# Patient Record
Sex: FEMALE | Race: WHITE | Employment: PART TIME
[De-identification: names, ages, dates, MRNs, and addresses within clinical notes are randomized per-mention and may not be internally consistent; named-entity substitution may affect disease eponyms.]

---

## 2017-01-09 ENCOUNTER — OFFICE VISIT (OUTPATIENT)
Dept: OBGYN | Facility: CLINIC | Age: 37
End: 2017-01-09

## 2017-01-09 VITALS
RESPIRATION RATE: 16 BRPM | HEART RATE: 84 BPM | SYSTOLIC BLOOD PRESSURE: 138 MMHG | WEIGHT: 213 LBS | DIASTOLIC BLOOD PRESSURE: 71 MMHG | BODY MASS INDEX: 38.96 KG/M2

## 2017-01-09 DIAGNOSIS — R10.31 RLQ ABDOMINAL PAIN: Primary | ICD-10-CM

## 2017-01-09 PROCEDURE — 99203 OFFICE O/P NEW LOW 30 MIN: CPT | Performed by: SPECIALIST

## 2017-01-09 ASSESSMENT — ENCOUNTER SYMPTOMS
ABDOMINAL DISTENTION: 0
CONSTIPATION: 0
APNEA: 0
DIARRHEA: 0
EYE PAIN: 0
NAUSEA: 0
VOMITING: 0
COUGH: 0
ABDOMINAL PAIN: 1

## 2017-01-16 ENCOUNTER — OFFICE VISIT (OUTPATIENT)
Dept: OBGYN | Facility: CLINIC | Age: 37
End: 2017-01-16

## 2017-01-16 DIAGNOSIS — R10.31 RLQ ABDOMINAL PAIN: ICD-10-CM

## 2017-01-16 PROCEDURE — 76856 US EXAM PELVIC COMPLETE: CPT | Performed by: SPECIALIST

## 2017-01-16 PROCEDURE — 76830 TRANSVAGINAL US NON-OB: CPT | Performed by: SPECIALIST

## 2017-02-08 ENCOUNTER — HOSPITAL ENCOUNTER (EMERGENCY)
Age: 37
Discharge: HOME | End: 2017-02-08
Attending: EMERGENCY MEDICINE
Payer: COMMERCIAL

## 2017-02-08 VITALS
BODY MASS INDEX: 39.38 KG/M2 | WEIGHT: 214 LBS | OXYGEN SATURATION: 96 % | HEART RATE: 89 BPM | HEIGHT: 62 IN | DIASTOLIC BLOOD PRESSURE: 80 MMHG | SYSTOLIC BLOOD PRESSURE: 121 MMHG | TEMPERATURE: 97.7 F | RESPIRATION RATE: 16 BRPM

## 2017-02-08 DIAGNOSIS — S16.1XXA NECK STRAIN, INITIAL ENCOUNTER: Primary | ICD-10-CM

## 2017-02-08 PROCEDURE — 99282 EMERGENCY DEPT VISIT SF MDM: CPT

## 2017-02-08 RX ORDER — PREDNISONE 50 MG/1
50 TABLET ORAL DAILY
Qty: 4 TABLET | Refills: 0 | Status: SHIPPED | OUTPATIENT
Start: 2017-02-08 | End: 2017-02-27

## 2017-02-08 RX ORDER — HYDROCODONE BITARTRATE AND ACETAMINOPHEN 5; 325 MG/1; MG/1
1 TABLET ORAL EVERY 6 HOURS PRN
Qty: 10 TABLET | Refills: 0 | Status: SHIPPED | OUTPATIENT
Start: 2017-02-08 | End: 2017-02-15

## 2017-02-08 ASSESSMENT — PAIN DESCRIPTION - ORIENTATION: ORIENTATION: RIGHT;MID

## 2017-02-08 ASSESSMENT — PAIN DESCRIPTION - PAIN TYPE: TYPE: ACUTE PAIN

## 2017-02-08 ASSESSMENT — PAIN DESCRIPTION - DESCRIPTORS: DESCRIPTORS: ACHING

## 2017-02-08 ASSESSMENT — PAIN SCALES - GENERAL: PAINLEVEL_OUTOF10: 7

## 2017-02-08 ASSESSMENT — PAIN DESCRIPTION - LOCATION: LOCATION: NECK

## 2017-02-09 ENCOUNTER — OFFICE VISIT (OUTPATIENT)
Dept: ORTHOPEDIC SURGERY | Facility: CLINIC | Age: 37
End: 2017-02-09

## 2017-02-09 DIAGNOSIS — M50.20 CERVICAL DISC HERNIATION: ICD-10-CM

## 2017-02-09 DIAGNOSIS — M54.12 CERVICAL RADICULAR PAIN: Primary | ICD-10-CM

## 2017-02-09 DIAGNOSIS — M50.30 DEGENERATIVE DISC DISEASE, CERVICAL: ICD-10-CM

## 2017-02-09 PROCEDURE — 99213 OFFICE O/P EST LOW 20 MIN: CPT | Performed by: ORTHOPAEDIC SURGERY

## 2017-02-10 DIAGNOSIS — M54.12 CERVICAL RADICULAR PAIN: Primary | ICD-10-CM

## 2017-02-10 DIAGNOSIS — M50.30 DEGENERATIVE DISC DISEASE, CERVICAL: ICD-10-CM

## 2017-02-10 DIAGNOSIS — M50.20 CERVICAL DISC HERNIATION: ICD-10-CM

## 2017-02-13 ENCOUNTER — HOSPITAL ENCOUNTER (INPATIENT)
Age: 37
LOS: 1 days | Discharge: HOME | DRG: 202 | End: 2017-02-15
Attending: EMERGENCY MEDICINE | Admitting: INTERNAL MEDICINE
Payer: COMMERCIAL

## 2017-02-13 DIAGNOSIS — J45.901 ASTHMA WITH ACUTE EXACERBATION, UNSPECIFIED ASTHMA SEVERITY: Primary | ICD-10-CM

## 2017-02-13 LAB
ABSOLUTE EOS #: 0 K/UL (ref 0–0.4)
ABSOLUTE LYMPH #: 1.7 K/UL (ref 1–4.8)
ABSOLUTE MONO #: 0.2 K/UL (ref 0.1–1.3)
ANION GAP SERPL CALCULATED.3IONS-SCNC: 14 MMOL/L (ref 9–17)
BASOPHILS # BLD: 0 % (ref 0–2)
BASOPHILS ABSOLUTE: 0 K/UL (ref 0–0.2)
BUN BLDV-MCNC: 17 MG/DL (ref 6–20)
BUN/CREAT BLD: ABNORMAL (ref 9–20)
CALCIUM SERPL-MCNC: 8.9 MG/DL (ref 8.6–10.4)
CHLORIDE BLD-SCNC: 103 MMOL/L (ref 98–107)
CO2: 21 MMOL/L (ref 20–31)
CREAT SERPL-MCNC: 0.67 MG/DL (ref 0.5–0.9)
DIFFERENTIAL TYPE: ABNORMAL
EOSINOPHILS RELATIVE PERCENT: 0 % (ref 0–4)
GFR AFRICAN AMERICAN: >60 ML/MIN
GFR NON-AFRICAN AMERICAN: >60 ML/MIN
GFR SERPL CREATININE-BSD FRML MDRD: ABNORMAL ML/MIN/{1.73_M2}
GFR SERPL CREATININE-BSD FRML MDRD: ABNORMAL ML/MIN/{1.73_M2}
GLUCOSE BLD-MCNC: 152 MG/DL (ref 70–99)
HCT VFR BLD CALC: 39.5 % (ref 36–46)
HEMOGLOBIN: 13.1 G/DL (ref 12–16)
LYMPHOCYTES # BLD: 18 % (ref 24–44)
MCH RBC QN AUTO: 26.2 PG (ref 26–34)
MCHC RBC AUTO-ENTMCNC: 33.3 G/DL (ref 31–37)
MCV RBC AUTO: 78.6 FL (ref 80–100)
MONOCYTES # BLD: 2 % (ref 1–7)
PDW BLD-RTO: 15.3 % (ref 11.5–14.9)
PLATELET # BLD: 303 K/UL (ref 150–450)
PLATELET ESTIMATE: ABNORMAL
PMV BLD AUTO: 8.6 FL (ref 6–12)
POTASSIUM SERPL-SCNC: 4.2 MMOL/L (ref 3.7–5.3)
RBC # BLD: 5.02 M/UL (ref 4–5.2)
RBC # BLD: ABNORMAL 10*6/UL
SEG NEUTROPHILS: 80 % (ref 36–66)
SEGMENTED NEUTROPHILS ABSOLUTE COUNT: 7.4 K/UL (ref 1.3–9.1)
SODIUM BLD-SCNC: 138 MMOL/L (ref 135–144)
WBC # BLD: 9.3 K/UL (ref 3.5–11)
WBC # BLD: ABNORMAL 10*3/UL

## 2017-02-13 PROCEDURE — 36415 COLL VENOUS BLD VENIPUNCTURE: CPT

## 2017-02-13 PROCEDURE — 6370000000 HC RX 637 (ALT 250 FOR IP): Performed by: EMERGENCY MEDICINE

## 2017-02-13 PROCEDURE — 94664 DEMO&/EVAL PT USE INHALER: CPT

## 2017-02-13 PROCEDURE — 85025 COMPLETE CBC W/AUTO DIFF WBC: CPT

## 2017-02-13 PROCEDURE — 99285 EMERGENCY DEPT VISIT HI MDM: CPT

## 2017-02-13 PROCEDURE — 94640 AIRWAY INHALATION TREATMENT: CPT

## 2017-02-13 PROCEDURE — G0378 HOSPITAL OBSERVATION PER HR: HCPCS

## 2017-02-13 PROCEDURE — 6360000002 HC RX W HCPCS: Performed by: EMERGENCY MEDICINE

## 2017-02-13 PROCEDURE — 80048 BASIC METABOLIC PNL TOTAL CA: CPT

## 2017-02-13 PROCEDURE — 96374 THER/PROPH/DIAG INJ IV PUSH: CPT

## 2017-02-13 RX ORDER — ALBUTEROL SULFATE 2.5 MG/3ML
5 SOLUTION RESPIRATORY (INHALATION)
Status: DISCONTINUED | OUTPATIENT
Start: 2017-02-13 | End: 2017-02-13 | Stop reason: ALTCHOICE

## 2017-02-13 RX ORDER — ALBUTEROL SULFATE 90 UG/1
2 AEROSOL, METERED RESPIRATORY (INHALATION)
Status: DISCONTINUED | OUTPATIENT
Start: 2017-02-13 | End: 2017-02-13

## 2017-02-13 RX ORDER — ALBUTEROL SULFATE 2.5 MG/3ML
5 SOLUTION RESPIRATORY (INHALATION)
Status: DISCONTINUED | OUTPATIENT
Start: 2017-02-13 | End: 2017-02-13

## 2017-02-13 RX ORDER — SODIUM CHLORIDE 0.9 % (FLUSH) 0.9 %
10 SYRINGE (ML) INJECTION EVERY 12 HOURS SCHEDULED
Status: DISCONTINUED | OUTPATIENT
Start: 2017-02-13 | End: 2017-02-14 | Stop reason: SDUPTHER

## 2017-02-13 RX ORDER — BENZONATATE 100 MG/1
200 CAPSULE ORAL ONCE
Status: COMPLETED | OUTPATIENT
Start: 2017-02-13 | End: 2017-02-13

## 2017-02-13 RX ORDER — SODIUM CHLORIDE 0.9 % (FLUSH) 0.9 %
10 SYRINGE (ML) INJECTION PRN
Status: DISCONTINUED | OUTPATIENT
Start: 2017-02-13 | End: 2017-02-14 | Stop reason: SDUPTHER

## 2017-02-13 RX ORDER — IPRATROPIUM BROMIDE AND ALBUTEROL SULFATE 2.5; .5 MG/3ML; MG/3ML
1 SOLUTION RESPIRATORY (INHALATION)
Status: DISCONTINUED | OUTPATIENT
Start: 2017-02-13 | End: 2017-02-13

## 2017-02-13 RX ORDER — IPRATROPIUM BROMIDE AND ALBUTEROL SULFATE 2.5; .5 MG/3ML; MG/3ML
1 SOLUTION RESPIRATORY (INHALATION)
Status: DISCONTINUED | OUTPATIENT
Start: 2017-02-13 | End: 2017-02-13 | Stop reason: ALTCHOICE

## 2017-02-13 RX ORDER — METHYLPREDNISOLONE SODIUM SUCCINATE 125 MG/2ML
125 INJECTION, POWDER, LYOPHILIZED, FOR SOLUTION INTRAMUSCULAR; INTRAVENOUS ONCE
Status: COMPLETED | OUTPATIENT
Start: 2017-02-13 | End: 2017-02-13

## 2017-02-13 RX ORDER — ALBUTEROL SULFATE 90 UG/1
2 AEROSOL, METERED RESPIRATORY (INHALATION)
Status: DISCONTINUED | OUTPATIENT
Start: 2017-02-13 | End: 2017-02-13 | Stop reason: ALTCHOICE

## 2017-02-13 RX ORDER — ACETAMINOPHEN 325 MG/1
650 TABLET ORAL EVERY 4 HOURS PRN
Status: DISCONTINUED | OUTPATIENT
Start: 2017-02-13 | End: 2017-02-15 | Stop reason: HOSPADM

## 2017-02-13 RX ORDER — AZITHROMYCIN 250 MG/1
500 TABLET, FILM COATED ORAL ONCE
Status: COMPLETED | OUTPATIENT
Start: 2017-02-13 | End: 2017-02-13

## 2017-02-13 RX ADMIN — IPRATROPIUM BROMIDE AND ALBUTEROL SULFATE 1 AMPULE: .5; 3 SOLUTION RESPIRATORY (INHALATION) at 20:30

## 2017-02-13 RX ADMIN — AZITHROMYCIN 500 MG: 250 TABLET, FILM COATED ORAL at 22:30

## 2017-02-13 RX ADMIN — BENZONATATE 200 MG: 100 CAPSULE ORAL at 22:30

## 2017-02-13 RX ADMIN — IPRATROPIUM BROMIDE AND ALBUTEROL SULFATE 1 AMPULE: .5; 3 SOLUTION RESPIRATORY (INHALATION) at 20:43

## 2017-02-13 RX ADMIN — IPRATROPIUM BROMIDE AND ALBUTEROL SULFATE 1 AMPULE: .5; 3 SOLUTION RESPIRATORY (INHALATION) at 20:56

## 2017-02-13 RX ADMIN — METHYLPREDNISOLONE SODIUM SUCCINATE 125 MG: 125 INJECTION, POWDER, FOR SOLUTION INTRAMUSCULAR; INTRAVENOUS at 21:25

## 2017-02-13 ASSESSMENT — ENCOUNTER SYMPTOMS
VOMITING: 0
ABDOMINAL PAIN: 0
SHORTNESS OF BREATH: 1
SORE THROAT: 0
EYE PAIN: 0
COUGH: 0
WHEEZING: 1
BACK PAIN: 0
NAUSEA: 0
DIARRHEA: 0

## 2017-02-14 ENCOUNTER — APPOINTMENT (OUTPATIENT)
Dept: GENERAL RADIOLOGY | Age: 37
DRG: 202 | End: 2017-02-14
Payer: COMMERCIAL

## 2017-02-14 PROBLEM — Z72.0 TOBACCO USE: Status: ACTIVE | Noted: 2017-02-14

## 2017-02-14 LAB
ALBUMIN SERPL-MCNC: 3.9 G/DL (ref 3.5–5.2)
ALBUMIN/GLOBULIN RATIO: ABNORMAL (ref 1–2.5)
ALP BLD-CCNC: 59 U/L (ref 35–104)
ALT SERPL-CCNC: 21 U/L (ref 5–33)
ANION GAP SERPL CALCULATED.3IONS-SCNC: 14 MMOL/L (ref 9–17)
AST SERPL-CCNC: 15 U/L
BILIRUB SERPL-MCNC: 0.25 MG/DL (ref 0.3–1.2)
BUN BLDV-MCNC: 15 MG/DL (ref 6–20)
BUN/CREAT BLD: ABNORMAL (ref 9–20)
CALCIUM SERPL-MCNC: 8.9 MG/DL (ref 8.6–10.4)
CHLORIDE BLD-SCNC: 105 MMOL/L (ref 98–107)
CO2: 19 MMOL/L (ref 20–31)
CREAT SERPL-MCNC: 0.6 MG/DL (ref 0.5–0.9)
GFR AFRICAN AMERICAN: >60 ML/MIN
GFR NON-AFRICAN AMERICAN: >60 ML/MIN
GFR SERPL CREATININE-BSD FRML MDRD: ABNORMAL ML/MIN/{1.73_M2}
GFR SERPL CREATININE-BSD FRML MDRD: ABNORMAL ML/MIN/{1.73_M2}
GLUCOSE BLD-MCNC: 124 MG/DL (ref 65–105)
GLUCOSE BLD-MCNC: 139 MG/DL (ref 70–99)
GLUCOSE BLD-MCNC: 183 MG/DL (ref 65–105)
HCT VFR BLD CALC: 38.8 % (ref 36–46)
HEMOGLOBIN: 12.2 G/DL (ref 12–16)
MCH RBC QN AUTO: 24.6 PG (ref 26–34)
MCHC RBC AUTO-ENTMCNC: 31.3 G/DL (ref 31–37)
MCV RBC AUTO: 78.5 FL (ref 80–100)
PDW BLD-RTO: 15.2 % (ref 11.5–14.9)
PLATELET # BLD: 301 K/UL (ref 150–450)
PMV BLD AUTO: 8.5 FL (ref 6–12)
POTASSIUM SERPL-SCNC: 4.3 MMOL/L (ref 3.7–5.3)
RBC # BLD: 4.95 M/UL (ref 4–5.2)
SODIUM BLD-SCNC: 138 MMOL/L (ref 135–144)
TOTAL PROTEIN: 6.7 G/DL (ref 6.4–8.3)
WBC # BLD: 11.8 K/UL (ref 3.5–11)

## 2017-02-14 PROCEDURE — 80053 COMPREHEN METABOLIC PANEL: CPT

## 2017-02-14 PROCEDURE — 6370000000 HC RX 637 (ALT 250 FOR IP): Performed by: NURSE PRACTITIONER

## 2017-02-14 PROCEDURE — 94761 N-INVAS EAR/PLS OXIMETRY MLT: CPT

## 2017-02-14 PROCEDURE — 94760 N-INVAS EAR/PLS OXIMETRY 1: CPT

## 2017-02-14 PROCEDURE — 6360000002 HC RX W HCPCS: Performed by: NURSE PRACTITIONER

## 2017-02-14 PROCEDURE — 71020 XR CHEST STANDARD TWO VW: CPT

## 2017-02-14 PROCEDURE — 6370000000 HC RX 637 (ALT 250 FOR IP): Performed by: INTERNAL MEDICINE

## 2017-02-14 PROCEDURE — 99223 1ST HOSP IP/OBS HIGH 75: CPT | Performed by: INTERNAL MEDICINE

## 2017-02-14 PROCEDURE — 85027 COMPLETE CBC AUTOMATED: CPT

## 2017-02-14 PROCEDURE — 87631 RESP VIRUS 3-5 TARGETS: CPT

## 2017-02-14 PROCEDURE — 2580000003 HC RX 258: Performed by: NURSE PRACTITIONER

## 2017-02-14 PROCEDURE — 94664 DEMO&/EVAL PT USE INHALER: CPT

## 2017-02-14 PROCEDURE — 96376 TX/PRO/DX INJ SAME DRUG ADON: CPT

## 2017-02-14 PROCEDURE — 71020 XR CHEST STANDARD TWO VW: CPT | Performed by: RADIOLOGY

## 2017-02-14 PROCEDURE — 82947 ASSAY GLUCOSE BLOOD QUANT: CPT

## 2017-02-14 PROCEDURE — 94640 AIRWAY INHALATION TREATMENT: CPT

## 2017-02-14 PROCEDURE — 94667 MNPJ CHEST WALL 1ST: CPT

## 2017-02-14 PROCEDURE — 96372 THER/PROPH/DIAG INJ SC/IM: CPT

## 2017-02-14 PROCEDURE — 36415 COLL VENOUS BLD VENIPUNCTURE: CPT

## 2017-02-14 PROCEDURE — 96374 THER/PROPH/DIAG INJ IV PUSH: CPT

## 2017-02-14 PROCEDURE — 6360000002 HC RX W HCPCS: Performed by: INTERNAL MEDICINE

## 2017-02-14 PROCEDURE — 1200000000 HC SEMI PRIVATE

## 2017-02-14 RX ORDER — PANTOPRAZOLE SODIUM 40 MG/1
40 TABLET, DELAYED RELEASE ORAL
Status: DISCONTINUED | OUTPATIENT
Start: 2017-02-14 | End: 2017-02-15 | Stop reason: HOSPADM

## 2017-02-14 RX ORDER — SUCRALFATE ORAL 1 G/10ML
1 SUSPENSION ORAL 4 TIMES DAILY
Status: DISCONTINUED | OUTPATIENT
Start: 2017-02-14 | End: 2017-02-15 | Stop reason: HOSPADM

## 2017-02-14 RX ORDER — DEXTROSE MONOHYDRATE 50 MG/ML
100 INJECTION, SOLUTION INTRAVENOUS PRN
Status: DISCONTINUED | OUTPATIENT
Start: 2017-02-14 | End: 2017-02-15 | Stop reason: HOSPADM

## 2017-02-14 RX ORDER — SUMATRIPTAN 100 MG/1
100 TABLET, FILM COATED ORAL
Status: DISPENSED | OUTPATIENT
Start: 2017-02-14 | End: 2017-02-14

## 2017-02-14 RX ORDER — ONDANSETRON 2 MG/ML
4 INJECTION INTRAMUSCULAR; INTRAVENOUS EVERY 6 HOURS PRN
Status: DISCONTINUED | OUTPATIENT
Start: 2017-02-14 | End: 2017-02-15 | Stop reason: HOSPADM

## 2017-02-14 RX ORDER — POTASSIUM CHLORIDE 20 MEQ/1
40 TABLET, EXTENDED RELEASE ORAL PRN
Status: DISCONTINUED | OUTPATIENT
Start: 2017-02-14 | End: 2017-02-15 | Stop reason: HOSPADM

## 2017-02-14 RX ORDER — BENZONATATE 100 MG/1
200 CAPSULE ORAL 3 TIMES DAILY PRN
Status: DISCONTINUED | OUTPATIENT
Start: 2017-02-14 | End: 2017-02-15 | Stop reason: HOSPADM

## 2017-02-14 RX ORDER — ALBUTEROL SULFATE 2.5 MG/3ML
2.5 SOLUTION RESPIRATORY (INHALATION) EVERY 4 HOURS PRN
Status: DISCONTINUED | OUTPATIENT
Start: 2017-02-14 | End: 2017-02-15 | Stop reason: HOSPADM

## 2017-02-14 RX ORDER — HYDROCODONE BITARTRATE AND ACETAMINOPHEN 5; 325 MG/1; MG/1
1 TABLET ORAL EVERY 6 HOURS PRN
Status: DISCONTINUED | OUTPATIENT
Start: 2017-02-14 | End: 2017-02-14

## 2017-02-14 RX ORDER — HYDROCODONE BITARTRATE AND ACETAMINOPHEN 5; 325 MG/1; MG/1
1 TABLET ORAL EVERY 4 HOURS PRN
Status: DISCONTINUED | OUTPATIENT
Start: 2017-02-14 | End: 2017-02-15 | Stop reason: HOSPADM

## 2017-02-14 RX ORDER — BISACODYL 10 MG
10 SUPPOSITORY, RECTAL RECTAL DAILY PRN
Status: DISCONTINUED | OUTPATIENT
Start: 2017-02-14 | End: 2017-02-15 | Stop reason: HOSPADM

## 2017-02-14 RX ORDER — MONTELUKAST SODIUM 10 MG/1
10 TABLET ORAL NIGHTLY
Status: DISCONTINUED | OUTPATIENT
Start: 2017-02-14 | End: 2017-02-15 | Stop reason: HOSPADM

## 2017-02-14 RX ORDER — SODIUM CHLORIDE 0.9 % (FLUSH) 0.9 %
10 SYRINGE (ML) INJECTION EVERY 12 HOURS SCHEDULED
Status: DISCONTINUED | OUTPATIENT
Start: 2017-02-14 | End: 2017-02-15 | Stop reason: HOSPADM

## 2017-02-14 RX ORDER — PAROXETINE HYDROCHLORIDE 20 MG/1
20 TABLET, FILM COATED ORAL EVERY MORNING
Status: DISCONTINUED | OUTPATIENT
Start: 2017-02-14 | End: 2017-02-15 | Stop reason: HOSPADM

## 2017-02-14 RX ORDER — TOPIRAMATE 25 MG/1
50 TABLET ORAL 2 TIMES DAILY
Status: DISCONTINUED | OUTPATIENT
Start: 2017-02-14 | End: 2017-02-15 | Stop reason: HOSPADM

## 2017-02-14 RX ORDER — LAMOTRIGINE 25 MG/1
25 TABLET ORAL 2 TIMES DAILY
Status: DISCONTINUED | OUTPATIENT
Start: 2017-02-14 | End: 2017-02-15 | Stop reason: HOSPADM

## 2017-02-14 RX ORDER — NAPROXEN 500 MG/1
500 TABLET ORAL 2 TIMES DAILY
Status: DISCONTINUED | OUTPATIENT
Start: 2017-02-14 | End: 2017-02-15 | Stop reason: HOSPADM

## 2017-02-14 RX ORDER — METHYLPREDNISOLONE SODIUM SUCCINATE 125 MG/2ML
60 INJECTION, POWDER, LYOPHILIZED, FOR SOLUTION INTRAMUSCULAR; INTRAVENOUS EVERY 6 HOURS
Status: DISCONTINUED | OUTPATIENT
Start: 2017-02-14 | End: 2017-02-15 | Stop reason: HOSPADM

## 2017-02-14 RX ORDER — GABAPENTIN 300 MG/1
300 CAPSULE ORAL 3 TIMES DAILY
Status: DISCONTINUED | OUTPATIENT
Start: 2017-02-14 | End: 2017-02-15 | Stop reason: HOSPADM

## 2017-02-14 RX ORDER — POTASSIUM CHLORIDE 20MEQ/15ML
40 LIQUID (ML) ORAL PRN
Status: DISCONTINUED | OUTPATIENT
Start: 2017-02-14 | End: 2017-02-15 | Stop reason: HOSPADM

## 2017-02-14 RX ORDER — AZITHROMYCIN 250 MG/1
250 TABLET, FILM COATED ORAL DAILY
Status: DISCONTINUED | OUTPATIENT
Start: 2017-02-14 | End: 2017-02-15 | Stop reason: HOSPADM

## 2017-02-14 RX ORDER — NICOTINE POLACRILEX 4 MG
15 LOZENGE BUCCAL PRN
Status: DISCONTINUED | OUTPATIENT
Start: 2017-02-14 | End: 2017-02-15 | Stop reason: HOSPADM

## 2017-02-14 RX ORDER — POTASSIUM CHLORIDE 7.45 MG/ML
10 INJECTION INTRAVENOUS PRN
Status: DISCONTINUED | OUTPATIENT
Start: 2017-02-14 | End: 2017-02-15 | Stop reason: HOSPADM

## 2017-02-14 RX ORDER — SODIUM CHLORIDE 0.9 % (FLUSH) 0.9 %
10 SYRINGE (ML) INJECTION PRN
Status: DISCONTINUED | OUTPATIENT
Start: 2017-02-14 | End: 2017-02-15 | Stop reason: HOSPADM

## 2017-02-14 RX ORDER — GUAIFENESIN 600 MG/1
600 TABLET, EXTENDED RELEASE ORAL 2 TIMES DAILY
Status: DISCONTINUED | OUTPATIENT
Start: 2017-02-14 | End: 2017-02-15 | Stop reason: HOSPADM

## 2017-02-14 RX ORDER — CETIRIZINE HYDROCHLORIDE 10 MG/1
10 TABLET ORAL DAILY
Status: DISCONTINUED | OUTPATIENT
Start: 2017-02-14 | End: 2017-02-15 | Stop reason: HOSPADM

## 2017-02-14 RX ORDER — IPRATROPIUM BROMIDE AND ALBUTEROL SULFATE 2.5; .5 MG/3ML; MG/3ML
1 SOLUTION RESPIRATORY (INHALATION)
Status: DISCONTINUED | OUTPATIENT
Start: 2017-02-14 | End: 2017-02-15 | Stop reason: HOSPADM

## 2017-02-14 RX ORDER — DEXTROSE MONOHYDRATE 25 G/50ML
12.5 INJECTION, SOLUTION INTRAVENOUS PRN
Status: DISCONTINUED | OUTPATIENT
Start: 2017-02-14 | End: 2017-02-15 | Stop reason: HOSPADM

## 2017-02-14 RX ADMIN — SUCRALFATE 1 G: 1 SUSPENSION ORAL at 01:21

## 2017-02-14 RX ADMIN — LAMOTRIGINE 25 MG: 25 TABLET ORAL at 08:17

## 2017-02-14 RX ADMIN — NAPROXEN 500 MG: 500 TABLET ORAL at 01:20

## 2017-02-14 RX ADMIN — IPRATROPIUM BROMIDE AND ALBUTEROL SULFATE 1 AMPULE: .5; 3 SOLUTION RESPIRATORY (INHALATION) at 07:18

## 2017-02-14 RX ADMIN — METFORMIN HYDROCHLORIDE 500 MG: 500 TABLET ORAL at 08:14

## 2017-02-14 RX ADMIN — PAROXETINE HYDROCHLORIDE 20 MG: 20 TABLET, FILM COATED ORAL at 08:15

## 2017-02-14 RX ADMIN — SUCRALFATE 1 G: 1 SUSPENSION ORAL at 19:30

## 2017-02-14 RX ADMIN — HYDROCODONE BITARTRATE AND ACETAMINOPHEN 1 TABLET: 5; 325 TABLET ORAL at 19:30

## 2017-02-14 RX ADMIN — IPRATROPIUM BROMIDE AND ALBUTEROL SULFATE 1 AMPULE: .5; 3 SOLUTION RESPIRATORY (INHALATION) at 10:53

## 2017-02-14 RX ADMIN — MOMETASONE FUROATE AND FORMOTEROL FUMARATE DIHYDRATE 2 PUFF: 200; 5 AEROSOL RESPIRATORY (INHALATION) at 08:15

## 2017-02-14 RX ADMIN — GABAPENTIN 300 MG: 300 CAPSULE ORAL at 13:50

## 2017-02-14 RX ADMIN — HYDROCODONE BITARTRATE AND ACETAMINOPHEN 1 TABLET: 5; 325 TABLET ORAL at 01:03

## 2017-02-14 RX ADMIN — GABAPENTIN 300 MG: 300 CAPSULE ORAL at 19:31

## 2017-02-14 RX ADMIN — LAMOTRIGINE 25 MG: 25 TABLET ORAL at 01:21

## 2017-02-14 RX ADMIN — Medication 10 ML: at 19:32

## 2017-02-14 RX ADMIN — SUCRALFATE 1 G: 1 SUSPENSION ORAL at 08:16

## 2017-02-14 RX ADMIN — NAPROXEN 500 MG: 500 TABLET ORAL at 19:30

## 2017-02-14 RX ADMIN — AZITHROMYCIN 250 MG: 250 TABLET, FILM COATED ORAL at 19:31

## 2017-02-14 RX ADMIN — METHYLPREDNISOLONE SODIUM SUCCINATE 60 MG: 125 INJECTION, POWDER, FOR SOLUTION INTRAMUSCULAR; INTRAVENOUS at 13:50

## 2017-02-14 RX ADMIN — NAPROXEN 500 MG: 500 TABLET ORAL at 08:16

## 2017-02-14 RX ADMIN — METHYLPREDNISOLONE SODIUM SUCCINATE 60 MG: 125 INJECTION, POWDER, FOR SOLUTION INTRAMUSCULAR; INTRAVENOUS at 03:27

## 2017-02-14 RX ADMIN — IPRATROPIUM BROMIDE AND ALBUTEROL SULFATE 1 AMPULE: .5; 3 SOLUTION RESPIRATORY (INHALATION) at 20:38

## 2017-02-14 RX ADMIN — GABAPENTIN 300 MG: 300 CAPSULE ORAL at 08:14

## 2017-02-14 RX ADMIN — NICOTINE POLACRILEX 2 MG: 2 GUM, CHEWING ORAL at 12:25

## 2017-02-14 RX ADMIN — TOPIRAMATE 50 MG: 25 TABLET, FILM COATED ORAL at 19:30

## 2017-02-14 RX ADMIN — Medication 10 ML: at 08:17

## 2017-02-14 RX ADMIN — MONTELUKAST SODIUM 10 MG: 10 TABLET, FILM COATED ORAL at 19:30

## 2017-02-14 RX ADMIN — METFORMIN HYDROCHLORIDE 500 MG: 500 TABLET ORAL at 19:27

## 2017-02-14 RX ADMIN — CETIRIZINE HYDROCHLORIDE 10 MG: 10 TABLET, FILM COATED ORAL at 08:15

## 2017-02-14 RX ADMIN — PANTOPRAZOLE SODIUM 40 MG: 40 TABLET, DELAYED RELEASE ORAL at 19:31

## 2017-02-14 RX ADMIN — IPRATROPIUM BROMIDE AND ALBUTEROL SULFATE 1 AMPULE: .5; 3 SOLUTION RESPIRATORY (INHALATION) at 15:07

## 2017-02-14 RX ADMIN — MOMETASONE FUROATE AND FORMOTEROL FUMARATE DIHYDRATE 2 PUFF: 200; 5 AEROSOL RESPIRATORY (INHALATION) at 19:31

## 2017-02-14 RX ADMIN — GUAIFENESIN 600 MG: 600 TABLET, EXTENDED RELEASE ORAL at 19:30

## 2017-02-14 RX ADMIN — HYDROCODONE BITARTRATE AND ACETAMINOPHEN 1 TABLET: 5; 325 TABLET ORAL at 07:08

## 2017-02-14 RX ADMIN — LAMOTRIGINE 25 MG: 25 TABLET ORAL at 19:30

## 2017-02-14 RX ADMIN — ONDANSETRON 4 MG: 2 INJECTION INTRAMUSCULAR; INTRAVENOUS at 17:28

## 2017-02-14 RX ADMIN — NICOTINE POLACRILEX 2 MG: 2 GUM, CHEWING ORAL at 17:41

## 2017-02-14 RX ADMIN — GUAIFENESIN 600 MG: 600 TABLET, EXTENDED RELEASE ORAL at 08:15

## 2017-02-14 RX ADMIN — BENZONATATE 200 MG: 100 CAPSULE ORAL at 08:15

## 2017-02-14 RX ADMIN — TOPIRAMATE 50 MG: 25 TABLET, FILM COATED ORAL at 01:25

## 2017-02-14 RX ADMIN — HYDROCODONE BITARTRATE AND ACETAMINOPHEN 1 TABLET: 5; 325 TABLET ORAL at 13:50

## 2017-02-14 RX ADMIN — PANTOPRAZOLE SODIUM 40 MG: 40 TABLET, DELAYED RELEASE ORAL at 07:08

## 2017-02-14 RX ADMIN — MOMETASONE FUROATE AND FORMOTEROL FUMARATE DIHYDRATE 2 PUFF: 200; 5 AEROSOL RESPIRATORY (INHALATION) at 01:20

## 2017-02-14 RX ADMIN — SUCRALFATE 1 G: 1 SUSPENSION ORAL at 12:26

## 2017-02-14 RX ADMIN — ENOXAPARIN SODIUM 40 MG: 40 INJECTION SUBCUTANEOUS at 08:18

## 2017-02-14 RX ADMIN — METHYLPREDNISOLONE SODIUM SUCCINATE 60 MG: 125 INJECTION, POWDER, FOR SOLUTION INTRAMUSCULAR; INTRAVENOUS at 19:30

## 2017-02-14 RX ADMIN — TOPIRAMATE 50 MG: 25 TABLET, FILM COATED ORAL at 08:16

## 2017-02-14 ASSESSMENT — ENCOUNTER SYMPTOMS
NAUSEA: 0
COUGH: 1
VOMITING: 0
WHEEZING: 1
SHORTNESS OF BREATH: 1
BLURRED VISION: 0
DOUBLE VISION: 0
SORE THROAT: 0
ABDOMINAL PAIN: 0
CONSTIPATION: 0
SPUTUM PRODUCTION: 0
DIARRHEA: 0
ORTHOPNEA: 0
BACK PAIN: 0

## 2017-02-14 ASSESSMENT — PAIN SCALES - GENERAL
PAINLEVEL_OUTOF10: 8
PAINLEVEL_OUTOF10: 9
PAINLEVEL_OUTOF10: 6
PAINLEVEL_OUTOF10: 7
PAINLEVEL_OUTOF10: 8
PAINLEVEL_OUTOF10: 7
PAINLEVEL_OUTOF10: 7
PAINLEVEL_OUTOF10: 9
PAINLEVEL_OUTOF10: 8
PAINLEVEL_OUTOF10: 8

## 2017-02-14 ASSESSMENT — PAIN DESCRIPTION - LOCATION: LOCATION: NECK

## 2017-02-14 ASSESSMENT — PAIN DESCRIPTION - PAIN TYPE: TYPE: CHRONIC PAIN

## 2017-02-15 ENCOUNTER — TELEPHONE (OUTPATIENT)
Dept: GASTROENTEROLOGY | Facility: CLINIC | Age: 37
End: 2017-02-15

## 2017-02-15 VITALS
TEMPERATURE: 98.2 F | RESPIRATION RATE: 20 BRPM | BODY MASS INDEX: 39.84 KG/M2 | SYSTOLIC BLOOD PRESSURE: 120 MMHG | HEIGHT: 62 IN | OXYGEN SATURATION: 96 % | WEIGHT: 216.49 LBS | DIASTOLIC BLOOD PRESSURE: 63 MMHG | HEART RATE: 110 BPM

## 2017-02-15 LAB
ANION GAP SERPL CALCULATED.3IONS-SCNC: 17 MMOL/L (ref 9–17)
BUN BLDV-MCNC: 23 MG/DL (ref 6–20)
BUN/CREAT BLD: ABNORMAL (ref 9–20)
CALCIUM SERPL-MCNC: 8.7 MG/DL (ref 8.6–10.4)
CHLORIDE BLD-SCNC: 104 MMOL/L (ref 98–107)
CO2: 18 MMOL/L (ref 20–31)
CREAT SERPL-MCNC: 0.69 MG/DL (ref 0.5–0.9)
DIRECT EXAM: ABNORMAL
GFR AFRICAN AMERICAN: >60 ML/MIN
GFR NON-AFRICAN AMERICAN: >60 ML/MIN
GFR SERPL CREATININE-BSD FRML MDRD: ABNORMAL ML/MIN/{1.73_M2}
GFR SERPL CREATININE-BSD FRML MDRD: ABNORMAL ML/MIN/{1.73_M2}
GLUCOSE BLD-MCNC: 141 MG/DL (ref 65–105)
GLUCOSE BLD-MCNC: 166 MG/DL (ref 70–99)
GLUCOSE BLD-MCNC: 182 MG/DL (ref 65–105)
Lab: ABNORMAL
POTASSIUM SERPL-SCNC: 4 MMOL/L (ref 3.7–5.3)
SODIUM BLD-SCNC: 139 MMOL/L (ref 135–144)
SPECIMEN DESCRIPTION: ABNORMAL
SPECIMEN DESCRIPTION: ABNORMAL
STATUS: ABNORMAL

## 2017-02-15 PROCEDURE — 6370000000 HC RX 637 (ALT 250 FOR IP): Performed by: INTERNAL MEDICINE

## 2017-02-15 PROCEDURE — 36415 COLL VENOUS BLD VENIPUNCTURE: CPT

## 2017-02-15 PROCEDURE — 6360000002 HC RX W HCPCS: Performed by: NURSE PRACTITIONER

## 2017-02-15 PROCEDURE — 6360000002 HC RX W HCPCS: Performed by: INTERNAL MEDICINE

## 2017-02-15 PROCEDURE — 6370000000 HC RX 637 (ALT 250 FOR IP): Performed by: NURSE PRACTITIONER

## 2017-02-15 PROCEDURE — 94640 AIRWAY INHALATION TREATMENT: CPT

## 2017-02-15 PROCEDURE — 99239 HOSP IP/OBS DSCHRG MGMT >30: CPT | Performed by: INTERNAL MEDICINE

## 2017-02-15 PROCEDURE — 2580000003 HC RX 258: Performed by: NURSE PRACTITIONER

## 2017-02-15 PROCEDURE — 80048 BASIC METABOLIC PNL TOTAL CA: CPT

## 2017-02-15 PROCEDURE — 82947 ASSAY GLUCOSE BLOOD QUANT: CPT

## 2017-02-15 RX ORDER — PREDNISONE 20 MG/1
20 TABLET ORAL 2 TIMES DAILY
Qty: 10 TABLET | Refills: 0 | Status: SHIPPED | OUTPATIENT
Start: 2017-02-15 | End: 2017-06-08

## 2017-02-15 RX ORDER — ALBUTEROL SULFATE 2.5 MG/3ML
2.5 SOLUTION RESPIRATORY (INHALATION) EVERY 4 HOURS PRN
Qty: 120 EACH | Refills: 3 | Status: SHIPPED | OUTPATIENT
Start: 2017-02-15 | End: 2020-04-14

## 2017-02-15 RX ORDER — BENZONATATE 200 MG/1
200 CAPSULE ORAL 3 TIMES DAILY PRN
Qty: 21 CAPSULE | Refills: 0 | Status: SHIPPED | OUTPATIENT
Start: 2017-02-15 | End: 2017-02-22

## 2017-02-15 RX ADMIN — ALBUTEROL SULFATE 2.5 MG: 2.5 SOLUTION RESPIRATORY (INHALATION) at 02:12

## 2017-02-15 RX ADMIN — MOMETASONE FUROATE AND FORMOTEROL FUMARATE DIHYDRATE 2 PUFF: 200; 5 AEROSOL RESPIRATORY (INHALATION) at 07:41

## 2017-02-15 RX ADMIN — MOMETASONE FUROATE AND FORMOTEROL FUMARATE DIHYDRATE 2 PUFF: 200; 5 AEROSOL RESPIRATORY (INHALATION) at 07:30

## 2017-02-15 RX ADMIN — PANTOPRAZOLE SODIUM 40 MG: 40 TABLET, DELAYED RELEASE ORAL at 06:06

## 2017-02-15 RX ADMIN — Medication 10 ML: at 02:13

## 2017-02-15 RX ADMIN — INSULIN LISPRO 1 UNITS: 100 INJECTION, SOLUTION INTRAVENOUS; SUBCUTANEOUS at 11:03

## 2017-02-15 RX ADMIN — SUCRALFATE 1 G: 1 SUSPENSION ORAL at 13:45

## 2017-02-15 RX ADMIN — PAROXETINE HYDROCHLORIDE 20 MG: 20 TABLET, FILM COATED ORAL at 07:30

## 2017-02-15 RX ADMIN — HYDROCODONE BITARTRATE AND ACETAMINOPHEN 1 TABLET: 5; 325 TABLET ORAL at 10:24

## 2017-02-15 RX ADMIN — NAPROXEN 500 MG: 500 TABLET ORAL at 07:32

## 2017-02-15 RX ADMIN — HYDROCODONE BITARTRATE AND ACETAMINOPHEN 1 TABLET: 5; 325 TABLET ORAL at 06:06

## 2017-02-15 RX ADMIN — HYDROCODONE BITARTRATE AND ACETAMINOPHEN 1 TABLET: 5; 325 TABLET ORAL at 14:07

## 2017-02-15 RX ADMIN — METFORMIN HYDROCHLORIDE 500 MG: 500 TABLET ORAL at 07:30

## 2017-02-15 RX ADMIN — GABAPENTIN 300 MG: 300 CAPSULE ORAL at 07:30

## 2017-02-15 RX ADMIN — AZITHROMYCIN 250 MG: 250 TABLET, FILM COATED ORAL at 07:29

## 2017-02-15 RX ADMIN — ENOXAPARIN SODIUM 40 MG: 40 INJECTION SUBCUTANEOUS at 07:30

## 2017-02-15 RX ADMIN — GUAIFENESIN 600 MG: 600 TABLET, EXTENDED RELEASE ORAL at 07:30

## 2017-02-15 RX ADMIN — INSULIN LISPRO 1 UNITS: 100 INJECTION, SOLUTION INTRAVENOUS; SUBCUTANEOUS at 07:52

## 2017-02-15 RX ADMIN — TOPIRAMATE 50 MG: 25 TABLET, FILM COATED ORAL at 07:33

## 2017-02-15 RX ADMIN — IPRATROPIUM BROMIDE AND ALBUTEROL SULFATE 1 AMPULE: .5; 3 SOLUTION RESPIRATORY (INHALATION) at 07:11

## 2017-02-15 RX ADMIN — SUCRALFATE 1 G: 1 SUSPENSION ORAL at 07:33

## 2017-02-15 RX ADMIN — CETIRIZINE HYDROCHLORIDE 10 MG: 10 TABLET, FILM COATED ORAL at 07:30

## 2017-02-15 RX ADMIN — METHYLPREDNISOLONE SODIUM SUCCINATE 60 MG: 125 INJECTION, POWDER, FOR SOLUTION INTRAMUSCULAR; INTRAVENOUS at 02:12

## 2017-02-15 RX ADMIN — LAMOTRIGINE 25 MG: 25 TABLET ORAL at 07:34

## 2017-02-15 RX ADMIN — GABAPENTIN 300 MG: 300 CAPSULE ORAL at 13:45

## 2017-02-15 RX ADMIN — IPRATROPIUM BROMIDE AND ALBUTEROL SULFATE 1 AMPULE: .5; 3 SOLUTION RESPIRATORY (INHALATION) at 12:08

## 2017-02-15 RX ADMIN — METHYLPREDNISOLONE SODIUM SUCCINATE 60 MG: 125 INJECTION, POWDER, FOR SOLUTION INTRAMUSCULAR; INTRAVENOUS at 07:31

## 2017-02-15 RX ADMIN — HYDROCODONE BITARTRATE AND ACETAMINOPHEN 1 TABLET: 5; 325 TABLET ORAL at 02:12

## 2017-02-15 ASSESSMENT — PAIN SCALES - GENERAL
PAINLEVEL_OUTOF10: 7
PAINLEVEL_OUTOF10: 7
PAINLEVEL_OUTOF10: 6
PAINLEVEL_OUTOF10: 5
PAINLEVEL_OUTOF10: 8
PAINLEVEL_OUTOF10: 8
PAINLEVEL_OUTOF10: 7

## 2017-02-16 ENCOUNTER — TELEPHONE (OUTPATIENT)
Dept: ORTHOPEDIC SURGERY | Facility: CLINIC | Age: 37
End: 2017-02-16

## 2017-02-16 RX ORDER — PANTOPRAZOLE SODIUM 40 MG/1
40 TABLET, DELAYED RELEASE ORAL
Qty: 60 TABLET | Refills: 5 | OUTPATIENT
Start: 2017-02-16

## 2017-02-17 RX ORDER — SUCRALFATE 1 G/1
1 TABLET ORAL 4 TIMES DAILY
Qty: 120 TABLET | Refills: 1 | Status: SHIPPED | OUTPATIENT
Start: 2017-02-17 | End: 2017-02-27

## 2017-02-27 ENCOUNTER — HOSPITAL ENCOUNTER (INPATIENT)
Age: 37
LOS: 4 days | Discharge: HOME | DRG: 191 | End: 2017-03-03
Attending: EMERGENCY MEDICINE | Admitting: INTERNAL MEDICINE
Payer: COMMERCIAL

## 2017-02-27 ENCOUNTER — APPOINTMENT (OUTPATIENT)
Dept: GENERAL RADIOLOGY | Age: 37
DRG: 191 | End: 2017-02-27
Payer: COMMERCIAL

## 2017-02-27 DIAGNOSIS — J44.1 COPD EXACERBATION (HCC): Primary | ICD-10-CM

## 2017-02-27 PROBLEM — E83.52 HYPERCALCEMIA: Status: ACTIVE | Noted: 2017-02-27

## 2017-02-27 LAB
ABSOLUTE EOS #: 0.1 K/UL (ref 0–0.4)
ABSOLUTE LYMPH #: 1.16 K/UL (ref 1–4.8)
ABSOLUTE MONO #: 0.58 K/UL (ref 0.1–1.3)
ALBUMIN SERPL-MCNC: 3.9 G/DL (ref 3.5–5.2)
ALBUMIN/GLOBULIN RATIO: NORMAL (ref 1–2.5)
ALP BLD-CCNC: 63 U/L (ref 35–104)
ALT SERPL-CCNC: 24 U/L (ref 5–33)
ANION GAP SERPL CALCULATED.3IONS-SCNC: 13 MMOL/L (ref 9–17)
AST SERPL-CCNC: 18 U/L
BASOPHILS # BLD: 1 % (ref 0–2)
BASOPHILS ABSOLUTE: 0.1 K/UL (ref 0–0.2)
BILIRUB SERPL-MCNC: 0.37 MG/DL (ref 0.3–1.2)
BILIRUBIN DIRECT: <0.08 MG/DL
BILIRUBIN, INDIRECT: NORMAL MG/DL (ref 0–1)
BUN BLDV-MCNC: 12 MG/DL (ref 6–20)
BUN/CREAT BLD: ABNORMAL (ref 9–20)
CALCIUM IONIZED: 1.34 MMOL/L (ref 1.13–1.33)
CALCIUM SERPL-MCNC: 10.9 MG/DL (ref 8.6–10.4)
CHLORIDE BLD-SCNC: 102 MMOL/L (ref 98–107)
CO2: 22 MMOL/L (ref 20–31)
CREAT SERPL-MCNC: 0.65 MG/DL (ref 0.5–0.9)
D-DIMER QUANTITATIVE: 0.38 MG/L FEU
DIFFERENTIAL TYPE: ABNORMAL
DIRECT EXAM: NORMAL
EOSINOPHILS RELATIVE PERCENT: 1 % (ref 0–4)
GFR AFRICAN AMERICAN: >60 ML/MIN
GFR NON-AFRICAN AMERICAN: >60 ML/MIN
GFR SERPL CREATININE-BSD FRML MDRD: ABNORMAL ML/MIN/{1.73_M2}
GFR SERPL CREATININE-BSD FRML MDRD: ABNORMAL ML/MIN/{1.73_M2}
GLOBULIN: NORMAL G/DL (ref 1.5–3.8)
GLUCOSE BLD-MCNC: 101 MG/DL (ref 70–99)
GLUCOSE BLD-MCNC: 212 MG/DL (ref 65–105)
HCT VFR BLD CALC: 39.3 % (ref 36–46)
HEMOGLOBIN: 12.7 G/DL (ref 12–16)
LACTIC ACID, WHOLE BLOOD: ABNORMAL MMOL/L (ref 0.7–2.1)
LACTIC ACID: 3.8 MMOL/L (ref 0.5–2.2)
LIPASE: 20 U/L (ref 13–60)
LYMPHOCYTES # BLD: 12 % (ref 24–44)
Lab: NORMAL
MAGNESIUM: 1.5 MG/DL (ref 1.6–2.6)
MCH RBC QN AUTO: 25.1 PG (ref 26–34)
MCHC RBC AUTO-ENTMCNC: 32.3 G/DL (ref 31–37)
MCV RBC AUTO: 77.6 FL (ref 80–100)
MONOCYTES # BLD: 6 % (ref 1–7)
MORPHOLOGY: ABNORMAL
PDW BLD-RTO: 15.5 % (ref 11.5–14.9)
PLATELET # BLD: 205 K/UL (ref 150–450)
PLATELET ESTIMATE: ABNORMAL
PMV BLD AUTO: 8.3 FL (ref 6–12)
POTASSIUM SERPL-SCNC: 4.2 MMOL/L (ref 3.7–5.3)
PTH INTACT: 10.48 PG/ML (ref 15–65)
RBC # BLD: 5.06 M/UL (ref 4–5.2)
RBC # BLD: ABNORMAL 10*6/UL
SEG NEUTROPHILS: 80 % (ref 36–66)
SEGMENTED NEUTROPHILS ABSOLUTE COUNT: 7.76 K/UL (ref 1.3–9.1)
SODIUM BLD-SCNC: 137 MMOL/L (ref 135–144)
SPECIMEN DESCRIPTION: NORMAL
SPECIMEN DESCRIPTION: NORMAL
STATUS: NORMAL
TOTAL PROTEIN: 6.9 G/DL (ref 6.4–8.3)
TSH SERPL DL<=0.05 MIU/L-ACNC: 0.51 MIU/L (ref 0.3–5)
WBC # BLD: 9.7 K/UL (ref 3.5–11)
WBC # BLD: ABNORMAL 10*3/UL

## 2017-02-27 PROCEDURE — 94664 DEMO&/EVAL PT USE INHALER: CPT

## 2017-02-27 PROCEDURE — 80076 HEPATIC FUNCTION PANEL: CPT

## 2017-02-27 PROCEDURE — 96375 TX/PRO/DX INJ NEW DRUG ADDON: CPT

## 2017-02-27 PROCEDURE — 2580000003 HC RX 258: Performed by: FAMILY MEDICINE

## 2017-02-27 PROCEDURE — 96374 THER/PROPH/DIAG INJ IV PUSH: CPT

## 2017-02-27 PROCEDURE — 1200000000 HC SEMI PRIVATE

## 2017-02-27 PROCEDURE — 82330 ASSAY OF CALCIUM: CPT

## 2017-02-27 PROCEDURE — 87804 INFLUENZA ASSAY W/OPTIC: CPT

## 2017-02-27 PROCEDURE — 85379 FIBRIN DEGRADATION QUANT: CPT

## 2017-02-27 PROCEDURE — 6370000000 HC RX 637 (ALT 250 FOR IP)

## 2017-02-27 PROCEDURE — 94640 AIRWAY INHALATION TREATMENT: CPT

## 2017-02-27 PROCEDURE — 84443 ASSAY THYROID STIM HORMONE: CPT

## 2017-02-27 PROCEDURE — 93005 ELECTROCARDIOGRAM TRACING: CPT

## 2017-02-27 PROCEDURE — 6360000002 HC RX W HCPCS: Performed by: EMERGENCY MEDICINE

## 2017-02-27 PROCEDURE — 83690 ASSAY OF LIPASE: CPT

## 2017-02-27 PROCEDURE — 6360000002 HC RX W HCPCS: Performed by: FAMILY MEDICINE

## 2017-02-27 PROCEDURE — 6370000000 HC RX 637 (ALT 250 FOR IP): Performed by: FAMILY MEDICINE

## 2017-02-27 PROCEDURE — 6370000000 HC RX 637 (ALT 250 FOR IP): Performed by: INTERNAL MEDICINE

## 2017-02-27 PROCEDURE — 36415 COLL VENOUS BLD VENIPUNCTURE: CPT

## 2017-02-27 PROCEDURE — 82947 ASSAY GLUCOSE BLOOD QUANT: CPT

## 2017-02-27 PROCEDURE — 99285 EMERGENCY DEPT VISIT HI MDM: CPT

## 2017-02-27 PROCEDURE — 85025 COMPLETE CBC W/AUTO DIFF WBC: CPT

## 2017-02-27 PROCEDURE — 83735 ASSAY OF MAGNESIUM: CPT

## 2017-02-27 PROCEDURE — 71020 XR CHEST STANDARD TWO VW: CPT | Performed by: RADIOLOGY

## 2017-02-27 PROCEDURE — 71020 XR CHEST STANDARD TWO VW: CPT

## 2017-02-27 PROCEDURE — 83970 ASSAY OF PARATHORMONE: CPT

## 2017-02-27 PROCEDURE — 83605 ASSAY OF LACTIC ACID: CPT

## 2017-02-27 PROCEDURE — 94760 N-INVAS EAR/PLS OXIMETRY 1: CPT

## 2017-02-27 PROCEDURE — 80048 BASIC METABOLIC PNL TOTAL CA: CPT

## 2017-02-27 PROCEDURE — 6370000000 HC RX 637 (ALT 250 FOR IP): Performed by: EMERGENCY MEDICINE

## 2017-02-27 RX ORDER — POTASSIUM CHLORIDE 7.45 MG/ML
10 INJECTION INTRAVENOUS PRN
Status: DISCONTINUED | OUTPATIENT
Start: 2017-02-27 | End: 2017-03-03 | Stop reason: HOSPADM

## 2017-02-27 RX ORDER — IPRATROPIUM BROMIDE AND ALBUTEROL SULFATE 2.5; .5 MG/3ML; MG/3ML
1 SOLUTION RESPIRATORY (INHALATION)
Status: DISCONTINUED | OUTPATIENT
Start: 2017-02-27 | End: 2017-03-03 | Stop reason: HOSPADM

## 2017-02-27 RX ORDER — BISACODYL 10 MG
10 SUPPOSITORY, RECTAL RECTAL DAILY PRN
Status: DISCONTINUED | OUTPATIENT
Start: 2017-02-27 | End: 2017-03-03 | Stop reason: HOSPADM

## 2017-02-27 RX ORDER — METHYLPREDNISOLONE SODIUM SUCCINATE 125 MG/2ML
125 INJECTION, POWDER, LYOPHILIZED, FOR SOLUTION INTRAMUSCULAR; INTRAVENOUS ONCE
Status: COMPLETED | OUTPATIENT
Start: 2017-02-27 | End: 2017-02-27

## 2017-02-27 RX ORDER — SODIUM CHLORIDE 0.9 % (FLUSH) 0.9 %
10 SYRINGE (ML) INJECTION EVERY 12 HOURS SCHEDULED
Status: DISCONTINUED | OUTPATIENT
Start: 2017-02-27 | End: 2017-03-03 | Stop reason: HOSPADM

## 2017-02-27 RX ORDER — ACETAMINOPHEN 325 MG/1
650 TABLET ORAL EVERY 4 HOURS PRN
Status: DISCONTINUED | OUTPATIENT
Start: 2017-02-27 | End: 2017-03-03 | Stop reason: HOSPADM

## 2017-02-27 RX ORDER — ONDANSETRON 2 MG/ML
4 INJECTION INTRAMUSCULAR; INTRAVENOUS EVERY 6 HOURS PRN
Status: DISCONTINUED | OUTPATIENT
Start: 2017-02-27 | End: 2017-03-03 | Stop reason: HOSPADM

## 2017-02-27 RX ORDER — MAGNESIUM HYDROXIDE/ALUMINUM HYDROXICE/SIMETHICONE 120; 1200; 1200 MG/30ML; MG/30ML; MG/30ML
30 SUSPENSION ORAL 3 TIMES DAILY PRN
Status: DISCONTINUED | OUTPATIENT
Start: 2017-02-27 | End: 2017-03-03 | Stop reason: HOSPADM

## 2017-02-27 RX ORDER — IPRATROPIUM BROMIDE AND ALBUTEROL SULFATE 2.5; .5 MG/3ML; MG/3ML
SOLUTION RESPIRATORY (INHALATION)
Status: COMPLETED
Start: 2017-02-27 | End: 2017-02-27

## 2017-02-27 RX ORDER — ALBUTEROL SULFATE 2.5 MG/3ML
2.5 SOLUTION RESPIRATORY (INHALATION) EVERY 6 HOURS PRN
Status: DISCONTINUED | OUTPATIENT
Start: 2017-02-27 | End: 2017-03-03 | Stop reason: HOSPADM

## 2017-02-27 RX ORDER — FENTANYL CITRATE 50 UG/ML
50 INJECTION, SOLUTION INTRAMUSCULAR; INTRAVENOUS ONCE
Status: COMPLETED | OUTPATIENT
Start: 2017-02-27 | End: 2017-02-27

## 2017-02-27 RX ORDER — SUCRALFATE ORAL 1 G/10ML
1 SUSPENSION ORAL 4 TIMES DAILY
COMMUNITY
End: 2017-04-26 | Stop reason: ALTCHOICE

## 2017-02-27 RX ORDER — SUCRALFATE ORAL 1 G/10ML
1 SUSPENSION ORAL 4 TIMES DAILY
Status: DISCONTINUED | OUTPATIENT
Start: 2017-02-27 | End: 2017-02-28

## 2017-02-27 RX ORDER — NICOTINE POLACRILEX 4 MG
15 LOZENGE BUCCAL PRN
Status: DISCONTINUED | OUTPATIENT
Start: 2017-02-27 | End: 2017-03-03 | Stop reason: HOSPADM

## 2017-02-27 RX ORDER — DEXTROSE MONOHYDRATE 25 G/50ML
12.5 INJECTION, SOLUTION INTRAVENOUS PRN
Status: DISCONTINUED | OUTPATIENT
Start: 2017-02-27 | End: 2017-03-03 | Stop reason: HOSPADM

## 2017-02-27 RX ORDER — CODEINE PHOSPHATE AND GUAIFENESIN 10; 100 MG/5ML; MG/5ML
5 SOLUTION ORAL EVERY 4 HOURS PRN
Status: DISCONTINUED | OUTPATIENT
Start: 2017-02-27 | End: 2017-02-27

## 2017-02-27 RX ORDER — POTASSIUM CHLORIDE 20 MEQ/1
40 TABLET, EXTENDED RELEASE ORAL PRN
Status: DISCONTINUED | OUTPATIENT
Start: 2017-02-27 | End: 2017-03-03 | Stop reason: HOSPADM

## 2017-02-27 RX ORDER — DEXTROSE MONOHYDRATE 50 MG/ML
100 INJECTION, SOLUTION INTRAVENOUS PRN
Status: DISCONTINUED | OUTPATIENT
Start: 2017-02-27 | End: 2017-03-03 | Stop reason: HOSPADM

## 2017-02-27 RX ORDER — POTASSIUM CHLORIDE 20MEQ/15ML
40 LIQUID (ML) ORAL PRN
Status: DISCONTINUED | OUTPATIENT
Start: 2017-02-27 | End: 2017-03-03 | Stop reason: HOSPADM

## 2017-02-27 RX ORDER — METHYLPREDNISOLONE SODIUM SUCCINATE 125 MG/2ML
80 INJECTION, POWDER, LYOPHILIZED, FOR SOLUTION INTRAMUSCULAR; INTRAVENOUS EVERY 8 HOURS
Status: DISCONTINUED | OUTPATIENT
Start: 2017-02-27 | End: 2017-03-02

## 2017-02-27 RX ORDER — ONDANSETRON 2 MG/ML
4 INJECTION INTRAMUSCULAR; INTRAVENOUS ONCE
Status: COMPLETED | OUTPATIENT
Start: 2017-02-27 | End: 2017-02-27

## 2017-02-27 RX ORDER — TOPIRAMATE 25 MG/1
50 TABLET ORAL 2 TIMES DAILY
Status: DISCONTINUED | OUTPATIENT
Start: 2017-02-27 | End: 2017-03-03 | Stop reason: HOSPADM

## 2017-02-27 RX ORDER — SODIUM CHLORIDE 0.9 % (FLUSH) 0.9 %
10 SYRINGE (ML) INJECTION PRN
Status: DISCONTINUED | OUTPATIENT
Start: 2017-02-27 | End: 2017-03-03 | Stop reason: HOSPADM

## 2017-02-27 RX ADMIN — Medication 10 ML: at 20:11

## 2017-02-27 RX ADMIN — METHYLPREDNISOLONE SODIUM SUCCINATE 125 MG: 125 INJECTION, POWDER, FOR SOLUTION INTRAMUSCULAR; INTRAVENOUS at 12:35

## 2017-02-27 RX ADMIN — SUCRALFATE 1 G: 1 SUSPENSION ORAL at 20:08

## 2017-02-27 RX ADMIN — NICOTINE POLACRILEX 2 MG: 2 GUM, CHEWING ORAL at 20:09

## 2017-02-27 RX ADMIN — FENTANYL CITRATE 50 MCG: 50 INJECTION INTRAMUSCULAR; INTRAVENOUS at 12:35

## 2017-02-27 RX ADMIN — TOPIRAMATE 50 MG: 25 TABLET, FILM COATED ORAL at 20:08

## 2017-02-27 RX ADMIN — IPRATROPIUM BROMIDE AND ALBUTEROL SULFATE 1 AMPULE: .5; 3 SOLUTION RESPIRATORY (INHALATION) at 19:21

## 2017-02-27 RX ADMIN — METHYLPREDNISOLONE SODIUM SUCCINATE 80 MG: 125 INJECTION, POWDER, FOR SOLUTION INTRAMUSCULAR; INTRAVENOUS at 17:32

## 2017-02-27 RX ADMIN — ENOXAPARIN SODIUM 40 MG: 40 INJECTION SUBCUTANEOUS at 17:31

## 2017-02-27 RX ADMIN — INSULIN LISPRO 1 UNITS: 100 INJECTION, SOLUTION INTRAVENOUS; SUBCUTANEOUS at 21:00

## 2017-02-27 RX ADMIN — ACETAMINOPHEN 650 MG: 325 TABLET ORAL at 18:21

## 2017-02-27 RX ADMIN — SUCRALFATE 1 G: 1 SUSPENSION ORAL at 18:14

## 2017-02-27 RX ADMIN — IPRATROPIUM BROMIDE AND ALBUTEROL SULFATE 3 ML: .5; 3 SOLUTION RESPIRATORY (INHALATION) at 12:07

## 2017-02-27 RX ADMIN — GUAIFENESIN AND CODEINE PHOSPHATE 5 ML: 100; 10 SOLUTION ORAL at 15:33

## 2017-02-27 RX ADMIN — IPRATROPIUM BROMIDE AND ALBUTEROL SULFATE 3 ML: .5; 3 SOLUTION RESPIRATORY (INHALATION) at 10:39

## 2017-02-27 RX ADMIN — ONDANSETRON 4 MG: 2 INJECTION INTRAMUSCULAR; INTRAVENOUS at 12:35

## 2017-02-27 ASSESSMENT — ENCOUNTER SYMPTOMS
BACK PAIN: 0
DIARRHEA: 1
PHOTOPHOBIA: 0
EYE DISCHARGE: 0
ABDOMINAL PAIN: 1
NAUSEA: 0
SORE THROAT: 0
RHINORRHEA: 0
SHORTNESS OF BREATH: 1
CHEST TIGHTNESS: 1
VOMITING: 0
SINUS PRESSURE: 0
TROUBLE SWALLOWING: 0
EYE PAIN: 0
NAUSEA: 1
WHEEZING: 1
COUGH: 1

## 2017-02-27 ASSESSMENT — PAIN SCALES - GENERAL
PAINLEVEL_OUTOF10: 9
PAINLEVEL_OUTOF10: 9
PAINLEVEL_OUTOF10: 6

## 2017-02-27 ASSESSMENT — PAIN DESCRIPTION - LOCATION: LOCATION: ABDOMEN;CHEST

## 2017-02-27 ASSESSMENT — PAIN DESCRIPTION - PAIN TYPE: TYPE: ACUTE PAIN

## 2017-02-28 LAB
ABSOLUTE EOS #: 0 K/UL (ref 0–0.4)
ABSOLUTE LYMPH #: 0.81 K/UL (ref 1–4.8)
ABSOLUTE MONO #: 0.06 K/UL (ref 0.1–1.3)
ANGIOTENSIN-CONVERTING ENZYME: 29 U/L (ref 8–52)
ANION GAP SERPL CALCULATED.3IONS-SCNC: 17 MMOL/L (ref 9–17)
BASOPHILS # BLD: 0 % (ref 0–2)
BASOPHILS ABSOLUTE: 0 K/UL (ref 0–0.2)
BUN BLDV-MCNC: 16 MG/DL (ref 6–20)
BUN/CREAT BLD: ABNORMAL (ref 9–20)
CALCIUM SERPL-MCNC: 9.5 MG/DL (ref 8.6–10.4)
CHLORIDE BLD-SCNC: 101 MMOL/L (ref 98–107)
CO2: 20 MMOL/L (ref 20–31)
CREAT SERPL-MCNC: 0.66 MG/DL (ref 0.5–0.9)
DIFFERENTIAL TYPE: ABNORMAL
EOSINOPHILS RELATIVE PERCENT: 0 % (ref 0–4)
GFR AFRICAN AMERICAN: >60 ML/MIN
GFR NON-AFRICAN AMERICAN: >60 ML/MIN
GFR SERPL CREATININE-BSD FRML MDRD: ABNORMAL ML/MIN/{1.73_M2}
GFR SERPL CREATININE-BSD FRML MDRD: ABNORMAL ML/MIN/{1.73_M2}
GLUCOSE BLD-MCNC: 159 MG/DL (ref 65–105)
GLUCOSE BLD-MCNC: 175 MG/DL (ref 65–105)
GLUCOSE BLD-MCNC: 184 MG/DL (ref 65–105)
GLUCOSE BLD-MCNC: 201 MG/DL (ref 70–99)
GLUCOSE BLD-MCNC: 205 MG/DL (ref 65–105)
HCT VFR BLD CALC: 40.5 % (ref 36–46)
HEMOGLOBIN: 12.9 G/DL (ref 12–16)
LACTIC ACID: 2.3 MMOL/L (ref 0.5–2.2)
LYMPHOCYTES # BLD: 13 % (ref 24–44)
MCH RBC QN AUTO: 24.8 PG (ref 26–34)
MCHC RBC AUTO-ENTMCNC: 31.8 G/DL (ref 31–37)
MCV RBC AUTO: 78.2 FL (ref 80–100)
MONOCYTES # BLD: 1 % (ref 1–7)
MORPHOLOGY: ABNORMAL
PDW BLD-RTO: 15.3 % (ref 11.5–14.9)
PLATELET # BLD: 217 K/UL (ref 150–450)
PLATELET ESTIMATE: ABNORMAL
PMV BLD AUTO: 8.7 FL (ref 6–12)
POTASSIUM SERPL-SCNC: 4 MMOL/L (ref 3.7–5.3)
RBC # BLD: 5.18 M/UL (ref 4–5.2)
RBC # BLD: ABNORMAL 10*6/UL
SEG NEUTROPHILS: 86 % (ref 36–66)
SEGMENTED NEUTROPHILS ABSOLUTE COUNT: 5.33 K/UL (ref 1.3–9.1)
SODIUM BLD-SCNC: 138 MMOL/L (ref 135–144)
VITAMIN D 25-HYDROXY: 18.4 NG/ML (ref 30–100)
WBC # BLD: 6.2 K/UL (ref 3.5–11)
WBC # BLD: ABNORMAL 10*3/UL

## 2017-02-28 PROCEDURE — C9113 INJ PANTOPRAZOLE SODIUM, VIA: HCPCS | Performed by: STUDENT IN AN ORGANIZED HEALTH CARE EDUCATION/TRAINING PROGRAM

## 2017-02-28 PROCEDURE — 85025 COMPLETE CBC W/AUTO DIFF WBC: CPT

## 2017-02-28 PROCEDURE — 6360000002 HC RX W HCPCS: Performed by: STUDENT IN AN ORGANIZED HEALTH CARE EDUCATION/TRAINING PROGRAM

## 2017-02-28 PROCEDURE — 80048 BASIC METABOLIC PNL TOTAL CA: CPT

## 2017-02-28 PROCEDURE — G0008 ADMIN INFLUENZA VIRUS VAC: HCPCS | Performed by: INTERNAL MEDICINE

## 2017-02-28 PROCEDURE — 6360000002 HC RX W HCPCS: Performed by: FAMILY MEDICINE

## 2017-02-28 PROCEDURE — 94761 N-INVAS EAR/PLS OXIMETRY MLT: CPT

## 2017-02-28 PROCEDURE — 82947 ASSAY GLUCOSE BLOOD QUANT: CPT

## 2017-02-28 PROCEDURE — 6370000000 HC RX 637 (ALT 250 FOR IP): Performed by: FAMILY MEDICINE

## 2017-02-28 PROCEDURE — 2580000003 HC RX 258: Performed by: STUDENT IN AN ORGANIZED HEALTH CARE EDUCATION/TRAINING PROGRAM

## 2017-02-28 PROCEDURE — 2580000003 HC RX 258: Performed by: FAMILY MEDICINE

## 2017-02-28 PROCEDURE — 36415 COLL VENOUS BLD VENIPUNCTURE: CPT

## 2017-02-28 PROCEDURE — 90686 IIV4 VACC NO PRSV 0.5 ML IM: CPT | Performed by: INTERNAL MEDICINE

## 2017-02-28 PROCEDURE — 6370000000 HC RX 637 (ALT 250 FOR IP): Performed by: INTERNAL MEDICINE

## 2017-02-28 PROCEDURE — 6360000002 HC RX W HCPCS: Performed by: INTERNAL MEDICINE

## 2017-02-28 PROCEDURE — 1200000000 HC SEMI PRIVATE

## 2017-02-28 PROCEDURE — 6370000000 HC RX 637 (ALT 250 FOR IP): Performed by: NURSE PRACTITIONER

## 2017-02-28 PROCEDURE — 83605 ASSAY OF LACTIC ACID: CPT

## 2017-02-28 PROCEDURE — 94640 AIRWAY INHALATION TREATMENT: CPT

## 2017-02-28 PROCEDURE — 82164 ANGIOTENSIN I ENZYME TEST: CPT

## 2017-02-28 PROCEDURE — 82306 VITAMIN D 25 HYDROXY: CPT

## 2017-02-28 PROCEDURE — 99223 1ST HOSP IP/OBS HIGH 75: CPT | Performed by: INTERNAL MEDICINE

## 2017-02-28 RX ORDER — 0.9 % SODIUM CHLORIDE 0.9 %
10 VIAL (ML) INJECTION DAILY
Status: DISCONTINUED | OUTPATIENT
Start: 2017-02-28 | End: 2017-03-01

## 2017-02-28 RX ORDER — ACETYLCYSTEINE 200 MG/ML
600 SOLUTION ORAL; RESPIRATORY (INHALATION) 2 TIMES DAILY
Status: DISCONTINUED | OUTPATIENT
Start: 2017-02-28 | End: 2017-02-28

## 2017-02-28 RX ORDER — PANTOPRAZOLE SODIUM 40 MG/10ML
40 INJECTION, POWDER, LYOPHILIZED, FOR SOLUTION INTRAVENOUS DAILY
Status: DISCONTINUED | OUTPATIENT
Start: 2017-02-28 | End: 2017-03-01

## 2017-02-28 RX ORDER — ACETYLCYSTEINE 200 MG/ML
200 SOLUTION ORAL; RESPIRATORY (INHALATION) 2 TIMES DAILY
Status: DISCONTINUED | OUTPATIENT
Start: 2017-02-28 | End: 2017-03-03 | Stop reason: HOSPADM

## 2017-02-28 RX ORDER — CALCIUM CARBONATE 200(500)MG
500 TABLET,CHEWABLE ORAL 3 TIMES DAILY PRN
Status: DISCONTINUED | OUTPATIENT
Start: 2017-02-28 | End: 2017-03-03 | Stop reason: HOSPADM

## 2017-02-28 RX ORDER — SUCRALFATE ORAL 1 G/10ML
1 SUSPENSION ORAL 4 TIMES DAILY
Status: DISCONTINUED | OUTPATIENT
Start: 2017-02-28 | End: 2017-03-03 | Stop reason: HOSPADM

## 2017-02-28 RX ADMIN — Medication 10 ML: at 22:01

## 2017-02-28 RX ADMIN — INSULIN LISPRO 1 UNITS: 100 INJECTION, SOLUTION INTRAVENOUS; SUBCUTANEOUS at 21:05

## 2017-02-28 RX ADMIN — PANTOPRAZOLE SODIUM 40 MG: 40 INJECTION, POWDER, FOR SOLUTION INTRAVENOUS at 13:50

## 2017-02-28 RX ADMIN — MAGNESIUM SULFATE HEPTAHYDRATE 1 G: 1 INJECTION, SOLUTION INTRAVENOUS at 13:11

## 2017-02-28 RX ADMIN — METHYLPREDNISOLONE SODIUM SUCCINATE 80 MG: 125 INJECTION, POWDER, FOR SOLUTION INTRAMUSCULAR; INTRAVENOUS at 01:01

## 2017-02-28 RX ADMIN — ENOXAPARIN SODIUM 40 MG: 40 INJECTION SUBCUTANEOUS at 07:26

## 2017-02-28 RX ADMIN — SODIUM CHLORIDE 10 ML: 9 INJECTION INTRAMUSCULAR; INTRAVENOUS; SUBCUTANEOUS at 13:50

## 2017-02-28 RX ADMIN — ALUMINUM HYDROXIDE, MAGNESIUM HYDROXIDE, AND SIMETHICONE 30 ML: 200; 200; 20 SUSPENSION ORAL at 12:11

## 2017-02-28 RX ADMIN — MAGNESIUM SULFATE HEPTAHYDRATE 1 G: 1 INJECTION, SOLUTION INTRAVENOUS at 11:33

## 2017-02-28 RX ADMIN — METHYLPREDNISOLONE SODIUM SUCCINATE 80 MG: 125 INJECTION, POWDER, FOR SOLUTION INTRAMUSCULAR; INTRAVENOUS at 07:25

## 2017-02-28 RX ADMIN — TOPIRAMATE 50 MG: 25 TABLET, FILM COATED ORAL at 07:25

## 2017-02-28 RX ADMIN — ACETYLCYSTEINE 200 MG: 200 SOLUTION ORAL; RESPIRATORY (INHALATION) at 20:34

## 2017-02-28 RX ADMIN — ALUMINUM HYDROXIDE, MAGNESIUM HYDROXIDE, AND SIMETHICONE 30 ML: 200; 200; 20 SUSPENSION ORAL at 18:32

## 2017-02-28 RX ADMIN — ALUMINUM HYDROXIDE, MAGNESIUM HYDROXIDE, AND SIMETHICONE 30 ML: 200; 200; 20 SUSPENSION ORAL at 00:01

## 2017-02-28 RX ADMIN — SUCRALFATE 1 G: 1 SUSPENSION ORAL at 16:33

## 2017-02-28 RX ADMIN — ACETAMINOPHEN 650 MG: 325 TABLET ORAL at 12:12

## 2017-02-28 RX ADMIN — SUCRALFATE 1 G: 1 SUSPENSION ORAL at 21:04

## 2017-02-28 RX ADMIN — Medication 10 ML: at 07:26

## 2017-02-28 RX ADMIN — SUCRALFATE 1 G: 1 SUSPENSION ORAL at 07:16

## 2017-02-28 RX ADMIN — IPRATROPIUM BROMIDE AND ALBUTEROL SULFATE 1 AMPULE: .5; 3 SOLUTION RESPIRATORY (INHALATION) at 16:13

## 2017-02-28 RX ADMIN — TOPIRAMATE 50 MG: 25 TABLET, FILM COATED ORAL at 21:05

## 2017-02-28 RX ADMIN — INSULIN LISPRO 2 UNITS: 100 INJECTION, SOLUTION INTRAVENOUS; SUBCUTANEOUS at 07:25

## 2017-02-28 RX ADMIN — INSULIN LISPRO 1 UNITS: 100 INJECTION, SOLUTION INTRAVENOUS; SUBCUTANEOUS at 11:32

## 2017-02-28 RX ADMIN — ALUMINUM HYDROXIDE, MAGNESIUM HYDROXIDE, AND SIMETHICONE 30 ML: 200; 200; 20 SUSPENSION ORAL at 05:22

## 2017-02-28 RX ADMIN — INSULIN LISPRO 1 UNITS: 100 INJECTION, SOLUTION INTRAVENOUS; SUBCUTANEOUS at 16:33

## 2017-02-28 RX ADMIN — IPRATROPIUM BROMIDE AND ALBUTEROL SULFATE 1 AMPULE: .5; 3 SOLUTION RESPIRATORY (INHALATION) at 20:34

## 2017-02-28 RX ADMIN — NICOTINE POLACRILEX 2 MG: 2 GUM, CHEWING ORAL at 22:58

## 2017-02-28 RX ADMIN — ALBUTEROL SULFATE 2.5 MG: 2.5 SOLUTION RESPIRATORY (INHALATION) at 00:40

## 2017-02-28 RX ADMIN — INFLUENZA A VIRUS A/CALIFORNIA/7/2009 X-179A (H1N1) ANTIGEN (FORMALDEHYDE INACTIVATED), INFLUENZA A VIRUS A/HONG KONG/4801/2014 X-263B (H3N2) ANTIGEN (FORMALDEHYDE INACTIVATED), INFLUENZA B VIRUS B/PHUKET/3073/2013 ANTIGEN (FORMALDEHYDE INACTIVATED), AND INFLUENZA B VIRUS B/BRISBANE/60/2008 ANTIGEN (FORMALDEHYDE INACTIVATED) 0.5 ML: 15; 15; 15; 15 INJECTION, SUSPENSION INTRAMUSCULAR at 13:56

## 2017-02-28 RX ADMIN — IPRATROPIUM BROMIDE AND ALBUTEROL SULFATE 1 AMPULE: .5; 3 SOLUTION RESPIRATORY (INHALATION) at 11:19

## 2017-02-28 RX ADMIN — IPRATROPIUM BROMIDE AND ALBUTEROL SULFATE 1 AMPULE: .5; 3 SOLUTION RESPIRATORY (INHALATION) at 07:41

## 2017-02-28 RX ADMIN — Medication 10 ML: at 01:01

## 2017-02-28 RX ADMIN — METHYLPREDNISOLONE SODIUM SUCCINATE 80 MG: 125 INJECTION, POWDER, FOR SOLUTION INTRAMUSCULAR; INTRAVENOUS at 16:35

## 2017-02-28 RX ADMIN — ACETAMINOPHEN 650 MG: 325 TABLET ORAL at 21:04

## 2017-02-28 RX ADMIN — ACETAMINOPHEN 650 MG: 325 TABLET ORAL at 06:30

## 2017-02-28 RX ADMIN — SUCRALFATE 1 G: 1 SUSPENSION ORAL at 11:33

## 2017-02-28 ASSESSMENT — ENCOUNTER SYMPTOMS
WHEEZING: 1
RHINORRHEA: 1
CHEST TIGHTNESS: 1
COUGH: 1
ABDOMINAL PAIN: 1
SORE THROAT: 1

## 2017-02-28 ASSESSMENT — PAIN SCALES - GENERAL
PAINLEVEL_OUTOF10: 6
PAINLEVEL_OUTOF10: 8
PAINLEVEL_OUTOF10: 6
PAINLEVEL_OUTOF10: 9

## 2017-03-01 ENCOUNTER — ANESTHESIA EVENT (OUTPATIENT)
Dept: OPERATING ROOM | Age: 37
DRG: 191 | End: 2017-03-01
Payer: COMMERCIAL

## 2017-03-01 LAB
ABSOLUTE BANDS #: 1.82 K/UL (ref 0–1)
ABSOLUTE EOS #: 0 K/UL (ref 0–0.4)
ABSOLUTE LYMPH #: 0.68 K/UL (ref 1–4.8)
ABSOLUTE MONO #: 0 K/UL (ref 0.1–1.3)
ANION GAP SERPL CALCULATED.3IONS-SCNC: 12 MMOL/L (ref 9–17)
BANDS: 8 % (ref 0–10)
BASOPHILS # BLD: 0 % (ref 0–2)
BASOPHILS ABSOLUTE: 0 K/UL (ref 0–0.2)
BUN BLDV-MCNC: 19 MG/DL (ref 6–20)
BUN/CREAT BLD: ABNORMAL (ref 9–20)
CALCIUM SERPL-MCNC: 9.3 MG/DL (ref 8.6–10.4)
CHLORIDE BLD-SCNC: 107 MMOL/L (ref 98–107)
CO2: 20 MMOL/L (ref 20–31)
CREAT SERPL-MCNC: 0.76 MG/DL (ref 0.5–0.9)
DIFFERENTIAL TYPE: ABNORMAL
EOSINOPHILS RELATIVE PERCENT: 0 % (ref 0–4)
ESTIMATED AVERAGE GLUCOSE: 126 MG/DL
GFR AFRICAN AMERICAN: >60 ML/MIN
GFR NON-AFRICAN AMERICAN: >60 ML/MIN
GFR SERPL CREATININE-BSD FRML MDRD: ABNORMAL ML/MIN/{1.73_M2}
GFR SERPL CREATININE-BSD FRML MDRD: ABNORMAL ML/MIN/{1.73_M2}
GLUCOSE BLD-MCNC: 135 MG/DL (ref 65–105)
GLUCOSE BLD-MCNC: 143 MG/DL (ref 65–105)
GLUCOSE BLD-MCNC: 143 MG/DL (ref 65–105)
GLUCOSE BLD-MCNC: 149 MG/DL (ref 70–99)
GLUCOSE BLD-MCNC: 164 MG/DL (ref 65–105)
HBA1C MFR BLD: 6 % (ref 4–6)
HCT VFR BLD CALC: 38.2 % (ref 36–46)
HEMOGLOBIN: 12.2 G/DL (ref 12–16)
LACTIC ACID: 1.8 MMOL/L (ref 0.5–2.2)
LYMPHOCYTES # BLD: 3 % (ref 24–44)
MAGNESIUM: 2.2 MG/DL (ref 1.6–2.6)
MCH RBC QN AUTO: 24.9 PG (ref 26–34)
MCHC RBC AUTO-ENTMCNC: 31.8 G/DL (ref 31–37)
MCV RBC AUTO: 78.4 FL (ref 80–100)
MONOCYTES # BLD: 0 % (ref 1–7)
MORPHOLOGY: ABNORMAL
PDW BLD-RTO: 15.6 % (ref 11.5–14.9)
PLATELET # BLD: 229 K/UL (ref 150–450)
PLATELET ESTIMATE: ABNORMAL
PMV BLD AUTO: 8.5 FL (ref 6–12)
POTASSIUM SERPL-SCNC: 4.3 MMOL/L (ref 3.7–5.3)
RBC # BLD: 4.88 M/UL (ref 4–5.2)
RBC # BLD: ABNORMAL 10*6/UL
SEG NEUTROPHILS: 89 % (ref 36–66)
SEGMENTED NEUTROPHILS ABSOLUTE COUNT: 20.2 K/UL (ref 1.3–9.1)
SODIUM BLD-SCNC: 139 MMOL/L (ref 135–144)
WBC # BLD: 22.7 K/UL (ref 3.5–11)
WBC # BLD: ABNORMAL 10*3/UL

## 2017-03-01 PROCEDURE — 83036 HEMOGLOBIN GLYCOSYLATED A1C: CPT

## 2017-03-01 PROCEDURE — 82947 ASSAY GLUCOSE BLOOD QUANT: CPT

## 2017-03-01 PROCEDURE — 6370000000 HC RX 637 (ALT 250 FOR IP): Performed by: INTERNAL MEDICINE

## 2017-03-01 PROCEDURE — 6360000002 HC RX W HCPCS: Performed by: STUDENT IN AN ORGANIZED HEALTH CARE EDUCATION/TRAINING PROGRAM

## 2017-03-01 PROCEDURE — C9113 INJ PANTOPRAZOLE SODIUM, VIA: HCPCS | Performed by: STUDENT IN AN ORGANIZED HEALTH CARE EDUCATION/TRAINING PROGRAM

## 2017-03-01 PROCEDURE — 36415 COLL VENOUS BLD VENIPUNCTURE: CPT

## 2017-03-01 PROCEDURE — 6370000000 HC RX 637 (ALT 250 FOR IP): Performed by: FAMILY MEDICINE

## 2017-03-01 PROCEDURE — 2580000003 HC RX 258: Performed by: STUDENT IN AN ORGANIZED HEALTH CARE EDUCATION/TRAINING PROGRAM

## 2017-03-01 PROCEDURE — 6360000002 HC RX W HCPCS: Performed by: FAMILY MEDICINE

## 2017-03-01 PROCEDURE — 83735 ASSAY OF MAGNESIUM: CPT

## 2017-03-01 PROCEDURE — 6370000000 HC RX 637 (ALT 250 FOR IP): Performed by: STUDENT IN AN ORGANIZED HEALTH CARE EDUCATION/TRAINING PROGRAM

## 2017-03-01 PROCEDURE — 6360000002 HC RX W HCPCS: Performed by: INTERNAL MEDICINE

## 2017-03-01 PROCEDURE — 94640 AIRWAY INHALATION TREATMENT: CPT

## 2017-03-01 PROCEDURE — 83605 ASSAY OF LACTIC ACID: CPT

## 2017-03-01 PROCEDURE — C9113 INJ PANTOPRAZOLE SODIUM, VIA: HCPCS | Performed by: INTERNAL MEDICINE

## 2017-03-01 PROCEDURE — 99233 SBSQ HOSP IP/OBS HIGH 50: CPT | Performed by: INTERNAL MEDICINE

## 2017-03-01 PROCEDURE — 1200000000 HC SEMI PRIVATE

## 2017-03-01 PROCEDURE — 2580000003 HC RX 258: Performed by: FAMILY MEDICINE

## 2017-03-01 PROCEDURE — 85025 COMPLETE CBC W/AUTO DIFF WBC: CPT

## 2017-03-01 PROCEDURE — 94761 N-INVAS EAR/PLS OXIMETRY MLT: CPT

## 2017-03-01 PROCEDURE — 80048 BASIC METABOLIC PNL TOTAL CA: CPT

## 2017-03-01 PROCEDURE — 99222 1ST HOSP IP/OBS MODERATE 55: CPT | Performed by: INTERNAL MEDICINE

## 2017-03-01 PROCEDURE — 2580000003 HC RX 258: Performed by: INTERNAL MEDICINE

## 2017-03-01 RX ORDER — PANTOPRAZOLE SODIUM 40 MG/10ML
40 INJECTION, POWDER, LYOPHILIZED, FOR SOLUTION INTRAVENOUS 2 TIMES DAILY
Status: DISCONTINUED | OUTPATIENT
Start: 2017-03-01 | End: 2017-03-03 | Stop reason: HOSPADM

## 2017-03-01 RX ORDER — GUAIFENESIN/DEXTROMETHORPHAN 100-10MG/5
5 SYRUP ORAL EVERY 6 HOURS PRN
Status: DISCONTINUED | OUTPATIENT
Start: 2017-03-01 | End: 2017-03-03 | Stop reason: HOSPADM

## 2017-03-01 RX ORDER — 0.9 % SODIUM CHLORIDE 0.9 %
10 VIAL (ML) INJECTION 2 TIMES DAILY
Status: DISCONTINUED | OUTPATIENT
Start: 2017-03-01 | End: 2017-03-03 | Stop reason: HOSPADM

## 2017-03-01 RX ADMIN — ACETYLCYSTEINE 200 MG: 200 SOLUTION ORAL; RESPIRATORY (INHALATION) at 06:50

## 2017-03-01 RX ADMIN — TOPIRAMATE 50 MG: 25 TABLET, FILM COATED ORAL at 20:23

## 2017-03-01 RX ADMIN — ACETYLCYSTEINE 200 MG: 200 SOLUTION ORAL; RESPIRATORY (INHALATION) at 20:40

## 2017-03-01 RX ADMIN — SUCRALFATE 1 G: 1 SUSPENSION ORAL at 11:42

## 2017-03-01 RX ADMIN — METHYLPREDNISOLONE SODIUM SUCCINATE 80 MG: 125 INJECTION, POWDER, FOR SOLUTION INTRAMUSCULAR; INTRAVENOUS at 01:00

## 2017-03-01 RX ADMIN — SUCRALFATE 1 G: 1 SUSPENSION ORAL at 16:47

## 2017-03-01 RX ADMIN — INSULIN LISPRO 1 UNITS: 100 INJECTION, SOLUTION INTRAVENOUS; SUBCUTANEOUS at 20:31

## 2017-03-01 RX ADMIN — ONDANSETRON 4 MG: 2 INJECTION INTRAMUSCULAR; INTRAVENOUS at 00:49

## 2017-03-01 RX ADMIN — SUCRALFATE 1 G: 1 SUSPENSION ORAL at 06:24

## 2017-03-01 RX ADMIN — PANTOPRAZOLE SODIUM 40 MG: 40 INJECTION, POWDER, FOR SOLUTION INTRAVENOUS at 07:32

## 2017-03-01 RX ADMIN — TOPIRAMATE 50 MG: 25 TABLET, FILM COATED ORAL at 07:32

## 2017-03-01 RX ADMIN — SUCRALFATE 1 G: 1 SUSPENSION ORAL at 20:29

## 2017-03-01 RX ADMIN — INSULIN LISPRO 1 UNITS: 100 INJECTION, SOLUTION INTRAVENOUS; SUBCUTANEOUS at 07:33

## 2017-03-01 RX ADMIN — SODIUM CHLORIDE 10 ML: 9 INJECTION INTRAMUSCULAR; INTRAVENOUS; SUBCUTANEOUS at 07:32

## 2017-03-01 RX ADMIN — PANTOPRAZOLE SODIUM 40 MG: 40 INJECTION, POWDER, FOR SOLUTION INTRAVENOUS at 20:22

## 2017-03-01 RX ADMIN — Medication 10 ML: at 20:24

## 2017-03-01 RX ADMIN — GUAIFENESIN AND DEXTROMETHORPHAN 5 ML: 100; 10 SYRUP ORAL at 20:23

## 2017-03-01 RX ADMIN — Medication 10 ML: at 07:33

## 2017-03-01 RX ADMIN — ENOXAPARIN SODIUM 40 MG: 40 INJECTION SUBCUTANEOUS at 07:33

## 2017-03-01 RX ADMIN — GUAIFENESIN AND DEXTROMETHORPHAN 5 ML: 100; 10 SYRUP ORAL at 07:48

## 2017-03-01 RX ADMIN — Medication 10 ML: at 20:22

## 2017-03-01 RX ADMIN — IPRATROPIUM BROMIDE AND ALBUTEROL SULFATE 1 AMPULE: .5; 3 SOLUTION RESPIRATORY (INHALATION) at 12:15

## 2017-03-01 RX ADMIN — GUAIFENESIN AND DEXTROMETHORPHAN 5 ML: 100; 10 SYRUP ORAL at 14:09

## 2017-03-01 RX ADMIN — IPRATROPIUM BROMIDE AND ALBUTEROL SULFATE 1 AMPULE: .5; 3 SOLUTION RESPIRATORY (INHALATION) at 20:39

## 2017-03-01 RX ADMIN — INSULIN LISPRO 1 UNITS: 100 INJECTION, SOLUTION INTRAVENOUS; SUBCUTANEOUS at 11:42

## 2017-03-01 RX ADMIN — IPRATROPIUM BROMIDE AND ALBUTEROL SULFATE 1 AMPULE: .5; 3 SOLUTION RESPIRATORY (INHALATION) at 16:56

## 2017-03-01 RX ADMIN — METHYLPREDNISOLONE SODIUM SUCCINATE 80 MG: 125 INJECTION, POWDER, FOR SOLUTION INTRAMUSCULAR; INTRAVENOUS at 16:48

## 2017-03-01 RX ADMIN — IPRATROPIUM BROMIDE AND ALBUTEROL SULFATE 1 AMPULE: .5; 3 SOLUTION RESPIRATORY (INHALATION) at 06:50

## 2017-03-01 RX ADMIN — METHYLPREDNISOLONE SODIUM SUCCINATE 80 MG: 125 INJECTION, POWDER, FOR SOLUTION INTRAMUSCULAR; INTRAVENOUS at 07:32

## 2017-03-01 RX ADMIN — ALBUTEROL SULFATE 2.5 MG: 2.5 SOLUTION RESPIRATORY (INHALATION) at 03:21

## 2017-03-01 ASSESSMENT — ENCOUNTER SYMPTOMS
COUGH: 1
ABDOMINAL PAIN: 1
SPUTUM PRODUCTION: 0
VOMITING: 0
NAUSEA: 0
SHORTNESS OF BREATH: 0
HEARTBURN: 1
WHEEZING: 1

## 2017-03-02 ENCOUNTER — ANESTHESIA (OUTPATIENT)
Dept: OPERATING ROOM | Age: 37
DRG: 191 | End: 2017-03-02
Payer: COMMERCIAL

## 2017-03-02 VITALS — SYSTOLIC BLOOD PRESSURE: 131 MMHG | OXYGEN SATURATION: 84 % | DIASTOLIC BLOOD PRESSURE: 75 MMHG

## 2017-03-02 LAB
ABSOLUTE EOS #: 0 K/UL (ref 0–0.4)
ABSOLUTE LYMPH #: 1.44 K/UL (ref 1–4.8)
ABSOLUTE MONO #: 0 K/UL (ref 0.1–1.3)
ANION GAP SERPL CALCULATED.3IONS-SCNC: 15 MMOL/L (ref 9–17)
BASOPHILS # BLD: 0 % (ref 0–2)
BASOPHILS ABSOLUTE: 0 K/UL (ref 0–0.2)
BUN BLDV-MCNC: 17 MG/DL (ref 6–20)
BUN/CREAT BLD: ABNORMAL (ref 9–20)
CALCIUM SERPL-MCNC: 9 MG/DL (ref 8.6–10.4)
CHLORIDE BLD-SCNC: 108 MMOL/L (ref 98–107)
CO2: 18 MMOL/L (ref 20–31)
CREAT SERPL-MCNC: 0.61 MG/DL (ref 0.5–0.9)
DIFFERENTIAL TYPE: ABNORMAL
EOSINOPHILS RELATIVE PERCENT: 0 % (ref 0–4)
GFR AFRICAN AMERICAN: >60 ML/MIN
GFR NON-AFRICAN AMERICAN: >60 ML/MIN
GFR SERPL CREATININE-BSD FRML MDRD: ABNORMAL ML/MIN/{1.73_M2}
GFR SERPL CREATININE-BSD FRML MDRD: ABNORMAL ML/MIN/{1.73_M2}
GLUCOSE BLD-MCNC: 103 MG/DL (ref 65–105)
GLUCOSE BLD-MCNC: 106 MG/DL (ref 65–105)
GLUCOSE BLD-MCNC: 114 MG/DL (ref 65–105)
GLUCOSE BLD-MCNC: 117 MG/DL (ref 65–105)
GLUCOSE BLD-MCNC: 131 MG/DL (ref 65–105)
GLUCOSE BLD-MCNC: 136 MG/DL (ref 70–99)
HCT VFR BLD CALC: 38.8 % (ref 36–46)
HEMOGLOBIN: 12.1 G/DL (ref 12–16)
LYMPHOCYTES # BLD: 9 % (ref 24–44)
MCH RBC QN AUTO: 25.1 PG (ref 26–34)
MCHC RBC AUTO-ENTMCNC: 31.2 G/DL (ref 31–37)
MCV RBC AUTO: 80.3 FL (ref 80–100)
MONOCYTES # BLD: 0 % (ref 1–7)
MORPHOLOGY: NORMAL
PDW BLD-RTO: 15.7 % (ref 11.5–14.9)
PLATELET # BLD: 225 K/UL (ref 150–450)
PLATELET ESTIMATE: ABNORMAL
PMV BLD AUTO: 9.3 FL (ref 6–12)
POTASSIUM SERPL-SCNC: 3.9 MMOL/L (ref 3.7–5.3)
RBC # BLD: 4.84 M/UL (ref 4–5.2)
RBC # BLD: ABNORMAL 10*6/UL
SEG NEUTROPHILS: 91 % (ref 36–66)
SEGMENTED NEUTROPHILS ABSOLUTE COUNT: 14.56 K/UL (ref 1.3–9.1)
SODIUM BLD-SCNC: 141 MMOL/L (ref 135–144)
WBC # BLD: 16 K/UL (ref 3.5–11)
WBC # BLD: ABNORMAL 10*3/UL

## 2017-03-02 PROCEDURE — 6370000000 HC RX 637 (ALT 250 FOR IP): Performed by: FAMILY MEDICINE

## 2017-03-02 PROCEDURE — 43235 EGD DIAGNOSTIC BRUSH WASH: CPT | Performed by: INTERNAL MEDICINE

## 2017-03-02 PROCEDURE — 2500000003 HC RX 250 WO HCPCS: Performed by: NURSE ANESTHETIST, CERTIFIED REGISTERED

## 2017-03-02 PROCEDURE — 1200000000 HC SEMI PRIVATE

## 2017-03-02 PROCEDURE — 6360000002 HC RX W HCPCS: Performed by: FAMILY MEDICINE

## 2017-03-02 PROCEDURE — 3609017100 HC EGD: Performed by: INTERNAL MEDICINE

## 2017-03-02 PROCEDURE — C9113 INJ PANTOPRAZOLE SODIUM, VIA: HCPCS | Performed by: INTERNAL MEDICINE

## 2017-03-02 PROCEDURE — 2580000003 HC RX 258: Performed by: FAMILY MEDICINE

## 2017-03-02 PROCEDURE — 7100000000 HC PACU RECOVERY - FIRST 15 MIN: Performed by: INTERNAL MEDICINE

## 2017-03-02 PROCEDURE — 36415 COLL VENOUS BLD VENIPUNCTURE: CPT

## 2017-03-02 PROCEDURE — 6370000000 HC RX 637 (ALT 250 FOR IP): Performed by: STUDENT IN AN ORGANIZED HEALTH CARE EDUCATION/TRAINING PROGRAM

## 2017-03-02 PROCEDURE — 2580000003 HC RX 258: Performed by: NURSE ANESTHETIST, CERTIFIED REGISTERED

## 2017-03-02 PROCEDURE — 0DJ08ZZ INSPECTION OF UPPER INTESTINAL TRACT, VIA NATURAL OR ARTIFICIAL OPENING ENDOSCOPIC: ICD-10-PCS | Performed by: INTERNAL MEDICINE

## 2017-03-02 PROCEDURE — 82947 ASSAY GLUCOSE BLOOD QUANT: CPT

## 2017-03-02 PROCEDURE — 6360000002 HC RX W HCPCS: Performed by: INTERNAL MEDICINE

## 2017-03-02 PROCEDURE — 2580000003 HC RX 258: Performed by: INTERNAL MEDICINE

## 2017-03-02 PROCEDURE — 7100000001 HC PACU RECOVERY - ADDTL 15 MIN: Performed by: INTERNAL MEDICINE

## 2017-03-02 PROCEDURE — 94640 AIRWAY INHALATION TREATMENT: CPT

## 2017-03-02 PROCEDURE — 6360000002 HC RX W HCPCS: Performed by: NURSE ANESTHETIST, CERTIFIED REGISTERED

## 2017-03-02 PROCEDURE — 85025 COMPLETE CBC W/AUTO DIFF WBC: CPT

## 2017-03-02 PROCEDURE — 80048 BASIC METABOLIC PNL TOTAL CA: CPT

## 2017-03-02 RX ORDER — PROPOFOL 10 MG/ML
INJECTION, EMULSION INTRAVENOUS PRN
Status: DISCONTINUED | OUTPATIENT
Start: 2017-03-02 | End: 2017-03-02 | Stop reason: SDUPTHER

## 2017-03-02 RX ORDER — MORPHINE SULFATE 2 MG/ML
1 INJECTION, SOLUTION INTRAMUSCULAR; INTRAVENOUS EVERY 5 MIN PRN
Status: DISCONTINUED | OUTPATIENT
Start: 2017-03-02 | End: 2017-03-02 | Stop reason: HOSPADM

## 2017-03-02 RX ORDER — DIPHENHYDRAMINE HYDROCHLORIDE 50 MG/ML
12.5 INJECTION INTRAMUSCULAR; INTRAVENOUS
Status: DISCONTINUED | OUTPATIENT
Start: 2017-03-02 | End: 2017-03-02 | Stop reason: HOSPADM

## 2017-03-02 RX ORDER — OXYCODONE HYDROCHLORIDE AND ACETAMINOPHEN 5; 325 MG/1; MG/1
1 TABLET ORAL PRN
Status: DISCONTINUED | OUTPATIENT
Start: 2017-03-02 | End: 2017-03-02 | Stop reason: HOSPADM

## 2017-03-02 RX ORDER — FENTANYL CITRATE 50 UG/ML
25 INJECTION, SOLUTION INTRAMUSCULAR; INTRAVENOUS EVERY 5 MIN PRN
Status: DISCONTINUED | OUTPATIENT
Start: 2017-03-02 | End: 2017-03-02 | Stop reason: HOSPADM

## 2017-03-02 RX ORDER — MEPERIDINE HYDROCHLORIDE 25 MG/ML
12.5 INJECTION INTRAMUSCULAR; INTRAVENOUS; SUBCUTANEOUS EVERY 5 MIN PRN
Status: DISCONTINUED | OUTPATIENT
Start: 2017-03-02 | End: 2017-03-02 | Stop reason: HOSPADM

## 2017-03-02 RX ORDER — FENTANYL CITRATE 50 UG/ML
INJECTION, SOLUTION INTRAMUSCULAR; INTRAVENOUS PRN
Status: DISCONTINUED | OUTPATIENT
Start: 2017-03-02 | End: 2017-03-02 | Stop reason: SDUPTHER

## 2017-03-02 RX ORDER — OXYCODONE HYDROCHLORIDE AND ACETAMINOPHEN 5; 325 MG/1; MG/1
2 TABLET ORAL PRN
Status: DISCONTINUED | OUTPATIENT
Start: 2017-03-02 | End: 2017-03-02 | Stop reason: HOSPADM

## 2017-03-02 RX ORDER — MIDAZOLAM HYDROCHLORIDE 1 MG/ML
INJECTION INTRAMUSCULAR; INTRAVENOUS PRN
Status: DISCONTINUED | OUTPATIENT
Start: 2017-03-02 | End: 2017-03-02 | Stop reason: SDUPTHER

## 2017-03-02 RX ORDER — SODIUM CHLORIDE, SODIUM LACTATE, POTASSIUM CHLORIDE, CALCIUM CHLORIDE 600; 310; 30; 20 MG/100ML; MG/100ML; MG/100ML; MG/100ML
INJECTION, SOLUTION INTRAVENOUS CONTINUOUS PRN
Status: DISCONTINUED | OUTPATIENT
Start: 2017-03-02 | End: 2017-03-02 | Stop reason: SDUPTHER

## 2017-03-02 RX ORDER — PROMETHAZINE HYDROCHLORIDE 25 MG/ML
6.25 INJECTION, SOLUTION INTRAMUSCULAR; INTRAVENOUS
Status: DISCONTINUED | OUTPATIENT
Start: 2017-03-02 | End: 2017-03-02 | Stop reason: HOSPADM

## 2017-03-02 RX ORDER — METHYLPREDNISOLONE SODIUM SUCCINATE 40 MG/ML
40 INJECTION, POWDER, LYOPHILIZED, FOR SOLUTION INTRAMUSCULAR; INTRAVENOUS EVERY 8 HOURS
Status: DISCONTINUED | OUTPATIENT
Start: 2017-03-02 | End: 2017-03-03

## 2017-03-02 RX ORDER — LIDOCAINE HYDROCHLORIDE 10 MG/ML
INJECTION, SOLUTION EPIDURAL; INFILTRATION; INTRACAUDAL; PERINEURAL PRN
Status: DISCONTINUED | OUTPATIENT
Start: 2017-03-02 | End: 2017-03-02 | Stop reason: SDUPTHER

## 2017-03-02 RX ADMIN — ENOXAPARIN SODIUM 40 MG: 40 INJECTION SUBCUTANEOUS at 11:59

## 2017-03-02 RX ADMIN — PANTOPRAZOLE SODIUM 40 MG: 40 INJECTION, POWDER, FOR SOLUTION INTRAVENOUS at 11:57

## 2017-03-02 RX ADMIN — ACETYLCYSTEINE 200 MG: 200 SOLUTION ORAL; RESPIRATORY (INHALATION) at 07:11

## 2017-03-02 RX ADMIN — IPRATROPIUM BROMIDE AND ALBUTEROL SULFATE 1 AMPULE: .5; 3 SOLUTION RESPIRATORY (INHALATION) at 07:11

## 2017-03-02 RX ADMIN — PROPOFOL 30 MG: 10 INJECTION, EMULSION INTRAVENOUS at 09:37

## 2017-03-02 RX ADMIN — METHYLPREDNISOLONE SODIUM SUCCINATE 80 MG: 125 INJECTION, POWDER, FOR SOLUTION INTRAMUSCULAR; INTRAVENOUS at 00:46

## 2017-03-02 RX ADMIN — Medication 10 ML: at 11:59

## 2017-03-02 RX ADMIN — GUAIFENESIN AND DEXTROMETHORPHAN 5 ML: 100; 10 SYRUP ORAL at 18:13

## 2017-03-02 RX ADMIN — SUCRALFATE 1 G: 1 SUSPENSION ORAL at 22:12

## 2017-03-02 RX ADMIN — IPRATROPIUM BROMIDE AND ALBUTEROL SULFATE 1 AMPULE: .5; 3 SOLUTION RESPIRATORY (INHALATION) at 11:20

## 2017-03-02 RX ADMIN — TOPIRAMATE 50 MG: 25 TABLET, FILM COATED ORAL at 22:12

## 2017-03-02 RX ADMIN — SUCRALFATE 1 G: 1 SUSPENSION ORAL at 11:51

## 2017-03-02 RX ADMIN — TOPIRAMATE 50 MG: 25 TABLET, FILM COATED ORAL at 12:00

## 2017-03-02 RX ADMIN — MIDAZOLAM HYDROCHLORIDE 2 MG: 1 INJECTION, SOLUTION INTRAMUSCULAR; INTRAVENOUS at 09:34

## 2017-03-02 RX ADMIN — IPRATROPIUM BROMIDE AND ALBUTEROL SULFATE 1 AMPULE: .5; 3 SOLUTION RESPIRATORY (INHALATION) at 16:22

## 2017-03-02 RX ADMIN — SUCRALFATE 1 G: 1 SUSPENSION ORAL at 18:11

## 2017-03-02 RX ADMIN — Medication 10 ML: at 22:07

## 2017-03-02 RX ADMIN — SODIUM CHLORIDE, POTASSIUM CHLORIDE, SODIUM LACTATE AND CALCIUM CHLORIDE: 600; 310; 30; 20 INJECTION, SOLUTION INTRAVENOUS at 09:34

## 2017-03-02 RX ADMIN — Medication 10 ML: at 22:13

## 2017-03-02 RX ADMIN — ACETYLCYSTEINE 200 MG: 200 SOLUTION ORAL; RESPIRATORY (INHALATION) at 20:06

## 2017-03-02 RX ADMIN — GUAIFENESIN AND DEXTROMETHORPHAN 5 ML: 100; 10 SYRUP ORAL at 22:08

## 2017-03-02 RX ADMIN — LIDOCAINE HYDROCHLORIDE 50 MG: 10 INJECTION, SOLUTION EPIDURAL; INFILTRATION; INTRACAUDAL; PERINEURAL at 09:37

## 2017-03-02 RX ADMIN — PANTOPRAZOLE SODIUM 40 MG: 40 INJECTION, POWDER, FOR SOLUTION INTRAVENOUS at 22:07

## 2017-03-02 RX ADMIN — ACETAMINOPHEN 650 MG: 325 TABLET ORAL at 22:08

## 2017-03-02 RX ADMIN — IPRATROPIUM BROMIDE AND ALBUTEROL SULFATE 1 AMPULE: .5; 3 SOLUTION RESPIRATORY (INHALATION) at 20:05

## 2017-03-02 RX ADMIN — FENTANYL CITRATE 50 MCG: 50 INJECTION, SOLUTION INTRAMUSCULAR; INTRAVENOUS at 09:37

## 2017-03-02 ASSESSMENT — PAIN - FUNCTIONAL ASSESSMENT: PAIN_FUNCTIONAL_ASSESSMENT: 0-10

## 2017-03-02 ASSESSMENT — PAIN SCALES - GENERAL
PAINLEVEL_OUTOF10: 0
PAINLEVEL_OUTOF10: 9
PAINLEVEL_OUTOF10: 9
PAINLEVEL_OUTOF10: 0
PAINLEVEL_OUTOF10: 0

## 2017-03-02 ASSESSMENT — PAIN DESCRIPTION - DESCRIPTORS: DESCRIPTORS: BURNING

## 2017-03-03 VITALS
DIASTOLIC BLOOD PRESSURE: 87 MMHG | HEART RATE: 86 BPM | OXYGEN SATURATION: 99 % | WEIGHT: 207.23 LBS | BODY MASS INDEX: 38.14 KG/M2 | TEMPERATURE: 97.7 F | HEIGHT: 62 IN | RESPIRATION RATE: 16 BRPM | SYSTOLIC BLOOD PRESSURE: 135 MMHG

## 2017-03-03 LAB
ABSOLUTE EOS #: 0 K/UL (ref 0–0.4)
ABSOLUTE LYMPH #: 0.94 K/UL (ref 1–4.8)
ABSOLUTE MONO #: 0.29 K/UL (ref 0.1–1.3)
ANION GAP SERPL CALCULATED.3IONS-SCNC: 14 MMOL/L (ref 9–17)
BASOPHILS # BLD: 0 % (ref 0–2)
BASOPHILS ABSOLUTE: 0 K/UL (ref 0–0.2)
BUN BLDV-MCNC: 18 MG/DL (ref 6–20)
BUN/CREAT BLD: ABNORMAL (ref 9–20)
CALCIUM SERPL-MCNC: 8.3 MG/DL (ref 8.6–10.4)
CHLORIDE BLD-SCNC: 107 MMOL/L (ref 98–107)
CO2: 19 MMOL/L (ref 20–31)
CREAT SERPL-MCNC: 0.67 MG/DL (ref 0.5–0.9)
DIFFERENTIAL TYPE: ABNORMAL
EOSINOPHILS RELATIVE PERCENT: 0 % (ref 0–4)
GFR AFRICAN AMERICAN: >60 ML/MIN
GFR NON-AFRICAN AMERICAN: >60 ML/MIN
GFR SERPL CREATININE-BSD FRML MDRD: ABNORMAL ML/MIN/{1.73_M2}
GFR SERPL CREATININE-BSD FRML MDRD: ABNORMAL ML/MIN/{1.73_M2}
GLUCOSE BLD-MCNC: 118 MG/DL (ref 65–105)
GLUCOSE BLD-MCNC: 121 MG/DL (ref 70–99)
GLUCOSE BLD-MCNC: 131 MG/DL (ref 65–105)
HCT VFR BLD CALC: 37.2 % (ref 36–46)
HEMOGLOBIN: 11.8 G/DL (ref 12–16)
LYMPHOCYTES # BLD: 13 % (ref 24–44)
MCH RBC QN AUTO: 24.8 PG (ref 26–34)
MCHC RBC AUTO-ENTMCNC: 31.7 G/DL (ref 31–37)
MCV RBC AUTO: 78.2 FL (ref 80–100)
MONOCYTES # BLD: 4 % (ref 1–7)
MORPHOLOGY: ABNORMAL
PDW BLD-RTO: 15.8 % (ref 11.5–14.9)
PLATELET # BLD: 209 K/UL (ref 150–450)
PLATELET ESTIMATE: ABNORMAL
PMV BLD AUTO: 8.5 FL (ref 6–12)
POTASSIUM SERPL-SCNC: 3.9 MMOL/L (ref 3.7–5.3)
RBC # BLD: 4.75 M/UL (ref 4–5.2)
RBC # BLD: ABNORMAL 10*6/UL
SEG NEUTROPHILS: 83 % (ref 36–66)
SEGMENTED NEUTROPHILS ABSOLUTE COUNT: 5.97 K/UL (ref 1.3–9.1)
SODIUM BLD-SCNC: 140 MMOL/L (ref 135–144)
WBC # BLD: 7.2 K/UL (ref 3.5–11)
WBC # BLD: ABNORMAL 10*3/UL

## 2017-03-03 PROCEDURE — 6370000000 HC RX 637 (ALT 250 FOR IP): Performed by: FAMILY MEDICINE

## 2017-03-03 PROCEDURE — C9113 INJ PANTOPRAZOLE SODIUM, VIA: HCPCS | Performed by: INTERNAL MEDICINE

## 2017-03-03 PROCEDURE — 99239 HOSP IP/OBS DSCHRG MGMT >30: CPT | Performed by: INTERNAL MEDICINE

## 2017-03-03 PROCEDURE — 80048 BASIC METABOLIC PNL TOTAL CA: CPT

## 2017-03-03 PROCEDURE — 2580000003 HC RX 258: Performed by: FAMILY MEDICINE

## 2017-03-03 PROCEDURE — 82947 ASSAY GLUCOSE BLOOD QUANT: CPT

## 2017-03-03 PROCEDURE — 6370000000 HC RX 637 (ALT 250 FOR IP): Performed by: NURSE PRACTITIONER

## 2017-03-03 PROCEDURE — 36415 COLL VENOUS BLD VENIPUNCTURE: CPT

## 2017-03-03 PROCEDURE — 6370000000 HC RX 637 (ALT 250 FOR IP): Performed by: STUDENT IN AN ORGANIZED HEALTH CARE EDUCATION/TRAINING PROGRAM

## 2017-03-03 PROCEDURE — 99232 SBSQ HOSP IP/OBS MODERATE 35: CPT | Performed by: INTERNAL MEDICINE

## 2017-03-03 PROCEDURE — 94640 AIRWAY INHALATION TREATMENT: CPT

## 2017-03-03 PROCEDURE — 85025 COMPLETE CBC W/AUTO DIFF WBC: CPT

## 2017-03-03 PROCEDURE — 6360000002 HC RX W HCPCS: Performed by: INTERNAL MEDICINE

## 2017-03-03 PROCEDURE — 6360000002 HC RX W HCPCS: Performed by: FAMILY MEDICINE

## 2017-03-03 PROCEDURE — 2580000003 HC RX 258: Performed by: INTERNAL MEDICINE

## 2017-03-03 RX ORDER — PREDNISONE 10 MG/1
10 TABLET ORAL DAILY
Qty: 3 TABLET | Refills: 0 | Status: SHIPPED | OUTPATIENT
Start: 2017-03-09 | End: 2017-06-08

## 2017-03-03 RX ORDER — PREDNISONE 20 MG/1
20 TABLET ORAL DAILY
Qty: 3 TABLET | Refills: 0 | Status: SHIPPED | OUTPATIENT
Start: 2017-03-06 | End: 2017-06-08

## 2017-03-03 RX ORDER — PREDNISONE 10 MG/1
30 TABLET ORAL DAILY
Qty: 6 TABLET | Refills: 0 | Status: SHIPPED | OUTPATIENT
Start: 2017-03-04 | End: 2017-06-08

## 2017-03-03 RX ADMIN — Medication 10 ML: at 07:57

## 2017-03-03 RX ADMIN — PREDNISONE 30 MG: 10 TABLET ORAL at 02:12

## 2017-03-03 RX ADMIN — ACETYLCYSTEINE 200 MG: 200 SOLUTION ORAL; RESPIRATORY (INHALATION) at 07:03

## 2017-03-03 RX ADMIN — GUAIFENESIN AND DEXTROMETHORPHAN 5 ML: 100; 10 SYRUP ORAL at 08:00

## 2017-03-03 RX ADMIN — SUCRALFATE 1 G: 1 SUSPENSION ORAL at 06:57

## 2017-03-03 RX ADMIN — IPRATROPIUM BROMIDE AND ALBUTEROL SULFATE 1 AMPULE: .5; 3 SOLUTION RESPIRATORY (INHALATION) at 11:23

## 2017-03-03 RX ADMIN — TOPIRAMATE 50 MG: 25 TABLET, FILM COATED ORAL at 09:14

## 2017-03-03 RX ADMIN — Medication 1 LOZENGE: at 02:13

## 2017-03-03 RX ADMIN — SUCRALFATE 1 G: 1 SUSPENSION ORAL at 12:11

## 2017-03-03 RX ADMIN — IPRATROPIUM BROMIDE AND ALBUTEROL SULFATE 1 AMPULE: .5; 3 SOLUTION RESPIRATORY (INHALATION) at 07:04

## 2017-03-03 RX ADMIN — ACETAMINOPHEN 650 MG: 325 TABLET ORAL at 02:12

## 2017-03-03 RX ADMIN — Medication 1 LOZENGE: at 11:09

## 2017-03-03 RX ADMIN — Medication 10 ML: at 08:05

## 2017-03-03 RX ADMIN — Medication 1 LOZENGE: at 06:57

## 2017-03-03 RX ADMIN — PANTOPRAZOLE SODIUM 40 MG: 40 INJECTION, POWDER, FOR SOLUTION INTRAVENOUS at 07:57

## 2017-03-03 RX ADMIN — ENOXAPARIN SODIUM 40 MG: 40 INJECTION SUBCUTANEOUS at 07:57

## 2017-03-03 ASSESSMENT — PAIN SCALES - GENERAL
PAINLEVEL_OUTOF10: 0
PAINLEVEL_OUTOF10: 9

## 2017-03-06 LAB
EKG ATRIAL RATE: 99 BPM
EKG P AXIS: 47 DEGREES
EKG P-R INTERVAL: 122 MS
EKG Q-T INTERVAL: 310 MS
EKG QRS DURATION: 82 MS
EKG QTC CALCULATION (BAZETT): 397 MS
EKG R AXIS: 54 DEGREES
EKG T AXIS: 41 DEGREES
EKG VENTRICULAR RATE: 99 BPM

## 2017-03-25 ENCOUNTER — HOSPITAL ENCOUNTER (EMERGENCY)
Age: 37
Discharge: HOME | End: 2017-03-25
Attending: EMERGENCY MEDICINE
Payer: COMMERCIAL

## 2017-03-25 VITALS
OXYGEN SATURATION: 97 % | BODY MASS INDEX: 38.09 KG/M2 | TEMPERATURE: 97.9 F | HEIGHT: 62 IN | HEART RATE: 96 BPM | WEIGHT: 207 LBS | RESPIRATION RATE: 17 BRPM

## 2017-03-25 DIAGNOSIS — B02.9 HERPES ZOSTER WITHOUT COMPLICATION: Primary | ICD-10-CM

## 2017-03-25 LAB
-: ABNORMAL
-: NORMAL
AMORPHOUS: ABNORMAL
BACTERIA: ABNORMAL
BILIRUBIN URINE: NEGATIVE
CASTS UA: ABNORMAL /LPF
CHP ED QC CHECK: NORMAL
COLOR: YELLOW
COMMENT UA: ABNORMAL
CRYSTALS, UA: ABNORMAL /HPF
EPITHELIAL CELLS UA: ABNORMAL /HPF
GLUCOSE URINE: NEGATIVE
HCG, PREGNANCY URINE (POC): NEGATIVE
KETONES, URINE: NEGATIVE
LEUKOCYTE ESTERASE, URINE: NEGATIVE
MUCUS: ABNORMAL
NITRITE, URINE: NEGATIVE
OTHER OBSERVATIONS UA: ABNORMAL
PH UA: 6.5 (ref 5–8)
PREGNANCY TEST URINE, POC: NEGATIVE
PROTEIN UA: NEGATIVE
RBC UA: ABNORMAL /HPF
RENAL EPITHELIAL, UA: ABNORMAL /HPF
SPECIFIC GRAVITY UA: 1.02 (ref 1–1.03)
TRICHOMONAS: ABNORMAL
TURBIDITY: ABNORMAL
URINE HGB: NEGATIVE
UROBILINOGEN, URINE: NORMAL
WBC UA: ABNORMAL /HPF
YEAST: ABNORMAL

## 2017-03-25 PROCEDURE — 84703 CHORIONIC GONADOTROPIN ASSAY: CPT

## 2017-03-25 PROCEDURE — 81001 URINALYSIS AUTO W/SCOPE: CPT

## 2017-03-25 PROCEDURE — 99284 EMERGENCY DEPT VISIT MOD MDM: CPT

## 2017-03-25 RX ORDER — PREDNISONE 20 MG/1
20 TABLET ORAL 2 TIMES DAILY
Qty: 10 TABLET | Refills: 0 | Status: SHIPPED | OUTPATIENT
Start: 2017-03-25 | End: 2017-06-08

## 2017-03-25 RX ORDER — ACETAMINOPHEN AND CODEINE PHOSPHATE 300; 30 MG/1; MG/1
1 TABLET ORAL 3 TIMES DAILY PRN
Qty: 10 TABLET | Refills: 0 | Status: SHIPPED | OUTPATIENT
Start: 2017-03-25 | End: 2017-04-26 | Stop reason: ALTCHOICE

## 2017-03-25 ASSESSMENT — PAIN DESCRIPTION - LOCATION: LOCATION: ABDOMEN;FLANK

## 2017-03-25 ASSESSMENT — ENCOUNTER SYMPTOMS
COUGH: 0
NAUSEA: 0
WHEEZING: 0
VOMITING: 0
ABDOMINAL PAIN: 0
DIARRHEA: 0
SORE THROAT: 0

## 2017-03-25 ASSESSMENT — PAIN DESCRIPTION - FREQUENCY: FREQUENCY: CONTINUOUS

## 2017-03-25 ASSESSMENT — PAIN DESCRIPTION - ORIENTATION: ORIENTATION: RIGHT

## 2017-03-25 ASSESSMENT — PAIN DESCRIPTION - PAIN TYPE: TYPE: ACUTE PAIN

## 2017-03-25 ASSESSMENT — PAIN DESCRIPTION - DESCRIPTORS: DESCRIPTORS: SHARP

## 2017-03-28 ENCOUNTER — OFFICE VISIT (OUTPATIENT)
Dept: GASTROENTEROLOGY | Age: 37
End: 2017-03-28
Payer: COMMERCIAL

## 2017-03-28 VITALS
HEART RATE: 79 BPM | DIASTOLIC BLOOD PRESSURE: 81 MMHG | SYSTOLIC BLOOD PRESSURE: 125 MMHG | BODY MASS INDEX: 37.25 KG/M2 | RESPIRATION RATE: 14 BRPM | TEMPERATURE: 98.1 F | HEIGHT: 62 IN | WEIGHT: 202.4 LBS | OXYGEN SATURATION: 98 %

## 2017-03-28 DIAGNOSIS — K22.70 BARRETT'S ESOPHAGUS WITHOUT DYSPLASIA: ICD-10-CM

## 2017-03-28 DIAGNOSIS — K44.9 HIATAL HERNIA: ICD-10-CM

## 2017-03-28 DIAGNOSIS — K21.00 GASTROESOPHAGEAL REFLUX DISEASE WITH ESOPHAGITIS: ICD-10-CM

## 2017-03-28 DIAGNOSIS — R13.10 ODYNOPHAGIA: ICD-10-CM

## 2017-03-28 DIAGNOSIS — K21.9 GASTROESOPHAGEAL REFLUX DISEASE, ESOPHAGITIS PRESENCE NOT SPECIFIED: Primary | ICD-10-CM

## 2017-03-28 PROCEDURE — 99214 OFFICE O/P EST MOD 30 MIN: CPT | Performed by: INTERNAL MEDICINE

## 2017-03-28 ASSESSMENT — ENCOUNTER SYMPTOMS
VOICE CHANGE: 0
ABDOMINAL DISTENTION: 1
ALLERGIC/IMMUNOLOGIC NEGATIVE: 1
ABDOMINAL PAIN: 0
TROUBLE SWALLOWING: 0
SINUS PRESSURE: 0
ANAL BLEEDING: 0
RECTAL PAIN: 0
RESPIRATORY NEGATIVE: 1
NAUSEA: 0
VOMITING: 0
SORE THROAT: 0
BLOOD IN STOOL: 0
CONSTIPATION: 0
DIARRHEA: 0

## 2017-04-10 ENCOUNTER — HOSPITAL ENCOUNTER (OUTPATIENT)
Age: 37
Setting detail: SPECIMEN
Discharge: HOME | End: 2017-04-10

## 2017-04-10 LAB
ALBUMIN SERPL-MCNC: 4.1 G/DL (ref 3.5–5.2)
ALBUMIN/GLOBULIN RATIO: 1.4 (ref 1–2.5)
ALP BLD-CCNC: 61 U/L (ref 35–104)
ALT SERPL-CCNC: 22 U/L (ref 5–33)
ANION GAP SERPL CALCULATED.3IONS-SCNC: 17 MMOL/L (ref 9–17)
AST SERPL-CCNC: 18 U/L
BILIRUB SERPL-MCNC: 0.41 MG/DL (ref 0.3–1.2)
BILIRUBIN DIRECT: 0.09 MG/DL
BILIRUBIN, INDIRECT: 0.32 MG/DL (ref 0–1)
BUN BLDV-MCNC: 10 MG/DL (ref 6–20)
BUN/CREAT BLD: NORMAL (ref 9–20)
CALCIUM SERPL-MCNC: 9.4 MG/DL (ref 8.6–10.4)
CHLORIDE BLD-SCNC: 103 MMOL/L (ref 98–107)
CHOLESTEROL/HDL RATIO: 4.6
CHOLESTEROL: 218 MG/DL
CO2: 23 MMOL/L (ref 20–31)
CREAT SERPL-MCNC: 0.7 MG/DL (ref 0.5–0.9)
CREATININE URINE: 129.8 MG/DL (ref 28–217)
ESTIMATED AVERAGE GLUCOSE: 120 MG/DL
GFR AFRICAN AMERICAN: >60 ML/MIN
GFR NON-AFRICAN AMERICAN: >60 ML/MIN
GFR SERPL CREATININE-BSD FRML MDRD: NORMAL ML/MIN/{1.73_M2}
GFR SERPL CREATININE-BSD FRML MDRD: NORMAL ML/MIN/{1.73_M2}
GLOBULIN: NORMAL G/DL (ref 1.5–3.8)
GLUCOSE BLD-MCNC: 83 MG/DL (ref 70–99)
HBA1C MFR BLD: 5.8 % (ref 4–6)
HDLC SERPL-MCNC: 47 MG/DL
LDL CHOLESTEROL: 117 MG/DL (ref 0–130)
MICROALBUMIN/CREAT 24H UR: <12 MG/L
MICROALBUMIN/CREAT UR-RTO: 9 MCG/MG CREAT
POTASSIUM SERPL-SCNC: 4.1 MMOL/L (ref 3.7–5.3)
SODIUM BLD-SCNC: 143 MMOL/L (ref 135–144)
THYROXINE, FREE: 0.95 NG/DL (ref 0.93–1.7)
TOTAL PROTEIN: 7 G/DL (ref 6.4–8.3)
TRIGL SERPL-MCNC: 269 MG/DL
TSH SERPL DL<=0.05 MIU/L-ACNC: 1.82 MIU/L (ref 0.3–5)
VLDLC SERPL CALC-MCNC: ABNORMAL MG/DL (ref 1–30)

## 2017-04-11 LAB
BILIRUBIN URINE: NEGATIVE
COLOR: YELLOW
COMMENT UA: NORMAL
CULTURE: NORMAL
CULTURE: NORMAL
GLUCOSE URINE: NEGATIVE
KETONES, URINE: NEGATIVE
LEUKOCYTE ESTERASE, URINE: NEGATIVE
Lab: NORMAL
NITRITE, URINE: NEGATIVE
PH UA: 8 (ref 5–8)
PROTEIN UA: NEGATIVE
SPECIFIC GRAVITY UA: 1.02 (ref 1–1.03)
SPECIMEN DESCRIPTION: NORMAL
STATUS: NORMAL
TURBIDITY: CLEAR
URINE HGB: NEGATIVE
UROBILINOGEN, URINE: NORMAL

## 2017-04-13 RX ORDER — SUCRALFATE 1 G/1
TABLET ORAL
Qty: 120 TABLET | Refills: 0 | Status: SHIPPED | OUTPATIENT
Start: 2017-04-13 | End: 2017-04-26 | Stop reason: ALTCHOICE

## 2017-04-18 RX ORDER — SUCRALFATE 1 G/1
TABLET ORAL
Qty: 120 TABLET | Refills: 0 | Status: SHIPPED | OUTPATIENT
Start: 2017-04-18 | End: 2018-02-12

## 2017-04-26 ENCOUNTER — OFFICE VISIT (OUTPATIENT)
Dept: OBGYN CLINIC | Age: 37
End: 2017-04-26
Payer: COMMERCIAL

## 2017-04-26 VITALS
RESPIRATION RATE: 20 BRPM | WEIGHT: 216 LBS | HEIGHT: 62 IN | SYSTOLIC BLOOD PRESSURE: 122 MMHG | BODY MASS INDEX: 39.75 KG/M2 | DIASTOLIC BLOOD PRESSURE: 82 MMHG | HEART RATE: 72 BPM

## 2017-04-26 DIAGNOSIS — R10.2 PELVIC PAIN IN FEMALE: ICD-10-CM

## 2017-04-26 DIAGNOSIS — R10.31 RLQ ABDOMINAL PAIN: Primary | ICD-10-CM

## 2017-04-26 PROCEDURE — 99213 OFFICE O/P EST LOW 20 MIN: CPT | Performed by: SPECIALIST

## 2017-04-26 ASSESSMENT — ENCOUNTER SYMPTOMS
NAUSEA: 0
CONSTIPATION: 0
VOMITING: 0
COUGH: 0
DIARRHEA: 0
APNEA: 0
ABDOMINAL DISTENTION: 0
EYE PAIN: 0
ABDOMINAL PAIN: 1

## 2017-04-29 ENCOUNTER — HOSPITAL ENCOUNTER (EMERGENCY)
Age: 37
Discharge: HOME | End: 2017-04-30
Attending: EMERGENCY MEDICINE
Payer: COMMERCIAL

## 2017-04-29 ENCOUNTER — APPOINTMENT (OUTPATIENT)
Dept: GENERAL RADIOLOGY | Age: 37
End: 2017-04-29
Payer: COMMERCIAL

## 2017-04-29 DIAGNOSIS — R07.9 CHEST PAIN, UNSPECIFIED TYPE: Primary | ICD-10-CM

## 2017-04-29 PROCEDURE — 99285 EMERGENCY DEPT VISIT HI MDM: CPT

## 2017-04-29 PROCEDURE — 93005 ELECTROCARDIOGRAM TRACING: CPT

## 2017-04-29 PROCEDURE — 96374 THER/PROPH/DIAG INJ IV PUSH: CPT

## 2017-04-29 PROCEDURE — 71020 XR CHEST STANDARD TWO VW: CPT

## 2017-04-29 RX ORDER — MORPHINE SULFATE 4 MG/ML
4 INJECTION, SOLUTION INTRAMUSCULAR; INTRAVENOUS ONCE
Status: COMPLETED | OUTPATIENT
Start: 2017-04-29 | End: 2017-04-30

## 2017-04-29 RX ORDER — ASPIRIN 81 MG/1
324 TABLET, CHEWABLE ORAL ONCE
Status: COMPLETED | OUTPATIENT
Start: 2017-04-29 | End: 2017-04-30

## 2017-04-29 ASSESSMENT — ENCOUNTER SYMPTOMS
VOMITING: 0
SORE THROAT: 0
FACIAL SWELLING: 0
ABDOMINAL PAIN: 0
TROUBLE SWALLOWING: 0
RHINORRHEA: 0
WHEEZING: 0
COUGH: 0
BLOOD IN STOOL: 0
CHEST TIGHTNESS: 0
EYE REDNESS: 0
CONSTIPATION: 0
NAUSEA: 0
SINUS PRESSURE: 0
COLOR CHANGE: 0
EYE DISCHARGE: 0
DIARRHEA: 0
EYE PAIN: 0
SHORTNESS OF BREATH: 0
BACK PAIN: 0

## 2017-04-29 ASSESSMENT — PAIN DESCRIPTION - ORIENTATION: ORIENTATION: LEFT

## 2017-04-29 ASSESSMENT — PAIN DESCRIPTION - LOCATION: LOCATION: ARM;CHEST

## 2017-04-29 ASSESSMENT — PAIN SCALES - GENERAL: PAINLEVEL_OUTOF10: 8

## 2017-04-30 VITALS
RESPIRATION RATE: 19 BRPM | HEART RATE: 80 BPM | BODY MASS INDEX: 39.56 KG/M2 | HEIGHT: 62 IN | DIASTOLIC BLOOD PRESSURE: 69 MMHG | WEIGHT: 215 LBS | SYSTOLIC BLOOD PRESSURE: 123 MMHG | TEMPERATURE: 98 F | OXYGEN SATURATION: 98 %

## 2017-04-30 LAB
ABSOLUTE EOS #: 0.26 K/UL (ref 0–0.4)
ABSOLUTE LYMPH #: 2.18 K/UL (ref 1–4.8)
ABSOLUTE MONO #: 0.59 K/UL (ref 0.1–1.3)
ANION GAP SERPL CALCULATED.3IONS-SCNC: 14 MMOL/L (ref 9–17)
BASOPHILS # BLD: 1 %
BASOPHILS ABSOLUTE: 0.07 K/UL (ref 0–0.2)
BUN BLDV-MCNC: 15 MG/DL (ref 6–20)
BUN/CREAT BLD: ABNORMAL (ref 9–20)
CALCIUM SERPL-MCNC: 8.8 MG/DL (ref 8.6–10.4)
CHLORIDE BLD-SCNC: 106 MMOL/L (ref 98–107)
CO2: 19 MMOL/L (ref 20–31)
CREAT SERPL-MCNC: 0.81 MG/DL (ref 0.5–0.9)
DIFFERENTIAL TYPE: ABNORMAL
EOSINOPHILS RELATIVE PERCENT: 4 %
GFR AFRICAN AMERICAN: >60 ML/MIN
GFR NON-AFRICAN AMERICAN: >60 ML/MIN
GFR SERPL CREATININE-BSD FRML MDRD: ABNORMAL ML/MIN/{1.73_M2}
GFR SERPL CREATININE-BSD FRML MDRD: ABNORMAL ML/MIN/{1.73_M2}
GLUCOSE BLD-MCNC: 122 MG/DL (ref 70–99)
HCT VFR BLD CALC: 33.8 % (ref 36–46)
HEMOGLOBIN: 10.8 G/DL (ref 12–16)
LYMPHOCYTES # BLD: 33 %
MCH RBC QN AUTO: 23.8 PG (ref 26–34)
MCHC RBC AUTO-ENTMCNC: 32 G/DL (ref 31–37)
MCV RBC AUTO: 74.5 FL (ref 80–100)
MONOCYTES # BLD: 9 %
MORPHOLOGY: ABNORMAL
MYOGLOBIN: 53 NG/ML (ref 25–58)
MYOGLOBIN: 59 NG/ML (ref 25–58)
PDW BLD-RTO: 15.9 % (ref 11.5–14.9)
PLATELET # BLD: 317 K/UL (ref 150–450)
PLATELET ESTIMATE: ABNORMAL
PMV BLD AUTO: 8 FL (ref 6–12)
POTASSIUM SERPL-SCNC: 3.8 MMOL/L (ref 3.7–5.3)
RBC # BLD: 4.54 M/UL (ref 4–5.2)
RBC # BLD: ABNORMAL 10*6/UL
SEG NEUTROPHILS: 53 %
SEGMENTED NEUTROPHILS ABSOLUTE COUNT: 3.5 K/UL (ref 1.3–9.1)
SODIUM BLD-SCNC: 139 MMOL/L (ref 135–144)
TROPONIN INTERP: ABNORMAL
TROPONIN INTERP: NORMAL
TROPONIN T: <0.03 NG/ML
TROPONIN T: <0.03 NG/ML
WBC # BLD: 6.6 K/UL (ref 3.5–11)
WBC # BLD: ABNORMAL 10*3/UL

## 2017-04-30 PROCEDURE — 85025 COMPLETE CBC W/AUTO DIFF WBC: CPT

## 2017-04-30 PROCEDURE — 83874 ASSAY OF MYOGLOBIN: CPT

## 2017-04-30 PROCEDURE — 36415 COLL VENOUS BLD VENIPUNCTURE: CPT

## 2017-04-30 PROCEDURE — 80048 BASIC METABOLIC PNL TOTAL CA: CPT

## 2017-04-30 PROCEDURE — 6370000000 HC RX 637 (ALT 250 FOR IP): Performed by: EMERGENCY MEDICINE

## 2017-04-30 PROCEDURE — 84484 ASSAY OF TROPONIN QUANT: CPT

## 2017-04-30 PROCEDURE — 6360000002 HC RX W HCPCS: Performed by: EMERGENCY MEDICINE

## 2017-04-30 RX ORDER — OXYCODONE HYDROCHLORIDE AND ACETAMINOPHEN 5; 325 MG/1; MG/1
2 TABLET ORAL ONCE
Status: COMPLETED | OUTPATIENT
Start: 2017-04-30 | End: 2017-04-30

## 2017-04-30 RX ADMIN — ASPIRIN 81 MG 324 MG: 81 TABLET ORAL at 00:47

## 2017-04-30 RX ADMIN — OXYCODONE HYDROCHLORIDE AND ACETAMINOPHEN 2 TABLET: 5; 325 TABLET ORAL at 02:22

## 2017-04-30 RX ADMIN — MORPHINE SULFATE 4 MG: 4 INJECTION, SOLUTION INTRAMUSCULAR; INTRAVENOUS at 00:45

## 2017-04-30 ASSESSMENT — PAIN DESCRIPTION - LOCATION: LOCATION: CHEST

## 2017-04-30 ASSESSMENT — PAIN SCALES - GENERAL
PAINLEVEL_OUTOF10: 4
PAINLEVEL_OUTOF10: 8
PAINLEVEL_OUTOF10: 8

## 2017-05-02 LAB
EKG ATRIAL RATE: 80 BPM
EKG P AXIS: 40 DEGREES
EKG P-R INTERVAL: 130 MS
EKG Q-T INTERVAL: 374 MS
EKG QRS DURATION: 86 MS
EKG QTC CALCULATION (BAZETT): 431 MS
EKG R AXIS: 41 DEGREES
EKG T AXIS: 28 DEGREES
EKG VENTRICULAR RATE: 80 BPM

## 2017-05-03 ENCOUNTER — TELEPHONE (OUTPATIENT)
Dept: OBGYN CLINIC | Age: 37
End: 2017-05-03

## 2017-05-11 ENCOUNTER — TELEPHONE (OUTPATIENT)
Dept: OBGYN CLINIC | Age: 37
End: 2017-05-11

## 2017-05-11 RX ORDER — SUCRALFATE 1 G/1
TABLET ORAL
Qty: 120 TABLET | Refills: 2 | Status: ON HOLD | OUTPATIENT
Start: 2017-05-11 | End: 2017-05-23 | Stop reason: HOSPADM

## 2017-05-15 ENCOUNTER — APPOINTMENT (OUTPATIENT)
Dept: CT IMAGING | Age: 37
End: 2017-05-15
Payer: COMMERCIAL

## 2017-05-15 ENCOUNTER — APPOINTMENT (OUTPATIENT)
Dept: ULTRASOUND IMAGING | Age: 37
End: 2017-05-15
Payer: COMMERCIAL

## 2017-05-15 ENCOUNTER — HOSPITAL ENCOUNTER (EMERGENCY)
Age: 37
Discharge: HOME | End: 2017-05-15
Attending: EMERGENCY MEDICINE
Payer: COMMERCIAL

## 2017-05-15 VITALS
WEIGHT: 215 LBS | HEIGHT: 62 IN | HEART RATE: 77 BPM | SYSTOLIC BLOOD PRESSURE: 153 MMHG | TEMPERATURE: 97.9 F | OXYGEN SATURATION: 97 % | RESPIRATION RATE: 18 BRPM | DIASTOLIC BLOOD PRESSURE: 95 MMHG | BODY MASS INDEX: 39.56 KG/M2

## 2017-05-15 DIAGNOSIS — D50.9 MICROCYTIC ANEMIA: ICD-10-CM

## 2017-05-15 DIAGNOSIS — R52 PAIN: ICD-10-CM

## 2017-05-15 DIAGNOSIS — R10.31 CHRONIC BILATERAL LOWER ABDOMINAL PAIN: Primary | ICD-10-CM

## 2017-05-15 DIAGNOSIS — R10.32 CHRONIC BILATERAL LOWER ABDOMINAL PAIN: Primary | ICD-10-CM

## 2017-05-15 DIAGNOSIS — G89.29 CHRONIC BILATERAL LOWER ABDOMINAL PAIN: Primary | ICD-10-CM

## 2017-05-15 LAB
ABSOLUTE EOS #: 0.3 K/UL (ref 0–0.4)
ABSOLUTE LYMPH #: 1.63 K/UL (ref 1–4.8)
ABSOLUTE MONO #: 0.52 K/UL (ref 0.1–1.3)
ALBUMIN SERPL-MCNC: 4.1 G/DL (ref 3.5–5.2)
ALBUMIN/GLOBULIN RATIO: NORMAL (ref 1–2.5)
ALP BLD-CCNC: 50 U/L (ref 35–104)
ALT SERPL-CCNC: 14 U/L (ref 5–33)
ANION GAP SERPL CALCULATED.3IONS-SCNC: 12 MMOL/L (ref 9–17)
AST SERPL-CCNC: 17 U/L
BASOPHILS # BLD: 1 %
BASOPHILS ABSOLUTE: 0.07 K/UL (ref 0–0.2)
BILIRUB SERPL-MCNC: 0.62 MG/DL (ref 0.3–1.2)
BILIRUBIN DIRECT: 0.13 MG/DL
BILIRUBIN URINE: NEGATIVE
BILIRUBIN, INDIRECT: 0.49 MG/DL (ref 0–1)
BUN BLDV-MCNC: 8 MG/DL (ref 6–20)
BUN/CREAT BLD: ABNORMAL (ref 9–20)
CALCIUM SERPL-MCNC: 9.1 MG/DL (ref 8.6–10.4)
CHLORIDE BLD-SCNC: 106 MMOL/L (ref 98–107)
CO2: 21 MMOL/L (ref 20–31)
COLOR: YELLOW
COMMENT UA: NORMAL
CREAT SERPL-MCNC: 0.6 MG/DL (ref 0.5–0.9)
DIFFERENTIAL TYPE: ABNORMAL
EOSINOPHILS RELATIVE PERCENT: 4 %
GFR AFRICAN AMERICAN: >60 ML/MIN
GFR NON-AFRICAN AMERICAN: >60 ML/MIN
GFR SERPL CREATININE-BSD FRML MDRD: ABNORMAL ML/MIN/{1.73_M2}
GFR SERPL CREATININE-BSD FRML MDRD: ABNORMAL ML/MIN/{1.73_M2}
GLOBULIN: NORMAL G/DL (ref 1.5–3.8)
GLUCOSE BLD-MCNC: 109 MG/DL (ref 70–99)
GLUCOSE URINE: NEGATIVE
HCG(URINE) PREGNANCY TEST: NEGATIVE
HCT VFR BLD CALC: 36 % (ref 36–46)
HEMOGLOBIN: 11.5 G/DL (ref 12–16)
KETONES, URINE: NEGATIVE
LEUKOCYTE ESTERASE, URINE: NEGATIVE
LIPASE: 19 U/L (ref 13–60)
LYMPHOCYTES # BLD: 22 %
MCH RBC QN AUTO: 23.7 PG (ref 26–34)
MCHC RBC AUTO-ENTMCNC: 32 G/DL (ref 31–37)
MCV RBC AUTO: 74.2 FL (ref 80–100)
MONOCYTES # BLD: 7 %
MORPHOLOGY: ABNORMAL
NITRITE, URINE: NEGATIVE
PDW BLD-RTO: 15.6 % (ref 11.5–14.9)
PH UA: 6 (ref 5–8)
PLATELET # BLD: 295 K/UL (ref 150–450)
PLATELET ESTIMATE: ABNORMAL
PMV BLD AUTO: 8.2 FL (ref 6–12)
POTASSIUM SERPL-SCNC: 3.8 MMOL/L (ref 3.7–5.3)
PROTEIN UA: NEGATIVE
RBC # BLD: 4.86 M/UL (ref 4–5.2)
RBC # BLD: ABNORMAL 10*6/UL
SEG NEUTROPHILS: 66 %
SEGMENTED NEUTROPHILS ABSOLUTE COUNT: 4.88 K/UL (ref 1.3–9.1)
SODIUM BLD-SCNC: 139 MMOL/L (ref 135–144)
SPECIFIC GRAVITY UA: 1.02 (ref 1–1.03)
TOTAL PROTEIN: 6.9 G/DL (ref 6.4–8.3)
TURBIDITY: CLEAR
URINE HGB: NEGATIVE
UROBILINOGEN, URINE: NORMAL
WBC # BLD: 7.4 K/UL (ref 3.5–11)
WBC # BLD: ABNORMAL 10*3/UL

## 2017-05-15 PROCEDURE — 80076 HEPATIC FUNCTION PANEL: CPT

## 2017-05-15 PROCEDURE — 36415 COLL VENOUS BLD VENIPUNCTURE: CPT

## 2017-05-15 PROCEDURE — 80048 BASIC METABOLIC PNL TOTAL CA: CPT

## 2017-05-15 PROCEDURE — 83690 ASSAY OF LIPASE: CPT

## 2017-05-15 PROCEDURE — 96376 TX/PRO/DX INJ SAME DRUG ADON: CPT

## 2017-05-15 PROCEDURE — 6360000002 HC RX W HCPCS: Performed by: EMERGENCY MEDICINE

## 2017-05-15 PROCEDURE — 96374 THER/PROPH/DIAG INJ IV PUSH: CPT

## 2017-05-15 PROCEDURE — 2580000003 HC RX 258: Performed by: EMERGENCY MEDICINE

## 2017-05-15 PROCEDURE — 99284 EMERGENCY DEPT VISIT MOD MDM: CPT

## 2017-05-15 PROCEDURE — 81003 URINALYSIS AUTO W/O SCOPE: CPT

## 2017-05-15 PROCEDURE — 96375 TX/PRO/DX INJ NEW DRUG ADDON: CPT

## 2017-05-15 PROCEDURE — 6370000000 HC RX 637 (ALT 250 FOR IP): Performed by: EMERGENCY MEDICINE

## 2017-05-15 PROCEDURE — 76830 TRANSVAGINAL US NON-OB: CPT

## 2017-05-15 PROCEDURE — 84703 CHORIONIC GONADOTROPIN ASSAY: CPT

## 2017-05-15 PROCEDURE — 6360000004 HC RX CONTRAST MEDICATION: Performed by: EMERGENCY MEDICINE

## 2017-05-15 PROCEDURE — 93976 VASCULAR STUDY: CPT

## 2017-05-15 PROCEDURE — 85025 COMPLETE CBC W/AUTO DIFF WBC: CPT

## 2017-05-15 PROCEDURE — 74177 CT ABD & PELVIS W/CONTRAST: CPT

## 2017-05-15 RX ORDER — ONDANSETRON 4 MG/1
4 TABLET, ORALLY DISINTEGRATING ORAL EVERY 8 HOURS PRN
Qty: 8 TABLET | Refills: 0 | Status: SHIPPED | OUTPATIENT
Start: 2017-05-15 | End: 2017-06-08

## 2017-05-15 RX ORDER — 0.9 % SODIUM CHLORIDE 0.9 %
100 INTRAVENOUS SOLUTION INTRAVENOUS ONCE
Status: COMPLETED | OUTPATIENT
Start: 2017-05-15 | End: 2017-05-15

## 2017-05-15 RX ORDER — OXYCODONE HYDROCHLORIDE AND ACETAMINOPHEN 5; 325 MG/1; MG/1
1-2 TABLET ORAL EVERY 6 HOURS PRN
Qty: 8 TABLET | Refills: 0 | Status: ON HOLD | OUTPATIENT
Start: 2017-05-15 | End: 2017-05-23 | Stop reason: HOSPADM

## 2017-05-15 RX ORDER — SODIUM CHLORIDE 0.9 % (FLUSH) 0.9 %
10 SYRINGE (ML) INJECTION PRN
Status: DISCONTINUED | OUTPATIENT
Start: 2017-05-15 | End: 2017-05-15 | Stop reason: HOSPADM

## 2017-05-15 RX ORDER — FENTANYL CITRATE 50 UG/ML
100 INJECTION, SOLUTION INTRAMUSCULAR; INTRAVENOUS ONCE
Status: COMPLETED | OUTPATIENT
Start: 2017-05-15 | End: 2017-05-15

## 2017-05-15 RX ORDER — 0.9 % SODIUM CHLORIDE 0.9 %
1000 INTRAVENOUS SOLUTION INTRAVENOUS ONCE
Status: COMPLETED | OUTPATIENT
Start: 2017-05-15 | End: 2017-05-15

## 2017-05-15 RX ORDER — ONDANSETRON 2 MG/ML
4 INJECTION INTRAMUSCULAR; INTRAVENOUS ONCE
Status: COMPLETED | OUTPATIENT
Start: 2017-05-15 | End: 2017-05-15

## 2017-05-15 RX ORDER — OXYCODONE HYDROCHLORIDE AND ACETAMINOPHEN 5; 325 MG/1; MG/1
1 TABLET ORAL ONCE
Status: COMPLETED | OUTPATIENT
Start: 2017-05-15 | End: 2017-05-15

## 2017-05-15 RX ADMIN — FENTANYL CITRATE 100 MCG: 50 INJECTION, SOLUTION INTRAMUSCULAR; INTRAVENOUS at 13:04

## 2017-05-15 RX ADMIN — SODIUM CHLORIDE 100 ML: 9 INJECTION, SOLUTION INTRAVENOUS at 13:36

## 2017-05-15 RX ADMIN — Medication 10 ML: at 13:36

## 2017-05-15 RX ADMIN — IOVERSOL 130 ML: 741 INJECTION INTRA-ARTERIAL; INTRAVENOUS at 13:36

## 2017-05-15 RX ADMIN — SODIUM CHLORIDE 1000 ML: 9 INJECTION, SOLUTION INTRAVENOUS at 13:03

## 2017-05-15 RX ADMIN — OXYCODONE HYDROCHLORIDE AND ACETAMINOPHEN 1 TABLET: 5; 325 TABLET ORAL at 16:25

## 2017-05-15 RX ADMIN — FENTANYL CITRATE 100 MCG: 50 INJECTION, SOLUTION INTRAMUSCULAR; INTRAVENOUS at 14:45

## 2017-05-15 RX ADMIN — ONDANSETRON 4 MG: 2 INJECTION INTRAMUSCULAR; INTRAVENOUS at 13:04

## 2017-05-15 ASSESSMENT — PAIN DESCRIPTION - DESCRIPTORS: DESCRIPTORS: SHARP

## 2017-05-15 ASSESSMENT — ENCOUNTER SYMPTOMS
ABDOMINAL PAIN: 1
NAUSEA: 0
SORE THROAT: 0
BACK PAIN: 0
COUGH: 0
VOMITING: 0
SHORTNESS OF BREATH: 0
EYE PAIN: 0
DIARRHEA: 0

## 2017-05-15 ASSESSMENT — PAIN DESCRIPTION - FREQUENCY: FREQUENCY: CONTINUOUS

## 2017-05-15 ASSESSMENT — PAIN DESCRIPTION - LOCATION: LOCATION: ABDOMEN

## 2017-05-15 ASSESSMENT — PAIN DESCRIPTION - ORIENTATION: ORIENTATION: RIGHT

## 2017-05-15 ASSESSMENT — PAIN DESCRIPTION - PAIN TYPE: TYPE: ACUTE PAIN

## 2017-05-15 ASSESSMENT — PAIN SCALES - GENERAL: PAINLEVEL_OUTOF10: 9

## 2017-05-21 ENCOUNTER — APPOINTMENT (OUTPATIENT)
Dept: GENERAL RADIOLOGY | Age: 37
DRG: 203 | End: 2017-05-21
Payer: COMMERCIAL

## 2017-05-21 ENCOUNTER — HOSPITAL ENCOUNTER (INPATIENT)
Age: 37
LOS: 2 days | Discharge: HOME | DRG: 203 | End: 2017-05-23
Attending: EMERGENCY MEDICINE | Admitting: INTERNAL MEDICINE
Payer: COMMERCIAL

## 2017-05-21 DIAGNOSIS — J45.901 ASTHMA EXACERBATION: Primary | ICD-10-CM

## 2017-05-21 LAB
ABSOLUTE EOS #: 0.2 K/UL (ref 0–0.4)
ABSOLUTE LYMPH #: 0.81 K/UL (ref 1–4.8)
ABSOLUTE MONO #: 0.3 K/UL (ref 0.1–1.3)
ALBUMIN SERPL-MCNC: 4.2 G/DL (ref 3.5–5.2)
ALBUMIN/GLOBULIN RATIO: NORMAL (ref 1–2.5)
ALLEN TEST: ABNORMAL
ALP BLD-CCNC: 51 U/L (ref 35–104)
ALT SERPL-CCNC: 14 U/L (ref 5–33)
ANION GAP SERPL CALCULATED.3IONS-SCNC: 15 MMOL/L (ref 9–17)
AST SERPL-CCNC: 17 U/L
BASOPHILS # BLD: 1 %
BASOPHILS ABSOLUTE: 0.1 K/UL (ref 0–0.2)
BILIRUB SERPL-MCNC: 0.39 MG/DL (ref 0.3–1.2)
BUN BLDV-MCNC: 18 MG/DL (ref 6–20)
BUN/CREAT BLD: NORMAL (ref 9–20)
C-REACTIVE PROTEIN: 16.8 MG/L (ref 0–5)
CALCIUM SERPL-MCNC: 9.1 MG/DL (ref 8.6–10.4)
CARBOXYHEMOGLOBIN: 1.1 % (ref 0–5)
CHLORIDE BLD-SCNC: 105 MMOL/L (ref 98–107)
CO2: 21 MMOL/L (ref 20–31)
CREAT SERPL-MCNC: 0.75 MG/DL (ref 0.5–0.9)
DIFFERENTIAL TYPE: ABNORMAL
EOSINOPHILS RELATIVE PERCENT: 2 %
FIO2: ABNORMAL
GFR AFRICAN AMERICAN: >60 ML/MIN
GFR NON-AFRICAN AMERICAN: >60 ML/MIN
GFR SERPL CREATININE-BSD FRML MDRD: NORMAL ML/MIN/{1.73_M2}
GFR SERPL CREATININE-BSD FRML MDRD: NORMAL ML/MIN/{1.73_M2}
GLUCOSE BLD-MCNC: 132 MG/DL (ref 65–105)
GLUCOSE BLD-MCNC: 182 MG/DL (ref 65–105)
GLUCOSE BLD-MCNC: 91 MG/DL (ref 70–99)
HCO3 ARTERIAL: 24.2 MMOL/L (ref 22–26)
HCT VFR BLD CALC: 35.4 % (ref 36–46)
HEMOGLOBIN: 11.6 G/DL (ref 12–16)
LYMPHOCYTES # BLD: 8 %
MCH RBC QN AUTO: 24 PG (ref 26–34)
MCHC RBC AUTO-ENTMCNC: 32.7 G/DL (ref 31–37)
MCV RBC AUTO: 73.4 FL (ref 80–100)
METHEMOGLOBIN: 0.4 % (ref 0–1.9)
MODE: ABNORMAL
MONOCYTES # BLD: 3 %
MORPHOLOGY: ABNORMAL
NEGATIVE BASE EXCESS, ART: ABNORMAL MMOL/L (ref 0–2)
NOTIFICATION TIME: ABNORMAL
NOTIFICATION: ABNORMAL
O2 DEVICE/FLOW/%: ABNORMAL
O2 SAT, ARTERIAL: 89.6 % (ref 95–98)
OXYHEMOGLOBIN: ABNORMAL % (ref 95–98)
PATIENT TEMP: 37
PCO2 ARTERIAL: 35 MMHG (ref 35–45)
PCO2, ART, TEMP ADJ: ABNORMAL (ref 35–45)
PDW BLD-RTO: 15.8 % (ref 11.5–14.9)
PEEP/CPAP: ABNORMAL
PH ARTERIAL: 7.45 (ref 7.35–7.45)
PH, ART, TEMP ADJ: ABNORMAL (ref 7.35–7.45)
PLATELET # BLD: 270 K/UL (ref 150–450)
PLATELET ESTIMATE: ABNORMAL
PMV BLD AUTO: 8.3 FL (ref 6–12)
PO2 ARTERIAL: 64.7 MMHG (ref 80–100)
PO2, ART, TEMP ADJ: ABNORMAL MMHG (ref 80–100)
POSITIVE BASE EXCESS, ART: 0.1 MMOL/L (ref 0–2)
POTASSIUM SERPL-SCNC: 4 MMOL/L (ref 3.7–5.3)
PSV: ABNORMAL
PT. POSITION: ABNORMAL
RBC # BLD: 4.82 M/UL (ref 4–5.2)
RBC # BLD: ABNORMAL 10*6/UL
RESPIRATORY RATE: 24
SAMPLE SITE: ABNORMAL
SEG NEUTROPHILS: 86 %
SEGMENTED NEUTROPHILS ABSOLUTE COUNT: 8.69 K/UL (ref 1.3–9.1)
SET RATE: ABNORMAL
SODIUM BLD-SCNC: 141 MMOL/L (ref 135–144)
TEXT FOR RESPIRATORY: ABNORMAL
TOTAL HB: ABNORMAL G/DL (ref 12–16)
TOTAL PROTEIN: 7 G/DL (ref 6.4–8.3)
TOTAL RATE: ABNORMAL
VT: ABNORMAL
WBC # BLD: 10.1 K/UL (ref 3.5–11)
WBC # BLD: ABNORMAL 10*3/UL

## 2017-05-21 PROCEDURE — 71020 XR CHEST STANDARD TWO VW: CPT

## 2017-05-21 PROCEDURE — 99285 EMERGENCY DEPT VISIT HI MDM: CPT

## 2017-05-21 PROCEDURE — 2580000003 HC RX 258: Performed by: STUDENT IN AN ORGANIZED HEALTH CARE EDUCATION/TRAINING PROGRAM

## 2017-05-21 PROCEDURE — 6360000002 HC RX W HCPCS: Performed by: STUDENT IN AN ORGANIZED HEALTH CARE EDUCATION/TRAINING PROGRAM

## 2017-05-21 PROCEDURE — 80053 COMPREHEN METABOLIC PANEL: CPT

## 2017-05-21 PROCEDURE — 6370000000 HC RX 637 (ALT 250 FOR IP): Performed by: NURSE PRACTITIONER

## 2017-05-21 PROCEDURE — 82947 ASSAY GLUCOSE BLOOD QUANT: CPT

## 2017-05-21 PROCEDURE — 6370000000 HC RX 637 (ALT 250 FOR IP): Performed by: STUDENT IN AN ORGANIZED HEALTH CARE EDUCATION/TRAINING PROGRAM

## 2017-05-21 PROCEDURE — 85025 COMPLETE CBC W/AUTO DIFF WBC: CPT

## 2017-05-21 PROCEDURE — 94640 AIRWAY INHALATION TREATMENT: CPT

## 2017-05-21 PROCEDURE — 36415 COLL VENOUS BLD VENIPUNCTURE: CPT

## 2017-05-21 PROCEDURE — 83036 HEMOGLOBIN GLYCOSYLATED A1C: CPT

## 2017-05-21 PROCEDURE — 82805 BLOOD GASES W/O2 SATURATION: CPT

## 2017-05-21 PROCEDURE — 86140 C-REACTIVE PROTEIN: CPT

## 2017-05-21 PROCEDURE — 99223 1ST HOSP IP/OBS HIGH 75: CPT | Performed by: INTERNAL MEDICINE

## 2017-05-21 PROCEDURE — 36600 WITHDRAWAL OF ARTERIAL BLOOD: CPT

## 2017-05-21 PROCEDURE — 94664 DEMO&/EVAL PT USE INHALER: CPT

## 2017-05-21 PROCEDURE — 6370000000 HC RX 637 (ALT 250 FOR IP): Performed by: INTERNAL MEDICINE

## 2017-05-21 PROCEDURE — 2060000000 HC ICU INTERMEDIATE R&B

## 2017-05-21 RX ORDER — PANTOPRAZOLE SODIUM 40 MG/1
40 TABLET, DELAYED RELEASE ORAL
Status: DISCONTINUED | OUTPATIENT
Start: 2017-05-21 | End: 2017-05-23 | Stop reason: HOSPADM

## 2017-05-21 RX ORDER — DEXTROSE MONOHYDRATE 50 MG/ML
100 INJECTION, SOLUTION INTRAVENOUS PRN
Status: DISCONTINUED | OUTPATIENT
Start: 2017-05-21 | End: 2017-05-23 | Stop reason: HOSPADM

## 2017-05-21 RX ORDER — DEXTROSE MONOHYDRATE 25 G/50ML
12.5 INJECTION, SOLUTION INTRAVENOUS PRN
Status: DISCONTINUED | OUTPATIENT
Start: 2017-05-21 | End: 2017-05-23 | Stop reason: HOSPADM

## 2017-05-21 RX ORDER — IPRATROPIUM BROMIDE AND ALBUTEROL SULFATE 2.5; .5 MG/3ML; MG/3ML
1 SOLUTION RESPIRATORY (INHALATION) ONCE
Status: COMPLETED | OUTPATIENT
Start: 2017-05-21 | End: 2017-05-21

## 2017-05-21 RX ORDER — SODIUM CHLORIDE 0.9 % (FLUSH) 0.9 %
10 SYRINGE (ML) INJECTION EVERY 12 HOURS SCHEDULED
Status: DISCONTINUED | OUTPATIENT
Start: 2017-05-21 | End: 2017-05-23 | Stop reason: HOSPADM

## 2017-05-21 RX ORDER — ONDANSETRON 2 MG/ML
4 INJECTION INTRAMUSCULAR; INTRAVENOUS EVERY 6 HOURS PRN
Status: DISCONTINUED | OUTPATIENT
Start: 2017-05-21 | End: 2017-05-23 | Stop reason: HOSPADM

## 2017-05-21 RX ORDER — NICOTINE POLACRILEX 4 MG
15 LOZENGE BUCCAL PRN
Status: DISCONTINUED | OUTPATIENT
Start: 2017-05-21 | End: 2017-05-23 | Stop reason: HOSPADM

## 2017-05-21 RX ORDER — PANTOPRAZOLE SODIUM 40 MG/1
40 TABLET, DELAYED RELEASE ORAL
Status: DISCONTINUED | OUTPATIENT
Start: 2017-05-22 | End: 2017-05-21

## 2017-05-21 RX ORDER — OXYCODONE HYDROCHLORIDE AND ACETAMINOPHEN 5; 325 MG/1; MG/1
1 TABLET ORAL EVERY 6 HOURS PRN
Status: DISCONTINUED | OUTPATIENT
Start: 2017-05-21 | End: 2017-05-23 | Stop reason: HOSPADM

## 2017-05-21 RX ORDER — IPRATROPIUM BROMIDE AND ALBUTEROL SULFATE 2.5; .5 MG/3ML; MG/3ML
1 SOLUTION RESPIRATORY (INHALATION)
Status: DISCONTINUED | OUTPATIENT
Start: 2017-05-21 | End: 2017-05-23 | Stop reason: HOSPADM

## 2017-05-21 RX ORDER — BUDESONIDE 0.25 MG/2ML
250 INHALANT ORAL 2 TIMES DAILY
Status: DISCONTINUED | OUTPATIENT
Start: 2017-05-21 | End: 2017-05-23 | Stop reason: HOSPADM

## 2017-05-21 RX ORDER — PREDNISONE 20 MG/1
60 TABLET ORAL ONCE
Status: COMPLETED | OUTPATIENT
Start: 2017-05-21 | End: 2017-05-21

## 2017-05-21 RX ORDER — SODIUM CHLORIDE 0.9 % (FLUSH) 0.9 %
10 SYRINGE (ML) INJECTION PRN
Status: DISCONTINUED | OUTPATIENT
Start: 2017-05-21 | End: 2017-05-23 | Stop reason: HOSPADM

## 2017-05-21 RX ORDER — OXYCODONE HYDROCHLORIDE AND ACETAMINOPHEN 5; 325 MG/1; MG/1
2 TABLET ORAL EVERY 6 HOURS PRN
Status: DISCONTINUED | OUTPATIENT
Start: 2017-05-21 | End: 2017-05-23 | Stop reason: HOSPADM

## 2017-05-21 RX ORDER — PANTOPRAZOLE SODIUM 40 MG/1
40 TABLET, DELAYED RELEASE ORAL
Status: DISCONTINUED | OUTPATIENT
Start: 2017-05-21 | End: 2017-05-21

## 2017-05-21 RX ORDER — SUCRALFATE ORAL 1 G/10ML
1 SUSPENSION ORAL 4 TIMES DAILY
Status: DISCONTINUED | OUTPATIENT
Start: 2017-05-21 | End: 2017-05-23 | Stop reason: HOSPADM

## 2017-05-21 RX ORDER — METHYLPREDNISOLONE SODIUM SUCCINATE 40 MG/ML
40 INJECTION, POWDER, LYOPHILIZED, FOR SOLUTION INTRAMUSCULAR; INTRAVENOUS EVERY 8 HOURS
Status: DISCONTINUED | OUTPATIENT
Start: 2017-05-21 | End: 2017-05-23 | Stop reason: HOSPADM

## 2017-05-21 RX ADMIN — IPRATROPIUM BROMIDE AND ALBUTEROL SULFATE 1 AMPULE: .5; 3 SOLUTION RESPIRATORY (INHALATION) at 20:23

## 2017-05-21 RX ADMIN — Medication 2 PUFF: at 21:25

## 2017-05-21 RX ADMIN — METHYLPREDNISOLONE SODIUM SUCCINATE 40 MG: 40 INJECTION, POWDER, FOR SOLUTION INTRAMUSCULAR; INTRAVENOUS at 20:25

## 2017-05-21 RX ADMIN — SUCRALFATE 1 G: 1 SUSPENSION ORAL at 21:21

## 2017-05-21 RX ADMIN — BUDESONIDE 250 MCG: 0.25 INHALANT RESPIRATORY (INHALATION) at 20:24

## 2017-05-21 RX ADMIN — Medication 10 ML: at 20:25

## 2017-05-21 RX ADMIN — IPRATROPIUM BROMIDE AND ALBUTEROL SULFATE 1 AMPULE: .5; 3 SOLUTION RESPIRATORY (INHALATION) at 14:27

## 2017-05-21 RX ADMIN — IPRATROPIUM BROMIDE AND ALBUTEROL SULFATE 1 AMPULE: .5; 3 SOLUTION RESPIRATORY (INHALATION) at 16:17

## 2017-05-21 RX ADMIN — OXYCODONE HYDROCHLORIDE AND ACETAMINOPHEN 2 TABLET: 5; 325 TABLET ORAL at 21:21

## 2017-05-21 RX ADMIN — PANTOPRAZOLE SODIUM 40 MG: 40 TABLET, DELAYED RELEASE ORAL at 21:29

## 2017-05-21 RX ADMIN — INSULIN LISPRO 1 UNITS: 100 INJECTION, SOLUTION INTRAVENOUS; SUBCUTANEOUS at 23:27

## 2017-05-21 RX ADMIN — ENOXAPARIN SODIUM 40 MG: 40 INJECTION SUBCUTANEOUS at 20:25

## 2017-05-21 RX ADMIN — PREDNISONE 60 MG: 20 TABLET ORAL at 14:53

## 2017-05-21 ASSESSMENT — ENCOUNTER SYMPTOMS
SHORTNESS OF BREATH: 1
SINUS PRESSURE: 0
STRIDOR: 0
NAUSEA: 0
TROUBLE SWALLOWING: 0
VOMITING: 0
WHEEZING: 1
CHEST TIGHTNESS: 1
ABDOMINAL PAIN: 0
RHINORRHEA: 0
SORE THROAT: 0
COUGH: 1

## 2017-05-21 ASSESSMENT — PAIN DESCRIPTION - LOCATION: LOCATION: ABDOMEN

## 2017-05-21 ASSESSMENT — PAIN SCALES - GENERAL
PAINLEVEL_OUTOF10: 8
PAINLEVEL_OUTOF10: 6
PAINLEVEL_OUTOF10: 9

## 2017-05-21 ASSESSMENT — PAIN DESCRIPTION - PAIN TYPE: TYPE: CHRONIC PAIN

## 2017-05-22 LAB
ABSOLUTE EOS #: 0 K/UL (ref 0–0.4)
ABSOLUTE LYMPH #: 0.91 K/UL (ref 1–4.8)
ABSOLUTE MONO #: 0.07 K/UL (ref 0.1–1.3)
ALBUMIN SERPL-MCNC: 4.2 G/DL (ref 3.5–5.2)
ALBUMIN/GLOBULIN RATIO: ABNORMAL (ref 1–2.5)
ALP BLD-CCNC: 54 U/L (ref 35–104)
ALT SERPL-CCNC: 17 U/L (ref 5–33)
ANION GAP SERPL CALCULATED.3IONS-SCNC: 14 MMOL/L (ref 9–17)
AST SERPL-CCNC: 15 U/L
BASOPHILS # BLD: 0 %
BASOPHILS ABSOLUTE: 0 K/UL (ref 0–0.2)
BILIRUB SERPL-MCNC: 0.23 MG/DL (ref 0.3–1.2)
BUN BLDV-MCNC: 15 MG/DL (ref 6–20)
BUN/CREAT BLD: ABNORMAL (ref 9–20)
CALCIUM SERPL-MCNC: 9.5 MG/DL (ref 8.6–10.4)
CHLORIDE BLD-SCNC: 103 MMOL/L (ref 98–107)
CO2: 21 MMOL/L (ref 20–31)
CREAT SERPL-MCNC: 0.59 MG/DL (ref 0.5–0.9)
DIFFERENTIAL TYPE: ABNORMAL
EOSINOPHILS RELATIVE PERCENT: 0 %
GFR AFRICAN AMERICAN: >60 ML/MIN
GFR NON-AFRICAN AMERICAN: >60 ML/MIN
GFR SERPL CREATININE-BSD FRML MDRD: ABNORMAL ML/MIN/{1.73_M2}
GFR SERPL CREATININE-BSD FRML MDRD: ABNORMAL ML/MIN/{1.73_M2}
GLUCOSE BLD-MCNC: 159 MG/DL (ref 65–105)
GLUCOSE BLD-MCNC: 167 MG/DL (ref 65–105)
GLUCOSE BLD-MCNC: 176 MG/DL (ref 65–105)
GLUCOSE BLD-MCNC: 179 MG/DL (ref 70–99)
GLUCOSE BLD-MCNC: 188 MG/DL (ref 65–105)
HCT VFR BLD CALC: 36.7 % (ref 36–46)
HEMOGLOBIN: 11.5 G/DL (ref 12–16)
INR BLD: 1
LYMPHOCYTES # BLD: 13 %
MCH RBC QN AUTO: 23.2 PG (ref 26–34)
MCHC RBC AUTO-ENTMCNC: 31.4 G/DL (ref 31–37)
MCV RBC AUTO: 73.8 FL (ref 80–100)
MONOCYTES # BLD: 1 %
MORPHOLOGY: ABNORMAL
PDW BLD-RTO: 16 % (ref 11.5–14.9)
PLATELET # BLD: 289 K/UL (ref 150–450)
PLATELET ESTIMATE: ABNORMAL
PMV BLD AUTO: 8.3 FL (ref 6–12)
POTASSIUM SERPL-SCNC: 4.3 MMOL/L (ref 3.7–5.3)
PROTHROMBIN TIME: 10.3 SEC (ref 9.7–12)
RBC # BLD: 4.98 M/UL (ref 4–5.2)
RBC # BLD: ABNORMAL 10*6/UL
SEG NEUTROPHILS: 86 %
SEGMENTED NEUTROPHILS ABSOLUTE COUNT: 6.02 K/UL (ref 1.3–9.1)
SODIUM BLD-SCNC: 138 MMOL/L (ref 135–144)
TOTAL PROTEIN: 7.4 G/DL (ref 6.4–8.3)
WBC # BLD: 7 K/UL (ref 3.5–11)
WBC # BLD: ABNORMAL 10*3/UL

## 2017-05-22 PROCEDURE — 36415 COLL VENOUS BLD VENIPUNCTURE: CPT

## 2017-05-22 PROCEDURE — 6360000002 HC RX W HCPCS: Performed by: STUDENT IN AN ORGANIZED HEALTH CARE EDUCATION/TRAINING PROGRAM

## 2017-05-22 PROCEDURE — 6370000000 HC RX 637 (ALT 250 FOR IP): Performed by: INTERNAL MEDICINE

## 2017-05-22 PROCEDURE — 82947 ASSAY GLUCOSE BLOOD QUANT: CPT

## 2017-05-22 PROCEDURE — 85610 PROTHROMBIN TIME: CPT

## 2017-05-22 PROCEDURE — 1200000000 HC SEMI PRIVATE

## 2017-05-22 PROCEDURE — 6370000000 HC RX 637 (ALT 250 FOR IP): Performed by: STUDENT IN AN ORGANIZED HEALTH CARE EDUCATION/TRAINING PROGRAM

## 2017-05-22 PROCEDURE — 94640 AIRWAY INHALATION TREATMENT: CPT

## 2017-05-22 PROCEDURE — 85025 COMPLETE CBC W/AUTO DIFF WBC: CPT

## 2017-05-22 PROCEDURE — 2580000003 HC RX 258: Performed by: STUDENT IN AN ORGANIZED HEALTH CARE EDUCATION/TRAINING PROGRAM

## 2017-05-22 PROCEDURE — 94761 N-INVAS EAR/PLS OXIMETRY MLT: CPT

## 2017-05-22 PROCEDURE — 99233 SBSQ HOSP IP/OBS HIGH 50: CPT | Performed by: INTERNAL MEDICINE

## 2017-05-22 PROCEDURE — 80053 COMPREHEN METABOLIC PANEL: CPT

## 2017-05-22 PROCEDURE — 94664 DEMO&/EVAL PT USE INHALER: CPT

## 2017-05-22 RX ORDER — MAGNESIUM SULFATE 1 G/100ML
1 INJECTION INTRAVENOUS ONCE
Status: COMPLETED | OUTPATIENT
Start: 2017-05-22 | End: 2017-05-22

## 2017-05-22 RX ORDER — IPRATROPIUM BROMIDE AND ALBUTEROL SULFATE 2.5; .5 MG/3ML; MG/3ML
1 SOLUTION RESPIRATORY (INHALATION) EVERY 4 HOURS PRN
Status: DISCONTINUED | OUTPATIENT
Start: 2017-05-22 | End: 2017-05-23 | Stop reason: HOSPADM

## 2017-05-22 RX ADMIN — IPRATROPIUM BROMIDE AND ALBUTEROL SULFATE 1 AMPULE: .5; 3 SOLUTION RESPIRATORY (INHALATION) at 08:17

## 2017-05-22 RX ADMIN — BUDESONIDE 250 MCG: 0.25 INHALANT RESPIRATORY (INHALATION) at 08:16

## 2017-05-22 RX ADMIN — PANTOPRAZOLE SODIUM 40 MG: 40 TABLET, DELAYED RELEASE ORAL at 06:29

## 2017-05-22 RX ADMIN — IPRATROPIUM BROMIDE AND ALBUTEROL SULFATE 1 AMPULE: .5; 3 SOLUTION RESPIRATORY (INHALATION) at 01:30

## 2017-05-22 RX ADMIN — OXYCODONE HYDROCHLORIDE AND ACETAMINOPHEN 2 TABLET: 5; 325 TABLET ORAL at 19:38

## 2017-05-22 RX ADMIN — SUCRALFATE 1 G: 1 SUSPENSION ORAL at 06:30

## 2017-05-22 RX ADMIN — INSULIN LISPRO 1 UNITS: 100 INJECTION, SOLUTION INTRAVENOUS; SUBCUTANEOUS at 21:14

## 2017-05-22 RX ADMIN — METHYLPREDNISOLONE SODIUM SUCCINATE 40 MG: 40 INJECTION, POWDER, FOR SOLUTION INTRAMUSCULAR; INTRAVENOUS at 18:21

## 2017-05-22 RX ADMIN — IPRATROPIUM BROMIDE AND ALBUTEROL SULFATE 1 AMPULE: .5; 3 SOLUTION RESPIRATORY (INHALATION) at 11:12

## 2017-05-22 RX ADMIN — INSULIN LISPRO 1 UNITS: 100 INJECTION, SOLUTION INTRAVENOUS; SUBCUTANEOUS at 11:42

## 2017-05-22 RX ADMIN — IPRATROPIUM BROMIDE AND ALBUTEROL SULFATE 1 AMPULE: .5; 3 SOLUTION RESPIRATORY (INHALATION) at 05:14

## 2017-05-22 RX ADMIN — Medication 10 ML: at 09:06

## 2017-05-22 RX ADMIN — SUCRALFATE 1 G: 1 SUSPENSION ORAL at 11:42

## 2017-05-22 RX ADMIN — Medication 10 ML: at 21:14

## 2017-05-22 RX ADMIN — IPRATROPIUM BROMIDE AND ALBUTEROL SULFATE 1 AMPULE: .5; 3 SOLUTION RESPIRATORY (INHALATION) at 16:04

## 2017-05-22 RX ADMIN — SUCRALFATE 1 G: 1 SUSPENSION ORAL at 18:20

## 2017-05-22 RX ADMIN — MAGNESIUM SULFATE IN DEXTROSE 1 G: 10 INJECTION, SOLUTION INTRAVENOUS at 11:01

## 2017-05-22 RX ADMIN — METHYLPREDNISOLONE SODIUM SUCCINATE 40 MG: 40 INJECTION, POWDER, FOR SOLUTION INTRAMUSCULAR; INTRAVENOUS at 01:54

## 2017-05-22 RX ADMIN — METHYLPREDNISOLONE SODIUM SUCCINATE 40 MG: 40 INJECTION, POWDER, FOR SOLUTION INTRAMUSCULAR; INTRAVENOUS at 09:06

## 2017-05-22 RX ADMIN — OXYCODONE HYDROCHLORIDE AND ACETAMINOPHEN 2 TABLET: 5; 325 TABLET ORAL at 06:29

## 2017-05-22 RX ADMIN — INSULIN LISPRO 1 UNITS: 100 INJECTION, SOLUTION INTRAVENOUS; SUBCUTANEOUS at 18:22

## 2017-05-22 RX ADMIN — IPRATROPIUM BROMIDE AND ALBUTEROL SULFATE 1 AMPULE: .5; 3 SOLUTION RESPIRATORY (INHALATION) at 20:41

## 2017-05-22 RX ADMIN — SUCRALFATE 1 G: 1 SUSPENSION ORAL at 21:14

## 2017-05-22 RX ADMIN — PANTOPRAZOLE SODIUM 40 MG: 40 TABLET, DELAYED RELEASE ORAL at 18:20

## 2017-05-22 RX ADMIN — ONDANSETRON 4 MG: 2 INJECTION INTRAMUSCULAR; INTRAVENOUS at 04:51

## 2017-05-22 RX ADMIN — OXYCODONE HYDROCHLORIDE AND ACETAMINOPHEN 2 TABLET: 5; 325 TABLET ORAL at 13:59

## 2017-05-22 RX ADMIN — ENOXAPARIN SODIUM 40 MG: 40 INJECTION SUBCUTANEOUS at 09:06

## 2017-05-22 ASSESSMENT — ENCOUNTER SYMPTOMS
VOMITING: 0
WHEEZING: 1
COUGH: 0
SHORTNESS OF BREATH: 1
CONSTIPATION: 0
ABDOMINAL PAIN: 0

## 2017-05-22 ASSESSMENT — PAIN SCALES - GENERAL
PAINLEVEL_OUTOF10: 5
PAINLEVEL_OUTOF10: 8
PAINLEVEL_OUTOF10: 0
PAINLEVEL_OUTOF10: 2
PAINLEVEL_OUTOF10: 8
PAINLEVEL_OUTOF10: 8

## 2017-05-22 ASSESSMENT — PAIN DESCRIPTION - LOCATION: LOCATION: PELVIS

## 2017-05-22 ASSESSMENT — PAIN DESCRIPTION - PAIN TYPE
TYPE: CHRONIC PAIN
TYPE: CHRONIC PAIN

## 2017-05-23 VITALS
HEIGHT: 62 IN | SYSTOLIC BLOOD PRESSURE: 133 MMHG | TEMPERATURE: 97.5 F | RESPIRATION RATE: 18 BRPM | DIASTOLIC BLOOD PRESSURE: 79 MMHG | WEIGHT: 214 LBS | OXYGEN SATURATION: 97 % | BODY MASS INDEX: 39.38 KG/M2 | HEART RATE: 72 BPM

## 2017-05-23 LAB
ABSOLUTE BANDS #: 0.41 K/UL (ref 0–1)
ABSOLUTE EOS #: 0 K/UL (ref 0–0.4)
ABSOLUTE LYMPH #: 1.52 K/UL (ref 1–4.8)
ABSOLUTE MONO #: 0.55 K/UL (ref 0.1–1.3)
ALBUMIN SERPL-MCNC: 4 G/DL (ref 3.5–5.2)
ALBUMIN/GLOBULIN RATIO: ABNORMAL (ref 1–2.5)
ALP BLD-CCNC: 49 U/L (ref 35–104)
ALT SERPL-CCNC: 13 U/L (ref 5–33)
ANION GAP SERPL CALCULATED.3IONS-SCNC: 13 MMOL/L (ref 9–17)
AST SERPL-CCNC: 10 U/L
BANDS: 3 %
BASOPHILS # BLD: 0 %
BASOPHILS ABSOLUTE: 0 K/UL (ref 0–0.2)
BILIRUB SERPL-MCNC: 0.21 MG/DL (ref 0.3–1.2)
BUN BLDV-MCNC: 14 MG/DL (ref 6–20)
BUN/CREAT BLD: ABNORMAL (ref 9–20)
CALCIUM SERPL-MCNC: 9.5 MG/DL (ref 8.6–10.4)
CHLORIDE BLD-SCNC: 105 MMOL/L (ref 98–107)
CO2: 22 MMOL/L (ref 20–31)
CREAT SERPL-MCNC: 0.57 MG/DL (ref 0.5–0.9)
DIFFERENTIAL TYPE: ABNORMAL
EOSINOPHILS RELATIVE PERCENT: 0 %
ESTIMATED AVERAGE GLUCOSE: 105 MG/DL
GFR AFRICAN AMERICAN: >60 ML/MIN
GFR NON-AFRICAN AMERICAN: >60 ML/MIN
GFR SERPL CREATININE-BSD FRML MDRD: ABNORMAL ML/MIN/{1.73_M2}
GFR SERPL CREATININE-BSD FRML MDRD: ABNORMAL ML/MIN/{1.73_M2}
GLUCOSE BLD-MCNC: 141 MG/DL (ref 65–105)
GLUCOSE BLD-MCNC: 160 MG/DL (ref 70–99)
GLUCOSE BLD-MCNC: 169 MG/DL (ref 65–105)
GLUCOSE BLD-MCNC: 184 MG/DL (ref 65–105)
HBA1C MFR BLD: 5.3 % (ref 4–6)
HCT VFR BLD CALC: 37.1 % (ref 36–46)
HEMOGLOBIN: 11.7 G/DL (ref 12–16)
LYMPHOCYTES # BLD: 11 %
MCH RBC QN AUTO: 23.4 PG (ref 26–34)
MCHC RBC AUTO-ENTMCNC: 31.5 G/DL (ref 31–37)
MCV RBC AUTO: 74.3 FL (ref 80–100)
MONOCYTES # BLD: 4 %
MORPHOLOGY: ABNORMAL
PDW BLD-RTO: 16.1 % (ref 11.5–14.9)
PLATELET # BLD: 348 K/UL (ref 150–450)
PLATELET ESTIMATE: ABNORMAL
PMV BLD AUTO: 8.3 FL (ref 6–12)
POTASSIUM SERPL-SCNC: 4.4 MMOL/L (ref 3.7–5.3)
RBC # BLD: 4.99 M/UL (ref 4–5.2)
RBC # BLD: ABNORMAL 10*6/UL
SEG NEUTROPHILS: 82 %
SEGMENTED NEUTROPHILS ABSOLUTE COUNT: 11.32 K/UL (ref 1.3–9.1)
SODIUM BLD-SCNC: 140 MMOL/L (ref 135–144)
TOTAL PROTEIN: 6.9 G/DL (ref 6.4–8.3)
WBC # BLD: 13.8 K/UL (ref 3.5–11)
WBC # BLD: ABNORMAL 10*3/UL

## 2017-05-23 PROCEDURE — 6370000000 HC RX 637 (ALT 250 FOR IP): Performed by: STUDENT IN AN ORGANIZED HEALTH CARE EDUCATION/TRAINING PROGRAM

## 2017-05-23 PROCEDURE — 93005 ELECTROCARDIOGRAM TRACING: CPT

## 2017-05-23 PROCEDURE — 6360000002 HC RX W HCPCS: Performed by: STUDENT IN AN ORGANIZED HEALTH CARE EDUCATION/TRAINING PROGRAM

## 2017-05-23 PROCEDURE — 6370000000 HC RX 637 (ALT 250 FOR IP): Performed by: INTERNAL MEDICINE

## 2017-05-23 PROCEDURE — 94640 AIRWAY INHALATION TREATMENT: CPT

## 2017-05-23 PROCEDURE — 85025 COMPLETE CBC W/AUTO DIFF WBC: CPT

## 2017-05-23 PROCEDURE — 80053 COMPREHEN METABOLIC PANEL: CPT

## 2017-05-23 PROCEDURE — 94761 N-INVAS EAR/PLS OXIMETRY MLT: CPT

## 2017-05-23 PROCEDURE — 82947 ASSAY GLUCOSE BLOOD QUANT: CPT

## 2017-05-23 PROCEDURE — 36415 COLL VENOUS BLD VENIPUNCTURE: CPT

## 2017-05-23 PROCEDURE — 2580000003 HC RX 258: Performed by: STUDENT IN AN ORGANIZED HEALTH CARE EDUCATION/TRAINING PROGRAM

## 2017-05-23 RX ORDER — PREDNISONE 10 MG/1
10 TABLET ORAL DAILY
Qty: 3 TABLET | Refills: 0 | Status: SHIPPED | OUTPATIENT
Start: 2017-05-29 | End: 2017-06-08

## 2017-05-23 RX ORDER — PREDNISONE 10 MG/1
30 TABLET ORAL DAILY
Qty: 9 TABLET | Refills: 0 | Status: SHIPPED | OUTPATIENT
Start: 2017-05-23 | End: 2017-06-08

## 2017-05-23 RX ORDER — PREDNISONE 10 MG/1
20 TABLET ORAL DAILY
Qty: 6 TABLET | Refills: 0 | Status: SHIPPED | OUTPATIENT
Start: 2017-05-26 | End: 2017-06-08

## 2017-05-23 RX ADMIN — ONDANSETRON 4 MG: 2 INJECTION INTRAMUSCULAR; INTRAVENOUS at 00:45

## 2017-05-23 RX ADMIN — ENOXAPARIN SODIUM 40 MG: 40 INJECTION SUBCUTANEOUS at 08:44

## 2017-05-23 RX ADMIN — SUCRALFATE 1 G: 1 SUSPENSION ORAL at 12:13

## 2017-05-23 RX ADMIN — METHYLPREDNISOLONE SODIUM SUCCINATE 40 MG: 40 INJECTION, POWDER, FOR SOLUTION INTRAMUSCULAR; INTRAVENOUS at 01:59

## 2017-05-23 RX ADMIN — INSULIN LISPRO 1 UNITS: 100 INJECTION, SOLUTION INTRAVENOUS; SUBCUTANEOUS at 08:54

## 2017-05-23 RX ADMIN — SUCRALFATE 1 G: 1 SUSPENSION ORAL at 08:46

## 2017-05-23 RX ADMIN — IPRATROPIUM BROMIDE AND ALBUTEROL SULFATE 1 AMPULE: .5; 3 SOLUTION RESPIRATORY (INHALATION) at 11:07

## 2017-05-23 RX ADMIN — IPRATROPIUM BROMIDE AND ALBUTEROL SULFATE 1 AMPULE: .5; 3 SOLUTION RESPIRATORY (INHALATION) at 06:54

## 2017-05-23 RX ADMIN — Medication 10 ML: at 08:47

## 2017-05-23 RX ADMIN — METHYLPREDNISOLONE SODIUM SUCCINATE 40 MG: 40 INJECTION, POWDER, FOR SOLUTION INTRAMUSCULAR; INTRAVENOUS at 12:01

## 2017-05-23 RX ADMIN — BUDESONIDE 250 MCG: 0.25 INHALANT RESPIRATORY (INHALATION) at 06:58

## 2017-05-23 RX ADMIN — INSULIN LISPRO 1 UNITS: 100 INJECTION, SOLUTION INTRAVENOUS; SUBCUTANEOUS at 12:01

## 2017-05-23 RX ADMIN — PANTOPRAZOLE SODIUM 40 MG: 40 TABLET, DELAYED RELEASE ORAL at 06:23

## 2017-05-23 RX ADMIN — OXYCODONE HYDROCHLORIDE AND ACETAMINOPHEN 2 TABLET: 5; 325 TABLET ORAL at 14:45

## 2017-05-23 RX ADMIN — OXYCODONE HYDROCHLORIDE AND ACETAMINOPHEN 2 TABLET: 5; 325 TABLET ORAL at 08:44

## 2017-05-23 ASSESSMENT — PAIN SCALES - GENERAL
PAINLEVEL_OUTOF10: 4
PAINLEVEL_OUTOF10: 4
PAINLEVEL_OUTOF10: 7
PAINLEVEL_OUTOF10: 8

## 2017-05-23 ASSESSMENT — ENCOUNTER SYMPTOMS
CONSTIPATION: 0
WHEEZING: 0
COUGH: 0
VOMITING: 0
ABDOMINAL PAIN: 0
SHORTNESS OF BREATH: 0

## 2017-05-23 ASSESSMENT — PAIN DESCRIPTION - PAIN TYPE: TYPE: CHRONIC PAIN

## 2017-05-23 ASSESSMENT — PAIN DESCRIPTION - LOCATION: LOCATION: ABDOMEN

## 2017-05-23 ASSESSMENT — PAIN DESCRIPTION - FREQUENCY: FREQUENCY: INTERMITTENT

## 2017-05-23 ASSESSMENT — PAIN DESCRIPTION - PROGRESSION: CLINICAL_PROGRESSION: NOT CHANGED

## 2017-05-23 ASSESSMENT — PAIN DESCRIPTION - DESCRIPTORS: DESCRIPTORS: SHARP

## 2017-05-23 ASSESSMENT — PAIN DESCRIPTION - ONSET: ONSET: ON-GOING

## 2017-05-23 ASSESSMENT — PAIN DESCRIPTION - ORIENTATION: ORIENTATION: RIGHT;LOWER

## 2017-05-24 LAB
EKG ATRIAL RATE: 93 BPM
EKG P AXIS: 19 DEGREES
EKG P-R INTERVAL: 128 MS
EKG Q-T INTERVAL: 354 MS
EKG QRS DURATION: 84 MS
EKG QTC CALCULATION (BAZETT): 440 MS
EKG R AXIS: 49 DEGREES
EKG T AXIS: 27 DEGREES
EKG VENTRICULAR RATE: 93 BPM

## 2017-05-28 ENCOUNTER — HOSPITAL ENCOUNTER (EMERGENCY)
Age: 37
Discharge: HOME | End: 2017-05-28
Attending: EMERGENCY MEDICINE
Payer: COMMERCIAL

## 2017-05-28 VITALS
HEIGHT: 62 IN | DIASTOLIC BLOOD PRESSURE: 62 MMHG | RESPIRATION RATE: 16 BRPM | WEIGHT: 214 LBS | HEART RATE: 89 BPM | BODY MASS INDEX: 39.38 KG/M2 | SYSTOLIC BLOOD PRESSURE: 120 MMHG | TEMPERATURE: 99.5 F | OXYGEN SATURATION: 95 %

## 2017-05-28 DIAGNOSIS — K92.0 HEMATEMESIS WITH NAUSEA: ICD-10-CM

## 2017-05-28 DIAGNOSIS — R10.13 EPIGASTRIC PAIN: Primary | ICD-10-CM

## 2017-05-28 DIAGNOSIS — H65.01 RIGHT ACUTE SEROUS OTITIS MEDIA, RECURRENCE NOT SPECIFIED: ICD-10-CM

## 2017-05-28 LAB
-: ABNORMAL
ABSOLUTE BANDS #: 0.2 K/UL (ref 0–1)
ABSOLUTE EOS #: 0 K/UL (ref 0–0.4)
ABSOLUTE LYMPH #: 1.78 K/UL (ref 1–4.8)
ABSOLUTE MONO #: 0.79 K/UL (ref 0.1–1.3)
ALBUMIN SERPL-MCNC: 3.9 G/DL (ref 3.5–5.2)
ALBUMIN/GLOBULIN RATIO: ABNORMAL (ref 1–2.5)
ALP BLD-CCNC: 58 U/L (ref 35–104)
ALT SERPL-CCNC: 26 U/L (ref 5–33)
AMORPHOUS: ABNORMAL
ANION GAP SERPL CALCULATED.3IONS-SCNC: 13 MMOL/L (ref 9–17)
AST SERPL-CCNC: 15 U/L
BACTERIA: ABNORMAL
BANDS: 1 %
BASOPHILS # BLD: 0 %
BASOPHILS ABSOLUTE: 0 K/UL (ref 0–0.2)
BILIRUB SERPL-MCNC: 0.63 MG/DL (ref 0.3–1.2)
BILIRUBIN URINE: NEGATIVE
BUN BLDV-MCNC: 13 MG/DL (ref 6–20)
BUN/CREAT BLD: ABNORMAL (ref 9–20)
CALCIUM SERPL-MCNC: 11 MG/DL (ref 8.6–10.4)
CASTS UA: ABNORMAL /LPF
CHLORIDE BLD-SCNC: 101 MMOL/L (ref 98–107)
CO2: 26 MMOL/L (ref 20–31)
COLOR: YELLOW
COMMENT UA: ABNORMAL
CREAT SERPL-MCNC: 0.94 MG/DL (ref 0.5–0.9)
CRYSTALS, UA: ABNORMAL /HPF
DIFFERENTIAL TYPE: ABNORMAL
EOSINOPHILS RELATIVE PERCENT: 0 %
EPITHELIAL CELLS UA: ABNORMAL /HPF
GFR AFRICAN AMERICAN: >60 ML/MIN
GFR NON-AFRICAN AMERICAN: >60 ML/MIN
GFR SERPL CREATININE-BSD FRML MDRD: ABNORMAL ML/MIN/{1.73_M2}
GFR SERPL CREATININE-BSD FRML MDRD: ABNORMAL ML/MIN/{1.73_M2}
GLUCOSE BLD-MCNC: 100 MG/DL (ref 70–99)
GLUCOSE URINE: NEGATIVE
HCT VFR BLD CALC: 36.9 % (ref 36–46)
HEMOGLOBIN: 11.5 G/DL (ref 12–16)
KETONES, URINE: NEGATIVE
LACTIC ACID: 1.2 MMOL/L (ref 0.5–2.2)
LEUKOCYTE ESTERASE, URINE: NEGATIVE
LIPASE: 17 U/L (ref 13–60)
LYMPHOCYTES # BLD: 9 %
MCH RBC QN AUTO: 23 PG (ref 26–34)
MCHC RBC AUTO-ENTMCNC: 31.1 G/DL (ref 31–37)
MCV RBC AUTO: 74 FL (ref 80–100)
MONOCYTES # BLD: 4 %
MORPHOLOGY: ABNORMAL
MUCUS: ABNORMAL
NITRITE, URINE: NEGATIVE
OTHER OBSERVATIONS UA: ABNORMAL
PDW BLD-RTO: 15.6 % (ref 11.5–14.9)
PH UA: 8 (ref 5–8)
PLATELET # BLD: 314 K/UL (ref 150–450)
PLATELET ESTIMATE: ABNORMAL
PMV BLD AUTO: 8.8 FL (ref 6–12)
POTASSIUM SERPL-SCNC: 3.9 MMOL/L (ref 3.7–5.3)
PROTEIN UA: ABNORMAL
RBC # BLD: 4.99 M/UL (ref 4–5.2)
RBC # BLD: ABNORMAL 10*6/UL
RBC UA: ABNORMAL /HPF
RENAL EPITHELIAL, UA: ABNORMAL /HPF
SEG NEUTROPHILS: 86 %
SEGMENTED NEUTROPHILS ABSOLUTE COUNT: 17.03 K/UL (ref 1.3–9.1)
SODIUM BLD-SCNC: 140 MMOL/L (ref 135–144)
SPECIFIC GRAVITY UA: 1.01 (ref 1–1.03)
TOTAL PROTEIN: 6.8 G/DL (ref 6.4–8.3)
TRICHOMONAS: ABNORMAL
TURBIDITY: ABNORMAL
URINE HGB: NEGATIVE
UROBILINOGEN, URINE: NORMAL
WBC # BLD: 19.8 K/UL (ref 3.5–11)
WBC # BLD: ABNORMAL 10*3/UL
WBC UA: ABNORMAL /HPF
YEAST: ABNORMAL

## 2017-05-28 PROCEDURE — 85025 COMPLETE CBC W/AUTO DIFF WBC: CPT

## 2017-05-28 PROCEDURE — 6360000002 HC RX W HCPCS: Performed by: EMERGENCY MEDICINE

## 2017-05-28 PROCEDURE — 83690 ASSAY OF LIPASE: CPT

## 2017-05-28 PROCEDURE — 81001 URINALYSIS AUTO W/SCOPE: CPT

## 2017-05-28 PROCEDURE — 36415 COLL VENOUS BLD VENIPUNCTURE: CPT

## 2017-05-28 PROCEDURE — 96372 THER/PROPH/DIAG INJ SC/IM: CPT

## 2017-05-28 PROCEDURE — 99284 EMERGENCY DEPT VISIT MOD MDM: CPT

## 2017-05-28 PROCEDURE — C9113 INJ PANTOPRAZOLE SODIUM, VIA: HCPCS | Performed by: EMERGENCY MEDICINE

## 2017-05-28 PROCEDURE — 80053 COMPREHEN METABOLIC PANEL: CPT

## 2017-05-28 PROCEDURE — 96374 THER/PROPH/DIAG INJ IV PUSH: CPT

## 2017-05-28 PROCEDURE — 83605 ASSAY OF LACTIC ACID: CPT

## 2017-05-28 PROCEDURE — 96375 TX/PRO/DX INJ NEW DRUG ADDON: CPT

## 2017-05-28 PROCEDURE — 2580000003 HC RX 258: Performed by: EMERGENCY MEDICINE

## 2017-05-28 RX ORDER — DICYCLOMINE HYDROCHLORIDE 10 MG/ML
20 INJECTION INTRAMUSCULAR ONCE
Status: COMPLETED | OUTPATIENT
Start: 2017-05-28 | End: 2017-05-28

## 2017-05-28 RX ORDER — 0.9 % SODIUM CHLORIDE 0.9 %
10 VIAL (ML) INJECTION ONCE
Status: COMPLETED | OUTPATIENT
Start: 2017-05-28 | End: 2017-05-28

## 2017-05-28 RX ORDER — METOCLOPRAMIDE 10 MG/1
10 TABLET ORAL 4 TIMES DAILY
Qty: 20 TABLET | Refills: 0 | Status: SHIPPED | OUTPATIENT
Start: 2017-05-28 | End: 2017-06-08

## 2017-05-28 RX ORDER — PANTOPRAZOLE SODIUM 40 MG/10ML
40 INJECTION, POWDER, LYOPHILIZED, FOR SOLUTION INTRAVENOUS ONCE
Status: COMPLETED | OUTPATIENT
Start: 2017-05-28 | End: 2017-05-28

## 2017-05-28 RX ORDER — 0.9 % SODIUM CHLORIDE 0.9 %
1000 INTRAVENOUS SOLUTION INTRAVENOUS ONCE
Status: COMPLETED | OUTPATIENT
Start: 2017-05-28 | End: 2017-05-28

## 2017-05-28 RX ORDER — ONDANSETRON 2 MG/ML
4 INJECTION INTRAMUSCULAR; INTRAVENOUS ONCE
Status: COMPLETED | OUTPATIENT
Start: 2017-05-28 | End: 2017-05-28

## 2017-05-28 RX ORDER — AMOXICILLIN 500 MG/1
500 TABLET, FILM COATED ORAL 2 TIMES DAILY
Qty: 14 TABLET | Refills: 0 | Status: SHIPPED | OUTPATIENT
Start: 2017-05-28 | End: 2017-06-04

## 2017-05-28 RX ORDER — METOCLOPRAMIDE HYDROCHLORIDE 5 MG/ML
10 INJECTION INTRAMUSCULAR; INTRAVENOUS ONCE
Status: COMPLETED | OUTPATIENT
Start: 2017-05-28 | End: 2017-05-28

## 2017-05-28 RX ORDER — MORPHINE SULFATE 4 MG/ML
4 INJECTION, SOLUTION INTRAMUSCULAR; INTRAVENOUS ONCE
Status: COMPLETED | OUTPATIENT
Start: 2017-05-28 | End: 2017-05-28

## 2017-05-28 RX ADMIN — METOCLOPRAMIDE 10 MG: 5 INJECTION, SOLUTION INTRAMUSCULAR; INTRAVENOUS at 11:39

## 2017-05-28 RX ADMIN — ONDANSETRON 4 MG: 2 INJECTION INTRAMUSCULAR; INTRAVENOUS at 10:41

## 2017-05-28 RX ADMIN — SODIUM CHLORIDE 10 ML: 9 INJECTION INTRAMUSCULAR; INTRAVENOUS; SUBCUTANEOUS at 10:43

## 2017-05-28 RX ADMIN — DICYCLOMINE HYDROCHLORIDE 20 MG: 20 INJECTION, SOLUTION INTRAMUSCULAR at 10:50

## 2017-05-28 RX ADMIN — SODIUM CHLORIDE 1000 ML: 9 INJECTION, SOLUTION INTRAVENOUS at 10:39

## 2017-05-28 RX ADMIN — PANTOPRAZOLE SODIUM 40 MG: 40 INJECTION, POWDER, FOR SOLUTION INTRAVENOUS at 10:43

## 2017-05-28 RX ADMIN — HYDROMORPHONE HYDROCHLORIDE 1 MG: 1 INJECTION, SOLUTION INTRAMUSCULAR; INTRAVENOUS; SUBCUTANEOUS at 12:41

## 2017-05-28 RX ADMIN — MORPHINE SULFATE 4 MG: 4 INJECTION, SOLUTION INTRAMUSCULAR; INTRAVENOUS at 10:50

## 2017-05-28 ASSESSMENT — ENCOUNTER SYMPTOMS
EYE DISCHARGE: 0
WHEEZING: 0
BACK PAIN: 0
DIARRHEA: 1
COUGH: 0
BLOOD IN STOOL: 1
EYE PAIN: 0
TROUBLE SWALLOWING: 0
FACIAL SWELLING: 0
EYE REDNESS: 0
SINUS PRESSURE: 0
NAUSEA: 1
RHINORRHEA: 0
VOMITING: 1
ABDOMINAL PAIN: 1
CHEST TIGHTNESS: 0
SHORTNESS OF BREATH: 0
SORE THROAT: 0
CONSTIPATION: 0
COLOR CHANGE: 0

## 2017-05-28 ASSESSMENT — PAIN SCALES - GENERAL
PAINLEVEL_OUTOF10: 7

## 2017-05-28 ASSESSMENT — PAIN DESCRIPTION - PAIN TYPE: TYPE: ACUTE PAIN

## 2017-06-08 ENCOUNTER — APPOINTMENT (OUTPATIENT)
Dept: CT IMAGING | Age: 37
End: 2017-06-08
Payer: COMMERCIAL

## 2017-06-08 ENCOUNTER — HOSPITAL ENCOUNTER (EMERGENCY)
Age: 37
Discharge: HOME | End: 2017-06-08
Attending: EMERGENCY MEDICINE
Payer: COMMERCIAL

## 2017-06-08 VITALS
TEMPERATURE: 97.9 F | RESPIRATION RATE: 18 BRPM | HEART RATE: 86 BPM | SYSTOLIC BLOOD PRESSURE: 134 MMHG | DIASTOLIC BLOOD PRESSURE: 69 MMHG | OXYGEN SATURATION: 98 %

## 2017-06-08 DIAGNOSIS — N20.0 KIDNEY STONE: Primary | ICD-10-CM

## 2017-06-08 DIAGNOSIS — R31.9 HEMATURIA: ICD-10-CM

## 2017-06-08 DIAGNOSIS — R10.9 FLANK PAIN: ICD-10-CM

## 2017-06-08 LAB
-: ABNORMAL
ABSOLUTE EOS #: 0.2 K/UL (ref 0–0.4)
ABSOLUTE LYMPH #: 2 K/UL (ref 1–4.8)
ABSOLUTE MONO #: 0.5 K/UL (ref 0.1–1.3)
ALBUMIN SERPL-MCNC: 4 G/DL (ref 3.5–5.2)
ALBUMIN/GLOBULIN RATIO: ABNORMAL (ref 1–2.5)
ALP BLD-CCNC: 51 U/L (ref 35–104)
ALT SERPL-CCNC: 17 U/L (ref 5–33)
AMORPHOUS: ABNORMAL
ANION GAP SERPL CALCULATED.3IONS-SCNC: 14 MMOL/L (ref 9–17)
AST SERPL-CCNC: 13 U/L
BACTERIA: ABNORMAL
BASOPHILS # BLD: 1 %
BASOPHILS ABSOLUTE: 0.1 K/UL (ref 0–0.2)
BILIRUB SERPL-MCNC: 0.26 MG/DL (ref 0.3–1.2)
BILIRUBIN URINE: NEGATIVE
BUN BLDV-MCNC: 15 MG/DL (ref 6–20)
BUN/CREAT BLD: ABNORMAL (ref 9–20)
CALCIUM SERPL-MCNC: 8.5 MG/DL (ref 8.6–10.4)
CASTS UA: ABNORMAL /LPF
CHLORIDE BLD-SCNC: 108 MMOL/L (ref 98–107)
CO2: 18 MMOL/L (ref 20–31)
COLOR: YELLOW
COMMENT UA: ABNORMAL
CREAT SERPL-MCNC: 0.62 MG/DL (ref 0.5–0.9)
CRYSTALS, UA: ABNORMAL /HPF
DIFFERENTIAL TYPE: ABNORMAL
EOSINOPHILS RELATIVE PERCENT: 3 %
EPITHELIAL CELLS UA: ABNORMAL /HPF
GFR AFRICAN AMERICAN: >60 ML/MIN
GFR NON-AFRICAN AMERICAN: >60 ML/MIN
GFR SERPL CREATININE-BSD FRML MDRD: ABNORMAL ML/MIN/{1.73_M2}
GFR SERPL CREATININE-BSD FRML MDRD: ABNORMAL ML/MIN/{1.73_M2}
GLUCOSE BLD-MCNC: 135 MG/DL (ref 70–99)
GLUCOSE URINE: ABNORMAL
HCG(URINE) PREGNANCY TEST: NEGATIVE
HCT VFR BLD CALC: 34.6 % (ref 36–46)
HEMOGLOBIN: 11.1 G/DL (ref 12–16)
KETONES, URINE: NEGATIVE
LEUKOCYTE ESTERASE, URINE: NEGATIVE
LYMPHOCYTES # BLD: 25 %
MCH RBC QN AUTO: 23.9 PG (ref 26–34)
MCHC RBC AUTO-ENTMCNC: 32.2 G/DL (ref 31–37)
MCV RBC AUTO: 74.2 FL (ref 80–100)
MONOCYTES # BLD: 7 %
MUCUS: ABNORMAL
NITRITE, URINE: NEGATIVE
OTHER OBSERVATIONS UA: ABNORMAL
PDW BLD-RTO: 16.3 % (ref 11.5–14.9)
PH UA: 5.5 (ref 5–8)
PLATELET # BLD: 266 K/UL (ref 150–450)
PLATELET ESTIMATE: ABNORMAL
PMV BLD AUTO: 8.2 FL (ref 6–12)
POTASSIUM SERPL-SCNC: 3.5 MMOL/L (ref 3.7–5.3)
PROTEIN UA: ABNORMAL
RBC # BLD: 4.67 M/UL (ref 4–5.2)
RBC # BLD: ABNORMAL 10*6/UL
RBC UA: ABNORMAL /HPF
RENAL EPITHELIAL, UA: ABNORMAL /HPF
SEG NEUTROPHILS: 64 %
SEGMENTED NEUTROPHILS ABSOLUTE COUNT: 4.9 K/UL (ref 1.3–9.1)
SODIUM BLD-SCNC: 140 MMOL/L (ref 135–144)
SPECIFIC GRAVITY UA: 1.03 (ref 1–1.03)
TOTAL PROTEIN: 6.7 G/DL (ref 6.4–8.3)
TRICHOMONAS: ABNORMAL
TURBIDITY: ABNORMAL
URINE HGB: ABNORMAL
UROBILINOGEN, URINE: NORMAL
WBC # BLD: 7.7 K/UL (ref 3.5–11)
WBC # BLD: ABNORMAL 10*3/UL
WBC UA: ABNORMAL /HPF
YEAST: ABNORMAL

## 2017-06-08 PROCEDURE — 96374 THER/PROPH/DIAG INJ IV PUSH: CPT

## 2017-06-08 PROCEDURE — 80053 COMPREHEN METABOLIC PANEL: CPT

## 2017-06-08 PROCEDURE — 6370000000 HC RX 637 (ALT 250 FOR IP): Performed by: EMERGENCY MEDICINE

## 2017-06-08 PROCEDURE — 74176 CT ABD & PELVIS W/O CONTRAST: CPT

## 2017-06-08 PROCEDURE — 2580000003 HC RX 258: Performed by: EMERGENCY MEDICINE

## 2017-06-08 PROCEDURE — 87086 URINE CULTURE/COLONY COUNT: CPT

## 2017-06-08 PROCEDURE — 99284 EMERGENCY DEPT VISIT MOD MDM: CPT

## 2017-06-08 PROCEDURE — 81001 URINALYSIS AUTO W/SCOPE: CPT

## 2017-06-08 PROCEDURE — 6360000002 HC RX W HCPCS: Performed by: EMERGENCY MEDICINE

## 2017-06-08 PROCEDURE — 85025 COMPLETE CBC W/AUTO DIFF WBC: CPT

## 2017-06-08 PROCEDURE — 84703 CHORIONIC GONADOTROPIN ASSAY: CPT

## 2017-06-08 PROCEDURE — 36415 COLL VENOUS BLD VENIPUNCTURE: CPT

## 2017-06-08 RX ORDER — SULFAMETHOXAZOLE AND TRIMETHOPRIM 800; 160 MG/1; MG/1
1 TABLET ORAL ONCE
Status: COMPLETED | OUTPATIENT
Start: 2017-06-08 | End: 2017-06-08

## 2017-06-08 RX ORDER — TRAMADOL HYDROCHLORIDE 50 MG/1
50 TABLET ORAL EVERY 6 HOURS PRN
Qty: 20 TABLET | Refills: 0 | Status: SHIPPED | OUTPATIENT
Start: 2017-06-08 | End: 2017-06-08

## 2017-06-08 RX ORDER — TAMSULOSIN HYDROCHLORIDE 0.4 MG/1
0.4 CAPSULE ORAL ONCE
Status: COMPLETED | OUTPATIENT
Start: 2017-06-08 | End: 2017-06-08

## 2017-06-08 RX ORDER — ONDANSETRON 4 MG/1
4 TABLET, FILM COATED ORAL EVERY 8 HOURS PRN
Qty: 20 TABLET | Refills: 0 | Status: SHIPPED | OUTPATIENT
Start: 2017-06-08 | End: 2017-09-12

## 2017-06-08 RX ORDER — SULFAMETHOXAZOLE AND TRIMETHOPRIM 800; 160 MG/1; MG/1
1 TABLET ORAL 2 TIMES DAILY
Qty: 14 TABLET | Refills: 0 | Status: SHIPPED | OUTPATIENT
Start: 2017-06-08 | End: 2017-06-15

## 2017-06-08 RX ORDER — TAMSULOSIN HYDROCHLORIDE 0.4 MG/1
0.4 CAPSULE ORAL DAILY
Qty: 7 CAPSULE | Refills: 0 | Status: SHIPPED | OUTPATIENT
Start: 2017-06-08 | End: 2017-08-22 | Stop reason: ALTCHOICE

## 2017-06-08 RX ORDER — MORPHINE SULFATE 4 MG/ML
4 INJECTION, SOLUTION INTRAMUSCULAR; INTRAVENOUS ONCE
Status: COMPLETED | OUTPATIENT
Start: 2017-06-08 | End: 2017-06-08

## 2017-06-08 RX ORDER — 0.9 % SODIUM CHLORIDE 0.9 %
1000 INTRAVENOUS SOLUTION INTRAVENOUS ONCE
Status: COMPLETED | OUTPATIENT
Start: 2017-06-08 | End: 2017-06-08

## 2017-06-08 RX ADMIN — SULFAMETHOXAZOLE AND TRIMETHOPRIM 1 TABLET: 800; 160 TABLET ORAL at 23:25

## 2017-06-08 RX ADMIN — MORPHINE SULFATE 4 MG: 4 INJECTION, SOLUTION INTRAMUSCULAR; INTRAVENOUS at 21:58

## 2017-06-08 RX ADMIN — SODIUM CHLORIDE 1000 ML: 9 INJECTION, SOLUTION INTRAVENOUS at 21:58

## 2017-06-08 RX ADMIN — TAMSULOSIN HYDROCHLORIDE 0.4 MG: 0.4 CAPSULE ORAL at 23:25

## 2017-06-08 ASSESSMENT — PAIN SCALES - GENERAL
PAINLEVEL_OUTOF10: 3
PAINLEVEL_OUTOF10: 8

## 2017-06-10 LAB
CULTURE: NORMAL
CULTURE: NORMAL
Lab: NORMAL
SPECIMEN DESCRIPTION: NORMAL
SPECIMEN DESCRIPTION: NORMAL
STATUS: NORMAL

## 2017-06-27 ENCOUNTER — TELEPHONE (OUTPATIENT)
Dept: OBGYN CLINIC | Age: 37
End: 2017-06-27

## 2017-06-27 RX ORDER — SULFAMETHOXAZOLE AND TRIMETHOPRIM 800; 160 MG/1; MG/1
1 TABLET ORAL 2 TIMES DAILY
Qty: 14 TABLET | Refills: 0 | Status: SHIPPED | OUTPATIENT
Start: 2017-06-27 | End: 2017-07-04

## 2017-06-28 ENCOUNTER — HOSPITAL ENCOUNTER (EMERGENCY)
Age: 37
Discharge: HOME | End: 2017-06-28
Attending: EMERGENCY MEDICINE
Payer: COMMERCIAL

## 2017-06-28 ENCOUNTER — APPOINTMENT (OUTPATIENT)
Dept: CT IMAGING | Age: 37
End: 2017-06-28
Payer: COMMERCIAL

## 2017-06-28 VITALS
WEIGHT: 215 LBS | HEART RATE: 81 BPM | OXYGEN SATURATION: 93 % | BODY MASS INDEX: 39.32 KG/M2 | SYSTOLIC BLOOD PRESSURE: 107 MMHG | RESPIRATION RATE: 19 BRPM | DIASTOLIC BLOOD PRESSURE: 60 MMHG | TEMPERATURE: 98.6 F

## 2017-06-28 DIAGNOSIS — R10.84 GENERALIZED ABDOMINAL PAIN: Primary | ICD-10-CM

## 2017-06-28 LAB
-: ABNORMAL
ABSOLUTE EOS #: 0.24 K/UL (ref 0–0.4)
ABSOLUTE LYMPH #: 1.66 K/UL (ref 1–4.8)
ABSOLUTE MONO #: 0.55 K/UL (ref 0.1–1.3)
ALBUMIN SERPL-MCNC: 4.1 G/DL (ref 3.5–5.2)
ALBUMIN/GLOBULIN RATIO: ABNORMAL (ref 1–2.5)
ALP BLD-CCNC: 60 U/L (ref 35–104)
ALT SERPL-CCNC: 12 U/L (ref 5–33)
AMORPHOUS: ABNORMAL
ANION GAP SERPL CALCULATED.3IONS-SCNC: 14 MMOL/L (ref 9–17)
AST SERPL-CCNC: 15 U/L
BACTERIA: ABNORMAL
BASOPHILS # BLD: 1 %
BASOPHILS ABSOLUTE: 0.08 K/UL (ref 0–0.2)
BILIRUB SERPL-MCNC: 0.23 MG/DL (ref 0.3–1.2)
BILIRUBIN URINE: NEGATIVE
BUN BLDV-MCNC: 13 MG/DL (ref 6–20)
BUN/CREAT BLD: ABNORMAL (ref 9–20)
CALCIUM SERPL-MCNC: 8.6 MG/DL (ref 8.6–10.4)
CASTS UA: ABNORMAL /LPF
CHLORIDE BLD-SCNC: 103 MMOL/L (ref 98–107)
CO2: 22 MMOL/L (ref 20–31)
COLOR: YELLOW
COMMENT UA: ABNORMAL
CREAT SERPL-MCNC: 0.77 MG/DL (ref 0.5–0.9)
CRYSTALS, UA: ABNORMAL /HPF
DIFFERENTIAL TYPE: ABNORMAL
EOSINOPHILS RELATIVE PERCENT: 3 %
EPITHELIAL CELLS UA: ABNORMAL /HPF
GFR AFRICAN AMERICAN: >60 ML/MIN
GFR NON-AFRICAN AMERICAN: >60 ML/MIN
GFR SERPL CREATININE-BSD FRML MDRD: ABNORMAL ML/MIN/{1.73_M2}
GFR SERPL CREATININE-BSD FRML MDRD: ABNORMAL ML/MIN/{1.73_M2}
GLUCOSE BLD-MCNC: 91 MG/DL (ref 70–99)
GLUCOSE URINE: NEGATIVE
HCG(URINE) PREGNANCY TEST: NEGATIVE
HCT VFR BLD CALC: 35.9 % (ref 36–46)
HEMOGLOBIN: 11.3 G/DL (ref 12–16)
INR BLD: 0.9
KETONES, URINE: NEGATIVE
LACTIC ACID: 1 MMOL/L (ref 0.5–2.2)
LEUKOCYTE ESTERASE, URINE: NEGATIVE
LIPASE: 19 U/L (ref 13–60)
LYMPHOCYTES # BLD: 21 %
MAGNESIUM: 1.9 MG/DL (ref 1.6–2.6)
MCH RBC QN AUTO: 23.5 PG (ref 26–34)
MCHC RBC AUTO-ENTMCNC: 31.5 G/DL (ref 31–37)
MCV RBC AUTO: 74.5 FL (ref 80–100)
MONOCYTES # BLD: 7 %
MORPHOLOGY: ABNORMAL
MUCUS: ABNORMAL
NITRITE, URINE: NEGATIVE
OTHER OBSERVATIONS UA: ABNORMAL
PDW BLD-RTO: 16.7 % (ref 11.5–14.9)
PH UA: 5.5 (ref 5–8)
PLATELET # BLD: 291 K/UL (ref 150–450)
PLATELET ESTIMATE: ABNORMAL
PMV BLD AUTO: 8.7 FL (ref 6–12)
POTASSIUM SERPL-SCNC: 4 MMOL/L (ref 3.7–5.3)
PROTEIN UA: NEGATIVE
PROTHROMBIN TIME: 10 SEC (ref 9.7–12)
RBC # BLD: 4.81 M/UL (ref 4–5.2)
RBC # BLD: ABNORMAL 10*6/UL
RBC UA: ABNORMAL /HPF
RENAL EPITHELIAL, UA: ABNORMAL /HPF
SEG NEUTROPHILS: 68 %
SEGMENTED NEUTROPHILS ABSOLUTE COUNT: 5.37 K/UL (ref 1.3–9.1)
SODIUM BLD-SCNC: 139 MMOL/L (ref 135–144)
SPECIFIC GRAVITY UA: 1.02 (ref 1–1.03)
TOTAL PROTEIN: 6.8 G/DL (ref 6.4–8.3)
TRICHOMONAS: ABNORMAL
TURBIDITY: CLEAR
URINE HGB: ABNORMAL
UROBILINOGEN, URINE: NORMAL
WBC # BLD: 7.9 K/UL (ref 3.5–11)
WBC # BLD: ABNORMAL 10*3/UL
WBC UA: ABNORMAL /HPF
YEAST: ABNORMAL

## 2017-06-28 PROCEDURE — 2580000003 HC RX 258: Performed by: EMERGENCY MEDICINE

## 2017-06-28 PROCEDURE — 83605 ASSAY OF LACTIC ACID: CPT

## 2017-06-28 PROCEDURE — 6360000004 HC RX CONTRAST MEDICATION: Performed by: EMERGENCY MEDICINE

## 2017-06-28 PROCEDURE — 96374 THER/PROPH/DIAG INJ IV PUSH: CPT

## 2017-06-28 PROCEDURE — 85610 PROTHROMBIN TIME: CPT

## 2017-06-28 PROCEDURE — 6360000002 HC RX W HCPCS: Performed by: EMERGENCY MEDICINE

## 2017-06-28 PROCEDURE — 81001 URINALYSIS AUTO W/SCOPE: CPT

## 2017-06-28 PROCEDURE — 83735 ASSAY OF MAGNESIUM: CPT

## 2017-06-28 PROCEDURE — 83690 ASSAY OF LIPASE: CPT

## 2017-06-28 PROCEDURE — 84703 CHORIONIC GONADOTROPIN ASSAY: CPT

## 2017-06-28 PROCEDURE — 85025 COMPLETE CBC W/AUTO DIFF WBC: CPT

## 2017-06-28 PROCEDURE — 96375 TX/PRO/DX INJ NEW DRUG ADDON: CPT

## 2017-06-28 PROCEDURE — 36415 COLL VENOUS BLD VENIPUNCTURE: CPT

## 2017-06-28 PROCEDURE — 2500000003 HC RX 250 WO HCPCS: Performed by: EMERGENCY MEDICINE

## 2017-06-28 PROCEDURE — 80053 COMPREHEN METABOLIC PANEL: CPT

## 2017-06-28 PROCEDURE — 74177 CT ABD & PELVIS W/CONTRAST: CPT

## 2017-06-28 PROCEDURE — S0028 INJECTION, FAMOTIDINE, 20 MG: HCPCS | Performed by: EMERGENCY MEDICINE

## 2017-06-28 PROCEDURE — 99284 EMERGENCY DEPT VISIT MOD MDM: CPT

## 2017-06-28 RX ORDER — 0.9 % SODIUM CHLORIDE 0.9 %
1000 INTRAVENOUS SOLUTION INTRAVENOUS ONCE
Status: COMPLETED | OUTPATIENT
Start: 2017-06-28 | End: 2017-06-28

## 2017-06-28 RX ORDER — ONDANSETRON 2 MG/ML
8 INJECTION INTRAMUSCULAR; INTRAVENOUS ONCE
Status: COMPLETED | OUTPATIENT
Start: 2017-06-28 | End: 2017-06-28

## 2017-06-28 RX ORDER — OXYCODONE HYDROCHLORIDE AND ACETAMINOPHEN 5; 325 MG/1; MG/1
1 TABLET ORAL EVERY 6 HOURS PRN
Qty: 12 TABLET | Refills: 0 | Status: SHIPPED | OUTPATIENT
Start: 2017-06-28 | End: 2017-07-01

## 2017-06-28 RX ORDER — 0.9 % SODIUM CHLORIDE 0.9 %
100 INTRAVENOUS SOLUTION INTRAVENOUS ONCE
Status: COMPLETED | OUTPATIENT
Start: 2017-06-28 | End: 2017-06-28

## 2017-06-28 RX ORDER — SODIUM CHLORIDE 0.9 % (FLUSH) 0.9 %
10 SYRINGE (ML) INJECTION PRN
Status: DISCONTINUED | OUTPATIENT
Start: 2017-06-28 | End: 2017-06-28 | Stop reason: HOSPADM

## 2017-06-28 RX ORDER — DOCUSATE SODIUM 100 MG/1
100 CAPSULE, LIQUID FILLED ORAL 2 TIMES DAILY
Qty: 30 CAPSULE | Refills: 0 | Status: SHIPPED | OUTPATIENT
Start: 2017-06-28 | End: 2017-07-13

## 2017-06-28 RX ADMIN — FAMOTIDINE 20 MG: 10 INJECTION INTRAVENOUS at 14:36

## 2017-06-28 RX ADMIN — ONDANSETRON 4 MG: 2 INJECTION INTRAMUSCULAR; INTRAVENOUS at 14:35

## 2017-06-28 RX ADMIN — SODIUM CHLORIDE 1000 ML: 9 INJECTION, SOLUTION INTRAVENOUS at 14:35

## 2017-06-28 RX ADMIN — IOVERSOL 130 ML: 741 INJECTION INTRA-ARTERIAL; INTRAVENOUS at 15:34

## 2017-06-28 RX ADMIN — Medication 10 ML: at 15:34

## 2017-06-28 RX ADMIN — HYDROMORPHONE HYDROCHLORIDE 1 MG: 1 INJECTION, SOLUTION INTRAMUSCULAR; INTRAVENOUS; SUBCUTANEOUS at 14:36

## 2017-06-28 RX ADMIN — SODIUM CHLORIDE 100 ML: 9 INJECTION, SOLUTION INTRAVENOUS at 15:33

## 2017-06-28 ASSESSMENT — PAIN DESCRIPTION - FREQUENCY: FREQUENCY: CONTINUOUS

## 2017-06-28 ASSESSMENT — PAIN SCALES - GENERAL
PAINLEVEL_OUTOF10: 10
PAINLEVEL_OUTOF10: 10

## 2017-06-28 ASSESSMENT — PAIN DESCRIPTION - DESCRIPTORS: DESCRIPTORS: STABBING;SHARP

## 2017-06-28 ASSESSMENT — PAIN DESCRIPTION - LOCATION: LOCATION: ABDOMEN

## 2017-06-28 ASSESSMENT — PAIN DESCRIPTION - PROGRESSION: CLINICAL_PROGRESSION: GRADUALLY WORSENING

## 2017-07-11 ENCOUNTER — OFFICE VISIT (OUTPATIENT)
Dept: OBGYN CLINIC | Age: 37
End: 2017-07-11

## 2017-07-11 VITALS
RESPIRATION RATE: 17 BRPM | WEIGHT: 214 LBS | OXYGEN SATURATION: 99 % | SYSTOLIC BLOOD PRESSURE: 144 MMHG | DIASTOLIC BLOOD PRESSURE: 101 MMHG | HEIGHT: 62 IN | BODY MASS INDEX: 39.38 KG/M2 | TEMPERATURE: 97.7 F | HEART RATE: 107 BPM

## 2017-07-11 DIAGNOSIS — Z48.89 POSTOPERATIVE VISIT: Primary | ICD-10-CM

## 2017-07-11 PROCEDURE — 99024 POSTOP FOLLOW-UP VISIT: CPT | Performed by: SPECIALIST

## 2017-07-11 ASSESSMENT — ENCOUNTER SYMPTOMS
DIARRHEA: 0
ABDOMINAL PAIN: 1
CONSTIPATION: 0
EYE PAIN: 0
ABDOMINAL DISTENTION: 0
COUGH: 0
VOMITING: 0
APNEA: 0
NAUSEA: 0

## 2017-07-18 ENCOUNTER — OFFICE VISIT (OUTPATIENT)
Dept: OBGYN CLINIC | Age: 37
End: 2017-07-18

## 2017-07-18 VITALS
BODY MASS INDEX: 38.83 KG/M2 | SYSTOLIC BLOOD PRESSURE: 131 MMHG | WEIGHT: 211 LBS | DIASTOLIC BLOOD PRESSURE: 86 MMHG | HEART RATE: 102 BPM | HEIGHT: 62 IN | RESPIRATION RATE: 18 BRPM

## 2017-07-18 DIAGNOSIS — K46.9 ABDOMINAL HERNIA WITHOUT OBSTRUCTION AND WITHOUT GANGRENE, RECURRENCE NOT SPECIFIED, UNSPECIFIED HERNIA TYPE: ICD-10-CM

## 2017-07-18 DIAGNOSIS — Z48.89 POSTOPERATIVE VISIT: Primary | ICD-10-CM

## 2017-07-18 DIAGNOSIS — N76.0 ACUTE VAGINITIS: ICD-10-CM

## 2017-07-18 PROCEDURE — 99024 POSTOP FOLLOW-UP VISIT: CPT | Performed by: SPECIALIST

## 2017-07-18 ASSESSMENT — ENCOUNTER SYMPTOMS
CONSTIPATION: 0
ABDOMINAL DISTENTION: 0
NAUSEA: 0
APNEA: 0
DIARRHEA: 0
COUGH: 0
VOMITING: 0
EYE PAIN: 0
ABDOMINAL PAIN: 1

## 2017-08-02 ENCOUNTER — HOSPITAL ENCOUNTER (EMERGENCY)
Age: 37
Discharge: HOME | End: 2017-08-02
Attending: EMERGENCY MEDICINE
Payer: COMMERCIAL

## 2017-08-02 VITALS
WEIGHT: 213 LBS | HEIGHT: 62 IN | BODY MASS INDEX: 39.2 KG/M2 | OXYGEN SATURATION: 96 % | SYSTOLIC BLOOD PRESSURE: 127 MMHG | RESPIRATION RATE: 14 BRPM | HEART RATE: 83 BPM | TEMPERATURE: 98.4 F | DIASTOLIC BLOOD PRESSURE: 82 MMHG

## 2017-08-02 DIAGNOSIS — R11.2 NAUSEA AND VOMITING, INTRACTABILITY OF VOMITING NOT SPECIFIED, UNSPECIFIED VOMITING TYPE: ICD-10-CM

## 2017-08-02 DIAGNOSIS — R10.13 EPIGASTRIC PAIN: Primary | ICD-10-CM

## 2017-08-02 DIAGNOSIS — F41.1 ANXIETY STATE: ICD-10-CM

## 2017-08-02 LAB
-: ABNORMAL
ABSOLUTE EOS #: 0.26 K/UL (ref 0–0.4)
ABSOLUTE LYMPH #: 1.65 K/UL (ref 1–4.8)
ABSOLUTE MONO #: 0.52 K/UL (ref 0.1–1.3)
ALBUMIN SERPL-MCNC: 4.6 G/DL (ref 3.5–5.2)
ALBUMIN/GLOBULIN RATIO: ABNORMAL (ref 1–2.5)
ALP BLD-CCNC: 65 U/L (ref 35–104)
ALT SERPL-CCNC: 18 U/L (ref 5–33)
AMORPHOUS: ABNORMAL
ANION GAP SERPL CALCULATED.3IONS-SCNC: 14 MMOL/L (ref 9–17)
AST SERPL-CCNC: 20 U/L
BACTERIA: ABNORMAL
BASOPHILS # BLD: 1 %
BASOPHILS ABSOLUTE: 0.09 K/UL (ref 0–0.2)
BILIRUB SERPL-MCNC: 0.57 MG/DL (ref 0.3–1.2)
BILIRUBIN URINE: ABNORMAL
BUN BLDV-MCNC: 9 MG/DL (ref 6–20)
BUN/CREAT BLD: ABNORMAL (ref 9–20)
CALCIUM SERPL-MCNC: 9.2 MG/DL (ref 8.6–10.4)
CASTS UA: ABNORMAL /LPF
CHLORIDE BLD-SCNC: 104 MMOL/L (ref 98–107)
CO2: 25 MMOL/L (ref 20–31)
COLOR: YELLOW
COMMENT UA: ABNORMAL
CREAT SERPL-MCNC: 0.72 MG/DL (ref 0.5–0.9)
CRYSTALS, UA: ABNORMAL /HPF
DIFFERENTIAL TYPE: ABNORMAL
EOSINOPHILS RELATIVE PERCENT: 3 %
EPITHELIAL CELLS UA: ABNORMAL /HPF
GFR AFRICAN AMERICAN: >60 ML/MIN
GFR NON-AFRICAN AMERICAN: >60 ML/MIN
GFR SERPL CREATININE-BSD FRML MDRD: ABNORMAL ML/MIN/{1.73_M2}
GFR SERPL CREATININE-BSD FRML MDRD: ABNORMAL ML/MIN/{1.73_M2}
GLUCOSE BLD-MCNC: 103 MG/DL (ref 70–99)
GLUCOSE URINE: NEGATIVE
HCG(URINE) PREGNANCY TEST: NEGATIVE
HCT VFR BLD CALC: 39.3 % (ref 36–46)
HEMOGLOBIN: 12.4 G/DL (ref 12–16)
KETONES, URINE: NEGATIVE
LEUKOCYTE ESTERASE, URINE: NEGATIVE
LIPASE: 32 U/L (ref 13–60)
LYMPHOCYTES # BLD: 19 %
MCH RBC QN AUTO: 23.1 PG (ref 26–34)
MCHC RBC AUTO-ENTMCNC: 31.4 G/DL (ref 31–37)
MCV RBC AUTO: 73.5 FL (ref 80–100)
MONOCYTES # BLD: 6 %
MORPHOLOGY: ABNORMAL
MUCUS: ABNORMAL
NITRITE, URINE: NEGATIVE
OTHER OBSERVATIONS UA: ABNORMAL
PDW BLD-RTO: 17 % (ref 11.5–14.9)
PH UA: 6 (ref 5–8)
PLATELET # BLD: 316 K/UL (ref 150–450)
PLATELET ESTIMATE: ABNORMAL
PMV BLD AUTO: 8 FL (ref 6–12)
POTASSIUM SERPL-SCNC: 3.4 MMOL/L (ref 3.7–5.3)
PROTEIN UA: NEGATIVE
RBC # BLD: 5.35 M/UL (ref 4–5.2)
RBC # BLD: ABNORMAL 10*6/UL
RBC UA: ABNORMAL /HPF
RENAL EPITHELIAL, UA: ABNORMAL /HPF
SEG NEUTROPHILS: 71 %
SEGMENTED NEUTROPHILS ABSOLUTE COUNT: 6.18 K/UL (ref 1.3–9.1)
SODIUM BLD-SCNC: 143 MMOL/L (ref 135–144)
SPECIFIC GRAVITY UA: 1.02 (ref 1–1.03)
TOTAL PROTEIN: 7.6 G/DL (ref 6.4–8.3)
TRICHOMONAS: ABNORMAL
TURBIDITY: ABNORMAL
URINE HGB: ABNORMAL
UROBILINOGEN, URINE: NORMAL
WBC # BLD: 8.7 K/UL (ref 3.5–11)
WBC # BLD: ABNORMAL 10*3/UL
WBC UA: ABNORMAL /HPF
YEAST: ABNORMAL

## 2017-08-02 PROCEDURE — 36415 COLL VENOUS BLD VENIPUNCTURE: CPT

## 2017-08-02 PROCEDURE — 96375 TX/PRO/DX INJ NEW DRUG ADDON: CPT

## 2017-08-02 PROCEDURE — 2580000003 HC RX 258: Performed by: EMERGENCY MEDICINE

## 2017-08-02 PROCEDURE — 85025 COMPLETE CBC W/AUTO DIFF WBC: CPT

## 2017-08-02 PROCEDURE — 99285 EMERGENCY DEPT VISIT HI MDM: CPT

## 2017-08-02 PROCEDURE — 6360000002 HC RX W HCPCS: Performed by: EMERGENCY MEDICINE

## 2017-08-02 PROCEDURE — S0028 INJECTION, FAMOTIDINE, 20 MG: HCPCS | Performed by: EMERGENCY MEDICINE

## 2017-08-02 PROCEDURE — 84703 CHORIONIC GONADOTROPIN ASSAY: CPT

## 2017-08-02 PROCEDURE — 2500000003 HC RX 250 WO HCPCS: Performed by: EMERGENCY MEDICINE

## 2017-08-02 PROCEDURE — 83690 ASSAY OF LIPASE: CPT

## 2017-08-02 PROCEDURE — 81001 URINALYSIS AUTO W/SCOPE: CPT

## 2017-08-02 PROCEDURE — 80053 COMPREHEN METABOLIC PANEL: CPT

## 2017-08-02 PROCEDURE — 96374 THER/PROPH/DIAG INJ IV PUSH: CPT

## 2017-08-02 RX ORDER — ONDANSETRON 2 MG/ML
4 INJECTION INTRAMUSCULAR; INTRAVENOUS ONCE
Status: COMPLETED | OUTPATIENT
Start: 2017-08-02 | End: 2017-08-02

## 2017-08-02 RX ORDER — 0.9 % SODIUM CHLORIDE 0.9 %
1000 INTRAVENOUS SOLUTION INTRAVENOUS ONCE
Status: COMPLETED | OUTPATIENT
Start: 2017-08-02 | End: 2017-08-02

## 2017-08-02 RX ORDER — PROMETHAZINE HYDROCHLORIDE 25 MG/ML
12.5 INJECTION, SOLUTION INTRAMUSCULAR; INTRAVENOUS ONCE
Status: COMPLETED | OUTPATIENT
Start: 2017-08-02 | End: 2017-08-02

## 2017-08-02 ASSESSMENT — ENCOUNTER SYMPTOMS
NAUSEA: 1
SHORTNESS OF BREATH: 0
BACK PAIN: 0
VOMITING: 1
EYE REDNESS: 0
ABDOMINAL PAIN: 1
EYE PAIN: 0
RHINORRHEA: 0
COUGH: 0

## 2017-08-02 ASSESSMENT — PAIN SCALES - GENERAL: PAINLEVEL_OUTOF10: 5

## 2017-08-02 ASSESSMENT — PAIN DESCRIPTION - LOCATION: LOCATION: CHEST

## 2017-08-02 ASSESSMENT — PAIN DESCRIPTION - PAIN TYPE: TYPE: ACUTE PAIN

## 2017-08-07 ENCOUNTER — APPOINTMENT (OUTPATIENT)
Dept: CT IMAGING | Age: 37
End: 2017-08-07
Payer: COMMERCIAL

## 2017-08-07 ENCOUNTER — APPOINTMENT (OUTPATIENT)
Dept: GENERAL RADIOLOGY | Age: 37
End: 2017-08-07
Payer: COMMERCIAL

## 2017-08-07 ENCOUNTER — HOSPITAL ENCOUNTER (EMERGENCY)
Age: 37
Discharge: HOME | End: 2017-08-07
Attending: EMERGENCY MEDICINE
Payer: COMMERCIAL

## 2017-08-07 VITALS
SYSTOLIC BLOOD PRESSURE: 108 MMHG | BODY MASS INDEX: 39.2 KG/M2 | HEART RATE: 78 BPM | DIASTOLIC BLOOD PRESSURE: 59 MMHG | TEMPERATURE: 98.2 F | WEIGHT: 213 LBS | OXYGEN SATURATION: 95 % | HEIGHT: 62 IN | RESPIRATION RATE: 16 BRPM

## 2017-08-07 DIAGNOSIS — R10.31 RIGHT LOWER QUADRANT ABDOMINAL PAIN: Primary | ICD-10-CM

## 2017-08-07 DIAGNOSIS — R11.2 NAUSEA AND VOMITING, INTRACTABILITY OF VOMITING NOT SPECIFIED, UNSPECIFIED VOMITING TYPE: ICD-10-CM

## 2017-08-07 DIAGNOSIS — J45.901 ASTHMA EXACERBATION: ICD-10-CM

## 2017-08-07 LAB
-: ABNORMAL
ABSOLUTE EOS #: 0 K/UL (ref 0–0.4)
ABSOLUTE LYMPH #: 1.7 K/UL (ref 1–4.8)
ABSOLUTE MONO #: 0.71 K/UL (ref 0.1–1.3)
ALBUMIN SERPL-MCNC: 4.2 G/DL (ref 3.5–5.2)
ALBUMIN/GLOBULIN RATIO: ABNORMAL (ref 1–2.5)
ALP BLD-CCNC: 52 U/L (ref 35–104)
ALT SERPL-CCNC: 18 U/L (ref 5–33)
AMORPHOUS: ABNORMAL
ANION GAP SERPL CALCULATED.3IONS-SCNC: 14 MMOL/L (ref 9–17)
AST SERPL-CCNC: 18 U/L
BACTERIA: ABNORMAL
BASOPHILS # BLD: 1 %
BASOPHILS ABSOLUTE: 0.14 K/UL (ref 0–0.2)
BILIRUB SERPL-MCNC: 0.49 MG/DL (ref 0.3–1.2)
BILIRUBIN URINE: NEGATIVE
BUN BLDV-MCNC: 12 MG/DL (ref 6–20)
BUN/CREAT BLD: ABNORMAL (ref 9–20)
CALCIUM SERPL-MCNC: 9.1 MG/DL (ref 8.6–10.4)
CASTS UA: ABNORMAL /LPF
CHLORIDE BLD-SCNC: 102 MMOL/L (ref 98–107)
CO2: 22 MMOL/L (ref 20–31)
COLOR: YELLOW
COMMENT UA: ABNORMAL
CREAT SERPL-MCNC: 0.63 MG/DL (ref 0.5–0.9)
CRYSTALS, UA: ABNORMAL /HPF
DIFFERENTIAL TYPE: ABNORMAL
EOSINOPHILS RELATIVE PERCENT: 0 %
EPITHELIAL CELLS UA: ABNORMAL /HPF
GFR AFRICAN AMERICAN: >60 ML/MIN
GFR NON-AFRICAN AMERICAN: >60 ML/MIN
GFR SERPL CREATININE-BSD FRML MDRD: ABNORMAL ML/MIN/{1.73_M2}
GFR SERPL CREATININE-BSD FRML MDRD: ABNORMAL ML/MIN/{1.73_M2}
GLUCOSE BLD-MCNC: 109 MG/DL (ref 70–99)
GLUCOSE URINE: NEGATIVE
HCG QUALITATIVE: NEGATIVE
HCT VFR BLD CALC: 34 % (ref 36–46)
HEMOGLOBIN: 10.9 G/DL (ref 12–16)
KETONES, URINE: NEGATIVE
LACTIC ACID, WHOLE BLOOD: NORMAL MMOL/L (ref 0.7–2.1)
LACTIC ACID: 1.3 MMOL/L (ref 0.5–2.2)
LEUKOCYTE ESTERASE, URINE: NEGATIVE
LIPASE: 45 U/L (ref 13–60)
LYMPHOCYTES # BLD: 12 %
MCH RBC QN AUTO: 23.6 PG (ref 26–34)
MCHC RBC AUTO-ENTMCNC: 32.2 G/DL (ref 31–37)
MCV RBC AUTO: 73.4 FL (ref 80–100)
MONOCYTES # BLD: 5 %
MORPHOLOGY: ABNORMAL
MUCUS: ABNORMAL
NITRITE, URINE: NEGATIVE
OTHER OBSERVATIONS UA: ABNORMAL
PDW BLD-RTO: 16.6 % (ref 11.5–14.9)
PH UA: 7 (ref 5–8)
PLATELET # BLD: 292 K/UL (ref 150–450)
PLATELET ESTIMATE: ABNORMAL
PMV BLD AUTO: 8.4 FL (ref 6–12)
POTASSIUM SERPL-SCNC: 3.7 MMOL/L (ref 3.7–5.3)
PROTEIN UA: NEGATIVE
RBC # BLD: 4.64 M/UL (ref 4–5.2)
RBC # BLD: ABNORMAL 10*6/UL
RBC UA: ABNORMAL /HPF
RENAL EPITHELIAL, UA: ABNORMAL /HPF
SEG NEUTROPHILS: 82 %
SEGMENTED NEUTROPHILS ABSOLUTE COUNT: 11.65 K/UL (ref 1.3–9.1)
SODIUM BLD-SCNC: 138 MMOL/L (ref 135–144)
SPECIFIC GRAVITY UA: 1.02 (ref 1–1.03)
TOTAL PROTEIN: 6.9 G/DL (ref 6.4–8.3)
TRICHOMONAS: ABNORMAL
TROPONIN INTERP: NORMAL
TROPONIN INTERP: NORMAL
TROPONIN T: <0.03 NG/ML
TROPONIN T: <0.03 NG/ML
TURBIDITY: CLEAR
URINE HGB: ABNORMAL
UROBILINOGEN, URINE: NORMAL
WBC # BLD: 14.2 K/UL (ref 3.5–11)
WBC # BLD: ABNORMAL 10*3/UL
WBC UA: ABNORMAL /HPF
YEAST: ABNORMAL

## 2017-08-07 PROCEDURE — 83605 ASSAY OF LACTIC ACID: CPT

## 2017-08-07 PROCEDURE — 6360000004 HC RX CONTRAST MEDICATION: Performed by: EMERGENCY MEDICINE

## 2017-08-07 PROCEDURE — 81001 URINALYSIS AUTO W/SCOPE: CPT

## 2017-08-07 PROCEDURE — 84703 CHORIONIC GONADOTROPIN ASSAY: CPT

## 2017-08-07 PROCEDURE — 99285 EMERGENCY DEPT VISIT HI MDM: CPT

## 2017-08-07 PROCEDURE — 84484 ASSAY OF TROPONIN QUANT: CPT

## 2017-08-07 PROCEDURE — 36415 COLL VENOUS BLD VENIPUNCTURE: CPT

## 2017-08-07 PROCEDURE — 94640 AIRWAY INHALATION TREATMENT: CPT

## 2017-08-07 PROCEDURE — 2580000003 HC RX 258: Performed by: EMERGENCY MEDICINE

## 2017-08-07 PROCEDURE — 96375 TX/PRO/DX INJ NEW DRUG ADDON: CPT

## 2017-08-07 PROCEDURE — 94761 N-INVAS EAR/PLS OXIMETRY MLT: CPT

## 2017-08-07 PROCEDURE — 94664 DEMO&/EVAL PT USE INHALER: CPT

## 2017-08-07 PROCEDURE — 80053 COMPREHEN METABOLIC PANEL: CPT

## 2017-08-07 PROCEDURE — 96374 THER/PROPH/DIAG INJ IV PUSH: CPT

## 2017-08-07 PROCEDURE — 85025 COMPLETE CBC W/AUTO DIFF WBC: CPT

## 2017-08-07 PROCEDURE — 71020 XR CHEST STANDARD TWO VW: CPT

## 2017-08-07 PROCEDURE — 83690 ASSAY OF LIPASE: CPT

## 2017-08-07 PROCEDURE — 74177 CT ABD & PELVIS W/CONTRAST: CPT

## 2017-08-07 PROCEDURE — 6370000000 HC RX 637 (ALT 250 FOR IP): Performed by: EMERGENCY MEDICINE

## 2017-08-07 PROCEDURE — 6360000002 HC RX W HCPCS: Performed by: EMERGENCY MEDICINE

## 2017-08-07 RX ORDER — SODIUM CHLORIDE 0.9 % (FLUSH) 0.9 %
10 SYRINGE (ML) INJECTION PRN
Status: DISCONTINUED | OUTPATIENT
Start: 2017-08-07 | End: 2017-08-07 | Stop reason: HOSPADM

## 2017-08-07 RX ORDER — IPRATROPIUM BROMIDE AND ALBUTEROL SULFATE 2.5; .5 MG/3ML; MG/3ML
1 SOLUTION RESPIRATORY (INHALATION)
Status: DISCONTINUED | OUTPATIENT
Start: 2017-08-07 | End: 2017-08-07 | Stop reason: HOSPADM

## 2017-08-07 RX ORDER — 0.9 % SODIUM CHLORIDE 0.9 %
1000 INTRAVENOUS SOLUTION INTRAVENOUS ONCE
Status: COMPLETED | OUTPATIENT
Start: 2017-08-07 | End: 2017-08-07

## 2017-08-07 RX ORDER — PREDNISONE 10 MG/1
TABLET ORAL
Qty: 20 TABLET | Refills: 0 | Status: SHIPPED | OUTPATIENT
Start: 2017-08-07 | End: 2017-08-17

## 2017-08-07 RX ORDER — FENTANYL CITRATE 50 UG/ML
100 INJECTION, SOLUTION INTRAMUSCULAR; INTRAVENOUS ONCE
Status: COMPLETED | OUTPATIENT
Start: 2017-08-07 | End: 2017-08-07

## 2017-08-07 RX ORDER — IPRATROPIUM BROMIDE AND ALBUTEROL SULFATE 2.5; .5 MG/3ML; MG/3ML
1 SOLUTION RESPIRATORY (INHALATION) ONCE
Status: COMPLETED | OUTPATIENT
Start: 2017-08-07 | End: 2017-08-07

## 2017-08-07 RX ORDER — 0.9 % SODIUM CHLORIDE 0.9 %
100 INTRAVENOUS SOLUTION INTRAVENOUS ONCE
Status: COMPLETED | OUTPATIENT
Start: 2017-08-07 | End: 2017-08-07

## 2017-08-07 RX ORDER — ONDANSETRON 2 MG/ML
4 INJECTION INTRAMUSCULAR; INTRAVENOUS ONCE
Status: COMPLETED | OUTPATIENT
Start: 2017-08-07 | End: 2017-08-07

## 2017-08-07 RX ADMIN — IOVERSOL 130 ML: 741 INJECTION INTRA-ARTERIAL; INTRAVENOUS at 13:42

## 2017-08-07 RX ADMIN — SODIUM CHLORIDE 1000 ML: 9 INJECTION, SOLUTION INTRAVENOUS at 12:47

## 2017-08-07 RX ADMIN — FENTANYL CITRATE 100 MCG: 50 INJECTION INTRAMUSCULAR; INTRAVENOUS at 13:24

## 2017-08-07 RX ADMIN — IPRATROPIUM BROMIDE AND ALBUTEROL SULFATE 1 AMPULE: .5; 3 SOLUTION RESPIRATORY (INHALATION) at 12:55

## 2017-08-07 RX ADMIN — Medication 10 ML: at 13:43

## 2017-08-07 RX ADMIN — SODIUM CHLORIDE 100 ML: 9 INJECTION, SOLUTION INTRAVENOUS at 13:42

## 2017-08-07 RX ADMIN — ONDANSETRON 4 MG: 2 INJECTION INTRAMUSCULAR; INTRAVENOUS at 12:47

## 2017-08-07 ASSESSMENT — PAIN DESCRIPTION - LOCATION
LOCATION: ABDOMEN
LOCATION: ABDOMEN

## 2017-08-07 ASSESSMENT — PAIN DESCRIPTION - FREQUENCY
FREQUENCY: INTERMITTENT
FREQUENCY: CONTINUOUS

## 2017-08-07 ASSESSMENT — PAIN SCALES - GENERAL
PAINLEVEL_OUTOF10: 10
PAINLEVEL_OUTOF10: 9
PAINLEVEL_OUTOF10: 4

## 2017-08-07 ASSESSMENT — PAIN DESCRIPTION - PAIN TYPE
TYPE: ACUTE PAIN
TYPE: CHRONIC PAIN

## 2017-08-07 ASSESSMENT — PAIN DESCRIPTION - ORIENTATION: ORIENTATION: RIGHT;LOWER

## 2017-08-07 ASSESSMENT — PAIN DESCRIPTION - DESCRIPTORS: DESCRIPTORS: SHARP

## 2017-08-22 ENCOUNTER — HOSPITAL ENCOUNTER (EMERGENCY)
Age: 37
Discharge: HOME | End: 2017-08-22
Attending: EMERGENCY MEDICINE
Payer: MEDICAID

## 2017-08-22 VITALS
SYSTOLIC BLOOD PRESSURE: 139 MMHG | WEIGHT: 213 LBS | TEMPERATURE: 98.1 F | HEIGHT: 62 IN | HEART RATE: 90 BPM | RESPIRATION RATE: 16 BRPM | OXYGEN SATURATION: 100 % | BODY MASS INDEX: 39.2 KG/M2 | DIASTOLIC BLOOD PRESSURE: 96 MMHG

## 2017-08-22 DIAGNOSIS — L02.214 CUTANEOUS ABSCESS OF GROIN: Primary | ICD-10-CM

## 2017-08-22 PROCEDURE — 10060 I&D ABSCESS SIMPLE/SINGLE: CPT

## 2017-08-22 PROCEDURE — 99282 EMERGENCY DEPT VISIT SF MDM: CPT

## 2017-08-22 RX ORDER — SULFAMETHOXAZOLE AND TRIMETHOPRIM 800; 160 MG/1; MG/1
1 TABLET ORAL 2 TIMES DAILY
Qty: 20 TABLET | Refills: 0 | Status: SHIPPED | OUTPATIENT
Start: 2017-08-22 | End: 2017-09-01

## 2017-08-22 RX ORDER — ACETAMINOPHEN AND CODEINE PHOSPHATE 300; 30 MG/1; MG/1
1 TABLET ORAL 3 TIMES DAILY PRN
Qty: 10 TABLET | Refills: 0 | Status: SHIPPED | OUTPATIENT
Start: 2017-08-22 | End: 2017-09-12

## 2017-08-22 RX ORDER — CEPHALEXIN 500 MG/1
500 CAPSULE ORAL 4 TIMES DAILY
Qty: 40 CAPSULE | Refills: 0 | Status: SHIPPED | OUTPATIENT
Start: 2017-08-22 | End: 2017-09-01

## 2017-08-22 ASSESSMENT — PAIN SCALES - GENERAL: PAINLEVEL_OUTOF10: 8

## 2017-08-22 ASSESSMENT — ENCOUNTER SYMPTOMS
WHEEZING: 0
COUGH: 0
COLOR CHANGE: 1
STRIDOR: 0

## 2017-08-22 ASSESSMENT — PAIN DESCRIPTION - DESCRIPTORS: DESCRIPTORS: TENDER

## 2017-08-22 ASSESSMENT — PAIN DESCRIPTION - PAIN TYPE: TYPE: ACUTE PAIN

## 2017-09-02 ENCOUNTER — APPOINTMENT (OUTPATIENT)
Dept: GENERAL RADIOLOGY | Age: 37
End: 2017-09-02
Payer: COMMERCIAL

## 2017-09-02 ENCOUNTER — HOSPITAL ENCOUNTER (EMERGENCY)
Age: 37
Discharge: HOME | End: 2017-09-02
Attending: EMERGENCY MEDICINE
Payer: COMMERCIAL

## 2017-09-02 VITALS
DIASTOLIC BLOOD PRESSURE: 79 MMHG | RESPIRATION RATE: 14 BRPM | HEART RATE: 85 BPM | OXYGEN SATURATION: 95 % | HEIGHT: 62 IN | SYSTOLIC BLOOD PRESSURE: 127 MMHG | WEIGHT: 208 LBS | BODY MASS INDEX: 38.28 KG/M2 | TEMPERATURE: 98.5 F

## 2017-09-02 DIAGNOSIS — S69.92XA LEFT WRIST INJURY, INITIAL ENCOUNTER: Primary | ICD-10-CM

## 2017-09-02 PROCEDURE — 99283 EMERGENCY DEPT VISIT LOW MDM: CPT

## 2017-09-02 PROCEDURE — 73110 X-RAY EXAM OF WRIST: CPT

## 2017-09-02 ASSESSMENT — ENCOUNTER SYMPTOMS
SHORTNESS OF BREATH: 0
COUGH: 0
VOMITING: 0
TROUBLE SWALLOWING: 0
NAUSEA: 0

## 2017-09-02 ASSESSMENT — PAIN SCALES - GENERAL: PAINLEVEL_OUTOF10: 7

## 2017-09-12 ENCOUNTER — HOSPITAL ENCOUNTER (EMERGENCY)
Age: 37
Discharge: HOME | End: 2017-09-12
Attending: EMERGENCY MEDICINE
Payer: COMMERCIAL

## 2017-09-12 ENCOUNTER — APPOINTMENT (OUTPATIENT)
Dept: CT IMAGING | Age: 37
End: 2017-09-12
Payer: COMMERCIAL

## 2017-09-12 VITALS
WEIGHT: 208 LBS | DIASTOLIC BLOOD PRESSURE: 63 MMHG | HEART RATE: 92 BPM | OXYGEN SATURATION: 94 % | HEIGHT: 62 IN | BODY MASS INDEX: 38.28 KG/M2 | SYSTOLIC BLOOD PRESSURE: 117 MMHG | TEMPERATURE: 98.2 F | RESPIRATION RATE: 16 BRPM

## 2017-09-12 DIAGNOSIS — R11.2 NAUSEA AND VOMITING, INTRACTABILITY OF VOMITING NOT SPECIFIED, UNSPECIFIED VOMITING TYPE: ICD-10-CM

## 2017-09-12 DIAGNOSIS — R10.9 ABDOMINAL PAIN, UNSPECIFIED LOCATION: Primary | ICD-10-CM

## 2017-09-12 DIAGNOSIS — J44.9 CHRONIC OBSTRUCTIVE PULMONARY DISEASE, UNSPECIFIED COPD TYPE (HCC): ICD-10-CM

## 2017-09-12 LAB
-: ABNORMAL
ABSOLUTE EOS #: 0.45 K/UL (ref 0–0.4)
ABSOLUTE LYMPH #: 1.94 K/UL (ref 1–4.8)
ABSOLUTE MONO #: 1.19 K/UL (ref 0.1–1.3)
ALBUMIN SERPL-MCNC: 3.9 G/DL (ref 3.5–5.2)
ALBUMIN/GLOBULIN RATIO: ABNORMAL (ref 1–2.5)
ALP BLD-CCNC: 51 U/L (ref 35–104)
ALT SERPL-CCNC: 20 U/L (ref 5–33)
AMORPHOUS: ABNORMAL
ANION GAP SERPL CALCULATED.3IONS-SCNC: 15 MMOL/L (ref 9–17)
AST SERPL-CCNC: 17 U/L
BACTERIA: ABNORMAL
BASOPHILS # BLD: 0 %
BASOPHILS ABSOLUTE: 0 K/UL (ref 0–0.2)
BILIRUB SERPL-MCNC: 0.33 MG/DL (ref 0.3–1.2)
BILIRUBIN URINE: NEGATIVE
BUN BLDV-MCNC: 10 MG/DL (ref 6–20)
BUN/CREAT BLD: ABNORMAL (ref 9–20)
CALCIUM SERPL-MCNC: 8.7 MG/DL (ref 8.6–10.4)
CASTS UA: ABNORMAL /LPF
CHLORIDE BLD-SCNC: 105 MMOL/L (ref 98–107)
CO2: 19 MMOL/L (ref 20–31)
COLOR: YELLOW
COMMENT UA: ABNORMAL
CREAT SERPL-MCNC: 0.74 MG/DL (ref 0.5–0.9)
CRYSTALS, UA: ABNORMAL /HPF
DIFFERENTIAL TYPE: ABNORMAL
EOSINOPHILS RELATIVE PERCENT: 3 %
EPITHELIAL CELLS UA: ABNORMAL /HPF
GFR AFRICAN AMERICAN: >60 ML/MIN
GFR NON-AFRICAN AMERICAN: >60 ML/MIN
GFR SERPL CREATININE-BSD FRML MDRD: ABNORMAL ML/MIN/{1.73_M2}
GFR SERPL CREATININE-BSD FRML MDRD: ABNORMAL ML/MIN/{1.73_M2}
GLUCOSE BLD-MCNC: 102 MG/DL (ref 70–99)
GLUCOSE URINE: NEGATIVE
HCG QUALITATIVE: NEGATIVE
HCT VFR BLD CALC: 35.6 % (ref 36–46)
HEMOGLOBIN: 11.1 G/DL (ref 12–16)
KETONES, URINE: NEGATIVE
LEUKOCYTE ESTERASE, URINE: NEGATIVE
LIPASE: 20 U/L (ref 13–60)
LYMPHOCYTES # BLD: 13 %
MCH RBC QN AUTO: 23.2 PG (ref 26–34)
MCHC RBC AUTO-ENTMCNC: 31.1 G/DL (ref 31–37)
MCV RBC AUTO: 74.8 FL (ref 80–100)
MONOCYTES # BLD: 8 %
MORPHOLOGY: ABNORMAL
MUCUS: ABNORMAL
NITRITE, URINE: NEGATIVE
OTHER OBSERVATIONS UA: ABNORMAL
PDW BLD-RTO: 16.5 % (ref 11.5–14.9)
PH UA: 5 (ref 5–8)
PLATELET # BLD: 291 K/UL (ref 150–450)
PLATELET ESTIMATE: ABNORMAL
PMV BLD AUTO: 8.5 FL (ref 6–12)
POTASSIUM SERPL-SCNC: 3.7 MMOL/L (ref 3.7–5.3)
PROTEIN UA: NEGATIVE
RBC # BLD: 4.77 M/UL (ref 4–5.2)
RBC # BLD: ABNORMAL 10*6/UL
RBC UA: ABNORMAL /HPF
RENAL EPITHELIAL, UA: ABNORMAL /HPF
SEG NEUTROPHILS: 76 %
SEGMENTED NEUTROPHILS ABSOLUTE COUNT: 11.32 K/UL (ref 1.3–9.1)
SODIUM BLD-SCNC: 139 MMOL/L (ref 135–144)
SPECIFIC GRAVITY UA: 1.02 (ref 1–1.03)
TOTAL PROTEIN: 6.4 G/DL (ref 6.4–8.3)
TRICHOMONAS: ABNORMAL
TURBIDITY: ABNORMAL
URINE HGB: ABNORMAL
UROBILINOGEN, URINE: NORMAL
WBC # BLD: 14.9 K/UL (ref 3.5–11)
WBC # BLD: ABNORMAL 10*3/UL
WBC UA: ABNORMAL /HPF
YEAST: ABNORMAL

## 2017-09-12 PROCEDURE — 99284 EMERGENCY DEPT VISIT MOD MDM: CPT

## 2017-09-12 PROCEDURE — 6360000002 HC RX W HCPCS: Performed by: EMERGENCY MEDICINE

## 2017-09-12 PROCEDURE — 94761 N-INVAS EAR/PLS OXIMETRY MLT: CPT

## 2017-09-12 PROCEDURE — 96374 THER/PROPH/DIAG INJ IV PUSH: CPT

## 2017-09-12 PROCEDURE — 96375 TX/PRO/DX INJ NEW DRUG ADDON: CPT

## 2017-09-12 PROCEDURE — 83690 ASSAY OF LIPASE: CPT

## 2017-09-12 PROCEDURE — 94664 DEMO&/EVAL PT USE INHALER: CPT

## 2017-09-12 PROCEDURE — 84703 CHORIONIC GONADOTROPIN ASSAY: CPT

## 2017-09-12 PROCEDURE — 6370000000 HC RX 637 (ALT 250 FOR IP): Performed by: EMERGENCY MEDICINE

## 2017-09-12 PROCEDURE — 81001 URINALYSIS AUTO W/SCOPE: CPT

## 2017-09-12 PROCEDURE — 2580000003 HC RX 258: Performed by: EMERGENCY MEDICINE

## 2017-09-12 PROCEDURE — 74176 CT ABD & PELVIS W/O CONTRAST: CPT

## 2017-09-12 PROCEDURE — 80053 COMPREHEN METABOLIC PANEL: CPT

## 2017-09-12 PROCEDURE — 94640 AIRWAY INHALATION TREATMENT: CPT

## 2017-09-12 PROCEDURE — 36415 COLL VENOUS BLD VENIPUNCTURE: CPT

## 2017-09-12 PROCEDURE — 85025 COMPLETE CBC W/AUTO DIFF WBC: CPT

## 2017-09-12 RX ORDER — ONDANSETRON 2 MG/ML
4 INJECTION INTRAMUSCULAR; INTRAVENOUS ONCE
Status: COMPLETED | OUTPATIENT
Start: 2017-09-12 | End: 2017-09-12

## 2017-09-12 RX ORDER — IPRATROPIUM BROMIDE AND ALBUTEROL SULFATE 2.5; .5 MG/3ML; MG/3ML
1 SOLUTION RESPIRATORY (INHALATION) ONCE
Status: COMPLETED | OUTPATIENT
Start: 2017-09-12 | End: 2017-09-12

## 2017-09-12 RX ORDER — ONDANSETRON 4 MG/1
4 TABLET, ORALLY DISINTEGRATING ORAL EVERY 8 HOURS PRN
Qty: 10 TABLET | Refills: 0 | Status: SHIPPED | OUTPATIENT
Start: 2017-09-12 | End: 2017-09-27 | Stop reason: ALTCHOICE

## 2017-09-12 RX ORDER — MORPHINE SULFATE 4 MG/ML
4 INJECTION, SOLUTION INTRAMUSCULAR; INTRAVENOUS ONCE
Status: COMPLETED | OUTPATIENT
Start: 2017-09-12 | End: 2017-09-12

## 2017-09-12 RX ORDER — 0.9 % SODIUM CHLORIDE 0.9 %
1000 INTRAVENOUS SOLUTION INTRAVENOUS ONCE
Status: COMPLETED | OUTPATIENT
Start: 2017-09-12 | End: 2017-09-12

## 2017-09-12 RX ADMIN — MORPHINE SULFATE 4 MG: 4 INJECTION, SOLUTION INTRAMUSCULAR; INTRAVENOUS at 14:05

## 2017-09-12 ASSESSMENT — ENCOUNTER SYMPTOMS
WHEEZING: 1
COUGH: 0
RHINORRHEA: 0
VOMITING: 1
EYE PAIN: 0
BACK PAIN: 0
EYE REDNESS: 0
ABDOMINAL PAIN: 1
NAUSEA: 1

## 2017-09-12 ASSESSMENT — PAIN DESCRIPTION - LOCATION: LOCATION: ABDOMEN

## 2017-09-12 ASSESSMENT — PAIN DESCRIPTION - PAIN TYPE: TYPE: ACUTE PAIN

## 2017-09-12 ASSESSMENT — PAIN SCALES - GENERAL
PAINLEVEL_OUTOF10: 3
PAINLEVEL_OUTOF10: 9
PAINLEVEL_OUTOF10: 9

## 2017-09-13 ENCOUNTER — APPOINTMENT (OUTPATIENT)
Dept: GENERAL RADIOLOGY | Age: 37
End: 2017-09-13
Payer: COMMERCIAL

## 2017-09-13 ENCOUNTER — HOSPITAL ENCOUNTER (EMERGENCY)
Age: 37
Discharge: HOME | End: 2017-09-13
Attending: EMERGENCY MEDICINE
Payer: COMMERCIAL

## 2017-09-13 VITALS
OXYGEN SATURATION: 96 % | BODY MASS INDEX: 38.28 KG/M2 | HEART RATE: 94 BPM | HEIGHT: 62 IN | TEMPERATURE: 98.6 F | DIASTOLIC BLOOD PRESSURE: 83 MMHG | RESPIRATION RATE: 14 BRPM | WEIGHT: 208 LBS | SYSTOLIC BLOOD PRESSURE: 138 MMHG

## 2017-09-13 DIAGNOSIS — J45.901 ASTHMA EXACERBATION: Primary | ICD-10-CM

## 2017-09-13 LAB
ABSOLUTE EOS #: 0.13 K/UL (ref 0–0.4)
ABSOLUTE LYMPH #: 1.15 K/UL (ref 1–4.8)
ABSOLUTE MONO #: 0.51 K/UL (ref 0.1–1.3)
ANION GAP SERPL CALCULATED.3IONS-SCNC: 15 MMOL/L (ref 9–17)
BASOPHILS # BLD: 1 %
BASOPHILS ABSOLUTE: 0.13 K/UL (ref 0–0.2)
BUN BLDV-MCNC: 9 MG/DL (ref 6–20)
BUN/CREAT BLD: NORMAL (ref 9–20)
CALCIUM SERPL-MCNC: 9.2 MG/DL (ref 8.6–10.4)
CHLORIDE BLD-SCNC: 104 MMOL/L (ref 98–107)
CO2: 21 MMOL/L (ref 20–31)
CREAT SERPL-MCNC: 0.77 MG/DL (ref 0.5–0.9)
DIFFERENTIAL TYPE: ABNORMAL
EOSINOPHILS RELATIVE PERCENT: 1 %
GFR AFRICAN AMERICAN: >60 ML/MIN
GFR NON-AFRICAN AMERICAN: >60 ML/MIN
GFR SERPL CREATININE-BSD FRML MDRD: NORMAL ML/MIN/{1.73_M2}
GFR SERPL CREATININE-BSD FRML MDRD: NORMAL ML/MIN/{1.73_M2}
GLUCOSE BLD-MCNC: 92 MG/DL (ref 70–99)
HCT VFR BLD CALC: 38.8 % (ref 36–46)
HEMOGLOBIN: 12.2 G/DL (ref 12–16)
LYMPHOCYTES # BLD: 9 %
MCH RBC QN AUTO: 23.2 PG (ref 26–34)
MCHC RBC AUTO-ENTMCNC: 31.3 G/DL (ref 31–37)
MCV RBC AUTO: 74.1 FL (ref 80–100)
MONOCYTES # BLD: 4 %
MORPHOLOGY: ABNORMAL
PDW BLD-RTO: 17 % (ref 11.5–14.9)
PLATELET # BLD: 345 K/UL (ref 150–450)
PLATELET ESTIMATE: ABNORMAL
PMV BLD AUTO: 8.6 FL (ref 6–12)
POTASSIUM SERPL-SCNC: 4.1 MMOL/L (ref 3.7–5.3)
RBC # BLD: 5.24 M/UL (ref 4–5.2)
RBC # BLD: ABNORMAL 10*6/UL
SEG NEUTROPHILS: 85 %
SEGMENTED NEUTROPHILS ABSOLUTE COUNT: 10.88 K/UL (ref 1.3–9.1)
SODIUM BLD-SCNC: 140 MMOL/L (ref 135–144)
WBC # BLD: 12.8 K/UL (ref 3.5–11)
WBC # BLD: ABNORMAL 10*3/UL

## 2017-09-13 PROCEDURE — 85025 COMPLETE CBC W/AUTO DIFF WBC: CPT

## 2017-09-13 PROCEDURE — 2580000003 HC RX 258: Performed by: PHYSICIAN ASSISTANT

## 2017-09-13 PROCEDURE — 6360000002 HC RX W HCPCS: Performed by: PHYSICIAN ASSISTANT

## 2017-09-13 PROCEDURE — 94664 DEMO&/EVAL PT USE INHALER: CPT

## 2017-09-13 PROCEDURE — 94640 AIRWAY INHALATION TREATMENT: CPT

## 2017-09-13 PROCEDURE — 6370000000 HC RX 637 (ALT 250 FOR IP): Performed by: EMERGENCY MEDICINE

## 2017-09-13 PROCEDURE — 80048 BASIC METABOLIC PNL TOTAL CA: CPT

## 2017-09-13 PROCEDURE — 36415 COLL VENOUS BLD VENIPUNCTURE: CPT

## 2017-09-13 PROCEDURE — 6370000000 HC RX 637 (ALT 250 FOR IP): Performed by: PHYSICIAN ASSISTANT

## 2017-09-13 PROCEDURE — 96374 THER/PROPH/DIAG INJ IV PUSH: CPT

## 2017-09-13 PROCEDURE — 71020 XR CHEST STANDARD TWO VW: CPT

## 2017-09-13 PROCEDURE — 99285 EMERGENCY DEPT VISIT HI MDM: CPT

## 2017-09-13 RX ORDER — ACETAMINOPHEN AND CODEINE PHOSPHATE 300; 30 MG/1; MG/1
1 TABLET ORAL 3 TIMES DAILY PRN
Qty: 6 TABLET | Refills: 0 | Status: SHIPPED | OUTPATIENT
Start: 2017-09-13 | End: 2017-09-27 | Stop reason: ALTCHOICE

## 2017-09-13 RX ORDER — ALBUTEROL SULFATE 2.5 MG/3ML
2.5 SOLUTION RESPIRATORY (INHALATION) EVERY 6 HOURS PRN
Status: DISCONTINUED | OUTPATIENT
Start: 2017-09-13 | End: 2017-09-13 | Stop reason: HOSPADM

## 2017-09-13 RX ORDER — IPRATROPIUM BROMIDE AND ALBUTEROL SULFATE 2.5; .5 MG/3ML; MG/3ML
1 SOLUTION RESPIRATORY (INHALATION)
Status: DISCONTINUED | OUTPATIENT
Start: 2017-09-13 | End: 2017-09-13

## 2017-09-13 RX ORDER — PREDNISONE 20 MG/1
20 TABLET ORAL 2 TIMES DAILY
Qty: 10 TABLET | Refills: 0 | Status: SHIPPED | OUTPATIENT
Start: 2017-09-13 | End: 2017-09-23

## 2017-09-13 RX ORDER — ACETAMINOPHEN AND CODEINE PHOSPHATE 300; 30 MG/1; MG/1
1 TABLET ORAL ONCE
Status: COMPLETED | OUTPATIENT
Start: 2017-09-13 | End: 2017-09-13

## 2017-09-13 RX ORDER — ALBUTEROL SULFATE 2.5 MG/3ML
2.5 SOLUTION RESPIRATORY (INHALATION) EVERY 6 HOURS PRN
Status: DISCONTINUED | OUTPATIENT
Start: 2017-09-13 | End: 2017-09-13

## 2017-09-13 RX ORDER — METHYLPREDNISOLONE SODIUM SUCCINATE 125 MG/2ML
125 INJECTION, POWDER, LYOPHILIZED, FOR SOLUTION INTRAMUSCULAR; INTRAVENOUS ONCE
Status: COMPLETED | OUTPATIENT
Start: 2017-09-13 | End: 2017-09-13

## 2017-09-13 RX ORDER — 0.9 % SODIUM CHLORIDE 0.9 %
500 INTRAVENOUS SOLUTION INTRAVENOUS ONCE
Status: COMPLETED | OUTPATIENT
Start: 2017-09-13 | End: 2017-09-13

## 2017-09-13 RX ORDER — IPRATROPIUM BROMIDE AND ALBUTEROL SULFATE 2.5; .5 MG/3ML; MG/3ML
1 SOLUTION RESPIRATORY (INHALATION) EVERY 4 HOURS PRN
Status: DISCONTINUED | OUTPATIENT
Start: 2017-09-13 | End: 2017-09-13 | Stop reason: HOSPADM

## 2017-09-13 RX ORDER — AZITHROMYCIN 250 MG/1
TABLET, FILM COATED ORAL
Qty: 1 PACKET | Refills: 0 | Status: SHIPPED | OUTPATIENT
Start: 2017-09-13 | End: 2017-09-23

## 2017-09-13 RX ADMIN — ACETAMINOPHEN AND CODEINE PHOSPHATE 1 TABLET: 300; 30 TABLET ORAL at 17:41

## 2017-09-13 RX ADMIN — SODIUM CHLORIDE 500 ML: 9 INJECTION, SOLUTION INTRAVENOUS at 16:28

## 2017-09-13 RX ADMIN — IPRATROPIUM BROMIDE AND ALBUTEROL SULFATE 1 AMPULE: .5; 3 SOLUTION RESPIRATORY (INHALATION) at 16:02

## 2017-09-13 RX ADMIN — IPRATROPIUM BROMIDE AND ALBUTEROL SULFATE 1 AMPULE: .5; 3 SOLUTION RESPIRATORY (INHALATION) at 17:17

## 2017-09-13 RX ADMIN — IPRATROPIUM BROMIDE AND ALBUTEROL SULFATE 1 AMPULE: .5; 3 SOLUTION RESPIRATORY (INHALATION) at 16:18

## 2017-09-13 RX ADMIN — SODIUM CHLORIDE 500 ML: 9 INJECTION, SOLUTION INTRAVENOUS at 18:00

## 2017-09-13 RX ADMIN — METHYLPREDNISOLONE SODIUM SUCCINATE 125 MG: 125 INJECTION, POWDER, FOR SOLUTION INTRAMUSCULAR; INTRAVENOUS at 16:28

## 2017-09-13 ASSESSMENT — PAIN SCALES - GENERAL: PAINLEVEL_OUTOF10: 9

## 2017-09-13 ASSESSMENT — ENCOUNTER SYMPTOMS
EYE DISCHARGE: 0
CHEST TIGHTNESS: 0
SINUS CONGESTION: 0
BACK PAIN: 0
CHOKING: 0
RHINORRHEA: 0
STRIDOR: 0
SORE THROAT: 0
COUGH: 1
SHORTNESS OF BREATH: 0
WHEEZING: 1

## 2017-09-14 ENCOUNTER — TELEPHONE (OUTPATIENT)
Dept: GASTROENTEROLOGY | Age: 37
End: 2017-09-14

## 2017-09-14 RX ORDER — SUCRALFATE 1 G/1
TABLET ORAL
Qty: 120 TABLET | Refills: 0 | Status: SHIPPED | OUTPATIENT
Start: 2017-09-14 | End: 2017-11-02 | Stop reason: SDUPTHER

## 2017-09-27 ENCOUNTER — HOSPITAL ENCOUNTER (OUTPATIENT)
Dept: PREADMISSION TESTING | Age: 37
Discharge: HOME | End: 2017-09-27
Payer: COMMERCIAL

## 2017-09-27 VITALS
BODY MASS INDEX: 38.28 KG/M2 | OXYGEN SATURATION: 94 % | HEART RATE: 93 BPM | SYSTOLIC BLOOD PRESSURE: 120 MMHG | HEIGHT: 62 IN | TEMPERATURE: 97.3 F | RESPIRATION RATE: 16 BRPM | WEIGHT: 208 LBS | DIASTOLIC BLOOD PRESSURE: 84 MMHG

## 2017-09-27 LAB
ABSOLUTE EOS #: 0.27 K/UL (ref 0–0.4)
ABSOLUTE LYMPH #: 1.55 K/UL (ref 1–4.8)
ABSOLUTE MONO #: 0.46 K/UL (ref 0.1–1.3)
ANION GAP SERPL CALCULATED.3IONS-SCNC: 12 MMOL/L (ref 9–17)
BASOPHILS # BLD: 2 %
BASOPHILS ABSOLUTE: 0.18 K/UL (ref 0–0.2)
BUN BLDV-MCNC: 13 MG/DL (ref 6–20)
BUN/CREAT BLD: ABNORMAL (ref 9–20)
CALCIUM SERPL-MCNC: 9.4 MG/DL (ref 8.6–10.4)
CHLORIDE BLD-SCNC: 102 MMOL/L (ref 98–107)
CO2: 28 MMOL/L (ref 20–31)
CREAT SERPL-MCNC: 0.81 MG/DL (ref 0.5–0.9)
DIFFERENTIAL TYPE: ABNORMAL
EOSINOPHILS RELATIVE PERCENT: 3 %
GFR AFRICAN AMERICAN: >60 ML/MIN
GFR NON-AFRICAN AMERICAN: >60 ML/MIN
GFR SERPL CREATININE-BSD FRML MDRD: ABNORMAL ML/MIN/{1.73_M2}
GFR SERPL CREATININE-BSD FRML MDRD: ABNORMAL ML/MIN/{1.73_M2}
GLUCOSE BLD-MCNC: 120 MG/DL (ref 70–99)
HCT VFR BLD CALC: 37.9 % (ref 36–46)
HEMOGLOBIN: 12.3 G/DL (ref 12–16)
LYMPHOCYTES # BLD: 17 %
MCH RBC QN AUTO: 23.8 PG (ref 26–34)
MCHC RBC AUTO-ENTMCNC: 32.6 G/DL (ref 31–37)
MCV RBC AUTO: 73.1 FL (ref 80–100)
MONOCYTES # BLD: 5 %
MORPHOLOGY: ABNORMAL
PDW BLD-RTO: 17 % (ref 11.5–14.9)
PLATELET # BLD: 314 K/UL (ref 150–450)
PLATELET ESTIMATE: ABNORMAL
PMV BLD AUTO: 9 FL (ref 6–12)
POTASSIUM SERPL-SCNC: 3.9 MMOL/L (ref 3.7–5.3)
RBC # BLD: 5.18 M/UL (ref 4–5.2)
RBC # BLD: ABNORMAL 10*6/UL
SEG NEUTROPHILS: 73 %
SEGMENTED NEUTROPHILS ABSOLUTE COUNT: 6.64 K/UL (ref 1.3–9.1)
SODIUM BLD-SCNC: 142 MMOL/L (ref 135–144)
WBC # BLD: 9.1 K/UL (ref 3.5–11)
WBC # BLD: ABNORMAL 10*3/UL

## 2017-09-27 PROCEDURE — 80048 BASIC METABOLIC PNL TOTAL CA: CPT

## 2017-09-27 PROCEDURE — 85025 COMPLETE CBC W/AUTO DIFF WBC: CPT

## 2017-09-27 PROCEDURE — 36415 COLL VENOUS BLD VENIPUNCTURE: CPT

## 2017-09-27 NOTE — IP AVS SNAPSHOT
Patient Information     Patient Name JAMES Miranda 1980         This is your updated medication list to keep with you all times      ASK your doctor about these medications     albuterol (2.5 MG/3ML) 0.083% nebulizer solution   Commonly known as:  PROVENTIL   Take 3 mLs by nebulization every 4 hours as needed for Wheezing       albuterol-ipratropium  MCG/ACT inhaler   Commonly known as:  COMBIVENT       pantoprazole 40 MG tablet   Commonly known as:  PROTONIX   Take 1 tablet by mouth 2 times daily (before meals)       * sucralfate 1 GM tablet   Commonly known as:  CARAFATE   TAKE 1 TABLET BY MOUTH 4 TIMES DAILY       * sucralfate 1 GM tablet   Commonly known as:  CARAFATE   TAKE 1 TABLET BY MOUTH 4 TIMES DAILY       SUMAtriptan 100 MG tablet   Commonly known as:  IMITREX       SYMBICORT 160-4.5 MCG/ACT Aero   Generic drug:  budesonide-formoterol       * Notice: This list has 2 medication(s) that are the same as other medications prescribed for you. Read the directions carefully, and ask your doctor or other care provider to review them with you.

## 2017-09-27 NOTE — H&P
HISTORY and [facility]       NAME:  Germaine Benedict  MRN: [id number]   YOB: 1980   Date: 2017   Age: 30 y.o. Gender: female       COMPLAINT AND PRESENT HISTORY:     Germaine Benedict is 30 y.o.,  female, here for RLQ Intrabdominal Lipoma. The symptoms started 2 years   Pt C/O of pain, on and off. No recent falls or trauma. No redness, swelling or rashes. Pt denies any other symptoms. PAST MEDICAL HISTORY     Past Medical History:   Diagnosis Date    Anxiety     Arthritis     OA    Asthma     Mullins's esophagus     LONG SEGMENT    Bipolar 1 disorder ([facility] [address]     Diabetes mellitus (HCC)     borderline, not taking meds    Esophagitis     severe    GERD (gastroesophageal reflux disease)     Headache     Herniated disc, cervical     Hiatal hernia     Kidney stones     Pleurisy     hx of \"years ago\"    UTI (urinary tract infection)     Vision abnormalities     wears glasses       Pt denies any history of hypertension, stroke, heart disease, COPD, HLD, Cancer, Seizures,Thyroid disease, Hepatitis, TB or Substance abuse.     SURGICAL HISTORY       Past Surgical History:   Procedure Laterality Date    CERVICAL DISC SURGERY       SECTION      X 1    KNEE ARTHROSCOPY Left 5/20/15    KNEE SURGERY Left     x3    LAPAROSCOPY      MO ESOPHAGOGASTRODUODENOSCOPY TRANSORAL DIAGNOSTIC N/A 3/2/2017    EGD ESOPHAGOGASTRODUODENOSCOPY  IP  performed by Davon Carmona MD at [address] ENDOSCOPY  2016    severe esophagitis    UPPER GASTROINTESTINAL ENDOSCOPY  2017    barretts; hiatus hernia; gastritis    UPPER GASTROINTESTINAL ENDOSCOPY  2017    long seg mullins's with linear erosions, esophagitis, small hiatal hernia       FAMILY HISTORY       Family History   Problem Relation Age of Onset    Cancer Mother      breast    Bipolar Disorder Mother     Bipolar Disorder Brother Cardiovascular/Respiratory system:  No chest pain, palpitation or shortness of breath. Gastrointestinal tract: See HPI. No nausea, vomiting, diarrhea or constipation. Genitourinary:  No burning on micturition. No hesitancy, urgency, frequency or discoloration of urine. Locomotor:  No bone or joint pains. No swelling. Neuropsychiatric:  No referable complaints. GENERAL PHYSICAL EXAM:     Vitals: /84  Pulse 93  Temp 97.3 °F (36.3 °C)  Resp 16  Ht 5' 2\" (1.575 m)  Wt 208 lb (94.3 kg)  LMP 09/20/2017  SpO2 94%  BMI 38.04 kg/m2 Body mass index is 38.04 kg/(m^2). GENERAL APPEARANCE:   Dee Dee Gastelum is 30 y.o.,  female, moderately obese, nourished, conscious, alert. Does not appear to be distress or pain at this time. SKIN:  Warm, dry, no cyanosis or jaundice. HEAD:  Normocephalic, atraumatic, no swelling or tenderness. EYES:  Pupils equal, reactive to light. EARS:  No discharge, no marked hearing loss. NOSE:  No rhinorrhea, epistaxis or septal deformity. THROAT:  Not congested. No ulceration bleeding or discharge. NECK:  No stiffness, trachea central.  No palpable masses or L.N.                 CHEST:  Symmetrical and equal on expansion. HEART:  RRR S1 > S2. No audible murmurs or gallops. LUNGS:  Equal on expansion, normal breath sounds. No adventitious sounds. ABDOMEN:  Markedly obese. Soft on palpation. RLQ Abdominal tenderness by Hx. Pt doesn't want that area touched. No guarding or rigidity. No palpable organomegaly. LYMPHATICS:  No palpable cervical lymphadenopathy. LOCOMOTOR, BACK AND SPINE:  No tenderness or deformities. EXTREMITIES:  Symmetrical, no pedal edema. Marcuss sign negative.   No

## 2017-09-27 NOTE — IP AVS SNAPSHOT
at a time in the pre-op holding area. When you go to surgery, your family will be directed to the surgical waiting room, where the doctor should speak with them after your surgery. · After surgery, you will be in the recovery room for at least one hour, then to the short stay unit for preparation to be discharged. · If you use a Bi-PAP or C-PAP machine, please bring it with you and leave it in the car in case it is needed in recovery room. Important information for a smoker       SMOKING: QUIT SMOKING. THIS IS THE MOST IMPORTANT ACTION YOU CAN TAKE TO IMPROVE YOUR CURRENT AND FUTURE HEALTH. Call the [address] [place] [address] [place] [facility] ([phone])    Smoking harms nonsmokers. When nonsmokers are around people who smoke, they absorb nicotine, carbon monoxide, and other ingredients of tobacco smoke. DO NOT SMOKE AROUND CHILDREN     Children exposed to secondhand smoke are at an increased risk of:  Sudden Infant Death Syndrome (SIDS), acute respiratory infections, inflammation of the middle ear, and severe asthma. Over a longer time, it causes heart disease and lung cancer. There is no safe level of exposure to secondhand smoke. Wellpepper Signup     Our records indicate that you have an active Wellpepper account. You can view your After Visit Summary by going to [email or web address] and logging in with your Wellpepper username and password. If you don't have a Wellpepper username and password but a parent or guardian has access to your record, the parent or guardian should login with their own Wellpepper username and password and access your record to view the After Visit Summary. Additional Information  If you have questions, please contact the physician practice where you receive care. Remember, Wellpepper is NOT to be used for urgent needs. For medical emergencies, dial 911. For questions regarding your [employer] account call [phone]. If you have a clinical question, please call your doctor's office. View your information online  ? Review your current list of  medications, immunization, and allergies. ? Review your future test results online . ? Review your discharge instructions provided by your caregivers at discharge    Certain functionality such as prescription refills, scheduling appointments or sending messages to your provider are not activated if your provider does not use [employer] in his/her office    For questions regarding your [employer] account call [phone]. If you have a clinical question, please call your doctor's office. The information on all pages of the After Visit Summary has been reviewed with me, the patient and/or responsible adult, by my health care provider(s). I had the opportunity to ask questions regarding this information. I understand I should dispose of my armband safely at home to protect my health information. A complete copy of the After Visit Summary has been given to me, the patient and/or responsible adult.            Patient Signature/Responsible Adult:____________________    Clinician Signature:_____________________    Date:_____________________    Time:_____________________

## 2017-09-28 ENCOUNTER — ANESTHESIA EVENT (OUTPATIENT)
Dept: OPERATING ROOM | Age: 37
End: 2017-09-28
Payer: COMMERCIAL

## 2017-10-05 ENCOUNTER — HOSPITAL ENCOUNTER (OUTPATIENT)
Age: 37
Setting detail: OUTPATIENT SURGERY
Discharge: HOME | End: 2017-10-05
Attending: SURGERY | Admitting: SURGERY
Payer: COMMERCIAL

## 2017-10-05 ENCOUNTER — ANESTHESIA (OUTPATIENT)
Dept: OPERATING ROOM | Age: 37
End: 2017-10-05
Payer: COMMERCIAL

## 2017-10-05 VITALS
RESPIRATION RATE: 18 BRPM | OXYGEN SATURATION: 94 % | HEART RATE: 66 BPM | DIASTOLIC BLOOD PRESSURE: 63 MMHG | HEIGHT: 62 IN | BODY MASS INDEX: 38.28 KG/M2 | TEMPERATURE: 99.5 F | WEIGHT: 208 LBS | SYSTOLIC BLOOD PRESSURE: 109 MMHG

## 2017-10-05 VITALS — OXYGEN SATURATION: 99 % | TEMPERATURE: 96.3 F | SYSTOLIC BLOOD PRESSURE: 133 MMHG | DIASTOLIC BLOOD PRESSURE: 75 MMHG

## 2017-10-05 LAB
-: NORMAL
HCG, PREGNANCY URINE (POC): NEGATIVE

## 2017-10-05 PROCEDURE — 7100000000 HC PACU RECOVERY - FIRST 15 MIN: Performed by: SURGERY

## 2017-10-05 PROCEDURE — 6360000002 HC RX W HCPCS: Performed by: NURSE ANESTHETIST, CERTIFIED REGISTERED

## 2017-10-05 PROCEDURE — 2580000003 HC RX 258: Performed by: SURGERY

## 2017-10-05 PROCEDURE — 6370000000 HC RX 637 (ALT 250 FOR IP): Performed by: NURSE ANESTHETIST, CERTIFIED REGISTERED

## 2017-10-05 PROCEDURE — 2500000003 HC RX 250 WO HCPCS: Performed by: NURSE ANESTHETIST, CERTIFIED REGISTERED

## 2017-10-05 PROCEDURE — 3700000001 HC ADD 15 MINUTES (ANESTHESIA): Performed by: SURGERY

## 2017-10-05 PROCEDURE — 7100000031 HC ASPR PHASE II RECOVERY - ADDTL 15 MIN: Performed by: SURGERY

## 2017-10-05 PROCEDURE — 7100000001 HC PACU RECOVERY - ADDTL 15 MIN: Performed by: SURGERY

## 2017-10-05 PROCEDURE — 3600000013 HC SURGERY LEVEL 3 ADDTL 15MIN: Performed by: SURGERY

## 2017-10-05 PROCEDURE — 6360000002 HC RX W HCPCS: Performed by: ANESTHESIOLOGY

## 2017-10-05 PROCEDURE — 2500000003 HC RX 250 WO HCPCS: Performed by: SURGERY

## 2017-10-05 PROCEDURE — 3600000003 HC SURGERY LEVEL 3 BASE: Performed by: SURGERY

## 2017-10-05 PROCEDURE — 94640 AIRWAY INHALATION TREATMENT: CPT

## 2017-10-05 PROCEDURE — 3700000000 HC ANESTHESIA ATTENDED CARE: Performed by: SURGERY

## 2017-10-05 PROCEDURE — 88304 TISSUE EXAM BY PATHOLOGIST: CPT

## 2017-10-05 PROCEDURE — 6360000002 HC RX W HCPCS: Performed by: SURGERY

## 2017-10-05 PROCEDURE — 7100000030 HC ASPR PHASE II RECOVERY - FIRST 15 MIN: Performed by: SURGERY

## 2017-10-05 PROCEDURE — 94664 DEMO&/EVAL PT USE INHALER: CPT

## 2017-10-05 PROCEDURE — 6370000000 HC RX 637 (ALT 250 FOR IP): Performed by: ANESTHESIOLOGY

## 2017-10-05 PROCEDURE — A6402 STERILE GAUZE <= 16 SQ IN: HCPCS | Performed by: SURGERY

## 2017-10-05 PROCEDURE — 84703 CHORIONIC GONADOTROPIN ASSAY: CPT

## 2017-10-05 RX ORDER — DIPHENHYDRAMINE HYDROCHLORIDE 50 MG/ML
12.5 INJECTION INTRAMUSCULAR; INTRAVENOUS
Status: DISCONTINUED | OUTPATIENT
Start: 2017-10-05 | End: 2017-10-05 | Stop reason: HOSPADM

## 2017-10-05 RX ORDER — MEPERIDINE HYDROCHLORIDE 25 MG/ML
12.5 INJECTION INTRAMUSCULAR; INTRAVENOUS; SUBCUTANEOUS EVERY 5 MIN PRN
Status: DISCONTINUED | OUTPATIENT
Start: 2017-10-05 | End: 2017-10-05 | Stop reason: HOSPADM

## 2017-10-05 RX ORDER — DEXAMETHASONE SODIUM PHOSPHATE 4 MG/ML
INJECTION, SOLUTION INTRA-ARTICULAR; INTRALESIONAL; INTRAMUSCULAR; INTRAVENOUS; SOFT TISSUE PRN
Status: DISCONTINUED | OUTPATIENT
Start: 2017-10-05 | End: 2017-10-05 | Stop reason: SDUPTHER

## 2017-10-05 RX ORDER — ALBUTEROL SULFATE 2.5 MG/3ML
2.5 SOLUTION RESPIRATORY (INHALATION) ONCE
Status: COMPLETED | OUTPATIENT
Start: 2017-10-05 | End: 2017-10-05

## 2017-10-05 RX ORDER — PROPOFOL 10 MG/ML
INJECTION, EMULSION INTRAVENOUS PRN
Status: DISCONTINUED | OUTPATIENT
Start: 2017-10-05 | End: 2017-10-05 | Stop reason: SDUPTHER

## 2017-10-05 RX ORDER — SODIUM CHLORIDE, SODIUM LACTATE, POTASSIUM CHLORIDE, CALCIUM CHLORIDE 600; 310; 30; 20 MG/100ML; MG/100ML; MG/100ML; MG/100ML
INJECTION, SOLUTION INTRAVENOUS CONTINUOUS
Status: DISCONTINUED | OUTPATIENT
Start: 2017-10-05 | End: 2017-10-05 | Stop reason: HOSPADM

## 2017-10-05 RX ORDER — NEOSTIGMINE METHYLSULFATE 1 MG/ML
INJECTION, SOLUTION INTRAVENOUS PRN
Status: DISCONTINUED | OUTPATIENT
Start: 2017-10-05 | End: 2017-10-05 | Stop reason: SDUPTHER

## 2017-10-05 RX ORDER — ALBUTEROL SULFATE 90 UG/1
AEROSOL, METERED RESPIRATORY (INHALATION) PRN
Status: DISCONTINUED | OUTPATIENT
Start: 2017-10-05 | End: 2017-10-05 | Stop reason: SDUPTHER

## 2017-10-05 RX ORDER — MORPHINE SULFATE 2 MG/ML
2 INJECTION, SOLUTION INTRAMUSCULAR; INTRAVENOUS EVERY 5 MIN PRN
Status: DISCONTINUED | OUTPATIENT
Start: 2017-10-05 | End: 2017-10-05 | Stop reason: HOSPADM

## 2017-10-05 RX ORDER — ONDANSETRON 2 MG/ML
4 INJECTION INTRAMUSCULAR; INTRAVENOUS
Status: DISCONTINUED | OUTPATIENT
Start: 2017-10-05 | End: 2017-10-05 | Stop reason: HOSPADM

## 2017-10-05 RX ORDER — GLYCOPYRROLATE 0.2 MG/ML
INJECTION INTRAMUSCULAR; INTRAVENOUS PRN
Status: DISCONTINUED | OUTPATIENT
Start: 2017-10-05 | End: 2017-10-05 | Stop reason: SDUPTHER

## 2017-10-05 RX ORDER — ROCURONIUM BROMIDE 10 MG/ML
INJECTION, SOLUTION INTRAVENOUS PRN
Status: DISCONTINUED | OUTPATIENT
Start: 2017-10-05 | End: 2017-10-05 | Stop reason: SDUPTHER

## 2017-10-05 RX ORDER — BUPIVACAINE HYDROCHLORIDE 5 MG/ML
INJECTION, SOLUTION EPIDURAL; INTRACAUDAL PRN
Status: DISCONTINUED | OUTPATIENT
Start: 2017-10-05 | End: 2017-10-05 | Stop reason: HOSPADM

## 2017-10-05 RX ORDER — HYDROMORPHONE HCL 110MG/55ML
PATIENT CONTROLLED ANALGESIA SYRINGE INTRAVENOUS PRN
Status: DISCONTINUED | OUTPATIENT
Start: 2017-10-05 | End: 2017-10-05 | Stop reason: SDUPTHER

## 2017-10-05 RX ORDER — LIDOCAINE HYDROCHLORIDE 10 MG/ML
INJECTION, SOLUTION EPIDURAL; INFILTRATION; INTRACAUDAL; PERINEURAL PRN
Status: DISCONTINUED | OUTPATIENT
Start: 2017-10-05 | End: 2017-10-05 | Stop reason: SDUPTHER

## 2017-10-05 RX ORDER — FENTANYL CITRATE 50 UG/ML
INJECTION, SOLUTION INTRAMUSCULAR; INTRAVENOUS PRN
Status: DISCONTINUED | OUTPATIENT
Start: 2017-10-05 | End: 2017-10-05 | Stop reason: SDUPTHER

## 2017-10-05 RX ORDER — OXYCODONE HYDROCHLORIDE AND ACETAMINOPHEN 5; 325 MG/1; MG/1
TABLET ORAL
Qty: 30 TABLET | Refills: 0 | Status: SHIPPED | OUTPATIENT
Start: 2017-10-05 | End: 2017-11-13

## 2017-10-05 RX ORDER — MIDAZOLAM HYDROCHLORIDE 1 MG/ML
INJECTION INTRAMUSCULAR; INTRAVENOUS PRN
Status: DISCONTINUED | OUTPATIENT
Start: 2017-10-05 | End: 2017-10-05 | Stop reason: SDUPTHER

## 2017-10-05 RX ORDER — HYDROCODONE BITARTRATE AND ACETAMINOPHEN 5; 325 MG/1; MG/1
TABLET ORAL
Qty: 30 TABLET | Refills: 0 | Status: SHIPPED | OUTPATIENT
Start: 2017-10-05 | End: 2017-10-05 | Stop reason: HOSPADM

## 2017-10-05 RX ORDER — DOCUSATE SODIUM 100 MG/1
100 CAPSULE, LIQUID FILLED ORAL 2 TIMES DAILY
Qty: 30 CAPSULE | Refills: 0 | Status: SHIPPED | OUTPATIENT
Start: 2017-10-05 | End: 2017-11-13

## 2017-10-05 RX ORDER — OXYCODONE HYDROCHLORIDE AND ACETAMINOPHEN 5; 325 MG/1; MG/1
2 TABLET ORAL EVERY 4 HOURS PRN
Status: DISCONTINUED | OUTPATIENT
Start: 2017-10-05 | End: 2017-10-05 | Stop reason: HOSPADM

## 2017-10-05 RX ORDER — LABETALOL HYDROCHLORIDE 5 MG/ML
5 INJECTION, SOLUTION INTRAVENOUS EVERY 10 MIN PRN
Status: DISCONTINUED | OUTPATIENT
Start: 2017-10-05 | End: 2017-10-05 | Stop reason: HOSPADM

## 2017-10-05 RX ORDER — ONDANSETRON 2 MG/ML
INJECTION INTRAMUSCULAR; INTRAVENOUS PRN
Status: DISCONTINUED | OUTPATIENT
Start: 2017-10-05 | End: 2017-10-05 | Stop reason: SDUPTHER

## 2017-10-05 RX ADMIN — ROCURONIUM BROMIDE 40 MG: 10 INJECTION INTRAVENOUS at 11:57

## 2017-10-05 RX ADMIN — LIDOCAINE HYDROCHLORIDE 40 MG: 10 INJECTION, SOLUTION EPIDURAL; INFILTRATION; INTRACAUDAL; PERINEURAL at 11:57

## 2017-10-05 RX ADMIN — MIDAZOLAM 2 MG: 1 INJECTION INTRAMUSCULAR; INTRAVENOUS at 11:53

## 2017-10-05 RX ADMIN — ROCURONIUM BROMIDE 10 MG: 10 INJECTION INTRAVENOUS at 12:27

## 2017-10-05 RX ADMIN — HYDROMORPHONE HYDROCHLORIDE 0.5 MG: 1 INJECTION, SOLUTION INTRAMUSCULAR; INTRAVENOUS; SUBCUTANEOUS at 14:21

## 2017-10-05 RX ADMIN — SODIUM CHLORIDE, POTASSIUM CHLORIDE, SODIUM LACTATE AND CALCIUM CHLORIDE: 600; 310; 30; 20 INJECTION, SOLUTION INTRAVENOUS at 13:30

## 2017-10-05 RX ADMIN — MIDAZOLAM 2 MG: 1 INJECTION INTRAMUSCULAR; INTRAVENOUS at 11:55

## 2017-10-05 RX ADMIN — ALBUTEROL SULFATE 4 PUFF: 90 AEROSOL, METERED RESPIRATORY (INHALATION) at 13:40

## 2017-10-05 RX ADMIN — PROPOFOL 180 MG: 10 INJECTION, EMULSION INTRAVENOUS at 11:57

## 2017-10-05 RX ADMIN — GLYCOPYRROLATE 0.6 MG: 0.2 INJECTION, SOLUTION INTRAMUSCULAR; INTRAVENOUS at 13:30

## 2017-10-05 RX ADMIN — SODIUM CHLORIDE, POTASSIUM CHLORIDE, SODIUM LACTATE AND CALCIUM CHLORIDE: 600; 310; 30; 20 INJECTION, SOLUTION INTRAVENOUS at 10:13

## 2017-10-05 RX ADMIN — HYDROMORPHONE HYDROCHLORIDE 0.5 MG: 1 INJECTION, SOLUTION INTRAMUSCULAR; INTRAVENOUS; SUBCUTANEOUS at 14:54

## 2017-10-05 RX ADMIN — FENTANYL CITRATE 100 MCG: 50 INJECTION, SOLUTION INTRAMUSCULAR; INTRAVENOUS at 12:35

## 2017-10-05 RX ADMIN — OXYCODONE HYDROCHLORIDE AND ACETAMINOPHEN 2 TABLET: 5; 325 TABLET ORAL at 15:43

## 2017-10-05 RX ADMIN — HYDROCORTISONE SODIUM SUCCINATE 100 MG: 100 INJECTION, POWDER, FOR SOLUTION INTRAMUSCULAR; INTRAVENOUS at 12:10

## 2017-10-05 RX ADMIN — NEOSTIGMINE METHYLSULFATE 3 MG: 1 INJECTION, SOLUTION INTRAVENOUS at 13:30

## 2017-10-05 RX ADMIN — ALBUTEROL SULFATE 2.5 MG: 2.5 SOLUTION RESPIRATORY (INHALATION) at 10:28

## 2017-10-05 RX ADMIN — ONDANSETRON 4 MG: 2 INJECTION INTRAMUSCULAR; INTRAVENOUS at 13:10

## 2017-10-05 RX ADMIN — DEXAMETHASONE SODIUM PHOSPHATE 8 MG: 4 INJECTION, SOLUTION INTRAMUSCULAR; INTRAVENOUS at 12:59

## 2017-10-05 RX ADMIN — HYDROMORPHONE HYDROCHLORIDE 1 MG: 2 INJECTION, SOLUTION INTRAMUSCULAR; INTRAVENOUS; SUBCUTANEOUS at 13:54

## 2017-10-05 RX ADMIN — FENTANYL CITRATE 100 MCG: 50 INJECTION, SOLUTION INTRAMUSCULAR; INTRAVENOUS at 11:57

## 2017-10-05 RX ADMIN — FENTANYL CITRATE 100 MCG: 50 INJECTION, SOLUTION INTRAMUSCULAR; INTRAVENOUS at 12:02

## 2017-10-05 RX ADMIN — HYDROMORPHONE HYDROCHLORIDE 1 MG: 2 INJECTION, SOLUTION INTRAMUSCULAR; INTRAVENOUS; SUBCUTANEOUS at 13:28

## 2017-10-05 RX ADMIN — Medication 2 G: at 11:53

## 2017-10-05 ASSESSMENT — PAIN DESCRIPTION - LOCATION
LOCATION: ABDOMEN

## 2017-10-05 ASSESSMENT — PAIN DESCRIPTION - PAIN TYPE
TYPE: SURGICAL PAIN

## 2017-10-05 ASSESSMENT — PAIN SCALES - GENERAL
PAINLEVEL_OUTOF10: 8
PAINLEVEL_OUTOF10: 9
PAINLEVEL_OUTOF10: 5
PAINLEVEL_OUTOF10: 6
PAINLEVEL_OUTOF10: 5
PAINLEVEL_OUTOF10: 9
PAINLEVEL_OUTOF10: 6
PAINLEVEL_OUTOF10: 8

## 2017-10-05 ASSESSMENT — PAIN DESCRIPTION - ORIENTATION
ORIENTATION: MID

## 2017-10-05 ASSESSMENT — ENCOUNTER SYMPTOMS
STRIDOR: 0
SHORTNESS OF BREATH: 0

## 2017-10-05 ASSESSMENT — PAIN DESCRIPTION - DESCRIPTORS
DESCRIPTORS: DISCOMFORT
DESCRIPTORS: ACHING

## 2017-10-05 ASSESSMENT — PAIN DESCRIPTION - FREQUENCY: FREQUENCY: CONTINUOUS

## 2017-10-05 ASSESSMENT — PAIN - FUNCTIONAL ASSESSMENT: PAIN_FUNCTIONAL_ASSESSMENT: 0-10

## 2017-10-05 ASSESSMENT — PAIN DESCRIPTION - ONSET: ONSET: AWAKENED FROM SLEEP

## 2017-10-05 ASSESSMENT — PAIN DESCRIPTION - PROGRESSION: CLINICAL_PROGRESSION: NOT CHANGED

## 2017-10-05 ASSESSMENT — COPD QUESTIONNAIRES: CAT_SEVERITY: NO INTERVAL CHANGE

## 2017-10-05 NOTE — ANESTHESIA PRE PROCEDURE
Department of Anesthesiology  Preprocedure Note       Name:  Yajaira Gleason   Age:  30 y.o.  :  1980                                          MRN:  [id number]         Date:  10/5/2017      Surgeon: Inés Thomas):  Elisabet Helm DO    Procedure: Procedure(s):  LAPAROSCOPIC POSS OPEN REMOVAL INTRA ABDOMINAL LIPOMA LOWER RIGHT    Medications prior to admission:   Prior to Admission medications    Medication Sig Start Date End Date Taking? Authorizing Provider   sucralfate (CARAFATE) 1 GM tablet TAKE 1 TABLET BY MOUTH 4 TIMES DAILY 17  Yes Salina Davila MD   albuterol (PROVENTIL) (2.5 MG/3ML) 0.083% nebulizer solution Take 3 mLs by nebulization every 4 hours as needed for Wheezing  Patient taking differently: Take 2.5 mg by nebulization 4 times daily  2/15/17  Yes Rylee Villalpando MD   albuterol-ipratropium (COMBIVENT)  MCG/ACT inhaler Inhale 2 puffs into the lungs every 6 hours as needed for Wheezing   Yes Historical Provider, MD   SUMAtriptan (IMITREX) 100 MG tablet Take 100 mg by mouth once as needed for Migraine   Yes Historical Provider, MD   pantoprazole (PROTONIX) 40 MG tablet Take 1 tablet by mouth 2 times daily (before meals) 16  Yes Janay Kim MD   budesonide-formoterol (SYMBICORT) 160-4.5 MCG/ACT AERO Inhale 2 puffs into the lungs 2 times daily. 08  Yes Historical Provider, MD   sucralfate (CARAFATE) 1 GM tablet TAKE 1 TABLET BY MOUTH 4 TIMES DAILY 17   Salina Davila MD       Current medications:    Current Facility-Administered Medications   Medication Dose Route Frequency Provider Last Rate Last Dose    ceFAZolin (ANCEF) 2 g in dextrose 5 % 50 mL IVPB  2 g Intravenous Once Elisabet Helm DO        lactated ringers infusion   Intravenous Continuous Elisabet Helm DO           Allergies:     Allergies   Allergen Reactions    Nsaids      Irritates her Barrets esophogus    Toradol [Ketorolac Tromethamine]     Ultram [Tramadol] Nausea Only     Gastric upset severe esophagitis    UPPER GASTROINTESTINAL ENDOSCOPY  01/19/2017    barretts; hiatus hernia; gastritis    UPPER GASTROINTESTINAL ENDOSCOPY  03/02/2017    long seg mullins's with linear erosions, esophagitis, small hiatal hernia       Social History:    Social History   Substance Use Topics    Smoking status: Former Smoker     Packs/day: 1.00     Years: 17.00     Types: Cigarettes     Quit date: 2/28/2017    Smokeless tobacco: Not on file    Alcohol use Yes      Comment: very rare                                Counseling given: Not Answered      Vital Signs (Current):   Vitals:    10/05/17 0958 10/05/17 1000   BP:  131/82   Pulse:  89   Resp:  20   Temp:  97.5 °F (36.4 °C)   TempSrc:  Oral   SpO2:  97%   Weight: 208 lb (94.3 kg)    Height: 5' 2\" (1.575 m)                                               BP Readings from Last 3 Encounters:   10/05/17 131/82   09/27/17 120/84   09/13/17 138/83       NPO Status: Time of last liquid consumption: 2230                        Time of last solid consumption: 2000                        Date of last liquid consumption: 10/04/17                        Date of last solid food consumption: 10/04/17    BMI:   Wt Readings from Last 3 Encounters:   10/05/17 208 lb (94.3 kg)   09/27/17 208 lb (94.3 kg)   09/13/17 208 lb (94.3 kg)     Body mass index is 38.04 kg/(m^2).     CBC:   Lab Results   Component Value Date    WBC 9.1 09/27/2017    RBC 5.18 09/27/2017    RBC 4.49 03/25/2012    HGB 12.3 09/27/2017    HCT 37.9 09/27/2017    MCV 73.1 09/27/2017    RDW 17.0 09/27/2017     09/27/2017     03/25/2012     LR    CMP:   Lab Results   Component Value Date     09/27/2017    K 3.9 09/27/2017     09/27/2017    CO2 28 09/27/2017    BUN 13 09/27/2017    CREATININE 0.81 09/27/2017    GFRAA >60 09/27/2017    LABGLOM >60 09/27/2017    GLUCOSE 120 09/27/2017    GLUCOSE 106 03/25/2012    PROT 6.4 09/12/2017    CALCIUM 9.4 09/27/2017    BILITOT 0.33 09/12/2017 general     intravenous induction   Anesthetic plan and risks discussed with patient. Plan discussed with CRNA.             Sharalyn Dubin, MD   10/5/2017

## 2017-10-05 NOTE — INTERVAL H&P NOTE
HISTORY and [facility]       NAME:  Feli Milan  MRN: [id number]   YOB: 1980   Date: 10/5/2017   Age: 30 y.o. Gender: female     H&P Update Note    H&P from 09/27/2017  reviewed and updated, Patient examined. Vitals: /82  Pulse 89  Temp 97.5 °F (36.4 °C) (Oral)   Resp 20  Ht 5' 2\" (1.575 m)  Wt 208 lb (94.3 kg)  LMP 09/20/2017  SpO2 97%  BMI 38.04 kg/m2 Body mass index is 38.04 kg/(m^2). No interval changes except for mild Bony wheezing. Breathing treatment ordered. I concur with the findings.       TIA LEMUS PA-C on 10/5/2017 at 10:26 AM

## 2017-10-05 NOTE — IP AVS SNAPSHOT
Your input is valuable to us. We encourage you to complete and return your survey in the envelope provided. We hope you will choose us in the future for your healthcare needs. Patient Information     Patient Name JAMES Lucas 1980      Care Provided at:     Name Address Phone       [address]  [address] [phone]            Your Visit    Here you will find information about your visit, including the reason for your visit. Please take this sheet with you when you visit your doctor or other health care provider in the future. It will help determine the best possible medical care for you at that time. If you have any questions once you leave the hospital, please call the department phone number listed below. Why you were here     Your primary diagnosis was:  Not on File      Visit Information     Date & Time Provider Department Dept. Phone    10/5/2017 Nikunj Hickey, [address] [phone]       Follow-up Appointments    Below is a list of your follow-up and future appointments. This may not be a complete list as you may have made appointments directly with providers that we are not aware of or your providers may have made some for you. Please call your providers to confirm appointments. It is important to keep your appointments. Please bring your current insurance card, photo ID, co-pay, and all medication bottles to your appointment. If self-pay, payment is expected at the time of service. Follow-up Information     Follow up with Nikunj Hickey DO.     Specialty:  General Surgery    Why:  For follow up post-op visit    Contact information:    [address] [facility] [address]        Future Appointments     10/23/2017 1:15 PM     Appointment with Rene Gilford, MD at [facility] ([phone])   Please arrive 15 minutes prior to appointment, bring photo ID and ? You have increasing and progressive bleeding or drainage from surgery site. ? Signs of an infection:  increased swelling, redness, warmth, or hardness around surgery area or yellow or green drainage from incision. ? Persistent nausea or vomiting and cant keep fluids down. ? You have signs of a blood clot, such as: Pain in your calf, back of the knee, thigh, or groin. Redness and swelling in your leg or groin. ? If you have trouble passing urine or stool, especially if you have mild pain or swelling in your lower belly. ? Redness or swelling at IV site. ? For any questions or concerns you may have. Important information for a smoker       SMOKING: QUIT SMOKING. THIS IS THE MOST IMPORTANT ACTION YOU CAN TAKE TO IMPROVE YOUR CURRENT AND FUTURE HEALTH. Call the [address] [place] [address] [facility] [place] NOW ([phone])    Smoking harms nonsmokers. When nonsmokers are around people who smoke, they absorb nicotine, carbon monoxide, and other ingredients of tobacco smoke. DO NOT SMOKE AROUND CHILDREN     Children exposed to secondhand smoke are at an increased risk of:  Sudden Infant Death Syndrome (SIDS), acute respiratory infections, inflammation of the middle ear, and severe asthma. Over a longer time, it causes heart disease and lung cancer. There is no safe level of exposure to secondhand smoke. DFine Signup     Our records indicate that you have an active DFine account. You can view your After Visit Summary by going to [email or web address] and logging in with your DFine username and password. If you don't have a DFine username and password but a parent or guardian has access to your record, the parent or guardian should login with their own DFine username and password and access your record to view the After Visit Summary.      Additional Information

## 2017-10-05 NOTE — OP NOTE
DATE OF PROCEDURE: 10/5/2017     SURGEON:Lakshmi Osman DO    PREOPERATIVE DIAGNOSIS: painful lipoma, right lower quadrant abdominal wall    POSTOPERATIVE DIAGNOSIS: same    OPERATION: Laparoscopic excision of abdominal wall lipomatous mass    ANESTHESIA: General anesthesia    ESTIMATED BLOOD LOSS:  less than 20     COMPLICATIONS: None. SPECIMENS:  Was Obtained: lipoma right lower quadrant abdominal wall     HISTORY: The patient is a 30y.o. year old female with history of above preop diagnosis. I explained the risk, benefits, expected outcome, and alternatives to the procedure. Patient understands and is in agreement. PROCEDURE: The patient was given general anesthesia. The abdomen was prepped and draped in typical sterile fashion. The base of the umbilicus is grasped with a kocher and elevated. A longitudinal incision is made with an #15blade. The base of the umbilicus is grasped with a kocker and 0 Vicryl suture is placed on either side of the fascia. An incision is made in the fascia with a #11 blade and the peritoneum is entered bluntly. The  Grant canula was inserted and secured into place with the sutures. Pneumoperitoneum was established with CO2 gas with maximum pressure of 15 mm Hg. Under direct vision, a 5 mm  trocar was inserted in the suprapubic region after anesthetizing with 0.5% Marcaine. A  Five mm  trocar is placed in the left lower quadrant. A large lipomatous mass was seen in the preperitoneal space in the right lower quadrant. Hook cautery was used to incise the peritoneum over the mass exposing the lipoma. The lipoma was grasped with atraumatic graspers and gently dissected free from the peritoneum and fascia. The mass was placed in an endocatch bag and removed through the umbilical port site. The fascia of the port site was incised with curved cárdenas scissors to allow removal of the lipomatous mass. The site was inspected and appeared hemostatic.      The cannulas were removed under direct vision with no bleeding. The pneumoperitoneum was released then the umbilical cannula removed. Figure of eight 0 Vicryl sutures were placed in the fascial opening of the supraumbilical incision and securely tied. The first vicryl sutures are removed. The skin incisions were closed with interrupted 4-0 Vicryl subcuticular sutures. Sterile dressings are applied. The sponge and needle count were correct. The patient tolerated the procedure well,  Was extubated and was sent to PACU in stable condition.     Electronically signed by Lane Canas DO  on 10/5/2017 at 5:49 PM

## 2017-10-05 NOTE — PROGRESS NOTES
Dr. Kourtney Barillas called in to [facility] regarding patient status. Patient status was relayed and Dr. Kourtney Barillas spoke with patient directly. Patient may go home at this time.

## 2017-10-09 LAB — SURGICAL PATHOLOGY REPORT: NORMAL

## 2017-11-02 NOTE — TELEPHONE ENCOUNTER
Pt has cancelled last 3 office apts and stated she was in hosp the last time, but nothing found in care everywhere. Pt rx prepared for 10 day supply. Pt to contact office for f/u apt or continue refills with PCP. Dr Salina Patrick out of town. Dr Micheal Whiteside on call signing rxs for him.

## 2017-11-06 ENCOUNTER — HOSPITAL ENCOUNTER (EMERGENCY)
Age: 37
Discharge: HOME | End: 2017-11-07
Attending: EMERGENCY MEDICINE
Payer: COMMERCIAL

## 2017-11-06 ENCOUNTER — APPOINTMENT (OUTPATIENT)
Dept: GENERAL RADIOLOGY | Age: 37
End: 2017-11-06
Payer: COMMERCIAL

## 2017-11-06 DIAGNOSIS — R07.9 CHEST PAIN, UNSPECIFIED TYPE: Primary | ICD-10-CM

## 2017-11-06 LAB
ABSOLUTE EOS #: 0.2 K/UL (ref 0–0.4)
ABSOLUTE IMMATURE GRANULOCYTE: ABNORMAL K/UL (ref 0–0.3)
ABSOLUTE LYMPH #: 1.8 K/UL (ref 1–4.8)
ABSOLUTE MONO #: 0.7 K/UL (ref 0.1–1.3)
ANION GAP SERPL CALCULATED.3IONS-SCNC: 16 MMOL/L (ref 9–17)
BASOPHILS # BLD: 1 %
BASOPHILS ABSOLUTE: 0.1 K/UL (ref 0–0.2)
BNP INTERPRETATION: NORMAL
BUN BLDV-MCNC: 14 MG/DL (ref 6–20)
BUN/CREAT BLD: NORMAL (ref 9–20)
CALCIUM SERPL-MCNC: 9.1 MG/DL (ref 8.6–10.4)
CHLORIDE BLD-SCNC: 100 MMOL/L (ref 98–107)
CO2: 22 MMOL/L (ref 20–31)
CREAT SERPL-MCNC: 0.8 MG/DL (ref 0.5–0.9)
D-DIMER QUANTITATIVE: 0.45 MG/L FEU
DIFFERENTIAL TYPE: ABNORMAL
EOSINOPHILS RELATIVE PERCENT: 2 %
GFR AFRICAN AMERICAN: >60 ML/MIN
GFR NON-AFRICAN AMERICAN: >60 ML/MIN
GFR SERPL CREATININE-BSD FRML MDRD: NORMAL ML/MIN/{1.73_M2}
GFR SERPL CREATININE-BSD FRML MDRD: NORMAL ML/MIN/{1.73_M2}
GLUCOSE BLD-MCNC: 94 MG/DL (ref 70–99)
HCT VFR BLD CALC: 34.8 % (ref 36–46)
HEMOGLOBIN: 11.2 G/DL (ref 12–16)
IMMATURE GRANULOCYTES: ABNORMAL %
INR BLD: 1
LYMPHOCYTES # BLD: 19 %
MCH RBC QN AUTO: 23.5 PG (ref 26–34)
MCHC RBC AUTO-ENTMCNC: 32.1 G/DL (ref 31–37)
MCV RBC AUTO: 73.3 FL (ref 80–100)
MONOCYTES # BLD: 8 %
MYOGLOBIN: 47 NG/ML (ref 25–58)
PARTIAL THROMBOPLASTIN TIME: 21 SEC (ref 23–31)
PDW BLD-RTO: 18 % (ref 11.5–14.9)
PLATELET # BLD: 272 K/UL (ref 150–450)
PLATELET ESTIMATE: ABNORMAL
PMV BLD AUTO: 8.1 FL (ref 6–12)
POTASSIUM SERPL-SCNC: 3.8 MMOL/L (ref 3.7–5.3)
PRO-BNP: 123 PG/ML
PROTHROMBIN TIME: 10.1 SEC (ref 9.7–12)
RBC # BLD: 4.76 M/UL (ref 4–5.2)
RBC # BLD: ABNORMAL 10*6/UL
SEG NEUTROPHILS: 70 %
SEGMENTED NEUTROPHILS ABSOLUTE COUNT: 6.4 K/UL (ref 1.3–9.1)
SODIUM BLD-SCNC: 138 MMOL/L (ref 135–144)
TROPONIN INTERP: NORMAL
TROPONIN T: <0.03 NG/ML
TSH SERPL DL<=0.05 MIU/L-ACNC: 1.78 MIU/L (ref 0.3–5)
WBC # BLD: 9.2 K/UL (ref 3.5–11)
WBC # BLD: ABNORMAL 10*3/UL

## 2017-11-06 PROCEDURE — 6360000002 HC RX W HCPCS: Performed by: EMERGENCY MEDICINE

## 2017-11-06 PROCEDURE — 71020 XR CHEST STANDARD TWO VW: CPT

## 2017-11-06 PROCEDURE — 85379 FIBRIN DEGRADATION QUANT: CPT

## 2017-11-06 PROCEDURE — 83880 ASSAY OF NATRIURETIC PEPTIDE: CPT

## 2017-11-06 PROCEDURE — 80048 BASIC METABOLIC PNL TOTAL CA: CPT

## 2017-11-06 PROCEDURE — 84443 ASSAY THYROID STIM HORMONE: CPT

## 2017-11-06 PROCEDURE — 93005 ELECTROCARDIOGRAM TRACING: CPT

## 2017-11-06 PROCEDURE — 36415 COLL VENOUS BLD VENIPUNCTURE: CPT

## 2017-11-06 PROCEDURE — 6370000000 HC RX 637 (ALT 250 FOR IP): Performed by: EMERGENCY MEDICINE

## 2017-11-06 PROCEDURE — 96374 THER/PROPH/DIAG INJ IV PUSH: CPT

## 2017-11-06 PROCEDURE — 85025 COMPLETE CBC W/AUTO DIFF WBC: CPT

## 2017-11-06 PROCEDURE — 83874 ASSAY OF MYOGLOBIN: CPT

## 2017-11-06 PROCEDURE — 2580000003 HC RX 258: Performed by: EMERGENCY MEDICINE

## 2017-11-06 PROCEDURE — 85730 THROMBOPLASTIN TIME PARTIAL: CPT

## 2017-11-06 PROCEDURE — 99285 EMERGENCY DEPT VISIT HI MDM: CPT

## 2017-11-06 PROCEDURE — 85610 PROTHROMBIN TIME: CPT

## 2017-11-06 PROCEDURE — 84484 ASSAY OF TROPONIN QUANT: CPT

## 2017-11-06 RX ORDER — SUCRALFATE 1 G/1
TABLET ORAL
Qty: 40 TABLET | Refills: 0 | Status: SHIPPED | OUTPATIENT
Start: 2017-11-06 | End: 2017-11-13

## 2017-11-06 RX ORDER — MORPHINE SULFATE 10 MG/ML
4 INJECTION, SOLUTION INTRAMUSCULAR; INTRAVENOUS ONCE
Status: COMPLETED | OUTPATIENT
Start: 2017-11-06 | End: 2017-11-06

## 2017-11-06 RX ORDER — 0.9 % SODIUM CHLORIDE 0.9 %
500 INTRAVENOUS SOLUTION INTRAVENOUS ONCE
Status: COMPLETED | OUTPATIENT
Start: 2017-11-06 | End: 2017-11-07

## 2017-11-06 RX ORDER — NITROGLYCERIN 0.4 MG/1
0.4 TABLET SUBLINGUAL EVERY 5 MIN PRN
Status: DISCONTINUED | OUTPATIENT
Start: 2017-11-06 | End: 2017-11-07 | Stop reason: HOSPADM

## 2017-11-06 RX ORDER — ASPIRIN 81 MG/1
324 TABLET, CHEWABLE ORAL ONCE
Status: COMPLETED | OUTPATIENT
Start: 2017-11-06 | End: 2017-11-06

## 2017-11-06 RX ADMIN — MORPHINE SULFATE 4 MG: 10 INJECTION, SOLUTION INTRAMUSCULAR; INTRAVENOUS at 22:29

## 2017-11-06 RX ADMIN — SODIUM CHLORIDE 500 ML: 9 INJECTION, SOLUTION INTRAVENOUS at 22:29

## 2017-11-06 RX ADMIN — ASPIRIN 81 MG 324 MG: 81 TABLET ORAL at 22:20

## 2017-11-06 RX ADMIN — NITROGLYCERIN 0.4 MG: 0.4 TABLET SUBLINGUAL at 22:31

## 2017-11-06 ASSESSMENT — ENCOUNTER SYMPTOMS
SINUS PRESSURE: 0
DIARRHEA: 0
ABDOMINAL PAIN: 0
BLOOD IN STOOL: 0
WHEEZING: 0
TROUBLE SWALLOWING: 0
COLOR CHANGE: 0
SORE THROAT: 0
RHINORRHEA: 0
FACIAL SWELLING: 0
SHORTNESS OF BREATH: 1
VOMITING: 0
NAUSEA: 0
EYE REDNESS: 0
EYE DISCHARGE: 0
CONSTIPATION: 0
EYE PAIN: 0
CHEST TIGHTNESS: 1
BACK PAIN: 0
COUGH: 0

## 2017-11-06 ASSESSMENT — PAIN SCALES - GENERAL
PAINLEVEL_OUTOF10: 9
PAINLEVEL_OUTOF10: 6

## 2017-11-06 ASSESSMENT — PAIN DESCRIPTION - LOCATION: LOCATION: CHEST

## 2017-11-06 ASSESSMENT — PAIN DESCRIPTION - PAIN TYPE: TYPE: ACUTE PAIN

## 2017-11-06 ASSESSMENT — PAIN DESCRIPTION - DESCRIPTORS: DESCRIPTORS: ACHING

## 2017-11-06 ASSESSMENT — PAIN DESCRIPTION - ORIENTATION: ORIENTATION: LEFT

## 2017-11-07 VITALS
HEIGHT: 62 IN | RESPIRATION RATE: 20 BRPM | HEART RATE: 87 BPM | DIASTOLIC BLOOD PRESSURE: 64 MMHG | WEIGHT: 203 LBS | OXYGEN SATURATION: 91 % | SYSTOLIC BLOOD PRESSURE: 127 MMHG | BODY MASS INDEX: 37.36 KG/M2 | TEMPERATURE: 97.9 F

## 2017-11-07 LAB
EKG ATRIAL RATE: 89 BPM
EKG ATRIAL RATE: 89 BPM
EKG P AXIS: 34 DEGREES
EKG P AXIS: 46 DEGREES
EKG P-R INTERVAL: 132 MS
EKG P-R INTERVAL: 132 MS
EKG Q-T INTERVAL: 358 MS
EKG Q-T INTERVAL: 380 MS
EKG QRS DURATION: 86 MS
EKG QRS DURATION: 86 MS
EKG QTC CALCULATION (BAZETT): 433 MS
EKG QTC CALCULATION (BAZETT): 462 MS
EKG R AXIS: 33 DEGREES
EKG R AXIS: 36 DEGREES
EKG T AXIS: 31 DEGREES
EKG T AXIS: 35 DEGREES
EKG VENTRICULAR RATE: 88 BPM
EKG VENTRICULAR RATE: 89 BPM
MYOGLOBIN: 40 NG/ML (ref 25–58)
TROPONIN INTERP: NORMAL
TROPONIN T: <0.03 NG/ML

## 2017-11-07 PROCEDURE — 93005 ELECTROCARDIOGRAM TRACING: CPT

## 2017-11-07 PROCEDURE — 83874 ASSAY OF MYOGLOBIN: CPT

## 2017-11-07 PROCEDURE — 36415 COLL VENOUS BLD VENIPUNCTURE: CPT

## 2017-11-07 PROCEDURE — 84484 ASSAY OF TROPONIN QUANT: CPT

## 2017-11-07 NOTE — ED PROVIDER NOTES
[address]  eMERGENCY dEPARTMENT eNCOUnter      Pt Name: Naty Mcintosh  MRN: [id number]  [place] 1980  Date of evaluation: 11/6/17      CHIEF COMPLAINT     No chief complaint on file. HISTORY OF PRESENT ILLNESS    Naty Mcintosh is a 30 y.o. female who presents complaining of Chest pain. Patient states she had sudden onset of chest tightness and heaviness on the left side of her chest radiating down her left arm about one hour ago. Patient states the pain is severe. Patient states she does have shortness of breath and palpitations associated with this. Patient states she's had no cough fever or recent illnesses. Patient denies using her inhaler more than normal.  Patient denies any abdominal complaints. Patient has no pain or swelling in the legs. Patient's had no recent travel. Patient states that she has not felt like this before. Patient denies any recent cardiac workups. REVIEW OF SYSTEMS       Review of Systems   Constitutional: Negative for activity change, appetite change, chills, diaphoresis and fever. HENT: Negative for congestion, ear pain, facial swelling, nosebleeds, rhinorrhea, sinus pressure, sore throat and trouble swallowing. Eyes: Negative for pain, discharge and redness. Respiratory: Positive for chest tightness and shortness of breath. Negative for cough and wheezing. Cardiovascular: Positive for chest pain and palpitations. Negative for leg swelling. Gastrointestinal: Negative for abdominal pain, blood in stool, constipation, diarrhea, nausea and vomiting. Genitourinary: Negative for difficulty urinating, dysuria, flank pain, frequency, genital sores and hematuria. Musculoskeletal: Negative for arthralgias, back pain, gait problem, joint swelling, myalgias and neck pain. Skin: Negative for color change, pallor, rash and wound.    Neurological: Negative for dizziness, tremors, seizures, syncope, speech difficulty, weakness, numbness and headaches. Psychiatric/Behavioral: Negative for confusion, decreased concentration, hallucinations, self-injury, sleep disturbance and suicidal ideas. PAST MEDICAL HISTORY     Past Medical History:   Diagnosis Date    Anxiety     Arthritis     OA    Asthma     Mullins's esophagus     LONG SEGMENT    Bipolar 1 disorder ([address]     Diabetes mellitus (HCC)     borderline, not taking meds    Esophagitis     severe    GERD (gastroesophageal reflux disease)     Headache(784.0)     Herniated disc, cervical     Hiatal hernia     Kidney stones     Pleurisy     hx of \"years ago\"    UTI (urinary tract infection)     Vision abnormalities     wears glasses       SURGICAL HISTORY       Past Surgical History:   Procedure Laterality Date    CERVICAL DISC SURGERY       SECTION      X 1    KNEE ARTHROSCOPY Left 5/20/15    KNEE SURGERY Left     x3    LAPAROSCOPY      LAPAROSCOPY N/A 10/5/2017    LAPAROSCOPIC REMOVAL INTRA ABDOMINAL LIPOMA LOWER RIGHT performed by Ginger Naylor DO at [address] [facility] ESOPHAGOGASTRODUODENOSCOPY TRANSORAL DIAGNOSTIC N/A 3/2/2017    EGD ESOPHAGOGASTRODUODENOSCOPY  IP 2055 performed by Nava Cardona MD at [facility]  2016    severe esophagitis    UPPER GASTROINTESTINAL ENDOSCOPY  2017    barretts; hiatus hernia; gastritis    UPPER GASTROINTESTINAL ENDOSCOPY  2017    long seg mullins's with linear erosions, esophagitis, small hiatal hernia       CURRENT MEDICATIONS       Current Discharge Medication List      CONTINUE these medications which have NOT CHANGED    Details   !! sucralfate (CARAFATE) 1 GM tablet TAKE 1 TABLET BY MOUTH 4 TIMES DAILY  Qty: 40 tablet, Refills: 0    Comments: Pt NEEDS OFFICE APT OR WILL HAVE TO CONTACT PCP.       docusate sodium (COLACE) 100 MG capsule Take 1 capsule by mouth 2 times daily  Qty: 30 capsule, Refills: 0      oxyCODONE-acetaminophen (PERCOCET) 5-325 MG per tablet 1-2 tabs po q 4 hours prn. Qty: 30 tablet, Refills: 0      !! sucralfate (CARAFATE) 1 GM tablet TAKE 1 TABLET BY MOUTH 4 TIMES DAILY  Qty: 120 tablet, Refills: 0      albuterol (PROVENTIL) (2.5 MG/3ML) 0.083% nebulizer solution Take 3 mLs by nebulization every 4 hours as needed for Wheezing  Qty: 120 each, Refills: 3      albuterol-ipratropium (COMBIVENT)  MCG/ACT inhaler Inhale 2 puffs into the lungs every 6 hours as needed for Wheezing      SUMAtriptan (IMITREX) 100 MG tablet Take 100 mg by mouth once as needed for Migraine      pantoprazole (PROTONIX) 40 MG tablet Take 1 tablet by mouth 2 times daily (before meals)  Qty: 60 tablet, Refills: 5      budesonide-formoterol (SYMBICORT) 160-4.5 MCG/ACT AERO Inhale 2 puffs into the lungs 2 times daily. !! - Potential duplicate medications found. Please discuss with provider. ALLERGIES     is allergic to nsaids; toradol [ketorolac tromethamine]; and ultram [tramadol]. FAMILY HISTORY     indicated that the status of her mother is unknown. She indicated that the status of her brother is unknown. SOCIAL HISTORY      reports that she quit smoking about 8 months ago. Her smoking use included Cigarettes. She has a 17.00 pack-year smoking history. She does not have any smokeless tobacco history on file. She reports that she drinks alcohol. She reports that she does not use drugs. PHYSICAL EXAM     INITIAL VITALS: BP (!) 124/46   Pulse 86   Temp 97.9 °F (36.6 °C) (Oral)   Resp 24   Ht 5' 2\" (1.575 m)   Wt 203 lb (92.1 kg)   LMP 10/15/2017   SpO2 98%   BMI 37.13 kg/m²      Physical Exam   Constitutional: She is oriented to person, place, and time. She appears well-developed and well-nourished. No distress. HENT:   Head: Normocephalic and atraumatic. Eyes: Conjunctivae and EOM are normal. Pupils are equal, round, and reactive to light. Right eye exhibits no discharge. Left eye exhibits no discharge. No scleral icterus. acute changes and normal EKG    RADIOLOGY:All plain film, CT, MRI, and formal ultrasound images (except ED bedside ultrasound) are read by the radiologist and the images and interpretations are directly viewed by the emergency physician. Xr Chest Standard (2 Vw)    Result Date: 11/6/2017  EXAMINATION: TWO VIEWS OF THE CHEST 11/6/2017 10:45 pm COMPARISON: September 13, 2017 HISTORY: ORDERING SYSTEM PROVIDED HISTORY: Chest Pain TECHNOLOGIST PROVIDED HISTORY: Reason for exam:->Chest Pain Ordering Physician Provided Reason for Exam: chest pain Acuity: Acute Type of Exam: Initial Additional signs and symptoms: Pt c/o left chest pain radiating down left arm for 1 hour. FINDINGS: The mediastinal and cardiac contours are normal. No lobar lung consolidation, pleural effusion or pneumothorax. The bones are stable. Cervical fusion hardware present. No radiographic evidence of acute cardiopulmonary disease. LABS: All lab results were reviewed by myself, and all abnormals are listed below.   Labs Reviewed   APTT - Abnormal; Notable for the following:        Result Value    PTT 21.0 (*)     All other components within normal limits   CBC WITH AUTO DIFFERENTIAL - Abnormal; Notable for the following:     Hemoglobin 11.2 (*)     Hematocrit 34.8 (*)     MCV 73.3 (*)     MCH 23.5 (*)     RDW 18.0 (*)     All other components within normal limits   BASIC METABOLIC PANEL   BRAIN NATRIURETIC PEPTIDE   D-DIMER, QUANTITATIVE   TROP/MYOGLOBIN   TSH WITHOUT REFLEX   PROTIME-INR   TROP/MYOGLOBIN   PREVIOUS SPECIMEN         EMERGENCY DEPARTMENT COURSE:   Vitals:    Vitals:    11/06/17 2215   BP: (!) 124/46   Pulse: 86   Resp: 24   Temp: 97.9 °F (36.6 °C)   TempSrc: Oral   SpO2: 98%   Weight: 203 lb (92.1 kg)   Height: 5' 2\" (1.575 m)       The patient was given the following medications while in the emergency department:  Orders Placed This Encounter   Medications    0.9 % sodium chloride bolus    nitroGLYCERIN (NITROSTAT)

## 2017-11-13 ENCOUNTER — HOSPITAL ENCOUNTER (EMERGENCY)
Age: 37
Discharge: LEFT BEFORE DISCHARGE | End: 2017-11-13
Attending: EMERGENCY MEDICINE
Payer: COMMERCIAL

## 2017-11-13 VITALS
OXYGEN SATURATION: 98 % | SYSTOLIC BLOOD PRESSURE: 117 MMHG | DIASTOLIC BLOOD PRESSURE: 92 MMHG | HEIGHT: 62 IN | RESPIRATION RATE: 20 BRPM | HEART RATE: 96 BPM | TEMPERATURE: 98.1 F | WEIGHT: 203 LBS | BODY MASS INDEX: 37.36 KG/M2

## 2017-11-13 DIAGNOSIS — R10.84 GENERALIZED ABDOMINAL PAIN: Primary | ICD-10-CM

## 2017-11-13 PROCEDURE — 99284 EMERGENCY DEPT VISIT MOD MDM: CPT

## 2017-11-13 RX ORDER — HALOPERIDOL 5 MG/ML
5 INJECTION INTRAMUSCULAR ONCE
Status: DISCONTINUED | OUTPATIENT
Start: 2017-11-13 | End: 2017-11-13 | Stop reason: HOSPADM

## 2017-11-13 RX ORDER — DIPHENHYDRAMINE HYDROCHLORIDE 50 MG/ML
25 INJECTION INTRAMUSCULAR; INTRAVENOUS ONCE
Status: DISCONTINUED | OUTPATIENT
Start: 2017-11-13 | End: 2017-11-13 | Stop reason: HOSPADM

## 2017-11-13 ASSESSMENT — PAIN DESCRIPTION - LOCATION: LOCATION: ABDOMEN

## 2017-11-13 ASSESSMENT — PAIN DESCRIPTION - ORIENTATION: ORIENTATION: RIGHT

## 2017-11-13 ASSESSMENT — PAIN SCALES - GENERAL: PAINLEVEL_OUTOF10: 10

## 2017-11-13 NOTE — ED PROVIDER NOTES
reactive to light. Neck: Normal range of motion. Neck supple. Cardiovascular: Normal rate, regular rhythm, normal heart sounds and intact distal pulses. Exam reveals no gallop and no friction rub. No murmur heard. Pulmonary/Chest: Effort normal and breath sounds normal. No stridor. No respiratory distress. She has no wheezes. She has no rales. She exhibits no tenderness. Abdominal: Soft. Bowel sounds are normal. She exhibits no distension. There is no rebound and no guarding. Mild abdominal tenderness. Musculoskeletal: Normal range of motion. She exhibits no edema, tenderness or deformity. Neurological: She is alert and oriented to person, place, and time. No cranial nerve deficit. Skin: Skin is warm and dry. No rash noted. She is not diaphoretic. No erythema. Psychiatric: She has a normal mood and affect. Her behavior is normal. Judgment and thought content normal.   Nursing note and vitals reviewed. MEDICAL DECISION MAKING:   MDM    Patient with multiple presentations with negative workups. Will not image abdomen is patient's previous CT scan was done and September. Physical exam findings are not suggestive of acute intra-abdominal pathology. We'll provide patient IM medications obtain urine to screen for dehydration or infection. 8:39 AM  Informed by nurse that patient eloped prior to medications being provided. Procedures    DIAGNOSTIC RESULTS   EKG: All EKG's are interpreted by the Emergency Department Physician who either signs or Co-signs this chart in the absence of a cardiologist.      RADIOLOGY:All plain film, CT, MRI, and formal ultrasound images (except ED bedside ultrasound) are read by the radiologist and interpretations are viewed by the emergency physician. No orders to display     LABS: All lab results were reviewed by myself, and all abnormals are listed below.   Labs Reviewed - No data to display  EMERGENCY DEPARTMENT COURSE:     Vitals:    Vitals:    11/13/17 0750   BP: (!) 117/92   Pulse: 96   Resp: 20   Temp: 98.1 °F (36.7 °C)   TempSrc: Oral   SpO2: 98%   Weight: 203 lb (92.1 kg)   Height: 5' 2\" (1.575 m)     The patient was given the following medications while in the emergency department:  Orders Placed This Encounter   Medications    DISCONTD: haloperidol lactate (HALDOL) injection 5 mg    DISCONTD: diphenhydrAMINE (BENADRYL) injection 25 mg     CONSULTS:  None    FINAL IMPRESSION      1. Generalized abdominal pain          DISPOSITION/PLAN   DISPOSITION Eloped - Left Before Treatment Complete    PATIENT REFERRED TO:  No follow-up provider specified.   DISCHARGE MEDICATIONS:  Discharge Medication List as of 11/13/2017  8:50 AM        Ollie Frey MD  Attending Emergency Physician                      Ollie Frey MD  11/13/17 1339       Ollie Frey MD  11/13/17 2639

## 2017-11-13 NOTE — ED NOTES
Pt is agitated she is not getting an IV and pain meds immediately. Dr. Ness Smith had already been in with pt and spoken with her about the POC today. Pt becomes louder and upset because of the meds and testing ordered. Pt threatens to leave, stating [facility] I don't even know why I come here. \"  Calmly explained the POC to pt. Pt upset d/t the IM meds will take too long and she demands an IV.   The patient brother attempting to calm pt and encouraging her to calm down and accept tx.      Fernandez Castro RN  11/13/17 2069

## 2017-11-13 NOTE — ED NOTES
Pt loud and beligerant, accusing the staff of treating her like a drug addict, although nothing has been mentioned about narcotics or drug seeking/addiction to this pt. Pt is continuing to get louder and more agitated because she is not getting an IV and pain meds. Pt has continued to c/o vomiting since 0500, although pt has had no emesis since arrival. Pt is intermittently holding her R abd and standing and bending over, stating the pain \"radiates to my back\". PT is given a UA cup for sample and offered the meds. PT is upset the meds are IM and not IV, that they will take too long to work. PT continues to yell about not \"being a drug addict\", demanding another doctor and stating \"I'm going to report this hospital and that doctor. \" Pt offered meds and refuses. Picking up her stuff and stating she's going to \"go to a better hospital that cares\". PT leaves with brother.      Bre Oliver RN  11/13/17 9942

## 2017-11-20 ENCOUNTER — HOSPITAL ENCOUNTER (EMERGENCY)
Age: 37
Discharge: LEFT BEFORE DISCHARGE | End: 2017-11-20
Attending: EMERGENCY MEDICINE
Payer: COMMERCIAL

## 2017-11-20 VITALS
RESPIRATION RATE: 20 BRPM | BODY MASS INDEX: 37.36 KG/M2 | OXYGEN SATURATION: 96 % | WEIGHT: 203 LBS | DIASTOLIC BLOOD PRESSURE: 78 MMHG | HEIGHT: 62 IN | SYSTOLIC BLOOD PRESSURE: 164 MMHG | HEART RATE: 94 BPM | TEMPERATURE: 98.5 F

## 2017-11-20 DIAGNOSIS — Z53.20 LEFT BEFORE TREATMENT COMPLETED: Primary | ICD-10-CM

## 2017-11-20 LAB
-: ABNORMAL
ABSOLUTE BANDS #: 0.17 K/UL (ref 0–1)
ABSOLUTE EOS #: 0.34 K/UL (ref 0–0.4)
ABSOLUTE IMMATURE GRANULOCYTE: ABNORMAL K/UL (ref 0–0.3)
ABSOLUTE LYMPH #: 2.21 K/UL (ref 1–4.8)
ABSOLUTE MONO #: 1.02 K/UL (ref 0.1–1.3)
ALBUMIN SERPL-MCNC: 4.3 G/DL (ref 3.5–5.2)
ALBUMIN/GLOBULIN RATIO: ABNORMAL (ref 1–2.5)
ALP BLD-CCNC: 65 U/L (ref 35–104)
ALT SERPL-CCNC: 18 U/L (ref 5–33)
AMORPHOUS: ABNORMAL
ANION GAP SERPL CALCULATED.3IONS-SCNC: 16 MMOL/L (ref 9–17)
AST SERPL-CCNC: 24 U/L
ATYPICAL LYMPHOCYTE ABSOLUTE COUNT: 0.51 K/UL
ATYPICAL LYMPHOCYTES: 3 %
BACTERIA: ABNORMAL
BANDS: 1 %
BASOPHILS # BLD: 1 %
BASOPHILS ABSOLUTE: 0.17 K/UL (ref 0–0.2)
BILIRUB SERPL-MCNC: 0.24 MG/DL (ref 0.3–1.2)
BILIRUBIN DIRECT: <0.08 MG/DL
BILIRUBIN URINE: NEGATIVE
BILIRUBIN, INDIRECT: ABNORMAL MG/DL (ref 0–1)
BUN BLDV-MCNC: 16 MG/DL (ref 6–20)
BUN/CREAT BLD: ABNORMAL (ref 9–20)
CALCIUM SERPL-MCNC: 9.3 MG/DL (ref 8.6–10.4)
CASTS UA: ABNORMAL /LPF
CHLORIDE BLD-SCNC: 103 MMOL/L (ref 98–107)
CO2: 21 MMOL/L (ref 20–31)
COLOR: YELLOW
COMMENT UA: ABNORMAL
CREAT SERPL-MCNC: 0.75 MG/DL (ref 0.5–0.9)
CRYSTALS, UA: ABNORMAL /HPF
DIFFERENTIAL TYPE: ABNORMAL
EOSINOPHILS RELATIVE PERCENT: 2 %
EPITHELIAL CELLS UA: ABNORMAL /HPF
GFR AFRICAN AMERICAN: >60 ML/MIN
GFR NON-AFRICAN AMERICAN: >60 ML/MIN
GFR SERPL CREATININE-BSD FRML MDRD: ABNORMAL ML/MIN/{1.73_M2}
GFR SERPL CREATININE-BSD FRML MDRD: ABNORMAL ML/MIN/{1.73_M2}
GLOBULIN: ABNORMAL G/DL (ref 1.5–3.8)
GLUCOSE BLD-MCNC: 103 MG/DL (ref 70–99)
GLUCOSE URINE: NEGATIVE
HCG QUALITATIVE: NEGATIVE
HCG(URINE) PREGNANCY TEST: NEGATIVE
HCT VFR BLD CALC: 38.2 % (ref 36–46)
HEMOGLOBIN: 12.2 G/DL (ref 12–16)
IMMATURE GRANULOCYTES: ABNORMAL %
KETONES, URINE: NEGATIVE
LEUKOCYTE ESTERASE, URINE: NEGATIVE
LIPASE: 25 U/L (ref 13–60)
LYMPHOCYTES # BLD: 13 %
MCH RBC QN AUTO: 23.8 PG (ref 26–34)
MCHC RBC AUTO-ENTMCNC: 32 G/DL (ref 31–37)
MCV RBC AUTO: 74.3 FL (ref 80–100)
MONOCYTES # BLD: 6 %
MORPHOLOGY: ABNORMAL
MUCUS: ABNORMAL
NITRITE, URINE: NEGATIVE
OTHER OBSERVATIONS UA: ABNORMAL
PDW BLD-RTO: 18.5 % (ref 11.5–14.9)
PH UA: 5.5 (ref 5–8)
PLATELET # BLD: 468 K/UL (ref 150–450)
PLATELET ESTIMATE: ABNORMAL
PMV BLD AUTO: 8.5 FL (ref 6–12)
POTASSIUM SERPL-SCNC: 4.2 MMOL/L (ref 3.7–5.3)
PROTEIN UA: ABNORMAL
RBC # BLD: 5.14 M/UL (ref 4–5.2)
RBC # BLD: ABNORMAL 10*6/UL
RBC UA: ABNORMAL /HPF
RENAL EPITHELIAL, UA: ABNORMAL /HPF
SEG NEUTROPHILS: 74 %
SEGMENTED NEUTROPHILS ABSOLUTE COUNT: 12.58 K/UL (ref 1.3–9.1)
SODIUM BLD-SCNC: 140 MMOL/L (ref 135–144)
SPECIFIC GRAVITY UA: 1.02 (ref 1–1.03)
TOTAL PROTEIN: 7.2 G/DL (ref 6.4–8.3)
TRICHOMONAS: ABNORMAL
TURBIDITY: CLEAR
URINE HGB: ABNORMAL
UROBILINOGEN, URINE: NORMAL
WBC # BLD: 17 K/UL (ref 3.5–11)
WBC # BLD: ABNORMAL 10*3/UL
WBC UA: ABNORMAL /HPF
YEAST: ABNORMAL

## 2017-11-20 PROCEDURE — 81001 URINALYSIS AUTO W/SCOPE: CPT

## 2017-11-20 PROCEDURE — 51798 US URINE CAPACITY MEASURE: CPT

## 2017-11-20 PROCEDURE — 87510 GARDNER VAG DNA DIR PROBE: CPT

## 2017-11-20 PROCEDURE — 80076 HEPATIC FUNCTION PANEL: CPT

## 2017-11-20 PROCEDURE — 87660 TRICHOMONAS VAGIN DIR PROBE: CPT

## 2017-11-20 PROCEDURE — 36415 COLL VENOUS BLD VENIPUNCTURE: CPT

## 2017-11-20 PROCEDURE — 87491 CHLMYD TRACH DNA AMP PROBE: CPT

## 2017-11-20 PROCEDURE — 87591 N.GONORRHOEAE DNA AMP PROB: CPT

## 2017-11-20 PROCEDURE — 6370000000 HC RX 637 (ALT 250 FOR IP): Performed by: PHYSICIAN ASSISTANT

## 2017-11-20 PROCEDURE — 87480 CANDIDA DNA DIR PROBE: CPT

## 2017-11-20 PROCEDURE — 84703 CHORIONIC GONADOTROPIN ASSAY: CPT

## 2017-11-20 PROCEDURE — 83690 ASSAY OF LIPASE: CPT

## 2017-11-20 PROCEDURE — 99284 EMERGENCY DEPT VISIT MOD MDM: CPT

## 2017-11-20 PROCEDURE — 80048 BASIC METABOLIC PNL TOTAL CA: CPT

## 2017-11-20 PROCEDURE — 85025 COMPLETE CBC W/AUTO DIFF WBC: CPT

## 2017-11-20 RX ORDER — ACETAMINOPHEN 325 MG/1
650 TABLET ORAL ONCE
Status: COMPLETED | OUTPATIENT
Start: 2017-11-20 | End: 2017-11-20

## 2017-11-20 RX ORDER — PHENAZOPYRIDINE HYDROCHLORIDE 200 MG/1
200 TABLET, FILM COATED ORAL ONCE
Status: COMPLETED | OUTPATIENT
Start: 2017-11-20 | End: 2017-11-20

## 2017-11-20 RX ORDER — FENTANYL CITRATE 50 UG/ML
25 INJECTION, SOLUTION INTRAMUSCULAR; INTRAVENOUS ONCE
Status: DISCONTINUED | OUTPATIENT
Start: 2017-11-20 | End: 2017-11-20

## 2017-11-20 RX ADMIN — ACETAMINOPHEN 650 MG: 325 TABLET ORAL at 18:30

## 2017-11-20 RX ADMIN — PHENAZOPYRIDINE HYDROCHLORIDE 200 MG: 200 TABLET ORAL at 18:39

## 2017-11-20 ASSESSMENT — ENCOUNTER SYMPTOMS
EYE ITCHING: 0
RHINORRHEA: 0
EYE PAIN: 0
EYE DISCHARGE: 0
COUGH: 0
BACK PAIN: 0
ABDOMINAL PAIN: 1
VOMITING: 0
NAUSEA: 0
WHEEZING: 0
SORE THROAT: 0
COLOR CHANGE: 0

## 2017-11-20 ASSESSMENT — PAIN SCALES - GENERAL
PAINLEVEL_OUTOF10: 10
PAINLEVEL_OUTOF10: 10

## 2017-11-20 ASSESSMENT — PAIN DESCRIPTION - ORIENTATION: ORIENTATION: RIGHT

## 2017-11-20 ASSESSMENT — PAIN DESCRIPTION - LOCATION: LOCATION: ABDOMEN;BACK

## 2017-11-20 NOTE — ED PROVIDER NOTES
Historical Provider, MD       patient's medication list has been reviewed as entered by the nursing staff. PATIENT STATES SHE TAKES NO MEDICATIONS. REVIEW OF SYSTEMS    (2-9 systems for level 4, 10 or more for level 5)      Review of Systems   Constitutional: Negative for chills and fever. HENT: Negative for ear pain, rhinorrhea and sore throat. Eyes: Negative for pain, discharge and itching. Respiratory: Negative for cough and wheezing. Cardiovascular: Negative for chest pain and palpitations. Gastrointestinal: Positive for abdominal pain. Negative for nausea and vomiting. Genitourinary: Negative for difficulty urinating and dysuria. +urinary retention   Musculoskeletal: Negative for back pain and myalgias. Skin: Negative for color change and wound. Neurological: Negative for dizziness and headaches. Psychiatric/Behavioral: Negative for dysphoric mood. PHYSICAL EXAM   (up to 7 for level 4, 8 or more for level 5)      INITIAL VITALS:  height is 5' 2\" (1.575 m) and weight is 203 lb (92.1 kg). Her oral temperature is 98.5 °F (36.9 °C). Her pulse is 94. Her respiration is 20 and oxygen saturation is 96%. Physical Exam   Constitutional: She is oriented to person, place, and time. She appears well-developed and well-nourished. She appears distressed. Patient is rolling around on the bed   HENT:   Head: Normocephalic and atraumatic. Right Ear: External ear normal.   Left Ear: External ear normal.   Eyes: Left eye exhibits no discharge. No scleral icterus. Neck: Normal range of motion. No tracheal deviation present. Cardiovascular: Normal rate, regular rhythm and normal heart sounds. Exam reveals no gallop. No murmur heard. Pulmonary/Chest: Effort normal and breath sounds normal. No stridor. No respiratory distress. Abdominal: There is no tenderness. There is no rebound and no guarding. abomen is obese   Musculoskeletal: Normal range of motion. She exhibits no edema. Neurological: She is alert and oriented to person, place, and time. Coordination normal.   Skin: Skin is warm and dry. No rash (on exposed surfaces) noted. She is not diaphoretic. No pallor. Psychiatric: She has a normal mood and affect. Her behavior is normal.       DIFFERENTIAL  DIAGNOSIS       Ovarian torsion, renal calculi, urinary retention, tumor or mass    PLAN (LABS / IMAGING / EKG):  Orders Placed This Encounter   Procedures    C.trachomatis N.gonorrhoeae DNA    VAGINITIS DNA PROBE    Urinalysis    Pregnancy, Urine    CBC Auto Differential    Basic Metabolic Panel    Lipase    Hepatic Function Panel    HCG Qualitative, Serum    Microscopic Urinalysis    Bladder scan    Vaginal exam    Insert peripheral IV       MEDICATIONS ORDERED:  Orders Placed This Encounter   Medications    DISCONTD: fentaNYL (SUBLIMAZE) injection 25 mcg    phenazopyridine (PYRIDIUM) tablet 200 mg    acetaminophen (TYLENOL) tablet 650 mg       Controlled Substances Monitoring: Attestation: The Prescription Monitoring Report for this patient was reviewed today. (Ayesha Velez PA-C)    DIAGNOSTIC RESULTS / EMERGENCY DEPARTMENT COURSE / MDM   Patient with abdominal pain, patient is writhing around on the bed. After a bladder scan showed 38 mL of retained urine patient now stating that she can urinate though she was adamantly denying this at first.  Her attending physician note narcotics were given and it appears that patient has eloped.   Please note that ovarian torsion was high on the differential due to patient's extreme amount of pain however no pelvic was attempted and patient eloped prior to any imaging being done    RADIOLOGY:   I directly visualized (with the attending physician) the following  images and reviewed the radiologist interpretations:  Xr Chest Standard (2 Vw)    Result Date: 11/6/2017  EXAMINATION: TWO VIEWS OF THE CHEST 11/6/2017 10:45 pm COMPARISON: September 13, 2017 HISTORY: [address]

## 2017-11-21 NOTE — ED NOTES
Pt continues to be very dramatic in her presentation, pt continues to be loud, sprawling herself out on the cart on her stomach. Let pt know we had Tylenol and Pyridium for her. Pt upset d/t she wants the Tylenol given IV. I told her we don't give Tylenol IV, but only oral or rectal in the ED. Pt is upset because it will take too long to work  And why is nobody doing anything for her and why are we treating her like a drug addict, because she isn't a drug addict.       Stephanie Flores, ANNY  11/20/17 1921

## 2017-11-21 NOTE — ED NOTES
[facility] speaking to pt d/t pt demanding to speak with a doctor.  [facility] speaking with pt about the multiple ED visits in the last year and the dangers of the multiple CT scans she has had, the multiple US she has had have been unremarkable, and that we would properly medically treat her today, but we would not be prescribing narcotics during this visit unless it was found to be medically warranted. PT is t/o this interrupting  [facility] DEAN and yelling and stating that she is not a drug addict and she is going to contact her  and danielle all the staff and this hospital and that this hospital will not exsist anymore after she sues us. After  [facility] is finished speaking to pt,pt demands to have her IV removed and she was going to leave and never come back to this hospital again.       Lupe Yi, RN  11/20/17 2840

## 2017-11-21 NOTE — ED PROVIDER NOTES
examination. I did also inform her that we would continue to attempt better pain control with noncontrolled substances. Pt is alert, oriented and appears capable of making medical decisions. No indication to hold. Pt made aware she can return at any time. Ebony Irene MD  Attending Emergency Physician                    Liliane Gray MD  11/20/17 2005

## 2017-11-21 NOTE — ED NOTES
Pt is in room, hyperventilating and moaning while rocking back and forth. PT is holding her bladder area with both hands and c/o she was unable to urinate since last night. Pt has gone back and forth to the BR multiple times and states trying to urinate is so painful, but she has no urine out. Pt is very dramatic. Pt stating she needs a catheter placed to get rid of all the urine in her bladder. Explained to pt we first would be doing a bladder scan, and if she had an excessive amount of urine we would cath her. Pt verbalized understanding.       Stephanie Kamole, RN  11/20/17 4817

## 2017-11-21 NOTE — ED NOTES
Maxwell Storey arrives in room to re examine pt. Pt continues to hyperventilate and cry loudly. Pt stating she needs something to take the pain away.      Berna Beck RN  11/20/17 0365

## 2017-12-08 ENCOUNTER — OFFICE VISIT (OUTPATIENT)
Dept: GASTROENTEROLOGY | Age: 37
End: 2017-12-08
Payer: MEDICAID

## 2017-12-08 VITALS
HEART RATE: 64 BPM | DIASTOLIC BLOOD PRESSURE: 70 MMHG | HEIGHT: 62 IN | SYSTOLIC BLOOD PRESSURE: 114 MMHG | WEIGHT: 204 LBS | OXYGEN SATURATION: 99 % | RESPIRATION RATE: 12 BRPM | BODY MASS INDEX: 37.54 KG/M2

## 2017-12-08 DIAGNOSIS — K21.00 GASTROESOPHAGEAL REFLUX DISEASE WITH ESOPHAGITIS: ICD-10-CM

## 2017-12-08 DIAGNOSIS — E66.09 CLASS 1 OBESITY DUE TO EXCESS CALORIES WITHOUT SERIOUS COMORBIDITY IN ADULT, UNSPECIFIED BMI: ICD-10-CM

## 2017-12-08 DIAGNOSIS — K22.70 BARRETT'S ESOPHAGUS WITHOUT DYSPLASIA: Primary | ICD-10-CM

## 2017-12-08 DIAGNOSIS — R10.84 ABDOMINAL PAIN, GENERALIZED: ICD-10-CM

## 2017-12-08 PROCEDURE — 99214 OFFICE O/P EST MOD 30 MIN: CPT | Performed by: INTERNAL MEDICINE

## 2017-12-08 RX ORDER — PANTOPRAZOLE SODIUM 40 MG/1
40 TABLET, DELAYED RELEASE ORAL
Qty: 60 TABLET | Refills: 5 | Status: SHIPPED | OUTPATIENT
Start: 2017-12-08 | End: 2018-01-24 | Stop reason: SDUPTHER

## 2017-12-08 ASSESSMENT — ENCOUNTER SYMPTOMS
TROUBLE SWALLOWING: 1
SINUS PRESSURE: 0
ALLERGIC/IMMUNOLOGIC NEGATIVE: 1
BLOOD IN STOOL: 0
NAUSEA: 0
RECTAL PAIN: 0
CONSTIPATION: 0
VOICE CHANGE: 0
ANAL BLEEDING: 0
RESPIRATORY NEGATIVE: 1
VOMITING: 0
ABDOMINAL DISTENTION: 1
DIARRHEA: 0
SORE THROAT: 1
ABDOMINAL PAIN: 0

## 2018-01-24 RX ORDER — PANTOPRAZOLE SODIUM 40 MG/1
40 TABLET, DELAYED RELEASE ORAL DAILY
Qty: 180 TABLET | Refills: 2 | Status: SHIPPED | OUTPATIENT
Start: 2018-01-24 | End: 2018-08-01 | Stop reason: SDUPTHER

## 2018-01-30 ENCOUNTER — ANESTHESIA (OUTPATIENT)
Dept: OPERATING ROOM | Age: 38
End: 2018-01-30
Payer: COMMERCIAL

## 2018-01-30 ENCOUNTER — HOSPITAL ENCOUNTER (OUTPATIENT)
Age: 38
Setting detail: OUTPATIENT SURGERY
Discharge: HOME | End: 2018-01-30
Attending: INTERNAL MEDICINE | Admitting: INTERNAL MEDICINE
Payer: COMMERCIAL

## 2018-01-30 ENCOUNTER — ANESTHESIA EVENT (OUTPATIENT)
Dept: OPERATING ROOM | Age: 38
End: 2018-01-30
Payer: COMMERCIAL

## 2018-01-30 VITALS — DIASTOLIC BLOOD PRESSURE: 79 MMHG | OXYGEN SATURATION: 93 % | SYSTOLIC BLOOD PRESSURE: 152 MMHG

## 2018-01-30 VITALS
HEART RATE: 80 BPM | OXYGEN SATURATION: 98 % | TEMPERATURE: 97.7 F | DIASTOLIC BLOOD PRESSURE: 72 MMHG | HEIGHT: 62 IN | BODY MASS INDEX: 36.25 KG/M2 | SYSTOLIC BLOOD PRESSURE: 116 MMHG | RESPIRATION RATE: 16 BRPM | WEIGHT: 197 LBS

## 2018-01-30 LAB
-: NORMAL
HCG, PREGNANCY URINE (POC): NEGATIVE

## 2018-01-30 PROCEDURE — 7100000031 HC ASPR PHASE II RECOVERY - ADDTL 15 MIN: Performed by: INTERNAL MEDICINE

## 2018-01-30 PROCEDURE — 88305 TISSUE EXAM BY PATHOLOGIST: CPT

## 2018-01-30 PROCEDURE — 43239 EGD BIOPSY SINGLE/MULTIPLE: CPT | Performed by: INTERNAL MEDICINE

## 2018-01-30 PROCEDURE — 3609012400 HC EGD TRANSORAL BIOPSY SINGLE/MULTIPLE: Performed by: INTERNAL MEDICINE

## 2018-01-30 PROCEDURE — 2580000003 HC RX 258: Performed by: NURSE ANESTHETIST, CERTIFIED REGISTERED

## 2018-01-30 PROCEDURE — 7100000030 HC ASPR PHASE II RECOVERY - FIRST 15 MIN: Performed by: INTERNAL MEDICINE

## 2018-01-30 PROCEDURE — 7100000000 HC PACU RECOVERY - FIRST 15 MIN: Performed by: INTERNAL MEDICINE

## 2018-01-30 PROCEDURE — 3700000000 HC ANESTHESIA ATTENDED CARE: Performed by: INTERNAL MEDICINE

## 2018-01-30 PROCEDURE — 84703 CHORIONIC GONADOTROPIN ASSAY: CPT

## 2018-01-30 PROCEDURE — 6360000002 HC RX W HCPCS: Performed by: NURSE ANESTHETIST, CERTIFIED REGISTERED

## 2018-01-30 PROCEDURE — 3700000001 HC ADD 15 MINUTES (ANESTHESIA): Performed by: INTERNAL MEDICINE

## 2018-01-30 PROCEDURE — 2580000003 HC RX 258: Performed by: INTERNAL MEDICINE

## 2018-01-30 PROCEDURE — 7100000001 HC PACU RECOVERY - ADDTL 15 MIN: Performed by: INTERNAL MEDICINE

## 2018-01-30 RX ORDER — FENTANYL CITRATE 50 UG/ML
50 INJECTION, SOLUTION INTRAMUSCULAR; INTRAVENOUS EVERY 5 MIN PRN
Status: DISCONTINUED | OUTPATIENT
Start: 2018-01-30 | End: 2018-01-30 | Stop reason: HOSPADM

## 2018-01-30 RX ORDER — ONDANSETRON 2 MG/ML
4 INJECTION INTRAMUSCULAR; INTRAVENOUS
Status: DISCONTINUED | OUTPATIENT
Start: 2018-01-30 | End: 2018-01-30 | Stop reason: HOSPADM

## 2018-01-30 RX ORDER — HYDRALAZINE HYDROCHLORIDE 20 MG/ML
5 INJECTION INTRAMUSCULAR; INTRAVENOUS EVERY 10 MIN PRN
Status: DISCONTINUED | OUTPATIENT
Start: 2018-01-30 | End: 2018-01-30 | Stop reason: HOSPADM

## 2018-01-30 RX ORDER — HYDROCODONE BITARTRATE AND ACETAMINOPHEN 5; 325 MG/1; MG/1
1 TABLET ORAL PRN
Status: DISCONTINUED | OUTPATIENT
Start: 2018-01-30 | End: 2018-01-30 | Stop reason: HOSPADM

## 2018-01-30 RX ORDER — HYDROCODONE BITARTRATE AND ACETAMINOPHEN 5; 325 MG/1; MG/1
2 TABLET ORAL PRN
Status: DISCONTINUED | OUTPATIENT
Start: 2018-01-30 | End: 2018-01-30 | Stop reason: HOSPADM

## 2018-01-30 RX ORDER — DIPHENHYDRAMINE HYDROCHLORIDE 50 MG/ML
12.5 INJECTION INTRAMUSCULAR; INTRAVENOUS
Status: DISCONTINUED | OUTPATIENT
Start: 2018-01-30 | End: 2018-01-30 | Stop reason: HOSPADM

## 2018-01-30 RX ORDER — PROPOFOL 10 MG/ML
INJECTION, EMULSION INTRAVENOUS PRN
Status: DISCONTINUED | OUTPATIENT
Start: 2018-01-30 | End: 2018-01-30 | Stop reason: SDUPTHER

## 2018-01-30 RX ORDER — METOCLOPRAMIDE HYDROCHLORIDE 5 MG/ML
10 INJECTION INTRAMUSCULAR; INTRAVENOUS
Status: DISCONTINUED | OUTPATIENT
Start: 2018-01-30 | End: 2018-01-30 | Stop reason: HOSPADM

## 2018-01-30 RX ORDER — LABETALOL HYDROCHLORIDE 5 MG/ML
5 INJECTION, SOLUTION INTRAVENOUS EVERY 10 MIN PRN
Status: DISCONTINUED | OUTPATIENT
Start: 2018-01-30 | End: 2018-01-30 | Stop reason: HOSPADM

## 2018-01-30 RX ORDER — SODIUM CHLORIDE, SODIUM LACTATE, POTASSIUM CHLORIDE, CALCIUM CHLORIDE 600; 310; 30; 20 MG/100ML; MG/100ML; MG/100ML; MG/100ML
INJECTION, SOLUTION INTRAVENOUS CONTINUOUS PRN
Status: DISCONTINUED | OUTPATIENT
Start: 2018-01-30 | End: 2018-01-30 | Stop reason: SDUPTHER

## 2018-01-30 RX ORDER — SODIUM CHLORIDE, SODIUM LACTATE, POTASSIUM CHLORIDE, CALCIUM CHLORIDE 600; 310; 30; 20 MG/100ML; MG/100ML; MG/100ML; MG/100ML
INJECTION, SOLUTION INTRAVENOUS CONTINUOUS
Status: DISCONTINUED | OUTPATIENT
Start: 2018-01-30 | End: 2018-01-30 | Stop reason: HOSPADM

## 2018-01-30 RX ORDER — MORPHINE SULFATE 2 MG/ML
2 INJECTION, SOLUTION INTRAMUSCULAR; INTRAVENOUS EVERY 5 MIN PRN
Status: DISCONTINUED | OUTPATIENT
Start: 2018-01-30 | End: 2018-01-30 | Stop reason: HOSPADM

## 2018-01-30 RX ORDER — FENTANYL CITRATE 50 UG/ML
25 INJECTION, SOLUTION INTRAMUSCULAR; INTRAVENOUS EVERY 5 MIN PRN
Status: DISCONTINUED | OUTPATIENT
Start: 2018-01-30 | End: 2018-01-30 | Stop reason: HOSPADM

## 2018-01-30 RX ORDER — MEPERIDINE HYDROCHLORIDE 50 MG/ML
12.5 INJECTION INTRAMUSCULAR; INTRAVENOUS; SUBCUTANEOUS EVERY 5 MIN PRN
Status: DISCONTINUED | OUTPATIENT
Start: 2018-01-30 | End: 2018-01-30 | Stop reason: HOSPADM

## 2018-01-30 RX ADMIN — PROPOFOL 100 MG: 10 INJECTION, EMULSION INTRAVENOUS at 12:40

## 2018-01-30 RX ADMIN — SODIUM CHLORIDE, POTASSIUM CHLORIDE, SODIUM LACTATE AND CALCIUM CHLORIDE: 600; 310; 30; 20 INJECTION, SOLUTION INTRAVENOUS at 11:15

## 2018-01-30 RX ADMIN — PROPOFOL 50 MG: 10 INJECTION, EMULSION INTRAVENOUS at 12:38

## 2018-01-30 RX ADMIN — SODIUM CHLORIDE, POTASSIUM CHLORIDE, SODIUM LACTATE AND CALCIUM CHLORIDE: 600; 310; 30; 20 INJECTION, SOLUTION INTRAVENOUS at 12:36

## 2018-01-30 RX ADMIN — PROPOFOL 50 MG: 10 INJECTION, EMULSION INTRAVENOUS at 12:36

## 2018-01-30 ASSESSMENT — PULMONARY FUNCTION TESTS
PIF_VALUE: 1
PIF_VALUE: 3
PIF_VALUE: 3
PIF_VALUE: 1
PIF_VALUE: 5
PIF_VALUE: 5
PIF_VALUE: 1

## 2018-01-30 ASSESSMENT — PAIN SCALES - GENERAL
PAINLEVEL_OUTOF10: 0

## 2018-01-30 ASSESSMENT — PAIN - FUNCTIONAL ASSESSMENT: PAIN_FUNCTIONAL_ASSESSMENT: 0-10

## 2018-01-30 NOTE — ANESTHESIA PRE PROCEDURE
Department of Anesthesiology  Preprocedure Note       Name:  Pato Bennett   Age:  30 y.o.  :  1980                                          MRN:  [id number]         Date:  2018      Surgeon: Chaz Gale):  Everett Hawk MD    Procedure: Procedure(s):  EGD ESOPHAGOGASTRODUODENOSCOPY    Medications prior to admission:   Prior to Admission medications    Medication Sig Start Date End Date Taking? Authorizing Provider   pantoprazole (PROTONIX) 40 MG tablet Take 1 tablet by mouth daily 18  Yes Everett Hawk MD   LYRICA 75 MG capsule Take 75 mg by mouth 2 times daily  . 10/27/17  Yes Historical Provider, MD   albuterol (PROVENTIL) (2.5 MG/3ML) 0.083% nebulizer solution Take 3 mLs by nebulization every 4 hours as needed for Wheezing  Patient taking differently: Take 2.5 mg by nebulization 4 times daily  2/15/17  Yes Olivia Orourke MD   albuterol-ipratropium (COMBIVENT)  MCG/ACT inhaler Inhale 2 puffs into the lungs every 6 hours as needed for Wheezing   Yes Historical Provider, MD   SUMAtriptan (IMITREX) 100 MG tablet Take 100 mg by mouth once as needed for Migraine   Yes Historical Provider, MD   budesonide-formoterol (SYMBICORT) 160-4.5 MCG/ACT AERO Inhale 2 puffs into the lungs 2 times daily. 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 1XX0    Asthma

## 2018-01-30 NOTE — OP NOTE
PROCEDURE NOTE    DATE OF PROCEDURE: 1/30/2018     SURGEON: Judd Xie MD    ASSISTANT: None    PREOPERATIVE DIAGNOSIS: GERD  BARRETTS  HX OF HEMATEMESIS     POSTOPERATIVE DIAGNOSIS: As described below    OPERATION: Upper GI endoscopy with Biopsy    ANESTHESIA: MAC     ESTIMATED BLOOD LOSS: Less than 50 ml    COMPLICATIONS: None. SPECIMENS:  Was Obtained:     HISTORY: The patient is a 30y.o. year old female with history of above preop diagnosis. I recommended esophagogastroduodenoscopy with possible biopsy and I explained the risk, benefits, expected outcome, and alternatives to the procedure. Risks included but are not limited to bleeding, infection, respiratory distress, hypotension, and perforation of the esophagus, stomach, or duodenum. Patient understands and is in agreement. PROCEDURE: The patient was given IV conscious sedation. The patient's SPO2 remained above 90% throughout the procedure. The gastroscope was inserted orally and advanced under direct vision through the esophagus, through the stomach, through the pylorus, and into the descending duodenum. Findings:    Retropharyngeal area was grossly normal appearing    Esophagus: abnormal: THREE CM GATES'S BIOPSIES WERE TAKEN    LARGE 4-5 CM HIATAL HERNIA  VIVIENNE ULCERS SUP NON BLEEDING AT THE HIATUS    Stomach:    Fundus: abnormal: AS ABOVE    Body: normal    Antrum: normal    Duodenum:     Descending: normal    Bulb: normal    The scope was removed and the patient tolerated the procedure well. Recommendations/Plan:   1. F/U Biopsies  2. F/U In Office in 3-4 weeks  3. Discussed with the family  4.  Post sedation patient was stable with stable vital signs and stable O2 saturations    Electronically signed by Judd Xie MD  on 1/30/2018 at 12:44 PM

## 2018-01-31 LAB — SURGICAL PATHOLOGY REPORT: NORMAL

## 2018-01-31 NOTE — PROGRESS NOTES
Writer calls patient for post op phone call on 1/31/18 at 10:11 am  Pt reports her arm hurts where the iv was and hurts all the way though her arm. Patient reports she has tried ice and elevation. Writer suggest patient continue with ice and elevation and that site could take a long time to heal but if patient feels it needs to be looked at by a medical professional that she should contact Dr. Heather Marie or her primary care physician. Pt verbalizes understanding.

## 2018-02-01 ENCOUNTER — HOSPITAL ENCOUNTER (OUTPATIENT)
Dept: MRI IMAGING | Age: 38
Discharge: HOME | End: 2018-02-03
Payer: COMMERCIAL

## 2018-02-01 DIAGNOSIS — M76.822 POSTERIOR TIBIAL TENDINITIS, LEFT: ICD-10-CM

## 2018-02-01 PROCEDURE — 73718 MRI LOWER EXTREMITY W/O DYE: CPT

## 2018-02-12 ENCOUNTER — APPOINTMENT (OUTPATIENT)
Dept: GENERAL RADIOLOGY | Age: 38
End: 2018-02-12
Payer: COMMERCIAL

## 2018-02-12 ENCOUNTER — HOSPITAL ENCOUNTER (EMERGENCY)
Age: 38
Discharge: HOME | End: 2018-02-12
Attending: EMERGENCY MEDICINE
Payer: COMMERCIAL

## 2018-02-12 VITALS
HEART RATE: 83 BPM | BODY MASS INDEX: 36.25 KG/M2 | SYSTOLIC BLOOD PRESSURE: 117 MMHG | HEIGHT: 62 IN | DIASTOLIC BLOOD PRESSURE: 83 MMHG | TEMPERATURE: 97.8 F | WEIGHT: 197 LBS | OXYGEN SATURATION: 100 % | RESPIRATION RATE: 18 BRPM

## 2018-02-12 DIAGNOSIS — S69.92XA INJURY OF FINGER OF LEFT HAND, INITIAL ENCOUNTER: Primary | ICD-10-CM

## 2018-02-12 LAB
ABSOLUTE EOS #: 0.26 K/UL (ref 0–0.4)
ABSOLUTE IMMATURE GRANULOCYTE: ABNORMAL K/UL (ref 0–0.3)
ABSOLUTE LYMPH #: 1.57 K/UL (ref 1–4.8)
ABSOLUTE MONO #: 0.61 K/UL (ref 0.1–1.3)
ANION GAP SERPL CALCULATED.3IONS-SCNC: 11 MMOL/L (ref 9–17)
BASOPHILS # BLD: 0 % (ref 0–2)
BASOPHILS ABSOLUTE: 0 K/UL (ref 0–0.2)
BUN BLDV-MCNC: 18 MG/DL (ref 6–20)
BUN/CREAT BLD: NORMAL (ref 9–20)
C-REACTIVE PROTEIN: 2.5 MG/L (ref 0–5)
CALCIUM SERPL-MCNC: 9 MG/DL (ref 8.6–10.4)
CHLORIDE BLD-SCNC: 105 MMOL/L (ref 98–107)
CO2: 23 MMOL/L (ref 20–31)
CREAT SERPL-MCNC: 0.77 MG/DL (ref 0.5–0.9)
DIFFERENTIAL TYPE: ABNORMAL
EOSINOPHILS RELATIVE PERCENT: 3 % (ref 0–4)
GFR AFRICAN AMERICAN: >60 ML/MIN
GFR NON-AFRICAN AMERICAN: >60 ML/MIN
GFR SERPL CREATININE-BSD FRML MDRD: NORMAL ML/MIN/{1.73_M2}
GFR SERPL CREATININE-BSD FRML MDRD: NORMAL ML/MIN/{1.73_M2}
GLUCOSE BLD-MCNC: 87 MG/DL (ref 70–99)
HCT VFR BLD CALC: 38.7 % (ref 36–46)
HEMOGLOBIN: 12.2 G/DL (ref 12–16)
IMMATURE GRANULOCYTES: ABNORMAL %
LYMPHOCYTES # BLD: 18 % (ref 24–44)
MCH RBC QN AUTO: 23.2 PG (ref 26–34)
MCHC RBC AUTO-ENTMCNC: 31.5 G/DL (ref 31–37)
MCV RBC AUTO: 73.5 FL (ref 80–100)
MONOCYTES # BLD: 7 % (ref 1–7)
MORPHOLOGY: ABNORMAL
NRBC AUTOMATED: ABNORMAL PER 100 WBC
PDW BLD-RTO: 16.8 % (ref 11.5–14.9)
PLATELET # BLD: 336 K/UL (ref 150–450)
PLATELET ESTIMATE: ABNORMAL
PMV BLD AUTO: 8.4 FL (ref 6–12)
POTASSIUM SERPL-SCNC: 4.4 MMOL/L (ref 3.7–5.3)
RBC # BLD: 5.27 M/UL (ref 4–5.2)
RBC # BLD: ABNORMAL 10*6/UL
SEDIMENTATION RATE, ERYTHROCYTE: 6 MM (ref 0–20)
SEG NEUTROPHILS: 72 % (ref 36–66)
SEGMENTED NEUTROPHILS ABSOLUTE COUNT: 6.26 K/UL (ref 1.3–9.1)
SODIUM BLD-SCNC: 139 MMOL/L (ref 135–144)
WBC # BLD: 8.7 K/UL (ref 3.5–11)
WBC # BLD: ABNORMAL 10*3/UL

## 2018-02-12 PROCEDURE — 85651 RBC SED RATE NONAUTOMATED: CPT

## 2018-02-12 PROCEDURE — 99283 EMERGENCY DEPT VISIT LOW MDM: CPT

## 2018-02-12 PROCEDURE — 85025 COMPLETE CBC W/AUTO DIFF WBC: CPT

## 2018-02-12 PROCEDURE — 36415 COLL VENOUS BLD VENIPUNCTURE: CPT

## 2018-02-12 PROCEDURE — 73130 X-RAY EXAM OF HAND: CPT

## 2018-02-12 PROCEDURE — 6370000000 HC RX 637 (ALT 250 FOR IP): Performed by: NURSE PRACTITIONER

## 2018-02-12 PROCEDURE — 86140 C-REACTIVE PROTEIN: CPT

## 2018-02-12 PROCEDURE — 80048 BASIC METABOLIC PNL TOTAL CA: CPT

## 2018-02-12 RX ORDER — ACETAMINOPHEN AND CODEINE PHOSPHATE 300; 30 MG/1; MG/1
1 TABLET ORAL EVERY 8 HOURS PRN
Qty: 6 TABLET | Refills: 0 | Status: SHIPPED | OUTPATIENT
Start: 2018-02-12 | End: 2018-02-14

## 2018-02-12 RX ORDER — TRAMADOL HYDROCHLORIDE 50 MG/1
50 TABLET ORAL EVERY 6 HOURS PRN
Qty: 9 TABLET | Refills: 0 | Status: SHIPPED | OUTPATIENT
Start: 2018-02-12 | End: 2018-02-12

## 2018-02-12 RX ORDER — METHYLPREDNISOLONE 4 MG/1
TABLET ORAL
Qty: 1 KIT | Refills: 0 | Status: SHIPPED | OUTPATIENT
Start: 2018-02-12 | End: 2018-02-12

## 2018-02-12 RX ORDER — HYDROCODONE BITARTRATE AND ACETAMINOPHEN 5; 325 MG/1; MG/1
2 TABLET ORAL ONCE
Status: COMPLETED | OUTPATIENT
Start: 2018-02-12 | End: 2018-02-12

## 2018-02-12 RX ADMIN — HYDROCODONE BITARTRATE AND ACETAMINOPHEN 2 TABLET: 5; 325 TABLET ORAL at 11:20

## 2018-02-12 ASSESSMENT — PAIN SCALES - GENERAL
PAINLEVEL_OUTOF10: 7
PAINLEVEL_OUTOF10: 4
PAINLEVEL_OUTOF10: 7

## 2018-02-12 ASSESSMENT — PAIN DESCRIPTION - ORIENTATION
ORIENTATION: LEFT
ORIENTATION: LEFT

## 2018-02-12 ASSESSMENT — ENCOUNTER SYMPTOMS
TROUBLE SWALLOWING: 0
COUGH: 0
SHORTNESS OF BREATH: 0
NAUSEA: 0
VOMITING: 0

## 2018-02-12 ASSESSMENT — PAIN DESCRIPTION - LOCATION
LOCATION: FINGER (COMMENT WHICH ONE)
LOCATION: FINGER (COMMENT WHICH ONE)

## 2018-02-12 ASSESSMENT — PAIN DESCRIPTION - PAIN TYPE: TYPE: ACUTE PAIN

## 2018-02-12 NOTE — PROGRESS NOTES
Pharmacy Note  Vancomycin Consult    Emily John is a 30 y.o. female started on Vancomycin for finger pain; consult received from Mojgan Tong CNP to manage therapy. 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    Community acquired pneumonia    Hypokalemia    Iron deficiency anemia    Asthma with status asthmaticus    Degenerative disc disease, cervical    Cervical disc herniation    Cervical radicular pain    Acute cystitis    Precordial pain    Asthma exacerbation in COPD ([facility] [address] [facility]    RUQ pain    Morbid obesity due to excess calories ([facility] [address] [facility]    Gastroesophageal reflux disease with esophagitis    Tobacco use    Asthma with acute exacerbation    Sadler's esophagus without dysplasia    Hiatal hernia    Hypercalcemia    COPD exacerbation (HCC)    Odynophagia       Allergies:  Nsaids; Toradol [ketorolac tromethamine]; and Ultram [tramadol]     Temp max: NA    No results for input(s): BUN in the last 72 hours. No results for input(s): CREATININE in the last 72 hours. No results for input(s): WBC in the last 72 hours. No intake or output data in the 24 hours ending 02/12/18 1123    Culture Date      Source                       Results  NA    Ht Readings from Last 1 Encounters:   02/12/18 5' 2\" (1.575 m)        Wt Readings from Last 1 Encounters:   02/12/18 197 lb (89.4 kg)         Body mass index is 36.03 kg/m². CrCl cannot be calculated (Patient's most recent lab result is older than the maximum 10 days allowed. ). Assessment/Plan:  Will initiate vancomycin 1250 mg IV once in ED. Further dosing to be determined when labs available. Timing of trough level will be determined based on culture results, renal function, and clinical response. Thank you for the consult.   Will

## 2018-02-12 NOTE — ED PROVIDER NOTES
[address] ED  eMERGENCY dEPARTMENT eNCOUnter   Attending Attestation     Pt Name: Kamari Romero  MRN: [id number]  [place] 1980  Date of evaluation: 2/12/18   Kamari Romero is a 30 y.o. female with CC: Finger Pain (lt index finger)    MDM:   Jammed her finger pushing against her . I think she may have tendinopathy. No evidence of acute infection. I don't think it's flexor tenosynovitis. APC discuss with hand surgeon. Having her apply splint. I personally evaluated and examined the patient in conjunction with the APC and agree with the assessment, treatment plan, and disposition of the patient as recorded by the APC.    Ariadna Randolph MD  Attending Emergency Physician             Ariadna Randolph MD  02/12/18 2996
[id number]  [phone]    In 1 day  Follow up visit    [address] [facility]  [place] [address]  [phone]  Schedule an appointment as soon as possible for a visit   Follow up visit      DISCHARGE MEDICATIONS:  Discharge Medication List as of 2/12/2018  1:27 PM      START taking these medications    Details   methylPREDNISolone (MEDROL, HELDER,) 4 MG tablet Take by mouth., Disp-1 kit, R-0Print             (Please note that portions of this note were completed with a voice recognition program.  Efforts were made to edit the dictations but occasionally words are mis-transcribed.)    Ganesh Koehler, CNP  02/12/18 6369

## 2018-02-28 ENCOUNTER — HOSPITAL ENCOUNTER (EMERGENCY)
Age: 38
Discharge: ANOTHER HOSPITAL | End: 2018-02-28
Attending: EMERGENCY MEDICINE
Payer: COMMERCIAL

## 2018-02-28 ENCOUNTER — APPOINTMENT (OUTPATIENT)
Dept: CT IMAGING | Age: 38
End: 2018-02-28
Payer: COMMERCIAL

## 2018-02-28 VITALS
BODY MASS INDEX: 36.25 KG/M2 | WEIGHT: 197 LBS | HEIGHT: 62 IN | HEART RATE: 81 BPM | TEMPERATURE: 98.1 F | RESPIRATION RATE: 20 BRPM | SYSTOLIC BLOOD PRESSURE: 135 MMHG | DIASTOLIC BLOOD PRESSURE: 79 MMHG | OXYGEN SATURATION: 95 %

## 2018-02-28 DIAGNOSIS — L02.11 NECK ABSCESS: Primary | ICD-10-CM

## 2018-02-28 LAB
ABSOLUTE EOS #: 0.62 K/UL (ref 0–0.4)
ABSOLUTE IMMATURE GRANULOCYTE: ABNORMAL K/UL (ref 0–0.3)
ABSOLUTE LYMPH #: 1.58 K/UL (ref 1–4.8)
ABSOLUTE MONO #: 0.62 K/UL (ref 0.1–1.3)
ANION GAP SERPL CALCULATED.3IONS-SCNC: 12 MMOL/L (ref 9–17)
BASOPHILS # BLD: 2 % (ref 0–2)
BASOPHILS ABSOLUTE: 0.18 K/UL (ref 0–0.2)
BUN BLDV-MCNC: 14 MG/DL (ref 6–20)
BUN/CREAT BLD: NORMAL (ref 9–20)
C-REACTIVE PROTEIN: 33.5 MG/L (ref 0–5)
CALCIUM SERPL-MCNC: 9.1 MG/DL (ref 8.6–10.4)
CHLORIDE BLD-SCNC: 99 MMOL/L (ref 98–107)
CO2: 27 MMOL/L (ref 20–31)
CREAT SERPL-MCNC: 0.74 MG/DL (ref 0.5–0.9)
DIFFERENTIAL TYPE: ABNORMAL
EOSINOPHILS RELATIVE PERCENT: 7 % (ref 0–4)
GFR AFRICAN AMERICAN: >60 ML/MIN
GFR NON-AFRICAN AMERICAN: >60 ML/MIN
GFR SERPL CREATININE-BSD FRML MDRD: NORMAL ML/MIN/{1.73_M2}
GFR SERPL CREATININE-BSD FRML MDRD: NORMAL ML/MIN/{1.73_M2}
GLUCOSE BLD-MCNC: 85 MG/DL (ref 70–99)
HCG QUALITATIVE: NEGATIVE
HCT VFR BLD CALC: 36.7 % (ref 36–46)
HEMOGLOBIN: 11.9 G/DL (ref 12–16)
IMMATURE GRANULOCYTES: ABNORMAL %
LYMPHOCYTES # BLD: 18 % (ref 24–44)
MCH RBC QN AUTO: 23.8 PG (ref 26–34)
MCHC RBC AUTO-ENTMCNC: 32.4 G/DL (ref 31–37)
MCV RBC AUTO: 73.3 FL (ref 80–100)
MONOCYTES # BLD: 7 % (ref 1–7)
MORPHOLOGY: ABNORMAL
NRBC AUTOMATED: ABNORMAL PER 100 WBC
PDW BLD-RTO: 17 % (ref 11.5–14.9)
PLATELET # BLD: 359 K/UL (ref 150–450)
PLATELET ESTIMATE: ABNORMAL
PMV BLD AUTO: 8.5 FL (ref 6–12)
POTASSIUM SERPL-SCNC: 4.3 MMOL/L (ref 3.7–5.3)
RBC # BLD: 5.01 M/UL (ref 4–5.2)
RBC # BLD: ABNORMAL 10*6/UL
SEDIMENTATION RATE, ERYTHROCYTE: 29 MM (ref 0–20)
SEG NEUTROPHILS: 66 % (ref 36–66)
SEGMENTED NEUTROPHILS ABSOLUTE COUNT: 5.8 K/UL (ref 1.3–9.1)
SODIUM BLD-SCNC: 138 MMOL/L (ref 135–144)
WBC # BLD: 8.8 K/UL (ref 3.5–11)
WBC # BLD: ABNORMAL 10*3/UL

## 2018-02-28 PROCEDURE — 70491 CT SOFT TISSUE NECK W/DYE: CPT

## 2018-02-28 PROCEDURE — 6360000004 HC RX CONTRAST MEDICATION: Performed by: EMERGENCY MEDICINE

## 2018-02-28 PROCEDURE — 96366 THER/PROPH/DIAG IV INF ADDON: CPT

## 2018-02-28 PROCEDURE — 36415 COLL VENOUS BLD VENIPUNCTURE: CPT

## 2018-02-28 PROCEDURE — 86140 C-REACTIVE PROTEIN: CPT

## 2018-02-28 PROCEDURE — 84703 CHORIONIC GONADOTROPIN ASSAY: CPT

## 2018-02-28 PROCEDURE — 85025 COMPLETE CBC W/AUTO DIFF WBC: CPT

## 2018-02-28 PROCEDURE — 2580000003 HC RX 258: Performed by: EMERGENCY MEDICINE

## 2018-02-28 PROCEDURE — 96365 THER/PROPH/DIAG IV INF INIT: CPT

## 2018-02-28 PROCEDURE — 96375 TX/PRO/DX INJ NEW DRUG ADDON: CPT

## 2018-02-28 PROCEDURE — 85651 RBC SED RATE NONAUTOMATED: CPT

## 2018-02-28 PROCEDURE — 96376 TX/PRO/DX INJ SAME DRUG ADON: CPT

## 2018-02-28 PROCEDURE — 80048 BASIC METABOLIC PNL TOTAL CA: CPT

## 2018-02-28 PROCEDURE — 6360000002 HC RX W HCPCS: Performed by: EMERGENCY MEDICINE

## 2018-02-28 PROCEDURE — 99285 EMERGENCY DEPT VISIT HI MDM: CPT

## 2018-02-28 RX ORDER — 0.9 % SODIUM CHLORIDE 0.9 %
100 INTRAVENOUS SOLUTION INTRAVENOUS ONCE
Status: COMPLETED | OUTPATIENT
Start: 2018-02-28 | End: 2018-02-28

## 2018-02-28 RX ORDER — SODIUM CHLORIDE 0.9 % (FLUSH) 0.9 %
10 SYRINGE (ML) INJECTION PRN
Status: DISCONTINUED | OUTPATIENT
Start: 2018-02-28 | End: 2018-03-01 | Stop reason: HOSPADM

## 2018-02-28 RX ORDER — LORAZEPAM 2 MG/ML
1 INJECTION INTRAMUSCULAR ONCE
Status: COMPLETED | OUTPATIENT
Start: 2018-02-28 | End: 2018-02-28

## 2018-02-28 RX ORDER — DEXAMETHASONE SODIUM PHOSPHATE 4 MG/ML
10 INJECTION, SOLUTION INTRA-ARTICULAR; INTRALESIONAL; INTRAMUSCULAR; INTRAVENOUS; SOFT TISSUE ONCE
Status: COMPLETED | OUTPATIENT
Start: 2018-02-28 | End: 2018-02-28

## 2018-02-28 RX ORDER — MORPHINE SULFATE 4 MG/ML
4 INJECTION, SOLUTION INTRAMUSCULAR; INTRAVENOUS ONCE
Status: COMPLETED | OUTPATIENT
Start: 2018-02-28 | End: 2018-02-28

## 2018-02-28 RX ORDER — ACETAMINOPHEN 500 MG
1000 TABLET ORAL ONCE
Status: DISCONTINUED | OUTPATIENT
Start: 2018-02-28 | End: 2018-03-01 | Stop reason: HOSPADM

## 2018-02-28 RX ADMIN — MORPHINE SULFATE 4 MG: 4 INJECTION, SOLUTION INTRAMUSCULAR; INTRAVENOUS at 23:01

## 2018-02-28 RX ADMIN — IOPAMIDOL 70 ML: 755 INJECTION, SOLUTION INTRAVENOUS at 20:13

## 2018-02-28 RX ADMIN — MORPHINE SULFATE 4 MG: 4 INJECTION, SOLUTION INTRAMUSCULAR; INTRAVENOUS at 20:08

## 2018-02-28 RX ADMIN — LORAZEPAM 1 MG: 2 INJECTION INTRAMUSCULAR; INTRAVENOUS at 23:01

## 2018-02-28 RX ADMIN — LORAZEPAM 1 MG: 2 INJECTION INTRAMUSCULAR; INTRAVENOUS at 20:08

## 2018-02-28 RX ADMIN — SODIUM CHLORIDE 100 ML: 9 INJECTION, SOLUTION INTRAVENOUS at 20:13

## 2018-02-28 RX ADMIN — DEXAMETHASONE SODIUM PHOSPHATE 10 MG: 4 INJECTION, SOLUTION INTRAMUSCULAR; INTRAVENOUS at 19:45

## 2018-02-28 RX ADMIN — VANCOMYCIN HYDROCHLORIDE 1250 MG: 1 INJECTION, POWDER, LYOPHILIZED, FOR SOLUTION INTRAVENOUS at 21:27

## 2018-02-28 RX ADMIN — Medication 10 ML: at 20:13

## 2018-02-28 ASSESSMENT — PAIN DESCRIPTION - DESCRIPTORS: DESCRIPTORS: STABBING

## 2018-02-28 ASSESSMENT — PAIN DESCRIPTION - LOCATION: LOCATION: NECK

## 2018-02-28 ASSESSMENT — PAIN DESCRIPTION - PAIN TYPE: TYPE: ACUTE PAIN

## 2018-02-28 ASSESSMENT — PAIN DESCRIPTION - ORIENTATION: ORIENTATION: LEFT;ANTERIOR

## 2018-02-28 ASSESSMENT — PAIN SCALES - GENERAL
PAINLEVEL_OUTOF10: 0
PAINLEVEL_OUTOF10: 9
PAINLEVEL_OUTOF10: 9
PAINLEVEL_OUTOF10: 8

## 2018-02-28 ASSESSMENT — PAIN DESCRIPTION - ONSET: ONSET: SUDDEN

## 2018-03-01 NOTE — ED NOTES
Pt requested pain medication. Tylenol offered as order by physician. Pt refusing to take it states \" pain medications my ass. .. I was just cut open on my neck, I take percocet at home. ..tylenol does shit\". Pt educated educated on pain management, pt refusing education, screams to RN  And states \"I want to go home, you and the doctor treat me like a drug addict. \". Pt educated she is not being forced to stay or being transferred. Pt educated no one told her she is a \"drug addict\" as she states and that medications are  being offered for her. Pt refuses and starts crying and yelling \"You treat me like a dolphin, you don't care about people  in pain\". Rn redirected pt to not scream like that and to decrease her tone but pt refuses. RN stepped out of room. Will continue to monitor. Call light within reach. Md made aware.       Aditi , RN  02/28/18 1635

## 2018-03-01 NOTE — ED PROVIDER NOTES
in the HPI. PAST MEDICAL HISTORY    has a past medical history of Anxiety; Arthritis; Asthma; Sadler's esophagus; Bipolar 1 disorder ([address] Diabetes mellitus ([address] Esophagitis; GERD (gastroesophageal reflux disease); Headache(784.0); Herniated disc, cervical; Hiatal hernia; Kidney stones; Pleurisy; UTI (urinary tract infection); and Vision abnormalities. SURGICAL HISTORY      has a past surgical history that includes knee surgery (Left);  section; Tonsillectomy; Knee arthroscopy (Left, 5/20/15); Cervical disc surgery; Upper gastrointestinal endoscopy (2016); Upper gastrointestinal endoscopy (2017); Upper gastrointestinal endoscopy (2017); pr esophagogastroduodenoscopy transoral diagnostic (N/A, 3/2/2017); laparoscopy; laparoscopy (N/A, 10/5/2017); and Upper gastrointestinal endoscopy (N/A, 2018). CURRENT MEDICATIONS       Previous Medications    ALBUTEROL (PROVENTIL) (2.5 MG/3ML) 0.083% NEBULIZER SOLUTION    Take 3 mLs by nebulization every 4 hours as needed for Wheezing    ALBUTEROL-IPRATROPIUM (COMBIVENT)  MCG/ACT INHALER    Inhale 2 puffs into the lungs every 6 hours as needed for Wheezing    BUDESONIDE-FORMOTEROL (SYMBICORT) 160-4.5 MCG/ACT AERO    Inhale 2 puffs into the lungs 2 times daily. LYRICA 75 MG CAPSULE    Take 75 mg by mouth 2 times daily  . PANTOPRAZOLE (PROTONIX) 40 MG TABLET    Take 1 tablet by mouth daily    SUMATRIPTAN (IMITREX) 100 MG TABLET    Take 100 mg by mouth once as needed for Migraine       ALLERGIES     is allergic to nsaids; toradol [ketorolac tromethamine]; and ultram [tramadol]. FAMILY HISTORY     indicated that the status of her mother is unknown. She indicated that the status of her brother is unknown.      family history includes Bipolar Disorder in her brother and mother; Cancer in her mother. SOCIAL HISTORY      reports that she has been smoking Cigarettes. She has a 17.00 pack-year smoking history.  She has We'll get CT scan of the neck due to concerns for developing abscess. We'll get basic flutter. We'll give dose of Decadron, pain medications and reassess. DIAGNOSTIC RESULTS     EKG: All EKG's are interpreted by the Emergency Department Physician who either signs or Co-signs this chart in the absence of a cardiologist.        RADIOLOGY:   I directly visualized the following  images and reviewed the radiologist interpretations:  CT SOFT TISSUE NECK W CONTRAST   Final Result   Diffuse prevertebral soft tissue swelling with deep space abscess in the left   prevertebral soft tissues, extending from approximately C4-C6. No evidence   of extension into the mediastinum. Left anterior neck fluid collection without peripheral enhancement to suggest   an organized abscess, however still cannot exclude superinfection. Mediastinal lymphadenopathy, similar to prior exam.  Differential   considerations include chronic reactive lymphadenopathy, lymphoproliferative   process or infiltrative disorder such as sarcoidosis.       Critical results were called by Dr. Melvina Vázquez to Samantha Conklin on   2/28/2018 at 20:53                 ED BEDSIDE ULTRASOUND:      LABS:  Labs Reviewed   CBC WITH AUTO DIFFERENTIAL - Abnormal; Notable for the following:        Result Value    Hemoglobin 11.9 (*)     MCV 73.3 (*)     MCH 23.8 (*)     RDW 17.0 (*)     Lymphocytes 18 (*)     Eosinophils % 7 (*)     Absolute Eos # 0.62 (*)     All other components within normal limits   C-REACTIVE PROTEIN - Abnormal; Notable for the following:     CRP 33.5 (*)     All other components within normal limits   SEDIMENTATION RATE - Abnormal; Notable for the following:     Sed Rate 29 (*)     All other components within normal limits   BASIC METABOLIC PANEL   HCG, SERUM, QUALITATIVE         EMERGENCY DEPARTMENT COURSE:   Vitals:    Vitals:    02/28/18 1844 02/28/18 1949 02/28/18 2047 02/28/18 2113   BP: 139/80 111/71 113/73 (!) 136/98   Pulse:

## 2018-03-01 NOTE — ED NOTES
[address] called with ETA for 2am. Charge nurse Joanne spoke to them regarding time and see if other ambulances are available for a faster service. [facility] called back with report that they are contacting [facility] for services and will call back with NADIA or pertinent information regarding transport.       Zoila Martinez, RN  02/28/18 2557 Jazzmine Perea, RN  02/28/18 9666

## 2018-03-01 NOTE — ED NOTES
Pt is going to be transported to [address] [place] [address] due to surgeon preference. Dr. Cristobal Owusu has a current status at [facility]. V's but is requesting that the patient be seen at [address] [place] [address] ED. Simran Cole admin on call and informed of situation. Pt will go to [address] [place] [address] so she can be evaluated by the surgeon who preformed the surgery.       Itzel Deleon RN  02/28/18 2706

## 2018-03-01 NOTE — ED NOTES
Pt left via ambulance [facility].  Report given to [facility] at [address], RN  02/28/18 2253

## 2018-03-26 ENCOUNTER — OFFICE VISIT (OUTPATIENT)
Dept: GASTROENTEROLOGY | Age: 38
End: 2018-03-26
Payer: COMMERCIAL

## 2018-03-26 DIAGNOSIS — K62.89 RECTAL PAIN: ICD-10-CM

## 2018-03-26 DIAGNOSIS — K62.5 RECTAL BLEEDING: ICD-10-CM

## 2018-03-26 DIAGNOSIS — K22.70 BARRETT'S ESOPHAGUS WITHOUT DYSPLASIA: Primary | ICD-10-CM

## 2018-03-26 DIAGNOSIS — K59.00 CONSTIPATION, UNSPECIFIED CONSTIPATION TYPE: ICD-10-CM

## 2018-03-26 PROCEDURE — G8484 FLU IMMUNIZE NO ADMIN: HCPCS | Performed by: INTERNAL MEDICINE

## 2018-03-26 PROCEDURE — G8427 DOCREV CUR MEDS BY ELIG CLIN: HCPCS | Performed by: INTERNAL MEDICINE

## 2018-03-26 PROCEDURE — 4004F PT TOBACCO SCREEN RCVD TLK: CPT | Performed by: INTERNAL MEDICINE

## 2018-03-26 PROCEDURE — G8417 CALC BMI ABV UP PARAM F/U: HCPCS | Performed by: INTERNAL MEDICINE

## 2018-03-26 PROCEDURE — 99214 OFFICE O/P EST MOD 30 MIN: CPT | Performed by: INTERNAL MEDICINE

## 2018-03-26 RX ORDER — OXYCODONE HYDROCHLORIDE AND ACETAMINOPHEN 5; 325 MG/1; MG/1
1 TABLET ORAL EVERY 6 HOURS PRN
COMMUNITY
End: 2018-05-11

## 2018-03-26 ASSESSMENT — ENCOUNTER SYMPTOMS
DIARRHEA: 0
ABDOMINAL DISTENTION: 1
SINUS PRESSURE: 0
VOMITING: 1
ABDOMINAL PAIN: 1
RECTAL PAIN: 1
ANAL BLEEDING: 1
VOICE CHANGE: 0
BLOOD IN STOOL: 0
CONSTIPATION: 1
NAUSEA: 0
ALLERGIC/IMMUNOLOGIC NEGATIVE: 1
WHEEZING: 1
SORE THROAT: 1
TROUBLE SWALLOWING: 1

## 2018-03-27 ENCOUNTER — TELEPHONE (OUTPATIENT)
Dept: GASTROENTEROLOGY | Age: 38
End: 2018-03-27

## 2018-03-27 DIAGNOSIS — K22.70 BARRETT'S ESOPHAGUS WITHOUT DYSPLASIA: ICD-10-CM

## 2018-03-27 DIAGNOSIS — K44.9 HIATAL HERNIA: ICD-10-CM

## 2018-03-27 DIAGNOSIS — K21.00 GASTROESOPHAGEAL REFLUX DISEASE WITH ESOPHAGITIS: Primary | ICD-10-CM

## 2018-03-27 RX ORDER — POLYETHYLENE GLYCOL 3350 17 G/17G
POWDER, FOR SOLUTION ORAL
Qty: 255 G | Refills: 0 | Status: SHIPPED | OUTPATIENT
Start: 2018-03-27 | End: 2018-04-24 | Stop reason: SDUPTHER

## 2018-04-26 RX ORDER — POLYETHYLENE GLYCOL 3350 17 G/17G
POWDER, FOR SOLUTION ORAL
Qty: 255 G | Refills: 0 | Status: SHIPPED | OUTPATIENT
Start: 2018-04-26 | End: 2018-08-01

## 2018-05-06 ENCOUNTER — HOSPITAL ENCOUNTER (OUTPATIENT)
Age: 38
Setting detail: OBSERVATION
Discharge: HOME | End: 2018-05-07
Attending: EMERGENCY MEDICINE | Admitting: SURGERY
Payer: COMMERCIAL

## 2018-05-06 DIAGNOSIS — Z34.90 INTRAUTERINE PREGNANCY: Primary | ICD-10-CM

## 2018-05-06 DIAGNOSIS — O02.81 ELEVATED LEVEL OF QUANTITATIVE HCG FOR GESTATIONAL AGE IN EARLY PREGNANCY: ICD-10-CM

## 2018-05-06 DIAGNOSIS — R10.31 RIGHT LOWER QUADRANT ABDOMINAL PAIN: ICD-10-CM

## 2018-05-06 LAB
BILIRUBIN URINE: NEGATIVE
COLOR: YELLOW
COMMENT UA: NORMAL
GLUCOSE URINE: NEGATIVE
HCG(URINE) PREGNANCY TEST: POSITIVE
KETONES, URINE: NEGATIVE
LEUKOCYTE ESTERASE, URINE: NEGATIVE
NITRITE, URINE: NEGATIVE
PH UA: 6 (ref 5–8)
PROTEIN UA: NEGATIVE
SPECIFIC GRAVITY UA: 1.03 (ref 1–1.03)
TURBIDITY: CLEAR
URINE HGB: NEGATIVE
UROBILINOGEN, URINE: NORMAL

## 2018-05-06 PROCEDURE — 83690 ASSAY OF LIPASE: CPT

## 2018-05-06 PROCEDURE — 85025 COMPLETE CBC W/AUTO DIFF WBC: CPT

## 2018-05-06 PROCEDURE — 36415 COLL VENOUS BLD VENIPUNCTURE: CPT

## 2018-05-06 PROCEDURE — 6360000002 HC RX W HCPCS: Performed by: EMERGENCY MEDICINE

## 2018-05-06 PROCEDURE — 81003 URINALYSIS AUTO W/O SCOPE: CPT

## 2018-05-06 PROCEDURE — 80048 BASIC METABOLIC PNL TOTAL CA: CPT

## 2018-05-06 PROCEDURE — 2580000003 HC RX 258: Performed by: EMERGENCY MEDICINE

## 2018-05-06 PROCEDURE — 99285 EMERGENCY DEPT VISIT HI MDM: CPT

## 2018-05-06 PROCEDURE — 96374 THER/PROPH/DIAG INJ IV PUSH: CPT

## 2018-05-06 PROCEDURE — 96361 HYDRATE IV INFUSION ADD-ON: CPT

## 2018-05-06 PROCEDURE — 80076 HEPATIC FUNCTION PANEL: CPT

## 2018-05-06 PROCEDURE — 84703 CHORIONIC GONADOTROPIN ASSAY: CPT

## 2018-05-06 RX ORDER — ONDANSETRON 2 MG/ML
4 INJECTION INTRAMUSCULAR; INTRAVENOUS ONCE
Status: COMPLETED | OUTPATIENT
Start: 2018-05-06 | End: 2018-05-06

## 2018-05-06 RX ORDER — FENTANYL CITRATE 50 UG/ML
50 INJECTION, SOLUTION INTRAMUSCULAR; INTRAVENOUS ONCE
Status: COMPLETED | OUTPATIENT
Start: 2018-05-06 | End: 2018-05-07

## 2018-05-06 RX ORDER — 0.9 % SODIUM CHLORIDE 0.9 %
1000 INTRAVENOUS SOLUTION INTRAVENOUS ONCE
Status: COMPLETED | OUTPATIENT
Start: 2018-05-06 | End: 2018-05-07

## 2018-05-06 RX ADMIN — SODIUM CHLORIDE 1000 ML: 9 INJECTION, SOLUTION INTRAVENOUS at 23:47

## 2018-05-06 RX ADMIN — ONDANSETRON 4 MG: 2 INJECTION INTRAMUSCULAR; INTRAVENOUS at 23:47

## 2018-05-06 ASSESSMENT — PAIN DESCRIPTION - DESCRIPTORS: DESCRIPTORS: CONSTANT;CRAMPING

## 2018-05-06 ASSESSMENT — PAIN DESCRIPTION - LOCATION: LOCATION: ABDOMEN

## 2018-05-06 ASSESSMENT — PAIN DESCRIPTION - PAIN TYPE: TYPE: ACUTE PAIN

## 2018-05-06 ASSESSMENT — PAIN SCALES - GENERAL: PAINLEVEL_OUTOF10: 9

## 2018-05-07 ENCOUNTER — APPOINTMENT (OUTPATIENT)
Dept: ULTRASOUND IMAGING | Age: 38
End: 2018-05-07
Payer: COMMERCIAL

## 2018-05-07 VITALS
SYSTOLIC BLOOD PRESSURE: 103 MMHG | WEIGHT: 191.8 LBS | HEIGHT: 62 IN | RESPIRATION RATE: 18 BRPM | TEMPERATURE: 98.3 F | HEART RATE: 67 BPM | OXYGEN SATURATION: 98 % | DIASTOLIC BLOOD PRESSURE: 55 MMHG | BODY MASS INDEX: 35.3 KG/M2

## 2018-05-07 PROBLEM — K62.5 RECTAL BLEEDING: Chronic | Status: ACTIVE | Noted: 2018-03-26

## 2018-05-07 PROBLEM — R10.9 ABDOMINAL PAIN: Status: ACTIVE | Noted: 2018-05-07

## 2018-05-07 PROBLEM — R56.9 SEIZURE (HCC): Status: ACTIVE | Noted: 2017-12-29

## 2018-05-07 PROBLEM — K59.00 CONSTIPATION: Chronic | Status: ACTIVE | Noted: 2018-03-26

## 2018-05-07 PROBLEM — Z72.0 TOBACCO USE: Chronic | Status: ACTIVE | Noted: 2017-02-14

## 2018-05-07 PROBLEM — N76.0 BACTERIAL VAGINOSIS: Status: ACTIVE | Noted: 2018-05-07

## 2018-05-07 PROBLEM — B96.89 BACTERIAL VAGINOSIS: Status: ACTIVE | Noted: 2018-05-07

## 2018-05-07 PROBLEM — O02.81 ELEVATED LEVEL OF QUANTITATIVE HCG FOR GESTATIONAL AGE IN EARLY PREGNANCY: Status: ACTIVE | Noted: 2018-05-07

## 2018-05-07 LAB
ABO/RH: NORMAL
ABSOLUTE EOS #: 0.16 K/UL (ref 0–0.4)
ABSOLUTE EOS #: 0.2 K/UL (ref 0–0.4)
ABSOLUTE IMMATURE GRANULOCYTE: ABNORMAL K/UL (ref 0–0.3)
ABSOLUTE IMMATURE GRANULOCYTE: ABNORMAL K/UL (ref 0–0.3)
ABSOLUTE LYMPH #: 1.7 K/UL (ref 1–4.8)
ABSOLUTE LYMPH #: 1.92 K/UL (ref 1–4.8)
ABSOLUTE MONO #: 0.57 K/UL (ref 0.1–1.3)
ABSOLUTE MONO #: 0.81 K/UL (ref 0.1–1.3)
ALBUMIN SERPL-MCNC: 3.9 G/DL (ref 3.5–5.2)
ALBUMIN/GLOBULIN RATIO: ABNORMAL (ref 1–2.5)
ALP BLD-CCNC: 77 U/L (ref 35–104)
ALT SERPL-CCNC: 12 U/L (ref 5–33)
ANION GAP SERPL CALCULATED.3IONS-SCNC: 15 MMOL/L (ref 9–17)
ANTIBODY SCREEN: NEGATIVE
ARM BAND NUMBER: NORMAL
AST SERPL-CCNC: 10 U/L
BASOPHILS # BLD: 0 % (ref 0–2)
BASOPHILS # BLD: 1 % (ref 0–2)
BASOPHILS ABSOLUTE: 0 K/UL (ref 0–0.2)
BASOPHILS ABSOLUTE: 0.08 K/UL (ref 0–0.2)
BILIRUB SERPL-MCNC: 0.17 MG/DL (ref 0.3–1.2)
BILIRUBIN DIRECT: <0.08 MG/DL
BILIRUBIN, INDIRECT: ABNORMAL MG/DL (ref 0–1)
BUN BLDV-MCNC: 18 MG/DL (ref 6–20)
BUN/CREAT BLD: NORMAL (ref 9–20)
CALCIUM SERPL-MCNC: 9 MG/DL (ref 8.6–10.4)
CHLORIDE BLD-SCNC: 104 MMOL/L (ref 98–107)
CO2: 20 MMOL/L (ref 20–31)
CREAT SERPL-MCNC: 0.67 MG/DL (ref 0.5–0.9)
DIFFERENTIAL TYPE: ABNORMAL
DIFFERENTIAL TYPE: ABNORMAL
DIRECT EXAM: ABNORMAL
EOSINOPHILS RELATIVE PERCENT: 2 % (ref 0–4)
EOSINOPHILS RELATIVE PERCENT: 2 % (ref 0–4)
EXPIRATION DATE: NORMAL
GFR AFRICAN AMERICAN: >60 ML/MIN
GFR NON-AFRICAN AMERICAN: >60 ML/MIN
GFR SERPL CREATININE-BSD FRML MDRD: NORMAL ML/MIN/{1.73_M2}
GFR SERPL CREATININE-BSD FRML MDRD: NORMAL ML/MIN/{1.73_M2}
GLOBULIN: ABNORMAL G/DL (ref 1.5–3.8)
GLUCOSE BLD-MCNC: 98 MG/DL (ref 70–99)
HCG QUANTITATIVE: 39 IU/L
HCT VFR BLD CALC: 35.4 % (ref 36–46)
HCT VFR BLD CALC: 38 % (ref 36–46)
HEMOGLOBIN: 11.2 G/DL (ref 12–16)
HEMOGLOBIN: 11.8 G/DL (ref 12–16)
IMMATURE GRANULOCYTES: ABNORMAL %
IMMATURE GRANULOCYTES: ABNORMAL %
LACTIC ACID, WHOLE BLOOD: NORMAL MMOL/L (ref 0.7–2.1)
LACTIC ACID: 1 MMOL/L (ref 0.5–2.2)
LIPASE: 28 U/L (ref 13–60)
LYMPHOCYTES # BLD: 19 % (ref 24–44)
LYMPHOCYTES # BLD: 21 % (ref 24–44)
Lab: ABNORMAL
MCH RBC QN AUTO: 23.4 PG (ref 26–34)
MCH RBC QN AUTO: 23.5 PG (ref 26–34)
MCHC RBC AUTO-ENTMCNC: 31 G/DL (ref 31–37)
MCHC RBC AUTO-ENTMCNC: 31.6 G/DL (ref 31–37)
MCV RBC AUTO: 74 FL (ref 80–100)
MCV RBC AUTO: 75.7 FL (ref 80–100)
MONOCYTES # BLD: 7 % (ref 1–7)
MONOCYTES # BLD: 8 % (ref 1–7)
MORPHOLOGY: ABNORMAL
MORPHOLOGY: ABNORMAL
NRBC AUTOMATED: ABNORMAL PER 100 WBC
NRBC AUTOMATED: ABNORMAL PER 100 WBC
PDW BLD-RTO: 17.9 % (ref 11.5–14.9)
PDW BLD-RTO: 18.2 % (ref 11.5–14.9)
PLATELET # BLD: 286 K/UL (ref 150–450)
PLATELET # BLD: 292 K/UL (ref 150–450)
PLATELET ESTIMATE: ABNORMAL
PLATELET ESTIMATE: ABNORMAL
PMV BLD AUTO: 8.7 FL (ref 6–12)
PMV BLD AUTO: 9.7 FL (ref 6–12)
POTASSIUM SERPL-SCNC: 3.9 MMOL/L (ref 3.7–5.3)
RBC # BLD: 4.79 M/UL (ref 4–5.2)
RBC # BLD: 5.02 M/UL (ref 4–5.2)
RBC # BLD: ABNORMAL 10*6/UL
RBC # BLD: ABNORMAL 10*6/UL
SEG NEUTROPHILS: 69 % (ref 36–66)
SEG NEUTROPHILS: 71 % (ref 36–66)
SEGMENTED NEUTROPHILS ABSOLUTE COUNT: 5.59 K/UL (ref 1.3–9.1)
SEGMENTED NEUTROPHILS ABSOLUTE COUNT: 7.17 K/UL (ref 1.3–9.1)
SODIUM BLD-SCNC: 139 MMOL/L (ref 135–144)
SPECIMEN DESCRIPTION: ABNORMAL
SPECIMEN DESCRIPTION: ABNORMAL
STATUS: ABNORMAL
TOTAL PROTEIN: 6.6 G/DL (ref 6.4–8.3)
WBC # BLD: 10.1 K/UL (ref 3.5–11)
WBC # BLD: 8.1 K/UL (ref 3.5–11)
WBC # BLD: ABNORMAL 10*3/UL
WBC # BLD: ABNORMAL 10*3/UL

## 2018-05-07 PROCEDURE — 6360000002 HC RX W HCPCS: Performed by: EMERGENCY MEDICINE

## 2018-05-07 PROCEDURE — 36415 COLL VENOUS BLD VENIPUNCTURE: CPT

## 2018-05-07 PROCEDURE — G0378 HOSPITAL OBSERVATION PER HR: HCPCS

## 2018-05-07 PROCEDURE — 96375 TX/PRO/DX INJ NEW DRUG ADDON: CPT

## 2018-05-07 PROCEDURE — 76705 ECHO EXAM OF ABDOMEN: CPT

## 2018-05-07 PROCEDURE — 85025 COMPLETE CBC W/AUTO DIFF WBC: CPT

## 2018-05-07 PROCEDURE — 96376 TX/PRO/DX INJ SAME DRUG ADON: CPT

## 2018-05-07 PROCEDURE — 87480 CANDIDA DNA DIR PROBE: CPT

## 2018-05-07 PROCEDURE — 86900 BLOOD TYPING SEROLOGIC ABO: CPT

## 2018-05-07 PROCEDURE — 76817 TRANSVAGINAL US OBSTETRIC: CPT

## 2018-05-07 PROCEDURE — 87660 TRICHOMONAS VAGIN DIR PROBE: CPT

## 2018-05-07 PROCEDURE — 2500000003 HC RX 250 WO HCPCS: Performed by: EMERGENCY MEDICINE

## 2018-05-07 PROCEDURE — 83690 ASSAY OF LIPASE: CPT

## 2018-05-07 PROCEDURE — 87491 CHLMYD TRACH DNA AMP PROBE: CPT

## 2018-05-07 PROCEDURE — 80076 HEPATIC FUNCTION PANEL: CPT

## 2018-05-07 PROCEDURE — 84702 CHORIONIC GONADOTROPIN TEST: CPT

## 2018-05-07 PROCEDURE — 80048 BASIC METABOLIC PNL TOTAL CA: CPT

## 2018-05-07 PROCEDURE — 87591 N.GONORRHOEAE DNA AMP PROB: CPT

## 2018-05-07 PROCEDURE — 83605 ASSAY OF LACTIC ACID: CPT

## 2018-05-07 PROCEDURE — S0028 INJECTION, FAMOTIDINE, 20 MG: HCPCS | Performed by: EMERGENCY MEDICINE

## 2018-05-07 PROCEDURE — 96361 HYDRATE IV INFUSION ADD-ON: CPT

## 2018-05-07 PROCEDURE — 87510 GARDNER VAG DNA DIR PROBE: CPT

## 2018-05-07 PROCEDURE — 86901 BLOOD TYPING SEROLOGIC RH(D): CPT

## 2018-05-07 PROCEDURE — 86850 RBC ANTIBODY SCREEN: CPT

## 2018-05-07 RX ORDER — FENTANYL CITRATE 50 UG/ML
50 INJECTION, SOLUTION INTRAMUSCULAR; INTRAVENOUS
Status: DISCONTINUED | OUTPATIENT
Start: 2018-05-07 | End: 2018-05-07 | Stop reason: HOSPADM

## 2018-05-07 RX ORDER — SODIUM CHLORIDE 0.9 % (FLUSH) 0.9 %
10 SYRINGE (ML) INJECTION PRN
Status: DISCONTINUED | OUTPATIENT
Start: 2018-05-07 | End: 2018-05-07 | Stop reason: HOSPADM

## 2018-05-07 RX ORDER — FENTANYL CITRATE 50 UG/ML
50 INJECTION, SOLUTION INTRAMUSCULAR; INTRAVENOUS ONCE
Status: COMPLETED | OUTPATIENT
Start: 2018-05-07 | End: 2018-05-07

## 2018-05-07 RX ORDER — METRONIDAZOLE 500 MG/1
500 TABLET ORAL 2 TIMES DAILY
Qty: 14 TABLET | Refills: 0 | Status: SHIPPED | OUTPATIENT
Start: 2018-05-07 | End: 2018-05-11

## 2018-05-07 RX ORDER — FENTANYL CITRATE 50 UG/ML
25 INJECTION, SOLUTION INTRAMUSCULAR; INTRAVENOUS
Status: DISCONTINUED | OUTPATIENT
Start: 2018-05-07 | End: 2018-05-07 | Stop reason: HOSPADM

## 2018-05-07 RX ORDER — SODIUM CHLORIDE 9 MG/ML
INJECTION, SOLUTION INTRAVENOUS CONTINUOUS
Status: DISCONTINUED | OUTPATIENT
Start: 2018-05-07 | End: 2018-05-07 | Stop reason: HOSPADM

## 2018-05-07 RX ORDER — SODIUM CHLORIDE 0.9 % (FLUSH) 0.9 %
10 SYRINGE (ML) INJECTION EVERY 12 HOURS SCHEDULED
Status: DISCONTINUED | OUTPATIENT
Start: 2018-05-07 | End: 2018-05-07 | Stop reason: HOSPADM

## 2018-05-07 RX ORDER — ONDANSETRON 2 MG/ML
4 INJECTION INTRAMUSCULAR; INTRAVENOUS EVERY 4 HOURS PRN
Status: DISCONTINUED | OUTPATIENT
Start: 2018-05-07 | End: 2018-05-07 | Stop reason: HOSPADM

## 2018-05-07 RX ORDER — ACETAMINOPHEN 325 MG/1
650 TABLET ORAL EVERY 4 HOURS PRN
Status: DISCONTINUED | OUTPATIENT
Start: 2018-05-07 | End: 2018-05-07 | Stop reason: HOSPADM

## 2018-05-07 RX ORDER — ONDANSETRON 2 MG/ML
4 INJECTION INTRAMUSCULAR; INTRAVENOUS ONCE
Status: COMPLETED | OUTPATIENT
Start: 2018-05-07 | End: 2018-05-07

## 2018-05-07 RX ADMIN — FENTANYL CITRATE 50 MCG: 50 INJECTION, SOLUTION INTRAMUSCULAR; INTRAVENOUS at 04:10

## 2018-05-07 RX ADMIN — FAMOTIDINE 20 MG: 10 INJECTION INTRAVENOUS at 03:18

## 2018-05-07 RX ADMIN — FENTANYL CITRATE 50 MCG: 50 INJECTION, SOLUTION INTRAMUSCULAR; INTRAVENOUS at 00:11

## 2018-05-07 RX ADMIN — FENTANYL CITRATE 50 MCG: 50 INJECTION, SOLUTION INTRAMUSCULAR; INTRAVENOUS at 01:54

## 2018-05-07 RX ADMIN — ONDANSETRON 4 MG: 2 INJECTION INTRAMUSCULAR; INTRAVENOUS at 03:18

## 2018-05-07 ASSESSMENT — ENCOUNTER SYMPTOMS
DIARRHEA: 0
COLOR CHANGE: 0
SHORTNESS OF BREATH: 0
STRIDOR: 0
VOMITING: 1
BACK PAIN: 0
NAUSEA: 1
CONSTIPATION: 0
ABDOMINAL PAIN: 1
COUGH: 0

## 2018-05-07 ASSESSMENT — PAIN DESCRIPTION - PAIN TYPE
TYPE: ACUTE PAIN
TYPE: ACUTE PAIN

## 2018-05-07 ASSESSMENT — PAIN SCALES - GENERAL
PAINLEVEL_OUTOF10: 10
PAINLEVEL_OUTOF10: 10
PAINLEVEL_OUTOF10: 3
PAINLEVEL_OUTOF10: 8

## 2018-05-07 ASSESSMENT — PAIN DESCRIPTION - LOCATION: LOCATION: ABDOMEN

## 2018-05-08 LAB
C TRACH DNA GENITAL QL NAA+PROBE: NEGATIVE
N. GONORRHOEAE DNA: NEGATIVE

## 2018-05-09 ENCOUNTER — TELEPHONE (OUTPATIENT)
Dept: GASTROENTEROLOGY | Age: 38
End: 2018-05-09

## 2018-05-10 ENCOUNTER — HOSPITAL ENCOUNTER (OUTPATIENT)
Age: 38
Setting detail: SPECIMEN
Discharge: HOME | End: 2018-05-10
Payer: COMMERCIAL

## 2018-05-10 ENCOUNTER — OFFICE VISIT (OUTPATIENT)
Dept: OBGYN CLINIC | Age: 38
End: 2018-05-10
Payer: COMMERCIAL

## 2018-05-10 ENCOUNTER — TELEPHONE (OUTPATIENT)
Dept: BARIATRICS/WEIGHT MGMT | Age: 38
End: 2018-05-10

## 2018-05-10 VITALS
HEART RATE: 85 BPM | WEIGHT: 193 LBS | RESPIRATION RATE: 16 BRPM | SYSTOLIC BLOOD PRESSURE: 137 MMHG | BODY MASS INDEX: 35.3 KG/M2 | DIASTOLIC BLOOD PRESSURE: 80 MMHG

## 2018-05-10 DIAGNOSIS — Z32.01 POSITIVE PREGNANCY TEST: ICD-10-CM

## 2018-05-10 DIAGNOSIS — O26.891 PELVIC PAIN AFFECTING PREGNANCY IN FIRST TRIMESTER, ANTEPARTUM: Primary | ICD-10-CM

## 2018-05-10 DIAGNOSIS — R10.2 PELVIC PAIN AFFECTING PREGNANCY IN FIRST TRIMESTER, ANTEPARTUM: Primary | ICD-10-CM

## 2018-05-10 LAB — HCG QUANTITATIVE: 404 IU/L

## 2018-05-10 PROCEDURE — 81025 URINE PREGNANCY TEST: CPT | Performed by: SPECIALIST

## 2018-05-10 PROCEDURE — 99213 OFFICE O/P EST LOW 20 MIN: CPT | Performed by: SPECIALIST

## 2018-05-10 PROCEDURE — 4004F PT TOBACCO SCREEN RCVD TLK: CPT | Performed by: SPECIALIST

## 2018-05-10 PROCEDURE — G8427 DOCREV CUR MEDS BY ELIG CLIN: HCPCS | Performed by: SPECIALIST

## 2018-05-10 PROCEDURE — G8417 CALC BMI ABV UP PARAM F/U: HCPCS | Performed by: SPECIALIST

## 2018-05-10 RX ORDER — PROMETHAZINE HYDROCHLORIDE 25 MG/1
25 TABLET ORAL EVERY 8 HOURS PRN
Qty: 30 TABLET | Refills: 2 | Status: SHIPPED | OUTPATIENT
Start: 2018-05-10 | End: 2018-08-02 | Stop reason: SDUPTHER

## 2018-05-10 RX ORDER — VITAMIN A ACETATE, .BETA.-CAROTENE, ASCORBIC ACID, CHOLECALCIFEROL, .ALPHA.-TOCOPHEROL ACETATE, DL-, THIAMINE MONONITRATE, RIBOFLAVIN, NIACINAMIDE, PYRIDOXINE HYDROCHLORIDE, FOLIC ACID, CYANOCOBALAMIN, CALCIUM CARBONATE, FERROUS FUMARATE, ZINC OXIDE, AND CUPRIC OXIDE 2000; 2000; 120; 400; 22; 1.84; 3; 20; 10; 1; 12; 200; 27; 25; 2 [IU]/1; [IU]/1; MG/1; [IU]/1; MG/1; MG/1; MG/1; MG/1; MG/1; MG/1; UG/1; MG/1; MG/1; MG/1; MG/1
1 TABLET ORAL DAILY
Qty: 30 TABLET | Refills: 11 | Status: SHIPPED | OUTPATIENT
Start: 2018-05-10 | End: 2018-05-21 | Stop reason: SDUPTHER

## 2018-05-10 ASSESSMENT — ENCOUNTER SYMPTOMS
DIARRHEA: 0
COUGH: 0
VOMITING: 0
ABDOMINAL PAIN: 0
CONSTIPATION: 0
ABDOMINAL DISTENTION: 0
APNEA: 0
NAUSEA: 1
EYE PAIN: 0

## 2018-05-11 ENCOUNTER — HOSPITAL ENCOUNTER (EMERGENCY)
Age: 38
Discharge: HOME | End: 2018-05-11
Attending: EMERGENCY MEDICINE
Payer: COMMERCIAL

## 2018-05-11 ENCOUNTER — APPOINTMENT (OUTPATIENT)
Dept: ULTRASOUND IMAGING | Age: 38
End: 2018-05-11
Payer: COMMERCIAL

## 2018-05-11 VITALS
WEIGHT: 191 LBS | RESPIRATION RATE: 16 BRPM | HEIGHT: 62 IN | TEMPERATURE: 98.4 F | SYSTOLIC BLOOD PRESSURE: 137 MMHG | BODY MASS INDEX: 35.15 KG/M2 | OXYGEN SATURATION: 100 % | DIASTOLIC BLOOD PRESSURE: 68 MMHG | HEART RATE: 98 BPM

## 2018-05-11 DIAGNOSIS — R10.9 ABDOMINAL PAIN, UNSPECIFIED ABDOMINAL LOCATION: Primary | ICD-10-CM

## 2018-05-11 LAB
-: ABNORMAL
ABSOLUTE EOS #: 0.19 K/UL (ref 0–0.4)
ABSOLUTE IMMATURE GRANULOCYTE: ABNORMAL K/UL (ref 0–0.3)
ABSOLUTE LYMPH #: 1.49 K/UL (ref 1–4.8)
ABSOLUTE MONO #: 0.56 K/UL (ref 0.1–1.3)
ALBUMIN SERPL-MCNC: 4.3 G/DL (ref 3.5–5.2)
ALBUMIN/GLOBULIN RATIO: ABNORMAL (ref 1–2.5)
ALP BLD-CCNC: 75 U/L (ref 35–104)
ALT SERPL-CCNC: 19 U/L (ref 5–33)
AMORPHOUS: ABNORMAL
ANION GAP SERPL CALCULATED.3IONS-SCNC: 12 MMOL/L (ref 9–17)
AST SERPL-CCNC: 20 U/L
BACTERIA: ABNORMAL
BASOPHILS # BLD: 1 % (ref 0–2)
BASOPHILS ABSOLUTE: 0.09 K/UL (ref 0–0.2)
BILIRUB SERPL-MCNC: 0.22 MG/DL (ref 0.3–1.2)
BILIRUBIN URINE: NEGATIVE
BUN BLDV-MCNC: 10 MG/DL (ref 6–20)
BUN/CREAT BLD: ABNORMAL (ref 9–20)
CALCIUM SERPL-MCNC: 8.5 MG/DL (ref 8.6–10.4)
CASTS UA: ABNORMAL /LPF
CHLORIDE BLD-SCNC: 104 MMOL/L (ref 98–107)
CO2: 22 MMOL/L (ref 20–31)
COLOR: YELLOW
COMMENT UA: ABNORMAL
CREAT SERPL-MCNC: 0.63 MG/DL (ref 0.5–0.9)
CRYSTALS, UA: ABNORMAL /HPF
DIFFERENTIAL TYPE: ABNORMAL
DIRECT EXAM: NORMAL
EOSINOPHILS RELATIVE PERCENT: 2 % (ref 0–4)
EPITHELIAL CELLS UA: ABNORMAL /HPF
GFR AFRICAN AMERICAN: >60 ML/MIN
GFR NON-AFRICAN AMERICAN: >60 ML/MIN
GFR SERPL CREATININE-BSD FRML MDRD: ABNORMAL ML/MIN/{1.73_M2}
GFR SERPL CREATININE-BSD FRML MDRD: ABNORMAL ML/MIN/{1.73_M2}
GLUCOSE BLD-MCNC: 98 MG/DL (ref 70–99)
GLUCOSE URINE: NEGATIVE
HCG QUANTITATIVE: 691 IU/L
HCT VFR BLD CALC: 40.6 % (ref 36–46)
HEMOGLOBIN: 12.9 G/DL (ref 12–16)
IMMATURE GRANULOCYTES: ABNORMAL %
KETONES, URINE: NEGATIVE
LEUKOCYTE ESTERASE, URINE: NEGATIVE
LYMPHOCYTES # BLD: 16 % (ref 24–44)
Lab: NORMAL
MCH RBC QN AUTO: 23.3 PG (ref 26–34)
MCHC RBC AUTO-ENTMCNC: 31.7 G/DL (ref 31–37)
MCV RBC AUTO: 73.6 FL (ref 80–100)
MONOCYTES # BLD: 6 % (ref 1–7)
MORPHOLOGY: ABNORMAL
MUCUS: ABNORMAL
NITRITE, URINE: NEGATIVE
NRBC AUTOMATED: ABNORMAL PER 100 WBC
OTHER OBSERVATIONS UA: ABNORMAL
PDW BLD-RTO: 18.1 % (ref 11.5–14.9)
PH UA: 5.5 (ref 5–8)
PLATELET # BLD: 367 K/UL (ref 150–450)
PLATELET ESTIMATE: ABNORMAL
PMV BLD AUTO: 8.2 FL (ref 6–12)
POTASSIUM SERPL-SCNC: 3.4 MMOL/L (ref 3.7–5.3)
PROTEIN UA: NEGATIVE
RBC # BLD: 5.52 M/UL (ref 4–5.2)
RBC # BLD: ABNORMAL 10*6/UL
RBC UA: ABNORMAL /HPF
RENAL EPITHELIAL, UA: ABNORMAL /HPF
SEG NEUTROPHILS: 75 % (ref 36–66)
SEGMENTED NEUTROPHILS ABSOLUTE COUNT: 6.97 K/UL (ref 1.3–9.1)
SODIUM BLD-SCNC: 138 MMOL/L (ref 135–144)
SPECIFIC GRAVITY UA: 1.03 (ref 1–1.03)
SPECIMEN DESCRIPTION: NORMAL
SPECIMEN DESCRIPTION: NORMAL
STATUS: NORMAL
TOTAL PROTEIN: 7.2 G/DL (ref 6.4–8.3)
TRICHOMONAS: ABNORMAL
TURBIDITY: ABNORMAL
URINE HGB: NEGATIVE
UROBILINOGEN, URINE: NORMAL
WBC # BLD: 9.3 K/UL (ref 3.5–11)
WBC # BLD: ABNORMAL 10*3/UL
WBC UA: ABNORMAL /HPF
YEAST: ABNORMAL

## 2018-05-11 PROCEDURE — 76817 TRANSVAGINAL US OBSTETRIC: CPT

## 2018-05-11 PROCEDURE — 87480 CANDIDA DNA DIR PROBE: CPT

## 2018-05-11 PROCEDURE — 84702 CHORIONIC GONADOTROPIN TEST: CPT

## 2018-05-11 PROCEDURE — 87491 CHLMYD TRACH DNA AMP PROBE: CPT

## 2018-05-11 PROCEDURE — 80053 COMPREHEN METABOLIC PANEL: CPT

## 2018-05-11 PROCEDURE — 2580000003 HC RX 258: Performed by: EMERGENCY MEDICINE

## 2018-05-11 PROCEDURE — 87086 URINE CULTURE/COLONY COUNT: CPT

## 2018-05-11 PROCEDURE — 81001 URINALYSIS AUTO W/SCOPE: CPT

## 2018-05-11 PROCEDURE — 87591 N.GONORRHOEAE DNA AMP PROB: CPT

## 2018-05-11 PROCEDURE — 76801 OB US < 14 WKS SINGLE FETUS: CPT

## 2018-05-11 PROCEDURE — 87510 GARDNER VAG DNA DIR PROBE: CPT

## 2018-05-11 PROCEDURE — 99284 EMERGENCY DEPT VISIT MOD MDM: CPT

## 2018-05-11 PROCEDURE — 85025 COMPLETE CBC W/AUTO DIFF WBC: CPT

## 2018-05-11 PROCEDURE — 36415 COLL VENOUS BLD VENIPUNCTURE: CPT

## 2018-05-11 PROCEDURE — 87660 TRICHOMONAS VAGIN DIR PROBE: CPT

## 2018-05-11 RX ORDER — ACETAMINOPHEN 500 MG
1000 TABLET ORAL ONCE
Status: DISCONTINUED | OUTPATIENT
Start: 2018-05-11 | End: 2018-05-11 | Stop reason: HOSPADM

## 2018-05-11 RX ORDER — LANOLIN ALCOHOL/MO/W.PET/CERES
25 CREAM (GRAM) TOPICAL ONCE
Status: DISCONTINUED | OUTPATIENT
Start: 2018-05-11 | End: 2018-05-11 | Stop reason: HOSPADM

## 2018-05-11 RX ORDER — CEPHALEXIN 500 MG/1
500 CAPSULE ORAL 2 TIMES DAILY
Qty: 14 CAPSULE | Refills: 0 | Status: SHIPPED | OUTPATIENT
Start: 2018-05-11 | End: 2018-05-18

## 2018-05-11 RX ORDER — SODIUM CHLORIDE, SODIUM LACTATE, POTASSIUM CHLORIDE, AND CALCIUM CHLORIDE .6; .31; .03; .02 G/100ML; G/100ML; G/100ML; G/100ML
1000 INJECTION, SOLUTION INTRAVENOUS ONCE
Status: COMPLETED | OUTPATIENT
Start: 2018-05-11 | End: 2018-05-11

## 2018-05-11 RX ADMIN — SODIUM CHLORIDE, POTASSIUM CHLORIDE, SODIUM LACTATE AND CALCIUM CHLORIDE 1000 ML: 600; 310; 30; 20 INJECTION, SOLUTION INTRAVENOUS at 14:18

## 2018-05-11 ASSESSMENT — ENCOUNTER SYMPTOMS
DIARRHEA: 0
NAUSEA: 0
HEMATEMESIS: 0
CONSTIPATION: 0
ABDOMINAL PAIN: 1
VOMITING: 0
HEMATOCHEZIA: 0

## 2018-05-11 ASSESSMENT — PAIN DESCRIPTION - PAIN TYPE: TYPE: ACUTE PAIN

## 2018-05-11 ASSESSMENT — PAIN SCALES - GENERAL: PAINLEVEL_OUTOF10: 10

## 2018-05-11 ASSESSMENT — PAIN DESCRIPTION - LOCATION: LOCATION: ABDOMEN

## 2018-05-14 LAB
C TRACH DNA GENITAL QL NAA+PROBE: NEGATIVE
N. GONORRHOEAE DNA: NEGATIVE

## 2018-05-15 ENCOUNTER — HOSPITAL ENCOUNTER (OUTPATIENT)
Age: 38
Setting detail: SPECIMEN
Discharge: HOME | End: 2018-05-15
Payer: COMMERCIAL

## 2018-05-15 ENCOUNTER — NURSE ONLY (OUTPATIENT)
Dept: OBGYN CLINIC | Age: 38
End: 2018-05-15

## 2018-05-15 DIAGNOSIS — Z32.01 POSITIVE PREGNANCY TEST: ICD-10-CM

## 2018-05-15 LAB — HCG QUANTITATIVE: 2195 IU/L

## 2018-05-18 ENCOUNTER — HOSPITAL ENCOUNTER (OUTPATIENT)
Age: 38
Setting detail: SPECIMEN
Discharge: HOME | End: 2018-05-18
Payer: COMMERCIAL

## 2018-05-18 ENCOUNTER — OFFICE VISIT (OUTPATIENT)
Dept: OBGYN CLINIC | Age: 38
End: 2018-05-18
Payer: COMMERCIAL

## 2018-05-18 VITALS
BODY MASS INDEX: 35.99 KG/M2 | HEART RATE: 76 BPM | WEIGHT: 195.6 LBS | HEIGHT: 62 IN | SYSTOLIC BLOOD PRESSURE: 116 MMHG | DIASTOLIC BLOOD PRESSURE: 80 MMHG

## 2018-05-18 DIAGNOSIS — Z32.01 POSITIVE PREGNANCY TEST: ICD-10-CM

## 2018-05-18 DIAGNOSIS — N92.6 IRREGULAR MENSES: ICD-10-CM

## 2018-05-18 DIAGNOSIS — Z32.01 POSITIVE PREGNANCY TEST: Primary | ICD-10-CM

## 2018-05-18 DIAGNOSIS — Z34.01 ENCOUNTER FOR SUPERVISION OF NORMAL FIRST PREGNANCY IN FIRST TRIMESTER: ICD-10-CM

## 2018-05-18 LAB — HCG QUANTITATIVE: 4323 IU/L

## 2018-05-18 PROCEDURE — 4004F PT TOBACCO SCREEN RCVD TLK: CPT | Performed by: SPECIALIST

## 2018-05-18 PROCEDURE — G8417 CALC BMI ABV UP PARAM F/U: HCPCS | Performed by: SPECIALIST

## 2018-05-18 PROCEDURE — G8427 DOCREV CUR MEDS BY ELIG CLIN: HCPCS | Performed by: SPECIALIST

## 2018-05-18 PROCEDURE — 99213 OFFICE O/P EST LOW 20 MIN: CPT | Performed by: SPECIALIST

## 2018-05-18 ASSESSMENT — ENCOUNTER SYMPTOMS
NAUSEA: 1
COUGH: 0
CONSTIPATION: 0
APNEA: 0
DIARRHEA: 0
EYE PAIN: 0
ABDOMINAL DISTENTION: 0
VOMITING: 0
ABDOMINAL PAIN: 0

## 2018-05-21 ENCOUNTER — HOSPITAL ENCOUNTER (OUTPATIENT)
Age: 38
Setting detail: SPECIMEN
Discharge: HOME | End: 2018-05-21
Payer: COMMERCIAL

## 2018-05-21 ENCOUNTER — OFFICE VISIT (OUTPATIENT)
Dept: OBGYN CLINIC | Age: 38
End: 2018-05-21
Payer: COMMERCIAL

## 2018-05-21 VITALS
HEIGHT: 62 IN | WEIGHT: 195.8 LBS | SYSTOLIC BLOOD PRESSURE: 110 MMHG | BODY MASS INDEX: 36.03 KG/M2 | HEART RATE: 69 BPM | DIASTOLIC BLOOD PRESSURE: 74 MMHG

## 2018-05-21 DIAGNOSIS — Z32.01 POSITIVE PREGNANCY TEST: Primary | ICD-10-CM

## 2018-05-21 DIAGNOSIS — Z32.01 POSITIVE PREGNANCY TEST: ICD-10-CM

## 2018-05-21 LAB — HCG QUANTITATIVE: 9379 IU/L

## 2018-05-21 PROCEDURE — 99213 OFFICE O/P EST LOW 20 MIN: CPT | Performed by: SPECIALIST

## 2018-05-21 PROCEDURE — 4004F PT TOBACCO SCREEN RCVD TLK: CPT | Performed by: SPECIALIST

## 2018-05-21 PROCEDURE — G8417 CALC BMI ABV UP PARAM F/U: HCPCS | Performed by: SPECIALIST

## 2018-05-21 PROCEDURE — 36415 COLL VENOUS BLD VENIPUNCTURE: CPT | Performed by: SPECIALIST

## 2018-05-21 PROCEDURE — G8427 DOCREV CUR MEDS BY ELIG CLIN: HCPCS | Performed by: SPECIALIST

## 2018-05-21 ASSESSMENT — ENCOUNTER SYMPTOMS
APNEA: 0
EYE PAIN: 0
COUGH: 0
CONSTIPATION: 0
NAUSEA: 0
ABDOMINAL DISTENTION: 0
ABDOMINAL PAIN: 0
DIARRHEA: 0
VOMITING: 0

## 2018-05-22 RX ORDER — MULTIVIT-MIN 60/IRON FUM/FOLIC 27 MG-1 MG
TABLET ORAL
Qty: 30 TABLET | Refills: 11 | Status: SHIPPED | OUTPATIENT
Start: 2018-05-22 | End: 2019-01-02

## 2018-05-24 ENCOUNTER — HOSPITAL ENCOUNTER (OUTPATIENT)
Age: 38
Setting detail: SPECIMEN
Discharge: HOME | End: 2018-05-24
Payer: COMMERCIAL

## 2018-05-24 ENCOUNTER — PROCEDURE VISIT (OUTPATIENT)
Dept: OBGYN CLINIC | Age: 38
End: 2018-05-24
Payer: COMMERCIAL

## 2018-05-24 DIAGNOSIS — O02.81 INAPPROPRIATE CHANGE IN QUANTITATIVE HUMAN CHORIONIC GONADOTROPIN (HCG) IN EARLY PREGNANCY: ICD-10-CM

## 2018-05-24 DIAGNOSIS — O20.0 THREATENED ABORTION: ICD-10-CM

## 2018-05-24 DIAGNOSIS — Z32.01 POSITIVE PREGNANCY TEST: ICD-10-CM

## 2018-05-24 DIAGNOSIS — Z32.01 POSITIVE PREGNANCY TEST: Primary | ICD-10-CM

## 2018-05-24 LAB
ABDOMINAL CIRCUMFERENCE: NORMAL CM
BIPARIETAL DIAMETER: NORMAL CM
ESTIMATED FETAL WEIGHT: NORMAL GRAMS
FEMORAL DIAMETER: NORMAL CM
HC/AC: NORMAL
HCG QUANTITATIVE: ABNORMAL IU/L
HEAD CIRCUMFERENCE: NORMAL CM

## 2018-05-24 PROCEDURE — 76817 TRANSVAGINAL US OBSTETRIC: CPT | Performed by: SPECIALIST

## 2018-06-07 ENCOUNTER — PROCEDURE VISIT (OUTPATIENT)
Dept: OBGYN CLINIC | Age: 38
End: 2018-06-07
Payer: COMMERCIAL

## 2018-06-07 DIAGNOSIS — Z32.01 POSITIVE PREGNANCY TEST: ICD-10-CM

## 2018-06-07 LAB
ABDOMINAL CIRCUMFERENCE: NORMAL CM
BIPARIETAL DIAMETER: NORMAL CM
ESTIMATED FETAL WEIGHT: NORMAL GRAMS
FEMORAL DIAMETER: NORMAL CM
HC/AC: NORMAL
HEAD CIRCUMFERENCE: NORMAL CM

## 2018-06-07 PROCEDURE — 76817 TRANSVAGINAL US OBSTETRIC: CPT | Performed by: SPECIALIST

## 2018-06-11 ENCOUNTER — HOSPITAL ENCOUNTER (EMERGENCY)
Age: 38
Discharge: HOME | End: 2018-06-11
Attending: EMERGENCY MEDICINE
Payer: COMMERCIAL

## 2018-06-11 ENCOUNTER — APPOINTMENT (OUTPATIENT)
Dept: ULTRASOUND IMAGING | Age: 38
End: 2018-06-11
Payer: COMMERCIAL

## 2018-06-11 VITALS
OXYGEN SATURATION: 98 % | HEIGHT: 62 IN | TEMPERATURE: 98.9 F | DIASTOLIC BLOOD PRESSURE: 77 MMHG | RESPIRATION RATE: 19 BRPM | WEIGHT: 191 LBS | HEART RATE: 79 BPM | BODY MASS INDEX: 35.15 KG/M2 | SYSTOLIC BLOOD PRESSURE: 140 MMHG

## 2018-06-11 DIAGNOSIS — R10.31 ABDOMINAL PAIN, RIGHT LOWER QUADRANT: Primary | ICD-10-CM

## 2018-06-11 LAB
-: NORMAL
ABSOLUTE EOS #: 0.19 K/UL (ref 0–0.4)
ABSOLUTE IMMATURE GRANULOCYTE: ABNORMAL K/UL (ref 0–0.3)
ABSOLUTE LYMPH #: 1.58 K/UL (ref 1–4.8)
ABSOLUTE MONO #: 0.47 K/UL (ref 0.1–1.3)
ALBUMIN SERPL-MCNC: 3.9 G/DL (ref 3.5–5.2)
ALBUMIN/GLOBULIN RATIO: ABNORMAL (ref 1–2.5)
ALP BLD-CCNC: 61 U/L (ref 35–104)
ALT SERPL-CCNC: 20 U/L (ref 5–33)
AMORPHOUS: NORMAL
ANION GAP SERPL CALCULATED.3IONS-SCNC: 13 MMOL/L (ref 9–17)
AST SERPL-CCNC: 19 U/L
BACTERIA: NORMAL
BASOPHILS # BLD: 1 % (ref 0–2)
BASOPHILS ABSOLUTE: 0.09 K/UL (ref 0–0.2)
BILIRUB SERPL-MCNC: 0.26 MG/DL (ref 0.3–1.2)
BILIRUBIN URINE: NEGATIVE
BUN BLDV-MCNC: 9 MG/DL (ref 6–20)
BUN/CREAT BLD: ABNORMAL (ref 9–20)
C-REACTIVE PROTEIN: 5.2 MG/L (ref 0–5)
CALCIUM SERPL-MCNC: 8.9 MG/DL (ref 8.6–10.4)
CASTS UA: NORMAL /LPF (ref 0–2)
CHLORIDE BLD-SCNC: 105 MMOL/L (ref 98–107)
CO2: 20 MMOL/L (ref 20–31)
COLOR: YELLOW
COMMENT UA: NORMAL
CREAT SERPL-MCNC: <0.4 MG/DL (ref 0.5–0.9)
CRYSTALS, UA: NORMAL /HPF
DIFFERENTIAL TYPE: ABNORMAL
EOSINOPHILS RELATIVE PERCENT: 2 % (ref 0–4)
EPITHELIAL CELLS UA: NORMAL /HPF (ref 0–5)
GFR AFRICAN AMERICAN: ABNORMAL ML/MIN
GFR NON-AFRICAN AMERICAN: ABNORMAL ML/MIN
GFR SERPL CREATININE-BSD FRML MDRD: ABNORMAL ML/MIN/{1.73_M2}
GFR SERPL CREATININE-BSD FRML MDRD: ABNORMAL ML/MIN/{1.73_M2}
GLUCOSE BLD-MCNC: 111 MG/DL (ref 70–99)
GLUCOSE URINE: NEGATIVE
HCG QUANTITATIVE: ABNORMAL IU/L
HCT VFR BLD CALC: 36.5 % (ref 36–46)
HEMOGLOBIN: 12 G/DL (ref 12–16)
IMMATURE GRANULOCYTES: ABNORMAL %
KETONES, URINE: NEGATIVE
LEUKOCYTE ESTERASE, URINE: NEGATIVE
LYMPHOCYTES # BLD: 17 % (ref 24–44)
MCH RBC QN AUTO: 24.9 PG (ref 26–34)
MCHC RBC AUTO-ENTMCNC: 32.8 G/DL (ref 31–37)
MCV RBC AUTO: 75.9 FL (ref 80–100)
MONOCYTES # BLD: 5 % (ref 1–7)
MORPHOLOGY: ABNORMAL
MUCUS: NORMAL
NITRITE, URINE: NEGATIVE
NRBC AUTOMATED: ABNORMAL PER 100 WBC
OTHER OBSERVATIONS UA: NORMAL
PDW BLD-RTO: 20.9 % (ref 11.5–14.9)
PH UA: 5.5 (ref 5–8)
PLATELET # BLD: 304 K/UL (ref 150–450)
PLATELET ESTIMATE: ABNORMAL
PMV BLD AUTO: 8.2 FL (ref 6–12)
POTASSIUM SERPL-SCNC: 3.5 MMOL/L (ref 3.7–5.3)
PROTEIN UA: NEGATIVE
RBC # BLD: 4.81 M/UL (ref 4–5.2)
RBC # BLD: ABNORMAL 10*6/UL
RBC UA: NORMAL /HPF
RENAL EPITHELIAL, UA: NORMAL /HPF
SEG NEUTROPHILS: 75 % (ref 36–66)
SEGMENTED NEUTROPHILS ABSOLUTE COUNT: 6.97 K/UL (ref 1.3–9.1)
SODIUM BLD-SCNC: 138 MMOL/L (ref 135–144)
SPECIFIC GRAVITY UA: 1.03 (ref 1–1.03)
TOTAL PROTEIN: 6.9 G/DL (ref 6.4–8.3)
TRICHOMONAS: NORMAL
TURBIDITY: CLEAR
URINE HGB: NEGATIVE
UROBILINOGEN, URINE: NORMAL
WBC # BLD: 9.3 K/UL (ref 3.5–11)
WBC # BLD: ABNORMAL 10*3/UL
WBC UA: NORMAL /HPF
YEAST: NORMAL

## 2018-06-11 PROCEDURE — 84702 CHORIONIC GONADOTROPIN TEST: CPT

## 2018-06-11 PROCEDURE — 96375 TX/PRO/DX INJ NEW DRUG ADDON: CPT

## 2018-06-11 PROCEDURE — 81015 MICROSCOPIC EXAM OF URINE: CPT

## 2018-06-11 PROCEDURE — 81003 URINALYSIS AUTO W/O SCOPE: CPT

## 2018-06-11 PROCEDURE — 99284 EMERGENCY DEPT VISIT MOD MDM: CPT

## 2018-06-11 PROCEDURE — 80053 COMPREHEN METABOLIC PANEL: CPT

## 2018-06-11 PROCEDURE — 36415 COLL VENOUS BLD VENIPUNCTURE: CPT

## 2018-06-11 PROCEDURE — 93976 VASCULAR STUDY: CPT

## 2018-06-11 PROCEDURE — 6360000002 HC RX W HCPCS: Performed by: EMERGENCY MEDICINE

## 2018-06-11 PROCEDURE — 85025 COMPLETE CBC W/AUTO DIFF WBC: CPT

## 2018-06-11 PROCEDURE — 76817 TRANSVAGINAL US OBSTETRIC: CPT

## 2018-06-11 PROCEDURE — 96374 THER/PROPH/DIAG INJ IV PUSH: CPT

## 2018-06-11 PROCEDURE — 86140 C-REACTIVE PROTEIN: CPT

## 2018-06-11 PROCEDURE — 96376 TX/PRO/DX INJ SAME DRUG ADON: CPT

## 2018-06-11 RX ORDER — ONDANSETRON 2 MG/ML
4 INJECTION INTRAMUSCULAR; INTRAVENOUS ONCE
Status: COMPLETED | OUTPATIENT
Start: 2018-06-11 | End: 2018-06-11

## 2018-06-11 RX ORDER — MORPHINE SULFATE 4 MG/ML
4 INJECTION, SOLUTION INTRAMUSCULAR; INTRAVENOUS ONCE
Status: COMPLETED | OUTPATIENT
Start: 2018-06-11 | End: 2018-06-11

## 2018-06-11 RX ADMIN — MORPHINE SULFATE 4 MG: 4 INJECTION INTRAVENOUS at 18:24

## 2018-06-11 RX ADMIN — ONDANSETRON 4 MG: 2 INJECTION, SOLUTION INTRAMUSCULAR; INTRAVENOUS at 18:22

## 2018-06-11 RX ADMIN — MORPHINE SULFATE 4 MG: 4 INJECTION INTRAVENOUS at 16:12

## 2018-06-11 ASSESSMENT — PAIN SCALES - GENERAL
PAINLEVEL_OUTOF10: 8
PAINLEVEL_OUTOF10: 2
PAINLEVEL_OUTOF10: 8
PAINLEVEL_OUTOF10: 4

## 2018-06-11 ASSESSMENT — PAIN DESCRIPTION - DESCRIPTORS: DESCRIPTORS: STABBING

## 2018-06-11 ASSESSMENT — ENCOUNTER SYMPTOMS
ABDOMINAL PAIN: 1
DIARRHEA: 0
NAUSEA: 0
VOMITING: 0
SHORTNESS OF BREATH: 0

## 2018-06-11 ASSESSMENT — PAIN DESCRIPTION - LOCATION: LOCATION: ABDOMEN

## 2018-06-11 ASSESSMENT — PAIN DESCRIPTION - PAIN TYPE: TYPE: ACUTE PAIN

## 2018-06-11 ASSESSMENT — PAIN DESCRIPTION - ORIENTATION: ORIENTATION: RIGHT

## 2018-06-26 ENCOUNTER — HOSPITAL ENCOUNTER (OUTPATIENT)
Age: 38
Setting detail: SPECIMEN
Discharge: HOME | End: 2018-06-26
Payer: COMMERCIAL

## 2018-06-26 ENCOUNTER — INITIAL PRENATAL (OUTPATIENT)
Dept: OBGYN CLINIC | Age: 38
End: 2018-06-26

## 2018-06-26 VITALS
SYSTOLIC BLOOD PRESSURE: 136 MMHG | WEIGHT: 203 LBS | BODY MASS INDEX: 37.13 KG/M2 | DIASTOLIC BLOOD PRESSURE: 83 MMHG | HEART RATE: 88 BPM

## 2018-06-26 DIAGNOSIS — O09.521 ELDERLY MULTIGRAVIDA IN FIRST TRIMESTER: ICD-10-CM

## 2018-06-26 DIAGNOSIS — O09.91 HIGH-RISK PREGNANCY IN FIRST TRIMESTER: Primary | ICD-10-CM

## 2018-06-26 DIAGNOSIS — J45.20 MILD INTERMITTENT ASTHMA WITHOUT COMPLICATION: Chronic | ICD-10-CM

## 2018-06-26 DIAGNOSIS — O09.299 PRIOR PREGNANCY WITH FETAL DEMISE: ICD-10-CM

## 2018-06-26 DIAGNOSIS — Z32.01 POSITIVE PREGNANCY TEST: ICD-10-CM

## 2018-06-26 LAB
ABO/RH: NORMAL
ABSOLUTE EOS #: 0.31 K/UL (ref 0–0.4)
ABSOLUTE IMMATURE GRANULOCYTE: 0 K/UL (ref 0–0.3)
ABSOLUTE LYMPH #: 1.4 K/UL (ref 1–4.8)
ABSOLUTE MONO #: 0.39 K/UL (ref 0.1–0.8)
ANTIBODY SCREEN: NEGATIVE
BASOPHILS # BLD: 0 % (ref 0–2)
BASOPHILS ABSOLUTE: 0 K/UL (ref 0–0.2)
BILIRUBIN URINE: NEGATIVE
COLOR: YELLOW
COMMENT UA: NORMAL
DIFFERENTIAL TYPE: ABNORMAL
EOSINOPHILS RELATIVE PERCENT: 4 % (ref 1–4)
GLUCOSE URINE: NEGATIVE
HCT VFR BLD CALC: 38.1 % (ref 36.3–47.1)
HEMOGLOBIN: 11.8 G/DL (ref 11.9–15.1)
HEPATITIS B SURFACE ANTIGEN: NONREACTIVE
HIV AG/AB: NONREACTIVE
IMMATURE GRANULOCYTES: 0 %
KETONES, URINE: NEGATIVE
LEUKOCYTE ESTERASE, URINE: NEGATIVE
LYMPHOCYTES # BLD: 18 % (ref 24–44)
MCH RBC QN AUTO: 24.7 PG (ref 25.2–33.5)
MCHC RBC AUTO-ENTMCNC: 31 G/DL (ref 28.4–34.8)
MCV RBC AUTO: 79.7 FL (ref 82.6–102.9)
MONOCYTES # BLD: 5 % (ref 1–7)
MORPHOLOGY: ABNORMAL
NITRITE, URINE: NEGATIVE
NRBC AUTOMATED: 0 PER 100 WBC
PDW BLD-RTO: 21.4 % (ref 11.8–14.4)
PH UA: 5.5 (ref 5–8)
PLATELET # BLD: 269 K/UL (ref 138–453)
PLATELET ESTIMATE: ABNORMAL
PMV BLD AUTO: 12.4 FL (ref 8.1–13.5)
PROTEIN UA: NEGATIVE
RBC # BLD: 4.78 M/UL (ref 3.95–5.11)
RBC # BLD: ABNORMAL 10*6/UL
RUBV IGG SER QL: 168.8 IU/ML
SEG NEUTROPHILS: 73 % (ref 36–66)
SEGMENTED NEUTROPHILS ABSOLUTE COUNT: 5.7 K/UL (ref 1.8–7.7)
SICKLE CELL SCREEN: NEGATIVE
SPECIFIC GRAVITY UA: 1.02 (ref 1–1.03)
T. PALLIDUM, IGG: NONREACTIVE
TSH SERPL DL<=0.05 MIU/L-ACNC: 2.2 MIU/L (ref 0.3–5)
TURBIDITY: CLEAR
URINE HGB: NEGATIVE
UROBILINOGEN, URINE: NORMAL
WBC # BLD: 7.8 K/UL (ref 3.5–11.3)
WBC # BLD: ABNORMAL 10*3/UL

## 2018-06-26 PROCEDURE — 0502F SUBSEQUENT PRENATAL CARE: CPT | Performed by: SPECIALIST

## 2018-07-04 LAB
SEND OUT REPORT: NORMAL
TEST NAME: NORMAL

## 2018-07-05 DIAGNOSIS — Z32.01 POSITIVE PREGNANCY TEST: ICD-10-CM

## 2018-07-10 ENCOUNTER — ROUTINE PRENATAL (OUTPATIENT)
Dept: OBGYN CLINIC | Age: 38
End: 2018-07-10
Payer: COMMERCIAL

## 2018-07-10 VITALS
WEIGHT: 205 LBS | SYSTOLIC BLOOD PRESSURE: 113 MMHG | DIASTOLIC BLOOD PRESSURE: 76 MMHG | HEART RATE: 85 BPM | BODY MASS INDEX: 37.49 KG/M2

## 2018-07-10 DIAGNOSIS — J45.20 MILD INTERMITTENT ASTHMA WITHOUT COMPLICATION: Primary | Chronic | ICD-10-CM

## 2018-07-10 DIAGNOSIS — O09.522 ELDERLY MULTIGRAVIDA IN SECOND TRIMESTER: ICD-10-CM

## 2018-07-10 DIAGNOSIS — Z3A.13 13 WEEKS GESTATION OF PREGNANCY: ICD-10-CM

## 2018-07-10 DIAGNOSIS — O09.92 HIGH-RISK PREGNANCY IN SECOND TRIMESTER: ICD-10-CM

## 2018-07-10 PROCEDURE — G8427 DOCREV CUR MEDS BY ELIG CLIN: HCPCS | Performed by: SPECIALIST

## 2018-07-10 PROCEDURE — G8417 CALC BMI ABV UP PARAM F/U: HCPCS | Performed by: SPECIALIST

## 2018-07-10 PROCEDURE — 99211 OFF/OP EST MAY X REQ PHY/QHP: CPT | Performed by: SPECIALIST

## 2018-07-18 ENCOUNTER — HOSPITAL ENCOUNTER (OUTPATIENT)
Age: 38
Setting detail: SPECIMEN
Discharge: HOME | End: 2018-07-18
Payer: COMMERCIAL

## 2018-07-18 ENCOUNTER — ROUTINE PRENATAL (OUTPATIENT)
Dept: OBGYN CLINIC | Age: 38
End: 2018-07-18
Payer: COMMERCIAL

## 2018-07-18 VITALS
BODY MASS INDEX: 38.04 KG/M2 | WEIGHT: 208 LBS | DIASTOLIC BLOOD PRESSURE: 82 MMHG | HEART RATE: 101 BPM | SYSTOLIC BLOOD PRESSURE: 138 MMHG

## 2018-07-18 DIAGNOSIS — O09.92 HIGH-RISK PREGNANCY IN SECOND TRIMESTER: Primary | ICD-10-CM

## 2018-07-18 DIAGNOSIS — Z12.4 CERVICAL CANCER SCREENING: ICD-10-CM

## 2018-07-18 DIAGNOSIS — Z3A.14 14 WEEKS GESTATION OF PREGNANCY: ICD-10-CM

## 2018-07-18 DIAGNOSIS — O09.92 HIGH-RISK PREGNANCY IN SECOND TRIMESTER: ICD-10-CM

## 2018-07-18 DIAGNOSIS — O09.522 ELDERLY MULTIGRAVIDA IN SECOND TRIMESTER: ICD-10-CM

## 2018-07-18 PROCEDURE — 4004F PT TOBACCO SCREEN RCVD TLK: CPT | Performed by: SPECIALIST

## 2018-07-18 PROCEDURE — 99213 OFFICE O/P EST LOW 20 MIN: CPT | Performed by: SPECIALIST

## 2018-07-18 PROCEDURE — G8427 DOCREV CUR MEDS BY ELIG CLIN: HCPCS | Performed by: SPECIALIST

## 2018-07-18 PROCEDURE — G8417 CALC BMI ABV UP PARAM F/U: HCPCS | Performed by: SPECIALIST

## 2018-07-18 NOTE — PROGRESS NOTES
Samia Berg is a 41 y.o. female 14w3d    [id number]    OB History    Para Term  AB Living   4 2 1 1 1 1   SAB TAB Ectopic Molar Multiple Live Births   1         1      # Outcome Date GA Lbr Dc/2nd Weight Sex Delivery Anes PTL Lv   4 Current            3 Term  38w0d   M CS-Unspec EPI, Spinal  THONG   2   27w0d    Vag-Spont   FD   1 SAB  8w0d                         Blood pressure 138/82, pulse 101, weight 208 lb (94.3 kg), last menstrual period 2018, not currently breastfeeding. The patient was seen and evaluated. There was N/A fetal movements. No contractions or leakage of fluid. Signs and symptoms of  labor as well as labor were reviewed. Dates were reviewed with the patient. The patient will return to the office for her next visit in 1 week. See antepartum flow sheet.      Patient Active Problem List    Diagnosis Date Noted    Pregnant and not yet delivered in first trimester     Abdominal pain 2018    Elevated level of quantitative hCG for gestational age in early pregnancy 2018    Bacterial vaginosis 2018 Flagyl prescription sent to patient's pharmacy      Rectal bleeding 2018    Rectal pain 2018    Constipation 2018    Seizure ([address] 2017    Odynophagia     COPD exacerbation ([address]     Hypercalcemia 2017    Sadler's esophagus without dysplasia     Hiatal hernia     Tobacco use 2017    Asthma with acute exacerbation     Gastroesophageal reflux disease with esophagitis      severe      Headache 2016    RUQ pain     Morbid obesity due to excess calories ([address]     Acute cystitis 2016    Precordial pain 2016    Degenerative disc disease, cervical 2016    Cervical disc herniation 2016    Cervical radicular pain 2016    Asthma with status asthmaticus     Iron deficiency anemia 2015    Asthma 2015    Anxiety 2015    GERD (gastroesophageal reflux disease) 12/17/2015    Bipolar 1 disorder ([facility] 12/17/2015    Community acquired pneumonia 12/17/2015    Hypokalemia 12/17/2015    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 High-risk pregnancy in second trimester     2. Elderly multigravida in second trimester     3. 14 weeks gestation of pregnancy     4. Cervical cancer screening  PAP SMEAR       Patient complains of cramping. Patient denies any vaginal bleeding. Pap smear and cultures were done today. Patient advised to continue taking prenatal vitamins and to rest as necessary. Hinda Klinefelter, am scribing for, and in the presence of Dr. Chelsea Saeed. Electronically signed by: Yanique Jett 7/18/18 2:13 PM   I agree to the above documentation placed by my scribe Yanique Jett. I reviewed the scribe's note and agree with the documented findings and plan of care. Any areas of disagreement are noted on the chart. I have personally evaluated this patient. Additional findings are as noted. I agree with the chief complaint, past medical history, past surgical history, allergies, medications, social and family history as documented unless otherwise noted below.      Electronically signed by Chelsea Saeed MD on 7/19/2018 at 2:14 AM

## 2018-07-19 LAB
C TRACH DNA GENITAL QL NAA+PROBE: NEGATIVE
N. GONORRHOEAE DNA: NEGATIVE

## 2018-07-24 ENCOUNTER — HOSPITAL ENCOUNTER (EMERGENCY)
Age: 38
Discharge: HOME | End: 2018-07-24
Attending: EMERGENCY MEDICINE
Payer: COMMERCIAL

## 2018-07-24 VITALS
SYSTOLIC BLOOD PRESSURE: 110 MMHG | DIASTOLIC BLOOD PRESSURE: 49 MMHG | OXYGEN SATURATION: 98 % | HEART RATE: 84 BPM | RESPIRATION RATE: 16 BRPM | TEMPERATURE: 98.4 F

## 2018-07-24 DIAGNOSIS — R51.9 CHRONIC NONINTRACTABLE HEADACHE, UNSPECIFIED HEADACHE TYPE: Primary | ICD-10-CM

## 2018-07-24 DIAGNOSIS — G89.29 CHRONIC NONINTRACTABLE HEADACHE, UNSPECIFIED HEADACHE TYPE: Primary | ICD-10-CM

## 2018-07-24 PROCEDURE — 6360000002 HC RX W HCPCS: Performed by: NURSE PRACTITIONER

## 2018-07-24 PROCEDURE — 96374 THER/PROPH/DIAG INJ IV PUSH: CPT

## 2018-07-24 PROCEDURE — 99283 EMERGENCY DEPT VISIT LOW MDM: CPT

## 2018-07-24 PROCEDURE — 96375 TX/PRO/DX INJ NEW DRUG ADDON: CPT

## 2018-07-24 PROCEDURE — 2580000003 HC RX 258: Performed by: NURSE PRACTITIONER

## 2018-07-24 RX ORDER — 0.9 % SODIUM CHLORIDE 0.9 %
1000 INTRAVENOUS SOLUTION INTRAVENOUS ONCE
Status: COMPLETED | OUTPATIENT
Start: 2018-07-24 | End: 2018-07-24

## 2018-07-24 RX ORDER — PROMETHAZINE HYDROCHLORIDE 25 MG/ML
12.5 INJECTION, SOLUTION INTRAMUSCULAR; INTRAVENOUS ONCE
Status: COMPLETED | OUTPATIENT
Start: 2018-07-24 | End: 2018-07-24

## 2018-07-24 RX ORDER — NALBUPHINE HCL 10 MG/ML
10 AMPUL (ML) INJECTION ONCE
Status: COMPLETED | OUTPATIENT
Start: 2018-07-24 | End: 2018-07-24

## 2018-07-24 RX ADMIN — SODIUM CHLORIDE 1000 ML: 9 INJECTION, SOLUTION INTRAVENOUS at 16:54

## 2018-07-24 RX ADMIN — NALBUPHINE HYDROCHLORIDE 10 MG: 10 INJECTION, SOLUTION INTRAMUSCULAR; INTRAVENOUS; SUBCUTANEOUS at 16:55

## 2018-07-24 RX ADMIN — PROMETHAZINE HYDROCHLORIDE 12.5 MG: 25 INJECTION INTRAMUSCULAR; INTRAVENOUS at 16:55

## 2018-07-24 ASSESSMENT — PAIN DESCRIPTION - LOCATION
LOCATION: HEAD
LOCATION: HEAD

## 2018-07-24 ASSESSMENT — PAIN SCALES - GENERAL
PAINLEVEL_OUTOF10: 4
PAINLEVEL_OUTOF10: 8
PAINLEVEL_OUTOF10: 8

## 2018-07-24 ASSESSMENT — PAIN DESCRIPTION - PROGRESSION: CLINICAL_PROGRESSION: GRADUALLY IMPROVING

## 2018-07-24 ASSESSMENT — PAIN DESCRIPTION - PAIN TYPE
TYPE: ACUTE PAIN
TYPE: ACUTE PAIN

## 2018-07-24 NOTE — ED PROVIDER NOTES
[address] ED  eMERGENCY dEPARTMENT eNCOUnter      Pt Name: Samia Berg  MRN: [id number]  [place] 1980  Date of evaluation: 7/24/18      CHIEF COMPLAINT:   Chief Complaint   Patient presents with    Migraine     2 days     HISTORY OF PRESENT ILLNESS    Samia Berg is a 41 y.o. female who presents with complaints of headache. Patient reports left-sided headache that started 2 days ago. States that is constant, aching, throbbing. It is similar in character and quality to prior headaches. Patient states that she normally takes Lyrica for her headaches but she is pregnant and unable to take it. Reports nausea and some blurry vision in left eye. No recent fever, chills, neck pain, neck stiffness, numbness, tingling or weakness. It is not the \"worst headache\" she has ever had. It has been gradually worsening and was not \"thunderclap\" in origin. 13 pregnant, no vaginal discharge or bleeding. Nursing notes reviewed and I agree. REVIEW OF SYSTEMS       EVIEW OF SYSTEMS    Constitutional:  Denies fever or chills  Eyes:  Denies vision changes   HENT:  Denies otalgia, nasal discharge or sore throat; denies neck pain or stiffness  Respiratory:  Denies shortness of breath  Cardiovascular:  Denies chest pain  GI:  Denies abdominal pain; complains of nausea   Skin:  Denies rash   Neurologic:  Denies weakness, dizziness or numbness/ tingling; complains of headache    Negative in 10 essential Systems except as mentioned above and in the HPI. PAST MEDICAL HISTORY   PMH:  has a past medical history of Anxiety; Arthritis; Asthma; Sadler's esophagus; Bipolar 1 disorder ([address] Depression; Esophagitis; GERD (gastroesophageal reflux disease); Headache(784.0); Herniated disc, cervical; Hiatal hernia; Kidney stones; Pleurisy; Pregnant and not yet delivered in first trimester; UTI (urinary tract infection); and Vision abnormalities.  none otherwise stated from nurses notes  Surgical History:  has a past

## 2018-07-25 ENCOUNTER — TELEPHONE (OUTPATIENT)
Dept: GASTROENTEROLOGY | Age: 38
End: 2018-07-25

## 2018-07-27 RX ORDER — PANTOPRAZOLE SODIUM 40 MG/1
40 TABLET, DELAYED RELEASE ORAL
Qty: 60 TABLET | Refills: 1 | Status: SHIPPED | OUTPATIENT
Start: 2018-07-27 | End: 2018-11-09 | Stop reason: SDUPTHER

## 2018-08-01 ENCOUNTER — ROUTINE PRENATAL (OUTPATIENT)
Dept: PERINATAL CARE | Age: 38
End: 2018-08-01
Payer: COMMERCIAL

## 2018-08-01 VITALS
TEMPERATURE: 98.4 F | WEIGHT: 210 LBS | RESPIRATION RATE: 20 BRPM | HEART RATE: 90 BPM | DIASTOLIC BLOOD PRESSURE: 79 MMHG | SYSTOLIC BLOOD PRESSURE: 127 MMHG | BODY MASS INDEX: 38.64 KG/M2 | HEIGHT: 62 IN

## 2018-08-01 DIAGNOSIS — O09.522 ELDERLY MULTIGRAVIDA IN SECOND TRIMESTER: Primary | ICD-10-CM

## 2018-08-01 DIAGNOSIS — O35.8XX0 SUSPECTED DAMAGE TO FETUS FROM DISEASE IN MOTHER, ANTEPARTUM CONDITION, SINGLE OR UNSPECIFIED FETUS: ICD-10-CM

## 2018-08-01 DIAGNOSIS — Z13.89 ENCOUNTER FOR ROUTINE SCREENING FOR MALFORMATION USING ULTRASONICS: ICD-10-CM

## 2018-08-01 DIAGNOSIS — O99.212 OBESITY AFFECTING PREGNANCY IN SECOND TRIMESTER: ICD-10-CM

## 2018-08-01 DIAGNOSIS — Z3A.16 16 WEEKS GESTATION OF PREGNANCY: ICD-10-CM

## 2018-08-01 DIAGNOSIS — O99.330 TOBACCO USE DISORDER COMPLICATING PREGNANCY, CHILDBIRTH, OR PUERPERIUM, ANTEPARTUM: ICD-10-CM

## 2018-08-01 PROCEDURE — G8427 DOCREV CUR MEDS BY ELIG CLIN: HCPCS | Performed by: OBSTETRICS & GYNECOLOGY

## 2018-08-01 PROCEDURE — 76805 OB US >/= 14 WKS SNGL FETUS: CPT | Performed by: OBSTETRICS & GYNECOLOGY

## 2018-08-01 PROCEDURE — G8417 CALC BMI ABV UP PARAM F/U: HCPCS | Performed by: OBSTETRICS & GYNECOLOGY

## 2018-08-01 PROCEDURE — 99202 OFFICE O/P NEW SF 15 MIN: CPT | Performed by: OBSTETRICS & GYNECOLOGY

## 2018-08-01 RX ORDER — ACETAMINOPHEN AND CODEINE PHOSPHATE 300; 30 MG/1; MG/1
TABLET ORAL
COMMUNITY
End: 2018-08-01 | Stop reason: ALTCHOICE

## 2018-08-02 ENCOUNTER — HOSPITAL ENCOUNTER (OUTPATIENT)
Age: 38
Discharge: HOME | End: 2018-08-02
Payer: COMMERCIAL

## 2018-08-02 LAB
GLUCOSE ADMINISTRATION: NORMAL
GLUCOSE TOLERANCE SCREEN 50G: 119 MG/DL (ref 70–135)
SEND OUT REPORT: NORMAL
TEST NAME: NORMAL

## 2018-08-02 PROCEDURE — 82950 GLUCOSE TEST: CPT

## 2018-08-02 RX ORDER — PROMETHAZINE HYDROCHLORIDE 25 MG/1
25 TABLET ORAL EVERY 8 HOURS PRN
Qty: 30 TABLET | Refills: 0 | Status: SHIPPED | OUTPATIENT
Start: 2018-08-02 | End: 2018-12-14 | Stop reason: SDUPTHER

## 2018-08-03 ENCOUNTER — HOSPITAL ENCOUNTER (EMERGENCY)
Age: 38
Discharge: HOME | End: 2018-08-03
Attending: EMERGENCY MEDICINE
Payer: COMMERCIAL

## 2018-08-03 VITALS
BODY MASS INDEX: 38.64 KG/M2 | WEIGHT: 210 LBS | HEART RATE: 98 BPM | HEIGHT: 62 IN | SYSTOLIC BLOOD PRESSURE: 136 MMHG | DIASTOLIC BLOOD PRESSURE: 74 MMHG | TEMPERATURE: 98 F | RESPIRATION RATE: 18 BRPM | OXYGEN SATURATION: 98 %

## 2018-08-03 DIAGNOSIS — G43.109 MIGRAINE WITH AURA AND WITHOUT STATUS MIGRAINOSUS, NOT INTRACTABLE: Primary | ICD-10-CM

## 2018-08-03 LAB
-: ABNORMAL
AMORPHOUS: ABNORMAL
BACTERIA: ABNORMAL
BILIRUBIN URINE: NEGATIVE
CASTS UA: ABNORMAL /LPF
COLOR: YELLOW
COMMENT UA: ABNORMAL
CRYSTALS, UA: ABNORMAL /HPF
CYTOLOGY REPORT: NORMAL
EPITHELIAL CELLS UA: ABNORMAL /HPF
GLUCOSE URINE: NEGATIVE
KETONES, URINE: NEGATIVE
LEUKOCYTE ESTERASE, URINE: NEGATIVE
MUCUS: ABNORMAL
NITRITE, URINE: NEGATIVE
OTHER OBSERVATIONS UA: ABNORMAL
PH UA: 6 (ref 5–8)
PROTEIN UA: NEGATIVE
RBC UA: ABNORMAL /HPF
RENAL EPITHELIAL, UA: ABNORMAL /HPF
SPECIFIC GRAVITY UA: 1.03 (ref 1–1.03)
TRICHOMONAS: ABNORMAL
TURBIDITY: CLEAR
URINE HGB: ABNORMAL
UROBILINOGEN, URINE: NORMAL
WBC UA: ABNORMAL /HPF
YEAST: ABNORMAL

## 2018-08-03 PROCEDURE — 6370000000 HC RX 637 (ALT 250 FOR IP): Performed by: STUDENT IN AN ORGANIZED HEALTH CARE EDUCATION/TRAINING PROGRAM

## 2018-08-03 PROCEDURE — 96374 THER/PROPH/DIAG INJ IV PUSH: CPT

## 2018-08-03 PROCEDURE — 81001 URINALYSIS AUTO W/SCOPE: CPT

## 2018-08-03 PROCEDURE — 99283 EMERGENCY DEPT VISIT LOW MDM: CPT

## 2018-08-03 PROCEDURE — 96375 TX/PRO/DX INJ NEW DRUG ADDON: CPT

## 2018-08-03 PROCEDURE — 6360000002 HC RX W HCPCS: Performed by: STUDENT IN AN ORGANIZED HEALTH CARE EDUCATION/TRAINING PROGRAM

## 2018-08-03 PROCEDURE — 2580000003 HC RX 258: Performed by: STUDENT IN AN ORGANIZED HEALTH CARE EDUCATION/TRAINING PROGRAM

## 2018-08-03 RX ORDER — NALBUPHINE HCL 10 MG/ML
10 AMPUL (ML) INJECTION ONCE
Status: COMPLETED | OUTPATIENT
Start: 2018-08-03 | End: 2018-08-03

## 2018-08-03 RX ORDER — PROMETHAZINE HYDROCHLORIDE 25 MG/ML
12.5 INJECTION, SOLUTION INTRAMUSCULAR; INTRAVENOUS ONCE
Status: COMPLETED | OUTPATIENT
Start: 2018-08-03 | End: 2018-08-03

## 2018-08-03 RX ORDER — 0.9 % SODIUM CHLORIDE 0.9 %
1000 INTRAVENOUS SOLUTION INTRAVENOUS ONCE
Status: COMPLETED | OUTPATIENT
Start: 2018-08-03 | End: 2018-08-03

## 2018-08-03 RX ORDER — ACETAMINOPHEN 325 MG/1
650 TABLET ORAL ONCE
Status: COMPLETED | OUTPATIENT
Start: 2018-08-03 | End: 2018-08-03

## 2018-08-03 RX ORDER — DIPHENHYDRAMINE HYDROCHLORIDE 50 MG/ML
25 INJECTION INTRAMUSCULAR; INTRAVENOUS ONCE
Status: DISCONTINUED | OUTPATIENT
Start: 2018-08-03 | End: 2018-08-03 | Stop reason: HOSPADM

## 2018-08-03 RX ADMIN — SODIUM CHLORIDE 1000 ML: 9 INJECTION, SOLUTION INTRAVENOUS at 16:19

## 2018-08-03 RX ADMIN — ACETAMINOPHEN 650 MG: 325 TABLET, FILM COATED ORAL at 17:25

## 2018-08-03 RX ADMIN — PROMETHAZINE HYDROCHLORIDE 12.5 MG: 25 INJECTION INTRAMUSCULAR; INTRAVENOUS at 16:19

## 2018-08-03 RX ADMIN — MAGNESIUM OXIDE TAB 400 MG (241.3 MG ELEMENTAL MG) 400 MG: 400 (241.3 MG) TAB at 18:03

## 2018-08-03 RX ADMIN — NALBUPHINE HYDROCHLORIDE 10 MG: 10 INJECTION, SOLUTION INTRAMUSCULAR; INTRAVENOUS; SUBCUTANEOUS at 16:19

## 2018-08-03 ASSESSMENT — PAIN SCALES - GENERAL
PAINLEVEL_OUTOF10: 10
PAINLEVEL_OUTOF10: 9
PAINLEVEL_OUTOF10: 8
PAINLEVEL_OUTOF10: 10

## 2018-08-03 ASSESSMENT — PAIN DESCRIPTION - PAIN TYPE: TYPE: ACUTE PAIN

## 2018-08-03 ASSESSMENT — PAIN DESCRIPTION - LOCATION
LOCATION: HEAD
LOCATION: HEAD

## 2018-08-03 ASSESSMENT — PAIN DESCRIPTION - FREQUENCY: FREQUENCY: CONTINUOUS

## 2018-08-03 ASSESSMENT — PAIN DESCRIPTION - DESCRIPTORS: DESCRIPTORS: ACHING

## 2018-08-03 ASSESSMENT — ENCOUNTER SYMPTOMS
COUGH: 0
RHINORRHEA: 0

## 2018-08-03 NOTE — ED PROVIDER NOTES
[address] ED  Emergency Department Encounter  Emergency Medicine Resident     Pt Name: Erica Hu  MRN: [id number]  [place] 1980  Date of evaluation: 8/3/18  PCP:  Ina Tse MD    [address] [place] [address]       Chief Complaint   Patient presents with    Migraine       HISTORY OF PRESENT ILLNESS  (Location/Symptom, Timing/Onset, Context/Setting, Quality, Duration, Modifying Factors, Severity.)      Erica Hu is a 41 y.o. female who presents with Migraine headache over the past 2 days. Patient reports that she feels this is similar to her normal migraines, but may be slightly worse. She reports that because she is 15 weeks pregnant, and the only thing she has at home for her headaches is Tylenol, which has not relieved her pain. She denies any fevers, chills. She does have some photophobia, which is typical of her regular migraines. She states that she sometimes has changes in her vision in the left eye, which she had with previous migraines, and is present now. Follows with Dr. Luisa Fonseca for her OB care, but [place] so [place]'t [place] him. PAST MEDICAL / SURGICAL / SOCIAL / FAMILY HISTORY      has a past medical history of Anxiety; Arthritis; Asthma; Sadler's esophagus; Bipolar 1 disorder ([address] Depression; Esophagitis; GERD (gastroesophageal reflux disease); Headache(784.0); Herniated disc, cervical; Hiatal hernia; Kidney stones; Pleurisy; Pregnant and not yet delivered in first trimester; UTI (urinary tract infection); and Vision abnormalities. has a past surgical history that includes knee surgery (Left);  section (); Tonsillectomy; Knee arthroscopy (Left, 5/20/15); Cervical disc surgery; Upper gastrointestinal endoscopy (2016); Upper gastrointestinal endoscopy (2017);  Upper gastrointestinal endoscopy (2017); pr esophagogastroduodenoscopy transoral diagnostic (N/A, 3/2/2017); laparoscopy; laparoscopy (N/A, 10/5/2017); and Upper gastrointestinal endoscopy (N/A, 1/30/2018). Social History     Social History    Marital status: Single     Spouse name: N/A    Number of children: 1    Years of education: 11th grade     Occupational History    unemployed-      Social History Main Topics    Smoking status: Current Every Day Smoker     Packs/day: 0.50     Years: 17.00     Types: Cigarettes     Last attempt to quit: 2/28/2017    Smokeless tobacco: Never Used    Alcohol use No    Drug use: No    Sexual activity: Not Currently     Partners: Male     Other Topics Concern    Not on file     Social History Narrative    Lives with son and boyfriend       Family History   Problem Relation Age of Onset   Qar Cancer Mother         breast    Bipolar Disorder Mother     Hypertension Mother     Breast Cancer Mother     Bipolar Disorder Brother     Hypertension Father        Allergies:  Nsaids; Toradol [ketorolac tromethamine]; and Ultram [tramadol]    Home Medications:  Prior to Admission medications    Medication Sig Start Date End Date Taking?  Authorizing Provider   promethazine (PHENERGAN) 25 MG tablet Take 1 tablet by mouth every 8 hours as needed for Nausea 8/2/18  Yes KATIE Zuñiga CNP   pantoprazole (PROTONIX) 40 MG tablet Take 1 tablet by mouth 2 times daily (before meals) 7/27/18  Yes Brodie Malave MD   prenatal vitamin (VOL-PLUS) 27-1 MG TABS TABLET TAKE ONE TABLET BY MOUTH ONCE A DAY 5/22/18  Yes KATIE Zuñiga CNP   sertraline (ZOLOFT) 50 MG tablet Take 1 tablet by mouth daily 5/18/18  Yes Emilio Nair MD   albuterol (PROVENTIL) (2.5 MG/3ML) 0.083% nebulizer solution Take 3 mLs by nebulization every 4 hours as needed for Wheezing  Patient taking differently: Take 2.5 mg by nebulization 4 times daily  2/15/17  Yes Ashleigh Cosme MD   albuterol-ipratropium (COMBIVENT)  MCG/ACT inhaler Inhale 2 puffs into the lungs every 6 hours as needed for Wheezing   Yes Historical Provider, MD

## 2018-08-04 ASSESSMENT — ENCOUNTER SYMPTOMS
NAUSEA: 1
VOMITING: 1
PHOTOPHOBIA: 1

## 2018-08-08 ENCOUNTER — ROUTINE PRENATAL (OUTPATIENT)
Dept: OBGYN CLINIC | Age: 38
End: 2018-08-08

## 2018-08-08 VITALS
HEART RATE: 77 BPM | SYSTOLIC BLOOD PRESSURE: 114 MMHG | WEIGHT: 209 LBS | DIASTOLIC BLOOD PRESSURE: 70 MMHG | BODY MASS INDEX: 38.23 KG/M2

## 2018-08-08 DIAGNOSIS — O99.212 OBESITY AFFECTING PREGNANCY IN SECOND TRIMESTER: ICD-10-CM

## 2018-08-08 DIAGNOSIS — Z3A.17 17 WEEKS GESTATION OF PREGNANCY: Primary | ICD-10-CM

## 2018-08-08 DIAGNOSIS — O09.522 ELDERLY MULTIGRAVIDA IN SECOND TRIMESTER: ICD-10-CM

## 2018-08-08 DIAGNOSIS — O09.92 HIGH-RISK PREGNANCY IN SECOND TRIMESTER: ICD-10-CM

## 2018-08-08 PROCEDURE — 0502F SUBSEQUENT PRENATAL CARE: CPT | Performed by: SPECIALIST

## 2018-08-08 NOTE — PROGRESS NOTES
Yamilet Samples is a 41 y.o. female 17w3d    [id number]    OB History    Para Term  AB Living   4 2 1 1 1 1   SAB TAB Ectopic Molar Multiple Live Births   1         1      # Outcome Date GA Lbr Dc/2nd Weight Sex Delivery Anes PTL Lv   4 Current            3 Term  38w0d   M CS-Unspec EPI, Spinal  THONG   2   27w0d    Vag-Spont   FD   1 SAB  8w0d                         Blood pressure 114/70, pulse 77, weight 209 lb (94.8 kg), last menstrual period 2018, not currently breastfeeding. The patient was seen and evaluated. There was N/A fetal movements. No contractions or leakage of fluid. Signs and symptoms of  labor as well as labor were reviewed. Dates were reviewed with the patient. The patient will return to the office for her next visit in 1 week. See antepartum flow sheet.      Patient Active Problem List    Diagnosis Date Noted    Elderly multigravida in second trimester 2018    Suspected damage to fetus from other disease in mother, affecting management of mother, antepartum condition or complication     Obesity affecting pregnancy in second trimester 2018    Tobacco use disorder complicating pregnancy, childbirth, or puerperium, antepartum 2018    Pregnant and not yet delivered in first trimester     Abdominal pain 2018    Elevated level of quantitative hCG for gestational age in early pregnancy 2018    Bacterial vaginosis 2018 Flagyl prescription sent to patient's pharmacy      Rectal bleeding 2018    Rectal pain 2018    Constipation 2018    Seizure ([address] 2017    Odynophagia     COPD exacerbation ([address]     Hypercalcemia 2017    Sadler's esophagus without dysplasia     Hiatal hernia     Tobacco use 2017    Asthma with acute exacerbation     Gastroesophageal reflux disease with esophagitis      severe      Headache 2016    RUQ pain     Morbid obesity due to excess calories ([facility]     Acute cystitis 06/14/2016    Precordial pain 06/14/2016    Degenerative disc disease, cervical 04/12/2016    Cervical disc herniation 04/12/2016    Cervical radicular pain 04/12/2016    Asthma with status asthmaticus     Iron deficiency anemia 12/18/2015    Asthma 12/17/2015    Anxiety 12/17/2015    GERD (gastroesophageal reflux disease) 12/17/2015    Bipolar 1 disorder ([facility] 12/17/2015    Community acquired pneumonia 12/17/2015    Hypokalemia 12/17/2015    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 17 weeks gestation of pregnancy       Patient doing well. Patient advised to continue taking prenatal vitamins and to rest as necessary. Alee Alvarez am scribing for, and in the presence of Dr. Love Ruth. Electronically signed by: Caryn Rocha 8/8/18 1:18 PM   I agree to the above documentation placed by my scribe Caryn Rocha. I reviewed the scribe's note and agree with the documented findings and plan of care. Any areas of disagreement are noted on the chart. I have personally evaluated this patient. Additional findings are as noted. I agree with the chief complaint, past medical history, past surgical history, allergies, medications, social and family history as documented unless otherwise noted below.      Electronically signed by Love Ruth MD on 8/11/2018 at 4:04 PM

## 2018-08-11 PROBLEM — O09.92 HIGH-RISK PREGNANCY IN SECOND TRIMESTER: Status: ACTIVE | Noted: 2018-08-11

## 2018-08-13 ENCOUNTER — APPOINTMENT (OUTPATIENT)
Dept: GENERAL RADIOLOGY | Age: 38
End: 2018-08-13
Payer: COMMERCIAL

## 2018-08-13 ENCOUNTER — HOSPITAL ENCOUNTER (EMERGENCY)
Age: 38
Discharge: HOME | End: 2018-08-14
Attending: EMERGENCY MEDICINE
Payer: COMMERCIAL

## 2018-08-13 DIAGNOSIS — G43.909 MIGRAINE WITHOUT STATUS MIGRAINOSUS, NOT INTRACTABLE, UNSPECIFIED MIGRAINE TYPE: Primary | ICD-10-CM

## 2018-08-13 DIAGNOSIS — R07.9 CHEST PAIN, UNSPECIFIED TYPE: ICD-10-CM

## 2018-08-13 LAB
ABSOLUTE EOS #: 0.2 K/UL (ref 0–0.4)
ABSOLUTE IMMATURE GRANULOCYTE: ABNORMAL K/UL (ref 0–0.3)
ABSOLUTE LYMPH #: 1.8 K/UL (ref 1–4.8)
ABSOLUTE MONO #: 0.6 K/UL (ref 0.1–1.3)
ANION GAP SERPL CALCULATED.3IONS-SCNC: 13 MMOL/L (ref 9–17)
BASOPHILS # BLD: 1 % (ref 0–2)
BASOPHILS ABSOLUTE: 0.1 K/UL (ref 0–0.2)
BUN BLDV-MCNC: 8 MG/DL (ref 6–20)
BUN/CREAT BLD: ABNORMAL (ref 9–20)
CALCIUM SERPL-MCNC: 9.3 MG/DL (ref 8.6–10.4)
CHLORIDE BLD-SCNC: 106 MMOL/L (ref 98–107)
CO2: 19 MMOL/L (ref 20–31)
CREAT SERPL-MCNC: 0.47 MG/DL (ref 0.5–0.9)
D-DIMER QUANTITATIVE: 0.51 MG/L FEU
DIFFERENTIAL TYPE: ABNORMAL
EOSINOPHILS RELATIVE PERCENT: 3 % (ref 0–4)
GFR AFRICAN AMERICAN: >60 ML/MIN
GFR NON-AFRICAN AMERICAN: >60 ML/MIN
GFR SERPL CREATININE-BSD FRML MDRD: ABNORMAL ML/MIN/{1.73_M2}
GFR SERPL CREATININE-BSD FRML MDRD: ABNORMAL ML/MIN/{1.73_M2}
GLUCOSE BLD-MCNC: 119 MG/DL (ref 70–99)
HCT VFR BLD CALC: 36.7 % (ref 36–46)
HEMOGLOBIN: 12.6 G/DL (ref 12–16)
IMMATURE GRANULOCYTES: ABNORMAL %
INR BLD: 1
LYMPHOCYTES # BLD: 23 % (ref 24–44)
MCH RBC QN AUTO: 29.1 PG (ref 26–34)
MCHC RBC AUTO-ENTMCNC: 34.3 G/DL (ref 31–37)
MCV RBC AUTO: 85 FL (ref 80–100)
MONOCYTES # BLD: 8 % (ref 1–7)
MYOGLOBIN: 37 NG/ML (ref 25–58)
NRBC AUTOMATED: ABNORMAL PER 100 WBC
PARTIAL THROMBOPLASTIN TIME: 27.8 SEC (ref 23–31)
PDW BLD-RTO: 19.5 % (ref 11.5–14.9)
PLATELET # BLD: 255 K/UL (ref 150–450)
PLATELET ESTIMATE: ABNORMAL
PMV BLD AUTO: 8.3 FL (ref 6–12)
POTASSIUM SERPL-SCNC: 3.4 MMOL/L (ref 3.7–5.3)
PROTHROMBIN TIME: 10 SEC (ref 9.7–12)
RBC # BLD: 4.31 M/UL (ref 4–5.2)
RBC # BLD: ABNORMAL 10*6/UL
SEG NEUTROPHILS: 65 % (ref 36–66)
SEGMENTED NEUTROPHILS ABSOLUTE COUNT: 5.3 K/UL (ref 1.3–9.1)
SODIUM BLD-SCNC: 138 MMOL/L (ref 135–144)
TROPONIN INTERP: NORMAL
TROPONIN T: <0.03 NG/ML
WBC # BLD: 8.1 K/UL (ref 3.5–11)
WBC # BLD: ABNORMAL 10*3/UL

## 2018-08-13 PROCEDURE — 84484 ASSAY OF TROPONIN QUANT: CPT

## 2018-08-13 PROCEDURE — 2580000003 HC RX 258: Performed by: EMERGENCY MEDICINE

## 2018-08-13 PROCEDURE — 36415 COLL VENOUS BLD VENIPUNCTURE: CPT

## 2018-08-13 PROCEDURE — 85730 THROMBOPLASTIN TIME PARTIAL: CPT

## 2018-08-13 PROCEDURE — 83874 ASSAY OF MYOGLOBIN: CPT

## 2018-08-13 PROCEDURE — 6370000000 HC RX 637 (ALT 250 FOR IP): Performed by: EMERGENCY MEDICINE

## 2018-08-13 PROCEDURE — 85025 COMPLETE CBC W/AUTO DIFF WBC: CPT

## 2018-08-13 PROCEDURE — 96374 THER/PROPH/DIAG INJ IV PUSH: CPT

## 2018-08-13 PROCEDURE — 96375 TX/PRO/DX INJ NEW DRUG ADDON: CPT

## 2018-08-13 PROCEDURE — 71046 X-RAY EXAM CHEST 2 VIEWS: CPT

## 2018-08-13 PROCEDURE — 93005 ELECTROCARDIOGRAM TRACING: CPT

## 2018-08-13 PROCEDURE — 80048 BASIC METABOLIC PNL TOTAL CA: CPT

## 2018-08-13 PROCEDURE — 6360000002 HC RX W HCPCS: Performed by: EMERGENCY MEDICINE

## 2018-08-13 PROCEDURE — 85379 FIBRIN DEGRADATION QUANT: CPT

## 2018-08-13 PROCEDURE — 85610 PROTHROMBIN TIME: CPT

## 2018-08-13 PROCEDURE — 99285 EMERGENCY DEPT VISIT HI MDM: CPT

## 2018-08-13 RX ORDER — IPRATROPIUM BROMIDE AND ALBUTEROL SULFATE 2.5; .5 MG/3ML; MG/3ML
1 SOLUTION RESPIRATORY (INHALATION) PRN
Status: DISCONTINUED | OUTPATIENT
Start: 2018-08-13 | End: 2018-08-14 | Stop reason: HOSPADM

## 2018-08-13 RX ORDER — 0.9 % SODIUM CHLORIDE 0.9 %
500 INTRAVENOUS SOLUTION INTRAVENOUS ONCE
Status: COMPLETED | OUTPATIENT
Start: 2018-08-13 | End: 2018-08-14

## 2018-08-13 RX ORDER — PROMETHAZINE HYDROCHLORIDE 25 MG/ML
25 INJECTION, SOLUTION INTRAMUSCULAR; INTRAVENOUS ONCE
Status: COMPLETED | OUTPATIENT
Start: 2018-08-13 | End: 2018-08-13

## 2018-08-13 RX ORDER — NALBUPHINE HCL 10 MG/ML
10 AMPUL (ML) INJECTION ONCE
Status: COMPLETED | OUTPATIENT
Start: 2018-08-13 | End: 2018-08-13

## 2018-08-13 RX ADMIN — NALBUPHINE HYDROCHLORIDE 10 MG: 10 INJECTION, SOLUTION INTRAMUSCULAR; INTRAVENOUS; SUBCUTANEOUS at 23:12

## 2018-08-13 RX ADMIN — PROMETHAZINE HYDROCHLORIDE 25 MG: 25 INJECTION, SOLUTION INTRAMUSCULAR; INTRAVENOUS at 23:11

## 2018-08-13 RX ADMIN — IPRATROPIUM BROMIDE AND ALBUTEROL SULFATE 1 AMPULE: .5; 3 SOLUTION RESPIRATORY (INHALATION) at 22:39

## 2018-08-13 RX ADMIN — SODIUM CHLORIDE 500 ML: 9 INJECTION, SOLUTION INTRAVENOUS at 23:11

## 2018-08-13 ASSESSMENT — PAIN SCALES - GENERAL
PAINLEVEL_OUTOF10: 8
PAINLEVEL_OUTOF10: 8
PAINLEVEL_OUTOF10: 6

## 2018-08-13 ASSESSMENT — ENCOUNTER SYMPTOMS
FACIAL SWELLING: 0
CONSTIPATION: 0
BLOOD IN STOOL: 0
BACK PAIN: 0
COUGH: 0
TROUBLE SWALLOWING: 0
DIARRHEA: 0
COLOR CHANGE: 0
SORE THROAT: 0
NAUSEA: 1
WHEEZING: 1
SHORTNESS OF BREATH: 1
EYE PAIN: 0
EYE DISCHARGE: 0
SINUS PRESSURE: 0
ABDOMINAL PAIN: 0
CHEST TIGHTNESS: 0
EYE REDNESS: 0
VOMITING: 0
RHINORRHEA: 0

## 2018-08-14 VITALS
HEIGHT: 62 IN | RESPIRATION RATE: 12 BRPM | SYSTOLIC BLOOD PRESSURE: 117 MMHG | TEMPERATURE: 98.7 F | WEIGHT: 209 LBS | OXYGEN SATURATION: 94 % | BODY MASS INDEX: 38.46 KG/M2 | DIASTOLIC BLOOD PRESSURE: 68 MMHG | HEART RATE: 73 BPM

## 2018-08-14 LAB
EKG ATRIAL RATE: 83 BPM
EKG P AXIS: 26 DEGREES
EKG P-R INTERVAL: 114 MS
EKG Q-T INTERVAL: 360 MS
EKG QRS DURATION: 92 MS
EKG QTC CALCULATION (BAZETT): 423 MS
EKG R AXIS: 50 DEGREES
EKG T AXIS: 24 DEGREES
EKG VENTRICULAR RATE: 83 BPM
MYOGLOBIN: 30 NG/ML (ref 25–58)
TROPONIN INTERP: NORMAL
TROPONIN T: <0.03 NG/ML

## 2018-08-14 PROCEDURE — 84484 ASSAY OF TROPONIN QUANT: CPT

## 2018-08-14 PROCEDURE — 36415 COLL VENOUS BLD VENIPUNCTURE: CPT

## 2018-08-14 PROCEDURE — 83874 ASSAY OF MYOGLOBIN: CPT

## 2018-08-14 NOTE — ED PROVIDER NOTES
GASTROINTESTINAL ENDOSCOPY  01/19/2017    barretts; hiatus hernia; gastritis    UPPER GASTROINTESTINAL ENDOSCOPY  03/02/2017    long seg mullins's with linear erosions, esophagitis, small hiatal hernia    UPPER GASTROINTESTINAL ENDOSCOPY N/A 1/30/2018    MULLINS'S       CURRENT MEDICATIONS       Current Discharge Medication List      CONTINUE these medications which have NOT CHANGED    Details   sertraline (ZOLOFT) 50 MG tablet Take 1 tablet by mouth daily  Qty: 30 tablet, Refills: 0      promethazine (PHENERGAN) 25 MG tablet Take 1 tablet by mouth every 8 hours as needed for Nausea  Qty: 30 tablet, Refills: 0      pantoprazole (PROTONIX) 40 MG tablet Take 1 tablet by mouth 2 times daily (before meals)  Qty: 60 tablet, Refills: 1      prenatal vitamin (VOL-PLUS) 27-1 MG TABS TABLET TAKE ONE TABLET BY MOUTH ONCE A DAY  Qty: 30 tablet, Refills: 11      albuterol (PROVENTIL) (2.5 MG/3ML) 0.083% nebulizer solution Take 3 mLs by nebulization every 4 hours as needed for Wheezing  Qty: 120 each, Refills: 3      albuterol-ipratropium (COMBIVENT)  MCG/ACT inhaler Inhale 2 puffs into the lungs every 6 hours as needed for Wheezing      budesonide-formoterol (SYMBICORT) 160-4.5 MCG/ACT AERO Inhale 2 puffs into the lungs 2 times daily. ALLERGIES     is allergic to nsaids; toradol [ketorolac tromethamine]; and ultram [tramadol]. FAMILY HISTORY     indicated that the status of her mother is unknown. She indicated that the status of her father is unknown. She indicated that the status of her brother is unknown. SOCIAL HISTORY      reports that she has been smoking Cigarettes. She has a 8.50 pack-year smoking history. She has never used smokeless tobacco. She reports that she does not drink alcohol or use drugs.     PHYSICAL EXAM     INITIAL VITALS: /68   Pulse 73   Temp 98.7 °F (37.1 °C) (Oral)   Resp 12   Ht 5' 2\" (1.575 m)   Wt 209 lb (94.8 kg)   LMP 03/31/2018 (Approximate)   SpO2 94% also is low risk for cardiac disease with a normal EKG therefore will do to sets of enzymes and hopefully reveals discharge her home. DIAGNOSTIC RESULTS     EKG: All EKG's are interpreted by the Emergency Department Physician who either signs or Co-signs this chart in the absence of a cardiologist.    EKG Interpretation    Interpreted by me    Rhythm: normal sinus   Rate: normal  Axis: normal  Ectopy: none  Conduction: normal  ST Segments: no acute change  T Waves: no acute change  Q Waves: none    Clinical Impression: no acute changes and normal EKG    RADIOLOGY:All plain film, CT, MRI, and formal ultrasound images (except ED bedside ultrasound) are read by the radiologist and interpretations are directly viewed by the emergency physician. Xr Chest Standard (2 Vw)    Result Date: 8/13/2018  EXAMINATION: TWO VIEWS OF THE CHEST 8/13/2018 11:03 pm COMPARISON: November 8, 2017 HISTORY: ORDERING SYSTEM PROVIDED HISTORY: Chest Pain TECHNOLOGIST PROVIDED HISTORY: Reason for exam:->Chest Pain Ordering Physician Provided Reason for Exam: chest pain Acuity: Acute Type of Exam: Initial Additional signs and symptoms: chest pain, Headache. 18 weeks pregnant Relevant Medical/Surgical History: chest pain, Headache. 18 weeks pregnant FINDINGS: The lungs are without acute focal process. There is no effusion or pneumothorax. The cardiomediastinal silhouette is without acute process. The osseous structures are without acute process. No acute process. Cervical hardware noted. LABS: All lab results were reviewed by myself, and all abnormals are listed below.   Labs Reviewed   BASIC METABOLIC PANEL - Abnormal; Notable for the following:        Result Value    Glucose 119 (*)     CREATININE 0.47 (*)     Potassium 3.4 (*)     CO2 19 (*)     All other components within normal limits   CBC WITH AUTO DIFFERENTIAL - Abnormal; Notable for the following:     RDW 19.5 (*)     Lymphocytes 23 (*)     Monocytes 8 (*)     All

## 2018-08-15 ENCOUNTER — ROUTINE PRENATAL (OUTPATIENT)
Dept: OBGYN CLINIC | Age: 38
End: 2018-08-15
Payer: COMMERCIAL

## 2018-08-15 VITALS
WEIGHT: 211 LBS | HEART RATE: 102 BPM | BODY MASS INDEX: 38.59 KG/M2 | DIASTOLIC BLOOD PRESSURE: 62 MMHG | SYSTOLIC BLOOD PRESSURE: 110 MMHG

## 2018-08-15 DIAGNOSIS — O09.92 HIGH-RISK PREGNANCY IN SECOND TRIMESTER: Primary | ICD-10-CM

## 2018-08-15 DIAGNOSIS — Z3A.18 18 WEEKS GESTATION OF PREGNANCY: ICD-10-CM

## 2018-08-15 PROCEDURE — 4004F PT TOBACCO SCREEN RCVD TLK: CPT | Performed by: SPECIALIST

## 2018-08-15 PROCEDURE — 99213 OFFICE O/P EST LOW 20 MIN: CPT | Performed by: SPECIALIST

## 2018-08-15 PROCEDURE — G8417 CALC BMI ABV UP PARAM F/U: HCPCS | Performed by: SPECIALIST

## 2018-08-15 PROCEDURE — G8427 DOCREV CUR MEDS BY ELIG CLIN: HCPCS | Performed by: SPECIALIST

## 2018-08-15 NOTE — PROGRESS NOTES
Tanja Samson is a 41 y.o. female 18w3d    [id number]    OB History    Para Term  AB Living   4 2 1 1 1 1   SAB TAB Ectopic Molar Multiple Live Births   1         1      # Outcome Date GA Lbr Dc/2nd Weight Sex Delivery Anes PTL Lv   4 Current            3 Term  38w0d   M CS-Unspec EPI, Spinal  THONG   2   27w0d    Vag-Spont   FD   1 SAB  8w0d                         Blood pressure 110/62, pulse 102, weight 211 lb (95.7 kg), last menstrual period 2018, not currently breastfeeding. The patient was seen and evaluated. There was Positive fetal movements. No contractions or leakage of fluid. Signs and symptoms of  labor as well as labor were reviewed. Dates were reviewed with the patient. The patient will return to the office for her next visit in 1 week. See antepartum flow sheet.      Patient Active Problem List    Diagnosis Date Noted    High-risk pregnancy in second trimester 2018    Elderly multigravida in second trimester 2018    Suspected damage to fetus from other disease in mother, affecting management of mother, antepartum condition or complication     Obesity affecting pregnancy in second trimester 2018    Tobacco use disorder complicating pregnancy, childbirth, or puerperium, antepartum 2018    Pregnant and not yet delivered in first trimester     Abdominal pain 2018    Elevated level of quantitative hCG for gestational age in early pregnancy 2018    Bacterial vaginosis 2018 Flagyl prescription sent to patient's pharmacy      Rectal bleeding 2018    Rectal pain 2018    Constipation 2018    Seizure ([address] 2017    Odynophagia     COPD exacerbation ([address]     Hypercalcemia 2017    Sadler's esophagus without dysplasia     Hiatal hernia     Tobacco use 2017    Asthma with acute exacerbation     Gastroesophageal reflux disease with esophagitis

## 2018-08-22 ENCOUNTER — ROUTINE PRENATAL (OUTPATIENT)
Dept: OBGYN CLINIC | Age: 38
End: 2018-08-22
Payer: COMMERCIAL

## 2018-08-22 VITALS
HEART RATE: 98 BPM | BODY MASS INDEX: 38.41 KG/M2 | DIASTOLIC BLOOD PRESSURE: 73 MMHG | SYSTOLIC BLOOD PRESSURE: 130 MMHG | WEIGHT: 210 LBS

## 2018-08-22 DIAGNOSIS — Z3A.19 19 WEEKS GESTATION OF PREGNANCY: ICD-10-CM

## 2018-08-22 DIAGNOSIS — Z87.59 HISTORY OF INTRAUTERINE FETAL DEATH IN PREVIOUS PREGNANCY: ICD-10-CM

## 2018-08-22 DIAGNOSIS — J45.20 MILD INTERMITTENT ASTHMA WITHOUT COMPLICATION: Primary | Chronic | ICD-10-CM

## 2018-08-22 DIAGNOSIS — O09.522 ELDERLY MULTIGRAVIDA IN SECOND TRIMESTER: ICD-10-CM

## 2018-08-22 PROCEDURE — G8427 DOCREV CUR MEDS BY ELIG CLIN: HCPCS | Performed by: SPECIALIST

## 2018-08-22 PROCEDURE — G8417 CALC BMI ABV UP PARAM F/U: HCPCS | Performed by: SPECIALIST

## 2018-08-22 PROCEDURE — 99211 OFF/OP EST MAY X REQ PHY/QHP: CPT | Performed by: SPECIALIST

## 2018-08-29 ENCOUNTER — HOSPITAL ENCOUNTER (OUTPATIENT)
Age: 38
Discharge: HOME | End: 2018-08-29
Attending: SPECIALIST | Admitting: SPECIALIST
Payer: COMMERCIAL

## 2018-08-29 ENCOUNTER — ROUTINE PRENATAL (OUTPATIENT)
Dept: PERINATAL CARE | Age: 38
End: 2018-08-29
Payer: COMMERCIAL

## 2018-08-29 VITALS
DIASTOLIC BLOOD PRESSURE: 64 MMHG | TEMPERATURE: 98.2 F | HEART RATE: 86 BPM | RESPIRATION RATE: 20 BRPM | SYSTOLIC BLOOD PRESSURE: 119 MMHG

## 2018-08-29 VITALS
BODY MASS INDEX: 39.01 KG/M2 | WEIGHT: 212 LBS | HEIGHT: 62 IN | HEART RATE: 95 BPM | RESPIRATION RATE: 20 BRPM | DIASTOLIC BLOOD PRESSURE: 87 MMHG | SYSTOLIC BLOOD PRESSURE: 128 MMHG | TEMPERATURE: 98 F

## 2018-08-29 DIAGNOSIS — Z36.86 ENCOUNTER FOR SCREENING FOR RISK OF PRE-TERM LABOR: ICD-10-CM

## 2018-08-29 DIAGNOSIS — O99.330 TOBACCO USE DISORDER COMPLICATING PREGNANCY, CHILDBIRTH, OR PUERPERIUM, ANTEPARTUM: ICD-10-CM

## 2018-08-29 DIAGNOSIS — Z3A.20 20 WEEKS GESTATION OF PREGNANCY: ICD-10-CM

## 2018-08-29 DIAGNOSIS — O09.522 ELDERLY MULTIGRAVIDA IN SECOND TRIMESTER: Primary | ICD-10-CM

## 2018-08-29 DIAGNOSIS — O35.8XX0 SUSPECTED DAMAGE TO FETUS FROM DISEASE IN MOTHER, ANTEPARTUM CONDITION, SINGLE OR UNSPECIFIED FETUS: ICD-10-CM

## 2018-08-29 DIAGNOSIS — O99.212 OBESITY AFFECTING PREGNANCY IN SECOND TRIMESTER: ICD-10-CM

## 2018-08-29 PROBLEM — R03.0 SINGLE EPISODE OF ELEVATED BLOOD PRESSURE: Status: ACTIVE | Noted: 2018-08-29

## 2018-08-29 PROBLEM — O09.299 PRIOR PREGNANCY WITH FETAL DEMISE: Status: ACTIVE | Noted: 2018-08-29

## 2018-08-29 PROBLEM — G89.29 CHRONIC ABDOMINAL PAIN: Status: ACTIVE | Noted: 2018-05-07

## 2018-08-29 PROBLEM — O09.90 HRP (HIGH RISK PREGNANCY), UNSPECIFIED TRIMESTER: Status: ACTIVE | Noted: 2018-08-29

## 2018-08-29 LAB
-: ABNORMAL
ABDOMINAL CIRCUMFERENCE: NORMAL CM
ABSOLUTE EOS #: 0.2 K/UL (ref 0–0.4)
ABSOLUTE IMMATURE GRANULOCYTE: ABNORMAL K/UL (ref 0–0.3)
ABSOLUTE LYMPH #: 1.5 K/UL (ref 1–4.8)
ABSOLUTE MONO #: 0.6 K/UL (ref 0.1–1.3)
ALBUMIN SERPL-MCNC: 3.7 G/DL (ref 3.5–5.2)
ALBUMIN/GLOBULIN RATIO: ABNORMAL (ref 1–2.5)
ALP BLD-CCNC: 53 U/L (ref 35–104)
ALT SERPL-CCNC: 18 U/L (ref 5–33)
AMORPHOUS: ABNORMAL
ANION GAP SERPL CALCULATED.3IONS-SCNC: 11 MMOL/L (ref 9–17)
AST SERPL-CCNC: 16 U/L
BACTERIA: ABNORMAL
BASOPHILS # BLD: 1 % (ref 0–2)
BASOPHILS ABSOLUTE: 0.1 K/UL (ref 0–0.2)
BILIRUB SERPL-MCNC: <0.15 MG/DL (ref 0.3–1.2)
BILIRUBIN URINE: NEGATIVE
BIPARIETAL DIAMETER: NORMAL CM
BUN BLDV-MCNC: 10 MG/DL (ref 6–20)
BUN/CREAT BLD: ABNORMAL (ref 9–20)
CALCIUM SERPL-MCNC: 9 MG/DL (ref 8.6–10.4)
CASTS UA: ABNORMAL /LPF
CHLORIDE BLD-SCNC: 106 MMOL/L (ref 98–107)
CO2: 19 MMOL/L (ref 20–31)
COLOR: YELLOW
COMMENT UA: ABNORMAL
CREAT SERPL-MCNC: 0.43 MG/DL (ref 0.5–0.9)
CREATININE URINE: 161.8 MG/DL (ref 28–217)
CREATININE URINE: 162.6 MG/DL (ref 28–217)
CRYSTALS, UA: ABNORMAL /HPF
DIFFERENTIAL TYPE: ABNORMAL
DIRECT EXAM: NORMAL
EOSINOPHILS RELATIVE PERCENT: 2 % (ref 0–4)
EPITHELIAL CELLS UA: ABNORMAL /HPF
ESTIMATED FETAL WEIGHT: NORMAL GRAMS
FEMORAL DIAMETER: NORMAL CM
GFR AFRICAN AMERICAN: >60 ML/MIN
GFR NON-AFRICAN AMERICAN: >60 ML/MIN
GFR SERPL CREATININE-BSD FRML MDRD: ABNORMAL ML/MIN/{1.73_M2}
GFR SERPL CREATININE-BSD FRML MDRD: ABNORMAL ML/MIN/{1.73_M2}
GLUCOSE BLD-MCNC: 89 MG/DL (ref 70–99)
GLUCOSE URINE: NEGATIVE
HC/AC: NORMAL
HCT VFR BLD CALC: 36.7 % (ref 36–46)
HEAD CIRCUMFERENCE: NORMAL CM
HEMOGLOBIN: 12.6 G/DL (ref 12–16)
IMMATURE GRANULOCYTES: ABNORMAL %
KETONES, URINE: NEGATIVE
LACTATE DEHYDROGENASE: 142 U/L (ref 135–214)
LEUKOCYTE ESTERASE, URINE: NEGATIVE
LYMPHOCYTES # BLD: 14 % (ref 24–44)
Lab: NORMAL
MCH RBC QN AUTO: 29.4 PG (ref 26–34)
MCHC RBC AUTO-ENTMCNC: 34.4 G/DL (ref 31–37)
MCV RBC AUTO: 85.6 FL (ref 80–100)
MONOCYTES # BLD: 6 % (ref 1–7)
MUCUS: ABNORMAL
NITRITE, URINE: NEGATIVE
NRBC AUTOMATED: ABNORMAL PER 100 WBC
OTHER OBSERVATIONS UA: ABNORMAL
PDW BLD-RTO: 16.9 % (ref 11.5–14.9)
PH UA: 6 (ref 5–8)
PLATELET # BLD: 254 K/UL (ref 150–450)
PLATELET ESTIMATE: ABNORMAL
PMV BLD AUTO: 9.1 FL (ref 6–12)
POTASSIUM SERPL-SCNC: 3.8 MMOL/L (ref 3.7–5.3)
PROTEIN UA: NEGATIVE
RBC # BLD: 4.28 M/UL (ref 4–5.2)
RBC # BLD: ABNORMAL 10*6/UL
RBC UA: ABNORMAL /HPF
RENAL EPITHELIAL, UA: ABNORMAL /HPF
SEG NEUTROPHILS: 77 % (ref 36–66)
SEGMENTED NEUTROPHILS ABSOLUTE COUNT: 8.2 K/UL (ref 1.3–9.1)
SODIUM BLD-SCNC: 136 MMOL/L (ref 135–144)
SPECIFIC GRAVITY UA: 1.02 (ref 1–1.03)
SPECIMEN DESCRIPTION: NORMAL
STATUS: NORMAL
TOTAL PROTEIN, URINE: 13 MG/DL
TOTAL PROTEIN: 6.4 G/DL (ref 6.4–8.3)
TRICHOMONAS: ABNORMAL
TURBIDITY: CLEAR
URIC ACID: 3 MG/DL (ref 2.4–5.7)
URINE HGB: ABNORMAL
URINE TOTAL PROTEIN CREATININE RATIO: 0.08 (ref 0–0.2)
UROBILINOGEN, URINE: NORMAL
WBC # BLD: 10.7 K/UL (ref 3.5–11)
WBC # BLD: ABNORMAL 10*3/UL
WBC UA: ABNORMAL /HPF
YEAST: ABNORMAL

## 2018-08-29 PROCEDURE — 87480 CANDIDA DNA DIR PROBE: CPT

## 2018-08-29 PROCEDURE — 87491 CHLMYD TRACH DNA AMP PROBE: CPT

## 2018-08-29 PROCEDURE — 2580000003 HC RX 258: Performed by: STUDENT IN AN ORGANIZED HEALTH CARE EDUCATION/TRAINING PROGRAM

## 2018-08-29 PROCEDURE — 80053 COMPREHEN METABOLIC PANEL: CPT

## 2018-08-29 PROCEDURE — 76811 OB US DETAILED SNGL FETUS: CPT | Performed by: OBSTETRICS & GYNECOLOGY

## 2018-08-29 PROCEDURE — 84550 ASSAY OF BLOOD/URIC ACID: CPT

## 2018-08-29 PROCEDURE — 83615 LACTATE (LD) (LDH) ENZYME: CPT

## 2018-08-29 PROCEDURE — 87591 N.GONORRHOEAE DNA AMP PROB: CPT

## 2018-08-29 PROCEDURE — 36415 COLL VENOUS BLD VENIPUNCTURE: CPT

## 2018-08-29 PROCEDURE — 87660 TRICHOMONAS VAGIN DIR PROBE: CPT

## 2018-08-29 PROCEDURE — 82570 ASSAY OF URINE CREATININE: CPT

## 2018-08-29 PROCEDURE — 81001 URINALYSIS AUTO W/SCOPE: CPT

## 2018-08-29 PROCEDURE — 76817 TRANSVAGINAL US OBSTETRIC: CPT | Performed by: OBSTETRICS & GYNECOLOGY

## 2018-08-29 PROCEDURE — 87510 GARDNER VAG DNA DIR PROBE: CPT

## 2018-08-29 PROCEDURE — 99213 OFFICE O/P EST LOW 20 MIN: CPT

## 2018-08-29 PROCEDURE — 85025 COMPLETE CBC W/AUTO DIFF WBC: CPT

## 2018-08-29 PROCEDURE — 84156 ASSAY OF PROTEIN URINE: CPT

## 2018-08-29 RX ORDER — ACETAMINOPHEN 500 MG
1000 TABLET ORAL EVERY 6 HOURS PRN
Status: DISCONTINUED | OUTPATIENT
Start: 2018-08-29 | End: 2018-08-29

## 2018-08-29 RX ORDER — 0.9 % SODIUM CHLORIDE 0.9 %
1000 INTRAVENOUS SOLUTION INTRAVENOUS ONCE
Status: COMPLETED | OUTPATIENT
Start: 2018-08-29 | End: 2018-08-29

## 2018-08-29 RX ORDER — SODIUM CHLORIDE 9 MG/ML
125 INJECTION, SOLUTION INTRAVENOUS CONTINUOUS
Status: DISCONTINUED | OUTPATIENT
Start: 2018-08-29 | End: 2018-08-29 | Stop reason: HOSPADM

## 2018-08-29 RX ADMIN — SODIUM CHLORIDE 1000 ML: 9 INJECTION, SOLUTION INTRAVENOUS at 17:25

## 2018-08-29 NOTE — FLOWSHEET NOTE
Pt admitted to room 167 from ER, pt states \she is having lower abdominal pain that wraps around to her back.  Pt states pain started approx 10 pm last night, pt was seen at maternal / fetal medicine this morning and was told if pain did not go away or got worse to be seen, pt states a transvaginal US was done this morning and \"they said my cervix was fine\"

## 2018-08-29 NOTE — FLOWSHEET NOTE
Sterile speculum exam performed by Dr Melvina Mendez, DO resident/Dr Jacque Kathleen, DO resident. Cervix closed. Samples obtained. No bleeding noted.

## 2018-08-29 NOTE — H&P
herniation    Cervical radicular pain    Acute cystitis    Precordial pain    RUQ pain    Morbid obesity due to excess calories (HCC)    Gastroesophageal reflux disease with esophagitis    Tobacco use    Asthma with acute exacerbation    Sadlre's esophagus without dysplasia    Hiatal hernia    Hypercalcemia    COPD exacerbation (HCC)    Odynophagia    Rectal bleeding    Rectal pain    Constipation    Abdominal pain    Headache    Seizure (HCC)    Elevated level of quantitative hCG for gestational age in early pregnancy    Bacterial vaginosis    Pregnant and not yet delivered in first trimester    Elderly multigravida in second trimester    Suspected damage to fetus from other disease in mother, affecting management of mother, antepartum condition or complication    Obesity affecting pregnancy in second trimester    Tobacco use disorder complicating pregnancy, childbirth, or puerperium, antepartum    High-risk pregnancy in second trimester    Umbilical cord complication        Steroids Given In This Pregnancy:  no     REVIEW OF SYSTEMS:   Constitutional: negative fever, negative chills  HEENT: negative visual disturbances, negative headaches  Respiratory: negative dyspnea, negative cough  Cardiovascular: negative chest pain,  negative palpitations  Gastrointestinal: positive abdominal pain, negative RUQ pain, negative N/V, negative diarrhea, negative constipation  Genitourinary: negative dysuria, negative vaginal discharge  Dermatological: negative rash  Hematologic: negative bruising  Immunologic/Lymphatic: negative recent illness, negative recent sick contact  Musculoskeletal: negative back pain, negative myalgias, negative arthralgias  Neurological:  negative dizziness, negative weakness  Behavior/Psych: negative depression, negative anxiety      OBSTETRICAL HISTORY:   Obstetric History       T1      L1     SAB1   TAB0   Ectopic0   Molar0   Multiple0   Live mg by nebulization 4 times daily  2/15/17   Meng Grimm MD   albuterol-ipratropium (COMBIVENT)  MCG/ACT inhaler Inhale 2 puffs into the lungs every 6 hours as needed for Wheezing    Historical Provider, MD   budesonide-formoterol (SYMBICORT) 160-4.5 MCG/ACT AERO Inhale 2 puffs into the lungs 2 times daily. 1/29/08   Historical Provider, MD       FAMILY HISTORY:  family history includes Bipolar Disorder in her brother and mother; Breast Cancer in her mother; Cancer in her mother; Hypertension in her father and mother. SOCIAL HISTORY:   reports that she has been smoking Cigarettes. She has a 8.50 pack-year smoking history. She has never used smokeless tobacco. She reports that she does not drink alcohol or use drugs.     VITALS:  Vitals:    08/29/18 1553 08/29/18 1554   BP: (!) 142/82 (!) 140/76   Pulse: 101 96   Resp: 20    Temp: 98.2 °F (36.8 °C)    TempSrc: Oral          PHYSICAL EXAM:  Fetal Heart Monitor:  Fetal Heart Tones 157 bpm via LBUS  [place]: contractions, none    General appearance:  no apparent distress, alert and cooperative  Neurologic:  alert, oriented, normal speech, no focal findings or movement disorder noted  Lungs:  No increased work of breathing, good air exchange, clear to auscultation bilaterally, no crackles or wheezing  Heart:  regular rate and rhythm and no murmur    Abdomen:  soft, gravid, tenderness with translation of uterus, no right upper quadrant tenderness, no CVA tenderness, no signs of abruption and no signs of chorioamnionitis  Extremities:  no calf tenderness bilaterally, non edematous bilaterally  Pelvic Exam:   Speculum: cervix visibly closed, scant white vaginal discharge, no vaginal blood appreciated, normal appearing vaginal mucosa, normal appearing vulva   Cervix Check: not indicated     OMM Structural Exam:  Chief Complaint:  Pregnancy    Anterior/ Posterior Spinal Curves: Lumbar Lordosis -  Increased  Scoliosis (Lateral Spinal Curves): None  Assessment

## 2018-08-30 LAB
C TRACH DNA GENITAL QL NAA+PROBE: NEGATIVE
N. GONORRHOEAE DNA: NEGATIVE

## 2018-09-05 ENCOUNTER — ROUTINE PRENATAL (OUTPATIENT)
Dept: OBGYN CLINIC | Age: 38
End: 2018-09-05
Payer: COMMERCIAL

## 2018-09-05 VITALS — WEIGHT: 214 LBS | BODY MASS INDEX: 39.14 KG/M2

## 2018-09-05 DIAGNOSIS — O09.92 HIGH-RISK PREGNANCY IN SECOND TRIMESTER: ICD-10-CM

## 2018-09-05 DIAGNOSIS — O09.522 ELDERLY MULTIGRAVIDA IN SECOND TRIMESTER: Primary | ICD-10-CM

## 2018-09-05 DIAGNOSIS — Z3A.21 21 WEEKS GESTATION OF PREGNANCY: ICD-10-CM

## 2018-09-05 PROCEDURE — 99213 OFFICE O/P EST LOW 20 MIN: CPT | Performed by: SPECIALIST

## 2018-09-05 PROCEDURE — G8427 DOCREV CUR MEDS BY ELIG CLIN: HCPCS | Performed by: SPECIALIST

## 2018-09-05 PROCEDURE — 4004F PT TOBACCO SCREEN RCVD TLK: CPT | Performed by: SPECIALIST

## 2018-09-05 PROCEDURE — G8417 CALC BMI ABV UP PARAM F/U: HCPCS | Performed by: SPECIALIST

## 2018-09-05 RX ORDER — SULFAMETHOXAZOLE AND TRIMETHOPRIM 800; 160 MG/1; MG/1
1 TABLET ORAL 2 TIMES DAILY
Qty: 20 TABLET | Refills: 0 | Status: SHIPPED | OUTPATIENT
Start: 2018-09-05 | End: 2018-09-15

## 2018-09-05 NOTE — PROGRESS NOTES
hydradenitis suppurativa. Will treat with Bactrim. Patient exhibits pain when obtaining FHT with doppler. Patient advised to go to ER for further evaluation of her back pain and possible x-ray. Referral to physical therapy and muscle relaxer may be considered pending result of xray. Patient advised to continue taking prenatal vitamins and to rest as necessary and to follow up with MFM as scheduled. .     The patient will return to the office for her next visit in 1 week. See antepartum flow sheet. Clois Dose, am scribing for, and in the presence of Dr. Mary Kirkpatrick. Electronically signed by: Cyndie Bermudez 9/5/18 1:28 PM   I agree to the above documentation placed by my scribe Cyndie Bermudez. I reviewed the scribe's note and agree with the documented findings and plan of care. Any areas of disagreement are noted on the chart. I have personally evaluated this patient. Additional findings are as noted. I agree with the chief complaint, past medical history, past surgical history, allergies, medications, social and family history as documented unless otherwise noted below.      Electronically signed by Mary Kirkpatrick MD on 9/6/2018 at 2:18 AM

## 2018-09-14 ENCOUNTER — HOSPITAL ENCOUNTER (OUTPATIENT)
Age: 38
Discharge: HOME | End: 2018-09-14
Attending: OBSTETRICS & GYNECOLOGY | Admitting: OBSTETRICS & GYNECOLOGY
Payer: COMMERCIAL

## 2018-09-14 VITALS
TEMPERATURE: 98.2 F | HEART RATE: 89 BPM | DIASTOLIC BLOOD PRESSURE: 57 MMHG | SYSTOLIC BLOOD PRESSURE: 120 MMHG | RESPIRATION RATE: 18 BRPM

## 2018-09-14 PROBLEM — O09.92 HRP (HIGH RISK PREGNANCY), SECOND TRIMESTER: Status: ACTIVE | Noted: 2018-09-14

## 2018-09-14 PROCEDURE — 99213 OFFICE O/P EST LOW 20 MIN: CPT

## 2018-09-14 RX ORDER — TOPIRAMATE 50 MG/1
50 TABLET, FILM COATED ORAL 2 TIMES DAILY
COMMUNITY
End: 2018-09-27 | Stop reason: ALTCHOICE

## 2018-09-14 RX ORDER — PREGABALIN 75 MG/1
75 CAPSULE ORAL 2 TIMES DAILY
COMMUNITY
End: 2018-09-27

## 2018-09-14 RX ORDER — ACETAMINOPHEN 325 MG/1
650 TABLET ORAL EVERY 4 HOURS PRN
Status: DISCONTINUED | OUTPATIENT
Start: 2018-09-14 | End: 2018-09-14 | Stop reason: HOSPADM

## 2018-09-14 RX ORDER — SUMATRIPTAN 100 MG/1
100 TABLET, FILM COATED ORAL
COMMUNITY
End: 2018-09-27 | Stop reason: ALTCHOICE

## 2018-09-14 NOTE — H&P
Keara Parker MD   albuterol-ipratropium (COMBIVENT)  MCG/ACT inhaler Inhale 2 puffs into the lungs every 6 hours as needed for Wheezing   Yes Historical Provider, MD   budesonide-formoterol (SYMBICORT) 160-4.5 MCG/ACT AERO Inhale 2 puffs into the lungs 2 times daily. 1/29/08  Yes Historical Provider, MD   topiramate (TOPAMAX) 50 MG tablet Take 50 mg by mouth 2 times daily    Historical Provider, MD   SUMAtriptan (IMITREX) 100 MG tablet Take 100 mg by mouth once as needed    Historical Provider, MD   pregabalin (LYRICA) 75 MG capsule Take 75 mg by mouth 2 times daily. Rowena Gilliland Historical Provider, MD       FAMILY HISTORY:  family history includes Bipolar Disorder in her brother and mother; Breast Cancer in her mother; Cancer in her mother; Hypertension in her father and mother. SOCIAL HISTORY:   reports that she has been smoking Cigarettes. She has a 8.50 pack-year smoking history. She has never used smokeless tobacco. She reports that she does not drink alcohol or use drugs.     VITALS:  Vitals:    09/14/18 1751   BP: (!) 120/57   Pulse: 89   Resp: 18   Temp: 98.2 °F (36.8 °C)   TempSrc: Oral       PHYSICAL EXAM:  Fetal Heart Tones via doppler: 148    General appearance:  no apparent distress, alert and cooperative  Neurologic:  alert, oriented, normal speech, no focal findings or movement disorder noted  Lungs:  No increased work of breathing, good air exchange, clear to auscultation bilaterally, no crackles or wheezing  Heart:  regular rate and rhythm and no murmur    Abdomen:  soft, gravid, non-tender, no right upper quadrant tenderness, no CVA tenderness, uterus non-tender, no signs of abruption and no signs of chorioamnionitis, no abdominal scars   Extremities:  no calf tenderness, non edematous    LIMITED BEDSIDE US:  Position: Transverse   Placental Location: posterior  Fetal Heart Tones: Present  Fetal Movement: Present  Amniotic Fluid Index/Volume: 2x2 cm pocket visualized    PRENATAL LAB RESULTS:   Blood asthmaticus     Iron deficiency anemia 12/18/2015    Asthma 12/17/2015    Anxiety 12/17/2015    GERD (gastroesophageal reflux disease) 12/17/2015    Bipolar 1 disorder ([facility] 12/17/2015    Community acquired pneumonia 12/17/2015    Hypokalemia 12/17/2015    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 Luisa Fonseca, who is agreeable. Steroids given this admission: No    Risks, benefits, alternatives and possible complications have been discussed in detail with the patient. Admission, and post admission procedures and expectations were discussed in detail. All questions were answered.     Attending's Name: Dr. Kimberly Medina DO  Ob/Gyn Resident  9/14/2018, 5:59 PM

## 2018-09-16 ENCOUNTER — APPOINTMENT (OUTPATIENT)
Dept: GENERAL RADIOLOGY | Age: 38
DRG: 781 | End: 2018-09-16
Payer: COMMERCIAL

## 2018-09-16 ENCOUNTER — HOSPITAL ENCOUNTER (INPATIENT)
Age: 38
LOS: 3 days | Discharge: HOME | DRG: 781 | End: 2018-09-20
Attending: EMERGENCY MEDICINE | Admitting: FAMILY MEDICINE
Payer: COMMERCIAL

## 2018-09-16 DIAGNOSIS — J45.901 EXACERBATION OF PERSISTENT ASTHMA, UNSPECIFIED ASTHMA SEVERITY: Primary | ICD-10-CM

## 2018-09-16 PROBLEM — N76.0 BACTERIAL VAGINOSIS: Status: RESOLVED | Noted: 2018-05-07 | Resolved: 2018-09-16

## 2018-09-16 PROBLEM — B96.89 BACTERIAL VAGINOSIS: Status: RESOLVED | Noted: 2018-05-07 | Resolved: 2018-09-16

## 2018-09-16 PROBLEM — O09.92 HRP (HIGH RISK PREGNANCY), SECOND TRIMESTER: Status: RESOLVED | Noted: 2018-09-14 | Resolved: 2018-09-16

## 2018-09-16 PROBLEM — K62.89 RECTAL PAIN: Status: RESOLVED | Noted: 2018-03-26 | Resolved: 2018-09-16

## 2018-09-16 PROBLEM — R56.9 SEIZURE (HCC): Status: ACTIVE | Noted: 2018-09-16

## 2018-09-16 LAB
ABSOLUTE EOS #: 0.3 K/UL (ref 0–0.4)
ABSOLUTE IMMATURE GRANULOCYTE: ABNORMAL K/UL (ref 0–0.3)
ABSOLUTE LYMPH #: 1.4 K/UL (ref 1–4.8)
ABSOLUTE MONO #: 0.5 K/UL (ref 0.1–1.3)
ALBUMIN SERPL-MCNC: 3.5 G/DL (ref 3.5–5.2)
ALBUMIN/GLOBULIN RATIO: ABNORMAL (ref 1–2.5)
ALP BLD-CCNC: 51 U/L (ref 35–104)
ALT SERPL-CCNC: 19 U/L (ref 5–33)
ANION GAP SERPL CALCULATED.3IONS-SCNC: 13 MMOL/L (ref 9–17)
AST SERPL-CCNC: 17 U/L
BASOPHILS # BLD: 1 % (ref 0–2)
BASOPHILS ABSOLUTE: 0.1 K/UL (ref 0–0.2)
BILIRUB SERPL-MCNC: 0.24 MG/DL (ref 0.3–1.2)
BUN BLDV-MCNC: 5 MG/DL (ref 6–20)
BUN/CREAT BLD: ABNORMAL (ref 9–20)
CALCIUM SERPL-MCNC: 8.9 MG/DL (ref 8.6–10.4)
CHLORIDE BLD-SCNC: 103 MMOL/L (ref 98–107)
CO2: 19 MMOL/L (ref 20–31)
CREAT SERPL-MCNC: 0.45 MG/DL (ref 0.5–0.9)
DIFFERENTIAL TYPE: ABNORMAL
EOSINOPHILS RELATIVE PERCENT: 3 % (ref 0–4)
GFR AFRICAN AMERICAN: >60 ML/MIN
GFR NON-AFRICAN AMERICAN: >60 ML/MIN
GFR SERPL CREATININE-BSD FRML MDRD: ABNORMAL ML/MIN/{1.73_M2}
GFR SERPL CREATININE-BSD FRML MDRD: ABNORMAL ML/MIN/{1.73_M2}
GLUCOSE BLD-MCNC: 106 MG/DL (ref 70–99)
HCT VFR BLD CALC: 34.8 % (ref 36–46)
HEMOGLOBIN: 11.9 G/DL (ref 12–16)
IMMATURE GRANULOCYTES: ABNORMAL %
LYMPHOCYTES # BLD: 15 % (ref 24–44)
MCH RBC QN AUTO: 30 PG (ref 26–34)
MCHC RBC AUTO-ENTMCNC: 34.2 G/DL (ref 31–37)
MCV RBC AUTO: 87.6 FL (ref 80–100)
MONOCYTES # BLD: 5 % (ref 1–7)
NRBC AUTOMATED: ABNORMAL PER 100 WBC
PDW BLD-RTO: 14.4 % (ref 11.5–14.9)
PLATELET # BLD: 254 K/UL (ref 150–450)
PLATELET ESTIMATE: ABNORMAL
PMV BLD AUTO: 8.4 FL (ref 6–12)
POTASSIUM SERPL-SCNC: 3.6 MMOL/L (ref 3.7–5.3)
RBC # BLD: 3.97 M/UL (ref 4–5.2)
RBC # BLD: ABNORMAL 10*6/UL
SEG NEUTROPHILS: 76 % (ref 36–66)
SEGMENTED NEUTROPHILS ABSOLUTE COUNT: 7.4 K/UL (ref 1.3–9.1)
SODIUM BLD-SCNC: 135 MMOL/L (ref 135–144)
TOTAL PROTEIN: 6.3 G/DL (ref 6.4–8.3)
WBC # BLD: 9.7 K/UL (ref 3.5–11)
WBC # BLD: ABNORMAL 10*3/UL

## 2018-09-16 PROCEDURE — 6360000002 HC RX W HCPCS: Performed by: FAMILY MEDICINE

## 2018-09-16 PROCEDURE — 85025 COMPLETE CBC W/AUTO DIFF WBC: CPT

## 2018-09-16 PROCEDURE — 6360000002 HC RX W HCPCS: Performed by: EMERGENCY MEDICINE

## 2018-09-16 PROCEDURE — G0378 HOSPITAL OBSERVATION PER HR: HCPCS

## 2018-09-16 PROCEDURE — 2580000003 HC RX 258: Performed by: FAMILY MEDICINE

## 2018-09-16 PROCEDURE — 36415 COLL VENOUS BLD VENIPUNCTURE: CPT

## 2018-09-16 PROCEDURE — 94761 N-INVAS EAR/PLS OXIMETRY MLT: CPT

## 2018-09-16 PROCEDURE — 99285 EMERGENCY DEPT VISIT HI MDM: CPT

## 2018-09-16 PROCEDURE — 94640 AIRWAY INHALATION TREATMENT: CPT

## 2018-09-16 PROCEDURE — 6370000000 HC RX 637 (ALT 250 FOR IP): Performed by: EMERGENCY MEDICINE

## 2018-09-16 PROCEDURE — 6370000000 HC RX 637 (ALT 250 FOR IP): Performed by: FAMILY MEDICINE

## 2018-09-16 PROCEDURE — 96376 TX/PRO/DX INJ SAME DRUG ADON: CPT

## 2018-09-16 PROCEDURE — 6370000000 HC RX 637 (ALT 250 FOR IP): Performed by: STUDENT IN AN ORGANIZED HEALTH CARE EDUCATION/TRAINING PROGRAM

## 2018-09-16 PROCEDURE — 94664 DEMO&/EVAL PT USE INHALER: CPT

## 2018-09-16 PROCEDURE — 96374 THER/PROPH/DIAG INJ IV PUSH: CPT

## 2018-09-16 PROCEDURE — 96372 THER/PROPH/DIAG INJ SC/IM: CPT

## 2018-09-16 PROCEDURE — 71045 X-RAY EXAM CHEST 1 VIEW: CPT

## 2018-09-16 PROCEDURE — 80053 COMPREHEN METABOLIC PANEL: CPT

## 2018-09-16 RX ORDER — ALBUTEROL SULFATE 90 UG/1
2 AEROSOL, METERED RESPIRATORY (INHALATION)
Status: DISCONTINUED | OUTPATIENT
Start: 2018-09-16 | End: 2018-09-16

## 2018-09-16 RX ORDER — SWAB
1 SWAB, NON-MEDICATED MISCELLANEOUS DAILY
Status: DISCONTINUED | OUTPATIENT
Start: 2018-09-16 | End: 2018-09-20 | Stop reason: HOSPADM

## 2018-09-16 RX ORDER — DIPHENHYDRAMINE HCL 25 MG
50 TABLET ORAL EVERY 6 HOURS PRN
Status: DISCONTINUED | OUTPATIENT
Start: 2018-09-16 | End: 2018-09-20 | Stop reason: HOSPADM

## 2018-09-16 RX ORDER — SODIUM CHLORIDE 0.9 % (FLUSH) 0.9 %
10 SYRINGE (ML) INJECTION EVERY 12 HOURS SCHEDULED
Status: DISCONTINUED | OUTPATIENT
Start: 2018-09-16 | End: 2018-09-20 | Stop reason: HOSPADM

## 2018-09-16 RX ORDER — ACETAMINOPHEN 325 MG/1
650 TABLET ORAL EVERY 4 HOURS PRN
Status: DISCONTINUED | OUTPATIENT
Start: 2018-09-16 | End: 2018-09-19

## 2018-09-16 RX ORDER — PROMETHAZINE HYDROCHLORIDE 25 MG/1
25 TABLET ORAL EVERY 8 HOURS PRN
Status: DISCONTINUED | OUTPATIENT
Start: 2018-09-16 | End: 2018-09-20 | Stop reason: HOSPADM

## 2018-09-16 RX ORDER — MULTIVIT-MIN 60/IRON FUM/FOLIC 27 MG-1 MG
1 TABLET ORAL DAILY
Status: DISCONTINUED | OUTPATIENT
Start: 2018-09-16 | End: 2018-09-16 | Stop reason: CLARIF

## 2018-09-16 RX ORDER — IPRATROPIUM BROMIDE AND ALBUTEROL SULFATE 2.5; .5 MG/3ML; MG/3ML
1 SOLUTION RESPIRATORY (INHALATION) EVERY 6 HOURS PRN
Status: DISCONTINUED | OUTPATIENT
Start: 2018-09-16 | End: 2018-09-17

## 2018-09-16 RX ORDER — SULFAMETHOXAZOLE AND TRIMETHOPRIM 400; 80 MG/1; MG/1
1 TABLET ORAL EVERY 12 HOURS SCHEDULED
Status: DISCONTINUED | OUTPATIENT
Start: 2018-09-16 | End: 2018-09-16 | Stop reason: CLARIF

## 2018-09-16 RX ORDER — METHYLPREDNISOLONE SODIUM SUCCINATE 125 MG/2ML
125 INJECTION, POWDER, LYOPHILIZED, FOR SOLUTION INTRAMUSCULAR; INTRAVENOUS ONCE
Status: COMPLETED | OUTPATIENT
Start: 2018-09-16 | End: 2018-09-16

## 2018-09-16 RX ORDER — IPRATROPIUM BROMIDE AND ALBUTEROL SULFATE 2.5; .5 MG/3ML; MG/3ML
1 SOLUTION RESPIRATORY (INHALATION)
Status: DISCONTINUED | OUTPATIENT
Start: 2018-09-16 | End: 2018-09-16 | Stop reason: SDUPTHER

## 2018-09-16 RX ORDER — SODIUM CHLORIDE 0.9 % (FLUSH) 0.9 %
10 SYRINGE (ML) INJECTION PRN
Status: DISCONTINUED | OUTPATIENT
Start: 2018-09-16 | End: 2018-09-20 | Stop reason: HOSPADM

## 2018-09-16 RX ORDER — FLUTICASONE FUROATE AND VILANTEROL 200; 25 UG/1; UG/1
1 POWDER RESPIRATORY (INHALATION) DAILY
Status: DISCONTINUED | OUTPATIENT
Start: 2018-09-16 | End: 2018-09-16 | Stop reason: CLARIF

## 2018-09-16 RX ORDER — ALBUTEROL SULFATE 2.5 MG/3ML
5 SOLUTION RESPIRATORY (INHALATION)
Status: DISCONTINUED | OUTPATIENT
Start: 2018-09-16 | End: 2018-09-16

## 2018-09-16 RX ORDER — ALBUTEROL SULFATE 2.5 MG/3ML
2.5 SOLUTION RESPIRATORY (INHALATION) EVERY 4 HOURS PRN
Status: DISCONTINUED | OUTPATIENT
Start: 2018-09-16 | End: 2018-09-20 | Stop reason: HOSPADM

## 2018-09-16 RX ORDER — SULFAMETHOXAZOLE AND TRIMETHOPRIM 800; 160 MG/1; MG/1
0.5 TABLET ORAL EVERY 12 HOURS SCHEDULED
Status: DISCONTINUED | OUTPATIENT
Start: 2018-09-16 | End: 2018-09-20 | Stop reason: HOSPADM

## 2018-09-16 RX ORDER — PANTOPRAZOLE SODIUM 40 MG/1
40 TABLET, DELAYED RELEASE ORAL
Status: DISCONTINUED | OUTPATIENT
Start: 2018-09-16 | End: 2018-09-20 | Stop reason: HOSPADM

## 2018-09-16 RX ORDER — METHYLPREDNISOLONE SODIUM SUCCINATE 40 MG/ML
40 INJECTION, POWDER, LYOPHILIZED, FOR SOLUTION INTRAMUSCULAR; INTRAVENOUS EVERY 6 HOURS
Status: DISCONTINUED | OUTPATIENT
Start: 2018-09-16 | End: 2018-09-17

## 2018-09-16 RX ADMIN — PANTOPRAZOLE SODIUM 40 MG: 40 TABLET, DELAYED RELEASE ORAL at 22:15

## 2018-09-16 RX ADMIN — SERTRALINE 50 MG: 50 TABLET, FILM COATED ORAL at 20:15

## 2018-09-16 RX ADMIN — MOMETASONE FUROATE AND FORMOTEROL FUMARATE DIHYDRATE 2 PUFF: 200; 5 AEROSOL RESPIRATORY (INHALATION) at 21:11

## 2018-09-16 RX ADMIN — Medication 10 ML: at 20:15

## 2018-09-16 RX ADMIN — ENOXAPARIN SODIUM 40 MG: 40 INJECTION SUBCUTANEOUS at 16:54

## 2018-09-16 RX ADMIN — METHYLPREDNISOLONE SODIUM SUCCINATE 40 MG: 40 INJECTION, POWDER, FOR SOLUTION INTRAMUSCULAR; INTRAVENOUS at 16:53

## 2018-09-16 RX ADMIN — DIPHENHYDRAMINE HCL 50 MG: 25 TABLET ORAL at 23:18

## 2018-09-16 RX ADMIN — ACETAMINOPHEN 650 MG: 325 TABLET, FILM COATED ORAL at 16:54

## 2018-09-16 RX ADMIN — SULFAMETHOXAZOLE AND TRIMETHOPRIM 0.5 TABLET: 800; 160 TABLET ORAL at 20:15

## 2018-09-16 RX ADMIN — Medication 1 TABLET: at 16:54

## 2018-09-16 RX ADMIN — PANTOPRAZOLE SODIUM 40 MG: 40 TABLET, DELAYED RELEASE ORAL at 16:54

## 2018-09-16 RX ADMIN — METHYLPREDNISOLONE SODIUM SUCCINATE 40 MG: 40 INJECTION, POWDER, FOR SOLUTION INTRAMUSCULAR; INTRAVENOUS at 20:14

## 2018-09-16 RX ADMIN — IPRATROPIUM BROMIDE AND ALBUTEROL SULFATE 1 AMPULE: .5; 3 SOLUTION RESPIRATORY (INHALATION) at 13:15

## 2018-09-16 RX ADMIN — IPRATROPIUM BROMIDE AND ALBUTEROL SULFATE 1 AMPULE: .5; 3 SOLUTION RESPIRATORY (INHALATION) at 14:44

## 2018-09-16 RX ADMIN — METHYLPREDNISOLONE SODIUM SUCCINATE 125 MG: 125 INJECTION, POWDER, FOR SOLUTION INTRAMUSCULAR; INTRAVENOUS at 13:19

## 2018-09-16 RX ADMIN — IPRATROPIUM BROMIDE AND ALBUTEROL SULFATE 1 AMPULE: .5; 3 SOLUTION RESPIRATORY (INHALATION) at 20:17

## 2018-09-16 RX ADMIN — ACETAMINOPHEN 650 MG: 325 TABLET, FILM COATED ORAL at 21:11

## 2018-09-16 RX ADMIN — ALBUTEROL SULFATE 2.5 MG: 2.5 SOLUTION RESPIRATORY (INHALATION) at 16:13

## 2018-09-16 RX ADMIN — IPRATROPIUM BROMIDE AND ALBUTEROL SULFATE 1 AMPULE: .5; 3 SOLUTION RESPIRATORY (INHALATION) at 13:05

## 2018-09-16 ASSESSMENT — ENCOUNTER SYMPTOMS
GASTROINTESTINAL NEGATIVE: 1
BACK PAIN: 0
EYES NEGATIVE: 1
COUGH: 1
WHEEZING: 1
SHORTNESS OF BREATH: 1
ABDOMINAL PAIN: 0

## 2018-09-16 ASSESSMENT — PAIN DESCRIPTION - ORIENTATION: ORIENTATION: MID

## 2018-09-16 ASSESSMENT — PAIN DESCRIPTION - DESCRIPTORS: DESCRIPTORS: ACHING

## 2018-09-16 ASSESSMENT — PAIN SCALES - GENERAL
PAINLEVEL_OUTOF10: 4
PAINLEVEL_OUTOF10: 3
PAINLEVEL_OUTOF10: 7

## 2018-09-16 ASSESSMENT — PAIN DESCRIPTION - ONSET: ONSET: OTHER (COMMENT)

## 2018-09-16 ASSESSMENT — PAIN DESCRIPTION - FREQUENCY: FREQUENCY: INTERMITTENT

## 2018-09-16 ASSESSMENT — PAIN DESCRIPTION - LOCATION: LOCATION: CHEST

## 2018-09-16 ASSESSMENT — PAIN DESCRIPTION - PAIN TYPE: TYPE: ACUTE PAIN

## 2018-09-16 NOTE — CONSULTS
Hypokalemia    Iron deficiency anemia    Asthma with status asthmaticus    Degenerative disc disease, cervical    Cervical disc herniation    Cervical radicular pain    Precordial pain    Morbid obesity due to excess calories (HCC)    Gastroesophageal reflux disease with esophagitis    Tobacco use    Sadler's esophagus without dysplasia    Hiatal hernia    COPD exacerbation (HCC)    Odynophagia    Rectal bleeding    Rectal pain    Constipation    Chronic abdominal pain    Headache    Seizure (HCC)    Bacterial vaginosis    Pregnant and not yet delivered in first trimester    Elderly multigravida in second trimester    Suspected damage to fetus from other disease in mother, affecting management of mother, antepartum condition or complication    Obesity affecting pregnancy in second trimester    Tobacco use disorder complicating pregnancy, childbirth, or puerperium, antepartum    High-risk pregnancy in second trimester    Umbilical cord complication    Rh+/RI/GBS unk    Hx of IUFD (G2)    Single episode of elevated blood pressure    HRP (high risk pregnancy), second trimester    Acute asthma exacerbation        Steroids Given In This Pregnancy:  no     REVIEW OF SYSTEMS:   Constitutional: negative fever, negative chills  HEENT: negative visual disturbances, negative headaches  Respiratory: positive dyspnea, negative cough  Cardiovascular: negative chest pain,  negative palpitations  Gastrointestinal: negative abdominal pain, negative RUQ pain, negative N/V, negative diarrhea, negative constipation  Genitourinary: negative dysuria, negative vaginal discharge  Dermatological: negative rash  Hematologic: negative bruising  Immunologic/Lymphatic: negative recent illness, negative recent sick contact  Musculoskeletal: negative back pain, negative myalgias, negative arthralgias  Neurological:  negative dizziness, negative weakness  Behavior/Psych: negative depression, negative 9/27 with MFM     GERD / Sadler's esophagus    - Continue home meds    - Management per primary     Seizure disorder   - Denies recent seizures      Tobacco abuse    - Patient states she is trying to quit    - Smoking 1/2 PPD    - Discussed increased risk of SIDs    Patient Active Problem List    Diagnosis Date Noted    Acute asthma exacerbation 09/16/2018    HRP (high risk pregnancy), second trimester 18/80/1258    Umbilical cord complication 03/77/1016    Rh+/RI/GBS unk 08/29/2018     PRENATAL LAB RESULTS:   Blood Type/Rh: O pos  Antibody Screen: negative  Hemoglobin, Hematocrit, Platelets: 00.4 / 49.6 / 255  Rubella: immune  T.  Pallidum, IgG: non-reactive   Hepatitis B Surface Antigen: non-reactive   HIV: non-reactive   Sickle Cell Screen: negative  Gonorrhea: negative  Chlamydia: negative  Urine culture: negative, date: 5/11/18     1 hour Glucose Tolerance Test: 119 on 8/2/18  3 hour Glucose Tolerance Test: N/A     Group B Strep: not done  Cystic Fibrosis Screen: not available  First Trimester Screen: not available  MSAFP/Multiple Markers: pending  Non-Invasive Prenatal Testing: normal  Anatomy US: posterior placenta, 3VC with normal insertion, female       Hx of IUFD (G2) 08/29/2018    Single episode of elevated blood pressure 08/29/2018 8/29/18 BP of 142/82 at 20w3d, repeat BP 1 minute later 140/76, all other BPs wnl  PreE labs wnl, P/C ratio 0.08      High-risk pregnancy in second trimester 08/11/2018    Elderly multigravida in second trimester 08/01/2018    Suspected damage to fetus from other disease in mother, affecting management of mother, antepartum condition or complication 02/12/3962    Obesity affecting pregnancy in second trimester 08/01/2018    Tobacco use disorder complicating pregnancy, childbirth, or puerperium, antepartum 08/01/2018    Pregnant and not yet delivered in first trimester     Chronic abdominal pain 05/07/2018    Bacterial vaginosis 05/07/2018 5/7/18 Flagyl prescription sent to patient's pharmacy      Rectal bleeding 03/26/2018    Rectal pain 03/26/2018    Constipation 03/26/2018    Seizure ([facility] 12/29/2017    Odynophagia     COPD exacerbation ([facility]     Sadler's esophagus without dysplasia     Hiatal hernia     Tobacco use 02/14/2017    Gastroesophageal reflux disease with esophagitis      severe      Headache 09/02/2016    Morbid obesity due to excess calories (HCC)     Precordial pain 06/14/2016    Degenerative disc disease, cervical 04/12/2016    Cervical disc herniation 04/12/2016    Cervical radicular pain 04/12/2016    Asthma with status asthmaticus     Iron deficiency anemia 12/18/2015    Asthma 12/17/2015    Anxiety 12/17/2015    GERD (gastroesophageal reflux disease) 12/17/2015    Bipolar 1 disorder ([facility] 12/17/2015    Community acquired pneumonia 12/17/2015    Hypokalemia 12/17/2015    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 Lorena Abdalla, who is agreeable. Steroids given this admission: No    Risks, benefits, alternatives and possible complications have been discussed in detail with the patient. Admission, and post admission procedures and expectations were discussed in detail. All questions were answered.     Attending's Name: Dr. El Alanis  Ob/Gyn Resident  9/16/2018, 4:07 PM

## 2018-09-16 NOTE — ED PROVIDER NOTES
[facility]  eMERGENCY dEPARTMENT eNCOUnter    Pt Name: Joann Mercado  MRN: [id number]  [place] 1980  Date of evaluation: 9/16/18  CHIEF COMPLAINT       Chief Complaint   Patient presents with    Shortness of Breath    Cough     HISTORY OF PRESENT ILLNESS   The pt presents for evaluation of cough, wheezing and shortness of breath. She is pregnant, has a history of asthma. Similar symptoms with asthma exacerbations in the past.  Symptoms started last night. Gradual onset, gradually worsening. Pt is a smoker. The history is provided by the patient. Shortness of Breath   Severity:  Moderate  Onset quality:  Gradual  Duration:  1 day  Timing:  Constant  Progression:  Worsening  Chronicity:  New  Relieved by:  Nothing  Worsened by:  Nothing  Associated symptoms: cough and wheezing    Associated symptoms: no abdominal pain, no chest pain, no fever, no headaches and no rash        REVIEW OF SYSTEMS     Review of Systems   Constitutional: Negative. Negative for chills and fever. HENT: Negative. Negative for congestion. Eyes: Negative. Respiratory: Positive for cough, shortness of breath and wheezing. Cardiovascular: Negative. Negative for chest pain. Gastrointestinal: Negative. Negative for abdominal pain. Genitourinary: Negative. Musculoskeletal: Negative. Negative for back pain. Skin: Negative. Negative for rash. Neurological: Negative. Negative for headaches. All other systems reviewed and are negative.     PAST MEDICAL HISTORY     Past Medical History:   Diagnosis Date    Anxiety     Arthritis     OA    Asthma     Sadler's esophagus     LONG SEGMENT    Bipolar 1 disorder ([facility]     CAD (coronary artery disease)     COPD (chronic obstructive pulmonary disease) (HCC)     Depression     and bipolar    Esophagitis     severe    GERD (gastroesophageal reflux disease)     Headache(784.0)     Herniated disc, cervical     Hiatal hernia     Kidney stones AERO Inhale 2 puffs into the lungs 2 times daily. topiramate (TOPAMAX) 50 MG tablet Take 50 mg by mouth 2 times daily      SUMAtriptan (IMITREX) 100 MG tablet Take 100 mg by mouth once as needed      pregabalin (LYRICA) 75 MG capsule Take 75 mg by mouth 2 times daily. .      pantoprazole (PROTONIX) 40 MG tablet Take 1 tablet by mouth 2 times daily (before meals)  Qty: 60 tablet, Refills: 1           ALLERGIES     is allergic to nsaids; toradol [ketorolac tromethamine]; and ultram [tramadol]. FAMILY HISTORY     indicated that the status of her mother is unknown. She indicated that the status of her father is unknown. She indicated that the status of her brother is unknown. SOCIAL HISTORY      reports that she has been smoking Cigarettes. She has a 8.50 pack-year smoking history. She has never used smokeless tobacco. She reports that she does not drink alcohol or use drugs. PHYSICAL EXAM     INITIAL VITALS: /60   Pulse 101   Temp 98.1 °F (36.7 °C) (Oral)   Resp 17   Ht 5' 2\" (1.575 m)   Wt 213 lb (96.6 kg)   LMP 03/31/2018 (Approximate)   SpO2 94%   BMI 38.96 kg/m²    Physical Exam   Constitutional: She is oriented to person, place, and time. She appears distressed. HENT:   Head: Normocephalic and atraumatic. Eyes: Conjunctivae are normal.   Neck: Normal range of motion. Neck supple. Cardiovascular: Normal rate, regular rhythm and normal heart sounds. No murmur heard. Pulmonary/Chest: Accessory muscle usage present. Tachypnea noted. She has wheezes. Abdominal: Soft. There is no tenderness. Musculoskeletal: She exhibits no edema or deformity. Neurological: She is alert and oriented to person, place, and time. Skin: Skin is warm and dry. No rash noted. She is not diaphoretic. Nursing note and vitals reviewed. MEDICAL DECISION MAKING:     Reviewed results. Nonacute. CXR clear. On reeval, pt improving, no longer distressed, but still symptomatic.   Significant wheezing but good air movement on auscultation. Discussed with Dr. Yamil Painting and Dr. David Ramsey, will admit for further therapy and evaluation for asthma exacerbation. Pt is ready for admission at this time. CRITICAL CARE:       PROCEDURES:    Procedures    DIAGNOSTIC RESULTS   EKG: All EKG's are interpreted by the Emergency Department Physician who either signs or Co-signs this chart in the absence of a cardiologist.      RADIOLOGY:All plain film, CT, MRI, and formal ultrasound images (except ED bedside ultrasound) are read by the radiologist, see reports below, unless otherwise noted in MDM or here. XR CHEST PORTABLE   Final Result   No acute airspace disease identified. LABS: All lab results were reviewed by myself, and all abnormals are listed below.   Labs Reviewed   CBC WITH AUTO DIFFERENTIAL - Abnormal; Notable for the following:        Result Value    RBC 3.97 (*)     Hemoglobin 11.9 (*)     Hematocrit 34.8 (*)     Seg Neutrophils 76 (*)     Lymphocytes 15 (*)     All other components within normal limits   COMPREHENSIVE METABOLIC PANEL - Abnormal; Notable for the following:     Glucose 106 (*)     BUN 5 (*)     CREATININE 0.45 (*)     Potassium 3.6 (*)     CO2 19 (*)     Total Bilirubin 0.24 (*)     Total Protein 6.3 (*)     All other components within normal limits     EMERGENCY DEPARTMENT COURSE:   Vitals:    Vitals:    09/16/18 1444 09/16/18 1445 09/16/18 1447 09/16/18 1520   BP:  131/80 131/80 138/60   Pulse:  100 104 101   Resp: 22 12 16 17   Temp:   98 °F (36.7 °C) 98.1 °F (36.7 °C)   TempSrc:   Axillary Oral   SpO2: 96% 95% 96% 94%   Weight:       Height:           The patient was given the following medications while in the emergency department:  Orders Placed This Encounter   Medications    methylPREDNISolone sodium (SOLU-MEDROL) injection 125 mg    DISCONTD: albuterol (PROVENTIL) nebulizer solution 5 mg    ipratropium-albuterol (DUONEB) nebulizer solution 1 ampule    DISCONTD: albuterol (PROVENTIL) nebulizer solution 5 mg    DISCONTD: albuterol sulfate  (90 Base) MCG/ACT inhaler 2 puff    DISCONTD: albuterol sulfate  (90 Base) MCG/ACT inhaler 2 puff    DISCONTD: ipratropium (ATROVENT HFA) 17 MCG/ACT inhaler 2 puff     Order Specific Question:   Please select a reason the therapeutic interchange was not accepted: Answer:   Gera Bhatia for Pharmacy to [address] [place] [address]: ipratropium (ATROVENT) 0.02 % nebulizer solution 0.5 mg    sodium chloride flush 0.9 % injection 10 mL    sodium chloride flush 0.9 % injection 10 mL    acetaminophen (TYLENOL) tablet 650 mg    enoxaparin (LOVENOX) injection 40 mg    methylPREDNISolone sodium (SOLU-MEDROL) injection 40 mg     CONSULTS:  IP CONSULT TO OB GYN  IP CONSULT TO INTERNAL MEDICINE    FINAL IMPRESSION      1. Exacerbation of persistent asthma, unspecified asthma severity          DISPOSITION/PLAN   DISPOSITION Admitted 09/16/2018 02:16:59 PM      PATIENT REFERRED TO:  Jacob Ochoa MD  [address] [place] [address]  [phone]          DISCHARGE MEDICATIONS:  Current Discharge Medication List        Sara Sellers MD  Attending Emergency Physician  [employer] voice recognition software used in portions of this document.                     Analy Villarreal MD  09/16/18 [id number]

## 2018-09-17 LAB
GLUCOSE BLD-MCNC: 121 MG/DL (ref 65–105)
GLUCOSE BLD-MCNC: 190 MG/DL (ref 65–105)

## 2018-09-17 PROCEDURE — 2060000000 HC ICU INTERMEDIATE R&B

## 2018-09-17 PROCEDURE — 6370000000 HC RX 637 (ALT 250 FOR IP): Performed by: FAMILY MEDICINE

## 2018-09-17 PROCEDURE — 94761 N-INVAS EAR/PLS OXIMETRY MLT: CPT

## 2018-09-17 PROCEDURE — 96372 THER/PROPH/DIAG INJ SC/IM: CPT

## 2018-09-17 PROCEDURE — 6360000002 HC RX W HCPCS: Performed by: FAMILY MEDICINE

## 2018-09-17 PROCEDURE — 90686 IIV4 VACC NO PRSV 0.5 ML IM: CPT | Performed by: FAMILY MEDICINE

## 2018-09-17 PROCEDURE — 82947 ASSAY GLUCOSE BLOOD QUANT: CPT

## 2018-09-17 PROCEDURE — 96376 TX/PRO/DX INJ SAME DRUG ADON: CPT

## 2018-09-17 PROCEDURE — 94640 AIRWAY INHALATION TREATMENT: CPT

## 2018-09-17 PROCEDURE — G0008 ADMIN INFLUENZA VIRUS VAC: HCPCS | Performed by: FAMILY MEDICINE

## 2018-09-17 PROCEDURE — 2580000003 HC RX 258: Performed by: FAMILY MEDICINE

## 2018-09-17 RX ORDER — IPRATROPIUM BROMIDE AND ALBUTEROL SULFATE 2.5; .5 MG/3ML; MG/3ML
1 SOLUTION RESPIRATORY (INHALATION) 4 TIMES DAILY
Status: DISCONTINUED | OUTPATIENT
Start: 2018-09-17 | End: 2018-09-20 | Stop reason: HOSPADM

## 2018-09-17 RX ORDER — METHYLPREDNISOLONE SODIUM SUCCINATE 125 MG/2ML
60 INJECTION, POWDER, LYOPHILIZED, FOR SOLUTION INTRAMUSCULAR; INTRAVENOUS EVERY 6 HOURS
Status: DISCONTINUED | OUTPATIENT
Start: 2018-09-17 | End: 2018-09-18

## 2018-09-17 RX ADMIN — Medication 10 ML: at 21:20

## 2018-09-17 RX ADMIN — METHYLPREDNISOLONE SODIUM SUCCINATE 60 MG: 125 INJECTION, POWDER, FOR SOLUTION INTRAMUSCULAR; INTRAVENOUS at 17:02

## 2018-09-17 RX ADMIN — IPRATROPIUM BROMIDE AND ALBUTEROL SULFATE 1 AMPULE: .5; 3 SOLUTION RESPIRATORY (INHALATION) at 11:25

## 2018-09-17 RX ADMIN — MOMETASONE FUROATE AND FORMOTEROL FUMARATE DIHYDRATE 2 PUFF: 200; 5 AEROSOL RESPIRATORY (INHALATION) at 21:20

## 2018-09-17 RX ADMIN — PROMETHAZINE HYDROCHLORIDE 25 MG: 25 TABLET ORAL at 11:49

## 2018-09-17 RX ADMIN — IPRATROPIUM BROMIDE AND ALBUTEROL SULFATE 1 AMPULE: .5; 3 SOLUTION RESPIRATORY (INHALATION) at 20:09

## 2018-09-17 RX ADMIN — METHYLPREDNISOLONE SODIUM SUCCINATE 60 MG: 125 INJECTION, POWDER, FOR SOLUTION INTRAMUSCULAR; INTRAVENOUS at 21:20

## 2018-09-17 RX ADMIN — METHYLPREDNISOLONE SODIUM SUCCINATE 40 MG: 40 INJECTION, POWDER, FOR SOLUTION INTRAMUSCULAR; INTRAVENOUS at 08:03

## 2018-09-17 RX ADMIN — SULFAMETHOXAZOLE AND TRIMETHOPRIM 0.5 TABLET: 800; 160 TABLET ORAL at 21:20

## 2018-09-17 RX ADMIN — PANTOPRAZOLE SODIUM 40 MG: 40 TABLET, DELAYED RELEASE ORAL at 17:02

## 2018-09-17 RX ADMIN — ENOXAPARIN SODIUM 40 MG: 40 INJECTION SUBCUTANEOUS at 08:03

## 2018-09-17 RX ADMIN — METHYLPREDNISOLONE SODIUM SUCCINATE 40 MG: 40 INJECTION, POWDER, FOR SOLUTION INTRAMUSCULAR; INTRAVENOUS at 03:52

## 2018-09-17 RX ADMIN — Medication 1 TABLET: at 08:03

## 2018-09-17 RX ADMIN — ACETAMINOPHEN 650 MG: 325 TABLET, FILM COATED ORAL at 08:16

## 2018-09-17 RX ADMIN — ACETAMINOPHEN 650 MG: 325 TABLET, FILM COATED ORAL at 21:20

## 2018-09-17 RX ADMIN — IPRATROPIUM BROMIDE AND ALBUTEROL SULFATE 1 AMPULE: .5; 3 SOLUTION RESPIRATORY (INHALATION) at 08:30

## 2018-09-17 RX ADMIN — PANTOPRAZOLE SODIUM 40 MG: 40 TABLET, DELAYED RELEASE ORAL at 06:24

## 2018-09-17 RX ADMIN — INFLUENZA A VIRUS A/MICHIGAN/45/2015 X-275 (H1N1) ANTIGEN (FORMALDEHYDE INACTIVATED), INFLUENZA A VIRUS A/SINGAPORE/INFIMH-16-0019/2016 IVR-186 (H3N2) ANTIGEN (FORMALDEHYDE INACTIVATED), INFLUENZA B VIRUS B/PHUKET/3073/2013 ANTIGEN (FORMALDEHYDE INACTIVATED), AND INFLUENZA B VIRUS B/MARYLAND/15/2016 BX-69A ANTIGEN (FORMALDEHYDE INACTIVATED) 0.5 ML: 15; 15; 15; 15 INJECTION, SUSPENSION INTRAMUSCULAR at 11:51

## 2018-09-17 RX ADMIN — MOMETASONE FUROATE AND FORMOTEROL FUMARATE DIHYDRATE 2 PUFF: 200; 5 AEROSOL RESPIRATORY (INHALATION) at 08:12

## 2018-09-17 RX ADMIN — Medication 10 ML: at 08:12

## 2018-09-17 RX ADMIN — SULFAMETHOXAZOLE AND TRIMETHOPRIM 0.5 TABLET: 800; 160 TABLET ORAL at 08:03

## 2018-09-17 RX ADMIN — ACETAMINOPHEN 650 MG: 325 TABLET, FILM COATED ORAL at 17:07

## 2018-09-17 RX ADMIN — SERTRALINE 50 MG: 50 TABLET, FILM COATED ORAL at 21:20

## 2018-09-17 RX ADMIN — IPRATROPIUM BROMIDE AND ALBUTEROL SULFATE 1 AMPULE: .5; 3 SOLUTION RESPIRATORY (INHALATION) at 15:26

## 2018-09-17 RX ADMIN — ALBUTEROL SULFATE 2.5 MG: 2.5 SOLUTION RESPIRATORY (INHALATION) at 04:31

## 2018-09-17 ASSESSMENT — PAIN SCALES - GENERAL
PAINLEVEL_OUTOF10: 7
PAINLEVEL_OUTOF10: 7
PAINLEVEL_OUTOF10: 8
PAINLEVEL_OUTOF10: 7
PAINLEVEL_OUTOF10: 6

## 2018-09-17 NOTE — PROGRESS NOTES
Resident Progress Note    Bay Jennings is a 41 y.o. female [id number] at 23w1d, Hospital Day: 2    CC: Difficulty breathing     Subjective:   Patient has been seen and examined. Patient is resting comfortably in bed, complaining of cough and wheezing. Patient denies any F/C, N/V, headaches, vision changes, chest pain, shortness of breath, RUQ pain, abdominal pain, and increased swelling/tenderness in bilateral lower extremities. Patient denies any vaginal discharge and any urinary complaints. The patient reports fetal movement is present, denies contractions, denies loss of fluid, denies vaginal bleeding.     Objective:   Vitals:  Vitals:    09/17/18 0033 09/17/18 0035 09/17/18 0052 09/17/18 0431   BP:   (!) 109/52    Pulse: 117 105 100    Resp:   16 18   Temp:   97.3 °F (36.3 °C)    TempSrc:   Oral    SpO2:   95% 95%   Weight:       Height:             Fetal Heart Tones by Doppler: 146 bpm       Physical Exam:   General:  no apparent distress, alert and cooperative  Neurologic:  alert, oriented, normal speech, no focal findings or movement disorder noted  Lungs:  No increased work of breathing, diffuse wheezing bilaterally   Heart:  regular rate and rhythm    Abdomen: gravid abdomen soft, non-distended, non-tender, no CVA tenderness, pfannenstiel abdominal scar   Extremities:  no calf tenderness, non edematous, SCDs not on       DATA:  Labs:   Recent Results (from the past 24 hour(s))   CBC Auto Differential    Collection Time: 09/16/18  1:15 PM   Result Value Ref Range    WBC 9.7 3.5 - 11.0 k/uL    RBC 3.97 (L) 4.0 - 5.2 m/uL    Hemoglobin 11.9 (L) 12.0 - 16.0 g/dL    Hematocrit 34.8 (L) 36 - 46 %    MCV 87.6 80 - 100 fL    MCH 30.0 26 - 34 pg    MCHC 34.2 31 - 37 g/dL    RDW 14.4 11.5 - 14.9 %    Platelets 274 972 - 115 k/uL    MPV 8.4 6.0 - 12.0 fL    NRBC Automated NOT REPORTED per 100 WBC    Differential Type NOT REPORTED     Seg Neutrophils 76 (H) 36 - 66 %    Lymphocytes 15 (L) 24 - 44 %    Monocytes 5 1 - pt now meets criteria for gHTN      Umbilical cord complication 72/46/4226     Priority: Medium    Hx of IUFD (G2) 08/29/2018     Priority: Medium    Sadler's esophagus without dysplasia      Priority: Medium    Iron deficiency anemia 12/18/2015     Priority: Medium    Anxiety 12/17/2015     Priority: Medium    Bipolar 1 disorder ([address] 12/17/2015     Priority: Medium    Obesity affecting pregnancy in second trimester 08/01/2018     Priority: Low    Tobacco use disorder complicating pregnancy, childbirth, or puerperium, antepartum 08/01/2018     Priority: Low    Gastroesophageal reflux disease with esophagitis      Priority: Low     Overview Note:     severe      Acute asthma exacerbation 09/16/2018    Seizure ([address] 09/16/2018    High-risk pregnancy in second trimester 08/11/2018    Elderly multigravida in second trimester 08/01/2018    Suspected damage to fetus from other disease in mother, affecting management of mother, antepartum condition or complication 21/79/2352    Chronic abdominal pain 05/07/2018    Rectal bleeding 03/26/2018    Constipation 03/26/2018    Odynophagia     COPD exacerbation ([address]     Hiatal hernia     Tobacco use 02/14/2017    Headache 09/02/2016    Morbid obesity due to excess calories (HCC)     Precordial pain 06/14/2016    Degenerative disc disease, cervical 04/12/2016    Cervical disc herniation 04/12/2016    Cervical radicular pain 04/12/2016    Asthma with status asthmaticus     Asthma 12/17/2015    GERD (gastroesophageal reflux disease) 12/17/2015    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      Marylen Bun, DO  Ob/Gyn Resident  [facility], [address]  9/17/2018, 5:52 AM

## 2018-09-17 NOTE — PROGRESS NOTES
OBGYN resident notified of increase in blood sugar and patients request for insulin. Resident states that they would only treat with insulin if patient had gestational diabetes and that blood sugar would normalize once patient if off solumedrol. Conversation was explained to the patient. Patient became tearful and anxious. Patient request to speak to resident directly.

## 2018-09-17 NOTE — CARE COORDINATION
Received call from [facility], , from [facility], wanting updates, in regards to pt's dc. All questions answered & she will follow when pt is dc home.

## 2018-09-17 NOTE — FLOWSHEET NOTE
09/17/18 1034   Provider Notification   Reason for Communication Evaluate; Review case   Provider Name Dr. Fabiola Gaines   Provider Notification Physician   Method of Communication Face to face   Response See orders   Notification Time 1034   Dr. Fabiola Gaines in to see patient. Reviewed medication orders with him. Received orders to increase solumedrol and have respiratory perform scheduled breathing treatments.

## 2018-09-17 NOTE — H&P
mullins's with linear erosions, esophagitis, small hiatal hernia    UPPER GASTROINTESTINAL ENDOSCOPY N/A 1/30/2018    MULLINS'S       Medications Prior to Admission:    Prescriptions Prior to Admission: Sulfamethoxazole-Trimethoprim (BACTRIM PO), Take by mouth  Fluticasone Furoate-Vilanterol (BREO ELLIPTA) 200-25 MCG/INH AEPB, Inhale 1 puff into the lungs daily  sertraline (ZOLOFT) 50 MG tablet, Take 1 tablet by mouth daily  promethazine (PHENERGAN) 25 MG tablet, Take 1 tablet by mouth every 8 hours as needed for Nausea  pantoprazole (PROTONIX) 40 MG tablet, Take 1 tablet by mouth 2 times daily (before meals)  prenatal vitamin (VOL-PLUS) 27-1 MG TABS TABLET, TAKE ONE TABLET BY MOUTH ONCE A DAY  albuterol (PROVENTIL) (2.5 MG/3ML) 0.083% nebulizer solution, Take 3 mLs by nebulization every 4 hours as needed for Wheezing (Patient taking differently: Take 2.5 mg by nebulization 4 times daily )  albuterol-ipratropium (COMBIVENT)  MCG/ACT inhaler, Inhale 2 puffs into the lungs every 6 hours as needed for Wheezing  budesonide-formoterol (SYMBICORT) 160-4.5 MCG/ACT AERO, Inhale 2 puffs into the lungs 2 times daily. topiramate (TOPAMAX) 50 MG tablet, Take 50 mg by mouth 2 times daily  SUMAtriptan (IMITREX) 100 MG tablet, Take 100 mg by mouth once as needed  pregabalin (LYRICA) 75 MG capsule, Take 75 mg by mouth 2 times daily. .    Allergies:  Nsaids;  Toradol [ketorolac tromethamine]; and Ultram [tramadol]    Immunizations:   Immunization History   Administered Date(s) Administered    Influenza, Quadv, 3 yrs and older, IM, PF (Fluzone 3 yrs and older or Afluria 5 yrs and older) 02/28/2017    Pneumococcal Polysaccharide ([id number]) 12/20/2015         Social History: Smoker, No ETOH, pregnant      Family History:   Family History   Problem Relation Age of Onset    Cancer Mother         breast    Bipolar Disorder Mother     Hypertension Mother     Breast Cancer Mother     Bipolar Disorder Brother    Mckayla Graham

## 2018-09-18 PROCEDURE — 94640 AIRWAY INHALATION TREATMENT: CPT

## 2018-09-18 PROCEDURE — 6360000002 HC RX W HCPCS: Performed by: FAMILY MEDICINE

## 2018-09-18 PROCEDURE — 6370000000 HC RX 637 (ALT 250 FOR IP): Performed by: FAMILY MEDICINE

## 2018-09-18 PROCEDURE — 94761 N-INVAS EAR/PLS OXIMETRY MLT: CPT

## 2018-09-18 PROCEDURE — 2580000003 HC RX 258: Performed by: FAMILY MEDICINE

## 2018-09-18 PROCEDURE — 2060000000 HC ICU INTERMEDIATE R&B

## 2018-09-18 RX ORDER — METHYLPREDNISOLONE SODIUM SUCCINATE 40 MG/ML
40 INJECTION, POWDER, LYOPHILIZED, FOR SOLUTION INTRAMUSCULAR; INTRAVENOUS EVERY 12 HOURS
Status: DISCONTINUED | OUTPATIENT
Start: 2018-09-18 | End: 2018-09-20 | Stop reason: HOSPADM

## 2018-09-18 RX ADMIN — PANTOPRAZOLE SODIUM 40 MG: 40 TABLET, DELAYED RELEASE ORAL at 16:08

## 2018-09-18 RX ADMIN — METHYLPREDNISOLONE SODIUM SUCCINATE 60 MG: 125 INJECTION, POWDER, FOR SOLUTION INTRAMUSCULAR; INTRAVENOUS at 04:31

## 2018-09-18 RX ADMIN — IPRATROPIUM BROMIDE AND ALBUTEROL SULFATE 1 AMPULE: .5; 3 SOLUTION RESPIRATORY (INHALATION) at 11:48

## 2018-09-18 RX ADMIN — SULFAMETHOXAZOLE AND TRIMETHOPRIM 0.5 TABLET: 800; 160 TABLET ORAL at 19:30

## 2018-09-18 RX ADMIN — Medication 10 ML: at 08:29

## 2018-09-18 RX ADMIN — METHYLPREDNISOLONE SODIUM SUCCINATE 40 MG: 40 INJECTION, POWDER, FOR SOLUTION INTRAMUSCULAR; INTRAVENOUS at 16:08

## 2018-09-18 RX ADMIN — ACETAMINOPHEN 650 MG: 325 TABLET, FILM COATED ORAL at 19:30

## 2018-09-18 RX ADMIN — ACETAMINOPHEN 650 MG: 325 TABLET, FILM COATED ORAL at 12:28

## 2018-09-18 RX ADMIN — PANTOPRAZOLE SODIUM 40 MG: 40 TABLET, DELAYED RELEASE ORAL at 08:29

## 2018-09-18 RX ADMIN — IPRATROPIUM BROMIDE AND ALBUTEROL SULFATE 1 AMPULE: .5; 3 SOLUTION RESPIRATORY (INHALATION) at 20:03

## 2018-09-18 RX ADMIN — SERTRALINE 50 MG: 50 TABLET, FILM COATED ORAL at 19:30

## 2018-09-18 RX ADMIN — IPRATROPIUM BROMIDE AND ALBUTEROL SULFATE 1 AMPULE: .5; 3 SOLUTION RESPIRATORY (INHALATION) at 08:46

## 2018-09-18 RX ADMIN — ENOXAPARIN SODIUM 40 MG: 40 INJECTION SUBCUTANEOUS at 08:25

## 2018-09-18 RX ADMIN — SULFAMETHOXAZOLE AND TRIMETHOPRIM 0.5 TABLET: 800; 160 TABLET ORAL at 08:26

## 2018-09-18 RX ADMIN — Medication 10 ML: at 19:30

## 2018-09-18 RX ADMIN — MOMETASONE FUROATE AND FORMOTEROL FUMARATE DIHYDRATE 2 PUFF: 200; 5 AEROSOL RESPIRATORY (INHALATION) at 08:25

## 2018-09-18 RX ADMIN — PROMETHAZINE HYDROCHLORIDE 25 MG: 25 TABLET ORAL at 20:57

## 2018-09-18 RX ADMIN — Medication 1 TABLET: at 08:29

## 2018-09-18 RX ADMIN — PROMETHAZINE HYDROCHLORIDE 25 MG: 25 TABLET ORAL at 10:58

## 2018-09-18 RX ADMIN — ALBUTEROL SULFATE 2.5 MG: 2.5 SOLUTION RESPIRATORY (INHALATION) at 17:57

## 2018-09-18 RX ADMIN — MOMETASONE FUROATE AND FORMOTEROL FUMARATE DIHYDRATE 2 PUFF: 200; 5 AEROSOL RESPIRATORY (INHALATION) at 19:29

## 2018-09-18 ASSESSMENT — PAIN SCALES - GENERAL
PAINLEVEL_OUTOF10: 6
PAINLEVEL_OUTOF10: 5
PAINLEVEL_OUTOF10: 7
PAINLEVEL_OUTOF10: 7
PAINLEVEL_OUTOF10: 6

## 2018-09-18 ASSESSMENT — PAIN DESCRIPTION - PAIN TYPE: TYPE: ACUTE PAIN

## 2018-09-18 ASSESSMENT — PAIN DESCRIPTION - LOCATION: LOCATION: CHEST

## 2018-09-18 NOTE — CARE COORDINATION
ONGOING DISCHARGE PLAN:    Writer to bedside and the patient was sleeping. Discharge plan for the patient is home and she has declined VNS. Active orders for 40mg Q12 hours solu-medrol, dulera, duoneb and oral bactrim. Will continue to follow for additional discharge needs.     Electronically signed by Nicho Hernandez RN on 9/18/2018 at 2:53 PM

## 2018-09-18 NOTE — PROGRESS NOTES
08/01/2018    Suspected damage to fetus from other disease in mother, affecting management of mother, antepartum condition or complication 46/27/5959    Obesity affecting pregnancy in second trimester 08/01/2018    Tobacco use disorder complicating pregnancy, childbirth, or puerperium, antepartum 08/01/2018    Chronic abdominal pain 05/07/2018    Rectal bleeding 03/26/2018    Constipation 03/26/2018    Odynophagia     COPD exacerbation ([facility]     Sadler's esophagus without dysplasia     Hiatal hernia     Tobacco use 02/14/2017    Gastroesophageal reflux disease with esophagitis     Headache 09/02/2016    Morbid obesity due to excess calories (HCC)     Precordial pain 06/14/2016    Degenerative disc disease, cervical 04/12/2016    Cervical disc herniation 04/12/2016    Cervical radicular pain 04/12/2016    Asthma with status asthmaticus     Iron deficiency anemia 12/18/2015    Asthma 12/17/2015    Anxiety 12/17/2015    GERD (gastroesophageal reflux disease) 12/17/2015    Bipolar 1 disorder ([facility] [address] [facility] 12/17/2015    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 Increase Solumedrol to 60 mg q 6 hrs.     Electronically signed by Yue Rendon MD on 9/17/2018 at 9:06 PM

## 2018-09-18 NOTE — PROGRESS NOTES
Resident Progress Note    Jamarcus Baig is a 41 y.o. female [id number] at 23w1d, Hospital Day: 3    CC: Difficulty breathing     Subjective:   Patient has been seen and examined. Patient is resting comfortably in bed, complaining of continued cough and wheezing. Patient denies any F/C, N/V, headaches, vision changes, chest pain, shortness of breath, RUQ pain, abdominal pain, and increased swelling/tenderness in bilateral lower extremities. Patient denies any vaginal discharge and any urinary complaints. The patient reports fetal movement is present, denies contractions, denies loss of fluid, denies vaginal bleeding.     Objective:   Vitals:  Vitals:    09/17/18 1822 09/17/18 2009 09/17/18 2259 09/18/18 0430   BP: 127/70  135/71    Pulse: 99  93    Resp: 18 18 16    Temp: 97.3 °F (36.3 °C)  97.6 °F (36.4 °C)    TempSrc: Oral  Oral    SpO2: 96% 95% 95%    Weight:    222 lb 3.6 oz (100.8 kg)   Height:             Fetal Heart Tones by Doppler: 132 bpm       Physical Exam:   General:  no apparent distress, alert and cooperative  Neurologic:  alert, oriented, normal speech, no focal findings or movement disorder noted  Lungs:  No increased work of breathing, diffuse wheezing bilaterally   Heart:  regular rate and rhythm    Abdomen: gravid abdomen soft, non-distended, non-tender, no CVA tenderness, pfannenstiel abdominal scar   Extremities:  no calf tenderness, non edematous, SCDs not on       DATA:  Labs:   Recent Results (from the past 24 hour(s))   POC Glucose Fingerstick    Collection Time: 09/17/18  7:57 AM   Result Value Ref Range    POC Glucose 121 (H) 65 - 105 mg/dL   POC Glucose Fingerstick    Collection Time: 09/17/18 11:20 AM   Result Value Ref Range    POC Glucose 190 (H) 65 - 105 mg/dL         Diagnostics:  Xr Chest Portable    Result Date: 9/16/2018  EXAMINATION: SINGLE XRAY VIEW OF THE CHEST 9/16/2018 1:06 pm COMPARISON: August 13, 2018 and November 6, 2017 HISTORY: ORDERING SYSTEM PROVIDED HISTORY: Tolerance Test: N/A     Group B Strep: not done  Cystic Fibrosis Screen: not available  First Trimester Screen: not available  MSAFP/Multiple Markers: pending  Non-Invasive Prenatal Testing: normal  Anatomy US: posterior placenta, 3VC with normal insertion, female       gHTN 08/29/2018     Priority: High     Overview Note:     8/29/18 BP of 142/82 at 20w3d, repeat BP 1 minute later 140/76, all other BPs wnl  PreE labs wnl, P/C ratio 0.08  9/16 Elevated BPs 146/67 and 150/68, pt now meets criteria for gHTN      Umbilical cord complication 56/83/0518     Priority: Medium    Hx of IUFD (G2) 08/29/2018     Priority: Medium    Sadler's esophagus without dysplasia      Priority: Medium    Iron deficiency anemia 12/18/2015     Priority: Medium    Anxiety 12/17/2015     Priority: Medium    Bipolar 1 disorder ([facility] 12/17/2015     Priority: Medium    Obesity affecting pregnancy in second trimester 08/01/2018     Priority: Low    Tobacco use disorder complicating pregnancy, childbirth, or puerperium, antepartum 08/01/2018     Priority: Low    Gastroesophageal reflux disease with esophagitis      Priority: Low     Overview Note:     severe      Acute asthma exacerbation 09/16/2018    Seizure ([address] 09/16/2018    High-risk pregnancy in second trimester 08/11/2018    Elderly multigravida in second trimester 08/01/2018    Suspected damage to fetus from other disease in mother, affecting management of mother, antepartum condition or complication 40/08/8331    Chronic abdominal pain 05/07/2018    Rectal bleeding 03/26/2018    Constipation 03/26/2018    Odynophagia     COPD exacerbation ([facility] [facility]     Hiatal hernia     Tobacco use 02/14/2017    Headache 09/02/2016    Morbid obesity due to excess calories (HCC)     Precordial pain 06/14/2016    Degenerative disc disease, cervical 04/12/2016    Cervical disc herniation 04/12/2016    Cervical radicular pain 04/12/2016    Asthma with status asthmaticus     Asthma

## 2018-09-18 NOTE — PLAN OF CARE
Problem: Falls - Risk of:  Goal: Will remain free from falls  Will remain free from falls   Outcome: Met This Shift  Gait steady   Goal: Absence of physical injury  Absence of physical injury   Outcome: Met This Shift      Problem: Gas Exchange - Impaired:  Goal: Levels of oxygenation will improve  Levels of oxygenation will improve   Outcome: Ongoing  o2 sat wnl

## 2018-09-19 LAB
GLUCOSE BLD-MCNC: 125 MG/DL (ref 65–105)
GLUCOSE BLD-MCNC: 160 MG/DL (ref 65–105)
GLUCOSE BLD-MCNC: 263 MG/DL (ref 65–105)

## 2018-09-19 PROCEDURE — 2060000000 HC ICU INTERMEDIATE R&B

## 2018-09-19 PROCEDURE — 94640 AIRWAY INHALATION TREATMENT: CPT

## 2018-09-19 PROCEDURE — 6370000000 HC RX 637 (ALT 250 FOR IP): Performed by: FAMILY MEDICINE

## 2018-09-19 PROCEDURE — 94642 AEROSOL INHALATION TREATMENT: CPT

## 2018-09-19 PROCEDURE — 6360000002 HC RX W HCPCS: Performed by: FAMILY MEDICINE

## 2018-09-19 PROCEDURE — 6370000000 HC RX 637 (ALT 250 FOR IP): Performed by: STUDENT IN AN ORGANIZED HEALTH CARE EDUCATION/TRAINING PROGRAM

## 2018-09-19 PROCEDURE — 2580000003 HC RX 258: Performed by: FAMILY MEDICINE

## 2018-09-19 RX ORDER — ACETAMINOPHEN 500 MG
1000 TABLET ORAL EVERY 6 HOURS PRN
Status: DISCONTINUED | OUTPATIENT
Start: 2018-09-19 | End: 2018-09-20 | Stop reason: HOSPADM

## 2018-09-19 RX ORDER — ACETAMINOPHEN AND CODEINE PHOSPHATE 300; 30 MG/1; MG/1
1 TABLET ORAL EVERY 4 HOURS PRN
Status: DISCONTINUED | OUTPATIENT
Start: 2018-09-19 | End: 2018-09-20 | Stop reason: HOSPADM

## 2018-09-19 RX ADMIN — Medication 10 ML: at 20:25

## 2018-09-19 RX ADMIN — PANTOPRAZOLE SODIUM 40 MG: 40 TABLET, DELAYED RELEASE ORAL at 05:01

## 2018-09-19 RX ADMIN — IPRATROPIUM BROMIDE AND ALBUTEROL SULFATE 1 AMPULE: .5; 3 SOLUTION RESPIRATORY (INHALATION) at 14:28

## 2018-09-19 RX ADMIN — ENOXAPARIN SODIUM 40 MG: 40 INJECTION SUBCUTANEOUS at 09:46

## 2018-09-19 RX ADMIN — MOMETASONE FUROATE AND FORMOTEROL FUMARATE DIHYDRATE 2 PUFF: 200; 5 AEROSOL RESPIRATORY (INHALATION) at 09:47

## 2018-09-19 RX ADMIN — Medication 1 TABLET: at 09:48

## 2018-09-19 RX ADMIN — ACETAMINOPHEN 650 MG: 325 TABLET, FILM COATED ORAL at 11:48

## 2018-09-19 RX ADMIN — METHYLPREDNISOLONE SODIUM SUCCINATE 40 MG: 40 INJECTION, POWDER, FOR SOLUTION INTRAMUSCULAR; INTRAVENOUS at 15:31

## 2018-09-19 RX ADMIN — SULFAMETHOXAZOLE AND TRIMETHOPRIM 0.5 TABLET: 800; 160 TABLET ORAL at 20:25

## 2018-09-19 RX ADMIN — IPRATROPIUM BROMIDE AND ALBUTEROL SULFATE 1 AMPULE: .5; 3 SOLUTION RESPIRATORY (INHALATION) at 19:10

## 2018-09-19 RX ADMIN — MOMETASONE FUROATE AND FORMOTEROL FUMARATE DIHYDRATE 2 PUFF: 200; 5 AEROSOL RESPIRATORY (INHALATION) at 20:25

## 2018-09-19 RX ADMIN — SULFAMETHOXAZOLE AND TRIMETHOPRIM 0.5 TABLET: 800; 160 TABLET ORAL at 09:47

## 2018-09-19 RX ADMIN — IPRATROPIUM BROMIDE AND ALBUTEROL SULFATE 1 AMPULE: .5; 3 SOLUTION RESPIRATORY (INHALATION) at 11:33

## 2018-09-19 RX ADMIN — ACETAMINOPHEN AND CODEINE PHOSPHATE 1 TABLET: 300; 30 TABLET ORAL at 15:31

## 2018-09-19 RX ADMIN — PANTOPRAZOLE SODIUM 40 MG: 40 TABLET, DELAYED RELEASE ORAL at 15:31

## 2018-09-19 RX ADMIN — Medication 10 ML: at 09:50

## 2018-09-19 RX ADMIN — PROMETHAZINE HYDROCHLORIDE 25 MG: 25 TABLET ORAL at 14:08

## 2018-09-19 RX ADMIN — ACETAMINOPHEN AND CODEINE PHOSPHATE 1 TABLET: 300; 30 TABLET ORAL at 20:24

## 2018-09-19 RX ADMIN — SERTRALINE 50 MG: 50 TABLET, FILM COATED ORAL at 20:24

## 2018-09-19 RX ADMIN — METHYLPREDNISOLONE SODIUM SUCCINATE 40 MG: 40 INJECTION, POWDER, FOR SOLUTION INTRAMUSCULAR; INTRAVENOUS at 05:01

## 2018-09-19 ASSESSMENT — PAIN SCALES - GENERAL
PAINLEVEL_OUTOF10: 9
PAINLEVEL_OUTOF10: 0
PAINLEVEL_OUTOF10: 8
PAINLEVEL_OUTOF10: 8
PAINLEVEL_OUTOF10: 7

## 2018-09-19 NOTE — PLAN OF CARE
Problem: Falls - Risk of:  Goal: Will remain free from falls  Will remain free from falls   Outcome: Met This Shift  The patient remained free from falls this shift, call light within reach, bed in locked and lowest position. Side rails up x2. Continue to monitor closely. Problem: Gas Exchange - Impaired:  Goal: Levels of oxygenation will improve  Levels of oxygenation will improve   Outcome: Met This Shift  Pt still having expiratory wheezes.

## 2018-09-19 NOTE — PROGRESS NOTES
limits. There is no consolidation, pneumothorax or evidence for edema. No evidence for effusion. No acute osseous abnormality is identified. No acute airspace disease identified. Assessment/Plan:  Sergo Kumar is a 41 y.o. female [id number] at 23w1d IUP admitted for Asthma Exacerbation    - GBS unknown/ Rh positive / R immune    - No indication for GBS prophylaxis unless delivery imminent    - Denies any obstetric complaints this AM    - FHTs by Doppler: 143 bpm   - PNV daily    - SCD compliance encouraged    Asthma exacerbation / COPD               - Management per primary     Newly Diagnosed gHTN    - No severe range BPs    - Denies s/s of pre E    - CBC, CMP WNL x1     Bipolar disorder               - Stable on Zoloft 50 mg daily    - Benadryl PRN for anxiety/sleep    - Denies SI/HI     Fetal exposure to zoloft               - ECHO scheduled 9/27 with MFM      GERD / Sadler's esophagus               - Continue home meds               - Management per primary      Seizure disorder   - Negative MRI brain 2016              - Denies recent seizures       Tobacco abuse               - 1/2 PPD    - Cessation encouraged     DVT prophylaxis    - Lovenox 40 mg QD     Patient Active Problem List    Diagnosis Date Noted    Rh+/RI/GBS unk 08/29/2018     Priority: High     Overview Note:     PRENATAL LAB RESULTS:   Blood Type/Rh: O pos  Antibody Screen: negative  Hemoglobin, Hematocrit, Platelets: 40.4 / 57.6 / 255  Rubella: immune  T.  Pallidum, IgG: non-reactive   Hepatitis B Surface Antigen: non-reactive   HIV: non-reactive   Sickle Cell Screen: negative  Gonorrhea: negative  Chlamydia: negative  Urine culture: negative, date: 5/11/18     1 hour Glucose Tolerance Test: 119 on 8/2/18  3 hour Glucose Tolerance Test: N/A     Group B Strep: not done  Cystic Fibrosis Screen: not available  First Trimester Screen: not available  MSAFP/Multiple Markers: pending  Non-Invasive Prenatal Testing: normal  Anatomy US:

## 2018-09-19 NOTE — CARE COORDINATION
DISCHARGE PLANNING NOTE:    Plan is for this patient to return to home independently. She declines any discharge needs at this time. Remains on steroids 40Q12 and PO Bactrim. Will continue to follow along for any discharge needs.      Electronically signed by Gennaro Guerrero RN on 9/19/2018 at 2:40 PM

## 2018-09-20 VITALS
HEIGHT: 62 IN | OXYGEN SATURATION: 94 % | TEMPERATURE: 97.9 F | DIASTOLIC BLOOD PRESSURE: 82 MMHG | SYSTOLIC BLOOD PRESSURE: 131 MMHG | BODY MASS INDEX: 40.45 KG/M2 | HEART RATE: 99 BPM | WEIGHT: 219.8 LBS | RESPIRATION RATE: 19 BRPM

## 2018-09-20 PROCEDURE — 6370000000 HC RX 637 (ALT 250 FOR IP): Performed by: FAMILY MEDICINE

## 2018-09-20 PROCEDURE — 6360000002 HC RX W HCPCS: Performed by: FAMILY MEDICINE

## 2018-09-20 PROCEDURE — 94761 N-INVAS EAR/PLS OXIMETRY MLT: CPT

## 2018-09-20 PROCEDURE — 94640 AIRWAY INHALATION TREATMENT: CPT

## 2018-09-20 RX ORDER — PREDNISONE 10 MG/1
10 TABLET ORAL DAILY
Qty: 5 TABLET | Refills: 0 | Status: SHIPPED | OUTPATIENT
Start: 2018-09-20 | End: 2018-09-20 | Stop reason: HOSPADM

## 2018-09-20 RX ORDER — PREDNISONE 10 MG/1
10 TABLET ORAL DAILY
Qty: 5 TABLET | Refills: 0 | Status: SHIPPED | OUTPATIENT
Start: 2018-09-20 | End: 2018-09-25

## 2018-09-20 RX ORDER — PREDNISONE 1 MG/1
5 TABLET ORAL DAILY
Qty: 5 TABLET | Refills: 0 | Status: SHIPPED | OUTPATIENT
Start: 2018-09-20 | End: 2018-09-25

## 2018-09-20 RX ORDER — MONTELUKAST SODIUM 10 MG/1
10 TABLET ORAL NIGHTLY
Qty: 30 TABLET | Refills: 3 | Status: SHIPPED | OUTPATIENT
Start: 2018-09-20 | End: 2021-06-02

## 2018-09-20 RX ORDER — MONTELUKAST SODIUM 10 MG/1
10 TABLET ORAL NIGHTLY
Status: DISCONTINUED | OUTPATIENT
Start: 2018-09-20 | End: 2018-09-20 | Stop reason: HOSPADM

## 2018-09-20 RX ORDER — PREDNISONE 20 MG/1
20 TABLET ORAL DAILY
Qty: 5 TABLET | Refills: 0 | Status: SHIPPED | OUTPATIENT
Start: 2018-09-20 | End: 2018-09-25

## 2018-09-20 RX ADMIN — Medication 1 TABLET: at 09:21

## 2018-09-20 RX ADMIN — MOMETASONE FUROATE AND FORMOTEROL FUMARATE DIHYDRATE 2 PUFF: 200; 5 AEROSOL RESPIRATORY (INHALATION) at 09:21

## 2018-09-20 RX ADMIN — PANTOPRAZOLE SODIUM 40 MG: 40 TABLET, DELAYED RELEASE ORAL at 05:20

## 2018-09-20 RX ADMIN — SULFAMETHOXAZOLE AND TRIMETHOPRIM 0.5 TABLET: 800; 160 TABLET ORAL at 09:21

## 2018-09-20 RX ADMIN — METHYLPREDNISOLONE SODIUM SUCCINATE 40 MG: 40 INJECTION, POWDER, FOR SOLUTION INTRAMUSCULAR; INTRAVENOUS at 04:36

## 2018-09-20 RX ADMIN — IPRATROPIUM BROMIDE AND ALBUTEROL SULFATE 1 AMPULE: .5; 3 SOLUTION RESPIRATORY (INHALATION) at 06:58

## 2018-09-20 NOTE — PROGRESS NOTES
is no consolidation, pneumothorax or evidence for edema. No evidence for effusion. No acute osseous abnormality is identified. No acute airspace disease identified. Assessment/Plan:  Erica Hu is a 41 y.o. female [id number] at 23w1d IUP admitted for Asthma Exacerbation    - GBS unknown/ Rh positive / R immune    - No indication for GBS prophylaxis unless delivery imminent    - Denies any obstetric complaints this AM    - FHTs by Doppler: 146 bpm   - PNV daily    - SCD compliance encouraged    Asthma exacerbation / COPD               - Management per primary     Newly Diagnosed gHTN    - No severe range BPs    - Denies s/s of pre E    - CBC, CMP WNL x1     Bipolar disorder               - Stable on Zoloft 50 mg daily    - Benadryl PRN for anxiety/sleep    - Denies SI/HI     Fetal exposure to zoloft               - ECHO scheduled 9/27 with MFM      GERD / Sadler's esophagus               - Continue home meds               - Management per primary      Seizure disorder   - Negative MRI brain 2016              - Denies recent seizures       Tobacco abuse               - 1/2 PPD    - Cessation encouraged     DVT prophylaxis    - Lovenox 40 mg QD     Patient Active Problem List    Diagnosis Date Noted    Rh+/RI/GBS unk 08/29/2018     Priority: High     Overview Note:     PRENATAL LAB RESULTS:   Blood Type/Rh: O pos  Antibody Screen: negative  Hemoglobin, Hematocrit, Platelets: 06.2 / 86.9 / 255  Rubella: immune  T.  Pallidum, IgG: non-reactive   Hepatitis B Surface Antigen: non-reactive   HIV: non-reactive   Sickle Cell Screen: negative  Gonorrhea: negative  Chlamydia: negative  Urine culture: negative, date: 5/11/18     1 hour Glucose Tolerance Test: 119 on 8/2/18  3 hour Glucose Tolerance Test: N/A     Group B Strep: not done  Cystic Fibrosis Screen: not available  First Trimester Screen: not available  MSAFP/Multiple Markers: pending  Non-Invasive Prenatal Testing: normal  Anatomy US: posterior

## 2018-09-25 RX ORDER — PANTOPRAZOLE SODIUM 40 MG/1
40 TABLET, DELAYED RELEASE ORAL
Qty: 60 TABLET | OUTPATIENT
Start: 2018-09-25

## 2018-09-27 ENCOUNTER — ROUTINE PRENATAL (OUTPATIENT)
Dept: PERINATAL CARE | Age: 38
End: 2018-09-27
Payer: COMMERCIAL

## 2018-09-27 VITALS
WEIGHT: 224 LBS | RESPIRATION RATE: 20 BRPM | DIASTOLIC BLOOD PRESSURE: 75 MMHG | BODY MASS INDEX: 41.22 KG/M2 | TEMPERATURE: 98 F | SYSTOLIC BLOOD PRESSURE: 122 MMHG | HEIGHT: 62 IN | HEART RATE: 90 BPM

## 2018-09-27 DIAGNOSIS — O40.9XX0 POLYHYDRAMNIOS, ANTEPARTUM, SINGLE OR UNSPECIFIED FETUS: ICD-10-CM

## 2018-09-27 DIAGNOSIS — O99.212 OBESITY AFFECTING PREGNANCY IN SECOND TRIMESTER: ICD-10-CM

## 2018-09-27 DIAGNOSIS — O35.8XX0 SUSPECTED DAMAGE TO FETUS FROM DISEASE IN MOTHER, ANTEPARTUM CONDITION, SINGLE OR UNSPECIFIED FETUS: Primary | ICD-10-CM

## 2018-09-27 DIAGNOSIS — Z36.4 ANTENATAL SCREENING FOR FETAL GROWTH RETARDATION USING ULTRASONICS: ICD-10-CM

## 2018-09-27 DIAGNOSIS — Z3A.24 24 WEEKS GESTATION OF PREGNANCY: ICD-10-CM

## 2018-09-27 DIAGNOSIS — O09.522 ELDERLY MULTIGRAVIDA IN SECOND TRIMESTER: ICD-10-CM

## 2018-09-27 PROCEDURE — 93325 DOPPLER ECHO COLOR FLOW MAPG: CPT | Performed by: OBSTETRICS & GYNECOLOGY

## 2018-09-27 PROCEDURE — 76827 ECHO EXAM OF FETAL HEART: CPT | Performed by: OBSTETRICS & GYNECOLOGY

## 2018-09-27 PROCEDURE — 76816 OB US FOLLOW-UP PER FETUS: CPT | Performed by: OBSTETRICS & GYNECOLOGY

## 2018-09-27 PROCEDURE — 76825 ECHO EXAM OF FETAL HEART: CPT | Performed by: OBSTETRICS & GYNECOLOGY

## 2018-09-27 RX ORDER — IPRATROPIUM/ALBUTEROL SULFATE 20-100 MCG
1 MIST INHALER (GRAM) INHALATION EVERY 6 HOURS PRN
Refills: 6 | COMMUNITY
Start: 2018-09-15 | End: 2020-10-21

## 2018-09-29 ENCOUNTER — HOSPITAL ENCOUNTER (EMERGENCY)
Age: 38
Discharge: HOME | End: 2018-09-29
Attending: EMERGENCY MEDICINE
Payer: COMMERCIAL

## 2018-09-29 ENCOUNTER — APPOINTMENT (OUTPATIENT)
Dept: GENERAL RADIOLOGY | Age: 38
End: 2018-09-29
Payer: COMMERCIAL

## 2018-09-29 VITALS
BODY MASS INDEX: 40.48 KG/M2 | DIASTOLIC BLOOD PRESSURE: 83 MMHG | RESPIRATION RATE: 17 BRPM | HEIGHT: 62 IN | TEMPERATURE: 97.9 F | WEIGHT: 220 LBS | HEART RATE: 91 BPM | OXYGEN SATURATION: 98 % | SYSTOLIC BLOOD PRESSURE: 139 MMHG

## 2018-09-29 DIAGNOSIS — R07.81 RIB PAIN ON RIGHT SIDE: Primary | ICD-10-CM

## 2018-09-29 PROCEDURE — 71100 X-RAY EXAM RIBS UNI 2 VIEWS: CPT

## 2018-09-29 PROCEDURE — 99283 EMERGENCY DEPT VISIT LOW MDM: CPT

## 2018-09-29 ASSESSMENT — PAIN SCALES - GENERAL: PAINLEVEL_OUTOF10: 8

## 2018-10-03 ENCOUNTER — ROUTINE PRENATAL (OUTPATIENT)
Dept: OBGYN CLINIC | Age: 38
End: 2018-10-03
Payer: COMMERCIAL

## 2018-10-03 VITALS
HEART RATE: 97 BPM | BODY MASS INDEX: 40.85 KG/M2 | HEIGHT: 62 IN | TEMPERATURE: 98.2 F | DIASTOLIC BLOOD PRESSURE: 69 MMHG | WEIGHT: 222 LBS | SYSTOLIC BLOOD PRESSURE: 124 MMHG

## 2018-10-03 DIAGNOSIS — E66.01 MORBID OBESITY DUE TO EXCESS CALORIES (HCC): Chronic | ICD-10-CM

## 2018-10-03 DIAGNOSIS — O09.90 HRP (HIGH RISK PREGNANCY), UNSPECIFIED TRIMESTER: ICD-10-CM

## 2018-10-03 DIAGNOSIS — R56.9 SEIZURE (HCC): ICD-10-CM

## 2018-10-03 DIAGNOSIS — O09.92 HIGH-RISK PREGNANCY IN SECOND TRIMESTER: Primary | ICD-10-CM

## 2018-10-03 DIAGNOSIS — J45.51 SEVERE PERSISTENT ASTHMA WITH ACUTE EXACERBATION: ICD-10-CM

## 2018-10-03 DIAGNOSIS — J45.20 INTERMITTENT ASTHMA, UNSPECIFIED ASTHMA SEVERITY, UNSPECIFIED WHETHER COMPLICATED: Chronic | ICD-10-CM

## 2018-10-03 DIAGNOSIS — Z3A.25 25 WEEKS GESTATION OF PREGNANCY: ICD-10-CM

## 2018-10-03 PROCEDURE — G8427 DOCREV CUR MEDS BY ELIG CLIN: HCPCS | Performed by: SPECIALIST

## 2018-10-03 PROCEDURE — 1111F DSCHRG MED/CURRENT MED MERGE: CPT | Performed by: SPECIALIST

## 2018-10-03 PROCEDURE — G8482 FLU IMMUNIZE ORDER/ADMIN: HCPCS | Performed by: SPECIALIST

## 2018-10-03 PROCEDURE — G8417 CALC BMI ABV UP PARAM F/U: HCPCS | Performed by: SPECIALIST

## 2018-10-03 PROCEDURE — 99213 OFFICE O/P EST LOW 20 MIN: CPT | Performed by: SPECIALIST

## 2018-10-03 PROCEDURE — 1036F TOBACCO NON-USER: CPT | Performed by: SPECIALIST

## 2018-10-03 NOTE — PROGRESS NOTES
for, and in the presence of Dr. Jeremias Irwin. Electronically signed by: Yoko Mendoza 10/3/18 1:48 PM   I agree to the above documentation placed by my scribe Yoko Mendoza. I reviewed the scribe's note and agree with the documented findings and plan of care. Any areas of disagreement are noted on the chart. I have personally evaluated this patient. Additional findings are as noted. I agree with the chief complaint, past medical history, past surgical history, allergies, medications, social and family history as documented unless otherwise noted below.      Electronically signed by Jeremias Irwin MD on 10/4/2018 at 3:25 AM

## 2018-10-10 NOTE — PROGRESS NOTES
Protein Creatinine Ratio 0.08 0.00 - 0.20   CREATININE, RANDOM URINE   Result Value Ref Range    Creatinine, Ur 161.8 28.0 - 217.0 mg/dL     Patient okay to be discharged to home. Encouraged increased po hydration. Recommended follow up with Dr. Ruiz Pagan as scheduled.  labor precautions and s/s of preeclampsia reviewed. All questions answered. Patient vocalized understanding.           Shu Villa DO  OB/GYN Resident, PGY3  [facility] [place]  2018, 7:00 PM
normal...

## 2018-10-14 ENCOUNTER — HOSPITAL ENCOUNTER (OUTPATIENT)
Age: 38
Discharge: HOME | End: 2018-10-14
Attending: SPECIALIST | Admitting: SPECIALIST
Payer: COMMERCIAL

## 2018-10-14 VITALS
DIASTOLIC BLOOD PRESSURE: 73 MMHG | RESPIRATION RATE: 20 BRPM | SYSTOLIC BLOOD PRESSURE: 133 MMHG | HEART RATE: 115 BPM | TEMPERATURE: 98 F

## 2018-10-14 PROBLEM — O09.529 AMA (ADVANCED MATERNAL AGE) MULTIGRAVIDA 35+: Status: ACTIVE | Noted: 2018-10-14

## 2018-10-14 PROBLEM — O09.899 HISTORY OF PRETERM DELIVERY, CURRENTLY PREGNANT: Status: ACTIVE | Noted: 2018-10-14

## 2018-10-14 PROBLEM — Z87.59 HISTORY OF MISCARRIAGE: Status: ACTIVE | Noted: 2018-10-14

## 2018-10-14 PROBLEM — Z98.891 HISTORY OF CESAREAN DELIVERY: Status: ACTIVE | Noted: 2018-10-14

## 2018-10-14 PROBLEM — O40.9XX0 POLYHYDRAMNIOS: Status: ACTIVE | Noted: 2018-10-14

## 2018-10-14 PROBLEM — Z3A.27 27 WEEKS GESTATION OF PREGNANCY: Status: ACTIVE | Noted: 2018-10-14

## 2018-10-14 LAB
-: ABNORMAL
AMORPHOUS: ABNORMAL
BACTERIA: ABNORMAL
BILIRUBIN URINE: ABNORMAL
CASTS UA: ABNORMAL /LPF (ref 0–2)
COLOR: ABNORMAL
COMMENT UA: ABNORMAL
CRYSTALS, UA: ABNORMAL /HPF
DIRECT EXAM: NORMAL
EPITHELIAL CELLS UA: ABNORMAL /HPF (ref 0–5)
GLUCOSE URINE: NEGATIVE
KETONES, URINE: ABNORMAL
LEUKOCYTE ESTERASE, URINE: NEGATIVE
Lab: NORMAL
MUCUS: ABNORMAL
NITRITE, URINE: NEGATIVE
OTHER OBSERVATIONS UA: ABNORMAL
PH UA: 5 (ref 5–8)
PROTEIN UA: ABNORMAL
RBC UA: ABNORMAL /HPF (ref 0–2)
RENAL EPITHELIAL, UA: ABNORMAL /HPF
SPECIFIC GRAVITY UA: 1.03 (ref 1–1.03)
SPECIMEN DESCRIPTION: NORMAL
STATUS: NORMAL
TRICHOMONAS: ABNORMAL
TURBIDITY: ABNORMAL
URINE HGB: ABNORMAL
UROBILINOGEN, URINE: NORMAL
WBC UA: ABNORMAL /HPF (ref 0–5)
YEAST: ABNORMAL

## 2018-10-14 PROCEDURE — 99213 OFFICE O/P EST LOW 20 MIN: CPT

## 2018-10-14 PROCEDURE — 87480 CANDIDA DNA DIR PROBE: CPT

## 2018-10-14 PROCEDURE — 87510 GARDNER VAG DNA DIR PROBE: CPT

## 2018-10-14 PROCEDURE — 87660 TRICHOMONAS VAGIN DIR PROBE: CPT

## 2018-10-14 PROCEDURE — 87491 CHLMYD TRACH DNA AMP PROBE: CPT

## 2018-10-14 PROCEDURE — 87086 URINE CULTURE/COLONY COUNT: CPT

## 2018-10-14 PROCEDURE — 81001 URINALYSIS AUTO W/SCOPE: CPT

## 2018-10-14 PROCEDURE — 87591 N.GONORRHOEAE DNA AMP PROB: CPT

## 2018-10-14 RX ORDER — ACETAMINOPHEN 500 MG
1000 TABLET ORAL EVERY 6 HOURS PRN
Status: DISCONTINUED | OUTPATIENT
Start: 2018-10-14 | End: 2018-10-14 | Stop reason: HOSPADM

## 2018-10-14 RX ADMIN — Medication 1000 MG: at 19:58

## 2018-10-14 ASSESSMENT — PAIN SCALES - GENERAL: PAINLEVEL_OUTOF10: 8
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
## 2018-10-15 LAB
C TRACH DNA GENITAL QL NAA+PROBE: NEGATIVE
CULTURE: NORMAL
Lab: NORMAL
N. GONORRHOEAE DNA: NEGATIVE
SPECIMEN DESCRIPTION: NORMAL
STATUS: NORMAL

## 2018-10-17 ENCOUNTER — ROUTINE PRENATAL (OUTPATIENT)
Dept: OBGYN CLINIC | Age: 38
End: 2018-10-17
Payer: COMMERCIAL

## 2018-10-17 VITALS
SYSTOLIC BLOOD PRESSURE: 116 MMHG | HEART RATE: 93 BPM | HEIGHT: 62 IN | DIASTOLIC BLOOD PRESSURE: 72 MMHG | WEIGHT: 227 LBS | BODY MASS INDEX: 41.77 KG/M2

## 2018-10-17 DIAGNOSIS — Z3A.27 27 WEEKS GESTATION OF PREGNANCY: ICD-10-CM

## 2018-10-17 DIAGNOSIS — J45.20 MILD INTERMITTENT ASTHMA, UNSPECIFIED WHETHER COMPLICATED: Chronic | ICD-10-CM

## 2018-10-17 DIAGNOSIS — E66.01 MORBID OBESITY DUE TO EXCESS CALORIES (HCC): Chronic | ICD-10-CM

## 2018-10-17 DIAGNOSIS — O09.523 ELDERLY MULTIGRAVIDA IN THIRD TRIMESTER: Primary | ICD-10-CM

## 2018-10-17 PROCEDURE — G8482 FLU IMMUNIZE ORDER/ADMIN: HCPCS | Performed by: SPECIALIST

## 2018-10-17 PROCEDURE — G8427 DOCREV CUR MEDS BY ELIG CLIN: HCPCS | Performed by: SPECIALIST

## 2018-10-17 PROCEDURE — 1036F TOBACCO NON-USER: CPT | Performed by: SPECIALIST

## 2018-10-17 PROCEDURE — G8417 CALC BMI ABV UP PARAM F/U: HCPCS | Performed by: SPECIALIST

## 2018-10-17 PROCEDURE — 99213 OFFICE O/P EST LOW 20 MIN: CPT | Performed by: SPECIALIST

## 2018-10-17 PROCEDURE — 1111F DSCHRG MED/CURRENT MED MERGE: CPT | Performed by: SPECIALIST

## 2018-10-19 NOTE — ED PROVIDER NOTES
every 6 hours as needed for Wheezing             ALLERGIES     Nsaids; Toradol [ketorolac tromethamine]; and Ultram [tramadol]    FAMILY HISTORY       Family History   Problem Relation Age of Onset    Cancer Mother         breast    Bipolar Disorder Mother     Hypertension Mother    Darian Bailey Breast Cancer Mother     Bipolar Disorder Brother     Hypertension Father      Family Status   Relation Status    Mother (Not Specified)    Brother (Not Specified)    Father (Not Specified)      None otherwise stated in nurses notes    SOCIAL HISTORY      reports that she quit smoking about 5 weeks ago. Her smoking use included Cigarettes. She has a 10.50 pack-year smoking history. She has never used smokeless tobacco. She reports that she does not drink alcohol or use drugs. lives at home with others     PHYSICAL EXAM    (up to 7 for level 4, 8 or more for level 5)     ED Triage Vitals [09/29/18 1442]   BP Temp Temp src Pulse Resp SpO2 Height Weight   139/83 97.9 °F (36.6 °C) -- 91 17 98 % 5' 2\" (1.575 m) 220 lb (99.8 kg)       Physical Exam   Nursing note and vitals reviewed. Constitutional: Oriented to person, place, and time and well-developed, well-nourished. Head: Normocephalic and atraumatic. Ear: External ears normal.   Nose: Nose normal and midline. Eyes: Conjunctivae and EOM are normal. Pupils are equal, round, and reactive to light. Neck: Normal range of motion. Neck supple. Cardiovascular: Normal rate, regular rhythm, normal heart sounds and intact distal pulses. Pulmonary/Chest: Effort normal and breath sounds normal. No respiratory distress. No wheezes. No rales. TTP to the right lateral ribs. No crepitus, no swelling, no lacerations. Abdominal: Soft. Bowel sounds are normal. No distension and no mass. There is no tenderness. There is no rebound and no guarding. Musculoskeletal: Normal range of motion. Neurological: Alert and oriented to person, place, and time. GCS score is 15.    Skin: Skin

## 2018-10-24 ENCOUNTER — ROUTINE PRENATAL (OUTPATIENT)
Dept: OBGYN CLINIC | Age: 38
End: 2018-10-24
Payer: COMMERCIAL

## 2018-10-24 VITALS
SYSTOLIC BLOOD PRESSURE: 121 MMHG | BODY MASS INDEX: 41.88 KG/M2 | HEART RATE: 92 BPM | WEIGHT: 229 LBS | DIASTOLIC BLOOD PRESSURE: 76 MMHG

## 2018-10-24 DIAGNOSIS — Z98.891 HISTORY OF CESAREAN DELIVERY: ICD-10-CM

## 2018-10-24 DIAGNOSIS — O99.213 OBESITY AFFECTING PREGNANCY IN THIRD TRIMESTER: ICD-10-CM

## 2018-10-24 DIAGNOSIS — Z72.0 TOBACCO USE: Chronic | ICD-10-CM

## 2018-10-24 DIAGNOSIS — O09.523 ELDERLY MULTIGRAVIDA IN THIRD TRIMESTER: Primary | ICD-10-CM

## 2018-10-24 DIAGNOSIS — J45.20 INTERMITTENT ASTHMA WITHOUT COMPLICATION, UNSPECIFIED ASTHMA SEVERITY: Chronic | ICD-10-CM

## 2018-10-24 DIAGNOSIS — Z3A.28 28 WEEKS GESTATION OF PREGNANCY: ICD-10-CM

## 2018-10-24 DIAGNOSIS — E66.01 MORBID OBESITY DUE TO EXCESS CALORIES (HCC): Chronic | ICD-10-CM

## 2018-10-24 PROCEDURE — G8427 DOCREV CUR MEDS BY ELIG CLIN: HCPCS | Performed by: SPECIALIST

## 2018-10-24 PROCEDURE — 1036F TOBACCO NON-USER: CPT | Performed by: SPECIALIST

## 2018-10-24 PROCEDURE — G8417 CALC BMI ABV UP PARAM F/U: HCPCS | Performed by: SPECIALIST

## 2018-10-24 PROCEDURE — G8482 FLU IMMUNIZE ORDER/ADMIN: HCPCS | Performed by: SPECIALIST

## 2018-10-24 PROCEDURE — 99213 OFFICE O/P EST LOW 20 MIN: CPT | Performed by: SPECIALIST

## 2018-10-25 ENCOUNTER — ROUTINE PRENATAL (OUTPATIENT)
Dept: PERINATAL CARE | Age: 38
End: 2018-10-25
Payer: COMMERCIAL

## 2018-10-25 VITALS
WEIGHT: 228 LBS | RESPIRATION RATE: 29 BRPM | TEMPERATURE: 98 F | BODY MASS INDEX: 41.96 KG/M2 | DIASTOLIC BLOOD PRESSURE: 74 MMHG | HEIGHT: 62 IN | SYSTOLIC BLOOD PRESSURE: 126 MMHG | HEART RATE: 100 BPM

## 2018-10-25 DIAGNOSIS — O35.8XX0 SUSPECTED DAMAGE TO FETUS FROM DISEASE IN MOTHER, ANTEPARTUM CONDITION, SINGLE OR UNSPECIFIED FETUS: ICD-10-CM

## 2018-10-25 DIAGNOSIS — Z3A.28 28 WEEKS GESTATION OF PREGNANCY: ICD-10-CM

## 2018-10-25 DIAGNOSIS — O40.9XX0 POLYHYDRAMNIOS, ANTEPARTUM, SINGLE OR UNSPECIFIED FETUS: Primary | ICD-10-CM

## 2018-10-25 DIAGNOSIS — Z13.89 ENCOUNTER FOR ROUTINE SCREENING FOR MALFORMATION USING ULTRASONICS: ICD-10-CM

## 2018-10-25 DIAGNOSIS — Z03.71 SUSPECTED PROBLEM WITH AMNIOTIC CAVITY AND MEMBRANE NOT FOUND: ICD-10-CM

## 2018-10-25 DIAGNOSIS — O99.213 OBESITY AFFECTING PREGNANCY IN THIRD TRIMESTER: ICD-10-CM

## 2018-10-25 DIAGNOSIS — O09.523 ELDERLY MULTIGRAVIDA IN THIRD TRIMESTER: ICD-10-CM

## 2018-10-25 PROCEDURE — 76819 FETAL BIOPHYS PROFIL W/O NST: CPT | Performed by: OBSTETRICS & GYNECOLOGY

## 2018-10-25 PROCEDURE — 76817 TRANSVAGINAL US OBSTETRIC: CPT | Performed by: OBSTETRICS & GYNECOLOGY

## 2018-10-25 PROCEDURE — 76805 OB US >/= 14 WKS SNGL FETUS: CPT | Performed by: OBSTETRICS & GYNECOLOGY

## 2018-10-31 PROBLEM — M54.12 CERVICAL RADICULOPATHY: Status: ACTIVE | Noted: 2017-12-29

## 2018-10-31 PROBLEM — M48.02 SPINAL STENOSIS IN CERVICAL REGION: Status: ACTIVE | Noted: 2018-01-29

## 2018-10-31 PROBLEM — G43.009 MIGRAINE WITHOUT AURA: Status: ACTIVE | Noted: 2017-12-29

## 2018-10-31 PROBLEM — D17.1 LIPOMA OF SKIN AND SUBCUTANEOUS TISSUE OF TRUNK: Status: ACTIVE | Noted: 2018-10-31

## 2018-11-09 RX ORDER — OMEPRAZOLE AND SODIUM BICARBONATE 40; 1100 MG/1; MG/1
CAPSULE ORAL
Qty: 90 CAPSULE | Refills: 5 | Status: SHIPPED | OUTPATIENT
Start: 2018-11-09 | End: 2018-11-26

## 2018-11-11 RX ORDER — PANTOPRAZOLE SODIUM 40 MG/1
40 TABLET, DELAYED RELEASE ORAL
Qty: 60 TABLET | Refills: 0 | Status: SHIPPED | OUTPATIENT
Start: 2018-11-11 | End: 2018-12-07 | Stop reason: SDUPTHER

## 2018-11-26 ENCOUNTER — ROUTINE PRENATAL (OUTPATIENT)
Dept: PERINATAL CARE | Age: 38
End: 2018-11-26
Payer: COMMERCIAL

## 2018-11-26 VITALS
HEART RATE: 100 BPM | TEMPERATURE: 97.6 F | HEIGHT: 62 IN | DIASTOLIC BLOOD PRESSURE: 72 MMHG | RESPIRATION RATE: 20 BRPM | WEIGHT: 230 LBS | SYSTOLIC BLOOD PRESSURE: 118 MMHG | BODY MASS INDEX: 42.33 KG/M2

## 2018-11-26 DIAGNOSIS — O09.523 ELDERLY MULTIGRAVIDA IN THIRD TRIMESTER: ICD-10-CM

## 2018-11-26 DIAGNOSIS — O99.213 OBESITY AFFECTING PREGNANCY IN THIRD TRIMESTER: Primary | ICD-10-CM

## 2018-11-26 DIAGNOSIS — Z3A.33 33 WEEKS GESTATION OF PREGNANCY: ICD-10-CM

## 2018-11-26 DIAGNOSIS — O40.3XX0 POLYHYDRAMNIOS IN THIRD TRIMESTER COMPLICATION, SINGLE OR UNSPECIFIED FETUS: ICD-10-CM

## 2018-11-26 DIAGNOSIS — Z36.4 ANTENATAL SCREENING FOR FETAL GROWTH RETARDATION USING ULTRASONICS: ICD-10-CM

## 2018-11-26 DIAGNOSIS — Z03.71 SUSPECTED PROBLEM WITH AMNIOTIC CAVITY AND MEMBRANE NOT FOUND: ICD-10-CM

## 2018-11-26 PROCEDURE — 76816 OB US FOLLOW-UP PER FETUS: CPT | Performed by: OBSTETRICS & GYNECOLOGY

## 2018-11-26 PROCEDURE — 76819 FETAL BIOPHYS PROFIL W/O NST: CPT | Performed by: OBSTETRICS & GYNECOLOGY

## 2018-11-29 ENCOUNTER — PROCEDURE VISIT (OUTPATIENT)
Dept: OBGYN CLINIC | Age: 38
End: 2018-11-29
Payer: COMMERCIAL

## 2018-11-29 DIAGNOSIS — O40.3XX0 POLYHYDRAMNIOS IN THIRD TRIMESTER COMPLICATION, SINGLE OR UNSPECIFIED FETUS: ICD-10-CM

## 2018-11-29 DIAGNOSIS — O09.523 ELDERLY MULTIGRAVIDA IN THIRD TRIMESTER: Primary | ICD-10-CM

## 2018-11-29 LAB
ACCELERATIONS > 10BPM: NORMAL
ACCELERATIONS > 15 BPM: NORMAL
ACOUSTIC STIMULATION: NORMAL
DECELERATIONS: NORMAL
FHR VARIABILITIES: NORMAL
NST ASSESSMENT: NORMAL
NST BASELINE: NORMAL
NST DURATION: NORMAL MINUTES
NST INDICATIONS: NORMAL
NST LOCATIONS: NORMAL
NST READ BY: NORMAL
NST RETURN: NORMAL
UTERINE ACTIVITY: NORMAL

## 2018-11-29 PROCEDURE — 59025 FETAL NON-STRESS TEST: CPT | Performed by: SPECIALIST

## 2018-12-06 ENCOUNTER — PROCEDURE VISIT (OUTPATIENT)
Dept: OBGYN CLINIC | Age: 38
End: 2018-12-06
Payer: COMMERCIAL

## 2018-12-06 ENCOUNTER — HOSPITAL ENCOUNTER (OUTPATIENT)
Age: 38
Setting detail: OBSERVATION
Discharge: HOME | End: 2018-12-06
Attending: SPECIALIST | Admitting: SPECIALIST
Payer: COMMERCIAL

## 2018-12-06 VITALS
RESPIRATION RATE: 20 BRPM | DIASTOLIC BLOOD PRESSURE: 93 MMHG | TEMPERATURE: 98.8 F | SYSTOLIC BLOOD PRESSURE: 120 MMHG | HEART RATE: 88 BPM

## 2018-12-06 DIAGNOSIS — O99.213 OBESITY AFFECTING PREGNANCY IN THIRD TRIMESTER: ICD-10-CM

## 2018-12-06 DIAGNOSIS — O09.523 ELDERLY MULTIGRAVIDA, THIRD TRIMESTER: ICD-10-CM

## 2018-12-06 DIAGNOSIS — O09.523 ELDERLY MULTIGRAVIDA IN THIRD TRIMESTER: ICD-10-CM

## 2018-12-06 DIAGNOSIS — Z3A.34 34 WEEKS GESTATION OF PREGNANCY: ICD-10-CM

## 2018-12-06 LAB
ACCELERATIONS > 10BPM: NORMAL
ACCELERATIONS > 15 BPM: NORMAL
ACOUSTIC STIMULATION: NORMAL
BILIRUBIN URINE: NEGATIVE
COLOR: YELLOW
COMMENT UA: ABNORMAL
DECELERATIONS: NORMAL
FHR VARIABILITIES: NORMAL
GLUCOSE URINE: ABNORMAL
KETONES, URINE: NEGATIVE
LEUKOCYTE ESTERASE, URINE: NEGATIVE
NITRITE, URINE: NEGATIVE
NST ASSESSMENT: NORMAL
NST BASELINE: NORMAL
NST DURATION: NORMAL MINUTES
NST INDICATIONS: NORMAL
NST LOCATIONS: NORMAL
NST READ BY: NORMAL
NST RETURN: NORMAL
PH UA: 6 (ref 5–8)
PROTEIN UA: NEGATIVE
SPECIFIC GRAVITY UA: 1.02 (ref 1–1.03)
TURBIDITY: CLEAR
URINE HGB: NEGATIVE
UROBILINOGEN, URINE: NORMAL
UTERINE ACTIVITY: NORMAL

## 2018-12-06 PROCEDURE — 87491 CHLMYD TRACH DNA AMP PROBE: CPT

## 2018-12-06 PROCEDURE — 81003 URINALYSIS AUTO W/O SCOPE: CPT

## 2018-12-06 PROCEDURE — 87591 N.GONORRHOEAE DNA AMP PROB: CPT

## 2018-12-06 PROCEDURE — 87086 URINE CULTURE/COLONY COUNT: CPT

## 2018-12-06 PROCEDURE — 99213 OFFICE O/P EST LOW 20 MIN: CPT

## 2018-12-06 PROCEDURE — 87210 SMEAR WET MOUNT SALINE/INK: CPT

## 2018-12-06 PROCEDURE — G0378 HOSPITAL OBSERVATION PER HR: HCPCS

## 2018-12-06 PROCEDURE — 76818 FETAL BIOPHYS PROFILE W/NST: CPT | Performed by: SPECIALIST

## 2018-12-06 PROCEDURE — 6370000000 HC RX 637 (ALT 250 FOR IP): Performed by: STUDENT IN AN ORGANIZED HEALTH CARE EDUCATION/TRAINING PROGRAM

## 2018-12-06 RX ORDER — ONDANSETRON 4 MG/1
4 TABLET, ORALLY DISINTEGRATING ORAL ONCE
Status: DISCONTINUED | OUTPATIENT
Start: 2018-12-06 | End: 2018-12-06 | Stop reason: HOSPADM

## 2018-12-06 RX ORDER — DIPHENHYDRAMINE HCL 25 MG
25 TABLET ORAL ONCE
Status: COMPLETED | OUTPATIENT
Start: 2018-12-06 | End: 2018-12-06

## 2018-12-06 RX ORDER — ACETAMINOPHEN 500 MG
1000 TABLET ORAL ONCE
Status: DISCONTINUED | OUTPATIENT
Start: 2018-12-06 | End: 2018-12-06 | Stop reason: HOSPADM

## 2018-12-06 RX ADMIN — DIPHENHYDRAMINE HCL 25 MG: 25 TABLET ORAL at 19:38

## 2018-12-06 NOTE — H&P
diagnostic. ALLERGIES:  is allergic to nsaids; toradol [ketorolac tromethamine]; and ultram [tramadol]. MEDICATIONS:  Prior to Admission medications    Medication Sig Start Date End Date Taking? Authorizing Provider   pantoprazole (PROTONIX) 40 MG tablet Take 1 tablet by mouth 2 times daily (before meals) 11/11/18   Alma Quintana MD   COMBIVENT RESPIMAT  MCG/ACT AERS inhaler INHALE ONE PUFF BY MOUTH 4 TIMES A DAY 9/15/18   Historical Provider, MD   montelukast (SINGULAIR) 10 MG tablet Take 1 tablet by mouth nightly 9/20/18   Linnea Baldwin MD   Fluticasone Furoate-Vilanterol (BREO ELLIPTA) 200-25 MCG/INH AEPB Inhale 1 puff into the lungs daily    Historical Provider, MD   sertraline (ZOLOFT) 50 MG tablet Take 1 tablet by mouth daily 9/10/18   Fallon Madera MD   promethazine (PHENERGAN) 25 MG tablet Take 1 tablet by mouth every 8 hours as needed for Nausea 8/2/18   KATIE Freire CNP   prenatal vitamin (VOL-PLUS) 27-1 MG TABS TABLET TAKE ONE TABLET BY MOUTH ONCE A DAY 5/22/18   KATIE Freire CNP   albuterol (PROVENTIL) (2.5 MG/3ML) 0.083% nebulizer solution Take 3 mLs by nebulization every 4 hours as needed for Wheezing  Patient taking differently: Take 2.5 mg by nebulization 4 times daily  2/15/17   Ronni Schirmer, MD   albuterol-ipratropium (COMBIVENT)  MCG/ACT inhaler Inhale 2 puffs into the lungs every 6 hours as needed for Wheezing    Historical Provider, MD       FAMILY HISTORY:  family history includes Bipolar Disorder in her brother and mother; Breast Cancer in her mother; Cancer in her mother; Hypertension in her father and mother. SOCIAL HISTORY:   reports that she quit smoking about 2 months ago. Her smoking use included Cigarettes. She has a 10.50 pack-year smoking history. She has never used smokeless tobacco. She reports that she does not drink alcohol or use drugs.     VITALS:  Vitals:    12/06/18 1801   BP: (!) 142/70   Pulse: 98   Resp: 20   Temp: 98.8 °F (37.1 °C)       PHYSICAL EXAM:  Fetal Heart Monitor:  Baseline Heart Rate 135, moderate variability, present accelerations, absent decelerations  [place]: uterine irritability    General appearance:  no apparent distress, alert and cooperative  Neurologic:  alert, oriented, normal speech, no focal findings or movement disorder noted  Lungs:  No increased work of breathing, good air exchange, clear to auscultation bilaterally, no crackles or wheezing  Heart:  regular rate and rhythm and no murmur    Abdomen:  soft, gravid, non-tender, no right upper quadrant tenderness, no CVA tenderness, uterus non-tender, no signs of abruption and no signs of chorioamnionitis  Extremities:  no calf tenderness, non edematous, DTRs: normal    Pelvic Exam:  Pelvic Exam:              SSE: Normal appearing vulva, no masses, tenderness or lesions, normal appearing clitoris, normal appearing vaginal mucosa with physiologic appearing discharge in vaginal vault. External cervical os visually closed.     DATA:  Vaginal Bleeding: absent  Wet prep: Clue cells Absent , Trichomonas Absent   KOH:  Yeast or Hyphae Absent   Whiff Test: negative     OMM Structural Exam:  Chief Complaint:  Pregnancy    Anterior/ Posterior Spinal Curves: Lumbar Lordosis -  Increased  Scoliosis (Lateral Spinal Curves): None  Assessment Tool:  T= Tenderness, A= Asymmetry, R= Restricted Motion (A=Active, P=Passive), T=Tissue Texture Changes  Region Evaluated : Severity / Specific of Major Somatic Dysfunction  M99.03 Lumbar -  Minor TART - more than BG levels -   Major Correlations with:  Genitourinary  Structural Diagnosis: Increased lumbar lordosis  Treatment Plan: Outpatient     Labs:  Results for orders placed or performed during the hospital encounter of 12/06/18   Urinalysis Reflex to Culture   Result Value Ref Range    Color, UA YELLOW YEL    Turbidity UA CLEAR CLEAR    Glucose, Ur TRACE (A) NEG    Bilirubin Urine NEGATIVE NEG    Ketones, Urine NEGATIVE NEG    Specific

## 2018-12-10 ENCOUNTER — ROUTINE PRENATAL (OUTPATIENT)
Dept: OBGYN CLINIC | Age: 38
End: 2018-12-10
Payer: COMMERCIAL

## 2018-12-10 ENCOUNTER — HOSPITAL ENCOUNTER (OUTPATIENT)
Age: 38
Discharge: LEFT BEFORE DISCHARGE | End: 2018-12-11
Attending: SPECIALIST | Admitting: SPECIALIST
Payer: COMMERCIAL

## 2018-12-10 VITALS
WEIGHT: 234.4 LBS | HEART RATE: 103 BPM | BODY MASS INDEX: 42.87 KG/M2 | SYSTOLIC BLOOD PRESSURE: 123 MMHG | DIASTOLIC BLOOD PRESSURE: 78 MMHG

## 2018-12-10 VITALS
DIASTOLIC BLOOD PRESSURE: 96 MMHG | SYSTOLIC BLOOD PRESSURE: 136 MMHG | BODY MASS INDEX: 43.06 KG/M2 | WEIGHT: 234 LBS | RESPIRATION RATE: 20 BRPM | TEMPERATURE: 98 F | HEIGHT: 62 IN | HEART RATE: 93 BPM

## 2018-12-10 DIAGNOSIS — J45.909 ASTHMA AFFECTING PREGNANCY IN THIRD TRIMESTER: ICD-10-CM

## 2018-12-10 DIAGNOSIS — Z3A.35 35 WEEKS GESTATION OF PREGNANCY: ICD-10-CM

## 2018-12-10 DIAGNOSIS — O09.93 ENCOUNTER FOR SUPERVISION OF HIGH RISK PREGNANCY IN THIRD TRIMESTER, ANTEPARTUM: Primary | ICD-10-CM

## 2018-12-10 DIAGNOSIS — O09.523 AMA (ADVANCED MATERNAL AGE) MULTIGRAVIDA 35+, THIRD TRIMESTER: ICD-10-CM

## 2018-12-10 DIAGNOSIS — O09.523 ELDERLY MULTIGRAVIDA IN THIRD TRIMESTER: ICD-10-CM

## 2018-12-10 DIAGNOSIS — O99.513 ASTHMA AFFECTING PREGNANCY IN THIRD TRIMESTER: ICD-10-CM

## 2018-12-10 LAB
-: ABNORMAL
ACCELERATIONS > 10BPM: NORMAL
ACCELERATIONS > 15 BPM: NORMAL
ACOUSTIC STIMULATION: NORMAL
AMORPHOUS: ABNORMAL
BACTERIA: ABNORMAL
BILIRUBIN URINE: NEGATIVE
BILIRUBIN, POC: ABNORMAL
BLOOD URINE, POC: ABNORMAL
CASTS UA: ABNORMAL /LPF
CLARITY, POC: CLEAR
COLOR, POC: ABNORMAL
COLOR: YELLOW
COMMENT UA: ABNORMAL
CRYSTALS, UA: ABNORMAL /HPF
DECELERATIONS: NORMAL
EPITHELIAL CELLS UA: ABNORMAL /HPF
FHR VARIABILITIES: NORMAL
GLUCOSE URINE, POC: ABNORMAL
GLUCOSE URINE: ABNORMAL
KETONES, POC: ABNORMAL
KETONES, URINE: NEGATIVE
KOH (POC): NORMAL
LEUKOCYTE EST, POC: ABNORMAL
LEUKOCYTE ESTERASE, URINE: NEGATIVE
MUCUS: ABNORMAL
NITRITE, POC: ABNORMAL
NITRITE, URINE: NEGATIVE
NST ASSESSMENT: NORMAL
NST BASELINE: NORMAL
NST DURATION: NORMAL MINUTES
NST INDICATIONS: NORMAL
NST LOCATIONS: NORMAL
NST READ BY: NORMAL
NST RETURN: NORMAL
OTHER OBSERVATIONS UA: ABNORMAL
PH UA: 6 (ref 5–8)
PH, POC: 6
PROTEIN UA: NEGATIVE
PROTEIN, POC: ABNORMAL
RBC UA: ABNORMAL /HPF
RENAL EPITHELIAL, UA: ABNORMAL /HPF
SPECIFIC GRAVITY UA: 1.03 (ref 1–1.03)
SPECIFIC GRAVITY, POC: 1.03
TRICHOMONAS: ABNORMAL
TURBIDITY: CLEAR
URINE HGB: ABNORMAL
UROBILINOGEN, POC: NORMAL
UROBILINOGEN, URINE: NORMAL
UTERINE ACTIVITY: NORMAL
WBC UA: ABNORMAL /HPF
WET PREP (POC): NORMAL
YEAST: ABNORMAL

## 2018-12-10 PROCEDURE — 2580000003 HC RX 258: Performed by: STUDENT IN AN ORGANIZED HEALTH CARE EDUCATION/TRAINING PROGRAM

## 2018-12-10 PROCEDURE — 6370000000 HC RX 637 (ALT 250 FOR IP): Performed by: STUDENT IN AN ORGANIZED HEALTH CARE EDUCATION/TRAINING PROGRAM

## 2018-12-10 PROCEDURE — G8427 DOCREV CUR MEDS BY ELIG CLIN: HCPCS | Performed by: CLINICAL NURSE SPECIALIST

## 2018-12-10 PROCEDURE — G8482 FLU IMMUNIZE ORDER/ADMIN: HCPCS | Performed by: CLINICAL NURSE SPECIALIST

## 2018-12-10 PROCEDURE — 99213 OFFICE O/P EST LOW 20 MIN: CPT

## 2018-12-10 PROCEDURE — G8417 CALC BMI ABV UP PARAM F/U: HCPCS | Performed by: CLINICAL NURSE SPECIALIST

## 2018-12-10 PROCEDURE — 81002 URINALYSIS NONAUTO W/O SCOPE: CPT | Performed by: CLINICAL NURSE SPECIALIST

## 2018-12-10 PROCEDURE — 81001 URINALYSIS AUTO W/SCOPE: CPT

## 2018-12-10 PROCEDURE — 99234 HOSP IP/OBS SM DT SF/LOW 45: CPT | Performed by: OBSTETRICS & GYNECOLOGY

## 2018-12-10 PROCEDURE — 87086 URINE CULTURE/COLONY COUNT: CPT

## 2018-12-10 PROCEDURE — 59025 FETAL NON-STRESS TEST: CPT | Performed by: CLINICAL NURSE SPECIALIST

## 2018-12-10 PROCEDURE — 1036F TOBACCO NON-USER: CPT | Performed by: CLINICAL NURSE SPECIALIST

## 2018-12-10 PROCEDURE — 99213 OFFICE O/P EST LOW 20 MIN: CPT | Performed by: CLINICAL NURSE SPECIALIST

## 2018-12-10 PROCEDURE — 6360000002 HC RX W HCPCS: Performed by: OBSTETRICS & GYNECOLOGY

## 2018-12-10 RX ORDER — DIPHENHYDRAMINE HCL 25 MG
50 TABLET ORAL ONCE
Status: COMPLETED | OUTPATIENT
Start: 2018-12-10 | End: 2018-12-10

## 2018-12-10 RX ORDER — PHENAZOPYRIDINE HYDROCHLORIDE 100 MG/1
100 TABLET, FILM COATED ORAL
Status: DISCONTINUED | OUTPATIENT
Start: 2018-12-11 | End: 2018-12-11 | Stop reason: HOSPADM

## 2018-12-10 RX ORDER — PANTOPRAZOLE SODIUM 40 MG/1
40 TABLET, DELAYED RELEASE ORAL
Qty: 60 TABLET | Refills: 0 | Status: SHIPPED | OUTPATIENT
Start: 2018-12-10 | End: 2019-03-29

## 2018-12-10 RX ORDER — PHENAZOPYRIDINE HYDROCHLORIDE 100 MG/1
100 TABLET, FILM COATED ORAL
Status: DISCONTINUED | OUTPATIENT
Start: 2018-12-11 | End: 2018-12-10

## 2018-12-10 RX ORDER — 0.9 % SODIUM CHLORIDE 0.9 %
1000 INTRAVENOUS SOLUTION INTRAVENOUS ONCE
Status: COMPLETED | OUTPATIENT
Start: 2018-12-10 | End: 2018-12-10

## 2018-12-10 RX ORDER — ONDANSETRON 2 MG/ML
4 INJECTION INTRAMUSCULAR; INTRAVENOUS EVERY 6 HOURS PRN
Status: DISCONTINUED | OUTPATIENT
Start: 2018-12-10 | End: 2018-12-11 | Stop reason: HOSPADM

## 2018-12-10 RX ORDER — ACETAMINOPHEN 500 MG
1000 TABLET ORAL EVERY 6 HOURS PRN
Status: DISCONTINUED | OUTPATIENT
Start: 2018-12-10 | End: 2018-12-11 | Stop reason: HOSPADM

## 2018-12-10 RX ORDER — 0.9 % SODIUM CHLORIDE 0.9 %
1000 INTRAVENOUS SOLUTION INTRAVENOUS ONCE
Status: COMPLETED | OUTPATIENT
Start: 2018-12-11 | End: 2018-12-11

## 2018-12-10 RX ADMIN — SODIUM CHLORIDE 1000 ML: 9 INJECTION, SOLUTION INTRAVENOUS at 22:45

## 2018-12-10 RX ADMIN — DIPHENHYDRAMINE HCL 50 MG: 25 TABLET ORAL at 22:28

## 2018-12-10 RX ADMIN — Medication 2 G: at 23:59

## 2018-12-10 RX ADMIN — ACETAMINOPHEN 1000 MG: 500 TABLET, FILM COATED ORAL at 22:29

## 2018-12-10 RX ADMIN — PHENAZOPYRIDINE HYDROCHLORIDE 100 MG: 100 TABLET ORAL at 23:59

## 2018-12-10 ASSESSMENT — PAIN SCALES - GENERAL
PAINLEVEL_OUTOF10: 10
PAINLEVEL_OUTOF10: 10

## 2018-12-10 NOTE — PROGRESS NOTES
35w1d Doing well. No complaints. Continue prenatal vitamins and rest as necessary. Fetal kick counts encouraged.

## 2018-12-11 LAB
CULTURE: NORMAL
Lab: NORMAL
SPECIMEN DESCRIPTION: NORMAL
STATUS: NORMAL

## 2018-12-11 PROCEDURE — 99213 OFFICE O/P EST LOW 20 MIN: CPT

## 2018-12-11 PROCEDURE — 2580000003 HC RX 258: Performed by: STUDENT IN AN ORGANIZED HEALTH CARE EDUCATION/TRAINING PROGRAM

## 2018-12-11 RX ADMIN — SODIUM CHLORIDE 1000 ML: 9 INJECTION, SOLUTION INTRAVENOUS at 00:01

## 2018-12-11 NOTE — H&P
negative arthralgias  Neurological:  negative dizziness, negative weakness  Behavior/Psych: negative depression, positive anxiety      OBSTETRICAL HISTORY:   Obstetric History       T1      L1     SAB1   TAB0   Ectopic0   Molar0   Multiple0   Live Births1       # Outcome Date GA Lbr Dc/2nd Weight Sex Delivery Anes PTL Lv   4 Current            3 Term 13 38w0d   M CS-Unspec EPI, Spinal  THONG   2   27w0d    Vag-Spont   FD   1 SAB 09 8w0d             Obstetric Comments   FOB1   FOB2   [address]       PAST MEDICAL HISTORY:   has a past medical history of Anxiety; Arthritis; Asthma; Sadler's esophagus; Bipolar 1 disorder ([facility] [address] CAD (coronary artery disease); COPD (chronic obstructive pulmonary disease) ([facility] [address] Depression; Esophagitis; GERD (gastroesophageal reflux disease); Headache(784.0); Herniated disc, cervical; Hiatal hernia; Kidney stones; Pleurisy; Pneumonia; Pregnant and not yet delivered in first trimester; UTI (urinary tract infection); and Vision abnormalities. PAST SURGICAL HISTORY:   has a past surgical history that includes knee surgery (Left);  section (); Tonsillectomy; Knee arthroscopy (Left, 5/20/15); Cervical disc surgery; Upper gastrointestinal endoscopy (2016); Upper gastrointestinal endoscopy (2017); Upper gastrointestinal endoscopy (2017); pr esophagogastroduodenoscopy transoral diagnostic (N/A, 3/2/2017); laparoscopy; laparoscopy (N/A, 10/5/2017); Upper gastrointestinal endoscopy (N/A, 2018); and Endoscopy, colon, diagnostic. ALLERGIES:  is allergic to nsaids; toradol [ketorolac tromethamine]; and ultram [tramadol]. MEDICATIONS:  Prior to Admission medications    Medication Sig Start Date End Date Taking?  Authorizing Provider   pantoprazole (PROTONIX) 40 MG tablet Take 1 tablet by mouth 2 times daily (before meals) 12/10/18   Fernando Pettit MD   COMBIVENT RESPIMAT  MCG/ACT AERS inhaler INHALE ONE PUFF BY MOUTH y.o. female [id number] at 35w1d IUP   - GBS unknown / Rh positive / R immune   - No indication for GBS prophylaxis, unless delivery imminent     Lower abdominal cramping   - Afebrile, VSS   - FHT: Category I tracing   - TOCO: no contractions   - Patient refused speculum exam without nubain; agreeable to SVE after much discussion and was closed/thick/high   - UA showed moderate Hgb, negative LE/nitrites, moderate bacteria    - S/P Pyridium and Ancef x1   - S/P Tylenol and Benadryl   - Encouraged PO hydration   - Low suspicion for  labor at this time    H/O  Section x1 (G3)   - Scheduled for repeat  on 18    Gestational Hypertension   - Diagnosed @ 23 weeks   - Blood pressures stable, no severe range blood pressures   - Denies s/s of preE    H/O Spontaneous  Delivery (G2 @ 27w0d)    H/O IUFD (G2)    H/O SAB x1 (G1)    Fetal Exposure to Zoloft (Fetal ECHO wnl)    AMA (NIPT wnl)    FamHx Type II DM (patient's father)    Asthma/COPD   - Stable on Albuterol, Symbicort    Anxiety/Depression/Bipolar 1 Disorder   - Stable on Zoloft 50 mg qd   - Denies SI/HI    Anemia   - Continue iron supplementation     Tobacco use   - Cessation encouraged     Obesity (BMI 42.9)    Patient left against medical advice. Recommended patient stay for overnight observation and patient refused unless she got nubain or a new mattress on the labor bed. She stated \"I can't sit in this retirement bed any more, I'm so uncomfortable, let me go home and be in pain and comfortable in my own bed. \" Discussed that patient would be leaving against medical advice and she stated that was fine and signed AMA papers. Encouraged increased po hydration. Recommended follow up with Dr. Sofia Blair as scheduled.  labor precautions and s/s of preeclampsia reviewed. All questions answered. Patient vocalized understanding.     Patient Active Problem List    Diagnosis Date Noted    AMA (advanced maternal age) multigravida 35+, third

## 2018-12-13 ENCOUNTER — HOSPITAL ENCOUNTER (OUTPATIENT)
Age: 38
Discharge: HOME | End: 2018-12-13
Attending: SPECIALIST | Admitting: SPECIALIST
Payer: COMMERCIAL

## 2018-12-13 ENCOUNTER — PROCEDURE VISIT (OUTPATIENT)
Dept: OBGYN CLINIC | Age: 38
End: 2018-12-13
Payer: COMMERCIAL

## 2018-12-13 ENCOUNTER — TELEPHONE (OUTPATIENT)
Dept: OBGYN CLINIC | Age: 38
End: 2018-12-13

## 2018-12-13 VITALS
TEMPERATURE: 98.4 F | DIASTOLIC BLOOD PRESSURE: 75 MMHG | SYSTOLIC BLOOD PRESSURE: 132 MMHG | HEART RATE: 103 BPM | RESPIRATION RATE: 18 BRPM

## 2018-12-13 DIAGNOSIS — O99.213 OBESITY AFFECTING PREGNANCY IN THIRD TRIMESTER: ICD-10-CM

## 2018-12-13 DIAGNOSIS — Z3A.35 35 WEEKS GESTATION OF PREGNANCY: ICD-10-CM

## 2018-12-13 DIAGNOSIS — O09.523 ELDERLY MULTIGRAVIDA, THIRD TRIMESTER: ICD-10-CM

## 2018-12-13 PROCEDURE — 59025 FETAL NON-STRESS TEST: CPT

## 2018-12-13 PROCEDURE — 76818 FETAL BIOPHYS PROFILE W/NST: CPT | Performed by: SPECIALIST

## 2018-12-13 NOTE — TELEPHONE ENCOUNTER
Spoke to [facility] at [facility] and scheduled patient for NST at 1700  Today. Patient notified via phone to go to [address] [place] [address] at 1700 today for NST.

## 2018-12-14 RX ORDER — PROMETHAZINE HYDROCHLORIDE 25 MG/1
25 TABLET ORAL EVERY 8 HOURS PRN
Qty: 30 TABLET | Refills: 1 | Status: SHIPPED | OUTPATIENT
Start: 2018-12-14 | End: 2019-01-01 | Stop reason: SDUPTHER

## 2018-12-18 ENCOUNTER — HOSPITAL ENCOUNTER (OUTPATIENT)
Age: 38
Discharge: HOME | End: 2018-12-18
Attending: SPECIALIST | Admitting: SPECIALIST
Payer: COMMERCIAL

## 2018-12-18 VITALS
DIASTOLIC BLOOD PRESSURE: 79 MMHG | HEART RATE: 74 BPM | RESPIRATION RATE: 18 BRPM | TEMPERATURE: 98.6 F | SYSTOLIC BLOOD PRESSURE: 139 MMHG

## 2018-12-18 LAB
ABSOLUTE EOS #: 0.2 K/UL (ref 0–0.4)
ABSOLUTE IMMATURE GRANULOCYTE: ABNORMAL K/UL (ref 0–0.3)
ABSOLUTE LYMPH #: 1.5 K/UL (ref 1–4.8)
ABSOLUTE MONO #: 0.6 K/UL (ref 0.1–1.3)
ALBUMIN SERPL-MCNC: 3.2 G/DL (ref 3.5–5.2)
ALBUMIN/GLOBULIN RATIO: ABNORMAL (ref 1–2.5)
ALP BLD-CCNC: 105 U/L (ref 35–104)
ALT SERPL-CCNC: 16 U/L (ref 5–33)
ANION GAP SERPL CALCULATED.3IONS-SCNC: 12 MMOL/L (ref 9–17)
AST SERPL-CCNC: 34 U/L
BASOPHILS # BLD: 1 % (ref 0–2)
BASOPHILS ABSOLUTE: 0.1 K/UL (ref 0–0.2)
BILIRUB SERPL-MCNC: 0.26 MG/DL (ref 0.3–1.2)
BUN BLDV-MCNC: 7 MG/DL (ref 6–20)
BUN/CREAT BLD: ABNORMAL (ref 9–20)
CALCIUM SERPL-MCNC: 8.7 MG/DL (ref 8.6–10.4)
CHLORIDE BLD-SCNC: 107 MMOL/L (ref 98–107)
CO2: 20 MMOL/L (ref 20–31)
CREAT SERPL-MCNC: <0.4 MG/DL (ref 0.5–0.9)
CREATININE URINE: 251.2 MG/DL (ref 28–217)
DIFFERENTIAL TYPE: ABNORMAL
EOSINOPHILS RELATIVE PERCENT: 1 % (ref 0–4)
GFR AFRICAN AMERICAN: ABNORMAL ML/MIN
GFR NON-AFRICAN AMERICAN: ABNORMAL ML/MIN
GFR SERPL CREATININE-BSD FRML MDRD: ABNORMAL ML/MIN/{1.73_M2}
GFR SERPL CREATININE-BSD FRML MDRD: ABNORMAL ML/MIN/{1.73_M2}
GLUCOSE BLD-MCNC: 92 MG/DL (ref 70–99)
HCT VFR BLD CALC: 35.2 % (ref 36–46)
HEMOGLOBIN: 11.7 G/DL (ref 12–16)
IMMATURE GRANULOCYTES: ABNORMAL %
LACTATE DEHYDROGENASE: 157 U/L (ref 135–214)
LYMPHOCYTES # BLD: 13 % (ref 24–44)
MCH RBC QN AUTO: 29.4 PG (ref 26–34)
MCHC RBC AUTO-ENTMCNC: 33.3 G/DL (ref 31–37)
MCV RBC AUTO: 88.3 FL (ref 80–100)
MONOCYTES # BLD: 5 % (ref 1–7)
NRBC AUTOMATED: ABNORMAL PER 100 WBC
PDW BLD-RTO: 13.4 % (ref 11.5–14.9)
PLATELET # BLD: 266 K/UL (ref 150–450)
PLATELET ESTIMATE: ABNORMAL
PMV BLD AUTO: 9 FL (ref 6–12)
POTASSIUM SERPL-SCNC: 3.8 MMOL/L (ref 3.7–5.3)
RBC # BLD: 3.98 M/UL (ref 4–5.2)
RBC # BLD: ABNORMAL 10*6/UL
SEG NEUTROPHILS: 80 % (ref 36–66)
SEGMENTED NEUTROPHILS ABSOLUTE COUNT: 8.9 K/UL (ref 1.3–9.1)
SODIUM BLD-SCNC: 139 MMOL/L (ref 135–144)
TOTAL PROTEIN, URINE: 39 MG/DL
TOTAL PROTEIN: 5.8 G/DL (ref 6.4–8.3)
URIC ACID: 3.7 MG/DL (ref 2.4–5.7)
URINE TOTAL PROTEIN CREATININE RATIO: 0.16 (ref 0–0.2)
WBC # BLD: 11.2 K/UL (ref 3.5–11)
WBC # BLD: ABNORMAL 10*3/UL

## 2018-12-18 PROCEDURE — 85025 COMPLETE CBC W/AUTO DIFF WBC: CPT

## 2018-12-18 PROCEDURE — 6360000002 HC RX W HCPCS: Performed by: STUDENT IN AN ORGANIZED HEALTH CARE EDUCATION/TRAINING PROGRAM

## 2018-12-18 PROCEDURE — 82570 ASSAY OF URINE CREATININE: CPT

## 2018-12-18 PROCEDURE — 6370000000 HC RX 637 (ALT 250 FOR IP): Performed by: STUDENT IN AN ORGANIZED HEALTH CARE EDUCATION/TRAINING PROGRAM

## 2018-12-18 PROCEDURE — 84156 ASSAY OF PROTEIN URINE: CPT

## 2018-12-18 PROCEDURE — 80053 COMPREHEN METABOLIC PANEL: CPT

## 2018-12-18 PROCEDURE — 83615 LACTATE (LD) (LDH) ENZYME: CPT

## 2018-12-18 PROCEDURE — 36415 COLL VENOUS BLD VENIPUNCTURE: CPT

## 2018-12-18 PROCEDURE — 87081 CULTURE SCREEN ONLY: CPT

## 2018-12-18 PROCEDURE — 84550 ASSAY OF BLOOD/URIC ACID: CPT

## 2018-12-18 PROCEDURE — 99213 OFFICE O/P EST LOW 20 MIN: CPT

## 2018-12-18 RX ORDER — ACETAMINOPHEN 500 MG
1000 TABLET ORAL EVERY 6 HOURS PRN
Status: DISCONTINUED | OUTPATIENT
Start: 2018-12-18 | End: 2018-12-18 | Stop reason: HOSPADM

## 2018-12-18 RX ORDER — PROMETHAZINE HYDROCHLORIDE 25 MG/ML
25 INJECTION, SOLUTION INTRAMUSCULAR; INTRAVENOUS ONCE
Status: COMPLETED | OUTPATIENT
Start: 2018-12-18 | End: 2018-12-18

## 2018-12-18 RX ORDER — ACETAMINOPHEN 500 MG
500 TABLET ORAL EVERY 6 HOURS PRN
Status: ON HOLD | COMMUNITY
End: 2019-01-18 | Stop reason: HOSPADM

## 2018-12-18 RX ORDER — SODIUM CHLORIDE 0.9 % (FLUSH) 0.9 %
10 SYRINGE (ML) INJECTION PRN
Status: DISCONTINUED | OUTPATIENT
Start: 2018-12-18 | End: 2018-12-18 | Stop reason: HOSPADM

## 2018-12-18 RX ORDER — BUTALBITAL, ACETAMINOPHEN AND CAFFEINE 50; 325; 40 MG/1; MG/1; MG/1
1 TABLET ORAL EVERY 4 HOURS PRN
Status: DISCONTINUED | OUTPATIENT
Start: 2018-12-18 | End: 2018-12-18 | Stop reason: HOSPADM

## 2018-12-18 RX ORDER — NALBUPHINE HCL 10 MG/ML
5 AMPUL (ML) INJECTION ONCE
Status: DISCONTINUED | OUTPATIENT
Start: 2018-12-18 | End: 2018-12-18

## 2018-12-18 RX ORDER — NALBUPHINE HCL 10 MG/ML
5 AMPUL (ML) INJECTION ONCE
Status: COMPLETED | OUTPATIENT
Start: 2018-12-18 | End: 2018-12-18

## 2018-12-18 RX ADMIN — PROMETHAZINE HYDROCHLORIDE 25 MG: 25 INJECTION, SOLUTION INTRAMUSCULAR; INTRAVENOUS at 19:18

## 2018-12-18 RX ADMIN — Medication 400 MG: at 17:16

## 2018-12-18 RX ADMIN — NALBUPHINE HYDROCHLORIDE 5 MG: 10 INJECTION, SOLUTION INTRAMUSCULAR; INTRAVENOUS; SUBCUTANEOUS at 19:18

## 2018-12-18 ASSESSMENT — PAIN SCALES - GENERAL
PAINLEVEL_OUTOF10: 9
PAINLEVEL_OUTOF10: 9

## 2018-12-18 ASSESSMENT — PAIN DESCRIPTION - PAIN TYPE
TYPE: ACUTE PAIN
TYPE: ACUTE PAIN

## 2018-12-18 ASSESSMENT — PAIN DESCRIPTION - LOCATION
LOCATION: HEAD
LOCATION: HEAD

## 2018-12-18 NOTE — H&P
mg by mouth every 6 hours as needed for Pain   Yes Historical Provider, MD   promethazine (PHENERGAN) 25 MG tablet Take 1 tablet by mouth every 8 hours as needed for Nausea 12/14/18  Yes Jorge Luis Hernández MD   pantoprazole (PROTONIX) 40 MG tablet Take 1 tablet by mouth 2 times daily (before meals) 12/10/18  Yes Shawn Ray MD   COMBIVENT RESPIMAT  MCG/ACT AERS inhaler INHALE ONE PUFF BY MOUTH 4 TIMES A DAY 9/15/18  Yes Historical Provider, MD   montelukast (SINGULAIR) 10 MG tablet Take 1 tablet by mouth nightly 9/20/18  Yes Zulema Hyde MD   Fluticasone Furoate-Vilanterol (BREO ELLIPTA) 200-25 MCG/INH AEPB Inhale 1 puff into the lungs daily   Yes Historical Provider, MD   sertraline (ZOLOFT) 50 MG tablet Take 1 tablet by mouth daily 9/10/18  Yes Jorge Luis Hernández MD   prenatal vitamin (VOL-PLUS) 27-1 MG TABS TABLET TAKE ONE TABLET BY MOUTH ONCE A DAY 5/22/18  Yes KATIE Christy - CNP   albuterol (PROVENTIL) (2.5 MG/3ML) 0.083% nebulizer solution Take 3 mLs by nebulization every 4 hours as needed for Wheezing  Patient taking differently: Take 2.5 mg by nebulization 4 times daily  2/15/17  Yes Corinne Minks, MD   albuterol-ipratropium (COMBIVENT)  MCG/ACT inhaler Inhale 2 puffs into the lungs every 6 hours as needed for Wheezing   Yes Historical Provider, MD       FAMILY HISTORY:  family history includes Bipolar Disorder in her brother and mother; Breast Cancer in her mother; Cancer in her mother; Hypertension in her father and mother. SOCIAL HISTORY:   reports that she has been smoking Cigarettes. She has a 10.50 pack-year smoking history. She has never used smokeless tobacco. She reports that she does not drink alcohol or use drugs.     VITALS:  Vitals:    12/18/18 1634   BP: 135/85   Pulse: 86   Resp: 16   Temp: 98.6 °F (37 °C)   TempSrc: Oral       PHYSICAL EXAM:  Fetal Heart Monitor:  Baseline Heart Rate 140, moderate variability, present accelerations, absent decelerations  [place]:

## 2018-12-20 ENCOUNTER — PROCEDURE VISIT (OUTPATIENT)
Dept: OBGYN CLINIC | Age: 38
End: 2018-12-20
Payer: COMMERCIAL

## 2018-12-20 VITALS — DIASTOLIC BLOOD PRESSURE: 87 MMHG | SYSTOLIC BLOOD PRESSURE: 137 MMHG | HEART RATE: 95 BPM

## 2018-12-20 DIAGNOSIS — O99.213 OBESITY AFFECTING PREGNANCY IN THIRD TRIMESTER: ICD-10-CM

## 2018-12-20 DIAGNOSIS — O09.523 ELDERLY MULTIGRAVIDA, THIRD TRIMESTER: ICD-10-CM

## 2018-12-20 DIAGNOSIS — O09.523 ELDERLY MULTIGRAVIDA IN THIRD TRIMESTER: ICD-10-CM

## 2018-12-20 DIAGNOSIS — Z3A.36 36 WEEKS GESTATION OF PREGNANCY: ICD-10-CM

## 2018-12-20 PROCEDURE — 76818 FETAL BIOPHYS PROFILE W/NST: CPT | Performed by: SPECIALIST

## 2018-12-20 PROCEDURE — 76816 OB US FOLLOW-UP PER FETUS: CPT | Performed by: SPECIALIST

## 2018-12-21 LAB
CULTURE: NORMAL
Lab: NORMAL
SPECIMEN DESCRIPTION: NORMAL
STATUS: NORMAL

## 2018-12-22 ENCOUNTER — HOSPITAL ENCOUNTER (OUTPATIENT)
Age: 38
Discharge: HOME | End: 2018-12-22
Attending: SPECIALIST | Admitting: SPECIALIST
Payer: COMMERCIAL

## 2018-12-22 VITALS
WEIGHT: 234 LBS | DIASTOLIC BLOOD PRESSURE: 86 MMHG | SYSTOLIC BLOOD PRESSURE: 144 MMHG | HEIGHT: 62 IN | BODY MASS INDEX: 43.06 KG/M2 | RESPIRATION RATE: 18 BRPM | TEMPERATURE: 98.4 F | HEART RATE: 86 BPM

## 2018-12-22 PROBLEM — O09.90 HIGH RISK PREGNANCY, ANTEPARTUM: Status: ACTIVE | Noted: 2018-12-22

## 2018-12-22 LAB
-: ABNORMAL
ABSOLUTE EOS #: 0.2 K/UL (ref 0–0.4)
ABSOLUTE IMMATURE GRANULOCYTE: ABNORMAL K/UL (ref 0–0.3)
ABSOLUTE LYMPH #: 1.6 K/UL (ref 1–4.8)
ABSOLUTE MONO #: 0.7 K/UL (ref 0.1–1.3)
ALBUMIN SERPL-MCNC: 3.2 G/DL (ref 3.5–5.2)
ALBUMIN/GLOBULIN RATIO: ABNORMAL (ref 1–2.5)
ALP BLD-CCNC: 117 U/L (ref 35–104)
ALT SERPL-CCNC: 16 U/L (ref 5–33)
AMORPHOUS: ABNORMAL
ANION GAP SERPL CALCULATED.3IONS-SCNC: 12 MMOL/L (ref 9–17)
AST SERPL-CCNC: 16 U/L
BACTERIA: ABNORMAL
BASOPHILS # BLD: 1 % (ref 0–2)
BASOPHILS ABSOLUTE: 0.1 K/UL (ref 0–0.2)
BILIRUB SERPL-MCNC: 0.29 MG/DL (ref 0.3–1.2)
BILIRUBIN URINE: ABNORMAL
BUN BLDV-MCNC: 9 MG/DL (ref 6–20)
BUN/CREAT BLD: ABNORMAL (ref 9–20)
CALCIUM SERPL-MCNC: 8.6 MG/DL (ref 8.6–10.4)
CASTS UA: ABNORMAL /LPF
CHLORIDE BLD-SCNC: 107 MMOL/L (ref 98–107)
CO2: 18 MMOL/L (ref 20–31)
COLOR: ABNORMAL
COMMENT UA: ABNORMAL
CREAT SERPL-MCNC: 0.42 MG/DL (ref 0.5–0.9)
CRYSTALS, UA: ABNORMAL /HPF
DIFFERENTIAL TYPE: ABNORMAL
EOSINOPHILS RELATIVE PERCENT: 2 % (ref 0–4)
EPITHELIAL CELLS UA: ABNORMAL /HPF
GFR AFRICAN AMERICAN: >60 ML/MIN
GFR NON-AFRICAN AMERICAN: >60 ML/MIN
GFR SERPL CREATININE-BSD FRML MDRD: ABNORMAL ML/MIN/{1.73_M2}
GFR SERPL CREATININE-BSD FRML MDRD: ABNORMAL ML/MIN/{1.73_M2}
GLUCOSE BLD-MCNC: 81 MG/DL (ref 70–99)
GLUCOSE URINE: ABNORMAL
HCT VFR BLD CALC: 38.7 % (ref 36–46)
HEMOGLOBIN: 13.1 G/DL (ref 12–16)
IMMATURE GRANULOCYTES: ABNORMAL %
KETONES, URINE: ABNORMAL
LEUKOCYTE ESTERASE, URINE: NEGATIVE
LYMPHOCYTES # BLD: 14 % (ref 24–44)
MCH RBC QN AUTO: 29.9 PG (ref 26–34)
MCHC RBC AUTO-ENTMCNC: 33.8 G/DL (ref 31–37)
MCV RBC AUTO: 88.4 FL (ref 80–100)
MONOCYTES # BLD: 6 % (ref 1–7)
MUCUS: ABNORMAL
NITRITE, URINE: NEGATIVE
NRBC AUTOMATED: ABNORMAL PER 100 WBC
OTHER OBSERVATIONS UA: ABNORMAL
PDW BLD-RTO: 13.5 % (ref 11.5–14.9)
PH UA: 6 (ref 5–8)
PLATELET # BLD: 284 K/UL (ref 150–450)
PLATELET ESTIMATE: ABNORMAL
PMV BLD AUTO: 9 FL (ref 6–12)
POTASSIUM SERPL-SCNC: 3.9 MMOL/L (ref 3.7–5.3)
PROTEIN UA: ABNORMAL
RBC # BLD: 4.38 M/UL (ref 4–5.2)
RBC # BLD: ABNORMAL 10*6/UL
RBC UA: ABNORMAL /HPF
RENAL EPITHELIAL, UA: ABNORMAL /HPF
SEG NEUTROPHILS: 77 % (ref 36–66)
SEGMENTED NEUTROPHILS ABSOLUTE COUNT: 8.3 K/UL (ref 1.3–9.1)
SODIUM BLD-SCNC: 137 MMOL/L (ref 135–144)
SPECIFIC GRAVITY UA: 1.04 (ref 1–1.03)
TOTAL PROTEIN: 6.3 G/DL (ref 6.4–8.3)
TRICHOMONAS: ABNORMAL
TURBIDITY: ABNORMAL
URINE HGB: ABNORMAL
UROBILINOGEN, URINE: NORMAL
WBC # BLD: 10.9 K/UL (ref 3.5–11)
WBC # BLD: ABNORMAL 10*3/UL
WBC UA: ABNORMAL /HPF
YEAST: ABNORMAL

## 2018-12-22 PROCEDURE — 85025 COMPLETE CBC W/AUTO DIFF WBC: CPT

## 2018-12-22 PROCEDURE — 96360 HYDRATION IV INFUSION INIT: CPT

## 2018-12-22 PROCEDURE — 99213 OFFICE O/P EST LOW 20 MIN: CPT

## 2018-12-22 PROCEDURE — 2580000003 HC RX 258: Performed by: STUDENT IN AN ORGANIZED HEALTH CARE EDUCATION/TRAINING PROGRAM

## 2018-12-22 PROCEDURE — 81001 URINALYSIS AUTO W/SCOPE: CPT

## 2018-12-22 PROCEDURE — 87086 URINE CULTURE/COLONY COUNT: CPT

## 2018-12-22 PROCEDURE — 6360000002 HC RX W HCPCS: Performed by: SPECIALIST

## 2018-12-22 PROCEDURE — 36415 COLL VENOUS BLD VENIPUNCTURE: CPT

## 2018-12-22 PROCEDURE — 80053 COMPREHEN METABOLIC PANEL: CPT

## 2018-12-22 RX ORDER — SODIUM CHLORIDE 0.9 % (FLUSH) 0.9 %
10 SYRINGE (ML) INJECTION EVERY 12 HOURS SCHEDULED
Status: DISCONTINUED | OUTPATIENT
Start: 2018-12-22 | End: 2018-12-22 | Stop reason: HOSPADM

## 2018-12-22 RX ORDER — SODIUM CHLORIDE, SODIUM LACTATE, POTASSIUM CHLORIDE, AND CALCIUM CHLORIDE .6; .31; .03; .02 G/100ML; G/100ML; G/100ML; G/100ML
500 INJECTION, SOLUTION INTRAVENOUS ONCE
Status: COMPLETED | OUTPATIENT
Start: 2018-12-22 | End: 2018-12-22

## 2018-12-22 RX ORDER — SODIUM CHLORIDE, SODIUM LACTATE, POTASSIUM CHLORIDE, AND CALCIUM CHLORIDE .6; .31; .03; .02 G/100ML; G/100ML; G/100ML; G/100ML
500 INJECTION, SOLUTION INTRAVENOUS ONCE
Status: DISCONTINUED | OUTPATIENT
Start: 2018-12-22 | End: 2018-12-22 | Stop reason: HOSPADM

## 2018-12-22 RX ORDER — PROMETHAZINE HYDROCHLORIDE 25 MG/ML
25 INJECTION, SOLUTION INTRAMUSCULAR; INTRAVENOUS ONCE
Status: COMPLETED | OUTPATIENT
Start: 2018-12-22 | End: 2018-12-22

## 2018-12-22 RX ADMIN — PROMETHAZINE HYDROCHLORIDE 25 MG: 25 INJECTION, SOLUTION INTRAMUSCULAR; INTRAVENOUS at 17:44

## 2018-12-22 RX ADMIN — SODIUM CHLORIDE, POTASSIUM CHLORIDE, SODIUM LACTATE AND CALCIUM CHLORIDE: 600; 310; 30; 20 INJECTION, SOLUTION INTRAVENOUS at 17:50

## 2018-12-22 NOTE — H&P
anxiety      OBSTETRICAL HISTORY:   Obstetric History       T1      L1     SAB1   TAB0   Ectopic0   Molar0   Multiple0   Live Births1       # Outcome Date GA Lbr Dc/2nd Weight Sex Delivery Anes PTL Lv   4 Current            3 Term 13 38w0d   M CS-Unspec EPI, Spinal  THONG   2   27w0d    Vag-Spont   FD   1 SAB 09 8w0d             Obstetric Comments   FOB1   FOB2   [address]       PAST MEDICAL HISTORY:   has a past medical history of Anxiety; Arthritis; Asthma; Sadler's esophagus; Bipolar 1 disorder ([facility] [address] CAD (coronary artery disease); COPD (chronic obstructive pulmonary disease) ([facility] [address] Depression; Esophagitis; GERD (gastroesophageal reflux disease); Headache(784.0); Herniated disc, cervical; Hiatal hernia; Kidney stones; Pleurisy; Pneumonia; Pregnant and not yet delivered in first trimester; UTI (urinary tract infection); and Vision abnormalities. PAST SURGICAL HISTORY:   has a past surgical history that includes knee surgery (Left);  section (); Tonsillectomy; Knee arthroscopy (Left, 5/20/15); Cervical disc surgery; Upper gastrointestinal endoscopy (2016); Upper gastrointestinal endoscopy (2017); Upper gastrointestinal endoscopy (2017); pr esophagogastroduodenoscopy transoral diagnostic (N/A, 3/2/2017); laparoscopy; laparoscopy (N/A, 10/5/2017); Upper gastrointestinal endoscopy (N/A, 2018); and Endoscopy, colon, diagnostic. ALLERGIES:  is allergic to nsaids; toradol [ketorolac tromethamine]; and ultram [tramadol]. MEDICATIONS:  Prior to Admission medications    Medication Sig Start Date End Date Taking?  Authorizing Provider   promethazine (PHENERGAN) 25 MG tablet Take 1 tablet by mouth every 8 hours as needed for Nausea 18  Yes Evalene Boeck, MD   montelukast (SINGULAIR) 10 MG tablet Take 1 tablet by mouth nightly 18  Yes Eliu Méndez MD   sertraline (ZOLOFT) 50 MG tablet Take 1 tablet by mouth daily 9/10/18  Yes until after  to schedule CS. Patient counseled extensively on s/s of pre E . She is to report to hospital if she experiences any of these. - Patient reports she will check BPs at home and report to hospital if she has BP greater or equal to 160/110   - Discussed with Dr. Serafin Samuels. Patient will see Dr. Serafin Samuels in the office  to schedule CS. H/O Spontaneous  Delivery   - G2 @ 27w0d    H/O IUFD (G2)    H/O SAB x1 (G1)    Fetal Exposure to Zoloft    - Fetal ECHO wnl    AMA   - NIPT wnl    Asthma   - Albuterol PRN     COPD   - Stable on Singulair, Breo and Combivent     Anxiety/Depression   - Stable on Zoloft 50mg daily     Bipolar 1 Disorder    Seizure disorder   - Stable on no meds     Anemia    Tobacco use   - cessation encouraged     FamHx Type II DM   - patient's father   - 1 hr GTT WNL    Obesity (BMI 42.9)    Patient Active Problem List    Diagnosis Date Noted    Rh+/RI/GBS unk 2018     Priority: High     PRENATAL LAB RESULTS:   Blood Type/Rh: O pos  Antibody Screen: negative  Hemoglobin, Hematocrit, Platelets: 32.0 / 78.4 / 255  Rubella: immune  T.  Pallidum, IgG: non-reactive   Hepatitis B Surface Antigen: non-reactive   HIV: non-reactive   Sickle Cell Screen: negative  Gonorrhea: negative  Chlamydia: negative  Urine culture: negative, date: 18     1 hour Glucose Tolerance Test: 119 on 18  3 hour Glucose Tolerance Test: N/A     Group B Strep: not done  Cystic Fibrosis Screen: not available  First Trimester Screen: not available  MSAFP/Multiple Markers: pending  Non-Invasive Prenatal Testing: normal  Anatomy US: posterior placenta, 3VC with normal insertion, female       gHTN (Diagnosed @ 23wk) 2018     Priority: High     18 BP of 142/82 at 20w3d, repeat BP 1 minute later 140/76, all other BPs wnl  PreE labs wnl, P/C ratio 0.08   Elevated BPs 146/67 and 150/68, pt now meets criteria for gHTN      Umbilical cord complication      Priority: Medium

## 2018-12-23 ENCOUNTER — HOSPITAL ENCOUNTER (OUTPATIENT)
Age: 38
Discharge: HOME | End: 2018-12-23
Attending: SPECIALIST | Admitting: SPECIALIST
Payer: COMMERCIAL

## 2018-12-23 VITALS
TEMPERATURE: 98.1 F | DIASTOLIC BLOOD PRESSURE: 58 MMHG | RESPIRATION RATE: 18 BRPM | HEART RATE: 101 BPM | SYSTOLIC BLOOD PRESSURE: 123 MMHG

## 2018-12-23 PROBLEM — J45.901 ACUTE ASTHMA EXACERBATION: Status: RESOLVED | Noted: 2018-09-16 | Resolved: 2018-12-23

## 2018-12-23 PROBLEM — O99.212 OBESITY AFFECTING PREGNANCY IN SECOND TRIMESTER: Status: RESOLVED | Noted: 2018-08-01 | Resolved: 2018-12-23

## 2018-12-23 PROBLEM — O09.523 AMA (ADVANCED MATERNAL AGE) MULTIGRAVIDA 35+, THIRD TRIMESTER: Status: RESOLVED | Noted: 2018-12-10 | Resolved: 2018-12-23

## 2018-12-23 PROBLEM — O09.522 ELDERLY MULTIGRAVIDA IN SECOND TRIMESTER: Status: RESOLVED | Noted: 2018-08-01 | Resolved: 2018-12-23

## 2018-12-23 PROCEDURE — 4A1HXCZ MONITORING OF PRODUCTS OF CONCEPTION, CARDIAC RATE, EXTERNAL APPROACH: ICD-10-PCS | Performed by: OBSTETRICS & GYNECOLOGY

## 2018-12-23 PROCEDURE — 99213 OFFICE O/P EST LOW 20 MIN: CPT

## 2018-12-23 NOTE — PROGRESS NOTES
dehydration               - Low suspicion for PTL     H/O  Section x1 (G3)              - Repeat CS scheduled for , will report to Dr. Linda Lu office  to schedule CS sooner 2/2 gTHN, pt refusing CS at this time      Breech presentation on US 18     Gestational Hypertension              - No severe range BPs               - Denies s/s of pre E               - CBC, CMP WNL               - Twice weekly  testing               - Delivery recommended at [address] [place] [address], patient is declining delivery at this time, she adamantly wants to wait until after [place] to schedule CS. Patient counseled extensively on s/s of pre E . She is to report to hospital if she experiences any of these. - Patient reports she will check BPs at home and report to hospital if she has BP greater or equal to 160/110              - Discussed with Dr. Danya Schwarz. Patient will see Dr. Danya Schwarz in the office  to schedule CS.      H/O Spontaneous  Delivery              - G2 @ 27w0d     H/O IUFD (G2)     H/O SAB x1 (G1)     Fetal Exposure to Zoloft               - Fetal ECHO wnl     AMA              - NIPT wnl     Asthma              - Albuterol PRN      COPD              - Stable on Singulair, Breo and Combivent      Anxiety/Depression              - Stable on Zoloft 50mg daily      Bipolar 1 Disorder     Seizure disorder              - Stable on no meds      Anemia     Tobacco use              - cessation encouraged      FamHx Type II DM              - patient's father              - 1 hr GTT WNL     Obesity (BMI 42.9)    Discussed with Dr. Danya Schwarz. Stable for DC to home. For all patients over 28 weeks gestation, Kick sheet parameters every 8 hours were reviewed and recommended. All questions answered. Patient vocalized understanding.       Patient is aware that she should return to the hospital if she has worsening contractions, LOF, VB or decreased fetal movements. She voices understanding.  Patient is

## 2018-12-26 ENCOUNTER — ANESTHESIA (OUTPATIENT)
Dept: LABOR AND DELIVERY | Age: 38
End: 2018-12-26
Payer: COMMERCIAL

## 2018-12-26 ENCOUNTER — APPOINTMENT (OUTPATIENT)
Dept: LABOR AND DELIVERY | Age: 38
End: 2018-12-26
Payer: COMMERCIAL

## 2018-12-26 ENCOUNTER — HOSPITAL ENCOUNTER (INPATIENT)
Age: 38
LOS: 3 days | Discharge: HOME | End: 2018-12-29
Attending: SPECIALIST | Admitting: STUDENT IN AN ORGANIZED HEALTH CARE EDUCATION/TRAINING PROGRAM
Payer: COMMERCIAL

## 2018-12-26 ENCOUNTER — ANESTHESIA EVENT (OUTPATIENT)
Dept: LABOR AND DELIVERY | Age: 38
End: 2018-12-26
Payer: COMMERCIAL

## 2018-12-26 VITALS — OXYGEN SATURATION: 96 % | TEMPERATURE: 98.6 F | DIASTOLIC BLOOD PRESSURE: 70 MMHG | SYSTOLIC BLOOD PRESSURE: 122 MMHG

## 2018-12-26 DIAGNOSIS — Z98.891 S/P CESAREAN SECTION: Primary | ICD-10-CM

## 2018-12-26 PROBLEM — R10.9 CHRONIC ABDOMINAL PAIN: Status: RESOLVED | Noted: 2018-05-07 | Resolved: 2018-12-26

## 2018-12-26 PROBLEM — O40.9XX0 POLYHYDRAMNIOS: Status: RESOLVED | Noted: 2018-10-14 | Resolved: 2018-12-26

## 2018-12-26 PROBLEM — J45.909 ASTHMA AFFECTING PREGNANCY IN THIRD TRIMESTER: Status: RESOLVED | Noted: 2018-12-10 | Resolved: 2018-12-26

## 2018-12-26 PROBLEM — O99.213 OBESITY AFFECTING PREGNANCY IN THIRD TRIMESTER: Status: RESOLVED | Noted: 2018-10-25 | Resolved: 2018-12-26

## 2018-12-26 PROBLEM — K62.5 RECTAL BLEEDING: Chronic | Status: RESOLVED | Noted: 2018-03-26 | Resolved: 2018-12-26

## 2018-12-26 PROBLEM — O35.8XX0 SUSPECTED DAMAGE TO FETUS FROM OTHER DISEASE IN MOTHER, AFFECTING MANAGEMENT OF MOTHER, ANTEPARTUM CONDITION OR COMPLICATION: Status: RESOLVED | Noted: 2018-08-01 | Resolved: 2018-12-26

## 2018-12-26 PROBLEM — O09.523 ELDERLY MULTIGRAVIDA IN THIRD TRIMESTER: Status: RESOLVED | Noted: 2018-10-25 | Resolved: 2018-12-26

## 2018-12-26 PROBLEM — Z33.1 IUP (INTRAUTERINE PREGNANCY), INCIDENTAL: Status: ACTIVE | Noted: 2018-12-26

## 2018-12-26 PROBLEM — Z03.71 SUSPECTED PROBLEM WITH AMNIOTIC CAVITY AND MEMBRANE NOT FOUND: Status: RESOLVED | Noted: 2018-10-25 | Resolved: 2018-12-26

## 2018-12-26 PROBLEM — Z33.1 IUP (INTRAUTERINE PREGNANCY), INCIDENTAL: Status: RESOLVED | Noted: 2018-12-26 | Resolved: 2018-12-26

## 2018-12-26 PROBLEM — O99.513 ASTHMA AFFECTING PREGNANCY IN THIRD TRIMESTER: Status: RESOLVED | Noted: 2018-12-10 | Resolved: 2018-12-26

## 2018-12-26 PROBLEM — G89.29 CHRONIC ABDOMINAL PAIN: Status: RESOLVED | Noted: 2018-05-07 | Resolved: 2018-12-26

## 2018-12-26 PROBLEM — D17.1 LIPOMA OF SKIN AND SUBCUTANEOUS TISSUE OF TRUNK: Status: RESOLVED | Noted: 2018-10-31 | Resolved: 2018-12-26

## 2018-12-26 PROBLEM — K59.00 CONSTIPATION: Chronic | Status: RESOLVED | Noted: 2018-03-26 | Resolved: 2018-12-26

## 2018-12-26 PROBLEM — O99.330 TOBACCO USE DISORDER COMPLICATING PREGNANCY, CHILDBIRTH, OR PUERPERIUM, ANTEPARTUM: Status: RESOLVED | Noted: 2018-08-01 | Resolved: 2018-12-26

## 2018-12-26 PROBLEM — O09.90 HIGH RISK PREGNANCY, ANTEPARTUM: Status: RESOLVED | Noted: 2018-12-22 | Resolved: 2018-12-26

## 2018-12-26 LAB
-: ABNORMAL
ABO/RH: NORMAL
ABSOLUTE BANDS #: 0.98 K/UL (ref 0–1)
ABSOLUTE EOS #: 0 K/UL (ref 0–0.4)
ABSOLUTE IMMATURE GRANULOCYTE: ABNORMAL K/UL (ref 0–0.3)
ABSOLUTE LYMPH #: 1.64 K/UL (ref 1–4.8)
ABSOLUTE MONO #: 0.49 K/UL (ref 0.1–1.3)
ALBUMIN SERPL-MCNC: 3.6 G/DL (ref 3.5–5.2)
ALBUMIN/GLOBULIN RATIO: ABNORMAL (ref 1–2.5)
ALP BLD-CCNC: 131 U/L (ref 35–104)
ALT SERPL-CCNC: 17 U/L (ref 5–33)
AMORPHOUS: ABNORMAL
AMPHETAMINE SCREEN URINE: NEGATIVE
ANION GAP SERPL CALCULATED.3IONS-SCNC: 13 MMOL/L (ref 9–17)
ANTIBODY SCREEN: NEGATIVE
ARM BAND NUMBER: NORMAL
AST SERPL-CCNC: 17 U/L
BACTERIA: ABNORMAL
BANDS: 6 % (ref 0–10)
BARBITURATE SCREEN URINE: NEGATIVE
BASOPHILS # BLD: 0 % (ref 0–2)
BASOPHILS ABSOLUTE: 0 K/UL (ref 0–0.2)
BENZODIAZEPINE SCREEN, URINE: NEGATIVE
BILIRUB SERPL-MCNC: 0.43 MG/DL (ref 0.3–1.2)
BILIRUBIN URINE: NEGATIVE
BUN BLDV-MCNC: 8 MG/DL (ref 6–20)
BUN/CREAT BLD: ABNORMAL (ref 9–20)
BUPRENORPHINE URINE: NORMAL
CALCIUM SERPL-MCNC: 8.9 MG/DL (ref 8.6–10.4)
CANNABINOID SCREEN URINE: NEGATIVE
CASTS UA: ABNORMAL /LPF
CHLORIDE BLD-SCNC: 103 MMOL/L (ref 98–107)
CO2: 20 MMOL/L (ref 20–31)
COCAINE METABOLITE, URINE: NEGATIVE
COLOR: YELLOW
COMMENT UA: ABNORMAL
CREAT SERPL-MCNC: 0.45 MG/DL (ref 0.5–0.9)
CREATININE URINE: 92.6 MG/DL (ref 28–217)
CRYSTALS, UA: ABNORMAL /HPF
DIFFERENTIAL TYPE: ABNORMAL
EOSINOPHILS RELATIVE PERCENT: 0 % (ref 0–4)
EPITHELIAL CELLS UA: ABNORMAL /HPF
EXPIRATION DATE: NORMAL
GFR AFRICAN AMERICAN: >60 ML/MIN
GFR NON-AFRICAN AMERICAN: >60 ML/MIN
GFR SERPL CREATININE-BSD FRML MDRD: ABNORMAL ML/MIN/{1.73_M2}
GFR SERPL CREATININE-BSD FRML MDRD: ABNORMAL ML/MIN/{1.73_M2}
GLUCOSE BLD-MCNC: 86 MG/DL (ref 70–99)
GLUCOSE URINE: NEGATIVE
HCT VFR BLD CALC: 36.2 % (ref 36–46)
HCT VFR BLD CALC: 39.7 % (ref 36–46)
HEMOGLOBIN: 12 G/DL (ref 12–16)
HEMOGLOBIN: 13.6 G/DL (ref 12–16)
IMMATURE GRANULOCYTES: ABNORMAL %
KETONES, URINE: NEGATIVE
LEUKOCYTE ESTERASE, URINE: NEGATIVE
LYMPHOCYTES # BLD: 10 % (ref 24–44)
MCH RBC QN AUTO: 30.1 PG (ref 26–34)
MCH RBC QN AUTO: 30.5 PG (ref 26–34)
MCHC RBC AUTO-ENTMCNC: 33.2 G/DL (ref 31–37)
MCHC RBC AUTO-ENTMCNC: 34.4 G/DL (ref 31–37)
MCV RBC AUTO: 88.7 FL (ref 80–100)
MCV RBC AUTO: 90.5 FL (ref 80–100)
MDMA URINE: NORMAL
METHADONE SCREEN, URINE: NEGATIVE
METHAMPHETAMINE, URINE: NORMAL
MONOCYTES # BLD: 3 % (ref 1–7)
MORPHOLOGY: ABNORMAL
MUCUS: ABNORMAL
NITRITE, URINE: NEGATIVE
NRBC AUTOMATED: ABNORMAL PER 100 WBC
NRBC AUTOMATED: ABNORMAL PER 100 WBC
OPIATES, URINE: NEGATIVE
OTHER OBSERVATIONS UA: ABNORMAL
OXYCODONE SCREEN URINE: NEGATIVE
PDW BLD-RTO: 13.8 % (ref 11.5–14.9)
PDW BLD-RTO: 14 % (ref 11.5–14.9)
PH UA: 6 (ref 5–8)
PHENCYCLIDINE, URINE: NEGATIVE
PLATELET # BLD: 258 K/UL (ref 150–450)
PLATELET # BLD: 269 K/UL (ref 150–450)
PLATELET ESTIMATE: ABNORMAL
PMV BLD AUTO: 10.5 FL (ref 6–12)
PMV BLD AUTO: 9.2 FL (ref 6–12)
POTASSIUM SERPL-SCNC: 4.1 MMOL/L (ref 3.7–5.3)
PROPOXYPHENE, URINE: NORMAL
PROTEIN UA: NEGATIVE
RBC # BLD: 4 M/UL (ref 4–5.2)
RBC # BLD: 4.47 M/UL (ref 4–5.2)
RBC # BLD: ABNORMAL 10*6/UL
RBC UA: ABNORMAL /HPF
RENAL EPITHELIAL, UA: ABNORMAL /HPF
SEG NEUTROPHILS: 81 % (ref 36–66)
SEGMENTED NEUTROPHILS ABSOLUTE COUNT: 13.29 K/UL (ref 1.3–9.1)
SODIUM BLD-SCNC: 136 MMOL/L (ref 135–144)
SPECIFIC GRAVITY UA: 1.02 (ref 1–1.03)
T. PALLIDUM, IGG: NONREACTIVE
TEST INFORMATION: NORMAL
TOTAL PROTEIN, URINE: 17 MG/DL
TOTAL PROTEIN: 6 G/DL (ref 6.4–8.3)
TRICHOMONAS: ABNORMAL
TRICYCLIC ANTIDEPRESSANTS, UR: NORMAL
TURBIDITY: ABNORMAL
URINE HGB: ABNORMAL
URINE TOTAL PROTEIN CREATININE RATIO: 0.18 (ref 0–0.2)
UROBILINOGEN, URINE: NORMAL
WBC # BLD: 13 K/UL (ref 3.5–11)
WBC # BLD: 16.4 K/UL (ref 3.5–11)
WBC # BLD: ABNORMAL 10*3/UL
WBC UA: ABNORMAL /HPF
YEAST: ABNORMAL

## 2018-12-26 PROCEDURE — 86780 TREPONEMA PALLIDUM: CPT

## 2018-12-26 PROCEDURE — 6370000000 HC RX 637 (ALT 250 FOR IP): Performed by: STUDENT IN AN ORGANIZED HEALTH CARE EDUCATION/TRAINING PROGRAM

## 2018-12-26 PROCEDURE — 81001 URINALYSIS AUTO W/SCOPE: CPT

## 2018-12-26 PROCEDURE — 7100000001 HC PACU RECOVERY - ADDTL 15 MIN: Performed by: SPECIALIST

## 2018-12-26 PROCEDURE — 2500000003 HC RX 250 WO HCPCS: Performed by: ANESTHESIOLOGY

## 2018-12-26 PROCEDURE — 2709999900 HC NON-CHARGEABLE SUPPLY: Performed by: SPECIALIST

## 2018-12-26 PROCEDURE — 80053 COMPREHEN METABOLIC PANEL: CPT

## 2018-12-26 PROCEDURE — 7100000000 HC PACU RECOVERY - FIRST 15 MIN: Performed by: SPECIALIST

## 2018-12-26 PROCEDURE — 3609079900 HC CESAREAN SECTION: Performed by: SPECIALIST

## 2018-12-26 PROCEDURE — 86850 RBC ANTIBODY SCREEN: CPT

## 2018-12-26 PROCEDURE — 84156 ASSAY OF PROTEIN URINE: CPT

## 2018-12-26 PROCEDURE — 86900 BLOOD TYPING SEROLOGIC ABO: CPT

## 2018-12-26 PROCEDURE — 6360000002 HC RX W HCPCS: Performed by: OBSTETRICS & GYNECOLOGY

## 2018-12-26 PROCEDURE — 2580000003 HC RX 258: Performed by: STUDENT IN AN ORGANIZED HEALTH CARE EDUCATION/TRAINING PROGRAM

## 2018-12-26 PROCEDURE — 86901 BLOOD TYPING SEROLOGIC RH(D): CPT

## 2018-12-26 PROCEDURE — 6360000002 HC RX W HCPCS: Performed by: ANESTHESIOLOGY

## 2018-12-26 PROCEDURE — 36415 COLL VENOUS BLD VENIPUNCTURE: CPT

## 2018-12-26 PROCEDURE — 3700000001 HC ADD 15 MINUTES (ANESTHESIA)

## 2018-12-26 PROCEDURE — 2580000003 HC RX 258: Performed by: OBSTETRICS & GYNECOLOGY

## 2018-12-26 PROCEDURE — 80307 DRUG TEST PRSMV CHEM ANLYZR: CPT

## 2018-12-26 PROCEDURE — 1220000000 HC SEMI PRIVATE OB R&B

## 2018-12-26 PROCEDURE — 94762 N-INVAS EAR/PLS OXIMTRY CONT: CPT

## 2018-12-26 PROCEDURE — 76815 OB US LIMITED FETUS(S): CPT

## 2018-12-26 PROCEDURE — 82570 ASSAY OF URINE CREATININE: CPT

## 2018-12-26 PROCEDURE — 6370000000 HC RX 637 (ALT 250 FOR IP): Performed by: OBSTETRICS & GYNECOLOGY

## 2018-12-26 PROCEDURE — 6360000002 HC RX W HCPCS: Performed by: STUDENT IN AN ORGANIZED HEALTH CARE EDUCATION/TRAINING PROGRAM

## 2018-12-26 PROCEDURE — 3700000000 HC ANESTHESIA ATTENDED CARE

## 2018-12-26 PROCEDURE — 94640 AIRWAY INHALATION TREATMENT: CPT

## 2018-12-26 PROCEDURE — 85027 COMPLETE CBC AUTOMATED: CPT

## 2018-12-26 PROCEDURE — 59514 CESAREAN DELIVERY ONLY: CPT | Performed by: SPECIALIST

## 2018-12-26 PROCEDURE — 85025 COMPLETE CBC W/AUTO DIFF WBC: CPT

## 2018-12-26 PROCEDURE — 88307 TISSUE EXAM BY PATHOLOGIST: CPT

## 2018-12-26 RX ORDER — SODIUM CHLORIDE 0.9 % (FLUSH) 0.9 %
10 SYRINGE (ML) INJECTION EVERY 12 HOURS SCHEDULED
Status: DISCONTINUED | OUTPATIENT
Start: 2018-12-26 | End: 2018-12-29 | Stop reason: HOSPADM

## 2018-12-26 RX ORDER — SODIUM CHLORIDE 0.9 % (FLUSH) 0.9 %
10 SYRINGE (ML) INJECTION PRN
Status: DISCONTINUED | OUTPATIENT
Start: 2018-12-26 | End: 2018-12-26

## 2018-12-26 RX ORDER — OXYCODONE HYDROCHLORIDE AND ACETAMINOPHEN 5; 325 MG/1; MG/1
2 TABLET ORAL EVERY 4 HOURS PRN
Status: DISCONTINUED | OUTPATIENT
Start: 2018-12-26 | End: 2018-12-27

## 2018-12-26 RX ORDER — SULFAMETHOXAZOLE AND TRIMETHOPRIM 800; 160 MG/1; MG/1
1 TABLET ORAL EVERY 12 HOURS SCHEDULED
Status: DISCONTINUED | OUTPATIENT
Start: 2018-12-26 | End: 2018-12-29 | Stop reason: HOSPADM

## 2018-12-26 RX ORDER — EPHEDRINE SULFATE 50 MG/ML
INJECTION, SOLUTION INTRAVENOUS PRN
Status: DISCONTINUED | OUTPATIENT
Start: 2018-12-26 | End: 2018-12-26 | Stop reason: SDUPTHER

## 2018-12-26 RX ORDER — ONDANSETRON 2 MG/ML
INJECTION INTRAMUSCULAR; INTRAVENOUS PRN
Status: DISCONTINUED | OUTPATIENT
Start: 2018-12-26 | End: 2018-12-26 | Stop reason: SDUPTHER

## 2018-12-26 RX ORDER — BISACODYL 10 MG
10 SUPPOSITORY, RECTAL RECTAL DAILY PRN
Status: DISCONTINUED | OUTPATIENT
Start: 2018-12-26 | End: 2018-12-29 | Stop reason: HOSPADM

## 2018-12-26 RX ORDER — SODIUM CHLORIDE 9 MG/ML
125 INJECTION, SOLUTION INTRAVENOUS CONTINUOUS
Status: DISCONTINUED | OUTPATIENT
Start: 2018-12-26 | End: 2018-12-26

## 2018-12-26 RX ORDER — IPRATROPIUM BROMIDE AND ALBUTEROL SULFATE 2.5; .5 MG/3ML; MG/3ML
1 SOLUTION RESPIRATORY (INHALATION)
Status: DISCONTINUED | OUTPATIENT
Start: 2018-12-26 | End: 2018-12-29 | Stop reason: HOSPADM

## 2018-12-26 RX ORDER — MIDAZOLAM HYDROCHLORIDE 1 MG/ML
INJECTION INTRAMUSCULAR; INTRAVENOUS PRN
Status: DISCONTINUED | OUTPATIENT
Start: 2018-12-26 | End: 2018-12-26 | Stop reason: SDUPTHER

## 2018-12-26 RX ORDER — PROPOFOL 10 MG/ML
INJECTION, EMULSION INTRAVENOUS PRN
Status: DISCONTINUED | OUTPATIENT
Start: 2018-12-26 | End: 2018-12-26 | Stop reason: SDUPTHER

## 2018-12-26 RX ORDER — OXYCODONE HYDROCHLORIDE AND ACETAMINOPHEN 5; 325 MG/1; MG/1
1 TABLET ORAL EVERY 6 HOURS PRN
Status: DISCONTINUED | OUTPATIENT
Start: 2018-12-27 | End: 2018-12-26

## 2018-12-26 RX ORDER — MONTELUKAST SODIUM 10 MG/1
10 TABLET ORAL NIGHTLY
Status: DISCONTINUED | OUTPATIENT
Start: 2018-12-26 | End: 2018-12-29 | Stop reason: HOSPADM

## 2018-12-26 RX ORDER — DOCUSATE SODIUM 100 MG/1
100 CAPSULE, LIQUID FILLED ORAL 2 TIMES DAILY
Status: DISCONTINUED | OUTPATIENT
Start: 2018-12-26 | End: 2018-12-29 | Stop reason: HOSPADM

## 2018-12-26 RX ORDER — PROMETHAZINE HYDROCHLORIDE 25 MG/ML
25 INJECTION, SOLUTION INTRAMUSCULAR; INTRAVENOUS ONCE
Status: COMPLETED | OUTPATIENT
Start: 2018-12-26 | End: 2018-12-26

## 2018-12-26 RX ORDER — LANOLIN 100 %
OINTMENT (GRAM) TOPICAL
Status: DISCONTINUED | OUTPATIENT
Start: 2018-12-26 | End: 2018-12-29 | Stop reason: HOSPADM

## 2018-12-26 RX ORDER — OXYCODONE HYDROCHLORIDE AND ACETAMINOPHEN 5; 325 MG/1; MG/1
1 TABLET ORAL EVERY 6 HOURS PRN
Status: DISCONTINUED | OUTPATIENT
Start: 2018-12-26 | End: 2018-12-27

## 2018-12-26 RX ORDER — ONDANSETRON 2 MG/ML
4 INJECTION INTRAMUSCULAR; INTRAVENOUS EVERY 6 HOURS PRN
Status: DISCONTINUED | OUTPATIENT
Start: 2018-12-26 | End: 2018-12-29 | Stop reason: HOSPADM

## 2018-12-26 RX ORDER — SIMETHICONE 80 MG
80 TABLET,CHEWABLE ORAL EVERY 6 HOURS PRN
Status: DISCONTINUED | OUTPATIENT
Start: 2018-12-26 | End: 2018-12-29 | Stop reason: HOSPADM

## 2018-12-26 RX ORDER — PANTOPRAZOLE SODIUM 40 MG/1
40 TABLET, DELAYED RELEASE ORAL
Status: DISCONTINUED | OUTPATIENT
Start: 2018-12-26 | End: 2018-12-29 | Stop reason: HOSPADM

## 2018-12-26 RX ORDER — TRISODIUM CITRATE DIHYDRATE AND CITRIC ACID MONOHYDRATE 500; 334 MG/5ML; MG/5ML
30 SOLUTION ORAL ONCE
Status: COMPLETED | OUTPATIENT
Start: 2018-12-26 | End: 2018-12-26

## 2018-12-26 RX ORDER — SODIUM CHLORIDE 0.9 % (FLUSH) 0.9 %
10 SYRINGE (ML) INJECTION PRN
Status: DISCONTINUED | OUTPATIENT
Start: 2018-12-26 | End: 2018-12-29 | Stop reason: HOSPADM

## 2018-12-26 RX ORDER — SODIUM CHLORIDE, SODIUM LACTATE, POTASSIUM CHLORIDE, CALCIUM CHLORIDE 600; 310; 30; 20 MG/100ML; MG/100ML; MG/100ML; MG/100ML
INJECTION, SOLUTION INTRAVENOUS CONTINUOUS
Status: DISCONTINUED | OUTPATIENT
Start: 2018-12-26 | End: 2018-12-27

## 2018-12-26 RX ORDER — SODIUM CHLORIDE, SODIUM LACTATE, POTASSIUM CHLORIDE, CALCIUM CHLORIDE 600; 310; 30; 20 MG/100ML; MG/100ML; MG/100ML; MG/100ML
125 INJECTION, SOLUTION INTRAVENOUS CONTINUOUS
Status: DISCONTINUED | OUTPATIENT
Start: 2018-12-26 | End: 2018-12-26

## 2018-12-26 RX ORDER — IPRATROPIUM BROMIDE AND ALBUTEROL SULFATE 2.5; .5 MG/3ML; MG/3ML
1 SOLUTION RESPIRATORY (INHALATION) EVERY 6 HOURS PRN
Status: DISCONTINUED | OUTPATIENT
Start: 2018-12-26 | End: 2018-12-26

## 2018-12-26 RX ORDER — NALOXONE HYDROCHLORIDE 0.4 MG/ML
0.4 INJECTION, SOLUTION INTRAMUSCULAR; INTRAVENOUS; SUBCUTANEOUS PRN
Status: DISCONTINUED | OUTPATIENT
Start: 2018-12-26 | End: 2018-12-29 | Stop reason: HOSPADM

## 2018-12-26 RX ORDER — OXYCODONE HYDROCHLORIDE AND ACETAMINOPHEN 5; 325 MG/1; MG/1
2 TABLET ORAL EVERY 4 HOURS PRN
Status: DISCONTINUED | OUTPATIENT
Start: 2018-12-27 | End: 2018-12-26

## 2018-12-26 RX ORDER — ALBUTEROL SULFATE 2.5 MG/3ML
2.5 SOLUTION RESPIRATORY (INHALATION) EVERY 4 HOURS PRN
Status: DISCONTINUED | OUTPATIENT
Start: 2018-12-26 | End: 2018-12-29 | Stop reason: HOSPADM

## 2018-12-26 RX ORDER — FENTANYL CITRATE 50 UG/ML
INJECTION, SOLUTION INTRAMUSCULAR; INTRAVENOUS PRN
Status: DISCONTINUED | OUTPATIENT
Start: 2018-12-26 | End: 2018-12-26 | Stop reason: SDUPTHER

## 2018-12-26 RX ADMIN — EPHEDRINE SULFATE 10 MG: 50 INJECTION INTRAMUSCULAR; INTRAVENOUS; SUBCUTANEOUS at 08:39

## 2018-12-26 RX ADMIN — FENTANYL CITRATE 50 MCG: 50 INJECTION INTRAMUSCULAR; INTRAVENOUS at 09:47

## 2018-12-26 RX ADMIN — SODIUM CHLORIDE, POTASSIUM CHLORIDE, SODIUM LACTATE AND CALCIUM CHLORIDE 999 ML/HR: 600; 310; 30; 20 INJECTION, SOLUTION INTRAVENOUS at 05:45

## 2018-12-26 RX ADMIN — MONTELUKAST SODIUM 10 MG: 10 TABLET, FILM COATED ORAL at 21:43

## 2018-12-26 RX ADMIN — HYDROMORPHONE HYDROCHLORIDE 0.5 MG: 1 INJECTION, SOLUTION INTRAMUSCULAR; INTRAVENOUS; SUBCUTANEOUS at 18:40

## 2018-12-26 RX ADMIN — ONDANSETRON 4 MG: 2 INJECTION INTRAMUSCULAR; INTRAVENOUS at 13:39

## 2018-12-26 RX ADMIN — EPHEDRINE SULFATE 10 MG: 50 INJECTION INTRAMUSCULAR; INTRAVENOUS; SUBCUTANEOUS at 08:25

## 2018-12-26 RX ADMIN — SODIUM CITRATE AND CITRIC ACID MONOHYDRATE 30 ML: 500; 334 SOLUTION ORAL at 07:36

## 2018-12-26 RX ADMIN — SODIUM CHLORIDE, POTASSIUM CHLORIDE, SODIUM LACTATE AND CALCIUM CHLORIDE 999 ML/HR: 600; 310; 30; 20 INJECTION, SOLUTION INTRAVENOUS at 06:34

## 2018-12-26 RX ADMIN — MIDAZOLAM 1 MG: 1 INJECTION INTRAMUSCULAR; INTRAVENOUS at 09:55

## 2018-12-26 RX ADMIN — PANTOPRAZOLE SODIUM 40 MG: 40 TABLET, DELAYED RELEASE ORAL at 17:12

## 2018-12-26 RX ADMIN — IPRATROPIUM BROMIDE AND ALBUTEROL SULFATE 1 AMPULE: .5; 3 SOLUTION RESPIRATORY (INHALATION) at 19:15

## 2018-12-26 RX ADMIN — ONDANSETRON 4 MG: 2 INJECTION, SOLUTION INTRAMUSCULAR; INTRAVENOUS at 08:59

## 2018-12-26 RX ADMIN — PHENYLEPHRINE HYDROCHLORIDE 100 MCG: 10 INJECTION INTRAVENOUS at 08:50

## 2018-12-26 RX ADMIN — FENTANYL CITRATE 50 MCG: 50 INJECTION INTRAMUSCULAR; INTRAVENOUS at 09:37

## 2018-12-26 RX ADMIN — IPRATROPIUM BROMIDE AND ALBUTEROL SULFATE 1 AMPULE: .5; 3 SOLUTION RESPIRATORY (INHALATION) at 14:35

## 2018-12-26 RX ADMIN — PROPOFOL 20 MG: 10 INJECTION, EMULSION INTRAVENOUS at 10:05

## 2018-12-26 RX ADMIN — SODIUM CHLORIDE, POTASSIUM CHLORIDE, SODIUM LACTATE AND CALCIUM CHLORIDE: 600; 310; 30; 20 INJECTION, SOLUTION INTRAVENOUS at 21:49

## 2018-12-26 RX ADMIN — SULFAMETHOXAZOLE AND TRIMETHOPRIM 1 TABLET: 800; 160 TABLET ORAL at 21:42

## 2018-12-26 RX ADMIN — SERTRALINE HYDROCHLORIDE 50 MG: 50 TABLET ORAL at 21:42

## 2018-12-26 RX ADMIN — PROMETHAZINE HYDROCHLORIDE 25 MG: 25 INJECTION INTRAMUSCULAR; INTRAVENOUS at 12:28

## 2018-12-26 RX ADMIN — Medication 2 G: at 07:36

## 2018-12-26 RX ADMIN — OXYCODONE AND ACETAMINOPHEN 2 TABLET: 5; 325 TABLET ORAL at 21:48

## 2018-12-26 RX ADMIN — MIDAZOLAM 1 MG: 1 INJECTION INTRAMUSCULAR; INTRAVENOUS at 09:45

## 2018-12-26 RX ADMIN — EPHEDRINE SULFATE 5 MG: 50 INJECTION INTRAMUSCULAR; INTRAVENOUS; SUBCUTANEOUS at 08:30

## 2018-12-26 RX ADMIN — DOCUSATE SODIUM 100 MG: 100 CAPSULE, LIQUID FILLED ORAL at 21:42

## 2018-12-26 RX ADMIN — SODIUM CHLORIDE, POTASSIUM CHLORIDE, SODIUM LACTATE AND CALCIUM CHLORIDE: 600; 310; 30; 20 INJECTION, SOLUTION INTRAVENOUS at 12:49

## 2018-12-26 RX ADMIN — MIDAZOLAM 2 MG: 1 INJECTION INTRAMUSCULAR; INTRAVENOUS at 09:36

## 2018-12-26 RX ADMIN — ALBUTEROL SULFATE 2.5 MG: 2.5 SOLUTION RESPIRATORY (INHALATION) at 11:05

## 2018-12-26 RX ADMIN — Medication 500 ML/HR: at 09:01

## 2018-12-26 RX ADMIN — HYDROMORPHONE HYDROCHLORIDE 0.5 MG: 1 INJECTION, SOLUTION INTRAMUSCULAR; INTRAVENOUS; SUBCUTANEOUS at 14:12

## 2018-12-26 RX ADMIN — PROPOFOL 30 MG: 10 INJECTION, EMULSION INTRAVENOUS at 10:00

## 2018-12-26 RX ADMIN — Medication 2 G: at 17:12

## 2018-12-26 RX ADMIN — HYDROMORPHONE HYDROCHLORIDE 0.5 MG: 1 INJECTION, SOLUTION INTRAMUSCULAR; INTRAVENOUS; SUBCUTANEOUS at 11:03

## 2018-12-26 RX ADMIN — PHENYLEPHRINE HYDROCHLORIDE 100 MCG: 10 INJECTION INTRAVENOUS at 08:43

## 2018-12-26 RX ADMIN — EPHEDRINE SULFATE 10 MG: 50 INJECTION INTRAMUSCULAR; INTRAVENOUS; SUBCUTANEOUS at 08:24

## 2018-12-26 ASSESSMENT — PULMONARY FUNCTION TESTS
PIF_VALUE: 0
PIF_VALUE: 1
PIF_VALUE: 0
PIF_VALUE: 1
PIF_VALUE: 0
PIF_VALUE: 1
PIF_VALUE: 0
PIF_VALUE: 1
PIF_VALUE: 0
PIF_VALUE: 1
PIF_VALUE: 0

## 2018-12-26 ASSESSMENT — PAIN DESCRIPTION - PAIN TYPE: TYPE: SURGICAL PAIN

## 2018-12-26 ASSESSMENT — PAIN SCALES - GENERAL
PAINLEVEL_OUTOF10: 9
PAINLEVEL_OUTOF10: 10
PAINLEVEL_OUTOF10: 9
PAINLEVEL_OUTOF10: 8
PAINLEVEL_OUTOF10: 8

## 2018-12-26 ASSESSMENT — LIFESTYLE VARIABLES: SMOKING_STATUS: 1

## 2018-12-26 NOTE — ANESTHESIA PRE PROCEDURE
consumption: 12/25/18    BMI:   Wt Readings from Last 3 Encounters:   12/26/18 234 lb (106.1 kg)   12/22/18 234 lb (106.1 kg)   12/18/18 234 lb (106.1 kg)     Body mass index is 42.8 kg/m². CBC:   Lab Results   Component Value Date    WBC 13.0 12/26/2018    RBC 4.47 12/26/2018    RBC 4.49 03/25/2012    HGB 13.6 12/26/2018    HCT 39.7 12/26/2018    MCV 88.7 12/26/2018    RDW 13.8 12/26/2018     12/26/2018     03/25/2012       CMP:   Lab Results   Component Value Date     12/22/2018    K 3.9 12/22/2018     12/22/2018    CO2 18 12/22/2018    BUN 9 12/22/2018    CREATININE 0.42 12/22/2018    GFRAA >60 12/22/2018    LABGLOM >60 12/22/2018    GLUCOSE 81 12/22/2018    GLUCOSE 106 03/25/2012    PROT 6.3 12/22/2018    CALCIUM 8.6 12/22/2018    BILITOT 0.29 12/22/2018    ALKPHOS 117 12/22/2018    AST 16 12/22/2018    ALT 16 12/22/2018       POC Tests: No results for input(s): POCGLU, POCNA, POCK, POCCL, POCBUN, POCHEMO, POCHCT in the last 72 hours.     Coags:   Lab Results   Component Value Date    PROTIME 10.0 08/13/2018    INR 1.0 08/13/2018    APTT 27.8 08/13/2018       HCG (If Applicable):   Lab Results   Component Value Date    PREGTESTUR POSITIVE (A) 05/06/2018    HCG NEGATIVE 01/30/2018    HCGQUANT 61,609 (H) 06/11/2018        ABGs:   Lab Results   Component Value Date    PHART 7.447 05/21/2017    PO2ART 64.7 05/21/2017    NRP4DDY 35.0 05/21/2017    ZID0MOL 24.2 05/21/2017    F4ZECWSJ 89.6 05/21/2017        Type & Screen (If Applicable):  No results found for: LABABO, [address]    Anesthesia Evaluation  Patient summary reviewed and Nursing notes reviewed  Airway: Mallampati: III  TM distance: >3 FB   Neck ROM: full  Mouth opening: > = 3 FB Dental: normal exam         Pulmonary:normal exam  breath sounds clear to auscultation  (+) pneumonia:  COPD:  asthma: current smoker                           Cardiovascular:    (+) CAD:,         Rhythm: regular  Rate: normal                    Neuro/Psych:

## 2018-12-27 PROCEDURE — 2580000003 HC RX 258: Performed by: OBSTETRICS & GYNECOLOGY

## 2018-12-27 PROCEDURE — 6370000000 HC RX 637 (ALT 250 FOR IP): Performed by: STUDENT IN AN ORGANIZED HEALTH CARE EDUCATION/TRAINING PROGRAM

## 2018-12-27 PROCEDURE — 6360000002 HC RX W HCPCS: Performed by: OBSTETRICS & GYNECOLOGY

## 2018-12-27 PROCEDURE — 94761 N-INVAS EAR/PLS OXIMETRY MLT: CPT

## 2018-12-27 PROCEDURE — 94640 AIRWAY INHALATION TREATMENT: CPT

## 2018-12-27 PROCEDURE — 6360000002 HC RX W HCPCS: Performed by: STUDENT IN AN ORGANIZED HEALTH CARE EDUCATION/TRAINING PROGRAM

## 2018-12-27 PROCEDURE — 6370000000 HC RX 637 (ALT 250 FOR IP): Performed by: OBSTETRICS & GYNECOLOGY

## 2018-12-27 PROCEDURE — 1220000000 HC SEMI PRIVATE OB R&B

## 2018-12-27 RX ORDER — OXYCODONE HYDROCHLORIDE AND ACETAMINOPHEN 5; 325 MG/1; MG/1
2 TABLET ORAL EVERY 4 HOURS PRN
Status: DISCONTINUED | OUTPATIENT
Start: 2018-12-27 | End: 2018-12-29 | Stop reason: HOSPADM

## 2018-12-27 RX ORDER — PHENAZOPYRIDINE HYDROCHLORIDE 200 MG/1
200 TABLET, FILM COATED ORAL
Status: DISCONTINUED | OUTPATIENT
Start: 2018-12-27 | End: 2018-12-29 | Stop reason: HOSPADM

## 2018-12-27 RX ORDER — ONDANSETRON 4 MG/1
4 TABLET, ORALLY DISINTEGRATING ORAL ONCE
Status: COMPLETED | OUTPATIENT
Start: 2018-12-27 | End: 2018-12-27

## 2018-12-27 RX ORDER — OXYCODONE HYDROCHLORIDE AND ACETAMINOPHEN 5; 325 MG/1; MG/1
1 TABLET ORAL EVERY 4 HOURS PRN
Status: DISCONTINUED | OUTPATIENT
Start: 2018-12-27 | End: 2018-12-29 | Stop reason: HOSPADM

## 2018-12-27 RX ADMIN — IPRATROPIUM BROMIDE AND ALBUTEROL SULFATE 1 AMPULE: .5; 3 SOLUTION RESPIRATORY (INHALATION) at 07:55

## 2018-12-27 RX ADMIN — OXYCODONE AND ACETAMINOPHEN 2 TABLET: 5; 325 TABLET ORAL at 23:50

## 2018-12-27 RX ADMIN — MONTELUKAST SODIUM 10 MG: 10 TABLET, FILM COATED ORAL at 21:03

## 2018-12-27 RX ADMIN — IPRATROPIUM BROMIDE AND ALBUTEROL SULFATE 1 AMPULE: .5; 3 SOLUTION RESPIRATORY (INHALATION) at 16:39

## 2018-12-27 RX ADMIN — PANTOPRAZOLE SODIUM 40 MG: 40 TABLET, DELAYED RELEASE ORAL at 06:36

## 2018-12-27 RX ADMIN — OXYCODONE AND ACETAMINOPHEN 2 TABLET: 5; 325 TABLET ORAL at 02:22

## 2018-12-27 RX ADMIN — OXYCODONE AND ACETAMINOPHEN 2 TABLET: 5; 325 TABLET ORAL at 11:04

## 2018-12-27 RX ADMIN — PANTOPRAZOLE SODIUM 40 MG: 40 TABLET, DELAYED RELEASE ORAL at 18:35

## 2018-12-27 RX ADMIN — SIMETHICONE CHEW TAB 80 MG 80 MG: 80 TABLET ORAL at 21:11

## 2018-12-27 RX ADMIN — ONDANSETRON 4 MG: 4 TABLET, ORALLY DISINTEGRATING ORAL at 21:11

## 2018-12-27 RX ADMIN — Medication 2 G: at 00:57

## 2018-12-27 RX ADMIN — ONDANSETRON 4 MG: 2 INJECTION INTRAMUSCULAR; INTRAVENOUS at 00:53

## 2018-12-27 RX ADMIN — SIMETHICONE CHEW TAB 80 MG 80 MG: 80 TABLET ORAL at 21:36

## 2018-12-27 RX ADMIN — DOCUSATE SODIUM 100 MG: 100 CAPSULE, LIQUID FILLED ORAL at 21:02

## 2018-12-27 RX ADMIN — OXYCODONE AND ACETAMINOPHEN 2 TABLET: 5; 325 TABLET ORAL at 15:25

## 2018-12-27 RX ADMIN — SULFAMETHOXAZOLE AND TRIMETHOPRIM 1 TABLET: 800; 160 TABLET ORAL at 11:06

## 2018-12-27 RX ADMIN — PHENAZOPYRIDINE HYDROCHLORIDE 200 MG: 200 TABLET ORAL at 20:07

## 2018-12-27 RX ADMIN — OXYCODONE AND ACETAMINOPHEN 2 TABLET: 5; 325 TABLET ORAL at 19:33

## 2018-12-27 RX ADMIN — OXYCODONE AND ACETAMINOPHEN 2 TABLET: 5; 325 TABLET ORAL at 06:36

## 2018-12-27 RX ADMIN — Medication 10 ML: at 11:08

## 2018-12-27 RX ADMIN — SERTRALINE HYDROCHLORIDE 50 MG: 50 TABLET ORAL at 21:03

## 2018-12-27 RX ADMIN — SULFAMETHOXAZOLE AND TRIMETHOPRIM 1 TABLET: 800; 160 TABLET ORAL at 21:02

## 2018-12-27 RX ADMIN — ALBUTEROL SULFATE 2.5 MG: 2.5 SOLUTION RESPIRATORY (INHALATION) at 03:37

## 2018-12-27 RX ADMIN — ALBUTEROL SULFATE 2.5 MG: 2.5 SOLUTION RESPIRATORY (INHALATION) at 23:55

## 2018-12-27 RX ADMIN — DOCUSATE SODIUM 100 MG: 100 CAPSULE, LIQUID FILLED ORAL at 11:04

## 2018-12-27 ASSESSMENT — PAIN SCALES - GENERAL
PAINLEVEL_OUTOF10: 8
PAINLEVEL_OUTOF10: 8
PAINLEVEL_OUTOF10: 9

## 2018-12-27 ASSESSMENT — PAIN DESCRIPTION - PROGRESSION
CLINICAL_PROGRESSION: GRADUALLY IMPROVING
CLINICAL_PROGRESSION: GRADUALLY IMPROVING

## 2018-12-27 ASSESSMENT — PAIN DESCRIPTION - LOCATION: LOCATION: ABDOMEN

## 2018-12-27 ASSESSMENT — PAIN DESCRIPTION - ORIENTATION: ORIENTATION: LOWER

## 2018-12-27 ASSESSMENT — PAIN DESCRIPTION - PAIN TYPE: TYPE: SURGICAL PAIN

## 2018-12-28 PROCEDURE — 94761 N-INVAS EAR/PLS OXIMETRY MLT: CPT

## 2018-12-28 PROCEDURE — 6370000000 HC RX 637 (ALT 250 FOR IP): Performed by: STUDENT IN AN ORGANIZED HEALTH CARE EDUCATION/TRAINING PROGRAM

## 2018-12-28 PROCEDURE — 1220000000 HC SEMI PRIVATE OB R&B

## 2018-12-28 PROCEDURE — 6370000000 HC RX 637 (ALT 250 FOR IP): Performed by: OBSTETRICS & GYNECOLOGY

## 2018-12-28 PROCEDURE — 94640 AIRWAY INHALATION TREATMENT: CPT

## 2018-12-28 RX ORDER — SULFAMETHOXAZOLE AND TRIMETHOPRIM 800; 160 MG/1; MG/1
1 TABLET ORAL EVERY 12 HOURS SCHEDULED
Qty: 16 TABLET | Refills: 0 | Status: SHIPPED | OUTPATIENT
Start: 2018-12-28 | End: 2019-01-05

## 2018-12-28 RX ORDER — BISACODYL 10 MG
10 SUPPOSITORY, RECTAL RECTAL ONCE
Status: COMPLETED | OUTPATIENT
Start: 2018-12-28 | End: 2018-12-28

## 2018-12-28 RX ORDER — OXYCODONE HYDROCHLORIDE AND ACETAMINOPHEN 5; 325 MG/1; MG/1
1 TABLET ORAL EVERY 4 HOURS PRN
Qty: 28 TABLET | Refills: 0 | Status: SHIPPED | OUTPATIENT
Start: 2018-12-28 | End: 2018-12-31

## 2018-12-28 RX ORDER — PSEUDOEPHEDRINE HCL 30 MG
100 TABLET ORAL 2 TIMES DAILY
Qty: 60 CAPSULE | Refills: 1 | Status: SHIPPED | OUTPATIENT
Start: 2018-12-28 | End: 2020-03-04

## 2018-12-28 RX ADMIN — PANTOPRAZOLE SODIUM 40 MG: 40 TABLET, DELAYED RELEASE ORAL at 16:28

## 2018-12-28 RX ADMIN — OXYCODONE AND ACETAMINOPHEN 2 TABLET: 5; 325 TABLET ORAL at 16:28

## 2018-12-28 RX ADMIN — OXYCODONE AND ACETAMINOPHEN 2 TABLET: 5; 325 TABLET ORAL at 20:31

## 2018-12-28 RX ADMIN — OXYCODONE AND ACETAMINOPHEN 2 TABLET: 5; 325 TABLET ORAL at 12:14

## 2018-12-28 RX ADMIN — PHENAZOPYRIDINE HYDROCHLORIDE 200 MG: 200 TABLET ORAL at 16:28

## 2018-12-28 RX ADMIN — DOCUSATE SODIUM 100 MG: 100 CAPSULE, LIQUID FILLED ORAL at 07:58

## 2018-12-28 RX ADMIN — SULFAMETHOXAZOLE AND TRIMETHOPRIM 1 TABLET: 800; 160 TABLET ORAL at 08:00

## 2018-12-28 RX ADMIN — PHENAZOPYRIDINE HYDROCHLORIDE 200 MG: 200 TABLET ORAL at 08:00

## 2018-12-28 RX ADMIN — DOCUSATE SODIUM 100 MG: 100 CAPSULE, LIQUID FILLED ORAL at 20:31

## 2018-12-28 RX ADMIN — BISACODYL 10 MG: 10 SUPPOSITORY RECTAL at 05:59

## 2018-12-28 RX ADMIN — OXYCODONE AND ACETAMINOPHEN 2 TABLET: 5; 325 TABLET ORAL at 07:58

## 2018-12-28 RX ADMIN — PANTOPRAZOLE SODIUM 40 MG: 40 TABLET, DELAYED RELEASE ORAL at 07:59

## 2018-12-28 RX ADMIN — MONTELUKAST SODIUM 10 MG: 10 TABLET, FILM COATED ORAL at 20:49

## 2018-12-28 RX ADMIN — IPRATROPIUM BROMIDE AND ALBUTEROL SULFATE 1 AMPULE: .5; 3 SOLUTION RESPIRATORY (INHALATION) at 19:52

## 2018-12-28 RX ADMIN — OXYCODONE AND ACETAMINOPHEN 2 TABLET: 5; 325 TABLET ORAL at 03:48

## 2018-12-28 RX ADMIN — SERTRALINE HYDROCHLORIDE 50 MG: 50 TABLET ORAL at 20:34

## 2018-12-28 ASSESSMENT — PAIN DESCRIPTION - ORIENTATION
ORIENTATION: LOWER
ORIENTATION: LOWER

## 2018-12-28 ASSESSMENT — PAIN DESCRIPTION - DESCRIPTORS
DESCRIPTORS: DISCOMFORT;SHARP
DESCRIPTORS: DISCOMFORT;SHARP

## 2018-12-28 ASSESSMENT — PAIN SCALES - GENERAL
PAINLEVEL_OUTOF10: 3
PAINLEVEL_OUTOF10: 8
PAINLEVEL_OUTOF10: 7
PAINLEVEL_OUTOF10: 7
PAINLEVEL_OUTOF10: 2
PAINLEVEL_OUTOF10: 8

## 2018-12-28 ASSESSMENT — PAIN DESCRIPTION - PROGRESSION
CLINICAL_PROGRESSION: RAPIDLY WORSENING
CLINICAL_PROGRESSION: GRADUALLY WORSENING

## 2018-12-28 ASSESSMENT — PAIN DESCRIPTION - FREQUENCY: FREQUENCY: CONTINUOUS

## 2018-12-28 ASSESSMENT — PAIN DESCRIPTION - PAIN TYPE
TYPE: SURGICAL PAIN
TYPE: SURGICAL PAIN

## 2018-12-28 ASSESSMENT — PAIN DESCRIPTION - LOCATION
LOCATION: ABDOMEN
LOCATION: ABDOMEN

## 2018-12-29 VITALS
TEMPERATURE: 97.2 F | SYSTOLIC BLOOD PRESSURE: 143 MMHG | DIASTOLIC BLOOD PRESSURE: 73 MMHG | RESPIRATION RATE: 18 BRPM | HEIGHT: 62 IN | OXYGEN SATURATION: 95 % | WEIGHT: 234 LBS | HEART RATE: 82 BPM | BODY MASS INDEX: 43.06 KG/M2

## 2018-12-29 PROCEDURE — 6370000000 HC RX 637 (ALT 250 FOR IP): Performed by: STUDENT IN AN ORGANIZED HEALTH CARE EDUCATION/TRAINING PROGRAM

## 2018-12-29 PROCEDURE — 94761 N-INVAS EAR/PLS OXIMETRY MLT: CPT

## 2018-12-29 PROCEDURE — 6370000000 HC RX 637 (ALT 250 FOR IP): Performed by: OBSTETRICS & GYNECOLOGY

## 2018-12-29 PROCEDURE — 94640 AIRWAY INHALATION TREATMENT: CPT

## 2018-12-29 RX ORDER — DIPHENHYDRAMINE HCL 25 MG
25 TABLET ORAL EVERY 6 HOURS PRN
Status: DISCONTINUED | OUTPATIENT
Start: 2018-12-29 | End: 2018-12-29 | Stop reason: HOSPADM

## 2018-12-29 RX ADMIN — DOCUSATE SODIUM 100 MG: 100 CAPSULE, LIQUID FILLED ORAL at 09:24

## 2018-12-29 RX ADMIN — PANTOPRAZOLE SODIUM 40 MG: 40 TABLET, DELAYED RELEASE ORAL at 09:24

## 2018-12-29 RX ADMIN — OXYCODONE AND ACETAMINOPHEN 2 TABLET: 5; 325 TABLET ORAL at 04:31

## 2018-12-29 RX ADMIN — DIPHENHYDRAMINE HCL 25 MG: 25 TABLET ORAL at 13:20

## 2018-12-29 RX ADMIN — OXYCODONE AND ACETAMINOPHEN 2 TABLET: 5; 325 TABLET ORAL at 00:26

## 2018-12-29 RX ADMIN — OXYCODONE AND ACETAMINOPHEN 2 TABLET: 5; 325 TABLET ORAL at 13:20

## 2018-12-29 RX ADMIN — SULFAMETHOXAZOLE AND TRIMETHOPRIM 1 TABLET: 800; 160 TABLET ORAL at 00:33

## 2018-12-29 RX ADMIN — IPRATROPIUM BROMIDE AND ALBUTEROL SULFATE 1 AMPULE: .5; 3 SOLUTION RESPIRATORY (INHALATION) at 11:19

## 2018-12-29 RX ADMIN — PHENAZOPYRIDINE HYDROCHLORIDE 200 MG: 200 TABLET ORAL at 00:27

## 2018-12-29 RX ADMIN — SULFAMETHOXAZOLE AND TRIMETHOPRIM 1 TABLET: 800; 160 TABLET ORAL at 10:05

## 2018-12-29 RX ADMIN — PHENAZOPYRIDINE HYDROCHLORIDE 200 MG: 200 TABLET ORAL at 10:05

## 2018-12-29 RX ADMIN — OXYCODONE AND ACETAMINOPHEN 2 TABLET: 5; 325 TABLET ORAL at 09:23

## 2018-12-29 ASSESSMENT — PAIN SCALES - GENERAL
PAINLEVEL_OUTOF10: 8
PAINLEVEL_OUTOF10: 3
PAINLEVEL_OUTOF10: 8
PAINLEVEL_OUTOF10: 3
PAINLEVEL_OUTOF10: 9
PAINLEVEL_OUTOF10: 9

## 2018-12-31 RX ORDER — VITAMIN A ACETATE, .BETA.-CAROTENE, ASCORBIC ACID, CHOLECALCIFEROL, .ALPHA.-TOCOPHEROL ACETATE, DL-, THIAMINE MONONITRATE, RIBOFLAVIN, NIACINAMIDE, PYRIDOXINE HYDROCHLORIDE, FOLIC ACID, CYANOCOBALAMIN, CALCIUM CARBONATE, FERROUS FUMARATE, ZINC OXIDE, AND CUPRIC OXIDE 2000; 2000; 120; 400; 22; 1.84; 3; 20; 10; 1; 12; 200; 27; 25; 2 [IU]/1; [IU]/1; MG/1; [IU]/1; MG/1; MG/1; MG/1; MG/1; MG/1; MG/1; UG/1; MG/1; MG/1; MG/1; MG/1
1 TABLET ORAL DAILY
Qty: 30 TABLET | Refills: 1 | Status: SHIPPED | OUTPATIENT
Start: 2018-12-31 | End: 2019-01-02

## 2019-01-02 ENCOUNTER — POSTPARTUM VISIT (OUTPATIENT)
Dept: OBGYN CLINIC | Age: 39
End: 2019-01-02

## 2019-01-02 VITALS
DIASTOLIC BLOOD PRESSURE: 88 MMHG | BODY MASS INDEX: 40.27 KG/M2 | SYSTOLIC BLOOD PRESSURE: 134 MMHG | WEIGHT: 218.8 LBS | HEIGHT: 62 IN | HEART RATE: 82 BPM | RESPIRATION RATE: 18 BRPM | TEMPERATURE: 97.8 F

## 2019-01-02 DIAGNOSIS — Z48.89 POSTOPERATIVE VISIT: Primary | ICD-10-CM

## 2019-01-02 LAB — SURGICAL PATHOLOGY REPORT: NORMAL

## 2019-01-02 PROCEDURE — 99024 POSTOP FOLLOW-UP VISIT: CPT | Performed by: SPECIALIST

## 2019-01-02 RX ORDER — OXYCODONE HYDROCHLORIDE AND ACETAMINOPHEN 5; 325 MG/1; MG/1
1 TABLET ORAL EVERY 4 HOURS PRN
Status: ON HOLD | COMMUNITY
End: 2019-01-12

## 2019-01-02 RX ORDER — PROMETHAZINE HYDROCHLORIDE 25 MG/1
25 TABLET ORAL EVERY 8 HOURS PRN
Qty: 30 TABLET | Refills: 1 | Status: SHIPPED | OUTPATIENT
Start: 2019-01-02 | End: 2020-01-21

## 2019-01-02 ASSESSMENT — ENCOUNTER SYMPTOMS
ABDOMINAL DISTENTION: 0
CONSTIPATION: 0
ABDOMINAL PAIN: 0
COUGH: 0
VOMITING: 0
DIARRHEA: 0
EYE PAIN: 0
NAUSEA: 0
APNEA: 0

## 2019-01-12 ENCOUNTER — HOSPITAL ENCOUNTER (INPATIENT)
Age: 39
LOS: 6 days | Discharge: HOME | DRG: 776 | End: 2019-01-18
Attending: EMERGENCY MEDICINE | Admitting: SPECIALIST
Payer: COMMERCIAL

## 2019-01-12 ENCOUNTER — APPOINTMENT (OUTPATIENT)
Dept: CT IMAGING | Age: 39
DRG: 776 | End: 2019-01-12
Payer: COMMERCIAL

## 2019-01-12 DIAGNOSIS — L02.91 ABSCESS: ICD-10-CM

## 2019-01-12 DIAGNOSIS — Z78.9 FAILURE OF OUTPATIENT TREATMENT: ICD-10-CM

## 2019-01-12 DIAGNOSIS — L02.219 CELLULITIS AND ABSCESS OF TRUNK: Primary | ICD-10-CM

## 2019-01-12 DIAGNOSIS — L03.319 CELLULITIS AND ABSCESS OF TRUNK: Primary | ICD-10-CM

## 2019-01-12 LAB
ABSOLUTE EOS #: 0.3 K/UL (ref 0–0.4)
ABSOLUTE IMMATURE GRANULOCYTE: ABNORMAL K/UL (ref 0–0.3)
ABSOLUTE LYMPH #: 1.8 K/UL (ref 1–4.8)
ABSOLUTE MONO #: 0.7 K/UL (ref 0.1–1.3)
ALBUMIN SERPL-MCNC: 3.7 G/DL (ref 3.5–5.2)
ALBUMIN/GLOBULIN RATIO: ABNORMAL (ref 1–2.5)
ALP BLD-CCNC: 108 U/L (ref 35–104)
ALT SERPL-CCNC: 23 U/L (ref 5–33)
ANION GAP SERPL CALCULATED.3IONS-SCNC: 14 MMOL/L (ref 9–17)
AST SERPL-CCNC: 16 U/L
BASOPHILS # BLD: 2 % (ref 0–2)
BASOPHILS ABSOLUTE: 0.2 K/UL (ref 0–0.2)
BILIRUB SERPL-MCNC: 0.41 MG/DL (ref 0.3–1.2)
BUN BLDV-MCNC: 11 MG/DL (ref 6–20)
BUN/CREAT BLD: ABNORMAL (ref 9–20)
CALCIUM SERPL-MCNC: 9.2 MG/DL (ref 8.6–10.4)
CHLORIDE BLD-SCNC: 105 MMOL/L (ref 98–107)
CO2: 23 MMOL/L (ref 20–31)
CREAT SERPL-MCNC: 0.69 MG/DL (ref 0.5–0.9)
DIFFERENTIAL TYPE: ABNORMAL
EOSINOPHILS RELATIVE PERCENT: 2 % (ref 0–4)
GFR AFRICAN AMERICAN: >60 ML/MIN
GFR NON-AFRICAN AMERICAN: >60 ML/MIN
GFR SERPL CREATININE-BSD FRML MDRD: ABNORMAL ML/MIN/{1.73_M2}
GFR SERPL CREATININE-BSD FRML MDRD: ABNORMAL ML/MIN/{1.73_M2}
GLUCOSE BLD-MCNC: 102 MG/DL (ref 70–99)
HCG QUALITATIVE: NEGATIVE
HCT VFR BLD CALC: 37.1 % (ref 36–46)
HEMOGLOBIN: 11.9 G/DL (ref 12–16)
IMMATURE GRANULOCYTES: ABNORMAL %
LACTIC ACID, WHOLE BLOOD: NORMAL MMOL/L (ref 0.7–2.1)
LACTIC ACID: 0.9 MMOL/L (ref 0.5–2.2)
LIPASE: 23 U/L (ref 13–60)
LYMPHOCYTES # BLD: 16 % (ref 24–44)
MCH RBC QN AUTO: 28.4 PG (ref 26–34)
MCHC RBC AUTO-ENTMCNC: 32.2 G/DL (ref 31–37)
MCV RBC AUTO: 88.2 FL (ref 80–100)
MONOCYTES # BLD: 6 % (ref 1–7)
NRBC AUTOMATED: ABNORMAL PER 100 WBC
PDW BLD-RTO: 13.5 % (ref 11.5–14.9)
PLATELET # BLD: 503 K/UL (ref 150–450)
PLATELET ESTIMATE: ABNORMAL
PMV BLD AUTO: 8.2 FL (ref 6–12)
POTASSIUM SERPL-SCNC: 4.2 MMOL/L (ref 3.7–5.3)
RBC # BLD: 4.2 M/UL (ref 4–5.2)
RBC # BLD: ABNORMAL 10*6/UL
SEG NEUTROPHILS: 74 % (ref 36–66)
SEGMENTED NEUTROPHILS ABSOLUTE COUNT: 8.3 K/UL (ref 1.3–9.1)
SODIUM BLD-SCNC: 142 MMOL/L (ref 135–144)
TOTAL PROTEIN: 7.2 G/DL (ref 6.4–8.3)
WBC # BLD: 11.3 K/UL (ref 3.5–11)
WBC # BLD: ABNORMAL 10*3/UL

## 2019-01-12 PROCEDURE — 96375 TX/PRO/DX INJ NEW DRUG ADDON: CPT

## 2019-01-12 PROCEDURE — 6360000002 HC RX W HCPCS: Performed by: EMERGENCY MEDICINE

## 2019-01-12 PROCEDURE — 2580000003 HC RX 258: Performed by: SPECIALIST

## 2019-01-12 PROCEDURE — 80053 COMPREHEN METABOLIC PANEL: CPT

## 2019-01-12 PROCEDURE — 36415 COLL VENOUS BLD VENIPUNCTURE: CPT

## 2019-01-12 PROCEDURE — 6360000004 HC RX CONTRAST MEDICATION: Performed by: EMERGENCY MEDICINE

## 2019-01-12 PROCEDURE — 85025 COMPLETE CBC W/AUTO DIFF WBC: CPT

## 2019-01-12 PROCEDURE — 2580000003 HC RX 258: Performed by: STUDENT IN AN ORGANIZED HEALTH CARE EDUCATION/TRAINING PROGRAM

## 2019-01-12 PROCEDURE — 99285 EMERGENCY DEPT VISIT HI MDM: CPT

## 2019-01-12 PROCEDURE — 2580000003 HC RX 258: Performed by: EMERGENCY MEDICINE

## 2019-01-12 PROCEDURE — 83605 ASSAY OF LACTIC ACID: CPT

## 2019-01-12 PROCEDURE — 96374 THER/PROPH/DIAG INJ IV PUSH: CPT

## 2019-01-12 PROCEDURE — 84703 CHORIONIC GONADOTROPIN ASSAY: CPT

## 2019-01-12 PROCEDURE — 6370000000 HC RX 637 (ALT 250 FOR IP): Performed by: STUDENT IN AN ORGANIZED HEALTH CARE EDUCATION/TRAINING PROGRAM

## 2019-01-12 PROCEDURE — 6370000000 HC RX 637 (ALT 250 FOR IP): Performed by: EMERGENCY MEDICINE

## 2019-01-12 PROCEDURE — 6360000002 HC RX W HCPCS: Performed by: SPECIALIST

## 2019-01-12 PROCEDURE — 6360000002 HC RX W HCPCS: Performed by: STUDENT IN AN ORGANIZED HEALTH CARE EDUCATION/TRAINING PROGRAM

## 2019-01-12 PROCEDURE — 83690 ASSAY OF LIPASE: CPT

## 2019-01-12 PROCEDURE — 74177 CT ABD & PELVIS W/CONTRAST: CPT

## 2019-01-12 PROCEDURE — 1200000000 HC SEMI PRIVATE

## 2019-01-12 RX ORDER — 0.9 % SODIUM CHLORIDE 0.9 %
80 INTRAVENOUS SOLUTION INTRAVENOUS ONCE
Status: COMPLETED | OUTPATIENT
Start: 2019-01-12 | End: 2019-01-12

## 2019-01-12 RX ORDER — SODIUM CHLORIDE 0.9 % (FLUSH) 0.9 %
10 SYRINGE (ML) INJECTION EVERY 12 HOURS SCHEDULED
Status: DISCONTINUED | OUTPATIENT
Start: 2019-01-12 | End: 2019-01-18 | Stop reason: HOSPADM

## 2019-01-12 RX ORDER — OXYCODONE HYDROCHLORIDE AND ACETAMINOPHEN 5; 325 MG/1; MG/1
2 TABLET ORAL ONCE
Status: COMPLETED | OUTPATIENT
Start: 2019-01-12 | End: 2019-01-12

## 2019-01-12 RX ORDER — MORPHINE SULFATE 4 MG/ML
4 INJECTION, SOLUTION INTRAMUSCULAR; INTRAVENOUS ONCE
Status: COMPLETED | OUTPATIENT
Start: 2019-01-12 | End: 2019-01-12

## 2019-01-12 RX ORDER — SODIUM CHLORIDE 0.9 % (FLUSH) 0.9 %
10 SYRINGE (ML) INJECTION PRN
Status: DISCONTINUED | OUTPATIENT
Start: 2019-01-12 | End: 2019-01-14

## 2019-01-12 RX ORDER — SODIUM CHLORIDE 0.9 % (FLUSH) 0.9 %
10 SYRINGE (ML) INJECTION PRN
Status: DISCONTINUED | OUTPATIENT
Start: 2019-01-12 | End: 2019-01-18 | Stop reason: HOSPADM

## 2019-01-12 RX ORDER — MONTELUKAST SODIUM 10 MG/1
10 TABLET ORAL NIGHTLY
Status: DISCONTINUED | OUTPATIENT
Start: 2019-01-12 | End: 2019-01-18 | Stop reason: HOSPADM

## 2019-01-12 RX ORDER — IPRATROPIUM BROMIDE AND ALBUTEROL SULFATE 2.5; .5 MG/3ML; MG/3ML
1 SOLUTION RESPIRATORY (INHALATION)
Status: DISCONTINUED | OUTPATIENT
Start: 2019-01-12 | End: 2019-01-18

## 2019-01-12 RX ORDER — OXYCODONE HYDROCHLORIDE AND ACETAMINOPHEN 5; 325 MG/1; MG/1
2 TABLET ORAL EVERY 4 HOURS PRN
Status: DISCONTINUED | OUTPATIENT
Start: 2019-01-12 | End: 2019-01-13

## 2019-01-12 RX ORDER — MORPHINE SULFATE 2 MG/ML
2 INJECTION, SOLUTION INTRAMUSCULAR; INTRAVENOUS EVERY 4 HOURS PRN
Status: DISCONTINUED | OUTPATIENT
Start: 2019-01-12 | End: 2019-01-13

## 2019-01-12 RX ORDER — ONDANSETRON 2 MG/ML
4 INJECTION INTRAMUSCULAR; INTRAVENOUS ONCE
Status: COMPLETED | OUTPATIENT
Start: 2019-01-12 | End: 2019-01-12

## 2019-01-12 RX ORDER — OXYCODONE HYDROCHLORIDE AND ACETAMINOPHEN 5; 325 MG/1; MG/1
1 TABLET ORAL EVERY 6 HOURS PRN
Status: DISCONTINUED | OUTPATIENT
Start: 2019-01-12 | End: 2019-01-12

## 2019-01-12 RX ORDER — OXYCODONE HYDROCHLORIDE AND ACETAMINOPHEN 5; 325 MG/1; MG/1
1 TABLET ORAL EVERY 4 HOURS PRN
Status: DISCONTINUED | OUTPATIENT
Start: 2019-01-12 | End: 2019-01-13

## 2019-01-12 RX ORDER — ONDANSETRON 2 MG/ML
4 INJECTION INTRAMUSCULAR; INTRAVENOUS EVERY 6 HOURS PRN
Status: DISCONTINUED | OUTPATIENT
Start: 2019-01-12 | End: 2019-01-18 | Stop reason: HOSPADM

## 2019-01-12 RX ORDER — 0.9 % SODIUM CHLORIDE 0.9 %
1000 INTRAVENOUS SOLUTION INTRAVENOUS ONCE
Status: COMPLETED | OUTPATIENT
Start: 2019-01-12 | End: 2019-01-12

## 2019-01-12 RX ORDER — FENTANYL CITRATE 50 UG/ML
100 INJECTION, SOLUTION INTRAMUSCULAR; INTRAVENOUS ONCE
Status: COMPLETED | OUTPATIENT
Start: 2019-01-12 | End: 2019-01-12

## 2019-01-12 RX ORDER — ALBUTEROL SULFATE 90 UG/1
2 AEROSOL, METERED RESPIRATORY (INHALATION) EVERY 6 HOURS PRN
Status: DISCONTINUED | OUTPATIENT
Start: 2019-01-12 | End: 2019-01-18 | Stop reason: HOSPADM

## 2019-01-12 RX ORDER — PANTOPRAZOLE SODIUM 40 MG/1
40 TABLET, DELAYED RELEASE ORAL
Status: DISCONTINUED | OUTPATIENT
Start: 2019-01-13 | End: 2019-01-13

## 2019-01-12 RX ADMIN — PIPERACILLIN SODIUM AND TAZOBACTAM SODIUM 3.38 G: 3; .375 INJECTION, POWDER, LYOPHILIZED, FOR SOLUTION INTRAVENOUS at 16:24

## 2019-01-12 RX ADMIN — SODIUM CHLORIDE 80 ML: 9 INJECTION, SOLUTION INTRAVENOUS at 14:19

## 2019-01-12 RX ADMIN — OXYCODONE AND ACETAMINOPHEN 2 TABLET: 5; 325 TABLET ORAL at 16:31

## 2019-01-12 RX ADMIN — MOMETASONE FUROATE AND FORMOTEROL FUMARATE DIHYDRATE 2 PUFF: 200; 5 AEROSOL RESPIRATORY (INHALATION) at 20:37

## 2019-01-12 RX ADMIN — SODIUM CHLORIDE 1000 ML: 9 INJECTION, SOLUTION INTRAVENOUS at 13:05

## 2019-01-12 RX ADMIN — Medication 10 ML: at 14:19

## 2019-01-12 RX ADMIN — ONDANSETRON 4 MG: 2 INJECTION INTRAMUSCULAR; INTRAVENOUS at 13:05

## 2019-01-12 RX ADMIN — OXYCODONE AND ACETAMINOPHEN 2 TABLET: 5; 325 TABLET ORAL at 20:36

## 2019-01-12 RX ADMIN — PIPERACILLIN SODIUM AND TAZOBACTAM SODIUM 3.38 G: 3; .375 INJECTION, POWDER, LYOPHILIZED, FOR SOLUTION INTRAVENOUS at 22:11

## 2019-01-12 RX ADMIN — VANCOMYCIN HYDROCHLORIDE 1500 MG: 5 INJECTION, POWDER, LYOPHILIZED, FOR SOLUTION INTRAVENOUS at 17:27

## 2019-01-12 RX ADMIN — IOPAMIDOL 75 ML: 755 INJECTION, SOLUTION INTRAVENOUS at 14:19

## 2019-01-12 RX ADMIN — MONTELUKAST SODIUM 10 MG: 10 TABLET, FILM COATED ORAL at 20:36

## 2019-01-12 RX ADMIN — ONDANSETRON 4 MG: 2 INJECTION INTRAMUSCULAR; INTRAVENOUS at 19:41

## 2019-01-12 RX ADMIN — FENTANYL CITRATE 100 MCG: 50 INJECTION INTRAMUSCULAR; INTRAVENOUS at 14:32

## 2019-01-12 RX ADMIN — SERTRALINE HYDROCHLORIDE 50 MG: 50 TABLET ORAL at 20:36

## 2019-01-12 RX ADMIN — MORPHINE SULFATE 4 MG: 4 INJECTION INTRAVENOUS at 13:05

## 2019-01-12 RX ADMIN — Medication 10 ML: at 20:36

## 2019-01-12 ASSESSMENT — ENCOUNTER SYMPTOMS
VOMITING: 0
DIARRHEA: 0
SHORTNESS OF BREATH: 0
COUGH: 0
SORE THROAT: 0
BACK PAIN: 0
CONSTIPATION: 0
BLOOD IN STOOL: 0
ABDOMINAL PAIN: 1
TROUBLE SWALLOWING: 0
NAUSEA: 0
COLOR CHANGE: 1

## 2019-01-12 ASSESSMENT — PAIN DESCRIPTION - LOCATION
LOCATION: ABDOMEN

## 2019-01-12 ASSESSMENT — PAIN SCALES - GENERAL
PAINLEVEL_OUTOF10: 7
PAINLEVEL_OUTOF10: 9
PAINLEVEL_OUTOF10: 10
PAINLEVEL_OUTOF10: 9
PAINLEVEL_OUTOF10: 8
PAINLEVEL_OUTOF10: 8

## 2019-01-12 ASSESSMENT — PAIN DESCRIPTION - PAIN TYPE
TYPE: ACUTE PAIN

## 2019-01-12 ASSESSMENT — PAIN DESCRIPTION - DESCRIPTORS: DESCRIPTORS: ACHING;SHARP;TENDER

## 2019-01-13 PROBLEM — O09.90 HRP (HIGH RISK PREGNANCY), UNSPECIFIED TRIMESTER: Status: RESOLVED | Noted: 2018-08-29 | Resolved: 2019-01-13

## 2019-01-13 LAB
ABO/RH: NORMAL
ABSOLUTE EOS #: 0.5 K/UL (ref 0–0.4)
ABSOLUTE IMMATURE GRANULOCYTE: ABNORMAL K/UL (ref 0–0.3)
ABSOLUTE LYMPH #: 1.8 K/UL (ref 1–4.8)
ABSOLUTE MONO #: 0.7 K/UL (ref 0.1–1.3)
ANTIBODY SCREEN: NEGATIVE
ARM BAND NUMBER: NORMAL
BASOPHILS # BLD: 1 % (ref 0–2)
BASOPHILS ABSOLUTE: 0.1 K/UL (ref 0–0.2)
CREAT SERPL-MCNC: 0.74 MG/DL (ref 0.5–0.9)
DIFFERENTIAL TYPE: ABNORMAL
EOSINOPHILS RELATIVE PERCENT: 5 % (ref 0–4)
EXPIRATION DATE: NORMAL
GFR AFRICAN AMERICAN: >60 ML/MIN
GFR NON-AFRICAN AMERICAN: >60 ML/MIN
GFR SERPL CREATININE-BSD FRML MDRD: NORMAL ML/MIN/{1.73_M2}
GFR SERPL CREATININE-BSD FRML MDRD: NORMAL ML/MIN/{1.73_M2}
HCT VFR BLD CALC: 34.8 % (ref 36–46)
HEMOGLOBIN: 11.2 G/DL (ref 12–16)
IMMATURE GRANULOCYTES: ABNORMAL %
INR BLD: 0.9
LYMPHOCYTES # BLD: 16 % (ref 24–44)
MCH RBC QN AUTO: 29.7 PG (ref 26–34)
MCHC RBC AUTO-ENTMCNC: 32.3 G/DL (ref 31–37)
MCV RBC AUTO: 92.1 FL (ref 80–100)
MONOCYTES # BLD: 7 % (ref 1–7)
NRBC AUTOMATED: ABNORMAL PER 100 WBC
PARTIAL THROMBOPLASTIN TIME: 22.7 SEC (ref 23–31)
PDW BLD-RTO: 14 % (ref 11.5–14.9)
PLATELET # BLD: 451 K/UL (ref 150–450)
PLATELET ESTIMATE: ABNORMAL
PMV BLD AUTO: 8 FL (ref 6–12)
PROTHROMBIN TIME: 9.8 SEC (ref 9.7–12)
RBC # BLD: 3.77 M/UL (ref 4–5.2)
RBC # BLD: ABNORMAL 10*6/UL
SEG NEUTROPHILS: 71 % (ref 36–66)
SEGMENTED NEUTROPHILS ABSOLUTE COUNT: 8.2 K/UL (ref 1.3–9.1)
WBC # BLD: 11.4 K/UL (ref 3.5–11)
WBC # BLD: ABNORMAL 10*3/UL

## 2019-01-13 PROCEDURE — 6360000002 HC RX W HCPCS: Performed by: STUDENT IN AN ORGANIZED HEALTH CARE EDUCATION/TRAINING PROGRAM

## 2019-01-13 PROCEDURE — 6360000002 HC RX W HCPCS: Performed by: EMERGENCY MEDICINE

## 2019-01-13 PROCEDURE — 86850 RBC ANTIBODY SCREEN: CPT

## 2019-01-13 PROCEDURE — 86901 BLOOD TYPING SEROLOGIC RH(D): CPT

## 2019-01-13 PROCEDURE — 85025 COMPLETE CBC W/AUTO DIFF WBC: CPT

## 2019-01-13 PROCEDURE — 6370000000 HC RX 637 (ALT 250 FOR IP): Performed by: STUDENT IN AN ORGANIZED HEALTH CARE EDUCATION/TRAINING PROGRAM

## 2019-01-13 PROCEDURE — 82565 ASSAY OF CREATININE: CPT

## 2019-01-13 PROCEDURE — 2580000003 HC RX 258: Performed by: STUDENT IN AN ORGANIZED HEALTH CARE EDUCATION/TRAINING PROGRAM

## 2019-01-13 PROCEDURE — 36415 COLL VENOUS BLD VENIPUNCTURE: CPT

## 2019-01-13 PROCEDURE — 2580000003 HC RX 258: Performed by: SPECIALIST

## 2019-01-13 PROCEDURE — 2580000003 HC RX 258: Performed by: EMERGENCY MEDICINE

## 2019-01-13 PROCEDURE — 85610 PROTHROMBIN TIME: CPT

## 2019-01-13 PROCEDURE — 1200000000 HC SEMI PRIVATE

## 2019-01-13 PROCEDURE — 86900 BLOOD TYPING SEROLOGIC ABO: CPT

## 2019-01-13 PROCEDURE — 85730 THROMBOPLASTIN TIME PARTIAL: CPT

## 2019-01-13 RX ORDER — ACETAMINOPHEN 325 MG/1
325 TABLET ORAL ONCE
Status: COMPLETED | OUTPATIENT
Start: 2019-01-13 | End: 2019-01-13

## 2019-01-13 RX ORDER — CALCIUM CARBONATE 200(500)MG
500 TABLET,CHEWABLE ORAL 3 TIMES DAILY PRN
Status: DISCONTINUED | OUTPATIENT
Start: 2019-01-13 | End: 2019-01-18 | Stop reason: HOSPADM

## 2019-01-13 RX ORDER — ACETAMINOPHEN 500 MG
1000 TABLET ORAL EVERY 6 HOURS PRN
Status: DISCONTINUED | OUTPATIENT
Start: 2019-01-13 | End: 2019-01-18 | Stop reason: HOSPADM

## 2019-01-13 RX ORDER — OXYCODONE HYDROCHLORIDE 5 MG/1
10 TABLET ORAL EVERY 4 HOURS PRN
Status: DISCONTINUED | OUTPATIENT
Start: 2019-01-13 | End: 2019-01-18 | Stop reason: HOSPADM

## 2019-01-13 RX ORDER — OXYCODONE HYDROCHLORIDE 5 MG/1
5 TABLET ORAL EVERY 4 HOURS PRN
Status: DISCONTINUED | OUTPATIENT
Start: 2019-01-13 | End: 2019-01-18 | Stop reason: HOSPADM

## 2019-01-13 RX ORDER — PROMETHAZINE HYDROCHLORIDE 25 MG/ML
12.5 INJECTION, SOLUTION INTRAMUSCULAR; INTRAVENOUS EVERY 6 HOURS PRN
Status: DISCONTINUED | OUTPATIENT
Start: 2019-01-13 | End: 2019-01-13 | Stop reason: CLARIF

## 2019-01-13 RX ORDER — SODIUM CHLORIDE 9 MG/ML
INJECTION, SOLUTION INTRAVENOUS CONTINUOUS
Status: DISCONTINUED | OUTPATIENT
Start: 2019-01-13 | End: 2019-01-18 | Stop reason: HOSPADM

## 2019-01-13 RX ORDER — LORAZEPAM 1 MG/1
2 TABLET ORAL
Status: COMPLETED | OUTPATIENT
Start: 2019-01-13 | End: 2019-01-13

## 2019-01-13 RX ORDER — PANTOPRAZOLE SODIUM 40 MG/1
40 TABLET, DELAYED RELEASE ORAL
Status: DISCONTINUED | OUTPATIENT
Start: 2019-01-13 | End: 2019-01-18 | Stop reason: HOSPADM

## 2019-01-13 RX ADMIN — PIPERACILLIN SODIUM AND TAZOBACTAM SODIUM 3.38 G: 3; .375 INJECTION, POWDER, LYOPHILIZED, FOR SOLUTION INTRAVENOUS at 22:28

## 2019-01-13 RX ADMIN — VANCOMYCIN HYDROCHLORIDE 1500 MG: 5 INJECTION, POWDER, LYOPHILIZED, FOR SOLUTION INTRAVENOUS at 05:01

## 2019-01-13 RX ADMIN — SODIUM CHLORIDE: 9 INJECTION, SOLUTION INTRAVENOUS at 16:08

## 2019-01-13 RX ADMIN — PROMETHAZINE HYDROCHLORIDE 12.5 MG: 25 INJECTION INTRAMUSCULAR; INTRAVENOUS at 09:20

## 2019-01-13 RX ADMIN — Medication 10 ML: at 12:23

## 2019-01-13 RX ADMIN — PROMETHAZINE HYDROCHLORIDE 12.5 MG: 25 INJECTION INTRAMUSCULAR; INTRAVENOUS at 21:48

## 2019-01-13 RX ADMIN — SERTRALINE HYDROCHLORIDE 50 MG: 50 TABLET ORAL at 21:48

## 2019-01-13 RX ADMIN — OXYCODONE AND ACETAMINOPHEN 2 TABLET: 5; 325 TABLET ORAL at 08:32

## 2019-01-13 RX ADMIN — Medication 10 ML: at 08:32

## 2019-01-13 RX ADMIN — OXYCODONE AND ACETAMINOPHEN 2 TABLET: 5; 325 TABLET ORAL at 12:50

## 2019-01-13 RX ADMIN — PROMETHAZINE HYDROCHLORIDE 12.5 MG: 25 INJECTION INTRAMUSCULAR; INTRAVENOUS at 15:45

## 2019-01-13 RX ADMIN — OXYCODONE HYDROCHLORIDE 10 MG: 5 TABLET ORAL at 18:40

## 2019-01-13 RX ADMIN — OXYCODONE AND ACETAMINOPHEN 2 TABLET: 5; 325 TABLET ORAL at 00:40

## 2019-01-13 RX ADMIN — LORAZEPAM 2 MG: 1 TABLET ORAL at 11:42

## 2019-01-13 RX ADMIN — PROMETHAZINE HYDROCHLORIDE 12.5 MG: 25 INJECTION INTRAMUSCULAR; INTRAVENOUS at 00:40

## 2019-01-13 RX ADMIN — MONTELUKAST SODIUM 10 MG: 10 TABLET, FILM COATED ORAL at 21:48

## 2019-01-13 RX ADMIN — PANTOPRAZOLE SODIUM 40 MG: 40 TABLET, DELAYED RELEASE ORAL at 05:01

## 2019-01-13 RX ADMIN — PIPERACILLIN SODIUM AND TAZOBACTAM SODIUM 3.38 G: 3; .375 INJECTION, POWDER, LYOPHILIZED, FOR SOLUTION INTRAVENOUS at 11:47

## 2019-01-13 RX ADMIN — PIPERACILLIN SODIUM AND TAZOBACTAM SODIUM 3.38 G: 3; .375 INJECTION, POWDER, LYOPHILIZED, FOR SOLUTION INTRAVENOUS at 04:23

## 2019-01-13 RX ADMIN — OXYCODONE AND ACETAMINOPHEN 2 TABLET: 5; 325 TABLET ORAL at 04:23

## 2019-01-13 RX ADMIN — PANTOPRAZOLE SODIUM 40 MG: 40 TABLET, DELAYED RELEASE ORAL at 17:27

## 2019-01-13 RX ADMIN — OXYCODONE HYDROCHLORIDE 10 MG: 5 TABLET ORAL at 22:28

## 2019-01-13 RX ADMIN — VANCOMYCIN HYDROCHLORIDE 1500 MG: 5 INJECTION, POWDER, LYOPHILIZED, FOR SOLUTION INTRAVENOUS at 17:27

## 2019-01-13 RX ADMIN — SODIUM CHLORIDE: 9 INJECTION, SOLUTION INTRAVENOUS at 22:28

## 2019-01-13 RX ADMIN — MORPHINE SULFATE 2 MG: 2 INJECTION, SOLUTION INTRAMUSCULAR; INTRAVENOUS at 16:07

## 2019-01-13 RX ADMIN — ACETAMINOPHEN 325 MG: 325 TABLET, FILM COATED ORAL at 17:27

## 2019-01-13 RX ADMIN — MOMETASONE FUROATE AND FORMOTEROL FUMARATE DIHYDRATE 2 PUFF: 200; 5 AEROSOL RESPIRATORY (INHALATION) at 21:48

## 2019-01-13 ASSESSMENT — PAIN DESCRIPTION - LOCATION
LOCATION: ABDOMEN

## 2019-01-13 ASSESSMENT — PAIN SCALES - GENERAL
PAINLEVEL_OUTOF10: 8
PAINLEVEL_OUTOF10: 9
PAINLEVEL_OUTOF10: 9
PAINLEVEL_OUTOF10: 7
PAINLEVEL_OUTOF10: 2
PAINLEVEL_OUTOF10: 6
PAINLEVEL_OUTOF10: 7
PAINLEVEL_OUTOF10: 3
PAINLEVEL_OUTOF10: 4
PAINLEVEL_OUTOF10: 9
PAINLEVEL_OUTOF10: 8
PAINLEVEL_OUTOF10: 7
PAINLEVEL_OUTOF10: 9
PAINLEVEL_OUTOF10: 4
PAINLEVEL_OUTOF10: 9
PAINLEVEL_OUTOF10: 4

## 2019-01-13 ASSESSMENT — PAIN DESCRIPTION - PAIN TYPE
TYPE: ACUTE PAIN
TYPE: SURGICAL PAIN
TYPE: ACUTE PAIN

## 2019-01-14 ENCOUNTER — APPOINTMENT (OUTPATIENT)
Dept: INTERVENTIONAL RADIOLOGY/VASCULAR | Age: 39
DRG: 776 | End: 2019-01-14
Payer: COMMERCIAL

## 2019-01-14 LAB
ABSOLUTE EOS #: 0.4 K/UL (ref 0–0.4)
ABSOLUTE IMMATURE GRANULOCYTE: ABNORMAL K/UL (ref 0–0.3)
ABSOLUTE LYMPH #: 1.9 K/UL (ref 1–4.8)
ABSOLUTE MONO #: 0.7 K/UL (ref 0.1–1.3)
ANION GAP SERPL CALCULATED.3IONS-SCNC: 11 MMOL/L (ref 9–17)
BASOPHILS # BLD: 0 % (ref 0–2)
BASOPHILS ABSOLUTE: 0 K/UL (ref 0–0.2)
BUN BLDV-MCNC: 11 MG/DL (ref 6–20)
BUN/CREAT BLD: ABNORMAL (ref 9–20)
CALCIUM SERPL-MCNC: 8.2 MG/DL (ref 8.6–10.4)
CHLORIDE BLD-SCNC: 101 MMOL/L (ref 98–107)
CO2: 23 MMOL/L (ref 20–31)
CREAT SERPL-MCNC: 0.67 MG/DL (ref 0.5–0.9)
CULTURE: NORMAL
CULTURE: NORMAL
DIFFERENTIAL TYPE: ABNORMAL
DIRECT EXAM: NORMAL
EOSINOPHILS RELATIVE PERCENT: 3 % (ref 0–4)
GFR AFRICAN AMERICAN: >60 ML/MIN
GFR NON-AFRICAN AMERICAN: >60 ML/MIN
GFR SERPL CREATININE-BSD FRML MDRD: ABNORMAL ML/MIN/{1.73_M2}
GFR SERPL CREATININE-BSD FRML MDRD: ABNORMAL ML/MIN/{1.73_M2}
GLUCOSE BLD-MCNC: 115 MG/DL (ref 70–99)
HCT VFR BLD CALC: 31.1 % (ref 36–46)
HEMOGLOBIN: 10.2 G/DL (ref 12–16)
IMMATURE GRANULOCYTES: ABNORMAL %
LYMPHOCYTES # BLD: 17 % (ref 24–44)
Lab: NORMAL
MCH RBC QN AUTO: 28.9 PG (ref 26–34)
MCHC RBC AUTO-ENTMCNC: 32.7 G/DL (ref 31–37)
MCV RBC AUTO: 88.3 FL (ref 80–100)
MONOCYTES # BLD: 6 % (ref 1–7)
NRBC AUTOMATED: ABNORMAL PER 100 WBC
PDW BLD-RTO: 13.9 % (ref 11.5–14.9)
PLATELET # BLD: 425 K/UL (ref 150–450)
PLATELET ESTIMATE: ABNORMAL
PMV BLD AUTO: 8 FL (ref 6–12)
POTASSIUM SERPL-SCNC: 4 MMOL/L (ref 3.7–5.3)
RBC # BLD: 3.53 M/UL (ref 4–5.2)
RBC # BLD: ABNORMAL 10*6/UL
SEG NEUTROPHILS: 74 % (ref 36–66)
SEGMENTED NEUTROPHILS ABSOLUTE COUNT: 8.4 K/UL (ref 1.3–9.1)
SODIUM BLD-SCNC: 135 MMOL/L (ref 135–144)
SPECIMEN DESCRIPTION: NORMAL
STATUS: NORMAL
VANCOMYCIN TROUGH DATE LAST DOSE: NORMAL
VANCOMYCIN TROUGH DOSE AMOUNT: 1630
VANCOMYCIN TROUGH TIME LAST DOSE: NORMAL
VANCOMYCIN TROUGH: 10.9 UG/ML (ref 10–20)
WBC # BLD: 11.4 K/UL (ref 3.5–11)
WBC # BLD: ABNORMAL 10*3/UL

## 2019-01-14 PROCEDURE — 2500000003 HC RX 250 WO HCPCS: Performed by: RADIOLOGY

## 2019-01-14 PROCEDURE — 1200000000 HC SEMI PRIVATE

## 2019-01-14 PROCEDURE — 87075 CULTR BACTERIA EXCEPT BLOOD: CPT

## 2019-01-14 PROCEDURE — 10030 IMG GID FLU COLL DRG SFT TIS: CPT | Performed by: RADIOLOGY

## 2019-01-14 PROCEDURE — 6370000000 HC RX 637 (ALT 250 FOR IP): Performed by: STUDENT IN AN ORGANIZED HEALTH CARE EDUCATION/TRAINING PROGRAM

## 2019-01-14 PROCEDURE — 2709999900 IR ABSCESS DRAINAGE PERC

## 2019-01-14 PROCEDURE — 6360000002 HC RX W HCPCS: Performed by: RADIOLOGY

## 2019-01-14 PROCEDURE — 87205 SMEAR GRAM STAIN: CPT

## 2019-01-14 PROCEDURE — 2580000003 HC RX 258: Performed by: STUDENT IN AN ORGANIZED HEALTH CARE EDUCATION/TRAINING PROGRAM

## 2019-01-14 PROCEDURE — 85025 COMPLETE CBC W/AUTO DIFF WBC: CPT

## 2019-01-14 PROCEDURE — 2580000003 HC RX 258: Performed by: EMERGENCY MEDICINE

## 2019-01-14 PROCEDURE — 87070 CULTURE OTHR SPECIMN AEROBIC: CPT

## 2019-01-14 PROCEDURE — 6360000002 HC RX W HCPCS: Performed by: EMERGENCY MEDICINE

## 2019-01-14 PROCEDURE — 0W9F3ZZ DRAINAGE OF ABDOMINAL WALL, PERCUTANEOUS APPROACH: ICD-10-PCS | Performed by: RADIOLOGY

## 2019-01-14 PROCEDURE — 36415 COLL VENOUS BLD VENIPUNCTURE: CPT

## 2019-01-14 PROCEDURE — 80202 ASSAY OF VANCOMYCIN: CPT

## 2019-01-14 PROCEDURE — 6360000002 HC RX W HCPCS: Performed by: STUDENT IN AN ORGANIZED HEALTH CARE EDUCATION/TRAINING PROGRAM

## 2019-01-14 PROCEDURE — 80048 BASIC METABOLIC PNL TOTAL CA: CPT

## 2019-01-14 RX ORDER — FENTANYL CITRATE 50 UG/ML
INJECTION, SOLUTION INTRAMUSCULAR; INTRAVENOUS
Status: COMPLETED | OUTPATIENT
Start: 2019-01-14 | End: 2019-01-14

## 2019-01-14 RX ORDER — LIDOCAINE HYDROCHLORIDE 10 MG/ML
INJECTION, SOLUTION INFILTRATION; PERINEURAL
Status: COMPLETED | OUTPATIENT
Start: 2019-01-14 | End: 2019-01-14

## 2019-01-14 RX ADMIN — PROMETHAZINE HYDROCHLORIDE 12.5 MG: 25 INJECTION INTRAMUSCULAR; INTRAVENOUS at 10:35

## 2019-01-14 RX ADMIN — PANTOPRAZOLE SODIUM 40 MG: 40 TABLET, DELAYED RELEASE ORAL at 16:33

## 2019-01-14 RX ADMIN — PANTOPRAZOLE SODIUM 40 MG: 40 TABLET, DELAYED RELEASE ORAL at 06:29

## 2019-01-14 RX ADMIN — OXYCODONE HYDROCHLORIDE 10 MG: 5 TABLET ORAL at 06:29

## 2019-01-14 RX ADMIN — VANCOMYCIN HYDROCHLORIDE 1500 MG: 5 INJECTION, POWDER, LYOPHILIZED, FOR SOLUTION INTRAVENOUS at 17:11

## 2019-01-14 RX ADMIN — PROMETHAZINE HYDROCHLORIDE 12.5 MG: 25 INJECTION INTRAMUSCULAR; INTRAVENOUS at 22:12

## 2019-01-14 RX ADMIN — PIPERACILLIN SODIUM AND TAZOBACTAM SODIUM 3.38 G: 3; .375 INJECTION, POWDER, LYOPHILIZED, FOR SOLUTION INTRAVENOUS at 04:20

## 2019-01-14 RX ADMIN — VANCOMYCIN HYDROCHLORIDE 1500 MG: 5 INJECTION, POWDER, LYOPHILIZED, FOR SOLUTION INTRAVENOUS at 04:51

## 2019-01-14 RX ADMIN — OXYCODONE HYDROCHLORIDE 10 MG: 5 TABLET ORAL at 10:42

## 2019-01-14 RX ADMIN — MONTELUKAST SODIUM 10 MG: 10 TABLET, FILM COATED ORAL at 19:32

## 2019-01-14 RX ADMIN — LIDOCAINE HYDROCHLORIDE 2 ML: 10 INJECTION, SOLUTION INFILTRATION; PERINEURAL at 14:45

## 2019-01-14 RX ADMIN — SERTRALINE HYDROCHLORIDE 50 MG: 50 TABLET ORAL at 19:32

## 2019-01-14 RX ADMIN — OXYCODONE HYDROCHLORIDE 10 MG: 5 TABLET ORAL at 19:32

## 2019-01-14 RX ADMIN — PIPERACILLIN SODIUM AND TAZOBACTAM SODIUM 3.38 G: 3; .375 INJECTION, POWDER, LYOPHILIZED, FOR SOLUTION INTRAVENOUS at 10:01

## 2019-01-14 RX ADMIN — PIPERACILLIN SODIUM AND TAZOBACTAM SODIUM 3.38 G: 3; .375 INJECTION, POWDER, LYOPHILIZED, FOR SOLUTION INTRAVENOUS at 16:33

## 2019-01-14 RX ADMIN — PROMETHAZINE HYDROCHLORIDE 12.5 MG: 25 INJECTION INTRAMUSCULAR; INTRAVENOUS at 03:51

## 2019-01-14 RX ADMIN — PIPERACILLIN SODIUM AND TAZOBACTAM SODIUM 3.38 G: 3; .375 INJECTION, POWDER, LYOPHILIZED, FOR SOLUTION INTRAVENOUS at 23:09

## 2019-01-14 RX ADMIN — SODIUM CHLORIDE: 9 INJECTION, SOLUTION INTRAVENOUS at 10:35

## 2019-01-14 RX ADMIN — MOMETASONE FUROATE AND FORMOTEROL FUMARATE DIHYDRATE 2 PUFF: 200; 5 AEROSOL RESPIRATORY (INHALATION) at 10:01

## 2019-01-14 RX ADMIN — OXYCODONE HYDROCHLORIDE 10 MG: 5 TABLET ORAL at 15:35

## 2019-01-14 RX ADMIN — FENTANYL CITRATE 25 MCG: 50 INJECTION, SOLUTION INTRAMUSCULAR; INTRAVENOUS at 14:46

## 2019-01-14 RX ADMIN — OXYCODONE HYDROCHLORIDE 10 MG: 5 TABLET ORAL at 23:09

## 2019-01-14 RX ADMIN — MOMETASONE FUROATE AND FORMOTEROL FUMARATE DIHYDRATE 2 PUFF: 200; 5 AEROSOL RESPIRATORY (INHALATION) at 19:33

## 2019-01-14 RX ADMIN — OXYCODONE HYDROCHLORIDE 10 MG: 5 TABLET ORAL at 02:32

## 2019-01-14 RX ADMIN — FENTANYL CITRATE 25 MCG: 50 INJECTION, SOLUTION INTRAMUSCULAR; INTRAVENOUS at 14:43

## 2019-01-14 ASSESSMENT — PAIN DESCRIPTION - LOCATION
LOCATION: ABDOMEN
LOCATION: ABDOMEN;SHOULDER
LOCATION: ABDOMEN

## 2019-01-14 ASSESSMENT — PAIN DESCRIPTION - DESCRIPTORS
DESCRIPTORS: STABBING;SHOOTING
DESCRIPTORS: ACHING;DISCOMFORT
DESCRIPTORS: DISCOMFORT;ACHING

## 2019-01-14 ASSESSMENT — PAIN DESCRIPTION - PAIN TYPE
TYPE: ACUTE PAIN

## 2019-01-14 ASSESSMENT — PAIN SCALES - GENERAL
PAINLEVEL_OUTOF10: 10
PAINLEVEL_OUTOF10: 9
PAINLEVEL_OUTOF10: 8
PAINLEVEL_OUTOF10: 9
PAINLEVEL_OUTOF10: 8
PAINLEVEL_OUTOF10: 4
PAINLEVEL_OUTOF10: 8
PAINLEVEL_OUTOF10: 9
PAINLEVEL_OUTOF10: 0
PAINLEVEL_OUTOF10: 9

## 2019-01-14 ASSESSMENT — PAIN DESCRIPTION - ORIENTATION
ORIENTATION: MID;LOWER
ORIENTATION: MID;LOWER
ORIENTATION: MID;LOWER;LEFT

## 2019-01-14 ASSESSMENT — PAIN DESCRIPTION - FREQUENCY
FREQUENCY: CONTINUOUS

## 2019-01-15 ENCOUNTER — APPOINTMENT (OUTPATIENT)
Dept: CT IMAGING | Age: 39
DRG: 776 | End: 2019-01-15
Payer: COMMERCIAL

## 2019-01-15 PROBLEM — T81.49XA INCISIONAL ABSCESS: Status: ACTIVE | Noted: 2019-01-15

## 2019-01-15 PROBLEM — A41.9 SEPSIS (HCC): Status: ACTIVE | Noted: 2019-01-15

## 2019-01-15 PROCEDURE — 6370000000 HC RX 637 (ALT 250 FOR IP): Performed by: STUDENT IN AN ORGANIZED HEALTH CARE EDUCATION/TRAINING PROGRAM

## 2019-01-15 PROCEDURE — 6360000002 HC RX W HCPCS: Performed by: STUDENT IN AN ORGANIZED HEALTH CARE EDUCATION/TRAINING PROGRAM

## 2019-01-15 PROCEDURE — 6360000002 HC RX W HCPCS: Performed by: EMERGENCY MEDICINE

## 2019-01-15 PROCEDURE — 2580000003 HC RX 258: Performed by: STUDENT IN AN ORGANIZED HEALTH CARE EDUCATION/TRAINING PROGRAM

## 2019-01-15 PROCEDURE — 99222 1ST HOSP IP/OBS MODERATE 55: CPT | Performed by: INTERNAL MEDICINE

## 2019-01-15 PROCEDURE — 73700 CT LOWER EXTREMITY W/O DYE: CPT

## 2019-01-15 PROCEDURE — 1200000000 HC SEMI PRIVATE

## 2019-01-15 PROCEDURE — 2580000003 HC RX 258: Performed by: EMERGENCY MEDICINE

## 2019-01-15 RX ADMIN — OXYCODONE HYDROCHLORIDE 10 MG: 5 TABLET ORAL at 21:56

## 2019-01-15 RX ADMIN — PANTOPRAZOLE SODIUM 40 MG: 40 TABLET, DELAYED RELEASE ORAL at 17:04

## 2019-01-15 RX ADMIN — OXYCODONE HYDROCHLORIDE 10 MG: 5 TABLET ORAL at 16:57

## 2019-01-15 RX ADMIN — OXYCODONE HYDROCHLORIDE 10 MG: 5 TABLET ORAL at 07:25

## 2019-01-15 RX ADMIN — PIPERACILLIN SODIUM AND TAZOBACTAM SODIUM 3.38 G: 3; .375 INJECTION, POWDER, LYOPHILIZED, FOR SOLUTION INTRAVENOUS at 17:04

## 2019-01-15 RX ADMIN — VANCOMYCIN HYDROCHLORIDE 1500 MG: 5 INJECTION, POWDER, LYOPHILIZED, FOR SOLUTION INTRAVENOUS at 17:40

## 2019-01-15 RX ADMIN — PANTOPRAZOLE SODIUM 40 MG: 40 TABLET, DELAYED RELEASE ORAL at 05:48

## 2019-01-15 RX ADMIN — PIPERACILLIN SODIUM AND TAZOBACTAM SODIUM 3.38 G: 3; .375 INJECTION, POWDER, LYOPHILIZED, FOR SOLUTION INTRAVENOUS at 05:09

## 2019-01-15 RX ADMIN — PROMETHAZINE HYDROCHLORIDE 12.5 MG: 25 INJECTION INTRAMUSCULAR; INTRAVENOUS at 22:44

## 2019-01-15 RX ADMIN — MOMETASONE FUROATE AND FORMOTEROL FUMARATE DIHYDRATE 2 PUFF: 200; 5 AEROSOL RESPIRATORY (INHALATION) at 07:25

## 2019-01-15 RX ADMIN — MOMETASONE FUROATE AND FORMOTEROL FUMARATE DIHYDRATE 2 PUFF: 200; 5 AEROSOL RESPIRATORY (INHALATION) at 21:56

## 2019-01-15 RX ADMIN — OXYCODONE HYDROCHLORIDE 10 MG: 5 TABLET ORAL at 12:13

## 2019-01-15 RX ADMIN — PIPERACILLIN SODIUM AND TAZOBACTAM SODIUM 3.38 G: 3; .375 INJECTION, POWDER, LYOPHILIZED, FOR SOLUTION INTRAVENOUS at 21:56

## 2019-01-15 RX ADMIN — OXYCODONE HYDROCHLORIDE 10 MG: 5 TABLET ORAL at 03:08

## 2019-01-15 RX ADMIN — SERTRALINE HYDROCHLORIDE 50 MG: 50 TABLET ORAL at 21:56

## 2019-01-15 RX ADMIN — SODIUM CHLORIDE: 9 INJECTION, SOLUTION INTRAVENOUS at 05:48

## 2019-01-15 RX ADMIN — MONTELUKAST SODIUM 10 MG: 10 TABLET, FILM COATED ORAL at 21:56

## 2019-01-15 RX ADMIN — PIPERACILLIN SODIUM AND TAZOBACTAM SODIUM 3.38 G: 3; .375 INJECTION, POWDER, LYOPHILIZED, FOR SOLUTION INTRAVENOUS at 10:33

## 2019-01-15 RX ADMIN — PROMETHAZINE HYDROCHLORIDE 12.5 MG: 25 INJECTION INTRAMUSCULAR; INTRAVENOUS at 15:23

## 2019-01-15 RX ADMIN — VANCOMYCIN HYDROCHLORIDE 1500 MG: 5 INJECTION, POWDER, LYOPHILIZED, FOR SOLUTION INTRAVENOUS at 05:48

## 2019-01-15 ASSESSMENT — PAIN DESCRIPTION - PAIN TYPE: TYPE: SURGICAL PAIN;ACUTE PAIN

## 2019-01-15 ASSESSMENT — PAIN SCALES - GENERAL
PAINLEVEL_OUTOF10: 7
PAINLEVEL_OUTOF10: 7
PAINLEVEL_OUTOF10: 9
PAINLEVEL_OUTOF10: 0
PAINLEVEL_OUTOF10: 9
PAINLEVEL_OUTOF10: 1
PAINLEVEL_OUTOF10: 2
PAINLEVEL_OUTOF10: 7

## 2019-01-15 ASSESSMENT — PAIN DESCRIPTION - LOCATION: LOCATION: ABDOMEN

## 2019-01-15 ASSESSMENT — PAIN - FUNCTIONAL ASSESSMENT: PAIN_FUNCTIONAL_ASSESSMENT: ACTIVITIES ARE NOT PREVENTED

## 2019-01-15 ASSESSMENT — PAIN DESCRIPTION - ONSET: ONSET: ON-GOING

## 2019-01-16 ENCOUNTER — APPOINTMENT (OUTPATIENT)
Dept: ULTRASOUND IMAGING | Age: 39
DRG: 776 | End: 2019-01-16
Payer: COMMERCIAL

## 2019-01-16 PROBLEM — G43.909 MIGRAINES: Status: ACTIVE | Noted: 2019-01-16

## 2019-01-16 LAB
ABSOLUTE EOS #: 0.3 K/UL (ref 0–0.4)
ABSOLUTE IMMATURE GRANULOCYTE: ABNORMAL K/UL (ref 0–0.3)
ABSOLUTE LYMPH #: 1.3 K/UL (ref 1–4.8)
ABSOLUTE MONO #: 0.5 K/UL (ref 0.1–1.3)
ANION GAP SERPL CALCULATED.3IONS-SCNC: 11 MMOL/L (ref 9–17)
BASOPHILS # BLD: 1 % (ref 0–2)
BASOPHILS ABSOLUTE: 0.1 K/UL (ref 0–0.2)
BUN BLDV-MCNC: 9 MG/DL (ref 6–20)
BUN/CREAT BLD: ABNORMAL (ref 9–20)
CALCIUM SERPL-MCNC: 8.5 MG/DL (ref 8.6–10.4)
CHLORIDE BLD-SCNC: 104 MMOL/L (ref 98–107)
CO2: 23 MMOL/L (ref 20–31)
CREAT SERPL-MCNC: 0.71 MG/DL (ref 0.5–0.9)
DIFFERENTIAL TYPE: ABNORMAL
EOSINOPHILS RELATIVE PERCENT: 4 % (ref 0–4)
GFR AFRICAN AMERICAN: >60 ML/MIN
GFR NON-AFRICAN AMERICAN: >60 ML/MIN
GFR SERPL CREATININE-BSD FRML MDRD: ABNORMAL ML/MIN/{1.73_M2}
GFR SERPL CREATININE-BSD FRML MDRD: ABNORMAL ML/MIN/{1.73_M2}
GLUCOSE BLD-MCNC: 120 MG/DL (ref 70–99)
HCT VFR BLD CALC: 29.6 % (ref 36–46)
HEMOGLOBIN: 9.4 G/DL (ref 12–16)
IMMATURE GRANULOCYTES: ABNORMAL %
LYMPHOCYTES # BLD: 17 % (ref 24–44)
MCH RBC QN AUTO: 29 PG (ref 26–34)
MCHC RBC AUTO-ENTMCNC: 31.9 G/DL (ref 31–37)
MCV RBC AUTO: 91.2 FL (ref 80–100)
MONOCYTES # BLD: 6 % (ref 1–7)
NRBC AUTOMATED: ABNORMAL PER 100 WBC
PDW BLD-RTO: 14.5 % (ref 11.5–14.9)
PLATELET # BLD: 412 K/UL (ref 150–450)
PLATELET ESTIMATE: ABNORMAL
PMV BLD AUTO: 7.6 FL (ref 6–12)
POTASSIUM SERPL-SCNC: 3.9 MMOL/L (ref 3.7–5.3)
RBC # BLD: 3.24 M/UL (ref 4–5.2)
RBC # BLD: ABNORMAL 10*6/UL
SEG NEUTROPHILS: 72 % (ref 36–66)
SEGMENTED NEUTROPHILS ABSOLUTE COUNT: 5.2 K/UL (ref 1.3–9.1)
SODIUM BLD-SCNC: 138 MMOL/L (ref 135–144)
WBC # BLD: 7.4 K/UL (ref 3.5–11)
WBC # BLD: ABNORMAL 10*3/UL

## 2019-01-16 PROCEDURE — 2580000003 HC RX 258: Performed by: STUDENT IN AN ORGANIZED HEALTH CARE EDUCATION/TRAINING PROGRAM

## 2019-01-16 PROCEDURE — 36415 COLL VENOUS BLD VENIPUNCTURE: CPT

## 2019-01-16 PROCEDURE — 2580000003 HC RX 258: Performed by: EMERGENCY MEDICINE

## 2019-01-16 PROCEDURE — 76857 US EXAM PELVIC LIMITED: CPT

## 2019-01-16 PROCEDURE — 6370000000 HC RX 637 (ALT 250 FOR IP): Performed by: STUDENT IN AN ORGANIZED HEALTH CARE EDUCATION/TRAINING PROGRAM

## 2019-01-16 PROCEDURE — 99233 SBSQ HOSP IP/OBS HIGH 50: CPT | Performed by: INTERNAL MEDICINE

## 2019-01-16 PROCEDURE — 6360000002 HC RX W HCPCS: Performed by: STUDENT IN AN ORGANIZED HEALTH CARE EDUCATION/TRAINING PROGRAM

## 2019-01-16 PROCEDURE — 80048 BASIC METABOLIC PNL TOTAL CA: CPT

## 2019-01-16 PROCEDURE — 1200000000 HC SEMI PRIVATE

## 2019-01-16 PROCEDURE — 2580000003 HC RX 258: Performed by: SPECIALIST

## 2019-01-16 PROCEDURE — 6360000002 HC RX W HCPCS: Performed by: EMERGENCY MEDICINE

## 2019-01-16 PROCEDURE — 85025 COMPLETE CBC W/AUTO DIFF WBC: CPT

## 2019-01-16 RX ORDER — FERROUS SULFATE 325(65) MG
325 TABLET ORAL 2 TIMES DAILY WITH MEALS
Status: DISCONTINUED | OUTPATIENT
Start: 2019-01-16 | End: 2019-01-18 | Stop reason: HOSPADM

## 2019-01-16 RX ORDER — MAGNESIUM SULFATE 1 G/100ML
1 INJECTION INTRAVENOUS ONCE
Status: COMPLETED | OUTPATIENT
Start: 2019-01-16 | End: 2019-01-16

## 2019-01-16 RX ORDER — SUMATRIPTAN 50 MG/1
50 TABLET, FILM COATED ORAL ONCE
Status: DISCONTINUED | OUTPATIENT
Start: 2019-01-16 | End: 2019-01-18 | Stop reason: HOSPADM

## 2019-01-16 RX ADMIN — MONTELUKAST SODIUM 10 MG: 10 TABLET, FILM COATED ORAL at 21:33

## 2019-01-16 RX ADMIN — FERROUS SULFATE TAB 325 MG (65 MG ELEMENTAL FE) 325 MG: 325 (65 FE) TAB at 17:49

## 2019-01-16 RX ADMIN — PIPERACILLIN SODIUM AND TAZOBACTAM SODIUM 3.38 G: 3; .375 INJECTION, POWDER, LYOPHILIZED, FOR SOLUTION INTRAVENOUS at 18:18

## 2019-01-16 RX ADMIN — PIPERACILLIN SODIUM AND TAZOBACTAM SODIUM 3.38 G: 3; .375 INJECTION, POWDER, LYOPHILIZED, FOR SOLUTION INTRAVENOUS at 10:49

## 2019-01-16 RX ADMIN — OXYCODONE HYDROCHLORIDE 10 MG: 5 TABLET ORAL at 12:23

## 2019-01-16 RX ADMIN — OXYCODONE HYDROCHLORIDE 10 MG: 5 TABLET ORAL at 21:48

## 2019-01-16 RX ADMIN — OXYCODONE HYDROCHLORIDE 10 MG: 5 TABLET ORAL at 17:46

## 2019-01-16 RX ADMIN — Medication 10 ML: at 08:26

## 2019-01-16 RX ADMIN — PROMETHAZINE HYDROCHLORIDE 12.5 MG: 25 INJECTION INTRAMUSCULAR; INTRAVENOUS at 08:50

## 2019-01-16 RX ADMIN — PROMETHAZINE HYDROCHLORIDE 12.5 MG: 25 INJECTION INTRAMUSCULAR; INTRAVENOUS at 15:21

## 2019-01-16 RX ADMIN — MOMETASONE FUROATE AND FORMOTEROL FUMARATE DIHYDRATE 2 PUFF: 200; 5 AEROSOL RESPIRATORY (INHALATION) at 08:26

## 2019-01-16 RX ADMIN — PIPERACILLIN SODIUM AND TAZOBACTAM SODIUM 3.38 G: 3; .375 INJECTION, POWDER, LYOPHILIZED, FOR SOLUTION INTRAVENOUS at 04:03

## 2019-01-16 RX ADMIN — SERTRALINE HYDROCHLORIDE 50 MG: 50 TABLET ORAL at 21:33

## 2019-01-16 RX ADMIN — MOMETASONE FUROATE AND FORMOTEROL FUMARATE DIHYDRATE 2 PUFF: 200; 5 AEROSOL RESPIRATORY (INHALATION) at 21:45

## 2019-01-16 RX ADMIN — PANTOPRAZOLE SODIUM 40 MG: 40 TABLET, DELAYED RELEASE ORAL at 06:15

## 2019-01-16 RX ADMIN — FERROUS SULFATE TAB 325 MG (65 MG ELEMENTAL FE) 325 MG: 325 (65 FE) TAB at 12:22

## 2019-01-16 RX ADMIN — SODIUM CHLORIDE: 9 INJECTION, SOLUTION INTRAVENOUS at 18:25

## 2019-01-16 RX ADMIN — ENOXAPARIN SODIUM 40 MG: 100 INJECTION SUBCUTANEOUS at 08:26

## 2019-01-16 RX ADMIN — OXYCODONE HYDROCHLORIDE 10 MG: 5 TABLET ORAL at 08:27

## 2019-01-16 RX ADMIN — VANCOMYCIN HYDROCHLORIDE 1500 MG: 5 INJECTION, POWDER, LYOPHILIZED, FOR SOLUTION INTRAVENOUS at 21:58

## 2019-01-16 RX ADMIN — PANTOPRAZOLE SODIUM 40 MG: 40 TABLET, DELAYED RELEASE ORAL at 15:33

## 2019-01-16 RX ADMIN — Medication 10 ML: at 21:34

## 2019-01-16 RX ADMIN — OXYCODONE HYDROCHLORIDE 10 MG: 5 TABLET ORAL at 04:03

## 2019-01-16 RX ADMIN — VANCOMYCIN HYDROCHLORIDE 1500 MG: 5 INJECTION, POWDER, LYOPHILIZED, FOR SOLUTION INTRAVENOUS at 04:37

## 2019-01-16 RX ADMIN — MAGNESIUM SULFATE HEPTAHYDRATE 1 G: 1 INJECTION, SOLUTION INTRAVENOUS at 15:33

## 2019-01-16 ASSESSMENT — PAIN SCALES - GENERAL
PAINLEVEL_OUTOF10: 6
PAINLEVEL_OUTOF10: 9
PAINLEVEL_OUTOF10: 2
PAINLEVEL_OUTOF10: 9
PAINLEVEL_OUTOF10: 8

## 2019-01-17 PROCEDURE — 6370000000 HC RX 637 (ALT 250 FOR IP): Performed by: STUDENT IN AN ORGANIZED HEALTH CARE EDUCATION/TRAINING PROGRAM

## 2019-01-17 PROCEDURE — 2580000003 HC RX 258: Performed by: EMERGENCY MEDICINE

## 2019-01-17 PROCEDURE — 2580000003 HC RX 258: Performed by: STUDENT IN AN ORGANIZED HEALTH CARE EDUCATION/TRAINING PROGRAM

## 2019-01-17 PROCEDURE — 6360000002 HC RX W HCPCS: Performed by: STUDENT IN AN ORGANIZED HEALTH CARE EDUCATION/TRAINING PROGRAM

## 2019-01-17 PROCEDURE — 6360000002 HC RX W HCPCS: Performed by: EMERGENCY MEDICINE

## 2019-01-17 PROCEDURE — 99233 SBSQ HOSP IP/OBS HIGH 50: CPT | Performed by: INTERNAL MEDICINE

## 2019-01-17 PROCEDURE — 1200000000 HC SEMI PRIVATE

## 2019-01-17 RX ORDER — SUMATRIPTAN 50 MG/1
50 TABLET, FILM COATED ORAL DAILY PRN
Status: DISCONTINUED | OUTPATIENT
Start: 2019-01-17 | End: 2019-01-18 | Stop reason: HOSPADM

## 2019-01-17 RX ADMIN — OXYCODONE HYDROCHLORIDE 10 MG: 5 TABLET ORAL at 18:27

## 2019-01-17 RX ADMIN — OXYCODONE HYDROCHLORIDE 10 MG: 5 TABLET ORAL at 02:11

## 2019-01-17 RX ADMIN — VANCOMYCIN HYDROCHLORIDE 1500 MG: 5 INJECTION, POWDER, LYOPHILIZED, FOR SOLUTION INTRAVENOUS at 10:09

## 2019-01-17 RX ADMIN — FERROUS SULFATE TAB 325 MG (65 MG ELEMENTAL FE) 325 MG: 325 (65 FE) TAB at 09:45

## 2019-01-17 RX ADMIN — OXYCODONE HYDROCHLORIDE 10 MG: 5 TABLET ORAL at 22:42

## 2019-01-17 RX ADMIN — PROCHLORPERAZINE EDISYLATE 10 MG: 5 INJECTION INTRAMUSCULAR; INTRAVENOUS at 09:46

## 2019-01-17 RX ADMIN — PANTOPRAZOLE SODIUM 40 MG: 40 TABLET, DELAYED RELEASE ORAL at 16:37

## 2019-01-17 RX ADMIN — SERTRALINE HYDROCHLORIDE 50 MG: 50 TABLET ORAL at 21:21

## 2019-01-17 RX ADMIN — PIPERACILLIN SODIUM AND TAZOBACTAM SODIUM 3.38 G: 3; .375 INJECTION, POWDER, LYOPHILIZED, FOR SOLUTION INTRAVENOUS at 12:28

## 2019-01-17 RX ADMIN — OXYCODONE HYDROCHLORIDE 10 MG: 5 TABLET ORAL at 10:01

## 2019-01-17 RX ADMIN — PIPERACILLIN SODIUM AND TAZOBACTAM SODIUM 3.38 G: 3; .375 INJECTION, POWDER, LYOPHILIZED, FOR SOLUTION INTRAVENOUS at 18:25

## 2019-01-17 RX ADMIN — SODIUM CHLORIDE: 9 INJECTION, SOLUTION INTRAVENOUS at 10:01

## 2019-01-17 RX ADMIN — PIPERACILLIN SODIUM AND TAZOBACTAM SODIUM 3.38 G: 3; .375 INJECTION, POWDER, LYOPHILIZED, FOR SOLUTION INTRAVENOUS at 05:39

## 2019-01-17 RX ADMIN — MOMETASONE FUROATE AND FORMOTEROL FUMARATE DIHYDRATE 2 PUFF: 200; 5 AEROSOL RESPIRATORY (INHALATION) at 21:22

## 2019-01-17 RX ADMIN — MONTELUKAST SODIUM 10 MG: 10 TABLET, FILM COATED ORAL at 21:21

## 2019-01-17 RX ADMIN — VANCOMYCIN HYDROCHLORIDE 1500 MG: 5 INJECTION, POWDER, LYOPHILIZED, FOR SOLUTION INTRAVENOUS at 22:07

## 2019-01-17 RX ADMIN — PIPERACILLIN SODIUM AND TAZOBACTAM SODIUM 3.38 G: 3; .375 INJECTION, POWDER, LYOPHILIZED, FOR SOLUTION INTRAVENOUS at 00:46

## 2019-01-17 RX ADMIN — FERROUS SULFATE TAB 325 MG (65 MG ELEMENTAL FE) 325 MG: 325 (65 FE) TAB at 16:37

## 2019-01-17 RX ADMIN — MOMETASONE FUROATE AND FORMOTEROL FUMARATE DIHYDRATE 2 PUFF: 200; 5 AEROSOL RESPIRATORY (INHALATION) at 09:47

## 2019-01-17 RX ADMIN — PANTOPRAZOLE SODIUM 40 MG: 40 TABLET, DELAYED RELEASE ORAL at 05:40

## 2019-01-17 ASSESSMENT — PAIN DESCRIPTION - ORIENTATION
ORIENTATION: RIGHT;LOWER
ORIENTATION: RIGHT;LOWER

## 2019-01-17 ASSESSMENT — PAIN SCALES - GENERAL
PAINLEVEL_OUTOF10: 8
PAINLEVEL_OUTOF10: 0
PAINLEVEL_OUTOF10: 8
PAINLEVEL_OUTOF10: 5
PAINLEVEL_OUTOF10: 9

## 2019-01-17 ASSESSMENT — PAIN DESCRIPTION - PAIN TYPE
TYPE: ACUTE PAIN
TYPE: ACUTE PAIN

## 2019-01-17 ASSESSMENT — PAIN DESCRIPTION - LOCATION
LOCATION: ABDOMEN
LOCATION: ABDOMEN

## 2019-01-17 ASSESSMENT — PAIN DESCRIPTION - FREQUENCY
FREQUENCY: CONTINUOUS
FREQUENCY: CONTINUOUS

## 2019-01-17 ASSESSMENT — PAIN DESCRIPTION - DESCRIPTORS
DESCRIPTORS: PRESSURE;SHOOTING;THROBBING
DESCRIPTORS: ACHING;DISCOMFORT

## 2019-01-17 ASSESSMENT — PAIN DESCRIPTION - ONSET: ONSET: ON-GOING

## 2019-01-18 VITALS
SYSTOLIC BLOOD PRESSURE: 125 MMHG | OXYGEN SATURATION: 91 % | RESPIRATION RATE: 14 BRPM | BODY MASS INDEX: 40.49 KG/M2 | DIASTOLIC BLOOD PRESSURE: 62 MMHG | TEMPERATURE: 98 F | HEIGHT: 62 IN | HEART RATE: 65 BPM | WEIGHT: 220.02 LBS

## 2019-01-18 PROCEDURE — 6370000000 HC RX 637 (ALT 250 FOR IP): Performed by: STUDENT IN AN ORGANIZED HEALTH CARE EDUCATION/TRAINING PROGRAM

## 2019-01-18 PROCEDURE — 2580000003 HC RX 258: Performed by: STUDENT IN AN ORGANIZED HEALTH CARE EDUCATION/TRAINING PROGRAM

## 2019-01-18 PROCEDURE — 99232 SBSQ HOSP IP/OBS MODERATE 35: CPT | Performed by: INTERNAL MEDICINE

## 2019-01-18 PROCEDURE — 6360000002 HC RX W HCPCS: Performed by: STUDENT IN AN ORGANIZED HEALTH CARE EDUCATION/TRAINING PROGRAM

## 2019-01-18 RX ORDER — OXYCODONE HYDROCHLORIDE 5 MG/1
5 TABLET ORAL EVERY 6 HOURS PRN
Qty: 10 TABLET | Refills: 0 | Status: SHIPPED | OUTPATIENT
Start: 2019-01-18 | End: 2019-01-21

## 2019-01-18 RX ORDER — SULFAMETHOXAZOLE AND TRIMETHOPRIM 400; 80 MG/1; MG/1
1 TABLET ORAL DAILY
Qty: 10 TABLET | Refills: 0 | Status: SHIPPED | OUTPATIENT
Start: 2019-01-18 | End: 2019-01-28

## 2019-01-18 RX ORDER — AMOXICILLIN AND CLAVULANATE POTASSIUM 500; 125 MG/1; MG/1
1 TABLET, FILM COATED ORAL 3 TIMES DAILY
Qty: 30 TABLET | Refills: 0 | Status: SHIPPED | OUTPATIENT
Start: 2019-01-18 | End: 2019-01-28

## 2019-01-18 RX ORDER — FERROUS SULFATE 325(65) MG
325 TABLET ORAL 2 TIMES DAILY WITH MEALS
Qty: 30 TABLET | Refills: 3 | Status: ON HOLD | OUTPATIENT
Start: 2019-01-18 | End: 2020-01-28

## 2019-01-18 RX ADMIN — MOMETASONE FUROATE AND FORMOTEROL FUMARATE DIHYDRATE 2 PUFF: 200; 5 AEROSOL RESPIRATORY (INHALATION) at 09:30

## 2019-01-18 RX ADMIN — SODIUM CHLORIDE: 9 INJECTION, SOLUTION INTRAVENOUS at 00:34

## 2019-01-18 RX ADMIN — OXYCODONE HYDROCHLORIDE 10 MG: 5 TABLET ORAL at 10:55

## 2019-01-18 RX ADMIN — PIPERACILLIN SODIUM AND TAZOBACTAM SODIUM 3.38 G: 3; .375 INJECTION, POWDER, LYOPHILIZED, FOR SOLUTION INTRAVENOUS at 00:33

## 2019-01-18 RX ADMIN — PANTOPRAZOLE SODIUM 40 MG: 40 TABLET, DELAYED RELEASE ORAL at 06:22

## 2019-01-18 RX ADMIN — OXYCODONE HYDROCHLORIDE 10 MG: 5 TABLET ORAL at 06:22

## 2019-01-18 RX ADMIN — PIPERACILLIN SODIUM AND TAZOBACTAM SODIUM 3.38 G: 3; .375 INJECTION, POWDER, LYOPHILIZED, FOR SOLUTION INTRAVENOUS at 06:22

## 2019-01-18 RX ADMIN — FERROUS SULFATE TAB 325 MG (65 MG ELEMENTAL FE) 325 MG: 325 (65 FE) TAB at 09:30

## 2019-01-18 ASSESSMENT — PAIN SCALES - GENERAL
PAINLEVEL_OUTOF10: 9
PAINLEVEL_OUTOF10: 8
PAINLEVEL_OUTOF10: 4

## 2019-01-19 LAB
CULTURE: ABNORMAL
DIRECT EXAM: ABNORMAL
DIRECT EXAM: ABNORMAL
Lab: ABNORMAL
SPECIMEN DESCRIPTION: ABNORMAL
STATUS: ABNORMAL

## 2019-02-26 RX ORDER — VITAMIN A ACETATE, .BETA.-CAROTENE, ASCORBIC ACID, CHOLECALCIFEROL, .ALPHA.-TOCOPHEROL ACETATE, DL-, THIAMINE MONONITRATE, RIBOFLAVIN, NIACINAMIDE, PYRIDOXINE HYDROCHLORIDE, FOLIC ACID, CYANOCOBALAMIN, CALCIUM CARBONATE, FERROUS FUMARATE, ZINC OXIDE, AND CUPRIC OXIDE 2000; 2000; 120; 400; 22; 1.84; 3; 20; 10; 1; 12; 200; 27; 25; 2 [IU]/1; [IU]/1; MG/1; [IU]/1; MG/1; MG/1; MG/1; MG/1; MG/1; MG/1; UG/1; MG/1; MG/1; MG/1; MG/1
1 TABLET ORAL DAILY
Qty: 30 TABLET | Refills: 5 | Status: ON HOLD | OUTPATIENT
Start: 2019-02-26 | End: 2019-10-01 | Stop reason: ALTCHOICE

## 2019-03-13 ENCOUNTER — APPOINTMENT (OUTPATIENT)
Dept: CT IMAGING | Age: 39
End: 2019-03-13
Payer: COMMERCIAL

## 2019-03-13 ENCOUNTER — HOSPITAL ENCOUNTER (EMERGENCY)
Age: 39
Discharge: HOME | End: 2019-03-13
Attending: EMERGENCY MEDICINE
Payer: COMMERCIAL

## 2019-03-13 VITALS
HEIGHT: 62 IN | SYSTOLIC BLOOD PRESSURE: 118 MMHG | WEIGHT: 215 LBS | HEART RATE: 93 BPM | OXYGEN SATURATION: 96 % | TEMPERATURE: 97.8 F | RESPIRATION RATE: 16 BRPM | DIASTOLIC BLOOD PRESSURE: 72 MMHG | BODY MASS INDEX: 39.56 KG/M2

## 2019-03-13 DIAGNOSIS — L02.91 ABSCESS: Primary | ICD-10-CM

## 2019-03-13 LAB
ABSOLUTE EOS #: 0.3 K/UL (ref 0–0.4)
ABSOLUTE IMMATURE GRANULOCYTE: ABNORMAL K/UL (ref 0–0.3)
ABSOLUTE LYMPH #: 1.4 K/UL (ref 1–4.8)
ABSOLUTE MONO #: 0.3 K/UL (ref 0.1–1.3)
ANION GAP SERPL CALCULATED.3IONS-SCNC: 13 MMOL/L (ref 9–17)
BASOPHILS # BLD: 1 % (ref 0–2)
BASOPHILS ABSOLUTE: 0.1 K/UL (ref 0–0.2)
BUN BLDV-MCNC: 15 MG/DL (ref 6–20)
BUN/CREAT BLD: ABNORMAL (ref 9–20)
CALCIUM SERPL-MCNC: 8.9 MG/DL (ref 8.6–10.4)
CHLORIDE BLD-SCNC: 104 MMOL/L (ref 98–107)
CO2: 23 MMOL/L (ref 20–31)
CREAT SERPL-MCNC: 0.71 MG/DL (ref 0.5–0.9)
DIFFERENTIAL TYPE: ABNORMAL
EOSINOPHILS RELATIVE PERCENT: 5 % (ref 0–4)
GFR AFRICAN AMERICAN: >60 ML/MIN
GFR NON-AFRICAN AMERICAN: >60 ML/MIN
GFR SERPL CREATININE-BSD FRML MDRD: ABNORMAL ML/MIN/{1.73_M2}
GFR SERPL CREATININE-BSD FRML MDRD: ABNORMAL ML/MIN/{1.73_M2}
GLUCOSE BLD-MCNC: 103 MG/DL (ref 70–99)
HCG QUALITATIVE: NEGATIVE
HCT VFR BLD CALC: 41.3 % (ref 36–46)
HEMOGLOBIN: 13.7 G/DL (ref 12–16)
IMMATURE GRANULOCYTES: ABNORMAL %
LYMPHOCYTES # BLD: 20 % (ref 24–44)
MCH RBC QN AUTO: 27.6 PG (ref 26–34)
MCHC RBC AUTO-ENTMCNC: 33.1 G/DL (ref 31–37)
MCV RBC AUTO: 83.4 FL (ref 80–100)
MONOCYTES # BLD: 5 % (ref 1–7)
NRBC AUTOMATED: ABNORMAL PER 100 WBC
PDW BLD-RTO: 15.6 % (ref 11.5–14.9)
PLATELET # BLD: 292 K/UL (ref 150–450)
PLATELET ESTIMATE: ABNORMAL
PMV BLD AUTO: 8.2 FL (ref 6–12)
POTASSIUM SERPL-SCNC: 4.1 MMOL/L (ref 3.7–5.3)
RBC # BLD: 4.95 M/UL (ref 4–5.2)
RBC # BLD: ABNORMAL 10*6/UL
SEG NEUTROPHILS: 69 % (ref 36–66)
SEGMENTED NEUTROPHILS ABSOLUTE COUNT: 4.9 K/UL (ref 1.3–9.1)
SODIUM BLD-SCNC: 140 MMOL/L (ref 135–144)
WBC # BLD: 7.1 K/UL (ref 3.5–11)
WBC # BLD: ABNORMAL 10*3/UL

## 2019-03-13 PROCEDURE — 99283 EMERGENCY DEPT VISIT LOW MDM: CPT

## 2019-03-13 PROCEDURE — 84703 CHORIONIC GONADOTROPIN ASSAY: CPT

## 2019-03-13 PROCEDURE — 96374 THER/PROPH/DIAG INJ IV PUSH: CPT

## 2019-03-13 PROCEDURE — 6360000002 HC RX W HCPCS: Performed by: PHYSICIAN ASSISTANT

## 2019-03-13 PROCEDURE — 10060 I&D ABSCESS SIMPLE/SINGLE: CPT

## 2019-03-13 PROCEDURE — 36415 COLL VENOUS BLD VENIPUNCTURE: CPT

## 2019-03-13 PROCEDURE — 2580000003 HC RX 258: Performed by: PHYSICIAN ASSISTANT

## 2019-03-13 PROCEDURE — 6360000004 HC RX CONTRAST MEDICATION: Performed by: PHYSICIAN ASSISTANT

## 2019-03-13 PROCEDURE — 74177 CT ABD & PELVIS W/CONTRAST: CPT

## 2019-03-13 PROCEDURE — 85025 COMPLETE CBC W/AUTO DIFF WBC: CPT

## 2019-03-13 PROCEDURE — 80048 BASIC METABOLIC PNL TOTAL CA: CPT

## 2019-03-13 RX ORDER — SODIUM CHLORIDE 0.9 % (FLUSH) 0.9 %
10 SYRINGE (ML) INJECTION PRN
Status: DISCONTINUED | OUTPATIENT
Start: 2019-03-13 | End: 2019-03-13 | Stop reason: HOSPADM

## 2019-03-13 RX ORDER — 0.9 % SODIUM CHLORIDE 0.9 %
80 INTRAVENOUS SOLUTION INTRAVENOUS ONCE
Status: COMPLETED | OUTPATIENT
Start: 2019-03-13 | End: 2019-03-13

## 2019-03-13 RX ORDER — MORPHINE SULFATE 4 MG/ML
4 INJECTION, SOLUTION INTRAMUSCULAR; INTRAVENOUS ONCE
Status: COMPLETED | OUTPATIENT
Start: 2019-03-13 | End: 2019-03-13

## 2019-03-13 RX ORDER — 0.9 % SODIUM CHLORIDE 0.9 %
1000 INTRAVENOUS SOLUTION INTRAVENOUS ONCE
Status: COMPLETED | OUTPATIENT
Start: 2019-03-13 | End: 2019-03-13

## 2019-03-13 RX ORDER — DOXYCYCLINE HYCLATE 100 MG
100 TABLET ORAL 2 TIMES DAILY
Qty: 20 TABLET | Refills: 0 | Status: SHIPPED | OUTPATIENT
Start: 2019-03-13 | End: 2019-03-23

## 2019-03-13 RX ADMIN — Medication 10 ML: at 14:13

## 2019-03-13 RX ADMIN — SODIUM CHLORIDE 1000 ML: 9 INJECTION, SOLUTION INTRAVENOUS at 13:28

## 2019-03-13 RX ADMIN — MORPHINE SULFATE 4 MG: 4 INJECTION, SOLUTION INTRAMUSCULAR; INTRAVENOUS at 13:51

## 2019-03-13 RX ADMIN — SODIUM CHLORIDE 80 ML: 9 INJECTION, SOLUTION INTRAVENOUS at 14:13

## 2019-03-13 RX ADMIN — IOVERSOL 75 ML: 741 INJECTION INTRA-ARTERIAL; INTRAVENOUS at 14:13

## 2019-03-13 ASSESSMENT — PAIN DESCRIPTION - LOCATION: LOCATION: ABDOMEN

## 2019-03-13 ASSESSMENT — PAIN SCALES - GENERAL
PAINLEVEL_OUTOF10: 7
PAINLEVEL_OUTOF10: 8

## 2019-03-13 ASSESSMENT — PAIN DESCRIPTION - FREQUENCY: FREQUENCY: CONTINUOUS

## 2019-03-13 ASSESSMENT — PAIN DESCRIPTION - DESCRIPTORS: DESCRIPTORS: SORE

## 2019-03-13 ASSESSMENT — PAIN DESCRIPTION - PAIN TYPE: TYPE: ACUTE PAIN

## 2019-03-15 ENCOUNTER — HOSPITAL ENCOUNTER (EMERGENCY)
Age: 39
Discharge: HOME | End: 2019-03-15
Attending: EMERGENCY MEDICINE
Payer: COMMERCIAL

## 2019-03-15 VITALS
SYSTOLIC BLOOD PRESSURE: 124 MMHG | BODY MASS INDEX: 39.56 KG/M2 | RESPIRATION RATE: 18 BRPM | HEART RATE: 89 BPM | WEIGHT: 215 LBS | TEMPERATURE: 97.9 F | HEIGHT: 62 IN | OXYGEN SATURATION: 95 % | DIASTOLIC BLOOD PRESSURE: 79 MMHG

## 2019-03-15 DIAGNOSIS — L02.91 ABSCESS: Primary | ICD-10-CM

## 2019-03-15 LAB
-: NORMAL
ABSOLUTE EOS #: 0.4 K/UL (ref 0–0.4)
ABSOLUTE IMMATURE GRANULOCYTE: ABNORMAL K/UL (ref 0–0.3)
ABSOLUTE LYMPH #: 1.5 K/UL (ref 1–4.8)
ABSOLUTE MONO #: 0.3 K/UL (ref 0.1–1.3)
ALBUMIN SERPL-MCNC: 4 G/DL (ref 3.5–5.2)
ALBUMIN/GLOBULIN RATIO: ABNORMAL (ref 1–2.5)
ALP BLD-CCNC: 80 U/L (ref 35–104)
ALT SERPL-CCNC: 29 U/L (ref 5–33)
ANION GAP SERPL CALCULATED.3IONS-SCNC: 11 MMOL/L (ref 9–17)
AST SERPL-CCNC: 25 U/L
BASOPHILS # BLD: 1 % (ref 0–2)
BASOPHILS ABSOLUTE: 0.1 K/UL (ref 0–0.2)
BILIRUB SERPL-MCNC: 0.34 MG/DL (ref 0.3–1.2)
BUN BLDV-MCNC: 15 MG/DL (ref 6–20)
BUN/CREAT BLD: ABNORMAL (ref 9–20)
CALCIUM SERPL-MCNC: 9 MG/DL (ref 8.6–10.4)
CHLORIDE BLD-SCNC: 105 MMOL/L (ref 98–107)
CO2: 22 MMOL/L (ref 20–31)
CREAT SERPL-MCNC: 0.68 MG/DL (ref 0.5–0.9)
DIFFERENTIAL TYPE: ABNORMAL
EOSINOPHILS RELATIVE PERCENT: 7 % (ref 0–4)
GFR AFRICAN AMERICAN: >60 ML/MIN
GFR NON-AFRICAN AMERICAN: >60 ML/MIN
GFR SERPL CREATININE-BSD FRML MDRD: ABNORMAL ML/MIN/{1.73_M2}
GFR SERPL CREATININE-BSD FRML MDRD: ABNORMAL ML/MIN/{1.73_M2}
GLUCOSE BLD-MCNC: 110 MG/DL (ref 70–99)
HCT VFR BLD CALC: 40 % (ref 36–46)
HEMOGLOBIN: 13.4 G/DL (ref 12–16)
IMMATURE GRANULOCYTES: ABNORMAL %
LYMPHOCYTES # BLD: 24 % (ref 24–44)
MCH RBC QN AUTO: 28 PG (ref 26–34)
MCHC RBC AUTO-ENTMCNC: 33.4 G/DL (ref 31–37)
MCV RBC AUTO: 83.8 FL (ref 80–100)
MONOCYTES # BLD: 5 % (ref 1–7)
NRBC AUTOMATED: ABNORMAL PER 100 WBC
PDW BLD-RTO: 15.1 % (ref 11.5–14.9)
PLATELET # BLD: 287 K/UL (ref 150–450)
PLATELET ESTIMATE: ABNORMAL
PMV BLD AUTO: 8.1 FL (ref 6–12)
POTASSIUM SERPL-SCNC: 4.2 MMOL/L (ref 3.7–5.3)
RBC # BLD: 4.78 M/UL (ref 4–5.2)
RBC # BLD: ABNORMAL 10*6/UL
REASON FOR REJECTION: NORMAL
SEG NEUTROPHILS: 63 % (ref 36–66)
SEGMENTED NEUTROPHILS ABSOLUTE COUNT: 4.1 K/UL (ref 1.3–9.1)
SODIUM BLD-SCNC: 138 MMOL/L (ref 135–144)
TOTAL PROTEIN: 6.6 G/DL (ref 6.4–8.3)
WBC # BLD: 6.4 K/UL (ref 3.5–11)
WBC # BLD: ABNORMAL 10*3/UL
ZZ NTE CLEAN UP: ORDERED TEST: NORMAL
ZZ NTE WITH NAME CLEAN UP: SPECIMEN SOURCE: NORMAL

## 2019-03-15 PROCEDURE — 36415 COLL VENOUS BLD VENIPUNCTURE: CPT

## 2019-03-15 PROCEDURE — 99284 EMERGENCY DEPT VISIT MOD MDM: CPT

## 2019-03-15 PROCEDURE — 6360000002 HC RX W HCPCS: Performed by: EMERGENCY MEDICINE

## 2019-03-15 PROCEDURE — 96372 THER/PROPH/DIAG INJ SC/IM: CPT

## 2019-03-15 PROCEDURE — 80053 COMPREHEN METABOLIC PANEL: CPT

## 2019-03-15 PROCEDURE — 6370000000 HC RX 637 (ALT 250 FOR IP): Performed by: EMERGENCY MEDICINE

## 2019-03-15 PROCEDURE — 85025 COMPLETE CBC W/AUTO DIFF WBC: CPT

## 2019-03-15 RX ORDER — MORPHINE SULFATE 4 MG/ML
4 INJECTION, SOLUTION INTRAMUSCULAR; INTRAVENOUS ONCE
Status: COMPLETED | OUTPATIENT
Start: 2019-03-15 | End: 2019-03-15

## 2019-03-15 RX ORDER — ONDANSETRON 4 MG/1
4 TABLET, ORALLY DISINTEGRATING ORAL ONCE
Status: COMPLETED | OUTPATIENT
Start: 2019-03-15 | End: 2019-03-15

## 2019-03-15 RX ORDER — ACETAMINOPHEN 500 MG
1000 TABLET ORAL ONCE
Status: COMPLETED | OUTPATIENT
Start: 2019-03-15 | End: 2019-03-15

## 2019-03-15 RX ORDER — LIDOCAINE 50 MG/G
1 PATCH TOPICAL DAILY
Qty: 7 PATCH | Refills: 0 | Status: ON HOLD | OUTPATIENT
Start: 2019-03-15 | End: 2020-01-28

## 2019-03-15 RX ORDER — LIDOCAINE 4 G/G
1 PATCH TOPICAL ONCE
Status: DISCONTINUED | OUTPATIENT
Start: 2019-03-15 | End: 2019-03-15 | Stop reason: HOSPADM

## 2019-03-15 RX ADMIN — ACETAMINOPHEN 1000 MG: 500 TABLET, FILM COATED ORAL at 11:53

## 2019-03-15 RX ADMIN — MORPHINE SULFATE 4 MG: 4 INJECTION INTRAVENOUS at 13:21

## 2019-03-15 RX ADMIN — ONDANSETRON 4 MG: 4 TABLET, ORALLY DISINTEGRATING ORAL at 11:54

## 2019-03-15 ASSESSMENT — PAIN DESCRIPTION - ORIENTATION: ORIENTATION: LOWER

## 2019-03-15 ASSESSMENT — PAIN SCALES - GENERAL
PAINLEVEL_OUTOF10: 8
PAINLEVEL_OUTOF10: 9
PAINLEVEL_OUTOF10: 9

## 2019-03-15 ASSESSMENT — ENCOUNTER SYMPTOMS
COLOR CHANGE: 0
DIARRHEA: 0
COUGH: 0
VOMITING: 0
CONSTIPATION: 0
NAUSEA: 0
BLOOD IN STOOL: 0
ABDOMINAL PAIN: 1
BACK PAIN: 0
SHORTNESS OF BREATH: 0
TROUBLE SWALLOWING: 0
SORE THROAT: 0

## 2019-03-15 ASSESSMENT — PAIN DESCRIPTION - PAIN TYPE: TYPE: ACUTE PAIN

## 2019-03-15 ASSESSMENT — PAIN DESCRIPTION - ONSET: ONSET: ON-GOING

## 2019-03-15 ASSESSMENT — PAIN DESCRIPTION - FREQUENCY: FREQUENCY: CONTINUOUS

## 2019-03-15 ASSESSMENT — PAIN DESCRIPTION - LOCATION: LOCATION: ABDOMEN

## 2019-03-15 ASSESSMENT — PAIN DESCRIPTION - PROGRESSION: CLINICAL_PROGRESSION: GRADUALLY WORSENING

## 2019-03-15 ASSESSMENT — PAIN DESCRIPTION - DESCRIPTORS: DESCRIPTORS: BURNING

## 2019-03-28 ENCOUNTER — APPOINTMENT (OUTPATIENT)
Dept: GENERAL RADIOLOGY | Age: 39
End: 2019-03-28
Payer: COMMERCIAL

## 2019-03-28 ENCOUNTER — HOSPITAL ENCOUNTER (EMERGENCY)
Age: 39
Discharge: HOME | End: 2019-03-29
Attending: EMERGENCY MEDICINE
Payer: COMMERCIAL

## 2019-03-28 DIAGNOSIS — T78.2XXA ANAPHYLAXIS, INITIAL ENCOUNTER: Primary | ICD-10-CM

## 2019-03-28 LAB
ABSOLUTE EOS #: 0 K/UL (ref 0–0.4)
ABSOLUTE IMMATURE GRANULOCYTE: ABNORMAL K/UL (ref 0–0.3)
ABSOLUTE LYMPH #: 4.24 K/UL (ref 1–4.8)
ABSOLUTE MONO #: 0.73 K/UL (ref 0.1–1.3)
ANION GAP SERPL CALCULATED.3IONS-SCNC: 17 MMOL/L (ref 9–17)
ATYPICAL LYMPHOCYTE ABSOLUTE COUNT: 1.69 K/UL
ATYPICAL LYMPHOCYTES: 14 %
BASOPHILS # BLD: 0 % (ref 0–2)
BASOPHILS ABSOLUTE: 0 K/UL (ref 0–0.2)
BUN BLDV-MCNC: 18 MG/DL (ref 6–20)
BUN/CREAT BLD: ABNORMAL (ref 9–20)
CALCIUM SERPL-MCNC: 9.1 MG/DL (ref 8.6–10.4)
CHLORIDE BLD-SCNC: 102 MMOL/L (ref 98–107)
CO2: 20 MMOL/L (ref 20–31)
CREAT SERPL-MCNC: 0.87 MG/DL (ref 0.5–0.9)
DIFFERENTIAL TYPE: ABNORMAL
EOSINOPHILS RELATIVE PERCENT: 0 % (ref 0–4)
GFR AFRICAN AMERICAN: >60 ML/MIN
GFR NON-AFRICAN AMERICAN: >60 ML/MIN
GFR SERPL CREATININE-BSD FRML MDRD: ABNORMAL ML/MIN/{1.73_M2}
GFR SERPL CREATININE-BSD FRML MDRD: ABNORMAL ML/MIN/{1.73_M2}
GLUCOSE BLD-MCNC: 136 MG/DL (ref 70–99)
HCT VFR BLD CALC: 46.6 % (ref 36–46)
HEMOGLOBIN: 15.1 G/DL (ref 12–16)
IMMATURE GRANULOCYTES: ABNORMAL %
LYMPHOCYTES # BLD: 35 % (ref 24–44)
MCH RBC QN AUTO: 27.2 PG (ref 26–34)
MCHC RBC AUTO-ENTMCNC: 32.4 G/DL (ref 31–37)
MCV RBC AUTO: 84.1 FL (ref 80–100)
MONOCYTES # BLD: 6 % (ref 1–7)
MORPHOLOGY: ABNORMAL
NRBC AUTOMATED: ABNORMAL PER 100 WBC
PDW BLD-RTO: 15.7 % (ref 11.5–14.9)
PLATELET # BLD: 480 K/UL (ref 150–450)
PLATELET ESTIMATE: ABNORMAL
PMV BLD AUTO: 8.4 FL (ref 6–12)
POTASSIUM SERPL-SCNC: 3.4 MMOL/L (ref 3.7–5.3)
RBC # BLD: 5.54 M/UL (ref 4–5.2)
RBC # BLD: ABNORMAL 10*6/UL
SEG NEUTROPHILS: 45 % (ref 36–66)
SEGMENTED NEUTROPHILS ABSOLUTE COUNT: 5.44 K/UL (ref 1.3–9.1)
SODIUM BLD-SCNC: 139 MMOL/L (ref 135–144)
WBC # BLD: 12.1 K/UL (ref 3.5–11)
WBC # BLD: ABNORMAL 10*3/UL

## 2019-03-28 PROCEDURE — 71045 X-RAY EXAM CHEST 1 VIEW: CPT

## 2019-03-28 PROCEDURE — 6360000002 HC RX W HCPCS: Performed by: STUDENT IN AN ORGANIZED HEALTH CARE EDUCATION/TRAINING PROGRAM

## 2019-03-28 PROCEDURE — 96375 TX/PRO/DX INJ NEW DRUG ADDON: CPT

## 2019-03-28 PROCEDURE — 99285 EMERGENCY DEPT VISIT HI MDM: CPT

## 2019-03-28 PROCEDURE — 96374 THER/PROPH/DIAG INJ IV PUSH: CPT

## 2019-03-28 PROCEDURE — 36415 COLL VENOUS BLD VENIPUNCTURE: CPT

## 2019-03-28 PROCEDURE — 2500000003 HC RX 250 WO HCPCS: Performed by: STUDENT IN AN ORGANIZED HEALTH CARE EDUCATION/TRAINING PROGRAM

## 2019-03-28 PROCEDURE — 85025 COMPLETE CBC W/AUTO DIFF WBC: CPT

## 2019-03-28 PROCEDURE — 80048 BASIC METABOLIC PNL TOTAL CA: CPT

## 2019-03-28 RX ORDER — ALBUTEROL SULFATE 2.5 MG/3ML
5 SOLUTION RESPIRATORY (INHALATION)
Status: DISCONTINUED | OUTPATIENT
Start: 2019-03-28 | End: 2019-03-29 | Stop reason: HOSPADM

## 2019-03-28 RX ORDER — ALBUTEROL SULFATE 90 UG/1
2 AEROSOL, METERED RESPIRATORY (INHALATION)
Status: DISCONTINUED | OUTPATIENT
Start: 2019-03-28 | End: 2019-03-29 | Stop reason: HOSPADM

## 2019-03-28 RX ORDER — IPRATROPIUM BROMIDE AND ALBUTEROL SULFATE 2.5; .5 MG/3ML; MG/3ML
1 SOLUTION RESPIRATORY (INHALATION)
Status: DISCONTINUED | OUTPATIENT
Start: 2019-03-28 | End: 2019-03-29 | Stop reason: HOSPADM

## 2019-03-28 RX ORDER — METHYLPREDNISOLONE SODIUM SUCCINATE 125 MG/2ML
125 INJECTION, POWDER, LYOPHILIZED, FOR SOLUTION INTRAMUSCULAR; INTRAVENOUS ONCE
Status: COMPLETED | OUTPATIENT
Start: 2019-03-28 | End: 2019-03-28

## 2019-03-28 RX ADMIN — METHYLPREDNISOLONE SODIUM SUCCINATE 125 MG: 125 INJECTION, POWDER, FOR SOLUTION INTRAMUSCULAR; INTRAVENOUS at 21:35

## 2019-03-28 RX ADMIN — FAMOTIDINE 20 MG: 10 INJECTION, SOLUTION INTRAVENOUS at 21:35

## 2019-03-28 ASSESSMENT — PAIN DESCRIPTION - LOCATION: LOCATION: CHEST

## 2019-03-28 ASSESSMENT — ENCOUNTER SYMPTOMS
FACIAL SWELLING: 1
NAUSEA: 0
CHEST TIGHTNESS: 1
VOMITING: 0
WHEEZING: 1
ABDOMINAL PAIN: 0
SHORTNESS OF BREATH: 1

## 2019-03-28 ASSESSMENT — PAIN DESCRIPTION - PAIN TYPE: TYPE: ACUTE PAIN

## 2019-03-29 VITALS
HEIGHT: 62 IN | OXYGEN SATURATION: 95 % | RESPIRATION RATE: 20 BRPM | WEIGHT: 208 LBS | BODY MASS INDEX: 38.28 KG/M2 | TEMPERATURE: 98.2 F | DIASTOLIC BLOOD PRESSURE: 101 MMHG | HEART RATE: 105 BPM | SYSTOLIC BLOOD PRESSURE: 129 MMHG

## 2019-03-29 RX ORDER — EPINEPHRINE 0.3 MG/.3ML
0.3 INJECTION SUBCUTANEOUS ONCE
Qty: 2 EACH | Refills: 0 | Status: SHIPPED | OUTPATIENT
Start: 2019-03-29 | End: 2021-01-04 | Stop reason: ALTCHOICE

## 2019-03-29 RX ORDER — PREDNISONE 50 MG/1
50 TABLET ORAL DAILY
Qty: 4 TABLET | Refills: 0 | Status: SHIPPED | OUTPATIENT
Start: 2019-03-29 | End: 2019-04-07

## 2019-03-29 RX ORDER — DIPHENHYDRAMINE HCL 25 MG
25 CAPSULE ORAL EVERY 6 HOURS PRN
Qty: 30 CAPSULE | Refills: 0 | Status: ON HOLD | OUTPATIENT
Start: 2019-03-29 | End: 2020-07-22

## 2019-03-29 RX ORDER — FAMOTIDINE 20 MG/1
20 TABLET, FILM COATED ORAL 2 TIMES DAILY
Qty: 30 TABLET | Refills: 0 | Status: ON HOLD | OUTPATIENT
Start: 2019-03-29 | End: 2020-01-28

## 2019-03-29 NOTE — ED NOTES
Pt discharged in stable condition. Pt advised to follow up with PCP and return to ED if symptoms worsen. Pt is AOx4 and answering questions appropriately. Skin is PWD. Pt has steady gait upon discharge.       Axel Goldsmith, ANNY  03/29/19 0067

## 2019-03-29 NOTE — ED PROVIDER NOTES
[address] ED  eMERGENCY dEPARTMENT eNCOUnter   Attending Attestation     Pt Name: Abraham Flores  MRN: [id number]  [place] 1980  Date of evaluation: 3/28/19       Abraham Flores is a 41 y.o. female who presents with Allergic Reaction      History:   Patient is here patient thinks she is having allergic reaction. Patient states she had sudden onset of wheezing and shortness of breath and swelling to her face with a rash. Patient is not sure what it was but she thinks some type of seasoning on her food tonight. Exam: Vitals:   Vitals:    03/28/19 2131   Pulse: 135   SpO2: 94%     Patient is in respiratory distress. Patient is breathing fast and heavy with wheezing diffusely. Patient has no stridor at this time. Patient does have a rash and her trunk. Patient also is noted to have some mild angioedema of the tongue and lips. I performed a history and physical examination of the patient and discussed management with the resident. I reviewed the residents note and agree with the documented findings and plan of care. Any areas of disagreement are noted on the chart. I was personally present for the key portions of any procedures. I have documented in the chart those procedures where I was not present during the key portions. I have personally reviewed all images and agree with the resident's interpretation. I have reviewed the emergency nurses triage note. I agree with the chief complaint, past medical history, past surgical history, allergies, medications, social and family history as documented unless otherwise noted below. Documentation of the HPI, Physical Exam and Medical Decision Making performed by medical students or scribes is based on my personal performance of the HPI, PE and MDM. I personally evaluated and examined the patient in conjunction with the APC and agree with the assessment, treatment plan, and disposition of the patient as recorded by the APC.  Additional findings are as

## 2019-03-29 NOTE — ED NOTES
Bed: 09  Expected date:   Expected time:   Means of arrival:   Comments Pt received 50mg benadryl, 1 combivent treatment and 0.3 epi      Monse Crooks RN  03/28/19 [id number]

## 2019-03-29 NOTE — ED NOTES
Pt updated on plan of care and then that states that she needs cabbed home. Pt states she needs clothes and shoes and a ride home because she \"pissed her pants. \" RN informed pt she would need to obtain a ride through friends or family and that RN would allow pt to leave in gown upon discharge. RN then left room. Pt could be heard throughout the department yelling to someone on her phone that the hospital will not cab her home and \"I need a fucking ride and some clothes so I don't have to go home butt ass fucking naked! \" Pt continues to yell and then starts sobbing and screaming throughout the department \"what did I do to deserve this? I ONLY WANTED YOU TO Fransisca Hence! \" Joanne MCCORMICK then enters room and informed pt there are sick people in the rooms surrounding her and she needs to please keep it down. Pt verbalizes understanding yet continues to yell on the phone. Due to frequency and level of yelling coming from pts room RN is able to assess that pts airway remains intact and will continue to monitor.       Chandrakant Germain RN  03/29/19 0019       Chandrakant Germain RN  03/29/19 1184

## 2019-03-29 NOTE — ED PROVIDER NOTES
[address] ED  Emergency Department Encounter  EmergencyMedicine Resident     Pt Name:Niurka Brooke  MRN: [id number]  [place] 1980  Date of evaluation: 3/28/19  PCP:  Margarita Romero MD    CHIEF COMPLAINT       Chief Complaint   Patient presents with    Allergic Reaction       HISTORY OF PRESENT ILLNESS  (Location/Symptom, Timing/Onset, Context/Setting, Quality, Duration, Modifying Factors, Severity.)      Arturo Villatoro is a 41 y.o. female who presents with EMS for suspected allergic reaction. Patient stated that she was cooking with spices earlier today when she started getting tongue and lip swelling as well as Wendy Decent of breath and wheezing. Patient denies ever having anything like this before. Patient notes that she is allergic to NSAIDs but did not take any of those this evening. Patient received albuterol, Benadryl and epi from EMS. Patient is still complaining of shortness of breath lip swelling difficult talking    PAST MEDICAL / SURGICAL / SOCIAL / FAMILY HISTORY      has a past medical history of Anxiety, Arthritis, Asthma, Sadler's esophagus, Bipolar 1 disorder ([address] CAD (coronary artery disease), COPD (chronic obstructive pulmonary disease) ([address] Depression, Esophagitis, GERD (gastroesophageal reflux disease), Headache(784.0), Herniated disc, cervical, Hiatal hernia, Incisional abscess, Kidney stones, Pleurisy, Pneumonia, UTI (urinary tract infection), and Vision abnormalities. has a past surgical history that includes knee surgery (Left);  section (, ); Tonsillectomy; Knee arthroscopy (Left, 5/20/15); Cervical disc surgery; Upper gastrointestinal endoscopy (2016); Upper gastrointestinal endoscopy (2017); Upper gastrointestinal endoscopy (2017); pr esophagogastroduodenoscopy transoral diagnostic (N/A, 3/2/2017); laparoscopy; laparoscopy (N/A, 10/5/2017); Upper gastrointestinal endoscopy (N/A, 2018);  Endoscopy, colon, diagnostic; and  section (N/A, 2018). Social History     Socioeconomic History    Marital status: Single     Spouse name: Not on file    Number of children: 1    Years of education: 11th grade    Highest education level: Not on file   Occupational History    Occupation: unemployed-   Social Needs    Financial resource strain: Not on file    Food insecurity:     Worry: Not on file     Inability: Not on file   [employer] needs:     Medical: Not on file     Non-medical: Not on file   Tobacco Use    Smoking status: Current Every Day Smoker     Packs/day: 0.25     Years: 21.00     Pack years: 5.25     Types: Cigarettes    Smokeless tobacco: Never Used   Substance and Sexual Activity    Alcohol use: No    Drug use: No    Sexual activity: Yes     Partners: Male   Lifestyle    Physical activity:     Days per week: Not on file     Minutes per session: Not on file    Stress: Not on file   Relationships    Social connections:     Talks on phone: Not on file     Gets together: Not on file     Attends [employer] service: Not on file     Active member of club or organization: Not on file     Attends meetings of clubs or organizations: Not on file     Relationship status: Not on file    Intimate partner violence:     Fear of current or ex partner: Not on file     Emotionally abused: Not on file     Physically abused: Not on file     Forced sexual activity: Not on file   Other Topics Concern    Not on file   Social History Narrative    Lives with son and boyfriend       Patient was advised to stop smoking or to avoid tobacco use    Family History   Problem Relation Age of Onset    Cancer Mother         breast    Bipolar Disorder Mother     Hypertension Mother     Breast Cancer Mother     Bipolar Disorder Brother     Hypertension Father        Allergies:  Nsaids;  Toradol [ketorolac tromethamine]; and Ultram [tramadol]    Home Medications:     Prior to Admission medications    Medication Sig Start Date End Date Taking?  Authorizing Provider   diphenhydrAMINE (BENADRYL) 25 MG capsule Take 1 capsule by mouth every 6 hours as needed for Itching 3/29/19  Yes Jasvir Loya, DO   famotidine (PEPCID) 20 MG tablet Take 1 tablet by mouth 2 times daily 3/29/19  Yes Jasvir Loya, DO   EPINEPHrine (EPIPEN 2-HELDER) 0.3 MG/0.3ML SOAJ injection Inject 0.3 mLs into the muscle once for 1 dose Use as directed for allergic reaction 3/29/19 3/29/19 Yes Jasvir Loya, DO   predniSONE (DELTASONE) 50 MG tablet Take 1 tablet by mouth daily 3/29/19  Yes Jasvir Loya, DO   lidocaine (LIDODERM) 5 % Place 1 patch onto the skin daily 12 hours on, 12 hours off. 3/15/19   Chepe Miners, DO   Prenatal Vit-Fe Fumarate-FA (PNV PRENATAL PLUS MULTIVITAMIN) 27-1 MG TABS Take 1 tablet by mouth daily 2/26/19 3/28/19  KATIE Teixeira - CNP   ferrous sulfate 325 (65 Fe) MG tablet Take 1 tablet by mouth 2 times daily (with meals) 1/18/19   Kush General, DO   promethazine (PHENERGAN) 25 MG tablet Take 1 tablet by mouth every 8 hours as needed for Nausea 1/2/19   Cal Morillo MD   sertraline (ZOLOFT) 50 MG tablet Take 1 tablet by mouth daily  Patient taking differently: Take 50 mg by mouth nightly  12/31/18   Cal Morillo MD   docusate sodium (COLACE, DULCOLAX) 100 MG CAPS Take 100 mg by mouth 2 times daily 12/28/18   Peri Rivas,    COMBIVENT RESPIMAT  MCG/ACT AERS inhaler INHALE ONE PUFF BY MOUTH 4 TIMES A DAY 9/15/18   Historical Provider, MD   montelukast (SINGULAIR) 10 MG tablet Take 1 tablet by mouth nightly 9/20/18   Colin Bautista MD   Fluticasone Furoate-Vilanterol (BREO ELLIPTA) 200-25 MCG/INH AEPB Inhale 1 puff into the lungs daily    Historical Provider, MD   albuterol (PROVENTIL) (2.5 MG/3ML) 0.083% nebulizer solution Take 3 mLs by nebulization every 4 hours as needed for Wheezing  Patient taking differently: Take 2.5 mg by nebulization 4 times daily  2/15/17   Carmen Helms MD       REVIEW OF SYSTEMS    (2-9 systems for level 4, 10 or more for level 5)      Review of Systems   HENT: Positive for facial swelling. Eyes: Negative for visual disturbance. Respiratory: Positive for chest tightness, shortness of breath and wheezing. Gastrointestinal: Negative for abdominal pain, nausea and vomiting. Skin: Positive for rash. Psychiatric/Behavioral: The patient is nervous/anxious. Review of systems Limited secondary to level of distress. PHYSICAL EXAM   (up to 7 for level 4, 8 or more for level 5)      INITIAL VITALS:  BP (!) 129/101   Pulse 105   Temp 98.2 °F (36.8 °C) (Oral)   Resp 20   Ht 5' 2\" (1.575 m)   Wt 208 lb (94.3 kg)   SpO2 95%   BMI 38.04 kg/m²     Physical Exam   Constitutional: She appears well-developed and well-nourished. She appears distressed. HENT:   Head: Normocephalic and atraumatic. Eyes: Pupils are equal, round, and reactive to light. Conjunctivae are normal.   Neck: Normal range of motion. Neck supple. No tracheal deviation present. Cardiovascular: Regular rhythm, normal heart sounds and intact distal pulses. Tachycardic   Pulmonary/Chest: No stridor. She is in respiratory distress. She has wheezes. Abdominal: Soft. Bowel sounds are normal. There is no tenderness. There is no rebound. Musculoskeletal: Normal range of motion. She exhibits no edema. Neurological: She is alert. She exhibits normal muscle tone. Coordination normal.   Skin: Skin is warm. Rash (Urticaria, rash diffusely) noted. Psychiatric: She has a normal mood and affect. Thought content normal.   Nursing note and vitals reviewed.       DIFFERENTIAL  DIAGNOSIS     PLAN (LABS / IMAGING / EKG):  Orders Placed This Encounter   Procedures    XR CHEST PORTABLE    CBC Auto Differential    Basic Metabolic Panel    Respiratory Care Evaluation and Treat    Initiate ED Aerosol Protocol    Respiratory care evaluation only    HHN Treatment    HHN Treatment    HHN Treatment    MDI Treatment    MDI Treatment    HHN Treatment       MEDICATIONS ORDERED:  Orders Placed This Encounter   Medications    methylPREDNISolone sodium (SOLU-MEDROL) injection 125 mg    famotidine (PEPCID) injection 20 mg    albuterol (PROVENTIL) nebulizer solution 5 mg    ipratropium-albuterol (DUONEB) nebulizer solution 1 ampule    albuterol (PROVENTIL) nebulizer solution 5 mg    albuterol sulfate  (90 Base) MCG/ACT inhaler 2 puff    AND Linked Order Group     albuterol sulfate  (90 Base) MCG/ACT inhaler 2 puff     ipratropium (ATROVENT HFA) 17 MCG/ACT inhaler 2 puff    ipratropium (ATROVENT) 0.02 % nebulizer solution 0.5 mg    diphenhydrAMINE (BENADRYL) 25 MG capsule     Sig: Take 1 capsule by mouth every 6 hours as needed for Itching     Dispense:  30 capsule     Refill:  0    famotidine (PEPCID) 20 MG tablet     Sig: Take 1 tablet by mouth 2 times daily     Dispense:  30 tablet     Refill:  0    EPINEPHrine (EPIPEN 2-HELDER) 0.3 MG/0.3ML SOAJ injection     Sig: Inject 0.3 mLs into the muscle once for 1 dose Use as directed for allergic reaction     Dispense:  2 each     Refill:  0    predniSONE (DELTASONE) 50 MG tablet     Sig: Take 1 tablet by mouth daily     Dispense:  4 tablet     Refill:  0       DDX: Anaphylaxis, allergic reaction, respiratory distress, wheezing       DIAGNOSTIC RESULTS / EMERGENCY DEPARTMENT COURSE / MDM     LABS:  Results for orders placed or performed during the hospital encounter of 03/28/19   CBC Auto Differential   Result Value Ref Range    WBC 12.1 (H) 3.5 - 11.0 k/uL    RBC 5.54 (H) 4.0 - 5.2 m/uL    Hemoglobin 15.1 12.0 - 16.0 g/dL    Hematocrit 46.6 (H) 36 - 46 %    MCV 84.1 80 - 100 fL    MCH 27.2 26 - 34 pg    MCHC 32.4 31 - 37 g/dL    RDW 15.7 (H) 11.5 - 14.9 %    Platelets 254 (H) 898 - 450 k/uL    MPV 8.4 6.0 - 12.0 fL    NRBC Automated NOT REPORTED per 100 WBC    Differential Type NOT REPORTED     Immature Granulocytes NOT REPORTED 0 %    Absolute Immature Granulocyte NOT REPORTED 0.00 - 0.30 k/uL    WBC Morphology NOT REPORTED     RBC Morphology NOT REPORTED     Platelet Estimate NOT REPORTED     Seg Neutrophils 45 36 - 66 %    Lymphocytes 35 24 - 44 %    Atypical Lymphocytes 14 %    Monocytes 6 1 - 7 %    Eosinophils % 0 0 - 4 %    Basophils 0 0 - 2 %    Segs Absolute 5.44 1.3 - 9.1 k/uL    Absolute Lymph # 4.24 1.0 - 4.8 k/uL    Atypical Lymphocytes Absolute 1.69 k/uL    Absolute Mono # 0.73 0.1 - 1.3 k/uL    Absolute Eos # 0.00 0.0 - 0.4 k/uL    Basophils # 0.00 0.0 - 0.2 k/uL    Morphology ANISOCYTOSIS PRESENT    Basic Metabolic Panel   Result Value Ref Range    Glucose 136 (H) 70 - 99 mg/dL    BUN 18 6 - 20 mg/dL    CREATININE 0.87 0.50 - 0.90 mg/dL    Bun/Cre Ratio NOT REPORTED 9 - 20    Calcium 9.1 8.6 - 10.4 mg/dL    Sodium 139 135 - 144 mmol/L    Potassium 3.4 (L) 3.7 - 5.3 mmol/L    Chloride 102 98 - 107 mmol/L    CO2 20 20 - 31 mmol/L    Anion Gap 17 9 - 17 mmol/L    GFR Non-African American >60 >60 mL/min    GFR African American >60 >60 mL/min    GFR Comment          GFR Staging NOT REPORTED        IMPRESSION: Patient is a 45-year-old female who presents in acute distress for possible angioedema/allergic reaction. We will add on Cipro Medrol Pepcid give patient breathing treatment obtain chest x-ray CBC BMP and reevaluate patient      RADIOLOGY:  Xr Chest Portable    Result Date: 3/28/2019  EXAMINATION: SINGLE XRAY VIEW OF THE CHEST 3/28/2019 9:57 pm COMPARISON: 09/16/2018 HISTORY: ORDERING SYSTEM PROVIDED HISTORY: SOB, wheezing, allergic reaction Ordering Physician Provided Reason for Exam: allergic reaction Acuity: Acute Type of Exam: Initial FINDINGS: The lungs are without acute focal process. No effusion or pneumothorax. The cardiomediastinal silhouette is normal.  The osseous structures are intact without acute process. Cervical spine hardware is redemonstrated.      Negative chest.       EKG  None    All EKG's are interpreted by the Emergency Department Physician edit the dictations but occasionally words are mis-transcribed.)      Margarita Manuel, DO  Resident  03/29/19 2482

## 2019-04-02 ENCOUNTER — HOSPITAL ENCOUNTER (EMERGENCY)
Age: 39
Discharge: HOME | End: 2019-04-02
Attending: EMERGENCY MEDICINE
Payer: COMMERCIAL

## 2019-04-02 VITALS
RESPIRATION RATE: 16 BRPM | BODY MASS INDEX: 38.28 KG/M2 | TEMPERATURE: 98.2 F | WEIGHT: 208 LBS | DIASTOLIC BLOOD PRESSURE: 74 MMHG | SYSTOLIC BLOOD PRESSURE: 107 MMHG | OXYGEN SATURATION: 99 % | HEART RATE: 85 BPM | HEIGHT: 62 IN

## 2019-04-02 DIAGNOSIS — L02.91 ABSCESS: Primary | ICD-10-CM

## 2019-04-02 PROCEDURE — 2500000003 HC RX 250 WO HCPCS: Performed by: PHYSICIAN ASSISTANT

## 2019-04-02 PROCEDURE — 99282 EMERGENCY DEPT VISIT SF MDM: CPT

## 2019-04-02 PROCEDURE — 10060 I&D ABSCESS SIMPLE/SINGLE: CPT

## 2019-04-02 RX ORDER — SULFAMETHOXAZOLE AND TRIMETHOPRIM 800; 160 MG/1; MG/1
1 TABLET ORAL 2 TIMES DAILY
Qty: 20 TABLET | Refills: 0 | Status: SHIPPED | OUTPATIENT
Start: 2019-04-02 | End: 2019-04-12

## 2019-04-02 RX ORDER — LIDOCAINE HYDROCHLORIDE 10 MG/ML
20 INJECTION, SOLUTION INFILTRATION; PERINEURAL ONCE
Status: COMPLETED | OUTPATIENT
Start: 2019-04-02 | End: 2019-04-02

## 2019-04-02 RX ORDER — CEPHALEXIN 500 MG/1
500 CAPSULE ORAL 4 TIMES DAILY
Qty: 40 CAPSULE | Refills: 0 | Status: SHIPPED | OUTPATIENT
Start: 2019-04-02 | End: 2019-04-12

## 2019-04-02 RX ADMIN — LIDOCAINE HYDROCHLORIDE 20 ML: 10 INJECTION, SOLUTION INFILTRATION; PERINEURAL at 17:11

## 2019-04-02 ASSESSMENT — PAIN SCALES - GENERAL
PAINLEVEL_OUTOF10: 8
PAINLEVEL_OUTOF10: 8

## 2019-04-02 NOTE — ED NOTES
Pt arrives to ED c/o abscess. Pt states it started because she was here a week ago and peed on herself and states that she sat in her \"pee pants\" for awhile. Abscess noted to left perineum. Pt is A&Ox4, eupneic, PWD. GCS=15. Call light in reach.       Henry Shah RN  04/02/19 1640

## 2019-04-02 NOTE — ED PROVIDER NOTES
[address]  eMERGENCY dEPARTMENT eNCOUnter      Pt Name: Kaitlynn Kidd  MRN: [id number]  [place] 1980  Date of evaluation: 19      CHIEF COMPLAINT:  Chief Complaint   Patient presents with    Abscess       [address] A Kimberly Nguyễn is a 41 y.o. female who presents to the emergency room complaining of abscess to vaginal area. Pt states she was here 1 week ago and developed the abscess because our staff left her sit in her \"pee pants\" for awhile. Pt reports a hx of abscess formation. States it is very painful. Denies fever, chills, cp, sob, nausea, vomiting, abd pain. No dysuria. No recent abx use. No other complaints. Nursing Notes were reviewed. REVIEW OF SYSTEMS       Constitutional:  Per HPI  Eyes: No visual changes. Neck: No neck pain. Respiratory: Denies recent shortness of breath. Cardiac:  Denies recent chest pain. GI:  Per HPI  Musculoskeletal: Denies focal weakness. Neurologic: Denies headache or focal weakness. Skin:  rash    Negative in 10 essential Systems except as mentioned above and in the HPI. PAST MEDICAL HISTORY   PMH:  has a past medical history of Anxiety, Arthritis, Asthma, Sadler's esophagus, Bipolar 1 disorder ([address] CAD (coronary artery disease), COPD (chronic obstructive pulmonary disease) ([address] Depression, Esophagitis, GERD (gastroesophageal reflux disease), Headache(784.0), Herniated disc, cervical, Hiatal hernia, Incisional abscess, Kidney stones, Pleurisy, Pneumonia, UTI (urinary tract infection), and Vision abnormalities. Surgical History:  has a past surgical history that includes knee surgery (Left);  section (, ); Tonsillectomy; Knee arthroscopy (Left, 5/20/15); Cervical disc surgery; Upper gastrointestinal endoscopy (2016); Upper gastrointestinal endoscopy (2017);  Upper gastrointestinal endoscopy (2017); pr esophagogastroduodenoscopy transoral diagnostic (N/A, 3/2/2017); laparoscopy; laparoscopy (N/A, 10/5/2017); Upper gastrointestinal endoscopy (N/A, 2018); Endoscopy, colon, diagnostic; and  section (N/A, 2018). Social History:  reports that she has been smoking cigarettes. She has a 5.25 pack-year smoking history. She has never used smokeless tobacco. She reports that she does not drink alcohol or use drugs. Family History: None  Psychiatric History: None    Allergies:is allergic to nsaids; toradol [ketorolac tromethamine]; and ultram [tramadol]. PHYSICAL EXAM     INITIAL VITALS: /74   Pulse 85   Temp 98.2 °F (36.8 °C) (Oral)   Resp 16   Ht 5' 2\" (1.575 m)   Wt 208 lb (94.3 kg)   LMP 2019 (Approximate)   SpO2 99%   BMI 38.04 kg/m²   Constitutional:  Well developed   Eyes:  Pupils equal and readily reactive to light  HENT:  Atraumatic, external ears normal, nose normal, oropharynx moist. Neck- supple   Respiratory:  Clear to auscultation bilaterally with good air exchange, no W/R/R  Cardiovascular:  RRR with normal S1 and S2  Gastrointestinal/Abdomen:  Soft, NT.  BS present. Musculoskeletal:  No edema, no tenderness, no deformities. Back:  No CVA tenderness. Normal to inspection. Integument:  3cm x 2cm area of fluctuance with mild surrounding induration over left perineum. No vaginal or rectal involvement. No streaking, drainage. Area is tender. No erythema. Exam performed with Rosas Benitez.    Neurologic:  Alert & oriented x 3, no focal deficits noted       DIAGNOSTIC RESULTS     EKG: All EKG's are interpreted by the Emergency Department Physician who either signs or Co-signs this chart in the absence of a cardiologist.  Not indicated    RADIOLOGY:   Reviewed the radiologist:  No orders to display     Not indicated      LABS:  Labs Reviewed - No data to display      EMERGENCY DEPARTMENT COURSE:   -------------------------    Incision/Drainage  Date/Time: 2019 10:05 PM  Performed by: Yolis Navarro PA-C  Authorized by: [address]:  Discharge Medication List as of 4/2/2019  5:23 PM      START taking these medications    Details   sulfamethoxazole-trimethoprim (BACTRIM DS) 800-160 MG per tablet Take 1 tablet by mouth 2 times daily for 10 days, Disp-20 tablet, R-0Print      cephALEXin (KEFLEX) 500 MG capsule Take 1 capsule by mouth 4 times daily for 10 days, Disp-40 capsule, R-0Print             (Please note that portions of this note were completed with a voice recognition program.  Efforts were made to edit the dictations but occasionally words are mis-transcribed.)    Jordon Hughes [address], PA-C  04/02/19 5122

## 2019-04-03 NOTE — ED PROVIDER NOTES
eMERGENCY dEPARTMENT eNCOUnter   Independent Attestation     Pt Name: Briseida Nelson  MRN: [id number]  [place] 1980  Date of evaluation: 4/3/19     Briseida Nelson is a 41 y.o. female with CC: Abscess      Based on the medical record the care appears appropriate. I was personally available for consultation in the Emergency Department.     Heather White MD  Attending Emergency Physician                    Robin Mcgregor MD  04/03/19 0910

## 2019-04-07 ENCOUNTER — APPOINTMENT (OUTPATIENT)
Dept: CT IMAGING | Age: 39
End: 2019-04-07
Payer: COMMERCIAL

## 2019-04-07 ENCOUNTER — APPOINTMENT (OUTPATIENT)
Dept: ULTRASOUND IMAGING | Age: 39
End: 2019-04-07
Payer: COMMERCIAL

## 2019-04-07 ENCOUNTER — HOSPITAL ENCOUNTER (EMERGENCY)
Age: 39
Discharge: HOME | End: 2019-04-07
Attending: EMERGENCY MEDICINE
Payer: COMMERCIAL

## 2019-04-07 VITALS
WEIGHT: 208 LBS | DIASTOLIC BLOOD PRESSURE: 71 MMHG | SYSTOLIC BLOOD PRESSURE: 122 MMHG | OXYGEN SATURATION: 98 % | HEART RATE: 88 BPM | TEMPERATURE: 98.4 F | RESPIRATION RATE: 18 BRPM | BODY MASS INDEX: 38.28 KG/M2 | HEIGHT: 62 IN

## 2019-04-07 DIAGNOSIS — R52 PAIN: ICD-10-CM

## 2019-04-07 DIAGNOSIS — R19.7 NAUSEA VOMITING AND DIARRHEA: Primary | ICD-10-CM

## 2019-04-07 DIAGNOSIS — J11.1 INFLUENZA WITH RESPIRATORY MANIFESTATION OTHER THAN PNEUMONIA: ICD-10-CM

## 2019-04-07 DIAGNOSIS — J45.41 MODERATE PERSISTENT ASTHMA WITH EXACERBATION: ICD-10-CM

## 2019-04-07 DIAGNOSIS — R11.2 NAUSEA VOMITING AND DIARRHEA: Primary | ICD-10-CM

## 2019-04-07 LAB
ABSOLUTE EOS #: 0 K/UL (ref 0–0.4)
ABSOLUTE IMMATURE GRANULOCYTE: ABNORMAL K/UL (ref 0–0.3)
ABSOLUTE LYMPH #: 2.85 K/UL (ref 1–4.8)
ABSOLUTE MONO #: 1.25 K/UL (ref 0.1–1.3)
ALBUMIN SERPL-MCNC: 4.3 G/DL (ref 3.5–5.2)
ALBUMIN/GLOBULIN RATIO: ABNORMAL (ref 1–2.5)
ALP BLD-CCNC: 78 U/L (ref 35–104)
ALT SERPL-CCNC: 30 U/L (ref 5–33)
ANION GAP SERPL CALCULATED.3IONS-SCNC: 14 MMOL/L (ref 9–17)
AST SERPL-CCNC: 15 U/L
BASOPHILS # BLD: 1 % (ref 0–2)
BASOPHILS ABSOLUTE: 0.18 K/UL (ref 0–0.2)
BILIRUB SERPL-MCNC: 0.26 MG/DL (ref 0.3–1.2)
BILIRUBIN URINE: NEGATIVE
BUN BLDV-MCNC: 16 MG/DL (ref 6–20)
BUN/CREAT BLD: ABNORMAL (ref 9–20)
C-REACTIVE PROTEIN: 11.2 MG/L (ref 0–5)
CALCIUM SERPL-MCNC: 8.9 MG/DL (ref 8.6–10.4)
CHLORIDE BLD-SCNC: 108 MMOL/L (ref 98–107)
CO2: 22 MMOL/L (ref 20–31)
COLOR: YELLOW
COMMENT UA: NORMAL
CREAT SERPL-MCNC: 0.69 MG/DL (ref 0.5–0.9)
DIFFERENTIAL TYPE: ABNORMAL
DIRECT EXAM: ABNORMAL
DIRECT EXAM: ABNORMAL
EOSINOPHILS RELATIVE PERCENT: 0 % (ref 0–4)
GFR AFRICAN AMERICAN: >60 ML/MIN
GFR NON-AFRICAN AMERICAN: >60 ML/MIN
GFR SERPL CREATININE-BSD FRML MDRD: ABNORMAL ML/MIN/{1.73_M2}
GFR SERPL CREATININE-BSD FRML MDRD: ABNORMAL ML/MIN/{1.73_M2}
GLUCOSE BLD-MCNC: 85 MG/DL (ref 70–99)
GLUCOSE URINE: NEGATIVE
HCG QUALITATIVE: NEGATIVE
HCT VFR BLD CALC: 42.4 % (ref 36–46)
HEMOGLOBIN: 13.9 G/DL (ref 12–16)
IMMATURE GRANULOCYTES: ABNORMAL %
KETONES, URINE: NEGATIVE
LEUKOCYTE ESTERASE, URINE: NEGATIVE
LIPASE: 20 U/L (ref 13–60)
LYMPHOCYTES # BLD: 16 % (ref 24–44)
Lab: ABNORMAL
MAGNESIUM: 1.9 MG/DL (ref 1.6–2.6)
MCH RBC QN AUTO: 27.7 PG (ref 26–34)
MCHC RBC AUTO-ENTMCNC: 32.6 G/DL (ref 31–37)
MCV RBC AUTO: 84.9 FL (ref 80–100)
MONOCYTES # BLD: 7 % (ref 1–7)
MORPHOLOGY: ABNORMAL
NITRITE, URINE: NEGATIVE
NRBC AUTOMATED: ABNORMAL PER 100 WBC
PDW BLD-RTO: 16.2 % (ref 11.5–14.9)
PH UA: 5 (ref 5–8)
PLATELET # BLD: 325 K/UL (ref 150–450)
PLATELET ESTIMATE: ABNORMAL
PMV BLD AUTO: 8.4 FL (ref 6–12)
POTASSIUM SERPL-SCNC: 3.9 MMOL/L (ref 3.7–5.3)
PROTEIN UA: NEGATIVE
RBC # BLD: 5 M/UL (ref 4–5.2)
RBC # BLD: ABNORMAL 10*6/UL
SEG NEUTROPHILS: 76 % (ref 36–66)
SEGMENTED NEUTROPHILS ABSOLUTE COUNT: 13.52 K/UL (ref 1.3–9.1)
SODIUM BLD-SCNC: 144 MMOL/L (ref 135–144)
SPECIFIC GRAVITY UA: 1.03 (ref 1–1.03)
SPECIMEN DESCRIPTION: ABNORMAL
TOTAL PROTEIN: 7.3 G/DL (ref 6.4–8.3)
TURBIDITY: CLEAR
URINE HGB: NEGATIVE
UROBILINOGEN, URINE: NORMAL
WBC # BLD: 17.8 K/UL (ref 3.5–11)
WBC # BLD: ABNORMAL 10*3/UL

## 2019-04-07 PROCEDURE — 76830 TRANSVAGINAL US NON-OB: CPT

## 2019-04-07 PROCEDURE — 94640 AIRWAY INHALATION TREATMENT: CPT

## 2019-04-07 PROCEDURE — 83735 ASSAY OF MAGNESIUM: CPT

## 2019-04-07 PROCEDURE — 80053 COMPREHEN METABOLIC PANEL: CPT

## 2019-04-07 PROCEDURE — 83690 ASSAY OF LIPASE: CPT

## 2019-04-07 PROCEDURE — 87804 INFLUENZA ASSAY W/OPTIC: CPT

## 2019-04-07 PROCEDURE — 99284 EMERGENCY DEPT VISIT MOD MDM: CPT

## 2019-04-07 PROCEDURE — 93976 VASCULAR STUDY: CPT

## 2019-04-07 PROCEDURE — 6370000000 HC RX 637 (ALT 250 FOR IP): Performed by: EMERGENCY MEDICINE

## 2019-04-07 PROCEDURE — 2500000003 HC RX 250 WO HCPCS: Performed by: EMERGENCY MEDICINE

## 2019-04-07 PROCEDURE — 96376 TX/PRO/DX INJ SAME DRUG ADON: CPT

## 2019-04-07 PROCEDURE — 96374 THER/PROPH/DIAG INJ IV PUSH: CPT

## 2019-04-07 PROCEDURE — 2580000003 HC RX 258: Performed by: EMERGENCY MEDICINE

## 2019-04-07 PROCEDURE — 96375 TX/PRO/DX INJ NEW DRUG ADDON: CPT

## 2019-04-07 PROCEDURE — 86140 C-REACTIVE PROTEIN: CPT

## 2019-04-07 PROCEDURE — 6360000002 HC RX W HCPCS: Performed by: EMERGENCY MEDICINE

## 2019-04-07 PROCEDURE — 85025 COMPLETE CBC W/AUTO DIFF WBC: CPT

## 2019-04-07 PROCEDURE — 6360000004 HC RX CONTRAST MEDICATION: Performed by: EMERGENCY MEDICINE

## 2019-04-07 PROCEDURE — 74177 CT ABD & PELVIS W/CONTRAST: CPT

## 2019-04-07 PROCEDURE — 81003 URINALYSIS AUTO W/O SCOPE: CPT

## 2019-04-07 PROCEDURE — 36415 COLL VENOUS BLD VENIPUNCTURE: CPT

## 2019-04-07 PROCEDURE — 84703 CHORIONIC GONADOTROPIN ASSAY: CPT

## 2019-04-07 RX ORDER — 0.9 % SODIUM CHLORIDE 0.9 %
80 INTRAVENOUS SOLUTION INTRAVENOUS ONCE
Status: COMPLETED | OUTPATIENT
Start: 2019-04-07 | End: 2019-04-07

## 2019-04-07 RX ORDER — METHYLPREDNISOLONE SODIUM SUCCINATE 125 MG/2ML
125 INJECTION, POWDER, LYOPHILIZED, FOR SOLUTION INTRAMUSCULAR; INTRAVENOUS ONCE
Status: COMPLETED | OUTPATIENT
Start: 2019-04-07 | End: 2019-04-07

## 2019-04-07 RX ORDER — ALBUTEROL SULFATE 2.5 MG/3ML
2.5 SOLUTION RESPIRATORY (INHALATION) EVERY 6 HOURS PRN
Status: DISCONTINUED | OUTPATIENT
Start: 2019-04-07 | End: 2019-04-07 | Stop reason: HOSPADM

## 2019-04-07 RX ORDER — 0.9 % SODIUM CHLORIDE 0.9 %
1000 INTRAVENOUS SOLUTION INTRAVENOUS ONCE
Status: COMPLETED | OUTPATIENT
Start: 2019-04-07 | End: 2019-04-07

## 2019-04-07 RX ORDER — OSELTAMIVIR PHOSPHATE 75 MG/1
75 CAPSULE ORAL 2 TIMES DAILY
Qty: 20 CAPSULE | Refills: 0 | Status: SHIPPED | OUTPATIENT
Start: 2019-04-07 | End: 2019-04-17

## 2019-04-07 RX ORDER — MORPHINE SULFATE 4 MG/ML
4 INJECTION, SOLUTION INTRAMUSCULAR; INTRAVENOUS ONCE
Status: COMPLETED | OUTPATIENT
Start: 2019-04-07 | End: 2019-04-07

## 2019-04-07 RX ORDER — OSELTAMIVIR PHOSPHATE 75 MG/1
75 CAPSULE ORAL ONCE
Status: COMPLETED | OUTPATIENT
Start: 2019-04-07 | End: 2019-04-07

## 2019-04-07 RX ORDER — PREDNISONE 10 MG/1
40 TABLET ORAL DAILY
Qty: 20 TABLET | Refills: 0 | Status: SHIPPED | OUTPATIENT
Start: 2019-04-07 | End: 2019-04-12

## 2019-04-07 RX ORDER — ONDANSETRON 2 MG/ML
8 INJECTION INTRAMUSCULAR; INTRAVENOUS ONCE
Status: COMPLETED | OUTPATIENT
Start: 2019-04-07 | End: 2019-04-07

## 2019-04-07 RX ORDER — SODIUM CHLORIDE 0.9 % (FLUSH) 0.9 %
10 SYRINGE (ML) INJECTION PRN
Status: DISCONTINUED | OUTPATIENT
Start: 2019-04-07 | End: 2019-04-07 | Stop reason: HOSPADM

## 2019-04-07 RX ADMIN — FAMOTIDINE 20 MG: 10 INJECTION, SOLUTION INTRAVENOUS at 14:37

## 2019-04-07 RX ADMIN — SODIUM CHLORIDE 1000 ML: 9 INJECTION, SOLUTION INTRAVENOUS at 14:37

## 2019-04-07 RX ADMIN — ONDANSETRON 8 MG: 2 INJECTION INTRAMUSCULAR; INTRAVENOUS at 16:22

## 2019-04-07 RX ADMIN — SODIUM CHLORIDE 80 ML: 9 INJECTION, SOLUTION INTRAVENOUS at 15:22

## 2019-04-07 RX ADMIN — Medication 10 ML: at 15:22

## 2019-04-07 RX ADMIN — MORPHINE SULFATE 4 MG: 4 INJECTION INTRAVENOUS at 14:37

## 2019-04-07 RX ADMIN — IOVERSOL 75 ML: 741 INJECTION INTRA-ARTERIAL; INTRAVENOUS at 15:22

## 2019-04-07 RX ADMIN — OSELTAMIVIR PHOSPHATE 75 MG: 75 CAPSULE ORAL at 15:32

## 2019-04-07 RX ADMIN — METHYLPREDNISOLONE SODIUM SUCCINATE 125 MG: 125 INJECTION, POWDER, FOR SOLUTION INTRAMUSCULAR; INTRAVENOUS at 14:37

## 2019-04-07 RX ADMIN — MORPHINE SULFATE 4 MG: 4 INJECTION INTRAVENOUS at 16:22

## 2019-04-07 RX ADMIN — ONDANSETRON 8 MG: 2 INJECTION INTRAMUSCULAR; INTRAVENOUS at 14:37

## 2019-04-07 RX ADMIN — ALBUTEROL SULFATE 2.5 MG: 2.5 SOLUTION RESPIRATORY (INHALATION) at 14:20

## 2019-04-07 ASSESSMENT — PAIN SCALES - GENERAL
PAINLEVEL_OUTOF10: 8
PAINLEVEL_OUTOF10: 9
PAINLEVEL_OUTOF10: 8
PAINLEVEL_OUTOF10: 8

## 2019-04-07 ASSESSMENT — ENCOUNTER SYMPTOMS
HEMATEMESIS: 0
COUGH: 1
NAUSEA: 1
HEMATOCHEZIA: 0
VOMITING: 1
DIARRHEA: 1
RHINORRHEA: 1
ABDOMINAL PAIN: 1
SHORTNESS OF BREATH: 1
CONSTIPATION: 0
WHEEZING: 1

## 2019-04-07 ASSESSMENT — PAIN DESCRIPTION - LOCATION: LOCATION: ABDOMEN

## 2019-04-07 NOTE — ED PROVIDER NOTES
EMERGENCY DEPARTMENT ENCOUNTER    Pt Name: Sky Brown  MRN: [id number]  Maricelgfparish 1980  Date of evaluation: 4/7/19  CHIEF COMPLAINT       Chief Complaint   Patient presents with    Abdominal Pain    Emesis     HISTORY OF PRESENT ILLNESS     Abdominal Pain   Pain location:  Generalized  Pain quality: cramping    Pain radiates to:  Does not radiate  Pain severity:  Moderate  Onset quality:  Gradual  Duration:  2 days  Timing:  Constant  Progression:  Worsening  Chronicity:  Recurrent (has chronic recurrent abd pain)  Relieved by:  Nothing  Worsened by:  Nothing  Ineffective treatments:  None tried  Associated symptoms: chills, cough, diarrhea, nausea, shortness of breath and vomiting    Associated symptoms: no constipation, no dysuria, no fever, no hematemesis, no hematochezia, no hematuria, no vaginal bleeding and no vaginal discharge        REVIEW OF SYSTEMS     Review of Systems   Constitutional: Positive for chills. Negative for fever. HENT: Positive for congestion, rhinorrhea and sneezing. Respiratory: Positive for cough, shortness of breath and wheezing. Gastrointestinal: Positive for abdominal pain, diarrhea, nausea and vomiting. Negative for constipation, hematemesis and hematochezia. Genitourinary: Negative for dysuria, hematuria, vaginal bleeding and vaginal discharge. All other systems reviewed and are negative.     PASTMEDICAL HISTORY     Past Medical History:   Diagnosis Date    Anxiety     Arthritis     OA    Asthma     Sadler's esophagus     LONG SEGMENT    Bipolar 1 disorder (HCC)     CAD (coronary artery disease)     [facility]     Depression     and bipolar    Esophagitis     severe    GERD (gastroesophageal reflux disease)     Headache(784.0)     Herniated disc, cervical     Hiatal hernia     Incisional abscess 1/15/2019    Kidney stones     Pleurisy     hx of \"years ago\"    Pneumonia     past penumonia    UTI (urinary tract infection)     Vision abnormalities     wears glasses     SURGICAL HISTORY       Past Surgical History:   Procedure Laterality Date    CERVICAL [facility]GERY       SECTION  , 2018    x 2     SECTION N/A 2018     SECTION performed by Bernarda Portillo MD at [facility]    ENDOSCOPY, COLON, DIAGNOSTIC      KNEE ARTHROSCOPY Left 5/20/15    KNEE SURGERY Left     x3    LAPAROSCOPY      LAPAROSCOPY N/A 10/5/2017    LAPAROSCOPIC REMOVAL INTRA ABDOMINAL LIPOMA LOWER RIGHT performed by Zack Hartman DO at [address] ESOPHAGOGASTRODUODENOSCOPY TRANSORAL DIAGNOSTIC N/A 3/2/2017    EGD ESOPHAGOGASTRODUODENOSCOPY  IP 2055 performed by Shashank Monet MD at [facility]  2016    severe esophagitis    UPPER GASTROINTESTINAL ENDOSCOPY  2017    barretts; hiatus hernia; gastritis    UPPER GASTROINTESTINAL ENDOSCOPY  2017    long seg mullins's with linear erosions, esophagitis, small hiatal hernia    UPPER GASTROINTESTINAL ENDOSCOPY N/A 2018    MULLINS'S     CURRENT MEDICATIONS       Previous Medications    ALBUTEROL (PROVENTIL) (2.5 MG/3ML) 0.083% NEBULIZER SOLUTION    Take 3 mLs by nebulization every 4 hours as needed for Wheezing    CEPHALEXIN (KEFLEX) 500 MG CAPSULE    Take 1 capsule by mouth 4 times daily for 10 days    COMBIVENT RESPIMAT  MCG/ACT AERS INHALER    INHALE ONE PUFF BY MOUTH 4 TIMES A DAY    DIPHENHYDRAMINE (BENADRYL) 25 MG CAPSULE    Take 1 capsule by mouth every 6 hours as needed for Itching    DOCUSATE SODIUM (COLACE, DULCOLAX) 100 MG CAPS    Take 100 mg by mouth 2 times daily    EPINEPHRINE (EPIPEN 2-HELDER) 0.3 MG/0.3ML SOAJ INJECTION    Inject 0.3 mLs into the muscle once for 1 dose Use as directed for allergic reaction    FAMOTIDINE (PEPCID) 20 MG TABLET    Take 1 tablet by mouth 2 times daily    FERROUS SULFATE 325 (65 FE) MG TABLET    Take 1 tablet by mouth 2 times daily (with meals)    FLUTICASONE FUROATE-VILANTEROL (BREO ELLIPTA) 200-25 MCG/INH AEPB    Inhale 1 puff into the lungs daily    LIDOCAINE (LIDODERM) 5 %    Place 1 patch onto the skin daily 12 hours on, 12 hours off. MONTELUKAST (SINGULAIR) 10 MG TABLET    Take 1 tablet by mouth nightly    PRENATAL VIT-FE FUMARATE-FA (PNV PRENATAL PLUS MULTIVITAMIN) 27-1 MG TABS    Take 1 tablet by mouth daily    PROMETHAZINE (PHENERGAN) 25 MG TABLET    Take 1 tablet by mouth every 8 hours as needed for Nausea    SERTRALINE (ZOLOFT) 50 MG TABLET    Take 1 tablet by mouth daily    SULFAMETHOXAZOLE-TRIMETHOPRIM (BACTRIM DS) 800-160 MG PER TABLET    Take 1 tablet by mouth 2 times daily for 10 days     ALLERGIES     is allergic to nsaids; toradol [ketorolac tromethamine]; and ultram [tramadol]. FAMILY HISTORY     indicated that the status of her mother is unknown. She indicated that the status of her father is unknown. She indicated that the status of her brother is unknown. SOCIAL HISTORY       Social History     Tobacco Use    Smoking status: Current Every Day Smoker     Packs/day: 0.25     Years: 21.00     Pack years: 5.25     Types: Cigarettes    Smokeless tobacco: Never Used   Substance Use Topics    Alcohol use: No    Drug use: No     PHYSICAL EXAM     INITIAL VITALS: /71   Pulse 88   Temp 98.4 °F (36.9 °C) (Oral)   Resp 18   Ht 5' 2\" (1.575 m)   Wt 208 lb (94.3 kg)   LMP 04/02/2019 (Approximate)   SpO2 98%   BMI 38.04 kg/m²    Physical Exam   Constitutional: She is oriented to person, place, and time. She appears well-developed and well-nourished. No distress. HENT:   Head: Normocephalic and atraumatic. Right Ear: External ear normal.   Left Ear: External ear normal.   Nose: Nose normal.   Mouth/Throat: Oropharynx is clear and moist.   Eyes: Pupils are equal, round, and reactive to light. Conjunctivae and EOM are normal. Right eye exhibits no discharge. Left eye exhibits no discharge.    Neck: Normal range of motion. Neck supple. No tracheal deviation present. Cardiovascular: Normal rate, regular rhythm, normal heart sounds and intact distal pulses. Pulmonary/Chest: Effort normal. No stridor. No respiratory distress. She has wheezes. She has no rales. She exhibits no tenderness. Abdominal: Soft. Bowel sounds are normal. There is tenderness. There is no rebound and no guarding. Right mid abd tenderness   Musculoskeletal: Normal range of motion. She exhibits no edema or tenderness. Neurological: She is alert and oriented to person, place, and time. No cranial nerve deficit. Coordination normal.   Skin: Skin is warm and dry. Capillary refill takes less than 2 seconds. No rash noted. She is not diaphoretic. No erythema. Psychiatric: She has a normal mood and affect. Her behavior is normal. Judgment normal.       MEDICAL DECISION MAKING:       ED Course as of Apr 07 1811   Sun Apr 07, 2019   1428 Patient has chronic recurrent abd pains, multiple CT abd in past, US, and laparoscopy. [WM]   9792 Reviewed her past med records    [WM]   1502 High wbc with right side abd tenderness, checking ct abd with contrast.  Giving tamiflu for positive flu test    [WM]   1610 Reviewed labs and ct, ovarian abnormality, getting US to r/o torsion, will check pelvic exam    [WM]   1034 Patient refuses pelvic exam, agrees to US pelvis    [WM]   1800 DW radiologist Dr Noemy Raymundo, there is waveform to right ovary US with normal flow, do not think there is torsion, but study is limited. On CT the right ovary position is the same as previous.    [WM]   1809 Repeat assessement, abd soft nt, tolerating po, feeling much better, she wants to go home. DW her CT and US results of pelvis. She states she will follow up with Dr. Shade Soto tomorrow. She has 2 kids and doesn't want anymore. I do not think she has acute ovarian torsion.   Rx tamiflu and prednisone    [WM]      ED Course User Index  [WM] Ramesh Del Rio MD Notable for the following components:    WBC 17.8 (*)     RDW 16.2 (*)     Seg Neutrophils 76 (*)     Lymphocytes 16 (*)     Segs Absolute 13.52 (*)     All other components within normal limits   COMPREHENSIVE METABOLIC PANEL - Abnormal; Notable for the following components:    Chloride 108 (*)     Total Bilirubin 0.26 (*)     All other components within normal limits   C-REACTIVE PROTEIN - Abnormal; Notable for the following components:    CRP 11.2 (*)     All other components within normal limits   URINALYSIS   HCG, SERUM, QUALITATIVE   MAGNESIUM   LIPASE       EMERGENCY DEPARTMENTCOURSE:         Vitals:    Vitals:    04/07/19 1307 04/07/19 1353 04/07/19 1529 04/07/19 1745   BP: 125/65 123/69 137/84 122/71   Pulse: 114 106 86 88   Resp: 18 18 16 18   Temp: 98.4 °F (36.9 °C)      TempSrc: Oral      SpO2: 96% 99% 96% 98%   Weight: 208 lb (94.3 kg)      Height: 5' 2\" (1.575 m)          The patient was given the following medications while in the emergency department:  Orders Placed This Encounter   Medications    0.9 % sodium chloride bolus    ondansetron (ZOFRAN) injection 8 mg    famotidine (PEPCID) injection 20 mg    morphine sulfate (PF) injection 4 mg    methylPREDNISolone sodium (SOLU-MEDROL) injection 125 mg    albuterol (PROVENTIL) nebulizer solution 2.5 mg    oseltamivir (TAMIFLU) capsule 75 mg    ioversol (OPTIRAY) 74 % injection 75 mL    0.9 % sodium chloride bolus    sodium chloride flush 0.9 % injection 10 mL    morphine sulfate (PF) injection 4 mg    DISCONTD: prochlorperazine (COMPAZINE) injection 10 mg    ondansetron (ZOFRAN) injection 8 mg    oseltamivir (TAMIFLU) 75 MG capsule     Sig: Take 1 capsule by mouth 2 times daily for 10 days     Dispense:  20 capsule     Refill:  0    predniSONE (DELTASONE) 10 MG tablet     Sig: Take 4 tablets by mouth daily for 5 days     Dispense:  20 tablet     Refill:  0     CONSULTS:  None    FINAL IMPRESSION      1. Nausea vomiting and diarrhea    2. Pain    3. Moderate persistent asthma with exacerbation    4.  Influenza with respiratory manifestation other than pneumonia          DISPOSITION/PLAN   DISPOSITION Decision To Discharge 04/07/2019 06:04:29 PM      PATIENT REFERRED TO:  Eli Palacio MD  [address]  [phone]    Schedule an appointment as soon as possible for a visit in 1 day      Chalino Morse, [address]  [phone]    Schedule an appointment as soon as possible for a visit in 1 day      DISCHARGE MEDICATIONS:  New Prescriptions    OSELTAMIVIR (TAMIFLU) 75 MG CAPSULE    Take 1 capsule by mouth 2 times daily for 10 days    PREDNISONE (DELTASONE) 10 MG TABLET    Take 4 tablets by mouth daily for 5 days     Neeru Ford MD  Attending Emergency Physician                    Neeru Ford MD  04/07/19 0055

## 2019-04-07 NOTE — PROGRESS NOTES
· Bronchodilator assessment       []   Continuous Aerosol    PEFR =     Bronchodilator assessment at level:   BRONCHODILATOR ASSESSMENT SCORE  Score 1 2 3 4   Breath Sounds   [x]  Clear [x]  Mild Wheezing with good aeration []  Moderate I/E wheezing with adequate aeration []  Poor Aeration or diffuse wheezing   Respiratory Rate [x]  Less than 20 []  20-25 []  Greater than 25  []  Greater than 35    Dyspnea [x]  No SOB  [x]  SOB with minimal activity []  Speaking in partial sentences []  Acute/ At rest   Peakflow (asthma) []  80 % or greater predicted/PB  []  Unable []  70% or greater predicted/PB  []  Unable []  51%-70% predicted/PB  []  Unable []  Less than 50% predicted/PB  []  Unable due to distress   FEV1 % Predicted []  Greater than 69%  []  Unable  []  Less than 50%-69%  []  Unable  []  Less than 35%-49%  []  Unable  []  Less than 35%  []  Unable due to distress

## 2019-05-11 ENCOUNTER — HOSPITAL ENCOUNTER (EMERGENCY)
Age: 39
Discharge: HOME | End: 2019-05-11
Attending: EMERGENCY MEDICINE
Payer: COMMERCIAL

## 2019-05-11 VITALS
BODY MASS INDEX: 38.28 KG/M2 | HEIGHT: 62 IN | RESPIRATION RATE: 18 BRPM | HEART RATE: 88 BPM | WEIGHT: 208 LBS | OXYGEN SATURATION: 98 % | SYSTOLIC BLOOD PRESSURE: 114 MMHG | DIASTOLIC BLOOD PRESSURE: 62 MMHG | TEMPERATURE: 97.7 F

## 2019-05-11 DIAGNOSIS — M54.9 BACK PAIN, UNSPECIFIED BACK LOCATION, UNSPECIFIED BACK PAIN LATERALITY, UNSPECIFIED CHRONICITY: ICD-10-CM

## 2019-05-11 DIAGNOSIS — G43.009 MIGRAINE WITHOUT AURA AND WITHOUT STATUS MIGRAINOSUS, NOT INTRACTABLE: Primary | ICD-10-CM

## 2019-05-11 LAB
AMPHETAMINE SCREEN URINE: NEGATIVE
BARBITURATE SCREEN URINE: NEGATIVE
BENZODIAZEPINE SCREEN, URINE: NEGATIVE
BILIRUBIN URINE: NEGATIVE
BUPRENORPHINE URINE: NORMAL
CANNABINOID SCREEN URINE: NEGATIVE
COCAINE METABOLITE, URINE: NEGATIVE
COLOR: YELLOW
COMMENT UA: NORMAL
GLUCOSE URINE: NEGATIVE
HCG(URINE) PREGNANCY TEST: NEGATIVE
KETONES, URINE: NEGATIVE
LEUKOCYTE ESTERASE, URINE: NEGATIVE
MDMA URINE: NORMAL
METHADONE SCREEN, URINE: NEGATIVE
METHAMPHETAMINE, URINE: NORMAL
NITRITE, URINE: NEGATIVE
OPIATES, URINE: NEGATIVE
OXYCODONE SCREEN URINE: NEGATIVE
PH UA: 5 (ref 5–8)
PHENCYCLIDINE, URINE: NEGATIVE
PROPOXYPHENE, URINE: NORMAL
PROTEIN UA: NEGATIVE
SPECIFIC GRAVITY UA: 1.02 (ref 1–1.03)
TEST INFORMATION: NORMAL
TRICYCLIC ANTIDEPRESSANTS, UR: NORMAL
TURBIDITY: CLEAR
URINE HGB: NEGATIVE
UROBILINOGEN, URINE: NORMAL

## 2019-05-11 PROCEDURE — 99283 EMERGENCY DEPT VISIT LOW MDM: CPT

## 2019-05-11 PROCEDURE — 84703 CHORIONIC GONADOTROPIN ASSAY: CPT

## 2019-05-11 PROCEDURE — 6360000002 HC RX W HCPCS: Performed by: EMERGENCY MEDICINE

## 2019-05-11 PROCEDURE — 80307 DRUG TEST PRSMV CHEM ANLYZR: CPT

## 2019-05-11 PROCEDURE — 96372 THER/PROPH/DIAG INJ SC/IM: CPT

## 2019-05-11 PROCEDURE — 81003 URINALYSIS AUTO W/O SCOPE: CPT

## 2019-05-11 RX ORDER — PROCHLORPERAZINE EDISYLATE 5 MG/ML
10 INJECTION INTRAMUSCULAR; INTRAVENOUS ONCE
Status: DISCONTINUED | OUTPATIENT
Start: 2019-05-11 | End: 2019-05-11

## 2019-05-11 RX ORDER — NALBUPHINE HCL 10 MG/ML
10 AMPUL (ML) INJECTION ONCE
Status: COMPLETED | OUTPATIENT
Start: 2019-05-11 | End: 2019-05-11

## 2019-05-11 RX ORDER — DIPHENHYDRAMINE HYDROCHLORIDE 50 MG/ML
50 INJECTION INTRAMUSCULAR; INTRAVENOUS ONCE
Status: DISCONTINUED | OUTPATIENT
Start: 2019-05-11 | End: 2019-05-11

## 2019-05-11 RX ORDER — PROMETHAZINE HYDROCHLORIDE 25 MG/ML
25 INJECTION, SOLUTION INTRAMUSCULAR; INTRAVENOUS ONCE
Status: COMPLETED | OUTPATIENT
Start: 2019-05-11 | End: 2019-05-11

## 2019-05-11 RX ADMIN — NALBUPHINE HYDROCHLORIDE 10 MG: 10 INJECTION, SOLUTION INTRAMUSCULAR; INTRAVENOUS; SUBCUTANEOUS at 16:15

## 2019-05-11 RX ADMIN — PROMETHAZINE HYDROCHLORIDE 25 MG: 25 INJECTION INTRAMUSCULAR; INTRAVENOUS at 16:14

## 2019-05-11 ASSESSMENT — ENCOUNTER SYMPTOMS
SHORTNESS OF BREATH: 0
ABDOMINAL PAIN: 0
DIARRHEA: 0
BACK PAIN: 0
SORE THROAT: 0
COUGH: 0
BLOOD IN STOOL: 0
CONSTIPATION: 0
VOMITING: 0
NAUSEA: 0
COLOR CHANGE: 0
TROUBLE SWALLOWING: 0

## 2019-05-11 ASSESSMENT — PAIN SCALES - GENERAL: PAINLEVEL_OUTOF10: 10

## 2019-05-11 ASSESSMENT — PAIN DESCRIPTION - FREQUENCY: FREQUENCY: CONTINUOUS

## 2019-05-11 ASSESSMENT — PAIN DESCRIPTION - LOCATION: LOCATION: HEAD;NECK

## 2019-05-11 ASSESSMENT — PATIENT HEALTH QUESTIONNAIRE - PHQ9: SUM OF ALL RESPONSES TO PHQ QUESTIONS 1-9: 15

## 2019-05-11 ASSESSMENT — PAIN DESCRIPTION - PAIN TYPE: TYPE: CHRONIC PAIN

## 2019-05-11 ASSESSMENT — PAIN DESCRIPTION - ONSET: ONSET: ON-GOING

## 2019-05-11 ASSESSMENT — PAIN DESCRIPTION - PROGRESSION: CLINICAL_PROGRESSION: GRADUALLY WORSENING

## 2019-05-11 ASSESSMENT — PAIN DESCRIPTION - DESCRIPTORS: DESCRIPTORS: BURNING

## 2019-05-11 NOTE — ED NOTES
Pt state she is bipolar and has had increased depression since she gave birth 12/2018; she has had increased thoughts of hurting herself but has not had any plan and has not had these thoughts in the past 2 days; she states she was on Zoloft but stopped taking it about 6 weeks ago as she did not feel like it was working      [employer], ANNY  05/11/19 [phone] [id number]

## 2019-05-11 NOTE — ED NOTES
Pt arrives to ED c/o spine pain and head pain. Patient states that she has a history of migraines and states that she feels like she has a migraine now. Patient states that this has been ongoing for the last three days with the pain being worse today. Patient states that her spine has been hurting \"for a long time\" stating that she has herniated discs and she states that she thinks the pain in her spine is what is causing her migraine. Patient states that she does have a sensitivity to light due to the migraine. Patient denies chest pain and shortness of breath. Patient resting on stretcher no s/s of distress. Pt is A&Ox4, eupneic, PWD. GCS=15. Call light in reach.       Sonja Stanford RN  05/11/19 2977

## 2019-05-11 NOTE — ED NOTES
SW in to see pt as she reports to the triage RN that she cannot get into [facility]. [facility] but cant get [facility] appointment as she has missed too many appointments. Pt reports that she has increased depression since having a baby in December of 2018 and stopping her depression medications. Pt states that she stopped taking Zoloft because it was not working. This writer informed pt that she can attend a [facility] walk in appointment to be seen. This writer also gave pt information  to [facility] for a walk in appointment on Monday 5/13/2019. Pt denies SI/HI and all hallucinations. Pt denies all drug and alcohol use. Pt also given additional resources to  [facility] in the area.

## 2019-05-11 NOTE — ED PROVIDER NOTES
[address] ED  eMERGENCY dEPARTMENT eNCOUnter    Pt Name: Suzanne Emery  MRN: [id number]  YOB: 1980  Date of evaluation:5/11/19  PCP: Ashlyn Hui MD    [address] [place] [address]       Chief Complaint   Patient presents with    Migraine    Back Pain     buring from neck down to lower back       [facility] Jaky Peck is a 42 y.o. female who presents with a chief complaint of a headache. Patient has a long history of migraines. She states she has migraines almost \"constantly. \"  She states this particular one has been going on for the past 3 days. There is no thunderclap onset. No falls or trauma. Pain is diffuse throughout her head, 8 out of 10 in severity. No blurry or double vision however lites are aggravating her symptoms. Nothing makes her symptoms better. Also complaining of back pain. She states this feels like a burning. She is complaining of some numbness in her right arm however this is a chronic issue for her and has been going on for a long time. She has seen neurology about this. She denies any new numbness, tingling or weakness. No recent fevers. No chest pain or difficulty breathing. Nothing else make her symptoms better or worse. Symptoms are acute. Patient has no other complaints at this time. REVIEW OF SYSTEMS       Review of Systems   Constitutional: Negative for chills, fatigue and fever. HENT: Negative for congestion, ear pain, sore throat and trouble swallowing. Eyes: Negative for visual disturbance. Respiratory: Negative for cough and shortness of breath. Cardiovascular: Negative for chest pain, palpitations and leg swelling. Gastrointestinal: Negative for abdominal pain, blood in stool, constipation, diarrhea, nausea and vomiting. Genitourinary: Negative for dysuria and flank pain. Musculoskeletal: Negative for arthralgias, back pain, myalgias and neck pain. Skin: Negative for color change, rash and wound.    Neurological: Positive for numbness (Chronic) and headaches. Negative for dizziness, weakness and light-headedness. Psychiatric/Behavioral: Negative for confusion. All other systems reviewed and are negative. Negativein 10 essential Systems except as mentioned above and in the HPI. PAST MEDICAL HISTORY     Past Medical History:   Diagnosis Date    Anxiety     Arthritis     OA    Asthma     Sadelr's esophagus     LONG SEGMENT    B[facility] disorder ([facility]     CAD (coronary artery disease)     [facility])     Depression     and bipolar    Esophagitis     severe    GERD (gastroesophageal reflux disease)     Headache(784.0)     Herniated disc, cervical     Hiatal hernia     Incisional abscess 1/15/2019    Kidney stones     Pleurisy     hx of \"years ago\"    Pneumonia     past penumonia    UTI (urinary tract infection)     Vision abnormalities     wears glasses         SURGICAL HISTORY      has a past surgical history that includes knee surgery (Left);  section (, ); Tonsillectomy; Knee arthroscopy (Left, 5/20/15); Cervical disc surgery; Upper gastrointestinal endoscopy (2016); Upper gastrointestinal endoscopy (2017); Upper gastrointestinal endoscopy (2017); pr esophagogastroduodenoscopy transoral diagnostic (N/A, 3/2/2017); laparoscopy; laparoscopy (N/A, 10/5/2017); Upper gastrointestinal endoscopy (N/A, 2018); Endoscopy, colon, diagnostic; and  section (N/A, 2018).       CURRENT MEDICATIONS       Discharge Medication List as of 2019  5:25 PM      CONTINUE these medications which have NOT CHANGED    Details   diphenhydrAMINE (BENADRYL) 25 MG capsule Take 1 capsule by mouth every 6 hours as needed for Itching, Disp-30 capsule, R-0Print      famotidine (PEPCID) 20 MG tablet Take 1 tablet by mouth 2 times daily, Disp-30 tablet, R-0Print      EPINEPHrine (EPIPEN 2-HELDER) 0.3 MG/0.3ML SOAJ injection Inject 0.3 mLs into the muscle once for 1 dose Use as directed for allergic reaction, Disp-2 each, R-0Print      lidocaine (LIDODERM) 5 % Place 1 patch onto the skin daily 12 hours on, 12 hours off., Disp-7 patch, R-0Print      Prenatal Vit-Fe Fumarate-FA (PNV PRENATAL PLUS MULTIVITAMIN) 27-1 MG TABS Take 1 tablet by mouth daily, Disp-30 tablet, R-5Normal      ferrous sulfate 325 (65 Fe) MG tablet Take 1 tablet by mouth 2 times daily (with meals), Disp-30 tablet, R-3Print      promethazine (PHENERGAN) 25 MG tablet Take 1 tablet by mouth every 8 hours as needed for Nausea, Disp-30 tablet, R-1Normal      sertraline (ZOLOFT) 50 MG tablet Take 1 tablet by mouth daily, Disp-30 tablet, R-1Normal      docusate sodium (COLACE, DULCOLAX) 100 MG CAPS Take 100 mg by mouth 2 times daily, Disp-60 capsule, R-1Print      COMBIVENT RESPIMAT  MCG/ACT AERS inhaler INHALE ONE PUFF BY MOUTH 4 TIMES A DAY, R-6, DAWHistorical Med      montelukast (SINGULAIR) 10 MG tablet Take 1 tablet by mouth nightly, Disp-30 tablet, R-3Print      Fluticasone Furoate-Vilanterol (BREO ELLIPTA) 200-25 MCG/INH AEPB Inhale 1 puff into the lungs dailyHistorical Med      albuterol (PROVENTIL) (2.5 MG/3ML) 0.083% nebulizer solution Take 3 mLs by nebulization every 4 hours as needed for Wheezing, Disp-120 each, R-3             ALLERGIES     is allergic to nsaids; toradol [ketorolac tromethamine]; and ultram [tramadol]. FAMILY HISTORY     indicated that the status of her mother is unknown. She indicated that the status of her father is unknown. She indicated that the status of her brother is unknown.     family history includes Bipolar Disorder in her brother and mother; Breast Cancer in her mother; Cancer in her mother; Hypertension in her father and mother. SOCIAL HISTORY      reports that she has been smoking cigarettes. She has a 5.25 pack-year smoking history.  She has never used smokeless tobacco. She reports that she does not drink alcohol or use drugs.    PHYSICAL EXAM     INITIAL VITALS:  height is 5' 2\" (1.575 m) and weight is 208 lb (94.3 kg). Her oral temperature is 97.7 °F (36.5 °C). Her blood pressure is 114/62 and her pulse is 88. Her respiration is 18 and oxygen saturation is 98%. Physical Exam   Constitutional: She is oriented to person, place, and time. No distress. HENT:   Head: Normocephalic and atraumatic. Eyes: Pupils are equal, round, and reactive to light. Conjunctivae are normal.   Neck: Neck supple. Cardiovascular: Normal rate, regular rhythm, normal heart sounds and intact distal pulses. Exam reveals no gallop and no friction rub. No murmur heard. Pulmonary/Chest: Effort normal and breath sounds normal. No respiratory distress. Abdominal: Soft. Bowel sounds are normal. She exhibits no distension. There is no tenderness. Musculoskeletal: She exhibits no edema or tenderness. Lymphadenopathy:     She has no cervical adenopathy. Neurological: She is alert and oriented to person, place, and time. No cranial nerve deficit or sensory deficit. She exhibits normal muscle tone. Coordination normal.   Skin: Skin is warm and dry. No rash noted. Psychiatric: Judgment normal.   Nursing note and vitals reviewed. DIFFERENTIAL DIAGNOSIS/MDM:   61-year-old female with history of migraines presents with a headache for the past 3 days. She is afebrile and nontoxic here. Viral syndrome normal.  Not in any acute distress. She has no neurologic deficits on my exam.    She is having some numbness in her right upper extremity however acknowledges this is been present for several months. There is nothing new about this today. I suspect this is a chronic issue and is not related to her current migraine. I strongly suspect she is having one of her migraine flareups. She has multiple presentations like this in the past.  She is agreeable with intramuscular migraine cocktail medications.   I believe her back pain is related to her

## 2019-05-11 NOTE — ED NOTES
Preformed SBIRT screening with the patient and the patient states that she has high anxiety and depression. Referred the patient to [facility] for anxiety and depression treatment.       Gurwinder Gillis  05/11/19 1047

## 2019-05-12 ENCOUNTER — HOSPITAL ENCOUNTER (EMERGENCY)
Age: 39
Discharge: HOME | End: 2019-05-12
Attending: EMERGENCY MEDICINE
Payer: COMMERCIAL

## 2019-05-12 VITALS
HEART RATE: 95 BPM | OXYGEN SATURATION: 97 % | BODY MASS INDEX: 38.46 KG/M2 | SYSTOLIC BLOOD PRESSURE: 132 MMHG | WEIGHT: 209 LBS | HEIGHT: 62 IN | DIASTOLIC BLOOD PRESSURE: 76 MMHG | RESPIRATION RATE: 16 BRPM | TEMPERATURE: 98.4 F

## 2019-05-12 DIAGNOSIS — N61.1 BREAST ABSCESS: Primary | ICD-10-CM

## 2019-05-12 DIAGNOSIS — L03.90 CELLULITIS, UNSPECIFIED CELLULITIS SITE: ICD-10-CM

## 2019-05-12 PROCEDURE — 2500000003 HC RX 250 WO HCPCS: Performed by: EMERGENCY MEDICINE

## 2019-05-12 PROCEDURE — 6370000000 HC RX 637 (ALT 250 FOR IP): Performed by: EMERGENCY MEDICINE

## 2019-05-12 PROCEDURE — 86403 PARTICLE AGGLUT ANTBDY SCRN: CPT

## 2019-05-12 PROCEDURE — 87205 SMEAR GRAM STAIN: CPT

## 2019-05-12 PROCEDURE — 87186 SC STD MICRODIL/AGAR DIL: CPT

## 2019-05-12 PROCEDURE — 99283 EMERGENCY DEPT VISIT LOW MDM: CPT

## 2019-05-12 PROCEDURE — 87070 CULTURE OTHR SPECIMN AEROBIC: CPT

## 2019-05-12 PROCEDURE — 10061 I&D ABSCESS COMP/MULTIPLE: CPT

## 2019-05-12 RX ORDER — CEPHALEXIN 250 MG/1
500 CAPSULE ORAL ONCE
Status: COMPLETED | OUTPATIENT
Start: 2019-05-12 | End: 2019-05-12

## 2019-05-12 RX ORDER — LIDOCAINE HYDROCHLORIDE 10 MG/ML
20 INJECTION, SOLUTION INFILTRATION; PERINEURAL ONCE
Status: COMPLETED | OUTPATIENT
Start: 2019-05-12 | End: 2019-05-12

## 2019-05-12 RX ORDER — ACETAMINOPHEN 325 MG/1
650 TABLET ORAL ONCE
Status: COMPLETED | OUTPATIENT
Start: 2019-05-12 | End: 2019-05-12

## 2019-05-12 RX ORDER — SULFAMETHOXAZOLE AND TRIMETHOPRIM 800; 160 MG/1; MG/1
1 TABLET ORAL ONCE
Status: COMPLETED | OUTPATIENT
Start: 2019-05-12 | End: 2019-05-12

## 2019-05-12 RX ORDER — CEPHALEXIN 500 MG/1
500 CAPSULE ORAL 3 TIMES DAILY
Qty: 30 CAPSULE | Refills: 0 | Status: SHIPPED | OUTPATIENT
Start: 2019-05-12 | End: 2019-05-22

## 2019-05-12 RX ORDER — ACETAMINOPHEN 325 MG/1
650 TABLET ORAL EVERY 4 HOURS PRN
Qty: 60 TABLET | Refills: 0 | Status: ON HOLD | OUTPATIENT
Start: 2019-05-12 | End: 2020-01-28 | Stop reason: ALTCHOICE

## 2019-05-12 RX ORDER — SULFAMETHOXAZOLE AND TRIMETHOPRIM 800; 160 MG/1; MG/1
1 TABLET ORAL 2 TIMES DAILY
Qty: 20 TABLET | Refills: 0 | Status: SHIPPED | OUTPATIENT
Start: 2019-05-12 | End: 2019-05-22

## 2019-05-12 RX ADMIN — SULFAMETHOXAZOLE AND TRIMETHOPRIM 1 TABLET: 800; 160 TABLET ORAL at 14:30

## 2019-05-12 RX ADMIN — ACETAMINOPHEN 650 MG: 325 TABLET, FILM COATED ORAL at 14:30

## 2019-05-12 RX ADMIN — LIDOCAINE HYDROCHLORIDE 20 ML: 10 INJECTION, SOLUTION INFILTRATION; PERINEURAL at 13:56

## 2019-05-12 RX ADMIN — CEPHALEXIN 500 MG: 250 CAPSULE ORAL at 14:30

## 2019-05-12 ASSESSMENT — PAIN SCALES - GENERAL
PAINLEVEL_OUTOF10: 9

## 2019-05-12 ASSESSMENT — PAIN DESCRIPTION - ORIENTATION: ORIENTATION: RIGHT

## 2019-05-12 ASSESSMENT — PAIN DESCRIPTION - LOCATION: LOCATION: BREAST

## 2019-05-12 ASSESSMENT — PAIN DESCRIPTION - PAIN TYPE: TYPE: CHRONIC PAIN

## 2019-05-12 ASSESSMENT — ENCOUNTER SYMPTOMS: COLOR CHANGE: 1

## 2019-05-12 ASSESSMENT — PAIN DESCRIPTION - DESCRIPTORS: DESCRIPTORS: DISCOMFORT

## 2019-05-13 ENCOUNTER — HOSPITAL ENCOUNTER (EMERGENCY)
Age: 39
Discharge: HOME | End: 2019-05-13
Attending: EMERGENCY MEDICINE
Payer: COMMERCIAL

## 2019-05-13 VITALS
OXYGEN SATURATION: 98 % | DIASTOLIC BLOOD PRESSURE: 88 MMHG | BODY MASS INDEX: 38.46 KG/M2 | RESPIRATION RATE: 16 BRPM | HEART RATE: 95 BPM | SYSTOLIC BLOOD PRESSURE: 142 MMHG | HEIGHT: 62 IN | WEIGHT: 209 LBS | TEMPERATURE: 97.7 F

## 2019-05-13 DIAGNOSIS — L03.90 CELLULITIS, UNSPECIFIED CELLULITIS SITE: Primary | ICD-10-CM

## 2019-05-13 PROCEDURE — 99283 EMERGENCY DEPT VISIT LOW MDM: CPT

## 2019-05-13 PROCEDURE — 6370000000 HC RX 637 (ALT 250 FOR IP): Performed by: EMERGENCY MEDICINE

## 2019-05-13 RX ORDER — OXYCODONE HYDROCHLORIDE AND ACETAMINOPHEN 5; 325 MG/1; MG/1
1 TABLET ORAL EVERY 6 HOURS PRN
Qty: 10 TABLET | Refills: 0 | Status: SHIPPED | OUTPATIENT
Start: 2019-05-13 | End: 2019-05-16

## 2019-05-13 RX ORDER — OXYCODONE HYDROCHLORIDE AND ACETAMINOPHEN 5; 325 MG/1; MG/1
2 TABLET ORAL ONCE
Status: COMPLETED | OUTPATIENT
Start: 2019-05-13 | End: 2019-05-13

## 2019-05-13 RX ADMIN — OXYCODONE HYDROCHLORIDE AND ACETAMINOPHEN 2 TABLET: 5; 325 TABLET ORAL at 19:13

## 2019-05-13 ASSESSMENT — PAIN SCALES - GENERAL
PAINLEVEL_OUTOF10: 9
PAINLEVEL_OUTOF10: 9

## 2019-05-13 NOTE — ED PROVIDER NOTES
[address]  eMERGENCY dEPARTMENT eNCOUnter      Pt Name: Arturo Villatoro  MRN: [id number]  [place] 1980  Date of evaluation: 19      CHIEF COMPLAINT       Chief Complaint   Patient presents with    Abscess          HISTORY OF PRESENT ILLNESS   HPI 42 y.o. female presents with complaints of breast abscess. Patient states that she's had redness on her right breast for the last week and over the last day she developed an abscess. She reports a history of previous abscesses but never on her breast.  She is not breast-feeding. She is not pregnant. She is seen in the emergency department yesterday for migraine headache, she had a pregnancy test that was negative. Her pain is reported as severe in intensity, constant in duration, progressively worsening. REVIEW OF SYSTEMS       Review of Systems   Constitutional: Negative for chills and fever. Skin: Positive for color change and rash. Negative for wound. Hematological: Negative for adenopathy. Does not bruise/bleed easily.        PAST MEDICAL HISTORY     Past Medical History:   Diagnosis Date    Anxiety     Arthritis     OA    Asthma     Sadler's esophagus     LONG SEGMENT    Bipolar 1 disorder ([facility] [address]     CAD (coronary artery disease)     COPD (chronic obstructive pulmonary disease) (HCC)     Depression     and bipolar    Esophagitis     severe    GERD (gastroesophageal reflux disease)     Headache(784.0)     Herniated disc, cervical     Hiatal hernia     Incisional abscess 1/15/2019    Kidney stones     Pleurisy     hx of \"years ago\"    Pneumonia     past penumonia    UTI (urinary tract infection)     Vision abnormalities     wears glasses       SURGICAL HISTORY       Past Surgical History:   Procedure Laterality Date    CERVICAL DISC SURGERY       SECTION  , 2018    x 2     SECTION N/A 2018     SECTION performed by Ramon Weller MD at [facility] L&D OR    ENDOSCOPY, COLON, DIAGNOSTIC 2 times daily, Disp-60 capsule, R-1Print      COMBIVENT RESPIMAT  MCG/ACT AERS inhaler INHALE ONE PUFF BY MOUTH 4 TIMES A DAY, R-6, DAWHistorical Med      montelukast (SINGULAIR) 10 MG tablet Take 1 tablet by mouth nightly, Disp-30 tablet, R-3Print      Fluticasone Furoate-Vilanterol (BREO ELLIPTA) 200-25 MCG/INH AEPB Inhale 1 puff into the lungs dailyHistorical Med      albuterol (PROVENTIL) (2.5 MG/3ML) 0.083% nebulizer solution Take 3 mLs by nebulization every 4 hours as needed for Wheezing, Disp-120 each, R-3             ALLERGIES     is allergic to nsaids; toradol [ketorolac tromethamine]; and ultram [tramadol]. FAMILY HISTORY     indicated that the status of her mother is unknown. She indicated that the status of her father is unknown. She indicated that the status of her brother is unknown. SOCIAL HISTORY      reports that she has been smoking cigarettes. She has a 5.25 pack-year smoking history. She has never used smokeless tobacco. She reports that she does not drink alcohol or use drugs. PHYSICAL EXAM     INITIAL VITALS: /76   Pulse 95   Temp 98.4 °F (36.9 °C) (Oral)   Resp 16   Ht 5' 2\" (1.575 m)   Wt 209 lb (94.8 kg)   SpO2 97%   BMI 38.23 kg/m²   Tunnel: NAD  Head: Normocephalic, atraumatic  Eye: Pupils equal round reactive to light, no conjunctivitis  Heart: Regular rate and rhythm no murmurs  Lungs: Clear to auscultation bilaterally, no respiratory distress  Chest wall: The right breast has approximately 14 area in diameter of erythema, there is a 3 cm area of induration and fluctuance at the 4 to 6:00 position of the breast.  Exam chaperoned by [facility]  Abdomen: Soft, nontender, nondistended, with no peritoneal signs  Neurologic: Patient is alert and oriented x3, motor and sensation is intact in all 4 extremities, full speech  Extremities: No edema    MEDICAL DECISION MAKING:     MDM    This is a 81-year-old female presenting with the breast abscess.   Incision and drainage performed per procedure note. No signs of systemic infection. Started on Bactrim and Keflex. Analgesia provided. D/w pt treatment plan, warning precautions for prompt ED return and importance of close OP FU, she verbalizes understanding and agrees with the treatment plan. DIAGNOSTIC RESULTS       LABS: All lab results were reviewed by myself, and all abnormals are listed below.   Labs Reviewed   WOUND CULTURE - Abnormal; Notable for the following components:       Result Value    Direct Exam FEW GRAM POSITIVE COCCI IN CLUSTERS (*)     All other components within normal limits       EMERGENCY DEPARTMENT COURSE:   Vitals:    Vitals:    05/12/19 1355   BP: 132/76   Pulse: 95   Resp: 16   Temp: 98.4 °F (36.9 °C)   TempSrc: Oral   SpO2: 97%   Weight: 209 lb (94.8 kg)   Height: 5' 2\" (1.575 m)       The patient was given the following medications while in the emergency department:  Orders Placed This Encounter   Medications    lidocaine 1 % injection 20 mL    acetaminophen (TYLENOL) tablet 650 mg    sulfamethoxazole-trimethoprim (BACTRIM DS;SEPTRA DS) 800-160 MG per tablet 1 tablet    cephALEXin (KEFLEX) capsule 500 mg    acetaminophen (TYLENOL) 325 MG tablet     Sig: Take 2 tablets by mouth every 4 hours as needed for Pain     Dispense:  60 tablet     Refill:  0    sulfamethoxazole-trimethoprim (BACTRIM DS) 800-160 MG per tablet     Sig: Take 1 tablet by mouth 2 times daily for 10 days     Dispense:  20 tablet     Refill:  0    cephALEXin (KEFLEX) 500 MG capsule     Sig: Take 1 capsule by mouth 3 times daily for 10 days     Dispense:  30 capsule     Refill:  0     -------------------------  CRITICAL CARE:   CONSULTS: None  PROCEDURES: Incision/Drainage  Date/Time: 5/12/2019 11:36 PM  Performed by: Mi Quintanilla MD  Authorized by: Mi Quintanilla MD     Consent:     Consent obtained:  Verbal    Consent given by:  Patient    Risks discussed:  Bleeding, incomplete drainage, pain and damage to other organs    Alternatives discussed:  No treatment  Location:     Type:  Abscess    Size:  3cm x 3cm    Location:  Trunk    Trunk location:  R breast  Pre-procedure details:     Skin preparation:  Antiseptic wash and Betadine  Anesthesia (see MAR for exact dosages): Anesthesia method:  Local infiltration    Local anesthetic:  Lidocaine 1% w/o epi  Procedure type:     Complexity:  Simple  Procedure details:     Needle aspiration: no      Incision types:  Stab incision    Incision depth:  Subcutaneous    Scalpel blade:  10    Wound management:  Probed and deloculated and irrigated with saline    Drainage:  Purulent    Drainage amount: Moderate    Packing materials:  1/2 in iodoform gauze    Amount 1/2\" iodoform:  3\"  Post-procedure details:     Patient tolerance of procedure: Tolerated well, no immediate complications         FINAL IMPRESSION      1. Breast abscess    2.  Cellulitis, unspecified cellulitis site          DISPOSITION/PLAN   DISPOSITION Decision To Discharge 05/12/2019 02:21:15 PM      PATIENT REFERRED TO:  Cristina Parrish MD  [address]  [phone]    In 2 days  For wound re-check    [facility] [place] [address]  [phone]    If symptoms worsen      DISCHARGE MEDICATIONS:  Discharge Medication List as of 5/12/2019  2:27 PM      START taking these medications    Details   acetaminophen (TYLENOL) 325 MG tablet Take 2 tablets by mouth every 4 hours as needed for Pain, Disp-60 tablet, R-0Print      sulfamethoxazole-trimethoprim (BACTRIM DS) 800-160 MG per tablet Take 1 tablet by mouth 2 times daily for 10 days, Disp-20 tablet, R-0Print      cephALEXin (KEFLEX) 500 MG capsule Take 1 capsule by mouth 3 times daily for 10 days, Disp-30 capsule, R-0Print               Amos Fitzgerald MD  Attending Emergency Physician                     Amos Fitzgerald MD  05/12/19 7630

## 2019-05-14 LAB
CULTURE: ABNORMAL
DIRECT EXAM: ABNORMAL
DIRECT EXAM: ABNORMAL
Lab: ABNORMAL
SPECIMEN DESCRIPTION: ABNORMAL

## 2019-05-14 ASSESSMENT — ENCOUNTER SYMPTOMS: COLOR CHANGE: 1

## 2019-05-14 NOTE — ED PROVIDER NOTES
tablet, R-3Print      promethazine (PHENERGAN) 25 MG tablet Take 1 tablet by mouth every 8 hours as needed for Nausea, Disp-30 tablet, R-1Normal      sertraline (ZOLOFT) 50 MG tablet Take 1 tablet by mouth daily, Disp-30 tablet, R-1Normal      docusate sodium (COLACE, DULCOLAX) 100 MG CAPS Take 100 mg by mouth 2 times daily, Disp-60 capsule, R-1Print      COMBIVENT RESPIMAT  MCG/ACT AERS inhaler INHALE ONE PUFF BY MOUTH 4 TIMES A DAY, R-6, DAWHistorical Med      montelukast (SINGULAIR) 10 MG tablet Take 1 tablet by mouth nightly, Disp-30 tablet, R-3Print      Fluticasone Furoate-Vilanterol (BREO ELLIPTA) 200-25 MCG/INH AEPB Inhale 1 puff into the lungs dailyHistorical Med      albuterol (PROVENTIL) (2.5 MG/3ML) 0.083% nebulizer solution Take 3 mLs by nebulization every 4 hours as needed for Wheezing, Disp-120 each, R-3             ALLERGIES     is allergic to nsaids; toradol [ketorolac tromethamine]; and ultram [tramadol]. FAMILY HISTORY     indicated that the status of her mother is unknown. She indicated that the status of her father is unknown. She indicated that the status of her brother is unknown. SOCIAL HISTORY      reports that she has been smoking cigarettes. She has a 5.25 pack-year smoking history. She has never used smokeless tobacco. She reports that she does not drink alcohol or use drugs. PHYSICAL EXAM     INITIAL VITALS: BP (!) 142/88   Pulse 95   Temp 97.7 °F (36.5 °C) (Oral)   Resp 16   Ht 5' 2\" (1.575 m)   Wt 209 lb (94.8 kg)   SpO2 98%   BMI 38.23 kg/m²     Gen: NAD  CVS: RRR  PULM:  CTA  CHEST: Chaperoned by female nurse, area of celluliits stable, slightly improved on medial aspect, unchanged laterally. Wound packing removed, no expressable discharge. NEURO: A&OX3    MEDICAL DECISION MAKING:     MDM  43 yo with cellulitis of the right breast.   Cx grow staph aureus. Being treated with bactrim and keflex. S/p I&D. Packing removed.    Starting pt on percocet for pain.   Continue bactrim and keflex. OARRS reviewed has been on narcotics chronically for some time, no current prescriptions. D/w pt treatment plan, warning precautions for prompt ED return and importance of close OP FU, she verbalizes understanding and agrees with the treatment plan. DIAGNOSTIC RESULTS       LABS: All lab results were reviewed by myself, and all abnormals are listed below. Component Collected Lab   Specimen Description 05/12/2019  2:15  [facility]   . BREAST RIGHT ABSCESS    Special Requests 05/12/2019  2:15  [address] [facility]   NOT REPORTED    Direct Exam 05/12/2019  2:15  [address]   NO NEUTROPHILS SEEN    Direct Exam Abnormal  05/12/2019  2:15 PM [facility] COCCI IN CLUSTERS    Culture Abnormal  05/12/2019  2:15 PM [facility] MODERATE GROWTH          EMERGENCY DEPARTMENT COURSE:   Vitals:    Vitals:    05/13/19 1846   BP: (!) 142/88   Pulse: 95   Resp: 16   Temp: 97.7 °F (36.5 °C)   TempSrc: Oral   SpO2: 98%   Weight: 209 lb (94.8 kg)   Height: 5' 2\" (1.575 m)       The patient was given the following medications while in the emergency department:  Orders Placed This Encounter   Medications    oxyCODONE-acetaminophen (PERCOCET) 5-325 MG per tablet 2 tablet    oxyCODONE-acetaminophen (PERCOCET) 5-325 MG per tablet     Sig: Take 1 tablet by mouth every 6 hours as needed for Pain for up to 3 days. Dispense:  10 tablet     Refill:  0     -------------------------  CRITICAL CARE:   CONSULTS: None  PROCEDURES: Procedures     FINAL IMPRESSION      1.  Cellulitis, unspecified cellulitis site          DISPOSITION/PLAN   DISPOSITION Decision To Discharge 05/13/2019 07:00:00 PM      PATIENT REFERRED TO:  Margarita Romero MD  [address] [address] [phone]    In 3 days      [address] [facility]  [facility] [address]  [phone]    If symptoms worsen      DISCHARGE MEDICATIONS:  Discharge Medication List as of 5/13/2019  7:02 PM      START taking these medications    Details   oxyCODONE-acetaminophen (PERCOCET) 5-325 MG per tablet Take 1 tablet by mouth every 6 hours as needed for Pain for up to 3 days. , Disp-10 tablet, R-0Print               Tia Grewal MD  Attending Emergency Physician                      Tia Grewal MD  05/14/19 8043       Tia Grewal MD  05/14/19 0522

## 2019-05-17 ENCOUNTER — HOSPITAL ENCOUNTER (EMERGENCY)
Age: 39
Discharge: HOME | End: 2019-05-17
Attending: EMERGENCY MEDICINE
Payer: COMMERCIAL

## 2019-05-17 ENCOUNTER — APPOINTMENT (OUTPATIENT)
Dept: GENERAL RADIOLOGY | Age: 39
End: 2019-05-17
Payer: COMMERCIAL

## 2019-05-17 VITALS
DIASTOLIC BLOOD PRESSURE: 68 MMHG | SYSTOLIC BLOOD PRESSURE: 114 MMHG | TEMPERATURE: 98.2 F | BODY MASS INDEX: 38.46 KG/M2 | RESPIRATION RATE: 18 BRPM | HEART RATE: 84 BPM | OXYGEN SATURATION: 99 % | HEIGHT: 62 IN | WEIGHT: 209 LBS

## 2019-05-17 DIAGNOSIS — S80.01XA CONTUSION OF RIGHT KNEE, INITIAL ENCOUNTER: ICD-10-CM

## 2019-05-17 DIAGNOSIS — S09.90XA INJURY OF HEAD, INITIAL ENCOUNTER: Primary | ICD-10-CM

## 2019-05-17 PROCEDURE — 73562 X-RAY EXAM OF KNEE 3: CPT

## 2019-05-17 PROCEDURE — 6370000000 HC RX 637 (ALT 250 FOR IP): Performed by: PHYSICIAN ASSISTANT

## 2019-05-17 PROCEDURE — 99283 EMERGENCY DEPT VISIT LOW MDM: CPT

## 2019-05-17 RX ORDER — ACETAMINOPHEN 500 MG
1000 TABLET ORAL ONCE
Status: COMPLETED | OUTPATIENT
Start: 2019-05-17 | End: 2019-05-17

## 2019-05-17 RX ORDER — PANTOPRAZOLE SODIUM 40 MG/1
40 GRANULE, DELAYED RELEASE ORAL
COMMUNITY
End: 2020-03-09

## 2019-05-17 RX ADMIN — ACETAMINOPHEN 1000 MG: 500 TABLET ORAL at 12:37

## 2019-05-17 ASSESSMENT — PAIN DESCRIPTION - LOCATION: LOCATION: HEAD

## 2019-05-17 ASSESSMENT — ENCOUNTER SYMPTOMS
NAUSEA: 0
VOMITING: 0
DOUBLE VISION: 0
DIFFICULTY BREATHING: 0
BLURRED VISION: 0

## 2019-05-17 ASSESSMENT — PAIN SCALES - GENERAL: PAINLEVEL_OUTOF10: 9

## 2019-05-17 ASSESSMENT — PAIN DESCRIPTION - PAIN TYPE: TYPE: ACUTE PAIN
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
## 2019-05-28 ENCOUNTER — TELEPHONE (OUTPATIENT)
Dept: GASTROENTEROLOGY | Age: 39
End: 2019-05-28

## 2019-06-03 ENCOUNTER — HOSPITAL ENCOUNTER (EMERGENCY)
Age: 39
Discharge: HOME | End: 2019-06-03
Attending: EMERGENCY MEDICINE
Payer: COMMERCIAL

## 2019-06-03 VITALS
HEART RATE: 102 BPM | OXYGEN SATURATION: 95 % | TEMPERATURE: 98.1 F | RESPIRATION RATE: 18 BRPM | SYSTOLIC BLOOD PRESSURE: 156 MMHG | DIASTOLIC BLOOD PRESSURE: 84 MMHG

## 2019-06-03 DIAGNOSIS — L02.91 ABSCESS: Primary | ICD-10-CM

## 2019-06-03 PROCEDURE — 6370000000 HC RX 637 (ALT 250 FOR IP): Performed by: NURSE PRACTITIONER

## 2019-06-03 PROCEDURE — 10060 I&D ABSCESS SIMPLE/SINGLE: CPT

## 2019-06-03 PROCEDURE — 2500000003 HC RX 250 WO HCPCS: Performed by: NURSE PRACTITIONER

## 2019-06-03 PROCEDURE — 99282 EMERGENCY DEPT VISIT SF MDM: CPT

## 2019-06-03 RX ORDER — SULFAMETHOXAZOLE AND TRIMETHOPRIM 800; 160 MG/1; MG/1
1 TABLET ORAL ONCE
Status: COMPLETED | OUTPATIENT
Start: 2019-06-03 | End: 2019-06-03

## 2019-06-03 RX ORDER — CEPHALEXIN 500 MG/1
500 CAPSULE ORAL 4 TIMES DAILY
Qty: 40 CAPSULE | Refills: 0 | Status: SHIPPED | OUTPATIENT
Start: 2019-06-03 | End: 2019-06-13

## 2019-06-03 RX ORDER — CEPHALEXIN 250 MG/1
500 CAPSULE ORAL ONCE
Status: COMPLETED | OUTPATIENT
Start: 2019-06-03 | End: 2019-06-03

## 2019-06-03 RX ORDER — HYDROCODONE BITARTRATE AND ACETAMINOPHEN 5; 325 MG/1; MG/1
1 TABLET ORAL EVERY 8 HOURS PRN
Qty: 10 TABLET | Refills: 0 | Status: SHIPPED | OUTPATIENT
Start: 2019-06-03 | End: 2019-06-07

## 2019-06-03 RX ORDER — HYDROCODONE BITARTRATE AND ACETAMINOPHEN 5; 325 MG/1; MG/1
2 TABLET ORAL ONCE
Status: COMPLETED | OUTPATIENT
Start: 2019-06-03 | End: 2019-06-03

## 2019-06-03 RX ORDER — LIDOCAINE HYDROCHLORIDE 10 MG/ML
20 INJECTION, SOLUTION INFILTRATION; PERINEURAL ONCE
Status: COMPLETED | OUTPATIENT
Start: 2019-06-03 | End: 2019-06-03

## 2019-06-03 RX ORDER — SULFAMETHOXAZOLE AND TRIMETHOPRIM 800; 160 MG/1; MG/1
1 TABLET ORAL 2 TIMES DAILY
Qty: 20 TABLET | Refills: 0 | Status: SHIPPED | OUTPATIENT
Start: 2019-06-03 | End: 2019-06-13

## 2019-06-03 RX ADMIN — CEPHALEXIN 500 MG: 250 CAPSULE ORAL at 19:11

## 2019-06-03 RX ADMIN — HYDROCODONE BITARTRATE AND ACETAMINOPHEN 2 TABLET: 5; 325 TABLET ORAL at 18:33

## 2019-06-03 RX ADMIN — SULFAMETHOXAZOLE AND TRIMETHOPRIM 1 TABLET: 800; 160 TABLET ORAL at 19:11

## 2019-06-03 RX ADMIN — LIDOCAINE HYDROCHLORIDE 20 ML: 10 INJECTION, SOLUTION INFILTRATION; PERINEURAL at 18:33

## 2019-06-03 ASSESSMENT — PAIN SCALES - GENERAL
PAINLEVEL_OUTOF10: 9
PAINLEVEL_OUTOF10: 9

## 2019-06-03 ASSESSMENT — ENCOUNTER SYMPTOMS
TROUBLE SWALLOWING: 0
COUGH: 0
NAUSEA: 0
SHORTNESS OF BREATH: 0
VOMITING: 0

## 2019-06-03 ASSESSMENT — PAIN DESCRIPTION - LOCATION: LOCATION: LABIA

## 2019-06-03 ASSESSMENT — PAIN DESCRIPTION - PAIN TYPE: TYPE: ACUTE PAIN

## 2019-06-03 ASSESSMENT — PAIN DESCRIPTION - DESCRIPTORS: DESCRIPTORS: ACHING;SHARP

## 2019-06-03 NOTE — ED NOTES
Pt given instructions for follow-up and discharge. Pt given education on prescriptions. Pt verbalizes understanding. Pt is A&O x4, PWD, eupneic, and ambulatory with steady, even gait upon discharge.       Maurizio Busby RN  06/03/19 1912

## 2019-06-03 NOTE — ED PROVIDER NOTES
[address]  eMERGENCYdEPARTMENT eNCOUnter      Pt Name: Shubham Mosquera  MRN: [id number]  [place] 1980  Date of evaluation: 6/3/2019  Provider:KATIE MÉNDEZ CNP    CHIEF COMPLAINT       Chief Complaint   Patient presents with    Abscess         HISTORY OF PRESENT ILLNESS  (Location/Symptom, Timing/Onset, Context/Setting, Quality, Duration, Modifying Factors, Severity.)   Shubham Mosquera is a 42 y.o. female who presents to the emergency department with complaints of abscess to the right groin area. Patient states she noticed very large tender abscess in the right groin 3 days ago. She has been applying warm compresses with no improvement of her symptoms. She has been taking Tylenol for pain and states it is not helping. Relates that \"Tylenol is a joke\". No fever, chills, nausea, vomiting. Reports history of abscesses or MRSA in the past.       Nursing Notes were reviewed and I agree. REVIEW OF SYSTEMS    (2-9 systems for level 4,10 or more for level 5)      Review of Systems   Constitutional: Negative for chills and fever. HENT: Negative for trouble swallowing. Respiratory: Negative for cough and shortness of breath. Cardiovascular: Negative for chest pain and palpitations. Gastrointestinal: Negative for nausea and vomiting. Musculoskeletal:        Right groin abscess     Except as noted above the remainder of the review of systems was reviewed andnegative.        PAST MEDICAL HISTORY         Diagnosis Date    Anxiety     Arthritis     OA    Asthma     Sadler's esophagus     LONG SEGMENT    Bipolar 1 disorder ([facility] [address]     CAD (coronary artery disease)     COPD (chronic obstructive pulmonary disease) (HCC)     Depression     and bipolar    Esophagitis     severe    GERD (gastroesophageal reflux disease)     Headache(784.0)     Herniated disc, cervical     Hiatal hernia     Incisional abscess 1/15/2019    Kidney stones     Pleurisy     hx of \"years ago\"    Pneumonia     past penumonia    UTI (urinary tract infection)     Vision abnormalities     wears glasses     Reviewed. SURGICAL HISTORY           Procedure Laterality Date    CERVICAL [facility] [address] SURGERY       SECTION  , 2018    x 2     SECTION N/A 2018     SECTION performed by Nancy Molina MD at [facility] OR    ENDOSCOPY, COLON, DIAGNOSTIC      KNEE ARTHROSCOPY Left 5/20/15    KNEE SURGERY Left     x3    LAPAROSCOPY      LAPAROSCOPY N/A 10/5/2017    LAPAROSCOPIC REMOVAL INTRA ABDOMINAL LIPOMA LOWER RIGHT performed by Nadia Burk DO at [address] ESOPHAGOGASTRODUODENOSCOPY TRANSORAL DIAGNOSTIC N/A 3/2/2017    EGD ESOPHAGOGASTRODUODENOSCOPY  IP 2055 performed by Ruth Ramirez MD at [facility]  2016    severe esophagitis    UPPER GASTROINTESTINAL ENDOSCOPY  2017    barretts; hiatus hernia; gastritis    UPPER GASTROINTESTINAL ENDOSCOPY  2017    long seg mullins's with linear erosions, esophagitis, small hiatal hernia    UPPER GASTROINTESTINAL ENDOSCOPY N/A 2018    MULLINS'S     Reviewed.   CURRENT MEDICATIONS       Discharge Medication List as of 6/3/2019  7:06 PM      CONTINUE these medications which have NOT CHANGED    Details   pantoprazole sodium (PROTONIX) 40 MG PACK packet Take 40 mg by mouth every morning (before breakfast)Historical Med      acetaminophen (TYLENOL) 325 MG tablet Take 2 tablets by mouth every 4 hours as needed for Pain, Disp-60 tablet, R-0Print      diphenhydrAMINE (BENADRYL) 25 MG capsule Take 1 capsule by mouth every 6 hours as needed for Itching, Disp-30 capsule, R-0Print      famotidine (PEPCID) 20 MG tablet Take 1 tablet by mouth 2 times daily, Disp-30 tablet, R-0Print      EPINEPHrine (EPIPEN 2-HELDER) 0.3 MG/0.3ML SOAJ injection Inject 0.3 mLs into the muscle once for 1 dose Use as directed for allergic reaction, Disp-2 each, R-0Print      lidocaine (LIDODERM) 5 % Place 1 patch onto the skin daily 12 hours on, 12 hours off., Disp-7 patch, R-0Print      Prenatal Vit-Fe Fumarate-FA (PNV PRENATAL PLUS MULTIVITAMIN) 27-1 MG TABS Take 1 tablet by mouth daily, Disp-30 tablet, R-5Normal      ferrous sulfate 325 (65 Fe) MG tablet Take 1 tablet by mouth 2 times daily (with meals), Disp-30 tablet, R-3Print      promethazine (PHENERGAN) 25 MG tablet Take 1 tablet by mouth every 8 hours as needed for Nausea, Disp-30 tablet, R-1Normal      sertraline (ZOLOFT) 50 MG tablet Take 1 tablet by mouth daily, Disp-30 tablet, R-1Normal      docusate sodium (COLACE, DULCOLAX) 100 MG CAPS Take 100 mg by mouth 2 times daily, Disp-60 capsule, R-1Print      COMBIVENT RESPIMAT  MCG/ACT AERS inhaler INHALE ONE PUFF BY MOUTH 4 TIMES A DAY, R-6, DAWHistorical Med      montelukast (SINGULAIR) 10 MG tablet Take 1 tablet by mouth nightly, Disp-30 tablet, R-3Print      Fluticasone Furoate-Vilanterol (BREO ELLIPTA) 200-25 MCG/INH AEPB Inhale 1 puff into the lungs dailyHistorical Med      albuterol (PROVENTIL) (2.5 MG/3ML) 0.083% nebulizer solution Take 3 mLs by nebulization every 4 hours as needed for Wheezing, Disp-120 each, R-3             ALLERGIES     Nsaids; Toradol [ketorolac tromethamine]; and Ultram [tramadol]    FAMILY HISTORY           Problem Relation Age of Onset    Cancer Mother         breast    Bipolar Disorder Mother     Hypertension Mother     Breast Cancer Mother     Bipolar Disorder Brother     Hypertension Father      Family Status   Relation Name Status    Mother  (Not Specified)    Brother  (Not Specified)    Father  (Not Specified)      Reviewed and not relevant. SOCIAL HISTORY      reports that she has been smoking cigarettes. She has a 5.25 pack-year smoking history. She has never used smokeless tobacco. She reports that she does not drink alcohol or use drugs. Reviewed.    PHYSICAL EXAM    (up to 7 for level 4, 8 or more for level 5)     ED Triage Vitals [06/03/19 1733]   BP Temp Temp Source Pulse Resp SpO2 Height Weight   (!) 156/84 98.1 °F (36.7 °C) Oral 102 18 95 % -- --       Physical Exam   Constitutional: She is oriented to person, place, and time. She appears well-developed and well-nourished. No distress. HENT:   Head: Normocephalic and atraumatic. Right Ear: External ear normal.   Left Ear: External ear normal.   Nose: Nose normal.   Eyes: Right eye exhibits no discharge. Left eye exhibits no discharge. No scleral icterus. Neck: Normal range of motion. No tracheal deviation present. Pulmonary/Chest: Effort normal. No stridor. No respiratory distress. Musculoskeletal: Normal range of motion. She exhibits no edema. Neurological: She is alert and oriented to person, place, and time. Coordination normal.   Skin: Skin is warm and dry. She is not diaphoretic. Psychiatric: She has a normal mood and affect. Her behavior is normal.       DIAGNOSTIC RESULTS     RADIOLOGY:   none      LABS:  Labs Reviewed - No data to display    All other labs were within normal range or not returned asof this dictation. EMERGENCYDEPARTMENT COURSE and DIFFERENTIAL DIAGNOSIS/MDM:   Patient presents to ED with complaints of abscess to right groin area. I&D. We'll start on  antibiotics. Recommended warm compresses. Given a short course of pain medication. And advised to return to ED in 2 days for packing change/wound check. Okay to discharge home. Follow-upwith family doctor or clinic of choice in 1 or 2 days for a recheck. Return to ED if any worsening or new symptoms. Vitals:    Vitals:    06/03/19 1733   BP: (!) 156/84   Pulse: 102   Resp: 18   Temp: 98.1 °F (36.7 °C)   TempSrc: Oral   SpO2: 95%         Vitals reviewed.      PROCEDURES:  Incision/Drainage  Date/Time: 6/3/2019 11:07 PM  Performed by: KATIE Morrison - CNP  Authorized by: Ayush Downing MD     Consent:     Consent obtained:  Verbal    Consent given by:  Patient    Risks discussed: Bleeding, incomplete drainage and infection    Alternatives discussed:  No treatment  Location:     Type:  Abscess    Location: right groin area. Pre-procedure details:     Procedure prep: alcohol wipe. Anesthesia (see MAR for exact dosages): Anesthesia method:  Local infiltration    Local anesthetic:  Lidocaine 1% w/o epi  Procedure details:     Needle aspiration: yes      Needle size:  18 G    Incision types:  Single straight    Scalpel blade:  11    Wound management:  Probed and deloculated, irrigated with saline and extensive cleaning    Drainage:  Bloody and purulent    Drainage amount: Moderate    Wound treatment:  Drain placed    Packing materials:  1/4 in iodoform gauze  Post-procedure details:     Patient tolerance of procedure: Tolerated with difficulty          FINAL IMPRESSION      1. Abscess          DISPOSITION/PLAN   DISPOSITION Decision To Discharge 06/03/2019 07:05:06 PM      PATIENT REFERRED TO:  [facility] ED  [facility] [address] [address]  [phone]  In 2 days  For wound re-check      DISCHARGE MEDICATIONS:  Discharge Medication List as of 6/3/2019  7:06 PM      START taking these medications    Details   sulfamethoxazole-trimethoprim (BACTRIM DS) 800-160 MG per tablet Take 1 tablet by mouth 2 times daily for 10 days, Disp-20 tablet, R-0Print      cephALEXin (KEFLEX) 500 MG capsule Take 1 capsule by mouth 4 times daily for 10 days, Disp-40 capsule, R-0Print      HYDROcodone-acetaminophen (NORCO) 5-325 MG per tablet Take 1 tablet by mouth every 8 hours as needed for Pain for up to 4 days. , Disp-10 tablet, R-0Print             (Please note thatportions of this note were completed with a voice recognition program.  Efforts were made to edit the dictations but occasionally words are mis-transcribed.)    Gregg Lujan, KATIE - CNP  06/03/19 [id number]

## 2019-06-04 NOTE — ED PROVIDER NOTES
eMERGENCY dEPARTMENT eNCOUnter   Independent Attestation     Pt Name: Bulmaro Torres  MRN: [id number]  [place] 1980  Date of evaluation: 6/3/19     Bulmaro Torres is a 42 y.o. female with CC: Abscess      Based on the medical record the care appears appropriate. I was personally available for consultation in the Emergency Department.     [facility], MD  Attending Emergency Physician                    Chadwick Varghese MD  06/03/19 9229

## 2019-06-05 ENCOUNTER — HOSPITAL ENCOUNTER (EMERGENCY)
Age: 39
Discharge: HOME | End: 2019-06-05
Attending: EMERGENCY MEDICINE
Payer: COMMERCIAL

## 2019-06-05 VITALS
SYSTOLIC BLOOD PRESSURE: 117 MMHG | BODY MASS INDEX: 38.64 KG/M2 | HEART RATE: 89 BPM | DIASTOLIC BLOOD PRESSURE: 82 MMHG | RESPIRATION RATE: 14 BRPM | OXYGEN SATURATION: 94 % | TEMPERATURE: 97.7 F | WEIGHT: 210 LBS | HEIGHT: 62 IN

## 2019-06-05 DIAGNOSIS — L02.214 CUTANEOUS ABSCESS OF GROIN: Primary | ICD-10-CM

## 2019-06-05 PROCEDURE — 99282 EMERGENCY DEPT VISIT SF MDM: CPT

## 2019-06-05 ASSESSMENT — PAIN DESCRIPTION - ORIENTATION: ORIENTATION: RIGHT

## 2019-06-05 ASSESSMENT — PAIN DESCRIPTION - DESCRIPTORS: DESCRIPTORS: TENDER

## 2019-06-05 ASSESSMENT — PAIN SCALES - GENERAL: PAINLEVEL_OUTOF10: 7

## 2019-06-05 ASSESSMENT — PAIN DESCRIPTION - PAIN TYPE: TYPE: ACUTE PAIN

## 2019-06-05 ASSESSMENT — PAIN DESCRIPTION - LOCATION: LOCATION: GROIN

## 2019-06-05 NOTE — ED PROVIDER NOTES
[address]  eMERGENCY dEPARTMENT eNCOUnter      Pt Name: Ernesto Blake  MRN: [id number]  [place] 1980  Date of evaluation: 19      CHIEF COMPLAINT       Chief Complaint   Patient presents with    Wound Check         HISTORY OF PRESENT ILLNESS    Ernesto Blake is a 42 y.o. female who presents complaining of wound check to the right groin, had I and D of abscess. The history is provided by the patient. Wound Check    She was treated in the ED 2 to 3 days ago. Previous treatment in the ED includes I&D of abscess. Treatments since wound repair include oral antibiotics. Her temperature was unmeasured prior to arrival. There has been bloody discharge from the wound. The redness has improved. The swelling has improved. The pain has improved. She has no difficulty moving the affected extremity or digit. REVIEW OF SYSTEMS       Review of Systems   Skin: Positive for wound (had I and D of abscess to right groin on the 3rd of this month, was told to come in for a wound check). All other systems reviewed and are negative.       PAST MEDICAL HISTORY     Past Medical History:   Diagnosis Date    Anxiety     Arthritis     OA    Asthma     Sadler's esophagus     LONG SEGMENT    Bipolar 1 disorder ([address]     CAD (coronary artery disease)     COPD (chronic obstructive pulmonary disease) (HCC)     Depression     and bipolar    Esophagitis     severe    GERD (gastroesophageal reflux disease)     Headache(784.0)     Herniated disc, cervical     Hiatal hernia     Incisional abscess 1/15/2019    Kidney stones     Pleurisy     hx of \"years ago\"    Pneumonia     past penumonia    UTI (urinary tract infection)     Vision abnormalities     wears glasses       SURGICAL HISTORY       Past Surgical History:   Procedure Laterality Date    CERVICAL DISC SURGERY       SECTION  , 2018    x 2     SECTION N/A 2018     SECTION performed by Marie Marinelli MD at [facility] OR    ENDOSCOPY, COLON, DIAGNOSTIC      KNEE ARTHROSCOPY Left 5/20/15    KNEE SURGERY Left     x3    LAPAROSCOPY      LAPAROSCOPY N/A 10/5/2017    LAPAROSCOPIC REMOVAL INTRA ABDOMINAL LIPOMA LOWER RIGHT performed by Kevin Gaytan DO at [facility] ESOPHAGOGASTRODUODENOSCOPY TRANSORAL DIAGNOSTIC N/A 3/2/2017    EGD ESOPHAGOGASTRODUODENOSCOPY  IP 2055 performed by James Leary MD at [address] [facility]  08/16/2016    severe esophagitis    UPPER GASTROINTESTINAL ENDOSCOPY  01/19/2017    barretts; hiatus hernia; gastritis    UPPER GASTROINTESTINAL ENDOSCOPY  03/02/2017    long seg gates's with linear erosions, esophagitis, small hiatal hernia    UPPER GASTROINTESTINAL ENDOSCOPY N/A 1/30/2018    GATES'S       CURRENT MEDICATIONS       Discharge Medication List as of 6/5/2019  1:43 PM      CONTINUE these medications which have NOT CHANGED    Details   sulfamethoxazole-trimethoprim (BACTRIM DS) 800-160 MG per tablet Take 1 tablet by mouth 2 times daily for 10 days, Disp-20 tablet, R-0Print      cephALEXin (KEFLEX) 500 MG capsule Take 1 capsule by mouth 4 times daily for 10 days, Disp-40 capsule, R-0Print      HYDROcodone-acetaminophen (NORCO) 5-325 MG per tablet Take 1 tablet by mouth every 8 hours as needed for Pain for up to 4 days. , Disp-10 tablet, R-0Print      pantoprazole sodium (PROTONIX) 40 MG PACK packet Take 40 mg by mouth every morning (before breakfast)Historical Med      acetaminophen (TYLENOL) 325 MG tablet Take 2 tablets by mouth every 4 hours as needed for Pain, Disp-60 tablet, R-0Print      diphenhydrAMINE (BENADRYL) 25 MG capsule Take 1 capsule by mouth every 6 hours as needed for Itching, Disp-30 capsule, R-0Print      famotidine (PEPCID) 20 MG tablet Take 1 tablet by mouth 2 times daily, Disp-30 tablet, R-0Print      EPINEPHrine (EPIPEN 2-HELDER) 0.3 MG/0.3ML SOAJ injection Inject 0.3 mLs into the muscle once for 1 dose Use as directed for allergic reaction, Disp-2 each, R-0Print      lidocaine (LIDODERM) 5 % Place 1 patch onto the skin daily 12 hours on, 12 hours off., Disp-7 patch, R-0Print      Prenatal Vit-Fe Fumarate-FA (PNV PRENATAL PLUS MULTIVITAMIN) 27-1 MG TABS Take 1 tablet by mouth daily, Disp-30 tablet, R-5Normal      ferrous sulfate 325 (65 Fe) MG tablet Take 1 tablet by mouth 2 times daily (with meals), Disp-30 tablet, R-3Print      promethazine (PHENERGAN) 25 MG tablet Take 1 tablet by mouth every 8 hours as needed for Nausea, Disp-30 tablet, R-1Normal      sertraline (ZOLOFT) 50 MG tablet Take 1 tablet by mouth daily, Disp-30 tablet, R-1Normal      docusate sodium (COLACE, DULCOLAX) 100 MG CAPS Take 100 mg by mouth 2 times daily, Disp-60 capsule, R-1Print      COMBIVENT RESPIMAT  MCG/ACT AERS inhaler INHALE ONE PUFF BY MOUTH 4 TIMES A DAY, R-6, DAWHistorical Med      montelukast (SINGULAIR) 10 MG tablet Take 1 tablet by mouth nightly, Disp-30 tablet, R-3Print      Fluticasone Furoate-Vilanterol (BREO ELLIPTA) 200-25 MCG/INH AEPB Inhale 1 puff into the lungs dailyHistorical Med      albuterol (PROVENTIL) (2.5 MG/3ML) 0.083% nebulizer solution Take 3 mLs by nebulization every 4 hours as needed for Wheezing, Disp-120 each, R-3             ALLERGIES     is allergic to nsaids; toradol [ketorolac tromethamine]; and ultram [tramadol]. FAMILY HISTORY     indicated that the status of her mother is unknown. She indicated that the status of her father is unknown. She indicated that the status of her brother is unknown. SOCIAL HISTORY      reports that she has been smoking cigarettes. She has a 5.25 pack-year smoking history. She has never used smokeless tobacco. She reports that she does not drink alcohol or use drugs.     PHYSICAL EXAM     INITIAL VITALS: /82   Pulse 89   Temp 97.7 °F (36.5 °C) (Oral)   Resp 14   Ht 5' 2\" (1.575 m)   Wt 210 lb (95.3 kg)   LMP 05/28/2019 (Approximate)   SpO2 94%   BMI 38.41 kg/m²      Physical Exam   Constitutional: She is oriented to person, place, and time. She appears well-developed and well-nourished. HENT:   Head: Normocephalic and atraumatic. Cardiovascular: Normal rate, regular rhythm and normal heart sounds. Pulmonary/Chest: Effort normal and breath sounds normal.   Neurological: She is alert and oriented to person, place, and time. Skin: Capillary refill takes less than 2 seconds. There is erythema. Redness to right groin area, small incision noted, no fluctuance, no streaking. Nursing note and vitals reviewed. MEDICAL DECISION MAKING:     Packing was loose upon arrival, wound cleansed and dressed, continue ATB, follow up with ID for further eval and tx. DIAGNOSTIC RESULTS     EKG: All EKG's are interpreted by the Emergency Department Physician who either signs or Co-signs this chart in the absence of acardiologist.        RADIOLOGY:Allplain film, CT, MRI, and formal ultrasound images (except ED bedside ultrasound) are read by the radiologist and the images and interpretations are directly viewed by the emergency physician. LABS:All lab results were reviewed by myself, and all abnormals are listed below. Labs Reviewed - No data to display      EMERGENCY DEPARTMENT COURSE:   Vitals:    Vitals:    06/05/19 1321   BP: 117/82   Pulse: 89   Resp: 14   Temp: 97.7 °F (36.5 °C)   TempSrc: Oral   SpO2: 94%   Weight: 210 lb (95.3 kg)   Height: 5' 2\" (1.575 m)       The patient was given the following medications while in the emergency department:  No orders of the defined types were placed in this encounter. -------------------------      CRITICAL CARE:    CONSULTS:  None    PROCEDURES:  Procedures    FINAL IMPRESSION      1.  Cutaneous abscess of groin          DISPOSITION/PLAN   DISPOSITION Decision To Discharge 06/05/2019 01:42:35 PM      PATIENT REFERREDTO:  Jak ePdro MD  [address] [place] [address],[address] [address]  [phone]    Schedule an appointment as soon as possible for a visit in 2 days      [facility] ED  [address]  [address] [facility]  [phone]    If symptoms worsen      DISCHARGEMEDICATIONS:  Discharge Medication List as of 6/5/2019  1:43 PM          (Please note that portions of this note were completed with a voice recognition program.  Efforts were made to edit thedictations but occasionally words are mis-transcribed.)    SHAINA Lockhart PA-C  06/06/19 3433

## 2019-06-06 NOTE — ED PROVIDER NOTES
[address] ED  eMERGENCY dEPARTMENT eNCOUnter   Independent Attestation     Pt Name: Brandin Carrasco  MRN: [id number]  [place] 1980  Date of evaluation: 6/5/19   Brandin Carrasco is a 42 y.o. female who presents with Wound Check    Vitals:   Vitals:    06/05/19 1321   BP: 117/82   Pulse: 89   Resp: 14   Temp: 97.7 °F (36.5 °C)   TempSrc: Oral   SpO2: 94%   Weight: 210 lb (95.3 kg)   Height: 5' 2\" (1.575 m)     Impression:   1. Cutaneous abscess of groin      I was personally available for consultation in the Emergency Department. I have reviewed the chart and agree with the documentation as recorded by the [facility], including the assessment, treatment plan and disposition.   Cassandra Curtis MD  Attending Emergency  Physician                  Cassandra Curtis MD  06/05/19 9143

## 2019-06-17 ENCOUNTER — HOSPITAL ENCOUNTER (EMERGENCY)
Age: 39
Discharge: HOME | End: 2019-06-17
Attending: EMERGENCY MEDICINE
Payer: COMMERCIAL

## 2019-06-17 ENCOUNTER — APPOINTMENT (OUTPATIENT)
Dept: GENERAL RADIOLOGY | Age: 39
End: 2019-06-17
Payer: COMMERCIAL

## 2019-06-17 VITALS
BODY MASS INDEX: 38.64 KG/M2 | RESPIRATION RATE: 20 BRPM | SYSTOLIC BLOOD PRESSURE: 96 MMHG | HEART RATE: 105 BPM | WEIGHT: 210 LBS | OXYGEN SATURATION: 95 % | DIASTOLIC BLOOD PRESSURE: 62 MMHG | HEIGHT: 62 IN | TEMPERATURE: 97.8 F

## 2019-06-17 DIAGNOSIS — M79.641 RIGHT HAND PAIN: Primary | ICD-10-CM

## 2019-06-17 PROCEDURE — 99283 EMERGENCY DEPT VISIT LOW MDM: CPT

## 2019-06-17 PROCEDURE — 73130 X-RAY EXAM OF HAND: CPT

## 2019-06-17 RX ORDER — ACETAMINOPHEN 325 MG/1
650 TABLET ORAL ONCE
Status: DISCONTINUED | OUTPATIENT
Start: 2019-06-17 | End: 2019-06-18 | Stop reason: HOSPADM

## 2019-06-17 ASSESSMENT — PAIN DESCRIPTION - LOCATION: LOCATION: HAND

## 2019-06-17 ASSESSMENT — PAIN DESCRIPTION - PAIN TYPE: TYPE: ACUTE PAIN

## 2019-06-17 ASSESSMENT — PAIN SCALES - GENERAL: PAINLEVEL_OUTOF10: 8

## 2019-06-17 ASSESSMENT — PAIN DESCRIPTION - ORIENTATION: ORIENTATION: RIGHT

## 2019-06-18 NOTE — ED PROVIDER NOTES
[address]  Emergency Department  Independent Attestation     Pt Name: Suzanne Emery  MRN: [id number]  [place] 1980  Date of evaluation: 6/17/19       Suzanne Emery is a 42 y.o. female who presents with Hand Pain (right)      I was personally available for consultation in the Emergency Department.     Dl Diggs DO  Attending Emergency Physician  [address]      (Please note that portions of this note were completed with a voice recognition program.  Efforts were made to edit the dictations but occasionally words are mis-transcribed.)        Dl Diggs DO  06/17/19 [id number]

## 2019-06-18 NOTE — ED PROVIDER NOTES
[address]  eMERGENCY dEPARTMENT eNCOUnter      Pt Name: Sky Brown  MRN: [id number]  [place] 1980  Date of evaluation: 19      CHIEF COMPLAINT       Chief Complaint   Patient presents with    Hand Pain     right         [address] CASE Hackett is a 42 y.o. female who presents complaining of right hand pain  The history is provided by the patient. Hand Problem   Location:  Hand  Hand location:  R hand and R palm  Injury: yes    Pain details:     Quality:  Aching    Radiates to:  Does not radiate    Severity:  Mild    Onset quality:  Sudden    Duration:  1 hour    Timing:  Intermittent  Handedness:  Right-handed  Dislocation: no    Tetanus status:  Unknown  Prior injury to area:  Unable to specify  Relieved by:  Nothing  Worsened by:  Nothing  Ineffective treatments:  None tried  Associated symptoms: no numbness, no stiffness, no swelling and no tingling        REVIEW OF SYSTEMS       Review of Systems   Musculoskeletal: Positive for arthralgias. Negative for stiffness. All other systems reviewed and are negative.       PAST MEDICAL HISTORY     Past Medical History:   Diagnosis Date    Anxiety     Arthritis     OA    Asthma     Sadler's esophagus     LONG SEGMENT    [facility] [address]     CAD (coronary artery disease)     [facility]     Depression     and bipolar    Esophagitis     severe    GERD (gastroesophageal reflux disease)     Headache(784.0)     Herniated disc, cervical     Hiatal hernia     Incisional abscess 1/15/2019    Kidney stones     Pleurisy     hx of \"years ago\"    Pneumonia     past penumonia    UTI (urinary tract infection)     Vision abnormalities     wears glasses       SURGICAL HISTORY       Past Surgical History:   Procedure Laterality Date    CERVICAL DISC SURGERY       SECTION  , 2018    x 2     SECTION N/A 2018     SECTION performed by Karlee Murillo MD at [facility] L&D OR    ENDOSCOPY, COLON, DIAGNOSTIC      KNEE ARTHROSCOPY Left 5/20/15    KNEE SURGERY Left     x3    LAPAROSCOPY      LAPAROSCOPY N/A 10/5/2017    LAPAROSCOPIC REMOVAL INTRA ABDOMINAL LIPOMA LOWER RIGHT performed by Miguel Fournier DO at [facility] ESOPHAGOGASTRODUODENOSCOPY TRANSORAL DIAGNOSTIC N/A 3/2/2017    EGD ESOPHAGOGASTRODUODENOSCOPY  IP 2055 performed by Lindy Seip, MD at [facility]  08/16/2016    severe esophagitis    UPPER GASTROINTESTINAL ENDOSCOPY  01/19/2017    barretts; hiatus hernia; gastritis    UPPER GASTROINTESTINAL ENDOSCOPY  03/02/2017    long seg mullins's with linear erosions, esophagitis, small hiatal hernia    UPPER GASTROINTESTINAL ENDOSCOPY N/A 1/30/2018    MULLINS'S       CURRENT MEDICATIONS       Previous Medications    ACETAMINOPHEN (TYLENOL) 325 MG TABLET    Take 2 tablets by mouth every 4 hours as needed for Pain    ALBUTEROL (PROVENTIL) (2.5 MG/3ML) 0.083% NEBULIZER SOLUTION    Take 3 mLs by nebulization every 4 hours as needed for Wheezing    COMBIVENT RESPIMAT  MCG/ACT AERS INHALER    INHALE ONE PUFF BY MOUTH 4 TIMES A DAY    DIPHENHYDRAMINE (BENADRYL) 25 MG CAPSULE    Take 1 capsule by mouth every 6 hours as needed for Itching    DOCUSATE SODIUM (COLACE, DULCOLAX) 100 MG CAPS    Take 100 mg by mouth 2 times daily    EPINEPHRINE (EPIPEN 2-HELDER) 0.3 MG/0.3ML SOAJ INJECTION    Inject 0.3 mLs into the muscle once for 1 dose Use as directed for allergic reaction    FAMOTIDINE (PEPCID) 20 MG TABLET    Take 1 tablet by mouth 2 times daily    FERROUS SULFATE 325 (65 FE) MG TABLET    Take 1 tablet by mouth 2 times daily (with meals)    FLUTICASONE FUROATE-VILANTEROL (BREO ELLIPTA) 200-25 MCG/INH AEPB    Inhale 1 puff into the lungs daily    LIDOCAINE (LIDODERM) 5 %    Place 1 patch onto the skin daily 12 hours on, 12 hours off.     MONTELUKAST (SINGULAIR) 10 MG TABLET    Take 1 acardiologist.        RADIOLOGY:Allplain film, CT, MRI, and formal ultrasound images (except ED bedside ultrasound) are read by the radiologist and the images and interpretations are directly viewed by the emergency physician. LABS:All lab results were reviewed by myself, and all abnormals are listed below. Labs Reviewed - No data to display      EMERGENCY DEPARTMENT COURSE:   Vitals:    Vitals:    06/17/19 2042   BP: 96/62   Pulse: 105   Resp: 20   Temp: 97.8 °F (36.6 °C)   TempSrc: Oral   SpO2: 95%   Weight: 210 lb (95.3 kg)   Height: 5' 2\" (1.575 m)       The patient was given the following medications while in the emergency department:  Orders Placed This Encounter   Medications    acetaminophen (TYLENOL) tablet 650 mg       -------------------------      CRITICAL CARE:     CONSULTS:  None    PROCEDURES:  Procedures    FINAL IMPRESSION      1.  Right hand pain          DISPOSITION/PLAN   DISPOSITION Decision To Discharge 06/17/2019 09:59:15 PM      PATIENT REFERREDTO:  Magen Perrin MD  [address]  [phone]    Schedule an appointment as soon as possible for a visit in 1 day      [facility]  [address] [address]  [phone]    If symptoms worsen      DISCHARGEMEDICATIONS:  New Prescriptions    No medications on file       (Please note that portions of this note were completed with a voice recognition program.  Efforts were made to edit thedictations but occasionally words are mis-transcribed.)    SHAINA Clancy PA-C  06/17/19 2238

## 2019-06-24 ENCOUNTER — HOSPITAL ENCOUNTER (EMERGENCY)
Age: 39
Discharge: NURSING, REHAB OR LONG-TERM CARE | End: 2019-06-24
Attending: EMERGENCY MEDICINE
Payer: COMMERCIAL

## 2019-06-24 VITALS
WEIGHT: 210 LBS | SYSTOLIC BLOOD PRESSURE: 146 MMHG | DIASTOLIC BLOOD PRESSURE: 58 MMHG | TEMPERATURE: 98 F | BODY MASS INDEX: 38.41 KG/M2 | HEART RATE: 100 BPM | RESPIRATION RATE: 18 BRPM | OXYGEN SATURATION: 95 %

## 2019-06-24 DIAGNOSIS — R10.84 GENERALIZED ABDOMINAL PAIN: Primary | ICD-10-CM

## 2019-06-24 PROCEDURE — 83690 ASSAY OF LIPASE: CPT

## 2019-06-24 PROCEDURE — 96372 THER/PROPH/DIAG INJ SC/IM: CPT

## 2019-06-24 PROCEDURE — 99283 EMERGENCY DEPT VISIT LOW MDM: CPT

## 2019-06-24 PROCEDURE — 80048 BASIC METABOLIC PNL TOTAL CA: CPT

## 2019-06-24 PROCEDURE — 80076 HEPATIC FUNCTION PANEL: CPT

## 2019-06-24 PROCEDURE — 85025 COMPLETE CBC W/AUTO DIFF WBC: CPT

## 2019-06-24 PROCEDURE — 6360000002 HC RX W HCPCS: Performed by: EMERGENCY MEDICINE

## 2019-06-24 PROCEDURE — 82150 ASSAY OF AMYLASE: CPT

## 2019-06-24 RX ORDER — ONDANSETRON 2 MG/ML
4 INJECTION INTRAMUSCULAR; INTRAVENOUS ONCE
Status: DISCONTINUED | OUTPATIENT
Start: 2019-06-24 | End: 2019-06-24

## 2019-06-24 RX ORDER — 0.9 % SODIUM CHLORIDE 0.9 %
1000 INTRAVENOUS SOLUTION INTRAVENOUS ONCE
Status: DISCONTINUED | OUTPATIENT
Start: 2019-06-24 | End: 2019-06-25 | Stop reason: HOSPADM

## 2019-06-24 RX ORDER — FAMOTIDINE 20 MG/1
20 TABLET, FILM COATED ORAL ONCE
Status: DISCONTINUED | OUTPATIENT
Start: 2019-06-24 | End: 2019-06-25 | Stop reason: HOSPADM

## 2019-06-24 RX ORDER — ONDANSETRON 2 MG/ML
4 INJECTION INTRAMUSCULAR; INTRAVENOUS ONCE
Status: COMPLETED | OUTPATIENT
Start: 2019-06-24 | End: 2019-06-24

## 2019-06-24 RX ORDER — VITAMIN A ACETATE, .BETA.-CAROTENE, ASCORBIC ACID, CHOLECALCIFEROL, .ALPHA.-TOCOPHEROL ACETATE, DL-, THIAMINE MONONITRATE, RIBOFLAVIN, NIACINAMIDE, PYRIDOXINE HYDROCHLORIDE, FOLIC ACID, CYANOCOBALAMIN, CALCIUM CARBONATE, FERROUS FUMARATE, ZINC OXIDE, AND CUPRIC OXIDE 2000; 2000; 120; 400; 22; 1.84; 3; 20; 10; 1; 12; 200; 27; 25; 2 [IU]/1; [IU]/1; MG/1; [IU]/1; MG/1; MG/1; MG/1; MG/1; MG/1; MG/1; UG/1; MG/1; MG/1; MG/1; MG/1
1 TABLET ORAL DAILY
Qty: 30 TABLET | Refills: 11 | Status: ON HOLD | OUTPATIENT
Start: 2019-06-24 | End: 2019-10-01 | Stop reason: ALTCHOICE

## 2019-06-24 RX ORDER — DICYCLOMINE HYDROCHLORIDE 10 MG/ML
20 INJECTION INTRAMUSCULAR ONCE
Status: COMPLETED | OUTPATIENT
Start: 2019-06-24 | End: 2019-06-24

## 2019-06-24 RX ORDER — HALOPERIDOL 5 MG/ML
5 INJECTION INTRAMUSCULAR ONCE
Status: COMPLETED | OUTPATIENT
Start: 2019-06-24 | End: 2019-06-24

## 2019-06-24 RX ADMIN — DICYCLOMINE HYDROCHLORIDE 20 MG: 20 INJECTION, SOLUTION INTRAMUSCULAR at 21:25

## 2019-06-24 RX ADMIN — HALOPERIDOL LACTATE 5 MG: 5 INJECTION INTRAMUSCULAR at 22:09

## 2019-06-24 RX ADMIN — ONDANSETRON 4 MG: 2 INJECTION INTRAMUSCULAR; INTRAVENOUS at 22:04

## 2019-06-24 ASSESSMENT — ENCOUNTER SYMPTOMS
DIARRHEA: 0
SHORTNESS OF BREATH: 0
VOMITING: 0
SORE THROAT: 0
CONSTIPATION: 0
COUGH: 0
NAUSEA: 0
FACIAL SWELLING: 0
SINUS PRESSURE: 0
COLOR CHANGE: 0
CHEST TIGHTNESS: 0

## 2019-06-24 ASSESSMENT — PAIN SCALES - GENERAL: PAINLEVEL_OUTOF10: 9

## 2019-06-24 ASSESSMENT — PAIN DESCRIPTION - FREQUENCY: FREQUENCY: CONTINUOUS

## 2019-06-24 ASSESSMENT — PAIN DESCRIPTION - PAIN TYPE: TYPE: ACUTE PAIN

## 2019-06-24 ASSESSMENT — PAIN DESCRIPTION - LOCATION: LOCATION: ABDOMEN

## 2019-06-24 ASSESSMENT — PAIN DESCRIPTION - ORIENTATION: ORIENTATION: RIGHT;LOWER

## 2019-06-25 ENCOUNTER — HOSPITAL ENCOUNTER (EMERGENCY)
Age: 39
Discharge: HOME | End: 2019-06-25
Attending: EMERGENCY MEDICINE
Payer: COMMERCIAL

## 2019-06-25 ENCOUNTER — APPOINTMENT (OUTPATIENT)
Dept: GENERAL RADIOLOGY | Age: 39
End: 2019-06-25
Payer: COMMERCIAL

## 2019-06-25 VITALS
DIASTOLIC BLOOD PRESSURE: 81 MMHG | BODY MASS INDEX: 38.64 KG/M2 | TEMPERATURE: 98.2 F | HEART RATE: 98 BPM | RESPIRATION RATE: 16 BRPM | HEIGHT: 62 IN | SYSTOLIC BLOOD PRESSURE: 130 MMHG | WEIGHT: 210 LBS | OXYGEN SATURATION: 98 %

## 2019-06-25 DIAGNOSIS — M62.838 SPASM OF MUSCLE: Primary | ICD-10-CM

## 2019-06-25 PROCEDURE — 96372 THER/PROPH/DIAG INJ SC/IM: CPT

## 2019-06-25 PROCEDURE — 6360000002 HC RX W HCPCS: Performed by: EMERGENCY MEDICINE

## 2019-06-25 PROCEDURE — 72040 X-RAY EXAM NECK SPINE 2-3 VW: CPT

## 2019-06-25 PROCEDURE — 99283 EMERGENCY DEPT VISIT LOW MDM: CPT

## 2019-06-25 RX ORDER — ORPHENADRINE CITRATE 30 MG/ML
60 INJECTION INTRAMUSCULAR; INTRAVENOUS ONCE
Status: COMPLETED | OUTPATIENT
Start: 2019-06-25 | End: 2019-06-25

## 2019-06-25 RX ADMIN — ORPHENADRINE CITRATE 60 MG: 30 INJECTION INTRAMUSCULAR; INTRAVENOUS at 19:34

## 2019-06-25 ASSESSMENT — ENCOUNTER SYMPTOMS
SORE THROAT: 0
TROUBLE SWALLOWING: 0
NAUSEA: 0
ABDOMINAL PAIN: 0
BACK PAIN: 0
SHORTNESS OF BREATH: 0
VOMITING: 0
DIARRHEA: 0
COLOR CHANGE: 0
CONSTIPATION: 0
BLOOD IN STOOL: 0
COUGH: 0

## 2019-06-25 ASSESSMENT — PAIN SCALES - GENERAL: PAINLEVEL_OUTOF10: 9

## 2019-06-25 NOTE — ED PROVIDER NOTES
[address]  eMERGENCY dEPARTMENT eNCOUnter    Pt Name: Montserrat Hawk  MRN: [id number]  YOB: 1980  Date of evaluation:6/25/19  PCP: Devorah Schmid MD    [address] [place] [address]       Chief Complaint   Patient presents with    Neck Pain       HISTORY OF PRESENT ILLNESS    Montserrat Hawk is a 42 y.o. female who presents with a chief complaint of neck pain. Patient states that at some point last night her neck started spasming and turning to the left. Denies any falls or trauma. She acknowledges being seen here last night for abdominal pain. She states that she had multiple intramuscular medications and she is concerned that that could be causing a reaction. Her symptoms are acute. Her pain is 9 out of 10 severity. Pain does not radiate. No numbness, tingling, weakness in extremities. No blurred or double vision. She does have a history of migraines but denies any headache today. Symptoms are acute. Symptoms are severe. Nothing makes symptoms better or worse. Patient has no other complaints at this time. REVIEW OF SYSTEMS       Review of Systems   Constitutional: Negative for chills, fatigue and fever. HENT: Negative for congestion, ear pain, sore throat and trouble swallowing. Eyes: Negative for visual disturbance. Respiratory: Negative for cough and shortness of breath. Cardiovascular: Negative for chest pain, palpitations and leg swelling. Gastrointestinal: Negative for abdominal pain, blood in stool, constipation, diarrhea, nausea and vomiting. Genitourinary: Negative for dysuria and flank pain. Musculoskeletal: Positive for neck pain. Negative for arthralgias, back pain and myalgias. Skin: Negative for color change, rash and wound. Neurological: Negative for dizziness, weakness, light-headedness, numbness and headaches. Psychiatric/Behavioral: Negative for confusion. All other systems reviewed and are negative.     Negativein 10 essential Systems except as mentioned above and in the HPI. PAST MEDICAL HISTORY     Past Medical History:   Diagnosis Date    Anxiety     Arthritis     OA    Asthma     Sadler's esophagus     LONG SEGMENT    [facility] ([facility] [address]     CAD (coronary artery disease)     [facility]) ([facility])     Depression     and bipolar    Esophagitis     severe    GERD (gastroesophageal reflux disease)     Headache(784.0)     Herniated disc, cervical     Hiatal hernia     Incisional abscess 1/15/2019    Kidney stones     Pleurisy     hx of \"years ago\"    Pneumonia     past penumonia    UTI (urinary tract infection)     Vision abnormalities     wears glasses         SURGICAL HISTORY      has a past surgical history that includes knee surgery (Left);  section (, ); Tonsillectomy; Knee arthroscopy (Left, 5/20/15); Cervical disc surgery; Upper gastrointestinal endoscopy (2016); Upper gastrointestinal endoscopy (2017); Upper gastrointestinal endoscopy (2017); pr esophagogastroduodenoscopy transoral diagnostic (N/A, 3/2/2017); laparoscopy; laparoscopy (N/A, 10/5/2017); Upper gastrointestinal endoscopy (N/A, 2018); Endoscopy, colon, diagnostic; and  section (N/A, 2018).       CURRENT MEDICATIONS       Current Discharge Medication List      CONTINUE these medications which have NOT CHANGED    Details   Prenatal Vit-Fe Fumarate-FA (PNV PRENATAL PLUS MULTIVITAMIN) 27-1 MG TABS Take 1 tablet by mouth daily  Qty: 30 tablet, Refills: 11      pantoprazole sodium (PROTONIX) 40 MG PACK packet Take 40 mg by mouth every morning (before breakfast)      acetaminophen (TYLENOL) 325 MG tablet Take 2 tablets by mouth every 4 hours as needed for Pain  Qty: 60 tablet, Refills: 0      diphenhydrAMINE (BENADRYL) 25 MG capsule Take 1 capsule by mouth every 6 hours as needed for Itching  Qty: 30 capsule, Refills: 0      famotidine (PEPCID) 20 MG tablet Take 1 tablet by mouth 2 times daily  Qty: 30 tablet, Refills: 0      EPINEPHrine (EPIPEN 2-HELDER) 0.3 MG/0.3ML SOAJ injection Inject 0.3 mLs into the muscle once for 1 dose Use as directed for allergic reaction  Qty: 2 each, Refills: 0      lidocaine (LIDODERM) 5 % Place 1 patch onto the skin daily 12 hours on, 12 hours off. Qty: 7 patch, Refills: 0      ferrous sulfate 325 (65 Fe) MG tablet Take 1 tablet by mouth 2 times daily (with meals)  Qty: 30 tablet, Refills: 3      promethazine (PHENERGAN) 25 MG tablet Take 1 tablet by mouth every 8 hours as needed for Nausea  Qty: 30 tablet, Refills: 1      sertraline (ZOLOFT) 50 MG tablet Take 1 tablet by mouth daily  Qty: 30 tablet, Refills: 1      docusate sodium (COLACE, DULCOLAX) 100 MG CAPS Take 100 mg by mouth 2 times daily  Qty: 60 capsule, Refills: 1      COMBIVENT RESPIMAT  MCG/ACT AERS inhaler INHALE ONE PUFF BY MOUTH 4 TIMES A DAY  Refills: 6      montelukast (SINGULAIR) 10 MG tablet Take 1 tablet by mouth nightly  Qty: 30 tablet, Refills: 3      Fluticasone Furoate-Vilanterol (BREO ELLIPTA) 200-25 MCG/INH AEPB Inhale 1 puff into the lungs daily      albuterol (PROVENTIL) (2.5 MG/3ML) 0.083% nebulizer solution Take 3 mLs by nebulization every 4 hours as needed for Wheezing  Qty: 120 each, Refills: 3             ALLERGIES     is allergic to nsaids; toradol [ketorolac tromethamine]; and ultram [tramadol]. FAMILY HISTORY     indicated that the status of her mother is unknown. She indicated that the status of her father is unknown. She indicated that the status of her brother is unknown.     family history includes Bipolar Disorder in her brother and mother; Breast Cancer in her mother; Cancer in her mother; Hypertension in her father and mother. SOCIAL HISTORY      reports that she has been smoking cigarettes. She has a 5.25 pack-year smoking history. She has never used smokeless tobacco. She reports that she does not drink alcohol or use drugs.     PHYSICAL EXAM     INITIAL VITALS:  height is 5' 2\" (1.575 m) and weight is 210 lb (95.3 kg). Her oral temperature is 98.2 °F (36.8 °C). Her blood pressure is 130/81 and her pulse is 98. Her respiration is 16 and oxygen saturation is 98%. Physical Exam   Constitutional: She is oriented to person, place, and time. No distress. HENT:   Head: Normocephalic and atraumatic. Eyes: Pupils are equal, round, and reactive to light. Conjunctivae are normal.   Neck: Normal range of motion. Neck supple. Spinous process tenderness and muscular tenderness present. No neck rigidity. Normal range of motion present. Cardiovascular: Normal rate, regular rhythm, normal heart sounds and intact distal pulses. No murmur heard. Pulmonary/Chest: Effort normal and breath sounds normal. No respiratory distress. Abdominal: Soft. Bowel sounds are normal. She exhibits no distension. There is no tenderness. Musculoskeletal: She exhibits no edema or tenderness. Lymphadenopathy:     She has no cervical adenopathy. Neurological: She is alert and oriented to person, place, and time. Skin: Skin is warm and dry. No rash noted. Psychiatric: Judgment normal.   Nursing note and vitals reviewed. DIFFERENTIAL DIAGNOSIS/MDM:   82-year-old female presents with muscle spasms and left-sided neck pain. She is afebrile, nontoxic, normal vital signs. She is not in any acute distress. Patient found to have muscle spasms noted in the left side of her neck. She is holding her neck to the left side however I am easily able to bring it to midline and through entire range of motion. She does have some midline spinous process tenderness but denies any falls or trauma. She has no neurologic deficits on my exam.    After review of patient's chart I am concerned for medication misuse. Patient has a total of 26 prescriptions on her prescription drug monitoring report from 12 different prescribers filled at 6 different pharmacies.   I did make it very clear with the patient that I would not be giving any narcotic pain medications at this time however we will try intramuscular Norflex to try to treat her symptoms today. We will get a cervical spine x-ray as she does have midline tenderness. I suspect the patient is most likely suffering form muscle spasms and possibly torticollis. DIAGNOSTIC RESULTS     EKG: All EKG's are interpreted by the Emergency Department Physician who either signs or Co-signs this chart in the absence of a cardiologist.        RADIOLOGY:   I directly visualized the following  images and reviewed the radiologist interpretations:  XR CERVICAL SPINE (2-3 VIEWS)   Final Result   Exam limited by suboptimal positioning. Status post anterior fusion at C3-4   and C5-6. No acute abnormality demonstrated on this exam however further   evaluation may be warranted with CT or MRI if symptoms warrant. ED BEDSIDE ULTRASOUND:      LABS:  Labs Reviewed - No data to display      EMERGENCY DEPARTMENT COURSE:   Vitals:    Vitals:    06/25/19 1920   BP: 130/81   Pulse: 98   Resp: 16   Temp: 98.2 °F (36.8 °C)   TempSrc: Oral   SpO2: 98%   Weight: 210 lb (95.3 kg)   Height: 5' 2\" (1.575 m)     8:10 PM  X-ray is unremarkable. Patient's hardware in her cervical spine is unremarkable. After muscle relaxer I reevaluated the patient. When I entered the room her neck was midline however when I entered the room she turned her head quickly to the right which is the opposite side she was holding her head before. I believe there is some secondary gain involved with the patient. I did strongly advise her to follow-up with her PCP, keep taking all medications as prescribed. Advised her to return if any symptoms worsen. She is agreeable to plan will be discharged home today.          CRITICALCARE:      CONSULTS:  None      PROCEDURES:      FINAL IMPRESSION      1. Spasm of muscle            DISPOSITION/PLAN   DISPOSITION            PATIENT REFERRED TO:  Pablo Coello MD  [address]  [phone]    Schedule an appointment as soon as possible for a visit       [facility] ED  [facility] [address] [phone]    As needed, If symptoms worsen      DISCHARGE MEDICATIONS:  Current Discharge Medication List          (Please note that portions ofthis note were completed with a voice recognition program.  Efforts were made to edit the dictations but occasionally words are mis-transcribed.)    Valencia Seip, DO  Attending Emergency Physician         Valencia Seip, DO  06/25/19 2011

## 2019-06-25 NOTE — ED PROVIDER NOTES
MEDICAL HISTORY     Past Medical History:   Diagnosis Date    Anxiety     Arthritis     OA    Asthma     Mullins's esophagus     LONG SEGMENT    Bipolar 1 disorder (HCC)     CAD (coronary artery disease)     COPD (chronic obstructive pulmonary disease) ([facility]CC)     Depression     and bipolar    Esophagitis     severe    GERD (gastroesophageal reflux disease)     Headache(784.0)     Herniated disc, cervical     Hiatal hernia     Incisional abscess 1/15/2019    Kidney stones     Pleurisy     hx of \"years ago\"    Pneumonia     past penumonia    UTI (urinary tract infection)     Vision abnormalities     wears glasses       SURGICAL HISTORY       Past Surgical History:   Procedure Laterality Date    CERVICAL DISC SURGERY       SECTION  , 2018    x 2     SECTION N/A 2018     SECTION performed by Yao Manning MD at [facility] L[facility]D [address], COLON, DIAGNOSTIC      KNEE ARTHROSCOPY Left 5/20/15    KNEE SURGERY Left     x3    LAPAROSCOPY      LAPAROSCOPY N/A 10/5/2017    LAPAROSCOPIC REMOVAL INTRA ABDOMINAL LIPOMA LOWER RIGHT performed by Farooq Ma DO at [facility] ESOPHAGOGASTRODUODENOSCOPY TRANSORAL DIAGNOSTIC N/A 3/2/2017    EGD ESOPHAGOGASTRODUODENOSCOPY  IP 2055 performed by Moni Chen MD at [facility]  2016    severe esophagitis    UPPER GASTROINTESTINAL ENDOSCOPY  2017    barretts; hiatus hernia; gastritis    UPPER GASTROINTESTINAL ENDOSCOPY  2017    long seg mullins's with linear erosions, esophagitis, small hiatal hernia    UPPER GASTROINTESTINAL ENDOSCOPY N/A 2018    MULLINS'S       CURRENT MEDICATIONS       Discharge Medication List as of 2019 10:30 PM      CONTINUE these medications which have NOT CHANGED    Details   Prenatal Vit-Fe Fumarate-FA (PNV PRENATAL PLUS MULTIVITAMIN) 27-1 MG TABS Take 1 tablet by mouth daily, Disp-30 tablet, R-11Normal pantoprazole sodium (PROTONIX) 40 MG PACK packet Take 40 mg by mouth every morning (before breakfast)Historical Med      acetaminophen (TYLENOL) 325 MG tablet Take 2 tablets by mouth every 4 hours as needed for Pain, Disp-60 tablet, R-0Print      diphenhydrAMINE (BENADRYL) 25 MG capsule Take 1 capsule by mouth every 6 hours as needed for Itching, Disp-30 capsule, R-0Print      famotidine (PEPCID) 20 MG tablet Take 1 tablet by mouth 2 times daily, Disp-30 tablet, R-0Print      EPINEPHrine (EPIPEN 2-HELDER) 0.3 MG/0.3ML SOAJ injection Inject 0.3 mLs into the muscle once for 1 dose Use as directed for allergic reaction, Disp-2 each, R-0Print      lidocaine (LIDODERM) 5 % Place 1 patch onto the skin daily 12 hours on, 12 hours off., Disp-7 patch, R-0Print      ferrous sulfate 325 (65 Fe) MG tablet Take 1 tablet by mouth 2 times daily (with meals), Disp-30 tablet, R-3Print      promethazine (PHENERGAN) 25 MG tablet Take 1 tablet by mouth every 8 hours as needed for Nausea, Disp-30 tablet, R-1Normal      sertraline (ZOLOFT) 50 MG tablet Take 1 tablet by mouth daily, Disp-30 tablet, R-1Normal      docusate sodium (COLACE, DULCOLAX) 100 MG CAPS Take 100 mg by mouth 2 times daily, Disp-60 capsule, R-1Print      COMBIVENT RESPIMAT  MCG/ACT AERS inhaler INHALE ONE PUFF BY MOUTH 4 TIMES A DAY, R-6, DAWHistorical Med      montelukast (SINGULAIR) 10 MG tablet Take 1 tablet by mouth nightly, Disp-30 tablet, R-3Print      Fluticasone Furoate-Vilanterol (BREO ELLIPTA) 200-25 MCG/INH AEPB Inhale 1 puff into the lungs dailyHistorical Med      albuterol (PROVENTIL) (2.5 MG/3ML) 0.083% nebulizer solution Take 3 mLs by nebulization every 4 hours as needed for Wheezing, Disp-120 each, R-3             ALLERGIES     is allergic to nsaids; toradol [ketorolac tromethamine]; and ultram [tramadol]. SOCIAL HISTORY      reports that she has been smoking cigarettes. She has a 5.25 pack-year smoking history.  She has never used smokeless tobacco. She reports that she does not drink alcohol or use drugs. PHYSICAL EXAM     INITIAL VITALS: BP (!) 146/58   Pulse 100   Temp 98 °F (36.7 °C) (Oral)   Resp 18   Wt 210 lb (95.3 kg)   LMP 06/03/2019   SpO2 95%   BMI 38.41 kg/m²      Physical Exam   Constitutional: She is oriented to person, place, and time. She appears well-developed and well-nourished. No distress. HENT:   Head: Normocephalic and atraumatic. Eyes: Pupils are equal, round, and reactive to light. Conjunctivae and EOM are normal. Right eye exhibits no discharge. Left eye exhibits no discharge. Neck: Normal range of motion. Neck supple. Pulmonary/Chest: Effort normal and breath sounds normal. No respiratory distress. Abdominal: Soft. She exhibits no distension. Musculoskeletal:   NO CYANOSIS, CLUBBING, OR EDEMA NOTED   Neurological: She is alert and oriented to person, place, and time. Skin: Skin is dry. She is not diaphoretic. Psychiatric: She has a normal mood and affect. Her behavior is normal. Judgment and thought content normal.   Nursing note and vitals reviewed. DIAGNOSTIC RESULTS     RADIOLOGY:All plain film, CT, MRI, and formal ultrasound images (except ED bedside ultrasound) are read by the radiologist and the images and interpretations are viewed by the emergency physician. LABS: All lab results were reviewed by myself, and all abnormals are listed below. Labs Reviewed   CBC WITH AUTO DIFFERENTIAL   BASIC METABOLIC PANEL W/ REFLEX TO MG FOR LOW K   HEPATIC FUNCTION PANEL   LIPASE   AMYLASE   URINALYSIS   PREGNANCY, URINE   URINE RT REFLEX TO CULTURE         MEDICAL DECISION MAKING:     At this point patient is refused all medication therapy early. Patient is requested narcotic therapy by name. Patient has been informed she would not get narcotic therapy until we have ruled out or ruled any cause of her abdominal pain. Patient is also offered Haldol empirically for pain discomfort.   Patient this point states that she would like to leave. She states that we are off the hospital and she does not understand how we can let people sit and discomfort. I did tell her that we offered her alternatives for discomfort but with her allergies it does make it somewhat difficult. Patient then stated that she would be reporting us to the state for an appropriate medical care. Patient is subsequently left there is an elopement rise return to the ED with any continuing or worsening concerns. EMERGENCY DEPARTMENT COURSE:   Vitals:    Vitals:    06/24/19 2040   BP: (!) 146/58   Pulse: 100   Resp: 18   Temp: 98 °F (36.7 °C)   TempSrc: Oral   SpO2: 95%   Weight: 210 lb (95.3 kg)       The patient was given the following medications while in the emergency department:  Orders Placed This Encounter   Medications    DISCONTD: famotidine (PEPCID) injection 20 mg    DISCONTD: ondansetron (ZOFRAN) injection 4 mg    dicyclomine (BENTYL) injection 20 mg    0.9 % sodium chloride bolus    ondansetron (ZOFRAN) injection 4 mg    famotidine (PEPCID) tablet 20 mg    haloperidol lactate (HALDOL) injection 5 mg       -------------------------  CONSULTS:  None    PROCEDURES:      FINAL IMPRESSION      1. Generalized abdominal pain          DISPOSITION/PLAN   DISPOSITION Eloped - Left Before Treatment Complete 06/24/2019 10:29:45 PM      PATIENT REFERRED TO:  No follow-up provider specified.     DISCHARGE MEDICATIONS:  Discharge Medication List as of 6/24/2019 10:30 PM          (Please note that portions of this note were completed with a voicerecognition program.  Efforts were made to edit the dictations but occasionally words are mis-transcribed.)    Veronica Vaughan DO  Attending Emergency Physician          Veronica Vaughan DO  06/24/19 0749
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
## 2019-08-07 ENCOUNTER — HOSPITAL ENCOUNTER (EMERGENCY)
Age: 39
Discharge: HOME | End: 2019-08-08
Attending: EMERGENCY MEDICINE
Payer: COMMERCIAL

## 2019-08-07 ENCOUNTER — APPOINTMENT (OUTPATIENT)
Dept: CT IMAGING | Age: 39
End: 2019-08-07
Payer: COMMERCIAL

## 2019-08-07 ENCOUNTER — APPOINTMENT (OUTPATIENT)
Dept: GENERAL RADIOLOGY | Age: 39
End: 2019-08-07
Payer: COMMERCIAL

## 2019-08-07 DIAGNOSIS — R07.9 CHEST PAIN, UNSPECIFIED TYPE: Primary | ICD-10-CM

## 2019-08-07 LAB
ABSOLUTE EOS #: 0.22 K/UL (ref 0–0.4)
ABSOLUTE IMMATURE GRANULOCYTE: ABNORMAL K/UL (ref 0–0.3)
ABSOLUTE LYMPH #: 1.55 K/UL (ref 1–4.8)
ABSOLUTE MONO #: 0.37 K/UL (ref 0.1–1.3)
ANION GAP SERPL CALCULATED.3IONS-SCNC: 12 MMOL/L (ref 9–17)
BASOPHILS # BLD: 1 % (ref 0–2)
BASOPHILS ABSOLUTE: 0.07 K/UL (ref 0–0.2)
BUN BLDV-MCNC: 14 MG/DL (ref 6–20)
BUN/CREAT BLD: NORMAL (ref 9–20)
CALCIUM SERPL-MCNC: 9.7 MG/DL (ref 8.6–10.4)
CHLORIDE BLD-SCNC: 104 MMOL/L (ref 98–107)
CO2: 23 MMOL/L (ref 20–31)
CREAT SERPL-MCNC: 0.78 MG/DL (ref 0.5–0.9)
D-DIMER QUANTITATIVE: 0.57 MG/L FEU (ref 0–0.59)
DIFFERENTIAL TYPE: ABNORMAL
EOSINOPHILS RELATIVE PERCENT: 3 % (ref 0–4)
GFR AFRICAN AMERICAN: >60 ML/MIN
GFR NON-AFRICAN AMERICAN: >60 ML/MIN
GFR SERPL CREATININE-BSD FRML MDRD: NORMAL ML/MIN/{1.73_M2}
GFR SERPL CREATININE-BSD FRML MDRD: NORMAL ML/MIN/{1.73_M2}
GLUCOSE BLD-MCNC: 88 MG/DL (ref 70–99)
HCT VFR BLD CALC: 41 % (ref 36–46)
HEMOGLOBIN: 13.9 G/DL (ref 12–16)
IMMATURE GRANULOCYTES: ABNORMAL %
LYMPHOCYTES # BLD: 21 % (ref 24–44)
MCH RBC QN AUTO: 27.9 PG (ref 26–34)
MCHC RBC AUTO-ENTMCNC: 34 G/DL (ref 31–37)
MCV RBC AUTO: 82.1 FL (ref 80–100)
MONOCYTES # BLD: 5 % (ref 1–7)
MORPHOLOGY: NORMAL
NRBC AUTOMATED: ABNORMAL PER 100 WBC
PDW BLD-RTO: 14.8 % (ref 11.5–14.9)
PLATELET # BLD: 295 K/UL (ref 150–450)
PLATELET ESTIMATE: ABNORMAL
PMV BLD AUTO: 8.9 FL (ref 6–12)
POTASSIUM SERPL-SCNC: 4 MMOL/L (ref 3.7–5.3)
RBC # BLD: 4.99 M/UL (ref 4–5.2)
RBC # BLD: ABNORMAL 10*6/UL
SEG NEUTROPHILS: 70 % (ref 36–66)
SEGMENTED NEUTROPHILS ABSOLUTE COUNT: 5.19 K/UL (ref 1.3–9.1)
SODIUM BLD-SCNC: 139 MMOL/L (ref 135–144)
TROPONIN INTERP: NORMAL
TROPONIN T: NORMAL NG/ML
TROPONIN, HIGH SENSITIVITY: <6 NG/L (ref 0–14)
WBC # BLD: 7.4 K/UL (ref 3.5–11)
WBC # BLD: ABNORMAL 10*3/UL

## 2019-08-07 PROCEDURE — 85379 FIBRIN DEGRADATION QUANT: CPT

## 2019-08-07 PROCEDURE — 2580000003 HC RX 258: Performed by: EMERGENCY MEDICINE

## 2019-08-07 PROCEDURE — 71260 CT THORAX DX C+: CPT

## 2019-08-07 PROCEDURE — 6370000000 HC RX 637 (ALT 250 FOR IP): Performed by: EMERGENCY MEDICINE

## 2019-08-07 PROCEDURE — 84484 ASSAY OF TROPONIN QUANT: CPT

## 2019-08-07 PROCEDURE — 6370000000 HC RX 637 (ALT 250 FOR IP): Performed by: STUDENT IN AN ORGANIZED HEALTH CARE EDUCATION/TRAINING PROGRAM

## 2019-08-07 PROCEDURE — 6360000004 HC RX CONTRAST MEDICATION: Performed by: EMERGENCY MEDICINE

## 2019-08-07 PROCEDURE — 99285 EMERGENCY DEPT VISIT HI MDM: CPT

## 2019-08-07 PROCEDURE — 71046 X-RAY EXAM CHEST 2 VIEWS: CPT

## 2019-08-07 PROCEDURE — 93005 ELECTROCARDIOGRAM TRACING: CPT | Performed by: STUDENT IN AN ORGANIZED HEALTH CARE EDUCATION/TRAINING PROGRAM

## 2019-08-07 PROCEDURE — 85025 COMPLETE CBC W/AUTO DIFF WBC: CPT

## 2019-08-07 PROCEDURE — 36415 COLL VENOUS BLD VENIPUNCTURE: CPT

## 2019-08-07 PROCEDURE — 80048 BASIC METABOLIC PNL TOTAL CA: CPT

## 2019-08-07 RX ORDER — SUCRALFATE 1 G/1
1 TABLET ORAL ONCE
Status: COMPLETED | OUTPATIENT
Start: 2019-08-07 | End: 2019-08-07

## 2019-08-07 RX ORDER — LIDOCAINE HYDROCHLORIDE 20 MG/ML
15 SOLUTION OROPHARYNGEAL
Status: DISCONTINUED | OUTPATIENT
Start: 2019-08-07 | End: 2019-08-07

## 2019-08-07 RX ORDER — MAGNESIUM HYDROXIDE/ALUMINUM HYDROXICE/SIMETHICONE 120; 1200; 1200 MG/30ML; MG/30ML; MG/30ML
30 SUSPENSION ORAL ONCE
Status: COMPLETED | OUTPATIENT
Start: 2019-08-07 | End: 2019-08-07

## 2019-08-07 RX ORDER — ACETAMINOPHEN 325 MG/1
650 TABLET ORAL ONCE
Status: COMPLETED | OUTPATIENT
Start: 2019-08-07 | End: 2019-08-07

## 2019-08-07 RX ORDER — SUCRALFATE 1 G/1
1 TABLET ORAL 4 TIMES DAILY
Qty: 120 TABLET | Refills: 3 | Status: SHIPPED | OUTPATIENT
Start: 2019-08-07 | End: 2020-03-09

## 2019-08-07 RX ORDER — 0.9 % SODIUM CHLORIDE 0.9 %
80 INTRAVENOUS SOLUTION INTRAVENOUS ONCE
Status: COMPLETED | OUTPATIENT
Start: 2019-08-07 | End: 2019-08-07

## 2019-08-07 RX ORDER — LIDOCAINE HYDROCHLORIDE 40 MG/ML
4 SOLUTION TOPICAL ONCE
Status: DISCONTINUED | OUTPATIENT
Start: 2019-08-07 | End: 2019-08-07

## 2019-08-07 RX ORDER — SODIUM CHLORIDE 0.9 % (FLUSH) 0.9 %
10 SYRINGE (ML) INJECTION PRN
Status: DISCONTINUED | OUTPATIENT
Start: 2019-08-07 | End: 2019-08-08 | Stop reason: HOSPADM

## 2019-08-07 RX ADMIN — IOVERSOL 75 ML: 741 INJECTION INTRA-ARTERIAL; INTRAVENOUS at 22:38

## 2019-08-07 RX ADMIN — SUCRALFATE 1 G: 1 TABLET ORAL at 23:19

## 2019-08-07 RX ADMIN — ACETAMINOPHEN 650 MG: 325 TABLET, FILM COATED ORAL at 23:19

## 2019-08-07 RX ADMIN — SODIUM CHLORIDE 80 ML: 9 INJECTION, SOLUTION INTRAVENOUS at 22:38

## 2019-08-07 RX ADMIN — Medication 10 ML: at 22:38

## 2019-08-07 RX ADMIN — ALUMINUM HYDROXIDE, MAGNESIUM HYDROXIDE, AND SIMETHICONE 30 ML: 200; 200; 20 SUSPENSION ORAL at 21:10

## 2019-08-07 ASSESSMENT — HEART SCORE: ECG: 0

## 2019-08-07 ASSESSMENT — ENCOUNTER SYMPTOMS
COUGH: 0
SHORTNESS OF BREATH: 1
CHEST TIGHTNESS: 1
ABDOMINAL PAIN: 0
VOMITING: 0
CHOKING: 0
NAUSEA: 0

## 2019-08-07 ASSESSMENT — PAIN SCALES - GENERAL
PAINLEVEL_OUTOF10: 10
PAINLEVEL_OUTOF10: 10

## 2019-08-08 VITALS
RESPIRATION RATE: 16 BRPM | BODY MASS INDEX: 38.41 KG/M2 | HEART RATE: 67 BPM | OXYGEN SATURATION: 96 % | TEMPERATURE: 98 F | DIASTOLIC BLOOD PRESSURE: 100 MMHG | SYSTOLIC BLOOD PRESSURE: 142 MMHG | WEIGHT: 210 LBS

## 2019-08-08 LAB
EKG ATRIAL RATE: 80 BPM
EKG P AXIS: 23 DEGREES
EKG P-R INTERVAL: 130 MS
EKG Q-T INTERVAL: 374 MS
EKG QRS DURATION: 88 MS
EKG QTC CALCULATION (BAZETT): 431 MS
EKG R AXIS: 48 DEGREES
EKG T AXIS: 35 DEGREES
EKG VENTRICULAR RATE: 80 BPM

## 2019-08-08 PROCEDURE — 93010 ELECTROCARDIOGRAM REPORT: CPT | Performed by: INTERNAL MEDICINE

## 2019-08-08 NOTE — ED PROVIDER NOTES
[address] [facility] ED  Emergency Department  Faculty Attestation       I performed a history and physical examination of the patient and discussed management with the resident. I reviewed the residents note and agree with the documented findings including all diagnostic interpretations and plan of care. Any areas of disagreement are noted on the chart. I was personally present for the key portions of any procedures. I have documented in the chart those procedures where I was not present during the key portions. I have reviewed the emergency nurses triage note. I agree with the chief complaint, past medical history, past surgical history, allergies, medications, social and family history as documented unless otherwise noted below. Documentation of the HPI, Physical Exam and Medical Decision Making performed by scribgeovani is based on my personal performance of the HPI, PE and MDM. For Physician Assistant/ Nurse Practitioner cases/documentation I have personally evaluated this patient and have completed at least one if not all key elements of the E/M (history, physical exam, and MDM). Additional findings are as noted. Pertinent Comments     Primary Care Physician: Orville Stevens MD    History: This is a 42 y.o. female who presents to the Emergency Department with complaint of chest pain. Patient has a history of Sadler's esophagus. She had chest pain that started last night into this morning. It is midsternal.  Does not radiate. Is no associated shortness of breath. Is worse with swallowing and states that difficult for her to eat. Been using viscous lidocaine given to her by her GI physician. No cardiac history. Patient does have a NuvaRing in place. No history of other DVTs or PEs. Physical:     weight is 210 lb (95.3 kg). Her oral temperature is 98 °F (36.7 °C). Her blood pressure is 129/79 and her pulse is 84.  Her respiration is 22 and oxygen saturation is 97%.    42 y.o. female who appears anxious, but is nondiaphoretic in no acute distress. Heart regular rate and rhythm and no murmurs rubs or gallops, lungs clear to auscultation bilaterally. No focal neurological deficits. No leg swelling. Impression: Based on patient's symptoms and history, there is a high suspicion of likely reflux versus esophagitis. Given the change of viscous lidocaine will give Maalox. Low suspicion of cardiac etiology and she is a low heart score. However we will get EKG troponins. EKG is unremarkable. Patient cannot be perked out as she does have maneuvering. This was verified with pharmacy who stated that this was absorbed and did for the patient had increased risk. Therefore d-dimer was obtained which was positive. Will get a CTA of the chest.  If CTA is negative, then will plan to discharge home. EKG Interpretation    EKG Interpretation    Interpreted by me    Rhythm: normal sinus   Rate: normal  Axis: normal  Ectopy: none  Conduction: normal  ST Segments: no acute change  T Waves: no acute change  Q Waves: none    Clinical Impression: no acute changes and normal EKG, compared to EKG on 8/13/18 with no acute changes. Interpreted by me      CRITICAL CARE: There was a high probability of clinically significant/life threatening deterioration in this patient's condition which required my urgent intervention. Total critical care time was 0 minutes. This excludes any time for separately reportable procedures.      Russel Card MD  Attending Emergency Physician         Russel Card MD  08/07/19 [id number]

## 2019-08-09 ENCOUNTER — APPOINTMENT (OUTPATIENT)
Dept: CT IMAGING | Age: 39
End: 2019-08-09
Payer: COMMERCIAL

## 2019-08-09 ENCOUNTER — OFFICE VISIT (OUTPATIENT)
Dept: GASTROENTEROLOGY | Age: 39
End: 2019-08-09
Payer: COMMERCIAL

## 2019-08-09 ENCOUNTER — HOSPITAL ENCOUNTER (EMERGENCY)
Age: 39
Discharge: HOME | End: 2019-08-09
Attending: EMERGENCY MEDICINE
Payer: COMMERCIAL

## 2019-08-09 VITALS
DIASTOLIC BLOOD PRESSURE: 92 MMHG | WEIGHT: 203.7 LBS | HEART RATE: 91 BPM | BODY MASS INDEX: 37.26 KG/M2 | SYSTOLIC BLOOD PRESSURE: 130 MMHG

## 2019-08-09 VITALS
OXYGEN SATURATION: 97 % | HEIGHT: 62 IN | DIASTOLIC BLOOD PRESSURE: 73 MMHG | WEIGHT: 203 LBS | HEART RATE: 84 BPM | RESPIRATION RATE: 16 BRPM | BODY MASS INDEX: 37.36 KG/M2 | SYSTOLIC BLOOD PRESSURE: 134 MMHG | TEMPERATURE: 97.9 F

## 2019-08-09 DIAGNOSIS — K44.9 HIATAL HERNIA: ICD-10-CM

## 2019-08-09 DIAGNOSIS — K22.70 BARRETT'S ESOPHAGUS WITHOUT DYSPLASIA: Primary | ICD-10-CM

## 2019-08-09 DIAGNOSIS — R07.89 ATYPICAL CHEST PAIN: ICD-10-CM

## 2019-08-09 DIAGNOSIS — R10.31 RIGHT LOWER QUADRANT ABDOMINAL PAIN: Primary | ICD-10-CM

## 2019-08-09 DIAGNOSIS — K21.9 GASTROESOPHAGEAL REFLUX DISEASE, ESOPHAGITIS PRESENCE NOT SPECIFIED: ICD-10-CM

## 2019-08-09 DIAGNOSIS — K58.8 OTHER IRRITABLE BOWEL SYNDROME: ICD-10-CM

## 2019-08-09 LAB
ABSOLUTE EOS #: 0.1 K/UL (ref 0–0.4)
ABSOLUTE IMMATURE GRANULOCYTE: ABNORMAL K/UL (ref 0–0.3)
ABSOLUTE LYMPH #: 1.3 K/UL (ref 1–4.8)
ABSOLUTE MONO #: 0.5 K/UL (ref 0.1–1.3)
ALBUMIN SERPL-MCNC: 4 G/DL (ref 3.5–5.2)
ALBUMIN/GLOBULIN RATIO: ABNORMAL (ref 1–2.5)
ALP BLD-CCNC: 61 U/L (ref 35–104)
ALT SERPL-CCNC: 16 U/L (ref 5–33)
ANION GAP SERPL CALCULATED.3IONS-SCNC: 10 MMOL/L (ref 9–17)
AST SERPL-CCNC: 15 U/L
BASOPHILS # BLD: 1 % (ref 0–2)
BASOPHILS ABSOLUTE: 0.1 K/UL (ref 0–0.2)
BILIRUB SERPL-MCNC: 0.57 MG/DL (ref 0.3–1.2)
BILIRUBIN URINE: NEGATIVE
BUN BLDV-MCNC: 15 MG/DL (ref 6–20)
BUN/CREAT BLD: ABNORMAL (ref 9–20)
CALCIUM SERPL-MCNC: 8.9 MG/DL (ref 8.6–10.4)
CHLORIDE BLD-SCNC: 104 MMOL/L (ref 98–107)
CO2: 21 MMOL/L (ref 20–31)
COLOR: YELLOW
COMMENT UA: NORMAL
CREAT SERPL-MCNC: 0.62 MG/DL (ref 0.5–0.9)
DIFFERENTIAL TYPE: ABNORMAL
EOSINOPHILS RELATIVE PERCENT: 1 % (ref 0–4)
GFR AFRICAN AMERICAN: >60 ML/MIN
GFR NON-AFRICAN AMERICAN: >60 ML/MIN
GFR SERPL CREATININE-BSD FRML MDRD: ABNORMAL ML/MIN/{1.73_M2}
GFR SERPL CREATININE-BSD FRML MDRD: ABNORMAL ML/MIN/{1.73_M2}
GLUCOSE BLD-MCNC: 105 MG/DL (ref 70–99)
GLUCOSE URINE: NEGATIVE
HCG QUALITATIVE: NEGATIVE
HCT VFR BLD CALC: 39.5 % (ref 36–46)
HEMOGLOBIN: 13.1 G/DL (ref 12–16)
IMMATURE GRANULOCYTES: ABNORMAL %
KETONES, URINE: NEGATIVE
LEUKOCYTE ESTERASE, URINE: NEGATIVE
LIPASE: 38 U/L (ref 13–60)
LYMPHOCYTES # BLD: 11 % (ref 24–44)
MCH RBC QN AUTO: 26.8 PG (ref 26–34)
MCHC RBC AUTO-ENTMCNC: 33.3 G/DL (ref 31–37)
MCV RBC AUTO: 80.4 FL (ref 80–100)
MONOCYTES # BLD: 4 % (ref 1–7)
NITRITE, URINE: NEGATIVE
NRBC AUTOMATED: ABNORMAL PER 100 WBC
PDW BLD-RTO: 14.3 % (ref 11.5–14.9)
PH UA: 7 (ref 5–8)
PLATELET # BLD: 258 K/UL (ref 150–450)
PLATELET ESTIMATE: ABNORMAL
PMV BLD AUTO: 8.6 FL (ref 6–12)
POTASSIUM SERPL-SCNC: 4.2 MMOL/L (ref 3.7–5.3)
PROTEIN UA: NEGATIVE
RBC # BLD: 4.91 M/UL (ref 4–5.2)
RBC # BLD: ABNORMAL 10*6/UL
SEG NEUTROPHILS: 83 % (ref 36–66)
SEGMENTED NEUTROPHILS ABSOLUTE COUNT: 9.9 K/UL (ref 1.3–9.1)
SODIUM BLD-SCNC: 135 MMOL/L (ref 135–144)
SPECIFIC GRAVITY UA: 1.02 (ref 1–1.03)
TOTAL PROTEIN: 6.8 G/DL (ref 6.4–8.3)
TURBIDITY: CLEAR
URINE HGB: NEGATIVE
UROBILINOGEN, URINE: NORMAL
WBC # BLD: 11.9 K/UL (ref 3.5–11)
WBC # BLD: ABNORMAL 10*3/UL

## 2019-08-09 PROCEDURE — 84703 CHORIONIC GONADOTROPIN ASSAY: CPT

## 2019-08-09 PROCEDURE — 6360000002 HC RX W HCPCS: Performed by: EMERGENCY MEDICINE

## 2019-08-09 PROCEDURE — 81003 URINALYSIS AUTO W/O SCOPE: CPT

## 2019-08-09 PROCEDURE — 74177 CT ABD & PELVIS W/CONTRAST: CPT

## 2019-08-09 PROCEDURE — 6370000000 HC RX 637 (ALT 250 FOR IP): Performed by: EMERGENCY MEDICINE

## 2019-08-09 PROCEDURE — 99284 EMERGENCY DEPT VISIT MOD MDM: CPT

## 2019-08-09 PROCEDURE — 2580000003 HC RX 258: Performed by: EMERGENCY MEDICINE

## 2019-08-09 PROCEDURE — 87086 URINE CULTURE/COLONY COUNT: CPT

## 2019-08-09 PROCEDURE — 36415 COLL VENOUS BLD VENIPUNCTURE: CPT

## 2019-08-09 PROCEDURE — 80053 COMPREHEN METABOLIC PANEL: CPT

## 2019-08-09 PROCEDURE — 83690 ASSAY OF LIPASE: CPT

## 2019-08-09 PROCEDURE — G8417 CALC BMI ABV UP PARAM F/U: HCPCS | Performed by: INTERNAL MEDICINE

## 2019-08-09 PROCEDURE — 6360000004 HC RX CONTRAST MEDICATION: Performed by: EMERGENCY MEDICINE

## 2019-08-09 PROCEDURE — 85025 COMPLETE CBC W/AUTO DIFF WBC: CPT

## 2019-08-09 PROCEDURE — G8427 DOCREV CUR MEDS BY ELIG CLIN: HCPCS | Performed by: INTERNAL MEDICINE

## 2019-08-09 PROCEDURE — 96374 THER/PROPH/DIAG INJ IV PUSH: CPT

## 2019-08-09 PROCEDURE — 4004F PT TOBACCO SCREEN RCVD TLK: CPT | Performed by: INTERNAL MEDICINE

## 2019-08-09 PROCEDURE — 94664 DEMO&/EVAL PT USE INHALER: CPT

## 2019-08-09 PROCEDURE — 94640 AIRWAY INHALATION TREATMENT: CPT

## 2019-08-09 PROCEDURE — 99214 OFFICE O/P EST MOD 30 MIN: CPT | Performed by: INTERNAL MEDICINE

## 2019-08-09 PROCEDURE — 96375 TX/PRO/DX INJ NEW DRUG ADDON: CPT

## 2019-08-09 PROCEDURE — 94760 N-INVAS EAR/PLS OXIMETRY 1: CPT

## 2019-08-09 RX ORDER — SODIUM CHLORIDE 0.9 % (FLUSH) 0.9 %
10 SYRINGE (ML) INJECTION PRN
Status: DISCONTINUED | OUTPATIENT
Start: 2019-08-09 | End: 2019-08-09 | Stop reason: HOSPADM

## 2019-08-09 RX ORDER — FENTANYL CITRATE 50 UG/ML
50 INJECTION, SOLUTION INTRAMUSCULAR; INTRAVENOUS ONCE
Status: COMPLETED | OUTPATIENT
Start: 2019-08-09 | End: 2019-08-09

## 2019-08-09 RX ORDER — 0.9 % SODIUM CHLORIDE 0.9 %
1000 INTRAVENOUS SOLUTION INTRAVENOUS ONCE
Status: COMPLETED | OUTPATIENT
Start: 2019-08-09 | End: 2019-08-09

## 2019-08-09 RX ORDER — SUCRALFATE ORAL 1 G/10ML
1 SUSPENSION ORAL 4 TIMES DAILY
Qty: 1200 ML | Refills: 3 | Status: ON HOLD | OUTPATIENT
Start: 2019-08-09 | End: 2020-01-28 | Stop reason: ALTCHOICE

## 2019-08-09 RX ORDER — PREGABALIN 75 MG/1
75 CAPSULE ORAL 3 TIMES DAILY
COMMUNITY
End: 2020-04-14 | Stop reason: SDUPTHER

## 2019-08-09 RX ORDER — IPRATROPIUM BROMIDE AND ALBUTEROL SULFATE 2.5; .5 MG/3ML; MG/3ML
1 SOLUTION RESPIRATORY (INHALATION) ONCE
Status: COMPLETED | OUTPATIENT
Start: 2019-08-09 | End: 2019-08-09

## 2019-08-09 RX ORDER — ONDANSETRON 2 MG/ML
4 INJECTION INTRAMUSCULAR; INTRAVENOUS ONCE
Status: COMPLETED | OUTPATIENT
Start: 2019-08-09 | End: 2019-08-09

## 2019-08-09 RX ORDER — 0.9 % SODIUM CHLORIDE 0.9 %
80 INTRAVENOUS SOLUTION INTRAVENOUS ONCE
Status: COMPLETED | OUTPATIENT
Start: 2019-08-09 | End: 2019-08-09

## 2019-08-09 RX ADMIN — IPRATROPIUM BROMIDE AND ALBUTEROL SULFATE 1 AMPULE: .5; 3 SOLUTION RESPIRATORY (INHALATION) at 14:42

## 2019-08-09 RX ADMIN — Medication 10 ML: at 14:39

## 2019-08-09 RX ADMIN — SODIUM CHLORIDE 1000 ML: 9 INJECTION, SOLUTION INTRAVENOUS at 13:49

## 2019-08-09 RX ADMIN — IOVERSOL 75 ML: 741 INJECTION INTRA-ARTERIAL; INTRAVENOUS at 14:39

## 2019-08-09 RX ADMIN — SODIUM CHLORIDE 80 ML: 9 INJECTION, SOLUTION INTRAVENOUS at 14:39

## 2019-08-09 RX ADMIN — ONDANSETRON 4 MG: 2 INJECTION INTRAMUSCULAR; INTRAVENOUS at 13:50

## 2019-08-09 RX ADMIN — FENTANYL CITRATE 50 MCG: 50 INJECTION INTRAMUSCULAR; INTRAVENOUS at 13:51

## 2019-08-09 ASSESSMENT — ENCOUNTER SYMPTOMS
BLOOD IN STOOL: 1
BACK PAIN: 1
COUGH: 0
RECTAL PAIN: 0
VOMITING: 0
WHEEZING: 0
ABDOMINAL PAIN: 0
CONSTIPATION: 0
TROUBLE SWALLOWING: 0
ANAL BLEEDING: 0
ALLERGIC/IMMUNOLOGIC NEGATIVE: 1
VOICE CHANGE: 0
DIARRHEA: 0
ABDOMINAL DISTENTION: 0
CHOKING: 0
SORE THROAT: 0
SINUS PRESSURE: 0
NAUSEA: 1

## 2019-08-09 ASSESSMENT — PAIN DESCRIPTION - LOCATION: LOCATION: ABDOMEN

## 2019-08-09 ASSESSMENT — PAIN DESCRIPTION - ONSET: ONSET: ON-GOING

## 2019-08-09 ASSESSMENT — PAIN DESCRIPTION - ORIENTATION: ORIENTATION: RIGHT;UPPER

## 2019-08-09 ASSESSMENT — PAIN DESCRIPTION - FREQUENCY: FREQUENCY: CONTINUOUS

## 2019-08-09 ASSESSMENT — PAIN DESCRIPTION - PROGRESSION: CLINICAL_PROGRESSION: GRADUALLY WORSENING

## 2019-08-09 ASSESSMENT — PAIN SCALES - GENERAL: PAINLEVEL_OUTOF10: 10

## 2019-08-09 ASSESSMENT — PAIN DESCRIPTION - PAIN TYPE: TYPE: ACUTE PAIN

## 2019-08-09 ASSESSMENT — PAIN DESCRIPTION - DESCRIPTORS: DESCRIPTORS: ACHING

## 2019-08-09 NOTE — ED PROVIDER NOTES
mLs into the muscle once for 1 dose Use as directed for allergic reaction  Chandan Rubin DO   lidocaine (LIDODERM) 5 % Place 1 patch onto the skin daily 12 hours on, 12 hours off. Domi Chauhan DO   Prenatal Vit-Fe Fumarate-FA (PNV PRENATAL PLUS MULTIVITAMIN) 27-1 MG TABS Take 1 tablet by mouth daily  KATIE Stevenson - CNP   ferrous sulfate 325 (65 Fe) MG tablet Take 1 tablet by mouth 2 times daily (with meals)  Yared Palmer DO   docusate sodium (COLACE, DULCOLAX) 100 MG CAPS Take 100 mg by mouth 2 times daily  Sheryle Battle,      Please note that medications prescribed at discharge will auto-populate into this medication list when note is refreshed. Please look at prescription date andprescriber to clarify. Family History:family history includes Bipolar Disorder in her brother and mother; Breast Cancer in her mother; Cancer in her mother; Hypertension in her father and mother. Social History: She reports that she has been smoking cigarettes. She has a 5.25 pack-year smoking history. She has never used smokeless tobacco. She reports that she does not drink alcohol or use drugs. She reports that she currently engages in sexual activity and has had partner(s) who are Male. REVIEW OF SYSTEMS    (2-9 systems for level 4, 10 or more for level 5)      Review of Systems   Constitutional: Negative for chills and fever. Eyes: Negative for pain and visual disturbance. Respiratory: Negative for cough, chest tightness and shortness of breath. Cardiovascular: Negative for chest pain and palpitations. Gastrointestinal: Positive for abdominal pain, nausea and vomiting. Negative for constipation and diarrhea. Genitourinary: Negative for dysuria and frequency. Musculoskeletal: Negative for arthralgias, back pain, joint swelling and myalgias. Skin: Negative for rash and wound. Neurological: Negative for dizziness, light-headedness and headaches.        PHYSICAL EXAM   (up to 7 for level 4, 8 or diffuse colonic stool burden. No acute inflammatory changes involving the appendix. The small bowel is unremarkable. The stomach and duodenal C-loop are intact. Pelvis: No pelvic mass or free pelvic fluid. The uterus and adnexal structures are unremarkable. A pessary is in place. Mild distention of the urinary bladder. Peritoneum/Retroperitoneum: The abdominal aorta is normal in caliber. No retroperitoneal adenopathy or upper abdominal ascites. Bones/Soft Tissues: No acute osseous or soft tissue abnormality. 1. No acute findings within the abdomen or pelvis to explain the patient's pain. No CT evidence of appendicitis. 2. New interstitial changes at the lung bases, most prevalent in the right lower lobe. Findings suggest either mild edema or inflammatory changes such as a bronchiolitis. BEDSIDE ULTRASOUND:  Not clinically indicated at this time.        ED COURSE      ED Medication Orders (From admission, onward)    Start Ordered     Status Ordering Provider    08/09/19 1430 08/09/19 1421  0.9 % sodium chloride bolus  ONCE      Last MAR action:  Stopped - by Cassandra Nichols on 08/09/19 at [facility]    08/09/19 1430 08/09/19 1422  ipratropium-albuterol (DUONEB) nebulizer solution 1 ampule  ONCE      Last MAR action:  Given - by Edd Wayne on 08/09/19 at [facility]    08/09/19 1421 08/09/19 1421  ioversol (OPTIRAY) 74 % injection 75 mL  IMG ONCE PRN      Last MAR action:  Given - by Cassandra Nichols on 08/09/19 at 1439 [facility]    08/09/19 1300 08/09/19 1254  0.9 % sodium chloride bolus  ONCE      Last MAR action:  Stopped - by PIERO OREILLY on 08/09/19 at [facility]    08/09/19 1300 08/09/19 1254  ondansetron (ZOFRAN) injection 4 mg  ONCE      Last MAR action:  Given - by PIERO OREILLY on 08/09/19 at 1350 [facility] E    08/09/19 1300 08/09/19 1254  fentaNYL (SUBLIMAZE) injection 50 mcg  ONCE      Last MAR action:  Given - by PORFIRIO KHAN Jeb Candice on 08/09/19 at [address]1 [facility]          EMERGENCYDEPARTMENT COURSE:  ED Course as of Aug 12 1502   Fri Aug 09, 2019   [place] Patient's lab work-up is unremarkable. CT scan is negative. I discussed with patient and offered Bentyl for the cramping sensation, but she declined at this time. States that she is on antiemetics at home. Patient is comfortable discharge home. She is going to call her primary care as well as her GI specialist for follow-up. Return precautions given. [CK]      ED Course User Index  [CK] Disha Simmons MD        PROCEDURES:  None     CONSULTS:  None    CRITICAL CARE:  0 mins not including procedure time    FINAL IMPRESSION      1.  Right lower quadrant abdominal pain          DISPOSITION / PLAN     DISPOSITION Decision To Discharge 08/09/2019 03:31:56 PM      PATIENT REFERRED TO:  Karan Rodriguez MD  [address]  [phone]    Schedule an appointment as soon as possible for a visit in 3 days      [address] [place] [address] [id number] [phone]  Go to   As needed, If symptoms worsen      DISCHARGE MEDICATIONS:  Discharge Medication List as of 8/9/2019  3:35 PM          Disha Simmons MD  Emergency Medicine Attending      (Please note that portions of this note were completed with a voice recognition program.  Efforts were made to edit the dictations but occasionallywords are mis-transcribed.)           Disha Simmons MD  08/12/19 2495

## 2019-08-09 NOTE — PROGRESS NOTES
dental problem, postnasal drip, sinus pressure, sore throat, trouble swallowing and voice change. Eyes: Positive for visual disturbance (glasses). Respiratory: Negative for cough, choking and wheezing. Cardiovascular: Positive for chest pain. Negative for palpitations and leg swelling. Gastrointestinal: Positive for blood in stool and nausea. Negative for abdominal distention, abdominal pain, anal bleeding, constipation, diarrhea, rectal pain and vomiting. Genitourinary: Negative. Negative for difficulty urinating. Musculoskeletal: Positive for back pain. Negative for arthralgias, gait problem and myalgias. Allergic/Immunologic: Negative. Negative for environmental allergies and food allergies. Neurological: Negative. Negative for dizziness, weakness, light-headedness, numbness and headaches. Hematological: Bruises/bleeds easily. Psychiatric/Behavioral: Positive for sleep disturbance. The patient is nervous/anxious. Reviewed and agree  Objective:   Physical Exam   Constitutional: She is oriented to person, place, and time. She appears well-developed and well-nourished. Anxious  Emotional    HENT:   Head: Normocephalic and atraumatic. Mouth/Throat: Oropharynx is clear and moist.   Eyes: Pupils are equal, round, and reactive to light. Conjunctivae are normal.   Neck: Normal range of motion. Neck supple. Cardiovascular: Normal heart sounds and intact distal pulses. Pulmonary/Chest: Effort normal. She has rales. Abdominal: Soft. Bowel sounds are normal. There is tenderness. Musculoskeletal: Normal range of motion. Neurological: She is alert and oriented to person, place, and time. Skin: Skin is warm. Psychiatric: Her behavior is normal.   Vitals reviewed.       Assessment:      Patient Active Problem List   Diagnosis    Asthma    Anxiety/Depression    GERD    Bipolar disorder ([facility] [address]    Iron deficiency anemia    Cervical disc herniation    Tobacco use    Sadler's

## 2019-08-09 NOTE — ED NOTES

## 2019-08-10 LAB
CULTURE: NORMAL
Lab: NORMAL
SPECIMEN DESCRIPTION: NORMAL

## 2019-08-12 ASSESSMENT — ENCOUNTER SYMPTOMS
VOMITING: 1
BACK PAIN: 0
NAUSEA: 1
CONSTIPATION: 0
ABDOMINAL PAIN: 1
CHEST TIGHTNESS: 0
DIARRHEA: 0
SHORTNESS OF BREATH: 0
EYE PAIN: 0
COUGH: 0

## 2019-09-26 ENCOUNTER — TELEPHONE (OUTPATIENT)
Dept: GASTROENTEROLOGY | Age: 39
End: 2019-09-26

## 2019-09-30 NOTE — TELEPHONE ENCOUNTER
Conception Noreen called to clarify time bowel prep. Explained to follow cld for the entire day today, 09/30/19. She states she has not eaten solild food today. Start drinking Golytely at 6 pm and complete by 12 am.  Conception Noreen expressed understanding.

## 2019-10-01 ENCOUNTER — ANESTHESIA EVENT (OUTPATIENT)
Dept: ENDOSCOPY | Age: 39
End: 2019-10-01
Payer: COMMERCIAL

## 2019-10-01 ENCOUNTER — ANESTHESIA (OUTPATIENT)
Dept: ENDOSCOPY | Age: 39
End: 2019-10-01
Payer: COMMERCIAL

## 2019-10-01 ENCOUNTER — HOSPITAL ENCOUNTER (OUTPATIENT)
Age: 39
Setting detail: OUTPATIENT SURGERY
Discharge: HOME | End: 2019-10-01
Attending: INTERNAL MEDICINE | Admitting: INTERNAL MEDICINE
Payer: COMMERCIAL

## 2019-10-01 VITALS — DIASTOLIC BLOOD PRESSURE: 71 MMHG | OXYGEN SATURATION: 99 % | SYSTOLIC BLOOD PRESSURE: 114 MMHG

## 2019-10-01 VITALS
TEMPERATURE: 98.2 F | BODY MASS INDEX: 37.36 KG/M2 | OXYGEN SATURATION: 94 % | SYSTOLIC BLOOD PRESSURE: 136 MMHG | DIASTOLIC BLOOD PRESSURE: 74 MMHG | HEIGHT: 62 IN | WEIGHT: 203 LBS | RESPIRATION RATE: 16 BRPM | HEART RATE: 84 BPM

## 2019-10-01 LAB
-: NORMAL
HCG, PREGNANCY URINE (POC): NEGATIVE

## 2019-10-01 PROCEDURE — 7100000000 HC PACU RECOVERY - FIRST 15 MIN: Performed by: INTERNAL MEDICINE

## 2019-10-01 PROCEDURE — 6370000000 HC RX 637 (ALT 250 FOR IP): Performed by: ANESTHESIOLOGY

## 2019-10-01 PROCEDURE — 2500000003 HC RX 250 WO HCPCS

## 2019-10-01 PROCEDURE — 6360000002 HC RX W HCPCS

## 2019-10-01 PROCEDURE — 7100000001 HC PACU RECOVERY - ADDTL 15 MIN: Performed by: INTERNAL MEDICINE

## 2019-10-01 PROCEDURE — 2709999900 HC NON-CHARGEABLE SUPPLY: Performed by: INTERNAL MEDICINE

## 2019-10-01 PROCEDURE — 45378 DIAGNOSTIC COLONOSCOPY: CPT | Performed by: INTERNAL MEDICINE

## 2019-10-01 PROCEDURE — 3609009500 HC COLONOSCOPY DIAGNOSTIC OR SCREENING: Performed by: INTERNAL MEDICINE

## 2019-10-01 PROCEDURE — 2580000003 HC RX 258: Performed by: ANESTHESIOLOGY

## 2019-10-01 PROCEDURE — 3609012400 HC EGD TRANSORAL BIOPSY SINGLE/MULTIPLE: Performed by: INTERNAL MEDICINE

## 2019-10-01 PROCEDURE — 3700000000 HC ANESTHESIA ATTENDED CARE: Performed by: INTERNAL MEDICINE

## 2019-10-01 PROCEDURE — 81025 URINE PREGNANCY TEST: CPT

## 2019-10-01 PROCEDURE — 3700000001 HC ADD 15 MINUTES (ANESTHESIA): Performed by: INTERNAL MEDICINE

## 2019-10-01 PROCEDURE — 94760 N-INVAS EAR/PLS OXIMETRY 1: CPT

## 2019-10-01 PROCEDURE — 88305 TISSUE EXAM BY PATHOLOGIST: CPT

## 2019-10-01 PROCEDURE — 94640 AIRWAY INHALATION TREATMENT: CPT

## 2019-10-01 PROCEDURE — 43239 EGD BIOPSY SINGLE/MULTIPLE: CPT | Performed by: INTERNAL MEDICINE

## 2019-10-01 RX ORDER — PROPOFOL 10 MG/ML
INJECTION, EMULSION INTRAVENOUS PRN
Status: DISCONTINUED | OUTPATIENT
Start: 2019-10-01 | End: 2019-10-01 | Stop reason: SDUPTHER

## 2019-10-01 RX ORDER — IPRATROPIUM BROMIDE AND ALBUTEROL SULFATE 2.5; .5 MG/3ML; MG/3ML
1 SOLUTION RESPIRATORY (INHALATION) ONCE
Status: COMPLETED | OUTPATIENT
Start: 2019-10-01 | End: 2019-10-01

## 2019-10-01 RX ORDER — LIDOCAINE HYDROCHLORIDE 10 MG/ML
INJECTION, SOLUTION EPIDURAL; INFILTRATION; INTRACAUDAL; PERINEURAL PRN
Status: DISCONTINUED | OUTPATIENT
Start: 2019-10-01 | End: 2019-10-01 | Stop reason: SDUPTHER

## 2019-10-01 RX ORDER — SODIUM CHLORIDE, SODIUM LACTATE, POTASSIUM CHLORIDE, CALCIUM CHLORIDE 600; 310; 30; 20 MG/100ML; MG/100ML; MG/100ML; MG/100ML
INJECTION, SOLUTION INTRAVENOUS CONTINUOUS
Status: DISCONTINUED | OUTPATIENT
Start: 2019-10-01 | End: 2019-10-01 | Stop reason: HOSPADM

## 2019-10-01 RX ORDER — GLYCOPYRROLATE 1 MG/5 ML
SYRINGE (ML) INTRAVENOUS PRN
Status: DISCONTINUED | OUTPATIENT
Start: 2019-10-01 | End: 2019-10-01 | Stop reason: SDUPTHER

## 2019-10-01 RX ADMIN — SODIUM CHLORIDE, POTASSIUM CHLORIDE, SODIUM LACTATE AND CALCIUM CHLORIDE: 600; 310; 30; 20 INJECTION, SOLUTION INTRAVENOUS at 08:41

## 2019-10-01 RX ADMIN — Medication 0.3 MG: at 10:22

## 2019-10-01 RX ADMIN — LIDOCAINE HYDROCHLORIDE 50 MG: 10 INJECTION, SOLUTION EPIDURAL; INFILTRATION; INTRACAUDAL at 10:26

## 2019-10-01 RX ADMIN — PROPOFOL 350 MG: 10 INJECTION, EMULSION INTRAVENOUS at 10:26

## 2019-10-01 RX ADMIN — IPRATROPIUM BROMIDE AND ALBUTEROL SULFATE 1 AMPULE: .5; 3 SOLUTION RESPIRATORY (INHALATION) at 09:08

## 2019-10-01 ASSESSMENT — PULMONARY FUNCTION TESTS
PIF_VALUE: 0
PIF_VALUE: 1
PIF_VALUE: 1
PIF_VALUE: 0
PIF_VALUE: 1
PIF_VALUE: 0
PIF_VALUE: 1
PIF_VALUE: 0
PIF_VALUE: 1
PIF_VALUE: 0
PIF_VALUE: 1
PIF_VALUE: 0
PIF_VALUE: 1
PIF_VALUE: 0

## 2019-10-01 ASSESSMENT — PAIN SCALES - GENERAL: PAINLEVEL_OUTOF10: 0

## 2019-10-01 ASSESSMENT — PAIN - FUNCTIONAL ASSESSMENT: PAIN_FUNCTIONAL_ASSESSMENT: 0-10

## 2019-10-01 ASSESSMENT — ENCOUNTER SYMPTOMS: STRIDOR: 0

## 2019-10-01 ASSESSMENT — COPD QUESTIONNAIRES: CAT_SEVERITY: NO INTERVAL CHANGE

## 2019-10-02 LAB — SURGICAL PATHOLOGY REPORT: NORMAL

## 2019-10-03 ENCOUNTER — TELEPHONE (OUTPATIENT)
Dept: GASTROENTEROLOGY | Age: 39
End: 2019-10-03

## 2019-10-03 RX ORDER — POLYETHYLENE GLYCOL 3350 17 G/17G
POWDER, FOR SOLUTION ORAL
Qty: 255 G | Refills: 0 | Status: SHIPPED | OUTPATIENT
Start: 2019-10-03 | End: 2019-11-01 | Stop reason: SDUPTHER

## 2019-10-09 ENCOUNTER — TELEPHONE (OUTPATIENT)
Dept: GASTROENTEROLOGY | Age: 39
End: 2019-10-09

## 2019-10-28 ENCOUNTER — TELEPHONE (OUTPATIENT)
Dept: GASTROENTEROLOGY | Age: 39
End: 2019-10-28

## 2019-11-01 RX ORDER — POLYETHYLENE GLYCOL 3350 17 G/17G
POWDER, FOR SOLUTION ORAL
Qty: 255 G | Refills: 0 | Status: SHIPPED | OUTPATIENT
Start: 2019-11-01 | End: 2019-12-05 | Stop reason: SDUPTHER

## 2019-11-21 ENCOUNTER — TELEPHONE (OUTPATIENT)
Dept: GASTROENTEROLOGY | Age: 39
End: 2019-11-21

## 2019-12-05 RX ORDER — POLYETHYLENE GLYCOL 3350 17 G/17G
POWDER, FOR SOLUTION ORAL
Qty: 255 G | Refills: 0 | Status: SHIPPED | OUTPATIENT
Start: 2019-12-05 | End: 2020-03-04

## 2020-01-21 ENCOUNTER — HOSPITAL ENCOUNTER (EMERGENCY)
Age: 40
Discharge: HOME | End: 2020-01-21
Attending: EMERGENCY MEDICINE
Payer: COMMERCIAL

## 2020-01-21 ENCOUNTER — APPOINTMENT (OUTPATIENT)
Dept: CT IMAGING | Age: 40
End: 2020-01-21
Payer: COMMERCIAL

## 2020-01-21 VITALS
SYSTOLIC BLOOD PRESSURE: 99 MMHG | DIASTOLIC BLOOD PRESSURE: 65 MMHG | RESPIRATION RATE: 14 BRPM | BODY MASS INDEX: 37.54 KG/M2 | WEIGHT: 204 LBS | HEIGHT: 62 IN | TEMPERATURE: 98.2 F | OXYGEN SATURATION: 94 % | HEART RATE: 91 BPM

## 2020-01-21 LAB
-: NORMAL
ABSOLUTE EOS #: 0.1 K/UL (ref 0–0.4)
ABSOLUTE IMMATURE GRANULOCYTE: ABNORMAL K/UL (ref 0–0.3)
ABSOLUTE LYMPH #: 0.8 K/UL (ref 1–4.8)
ABSOLUTE MONO #: 0.5 K/UL (ref 0.1–1.3)
ALBUMIN SERPL-MCNC: 3.6 G/DL (ref 3.5–5.2)
ALBUMIN/GLOBULIN RATIO: ABNORMAL (ref 1–2.5)
ALP BLD-CCNC: 62 U/L (ref 35–104)
ALT SERPL-CCNC: 20 U/L (ref 5–33)
ANION GAP SERPL CALCULATED.3IONS-SCNC: 7 MMOL/L (ref 9–17)
AST SERPL-CCNC: 14 U/L
BASOPHILS # BLD: 1 % (ref 0–2)
BASOPHILS ABSOLUTE: 0.1 K/UL (ref 0–0.2)
BILIRUB SERPL-MCNC: 0.38 MG/DL (ref 0.3–1.2)
BILIRUBIN URINE: NEGATIVE
BUN BLDV-MCNC: 14 MG/DL (ref 6–20)
BUN/CREAT BLD: ABNORMAL (ref 9–20)
CALCIUM SERPL-MCNC: 8.5 MG/DL (ref 8.6–10.4)
CHLORIDE BLD-SCNC: 106 MMOL/L (ref 98–107)
CO2: 23 MMOL/L (ref 20–31)
COLOR: YELLOW
COMMENT UA: ABNORMAL
CREAT SERPL-MCNC: 0.62 MG/DL (ref 0.5–0.9)
DIFFERENTIAL TYPE: ABNORMAL
EOSINOPHILS RELATIVE PERCENT: 1 % (ref 0–4)
GFR AFRICAN AMERICAN: >60 ML/MIN
GFR NON-AFRICAN AMERICAN: >60 ML/MIN
GFR SERPL CREATININE-BSD FRML MDRD: ABNORMAL ML/MIN/{1.73_M2}
GFR SERPL CREATININE-BSD FRML MDRD: ABNORMAL ML/MIN/{1.73_M2}
GLUCOSE BLD-MCNC: 103 MG/DL (ref 70–99)
GLUCOSE URINE: NEGATIVE
HCT VFR BLD CALC: 42.6 % (ref 36–46)
HEMOGLOBIN: 13.6 G/DL (ref 12–16)
IMMATURE GRANULOCYTES: ABNORMAL %
KETONES, URINE: NEGATIVE
LEUKOCYTE ESTERASE, URINE: NEGATIVE
LIPASE: 19 U/L (ref 13–60)
LYMPHOCYTES # BLD: 7 % (ref 24–44)
MCH RBC QN AUTO: 26 PG (ref 26–34)
MCHC RBC AUTO-ENTMCNC: 32 G/DL (ref 31–37)
MCV RBC AUTO: 81.5 FL (ref 80–100)
MONOCYTES # BLD: 5 % (ref 1–7)
NITRITE, URINE: NEGATIVE
NRBC AUTOMATED: ABNORMAL PER 100 WBC
PDW BLD-RTO: 14.9 % (ref 11.5–14.9)
PH UA: 8.5 (ref 5–8)
PLATELET # BLD: 269 K/UL (ref 150–450)
PLATELET ESTIMATE: ABNORMAL
PMV BLD AUTO: 9.7 FL (ref 6–12)
POTASSIUM SERPL-SCNC: 4 MMOL/L (ref 3.7–5.3)
PROTEIN UA: NEGATIVE
RBC # BLD: 5.23 M/UL (ref 4–5.2)
RBC # BLD: ABNORMAL 10*6/UL
REASON FOR REJECTION: NORMAL
SEG NEUTROPHILS: 86 % (ref 36–66)
SEGMENTED NEUTROPHILS ABSOLUTE COUNT: 10.4 K/UL (ref 1.3–9.1)
SODIUM BLD-SCNC: 136 MMOL/L (ref 135–144)
SPECIFIC GRAVITY UA: 1.02 (ref 1–1.03)
TOTAL PROTEIN: 6.2 G/DL (ref 6.4–8.3)
TURBIDITY: CLEAR
URINE HGB: NEGATIVE
UROBILINOGEN, URINE: NORMAL
WBC # BLD: 12 K/UL (ref 3.5–11)
WBC # BLD: ABNORMAL 10*3/UL
ZZ NTE CLEAN UP: ORDERED TEST: NORMAL
ZZ NTE WITH NAME CLEAN UP: SPECIMEN SOURCE: NORMAL

## 2020-01-21 PROCEDURE — 80053 COMPREHEN METABOLIC PANEL: CPT

## 2020-01-21 PROCEDURE — 6360000004 HC RX CONTRAST MEDICATION: Performed by: EMERGENCY MEDICINE

## 2020-01-21 PROCEDURE — 99284 EMERGENCY DEPT VISIT MOD MDM: CPT

## 2020-01-21 PROCEDURE — 2580000003 HC RX 258: Performed by: EMERGENCY MEDICINE

## 2020-01-21 PROCEDURE — 81003 URINALYSIS AUTO W/O SCOPE: CPT

## 2020-01-21 PROCEDURE — 96372 THER/PROPH/DIAG INJ SC/IM: CPT

## 2020-01-21 PROCEDURE — 74177 CT ABD & PELVIS W/CONTRAST: CPT

## 2020-01-21 PROCEDURE — 6360000002 HC RX W HCPCS: Performed by: EMERGENCY MEDICINE

## 2020-01-21 PROCEDURE — 85025 COMPLETE CBC W/AUTO DIFF WBC: CPT

## 2020-01-21 PROCEDURE — 36415 COLL VENOUS BLD VENIPUNCTURE: CPT

## 2020-01-21 PROCEDURE — 83690 ASSAY OF LIPASE: CPT

## 2020-01-21 PROCEDURE — 87086 URINE CULTURE/COLONY COUNT: CPT

## 2020-01-21 PROCEDURE — 96374 THER/PROPH/DIAG INJ IV PUSH: CPT

## 2020-01-21 RX ORDER — PROMETHAZINE HYDROCHLORIDE 25 MG/ML
12.5 INJECTION, SOLUTION INTRAMUSCULAR; INTRAVENOUS ONCE
Status: COMPLETED | OUTPATIENT
Start: 2020-01-21 | End: 2020-01-21

## 2020-01-21 RX ORDER — 0.9 % SODIUM CHLORIDE 0.9 %
80 INTRAVENOUS SOLUTION INTRAVENOUS ONCE
Status: COMPLETED | OUTPATIENT
Start: 2020-01-21 | End: 2020-01-21

## 2020-01-21 RX ORDER — 0.9 % SODIUM CHLORIDE 0.9 %
1000 INTRAVENOUS SOLUTION INTRAVENOUS ONCE
Status: COMPLETED | OUTPATIENT
Start: 2020-01-21 | End: 2020-01-21

## 2020-01-21 RX ORDER — MORPHINE SULFATE 4 MG/ML
4 INJECTION, SOLUTION INTRAMUSCULAR; INTRAVENOUS ONCE
Status: COMPLETED | OUTPATIENT
Start: 2020-01-21 | End: 2020-01-21

## 2020-01-21 RX ORDER — DICYCLOMINE HYDROCHLORIDE 10 MG/ML
20 INJECTION INTRAMUSCULAR ONCE
Status: DISCONTINUED | OUTPATIENT
Start: 2020-01-21 | End: 2020-01-21 | Stop reason: HOSPADM

## 2020-01-21 RX ORDER — SODIUM CHLORIDE 0.9 % (FLUSH) 0.9 %
10 SYRINGE (ML) INJECTION PRN
Status: DISCONTINUED | OUTPATIENT
Start: 2020-01-21 | End: 2020-01-21 | Stop reason: HOSPADM

## 2020-01-21 RX ORDER — PROMETHAZINE HYDROCHLORIDE 25 MG/1
25 SUPPOSITORY RECTAL EVERY 6 HOURS PRN
Qty: 12 SUPPOSITORY | Refills: 0 | Status: ON HOLD | OUTPATIENT
Start: 2020-01-21 | End: 2020-01-28 | Stop reason: ALTCHOICE

## 2020-01-21 RX ADMIN — PROMETHAZINE HYDROCHLORIDE 12.5 MG: 25 INJECTION INTRAMUSCULAR; INTRAVENOUS at 10:59

## 2020-01-21 RX ADMIN — SODIUM CHLORIDE 1000 ML: 9 INJECTION, SOLUTION INTRAVENOUS at 10:59

## 2020-01-21 RX ADMIN — MORPHINE SULFATE 4 MG: 4 INJECTION, SOLUTION INTRAMUSCULAR; INTRAVENOUS at 10:59

## 2020-01-21 RX ADMIN — IOPAMIDOL 75 ML: 755 INJECTION, SOLUTION INTRAVENOUS at 12:32

## 2020-01-21 RX ADMIN — SODIUM CHLORIDE 80 ML: 9 INJECTION, SOLUTION INTRAVENOUS at 12:28

## 2020-01-21 RX ADMIN — Medication 10 ML: at 12:32

## 2020-01-21 ASSESSMENT — PAIN DESCRIPTION - LOCATION
LOCATION: ABDOMEN
LOCATION: ABDOMEN

## 2020-01-21 ASSESSMENT — ENCOUNTER SYMPTOMS
NAUSEA: 1
CHEST TIGHTNESS: 0
ABDOMINAL PAIN: 1
EYE PAIN: 0
COUGH: 0
VOMITING: 1
DIARRHEA: 0
SHORTNESS OF BREATH: 0
EYE REDNESS: 0
CONSTIPATION: 0
BACK PAIN: 0
SORE THROAT: 0

## 2020-01-21 ASSESSMENT — PAIN DESCRIPTION - FREQUENCY
FREQUENCY: CONTINUOUS
FREQUENCY: CONTINUOUS

## 2020-01-21 ASSESSMENT — PAIN DESCRIPTION - PROGRESSION: CLINICAL_PROGRESSION: GRADUALLY IMPROVING

## 2020-01-21 ASSESSMENT — PAIN DESCRIPTION - DESCRIPTORS
DESCRIPTORS: SHARP
DESCRIPTORS: SHARP

## 2020-01-21 ASSESSMENT — PAIN DESCRIPTION - PAIN TYPE
TYPE: ACUTE PAIN
TYPE: ACUTE PAIN

## 2020-01-21 ASSESSMENT — PAIN DESCRIPTION - ORIENTATION: ORIENTATION: RIGHT;LOWER

## 2020-01-21 ASSESSMENT — PAIN SCALES - GENERAL
PAINLEVEL_OUTOF10: 9
PAINLEVEL_OUTOF10: 7

## 2020-01-21 ASSESSMENT — PAIN DESCRIPTION - ONSET: ONSET: PROGRESSIVE

## 2020-01-21 NOTE — ED NOTES
Mode of arrival (squad #, walk in, police, etc) : private auto, with father, from home        Chief complaint(s): abdominal pain, nausea and vomiting. Arrival Note (brief scenario, treatment PTA, etc). : patient describes having RLQ abdominal pain for the past 3 days, she states the pain got progressively worse last night. She states she also developed nausea and vomiting last night and has been dry heaving throughout the night. Patient alert and oriented x4, respirations even non-labored. C= \"Have you ever felt that you should Cut down on your drinking? \"  No  A= \"Have people Annoyed you by criticizing your drinking? \"  No  G= \"Have you ever felt bad or Guilty about your drinking? \"  No  E= \"Have you ever had a drink as an Eye-opener first thing in the morning to steady your nerves or to help a hangover? \"  No      Deferred []      Reason for deferring: N/A    *If yes to two or more: probable alcohol abuse. Fito Haider RN  01/21/20 6143

## 2020-01-21 NOTE — ED PROVIDER NOTES
acetaminophen (TYLENOL) 325 MG tablet Take 2 tablets by mouth every 4 hours as needed for Pain, Disp-60 tablet, R-0Print      diphenhydrAMINE (BENADRYL) 25 MG capsule Take 1 capsule by mouth every 6 hours as needed for Itching, Disp-30 capsule, R-0Print      famotidine (PEPCID) 20 MG tablet Take 1 tablet by mouth 2 times daily, Disp-30 tablet, R-0Print      EPINEPHrine (EPIPEN 2-HELDER) 0.3 MG/0.3ML SOAJ injection Inject 0.3 mLs into the muscle once for 1 dose Use as directed for allergic reaction, Disp-2 each, R-0Print      lidocaine (LIDODERM) 5 % Place 1 patch onto the skin daily 12 hours on, 12 hours off., Disp-7 patch, R-0Print      ferrous sulfate 325 (65 Fe) MG tablet Take 1 tablet by mouth 2 times daily (with meals), Disp-30 tablet, R-3Print      sertraline (ZOLOFT) 50 MG tablet Take 1 tablet by mouth daily, Disp-30 tablet, R-1Normal      docusate sodium (COLACE, DULCOLAX) 100 MG CAPS Take 100 mg by mouth 2 times daily, Disp-60 capsule, R-1Print      COMBIVENT RESPIMAT  MCG/ACT AERS inhaler Indications: uses PRN , R-6, DAWHistorical Med      montelukast (SINGULAIR) 10 MG tablet Take 1 tablet by mouth nightly, Disp-30 tablet, R-3Print      Fluticasone Furoate-Vilanterol (BREO ELLIPTA) 200-25 MCG/INH AEPB Inhale 1 puff into the lungs dailyHistorical Med      albuterol (PROVENTIL) (2.5 MG/3ML) 0.083% nebulizer solution Take 3 mLs by nebulization every 4 hours as needed for Wheezing, Disp-120 each, R-3           ALLERGIES     is allergic to nsaids; toradol [ketorolac tromethamine]; and ultram [tramadol]. FAMILY HISTORY     She indicated that the status of her mother is unknown. She indicated that the status of her father is unknown. She indicated that the status of her brother is unknown. SOCIALHISTORY      reports that she has been smoking cigarettes. She has a 5.25 pack-year smoking history. She has never used smokeless tobacco. She reports that she does not drink alcohol or use drugs.   PHYSICAL EXAM INITIAL VITALS: BP 99/65   Pulse 91   Temp 98.2 °F (36.8 °C) (Oral)   Resp 14   Ht 5' 2\" (1.575 m)   Wt 204 lb (92.5 kg)   LMP 01/14/2020 (Approximate)   SpO2 94%   BMI 37.31 kg/m²    Physical Exam  Vitals signs and nursing note reviewed. Constitutional:       Appearance: She is well-developed. HENT:      Head: Normocephalic and atraumatic. Right Ear: External ear normal.      Left Ear: External ear normal.   Eyes:      General:         Left eye: No discharge. Conjunctiva/sclera: Conjunctivae normal.      Pupils: Pupils are equal, round, and reactive to light. Neck:      Musculoskeletal: Normal range of motion and neck supple. Vascular: No JVD. Trachea: No tracheal deviation. Cardiovascular:      Rate and Rhythm: Normal rate and regular rhythm. Heart sounds: Normal heart sounds. Pulmonary:      Effort: Pulmonary effort is normal. No respiratory distress. Breath sounds: Normal breath sounds. No stridor. Abdominal:      Comments: Abdomen soft, non-distended. Tender in the RLQ. Musculoskeletal: Normal range of motion. General: No tenderness or deformity. Skin:     General: Skin is warm and dry. Neurological:      Mental Status: She is alert and oriented to person, place, and time. Cranial Nerves: No cranial nerve deficit. Coordination: Coordination normal.         MEDICAL DECISION MAKING:   Assessment:  Hossein Madrid is a 42 y.o. female who presents with abd pain and vomiting. ED Course/MDM:   Patient arrived hemodynamically stable and in no acute distress. Patient's previous imaging and studies reviewed. Labs notable only for mild non-specific leukocytosis. UA without evidence of infection. CT abdomen with no acute abd findings; patient informed of incidental renal stone finding, left lower lung field with evidence of bronchiolitis.  Low suspicion for pneumonia, patient has some nasal congestion and mild cough but no fevers, no shortness of breath, no sig leukocytosis or vital sign abnormalities. Patient with no vomiting in the ED. She is feeling slightly better. She reports this feels like an exacerbation of her endometriosis and states she has Gyn follow up scheduled for tomorrow. Dc home with antiemetics. Procedures    DIAGNOSTIC RESULTS   EKG: All EKG's are interpreted by the Emergency Department Physician who either signs or Co-signs this chart inthe absence of a cardiologist.      RADIOLOGY:All plain film, CT, MRI, and formal ultrasound images (except ED bedside ultrasound) are read by the radiologist, see reports below, unless otherwise noted in MDM or here. CT ABDOMEN PELVIS W IV CONTRAST Additional Contrast? None   Final Result   1. No evidence of acute process within the abdomen or pelvis including normal   appendix. 2. Redemonstration of mildly prominent left extrarenal pelvis with now 6 mm   stone along its dependent aspect which is predisposed to passing into the   ureter and resultant obstructive uropathy. 3. Left lower lung field peribronchovascular interstitial and nodular   opacities most consistent with bronchiolitis, likely infectious or   inflammatory. LABS: All lab results were reviewed by myself, and all abnormals are listed below.   Labs Reviewed   CBC WITH AUTO DIFFERENTIAL - Abnormal; Notable for the following components:       Result Value    WBC 12.0 (*)     RBC 5.23 (*)     Seg Neutrophils 86 (*)     Lymphocytes 7 (*)     Segs Absolute 10.40 (*)     Absolute Lymph # 0.80 (*)     All other components within normal limits   URINALYSIS - Abnormal; Notable for the following components:    pH, UA 8.5 (*)     All other components within normal limits   COMPREHENSIVE METABOLIC PANEL - Abnormal; Notable for the following components:    Glucose 103 (*)     Calcium 8.5 (*)     Anion Gap 7 (*)     Total Protein 6.2 (*)     All other components within normal limits   URINE CULTURE CLEAN CATCH SPECIMEN REJECTION   LIPASE     EMERGENCY DEPARTMENT COURSE:   Vitals:    Vitals:    01/21/20 0941 01/21/20 0945 01/21/20 1159 01/21/20 1315   BP: 128/78 128/70 115/73 99/65   Pulse: 106  94 91   Resp: 18  16 14   Temp: 98.2 °F (36.8 °C)      TempSrc: Oral      SpO2: 96% 97% 94% 94%   Weight: 204 lb (92.5 kg)      Height: 5' 2\" (1.575 m)          The patient was given the following medications while in the emergency department:  Orders Placed This Encounter   Medications    0.9 % sodium chloride bolus    morphine sulfate (PF) injection 4 mg    promethazine (PHENERGAN) injection 12.5 mg    DISCONTD: sodium chloride flush 0.9 % injection 10 mL    0.9 % sodium chloride bolus    iopamidol (ISOVUE-370) 76 % injection 75 mL    DISCONTD: dicyclomine (BENTYL) injection 20 mg    promethazine (PROMETHEGAN) 25 MG suppository     Sig: Place 1 suppository rectally every 6 hours as needed for Nausea     Dispense:  12 suppository     Refill:  0     CONSULTS:  None    FINAL IMPRESSION      1. Non-intractable vomiting with nausea, unspecified vomiting type    2.  Abdominal pain, unspecified abdominal location          DISPOSITION/PLAN   DISPOSITION Decision To Discharge 01/21/2020 01:45:44 PM      PATIENT REFERRED TO:  Author Meredith, [address] [facility] [address] [phone]      If symptoms worsen    DISCHARGE MEDICATIONS:  Discharge Medication List as of 1/21/2020  1:46 PM      START taking these medications    Details   promethazine (PROMETHEGAN) 25 MG suppository Place 1 suppository rectally every 6 hours as needed for Nausea, Disp-12 suppository, R-0Print           Olean Merlin, MD  AttendingEmergency Physician                        Reyna Payne MD  01/21/20 0715

## 2020-01-22 ENCOUNTER — OFFICE VISIT (OUTPATIENT)
Dept: OBGYN CLINIC | Age: 40
End: 2020-01-22
Payer: COMMERCIAL

## 2020-01-22 VITALS
WEIGHT: 205 LBS | SYSTOLIC BLOOD PRESSURE: 124 MMHG | BODY MASS INDEX: 37.73 KG/M2 | HEART RATE: 91 BPM | HEIGHT: 62 IN | DIASTOLIC BLOOD PRESSURE: 81 MMHG

## 2020-01-22 PROBLEM — Z98.891 S/P CESAREAN SECTION: Status: RESOLVED | Noted: 2018-12-26 | Resolved: 2020-01-22

## 2020-01-22 PROBLEM — Z87.59 HISTORY OF MISCARRIAGE: Status: RESOLVED | Noted: 2018-10-14 | Resolved: 2020-01-22

## 2020-01-22 PROBLEM — O09.899 HISTORY OF PRETERM DELIVERY, CURRENTLY PREGNANT: Status: RESOLVED | Noted: 2018-10-14 | Resolved: 2020-01-22

## 2020-01-22 PROBLEM — R10.2 PELVIC PAIN IN FEMALE: Status: ACTIVE | Noted: 2020-01-22

## 2020-01-22 LAB
CULTURE: NORMAL
Lab: NORMAL
SPECIMEN DESCRIPTION: NORMAL

## 2020-01-22 PROCEDURE — 4004F PT TOBACCO SCREEN RCVD TLK: CPT | Performed by: SPECIALIST

## 2020-01-22 PROCEDURE — G8417 CALC BMI ABV UP PARAM F/U: HCPCS | Performed by: SPECIALIST

## 2020-01-22 PROCEDURE — G8427 DOCREV CUR MEDS BY ELIG CLIN: HCPCS | Performed by: SPECIALIST

## 2020-01-22 PROCEDURE — G8484 FLU IMMUNIZE NO ADMIN: HCPCS | Performed by: SPECIALIST

## 2020-01-22 PROCEDURE — 99213 OFFICE O/P EST LOW 20 MIN: CPT | Performed by: SPECIALIST

## 2020-01-22 ASSESSMENT — ENCOUNTER SYMPTOMS
ABDOMINAL PAIN: 0
NAUSEA: 0
EYE PAIN: 0
ABDOMINAL DISTENTION: 0
CONSTIPATION: 0
VOMITING: 0
COUGH: 0
DIARRHEA: 0
APNEA: 0

## 2020-01-22 NOTE — PROGRESS NOTES
Subjective:      Patient ID: Margot Bocanegra is a 42 y.o. female. Patient is here today complaining of pain in her right abdomen. She states that this pain is the same as when she was diagnosed with endometriosis in 2017. She was at the hospital for the pain yesterday and was told she had a kidney stone on the left side by a CT scan. Patient states that her periods are normal.  She says the pain started a week ago and is getting worse day by day. She has tried holding it to relieve the pressure, but it is not helping with the pain. She states she is in a lot of pain. Patient states that she is not sexually active because the pain is too bad. She denies nausea, vomiting and fever. Review of Systems   Constitutional: Negative for activity change, appetite change and fever. HENT: Negative for ear discharge and ear pain. Eyes: Negative for pain and visual disturbance. Respiratory: Negative for apnea and cough. Cardiovascular: Negative for chest pain, palpitations and leg swelling. Gastrointestinal: Negative for abdominal distention, abdominal pain, constipation, diarrhea, nausea and vomiting. Endocrine: Negative. Genitourinary: Positive for pelvic pain. Negative for difficulty urinating, dysuria and menstrual problem. Musculoskeletal: Negative for neck pain and neck stiffness. Skin: Negative. Neurological: Negative for light-headedness and numbness. Hematological: Negative. Does not bruise/bleed easily. Objective:   Physical Exam  Vitals signs and nursing note reviewed. Constitutional:       Appearance: She is well-developed. HENT:      Head: Normocephalic and atraumatic. Neck:      Musculoskeletal: Normal range of motion and neck supple. Thyroid: No thyromegaly. Cardiovascular:      Rate and Rhythm: Normal rate and regular rhythm. Pulmonary:      Effort: Pulmonary effort is normal.      Breath sounds: Normal breath sounds. No wheezing.    Abdominal:

## 2020-01-24 ENCOUNTER — TELEPHONE (OUTPATIENT)
Dept: GASTROENTEROLOGY | Age: 40
End: 2020-01-24

## 2020-01-28 ENCOUNTER — ANESTHESIA EVENT (OUTPATIENT)
Dept: ENDOSCOPY | Age: 40
End: 2020-01-28
Payer: COMMERCIAL

## 2020-01-28 ENCOUNTER — ANESTHESIA (OUTPATIENT)
Dept: ENDOSCOPY | Age: 40
End: 2020-01-28
Payer: COMMERCIAL

## 2020-01-28 ENCOUNTER — HOSPITAL ENCOUNTER (OUTPATIENT)
Age: 40
Setting detail: OUTPATIENT SURGERY
Discharge: HOME | End: 2020-01-28
Attending: INTERNAL MEDICINE | Admitting: INTERNAL MEDICINE
Payer: COMMERCIAL

## 2020-01-28 VITALS
RESPIRATION RATE: 6 BRPM | SYSTOLIC BLOOD PRESSURE: 125 MMHG | TEMPERATURE: 98.6 F | DIASTOLIC BLOOD PRESSURE: 61 MMHG | OXYGEN SATURATION: 84 %

## 2020-01-28 VITALS
RESPIRATION RATE: 18 BRPM | SYSTOLIC BLOOD PRESSURE: 119 MMHG | OXYGEN SATURATION: 97 % | DIASTOLIC BLOOD PRESSURE: 80 MMHG | HEART RATE: 75 BPM | BODY MASS INDEX: 37.54 KG/M2 | WEIGHT: 204 LBS | HEIGHT: 62 IN | TEMPERATURE: 97.5 F

## 2020-01-28 LAB
-: NORMAL
HCG, PREGNANCY URINE (POC): NEGATIVE

## 2020-01-28 PROCEDURE — 3700000001 HC ADD 15 MINUTES (ANESTHESIA): Performed by: INTERNAL MEDICINE

## 2020-01-28 PROCEDURE — 94640 AIRWAY INHALATION TREATMENT: CPT

## 2020-01-28 PROCEDURE — 88305 TISSUE EXAM BY PATHOLOGIST: CPT

## 2020-01-28 PROCEDURE — 7100000030 HC ASPR PHASE II RECOVERY - FIRST 15 MIN: Performed by: INTERNAL MEDICINE

## 2020-01-28 PROCEDURE — 2580000003 HC RX 258: Performed by: ANESTHESIOLOGY

## 2020-01-28 PROCEDURE — 3700000000 HC ANESTHESIA ATTENDED CARE: Performed by: INTERNAL MEDICINE

## 2020-01-28 PROCEDURE — 81025 URINE PREGNANCY TEST: CPT

## 2020-01-28 PROCEDURE — 2500000003 HC RX 250 WO HCPCS: Performed by: NURSE ANESTHETIST, CERTIFIED REGISTERED

## 2020-01-28 PROCEDURE — 7100000000 HC PACU RECOVERY - FIRST 15 MIN: Performed by: INTERNAL MEDICINE

## 2020-01-28 PROCEDURE — 2709999900 HC NON-CHARGEABLE SUPPLY: Performed by: INTERNAL MEDICINE

## 2020-01-28 PROCEDURE — 7100000010 HC PHASE II RECOVERY - FIRST 15 MIN: Performed by: INTERNAL MEDICINE

## 2020-01-28 PROCEDURE — 6370000000 HC RX 637 (ALT 250 FOR IP): Performed by: PHYSICIAN ASSISTANT

## 2020-01-28 PROCEDURE — 7100000031 HC ASPR PHASE II RECOVERY - ADDTL 15 MIN: Performed by: INTERNAL MEDICINE

## 2020-01-28 PROCEDURE — 7100000011 HC PHASE II RECOVERY - ADDTL 15 MIN: Performed by: INTERNAL MEDICINE

## 2020-01-28 PROCEDURE — 7100000001 HC PACU RECOVERY - ADDTL 15 MIN: Performed by: INTERNAL MEDICINE

## 2020-01-28 PROCEDURE — 3609010300 HC COLONOSCOPY W/BIOPSY SINGLE/MULTIPLE: Performed by: INTERNAL MEDICINE

## 2020-01-28 PROCEDURE — 94761 N-INVAS EAR/PLS OXIMETRY MLT: CPT

## 2020-01-28 PROCEDURE — 6360000002 HC RX W HCPCS: Performed by: NURSE ANESTHETIST, CERTIFIED REGISTERED

## 2020-01-28 PROCEDURE — 45378 DIAGNOSTIC COLONOSCOPY: CPT | Performed by: INTERNAL MEDICINE

## 2020-01-28 RX ORDER — LIDOCAINE HYDROCHLORIDE 10 MG/ML
INJECTION, SOLUTION EPIDURAL; INFILTRATION; INTRACAUDAL; PERINEURAL PRN
Status: DISCONTINUED | OUTPATIENT
Start: 2020-01-28 | End: 2020-01-28 | Stop reason: SDUPTHER

## 2020-01-28 RX ORDER — IPRATROPIUM BROMIDE AND ALBUTEROL SULFATE 2.5; .5 MG/3ML; MG/3ML
1 SOLUTION RESPIRATORY (INHALATION) ONCE
Status: COMPLETED | OUTPATIENT
Start: 2020-01-28 | End: 2020-01-28

## 2020-01-28 RX ORDER — QUETIAPINE FUMARATE 50 MG/1
50 TABLET, EXTENDED RELEASE ORAL NIGHTLY
COMMUNITY
End: 2020-03-09

## 2020-01-28 RX ORDER — MIDAZOLAM HYDROCHLORIDE 1 MG/ML
INJECTION INTRAMUSCULAR; INTRAVENOUS PRN
Status: DISCONTINUED | OUTPATIENT
Start: 2020-01-28 | End: 2020-01-28 | Stop reason: SDUPTHER

## 2020-01-28 RX ORDER — PROPOFOL 10 MG/ML
INJECTION, EMULSION INTRAVENOUS PRN
Status: DISCONTINUED | OUTPATIENT
Start: 2020-01-28 | End: 2020-01-28 | Stop reason: SDUPTHER

## 2020-01-28 RX ORDER — SODIUM CHLORIDE, SODIUM LACTATE, POTASSIUM CHLORIDE, CALCIUM CHLORIDE 600; 310; 30; 20 MG/100ML; MG/100ML; MG/100ML; MG/100ML
INJECTION, SOLUTION INTRAVENOUS CONTINUOUS
Status: DISCONTINUED | OUTPATIENT
Start: 2020-01-28 | End: 2020-01-28 | Stop reason: HOSPADM

## 2020-01-28 RX ADMIN — SODIUM CHLORIDE, POTASSIUM CHLORIDE, SODIUM LACTATE AND CALCIUM CHLORIDE: 600; 310; 30; 20 INJECTION, SOLUTION INTRAVENOUS at 10:03

## 2020-01-28 RX ADMIN — IPRATROPIUM BROMIDE AND ALBUTEROL SULFATE 1 AMPULE: .5; 3 SOLUTION RESPIRATORY (INHALATION) at 10:20

## 2020-01-28 RX ADMIN — LIDOCAINE HYDROCHLORIDE 50 MG: 10 INJECTION, SOLUTION EPIDURAL; INFILTRATION; INTRACAUDAL; PERINEURAL at 10:36

## 2020-01-28 RX ADMIN — PROPOFOL 300 MG: 10 INJECTION, EMULSION INTRAVENOUS at 10:37

## 2020-01-28 RX ADMIN — MIDAZOLAM 2 MG: 1 INJECTION INTRAMUSCULAR; INTRAVENOUS at 10:37

## 2020-01-28 ASSESSMENT — PULMONARY FUNCTION TESTS
PIF_VALUE: 1

## 2020-01-28 ASSESSMENT — PAIN SCALES - GENERAL: PAINLEVEL_OUTOF10: 0

## 2020-01-28 ASSESSMENT — LIFESTYLE VARIABLES: SMOKING_STATUS: 1

## 2020-01-28 ASSESSMENT — PAIN DESCRIPTION - DESCRIPTORS: DESCRIPTORS: ACHING;DISCOMFORT

## 2020-01-28 ASSESSMENT — PAIN - FUNCTIONAL ASSESSMENT: PAIN_FUNCTIONAL_ASSESSMENT: 0-10

## 2020-01-28 ASSESSMENT — COPD QUESTIONNAIRES: CAT_SEVERITY: NO INTERVAL CHANGE

## 2020-01-28 ASSESSMENT — ENCOUNTER SYMPTOMS: STRIDOR: 0

## 2020-01-28 NOTE — ANESTHESIA POSTPROCEDURE EVALUATION
POST- ANESTHESIA EVALUATION       Pt Name: Maritza Mcleod  MRN: [id number]  [place]: 1980  Date of evaluation: 1/28/2020  Time:  3:00 PM      /80   Pulse 75   Temp 97.5 °F (36.4 °C) (Tympanic)   Resp 18   Ht 5' 2\" (1.575 m)   Wt 204 lb (92.5 kg)   LMP 01/14/2020 (Approximate)   SpO2 97%   BMI 37.31 kg/m²      Consciousness Level  Awake  Cardiopulmonary Status  Stable  Pain Adequately Treated YES  Nausea / Vomiting  NO  Adequate Hydration  YES  Anesthesia Related Complications NONE      Electronically signed by Tamara Sierra MD on 1/28/2020 at 3:00 PM       Department of Anesthesiology  Postprocedure Note    Patient: Maritza Mcleod  MRN: [id number]  YOB: 1980  Date of evaluation: 1/28/2020  Time:  3:00 PM     Procedure Summary     Date:  01/28/20 Room / Location:  [facility]    Anesthesia Start:  1033 Anesthesia Stop:  1101    Procedure:  COLONOSCOPY WITH BIOPSY (N/A ) Diagnosis:  (IBS, RECTAL BLEEDING          PAT ON ADMIT PER ANES)    Surgeon:  Letty Lora MD Responsible Provider:  Tamara Sierra MD    Anesthesia Type:  MAC ASA Status:  2          Anesthesia Type: MAC    Alina Phase I: Alina Score: 9    Alina Phase II: Alina Score: 10    Last vitals: Reviewed and per EMR flowsheets.        Anesthesia Post Evaluation

## 2020-01-28 NOTE — H&P
  SECTION N/A 2018     SECTION performed by Mandy Alvarez MD at [facility] L&D OR    COLONOSCOPY N/A 10/1/2019    COLONOSCOPY DIAGNOSTIC ABORTED performed by Chery Marte MD at [address] [facility] [address], COLON, DIAGNOSTIC      KNEE ARTHROSCOPY Left 5/20/15    KNEE SURGERY Left     x3    LAPAROSCOPY      LAPAROSCOPY N/A 10/5/2017    LAPAROSCOPIC REMOVAL INTRA ABDOMINAL LIPOMA LOWER RIGHT performed by Roger Quesada DO at [address] ESOPHAGOGASTRODUODENOSCOPY TRANSORAL DIAGNOSTIC N/A 3/2/2017    EGD ESOPHAGOGASTRODUODENOSCOPY  IP  performed by Christina Heard MD at [address]  2016    severe esophagitis    UPPER GASTROINTESTINAL ENDOSCOPY  2017    barretts; hiatus hernia; gastritis    UPPER GASTROINTESTINAL ENDOSCOPY  2017    long seg mullins's with linear erosions, esophagitis, small hiatal hernia    UPPER GASTROINTESTINAL ENDOSCOPY N/A 2018    MULLINS'S    UPPER GASTROINTESTINAL ENDOSCOPY N/A 10/1/2019    EGD BIOPSY performed by Chery Marte MD at [address]     Socioeconomic History    Marital status: Single     Spouse name: None    Number of children: 1    Years of education: 11th grade    Highest education level: None   Occupational History    Occupation: unemployed-   Social Needs    Financial resource strain: None    Food insecurity:     Worry: None     Inability: None    Transportation needs:     Medical: None     Non-medical: None   Tobacco Use    Smoking status: Current Every Day Smoker     Packs/day: 0.25     Years: 21.00     Pack years: 5.25     Types: Cigarettes    Smokeless tobacco: Never Used   Substance and Sexual Activity    Alcohol use: No    Drug use: No    Sexual activity: Yes     Partners: Male   Lifestyle    Physical activity:     Days per week: None     Minutes per session: None    Stress: None   Relationships    Social connections:     Talks on phone: None     Gets together: None     Attends Orthodoxy service: None     Active member of club or organization: None     Attends meetings of clubs or organizations: None     Relationship status: None    Intimate partner violence:     Fear of current or ex partner: None     Emotionally abused: None     Physically abused: None     Forced sexual activity: None   Other Topics Concern    None   Social History Narrative    Lives with son and boyfriend     REVIEW OF SYSTEMS      Allergies   Allergen Reactions    Nsaids      Irritates her Barrets esophogus    Toradol [Ketorolac Tromethamine]     Ultram [Tramadol] Nausea Only     Gastric upset       No current facility-administered medications on file prior to encounter. Current Outpatient Medications on File Prior to Encounter   Medication Sig Dispense Refill    QUEtiapine (SEROQUEL XR) 50 MG extended release tablet Take 50 mg by mouth nightly      bisacodyl (DULCOLAX) 5 MG EC tablet TAKE 4 TABS AT 10 AM DAY PRIOR TO COLONOSCOPY 4 tablet 0    pregabalin (LYRICA) 75 MG capsule Take 75 mg by mouth 3 times daily.       sucralfate (CARAFATE) 1 GM tablet Take 1 tablet by mouth 4 times daily 120 tablet 3    sertraline (ZOLOFT) 50 MG tablet Take 1 tablet by mouth daily 30 tablet 1    COMBIVENT RESPIMAT  MCG/ACT AERS inhaler Indications: uses PRN   6    montelukast (SINGULAIR) 10 MG tablet Take 1 tablet by mouth nightly 30 tablet 3    Fluticasone Furoate-Vilanterol (BREO ELLIPTA) 200-25 MCG/INH AEPB Inhale 1 puff into the lungs daily      albuterol (PROVENTIL) (2.5 MG/3ML) 0.083% nebulizer solution Take 3 mLs by nebulization every 4 hours as needed for Wheezing 120 each 3    pantoprazole sodium (PROTONIX) 40 MG PACK packet Take 40 mg by mouth every morning (before breakfast)      diphenhydrAMINE (BENADRYL) 25 MG capsule Take 1 capsule by mouth every 6 hours as needed for Itching (Patient not taking: Reported on 1/22/2020) 30 capsule 0    EPINEPHrine (EPIPEN 2-HELDER) 0.3 MG/0.3ML SOAJ injection Inject 0.3 mLs into the muscle once for 1 dose Use as directed for allergic reaction 2 each 0    docusate sodium (COLACE, DULCOLAX) 100 MG CAPS Take 100 mg by mouth 2 times daily (Patient not taking: Reported on 1/22/2020) 60 capsule 1     Negative except for what is mentioned in the HPI. GENERAL PHYSICAL EXAM     Vitals: /71   Pulse 76   Temp 97.7 °F (36.5 °C) (Oral)   Resp 18   Ht 5' 2\" (1.575 m)   Wt 204 lb (92.5 kg)   LMP 01/14/2020 (Approximate)   SpO2 97%   BMI 37.31 kg/m²  Body mass index is 37.31 kg/m². GENERAL APPEARANCE:   Claudia Camacho is 42 y.o.,  female, severely obese, nourished, conscious, alert. Does not appear to be distress or pain at this time. SKIN:  Warm, dry, no cyanosis or jaundice. HEAD:  Normocephalic, atraumatic, no swelling or tenderness. EYES:  Conjunctiva clear, sclera white bilaterally. EARS:  No marked hearing loss. NOSE:  No rhinorrhea, epistaxis or septal deformity. THROAT:  Not congested. No ulceration bleeding or discharge. NECK:  No stiffness, trachea central.          CHEST:  Symmetrical and equal on expansion. HEART:  RRR S1 > S2. No audible murmurs or gallops. LUNGS:  Equal on expansion, soft expiratory wheeze throughout. Pt agreeable to DuoNeb treatment prior to procedure. ABDOMEN:  Obese. Soft on palpation. Mild RLQ tenderness. Normoactive bowel sounds x 4 quadrants. No guarding or rigidity. LOCOMOTOR, BACK AND SPINE:  No tenderness or deformities. EXTREMITIES:  Symmetrical, no pedal edema. No calf tenderness. No discoloration or ulcerations. NEUROLOGIC:  The patient is conscious, alert, oriented, No apparent focal sensory or motor deficits.                       PROVISIONAL DIAGNOSES / SURGERY:      IBS  RECTAL BLEEDING      COLONOSCOPY DIAGNOSTIC    Patient Active Problem List    Diagnosis

## 2020-01-28 NOTE — OP NOTE
RIDE IN A STABLE CONDITION.     Electronically signed by Anabell Colbert MD  on 1/28/2020 at 10:54 AM

## 2020-01-29 LAB — SURGICAL PATHOLOGY REPORT: NORMAL

## 2020-02-10 ENCOUNTER — TELEPHONE (OUTPATIENT)
Dept: GASTROENTEROLOGY | Age: 40
End: 2020-02-10

## 2020-02-17 ENCOUNTER — OFFICE VISIT (OUTPATIENT)
Dept: OBGYN CLINIC | Age: 40
End: 2020-02-17
Payer: COMMERCIAL

## 2020-02-17 VITALS
DIASTOLIC BLOOD PRESSURE: 78 MMHG | SYSTOLIC BLOOD PRESSURE: 123 MMHG | HEART RATE: 71 BPM | BODY MASS INDEX: 37.91 KG/M2 | WEIGHT: 206 LBS | HEIGHT: 62 IN

## 2020-02-17 PROBLEM — N85.2 ENLARGED UTERUS: Status: ACTIVE | Noted: 2020-02-17

## 2020-02-17 PROBLEM — R93.89 THICKENED ENDOMETRIUM: Status: ACTIVE | Noted: 2020-02-17

## 2020-02-17 PROCEDURE — 4004F PT TOBACCO SCREEN RCVD TLK: CPT | Performed by: SPECIALIST

## 2020-02-17 PROCEDURE — G8417 CALC BMI ABV UP PARAM F/U: HCPCS | Performed by: SPECIALIST

## 2020-02-17 PROCEDURE — G8427 DOCREV CUR MEDS BY ELIG CLIN: HCPCS | Performed by: SPECIALIST

## 2020-02-17 PROCEDURE — 99212 OFFICE O/P EST SF 10 MIN: CPT | Performed by: SPECIALIST

## 2020-02-17 PROCEDURE — G8484 FLU IMMUNIZE NO ADMIN: HCPCS | Performed by: SPECIALIST

## 2020-02-17 ASSESSMENT — ENCOUNTER SYMPTOMS
DIARRHEA: 0
APNEA: 0
EYE PAIN: 0
COUGH: 0
NAUSEA: 0
VOMITING: 0
ABDOMINAL DISTENTION: 0
ABDOMINAL PAIN: 0
CONSTIPATION: 0

## 2020-02-17 NOTE — PROGRESS NOTES
Subjective:      Patient ID: Veronica Mcleod is a 42 y.o. female. Chief Complaint   Patient presents with    Results     Patient is here today for her U/S results that she had done for pelvic pain. /78 (Site: Right Upper Arm, Position: Sitting, Cuff Size: Medium Adult)   Pulse 71   Ht 5' 2\" (1.575 m)   Wt 206 lb (93.4 kg)   LMP 02/14/2020   BMI 37.68 kg/m²   Patient's last menstrual period was 02/14/2020. [id number]    Past Medical History:   Diagnosis Date    Anxiety     Arthritis     OA    Asthma     Sadler's esophagus     LONG SEGMENT    Bipolar 1 disorder (HCC)     CAD (coronary artery disease)     COPD (chronic obstructive pulmonary disease) (HCC)     Depression     and bipolar    Esophagitis     severe    GERD (gastroesophageal reflux disease)     Headache(784.0)     Herniated disc, cervical     Hiatal hernia     Incisional abscess 1/15/2019    Kidney stones     Pleurisy     hx of \"years ago\"    Pneumonia     past penumonia    UTI (urinary tract infection)     Vision abnormalities     wears glasses     Current Outpatient Medications Ordered in Epic   Medication Sig Dispense Refill    QUEtiapine (SEROQUEL XR) 50 MG extended release tablet Take 50 mg by mouth nightly      polyethylene glycol (GLYCOLAX) powder USE AS DIRECTED BY FOLLOWING YOUR PATIENT INSTRUCTIONS GIVEN BY OFFICE. 255 g 0    bisacodyl (DULCOLAX) 5 MG EC tablet TAKE 4 TABS AT 10 AM DAY PRIOR TO COLONOSCOPY 4 tablet 0    pregabalin (LYRICA) 75 MG capsule Take 75 mg by mouth 3 times daily.       sucralfate (CARAFATE) 1 GM tablet Take 1 tablet by mouth 4 times daily 120 tablet 3    pantoprazole sodium (PROTONIX) 40 MG PACK packet Take 40 mg by mouth every morning (before breakfast)      diphenhydrAMINE (BENADRYL) 25 MG capsule Take 1 capsule by mouth every 6 hours as needed for Itching 30 capsule 0    sertraline (ZOLOFT) 50 MG tablet Take 1 tablet by mouth daily 30 tablet 1    docusate sodium (COLACE,

## 2020-03-04 ENCOUNTER — APPOINTMENT (OUTPATIENT)
Dept: CT IMAGING | Age: 40
End: 2020-03-04
Payer: COMMERCIAL

## 2020-03-04 ENCOUNTER — HOSPITAL ENCOUNTER (EMERGENCY)
Age: 40
Discharge: HOME | End: 2020-03-04
Attending: EMERGENCY MEDICINE
Payer: COMMERCIAL

## 2020-03-04 ENCOUNTER — APPOINTMENT (OUTPATIENT)
Dept: GENERAL RADIOLOGY | Age: 40
End: 2020-03-04
Payer: COMMERCIAL

## 2020-03-04 VITALS
RESPIRATION RATE: 20 BRPM | OXYGEN SATURATION: 95 % | BODY MASS INDEX: 37.54 KG/M2 | HEIGHT: 62 IN | WEIGHT: 204 LBS | DIASTOLIC BLOOD PRESSURE: 55 MMHG | HEART RATE: 92 BPM | TEMPERATURE: 98.6 F | SYSTOLIC BLOOD PRESSURE: 104 MMHG

## 2020-03-04 LAB
ABSOLUTE EOS #: 0 K/UL (ref 0–0.4)
ABSOLUTE IMMATURE GRANULOCYTE: ABNORMAL K/UL (ref 0–0.3)
ABSOLUTE LYMPH #: 1.16 K/UL (ref 1–4.8)
ABSOLUTE MONO #: 0.83 K/UL (ref 0.1–1.3)
ALBUMIN SERPL-MCNC: 4.2 G/DL (ref 3.5–5.2)
ALBUMIN/GLOBULIN RATIO: ABNORMAL (ref 1–2.5)
ALP BLD-CCNC: 66 U/L (ref 35–104)
ALT SERPL-CCNC: 16 U/L (ref 5–33)
ANION GAP SERPL CALCULATED.3IONS-SCNC: 13 MMOL/L (ref 9–17)
AST SERPL-CCNC: 15 U/L
BASOPHILS # BLD: 0 % (ref 0–2)
BASOPHILS ABSOLUTE: 0 K/UL (ref 0–0.2)
BILIRUB SERPL-MCNC: 0.42 MG/DL (ref 0.3–1.2)
BUN BLDV-MCNC: 10 MG/DL (ref 6–20)
BUN/CREAT BLD: ABNORMAL (ref 9–20)
CALCIUM SERPL-MCNC: 10.3 MG/DL (ref 8.6–10.4)
CHLORIDE BLD-SCNC: 102 MMOL/L (ref 98–107)
CO2: 20 MMOL/L (ref 20–31)
CREAT SERPL-MCNC: 0.58 MG/DL (ref 0.5–0.9)
DIFFERENTIAL TYPE: ABNORMAL
EOSINOPHILS RELATIVE PERCENT: 0 % (ref 0–4)
GFR AFRICAN AMERICAN: >60 ML/MIN
GFR NON-AFRICAN AMERICAN: >60 ML/MIN
GFR SERPL CREATININE-BSD FRML MDRD: ABNORMAL ML/MIN/{1.73_M2}
GFR SERPL CREATININE-BSD FRML MDRD: ABNORMAL ML/MIN/{1.73_M2}
GLUCOSE BLD-MCNC: 109 MG/DL (ref 70–99)
HCG QUALITATIVE: NEGATIVE
HCT VFR BLD CALC: 42.7 % (ref 36–46)
HEMOGLOBIN: 13.7 G/DL (ref 12–16)
IMMATURE GRANULOCYTES: ABNORMAL %
LIPASE: 17 U/L (ref 13–60)
LYMPHOCYTES # BLD: 7 % (ref 24–44)
MCH RBC QN AUTO: 25.8 PG (ref 26–34)
MCHC RBC AUTO-ENTMCNC: 32 G/DL (ref 31–37)
MCV RBC AUTO: 80.5 FL (ref 80–100)
MONOCYTES # BLD: 5 % (ref 1–7)
MORPHOLOGY: ABNORMAL
MORPHOLOGY: ABNORMAL
NRBC AUTOMATED: ABNORMAL PER 100 WBC
PDW BLD-RTO: 15.7 % (ref 11.5–14.9)
PLATELET # BLD: 264 K/UL (ref 150–450)
PLATELET ESTIMATE: ABNORMAL
PMV BLD AUTO: 9.4 FL (ref 6–12)
POTASSIUM SERPL-SCNC: 3.9 MMOL/L (ref 3.7–5.3)
RBC # BLD: 5.31 M/UL (ref 4–5.2)
RBC # BLD: ABNORMAL 10*6/UL
SEG NEUTROPHILS: 88 % (ref 36–66)
SEGMENTED NEUTROPHILS ABSOLUTE COUNT: 14.61 K/UL (ref 1.3–9.1)
SODIUM BLD-SCNC: 135 MMOL/L (ref 135–144)
TOTAL PROTEIN: 7.3 G/DL (ref 6.4–8.3)
WBC # BLD: 16.6 K/UL (ref 3.5–11)
WBC # BLD: ABNORMAL 10*3/UL

## 2020-03-04 PROCEDURE — 71045 X-RAY EXAM CHEST 1 VIEW: CPT

## 2020-03-04 PROCEDURE — 6370000000 HC RX 637 (ALT 250 FOR IP): Performed by: EMERGENCY MEDICINE

## 2020-03-04 PROCEDURE — 83690 ASSAY OF LIPASE: CPT

## 2020-03-04 PROCEDURE — 94640 AIRWAY INHALATION TREATMENT: CPT

## 2020-03-04 PROCEDURE — 6360000004 HC RX CONTRAST MEDICATION: Performed by: EMERGENCY MEDICINE

## 2020-03-04 PROCEDURE — 74177 CT ABD & PELVIS W/CONTRAST: CPT

## 2020-03-04 PROCEDURE — 80053 COMPREHEN METABOLIC PANEL: CPT

## 2020-03-04 PROCEDURE — 85025 COMPLETE CBC W/AUTO DIFF WBC: CPT

## 2020-03-04 PROCEDURE — 94761 N-INVAS EAR/PLS OXIMETRY MLT: CPT

## 2020-03-04 PROCEDURE — 36415 COLL VENOUS BLD VENIPUNCTURE: CPT

## 2020-03-04 PROCEDURE — 84703 CHORIONIC GONADOTROPIN ASSAY: CPT

## 2020-03-04 PROCEDURE — 96374 THER/PROPH/DIAG INJ IV PUSH: CPT

## 2020-03-04 PROCEDURE — 2580000003 HC RX 258: Performed by: EMERGENCY MEDICINE

## 2020-03-04 PROCEDURE — 99285 EMERGENCY DEPT VISIT HI MDM: CPT

## 2020-03-04 PROCEDURE — 96375 TX/PRO/DX INJ NEW DRUG ADDON: CPT

## 2020-03-04 PROCEDURE — 6360000002 HC RX W HCPCS: Performed by: EMERGENCY MEDICINE

## 2020-03-04 RX ORDER — 0.9 % SODIUM CHLORIDE 0.9 %
80 INTRAVENOUS SOLUTION INTRAVENOUS ONCE
Status: COMPLETED | OUTPATIENT
Start: 2020-03-04 | End: 2020-03-04

## 2020-03-04 RX ORDER — IPRATROPIUM BROMIDE AND ALBUTEROL SULFATE 2.5; .5 MG/3ML; MG/3ML
1 SOLUTION RESPIRATORY (INHALATION)
Status: DISCONTINUED | OUTPATIENT
Start: 2020-03-04 | End: 2020-03-04 | Stop reason: HOSPADM

## 2020-03-04 RX ORDER — IPRATROPIUM BROMIDE AND ALBUTEROL SULFATE 2.5; .5 MG/3ML; MG/3ML
1 SOLUTION RESPIRATORY (INHALATION) 4 TIMES DAILY
COMMUNITY
End: 2021-03-02

## 2020-03-04 RX ORDER — METHYLPREDNISOLONE SODIUM SUCCINATE 125 MG/2ML
125 INJECTION, POWDER, LYOPHILIZED, FOR SOLUTION INTRAMUSCULAR; INTRAVENOUS ONCE
Status: COMPLETED | OUTPATIENT
Start: 2020-03-04 | End: 2020-03-04

## 2020-03-04 RX ORDER — AZITHROMYCIN 250 MG/1
TABLET, FILM COATED ORAL
Qty: 1 PACKET | Refills: 0 | Status: SHIPPED | OUTPATIENT
Start: 2020-03-04 | End: 2020-03-08

## 2020-03-04 RX ORDER — SODIUM CHLORIDE 0.9 % (FLUSH) 0.9 %
10 SYRINGE (ML) INJECTION PRN
Status: DISCONTINUED | OUTPATIENT
Start: 2020-03-04 | End: 2020-03-04 | Stop reason: HOSPADM

## 2020-03-04 RX ORDER — ACETAMINOPHEN 325 MG/1
650 TABLET ORAL ONCE
Status: COMPLETED | OUTPATIENT
Start: 2020-03-04 | End: 2020-03-04

## 2020-03-04 RX ORDER — CLONAZEPAM 1 MG/1
1 TABLET ORAL DAILY PRN
COMMUNITY
End: 2020-04-14

## 2020-03-04 RX ORDER — AZITHROMYCIN 250 MG/1
500 TABLET, FILM COATED ORAL ONCE
Status: COMPLETED | OUTPATIENT
Start: 2020-03-04 | End: 2020-03-04

## 2020-03-04 RX ORDER — ONDANSETRON 2 MG/ML
4 INJECTION INTRAMUSCULAR; INTRAVENOUS ONCE
Status: COMPLETED | OUTPATIENT
Start: 2020-03-04 | End: 2020-03-04

## 2020-03-04 RX ORDER — 0.9 % SODIUM CHLORIDE 0.9 %
1000 INTRAVENOUS SOLUTION INTRAVENOUS ONCE
Status: COMPLETED | OUTPATIENT
Start: 2020-03-04 | End: 2020-03-04

## 2020-03-04 RX ORDER — ONDANSETRON 4 MG/1
4 TABLET, ORALLY DISINTEGRATING ORAL EVERY 8 HOURS PRN
Qty: 12 TABLET | Refills: 0 | Status: SHIPPED | OUTPATIENT
Start: 2020-03-04 | End: 2020-04-14

## 2020-03-04 RX ADMIN — IPRATROPIUM BROMIDE AND ALBUTEROL SULFATE 1 AMPULE: .5; 3 SOLUTION RESPIRATORY (INHALATION) at 14:56

## 2020-03-04 RX ADMIN — IOPAMIDOL 75 ML: 755 INJECTION, SOLUTION INTRAVENOUS at 16:13

## 2020-03-04 RX ADMIN — ONDANSETRON 4 MG: 2 INJECTION INTRAMUSCULAR; INTRAVENOUS at 15:06

## 2020-03-04 RX ADMIN — METHYLPREDNISOLONE SODIUM SUCCINATE 125 MG: 125 INJECTION, POWDER, FOR SOLUTION INTRAMUSCULAR; INTRAVENOUS at 15:06

## 2020-03-04 RX ADMIN — AZITHROMYCIN 500 MG: 250 TABLET, FILM COATED ORAL at 16:55

## 2020-03-04 RX ADMIN — IPRATROPIUM BROMIDE AND ALBUTEROL SULFATE 1 AMPULE: .5; 3 SOLUTION RESPIRATORY (INHALATION) at 15:05

## 2020-03-04 RX ADMIN — ACETAMINOPHEN 650 MG: 325 TABLET, FILM COATED ORAL at 16:35

## 2020-03-04 RX ADMIN — SODIUM CHLORIDE 80 ML: 9 INJECTION, SOLUTION INTRAVENOUS at 16:13

## 2020-03-04 RX ADMIN — SODIUM CHLORIDE 1000 ML: 9 INJECTION, SOLUTION INTRAVENOUS at 15:06

## 2020-03-04 RX ADMIN — Medication 10 ML: at 16:13

## 2020-03-04 ASSESSMENT — ENCOUNTER SYMPTOMS
ABDOMINAL PAIN: 1
COUGH: 1
SHORTNESS OF BREATH: 1
EYES NEGATIVE: 1
VOMITING: 1
BACK PAIN: 0
WHEEZING: 1
NAUSEA: 1

## 2020-03-04 ASSESSMENT — PAIN DESCRIPTION - PAIN TYPE: TYPE: ACUTE PAIN

## 2020-03-04 ASSESSMENT — PAIN SCALES - GENERAL
PAINLEVEL_OUTOF10: 8
PAINLEVEL_OUTOF10: 8

## 2020-03-04 ASSESSMENT — PAIN DESCRIPTION - LOCATION: LOCATION: ABDOMEN

## 2020-03-04 NOTE — PROGRESS NOTES
Breath Sounds: wheezing  RR[de-identified] 18  Pulse Sat: 94% on room air  Home Meds: Aerosol Albuterol as needed.     Bronchodilator assessment at level: 3  []    Home Level  BRONCHODILATOR ASSESSMENT SCORE  Score 0 1 2 3 4 5   Breath Sounds   []  Patient Baseline []  No Wheeze good aeration []  Faint, scattered wheezing, good aeration [x]  Expiratory Wheezing and or moderately diminished []  Insp/Exp wheeze and/or very diminished []  Insp/Exp and/ or marked distress   Respiratory Rate   []  Patient Baseline []  Less than 20 []  Less than 20 []  20-25 []  Greater than 25 []  Greater than 25   Peak flow % of Pred or PB [x]  NA   []  Greater than 90%  []  81-90% []  71-80% []  Less than or equal to 70%  or unable to perform []  Unable due to Respiratory Distress   Dyspnea re []  Patient Baseline []  No SOB []  No SOB [x]  SOB on exertion []  SOB min activity []  At rest/acute   e FEV% Predicted       [x]  NA []  Above 69%  []  Unable []  Above 60-69%  []  Unable []  Above 50-59%  []  Unable []  Above 35-49%  []  Unable []  Less than 35%  []  Unable

## 2020-03-04 NOTE — ED NOTES
PT arrives today with c/o n/v since yesterday and also a HA. PT states her asthma is also acting up, but pt is eupneic and in no distress. PT appears anxious.       Zohaib Braswell RN  03/04/20 4265

## 2020-03-04 NOTE — ED PROVIDER NOTES
EMERGENCY DEPARTMENT ENCOUNTER    Pt Name: Luigi Tavarez  MRN: [id number]  [place] 1980  Date of evaluation: 3/4/20  CHIEF COMPLAINT       Chief Complaint   Patient presents with    Wheezing    Emesis     HISTORY OF PRESENT ILLNESS   The pt presents for evaluation of nausea vomiting and abd pain. Started a few days ago. Gradual onset, gradually worsening. Today pt also noticed her asthma flaring. She reports cough and wheezing. No fever. No sick contacts, no travel. The history is provided by the patient. Abdominal Pain   Pain location:  Generalized  Pain quality: aching    Pain severity:  Moderate  Onset quality:  Gradual  Duration: few days. Timing:  Constant  Progression:  Worsening  Chronicity:  New  Relieved by:  Nothing  Worsened by:  Nothing  Ineffective treatments:  None tried  Associated symptoms: cough, nausea, shortness of breath and vomiting    Associated symptoms: no chest pain, no chills and no fever        REVIEW OF SYSTEMS     Review of Systems   Constitutional: Negative. Negative for chills and fever. HENT: Negative. Negative for congestion. Eyes: Negative. Respiratory: Positive for cough, shortness of breath and wheezing. Cardiovascular: Negative. Negative for chest pain. Gastrointestinal: Positive for abdominal pain, nausea and vomiting. Genitourinary: Negative. Musculoskeletal: Negative. Negative for back pain. Skin: Negative. Negative for rash. Neurological: Negative. Negative for headaches. All other systems reviewed and are negative.     PASTMEDICAL HISTORY     Past Medical History:   Diagnosis Date    Anxiety     Arthritis     OA    Asthma     Sadler's esophagus     LONG SEGMENT    Bipolar 1 disorder (HCC)     CAD (coronary artery disease)     COPD (chronic obstructive pulmonary disease) (HCC)     Depression     and bipolar    Esophagitis     severe    GERD (gastroesophageal reflux disease)     Headache(784.0)     Herniated disc, QUEtiapine (SEROQUEL XR) 50 MG extended release tablet Take 50 mg by mouth nightlyHistorical Med      pregabalin (LYRICA) 75 MG capsule Take 75 mg by mouth 3 times daily. Historical Med      sucralfate (CARAFATE) 1 GM tablet Take 1 tablet by mouth 4 times daily, Disp-120 tablet, R-3Print      pantoprazole sodium (PROTONIX) 40 MG PACK packet Take 40 mg by mouth every morning (before breakfast)Historical Med      diphenhydrAMINE (BENADRYL) 25 MG capsule Take 1 capsule by mouth every 6 hours as needed for Itching, Disp-30 capsule, R-0Print      EPINEPHrine (EPIPEN 2-HELDER) 0.3 MG/0.3ML SOAJ injection Inject 0.3 mLs into the muscle once for 1 dose Use as directed for allergic reaction, Disp-2 each, R-0Print      sertraline (ZOLOFT) 50 MG tablet Take 1 tablet by mouth daily, Disp-30 tablet, R-1Normal      COMBIVENT RESPIMAT  MCG/ACT AERS inhaler Indications: uses PRN , R-6, DAWHistorical Med      montelukast (SINGULAIR) 10 MG tablet Take 1 tablet by mouth nightly, Disp-30 tablet, R-3Print      Fluticasone Furoate-Vilanterol (BREO ELLIPTA) 200-25 MCG/INH AEPB Inhale 1 puff into the lungs dailyHistorical Med      albuterol (PROVENTIL) (2.5 MG/3ML) 0.083% nebulizer solution Take 3 mLs by nebulization every 4 hours as needed for Wheezing, Disp-120 each, R-3           ALLERGIES     is allergic to nsaids; toradol [ketorolac tromethamine]; and ultram [tramadol]. FAMILY HISTORY     She indicated that the status of her mother is unknown. She indicated that the status of her father is unknown. She indicated that the status of her brother is unknown.      SOCIAL HISTORY       Social History     Tobacco Use    Smoking status: Current Every Day Smoker     Packs/day: 0.25     Years: 21.00     Pack years: 5.25     Types: Cigarettes    Smokeless tobacco: Never Used   Substance Use Topics    Alcohol use: No    Drug use: No     PHYSICAL EXAM     INITIAL VITALS: BP (!) 104/55   Pulse 92   Temp 98.6 °F (37 °C) (Oral)   Resp 20

## 2020-03-09 ENCOUNTER — HOSPITAL ENCOUNTER (OUTPATIENT)
Age: 40
Setting detail: SPECIMEN
Discharge: HOME | End: 2020-03-09
Payer: COMMERCIAL

## 2020-03-09 ENCOUNTER — OFFICE VISIT (OUTPATIENT)
Dept: OBGYN CLINIC | Age: 40
End: 2020-03-09
Payer: COMMERCIAL

## 2020-03-09 ENCOUNTER — TELEPHONE (OUTPATIENT)
Dept: GASTROENTEROLOGY | Age: 40
End: 2020-03-09

## 2020-03-09 VITALS
DIASTOLIC BLOOD PRESSURE: 82 MMHG | RESPIRATION RATE: 18 BRPM | SYSTOLIC BLOOD PRESSURE: 122 MMHG | WEIGHT: 201 LBS | HEART RATE: 80 BPM | BODY MASS INDEX: 36.99 KG/M2 | HEIGHT: 62 IN

## 2020-03-09 PROBLEM — N80.9 ENDOMETRIOSIS: Status: ACTIVE | Noted: 2020-03-09

## 2020-03-09 PROBLEM — Z80.3 FAMILY HISTORY OF BREAST CANCER: Status: ACTIVE | Noted: 2020-03-09

## 2020-03-09 PROBLEM — F17.200 SMOKER: Chronic | Status: ACTIVE | Noted: 2017-02-14

## 2020-03-09 PROCEDURE — G8484 FLU IMMUNIZE NO ADMIN: HCPCS | Performed by: SPECIALIST

## 2020-03-09 PROCEDURE — G8417 CALC BMI ABV UP PARAM F/U: HCPCS | Performed by: SPECIALIST

## 2020-03-09 PROCEDURE — G8427 DOCREV CUR MEDS BY ELIG CLIN: HCPCS | Performed by: SPECIALIST

## 2020-03-09 PROCEDURE — 99213 OFFICE O/P EST LOW 20 MIN: CPT | Performed by: SPECIALIST

## 2020-03-09 PROCEDURE — 4004F PT TOBACCO SCREEN RCVD TLK: CPT | Performed by: SPECIALIST

## 2020-03-09 RX ORDER — CHLORHEXIDINE GLUCONATE 0.12 MG/ML
RINSE ORAL
Status: ON HOLD | COMMUNITY
Start: 2020-02-17 | End: 2020-07-22

## 2020-03-09 RX ORDER — PANTOPRAZOLE SODIUM 40 MG/1
TABLET, DELAYED RELEASE ORAL
COMMUNITY
Start: 2020-02-17 | End: 2020-04-06 | Stop reason: SDUPTHER

## 2020-03-09 ASSESSMENT — ENCOUNTER SYMPTOMS
EYE PAIN: 0
CONSTIPATION: 0
DIARRHEA: 0
ABDOMINAL DISTENTION: 0
COUGH: 0
ABDOMINAL PAIN: 0
VOMITING: 0
APNEA: 0
NAUSEA: 0

## 2020-03-09 NOTE — PROGRESS NOTES
disagreement are noted on the chart. I have personally evaluated this patient. Additional findings are as noted. I agree with the chief complaint, past medical history, past surgical history, allergies, medications, social and family history as documented unless otherwise noted below.      Electronically signed by Alexandra Monteiro MD on 3/10/2020 at 5:37 AM

## 2020-03-10 ENCOUNTER — TELEPHONE (OUTPATIENT)
Dept: GASTROENTEROLOGY | Age: 40
End: 2020-03-10

## 2020-03-11 ENCOUNTER — TELEPHONE (OUTPATIENT)
Dept: OBGYN CLINIC | Age: 40
End: 2020-03-11

## 2020-03-11 LAB
HPV SAMPLE: NORMAL
HPV, GENOTYPE 16: NOT DETECTED
HPV, GENOTYPE 18: NOT DETECTED
HPV, HIGH RISK OTHER: NOT DETECTED
HPV, INTERPRETATION: NORMAL
SPECIMEN DESCRIPTION: NORMAL

## 2020-03-14 ENCOUNTER — HOSPITAL ENCOUNTER (EMERGENCY)
Age: 40
Discharge: HOME | End: 2020-03-14
Attending: EMERGENCY MEDICINE
Payer: COMMERCIAL

## 2020-03-14 ENCOUNTER — APPOINTMENT (OUTPATIENT)
Dept: GENERAL RADIOLOGY | Age: 40
End: 2020-03-14
Payer: COMMERCIAL

## 2020-03-14 VITALS
TEMPERATURE: 98.3 F | DIASTOLIC BLOOD PRESSURE: 73 MMHG | BODY MASS INDEX: 36.99 KG/M2 | WEIGHT: 201 LBS | RESPIRATION RATE: 20 BRPM | SYSTOLIC BLOOD PRESSURE: 128 MMHG | HEIGHT: 62 IN | HEART RATE: 86 BPM | OXYGEN SATURATION: 100 %

## 2020-03-14 PROCEDURE — 71046 X-RAY EXAM CHEST 2 VIEWS: CPT

## 2020-03-14 PROCEDURE — 6370000000 HC RX 637 (ALT 250 FOR IP): Performed by: EMERGENCY MEDICINE

## 2020-03-14 PROCEDURE — 6370000000 HC RX 637 (ALT 250 FOR IP): Performed by: PHYSICIAN ASSISTANT

## 2020-03-14 PROCEDURE — 94640 AIRWAY INHALATION TREATMENT: CPT

## 2020-03-14 PROCEDURE — 99284 EMERGENCY DEPT VISIT MOD MDM: CPT

## 2020-03-14 RX ORDER — PREDNISONE 20 MG/1
40 TABLET ORAL ONCE
Status: COMPLETED | OUTPATIENT
Start: 2020-03-14 | End: 2020-03-14

## 2020-03-14 RX ORDER — PREDNISONE 20 MG/1
20 TABLET ORAL DAILY
Qty: 5 TABLET | Refills: 0 | Status: SHIPPED | OUTPATIENT
Start: 2020-03-14 | End: 2020-03-19

## 2020-03-14 RX ORDER — IPRATROPIUM BROMIDE AND ALBUTEROL SULFATE 2.5; .5 MG/3ML; MG/3ML
1 SOLUTION RESPIRATORY (INHALATION)
Status: DISCONTINUED | OUTPATIENT
Start: 2020-03-14 | End: 2020-03-14 | Stop reason: HOSPADM

## 2020-03-14 RX ADMIN — IPRATROPIUM BROMIDE AND ALBUTEROL SULFATE 1 AMPULE: .5; 3 SOLUTION RESPIRATORY (INHALATION) at 20:52

## 2020-03-14 RX ADMIN — PREDNISONE 40 MG: 20 TABLET ORAL at 20:34

## 2020-03-14 ASSESSMENT — ENCOUNTER SYMPTOMS
SORE THROAT: 0
SINUS CONGESTION: 0
EYE DISCHARGE: 0
COUGH: 1
SHORTNESS OF BREATH: 0
RHINORRHEA: 0
WHEEZING: 1

## 2020-03-14 ASSESSMENT — PAIN SCALES - GENERAL: PAINLEVEL_OUTOF10: 7

## 2020-03-15 NOTE — ED PROVIDER NOTES
Herniated disc, cervical     Hiatal hernia     Incisional abscess 1/15/2019    Kidney stones     Pleurisy     hx of \"years ago\"    Pneumonia     past penumonia    UTI (urinary tract infection)     Vision abnormalities     wears glasses       SURGICAL HISTORY       Past Surgical History:   Procedure Laterality Date    CERVICAL DISC SURGERY      x2     SECTION  , 2018    x 2     SECTION N/A 2018     SECTION performed by Xiomy Alvarado MD at [facility] L&D OR    COLONOSCOPY N/A 10/1/2019    COLONOSCOPY DIAGNOSTIC ABORTED performed by Félix Liu MD at [address] [facility] N/A 2020    COLONOSCOPY WITH BIOPSY performed by Félix Liu MD at [address], COLON, DIAGNOSTIC      KNEE ARTHROSCOPY Left 5/20/15    KNEE SURGERY Left     x3    LAPAROSCOPY      LAPAROSCOPY N/A 10/5/2017    LAPAROSCOPIC REMOVAL INTRA ABDOMINAL LIPOMA LOWER RIGHT performed by Mery Rodriguez DO at [address] [facility] ESOPHAGOGASTRODUODENOSCOPY TRANSORAL DIAGNOSTIC N/A 3/2/2017    EGD ESOPHAGOGASTRODUODENOSCOPY  IP  performed by Barry Anderson MD at [facility]  2016    severe esophagitis    UPPER GASTROINTESTINAL ENDOSCOPY  2017    barretts; hiatus hernia; gastritis    UPPER GASTROINTESTINAL ENDOSCOPY  2017    long seg mullins's with linear erosions, esophagitis, small hiatal hernia    UPPER GASTROINTESTINAL ENDOSCOPY N/A 2018    MULLINS'S    UPPER GASTROINTESTINAL ENDOSCOPY N/A 10/1/2019    EGD BIOPSY performed by Félix Liu MD at [address] [place] [address]       Discharge Medication List as of 3/14/2020  9:07 PM      CONTINUE these medications which have NOT CHANGED    Details   pantoprazole (PROTONIX) 40 MG tablet TAKE ONE TABLET TWICE A DAY ORALLY 30 DAY(S)Historical Med      chlorhexidine (PERIDEX) 0.12 % solution RINSE WITH 15ML TWICE A DAY FOR 30 SECONDSHistorical Med ipratropium-albuterol (DUONEB) 0.5-2.5 (3) MG/3ML SOLN nebulizer solution Inhale 1 vial into the lungs 4 times dailyHistorical Med      clonazePAM (KLONOPIN) 1 MG tablet Take 1 mg by mouth daily as needed. Historical Med      ondansetron (ZOFRAN ODT) 4 MG disintegrating tablet Take 1 tablet by mouth every 8 hours as needed for Nausea or Vomiting, Disp-12 tablet, R-0Print      pregabalin (LYRICA) 75 MG capsule Take 75 mg by mouth 3 times daily. Historical Med      diphenhydrAMINE (BENADRYL) 25 MG capsule Take 1 capsule by mouth every 6 hours as needed for Itching, Disp-30 capsule, R-0Print      EPINEPHrine (EPIPEN 2-HELDER) 0.3 MG/0.3ML SOAJ injection Inject 0.3 mLs into the muscle once for 1 dose Use as directed for allergic reaction, Disp-2 each, R-0Print      COMBIVENT RESPIMAT  MCG/ACT AERS inhaler Indications: uses PRN , R-6, DAWHistorical Med      montelukast (SINGULAIR) 10 MG tablet Take 1 tablet by mouth nightly, Disp-30 tablet, R-3Print      Fluticasone Furoate-Vilanterol (BREO ELLIPTA) 200-25 MCG/INH AEPB Inhale 1 puff into the lungs dailyHistorical Med      albuterol (PROVENTIL) (2.5 MG/3ML) 0.083% nebulizer solution Take 3 mLs by nebulization every 4 hours as needed for Wheezing, Disp-120 each, R-3             ALLERGIES     is allergic to nsaids; toradol [ketorolac tromethamine]; and ultram [tramadol]. FAMILY HISTORY     She indicated that the status of her mother is unknown. She indicated that the status of her father is unknown. She indicated that the status of her brother is unknown. SOCIAL HISTORY      reports that she has been smoking cigarettes. She has a 10.50 pack-year smoking history. She has never used smokeless tobacco. She reports that she does not drink alcohol or use drugs.     PHYSICAL EXAM     INITIAL VITALS: /73   Pulse 86   Temp 98.3 °F (36.8 °C) (Oral)   Resp 20   Ht 5' 2\" (1.575 m)   Wt 201 lb (91.2 kg)   LMP 03/10/2020   SpO2 100%   BMI 36.76 kg/m² BP: 128/73    Pulse: 86    Resp: 22 20   Temp: 98.3 °F (36.8 °C)    TempSrc: Oral    SpO2: 99% 100%   Weight: 201 lb (91.2 kg)    Height: 5' 2\" (1.575 m)        The patient was given the following medications while in the emergency department:  Orders Placed This Encounter   Medications    predniSONE (DELTASONE) tablet 40 mg    ipratropium-albuterol (DUONEB) nebulizer solution 1 ampule    predniSONE (DELTASONE) 20 MG tablet     Sig: Take 1 tablet by mouth daily for 5 days     Dispense:  5 tablet     Refill:  0       -------------------------      CRITICAL CARE:    CONSULTS:  None    PROCEDURES:  Procedures    FINAL IMPRESSION      1.  Bronchitis          DISPOSITION/PLAN   DISPOSITION Decision To Discharge 03/14/2020 08:22:11 PM      PATIENT REFERREDTO:  Stephanie Brooke MD  [address]  [phone]    Schedule an appointment as soon as possible for a visit in 2 days      [facility] ED  [address] [address]  [phone]    If symptoms worsen      DISCHARGEMEDICATIONS:  Discharge Medication List as of 3/14/2020  9:07 PM      START taking these medications    Details   predniSONE (DELTASONE) 20 MG tablet Take 1 tablet by mouth daily for 5 days, Disp-5 tablet, R-0Print             (Please note that portions of this note were completed with a voice recognition program.  Efforts were made to edit thedictations but occasionally words are mis-transcribed.)    SHAINA Escalera PA-C  03/14/20 0598

## 2020-03-15 NOTE — ED NOTES
Pt discharged in stable condition with Rx's and discharge instructions, including follow up with PCP. Pt ambulates to door with steady gait and without assistance.       Urmila Guerrero RN  03/14/20 5206

## 2020-03-15 NOTE — ED NOTES
Pt arrives to ED c/o wheezing and shortness of breath. Patient states that she started getting short of breath yesterday with wheezing. Patient states that her albuterol machine is not working at home and she has not been able to do her treatments. Patient is resting on stretcher, playing on phone, with no s/s of distress.       Farrah Stanley RN  03/14/20 2037

## 2020-03-17 LAB — CYTOLOGY REPORT: NORMAL

## 2020-03-31 ENCOUNTER — TELEPHONE (OUTPATIENT)
Dept: GASTROENTEROLOGY | Age: 40
End: 2020-03-31

## 2020-04-06 ENCOUNTER — VIRTUAL VISIT (OUTPATIENT)
Dept: GASTROENTEROLOGY | Age: 40
End: 2020-04-06
Payer: COMMERCIAL

## 2020-04-06 PROCEDURE — 99443 PR PHYS/QHP TELEPHONE EVALUATION 21-30 MIN: CPT | Performed by: INTERNAL MEDICINE

## 2020-04-06 RX ORDER — PANTOPRAZOLE SODIUM 40 MG/1
TABLET, DELAYED RELEASE ORAL
Qty: 90 TABLET | Refills: 3 | Status: SHIPPED | OUTPATIENT
Start: 2020-04-06 | End: 2020-10-26 | Stop reason: SDUPTHER

## 2020-04-06 RX ORDER — LINACLOTIDE 290 UG/1
290 CAPSULE, GELATIN COATED ORAL
Qty: 30 CAPSULE | Refills: 3 | Status: SHIPPED | OUTPATIENT
Start: 2020-04-06 | End: 2020-07-02

## 2020-04-06 NOTE — PROGRESS NOTES
to increase the amount of fiber including dietary in terms of bran, cereals, whole wheat, brown bread etc. Was also instructed to start using supplemental fiber either Metamucil, citrucell or bennafiber with ample liquids. She was told to start drinking prune juice which is good for constipation. If symptoms don't resolve she will require medicines to assist with her symptoms    Pt has verbalized understanding and agreement to this plan.  Officer has been prescribed to her sent to her pharmacy    She was asked to take it only when she has severe constipation call for any issues or problems    I have also recommended for her to have repeat colonoscopy in 2-1/2 to 3 years with a better prep    We will see her in the office on a personal visit in about 3 months she was asked to give us a call if she has any problem any issues        The patient has verbalized understanding and agreement to this plan  Documentation:  I communicated with the patient and/or health care decision maker about . Details of this discussion including any medical advice provided: yes      I affirm this is a Patient Initiated Episode with an Established Patient who has not had a related appointment within my department in the past 7 days or scheduled within the next 24 hours.     Total Time: minutes: 21-30 minutes    Note: not billable if this call serves to triage the patient into an appointment for the relevant concern      Bharti Giang

## 2020-04-07 NOTE — TELEPHONE ENCOUNTER
Writer spoke to pt over the phone to schedule 3 month f/u OV. Pt is sched for 7/9/20 @ 1:15pm [facility]; appt reminder mailed to pt's home address.

## 2020-04-08 ENCOUNTER — TELEPHONE (OUTPATIENT)
Dept: OBGYN CLINIC | Age: 40
End: 2020-04-08

## 2020-04-10 PROBLEM — F31.32 BIPOLAR AFFECTIVE DISORDER, CURRENTLY DEPRESSED, MODERATE (HCC): Status: ACTIVE | Noted: 2020-04-10

## 2020-04-10 PROBLEM — G43.909 MIGRAINES: Status: RESOLVED | Noted: 2019-01-16 | Resolved: 2020-04-10

## 2020-04-10 PROBLEM — G44.201 ACUTE INTRACTABLE TENSION-TYPE HEADACHE: Status: RESOLVED | Noted: 2018-12-16 | Resolved: 2020-04-10

## 2020-04-10 PROBLEM — L03.319 CELLULITIS AND ABSCESS OF TRUNK: Status: RESOLVED | Noted: 2019-01-12 | Resolved: 2020-04-10

## 2020-04-10 PROBLEM — E66.01 MORBID OBESITY (HCC): Status: ACTIVE | Noted: 2020-04-10

## 2020-04-10 PROBLEM — M54.30 SCIATICA: Status: ACTIVE | Noted: 2020-04-10

## 2020-04-10 PROBLEM — O09.529 AMA (ADVANCED MATERNAL AGE) MULTIGRAVIDA 35+: Status: RESOLVED | Noted: 2018-10-14 | Resolved: 2020-04-10

## 2020-04-10 PROBLEM — G44.201 ACUTE INTRACTABLE TENSION-TYPE HEADACHE: Status: ACTIVE | Noted: 2018-12-16

## 2020-04-10 PROBLEM — R93.89 THICKENED ENDOMETRIUM: Status: RESOLVED | Noted: 2020-02-17 | Resolved: 2020-04-10

## 2020-04-10 PROBLEM — Z98.1 ARTHRODESIS STATUS: Status: ACTIVE | Noted: 2017-11-14

## 2020-04-10 PROBLEM — F33.9 MAJOR DEPRESSIVE DISORDER, RECURRENT EPISODE (HCC): Status: ACTIVE | Noted: 2020-04-10

## 2020-04-10 PROBLEM — E66.01 MORBID OBESITY (HCC): Status: RESOLVED | Noted: 2020-04-10 | Resolved: 2020-04-10

## 2020-04-10 PROBLEM — L02.219 CELLULITIS AND ABSCESS OF TRUNK: Status: RESOLVED | Noted: 2019-01-12 | Resolved: 2020-04-10

## 2020-04-10 PROBLEM — G56.20 ULNAR NEUROPATHY: Status: ACTIVE | Noted: 2020-04-10

## 2020-04-10 PROBLEM — A41.9 SEPSIS (HCC): Status: RESOLVED | Noted: 2019-01-15 | Resolved: 2020-04-10

## 2020-04-10 PROBLEM — G47.00 INSOMNIA: Status: ACTIVE | Noted: 2020-04-10

## 2020-04-13 PROBLEM — T81.49XA INCISIONAL ABSCESS: Status: RESOLVED | Noted: 2019-01-15 | Resolved: 2020-04-13

## 2020-04-13 PROBLEM — Z98.1 ARTHRODESIS STATUS: Status: RESOLVED | Noted: 2017-11-14 | Resolved: 2020-04-13

## 2020-04-13 NOTE — PROGRESS NOTES
visual disturbance. Respiratory: Positive for cough, shortness of breath and wheezing. Negative for apnea and chest tightness. Cardiovascular: Negative. Negative for chest pain. Gastrointestinal: Positive for constipation. Negative for abdominal pain, diarrhea, nausea and vomiting. Endocrine: Negative for cold intolerance and heat intolerance. Genitourinary: Positive for menstrual problem and pelvic pain. Negative for difficulty urinating, dysuria, frequency and genital sores. Musculoskeletal: Positive for myalgias, neck pain and neck stiffness. Negative for arthralgias and gait problem. Skin: Negative for color change and rash. Allergic/Immunologic: Positive for environmental allergies. Neurological: Positive for numbness and headaches. Negative for weakness and light-headedness. Psychiatric/Behavioral: Positive for dysphoric mood and sleep disturbance. Negative for decreased concentration. The patient is nervous/anxious.         Patient Active Problem List    Diagnosis Date Noted    Moderate persistent asthma without complication     Lipoma of torso 2020    History of cervical discectomy 2020    Chronic neck pain with abnormal neurologic examination 2020    Abnormal weight gain  2020    Class 2 drug-induced obesity with serious comorbidity and body mass index (BMI) of 37.0 to 37.9 in adult 2020    Bipolar affective disorder, currently depressed, moderate ([address] 04/10/2020    Insomnia 04/10/2020    Ulnar neuropathy 04/10/2020    Major depressive disorder, recurrent episode ([address] 04/10/2020    Sciatica 04/10/2020    Endometriosis 2020    Family history of breast cancer 2020    Enlarged uterus 2020    Pelvic pain in female 2020    Hx of  x2 (G3, G4) 10/14/2018    Seizure ([address] 2018    Hx of IUFD (G2) 2018    gHTN (Diagnosed @ [facility]) 2018    Chronic constipation 2018    Spinal Day Smoker     Packs/day: 0.50     Years: 21.00     Pack years: 10.50     Types: Cigarettes    Smokeless tobacco: Never Used   Substance Use Topics    Alcohol use: No    Drug use: No        PHYSICAL EXAMINATION:  Vital Signs: (As obtained by patient/caregiver or practitioner observation)        Constitutional: [x] Appears well-developed and well-nourished [x] No apparent distress      [x] Abnormal- Morbid obesity  Mental status  [x] Alert and awake  [x] Oriented to person/place/time [x]Able to follow commands      Eyes:  EOM    [x]  Normal  [] Abnormal-  Sclera  [x]  Normal  [] Abnormal -         Discharge [x]  None visible  [] Abnormal -    HENT:   [x] Normocephalic, atraumatic. [] Abnormal   [x] Mouth/Throat: Mucous membranes are moist.     External Ears [x] Normal  [] Abnormal-     Neck: [x] No visualized mass     Pulmonary/Chest: [x] Respiratory effort normal.  [x] No visualized signs of difficulty breathing or respiratory distress        [] Abnormal     Musculoskeletal:   [x] Normal gait with no signs of ataxia         [x] Normal range of motion of neck        [x] Abnormal- Decreased flexion extension of neck. Neurological:        [x] No Facial Asymmetry (Cranial nerve 7 motor function) (limited exam to video visit)          [x] No gaze palsy --unable to assess       [] Abnormal-     Skin:        [x] No significant exanthematous lesions or discoloration noted on facial skin         [] Abnormal-   Psychiatric:       [x] Normal Affect [x] No Hallucinations        [x] Abnormal- anxious with pressured speech, No- SI, HI  Other pertinent observable physical exam findings-     Due to this being a TeleHealth encounter, evaluation of the following organ systems is limited: Vitals/Constitutional/EENT/Resp/CV/GI//MS/Neuro/Skin/Heme-Lymph-Imm. ASSESSMENT/PLAN:  1.  Chronic obstructive pulmonary disease, unspecified COPD type ([facility] [address] [facility]  Ongoing  PFT after the pandemic  See the pulmonology specialist  Continue Psychiatry    9. Hiatal hernia  Continue gastroenterology follow-up    10. Chronic constipation  Tinea gastroenterology follow-up  - Vitamin D 25 Hydroxy; Future    11. Endometriosis  Historical    12. Abnormal weight gain   Evaluate for thyroid dysfunction  - TSH without Reflex; Future    13. Class 2 drug-induced obesity with serious comorbidity and body mass index (BMI) of 37.0 to 37.9 in adult  Evaluate for endocrine disorder  - Hemoglobin A1C; Future  - Lipid, Fasting; Future  - TSH without Reflex; Future    14. History of cervical discectomy  Historical  See the specialist  - Ollie [address], Laura 70, DO, Neurosurgery, [place]    15. Smoker  Ongoing  Counseling given to quit smoking. Support offered. Hand out given. Educational material given  Patient declined despite discussion. 12. Establishing care with new doctor, encounter for  Initial visit  - Hemoglobin A1C; Future  - Lipid, Fasting; Future  - TSH without Reflex; Future  - Urinalysis Reflex to Culture; Future  - Vitamin D 25 Hydroxy; Future    17. Screening for diabetes mellitus  Recommended  - Hemoglobin A1C; Future    18. Screening for endocrine disorder  Recommended  - TSH without Reflex; Future    19. Screening for lipid disorders  Recommended  - Lipid, Fasting; Future    20. Encounter for vitamin deficiency screening  Recommended  - Vitamin D 25 Hydroxy; Future    21. Other specified disorders of carbohydrate metabolism (HCC)   Recommended  - Hemoglobin A1C; Future      Niurka received counseling on the following healthy behaviors: nutrition, exercise, medication adherence and tobacco cessation  Reviewed prior labs and health maintenance. Continue current medications, diet and exercise. Discussed use, benefit, and side effects of prescribed medications. Barriers to medication compliance addressed. Patient given educational materials - see patient instructions. All patient questions answered. Patient voiced understanding.      No

## 2020-04-14 ENCOUNTER — TELEMEDICINE (OUTPATIENT)
Dept: FAMILY MEDICINE CLINIC | Age: 40
End: 2020-04-14
Payer: COMMERCIAL

## 2020-04-14 VITALS — HEIGHT: 62 IN | WEIGHT: 204 LBS | BODY MASS INDEX: 37.54 KG/M2

## 2020-04-14 PROBLEM — D17.1 LIPOMA OF TORSO: Status: ACTIVE | Noted: 2020-04-14

## 2020-04-14 PROBLEM — K59.09 CHRONIC CONSTIPATION: Chronic | Status: ACTIVE | Noted: 2018-03-26

## 2020-04-14 PROBLEM — G89.29 CHRONIC NECK PAIN WITH ABNORMAL NEUROLOGIC EXAMINATION: Status: ACTIVE | Noted: 2020-04-14

## 2020-04-14 PROBLEM — E66.1 CLASS 2 DRUG-INDUCED OBESITY WITH SERIOUS COMORBIDITY AND BODY MASS INDEX (BMI) OF 37.0 TO 37.9 IN ADULT: Status: ACTIVE | Noted: 2020-04-14

## 2020-04-14 PROBLEM — M54.2 CHRONIC NECK PAIN WITH ABNORMAL NEUROLOGIC EXAMINATION: Status: ACTIVE | Noted: 2020-04-14

## 2020-04-14 PROBLEM — R63.5 ABNORMAL WEIGHT GAIN: Status: ACTIVE | Noted: 2020-04-14

## 2020-04-14 PROBLEM — J45.40 MODERATE PERSISTENT ASTHMA WITHOUT COMPLICATION: Status: ACTIVE | Noted: 2020-04-14

## 2020-04-14 PROBLEM — E66.812 CLASS 2 DRUG-INDUCED OBESITY WITH SERIOUS COMORBIDITY AND BODY MASS INDEX (BMI) OF 37.0 TO 37.9 IN ADULT: Status: ACTIVE | Noted: 2020-04-14

## 2020-04-14 PROBLEM — Z98.890 HISTORY OF CERVICAL DISCECTOMY: Status: ACTIVE | Noted: 2020-04-14

## 2020-04-14 PROCEDURE — 81003 URINALYSIS AUTO W/O SCOPE: CPT | Performed by: FAMILY MEDICINE

## 2020-04-14 PROCEDURE — 99204 OFFICE O/P NEW MOD 45 MIN: CPT | Performed by: FAMILY MEDICINE

## 2020-04-14 PROCEDURE — G8427 DOCREV CUR MEDS BY ELIG CLIN: HCPCS | Performed by: FAMILY MEDICINE

## 2020-04-14 RX ORDER — PREGABALIN 75 MG/1
75 CAPSULE ORAL 3 TIMES DAILY
Qty: 90 CAPSULE | Refills: 0 | Status: SHIPPED | OUTPATIENT
Start: 2020-04-14 | End: 2020-06-09

## 2020-04-14 ASSESSMENT — ENCOUNTER SYMPTOMS
SORE THROAT: 0
DIARRHEA: 0
TROUBLE SWALLOWING: 0
CHEST TIGHTNESS: 0
VOMITING: 0
COLOR CHANGE: 0
NAUSEA: 0
SHORTNESS OF BREATH: 1
WHEEZING: 1
COUGH: 1
ABDOMINAL PAIN: 0
EYE PAIN: 0
APNEA: 0
CONSTIPATION: 1

## 2020-04-14 ASSESSMENT — PATIENT HEALTH QUESTIONNAIRE - PHQ9
SUM OF ALL RESPONSES TO PHQ9 QUESTIONS 1 & 2: 2
2. FEELING DOWN, DEPRESSED OR HOPELESS: 1
1. LITTLE INTEREST OR PLEASURE IN DOING THINGS: 1
SUM OF ALL RESPONSES TO PHQ QUESTIONS 1-9: 2
SUM OF ALL RESPONSES TO PHQ QUESTIONS 1-9: 2

## 2020-04-14 NOTE — LETTER
MEDICATION AGREEMENT     Barbie Rosales  [birth year]      For certain conditions, multiple classes of medications may be used to help better manage your symptoms, and to improve your ability to function at home, work and in social settings. However, these medications do have risks, which will be discussed with you, including addiction and dependency. The following prescribed medications need frequent monitoring and will require you to partner and assist in your healthcare. Medication  Dose, instructions and quantity as indicated on current prescription bottle Diagnosis/Reason(s) for Taking Category                                  Benefits and goals of Controlled Substance Medications: There are two potential goals for your treatment: (1) decreased pain and suffering (2) improved daily life functions. There are many possible treatments for your chronic condition(s), and, in addition to controlled substance medications, we will try alternatives such as physical therapy, yoga, massage, home daily exercise, meditation, relaxation techniques, injections, chiropractic manipulations, surgery, cognitive therapy, hypnosis and many medications that are not habit-forming. Use of controlled substance medications may be helpful, but they are unlikely to resolve all of your symptoms or restore all function. Risks of Controlled Substance Medications:    Opioid pain medications: These medications can lead to problems such as addiction/dependence, sedation, lightheadedness/dizziness, memory issues, falls, constipation, nausea, or vomiting. They may also impair the ability to drive or operate machinery. Additionally, these medications may lower testosterone levels, leading to loss of bone strength, stamina and sex drive.   They may cause problems with breathing, sleep apnea and reduced coughing, which are especially dangerous for patients with lung supplements and oral decongestants. Dependence withdrawal symptoms may include depressed mood, loss of interest, suicidal thoughts, anxiety, fatigue, appetite changes and agitation. Testosterone replacement therapy:  Potential side effects include increased risk of stroke and heart attack, blood clots, increased blood pressure, increased cholesterol, enlarged prostate, sleep apnea, irritability/aggression and other mood disorders, and decreased fertility. Other:     1. I understand that I have the following responsibilities:  · I will take medications at the dose and frequency prescribed. · I will not increase or change how I take my medications without the approval of the health care provider who signs this Medication Agreement. · I will arrange for refills at the prescribed interval ONLY during regular office hours. I will not ask for refills earlier than agreed, after-hours, on holidays or on weekends. · I will obtain all refills for these medications at  ·  ____________________________________  pharmacy (phone number  ·  ________________________), with full consent for my provider and pharmacist to exchange information in writing or verbally. · I will not request any pain medications or controlled substances from other providers and will inform this provider of all other medications I am taking. · I will inform my other health care providers that I am taking these medications and of the existence of this [id number]. In the event of an emergency, I will provide the same information to the emergency department providers. · I will protect my prescriptions and medications. I understand that lost or misplaced prescriptions will not be replaced. · I will keep medications only for my own use and will not share them with others. I will keep all medications away from children. · I agree to participate in any medical, psychological or psychiatric assessments recommended by my provider.

## 2020-04-14 NOTE — PATIENT INSTRUCTIONS
has travel advice. www.who.int  Current as of: April 1, 2020               Content Version: 12.4  © 2006-2020 Healthwise, Incorporated. Care instructions adapted under license by your healthcare professional. If you have questions about a medical condition or this instruction, always ask your healthcare professional. [address] any warranty or liability for your use of this information. VIDEO ON COVID 19  [email or web address]?utm_source=Newsletter&utm_medium=Email&utm_campaign=COVID_News_040820&utm_content=first&mkt_tok=[id number]%3D    [email or web address]. html    Preventing the Spread of Coronavirus Disease 2019 in Homes and Residential Communities   For the most recent information go to [email or web address]    Prevention steps for People with confirmed or suspected COVID-19 (including persons under investigation) who do not need to be hospitalized  and   People with confirmed COVID-19 who were hospitalized and determined to be medically stable to go home    Your healthcare provider and public health staff will evaluate whether you can be cared for at home. If it is determined that you do not need to be hospitalized and can be isolated at home, you will be monitored by staff from your local or state health department. You should follow the prevention steps below until a healthcare provider or local or state health department says you can return to your normal activities. Stay home except to get medical care  People who are mildly ill with COVID-19 are able to isolate at home during their illness.  You should restrict activities outside COVID-19, it is still recommended that people sick with COVID-19 limit contact with animals until more information is known about the virus. ; When possible, have another member of your household care for your animals while you are sick. If you are sick with COVID-19, avoid contact with your pet, including petting, snuggling, being kissed or licked, and sharing food. If you must care for your pet or be around animals while you are sick, wash your hands before and after you interact with pets and wear a facemask. See COVID-19 and Animals for more information. Other considerations   The ill person should eat/be fed in their room if possible. Non-disposable  items used should be handled with gloves and washed with hot water or in a . Clean hands after handling used  items.  If possible, dedicate a lined trash can for the ill person. Use gloves when removing garbage bags, handling, and disposing of trash. Wash hands after handling or disposing of trash.  Consider consulting with your local health department about trash disposal guidance if available. Information for Household Members and Caregivers of Someone who is Sick   Call ahead before visiting your doctor   Call ahead: If you have a medical appointment, call the healthcare provider and tell them that you have or may have COVID-19. This will help the healthcare provider's office take steps to keep other people from getting infected or exposed. Wear a facemask if you are sick   ; If you are sick: You should wear a facemask when you are around other people (e.g., sharing a room or vehicle) or pets and before you enter a healthcare provider's office.    ; If you are caring for others: If the person who is sick is not able to wear a facemask (for example, because it causes trouble breathing), then people who live with the person who is sick should not stay in the same room with them, or they should wear a facemask if

## 2020-06-09 ENCOUNTER — OFFICE VISIT (OUTPATIENT)
Dept: OBGYN CLINIC | Age: 40
End: 2020-06-09
Payer: COMMERCIAL

## 2020-06-09 VITALS
DIASTOLIC BLOOD PRESSURE: 87 MMHG | TEMPERATURE: 98.4 F | HEART RATE: 88 BPM | WEIGHT: 203 LBS | BODY MASS INDEX: 37.13 KG/M2 | SYSTOLIC BLOOD PRESSURE: 120 MMHG

## 2020-06-09 PROBLEM — G89.29 CHRONIC PELVIC PAIN IN FEMALE: Status: ACTIVE | Noted: 2020-01-22

## 2020-06-09 PROCEDURE — 4004F PT TOBACCO SCREEN RCVD TLK: CPT | Performed by: SPECIALIST

## 2020-06-09 PROCEDURE — G8427 DOCREV CUR MEDS BY ELIG CLIN: HCPCS | Performed by: SPECIALIST

## 2020-06-09 PROCEDURE — 99213 OFFICE O/P EST LOW 20 MIN: CPT | Performed by: SPECIALIST

## 2020-06-09 PROCEDURE — G8417 CALC BMI ABV UP PARAM F/U: HCPCS | Performed by: SPECIALIST

## 2020-06-09 RX ORDER — OXYCODONE HYDROCHLORIDE AND ACETAMINOPHEN 5; 325 MG/1; MG/1
1 TABLET ORAL EVERY 4 HOURS PRN
COMMUNITY
Start: 2020-06-08 | End: 2020-06-11

## 2020-06-09 RX ORDER — CLINDAMYCIN HYDROCHLORIDE 150 MG/1
150 CAPSULE ORAL EVERY 6 HOURS
COMMUNITY
Start: 2020-06-08 | End: 2020-06-25 | Stop reason: ALTCHOICE

## 2020-06-09 ASSESSMENT — ENCOUNTER SYMPTOMS
ABDOMINAL PAIN: 0
APNEA: 0
EYE PAIN: 0
ABDOMINAL DISTENTION: 0
COUGH: 0
CONSTIPATION: 0
NAUSEA: 0
VOMITING: 0
DIARRHEA: 0

## 2020-06-09 NOTE — PROGRESS NOTES
Subjective:      Patient ID: Coral Stern is a 47 y.o. female. Chief Complaint   Patient presents with    Results     Patient is here today to review BRCA results and discuss getting rescheduled for surgery.  Surgical Consult     /87 (Site: Right Lower Arm, Position: Sitting, Cuff Size: Medium Adult)   Pulse 88   Temp 98.4 °F (36.9 °C) (Oral)   Wt 203 lb (92.1 kg)   LMP 05/23/2020   BMI 37.13 kg/m²   Patient's last menstrual period was 05/23/2020. [id number]    Past Medical History:   Diagnosis Date    Anxiety     Arthritis     OA    Asthma     Sadler's esophagus     LONG SEGMENT    Bipolar 1 disorder (HCC)     CAD (coronary artery disease)     COPD (chronic obstructive pulmonary disease) (HCC)     Depression     and bipolar    Endometriosis 3/9/2020    Esophagitis     severe    GERD (gastroesophageal reflux disease)     Headache(784.0)     Herniated disc, cervical     Hiatal hernia     Incisional abscess 1/15/2019    Kidney stones     Pleurisy     hx of \"years ago\"    Pneumonia     past penumonia    Seizures ([address]     UTI (urinary tract infection)     Vision abnormalities     wears glasses     Current Outpatient Medications Ordered in Epic   Medication Sig Dispense Refill    oxyCODONE-acetaminophen (PERCOCET) 5-325 MG per tablet Take 1 tablet by mouth every 4 hours as needed.       clindamycin (CLEOCIN) 150 MG capsule Take 150 mg by mouth every 6 hours      pregabalin (LYRICA) 75 MG capsule TAKE 1 CAPSULE BY MOUTH 3 TIMES DAILY 90 capsule 0    pantoprazole (PROTONIX) 40 MG tablet TAKE ONE TABLET TWICE A DAY ORALLY 30 DAY(S) 90 tablet 3    linaclotide (LINZESS) 290 MCG CAPS capsule Take 1 capsule by mouth every morning (before breakfast) 30 capsule 3    chlorhexidine (PERIDEX) 0.12 % solution RINSE WITH 15ML TWICE A DAY FOR 30 SECONDS      ipratropium-albuterol (DUONEB) 0.5-2.5 (3) MG/3ML SOLN nebulizer solution Inhale 1 vial into the lungs 4 times daily      diphenhydrAMINE (BENADRYL) 25 MG capsule Take 1 capsule by mouth every 6 hours as needed for Itching 30 capsule 0    EPINEPHrine (EPIPEN 2-HELDER) 0.3 MG/0.3ML SOAJ injection Inject 0.3 mLs into the muscle once for 1 dose Use as directed for allergic reaction 2 each 0    COMBIVENT RESPIMAT  MCG/ACT AERS inhaler Indications: uses PRN   6    montelukast (SINGULAIR) 10 MG tablet Take 1 tablet by mouth nightly 30 tablet 3    Fluticasone Furoate-Vilanterol (BREO ELLIPTA) 200-25 MCG/INH AEPB Inhale 1 puff into the lungs daily       No current [facility]-ordered facility-administered medications on file. Problem List Items Addressed This Visit     Smoker (Chronic)    Chronic pelvic pain in female    Relevant Medications    oxyCODONE-acetaminophen (PERCOCET) 5-325 MG per tablet    Endometriosis - Primary    Family history of breast cancer        Allergies   Allergen Reactions    Nsaids      Irritates her Barrets esophogus    Toradol [Ketorolac Tromethamine]     Ultram [Tramadol] Nausea Only     Gastric upset     No orders of the defined types were placed in this encounter. Patient is here today because she wants to discuss hysterectomy and the result of her BRCA testing. Patient states that she is still having pain from her endometriosis. She states that her pain is getting worse every day. She is still smoking cigarettes. Review of Systems   Constitutional: Negative for activity change, appetite change and fever. HENT: Negative for ear discharge and ear pain. Eyes: Negative for pain and visual disturbance. Respiratory: Negative for apnea and cough. Cardiovascular: Negative for chest pain, palpitations and leg swelling. Gastrointestinal: Negative for abdominal distention, abdominal pain, constipation, diarrhea, nausea and vomiting. Endocrine: Negative. Genitourinary: Positive for pelvic pain. Negative for difficulty urinating, dysuria and menstrual problem.    Musculoskeletal: Negative for neck pain and neck stiffness. Skin: Negative. Neurological: Negative for light-headedness and numbness. Hematological: Negative. Does not bruise/bleed easily. Objective:   Physical Exam  Vitals signs and nursing note reviewed. Constitutional:       Appearance: She is well-developed. HENT:      Head: Normocephalic and atraumatic. Neck:      Musculoskeletal: Normal range of motion and neck supple. Thyroid: No thyromegaly. Cardiovascular:      Rate and Rhythm: Normal rate and regular rhythm. Pulmonary:      Effort: Pulmonary effort is normal.      Breath sounds: Normal breath sounds. No wheezing. Abdominal:      General: Bowel sounds are normal. There is no distension. Palpations: Abdomen is soft. There is no mass. Tenderness: There is no abdominal tenderness. There is no guarding. Musculoskeletal: Normal range of motion. Skin:     General: Skin is dry. Neurological:      Mental Status: She is alert and oriented to person, place, and time. Psychiatric:         Behavior: Behavior normal.         Thought Content: Thought content normal.         Assessment:       Patient with chronic pelvic pain most likely due to endometriosis, which was diagnosed by laparoscopy. As patient is a smoker and is over the age of 30, she is not a candidate for treatment with hormones. Patient requests hysterectomy as she is no longer able to tolerate the pain and has completed her family status. Will schedule patient for robotic assisted laparoscopic hysterectomy. Discussed surgical procedure, risks and benefits, and all questions were answered. Patient was encouraged to stop smoking. Discussed results of [employer] testing as negative for genes analyzed by this test. Patient was provided with a copy of this report, along with contact information for [employer], should she have any additional questions or concerns.   Explained to patient that she still requires mammograms and

## 2020-06-10 ENCOUNTER — HOSPITAL ENCOUNTER (OUTPATIENT)
Age: 40
Discharge: HOME | End: 2020-06-10
Payer: COMMERCIAL

## 2020-06-10 LAB
BILIRUBIN URINE: NEGATIVE
CHOLESTEROL, FASTING: 177 MG/DL
CHOLESTEROL/HDL RATIO: 3.8
COLOR: YELLOW
COMMENT UA: NORMAL
ESTIMATED AVERAGE GLUCOSE: 117 MG/DL
GLUCOSE URINE: NEGATIVE
HBA1C MFR BLD: 5.7 % (ref 4–6)
HDLC SERPL-MCNC: 47 MG/DL
KETONES, URINE: NEGATIVE
LDL CHOLESTEROL: 109 MG/DL (ref 0–130)
LEUKOCYTE ESTERASE, URINE: NEGATIVE
NITRITE, URINE: NEGATIVE
PH UA: 5.5 (ref 5–8)
PROTEIN UA: NEGATIVE
SPECIFIC GRAVITY UA: 1.02 (ref 1–1.03)
TRIGLYCERIDE, FASTING: 105 MG/DL
TSH SERPL DL<=0.05 MIU/L-ACNC: 2.08 MIU/L (ref 0.3–5)
TURBIDITY: CLEAR
URINE HGB: NEGATIVE
UROBILINOGEN, URINE: NORMAL
VITAMIN D 25-HYDROXY: 19 NG/ML (ref 30–100)
VLDLC SERPL CALC-MCNC: NORMAL MG/DL (ref 1–30)

## 2020-06-10 PROCEDURE — 36415 COLL VENOUS BLD VENIPUNCTURE: CPT

## 2020-06-10 PROCEDURE — 80061 LIPID PANEL: CPT

## 2020-06-10 PROCEDURE — 81003 URINALYSIS AUTO W/O SCOPE: CPT

## 2020-06-10 PROCEDURE — 82306 VITAMIN D 25 HYDROXY: CPT

## 2020-06-10 PROCEDURE — 84443 ASSAY THYROID STIM HORMONE: CPT

## 2020-06-10 PROCEDURE — 83036 HEMOGLOBIN GLYCOSYLATED A1C: CPT

## 2020-06-11 ENCOUNTER — TELEPHONE (OUTPATIENT)
Dept: OBGYN CLINIC | Age: 40
End: 2020-06-11

## 2020-06-12 ENCOUNTER — TELEMEDICINE (OUTPATIENT)
Dept: FAMILY MEDICINE CLINIC | Age: 40
End: 2020-06-12
Payer: COMMERCIAL

## 2020-06-12 PROCEDURE — 99214 OFFICE O/P EST MOD 30 MIN: CPT | Performed by: FAMILY MEDICINE

## 2020-06-12 PROCEDURE — G8427 DOCREV CUR MEDS BY ELIG CLIN: HCPCS | Performed by: FAMILY MEDICINE

## 2020-06-12 RX ORDER — PEN NEEDLE, DIABETIC 31 GX5/16"
NEEDLE, DISPOSABLE MISCELLANEOUS
Qty: 100 EACH | Refills: 11 | Status: SHIPPED | OUTPATIENT
Start: 2020-06-12 | End: 2021-05-10

## 2020-06-12 RX ORDER — GLUCOSAMINE HCL/CHONDROITIN SU 500-400 MG
CAPSULE ORAL
Qty: 100 STRIP | Refills: 11 | Status: SHIPPED | OUTPATIENT
Start: 2020-06-12 | End: 2021-05-27

## 2020-06-12 RX ORDER — LANCETS 30 GAUGE
1 EACH MISCELLANEOUS 2 TIMES DAILY
Qty: 300 EACH | Refills: 11 | Status: SHIPPED | OUTPATIENT
Start: 2020-06-12 | End: 2021-05-27

## 2020-06-12 ASSESSMENT — ENCOUNTER SYMPTOMS
CHEST TIGHTNESS: 0
APNEA: 0
EYE PAIN: 0
CONSTIPATION: 0
SORE THROAT: 0
ABDOMINAL PAIN: 0
COLOR CHANGE: 0
TROUBLE SWALLOWING: 0
VOMITING: 0
COUGH: 0
SHORTNESS OF BREATH: 0
WHEEZING: 0
NAUSEA: 0
DIARRHEA: 0

## 2020-06-12 NOTE — PATIENT INSTRUCTIONS
Patient Education        Prediabetes: Care Instructions  Overview     Prediabetes is a warning sign that you're at risk for getting type 2 diabetes. It means that your blood sugar is higher than it should be. But it's not high enough to be diabetes. The food you eat naturally turns into sugar. Your body uses the sugar for energy. Normally, an organ called the pancreas makes insulin. And insulin allows the sugar in your blood to get into your body's cells. But sometimes the body can't use insulin the right way. So the sugar stays in your blood instead. This is called insulin resistance. The buildup of sugar in your blood means you have prediabetes. The good news is that you may be able to prevent or delay diabetes. Making small lifestyle changes, like getting active and changing your eating habits, may help you get your blood sugar back to normal. You can work with your doctor to make a treatment plan. Follow-up care is a key part of your treatment and safety. Be sure to make and go to all appointments, and call your doctor if you are having problems. It's also a good idea to know your test results and keep a list of the medicines you take. How can you care for yourself at home? · Watch your weight. A healthy weight helps your body use insulin properly. · Limit the amount of calories, sweets, and unhealthy fat you eat. Ask your doctor if you should see a dietitian. A registered dietitian can help you create meal plans that fit your lifestyle. · Get at least 30 minutes of exercise on most days of the week. Exercise helps control your blood sugar. It also helps you maintain a healthy weight. Walking is a good choice. You also may want to do other activities, such as running, swimming, cycling, or playing tennis or team sports. · Do not smoke. Smoking can make prediabetes worse. If you need help quitting, talk to your doctor about stop-smoking programs and medicines.  These can increase your chances of quitting get enough vitamin D may not grow as much as others their age. They also have a chance of getting a rare disease called rickets. It causes weak bones. Vitamin D and calcium are added to many foods. And your body uses sunshine to make its own vitamin D. How much vitamin D do you need? The [place] of Medicine recommends that people ages 3 through 79 get 600 IU (international units) every day. Adults 71 and older need 800 IU every day. Blood tests for vitamin D can check your vitamin D level. But there is no standard normal range used by all laboratories. You're likely getting enough vitamin D if your levels are in the range of 20 to 50 ng/mL. How can you get more vitamin D? Foods that contain vitamin D include:  · [place], tuna, and mackerel. These are some of the best foods to eat when you need to get more vitamin D.  · Cheese, egg yolks, and beef liver. These foods have vitamin D in small amounts. · Milk, soy drinks, orange juice, yogurt, margarine, and some kinds of cereal have vitamin D added to them. Some people don't make vitamin D as well as others. They may have to take extra care in getting enough vitamin D. Things that reduce how much vitamin D your body makes include:  · Dark skin, such as many  Americans have. · Age, especially if you are older than 72. · Digestive problems, such as Crohn's or celiac disease. · Liver and kidney disease. Some people who do not get enough vitamin D may need supplements. Are there any risks from taking vitamin D?  · Too much vitamin D:  ? Can damage your kidneys. ? Can cause nausea and vomiting, constipation, and weakness. ? Raises the amount of calcium in your blood. If this happens, you can get confused or have an irregular heart rhythm. · Vitamin D may interact with other medicines. Tell your doctor about all of the medicines you take, including over-the-counter drugs, herbs, and pills.  Tell your doctor about all of your current medical problems. Where can you learn more? Go to [email or web address].[email or web address] org and sign in to your [employer] account. Enter [id number] in the [employer] box to learn more about \"Learning About Vitamin D. \"     If you do not have an account, please click on the \"Sign Up Now\" link. Current as of: August 22, 2019               Content Version: 12.5  © 8635-5478 Healthwise, Incorporated. Care instructions adapted under license by [facility] ([facility]). If you have questions about a medical condition or this instruction, always ask your healthcare professional. [address] any warranty or liability for your use of this information.

## 2020-06-12 NOTE — PROGRESS NOTES
[x] No visualized signs of difficulty breathing or respiratory distress        [] Abnormal     Musculoskeletal:   [x] Normal gait with no signs of ataxia         [x] Normal range of motion of neck        [] Abnormal-     Neurological:        [x] No Facial Asymmetry (Cranial nerve 7 motor function) (limited exam to video visit)          [x] No gaze palsy        [] Abnormal-     Skin:        [x] No significant exanthematous lesions or discoloration noted on facial skin         [] Abnormal-     Psychiatric:       [x] Normal Affect [x] No Hallucinations        [x] Abnormal- Anxious    Other pertinent observable physical exam findings- Mild pressured speech  Lab Results   Component Value Date    WBC 16.6 (H) 03/04/2020    HGB 13.7 03/04/2020    HCT 42.7 03/04/2020    MCV 80.5 03/04/2020     03/04/2020     Lab Results   Component Value Date     03/04/2020    K 3.9 03/04/2020     03/04/2020    CO2 20 03/04/2020    BUN 10 03/04/2020    CREATININE 0.58 03/04/2020    GLUCOSE 109 03/04/2020    GLUCOSE 106 03/25/2012    CALCIUM 10.3 03/04/2020      Lab Results   Component Value Date    ALT 16 03/04/2020    AST 15 03/04/2020    ALKPHOS 66 03/04/2020    BILITOT 0.42 03/04/2020     Lab Results   Component Value Date    TSH 2.08 06/10/2020     Lab Results   Component Value Date    CHOL 218 (H) 04/10/2017    CHOL 213 (H) 03/22/2016     Lab Results   Component Value Date    TRIG 269 (H) 04/10/2017    TRIG 138 03/22/2016     Lab Results   Component Value Date    HDL 47 06/10/2020    HDL 47 04/10/2017    HDL 44 03/22/2016     Lab Results   Component Value Date    LDLCHOLESTEROL 109 06/10/2020    LDLCHOLESTEROL 117 04/10/2017    LDLCHOLESTEROL 141 (H) 03/22/2016     Lab Results   Component Value Date    VLDL NOT REPORTED 06/10/2020    VLDL NOT REPORTED 04/10/2017    VLDL NOT REPORTED 03/22/2016     Lab Results   Component Value Date    CHOLHDLRATIO 3.8 06/10/2020    CHOLHDLRATIO 4.6 04/10/2017    CHOLHDLRATIO 4.8 this encounter. Orders Placed This Encounter   Medications    vitamin D (CHOLECALCIFEROL) 25 MCG (1000 UT) TABS tablet     Sig: Take 1 tablet by mouth daily     Dispense:  30 tablet     Refill:  3    metFORMIN (GLUCOPHAGE) 500 MG tablet     Sig: Take 1 tablet by mouth 2 times daily (with meals) Take 1 tablet daily for the first 7 days. Then BID     Dispense:  60 tablet     Refill:  3    blood glucose monitor strips     Sig: Test 2-3 times a day & as needed for symptoms of irregular blood glucose. BRAND OF CHOICE INSURANCE ALLOWS. Dispense:  100 strip     Refill:  11    Lancets MISC     Si each by Does not apply route 2 times daily     Dispense:  300 each     Refill:  11    Alcohol Swabs (ALCOHOL PREP) PADS     Sig: Use as directed     Dispense:  100 each     Refill:  11      Niurka received counseling on the following healthy behaviors: nutrition, exercise and medication adherence  Reviewed prior labs and health maintenance. Continue current medications, diet and exercise. Discussed use, benefit, and side effects of prescribed medications. Barriers to medication compliance addressed. Patient given educational materials - see patient instructions. All patient questions answered. Patient voiced understanding. Return in about 3 months (around 2020) for PreDM, weight. Fernando Valdez is a 47 y.o. female being evaluated by a Virtual Visit (video visit) encounter to address concerns as mentioned above. Due to this being a TeleHealth encounter (During [facility]- public health emergency), evaluation of the following organ systems was limited:Vitals/Constitutional/EENT/Resp/CV/GI//MS/Neuro/Skin/Heme-Lymph-Imm. Services were provided through a video synchronous discussion virtually to substitute for in-person clinic visit. This is a telehealth visit that was performed with the originating site at Patient Location: home and provider Location of [address]      Verbal consent to participate in video visit was obtained. Patient ID verified by me prior to start of this visit  I discussed with the patient the nature of our telehealth visits via interactive/real-time audio/video that:  - I would evaluate the patient and recommend diagnostics and treatments based on my assessment  - Our sessions are not being recorded and that personal health information is protected  - Our team would provide follow up care in person if/when the patient needs it. Pursuant to the emergency declaration under the [address] [place] [address], [address] [place] [address] authority and the [employer] Act, this Virtual Visit was conducted with patient's (and/or legal guardian's) consent, to reduce the patient's risk of exposure to COVID-19 and provide necessary medical care. The patient (and/or legal guardian) has also been advised to contact this office for worsening conditions or problems, and seek emergency medical treatment and/or call 911 if deemed necessary. This note was completed by using the assistance of a speech-recognition program. However, inadvertent computerized transcription errors may be present. Although every effort was made to ensure accuracy, no guarantees can be provided that every mistake has been identified and corrected by editing.   Electronically signed by KATIE Castaneda CNP on 6/11/20 at 10:46 PM EDT

## 2020-06-25 ENCOUNTER — HOSPITAL ENCOUNTER (OUTPATIENT)
Dept: PREADMISSION TESTING | Age: 40
Discharge: HOME | End: 2020-06-29
Payer: COMMERCIAL

## 2020-06-25 VITALS
RESPIRATION RATE: 18 BRPM | TEMPERATURE: 98.2 F | WEIGHT: 203 LBS | HEART RATE: 75 BPM | DIASTOLIC BLOOD PRESSURE: 70 MMHG | HEIGHT: 62 IN | OXYGEN SATURATION: 98 % | BODY MASS INDEX: 37.36 KG/M2 | SYSTOLIC BLOOD PRESSURE: 133 MMHG

## 2020-06-25 RX ORDER — ACETAMINOPHEN 500 MG
500 TABLET ORAL EVERY 6 HOURS PRN
COMMUNITY
End: 2022-01-15

## 2020-06-25 ASSESSMENT — PAIN SCALES - GENERAL: PAINLEVEL_OUTOF10: 7

## 2020-06-25 ASSESSMENT — PAIN DESCRIPTION - DESCRIPTORS: DESCRIPTORS: ACHING

## 2020-06-25 ASSESSMENT — PAIN DESCRIPTION - LOCATION: LOCATION: ABDOMEN;NECK

## 2020-06-25 ASSESSMENT — PAIN DESCRIPTION - PAIN TYPE: TYPE: CHRONIC PAIN

## 2020-06-25 NOTE — H&P (VIEW-ONLY)
HISTORY and [address] [id number]       NAME:  Soni Hwang  MRN: [id number]   YOB: 1980   Date: 6/25/2020   Age: 47 y.o. Gender: female     COMPLAINT AND PRESENT HISTORY:     Soni Hwang is a 47 y.o.  female, undergoing preadmission testing for laparoscopic abdominal hysterectomy. Patient reports past medical history of chronic pelvic pain, endometriosis. She reports history of pain for past few years, progressively worsening. She reports history of abnormal uterine bleeding with menorrhagia. She reports pain with intercourse. Patient was recently seen in emergency room due to substernal chest pain, radiating to right arm. EKG reportedly with no acute ischemic changes. Troponin negative. Chest x-ray with no acute findings. She attributes symptoms to increased stress, severe anxiety. Symptoms have resolved, she denies chest pain, dyspnea, palpitations, cough, hemoptysis. No pain or swelling in the lower extremities. Patient denies medical history of CAD, CHF, CVA/TIA, CKD or IDDM. No history of venous thrombosis.      PAST MEDICAL HISTORY     Past Medical History:   Diagnosis Date    Anxiety     Arthritis     OA    Asthma     Sadler's esophagus     LONG SEGMENT    Bipolar 1 disorder ([facility]     CAD (coronary artery disease)     Chronic insomnia     [facility]ch[facility] obstructive [facility]     Depression     and bipolar    Diabetes mellitus ([address]     prediabetic    Endometriosis 3/9/2020    Esophagitis     severe    GERD (gastroesophageal reflux disease)     Headache(784.0)     Herniated disc, cervical     Hiatal hernia     Incisional abscess 1/15/2019    Kidney stones     MDRO (multiple drug resistant organisms) resistance     mrsa    Pleurisy     hx of \"years ago\"    Pneumonia     past penumonia    Seizures ([facility]     2016 last seizure-focal seizure    UTI (urinary tract infection)     Vision abnormalities     wears glasses     SURGICAL HISTORY       Past Surgical History:   Procedure Laterality Date    CERVICAL [address] SURGERY      x2     SECTION  , 2018    x 2     SECTION N/A 2018     SECTION performed by Soniya Hoffman MD at [facility]    COLONOSCOPY N/A 10/1/2019    COLONOSCOPY DIAGNOSTIC ABORTED performed by Tate Soto MD at [address] [facility] N/A 2020    COLONOSCOPY WITH BIOPSY performed by Tate Soto MD at [address] [place] [address], COLON, DIAGNOSTIC      KNEE ARTHROSCOPY Left 5/20/15    KNEE SURGERY Left     x3    LAPAROSCOPY      dr Burgess Henry N/A 10/5/2017    LAPAROSCOPIC REMOVAL INTRA ABDOMINAL LIPOMA LOWER RIGHT performed by Jason Flores DO at [address] [facility] ESOPHAGOGASTRODUODENOSCOPY TRANSORAL DIAGNOSTIC N/A 3/2/2017    EGD ESOPHAGOGASTRODUODENOSCOPY  IP  performed [facility]an [facility] at [facility]  2016    severe esophagitis    UPPER GASTROINTESTINAL ENDOSCOPY  2017    barretts; hiatus hernia; gastritis    UPPER GASTROINTESTINAL ENDOSCOPY  2017    long seg mullins's with linear erosions, esophagitis, small hiatal hernia    UPPER GASTROINTESTINAL ENDOSCOPY N/A 2018    MULLINS'S    UPPER GASTROINTESTINAL ENDOSCOPY N/A 10/1/2019    EGD BIOPSY performed by Tate Soto MD at [place] [address]       Family History   Problem Relation Age of Onset    Cancer Mother         breast    Bipolar Disorder Mother     Hypertension Mother     Breast Cancer Mother     Bipolar Disorder Brother     Hypertension Father      SOCIAL HISTORY       Social History     Socioeconomic History    Marital status: Single     Spouse name: None    Number of children: 1    Years of education: 11th grade    Highest education level: None   Occupational History    Occupation: unemployed-   Social Needs    Financial resource strain: None   [address] [place] [address] insecurity     Worry: None     Inability: None    Transportation needs     Medical: None     Non-medical: None   Tobacco Use    Smoking status: Current Every Day Smoker     Packs/day: 1.00     Years: 21.00     Pack years: 21.00     Types: Cigarettes    Smokeless tobacco: Never Used   Substance and Sexual Activity    Alcohol use: No    Drug use: No    Sexual activity: Yes     Partners: Male   Lifestyle    Physical activity     Days per week: None     Minutes per session: None    Stress: None   Relationships    Social connections     Talks on phone: None     Gets together: None     Attends Holiness service: None     Active member of club or organization: None     Attends meetings of clubs or organizations: None     Relationship status: None    Intimate partner violence     Fear of current or ex partner: None     Emotionally abused: None     Physically abused: None     Forced sexual activity: None   Other Topics Concern    None   Social History Narrative    Lives with son and boyfriend     REVIEW OF SYSTEMS      Allergies   Allergen Reactions    Nsaids      Irritates her Barrets esophogus    Toradol [Ketorolac Tromethamine]     Ultram [Tramadol] Nausea Only     Gastric upset     Current Outpatient Medications on File Prior to Encounter   Medication Sig Dispense Refill    acetaminophen (TYLENOL) 500 MG tablet Take 500 mg by mouth every 6 hours as needed for Pain      MISC NATURAL PRODUCT OP Take 2 tablets by mouth 2 times daily hydroxycut max-weight loss supplement      vitamin D (CHOLECALCIFEROL) 25 MCG (1000 UT) TABS tablet Take 1 tablet by mouth daily 30 tablet 3    metFORMIN (GLUCOPHAGE) 500 MG tablet Take 1 tablet by mouth 2 times daily (with meals) Take 1 tablet daily for the first 7 days.  Then BID 60 tablet 3    BREO ELLIPTA 200-25 MCG/INH AEPB inhaler INHALE ONE PUFF BY MOUTH ONCE A DAY ONCE A DAY INHALATION 30 60 each 1    pregabalin (LYRICA) 75 MG capsule TAKE 1 CAPSULE BY MOUTH 3 TIMES DAILY 90 capsule 0    pantoprazole (PROTONIX) 40 MG tablet TAKE ONE TABLET TWICE A DAY ORALLY 30 DAY(S) 90 tablet 3    linaclotide (LINZESS) 290 MCG CAPS capsule Take 1 capsule by mouth every morning (before breakfast) 30 capsule 3    chlorhexidine (PERIDEX) 0.12 % solution RINSE WITH 15ML TWICE A DAY FOR 30 SECONDS      ipratropium-albuterol (DUONEB) 0.5-2.5 (3) MG/3ML SOLN nebulizer solution Inhale 1 vial into the lungs 4 times daily      diphenhydrAMINE (BENADRYL) 25 MG capsule Take 1 capsule by mouth every 6 hours as needed for Itching 30 capsule 0    EPINEPHrine (EPIPEN 2-HELDER) 0.3 MG/0.3ML SOAJ injection Inject 0.3 mLs into the muscle once for 1 dose Use as directed for allergic reaction 2 each 0    COMBIVENT RESPIMAT  MCG/ACT AERS inhaler Indications: uses PRN   6    montelukast (SINGULAIR) 10 MG tablet Take 1 tablet by mouth nightly 30 tablet 3    blood glucose monitor strips Test 2-3 times a day & as needed for symptoms of irregular blood glucose. BRAND OF CHOICE INSURANCE ALLOWS. 100 strip 11    Lancets MISC 1 each by Does not apply route 2 times daily 300 each 11    Alcohol Swabs (ALCOHOL PREP) PADS Use as directed 100 each 11     No current facility-administered medications on file prior to encounter. Negative except for what is mentioned in the HPI. GENERAL PHYSICAL EXAM     Vitals: /70   Pulse 75   Temp 98.2 °F (36.8 °C) (Temporal)   Resp 18   Ht 5' 2\" (1.575 m)   Wt 203 lb (92.1 kg)   LMP 06/15/2020   SpO2 98%   BMI 37.13 kg/m²  Body mass index is 37.13 kg/m². GENERAL APPEARANCE:   Gisele Sloan is a 47 y.o.,  female, moderately obese, nourished, conscious, alert. Does not appear to be in any distress or pain at this time. SKIN:  Normal temperature, turgor and texture. No cyanosis or jaundice. HEAD:  Normocephalic, atraumatic. EYES:  Pupils equal, reactive to light and accomodation. Conjunctiva clear, no pallor. THROAT:   Mucous membranes moist. No tonsillar erythema or exudates. NECK:  No stiffness, trachea central.  No palpable masses. CHEST:  Symmetrical and equal on expansion. HEART:  Regular rate, rhythm. No murmur. LUNGS:  Equal on expansion. Clear to auscultation with no adventitious sounds. ABDOMEN:  Obese. Soft on palpation. No localized tenderness, guarding or rigidity. No palpable organomegaly. LYMPHATICS:  No palpable cervical lymphadenopathy. LOCOMOTOR, BACK AND SPINE:  No tenderness or deformities. No flank tenderness. EXTREMITIES:  Symmetrical with no pretibial/pedal edema. No discoloration or ulcerations. No warmth, tenderness, erythema noted in lower legs bilaterally. NEUROLOGIC:  The patient is conscious, alert, oriented. Speech is clear, no facial droop. No focal sensory or motor deficits. PROVISIONAL DIAGNOSES / SURGERY:      1. Endometriosis  2.  Chronic pelvic pain    Laparoscopic Abdominal Hysterectomy         Shashi Acosta PA-C on 6/25/2020 at 11:23 AM

## 2020-06-25 NOTE — H&P
HISTORY       Past Surgical History:   Procedure Laterality Date    CERVICAL [address] SURGERY      x2     SECTION  , 2018    x 2     SECTION N/A 2018     SECTION performed [facility] Harriet Burrows MD [facility]    COLONOSCOPY N/A 10/1/2019    COLONOSCOPY DIAGNOSTIC ABORTED performed by Rueben Halsted, MD at [address] [facility] N/A 2020    COLONOSCOPY WITH BIOPSY performed by Rueben Halsted, MD at [address] [place] [address], COLON, DIAGNOSTIC      KNEE ARTHROSCOPY Left 5/20/15    KNEE SURGERY Left     x3    LAPAROSCOPY      dr Donnie Mccormick N/A 10/5/2017    LAPAROSCOPIC REMOVAL INTRA ABDOMINAL LIPOMA LOWER RIGHT performed by Lyn Rao DO at [address] ESOPHAGOGASTRODUODENOSCOPY TRANSORAL DIAGNOSTIC N/A 3/2/2017    EGD ESOPHAGOGASTRODUODENOSCOPY  IP  performed [facility] Val Bah [facility] at [facility]  2016    severe esophagitis    UPPER GASTROINTESTINAL ENDOSCOPY  2017    barretts; hiatus hernia; gastritis    UPPER GASTROINTESTINAL ENDOSCOPY  2017    long seg mullins's with linear erosions, esophagitis, small hiatal hernia    UPPER GASTROINTESTINAL ENDOSCOPY N/A 2018    MULLINS'S    UPPER GASTROINTESTINAL ENDOSCOPY N/A 10/1/2019    EGD BIOPSY performed by Rueben Halsted, MD at [place] [address]       Family History   Problem Relation Age of Onset    Cancer Mother         breast    Bipolar Disorder Mother     Hypertension Mother     Breast Cancer Mother     Bipolar Disorder Brother     Hypertension Father      SOCIAL HISTORY       Social History     Socioeconomic History    Marital status: Single     Spouse name: None    Number of children: 1    Years of education: 11th grade    Highest education level: None   Occupational History    Occupation: unemployed-   Social Needs    Financial resource strain: None   [employer] Conjunctiva clear, no pallor. THROAT:   Mucous membranes moist. No tonsillar erythema or exudates. NECK:  No stiffness, trachea central.  No palpable masses. CHEST:  Symmetrical and equal on expansion. HEART:  Regular rate, rhythm. No murmur. LUNGS:  Equal on expansion. Clear to auscultation with no adventitious sounds. ABDOMEN:  Obese. Soft on palpation. No localized tenderness, guarding or rigidity. No palpable organomegaly. LYMPHATICS:  No palpable cervical lymphadenopathy. LOCOMOTOR, BACK AND SPINE:  No tenderness or deformities. No flank tenderness. EXTREMITIES:  Symmetrical with no pretibial/pedal edema. No discoloration or ulcerations. No warmth, tenderness, erythema noted in lower legs bilaterally. NEUROLOGIC:  The patient is conscious, alert, oriented. Speech is clear, no facial droop. No focal sensory or motor deficits. PROVISIONAL DIAGNOSES / SURGERY:      1. Endometriosis  2.  Chronic pelvic pain    Laparoscopic Abdominal Hysterectomy         Jenni Eason PA-C on 6/25/2020 at 11:23 AM

## 2020-06-29 ENCOUNTER — TELEPHONE (OUTPATIENT)
Dept: OBGYN CLINIC | Age: 40
End: 2020-06-29

## 2020-06-29 NOTE — TELEPHONE ENCOUNTER
Patient called back. Informed she requires medical clearance from her PCP and informed that writer had faxed the anesthesia request for medical clearance to her pcp office.   Instructed to call pcp

## 2020-07-01 ENCOUNTER — TELEPHONE (OUTPATIENT)
Dept: OBGYN CLINIC | Age: 40
End: 2020-07-01

## 2020-07-01 NOTE — TELEPHONE ENCOUNTER
Writer called and spoke with patient. Instructed her to connect with her pcp for medical clearance. Writer called pcp and spoke with Beck.   Refaxed the clearance request from anesthesia

## 2020-07-02 ENCOUNTER — HOSPITAL ENCOUNTER (EMERGENCY)
Age: 40
Discharge: HOME | End: 2020-07-02
Attending: EMERGENCY MEDICINE
Payer: COMMERCIAL

## 2020-07-02 ENCOUNTER — APPOINTMENT (OUTPATIENT)
Dept: CT IMAGING | Age: 40
End: 2020-07-02
Payer: COMMERCIAL

## 2020-07-02 VITALS
TEMPERATURE: 98.3 F | SYSTOLIC BLOOD PRESSURE: 142 MMHG | RESPIRATION RATE: 16 BRPM | HEART RATE: 75 BPM | HEIGHT: 62 IN | BODY MASS INDEX: 37.36 KG/M2 | WEIGHT: 203 LBS | OXYGEN SATURATION: 96 % | DIASTOLIC BLOOD PRESSURE: 78 MMHG

## 2020-07-02 LAB
-: ABNORMAL
ABSOLUTE EOS #: 0.09 K/UL (ref 0–0.4)
ABSOLUTE IMMATURE GRANULOCYTE: ABNORMAL K/UL (ref 0–0.3)
ABSOLUTE LYMPH #: 1.82 K/UL (ref 1–4.8)
ABSOLUTE MONO #: 0.73 K/UL (ref 0.1–1.3)
ALBUMIN SERPL-MCNC: 4 G/DL (ref 3.5–5.2)
ALBUMIN/GLOBULIN RATIO: ABNORMAL (ref 1–2.5)
ALP BLD-CCNC: 53 U/L (ref 35–104)
ALT SERPL-CCNC: 19 U/L (ref 5–33)
AMORPHOUS: ABNORMAL
ANION GAP SERPL CALCULATED.3IONS-SCNC: 12 MMOL/L (ref 9–17)
AST SERPL-CCNC: 16 U/L
BACTERIA: ABNORMAL
BASOPHILS # BLD: 0 % (ref 0–2)
BASOPHILS ABSOLUTE: 0 K/UL (ref 0–0.2)
BILIRUB SERPL-MCNC: 0.36 MG/DL (ref 0.3–1.2)
BILIRUBIN URINE: NEGATIVE
BUN BLDV-MCNC: 18 MG/DL (ref 6–20)
BUN/CREAT BLD: ABNORMAL (ref 9–20)
CALCIUM SERPL-MCNC: 11.6 MG/DL (ref 8.6–10.4)
CASTS UA: ABNORMAL /LPF
CHLORIDE BLD-SCNC: 104 MMOL/L (ref 98–107)
CO2: 24 MMOL/L (ref 20–31)
COLOR: YELLOW
COMMENT UA: ABNORMAL
CREAT SERPL-MCNC: 0.99 MG/DL (ref 0.5–0.9)
CRYSTALS, UA: ABNORMAL /HPF
DIFFERENTIAL TYPE: ABNORMAL
EOSINOPHILS RELATIVE PERCENT: 1 % (ref 0–4)
EPITHELIAL CELLS UA: ABNORMAL /HPF
GFR AFRICAN AMERICAN: >60 ML/MIN
GFR NON-AFRICAN AMERICAN: >60 ML/MIN
GFR SERPL CREATININE-BSD FRML MDRD: ABNORMAL ML/MIN/{1.73_M2}
GFR SERPL CREATININE-BSD FRML MDRD: ABNORMAL ML/MIN/{1.73_M2}
GLUCOSE BLD-MCNC: 113 MG/DL (ref 70–99)
GLUCOSE URINE: NEGATIVE
HCG QUALITATIVE: NEGATIVE
HCT VFR BLD CALC: 37.8 % (ref 36–46)
HEMOGLOBIN: 12.9 G/DL (ref 12–16)
IMMATURE GRANULOCYTES: ABNORMAL %
KETONES, URINE: NEGATIVE
LEUKOCYTE ESTERASE, URINE: NEGATIVE
LIPASE: 31 U/L (ref 13–60)
LYMPHOCYTES # BLD: 20 % (ref 24–44)
MCH RBC QN AUTO: 27.3 PG (ref 26–34)
MCHC RBC AUTO-ENTMCNC: 34.1 G/DL (ref 31–37)
MCV RBC AUTO: 80.2 FL (ref 80–100)
MONOCYTES # BLD: 8 % (ref 1–7)
MORPHOLOGY: ABNORMAL
MUCUS: ABNORMAL
NITRITE, URINE: NEGATIVE
NRBC AUTOMATED: ABNORMAL PER 100 WBC
OTHER OBSERVATIONS UA: ABNORMAL
PDW BLD-RTO: 16.4 % (ref 11.5–14.9)
PH UA: 7 (ref 5–8)
PLATELET # BLD: 280 K/UL (ref 150–450)
PLATELET ESTIMATE: ABNORMAL
PMV BLD AUTO: 8.6 FL (ref 6–12)
POTASSIUM SERPL-SCNC: 3.6 MMOL/L (ref 3.7–5.3)
PROTEIN UA: NEGATIVE
RBC # BLD: 4.72 M/UL (ref 4–5.2)
RBC # BLD: ABNORMAL 10*6/UL
RBC UA: ABNORMAL /HPF
RENAL EPITHELIAL, UA: ABNORMAL /HPF
SEG NEUTROPHILS: 71 % (ref 36–66)
SEGMENTED NEUTROPHILS ABSOLUTE COUNT: 6.46 K/UL (ref 1.3–9.1)
SODIUM BLD-SCNC: 140 MMOL/L (ref 135–144)
SPECIFIC GRAVITY UA: 1.01 (ref 1–1.03)
TOTAL PROTEIN: 6.4 G/DL (ref 6.4–8.3)
TRICHOMONAS: ABNORMAL
TURBIDITY: ABNORMAL
URINE HGB: NEGATIVE
UROBILINOGEN, URINE: NORMAL
WBC # BLD: 9.1 K/UL (ref 3.5–11)
WBC # BLD: ABNORMAL 10*3/UL
WBC UA: ABNORMAL /HPF
YEAST: ABNORMAL

## 2020-07-02 PROCEDURE — 85025 COMPLETE CBC W/AUTO DIFF WBC: CPT

## 2020-07-02 PROCEDURE — 74177 CT ABD & PELVIS W/CONTRAST: CPT

## 2020-07-02 PROCEDURE — 6360000004 HC RX CONTRAST MEDICATION: Performed by: EMERGENCY MEDICINE

## 2020-07-02 PROCEDURE — 36415 COLL VENOUS BLD VENIPUNCTURE: CPT

## 2020-07-02 PROCEDURE — 81001 URINALYSIS AUTO W/SCOPE: CPT

## 2020-07-02 PROCEDURE — 99284 EMERGENCY DEPT VISIT MOD MDM: CPT

## 2020-07-02 PROCEDURE — 6360000002 HC RX W HCPCS: Performed by: STUDENT IN AN ORGANIZED HEALTH CARE EDUCATION/TRAINING PROGRAM

## 2020-07-02 PROCEDURE — 80053 COMPREHEN METABOLIC PANEL: CPT

## 2020-07-02 PROCEDURE — 84703 CHORIONIC GONADOTROPIN ASSAY: CPT

## 2020-07-02 PROCEDURE — 83690 ASSAY OF LIPASE: CPT

## 2020-07-02 PROCEDURE — 96365 THER/PROPH/DIAG IV INF INIT: CPT

## 2020-07-02 PROCEDURE — 2580000003 HC RX 258: Performed by: STUDENT IN AN ORGANIZED HEALTH CARE EDUCATION/TRAINING PROGRAM

## 2020-07-02 PROCEDURE — 96375 TX/PRO/DX INJ NEW DRUG ADDON: CPT

## 2020-07-02 PROCEDURE — 2580000003 HC RX 258: Performed by: EMERGENCY MEDICINE

## 2020-07-02 RX ORDER — 0.9 % SODIUM CHLORIDE 0.9 %
1000 INTRAVENOUS SOLUTION INTRAVENOUS ONCE
Status: COMPLETED | OUTPATIENT
Start: 2020-07-02 | End: 2020-07-02

## 2020-07-02 RX ORDER — SODIUM CHLORIDE 0.9 % (FLUSH) 0.9 %
10 SYRINGE (ML) INJECTION PRN
Status: DISCONTINUED | OUTPATIENT
Start: 2020-07-02 | End: 2020-07-02 | Stop reason: HOSPADM

## 2020-07-02 RX ORDER — MORPHINE SULFATE 4 MG/ML
4 INJECTION, SOLUTION INTRAMUSCULAR; INTRAVENOUS ONCE
Status: COMPLETED | OUTPATIENT
Start: 2020-07-02 | End: 2020-07-02

## 2020-07-02 RX ORDER — LINACLOTIDE 290 UG/1
290 CAPSULE, GELATIN COATED ORAL
Qty: 30 CAPSULE | Refills: 3 | Status: SHIPPED | OUTPATIENT
Start: 2020-07-02 | End: 2020-11-16

## 2020-07-02 RX ORDER — PROMETHAZINE HYDROCHLORIDE 25 MG/ML
25 INJECTION, SOLUTION INTRAMUSCULAR; INTRAVENOUS ONCE
Status: DISCONTINUED | OUTPATIENT
Start: 2020-07-02 | End: 2020-07-02 | Stop reason: CLARIF

## 2020-07-02 RX ORDER — AMOXICILLIN AND CLAVULANATE POTASSIUM 875; 125 MG/1; MG/1
1 TABLET, FILM COATED ORAL 2 TIMES DAILY
Qty: 20 TABLET | Refills: 0 | Status: SHIPPED | OUTPATIENT
Start: 2020-07-02 | End: 2020-07-12

## 2020-07-02 RX ORDER — 0.9 % SODIUM CHLORIDE 0.9 %
80 INTRAVENOUS SOLUTION INTRAVENOUS ONCE
Status: DISCONTINUED | OUTPATIENT
Start: 2020-07-02 | End: 2020-07-02 | Stop reason: HOSPADM

## 2020-07-02 RX ADMIN — MORPHINE SULFATE 4 MG: 4 INJECTION, SOLUTION INTRAMUSCULAR; INTRAVENOUS at 16:41

## 2020-07-02 RX ADMIN — SODIUM CHLORIDE 1000 ML: 9 INJECTION, SOLUTION INTRAVENOUS at 16:09

## 2020-07-02 RX ADMIN — Medication 10 ML: at 17:30

## 2020-07-02 RX ADMIN — PROMETHAZINE HYDROCHLORIDE 25 MG: 25 INJECTION INTRAMUSCULAR; INTRAVENOUS at 16:37

## 2020-07-02 RX ADMIN — IOVERSOL 75 ML: 741 INJECTION INTRA-ARTERIAL; INTRAVENOUS at 17:13

## 2020-07-02 ASSESSMENT — ENCOUNTER SYMPTOMS
SHORTNESS OF BREATH: 0
SINUS PAIN: 0
VOMITING: 1
PHOTOPHOBIA: 0
CHEST TIGHTNESS: 0
NAUSEA: 1
DIARRHEA: 1
SINUS PRESSURE: 0
BLOOD IN STOOL: 0
ABDOMINAL PAIN: 1

## 2020-07-02 ASSESSMENT — PAIN SCALES - GENERAL: PAINLEVEL_OUTOF10: 9

## 2020-07-02 ASSESSMENT — PAIN DESCRIPTION - PROGRESSION: CLINICAL_PROGRESSION: GRADUALLY WORSENING

## 2020-07-02 ASSESSMENT — PAIN DESCRIPTION - FREQUENCY
FREQUENCY: CONTINUOUS
FREQUENCY: CONTINUOUS

## 2020-07-02 ASSESSMENT — PAIN DESCRIPTION - PAIN TYPE
TYPE: ACUTE PAIN
TYPE: ACUTE PAIN

## 2020-07-02 ASSESSMENT — PAIN DESCRIPTION - ONSET: ONSET: GRADUAL

## 2020-07-02 ASSESSMENT — PAIN DESCRIPTION - DESCRIPTORS
DESCRIPTORS: ACHING;SHARP;SQUEEZING
DESCRIPTORS: ACHING;DISCOMFORT;CONSTANT

## 2020-07-02 ASSESSMENT — PAIN DESCRIPTION - LOCATION
LOCATION: ABDOMEN
LOCATION: ABDOMEN

## 2020-07-02 NOTE — ED NOTES
Informed pt. Still waiting on phenergan to arrive from pharmacy. She asked if she could have something for pain. This RN informed Pt. No orders for pain medications at this time. Pt. Stated \" I came here to get something for pain, I cant handle this pain\". This RN stated Dr. Nura Loyola would be notified.      Barabara Heimlich, RN  07/02/20 [id number]

## 2020-07-02 NOTE — ED NOTES
Mode of arrival (squad #, walk in, police, etc) : walk in        Chief complaint(s): Abdominal pain, nausea, vomiting, and diarrhea. Arrival Note (brief scenario, treatment PTA, etc).: Pt. Stated She has had abdominal pain, nausea/ vomiting, and diarrhea for a day. Pt. Stated she has not been able to keep anything down and the pain keeps getting worse. Pt. States abdominal pain is diffuse, and denies pain with palpation. Pt. States she has had multiple episodes of vomiting/ diarrhea today. Pt. Not actively vomiting upon arrival to ER. Patient alert and oriented times 4.         C= \"Have you ever felt that you should Cut down on your drinking? \"  No  A= \"Have people Annoyed you by criticizing your drinking? \"  No  G= \"Have you ever felt bad or Guilty about your drinking? \"  No  E= \"Have you ever had a drink as an Eye-opener first thing in the morning to steady your nerves or to help a hangover? \"  No      Deferred []      Reason for deferring: N/A    *If yes to two or more: probable alcohol abuse. Ernesto Blair, ANNY  07/02/20 [id number]

## 2020-07-02 NOTE — ED PROVIDER NOTES
[address] [facility] ED     Emergency Department     Faculty Attestation        I performed a history and physical examination of the patient and discussed management with the resident. I reviewed the residents note and agree with the documented findings and plan of care. Any areas of disagreement are noted on the chart. I was personally present for the key portions of any procedures. I have documented in the chart those procedures where I was not present during the key portions. I have reviewed the emergency nurses triage note. I agree with the chief complaint, past medical history, past surgical history, allergies, medications, social and family history as documented unless otherwise noted below. Documentation of the HPI, Physical Exam and Medical Decision Making performed by medical students or scribes is based on my personal performance of the HPI, PE and MDM. For Physician Assistant/ Nurse Practitioner cases/documentation I have have had a face to face evaluation with this patient and have completed at least one if not all key elements of the E/M (history, physical exam, and MDM). Additional findings are as noted. Pertinent Comments     Primary Care Physician: KATIE Alarcon - CNP    History: This is a 47 y.o. female who presents to the Emergency Department with a chief complaint of generalized abdominal pain, nausea, vomiting and diarrhea. Denies fevers, cough, shortness of breath however does acknowledge a history of asthma and states that he has been affecting her breathing over the past several weeks. Physical:     height is 5' 2\" (1.575 m) and weight is 203 lb (92.1 kg). Her oral temperature is 98.3 °F (36.8 °C). Her blood pressure is 145/85 (abnormal) and her pulse is 87. Her respiration is 22 and oxygen saturation is 97%.    47 y.o. female     Afebrile, nontoxic, normal vital signs. Not any acute distress.   Abdomen is obese, diffuse tenderness on exam.  No focal peritoneal signs.     Impression: Abdominal pain, gastroenteritis, unlikely obstruction, unlikely appendicitis    Plan: Lab work, fluids, symptomatic treatment, will consider CT scan based on response and her work-up        (Please note that portions of this note were completed with a voice recognition program.  Efforts were made to edit the dictations but occasionally words are mis-transcribed.)    Khoa Barry DO  Attending Emergency Physician         Khoa Barry DO  07/02/20 [id number]

## 2020-07-02 NOTE — ED PROVIDER NOTES
[address]  Emergency Department Encounter  EmergencyMedicine Resident     Pt Name:Niurka Alba  MRN: [id number]  [place] 1980  Date of evaluation: 20  PCP:  KATIE Cole CNP    CHIEF COMPLAINT       Chief Complaint   Patient presents with    Abdominal Pain    Nausea    Emesis    Generalized Body Aches       HISTORY OF PRESENT ILLNESS  (Location/Symptom, Timing/Onset, Context/Setting, Quality, Duration, Modifying Factors, Severity.)      Tristan Sommer is a 47 y.o. female who presents with diffuse abdominal pain, nausea, vomiting, diarrhea and generalized body aches and fatigue. Patient reports that nausea, vomiting and abdominal pain began yesterday and are gradually getting worse until today. Patient reports 5-6 episodes of emesis today and multiple episodes of diarrhea. Patient states that the abdominal pain is diffuse, not located in one specific spot of her abdomen and constant with waxing and waning severity. Denies any fevers at home but does report generalized body aches and chills. Denies any bloody emesis, bloody stools, vaginal bleeding, vaginal discharge, pelvic pain, chest pain, shortness of breath. PAST MEDICAL / SURGICAL / SOCIAL / FAMILY HISTORY      has a past medical history of Anxiety, Arthritis, Asthma, Sadler's esophagus, Bipolar 1 disorder ([address] CAD (coronary artery disease), Chronic insomnia, COPD (chronic obstructive pulmonary disease) ([address] Depression, Diabetes mellitus ([address] Endometriosis, Esophagitis, GERD (gastroesophageal reflux disease), Headache(784.0), Herniated disc, cervical, Hiatal hernia, Incisional abscess, Kidney stones, MDRO (multiple drug resistant organisms) resistance, Pleurisy, Pneumonia, Seizures ([address] UTI (urinary tract infection), and Vision abnormalities. has a past surgical history that includes knee surgery (Left);  section (, 2018); Tonsillectomy; Knee arthroscopy (Left, 5/20/15);  Cervical disc surgery; Upper gastrointestinal endoscopy (2016); Upper gastrointestinal endoscopy (2017); Upper gastrointestinal endoscopy (2017); pr esophagogastroduodenoscopy transoral diagnostic (N/A, 3/2/2017); laparoscopy; laparoscopy (N/A, 10/5/2017); Upper gastrointestinal endoscopy (N/A, 2018); Endoscopy, colon, diagnostic;  section (N/A, 2018); Upper gastrointestinal endoscopy (N/A, 10/1/2019); Colonoscopy (N/A, 10/1/2019); Colonoscopy (N/A, 2020); and [place] tooth extraction.       Social History     Socioeconomic History    Marital status: Single     Spouse name: Not on file    Number of children: 1    Years of education: 11th grade    Highest education level: Not on file   Occupational History    Occupation: unemployed-   Social Needs    Financial resource strain: Not on file    Food insecurity     Worry: Not on file     Inability: Not on file   [employer] needs     Medical: Not on file     Non-medical: Not on file   Tobacco Use    Smoking status: Current Every Day Smoker     Packs/day: 1.00     Years: 21.00     Pack years: 21.00     Types: Cigarettes    Smokeless tobacco: Never Used   Substance and Sexual Activity    Alcohol use: No    Drug use: No    Sexual activity: Yes     Partners: Male   Lifestyle    Physical activity     Days per week: Not on file     Minutes per session: Not on file    Stress: Not on file   Relationships    Social connections     Talks on phone: Not on file     Gets together: Not on file     Attends Worship service: Not on file     Active member of club or organization: Not on file     Attends meetings of clubs or organizations: Not on file     Relationship status: Not on file    Intimate partner violence     Fear of current or ex partner: Not on file     Emotionally abused: Not on file     Physically abused: Not on file     Forced sexual activity: Not on file   Other Topics Concern    Not on file   Social History Narrative Lives with son and boyfriend       Family History   Problem Relation Age of Onset   Soila Elba Cancer Mother         breast    Bipolar Disorder Mother     Hypertension Mother     Breast Cancer Mother     Bipolar Disorder Brother     Hypertension Father        Allergies:  Nsaids; Toradol [ketorolac tromethamine]; and Ultram [tramadol]    Home Medications:  Prior to Admission medications    Medication Sig Start Date End Date Taking? Authorizing Provider   amoxicillin-clavulanate (AUGMENTIN) 875-125 MG per tablet Take 1 tablet by mouth 2 times daily for 10 days 7/2/20 7/12/20 Yes Jon Joel DO   LINZESS 290 MCG CAPS capsule TAKE 1 CAPSULE BY MOUTH EVERY MORNING (BEFORE BREAKFAST) 7/2/20   Kavya Molina MD   acetaminophen (TYLENOL) 500 MG tablet Take 500 mg by mouth every 6 hours as needed for Pain    Historical Provider, MD   MISC NATURAL PRODUCT OP Take 2 tablets by mouth 2 times daily hydroxycut max-weight loss supplement    Historical Provider, MD   vitamin D (CHOLECALCIFEROL) 25 MCG (1000 UT) TABS tablet Take 1 tablet by mouth daily 6/12/20 7/12/20  KATIE Cole CNP   metFORMIN (GLUCOPHAGE) 500 MG tablet Take 1 tablet by mouth 2 times daily (with meals) Take 1 tablet daily for the first 7 days. Then BID 6/12/20 7/12/20  KATIE Cole CNP   blood glucose monitor strips Test 2-3 times a day & as needed for symptoms of irregular blood glucose.   BRAND OF CHOICE INSURANCE ALLOWS. 6/12/20   KATIE Cole CNP   Lancets MISC 1 each by Does not apply route 2 times daily 6/12/20   KATIE Cole CNP   Alcohol Swabs (ALCOHOL PREP) PADS Use as directed 6/12/20   KATIE Cole CNP   BREO ELLIPTA 200-25 MCG/INH AEPB inhaler INHALE ONE PUFF BY MOUTH ONCE A DAY ONCE A DAY INHALATION 30 6/10/20   KATIE Cole CNP   pregabalin (LYRICA) 75 MG capsule TAKE 1 CAPSULE BY MOUTH 3 TIMES DAILY 6/9/20 7/9/20  KATIE Cole CNP   pantoprazole (PROTONIX) 40 MG tablet TAKE ONE TABLET TWICE A DAY ORALLY 30 DAY(S) 4/6/20   Peggy Burk MD   chlorhexidine (PERIDEX) 0.12 % solution RINSE WITH 15ML TWICE A DAY FOR 30 SECONDS 2/17/20   Historical Provider, MD   ipratropium-albuterol (DUONEB) 0.5-2.5 (3) MG/3ML SOLN nebulizer solution Inhale 1 vial into the lungs 4 times daily    Historical Provider, MD   diphenhydrAMINE (BENADRYL) 25 MG capsule Take 1 capsule by mouth every 6 hours as needed for Itching 3/29/19   [facility]a [facility]   EPINEPHrine (EPIPEN 2-HELDER) 0.3 MG/0.3ML SOAJ injection Inject 0.3 mLs into the muscle once for 1 dose Use as directed for allergic reaction 3/29/19 6/25/20  Matt [facility]   COMBIVENT RESPIMAT  MCG/ACT AERS inhaler Indications: uses PRN  9/15/18   Historical Provider, MD   montelukast (SINGULAIR) 10 MG tablet Take 1 tablet by mouth nightly 9/20/18   Austin Samaniego MD       REVIEW OF SYSTEMS    (2-9 systems for level 4, 10 or more for level 5)      Review of Systems   Constitutional: Positive for fatigue. Negative for fever. HENT: Negative for sinus pressure and sinus pain. Eyes: Negative for photophobia. Respiratory: Negative for chest tightness and shortness of breath. Cardiovascular: Negative for chest pain and palpitations. Gastrointestinal: Positive for abdominal pain, diarrhea, nausea and vomiting. Negative for blood in stool. Endocrine: Negative for polyuria. Genitourinary: Negative for dysuria, flank pain, hematuria, vaginal bleeding and vaginal discharge. Musculoskeletal: Negative for neck pain and neck stiffness. Skin: Negative for rash and wound. Neurological: Negative for syncope, weakness and headaches. Psychiatric/Behavioral: Negative for confusion.        PHYSICAL EXAM   (up to 7 for level 4, 8 or more for level 5)      INITIAL VITALS:   BP (!) 142/78   Pulse 75   Temp 98.3 °F (36.8 °C) (Oral)   Resp 16   Ht 5' 2\" (1.575 m)   Wt 203 lb (92.1 kg)   LMP 06/15/2020   SpO2 96%   BMI 37.13 kg/m²     Physical Exam  Constitutional:       General: She is not in acute distress. Appearance: She is well-developed. She is not diaphoretic. HENT:      Head: Normocephalic. Right Ear: External ear normal.      Left Ear: External ear normal.      Mouth/Throat:      Pharynx: No oropharyngeal exudate. Eyes:      General: No scleral icterus. Right eye: No discharge. Left eye: No discharge. Pupils: Pupils are equal, round, and reactive to light. Neck:      Musculoskeletal: Normal range of motion and neck supple. Vascular: No JVD. Cardiovascular:      Rate and Rhythm: Normal rate and regular rhythm. Heart sounds: No murmur. No friction rub. No gallop. Pulmonary:      Effort: Pulmonary effort is normal. No respiratory distress. Breath sounds: Normal breath sounds. No stridor. No wheezing or rales. Chest:      Chest wall: No tenderness. Abdominal:      General: There is no distension. Palpations: Abdomen is soft. Tenderness: There is abdominal tenderness (Moderate, diffuse abdominal tenderness). There is no guarding or rebound. Musculoskeletal: Normal range of motion. General: No tenderness or deformity. Skin:     General: Skin is warm and dry. Capillary Refill: Capillary refill takes less than 2 seconds. Coloration: Skin is not pale. Findings: No erythema or rash. Neurological:      Mental Status: She is alert and oriented to person, place, and time. Motor: No abnormal muscle tone. Coordination: Coordination normal.   Psychiatric:         Behavior: Behavior normal.         Thought Content:  Thought content normal.         DIFFERENTIAL  DIAGNOSIS     PLAN (LABS / IMAGING / EKG):  Orders Placed This Encounter   Procedures    CT ABDOMEN PELVIS W IV CONTRAST    Urinalysis Reflex to Culture    HCG Qualitative, Serum    CBC Auto Differential    Lipase    Comprehensive Metabolic Panel w/ Reflex to MG    Microscopic Urinalysis    Insert peripheral IV       MEDICATIONS ORDERED:  Orders Placed This Encounter   Medications    0.9 % sodium chloride bolus    DISCONTD: promethazine (PHENERGAN) injection 25 mg    promethazine (PHENERGAN) 25 mg in sodium chloride 0.9 % 50 mL IVPB    morphine sulfate (PF) injection 4 mg    ioversol (OPTIRAY) 74 % injection 75 mL    0.9 % sodium chloride bolus    sodium chloride flush 0.9 % injection 10 mL    amoxicillin-clavulanate (AUGMENTIN) 875-125 MG per tablet     Sig: Take 1 tablet by mouth 2 times daily for 10 days     Dispense:  20 tablet     Refill:  0       DDX: Gastritis, gastroenteritis, peptic ulcer disease, GERD, food poisoning, endometriosis, appendicitis, cholecystitis, choledocholithiasis pyelonephritis, nephrolithiasis    DIAGNOSTIC RESULTS / EMERGENCY DEPARTMENT COURSE / MDM   LAB RESULTS:  Results for orders placed or performed during the hospital encounter of 07/02/20   Urinalysis Reflex to Culture   Result Value Ref Range    Color, UA YELLOW YELLOW    Turbidity UA CLOUDY (A) CLEAR    Glucose, Ur NEGATIVE NEGATIVE    Bilirubin Urine NEGATIVE NEGATIVE    Ketones, Urine NEGATIVE NEGATIVE    Specific Gravity, UA 1.008 1.000 - 1.030    Urine Hgb NEGATIVE NEGATIVE    pH, UA 7.0 5.0 - 8.0    Protein, UA NEGATIVE NEGATIVE    Urobilinogen, Urine Normal Normal    Nitrite, Urine NEGATIVE NEGATIVE    Leukocyte Esterase, Urine NEGATIVE NEGATIVE    Urinalysis Comments NOT REPORTED    HCG Qualitative, Serum   Result Value Ref Range    hCG Qual NEGATIVE NEGATIVE   CBC Auto Differential   Result Value Ref Range    WBC 9.1 3.5 - 11.0 k/uL    RBC 4.72 4.0 - 5.2 m/uL    Hemoglobin 12.9 12.0 - 16.0 g/dL    Hematocrit 37.8 36 - 46 %    MCV 80.2 80 - 100 fL    MCH 27.3 26 - 34 pg    MCHC 34.1 31 - 37 g/dL    RDW 16.4 (H) 11.5 - 14.9 %    Platelets 169 321 - 646 k/uL    MPV 8.6 6.0 - 12.0 fL    NRBC Automated NOT REPORTED per 100 WBC    Differential Type NOT REPORTED     Immature Granulocytes NOT REPORTED 0 %    Absolute Immature Granulocyte NOT REPORTED 0.00 - 0.30 k/uL    WBC Morphology NOT REPORTED     RBC Morphology NOT REPORTED     Platelet Estimate NOT REPORTED     Seg Neutrophils 71 (H) 36 - 66 %    Lymphocytes 20 (L) 24 - 44 %    Monocytes 8 (H) 1 - 7 %    Eosinophils % 1 0 - 4 %    Basophils 0 0 - 2 %    Segs Absolute 6.46 1.3 - 9.1 k/uL    Absolute Lymph # 1.82 1.0 - 4.8 k/uL    Absolute Mono # 0.73 0.1 - 1.3 k/uL    Absolute Eos # 0.09 0.0 - 0.4 k/uL    Basophils Absolute 0.00 0.0 - 0.2 k/uL    Morphology 1+ TEARDROPS     Morphology HYPOCHROMIA PRESENT     Morphology ANISOCYTOSIS PRESENT    Lipase   Result Value Ref Range    Lipase 31 13 - 60 U/L   Comprehensive Metabolic Panel w/ Reflex to MG   Result Value Ref Range    Glucose 113 (H) 70 - 99 mg/dL    BUN 18 6 - 20 mg/dL    CREATININE 0.99 (H) 0.50 - 0.90 mg/dL    Bun/Cre Ratio NOT REPORTED 9 - 20    Calcium 11.6 (H) 8.6 - 10.4 mg/dL    Sodium 140 135 - 144 mmol/L    Potassium 3.6 (L) 3.7 - 5.3 mmol/L    Chloride 104 98 - 107 mmol/L    CO2 24 20 - 31 mmol/L    Anion Gap 12 9 - 17 mmol/L    Alkaline Phosphatase 53 35 - 104 U/L    ALT 19 5 - 33 U/L    AST 16 <32 U/L    Total Bilirubin 0.36 0.3 - 1.2 mg/dL    Total Protein 6.4 6.4 - 8.3 g/dL    Alb 4.0 3.5 - 5.2 g/dL    Albumin/Globulin Ratio NOT REPORTED 1.0 - 2.5    GFR Non-African American >60 >60 mL/min    GFR African American >60 >60 mL/min    GFR Comment          GFR Staging NOT REPORTED    Microscopic Urinalysis   Result Value Ref Range    -          WBC, UA 2 TO 5 /HPF    RBC, UA 2 TO 5 /HPF    Casts UA NOT REPORTED /LPF    Crystals, UA NOT REPORTED None /HPF    Epithelial Cells UA 2 TO 5 /HPF    Renal Epithelial, UA NOT REPORTED 0 /HPF    Bacteria, UA FEW (A) None    Mucus, UA NOT REPORTED None    Trichomonas, UA NOT REPORTED None    Amorphous, UA 2+ (A) None    Other Observations UA NOT REPORTED NOT REQ.     Yeast, UA NOT REPORTED None       IMPRESSION: 72-year-old female in no apparent pain grasping her abdomen. Vital signs stable and afebrile. Plan for labs including CBC, CMP, urinalysis, pregnancy will treat symptomatically with IV fluids, IV Phenergan. Likely discharge home pending clinical course    RADIOLOGY:  Ct Abdomen Pelvis W Iv Contrast    Result Date: 7/2/2020  EXAMINATION: CT OF THE ABDOMEN AND PELVIS WITH CONTRAST 7/2/2020 4:55 pm TECHNIQUE: CT of the abdomen and pelvis was performed with the administration of intravenous contrast. Multiplanar reformatted images are provided for review. Dose modulation, iterative reconstruction, and/or weight based adjustment of the mA/kV was utilized to reduce the radiation dose to as low as reasonably achievable. COMPARISON: 03/04/2020 HISTORY: ORDERING SYSTEM PROVIDED HISTORY: Acute onset abdominal pain, N/V TECHNOLOGIST PROVIDED HISTORY: IV Only Contrast Acute onset abdominal pain, N/V Reason for Exam: Pt c/o abd pain since yesterday 82-year-old female with acute-onset abdominal pain, nausea and vomiting FINDINGS: Lower Chest: Mild bibasilar atelectasis and respiratory motion. No free intra-abdominal air. Tree-in-bud opacities at the lung bases. Small hiatal hernia. Mild wall thickening of the distal esophagus, stable. Organs: Liver, gallbladder, adrenal glands, pancreas, spleen grossly unremarkable in appearance. No hydronephrosis. No obstructing calculus or hydroureter. 5 mm nonobstructive midpole left renal calculus on image 81, series 2. GI/Bowel: No significant dilation of small bowel loops to suggest small bowel obstruction. Normal gas-filled appendix. Mild stool burden. Pelvis: Urinary bladder is collapsed. No inguinal or pelvic sidewall lymphadenopathy. No free fluid in the pelvis. Uterus and adnexa grossly unremarkable. Peritoneum/Retroperitoneum: Abdominal aorta normal in appearance and caliber. No retroperitoneal lymphadenopathy. Psoas muscles normal in size and symmetric in appearance.   Prominent portacaval lymph nodes. Bones/Soft Tissues: Mild diffuse degenerative changes throughout the spine. Tiny midline fat-containing periumbilical hernia. 1. No acute intra-abdominal or intrapelvic abnormality identified by CT. 2. Mild bibasilar atelectasis and respiratory motion. Tree-in-bud opacities at the lung bases likely representing inflammation or infection to include ENOC or sequela of aspiration. Follow-up is recommended to document resolution. 3. Tiny midline fat-containing periumbilical hernia. 4. Mild wall thickening of the distal esophagus, stable. Consider evaluation with EGD. 5. Small hiatal hernia. 6. 5 mm nonobstructing midpole left renal calculus. Xr Chest 1 Vw    Result Date: 6/22/2020  CHEST 1 VIEW HISTORY: Chest pain, anxiety attack COMPARISON: 10/20/2019 FINDINGS: No focal airspace disease, pulmonary edema, pleural effusions, or pneumothorax.  Normal cardiomediastinal silhouette.   IMPRESSION: No acute cardiopulmonary disease.   Workstation:[id number] Finalized by Lacey Ayon MD on 6/22/2020 7:50 PM Other Result Information Interface - Rad Results/Orders In 1 - 06/22/2020  7:52 PM EDT CHEST 1 VIEW HISTORY: Chest pain, anxiety attack COMPARISON: 10/20/2019 FINDINGS: No focal airspace disease, pulmonary edema, pleural effusions, or pneumothorax. Normal cardiomediastinal silhouette. IMPRESSION: No acute cardiopulmonary disease. Workstation:[id number] Finalized by Lacey Ayon MD on 6/22/2020 7:50 PM Status Results Details          EMERGENCY DEPARTMENT COURSE:  Patient evaluated at bedside, vital signs stable and afebrile. Patient grasping her abdomen in pain. Moderate tenderness to palpation on exam diffusely. Treated with IV Phenergan, morphine, IV fluids. CT abdomen pelvis revealed possible infectious process at base of bilateral lungs with possible sequela of aspiration pneumonia. On reevaluation patient was resting comfortably in bed, asleep when I walked in the room. Patient given prescription for p.o. Augmentin and instructed to follow-up with her primary care provider. She verbalized understanding of agreement with the ED return precautions and discharge plan    PROCEDURES:  None    CONSULTS:  None    CRITICAL CARE:  Please see attending note    FINAL IMPRESSION      1.  Aspiration pneumonia of both lower lobes, unspecified aspiration pneumonia type [facility])          DISPOSITION / PLAN     DISPOSITION Decision To Discharge 07/02/2020 05:56:57 PM      PATIENT REFERRED TO:  Lily Acosta, APRN - CNP  [facility]  [address]    Schedule an appointment as soon as possible for a visit in 3 days  For re-evaluation      DISCHARGE MEDICATIONS:  Discharge Medication List as of 7/2/2020  5:58 PM      START taking these medications    Details   amoxicillin-clavulanate (AUGMENTIN) 875-125 MG per tablet Take 1 tablet by mouth 2 times daily for 10 days, Disp-20 tablet, R-0Print             Steven Drake DO  Emergency Medicine Resident    (Please note that portions of thisnote were completed with a voice recognition program.  Efforts were made to edit the dictations but occasionally words are mis-transcribed.)        Steven Drake DO  Resident  07/02/20 [id number]

## 2020-07-03 ENCOUNTER — HOSPITAL ENCOUNTER (EMERGENCY)
Age: 40
Discharge: HOME | End: 2020-07-03
Attending: EMERGENCY MEDICINE
Payer: COMMERCIAL

## 2020-07-03 VITALS
RESPIRATION RATE: 16 BRPM | TEMPERATURE: 98.3 F | HEART RATE: 84 BPM | OXYGEN SATURATION: 97 % | BODY MASS INDEX: 37.36 KG/M2 | SYSTOLIC BLOOD PRESSURE: 126 MMHG | WEIGHT: 203 LBS | HEIGHT: 62 IN | DIASTOLIC BLOOD PRESSURE: 97 MMHG

## 2020-07-03 PROCEDURE — 96372 THER/PROPH/DIAG INJ SC/IM: CPT

## 2020-07-03 PROCEDURE — 99283 EMERGENCY DEPT VISIT LOW MDM: CPT

## 2020-07-03 PROCEDURE — 6360000002 HC RX W HCPCS: Performed by: EMERGENCY MEDICINE

## 2020-07-03 RX ORDER — PROMETHAZINE HYDROCHLORIDE 25 MG/1
25 SUPPOSITORY RECTAL EVERY 6 HOURS PRN
Qty: 20 SUPPOSITORY | Refills: 0 | Status: SHIPPED | OUTPATIENT
Start: 2020-07-03 | End: 2020-07-10

## 2020-07-03 RX ORDER — PROMETHAZINE HYDROCHLORIDE 25 MG/ML
25 INJECTION, SOLUTION INTRAMUSCULAR; INTRAVENOUS ONCE
Status: COMPLETED | OUTPATIENT
Start: 2020-07-03 | End: 2020-07-03

## 2020-07-03 RX ORDER — DICYCLOMINE HYDROCHLORIDE 10 MG/1
10 CAPSULE ORAL EVERY 6 HOURS PRN
Qty: 20 CAPSULE | Refills: 0 | Status: ON HOLD | OUTPATIENT
Start: 2020-07-03 | End: 2020-07-22

## 2020-07-03 RX ORDER — PROMETHAZINE HYDROCHLORIDE 25 MG/1
25 TABLET ORAL EVERY 6 HOURS PRN
Qty: 20 TABLET | Refills: 0 | Status: SHIPPED | OUTPATIENT
Start: 2020-07-03 | End: 2020-07-10

## 2020-07-03 RX ADMIN — PROMETHAZINE HYDROCHLORIDE 25 MG: 25 INJECTION INTRAMUSCULAR; INTRAVENOUS at 07:40

## 2020-07-03 ASSESSMENT — ENCOUNTER SYMPTOMS
WHEEZING: 0
DIARRHEA: 1
COLOR CHANGE: 0
SINUS PRESSURE: 0
EYE REDNESS: 0
RHINORRHEA: 0
BLOOD IN STOOL: 0
BACK PAIN: 0
FACIAL SWELLING: 0
CHEST TIGHTNESS: 0
NAUSEA: 1
VOMITING: 1
CONSTIPATION: 0
COUGH: 0
EYE PAIN: 0
EYE DISCHARGE: 0
ABDOMINAL PAIN: 1
SORE THROAT: 0
SHORTNESS OF BREATH: 0
TROUBLE SWALLOWING: 0

## 2020-07-03 ASSESSMENT — PAIN SCALES - GENERAL: PAINLEVEL_OUTOF10: 9

## 2020-07-03 ASSESSMENT — PAIN DESCRIPTION - PAIN TYPE: TYPE: ACUTE PAIN

## 2020-07-03 ASSESSMENT — PAIN DESCRIPTION - LOCATION: LOCATION: ABDOMEN

## 2020-07-03 NOTE — ED NOTES
Mode of arrival (squad #, walk in, police, etc) : walk in        Chief complaint(s): abd pain/n/v/d        Arrival Note (brief scenario, treatment PTA, etc). : Pt evaluated in this ED yesterday. Pt states she was Dx w/pneumonia and given abx. Pt states she took abx- experiencing continued emesis. Pt denies worsening of symptoms that brought her in yesterday but a continuation. Pt states she tolerates PO fluids. Pt AOx4. RR easy, unlabored. C= \"Have you ever felt that you should Cut down on your drinking? \"  No  A= \"Have people Annoyed you by criticizing your drinking? \"  No  G= \"Have you ever felt bad or Guilty about your drinking? \"  No  E= \"Have you ever had a drink as an Eye-opener first thing in the morning to steady your nerves or to help a hangover? \"  No      Deferred []      Reason for deferring: N/A    *If yes to two or more: probable alcohol abuse. Ankit Puente RN  07/03/20 [id number]

## 2020-07-03 NOTE — ED PROVIDER NOTES
[address]  eMERGENCY dEPARTMENT eNCOUnter      Pt Name: Soni Hwang  MRN: [id number]  [place] 1980  Date of evaluation: 7/3/20      CHIEF COMPLAINT       Chief Complaint   Patient presents with    Abdominal Pain    Emesis    Diarrhea         HISTORY OF PRESENT ILLNESS    Soni Hwang is a 47 y.o. female who presents complaining of vomiting. Patient was seen yesterday because she started having abdominal pain vomiting and diarrhea. Patient states this actually started the day before. Patient states that she has severe diffuse abdominal pain and cannot stop vomiting. Patient states they found a possible aspiration pneumonia and started on antibiotics but she has been unable to take the antibiotics at home because of the vomiting. Patient states they did not give her anything for the nausea. Patient denies any fevers. Patient has had no cough. Patient denies any blood in the stools. REVIEW OF SYSTEMS       Review of Systems   Constitutional: Negative for activity change, appetite change, chills, diaphoresis and fever. HENT: Negative for congestion, ear pain, facial swelling, nosebleeds, rhinorrhea, sinus pressure, sore throat and trouble swallowing. Eyes: Negative for pain, discharge and redness. Respiratory: Negative for cough, chest tightness, shortness of breath and wheezing. Cardiovascular: Negative for chest pain, palpitations and leg swelling. Gastrointestinal: Positive for abdominal pain, diarrhea, nausea and vomiting. Negative for blood in stool and constipation. Genitourinary: Negative for difficulty urinating, dysuria, flank pain, frequency, genital sores and hematuria. Musculoskeletal: Negative for arthralgias, back pain, gait problem, joint swelling, myalgias and neck pain. Skin: Negative for color change, pallor, rash and wound. Neurological: Negative for dizziness, tremors, seizures, syncope, speech difficulty, weakness, numbness and headaches. 08/16/2016    severe esophagitis    UPPER GASTROINTESTINAL ENDOSCOPY  01/19/2017    barretts; hiatus hernia; gastritis    UPPER GASTROINTESTINAL ENDOSCOPY  03/02/2017    long seg mullins's with linear erosions, esophagitis, small hiatal hernia    UPPER GASTROINTESTINAL ENDOSCOPY N/A 1/30/2018    MULLINS'S    UPPER GASTROINTESTINAL ENDOSCOPY N/A 10/1/2019    EGD BIOPSY performed by Vikas Carvalho MD at [facility]       Previous Medications    ACETAMINOPHEN (TYLENOL) 500 MG TABLET    Take 500 mg by mouth every 6 hours as needed for Pain    ALCOHOL SWABS (ALCOHOL PREP) PADS    Use as directed    AMOXICILLIN-CLAVULANATE (AUGMENTIN) 875-125 MG PER TABLET    Take 1 tablet by mouth 2 times daily for 10 days    BLOOD GLUCOSE MONITOR STRIPS    Test 2-3 times a day & as needed for symptoms of irregular blood glucose. BRAND OF CHOICE INSURANCE ALLOWS.    BREO ELLIPTA 200-25 MCG/INH AEPB INHALER    INHALE ONE PUFF BY MOUTH ONCE A DAY ONCE A DAY INHALATION 30    CHLORHEXIDINE (PERIDEX) 0.12 % SOLUTION    RINSE WITH 15ML TWICE A DAY FOR 30 SECONDS    COMBIVENT RESPIMAT  MCG/ACT AERS INHALER    Indications: uses PRN     DIPHENHYDRAMINE (BENADRYL) 25 MG CAPSULE    Take 1 capsule by mouth every 6 hours as needed for Itching    EPINEPHRINE (EPIPEN 2-HELDER) 0.3 MG/0.3ML SOAJ INJECTION    Inject 0.3 mLs into the muscle once for 1 dose Use as directed for allergic reaction    IPRATROPIUM-ALBUTEROL (DUONEB) 0.5-2.5 (3) MG/3ML SOLN NEBULIZER SOLUTION    Inhale 1 vial into the lungs 4 times daily    LANCETS MISC    1 each by Does not apply route 2 times daily    LINZESS 290 MCG CAPS CAPSULE    TAKE 1 CAPSULE BY MOUTH EVERY MORNING (BEFORE BREAKFAST)    METFORMIN (GLUCOPHAGE) 500 MG TABLET    Take 1 tablet by mouth 2 times daily (with meals) Take 1 tablet daily for the first 7 days.  Then BID    MISC NATURAL PRODUCT OP    Take 2 tablets by mouth 2 times daily hydroxycut max-weight loss supplement    MONTELUKAST (SINGULAIR) 10 MG TABLET    Take 1 tablet by mouth nightly    PANTOPRAZOLE (PROTONIX) 40 MG TABLET    TAKE ONE TABLET TWICE A DAY ORALLY 30 DAY(S)    PREGABALIN (LYRICA) 75 MG CAPSULE    TAKE 1 CAPSULE BY MOUTH 3 TIMES DAILY    VITAMIN D (CHOLECALCIFEROL) 25 MCG (1000 UT) TABS TABLET    Take 1 tablet by mouth daily       ALLERGIES     is allergic to nsaids; toradol [ketorolac tromethamine]; and ultram [tramadol]. SOCIAL HISTORY      reports that she has been smoking cigarettes. She has a 21.00 pack-year smoking history. She has never used smokeless tobacco. She reports that she does not drink alcohol or use drugs. PHYSICAL EXAM     INITIAL VITALS: BP (!) 126/97   Pulse 84   Temp 98.3 °F (36.8 °C) (Oral)   Resp 16   Ht 5' 2\" (1.575 m)   Wt 203 lb (92.1 kg)   LMP 06/15/2020   SpO2 97%   BMI 37.13 kg/m²      Physical Exam  Vitals signs and nursing note reviewed. Constitutional:       General: She is not in acute distress. Appearance: She is well-developed. She is not diaphoretic. HENT:      Head: Normocephalic and atraumatic. Eyes:      General: No scleral icterus. Right eye: No discharge. Left eye: No discharge. Conjunctiva/sclera: Conjunctivae normal.      Pupils: Pupils are equal, round, and reactive to light. Cardiovascular:      Rate and Rhythm: Normal rate and regular rhythm. Heart sounds: Normal heart sounds. No murmur. No friction rub. No gallop. Pulmonary:      Effort: Pulmonary effort is normal. No respiratory distress. Breath sounds: Normal breath sounds. No wheezing or rales. Chest:      Chest wall: No tenderness. Abdominal:      General: Bowel sounds are normal. There is no distension. Palpations: Abdomen is soft. There is no mass. Tenderness: There is no abdominal tenderness. There is no guarding or rebound. Musculoskeletal: Normal range of motion. General: No tenderness.    Skin: Physician                        Geoffrey Diaz MD  07/03/20 7003

## 2020-07-03 NOTE — ED NOTES
Dr Sunil Colindres at bedside discussing [address]. Pt tolerates PO challenge well.       Martínez Carr RN  07/03/20 [id number]

## 2020-07-03 NOTE — ED NOTES
Pt provided fluids for PO challenge.  Additional warm blanket provided     Warren Murguia RN  07/03/20 [id number]

## 2020-07-05 ENCOUNTER — HOSPITAL ENCOUNTER (OUTPATIENT)
Dept: PREADMISSION TESTING | Age: 40
Setting detail: SPECIMEN
Discharge: HOME | End: 2020-07-09
Payer: COMMERCIAL

## 2020-07-05 PROCEDURE — U0003 INFECTIOUS AGENT DETECTION BY NUCLEIC ACID (DNA OR RNA); SEVERE ACUTE RESPIRATORY SYNDROME CORONAVIRUS 2 (SARS-COV-2) (CORONAVIRUS DISEASE [COVID-19]), AMPLIFIED PROBE TECHNIQUE, MAKING USE OF HIGH THROUGHPUT TECHNOLOGIES AS DESCRIBED BY CMS-2020-01-R: HCPCS

## 2020-07-06 ENCOUNTER — TELEPHONE (OUTPATIENT)
Dept: FAMILY MEDICINE CLINIC | Age: 40
End: 2020-07-06

## 2020-07-06 LAB
SARS-COV-2, PCR: NORMAL
SARS-COV-2, RAPID: NORMAL
SARS-COV-2: NOT DETECTED
SOURCE: NORMAL

## 2020-07-07 ENCOUNTER — HOSPITAL ENCOUNTER (OUTPATIENT)
Dept: GENERAL RADIOLOGY | Age: 40
Discharge: HOME | End: 2020-07-09
Payer: COMMERCIAL

## 2020-07-07 ENCOUNTER — TELEPHONE (OUTPATIENT)
Dept: OBGYN CLINIC | Age: 40
End: 2020-07-07

## 2020-07-07 ENCOUNTER — ANESTHESIA EVENT (OUTPATIENT)
Dept: OPERATING ROOM | Age: 40
End: 2020-07-07
Payer: COMMERCIAL

## 2020-07-07 ENCOUNTER — TELEPHONE (OUTPATIENT)
Dept: PRIMARY CARE CLINIC | Age: 40
End: 2020-07-07

## 2020-07-07 ENCOUNTER — HOSPITAL ENCOUNTER (OUTPATIENT)
Age: 40
Discharge: HOME | End: 2020-07-09
Payer: COMMERCIAL

## 2020-07-07 ENCOUNTER — TELEPHONE (OUTPATIENT)
Dept: FAMILY MEDICINE CLINIC | Age: 40
End: 2020-07-07

## 2020-07-07 PROCEDURE — 71046 X-RAY EXAM CHEST 2 VIEWS: CPT

## 2020-07-07 RX ORDER — PREGABALIN 75 MG/1
CAPSULE ORAL
Qty: 90 CAPSULE | Refills: 0 | Status: SHIPPED | OUTPATIENT
Start: 2020-07-07 | End: 2020-08-04

## 2020-07-07 NOTE — TELEPHONE ENCOUNTER
Magdalena Huertas at patient pcp office calling and stated that patient has been cleared for surgery.   She will fax the letter to surgery patient notified

## 2020-07-07 NOTE — TELEPHONE ENCOUNTER
Received call from THE [facility] PCP office. The will review the Cxr for medical clearance and let writer know if clearance received.   Patient notified

## 2020-07-07 NOTE — TELEPHONE ENCOUNTER
Received a phone call from [address] [place] [address] , patient is scheduled for procedure for tomorrow and was cleared by you to get procedure done, but since than patient went to ED twice , [address] [place] [address] would need updated medical clearance and she would need to repeat Chest Xray .   Please Advice

## 2020-07-08 ENCOUNTER — HOSPITAL ENCOUNTER (OUTPATIENT)
Age: 40
Setting detail: OBSERVATION
Discharge: HOME | End: 2020-07-10
Attending: SPECIALIST | Admitting: SPECIALIST
Payer: COMMERCIAL

## 2020-07-08 ENCOUNTER — ANESTHESIA (OUTPATIENT)
Dept: OPERATING ROOM | Age: 40
End: 2020-07-08
Payer: COMMERCIAL

## 2020-07-08 ENCOUNTER — TELEPHONE (OUTPATIENT)
Dept: GASTROENTEROLOGY | Age: 40
End: 2020-07-08

## 2020-07-08 VITALS — OXYGEN SATURATION: 100 % | TEMPERATURE: 96.3 F | SYSTOLIC BLOOD PRESSURE: 151 MMHG | DIASTOLIC BLOOD PRESSURE: 86 MMHG

## 2020-07-08 PROBLEM — Z90.710 HISTORY OF ROBOT-ASSISTED LAPAROSCOPIC HYSTERECTOMY: Status: ACTIVE | Noted: 2020-07-08

## 2020-07-08 LAB
-: NORMAL
GLUCOSE BLD-MCNC: 131 MG/DL (ref 65–105)
GLUCOSE BLD-MCNC: 92 MG/DL (ref 65–105)
HCG, PREGNANCY URINE (POC): NEGATIVE

## 2020-07-08 PROCEDURE — 96365 THER/PROPH/DIAG IV INF INIT: CPT

## 2020-07-08 PROCEDURE — 96376 TX/PRO/DX INJ SAME DRUG ADON: CPT

## 2020-07-08 PROCEDURE — G0378 HOSPITAL OBSERVATION PER HR: HCPCS

## 2020-07-08 PROCEDURE — 7100000001 HC PACU RECOVERY - ADDTL 15 MIN: Performed by: SPECIALIST

## 2020-07-08 PROCEDURE — 94640 AIRWAY INHALATION TREATMENT: CPT

## 2020-07-08 PROCEDURE — 2580000003 HC RX 258: Performed by: ANESTHESIOLOGY

## 2020-07-08 PROCEDURE — 2500000003 HC RX 250 WO HCPCS: Performed by: ANESTHESIOLOGY

## 2020-07-08 PROCEDURE — 7100000000 HC PACU RECOVERY - FIRST 15 MIN: Performed by: SPECIALIST

## 2020-07-08 PROCEDURE — 6370000000 HC RX 637 (ALT 250 FOR IP): Performed by: STUDENT IN AN ORGANIZED HEALTH CARE EDUCATION/TRAINING PROGRAM

## 2020-07-08 PROCEDURE — S2900 ROBOTIC SURGICAL SYSTEM: HCPCS | Performed by: SPECIALIST

## 2020-07-08 PROCEDURE — 88307 TISSUE EXAM BY PATHOLOGIST: CPT

## 2020-07-08 PROCEDURE — 2580000003 HC RX 258: Performed by: STUDENT IN AN ORGANIZED HEALTH CARE EDUCATION/TRAINING PROGRAM

## 2020-07-08 PROCEDURE — 6360000002 HC RX W HCPCS: Performed by: NURSE ANESTHETIST, CERTIFIED REGISTERED

## 2020-07-08 PROCEDURE — 2720000010 HC SURG SUPPLY STERILE: Performed by: SPECIALIST

## 2020-07-08 PROCEDURE — 6360000002 HC RX W HCPCS: Performed by: STUDENT IN AN ORGANIZED HEALTH CARE EDUCATION/TRAINING PROGRAM

## 2020-07-08 PROCEDURE — 94761 N-INVAS EAR/PLS OXIMETRY MLT: CPT

## 2020-07-08 PROCEDURE — 6360000002 HC RX W HCPCS: Performed by: ANESTHESIOLOGY

## 2020-07-08 PROCEDURE — 82947 ASSAY GLUCOSE BLOOD QUANT: CPT

## 2020-07-08 PROCEDURE — 96366 THER/PROPH/DIAG IV INF ADDON: CPT

## 2020-07-08 PROCEDURE — 2709999900 HC NON-CHARGEABLE SUPPLY: Performed by: SPECIALIST

## 2020-07-08 PROCEDURE — 2500000003 HC RX 250 WO HCPCS: Performed by: NURSE ANESTHETIST, CERTIFIED REGISTERED

## 2020-07-08 PROCEDURE — 6360000002 HC RX W HCPCS: Performed by: SPECIALIST

## 2020-07-08 PROCEDURE — 3700000000 HC ANESTHESIA ATTENDED CARE: Performed by: SPECIALIST

## 2020-07-08 PROCEDURE — 2500000003 HC RX 250 WO HCPCS: Performed by: SPECIALIST

## 2020-07-08 PROCEDURE — 3600000009 HC SURGERY ROBOT BASE: Performed by: SPECIALIST

## 2020-07-08 PROCEDURE — 81025 URINE PREGNANCY TEST: CPT

## 2020-07-08 PROCEDURE — 96375 TX/PRO/DX INJ NEW DRUG ADDON: CPT

## 2020-07-08 PROCEDURE — 3700000001 HC ADD 15 MINUTES (ANESTHESIA): Performed by: SPECIALIST

## 2020-07-08 PROCEDURE — 3600000019 HC SURGERY ROBOT ADDTL 15MIN: Performed by: SPECIALIST

## 2020-07-08 PROCEDURE — 58571 TLH W/T/O 250 G OR LESS: CPT | Performed by: SPECIALIST

## 2020-07-08 RX ORDER — ONDANSETRON 2 MG/ML
INJECTION INTRAMUSCULAR; INTRAVENOUS PRN
Status: DISCONTINUED | OUTPATIENT
Start: 2020-07-08 | End: 2020-07-08 | Stop reason: SDUPTHER

## 2020-07-08 RX ORDER — ONDANSETRON 2 MG/ML
4 INJECTION INTRAMUSCULAR; INTRAVENOUS EVERY 6 HOURS PRN
Status: DISCONTINUED | OUTPATIENT
Start: 2020-07-08 | End: 2020-07-09

## 2020-07-08 RX ORDER — SODIUM CHLORIDE 9 MG/ML
INJECTION, SOLUTION INTRAVENOUS CONTINUOUS
Status: DISCONTINUED | OUTPATIENT
Start: 2020-07-08 | End: 2020-07-09

## 2020-07-08 RX ORDER — SODIUM CHLORIDE 0.9 % (FLUSH) 0.9 %
10 SYRINGE (ML) INJECTION PRN
Status: DISCONTINUED | OUTPATIENT
Start: 2020-07-08 | End: 2020-07-09

## 2020-07-08 RX ORDER — LORAZEPAM 0.5 MG/1
0.5 TABLET ORAL EVERY 6 HOURS PRN
Status: DISCONTINUED | OUTPATIENT
Start: 2020-07-08 | End: 2020-07-08

## 2020-07-08 RX ORDER — VITAMIN B COMPLEX
1000 TABLET ORAL DAILY
Status: DISCONTINUED | OUTPATIENT
Start: 2020-07-08 | End: 2020-07-10 | Stop reason: HOSPADM

## 2020-07-08 RX ORDER — ZOLPIDEM TARTRATE 5 MG/1
5 TABLET ORAL NIGHTLY
Status: DISCONTINUED | OUTPATIENT
Start: 2020-07-08 | End: 2020-07-10 | Stop reason: HOSPADM

## 2020-07-08 RX ORDER — BISACODYL 10 MG
10 SUPPOSITORY, RECTAL RECTAL DAILY PRN
Status: DISCONTINUED | OUTPATIENT
Start: 2020-07-08 | End: 2020-07-10 | Stop reason: HOSPADM

## 2020-07-08 RX ORDER — PANTOPRAZOLE SODIUM 40 MG/1
40 TABLET, DELAYED RELEASE ORAL EVERY 12 HOURS
Status: DISCONTINUED | OUTPATIENT
Start: 2020-07-08 | End: 2020-07-10 | Stop reason: HOSPADM

## 2020-07-08 RX ORDER — LORAZEPAM 0.5 MG/1
2 TABLET ORAL ONCE
Status: COMPLETED | OUTPATIENT
Start: 2020-07-08 | End: 2020-07-08

## 2020-07-08 RX ORDER — DIPHENHYDRAMINE HYDROCHLORIDE 50 MG/ML
12.5 INJECTION INTRAMUSCULAR; INTRAVENOUS
Status: DISCONTINUED | OUTPATIENT
Start: 2020-07-08 | End: 2020-07-08 | Stop reason: HOSPADM

## 2020-07-08 RX ORDER — MAGNESIUM HYDROXIDE/ALUMINUM HYDROXICE/SIMETHICONE 120; 1200; 1200 MG/30ML; MG/30ML; MG/30ML
30 SUSPENSION ORAL EVERY 6 HOURS PRN
Status: DISCONTINUED | OUTPATIENT
Start: 2020-07-08 | End: 2020-07-10 | Stop reason: HOSPADM

## 2020-07-08 RX ORDER — LABETALOL 20 MG/4 ML (5 MG/ML) INTRAVENOUS SYRINGE
5 EVERY 10 MIN PRN
Status: DISCONTINUED | OUTPATIENT
Start: 2020-07-08 | End: 2020-07-08 | Stop reason: HOSPADM

## 2020-07-08 RX ORDER — MEPERIDINE HYDROCHLORIDE 25 MG/ML
12.5 INJECTION INTRAMUSCULAR; INTRAVENOUS; SUBCUTANEOUS EVERY 5 MIN PRN
Status: DISCONTINUED | OUTPATIENT
Start: 2020-07-08 | End: 2020-07-08 | Stop reason: HOSPADM

## 2020-07-08 RX ORDER — BUDESONIDE AND FORMOTEROL FUMARATE DIHYDRATE 160; 4.5 UG/1; UG/1
2 AEROSOL RESPIRATORY (INHALATION) 2 TIMES DAILY
Status: DISCONTINUED | OUTPATIENT
Start: 2020-07-08 | End: 2020-07-10 | Stop reason: HOSPADM

## 2020-07-08 RX ORDER — OXYCODONE HYDROCHLORIDE AND ACETAMINOPHEN 5; 325 MG/1; MG/1
2 TABLET ORAL EVERY 4 HOURS PRN
Status: DISCONTINUED | OUTPATIENT
Start: 2020-07-08 | End: 2020-07-10 | Stop reason: HOSPADM

## 2020-07-08 RX ORDER — ACETAMINOPHEN 325 MG/1
650 TABLET ORAL EVERY 4 HOURS PRN
Status: DISCONTINUED | OUTPATIENT
Start: 2020-07-08 | End: 2020-07-10 | Stop reason: HOSPADM

## 2020-07-08 RX ORDER — DOCUSATE SODIUM 100 MG/1
100 CAPSULE, LIQUID FILLED ORAL 2 TIMES DAILY
Status: DISCONTINUED | OUTPATIENT
Start: 2020-07-08 | End: 2020-07-10 | Stop reason: HOSPADM

## 2020-07-08 RX ORDER — HYDROMORPHONE HCL 110MG/55ML
PATIENT CONTROLLED ANALGESIA SYRINGE INTRAVENOUS PRN
Status: DISCONTINUED | OUTPATIENT
Start: 2020-07-08 | End: 2020-07-08 | Stop reason: SDUPTHER

## 2020-07-08 RX ORDER — MIDAZOLAM HYDROCHLORIDE 1 MG/ML
INJECTION INTRAMUSCULAR; INTRAVENOUS PRN
Status: DISCONTINUED | OUTPATIENT
Start: 2020-07-08 | End: 2020-07-08 | Stop reason: SDUPTHER

## 2020-07-08 RX ORDER — PANTOPRAZOLE SODIUM 40 MG/1
40 TABLET, DELAYED RELEASE ORAL
Status: DISCONTINUED | OUTPATIENT
Start: 2020-07-09 | End: 2020-07-08

## 2020-07-08 RX ORDER — DEXAMETHASONE SODIUM PHOSPHATE 4 MG/ML
INJECTION, SOLUTION INTRA-ARTICULAR; INTRALESIONAL; INTRAMUSCULAR; INTRAVENOUS; SOFT TISSUE PRN
Status: DISCONTINUED | OUTPATIENT
Start: 2020-07-08 | End: 2020-07-08 | Stop reason: SDUPTHER

## 2020-07-08 RX ORDER — HYDROXYZINE HYDROCHLORIDE 25 MG/1
25 TABLET, FILM COATED ORAL 3 TIMES DAILY PRN
Status: DISCONTINUED | OUTPATIENT
Start: 2020-07-08 | End: 2020-07-10 | Stop reason: HOSPADM

## 2020-07-08 RX ORDER — PREGABALIN 75 MG/1
75 CAPSULE ORAL DAILY
Status: DISCONTINUED | OUTPATIENT
Start: 2020-07-08 | End: 2020-07-09

## 2020-07-08 RX ORDER — CALCIUM CARBONATE 200(500)MG
500 TABLET,CHEWABLE ORAL 3 TIMES DAILY PRN
Status: DISCONTINUED | OUTPATIENT
Start: 2020-07-08 | End: 2020-07-09

## 2020-07-08 RX ORDER — BUPIVACAINE HYDROCHLORIDE 5 MG/ML
INJECTION, SOLUTION EPIDURAL; INTRACAUDAL PRN
Status: DISCONTINUED | OUTPATIENT
Start: 2020-07-08 | End: 2020-07-08 | Stop reason: ALTCHOICE

## 2020-07-08 RX ORDER — PROPOFOL 10 MG/ML
INJECTION, EMULSION INTRAVENOUS PRN
Status: DISCONTINUED | OUTPATIENT
Start: 2020-07-08 | End: 2020-07-08 | Stop reason: SDUPTHER

## 2020-07-08 RX ORDER — SODIUM CHLORIDE 0.9 % (FLUSH) 0.9 %
10 SYRINGE (ML) INJECTION EVERY 12 HOURS SCHEDULED
Status: DISCONTINUED | OUTPATIENT
Start: 2020-07-08 | End: 2020-07-09

## 2020-07-08 RX ORDER — ONDANSETRON 2 MG/ML
4 INJECTION INTRAMUSCULAR; INTRAVENOUS
Status: DISCONTINUED | OUTPATIENT
Start: 2020-07-08 | End: 2020-07-08 | Stop reason: HOSPADM

## 2020-07-08 RX ORDER — MORPHINE SULFATE 2 MG/ML
2 INJECTION, SOLUTION INTRAMUSCULAR; INTRAVENOUS EVERY 5 MIN PRN
Status: DISCONTINUED | OUTPATIENT
Start: 2020-07-08 | End: 2020-07-08 | Stop reason: HOSPADM

## 2020-07-08 RX ORDER — MIDAZOLAM HYDROCHLORIDE 1 MG/ML
2 INJECTION INTRAMUSCULAR; INTRAVENOUS ONCE
Status: DISCONTINUED | OUTPATIENT
Start: 2020-07-08 | End: 2020-07-09

## 2020-07-08 RX ORDER — SODIUM CHLORIDE 9 MG/ML
INJECTION, SOLUTION INTRAVENOUS CONTINUOUS
Status: DISCONTINUED | OUTPATIENT
Start: 2020-07-08 | End: 2020-07-08

## 2020-07-08 RX ORDER — FENTANYL CITRATE 50 UG/ML
INJECTION, SOLUTION INTRAMUSCULAR; INTRAVENOUS PRN
Status: DISCONTINUED | OUTPATIENT
Start: 2020-07-08 | End: 2020-07-08 | Stop reason: SDUPTHER

## 2020-07-08 RX ORDER — IPRATROPIUM BROMIDE AND ALBUTEROL SULFATE 2.5; .5 MG/3ML; MG/3ML
1 SOLUTION RESPIRATORY (INHALATION) 4 TIMES DAILY
Status: DISCONTINUED | OUTPATIENT
Start: 2020-07-08 | End: 2020-07-09

## 2020-07-08 RX ORDER — DICYCLOMINE HYDROCHLORIDE 10 MG/1
10 CAPSULE ORAL EVERY 6 HOURS PRN
Status: DISCONTINUED | OUTPATIENT
Start: 2020-07-08 | End: 2020-07-10 | Stop reason: HOSPADM

## 2020-07-08 RX ORDER — PROMETHAZINE HYDROCHLORIDE 12.5 MG/1
12.5 TABLET ORAL EVERY 6 HOURS PRN
Status: DISCONTINUED | OUTPATIENT
Start: 2020-07-08 | End: 2020-07-10 | Stop reason: HOSPADM

## 2020-07-08 RX ORDER — LIDOCAINE HYDROCHLORIDE 10 MG/ML
INJECTION, SOLUTION EPIDURAL; INFILTRATION; INTRACAUDAL; PERINEURAL PRN
Status: DISCONTINUED | OUTPATIENT
Start: 2020-07-08 | End: 2020-07-08 | Stop reason: SDUPTHER

## 2020-07-08 RX ORDER — ROCURONIUM BROMIDE 10 MG/ML
INJECTION, SOLUTION INTRAVENOUS PRN
Status: DISCONTINUED | OUTPATIENT
Start: 2020-07-08 | End: 2020-07-08 | Stop reason: SDUPTHER

## 2020-07-08 RX ORDER — OXYCODONE HYDROCHLORIDE AND ACETAMINOPHEN 5; 325 MG/1; MG/1
1 TABLET ORAL EVERY 4 HOURS PRN
Status: DISCONTINUED | OUTPATIENT
Start: 2020-07-08 | End: 2020-07-10 | Stop reason: HOSPADM

## 2020-07-08 RX ADMIN — DOCUSATE SODIUM 100 MG: 100 CAPSULE, LIQUID FILLED ORAL at 14:36

## 2020-07-08 RX ADMIN — ROCURONIUM BROMIDE 50 MG: 10 INJECTION, SOLUTION INTRAVENOUS at 07:36

## 2020-07-08 RX ADMIN — HYDROXYZINE HYDROCHLORIDE 25 MG: 25 TABLET, FILM COATED ORAL at 14:15

## 2020-07-08 RX ADMIN — SODIUM CHLORIDE: 9 INJECTION, SOLUTION INTRAVENOUS at 06:49

## 2020-07-08 RX ADMIN — HYDROMORPHONE HYDROCHLORIDE 0.5 MG: 1 INJECTION, SOLUTION INTRAMUSCULAR; INTRAVENOUS; SUBCUTANEOUS at 11:50

## 2020-07-08 RX ADMIN — CEFAZOLIN 2 G: 10 INJECTION, POWDER, FOR SOLUTION INTRAVENOUS at 07:42

## 2020-07-08 RX ADMIN — CEFAZOLIN 2 G: 1 INJECTION, POWDER, FOR SOLUTION INTRAMUSCULAR; INTRAVENOUS at 23:29

## 2020-07-08 RX ADMIN — ONDANSETRON 4 MG: 2 INJECTION INTRAMUSCULAR; INTRAVENOUS at 11:04

## 2020-07-08 RX ADMIN — HYDROMORPHONE HYDROCHLORIDE 0.5 MG: 1 INJECTION, SOLUTION INTRAMUSCULAR; INTRAVENOUS; SUBCUTANEOUS at 12:14

## 2020-07-08 RX ADMIN — DOCUSATE SODIUM 100 MG: 100 CAPSULE, LIQUID FILLED ORAL at 20:07

## 2020-07-08 RX ADMIN — NICOTINE POLACRILEX 2 MG: 2 GUM, CHEWING BUCCAL at 20:00

## 2020-07-08 RX ADMIN — OXYCODONE HYDROCHLORIDE AND ACETAMINOPHEN 2 TABLET: 5; 325 TABLET ORAL at 16:47

## 2020-07-08 RX ADMIN — FENTANYL CITRATE 50 MCG: 50 INJECTION, SOLUTION INTRAMUSCULAR; INTRAVENOUS at 07:36

## 2020-07-08 RX ADMIN — IPRATROPIUM BROMIDE AND ALBUTEROL SULFATE 3 ML: .5; 3 SOLUTION RESPIRATORY (INHALATION) at 21:19

## 2020-07-08 RX ADMIN — MAGNESIUM HYDROXIDE 30 ML: 400 SUSPENSION ORAL at 20:07

## 2020-07-08 RX ADMIN — BISACODYL 10 MG: 10 SUPPOSITORY RECTAL at 14:14

## 2020-07-08 RX ADMIN — HYDROMORPHONE HYDROCHLORIDE 0.5 MG: 1 INJECTION, SOLUTION INTRAMUSCULAR; INTRAVENOUS; SUBCUTANEOUS at 14:34

## 2020-07-08 RX ADMIN — NICOTINE POLACRILEX 2 MG: 2 GUM, CHEWING BUCCAL at 22:40

## 2020-07-08 RX ADMIN — SODIUM CHLORIDE: 9 INJECTION, SOLUTION INTRAVENOUS at 12:25

## 2020-07-08 RX ADMIN — ROCURONIUM BROMIDE 20 MG: 10 INJECTION, SOLUTION INTRAVENOUS at 08:30

## 2020-07-08 RX ADMIN — OXYCODONE HYDROCHLORIDE AND ACETAMINOPHEN 2 TABLET: 5; 325 TABLET ORAL at 21:55

## 2020-07-08 RX ADMIN — LIDOCAINE HYDROCHLORIDE 50 MG: 10 INJECTION, SOLUTION EPIDURAL; INFILTRATION; INTRACAUDAL; PERINEURAL at 07:36

## 2020-07-08 RX ADMIN — SODIUM CHLORIDE: 9 INJECTION, SOLUTION INTRAVENOUS at 06:48

## 2020-07-08 RX ADMIN — MIDAZOLAM 2 MG: 1 INJECTION INTRAMUSCULAR; INTRAVENOUS at 07:30

## 2020-07-08 RX ADMIN — ALUMINUM HYDROXIDE, MAGNESIUM HYDROXIDE, AND SIMETHICONE 30 ML: 200; 200; 20 SUSPENSION ORAL at 22:40

## 2020-07-08 RX ADMIN — SODIUM CHLORIDE: 9 INJECTION, SOLUTION INTRAVENOUS at 16:16

## 2020-07-08 RX ADMIN — HYDROMORPHONE HYDROCHLORIDE 0.5 MG: 1 INJECTION, SOLUTION INTRAMUSCULAR; INTRAVENOUS; SUBCUTANEOUS at 13:01

## 2020-07-08 RX ADMIN — HYDROXYZINE HYDROCHLORIDE 25 MG: 25 TABLET, FILM COATED ORAL at 20:13

## 2020-07-08 RX ADMIN — SUGAMMADEX 200 MG: 100 INJECTION, SOLUTION INTRAVENOUS at 11:15

## 2020-07-08 RX ADMIN — ZOLPIDEM TARTRATE 5 MG: 5 TABLET ORAL at 23:46

## 2020-07-08 RX ADMIN — DEXAMETHASONE SODIUM PHOSPHATE 8 MG: 4 INJECTION, SOLUTION INTRA-ARTICULAR; INTRALESIONAL; INTRAMUSCULAR; INTRAVENOUS; SOFT TISSUE at 07:42

## 2020-07-08 RX ADMIN — HYDROMORPHONE HYDROCHLORIDE 1 MG: 2 INJECTION, SOLUTION INTRAMUSCULAR; INTRAVENOUS; SUBCUTANEOUS at 09:27

## 2020-07-08 RX ADMIN — ANTACID TABLETS 500 MG: 500 TABLET, CHEWABLE ORAL at 23:50

## 2020-07-08 RX ADMIN — HYDROMORPHONE HYDROCHLORIDE 1 MG: 2 INJECTION, SOLUTION INTRAMUSCULAR; INTRAVENOUS; SUBCUTANEOUS at 10:06

## 2020-07-08 RX ADMIN — LORAZEPAM 2 MG: 0.5 TABLET ORAL at 15:21

## 2020-07-08 RX ADMIN — ALUMINUM HYDROXIDE, MAGNESIUM HYDROXIDE, AND SIMETHICONE 30 ML: 200; 200; 20 SUSPENSION ORAL at 14:15

## 2020-07-08 RX ADMIN — MELATONIN 1000 UNITS: at 17:16

## 2020-07-08 RX ADMIN — METFORMIN HYDROCHLORIDE 500 MG: 500 TABLET ORAL at 17:15

## 2020-07-08 RX ADMIN — LABETALOL 20 MG/4 ML (5 MG/ML) INTRAVENOUS SYRINGE 5 MG: at 12:44

## 2020-07-08 RX ADMIN — FENTANYL CITRATE 100 MCG: 50 INJECTION, SOLUTION INTRAMUSCULAR; INTRAVENOUS at 08:35

## 2020-07-08 RX ADMIN — ROCURONIUM BROMIDE 20 MG: 10 INJECTION, SOLUTION INTRAVENOUS at 09:50

## 2020-07-08 RX ADMIN — BUDESONIDE AND FORMOTEROL FUMARATE DIHYDRATE 2 PUFF: 160; 4.5 AEROSOL RESPIRATORY (INHALATION) at 21:53

## 2020-07-08 RX ADMIN — PREGABALIN 75 MG: 75 CAPSULE ORAL at 17:14

## 2020-07-08 RX ADMIN — FENTANYL CITRATE 50 MCG: 50 INJECTION, SOLUTION INTRAMUSCULAR; INTRAVENOUS at 07:30

## 2020-07-08 RX ADMIN — CEFAZOLIN 2 G: 1 INJECTION, POWDER, FOR SOLUTION INTRAMUSCULAR; INTRAVENOUS at 15:13

## 2020-07-08 RX ADMIN — PROPOFOL 200 MG: 10 INJECTION, EMULSION INTRAVENOUS at 07:36

## 2020-07-08 RX ADMIN — PANTOPRAZOLE SODIUM 40 MG: 40 TABLET, DELAYED RELEASE ORAL at 20:07

## 2020-07-08 RX ADMIN — IPRATROPIUM BROMIDE AND ALBUTEROL SULFATE 3 ML: .5; 3 SOLUTION RESPIRATORY (INHALATION) at 16:42

## 2020-07-08 ASSESSMENT — PULMONARY FUNCTION TESTS
PIF_VALUE: 33
PIF_VALUE: 34
PIF_VALUE: 33
PIF_VALUE: 32
PIF_VALUE: 32
PIF_VALUE: 33
PIF_VALUE: 1
PIF_VALUE: 32
PIF_VALUE: 29
PIF_VALUE: 33
PIF_VALUE: 20
PIF_VALUE: 23
PIF_VALUE: 33
PIF_VALUE: 20
PIF_VALUE: 1
PIF_VALUE: 33
PIF_VALUE: 33
PIF_VALUE: 22
PIF_VALUE: 33
PIF_VALUE: 29
PIF_VALUE: 22
PIF_VALUE: 30
PIF_VALUE: 29
PIF_VALUE: 28
PIF_VALUE: 22
PIF_VALUE: 30
PIF_VALUE: 29
PIF_VALUE: 25
PIF_VALUE: 27
PIF_VALUE: 27
PIF_VALUE: 22
PIF_VALUE: 28
PIF_VALUE: 32
PIF_VALUE: 24
PIF_VALUE: 20
PIF_VALUE: 1
PIF_VALUE: 24
PIF_VALUE: 33
PIF_VALUE: 34
PIF_VALUE: 33
PIF_VALUE: 20
PIF_VALUE: 22
PIF_VALUE: 33
PIF_VALUE: 32
PIF_VALUE: 27
PIF_VALUE: 32
PIF_VALUE: 32
PIF_VALUE: 20
PIF_VALUE: 29
PIF_VALUE: 32
PIF_VALUE: 20
PIF_VALUE: 33
PIF_VALUE: 32
PIF_VALUE: 33
PIF_VALUE: 28
PIF_VALUE: 32
PIF_VALUE: 33
PIF_VALUE: 12
PIF_VALUE: 27
PIF_VALUE: 4
PIF_VALUE: 34
PIF_VALUE: 20
PIF_VALUE: 29
PIF_VALUE: 33
PIF_VALUE: 34
PIF_VALUE: 33
PIF_VALUE: 33
PIF_VALUE: 32
PIF_VALUE: 33
PIF_VALUE: 33
PIF_VALUE: 30
PIF_VALUE: 33
PIF_VALUE: 23
PIF_VALUE: 34
PIF_VALUE: 34
PIF_VALUE: 33
PIF_VALUE: 1
PIF_VALUE: 33
PIF_VALUE: 33
PIF_VALUE: 27
PIF_VALUE: 33
PIF_VALUE: 33
PIF_VALUE: 22
PIF_VALUE: 20
PIF_VALUE: 33
PIF_VALUE: 20
PIF_VALUE: 28
PIF_VALUE: 34
PIF_VALUE: 33
PIF_VALUE: 34
PIF_VALUE: 33
PIF_VALUE: 32
PIF_VALUE: 33
PIF_VALUE: 21
PIF_VALUE: 24
PIF_VALUE: 27
PIF_VALUE: 29
PIF_VALUE: 34
PIF_VALUE: 33
PIF_VALUE: 20
PIF_VALUE: 22
PIF_VALUE: 33
PIF_VALUE: 33
PIF_VALUE: 19
PIF_VALUE: 33
PIF_VALUE: 25
PIF_VALUE: 33
PIF_VALUE: 33
PIF_VALUE: 27
PIF_VALUE: 32
PIF_VALUE: 21
PIF_VALUE: 33
PIF_VALUE: 22
PIF_VALUE: 25
PIF_VALUE: 33
PIF_VALUE: 1
PIF_VALUE: 33
PIF_VALUE: 33
PIF_VALUE: 34
PIF_VALUE: 34
PIF_VALUE: 33
PIF_VALUE: 17
PIF_VALUE: 33
PIF_VALUE: 29
PIF_VALUE: 31
PIF_VALUE: 0
PIF_VALUE: 22
PIF_VALUE: 33
PIF_VALUE: 29
PIF_VALUE: 20
PIF_VALUE: 33
PIF_VALUE: 33
PIF_VALUE: 4
PIF_VALUE: 33
PIF_VALUE: 22
PIF_VALUE: 23
PIF_VALUE: 24
PIF_VALUE: 22
PIF_VALUE: 20
PIF_VALUE: 33
PIF_VALUE: 32
PIF_VALUE: 22
PIF_VALUE: 34
PIF_VALUE: 19
PIF_VALUE: 29
PIF_VALUE: 32
PIF_VALUE: 20
PIF_VALUE: 20
PIF_VALUE: 33
PIF_VALUE: 33
PIF_VALUE: 30
PIF_VALUE: 31
PIF_VALUE: 31
PIF_VALUE: 28
PIF_VALUE: 22
PIF_VALUE: 20
PIF_VALUE: 34
PIF_VALUE: 32
PIF_VALUE: 33
PIF_VALUE: 34
PIF_VALUE: 33
PIF_VALUE: 33
PIF_VALUE: 4
PIF_VALUE: 33
PIF_VALUE: 19
PIF_VALUE: 34
PIF_VALUE: 33
PIF_VALUE: 32
PIF_VALUE: 34
PIF_VALUE: 33
PIF_VALUE: 32
PIF_VALUE: 22
PIF_VALUE: 22
PIF_VALUE: 32
PIF_VALUE: 33
PIF_VALUE: 32
PIF_VALUE: 33
PIF_VALUE: 32
PIF_VALUE: 32
PIF_VALUE: 3
PIF_VALUE: 20
PIF_VALUE: 34
PIF_VALUE: 29
PIF_VALUE: 0
PIF_VALUE: 27
PIF_VALUE: 27
PIF_VALUE: 32
PIF_VALUE: 1
PIF_VALUE: 32
PIF_VALUE: 33
PIF_VALUE: 34
PIF_VALUE: 31
PIF_VALUE: 29
PIF_VALUE: 34
PIF_VALUE: 33
PIF_VALUE: 13
PIF_VALUE: 29
PIF_VALUE: 34
PIF_VALUE: 33
PIF_VALUE: 32
PIF_VALUE: 32
PIF_VALUE: 33
PIF_VALUE: 30
PIF_VALUE: 31
PIF_VALUE: 7
PIF_VALUE: 33
PIF_VALUE: 27
PIF_VALUE: 33
PIF_VALUE: 32
PIF_VALUE: 32
PIF_VALUE: 34
PIF_VALUE: 31
PIF_VALUE: 32
PIF_VALUE: 27
PIF_VALUE: 33
PIF_VALUE: 33
PIF_VALUE: 30
PIF_VALUE: 33
PIF_VALUE: 33
PIF_VALUE: 26
PIF_VALUE: 20
PIF_VALUE: 32
PIF_VALUE: 22
PIF_VALUE: 29
PIF_VALUE: 33
PIF_VALUE: 33
PIF_VALUE: 34
PIF_VALUE: 5
PIF_VALUE: 20
PIF_VALUE: 32
PIF_VALUE: 33
PIF_VALUE: 21
PIF_VALUE: 33

## 2020-07-08 ASSESSMENT — PAIN SCALES - GENERAL
PAINLEVEL_OUTOF10: 10
PAINLEVEL_OUTOF10: 5
PAINLEVEL_OUTOF10: 9
PAINLEVEL_OUTOF10: 8
PAINLEVEL_OUTOF10: 9
PAINLEVEL_OUTOF10: 10
PAINLEVEL_OUTOF10: 9
PAINLEVEL_OUTOF10: 0
PAINLEVEL_OUTOF10: 7
PAINLEVEL_OUTOF10: 10

## 2020-07-08 ASSESSMENT — PAIN DESCRIPTION - DESCRIPTORS
DESCRIPTORS: CRAMPING;SORE
DESCRIPTORS: CRAMPING
DESCRIPTORS: ACHING
DESCRIPTORS: DISCOMFORT

## 2020-07-08 ASSESSMENT — PAIN DESCRIPTION - ONSET
ONSET: ON-GOING
ONSET: ON-GOING

## 2020-07-08 ASSESSMENT — PAIN DESCRIPTION - ORIENTATION
ORIENTATION: LOWER;MID
ORIENTATION: LOWER;MID

## 2020-07-08 ASSESSMENT — PAIN DESCRIPTION - LOCATION
LOCATION: ABDOMEN

## 2020-07-08 ASSESSMENT — ENCOUNTER SYMPTOMS
SHORTNESS OF BREATH: 0
STRIDOR: 0

## 2020-07-08 ASSESSMENT — PAIN DESCRIPTION - PAIN TYPE
TYPE: SURGICAL PAIN
TYPE: SURGICAL PAIN;ACUTE PAIN
TYPE: ACUTE PAIN;SURGICAL PAIN
TYPE: SURGICAL PAIN;ACUTE PAIN
TYPE: SURGICAL PAIN

## 2020-07-08 ASSESSMENT — PAIN - FUNCTIONAL ASSESSMENT: PAIN_FUNCTIONAL_ASSESSMENT: 0-10

## 2020-07-08 ASSESSMENT — LIFESTYLE VARIABLES: SMOKING_STATUS: 0

## 2020-07-08 ASSESSMENT — PAIN DESCRIPTION - FREQUENCY
FREQUENCY: CONTINUOUS
FREQUENCY: CONTINUOUS

## 2020-07-08 ASSESSMENT — COPD QUESTIONNAIRES: CAT_SEVERITY: NO INTERVAL CHANGE

## 2020-07-08 NOTE — PROGRESS NOTES
OB/GYN Resident Interval Note      Patient ambulating and tolerating PO. Urine output adequate over last 4 hours. Epps catheter may be removed. Continue IV fluids at this time. Once patient urinates, may saline lock.      Vitals:    07/08/20 1423 07/08/20 1434 07/08/20 1445 07/08/20 1501   BP: (!) 143/63      Pulse: 80      Resp: 16      Temp:       TempSrc:       SpO2: 95% 96% 95% 96%   Weight:       Height:               Niharika Singh DO  OB/GYN Resident  PGY2  [facility], [place]  7/8/2020, 4:28 PM

## 2020-07-08 NOTE — FLOWSHEET NOTE
Pt becoming anxious -states I have to poop. This pain is worse than the surgical pain. Attempting to calm & reassure pt.  Pt sitting upright in bed, rocking motion of body

## 2020-07-08 NOTE — BRIEF OP NOTE
Brief Operative Note  Department of Obstetrics and Gynecology  [facility]     Patient: Seda Taylor   : 1980  MRN: [id number]       Acct: [de-identified]   Date of Procedure: 20     Pre-operative Diagnosis: 47 y.o. female [id number]   Endometriosis  Chronic pelvic pain  Hx of C/S x2  COPD  Hx of Hiatal Hernia  Asthma  Tobacco Use  BMI 37.13    Post-operative Diagnosis: same, pelvic adhesive disease    Procedure: robotic assisted laparoscopic hysterectomy with bilateral salpingectomy, lysis of adhesions, and cystoscopy    Surgeon: Jose Perez MD     Assistant(s): Mansi Resendiz, PGY3; Pavan Mercado, PGY1    Anesthesia: general     Findings:  Bimanual exam revealed 10 week sized retroverted uterus, not adnexal fullness bilaterally. Normal external genitalia, normal vaginal mucosa with mild rectocele, normal appear cervix with no lesions or discharge, cervical stenosis present. Survey of pelvis with laparoscopy revealed normal appearing uterus, bilateral ovaries, and bilateral fallopian tubes. Omental adhesions to anterior abdominal wall at level of umbilicus. Significant adhesions from bladder to anterior uterine surface. Total IV fluids/Blood products:  1800 ml crystalloid  Urine Output:  200 ml    Estimated blood loss:  100ml  Drains:  johnson catheter  Specimens:  Uterus, bilateral fallopian tubes, cervix  Instrument and Sponge Count: Correct  Complications:  none  Condition:  stable, transferred to post anesthesia recovery    See full operative report for further details.     Mansi Resendiz DO  Ob/Gyn Resident  Pager: [phone]  2020, 11:16 AM

## 2020-07-08 NOTE — DISCHARGE SUMMARY
Gyn Discharge Summary  [facility]      Patient Name: Baljit Canada  Patient : 1980  Primary Care Physician: Kinsey Davidson, APRN - CNP  Admit Date: 2020    Principal Diagnosis: Robotic assisted laparoscopic hysterectomy, bilateral salpingectomy, and cystoscopy    Other Diagnosis:   History of robot-assisted laparoscopic hysterectomy [Z90.710]  Patient Active Problem List   Diagnosis    Asthma    GERD    Iron deficiency anemia    Cervical disc herniation    Smoker    Sadler's esophagus without dysplasia    Hiatal hernia    COPD    Chronic constipation    Hx of IUFD (G2)    gHTN (Diagnosed @ 23wk)    Seizure ([address]    Hx of  x2 (G3, G4)    Arthritis    Cervical radiculopathy    Migraine without aura    Spinal stenosis in cervical region    Prediabetes    Chronic pelvic pain in female    Enlarged uterus    Endometriosis    Family history of breast cancer    Bipolar affective disorder, currently depressed, moderate ([address]    Insomnia    Ulnar neuropathy    Major depressive disorder, recurrent episode ([address]    Sciatica    Moderate persistent asthma without complication    Lipoma of torso    History of cervical discectomy    Chronic neck pain with abnormal neurologic examination    Abnormal weight gain     Class 2 drug-induced obesity with serious comorbidity and body mass index (BMI) of 37.0 to 37.9 in adult    Pelvic peritoneal endometriosis    History of robot-assisted laparoscopic hysterectomy    Post-op pain       Infection: No  Hospital Acquired: No    Surgical Operations & Procedures: robotic assisted laparoscopic hysterectomy, bilateral salpingectomy, and cystoscopy    Consultations: Anesthesia    Pertinent Findings & Procedures:   Baljit Canada is a 47 y.o. female [id number], admitted for surgical management of chronic pelvic pain; received Ancef preoperatively. She underwent RALH with BS and cystoscopy on 2020.   Post-op course normal, discharged home. Follow up in 1-2 weeks. Discharge instructions reviewed and questions answered. Course of patient: normal    Discharge to: Home    Readmission planned: No    Recommendations on Discharge:     Medications:     Medication List      START taking these medications    docusate sodium 100 MG capsule  Commonly known as:  Colace  Take 1 capsule by mouth 2 times daily     ondansetron 4 MG tablet  Commonly known as:  Zofran  Take 1 tablet by mouth every 8 hours as needed for Nausea     oxyCODONE-acetaminophen 5-325 MG per tablet  Commonly known as:  Percocet  Take 1 tablet by mouth every 6 hours as needed for Pain for up to 5 days. Intended supply: 5 days. Take lowest dose possible to manage pain     simethicone 80 MG chewable tablet  Commonly known as:  MYLICON  Take 1 tablet by mouth every 6 hours as needed for Flatulence        CONTINUE taking these medications    acetaminophen 500 MG tablet  Commonly known as:  TYLENOL     Alcohol Prep Pads  Use as directed     amoxicillin-clavulanate 875-125 MG per tablet  Commonly known as: Augmentin  Take 1 tablet by mouth 2 times daily for 10 days     blood glucose test strips  Test 2-3 times a day & as needed for symptoms of irregular blood glucose. BRAND OF CHOICE INSURANCE ALLOWS.      Breo Ellipta 200-25 MCG/INH Aepb inhaler  Generic drug:  Fluticasone furoate-vilanterol  INHALE ONE PUFF BY MOUTH ONCE A DAY ONCE A DAY INHALATION 30     chlorhexidine 0.12 % solution  Commonly known as:  PERIDEX     * [employer] Choice Face Mask Misc  USE NEW FACE MASK EVERYDAY WHEN PATIENT GO OUTSIDE OF HOME DUE TO COVID PANDEMIC     * [place] Choice Face Mask Misc  USE NEW FACE MASK EVERYDAY WHEN PATIENT GO OUTSIDE OF HOME DUE TO COVID PANDEMIC     * Combivent Respimat  MCG/ACT Aers inhaler  Generic drug:  albuterol-ipratropium     * ipratropium-albuterol 0.5-2.5 (3) MG/3ML Soln nebulizer solution  Commonly known as:  DUONEB     dicyclomine 10 MG capsule  Commonly known as:  Bentyl  Take 1 capsule by mouth every 6 hours as needed (cramps)     diphenhydrAMINE 25 MG capsule  Commonly known as:  BENADRYL  Take 1 capsule by mouth every 6 hours as needed for Itching     EPINEPHrine 0.3 MG/0.3ML Soaj injection  Commonly known as:  EpiPen 2-[place]  Inject 0.3 mLs into the muscle once for 1 dose Use as directed for allergic reaction     Lancets Misc  1 each by Does not apply route 2 times daily     Linzess 290 MCG Caps capsule  Generic drug:  linaclotide  TAKE 1 CAPSULE BY MOUTH EVERY MORNING (BEFORE BREAKFAST)     metFORMIN 500 MG tablet  Commonly known as:  GLUCOPHAGE  Take 1 tablet by mouth 2 times daily (with meals) Take 1 tablet daily for the first 7 days. Then BID     MISC NATURAL PRODUCT OP     montelukast 10 MG tablet  Commonly known as:  SINGULAIR  Take 1 tablet by mouth nightly     pantoprazole 40 MG tablet  Commonly known as:  PROTONIX  TAKE ONE TABLET TWICE A DAY ORALLY 30 DAY(S)     pregabalin 75 MG capsule  Commonly known as:  LYRICA  TAKE 1 CAPSULE BY MOUTH 3 TIMES DAILY     * promethazine 25 MG tablet  Commonly known as:  PHENERGAN  Take 1 tablet by mouth every 6 hours as needed for Nausea WARNING:  May cause drowsiness. May impair ability to operate vehicles or machinery. Do not use in combination with alcohol. * promethazine 25 MG suppository  Commonly known as:  PHENERGAN  Place 1 suppository rectally every 6 hours as needed for Nausea WARNING:  May cause drowsiness. May impair ability to operate vehicles or machinery. Do not use in combination with alcohol.     vitamin D 25 MCG (1000 UT) Tabs tablet  Commonly known as:  CHOLECALCIFEROL  Take 1 tablet by mouth daily         * This list has 6 medication(s) that are the same as other medications prescribed for you. Read the directions carefully, and ask your doctor or other care provider to review them with you.                Where to Get Your Medications      You can get these medications from any pharmacy    Bring a paper prescription for each of these medications  · docusate sodium 100 MG capsule  · ondansetron 4 MG tablet  · oxyCODONE-acetaminophen 5-325 MG per tablet  · simethicone 80 MG chewable tablet           Activity: pelvic rest x 6 weeks, no driving on narcotics, no lifting greater than 15 lbs  Diet: regular diet  Follow up: 1-2 weeks     Condition on discharge: good and stable   Discharge Date: 7/10/20    Comments:  Home care, Follow-up care, restrictions reviewed.     Yanet Junior DO  Ob/Gyn Resident  [facility]  7/10/2020, 8:18 AM

## 2020-07-08 NOTE — PROGRESS NOTES
SI/HI   - Encouraged outpatient follow up with psychiatrist    Fibromyalgia   - Continue lyrica 75mg daily while in patient      Sonal Tate DO  Ob/Gyn Resident  Pager: [phone]  7/8/2020, 2:15 PM

## 2020-07-08 NOTE — OP NOTE
8-mm skin incision was made superior to the umbilicus and a Veress needle was placed through this incision while tenting up the anterior abdominal wall. Saline bubble drop test was undergone and passed and gas was noted to flow under low pressures. Once pneumoperitoneum was obtained, the Veress needle was removed and the 8 mm camera trocar was placed through this incision while tenting up the anterior abdominal wall. Intra-abdominal placement was confirmed with the laparoscope and the underlying structures visualized were without trauma. Brief survey of the pelvis noted omental adhesions to anterior abdominal wall at level of umbilicus, as well as extensive adhesive disease involving anterior surface of uterus. 2 additional trocars were placed 10 cm lateral to either side of the supraumbilical trocar and another trocar on the left between the left lateral and subumbilical trocars, under direct visualization without any complication. Omental adhesions were removed using Ligasure and hemostasis of the omentum was noted. The patient was placed in Trendelenburg. The intestines were mobilized out of the posterior cul-de-sac and then the Remedifyinci robot was brought in and docked. Instruments were placed under direct visualization. With the physician at the console, visualization of the pelvis was undergone with above findings. The attention was turned to the left adnexa and the left ureter was noted to be peristalsing well below the surgical site. The left fallopian tube was cauterized and excised from the tubo-ovarian ligament to the fimbriated end and removed from the abdomen. The utero-ovarian ligament was cauterized and ligated. Then the broad ligament was opened using bipolar and scissors then dissected down to the round ligament which was cauterized and ligated. The anterior leaf of the broad ligament was then dissected to the vesicouterine peritoneal reflection and the bladder flap was created.  The bladder was mobilized inferiorly and then attention was turned to the right adnexa. Similarly, the right ureter was noted to be peristalsing well below the surgical site. The right fallopian tube was cauterized and excised from the tubo-ovarian ligament to the fimbriated end and removed from the abdomen. The utero-ovarian ligament was cauterized and ligated. Then the broad ligament was opened using bipolar and scissors then dissected down to the round ligament which was cauterized and ligated. The anterior leaf of the broad ligament was further dissected to the vesicouterine peritoneal reflection to complete the bladder flap. The bladder was further mobilized inferiorly and the anterior colpotomy cup could be palpated and visualized. The left uterine vessels were then skeletonized, cauterized and ligated at the cervical isthmus and a subsequent bite was taken, parallel to the cervix at the cardinal ligament. Then in a similar fashion, the right uterine arteries were skeletonized, cauterized and ligated at the cervical isthmus and a subsequent bite was taken, parallel to the cervix at the cardinal ligament. Posterior colpotomy was made circumferentially to the anterior aspect, amputating the cervix from the vagina without any complications. Then the uterus and cervix were removed en bloc vaginally without any complications. The vaginal cuff was then closed from right to left using V lock suture, being careful to incorporate both uterosacral ligaments in the corners and the vaginal mucosa into each suture, and oversewing suture to the midline. The pelvis was then irrigated/suctioned and excellent hemostasis was noted over the vaginal cuff, which was palpated vaginally after removing the glove with laparotomy sponge. Simultaneously, the cuff was also visualized laparoscopically and noted to be intact and well approximated. Tatianna was then applied to the cuff.   All pedicles were inspected with excellent hemostasis noted. There was no other intra-abdominal pathology.     The Johnson catheter was removed and the cystoscope was introduced through the urethra and the bladder filled with approximately 100 cc of normal saline. No bladder injury was noted. Both ureters were noted to be patent with good flow. The bladder was then drained and the cystoscope removed. A new Johnson catheter was placed.     All instruments were removed from the abdomen under direct visualization. The daVinci was undocked and removed from the operative field. All CO2 was removed from the patient's abdomen and trocars were removed. The skin was closed using 4-0 vicryl in the subcuticular fashion and covered with skin glue. Sponge, lap, needle count, and instrument counts were correct x 2. The patient was extubated and taken to the post operative recovery unit in good condition. Dr. Vane Zarate was present for the entire operation. Findings:  Bimanual exam revealed 10 week sized retroverted uterus, not adnexal fullness bilaterally. Normal external genitalia, normal vaginal mucosa with mild rectocele, normal appear cervix with no lesions or discharge, cervical stenosis present. Survey of pelvis with laparoscopy revealed normal appearing uterus, bilateral ovaries, and bilateral fallopian tubes. Omental adhesions to anterior abdominal wall at level of umbilicus.  Significant adhesions from bladder to anterior uterine surface  Total IV fluids/Blood products:  1800 ml crystalloid  Urine Output:  200 ml    Estimated blood loss:  100ml  Drains:  johnson catheter  Specimens:  Uterus, bilateral fallopian tubes, cervix  Instrument and Sponge Count: Correct  Complications:  none  Condition:  stable, transferred to post anesthesia recovery    Trevor Lord DO  OB/GYN Resident  PGY2  [facility], [address]  7/8/2020, 3:39 PM

## 2020-07-08 NOTE — ANESTHESIA PRE PROCEDURE
Department of Anesthesiology  Preprocedure Note       Name:  Froylan Galan   Age:  47 y.o.  :  1980                                          MRN:  [id number]         Date:  2020      Surgeon: Prosper Bhagat):  Harmony Galaviz MD    Procedure: Procedure(s): HYSTERECTOMY ABDOMINAL LAPAROSCOPIC ROBOTIC XI    Medications prior to admission:   Prior to Admission medications    Medication Sig Start Date End Date Taking? Authorizing Provider   pregabalin (LYRICA) 75 MG capsule TAKE 1 CAPSULE BY MOUTH 3 TIMES DAILY 20 Yes KATIE Cole CNP   BREO ELLIPTA 200-25 MCG/INH AEPB inhaler INHALE ONE PUFF BY MOUTH ONCE A DAY ONCE A DAY INHALATION 30 20  Yes KATIE Cole CNP   dicyclomine (BENTYL) 10 MG capsule Take 1 capsule by mouth every 6 hours as needed (cramps) 7/3/20  Yes Carlito Méndez MD   LINZESS 290 MCG CAPS capsule TAKE 1 CAPSULE BY MOUTH EVERY MORNING (BEFORE BREAKFAST) 20  Yes Silke Oneil MD   amoxicillin-clavulanate (AUGMENTIN) 875-125 MG per tablet Take 1 tablet by mouth 2 times daily for 10 days 20 Yes Ricki Ho DO   vitamin D (CHOLECALCIFEROL) 25 MCG (1000 UT) TABS tablet Take 1 tablet by mouth daily 20 Yes KATIE Cole CNP   metFORMIN (GLUCOPHAGE) 500 MG tablet Take 1 tablet by mouth 2 times daily (with meals) Take 1 tablet daily for the first 7 days.  Then BID 20 Yes KATIE Cole CNP   pantoprazole (PROTONIX) 40 MG tablet TAKE ONE TABLET TWICE A DAY ORALLY 30 DAY(S) 20  Yes Silke Oneil MD   chlorhexidine (PERIDEX) 0.12 % solution RINSE WITH 15ML TWICE A DAY FOR 30 SECONDS 20  Yes Historical Provider, MD   ipratropium-albuterol (DUONEB) 0.5-2.5 (3) MG/3ML SOLN nebulizer solution Inhale 1 vial into the lungs 4 times daily   Yes Historical Provider, MD   diphenhydrAMINE (BENADRYL) 25 MG capsule Take 1 capsule by mouth every 6 hours as needed for Itching 3/29/19  Yes Jimmy DO Arias   COMBIVENT RESPIMAT  MCG/ACT AERS inhaler Indications: uses PRN  9/15/18  Yes Historical Provider, MD   montelukast (SINGULAIR) 10 MG tablet Take 1 tablet by mouth nightly 9/20/18  Yes Pro Dao MD   Masks (CLEVER CHOICE FACE MASK) MISC USE NEW FACE MASK EVERYDAY WHEN PATIENT GO OUTSIDE OF HOME DUE TO COVID PANDEMIC 7/6/20   KATIE Cole - CNP   Masks (CLEVER CHOICE FACE MASK) MISC USE NEW FACE MASK EVERYDAY WHEN PATIENT GO OUTSIDE OF HOME DUE TO COVID PANDEMIC 7/6/20   Renfred Bernal APRN - CNP   promethazine (PHENERGAN) 25 MG tablet Take 1 tablet by mouth every 6 hours as needed for Nausea WARNING:  May cause drowsiness. May impair ability to operate vehicles or machinery. Do not use in combination with alcohol. 7/3/20 7/10/20  Angela Payton MD   promethazine (PHENERGAN) 25 MG suppository Place 1 suppository rectally every 6 hours as needed for Nausea WARNING:  May cause drowsiness. May impair ability to operate vehicles or machinery. Do not use in combination with alcohol. 7/3/20 7/10/20  Angela Payton MD   acetaminophen (TYLENOL) 500 MG tablet Take 500 mg by mouth every 6 hours as needed for Pain    Historical Provider, MD   MISC NATURAL PRODUCT OP Take 2 tablets by mouth 2 times daily hydroxycut max-weight loss supplement    Historical Provider, MD   blood glucose monitor strips Test 2-3 times a day & as needed for symptoms of irregular blood glucose.   BRAND OF CHOICE INSURANCE ALLOWS. 6/12/20   KATIE Cole CNP   Lancets MISC 1 each by Does not apply route 2 times daily 6/12/20   Renella CASE Bernal APRN - CNP   Alcohol Swabs (ALCOHOL PREP) PADS Use as directed 6/12/20   Alan Bernal APRN - CNP   EPINEPHrine (EPIPEN 2-HELDER) 0.3 MG/0.3ML SOAJ injection Inject 0.3 mLs into the muscle once for 1 dose Use as directed for allergic reaction 3/29/19 6/25/20  Claire Hardy DO       Current medications:    Current Facility-Administered Medications Medication Dose Route Frequency Provider Last Rate Last Dose    ceFAZolin (ANCEF) 2 g in dextrose 5 % 50 mL IVPB  2 g Intravenous Once Terry Gill MD        0.9 % sodium chloride infusion   Intravenous Continuous Renetta Sierra  mL/hr at 20 0649      0.9 % sodium chloride infusion   Intravenous Continuous Renetta Sierra  mL/hr at 20 9470         Allergies:     Allergies   Allergen Reactions    Nsaids      Irritates her Barrets esophogus    Toradol [Ketorolac Tromethamine]     Ultram [Tramadol] Nausea Only     Gastric upset       Problem List:    Patient Active Problem List   Diagnosis Code    Asthma J45.909    GERD K21.9    Iron deficiency anemia D50.9    Cervical disc herniation M50.20    Smoker F17.200    Sadler's esophagus without dysplasia K22.70    Hiatal hernia K44.9    COPD J44.9    Chronic constipation K59.09    Hx of IUFD (G2) O09.299    gHTN (Diagnosed @ 23wk) R03.0    Seizure ([address] R56.9    Hx of  x2 (G3, G4) Z98.891    Arthritis M19.90    Cervical radiculopathy M54.12    Migraine without aura G43.009    Spinal stenosis in cervical region M48.02    Prediabetes R73.03    Chronic pelvic pain in female R10.2, G89.29    Enlarged uterus N85.2    Endometriosis N80.9    Family history of breast cancer Z80.3    Bipolar affective disorder, currently depressed, moderate ([address] F31.32    Insomnia G47.00    Ulnar neuropathy G56.20    Major depressive disorder, recurrent episode ([address] F33.9    Sciatica M54.30    Moderate persistent asthma without complication T79.59    Lipoma of torso D17.1    History of cervical discectomy Z98.890    Chronic neck pain with abnormal neurologic examination M54.2, G89.28    Abnormal weight gain  R63.5    Class 2 drug-induced obesity with serious comorbidity and body mass index (BMI) of 37.0 to 37.9 in adult E66.1, Z68.37       Past Medical History:        Diagnosis Date    Anxiety     Arthritis     OA    Asthma     Mullins's esophagus     LONG SEGMENT    Bipolar 1 disorder (HCC)     CAD (coronary artery disease)     Chronic insomnia     [facility] disease[facility]     Depression     and bipolar    Diabetes mellitus ([facility]     prediabetic    Endometriosis 3/9/2020    Esophagitis     severe    GERD (gastroesophageal reflux disease)     Headache(784.0)     Herniated disc, cervical     Hiatal hernia     Incisional abscess 1/15/2019    Kidney stones     MDRO (multiple drug resistant organisms) resistance     mrsa    Pleurisy     hx of \"years ago\"    Pneumonia     past penumonia    Seizures ([facility]     2016 last seizure-focal seizure    UTI (urinary tract infection)     Vision abnormalities     wears glasses       Past Surgical History:        Procedure Laterality Date    CERVICAL DISC SURGERY      x2     SECTION  , 2018    x 2     SECTION N/A 2018     SECTION performed by Yousif Smith MD at [address] [facility] [address] COLONOSCOPY N/A 10/1/2019    COLONOSCOPY DIAGNOSTIC ABORTED performed by Pavithra Martínez MD at [address] [facility] N/A 2020    COLONOSCOPY WITH BIOPSY performed by Pavithra Martínez MD at [address] [place] [address], COLON, DIAGNOSTIC      KNEE ARTHROSCOPY Left 5/20/15    KNEE SURGERY Left     x3    LAPAROSCOPY      dr Kristi Ramon N/A 10/5/2017    LAPAROSCOPIC REMOVAL INTRA ABDOMINAL LIPOMA LOWER RIGHT performed by Pavan Oliver DO at [address] ESOPHAGOGASTRODUODENOSCOPY TRANSORAL DIAGNOSTIC N/A 3/2/2017    EGD ESOPHAGOGASTRODUODENOSCOPY  IP 5 performed by Yuri Sandy MD at [address] [place]  2016    severe esophagitis    UPPER GASTROINTESTINAL ENDOSCOPY  2017    barretts; hiatus hernia; gastritis    UPPER GASTROINTESTINAL ENDOSCOPY  2017    long seg mullins's with linear erosions, esophagitis, small hiatal hernia    UPPER GASTROINTESTINAL ENDOSCOPY N/A 1/30/2018    GATES'S    UPPER GASTROINTESTINAL ENDOSCOPY N/A 10/1/2019    EGD BIOPSY performed by Guilherme Link MD at [facility]         Social History:    Social History     Tobacco Use    Smoking status: Current Every Day Smoker     Packs/day: 1.00     Years: 21.00     Pack years: 21.00     Types: Cigarettes    Smokeless tobacco: Never Used   Substance Use Topics    Alcohol use: No                                Ready to quit: Not Answered  Counseling given: Not Answered      Vital Signs (Current):   Vitals:    07/08/20 0620   BP: 122/83   Pulse: 79   Resp: 18   Temp: 97.8 °F (36.6 °C)   TempSrc: Infrared   SpO2: 97%   Weight: 203 lb (92.1 kg)   Height: 5' 2\" (1.575 m)                                              BP Readings from Last 3 Encounters:   07/08/20 122/83   07/03/20 (!) 126/97   07/02/20 (!) 142/78       NPO Status: Time of last liquid consumption: 2300                        Time of last solid consumption: 2300                        Date of last liquid consumption: 07/07/20                        Date of last solid food consumption: 07/07/20    BMI:   Wt Readings from Last 3 Encounters:   07/08/20 203 lb (92.1 kg)   07/03/20 203 lb (92.1 kg)   07/02/20 203 lb (92.1 kg)     Body mass index is 37.13 kg/m².     CBC:   Lab Results   Component Value Date    WBC 9.1 07/02/2020    RBC 4.72 07/02/2020    RBC 4.49 03/25/2012    HGB 12.9 07/02/2020    HCT 37.8 07/02/2020    MCV 80.2 07/02/2020    RDW 16.4 07/02/2020     07/02/2020     03/25/2012     LR    CMP:   Lab Results   Component Value Date     07/02/2020    K 3.6 07/02/2020     07/02/2020    CO2 24 07/02/2020    BUN 18 07/02/2020    CREATININE 0.99 07/02/2020    GFRAA >60 07/02/2020    LABGLOM >60 07/02/2020    GLUCOSE 113 07/02/2020    GLUCOSE 106 03/25/2012    PROT 6.4 07/02/2020    CALCIUM 11.6 07/02/2020    BILITOT 0.36 07/02/2020    ALKPHOS 53 07/02/2020    AST GI/Hepatic/Renal:   (+) hiatal hernia, GERD: no interval change,      (-) PUD, hepatitis, liver disease, no renal disease, bowel prep and no morbid obesity       Endo/Other:    (+) DiabetesType II DM, , no malignancy/cancer. (-) hypothyroidism, hyperthyroidism, blood dyscrasia, arthritis, no electrolyte abnormalities, no malignancy/cancer               Abdominal:           Vascular: negative vascular ROS. - PVD, DVT and PE. Anesthesia Plan      general     ASA 3       Induction: intravenous. MIPS: Postoperative opioids intended and Prophylactic antiemetics administered. Anesthetic plan and risks discussed with patient. Plan discussed with CRNA. The patient was counseled at length about the risks of gemma Covid-19 during their perioperative period and any recovery window from their procedure. The patient was made aware that gemma Covid-19  may worsen their prognosis for recovering from their procedure  and lend to a higher morbidity and/or mortality risk. All material risks, benefits, and reasonable alternatives including postponing the procedure were discussed. The patient DOES wish to proceed with the procedure at this time.             Joy Reynoso MD   7/8/2020

## 2020-07-08 NOTE — ANESTHESIA POSTPROCEDURE EVALUATION
POST- ANESTHESIA EVALUATION       Pt Name: Pia Sinclair  MRN: [id number]  YOB: 1980  Date of evaluation: 7/8/2020  Time:  1:36 PM      BP (!) 146/103   Pulse 87   Temp 97.5 °F (36.4 °C)   Resp 12   Ht 5' 2\" (1.575 m)   Wt 203 lb (92.1 kg)   LMP 06/15/2020   SpO2 99%   BMI 37.13 kg/m²      Consciousness Level  Awake  Cardiopulmonary Status  Stable  Pain Adequately Treated YES  Nausea / Vomiting  NO  Adequate Hydration  YES  Anesthesia Related Complications NONE      Electronically signed by Mellisa Harp MD on 7/8/2020 at 1:36 PM       Department of Anesthesiology  Postprocedure Note    Patient: Pia Sinclair  MRN: [id number]  YOB: 1980  Date of evaluation: 7/8/2020  Time:  1:36 PM     Procedure Summary     Date:  07/08/20 Room / Location:  [address] [place] [address] [place]  10 / [facility]     Anesthesia Start:  0730 Anesthesia Stop:  6983    Procedure:  HYSTERECTOMY ABDOMINAL LAPAROSCOPIC ROBOTIC XI W/ CYSTOSCOPY (N/A Abdomen) Diagnosis:       (PELVIC ENDOMETRIOSIS, PELVIC PAIN, CHRONIC SMOKER)      (PAT PER ANES)    Surgeon:  Radha Huggins MD Responsible Provider:  Mellisa Harp MD    Anesthesia Type:  general ASA Status:  3          Anesthesia Type: general    Alina Phase I: Alina Score: 8    Alina Phase II:      Last vitals: Reviewed and per EMR flowsheets.        Anesthesia Post Evaluation

## 2020-07-08 NOTE — PROGRESS NOTES
Gynecology Progress Note    Date: 7/9/2020  Time: 6:32 AM    Charlie Oliva 47 y.o. female [id number], POD # 1 s/p HUSSAIN w BS and cystoscpy on 7/8/2020    Patient seen and examined. She complained of acid reflux and constipation. Pain is somewhat controlled, but reports still being sore. Patient is  tolerating oral intake but had 1 episode of emesis overnight due to acid reflux. She states this is normal for her. She is urinating well. She denies any vaginal bleeding. She is  ambulating without difficulty. She is not passing flatus. She does report 1 small bowel movement yesterday. She admits to nausea and vomiting secondary to her history of Sadler's esophagus. She denies Fever/Chills, Chest Pain, SOB, Calf Pain. Vitals:  Vitals:    07/09/20 0134 07/09/20 0200 07/09/20 0204 07/09/20 0254   BP:   121/74 (!) 129/56   Pulse:   93 96   Resp:       Temp:   98.1 °F (36.7 °C)    TempSrc:       SpO2: 95% 93%     Weight:       Height:             Intake/Output:   Last Shift: I/O last 3 completed shifts: In: 2950 [P.O.:720; I.V.:2230]  Out: 6690 [MMCXQ:1066; Blood:100]  Current Shift: I/O this shift:  In: 1310 [P.O.:500;  I.V.:810]  Out: 850 [Urine:450; Emesis/NG output:400]  450 mL out in last 8 hrs ~ 56.3 mL/hr      Physical Exam:  General:  awake, alert, cooperative, mild distress, and appears stated age  Neurologic:  alert, oriented, normal speech, no focal findings or movement disorder noted  Lungs:  No increased work of breathing, good air exchange, clear to auscultation bilaterally, no crackles or wheezing  Heart:  Normal apical impulse, regular rate and rhythm, normal S1 and S2, no S3 or S4, and no murmur noted, regular rate and rhythm and no murmur    Abdomen: soft, tender to palpation in the lower quadrants, abdominal binder in place, normal bowel sounds  Incision: clean, dry, intact  Extremities:  no calf tenderness, non edematous, SCDs in place    Lab:  No results found for this or any previous visit (from the past 12 hour(s)). Assessment/Plan:  Yisel Gupta 47 y.o. female [id number], POD #1 s/p RALH with BS and cystoscopy   - Stable, vitals stable when patient is comfortable, some elevated BP's during patient pain. - S/p johnson catheter and IV fluids, UOP appropriate   - Encourage ambulation and use of incentive spirometer   - Pain control: controlled with Percocet, still endorsing 8/10 pain when requiring medication   - Labs: H/H 11.8/36.8%, WBC 15.5, CMP wnl. Elevated WBC expected in the setting of post-operative state   - DVT Proph: SCDs   - Abx: Ancef x24h   - Diet: general   - Path: pending   - Continue post-op care, please page with any questions   - Patient stable for discharge home today, but is requesting to stay additional day due to not having help at home. Emil Mg DO  Ob/Gyn Resident  Pager: [phone]  7/9/2020, 6:32 AM      Resident Physician Statement  I have discussed the case, including pertinent history and exam findings with the above resident. I have personally seen the patient. I agree with the assessment, plan and orders as documented. I have made changes to the above note as needed. I have discussed the case with above named attending. Luis Manuel Baer DO  OB/GYN Resident  Pgr: [phone]  7/9/2020  7:21 AM   I have discussed the care of Janeen Lopez, including pertinent history and exam findings, with the resident. I have seen and examined the patient and the key elements of all parts of the encounter have been performed by me. I agree with the assessment, plan and orders as documented by the resident.     Cr Rich MD  Attending Physician

## 2020-07-09 LAB
ALBUMIN SERPL-MCNC: 3.5 G/DL (ref 3.5–5.2)
ALBUMIN/GLOBULIN RATIO: ABNORMAL (ref 1–2.5)
ALP BLD-CCNC: 54 U/L (ref 35–104)
ALT SERPL-CCNC: 14 U/L (ref 5–33)
ANION GAP SERPL CALCULATED.3IONS-SCNC: 7 MMOL/L (ref 9–17)
AST SERPL-CCNC: 17 U/L
BILIRUB SERPL-MCNC: 0.48 MG/DL (ref 0.3–1.2)
BUN BLDV-MCNC: 13 MG/DL (ref 6–20)
BUN/CREAT BLD: ABNORMAL (ref 9–20)
CALCIUM SERPL-MCNC: 8.3 MG/DL (ref 8.6–10.4)
CHLORIDE BLD-SCNC: 106 MMOL/L (ref 98–107)
CO2: 25 MMOL/L (ref 20–31)
CREAT SERPL-MCNC: 0.74 MG/DL (ref 0.5–0.9)
GFR AFRICAN AMERICAN: >60 ML/MIN
GFR NON-AFRICAN AMERICAN: >60 ML/MIN
GFR SERPL CREATININE-BSD FRML MDRD: ABNORMAL ML/MIN/{1.73_M2}
GFR SERPL CREATININE-BSD FRML MDRD: ABNORMAL ML/MIN/{1.73_M2}
GLUCOSE BLD-MCNC: 119 MG/DL (ref 70–99)
HCT VFR BLD CALC: 36.8 % (ref 36–46)
HEMOGLOBIN: 11.8 G/DL (ref 12–16)
MCH RBC QN AUTO: 26 PG (ref 26–34)
MCHC RBC AUTO-ENTMCNC: 32.1 G/DL (ref 31–37)
MCV RBC AUTO: 81 FL (ref 80–100)
NRBC AUTOMATED: ABNORMAL PER 100 WBC
PDW BLD-RTO: 16.5 % (ref 11.5–14.9)
PLATELET # BLD: 261 K/UL (ref 150–450)
PMV BLD AUTO: 8.5 FL (ref 6–12)
POTASSIUM SERPL-SCNC: 3.8 MMOL/L (ref 3.7–5.3)
RBC # BLD: 4.54 M/UL (ref 4–5.2)
SODIUM BLD-SCNC: 138 MMOL/L (ref 135–144)
TOTAL PROTEIN: 5.8 G/DL (ref 6.4–8.3)
WBC # BLD: 15.5 K/UL (ref 3.5–11)

## 2020-07-09 PROCEDURE — G0378 HOSPITAL OBSERVATION PER HR: HCPCS

## 2020-07-09 PROCEDURE — 6370000000 HC RX 637 (ALT 250 FOR IP): Performed by: STUDENT IN AN ORGANIZED HEALTH CARE EDUCATION/TRAINING PROGRAM

## 2020-07-09 PROCEDURE — 2700000000 HC OXYGEN THERAPY PER DAY

## 2020-07-09 PROCEDURE — 94761 N-INVAS EAR/PLS OXIMETRY MLT: CPT

## 2020-07-09 PROCEDURE — 6360000002 HC RX W HCPCS: Performed by: STUDENT IN AN ORGANIZED HEALTH CARE EDUCATION/TRAINING PROGRAM

## 2020-07-09 PROCEDURE — 94640 AIRWAY INHALATION TREATMENT: CPT

## 2020-07-09 PROCEDURE — 85027 COMPLETE CBC AUTOMATED: CPT

## 2020-07-09 PROCEDURE — 36415 COLL VENOUS BLD VENIPUNCTURE: CPT

## 2020-07-09 PROCEDURE — 80053 COMPREHEN METABOLIC PANEL: CPT

## 2020-07-09 RX ORDER — ALBUTEROL SULFATE 2.5 MG/3ML
2.5 SOLUTION RESPIRATORY (INHALATION) EVERY 6 HOURS PRN
Status: DISCONTINUED | OUTPATIENT
Start: 2020-07-09 | End: 2020-07-10 | Stop reason: HOSPADM

## 2020-07-09 RX ORDER — ALBUTEROL SULFATE 90 UG/1
2 AEROSOL, METERED RESPIRATORY (INHALATION) EVERY 6 HOURS PRN
Status: DISCONTINUED | OUTPATIENT
Start: 2020-07-09 | End: 2020-07-10 | Stop reason: HOSPADM

## 2020-07-09 RX ORDER — PREGABALIN 75 MG/1
75 CAPSULE ORAL 3 TIMES DAILY
Status: DISCONTINUED | OUTPATIENT
Start: 2020-07-09 | End: 2020-07-10 | Stop reason: HOSPADM

## 2020-07-09 RX ORDER — LORAZEPAM 0.5 MG/1
2 TABLET ORAL ONCE
Status: COMPLETED | OUTPATIENT
Start: 2020-07-09 | End: 2020-07-09

## 2020-07-09 RX ORDER — FAMOTIDINE 20 MG/1
20 TABLET, FILM COATED ORAL 2 TIMES DAILY
Status: DISCONTINUED | OUTPATIENT
Start: 2020-07-09 | End: 2020-07-10 | Stop reason: HOSPADM

## 2020-07-09 RX ORDER — LORAZEPAM 0.5 MG/1
1 TABLET ORAL EVERY 6 HOURS PRN
Status: DISCONTINUED | OUTPATIENT
Start: 2020-07-09 | End: 2020-07-10 | Stop reason: HOSPADM

## 2020-07-09 RX ORDER — CALCIUM CARBONATE 200(500)MG
1000 TABLET,CHEWABLE ORAL 3 TIMES DAILY PRN
Status: DISCONTINUED | OUTPATIENT
Start: 2020-07-09 | End: 2020-07-10 | Stop reason: HOSPADM

## 2020-07-09 RX ORDER — ONDANSETRON 4 MG/1
4 TABLET, FILM COATED ORAL EVERY 8 HOURS PRN
Qty: 30 TABLET | Refills: 1 | Status: SHIPPED | OUTPATIENT
Start: 2020-07-09 | End: 2020-07-10 | Stop reason: HOSPADM

## 2020-07-09 RX ORDER — IPRATROPIUM BROMIDE AND ALBUTEROL SULFATE 2.5; .5 MG/3ML; MG/3ML
1 SOLUTION RESPIRATORY (INHALATION) 4 TIMES DAILY
Status: DISCONTINUED | OUTPATIENT
Start: 2020-07-09 | End: 2020-07-10 | Stop reason: HOSPADM

## 2020-07-09 RX ORDER — OXYCODONE HYDROCHLORIDE AND ACETAMINOPHEN 5; 325 MG/1; MG/1
1 TABLET ORAL EVERY 6 HOURS PRN
Qty: 20 TABLET | Refills: 0 | Status: SHIPPED | OUTPATIENT
Start: 2020-07-09 | End: 2020-07-14

## 2020-07-09 RX ORDER — DOCUSATE SODIUM 100 MG/1
100 CAPSULE, LIQUID FILLED ORAL 2 TIMES DAILY
Qty: 60 CAPSULE | Refills: 0 | Status: SHIPPED | OUTPATIENT
Start: 2020-07-09 | End: 2020-09-14

## 2020-07-09 RX ORDER — SIMETHICONE 80 MG
80 TABLET,CHEWABLE ORAL EVERY 6 HOURS PRN
Qty: 180 TABLET | Refills: 0 | Status: ON HOLD | OUTPATIENT
Start: 2020-07-09 | End: 2020-07-22

## 2020-07-09 RX ADMIN — Medication 2 PUFF: at 08:24

## 2020-07-09 RX ADMIN — PREGABALIN 75 MG: 75 CAPSULE ORAL at 09:12

## 2020-07-09 RX ADMIN — NICOTINE POLACRILEX 2 MG: 2 GUM, CHEWING BUCCAL at 18:45

## 2020-07-09 RX ADMIN — METFORMIN HYDROCHLORIDE 500 MG: 500 TABLET ORAL at 17:55

## 2020-07-09 RX ADMIN — LORAZEPAM 2 MG: 0.5 TABLET ORAL at 02:37

## 2020-07-09 RX ADMIN — NICOTINE POLACRILEX 2 MG: 2 GUM, CHEWING BUCCAL at 12:22

## 2020-07-09 RX ADMIN — Medication 2 PUFF: at 01:18

## 2020-07-09 RX ADMIN — OXYCODONE HYDROCHLORIDE AND ACETAMINOPHEN 2 TABLET: 5; 325 TABLET ORAL at 06:34

## 2020-07-09 RX ADMIN — METFORMIN HYDROCHLORIDE 500 MG: 500 TABLET ORAL at 09:13

## 2020-07-09 RX ADMIN — PANTOPRAZOLE SODIUM 40 MG: 40 TABLET, DELAYED RELEASE ORAL at 06:34

## 2020-07-09 RX ADMIN — ANTACID TABLETS 1000 MG: 500 TABLET, CHEWABLE ORAL at 10:46

## 2020-07-09 RX ADMIN — BUDESONIDE AND FORMOTEROL FUMARATE DIHYDRATE 2 PUFF: 160; 4.5 AEROSOL RESPIRATORY (INHALATION) at 08:31

## 2020-07-09 RX ADMIN — IPRATROPIUM BROMIDE AND ALBUTEROL SULFATE 3 ML: .5; 3 SOLUTION RESPIRATORY (INHALATION) at 15:59

## 2020-07-09 RX ADMIN — IPRATROPIUM BROMIDE AND ALBUTEROL SULFATE 3 ML: .5; 3 SOLUTION RESPIRATORY (INHALATION) at 11:27

## 2020-07-09 RX ADMIN — MAGNESIUM HYDROXIDE 30 ML: 400 SUSPENSION ORAL at 18:43

## 2020-07-09 RX ADMIN — FAMOTIDINE 20 MG: 20 TABLET ORAL at 21:09

## 2020-07-09 RX ADMIN — OXYCODONE HYDROCHLORIDE AND ACETAMINOPHEN 2 TABLET: 5; 325 TABLET ORAL at 20:06

## 2020-07-09 RX ADMIN — OXYCODONE HYDROCHLORIDE AND ACETAMINOPHEN 2 TABLET: 5; 325 TABLET ORAL at 15:32

## 2020-07-09 RX ADMIN — LORAZEPAM 1 MG: 0.5 TABLET ORAL at 12:48

## 2020-07-09 RX ADMIN — PANTOPRAZOLE SODIUM 40 MG: 40 TABLET, DELAYED RELEASE ORAL at 18:43

## 2020-07-09 RX ADMIN — ZOLPIDEM TARTRATE 5 MG: 5 TABLET ORAL at 22:14

## 2020-07-09 RX ADMIN — ALBUTEROL SULFATE 2.5 MG: 2.5 SOLUTION RESPIRATORY (INHALATION) at 01:51

## 2020-07-09 RX ADMIN — BUDESONIDE AND FORMOTEROL FUMARATE DIHYDRATE 2 PUFF: 160; 4.5 AEROSOL RESPIRATORY (INHALATION) at 19:58

## 2020-07-09 RX ADMIN — PREGABALIN 75 MG: 75 CAPSULE ORAL at 21:09

## 2020-07-09 RX ADMIN — MELATONIN 1000 UNITS: at 09:12

## 2020-07-09 RX ADMIN — ALUMINUM HYDROXIDE, MAGNESIUM HYDROXIDE, AND SIMETHICONE 30 ML: 200; 200; 20 SUSPENSION ORAL at 06:46

## 2020-07-09 RX ADMIN — OXYCODONE HYDROCHLORIDE AND ACETAMINOPHEN 2 TABLET: 5; 325 TABLET ORAL at 02:04

## 2020-07-09 RX ADMIN — OXYCODONE HYDROCHLORIDE AND ACETAMINOPHEN 2 TABLET: 5; 325 TABLET ORAL at 10:49

## 2020-07-09 RX ADMIN — ALUMINUM HYDROXIDE, MAGNESIUM HYDROXIDE, AND SIMETHICONE 30 ML: 200; 200; 20 SUSPENSION ORAL at 14:02

## 2020-07-09 RX ADMIN — DOCUSATE SODIUM 100 MG: 100 CAPSULE, LIQUID FILLED ORAL at 09:12

## 2020-07-09 RX ADMIN — IPRATROPIUM BROMIDE AND ALBUTEROL SULFATE 3 ML: .5; 3 SOLUTION RESPIRATORY (INHALATION) at 08:37

## 2020-07-09 RX ADMIN — ANTACID TABLETS 1000 MG: 500 TABLET, CHEWABLE ORAL at 03:59

## 2020-07-09 RX ADMIN — IPRATROPIUM BROMIDE AND ALBUTEROL SULFATE 3 ML: .5; 3 SOLUTION RESPIRATORY (INHALATION) at 20:21

## 2020-07-09 ASSESSMENT — PAIN SCALES - GENERAL
PAINLEVEL_OUTOF10: 8
PAINLEVEL_OUTOF10: 6
PAINLEVEL_OUTOF10: 6
PAINLEVEL_OUTOF10: 8
PAINLEVEL_OUTOF10: 8
PAINLEVEL_OUTOF10: 7
PAINLEVEL_OUTOF10: 7
PAINLEVEL_OUTOF10: 8
PAINLEVEL_OUTOF10: 8

## 2020-07-09 ASSESSMENT — PAIN DESCRIPTION - PAIN TYPE
TYPE: SURGICAL PAIN
TYPE: SURGICAL PAIN

## 2020-07-09 ASSESSMENT — PAIN DESCRIPTION - FREQUENCY: FREQUENCY: INTERMITTENT

## 2020-07-09 ASSESSMENT — PAIN DESCRIPTION - DESCRIPTORS: DESCRIPTORS: SHARP;DISCOMFORT

## 2020-07-09 ASSESSMENT — PAIN DESCRIPTION - LOCATION
LOCATION: ABDOMEN
LOCATION: ABDOMEN

## 2020-07-09 NOTE — PROGRESS NOTES
Obstetric/Gynecology Resident Interval Note    Notified by RN that patient is requesting a breathing treatment and that she has wheezing throughout. Recommended to notify RT to come eval/treat and was notified by RN that there are 5 patients ahead of this patient to come and eval/treat at this time. Pt seen and evaluated. Pt is visibly anxious and irritable and visibly short of breath with audible wheezes. Pt is rocking back and forth sitting in bed stating that she feels short of breath and that she needs her duoneb breathing treatment. She denies anxiety at this time and declines anything for anxiety. Pt states she has been using her incentive spirometer regularly. Pt was given her last duoneb breathing treatment 3 hours ago and she states that she uses her nebulizer duoneb four times a day as needed at home. She keeps screaming \"I have really bad asthma\" while rocking back and forth. She states that it is triggered by her acid reflux and when asked what else she uses at home for her acid reflux, she yells \"I shouldn't eat past a certain time but I did because I was hungry!!\". Pt states that albuterol usually does not work. On lung auscultation, there are wheezes throughout bilaterally but no neck retractions are noted. Pt willing to try albuterol inhaler at this time and duoneb nebulizer ordered for when RT can come and administer to patient. Asked RN to place pulse oximeter and SpO2 86% noted and oxygen administered initially at 5L and then titrated down to 1.5L as SpO2 returned to 92-94%. Patient exclaims about the pulse oximeter alarm \"that beeping is irritating me!! Turn it down!!\" and hits her chest. Explained to patient that noise cannot be turned down and that continuous pulse oximetry is recommended.     Vitals:    07/09/20 0103 07/09/20 0106 07/09/20 0107 07/09/20 0109   BP:       Pulse:       Resp:       Temp:       TempSrc:       SpO2: (!) 86% 94% 97% 97%   Weight:       Height:         Pt is stable

## 2020-07-09 NOTE — FLOWSHEET NOTE
Dr. Pari Dacosta, [facility] resident in department. Notified patient wheezy, pulse ox 88% when patient dozing, O2 applied at @2L NC with p. Ox 96%. Respiratory notified and aerosol treatment given.     2738 Dr. Jason Bronson notified via [address] [place] [address]

## 2020-07-09 NOTE — FLOWSHEET NOTE
Patient calls out requesting a \"breathing treatment\". Dr. Navya Jones notified and she requests RT consult to evaluate. 0044-RT notified to come and evaluate. 36- Dr. Navya Jones notified RT can't come right away. 0100- Dr. Navya Jones at bedside to evaluate patient.

## 2020-07-09 NOTE — PLAN OF CARE
Problem: Pain:  Goal: Pain level will decrease  7/9/2020 1751 by Brandy Almendarez RN  Outcome: Ongoing  7/9/2020 0651 by Fab Tai RN  Outcome: Ongoing  Goal: Control of acute pain  7/9/2020 1751 by Brandy Almendarez RN  Outcome: Ongoing  7/9/2020 0651 by Fab Tai RN  Outcome: Ongoing  Goal: Control of chronic pain  7/9/2020 1751 by Brandy Almendarez RN  Outcome: Ongoing  7/9/2020 0651 by Fab Tai RN  Outcome: Ongoing     Problem: HEMODYNAMIC STATUS  Goal: Patient has stable vital signs and fluid balance  7/9/2020 1751 by Brandy Almendarez RN  Outcome: Ongoing  7/9/2020 0651 by Fab Tai RN  Outcome: Ongoing     Problem: OXYGENATION/RESPIRATORY FUNCTION  Goal: Patient will achieve/maintain normal respiratory rate/effort  7/9/2020 1751 by Brandy Almendarez RN  Outcome: Ongoing  7/9/2020 0651 by Fab Tai RN  Outcome: Ongoing     Problem: MOBILITY  Goal: Early mobilization is achieved  7/9/2020 1751 by Brandy Almendarez RN  Outcome: Ongoing  7/9/2020 0651 by Fab Tai RN  Outcome: Met This Shift     Problem: ELIMINATION  Goal: Elimination patterns are normal or improving  7/9/2020 1751 by Brandy Almendarez RN  Outcome: Ongoing  7/9/2020 0651 by Fab Tai RN  Outcome: Ongoing

## 2020-07-09 NOTE — FLOWSHEET NOTE
Patient with wheezes throughout lung fields. Albuterol inhaler given. Respiratory called for aerosol treatment.

## 2020-07-09 NOTE — PROGRESS NOTES
Gynecology Progress Note    Date: 7/10/2020  Time: 8:15 AM    Rich Albright 47 y.o. female [id number], POD # 2 s/p RALH w BS and cystoscpy on 7/8/2020     Patient seen and examined. She had no complaints this morning. Pain is controlled. Patient is  tolerating oral intake. She is urinating well. She denies any vaginal bleeding. She is  ambulating without difficulty. She is  passing flatus and has had bowel movements. She denies Fever/Chills, Chest Pain, SOB, N/V, Calf Pain    Vitals:  Vitals:    07/09/20 2216 07/10/20 0748 07/10/20 0756 07/10/20 0806   BP: 133/77 119/62     Pulse: 99 86 86    Resp: 17 18  18   Temp: 97.5 °F (36.4 °C) 97.2 °F (36.2 °C)     TempSrc: Infrared Infrared     SpO2: 97% 95%  95%   Weight:       Height:                 Physical Exam:  General:  no apparent distress, alert and cooperative  Neurologic:  alert, oriented, normal speech, no focal findings or movement disorder noted  Lungs:  No increased work of breathing, good air exchange, clear to auscultation bilaterally, no crackles or wheezing  Heart:  regular rate and rhythm and no murmur    Abdomen: soft, non-distended, appropriate tenderness, no CVA tenderness, normal bowel sounds  Incision: clean, dry, intact   Extremities:  no calf tenderness, non edematous, SCDs on and functioning      Assessment/Plan:  Rich Albright 47 y.o. female [id number], POD #2 s/p RALH w BS and cystoscpy on 7/8/2020   - Doing well, vitals stable   - S/p johnson catheter and IV fluids, UOP adequate   - Encourage continued use of ambulation and use of incentive spirometer   - Pain control: Percocet/Tylenol   - Labs: CBC, CMP unremarkable on POD#1   - DVT Proph: SCDs    - Abx: S/p Ancef x24h   - Diet: general   - Path: pending   - Continue post-op care, please page with any questions   - Anticipate discharge home today    Asthma              - Patient receiving breathing treatment with RT during rounds.  Patient reports s/s of asthma have improved with albuterol nebulizer. - Resume home meds upon dishcarge. - Follow up with PCP for further management     Anxiety/Bipolar Disorder              - Patient reports anxiety/bipolar disorder is typically controlled without medications, but admission to the hospital has made her more anxious than usual.              - Patient is not taking any medications for bipolar disorder at this time              - Denies SI/HI              - Encouraged outpatient follow up with psychiatrist     Fibromyalgia              - Continue lyrica 75mg daily upon discharge    Patient may be discharged to home. Discharge instructions and return precautions reviewed. All questions answered and concerns addressed. Patient vocalized understanding. Stephen Cruz DO  Ob/Gyn Resident  Pager: [phone]  7/10/2020, 8:15 AM   I have discussed the care of Fred Butt, including pertinent history and exam findings, with the resident. I have seen and examined the patient and the key elements of all parts of the encounter have been performed by me. I agree with the assessment, plan and orders as documented by the resident.     Mikey Cobos MD  Attending Physician

## 2020-07-10 VITALS
DIASTOLIC BLOOD PRESSURE: 84 MMHG | BODY MASS INDEX: 37.36 KG/M2 | OXYGEN SATURATION: 96 % | SYSTOLIC BLOOD PRESSURE: 138 MMHG | WEIGHT: 203 LBS | TEMPERATURE: 97.5 F | RESPIRATION RATE: 18 BRPM | HEIGHT: 62 IN | HEART RATE: 93 BPM

## 2020-07-10 LAB — SURGICAL PATHOLOGY REPORT: NORMAL

## 2020-07-10 PROCEDURE — G0378 HOSPITAL OBSERVATION PER HR: HCPCS

## 2020-07-10 PROCEDURE — 94640 AIRWAY INHALATION TREATMENT: CPT

## 2020-07-10 PROCEDURE — 6370000000 HC RX 637 (ALT 250 FOR IP): Performed by: STUDENT IN AN ORGANIZED HEALTH CARE EDUCATION/TRAINING PROGRAM

## 2020-07-10 PROCEDURE — 94761 N-INVAS EAR/PLS OXIMETRY MLT: CPT

## 2020-07-10 RX ORDER — ONDANSETRON 4 MG/1
4 TABLET, ORALLY DISINTEGRATING ORAL EVERY 8 HOURS PRN
Qty: 21 TABLET | Refills: 0 | Status: ON HOLD | OUTPATIENT
Start: 2020-07-10 | End: 2020-07-22

## 2020-07-10 RX ADMIN — BUDESONIDE AND FORMOTEROL FUMARATE DIHYDRATE 2 PUFF: 160; 4.5 AEROSOL RESPIRATORY (INHALATION) at 07:54

## 2020-07-10 RX ADMIN — NICOTINE POLACRILEX 2 MG: 2 GUM, CHEWING BUCCAL at 08:04

## 2020-07-10 RX ADMIN — LINACLOTIDE 290 MCG: 145 CAPSULE, GELATIN COATED ORAL at 07:53

## 2020-07-10 RX ADMIN — PREGABALIN 75 MG: 75 CAPSULE ORAL at 08:43

## 2020-07-10 RX ADMIN — OXYCODONE HYDROCHLORIDE AND ACETAMINOPHEN 2 TABLET: 5; 325 TABLET ORAL at 10:05

## 2020-07-10 RX ADMIN — FAMOTIDINE 20 MG: 20 TABLET ORAL at 08:42

## 2020-07-10 RX ADMIN — PREGABALIN 75 MG: 75 CAPSULE ORAL at 14:05

## 2020-07-10 RX ADMIN — MELATONIN 1000 UNITS: at 08:43

## 2020-07-10 RX ADMIN — METFORMIN HYDROCHLORIDE 500 MG: 500 TABLET ORAL at 07:53

## 2020-07-10 RX ADMIN — LORAZEPAM 1 MG: 0.5 TABLET ORAL at 08:04

## 2020-07-10 RX ADMIN — PANTOPRAZOLE SODIUM 40 MG: 40 TABLET, DELAYED RELEASE ORAL at 07:53

## 2020-07-10 RX ADMIN — IPRATROPIUM BROMIDE AND ALBUTEROL SULFATE 3 ML: .5; 3 SOLUTION RESPIRATORY (INHALATION) at 08:06

## 2020-07-10 RX ADMIN — OXYCODONE HYDROCHLORIDE AND ACETAMINOPHEN 1 TABLET: 5; 325 TABLET ORAL at 01:24

## 2020-07-10 RX ADMIN — IPRATROPIUM BROMIDE AND ALBUTEROL SULFATE 3 ML: .5; 3 SOLUTION RESPIRATORY (INHALATION) at 11:40

## 2020-07-10 RX ADMIN — OXYCODONE HYDROCHLORIDE AND ACETAMINOPHEN 2 TABLET: 5; 325 TABLET ORAL at 05:58

## 2020-07-10 RX ADMIN — OXYCODONE HYDROCHLORIDE AND ACETAMINOPHEN 2 TABLET: 5; 325 TABLET ORAL at 14:08

## 2020-07-10 ASSESSMENT — PAIN DESCRIPTION - ORIENTATION
ORIENTATION: LOWER;MID
ORIENTATION: MID;LOWER
ORIENTATION: LOWER;MID

## 2020-07-10 ASSESSMENT — PAIN DESCRIPTION - DESCRIPTORS
DESCRIPTORS: ACHING;DISCOMFORT;SHARP
DESCRIPTORS: ACHING;DISCOMFORT
DESCRIPTORS: ACHING;DISCOMFORT

## 2020-07-10 ASSESSMENT — PAIN DESCRIPTION - PAIN TYPE
TYPE: SURGICAL PAIN

## 2020-07-10 ASSESSMENT — PAIN - FUNCTIONAL ASSESSMENT
PAIN_FUNCTIONAL_ASSESSMENT: ACTIVITIES ARE NOT PREVENTED
PAIN_FUNCTIONAL_ASSESSMENT: ACTIVITIES ARE NOT PREVENTED

## 2020-07-10 ASSESSMENT — PAIN SCALES - GENERAL
PAINLEVEL_OUTOF10: 7
PAINLEVEL_OUTOF10: 6
PAINLEVEL_OUTOF10: 8
PAINLEVEL_OUTOF10: 9
PAINLEVEL_OUTOF10: 8

## 2020-07-10 ASSESSMENT — PAIN DESCRIPTION - LOCATION
LOCATION: ABDOMEN

## 2020-07-10 ASSESSMENT — PAIN DESCRIPTION - PROGRESSION: CLINICAL_PROGRESSION: NOT CHANGED

## 2020-07-10 ASSESSMENT — PAIN DESCRIPTION - FREQUENCY
FREQUENCY: CONTINUOUS

## 2020-07-10 ASSESSMENT — PAIN DESCRIPTION - ONSET
ONSET: GRADUAL
ONSET: ON-GOING
ONSET: GRADUAL

## 2020-07-10 NOTE — PROGRESS NOTES
Discussed discharge instructions, incision care and follow up information with patient.  Patient informed of next dose of Percocet at 6:07pm. Patient verbalized understanding and signed discharge instructions

## 2020-07-10 NOTE — PROGRESS NOTES
CLINICAL PHARMACY NOTE: MEDS TO [address] Select Patient?: No  Total # of Prescriptions Filled: 4   The following medications were delivered to the patient:  · Ondansetron 4mg  · MI-ACID GAS RELIEF  · DOK 100MG  · Oxycodone APAP 5/325  Total # of Interventions Completed: 0  Time Spent (min): 15    Additional Documentation:

## 2020-07-10 NOTE — PLAN OF CARE
Problem: Pain:  Goal: Pain level will decrease  Description: Pain level will decrease  7/10/2020 1433 by Jennifer Tamez RN  Outcome: Ongoing  7/10/2020 0722 by Ally Sanchez RN  Outcome: Met This Shift  Goal: Control of acute pain  Description: Control of acute pain  7/10/2020 1433 by Jennifer Tamez RN  Outcome: Ongoing  7/10/2020 0722 by Ally Sanchez RN  Outcome: Met This Shift  Goal: Control of chronic pain  Description: Control of chronic pain  7/10/2020 1433 by Jennifer Tamez RN  Outcome: Ongoing  7/10/2020 0722 by Ally Sanchez RN  Outcome: Met This Shift     Problem: HEMODYNAMIC STATUS  Goal: Patient has stable vital signs and fluid balance  7/10/2020 1433 by Jennifer Tamez RN  Outcome: Ongoing  7/10/2020 0722 by Ally Sanchez RN  Outcome: Met This Shift     Problem: OXYGENATION/RESPIRATORY FUNCTION  Goal: Patient will achieve/maintain normal respiratory rate/effort  7/10/2020 1433 by Jennifer Tamez RN  Outcome: Ongoing  7/10/2020 0722 by lAly Sanchez RN  Outcome: Ongoing     Problem: MOBILITY  Goal: Early mobilization is achieved  7/10/2020 1433 by Jennifer Tamez RN  Outcome: Ongoing  7/10/2020 0722 by Ally Sanchez RN  Outcome: Met This Shift     Problem: ELIMINATION  Goal: Elimination patterns are normal or improving  Description: Elimination patterns return to pre-surgery normal patterns  7/10/2020 1433 by Jennifer Tamez RN  Outcome: Ongoing  7/10/2020 0722 by Ally Sanchez RN  Outcome: Met This Shift

## 2020-07-13 ENCOUNTER — TELEPHONE (OUTPATIENT)
Dept: FAMILY MEDICINE CLINIC | Age: 40
End: 2020-07-13

## 2020-07-13 ENCOUNTER — TELEPHONE (OUTPATIENT)
Dept: OBGYN CLINIC | Age: 40
End: 2020-07-13
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
## 2020-07-13 NOTE — TELEPHONE ENCOUNTER
[facility] Initial Follow Up Call    Outreach made within 2 business days of discharge: Yes    Patient: Leonardo Trevino Patient : 1980   MRN: [id number]  Reason for Admission: There are no discharge diagnoses documented for the most recent discharge. Discharge Date: 7/10/20       Spoke with:     Discharge department/facility: [address] [place] [address] Interactive Patient Contact:  Was patient able to fill all prescriptions: yes  Was patient instructed to bring all medications to the follow-up visit: yes  Is patient taking all medications as directed in the discharge summary?  yes  Does patient understand their discharge instructions: yes  Does patient have questions or concerns that need addressed prior to 7-14 day follow up office visit: no    Scheduled appointment with PCP within 7-14 days    Follow Up  Future Appointments   Date Time Provider Wesley Smith   7/15/2020  2:00 PM Shamika Dallas MD OB [id number]   2020  1:00 PM KATIE Cole - CNP fp [facility]       PAULY Foreman/M for pt to call the office

## 2020-07-15 ENCOUNTER — OFFICE VISIT (OUTPATIENT)
Dept: OBGYN CLINIC | Age: 40
End: 2020-07-15

## 2020-07-15 VITALS
SYSTOLIC BLOOD PRESSURE: 135 MMHG | WEIGHT: 206.2 LBS | BODY MASS INDEX: 37.71 KG/M2 | HEART RATE: 82 BPM | TEMPERATURE: 97.3 F | DIASTOLIC BLOOD PRESSURE: 90 MMHG

## 2020-07-15 PROBLEM — R10.9 POSTOPERATIVE ABDOMINAL PAIN: Status: ACTIVE | Noted: 2020-07-15

## 2020-07-15 PROBLEM — Z48.89 POSTOPERATIVE VISIT: Status: ACTIVE | Noted: 2020-07-15

## 2020-07-15 PROBLEM — G89.18 POSTOPERATIVE ABDOMINAL PAIN: Status: ACTIVE | Noted: 2020-07-15

## 2020-07-15 PROCEDURE — 99024 POSTOP FOLLOW-UP VISIT: CPT | Performed by: SPECIALIST

## 2020-07-15 RX ORDER — OXYCODONE HYDROCHLORIDE AND ACETAMINOPHEN 5; 325 MG/1; MG/1
1 TABLET ORAL EVERY 6 HOURS PRN
Qty: 8 TABLET | Refills: 0 | Status: SHIPPED | OUTPATIENT
Start: 2020-07-15 | End: 2020-07-18

## 2020-07-15 ASSESSMENT — ENCOUNTER SYMPTOMS
ABDOMINAL PAIN: 0
NAUSEA: 0
EYE PAIN: 0
APNEA: 0
COUGH: 0
VOMITING: 0
DIARRHEA: 0
CONSTIPATION: 0
ABDOMINAL DISTENTION: 0

## 2020-07-15 NOTE — PROGRESS NOTES
Subjective:      Patient ID: Leslie Tena is a 47 y.o. female. Chief Complaint   Patient presents with    Follow-up     Patient is here for 1 week follow up post robotic assisted hysterectomy with bilateral salpingectomy     BP (!) 135/90 (Site: Right Lower Arm, Position: Sitting)   Pulse 82   Temp 97.3 °F (36.3 °C) (Oral)   Wt 206 lb 3.2 oz (93.5 kg)   LMP 06/15/2020   BMI 37.71 kg/m²   Patient's last menstrual period was 06/15/2020. [id number]    Past Medical History:   Diagnosis Date    Anxiety     Arthritis     OA    Asthma     Sadler's esophagus     LONG SEGMENT    Bipolar 1 disorder (HCC)     CAD (coronary artery disease)     Chronic insomnia     [facility]     Depression     and bipolar    Diabetes mellitus (HCC)     prediabetic    Endometriosis 3/9/2020    Esophagitis     severe    GERD (gastroesophageal reflux disease)     Headache(784.0)     Herniated disc, cervical     Hiatal hernia     Incisional abscess 1/15/2019    Kidney stones     MDRO (multiple drug resistant organisms) resistance     mrsa    Pleurisy     hx of \"years ago\"    Pneumonia     past penumonia    Seizures ([facility] [address]     2016 last seizure-focal seizure    UTI (urinary tract infection)     Vision abnormalities     wears glasses     Current Outpatient Medications Ordered in Epic   Medication Sig Dispense Refill    oxyCODONE-acetaminophen (PERCOCET) 5-325 MG per tablet Take 1 tablet by mouth every 6 hours as needed for Pain for up to 3 days. Intended supply: 3 days.  Take lowest dose possible to manage pain 8 tablet 0    docusate sodium (COLACE) 100 MG capsule Take 1 capsule by mouth 2 times daily 60 capsule 0    simethicone (MYLICON) 80 MG chewable tablet Take 1 tablet by mouth every 6 hours as needed for Flatulence 180 tablet 0    pregabalin (LYRICA) 75 MG capsule TAKE 1 CAPSULE BY MOUTH 3 TIMES DAILY 90 capsule 0    Masks (CLEVER CHOICE FACE MASK) MISC USE NEW FACE MASK EVERYDAY WHEN PATIENT GO OUTSIDE OF HOME DUE TO COVID PANDEMIC 90 each 1    Masks (CLEVER CHOICE FACE MASK) MISC USE NEW FACE MASK EVERYDAY WHEN PATIENT GO OUTSIDE OF HOME DUE TO COVID PANDEMIC 90 each 2    BREO ELLIPTA 200-25 MCG/INH AEPB inhaler INHALE ONE PUFF BY MOUTH ONCE A DAY ONCE A DAY INHALATION 30 60 each 1    LINZESS 290 MCG CAPS capsule TAKE 1 CAPSULE BY MOUTH EVERY MORNING (BEFORE BREAKFAST) 30 capsule 3    acetaminophen (TYLENOL) 500 MG tablet Take 500 mg by mouth every 6 hours as needed for Pain      MISC NATURAL PRODUCT OP Take 2 tablets by mouth 2 times daily hydroxycut max-weight loss supplement      blood glucose monitor strips Test 2-3 times a day & as needed for symptoms of irregular blood glucose.   BRAND OF CHOICE INSURANCE ALLOWS. 100 strip 11    Lancets MISC 1 each by Does not apply route 2 times daily 300 each 11    Alcohol Swabs (ALCOHOL PREP) PADS Use as directed 100 each 11    pantoprazole (PROTONIX) 40 MG tablet TAKE ONE TABLET TWICE A DAY ORALLY 30 DAY(S) 90 tablet 3    chlorhexidine (PERIDEX) 0.12 % solution RINSE WITH 15ML TWICE A DAY FOR 30 SECONDS      ipratropium-albuterol (DUONEB) 0.5-2.5 (3) MG/3ML SOLN nebulizer solution Inhale 1 vial into the lungs 4 times daily      diphenhydrAMINE (BENADRYL) 25 MG capsule Take 1 capsule by mouth every 6 hours as needed for Itching 30 capsule 0    COMBIVENT RESPIMAT  MCG/ACT AERS inhaler Indications: uses PRN   6    montelukast (SINGULAIR) 10 MG tablet Take 1 tablet by mouth nightly 30 tablet 3    ondansetron (ZOFRAN-ODT) 4 MG disintegrating tablet Take 1 tablet by mouth every 8 hours as needed for Nausea or Vomiting (Patient not taking: Reported on 7/15/2020) 21 tablet 0    dicyclomine (BENTYL) 10 MG capsule Take 1 capsule by mouth every 6 hours as needed (cramps) (Patient not taking: Reported on 7/15/2020) 20 capsule 0    vitamin D (CHOLECALCIFEROL) 25 MCG (1000 UT) TABS tablet Take 1 tablet by mouth daily 30 tablet 3    metFORMIN (GLUCOPHAGE) 500 MG tablet Take 1 tablet by mouth 2 times daily (with meals) Take 1 tablet daily for the first 7 days. Then BID 60 tablet 3    EPINEPHrine (EPIPEN 2-HELDER) 0.3 MG/0.3ML SOAJ injection Inject 0.3 mLs into the muscle once for 1 dose Use as directed for allergic reaction 2 each 0     No current Epic-ordered facility-administered medications on file. Problem List Items Addressed This Visit     Postoperative visit - Primary    Relevant Medications    oxyCODONE-acetaminophen (PERCOCET) 5-325 MG per tablet    Postoperative abdominal pain    Relevant Medications    oxyCODONE-acetaminophen (PERCOCET) 5-325 MG per tablet        Allergies   Allergen Reactions    Nsaids      Irritates her Barrets esophogus    Toradol [Ketorolac Tromethamine]     Ultram [Tramadol] Nausea Only     Gastric upset     No orders of the defined types were placed in this encounter. Patient is here for 1 week follow up post robotic assisted hysterectomy with bilateral salpingectomy. Patient states that she is having severe pain. She is unable to take Motrin and she is out of Percocet. The incisions themselves are severely painful. She reports normal bowel and bladder habits. Review of Systems   Constitutional: Negative for activity change, appetite change and fever. HENT: Negative for ear discharge and ear pain. Eyes: Negative for pain and visual disturbance. Respiratory: Negative for apnea and cough. Cardiovascular: Negative for chest pain, palpitations and leg swelling. Gastrointestinal: Negative for abdominal distention, abdominal pain, constipation, diarrhea, nausea and vomiting. Endocrine: Negative. Genitourinary: Negative for difficulty urinating, dysuria, menstrual problem and pelvic pain. Postoperative pain   Musculoskeletal: Negative for neck pain and neck stiffness. Skin: Negative. Neurological: Negative for light-headedness and numbness. Hematological: Negative. Does not bruise/bleed easily. Objective:   Physical Exam  Vitals signs and nursing note reviewed. Constitutional:       Appearance: She is well-developed. HENT:      Head: Normocephalic and atraumatic. Neck:      Musculoskeletal: Normal range of motion and neck supple. Thyroid: No thyromegaly. Cardiovascular:      Rate and Rhythm: Normal rate and regular rhythm. Pulmonary:      Effort: Pulmonary effort is normal.      Breath sounds: Normal breath sounds. No wheezing. Abdominal:      General: Bowel sounds are normal. There is no distension. Palpations: Abdomen is soft. There is no mass. Tenderness: There is abdominal tenderness. There is no guarding. Comments: Incision healing well without signs of infection    Musculoskeletal: Normal range of motion. Skin:     General: Skin is dry. Neurological:      Mental Status: She is alert and oriented to person, place, and time. Psychiatric:         Behavior: Behavior normal.         Thought Content: Thought content normal.         Assessment:      Patient 1 week post robotic assisted laparoscopic hysterectomy with postoperative pain unable to use Motrin due to allergy. Explained to patient that only limited amount of narcotic may be written postoperatively and #8 will be sent. Patient will return in 1 week. Patient advised to rest as much as possible and to watch incision for signs of infection including redness and drainage. Plan:      Orders Placed This Encounter   Medications    oxyCODONE-acetaminophen (PERCOCET) 5-325 MG per tablet     Sig: Take 1 tablet by mouth every 6 hours as needed for Pain for up to 3 days. Intended supply: 3 days. Take lowest dose possible to manage pain     Dispense:  8 tablet     Refill:  0     Reduce doses taken as pain becomes manageable      Appointment in 1 week. Natalie Reyes am scribing for, and in the presence of Dr. Dyan Fernandez. Electronically signed by: Aundrea Hernandez 7/15/20 2:53 PM       I agree to the above documentation placed by my scribe Aundrea Hernadnez. I reviewed the scribe's note and agree with the documented findings and plan of care. Any areas of disagreement are noted on the chart. I have personally evaluated this patient. Additional findings are as noted. I agree with the chief complaint, past medical history, past surgical history, allergies, medications, social and family history as documented unless otherwise noted below.      Electronically signed by Charisse Bey MD on 7/16/2020 at 2:53 AM

## 2020-07-17 ENCOUNTER — HOSPITAL ENCOUNTER (EMERGENCY)
Age: 40
Discharge: HOME | End: 2020-07-17
Attending: EMERGENCY MEDICINE
Payer: COMMERCIAL

## 2020-07-17 ENCOUNTER — APPOINTMENT (OUTPATIENT)
Dept: GENERAL RADIOLOGY | Age: 40
End: 2020-07-17
Payer: COMMERCIAL

## 2020-07-17 VITALS
TEMPERATURE: 98.1 F | SYSTOLIC BLOOD PRESSURE: 124 MMHG | RESPIRATION RATE: 16 BRPM | DIASTOLIC BLOOD PRESSURE: 57 MMHG | HEIGHT: 62 IN | HEART RATE: 81 BPM | BODY MASS INDEX: 37.36 KG/M2 | WEIGHT: 203 LBS | OXYGEN SATURATION: 98 %

## 2020-07-17 LAB
ABSOLUTE EOS #: 0.3 K/UL (ref 0–0.4)
ABSOLUTE IMMATURE GRANULOCYTE: ABNORMAL K/UL (ref 0–0.3)
ABSOLUTE LYMPH #: 1.6 K/UL (ref 1–4.8)
ABSOLUTE MONO #: 0.7 K/UL (ref 0.1–1.3)
ANION GAP SERPL CALCULATED.3IONS-SCNC: 9 MMOL/L (ref 9–17)
BASOPHILS # BLD: 1 % (ref 0–2)
BASOPHILS ABSOLUTE: 0.1 K/UL (ref 0–0.2)
BILIRUBIN URINE: NEGATIVE
BUN BLDV-MCNC: 14 MG/DL (ref 6–20)
BUN/CREAT BLD: ABNORMAL (ref 9–20)
CALCIUM SERPL-MCNC: 9 MG/DL (ref 8.6–10.4)
CHLORIDE BLD-SCNC: 108 MMOL/L (ref 98–107)
CO2: 24 MMOL/L (ref 20–31)
COLOR: YELLOW
COMMENT UA: NORMAL
CREAT SERPL-MCNC: 0.71 MG/DL (ref 0.5–0.9)
DIFFERENTIAL TYPE: ABNORMAL
EOSINOPHILS RELATIVE PERCENT: 3 % (ref 0–4)
GFR AFRICAN AMERICAN: >60 ML/MIN
GFR NON-AFRICAN AMERICAN: >60 ML/MIN
GFR SERPL CREATININE-BSD FRML MDRD: ABNORMAL ML/MIN/{1.73_M2}
GFR SERPL CREATININE-BSD FRML MDRD: ABNORMAL ML/MIN/{1.73_M2}
GLUCOSE BLD-MCNC: 78 MG/DL (ref 70–99)
GLUCOSE URINE: NEGATIVE
HCT VFR BLD CALC: 39.5 % (ref 36–46)
HEMOGLOBIN: 12.6 G/DL (ref 12–16)
IMMATURE GRANULOCYTES: ABNORMAL %
KETONES, URINE: NEGATIVE
LEUKOCYTE ESTERASE, URINE: NEGATIVE
LYMPHOCYTES # BLD: 18 % (ref 24–44)
MCH RBC QN AUTO: 26.2 PG (ref 26–34)
MCHC RBC AUTO-ENTMCNC: 31.9 G/DL (ref 31–37)
MCV RBC AUTO: 82.1 FL (ref 80–100)
MONOCYTES # BLD: 8 % (ref 1–7)
NITRITE, URINE: NEGATIVE
NRBC AUTOMATED: ABNORMAL PER 100 WBC
PDW BLD-RTO: 16.1 % (ref 11.5–14.9)
PH UA: 7.5 (ref 5–8)
PLATELET # BLD: 340 K/UL (ref 150–450)
PLATELET ESTIMATE: ABNORMAL
PMV BLD AUTO: 7.7 FL (ref 6–12)
POTASSIUM SERPL-SCNC: 4.1 MMOL/L (ref 3.7–5.3)
PROTEIN UA: NEGATIVE
RBC # BLD: 4.81 M/UL (ref 4–5.2)
RBC # BLD: ABNORMAL 10*6/UL
SEG NEUTROPHILS: 70 % (ref 36–66)
SEGMENTED NEUTROPHILS ABSOLUTE COUNT: 6.1 K/UL (ref 1.3–9.1)
SODIUM BLD-SCNC: 141 MMOL/L (ref 135–144)
SPECIFIC GRAVITY UA: 1.02 (ref 1–1.03)
TURBIDITY: CLEAR
URINE HGB: NEGATIVE
UROBILINOGEN, URINE: NORMAL
WBC # BLD: 8.8 K/UL (ref 3.5–11)
WBC # BLD: ABNORMAL 10*3/UL

## 2020-07-17 PROCEDURE — 36415 COLL VENOUS BLD VENIPUNCTURE: CPT

## 2020-07-17 PROCEDURE — 80048 BASIC METABOLIC PNL TOTAL CA: CPT

## 2020-07-17 PROCEDURE — 81003 URINALYSIS AUTO W/O SCOPE: CPT

## 2020-07-17 PROCEDURE — 85025 COMPLETE CBC W/AUTO DIFF WBC: CPT

## 2020-07-17 PROCEDURE — 6370000000 HC RX 637 (ALT 250 FOR IP): Performed by: STUDENT IN AN ORGANIZED HEALTH CARE EDUCATION/TRAINING PROGRAM

## 2020-07-17 PROCEDURE — 71045 X-RAY EXAM CHEST 1 VIEW: CPT

## 2020-07-17 PROCEDURE — 99285 EMERGENCY DEPT VISIT HI MDM: CPT

## 2020-07-17 RX ORDER — ALBUTEROL SULFATE 90 UG/1
2 AEROSOL, METERED RESPIRATORY (INHALATION) EVERY 6 HOURS PRN
Status: DISCONTINUED | OUTPATIENT
Start: 2020-07-17 | End: 2020-07-17 | Stop reason: HOSPADM

## 2020-07-17 RX ORDER — PREDNISONE 20 MG/1
60 TABLET ORAL ONCE
Status: COMPLETED | OUTPATIENT
Start: 2020-07-17 | End: 2020-07-17

## 2020-07-17 RX ORDER — PREDNISONE 20 MG/1
20 TABLET ORAL 2 TIMES DAILY
Qty: 8 TABLET | Refills: 0 | Status: SHIPPED | OUTPATIENT
Start: 2020-07-17 | End: 2020-07-21

## 2020-07-17 RX ORDER — ALBUTEROL SULFATE 90 UG/1
AEROSOL, METERED RESPIRATORY (INHALATION)
Status: DISCONTINUED
Start: 2020-07-17 | End: 2020-07-17 | Stop reason: HOSPADM

## 2020-07-17 RX ADMIN — ALBUTEROL SULFATE 2 PUFF: 90 AEROSOL, METERED RESPIRATORY (INHALATION) at 20:02

## 2020-07-17 RX ADMIN — PREDNISONE 60 MG: 20 TABLET ORAL at 19:29

## 2020-07-17 ASSESSMENT — ENCOUNTER SYMPTOMS
BACK PAIN: 0
NAUSEA: 0
WHEEZING: 1
SHORTNESS OF BREATH: 1
VOMITING: 0
ABDOMINAL PAIN: 0

## 2020-07-17 ASSESSMENT — PAIN DESCRIPTION - LOCATION: LOCATION: ABDOMEN

## 2020-07-17 ASSESSMENT — PAIN SCALES - GENERAL: PAINLEVEL_OUTOF10: 7

## 2020-07-17 NOTE — ED PROVIDER NOTES
[facility] ED  Emergency Department Encounter  Emergency Medicine Resident     Pt Name: Rukhsana Caceres  MRN: [id number]  [place] 1980  Date of evaluation: 20  PCP:  KATIE Gonzalez CNP    CHIEF COMPLAINT       Chief Complaint   Patient presents with    Post-op Problem     difficulty urinating       HISTORY OFPRESENT ILLNESS  (Location/Symptom, Timing/Onset, Context/Setting, Quality, Duration, Modifying Factors,Severity.)      Rukhsana Caceres is a 47 y. o.yo female who presents with wheezing and difficulty urinating. Patient complaining of wheezing and slight shortness of breath has a history of asthma. States she has had a hysterectomy recently, is having pain  when she pees, says that she has to hold her stomach in order to pee. Denies any blood in her urine, dysuria. Denies fevers or chills, denies chest pain, headache or changes in vision. Denies any traumatic injuries to the abdomen. PAST MEDICAL / SURGICAL / SOCIAL / FAMILY HISTORY      has a past medical history of Anxiety, Arthritis, Asthma, Sadler's esophagus, Bipolar 1 disorder ([address] CAD (coronary artery disease), Chronic insomnia, COPD (chronic obstructive pulmonary disease) ([address] Depression, Diabetes mellitus ([address] Endometriosis, Esophagitis, GERD (gastroesophageal reflux disease), Headache(784.0), Herniated disc, cervical, Hiatal hernia, Incisional abscess, Kidney stones, MDRO (multiple drug resistant organisms) resistance, Pleurisy, Pneumonia, Seizures ([address] UTI (urinary tract infection), and Vision abnormalities. has a past surgical history that includes knee surgery (Left);  section (, ); Tonsillectomy; Knee arthroscopy (Left, 5/20/15); Cervical disc surgery; Upper gastrointestinal endoscopy (2016); Upper gastrointestinal endoscopy (2017);  Upper gastrointestinal endoscopy (2017); pr esophagogastroduodenoscopy transoral diagnostic (N/A, 3/2/2017); Mother     Bipolar Disorder Brother     Hypertension Father         Allergies:  Nsaids; Toradol [ketorolac tromethamine]; and Ultram [tramadol]    Home Medications:  Prior to Admission medications    Medication Sig Start Date End Date Taking? Authorizing Provider   predniSONE (DELTASONE) 20 MG tablet Take 1 tablet by mouth 2 times daily for 4 days 7/17/20 7/21/20 Yes Mabel Johnson MD   oxyCODONE-acetaminophen (PERCOCET) 5-325 MG per tablet Take 1 tablet by mouth every 6 hours as needed for Pain for up to 3 days. Intended supply: 3 days.  Take lowest dose possible to manage pain 7/15/20 7/18/20  Shamika Dallas MD   ondansetron (ZOFRAN-ODT) 4 MG disintegrating tablet Take 1 tablet by mouth every 8 hours as needed for Nausea or Vomiting  Patient not taking: Reported on 7/15/2020 7/10/20   Vinicio Lerma DO   docusate sodium (COLACE) 100 MG capsule Take 1 capsule by mouth 2 times daily 7/9/20   Vinicio Lerma DO   simethicone (MYLICON) 80 MG chewable tablet Take 1 tablet by mouth every 6 hours as needed for Flatulence 7/9/20   Vinicio Lerma DO   pregabalin (LYRICA) 75 MG capsule TAKE 1 CAPSULE BY MOUTH 3 TIMES DAILY 7/7/20 8/6/20  KATIE Cole CNP   Masks (CLEVER CHOICE FACE MASK) [facility] USE NEW FACE MASK EVERYDAY WHEN PATIENT GO OUTSIDE OF HOME DUE TO COVID PANDEMIC 7/6/20   KATIE Cole CNP   Masks (CLEVER CHOICE FACE MASK) MIS USE NEW FACE MASK EVERYDAY WHEN PATIENT GO OUTSIDE OF HOME DUE TO COVID PANDEMIC 7/6/20   KATIE Cole CNP   BREO ELLIPTA 200-25 MCG/INH AEPB inhaler INHALE ONE PUFF BY MOUTH ONCE A DAY ONCE A DAY INHALATION 30 7/5/20   KATIE Cole CNP   dicyclomine (BENTYL) 10 MG capsule Take 1 capsule by mouth every 6 hours as needed (cramps)  Patient not taking: Reported on 7/15/2020 7/3/20   MD PATRICIA Ly 290 MCG CAPS capsule TAKE 1 CAPSULE BY MOUTH EVERY MORNING (BEFORE BREAKFAST) 7/2/20   Fortino Gann MD   acetaminophen (TYLENOL) 500 MG tablet Take 500 mg by mouth every 6 hours as needed for Pain    Historical Provider, MD   MISC NATURAL PRODUCT OP Take 2 tablets by mouth 2 times daily hydroxycut max-weight loss supplement    Historical Provider, MD   vitamin D (CHOLECALCIFEROL) 25 MCG (1000 UT) TABS tablet Take 1 tablet by mouth daily 6/12/20 7/12/20  KATIE Cole CNP   metFORMIN (GLUCOPHAGE) 500 MG tablet Take 1 tablet by mouth 2 times daily (with meals) Take 1 tablet daily for the first 7 days. Then BID 6/12/20 7/12/20  KATIE Cole CNP   blood glucose monitor strips Test 2-3 times a day & as needed for symptoms of irregular blood glucose.   BRAND OF CHOICE INSURANCE ALLOWS. 6/12/20   KATIE Cole CNP   Lancets MISC 1 each by Does not apply route 2 times daily 6/12/20   KATIE Cole CNP   Alcohol Swabs (ALCOHOL PREP) PADS Use as directed 6/12/20   KATIE Cole CNP   pantoprazole (PROTONIX) 40 MG tablet TAKE ONE TABLET TWICE A DAY ORALLY 30 DAY(S) 4/6/20   Fran Malloy MD   chlorhexidine (PERIDEX) 0.12 % solution RINSE WITH 15ML TWICE A DAY FOR 30 SECONDS 2/17/20   Historical Provider, MD   ipratropium-albuterol (DUONEB) 0.5-2.5 (3) MG/3ML SOLN nebulizer solution Inhale 1 vial into the lungs 4 times daily    Historical Provider, MD   diphenhydrAMINE (BENADRYL) 25 MG capsule Take 1 capsule by mouth every 6 hours as needed for Itching 3/29/19   Courtney Moreno, DO   EPINEPHrine (EPIPEN 2-HELDER) 0.3 MG/0.3ML SOAJ injection Inject 0.3 mLs into the muscle once for 1 dose Use as directed for allergic reaction 3/29/19 6/25/20  Courtney Moreno, DO   COMBIVENT RESPIMAT  MCG/ACT AERS inhaler Indications: uses PRN  9/15/18   Historical Provider, MD   montelukast (SINGULAIR) 10 MG tablet Take 1 tablet by mouth nightly 9/20/18   Lydia Gottron, MD       REVIEW OFSYSTEMS    (2-9 systems for level 4, 10 or more for level 5)      Review of Systems   Constitutional: Negative for diaphoresis and fever. HENT: Negative for congestion. Eyes: Negative for visual disturbance. Respiratory: Positive for shortness of breath and wheezing. Cardiovascular: Negative for chest pain. Gastrointestinal: Negative for abdominal pain, nausea and vomiting. Endocrine: Negative for polyuria. Genitourinary: Positive for difficulty urinating. Negative for dysuria. Musculoskeletal: Negative for back pain. Skin: Negative for wound. Neurological: Negative for headaches. Psychiatric/Behavioral: Negative for confusion. PHYSICAL EXAM   (up to 7 for level 4, 8 or more forlevel 5)      ED TRIAGE VITALS BP: (!) 124/57, Temp: 98.1 °F (36.7 °C), Pulse: 81, Resp: 16, SpO2: 98 %    Vitals:    07/17/20 1734   BP: (!) 124/57   Pulse: 81   Resp: 16   Temp: 98.1 °F (36.7 °C)   TempSrc: Oral   SpO2: 98%   Weight: 203 lb (92.1 kg)   Height: 5' 2\" (1.575 m)       Physical Exam  Constitutional:       General: She is not in acute distress. Appearance: She is well-developed. HENT:      Head: Normocephalic and atraumatic. Nose: Nose normal.   Eyes:      Pupils: Pupils are equal, round, and reactive to light. Neck:      Musculoskeletal: Normal range of motion and neck supple. Cardiovascular:      Rate and Rhythm: Normal rate and regular rhythm. Heart sounds: No murmur. Pulmonary:      Effort: Pulmonary effort is normal. No respiratory distress. Breath sounds: No stridor. Wheezing present. Abdominal:      General: There is no distension. Palpations: Abdomen is soft. Tenderness: There is no abdominal tenderness. Comments: Incision sites look clean, free from signs of infections   Musculoskeletal: Normal range of motion. General: No tenderness. Skin:     General: Skin is warm and dry. Capillary Refill: Capillary refill takes less than 2 seconds. Findings: No erythema or rash.    Neurological:      Mental Status: She is alert and oriented to person, place, and time. Sensory: No sensory deficit. Deep Tendon Reflexes: Reflexes normal.   Psychiatric:         Behavior: Behavior normal.         DIFFERENTIAL  DIAGNOSIS     PLAN (LABS / IMAGING / EKG):  Orders Placed This Encounter   Procedures    XR CHEST PORTABLE    Urinalysis, Routine    CBC Auto Differential    Basic Metabolic Panel w/ Reflex to MG    Respiratory care evaluation only       MEDICATIONS ORDERED:  Orders Placed This Encounter   Medications    albuterol sulfate  (90 Base) MCG/ACT inhaler 2 puff    predniSONE (DELTASONE) tablet 60 mg    predniSONE (DELTASONE) 20 MG tablet     Sig: Take 1 tablet by mouth 2 times daily for 4 days     Dispense:  8 tablet     Refill:  0       DDX:     Asthma exacerbation  Postsurgical pain  Postsurgical changes  Urinary tract infection    Initial MDM/Plan: 47 y.o. female who presents with shortness of breath and difficulty urinating. Has had a recent hysterectomy, will get urinalysis along with basic blood work, albuterol inhaler, prednisone. Patient had a CT abdomen pelvis 15 days ago which did not show any acute abdominal irregularity. DIAGNOSTIC RESULTS / EMERGENCYDEPARTMENT COURSE / MDM     LABS:  Results for orders placed or performed during the hospital encounter of 07/17/20   Urinalysis, Routine   Result Value Ref Range    Color, UA YELLOW YELLOW    Turbidity UA CLEAR CLEAR    Glucose, Ur NEGATIVE NEGATIVE    Bilirubin Urine NEGATIVE NEGATIVE    Ketones, Urine NEGATIVE NEGATIVE    Specific Gravity, UA 1.019 1.000 - 1.030    Urine Hgb NEGATIVE NEGATIVE    pH, UA 7.5 5.0 - 8.0    Protein, UA NEGATIVE NEGATIVE    Urobilinogen, Urine Normal Normal    Nitrite, Urine NEGATIVE NEGATIVE    Leukocyte Esterase, Urine NEGATIVE NEGATIVE    Urinalysis Comments       Microscopic exam not performed based on chemical results unless requested in original order.    CBC Auto Differential   Result Value Ref Range    WBC 8.8 3.5 - 11.0 k/uL    RBC 4. 81 4.0 - 5.2 m/uL    Hemoglobin 12.6 12.0 - 16.0 g/dL    Hematocrit 39.5 36 - 46 %    MCV 82.1 80 - 100 fL    MCH 26.2 26 - 34 pg    MCHC 31.9 31 - 37 g/dL    RDW 16.1 (H) 11.5 - 14.9 %    Platelets 611 825 - 736 k/uL    MPV 7.7 6.0 - 12.0 fL    NRBC Automated NOT REPORTED per 100 WBC    Differential Type NOT REPORTED     Immature Granulocytes NOT REPORTED 0 %    Absolute Immature Granulocyte NOT REPORTED 0.00 - 0.30 k/uL    WBC Morphology NOT REPORTED     RBC Morphology NOT REPORTED     Platelet Estimate NOT REPORTED     Seg Neutrophils 70 (H) 36 - 66 %    Lymphocytes 18 (L) 24 - 44 %    Monocytes 8 (H) 1 - 7 %    Eosinophils % 3 0 - 4 %    Basophils 1 0 - 2 %    Segs Absolute 6.10 1.3 - 9.1 k/uL    Absolute Lymph # 1.60 1.0 - 4.8 k/uL    Absolute Mono # 0.70 0.1 - 1.3 k/uL    Absolute Eos # 0.30 0.0 - 0.4 k/uL    Basophils Absolute 0.10 0.0 - 0.2 k/uL   Basic Metabolic Panel w/ Reflex to MG   Result Value Ref Range    Glucose 78 70 - 99 mg/dL    BUN 14 6 - 20 mg/dL    CREATININE 0.71 0.50 - 0.90 mg/dL    Bun/Cre Ratio NOT REPORTED 9 - 20    Calcium 9.0 8.6 - 10.4 mg/dL    Sodium 141 135 - 144 mmol/L    Potassium 4.1 3.7 - 5.3 mmol/L    Chloride 108 (H) 98 - 107 mmol/L    CO2 24 20 - 31 mmol/L    Anion Gap 9 9 - 17 mmol/L    GFR Non-African American >60 >60 mL/min    GFR African American >60 >60 mL/min    GFR Comment          GFR Staging NOT REPORTED        RADIOLOGY:  XR CHEST PORTABLE   Final Result   Negative chest radiograph. EMERGENCY DEPARTMENT COURSE:  ED Course as of Jul 17 2006 Fri Jul 17, 2020 1918 Nitrite, Urine: NEGATIVE [PS]   1918 Leukocyte Esterase, Urine: NEGATIVE [PS]   1918 ` Patient seen and assessed the emergency department no acute respiratory cardiovascular distress. Patient complaining of wheezing and shortness of breath has a history of asthma.   States she has had a hysterectomy recently, is having pain  when she pees, says that she has to hold her stomach in order to mikal.  Denies any blood in her urine, dysuria. Denies fevers or chills, denies chest pain, headache or changes in vision. Denies any traumatic injuries to the abdomen.    [PS]   1927 Robotic assisted laparoscopic hysterectomy, bilateral salpingectomy, and cystoscopy with Dr. Cindy Gray. Had follow up (1 week) on 7/15    [PS]   1944 No pneumonia      XR CHEST PORTABLE [PS]   1951 Hemoglobin Quant: 12.6 [PS]      ED Course User Index  [PS] Jada Fuentes MD          PROCEDURES:  None    CONSULTS:  None    CRITICAL CARE:  Please see attending note    FINAL IMPRESSION      1. Wheezing    2.  Post-op pain          DISPOSITION / PLAN     DISPOSITION         PATIENT REFERRED TO:  Lilian Joel, APRN - CNP  [address]  [address] [facility]  [address] [facility] [address]  [phone]    In 1 week      Christina Alford[address]  [address] [place] [address] [address] [facility] [address]  [phone]    Schedule an appointment as soon as possible for a visit in 2 days      [facility] ED  [place] [address]  [phone]    As needed, If symptoms worsen      DISCHARGE MEDICATIONS:  New Prescriptions    PREDNISONE (DELTASONE) 20 MG TABLET    Take 1 tablet by mouth 2 times daily for 4 days       Jada Fuentes MD  Emergency Medicine Resident    (Please note that portions of this note were completed with a voice recognition program.Efforts were made to edit the dictations but occasionally words are mis-transcribed.)     Jada Fuentes MD  Resident  07/17/20 2007

## 2020-07-17 NOTE — ED NOTES
Mode of arrival (squad #, walk in, police, etc) : walk in        Chief complaint(s): post op problem        Arrival Note (brief scenario, treatment PTA, etc). : pt states she had hysterectomy on July 8th and has been having to force urine out since then. Pt is unsure if she has uti or if something else is going on.        C= \"Have you ever felt that you should Cut down on your drinking? \"  No  A= \"Have people Annoyed you by criticizing your drinking? \"  No  G= \"Have you ever felt bad or Guilty about your drinking? \"  No  E= \"Have you ever had a drink as an Eye-opener first thing in the morning to steady your nerves or to help a hangover? \"  No      Deferred []      Reason for deferring: N/A    *If yes to two or more: probable alcohol abuse. Samantha Coulter RN  07/17/20 0535

## 2020-07-20 ENCOUNTER — TELEPHONE (OUTPATIENT)
Dept: FAMILY MEDICINE CLINIC | Age: 40
End: 2020-07-20

## 2020-07-20 NOTE — ED PROVIDER NOTES
[address] ED  eMERGENCY dEPARTMENT eNCOUnter   Attending Attestation     Pt Name: Leslie Tena  MRN: [id number]  [place] 1980  Date of evaluation: 7/20/20       Leslie Tena is a 47 y.o. female who presents with Post-op Problem (difficulty urinating)      History:       Exam: Vitals:   Vitals:    07/17/20 1734   BP: (!) 124/57   Pulse: 81   Resp: 16   Temp: 98.1 °F (36.7 °C)   TempSrc: Oral   SpO2: 98%   Weight: 203 lb (92.1 kg)   Height: 5' 2\" (1.575 m)         I performed a history and physical examination of the patient and discussed management with the resident. I reviewed the residents note and agree with the documented findings and plan of care. Any areas of disagreement are noted on the chart. I was personally present for the key portions of any procedures. I have documented in the chart those procedures where I was not present during the key portions. I have personally reviewed all images and agree with the resident's interpretation. I have reviewed the emergency nurses triage note. I agree with the chief complaint, past medical history, past surgical history, allergies, medications, social and family history as documented unless otherwise noted below. Documentation of the HPI, Physical Exam and Medical Decision Making performed by medical students or scribes is based on my personal performance of the HPI, PE and MDM. For Phys Assistant/ Nurse Practitioner cases/documentation I have had a face to face evaluation of this patient and have completed at least one if not all key elements of the E/M (history, physical exam, and MDM). Additional findings are as noted.     Bob Salvador MD  Attending Emergency  Physician              Bob Salvador MD  07/20/20 4641

## 2020-07-20 NOTE — TELEPHONE ENCOUNTER
[facility] ED Follow up Call    Reason for ED visit:           Kimberlyn Lal , this is Izzy Gil from Dr. Jonelle Bernal's office, just calling to see how you are doing after your recent ED visit. Did you receive discharge instructions? Yes  Do you understand the discharge instructions? Yes  Did the ED give you any new prescriptions? Yes  Were you able to fill your prescriptions? Yes      Do you have one of our red, yellow and green  Zone sheets that help you to determine when you should go to the ED? Yes      Do you need or want to make a follow up appt with your PCP? No    Do you have any further needs in the home i.e. Equipment?   No        FU appts/Provider:    Future Appointments   Date Time Provider Wesley Smith   7/21/2020 11:30 AM MD LILIAN Galvin   9/14/2020  1:00 PM KATIE Cole - CNP [facility]

## 2020-07-22 ENCOUNTER — TELEPHONE (OUTPATIENT)
Dept: OBGYN CLINIC | Age: 40
End: 2020-07-22

## 2020-07-22 ENCOUNTER — HOSPITAL ENCOUNTER (INPATIENT)
Age: 40
LOS: 6 days | Discharge: HOME | DRG: 392 | End: 2020-07-28
Attending: EMERGENCY MEDICINE | Admitting: INTERNAL MEDICINE
Payer: COMMERCIAL

## 2020-07-22 ENCOUNTER — APPOINTMENT (OUTPATIENT)
Dept: CT IMAGING | Age: 40
DRG: 392 | End: 2020-07-22
Payer: COMMERCIAL

## 2020-07-22 ENCOUNTER — APPOINTMENT (OUTPATIENT)
Dept: GENERAL RADIOLOGY | Age: 40
DRG: 392 | End: 2020-07-22
Payer: COMMERCIAL

## 2020-07-22 ENCOUNTER — TELEPHONE (OUTPATIENT)
Dept: OTHER | Facility: CLINIC | Age: 40
End: 2020-07-22

## 2020-07-22 PROBLEM — K57.92 DIVERTICULITIS: Status: ACTIVE | Noted: 2020-07-22

## 2020-07-22 PROBLEM — Z87.59 HISTORY OF GESTATIONAL HYPERTENSION: Status: ACTIVE | Noted: 2018-08-29

## 2020-07-22 LAB
ABSOLUTE BANDS #: 0.22 K/UL (ref 0–1)
ABSOLUTE EOS #: 0 K/UL (ref 0–0.4)
ABSOLUTE IMMATURE GRANULOCYTE: ABNORMAL K/UL (ref 0–0.3)
ABSOLUTE LYMPH #: 1.08 K/UL (ref 1–4.8)
ABSOLUTE MONO #: 0.65 K/UL (ref 0.1–1.3)
ALBUMIN SERPL-MCNC: 4.1 G/DL (ref 3.5–5.2)
ALBUMIN/GLOBULIN RATIO: NORMAL (ref 1–2.5)
ALP BLD-CCNC: 73 U/L (ref 35–104)
ALT SERPL-CCNC: 13 U/L (ref 5–33)
ANION GAP SERPL CALCULATED.3IONS-SCNC: 13 MMOL/L (ref 9–17)
AST SERPL-CCNC: 12 U/L
BANDS: 1 % (ref 0–10)
BASOPHILS # BLD: 0 % (ref 0–2)
BASOPHILS ABSOLUTE: 0 K/UL (ref 0–0.2)
BILIRUB SERPL-MCNC: 0.77 MG/DL (ref 0.3–1.2)
BILIRUBIN URINE: NEGATIVE
BUN BLDV-MCNC: 13 MG/DL (ref 6–20)
BUN/CREAT BLD: NORMAL (ref 9–20)
CALCIUM SERPL-MCNC: 8.8 MG/DL (ref 8.6–10.4)
CHLORIDE BLD-SCNC: 100 MMOL/L (ref 98–107)
CO2: 23 MMOL/L (ref 20–31)
COLOR: YELLOW
COMMENT UA: NORMAL
CREAT SERPL-MCNC: 0.7 MG/DL (ref 0.5–0.9)
DIFFERENTIAL TYPE: ABNORMAL
EOSINOPHILS RELATIVE PERCENT: 0 % (ref 0–4)
GFR AFRICAN AMERICAN: >60 ML/MIN
GFR NON-AFRICAN AMERICAN: >60 ML/MIN
GFR SERPL CREATININE-BSD FRML MDRD: NORMAL ML/MIN/{1.73_M2}
GFR SERPL CREATININE-BSD FRML MDRD: NORMAL ML/MIN/{1.73_M2}
GLUCOSE BLD-MCNC: 94 MG/DL (ref 70–99)
GLUCOSE BLD-MCNC: 96 MG/DL (ref 65–105)
GLUCOSE URINE: NEGATIVE
HCT VFR BLD CALC: 43 % (ref 36–46)
HEMOGLOBIN: 13.6 G/DL (ref 12–16)
IMMATURE GRANULOCYTES: ABNORMAL %
KETONES, URINE: NEGATIVE
LACTIC ACID: 0.9 MMOL/L (ref 0.5–2.2)
LACTIC ACID: 1 MMOL/L (ref 0.5–2.2)
LEUKOCYTE ESTERASE, URINE: NEGATIVE
LIPASE: 18 U/L (ref 13–60)
LYMPHOCYTES # BLD: 5 % (ref 24–44)
MCH RBC QN AUTO: 25.5 PG (ref 26–34)
MCHC RBC AUTO-ENTMCNC: 31.6 G/DL (ref 31–37)
MCV RBC AUTO: 80.8 FL (ref 80–100)
MONOCYTES # BLD: 3 % (ref 1–7)
MORPHOLOGY: ABNORMAL
NITRITE, URINE: NEGATIVE
NRBC AUTOMATED: ABNORMAL PER 100 WBC
PDW BLD-RTO: 16.5 % (ref 11.5–14.9)
PH UA: 6.5 (ref 5–8)
PLATELET # BLD: 325 K/UL (ref 150–450)
PLATELET ESTIMATE: ABNORMAL
PMV BLD AUTO: 8.4 FL (ref 6–12)
POTASSIUM SERPL-SCNC: 4.3 MMOL/L (ref 3.7–5.3)
PROTEIN UA: NEGATIVE
RBC # BLD: 5.32 M/UL (ref 4–5.2)
RBC # BLD: ABNORMAL 10*6/UL
SEG NEUTROPHILS: 91 % (ref 36–66)
SEGMENTED NEUTROPHILS ABSOLUTE COUNT: 19.65 K/UL (ref 1.3–9.1)
SODIUM BLD-SCNC: 136 MMOL/L (ref 135–144)
SPECIFIC GRAVITY UA: 1.02 (ref 1–1.03)
TOTAL PROTEIN: 7 G/DL (ref 6.4–8.3)
TURBIDITY: CLEAR
URINE HGB: NEGATIVE
UROBILINOGEN, URINE: NORMAL
WBC # BLD: 21.6 K/UL (ref 3.5–11)
WBC # BLD: ABNORMAL 10*3/UL

## 2020-07-22 PROCEDURE — 82947 ASSAY GLUCOSE BLOOD QUANT: CPT

## 2020-07-22 PROCEDURE — 81003 URINALYSIS AUTO W/O SCOPE: CPT

## 2020-07-22 PROCEDURE — 74177 CT ABD & PELVIS W/CONTRAST: CPT

## 2020-07-22 PROCEDURE — 2580000003 HC RX 258: Performed by: NURSE PRACTITIONER

## 2020-07-22 PROCEDURE — 1200000000 HC SEMI PRIVATE

## 2020-07-22 PROCEDURE — 36415 COLL VENOUS BLD VENIPUNCTURE: CPT

## 2020-07-22 PROCEDURE — 6360000002 HC RX W HCPCS: Performed by: STUDENT IN AN ORGANIZED HEALTH CARE EDUCATION/TRAINING PROGRAM

## 2020-07-22 PROCEDURE — 99285 EMERGENCY DEPT VISIT HI MDM: CPT

## 2020-07-22 PROCEDURE — 2500000003 HC RX 250 WO HCPCS: Performed by: STUDENT IN AN ORGANIZED HEALTH CARE EDUCATION/TRAINING PROGRAM

## 2020-07-22 PROCEDURE — 83690 ASSAY OF LIPASE: CPT

## 2020-07-22 PROCEDURE — 2580000003 HC RX 258: Performed by: EMERGENCY MEDICINE

## 2020-07-22 PROCEDURE — 51701 INSERT BLADDER CATHETER: CPT

## 2020-07-22 PROCEDURE — 80053 COMPREHEN METABOLIC PANEL: CPT

## 2020-07-22 PROCEDURE — 96374 THER/PROPH/DIAG INJ IV PUSH: CPT

## 2020-07-22 PROCEDURE — 6370000000 HC RX 637 (ALT 250 FOR IP): Performed by: STUDENT IN AN ORGANIZED HEALTH CARE EDUCATION/TRAINING PROGRAM

## 2020-07-22 PROCEDURE — 6370000000 HC RX 637 (ALT 250 FOR IP): Performed by: EMERGENCY MEDICINE

## 2020-07-22 PROCEDURE — 87040 BLOOD CULTURE FOR BACTERIA: CPT

## 2020-07-22 PROCEDURE — 71045 X-RAY EXAM CHEST 1 VIEW: CPT

## 2020-07-22 PROCEDURE — 6360000002 HC RX W HCPCS: Performed by: NURSE PRACTITIONER

## 2020-07-22 PROCEDURE — 83605 ASSAY OF LACTIC ACID: CPT

## 2020-07-22 PROCEDURE — 6360000004 HC RX CONTRAST MEDICATION: Performed by: EMERGENCY MEDICINE

## 2020-07-22 PROCEDURE — 85025 COMPLETE CBC W/AUTO DIFF WBC: CPT

## 2020-07-22 PROCEDURE — 6360000002 HC RX W HCPCS: Performed by: EMERGENCY MEDICINE

## 2020-07-22 RX ORDER — MAGNESIUM SULFATE 1 G/100ML
1 INJECTION INTRAVENOUS PRN
Status: DISCONTINUED | OUTPATIENT
Start: 2020-07-22 | End: 2020-07-26

## 2020-07-22 RX ORDER — POLYETHYLENE GLYCOL 3350 17 G/17G
17 POWDER, FOR SOLUTION ORAL DAILY PRN
Status: DISCONTINUED | OUTPATIENT
Start: 2020-07-22 | End: 2020-07-28 | Stop reason: HOSPADM

## 2020-07-22 RX ORDER — FAMOTIDINE 20 MG/1
20 TABLET, FILM COATED ORAL 2 TIMES DAILY
Status: DISCONTINUED | OUTPATIENT
Start: 2020-07-22 | End: 2020-07-25

## 2020-07-22 RX ORDER — MONTELUKAST SODIUM 10 MG/1
10 TABLET ORAL NIGHTLY
Status: DISCONTINUED | OUTPATIENT
Start: 2020-07-22 | End: 2020-07-28 | Stop reason: HOSPADM

## 2020-07-22 RX ORDER — DEXTROSE MONOHYDRATE 25 G/50ML
12.5 INJECTION, SOLUTION INTRAVENOUS PRN
Status: DISCONTINUED | OUTPATIENT
Start: 2020-07-22 | End: 2020-07-26

## 2020-07-22 RX ORDER — QUETIAPINE FUMARATE 50 MG/1
50 TABLET, FILM COATED ORAL NIGHTLY
COMMUNITY
End: 2020-10-27

## 2020-07-22 RX ORDER — LAMOTRIGINE 25 MG/1
25 TABLET ORAL 2 TIMES DAILY
Status: DISCONTINUED | OUTPATIENT
Start: 2020-07-22 | End: 2020-07-28 | Stop reason: HOSPADM

## 2020-07-22 RX ORDER — 0.9 % SODIUM CHLORIDE 0.9 %
1000 INTRAVENOUS SOLUTION INTRAVENOUS ONCE
Status: COMPLETED | OUTPATIENT
Start: 2020-07-22 | End: 2020-07-22

## 2020-07-22 RX ORDER — ONDANSETRON 2 MG/ML
4 INJECTION INTRAMUSCULAR; INTRAVENOUS EVERY 6 HOURS PRN
Status: DISCONTINUED | OUTPATIENT
Start: 2020-07-22 | End: 2020-07-26

## 2020-07-22 RX ORDER — DIPHENHYDRAMINE HYDROCHLORIDE 50 MG/ML
50 INJECTION INTRAMUSCULAR; INTRAVENOUS ONCE
Status: DISCONTINUED | OUTPATIENT
Start: 2020-07-22 | End: 2020-07-22

## 2020-07-22 RX ORDER — NICOTINE POLACRILEX 4 MG
15 LOZENGE BUCCAL PRN
Status: DISCONTINUED | OUTPATIENT
Start: 2020-07-22 | End: 2020-07-28 | Stop reason: HOSPADM

## 2020-07-22 RX ORDER — 0.9 % SODIUM CHLORIDE 0.9 %
80 INTRAVENOUS SOLUTION INTRAVENOUS ONCE
Status: COMPLETED | OUTPATIENT
Start: 2020-07-22 | End: 2020-07-22

## 2020-07-22 RX ORDER — POTASSIUM CHLORIDE 20 MEQ/1
40 TABLET, EXTENDED RELEASE ORAL PRN
Status: DISCONTINUED | OUTPATIENT
Start: 2020-07-22 | End: 2020-07-28 | Stop reason: HOSPADM

## 2020-07-22 RX ORDER — MORPHINE SULFATE 2 MG/ML
2 INJECTION, SOLUTION INTRAMUSCULAR; INTRAVENOUS EVERY 4 HOURS PRN
Status: DISCONTINUED | OUTPATIENT
Start: 2020-07-22 | End: 2020-07-24

## 2020-07-22 RX ORDER — DEXTROSE MONOHYDRATE 50 MG/ML
100 INJECTION, SOLUTION INTRAVENOUS PRN
Status: DISCONTINUED | OUTPATIENT
Start: 2020-07-22 | End: 2020-07-26

## 2020-07-22 RX ORDER — MORPHINE SULFATE 4 MG/ML
4 INJECTION, SOLUTION INTRAMUSCULAR; INTRAVENOUS ONCE
Status: COMPLETED | OUTPATIENT
Start: 2020-07-22 | End: 2020-07-22

## 2020-07-22 RX ORDER — MORPHINE SULFATE 4 MG/ML
2 INJECTION, SOLUTION INTRAMUSCULAR; INTRAVENOUS EVERY 4 HOURS PRN
Status: DISCONTINUED | OUTPATIENT
Start: 2020-07-22 | End: 2020-07-22

## 2020-07-22 RX ORDER — ACETAMINOPHEN 325 MG/1
650 TABLET ORAL ONCE
Status: COMPLETED | OUTPATIENT
Start: 2020-07-22 | End: 2020-07-22

## 2020-07-22 RX ORDER — SODIUM CHLORIDE 0.9 % (FLUSH) 0.9 %
10 SYRINGE (ML) INJECTION EVERY 12 HOURS SCHEDULED
Status: DISCONTINUED | OUTPATIENT
Start: 2020-07-22 | End: 2020-07-26

## 2020-07-22 RX ORDER — DIPHENHYDRAMINE HCL 25 MG
50 TABLET ORAL ONCE
Status: COMPLETED | OUTPATIENT
Start: 2020-07-22 | End: 2020-07-22

## 2020-07-22 RX ORDER — PROMETHAZINE HYDROCHLORIDE 25 MG/1
12.5 TABLET ORAL EVERY 6 HOURS PRN
Status: DISCONTINUED | OUTPATIENT
Start: 2020-07-22 | End: 2020-07-28 | Stop reason: HOSPADM

## 2020-07-22 RX ORDER — ACETAMINOPHEN 325 MG/1
650 TABLET ORAL EVERY 6 HOURS PRN
Status: DISCONTINUED | OUTPATIENT
Start: 2020-07-22 | End: 2020-07-28 | Stop reason: HOSPADM

## 2020-07-22 RX ORDER — IPRATROPIUM BROMIDE AND ALBUTEROL SULFATE 2.5; .5 MG/3ML; MG/3ML
1 SOLUTION RESPIRATORY (INHALATION)
Status: DISCONTINUED | OUTPATIENT
Start: 2020-07-22 | End: 2020-07-28 | Stop reason: HOSPADM

## 2020-07-22 RX ORDER — PREGABALIN 75 MG/1
75 CAPSULE ORAL 3 TIMES DAILY
Status: DISCONTINUED | OUTPATIENT
Start: 2020-07-22 | End: 2020-07-28 | Stop reason: HOSPADM

## 2020-07-22 RX ORDER — SODIUM CHLORIDE 0.9 % (FLUSH) 0.9 %
10 SYRINGE (ML) INJECTION PRN
Status: DISCONTINUED | OUTPATIENT
Start: 2020-07-22 | End: 2020-07-26

## 2020-07-22 RX ORDER — POTASSIUM CHLORIDE 7.45 MG/ML
10 INJECTION INTRAVENOUS PRN
Status: DISCONTINUED | OUTPATIENT
Start: 2020-07-22 | End: 2020-07-26

## 2020-07-22 RX ORDER — MAGNESIUM HYDROXIDE/ALUMINUM HYDROXICE/SIMETHICONE 120; 1200; 1200 MG/30ML; MG/30ML; MG/30ML
30 SUSPENSION ORAL ONCE
Status: COMPLETED | OUTPATIENT
Start: 2020-07-22 | End: 2020-07-22

## 2020-07-22 RX ORDER — CIPROFLOXACIN 2 MG/ML
400 INJECTION, SOLUTION INTRAVENOUS ONCE
Status: DISCONTINUED | OUTPATIENT
Start: 2020-07-22 | End: 2020-07-22

## 2020-07-22 RX ORDER — MORPHINE SULFATE 4 MG/ML
4 INJECTION, SOLUTION INTRAMUSCULAR; INTRAVENOUS EVERY 4 HOURS PRN
Status: DISCONTINUED | OUTPATIENT
Start: 2020-07-22 | End: 2020-07-24

## 2020-07-22 RX ORDER — SODIUM CHLORIDE 9 MG/ML
INJECTION, SOLUTION INTRAVENOUS CONTINUOUS
Status: DISCONTINUED | OUTPATIENT
Start: 2020-07-22 | End: 2020-07-26

## 2020-07-22 RX ORDER — IPRATROPIUM BROMIDE AND ALBUTEROL SULFATE 2.5; .5 MG/3ML; MG/3ML
1 SOLUTION RESPIRATORY (INHALATION) EVERY 4 HOURS PRN
Status: DISCONTINUED | OUTPATIENT
Start: 2020-07-22 | End: 2020-07-28 | Stop reason: HOSPADM

## 2020-07-22 RX ORDER — ACETAMINOPHEN 650 MG/1
650 SUPPOSITORY RECTAL EVERY 6 HOURS PRN
Status: DISCONTINUED | OUTPATIENT
Start: 2020-07-22 | End: 2020-07-28 | Stop reason: HOSPADM

## 2020-07-22 RX ORDER — CIPROFLOXACIN 2 MG/ML
400 INJECTION, SOLUTION INTRAVENOUS EVERY 12 HOURS
Status: DISCONTINUED | OUTPATIENT
Start: 2020-07-22 | End: 2020-07-25

## 2020-07-22 RX ADMIN — IPRATROPIUM BROMIDE AND ALBUTEROL SULFATE 1 AMPULE: 2.5; .5 SOLUTION RESPIRATORY (INHALATION) at 20:25

## 2020-07-22 RX ADMIN — CIPROFLOXACIN 400 MG: 2 INJECTION, SOLUTION INTRAVENOUS at 21:17

## 2020-07-22 RX ADMIN — MORPHINE SULFATE 4 MG: 4 INJECTION, SOLUTION INTRAMUSCULAR; INTRAVENOUS at 23:56

## 2020-07-22 RX ADMIN — IOVERSOL 75 ML: 741 INJECTION INTRA-ARTERIAL; INTRAVENOUS at 17:09

## 2020-07-22 RX ADMIN — FAMOTIDINE 20 MG: 20 TABLET, FILM COATED ORAL at 20:03

## 2020-07-22 RX ADMIN — MORPHINE SULFATE 4 MG: 4 INJECTION, SOLUTION INTRAMUSCULAR; INTRAVENOUS at 18:12

## 2020-07-22 RX ADMIN — ENOXAPARIN SODIUM 40 MG: 40 INJECTION SUBCUTANEOUS at 20:24

## 2020-07-22 RX ADMIN — PREGABALIN 75 MG: 75 CAPSULE ORAL at 20:03

## 2020-07-22 RX ADMIN — MEROPENEM 1 G: 1 INJECTION, POWDER, FOR SOLUTION INTRAVENOUS at 18:17

## 2020-07-22 RX ADMIN — MORPHINE SULFATE 4 MG: 4 INJECTION, SOLUTION INTRAMUSCULAR; INTRAVENOUS at 16:11

## 2020-07-22 RX ADMIN — SODIUM CHLORIDE 1000 ML: 9 INJECTION, SOLUTION INTRAVENOUS at 15:52

## 2020-07-22 RX ADMIN — SODIUM CHLORIDE 80 ML: 9 INJECTION, SOLUTION INTRAVENOUS at 17:08

## 2020-07-22 RX ADMIN — ACETAMINOPHEN 650 MG: 325 TABLET, FILM COATED ORAL at 15:36

## 2020-07-22 RX ADMIN — ALUMINUM HYDROXIDE, MAGNESIUM HYDROXIDE, AND SIMETHICONE 30 ML: 200; 200; 20 SUSPENSION ORAL at 18:09

## 2020-07-22 RX ADMIN — MORPHINE SULFATE 2 MG: 4 INJECTION, SOLUTION INTRAMUSCULAR; INTRAVENOUS at 19:46

## 2020-07-22 RX ADMIN — Medication 10 ML: at 17:09

## 2020-07-22 RX ADMIN — MONTELUKAST 10 MG: 10 TABLET, FILM COATED ORAL at 20:03

## 2020-07-22 RX ADMIN — SODIUM CHLORIDE 1000 ML: 9 INJECTION, SOLUTION INTRAVENOUS at 17:33

## 2020-07-22 RX ADMIN — DIPHENHYDRAMINE HCL 50 MG: 25 TABLET ORAL at 16:11

## 2020-07-22 RX ADMIN — METRONIDAZOLE 500 MG: 500 INJECTION, SOLUTION INTRAVENOUS at 20:05

## 2020-07-22 RX ADMIN — ACETAMINOPHEN 650 MG: 325 TABLET, FILM COATED ORAL at 22:03

## 2020-07-22 RX ADMIN — SODIUM CHLORIDE: 9 INJECTION, SOLUTION INTRAVENOUS at 22:04

## 2020-07-22 RX ADMIN — LAMOTRIGINE 25 MG: 25 TABLET ORAL at 20:24

## 2020-07-22 ASSESSMENT — PAIN DESCRIPTION - LOCATION
LOCATION: ABDOMEN
LOCATION: ABDOMEN

## 2020-07-22 ASSESSMENT — PAIN SCALES - GENERAL
PAINLEVEL_OUTOF10: 10

## 2020-07-22 ASSESSMENT — ENCOUNTER SYMPTOMS
BLOOD IN STOOL: 0
CHEST TIGHTNESS: 0
WHEEZING: 0
ABDOMINAL DISTENTION: 0
APNEA: 0
DIARRHEA: 0
BACK PAIN: 0
NAUSEA: 0
SHORTNESS OF BREATH: 0
CONSTIPATION: 1
EYES NEGATIVE: 1
VOMITING: 0
ABDOMINAL PAIN: 1
COUGH: 0
CHOKING: 1

## 2020-07-22 ASSESSMENT — PAIN DESCRIPTION - ORIENTATION: ORIENTATION: LOWER

## 2020-07-22 ASSESSMENT — PAIN DESCRIPTION - PAIN TYPE
TYPE: ACUTE PAIN
TYPE: ACUTE PAIN

## 2020-07-22 NOTE — TELEPHONE ENCOUNTER
Writer contacted Dr. Bobby Hassan,  ED provider to inform of 30 day readmission risk. No Decision on disposition at this time.

## 2020-07-22 NOTE — H&P
[address]      [address] [facility]     HISTORY AND PHYSICAL EXAMINATION            Date:   7/22/2020  Patient name:  Temo Skaggs  Date of admission:  7/22/2020  3:13 PM  MRN:   [id number]  Account:  [de-identified]  YOB: 1980  PCP:    KATIE Bay CNP  Room:   E/E  Code Status:    Prior    Chief Complaint:     Chief Complaint   Patient presents with    Abdominal Pain       History Obtained From:     patient    History of Present Illness: The patient is a 47 y.o. Non-/non  female who presents withAbdominal Pain   and she is admitted to the hospital for the management of abdominal pain. Patient had hysterectomy and salpingectomy on 7/8/2020 and was discharged from the hospital on 7/10/2020. Patient has continuously been having abdominal pain since then and was using her Percocet to help with it. The pain is located in the suprapubic, right lower quadrant and left lower quadrant areas. Patient states the pain was 7/10 on the pain scale when it started and is currently 10/10. Pain does not radiate anywhere and is worse when she tries to go to the restroom for bowel movements or to urinate. Patient denies any nausea, vomiting, blood in stool, chest pain, shortness of breath. Patient initially went to see her OB/GYN physician on 7/15/2020 and was cleared and sent home. Today patient had an appointment with her physician where she was not allowed to see the physician because she had a fever. The doctor's office advised her to go to the emergency department. In the ED CBC was remarkable for raised WBC of 21.6 and CT abdomen and pelvis with contrast showed findings compatible with acute colitis/diverticulitis of the rectosigmoid colon with multiple pericolonic fluid collections.   Patient is being admitted for management of diverticulitis.       Past Medical History:     Past Medical History:   Diagnosis Date    Anxiety     Arthritis     OA    Asthma     Sadler's esophagus     LONG SEGMENT    Bipolar 1 disorder (HCC)     CAD (coronary artery disease)     Chronic insomnia     COPD [facility]chronic obstructive pulmonary disease) ([facility]     Depression     and bipolar    Diabetes mellitus (HCC)     prediabetic    Endometriosis 3/9/2020    Esophagitis     severe    GERD (gastroesophageal reflux disease)     Headache(784.0)     Herniated disc, cervical     Hiatal hernia     Incisional abscess 1/15/2019    Kidney stones     MDRO (multiple drug resistant organisms) resistance     mrsa    Pleurisy     hx of \"years ago\"    Pneumonia     past penumonia    Seizures ([address]     2016 last seizure-focal seizure    UTI (urinary tract infection)     Vision abnormalities     wears glasses        Past SurgicalHistory:     Past Surgical History:   Procedure Laterality Date    CERVICAL DISC SURGERY      x2     SECTION  , 2018    x 2     SECTION N/A 2018     SECTION performed by Mark Nichols MD at [facility] L&D [address] COLONOSCOPY N/A 10/1/2019    COLONOSCOPY DIAGNOSTIC ABORTED performed by Erika Fierro MD at [address] [facility] N/A 2020    COLONOSCOPY WITH BIOPSY performed by Erika Fierro MD at [address] [place] [address], COLON, DIAGNOSTIC      HYSTERECTOMY N/A 2020    HYSTERECTOMY ABDOMINAL LAPAROSCOPIC ROBOTIC XI W/ CYSTOSCOPY performed by Mark Nichols MD at [address] [facility] ARTHROSCOPY Left 5/20/15    KNEE SURGERY Left     x3    LAPAROSCOPY      dr Sarah Tracy N/A 10/5/2017    LAPAROSCOPIC REMOVAL INTRA ABDOMINAL LIPOMA LOWER RIGHT performed by Macario Mccoy DO at [address] ESOPHAGOGASTRODUODENOSCOPY TRANSORAL DIAGNOSTIC N/A 3/2/2017    EGD ESOPHAGOGASTRODUODENOSCOPY  IP  performed by Jalen Trevino MD at [address] [facility] GASTROINTESTINAL ENDOSCOPY  08/16/2016    severe esophagitis    UPPER GASTROINTESTINAL ENDOSCOPY  01/19/2017    barretts; hiatus hernia; gastritis    UPPER GASTROINTESTINAL ENDOSCOPY  03/02/2017    long seg mullins's with linear erosions, esophagitis, small hiatal hernia    UPPER GASTROINTESTINAL ENDOSCOPY N/A 1/30/2018    MULLINS'S    UPPER GASTROINTESTINAL ENDOSCOPY N/A 10/1/2019    EGD BIOPSY performed by Minna Cárdenas MD at [address] [facility]          Medications Prior to Admission:        Prior to Admission medications    Medication Sig Start Date End Date Taking?  Authorizing Provider   lamoTRIgine (LAMICTAL) 25 MG tablet Take 25 mg by mouth 2 times daily    Historical Provider, MD   ondansetron (ZOFRAN-ODT) 4 MG disintegrating tablet Take 1 tablet by mouth every 8 hours as needed for Nausea or Vomiting 7/10/20   [facility], DO   docusate sodium (COLACE) 100 MG capsule Take 1 capsule by mouth 2 times daily 7/9/20   [facility], DO   simethicone (MYLICON) 80 MG chewable tablet Take 1 tablet by mouth every 6 hours as needed for Flatulence 7/9/20   [facility], DO   pregabalin (LYRICA) 75 MG capsule TAKE 1 CAPSULE BY MOUTH 3 TIMES DAILY 7/7/20 8/6/20  Alan A Gauamis, APRN - CNP   Masks (CLEVER CHOICE FACE MASK) MIS USE NEW FACE MASK EVERYDAY WHEN PATIENT GO OUTSIDE OF HOME DUE TO COVID PANDEMIC 7/6/20   Vitaliyella A TALAT BernalN - CNP   Masks (CLEVER CHOICE FACE MASK) MISC USE NEW FACE MASK EVERYDAY WHEN PATIENT GO OUTSIDE OF HOME DUE TO COVID PANDEMIC 7/6/20   Vitaliyella A TALAT BernalN - CNP   BREO ELLIPTA 200-25 MCG/INH AEPB inhaler INHALE ONE PUFF BY MOUTH ONCE A DAY ONCE A DAY INHALATION 30 7/5/20   Alan A KATIE Bernal - CNP   dicyclomine (BENTYL) 10 MG capsule Take 1 capsule by mouth every 6 hours as needed (cramps) 7/3/20   Olivia Guerin MD   LINZESS 290 MCG CAPS capsule TAKE 1 CAPSULE BY MOUTH EVERY MORNING (BEFORE BREAKFAST) 7/2/20   Minna Cárdenas MD   acetaminophen (TYLENOL) 500 MG tablet Take 500 mg by mouth every 6 hours as needed for Pain    Historical Provider, MD   MISC NATURAL PRODUCT OP Take 2 tablets by mouth 2 times daily hydroxycut max-weight loss supplement    Historical Provider, MD   vitamin D (CHOLECALCIFEROL) 25 MCG (1000 UT) TABS tablet Take 1 tablet by mouth daily 6/12/20 7/20/20  KATIE Cole CNP   metFORMIN (GLUCOPHAGE) 500 MG tablet Take 1 tablet by mouth 2 times daily (with meals) Take 1 tablet daily for the first 7 days. Then BID 6/12/20 7/12/20  KATIE Cole CNP   blood glucose monitor strips Test 2-3 times a day & as needed for symptoms of irregular blood glucose. BRAND OF CHO[employer] INSURANCE ALLOWS. 6/12/20   KATIE Cole CNP   Lancets MISC 1 each by Does not apply route 2 times daily 6/12/20   KATIE Cole CNP   Alcohol Swabs (ALCOHOL PREP) PADS Use as directed 6/12/20   KATIE Cole CNP   pantoprazole (PROTONIX) 40 MG tablet TAKE ONE TABLET TWICE A DAY ORALLY 30 DAY(S) 4/6/20   Erika Fierro MD   chlorhexidine (PERIDEX) 0.12 % solution RINSE WITH 15ML TWICE A DAY FOR 30 SECONDS 2/17/20   Historical Provider, MD   ipratropium-albuterol (DUONEB) 0.5-2.5 (3) MG/3ML SOLN nebulizer solution Inhale 1 vial into the lungs 4 times daily    Historical Provider, MD   diphenhydrAMINE (BENADRYL) 25 MG capsule Take 1 capsule by mouth every 6 hours as needed for Itching 3/29/19   Emily Talavera DO   EPINEPHrine (EPIPEN 2-HELDER) 0.3 MG/0.3ML SOAJ injection Inject 0.3 mLs into the muscle once for 1 dose Use as directed for allergic reaction 3/29/19 6/25/20  Emily Talavera DO   COMBIVENT RESPIMAT  MCG/ACT AERS inhaler Indications: uses PRN  9/15/18   Historical Provider, MD   montelukast (SINGULAIR) 10 MG tablet Take 1 tablet by mouth nightly 9/20/18   Evonne Pickard MD        Allergies:     Nsaids;  Toradol [ketorolac tromethamine]; and Ultram [tramadol]    Social History:     Tobacco:    reports rhythm. Pulses: Normal pulses. Heart sounds: Normal heart sounds. Pulmonary:      Effort: Pulmonary effort is normal.   Abdominal:      General: Abdomen is flat. There is no distension. Palpations: Abdomen is soft. Tenderness: There is abdominal tenderness in the right lower quadrant, suprapubic area and left lower quadrant. There is no guarding. Musculoskeletal: Normal range of motion. Skin:     General: Skin is warm. Neurological:      Mental Status: She is alert and oriented to person, place, and time.          Investigations:     Laboratory Testing:  Recent Results (from the past 24 hour(s))   CBC Auto Differential    Collection Time: 07/22/20  3:45 PM   Result Value Ref Range    WBC 21.6 (H) 3.5 - 11.0 k/uL    RBC 5.32 (H) 4.0 - 5.2 m/uL    Hemoglobin 13.6 12.0 - 16.0 g/dL    Hematocrit 43.0 36 - 46 %    MCV 80.8 80 - 100 fL    MCH 25.5 (L) 26 - 34 pg    MCHC 31.6 31 - 37 g/dL    RDW 16.5 (H) 11.5 - 14.9 %    Platelets 705 066 - 859 k/uL    MPV 8.4 6.0 - 12.0 fL    NRBC Automated NOT REPORTED per 100 WBC    Differential Type NOT REPORTED     Immature Granulocytes NOT REPORTED 0 %    Absolute Immature Granulocyte NOT REPORTED 0.00 - 0.30 k/uL    WBC Morphology NOT REPORTED     RBC Morphology NOT REPORTED     Platelet Estimate NOT REPORTED     Seg Neutrophils 91 (H) 36 - 66 %    Lymphocytes 5 (L) 24 - 44 %    Monocytes 3 1 - 7 %    Eosinophils % 0 0 - 4 %    Basophils 0 0 - 2 %    Bands 1 0 - 10 %    Segs Absolute 19.65 (H) 1.3 - 9.1 k/uL    Absolute Lymph # 1.08 1.0 - 4.8 k/uL    Absolute Mono # 0.65 0.1 - 1.3 k/uL    Absolute Eos # 0.00 0.0 - 0.4 k/uL    Basophils Absolute 0.00 0.0 - 0.2 k/uL    Absolute Bands # 0.22 0.0 - 1.0 k/uL    Morphology ANISOCYTOSIS PRESENT     Morphology 1+ TEARDROPS     Morphology 1+ ELLIPTOCYTES    Comprehensive Metabolic Panel    Collection Time: 07/22/20  3:45 PM   Result Value Ref Range    Glucose 94 70 - 99 mg/dL    BUN 13 6 - 20 mg/dL    CREATININE 0. 70 0.50 - 0.90 mg/dL    Bun/Cre Ratio NOT REPORTED 9 - 20    Calcium 8.8 8.6 - 10.4 mg/dL    Sodium 136 135 - 144 mmol/L    Potassium 4.3 3.7 - 5.3 mmol/L    Chloride 100 98 - 107 mmol/L    CO2 23 20 - 31 mmol/L    Anion Gap 13 9 - 17 mmol/L    Alkaline Phosphatase 73 35 - 104 U/L    ALT 13 5 - 33 U/L    AST 12 <32 U/L    Total Bilirubin 0.77 0.3 - 1.2 mg/dL    Total Protein 7.0 6.4 - 8.3 g/dL    Alb 4.1 3.5 - 5.2 g/dL    Albumin/Globulin Ratio NOT REPORTED 1.0 - 2.5    GFR Non-African American >60 >60 mL/min    GFR African American >60 >60 mL/min    GFR Comment          GFR Staging NOT REPORTED    Lipase    Collection Time: 07/22/20  3:45 PM   Result Value Ref Range    Lipase 18 13 - 60 U/L   Lactic Acid    Collection Time: 07/22/20  3:45 PM   Result Value Ref Range    Lactic Acid 1.0 0.5 - 2.2 mmol/L   Urinalysis Reflex to Culture    Collection Time: 07/22/20  4:05 PM    Specimen: Urine, clean catch   Result Value Ref Range    Color, UA YELLOW YELLOW    Turbidity UA CLEAR CLEAR    Glucose, Ur NEGATIVE NEGATIVE    Bilirubin Urine NEGATIVE NEGATIVE    Ketones, Urine NEGATIVE NEGATIVE    Specific Gravity, UA 1.020 1.000 - 1.030    Urine Hgb NEGATIVE NEGATIVE    pH, UA 6.5 5.0 - 8.0    Protein, UA NEGATIVE NEGATIVE    Urobilinogen, Urine Normal Normal    Nitrite, Urine NEGATIVE NEGATIVE    Leukocyte Esterase, Urine NEGATIVE NEGATIVE    Urinalysis Comments       Microscopic exam not performed based on chemical results unless requested in original order.        Imaging/Diagnostics:  Xr Chest Standard (2 Vw)    Result Date: 7/7/2020  EXAMINATION: TWO XRAY VIEWS OF THE CHEST 7/7/2020 12:27 pm COMPARISON: 03/14/2020 HISTORY: ORDERING SYSTEM PROVIDED HISTORY: Pneumonia due to infectious organism, unspecified laterality, unspecified part of lung TECHNOLOGIST PROVIDED HISTORY: surgery clearance, pneumonia Reason for Exam: preop clearance, pneumonia Acuity: Acute Type of Exam: Initial FINDINGS: The lungs are without acute GI/Bowel: No significant dilation of small bowel loops to suggest small bowel obstruction. Normal gas-filled appendix. Mild stool burden. Pelvis: Urinary bladder is collapsed. No inguinal or pelvic sidewall lymphadenopathy. No free fluid in the pelvis. Uterus and adnexa grossly unremarkable. Peritoneum/Retroperitoneum: Abdominal aorta normal in appearance and caliber. No retroperitoneal lymphadenopathy. Psoas muscles normal in size and symmetric in appearance. Prominent portacaval lymph nodes. Bones/Soft Tissues: Mild diffuse degenerative changes throughout the spine. Tiny midline fat-containing periumbilical hernia. 1. No acute intra-abdominal or intrapelvic abnormality identified by CT. 2. Mild bibasilar atelectasis and respiratory motion. Tree-in-bud opacities at the lung bases likely representing inflammation or infection to include ENOC or sequela of aspiration. Follow-up is recommended to document resolution. 3. Tiny midline fat-containing periumbilical hernia. 4. Mild wall thickening of the distal esophagus, stable. Consider evaluation with EGD. 5. Small hiatal hernia. 6. 5 mm nonobstructing midpole left renal calculus. Xr Chest Portable    Result Date: 7/22/2020  EXAMINATION: ONE XRAY VIEW OF THE CHEST 7/22/2020 4:31 pm COMPARISON: 07/17/2020 and 07/07/2020 HISTORY: Reason for Exam: Chest pains Acuity: Acute Type of Exam: Initial FINDINGS: The lungs are without acute focal process. No effusion or pneumothorax. The cardiomediastinal silhouette is normal.  The osseous structures are intact without acute process. Lower cervical spine hardware. Unremarkable chest.     Xr Chest Portable    Result Date: 7/17/2020  EXAMINATION: ONE XRAY VIEW OF THE CHEST 7/17/2020 7:25 pm COMPARISON: July 7, 2020 HISTORY: ORDERING SYSTEM PROVIDED HISTORY: Wheezing TECHNOLOGIST PROVIDED HISTORY: Wheezing Reason for Exam: Wheezing Acuity: Acute Type of Exam: Initial FINDINGS: Lungs are clear. No cardiomegaly. treat    Prediabetes  -Low-dose correction insulin  -POCT glucose 4 times daily  -Hypoglycemia treatment protocol      DVT prophylaxis: Lovenox 40 mg daily  GI prophylaxis: Pepcid 20 mg twice daily  Discharge planning:  consulted  PT/OT    Disposition: Home    Consultations:   IP CONSULT TO INTERNAL MEDICINE  IP CONSULT TO GENERAL SURGERY  IP CONSULT TO OB GYN  IP CONSULT TO SOCIAL WORK  IP CONSULT TO INFECTIOUS DISEASES    Patient is admitted as inpatient status because of co-morbiditieslisted above, severity of signs and symptoms as outlined, requirement for current medical therapies and most importantly because of direct risk to patient if care not provided in a hospital setting. Mirna Echevarria MD  7/22/2020  6:53 PM    Copy sent to Dr. Coretta Villatoro, APRN - CNP  Attending Physician Statement    I have discussed the case of Sylvia Edmond, including pertinent history and exam findings with the resident. I have seen and examined the patient and the key elements of the encounter have been performed by me. I agree with the assessment, plan, and orders as documented by the resident.   Therapy of acute colitis and diverticulitis   Electronically signed by Kortney Cope MD on 7/23/2020 at 1:58 PM

## 2020-07-22 NOTE — PROGRESS NOTES
Consultation discussed with ER physician. Patient admitted with significant leukocytosis pelvic abscesses. I believe the inflammation in the colon is secondary to recent surgical procedure which was robotic laparoscopic hysterectomy with lysis of adhesions and bilateral salpingectomy on July 8. I have asked emergency room physician to consult GYN service first.  I will also recommend consulting infectious disease. I will follow with the rest of the consultants.

## 2020-07-22 NOTE — ED NOTES
Report called to med- surge nurse Martin Can. All questions were answered by this RN. I asked receiving nurse if he had any further questions, and answered them.      Sarina Garcia RN  07/22/20 [id number]

## 2020-07-22 NOTE — ED NOTES
Patient states she is hungry and wants a cheeseburger and fries. This RN informed patient since she has a bed ready we will have to check their diet status for her. Patient states she needs to eat because she hasn't eaten all day. Patient states she can only have a cheeseburger and fries. This RN informed patient she won't be able to have that because our cafeteria is closed at this time. Patient began mumbling under her breath. This RN apologized and exited room.       Khadijah Reinoso RN  07/22/20 [id number]

## 2020-07-22 NOTE — ED PROVIDER NOTES
EMERGENCY DEPARTMENT ENCOUNTER    Pt Name: Ernestina Maxwell  MRN: [id number]  [place] 1980  Date of evaluation: 7/22/20  CHIEF COMPLAINT       Chief Complaint   Patient presents with    Abdominal Pain     HISTORY OF PRESENT ILLNESS   The pt presents for evaluation of abd pain and fever. Pt has been having abd pains. She recently had a hysterectomy and salpingectomy for chronic pelvic pain. Pt has had pain since the surgery. She has had several evaluations since that time, both in the er and as outpt with her surgeon. She returned to her gyn today, but had a fever and was directed to the ER. Pt does report some coughing. No recent covid tests. Although no known exposures, pt has had extensive hospital exposures during the last month. The pain is lower, midline abd pain. The history is provided by the patient. Abdominal Pain   Pain location: lower abd. Pain quality: sharp    Pain radiates to:  Does not radiate  Pain severity:  Moderate  Onset quality:  Unable to specify  Timing:  Constant  Progression:  Unchanged  Chronicity:  Chronic  Relieved by:  Nothing  Worsened by:  Nothing  Ineffective treatments:  None tried  Associated symptoms: chills and fever    Associated symptoms: no chest pain and no shortness of breath        REVIEW OF SYSTEMS     Review of Systems   Constitutional: Positive for chills and fever. HENT: Negative. Negative for congestion. Eyes: Negative. Respiratory: Positive for choking. Negative for shortness of breath. Cardiovascular: Negative. Negative for chest pain. Gastrointestinal: Positive for abdominal pain. Genitourinary: Negative. Musculoskeletal: Negative. Negative for back pain. Skin: Negative. Negative for rash. Neurological: Negative. Negative for headaches. All other systems reviewed and are negative.     PASTMEDICAL HISTORY     Past Medical History:   Diagnosis Date    Anxiety     Arthritis     OA    Asthma     Sadler's esophagus LONG SEGMENT    Bipolar 1 disorder (HCC)     CAD (coronary artery disease)     Chronic insomnia     COPD (chronic obstructive pulmonary disease) (HCC)     Depression     and bipolar    Diabetes mellitus ([address]     prediabetic    Endometriosis 3/9/2020    Esophagitis     severe    GERD (gastroesophageal reflux disease)     Headache(784.0)     Herniated disc, cervical     Hiatal hernia     Incisional abscess 1/15/2019    Kidney stones     MDRO (multiple drug resistant organisms) resistance     mrsa    Pleurisy     hx of \"years ago\"    Pneumonia     past penumonia    Seizures [facility]     2016 last seizure-focal seizure    UTI (urinary tract infection)     Vision abnormalities     wears glasses     Past Problem List  Patient Active Problem List   Diagnosis Code    Asthma J45.909    GERD K21.9    Iron deficiency anemia D50.9    Cervical disc herniation M50.20    Smoker F17.200    Sadler's esophagus without dysplasia K22.70    Hiatal hernia K44.9    COPD J44.9    Chronic constipation K59.09    Hx of IUFD (G2) O09.299    History of gestational hypertension Z87.59    Seizure ([facility] R56.9    Hx of  x2 (G3, G4) Z98.891    Arthritis M19.90    Cervical radiculopathy M54.12    Migraine without aura G43.009    Spinal stenosis in cervical region M48.02    Prediabetes R73.03    Chronic pelvic pain in female R10.2, G89.29    Enlarged uterus N85.2    Endometriosis N80.9    Family history of breast cancer Z80.3    Bipolar affective disorder, currently depressed, moderate ([address] F31.32    Insomnia G47.00    Ulnar neuropathy G56.20    Major depressive disorder, recurrent episode ([address] F33.9    Sciatica M54.30    Moderate persistent asthma without complication J24.21    Lipoma of torso D17.1    History of cervical discectomy Z98.890    Chronic neck pain with abnormal neurologic examination M54.2, G89.28    Abnormal weight gain  R63.5    Class 2 drug-induced obesity with serious comorbidity and body mass index (BMI) of 37.0 to 37.9 in adult E66.1, Z68.37    Pelvic peritoneal endometriosis N80.3    RALH with BS and cystoscopy 2020 Z90.710    Post-op pain G89.18    Wound infection/hemorrhage, obstetric surgical, postpartum condition O90.9    Postoperative visit Z48.89    Postoperative abdominal pain R10.9, G89.18    Diverticulitis K57.92     SURGICAL HISTORY       Past Surgical History:   Procedure Laterality Date    CERVICAL DISC SURGERY      x2     SECTION  , 2018    x 2     SECTION N/A 2018     SECTION performed by Rigoberto Good MD at [address] [facility] L&D [facility] N/A 10/1/2019    COLONOSCOPY DIAGNOSTIC ABORTED performed by Irma Zamora MD at [address] [facility] N/A 2020    COLONOSCOPY WITH BIOPSY performed by Irma Zamora MD at [address] [place] [address], COLON, DIAGNOSTIC      HYSTERECTOMY N/A 2020    HYSTERECTOMY ABDOMINAL LAPAROSCOPIC ROBOTIC XI W/ CYSTOSCOPY performed by Rigoberto Good MD at [address] [facility] ARTHROSCOPY Left 5/20/15    KNEE SURGERY Left     x3    LAPAROSCOPY      dr Nieves Cadet N/A 10/5/2017    LAPAROSCOPIC REMOVAL INTRA ABDOMINAL LIPOMA LOWER RIGHT performed by Gianni Garner DO at [address] ESOPHAGOGASTRODUODENOSCOPY TRANSORAL DIAGNOSTIC N/A 3/2/2017    EGD ESOPHAGOGASTRODUODENOSCOPY  IP  performed by Daniella Hoffman MD at [address] [facility]  2016    severe esophagitis    UPPER GASTROINTESTINAL ENDOSCOPY  2017    barretts; hiatus hernia; gastritis    UPPER GASTROINTESTINAL ENDOSCOPY  2017    long seg mullins's with linear erosions, esophagitis, small hiatal hernia    UPPER GASTROINTESTINAL ENDOSCOPY N/A 2018    MULLINS'S    UPPER GASTROINTESTINAL ENDOSCOPY N/A 10/1/2019    EGD BIOPSY performed by Irma Zamora MD at [facility]. [address]       Current Discharge Medication List      CONTINUE these medications which have NOT CHANGED    Details   QUEtiapine (SEROQUEL) 50 MG tablet Take 50 mg by mouth nightly      lamoTRIgine (LAMICTAL) 25 MG tablet Take 75 mg by mouth daily       docusate sodium (COLACE) 100 MG capsule Take 1 capsule by mouth 2 times daily  Qty: 60 capsule, Refills: 0      pregabalin (LYRICA) 75 MG capsule TAKE 1 CAPSULE BY MOUTH 3 TIMES DAILY  Qty: 90 capsule, Refills: 0    Associated Diagnoses: Spinal stenosis in cervical region; Intractable migraine without aura and without status migrainosus; Chronic neck pain with abnormal neurologic examination      BREO ELLIPTA 200-25 MCG/INH AEPB inhaler INHALE ONE PUFF BY MOUTH ONCE A DAY ONCE A DAY INHALATION 30  Qty: 60 each, Refills: 1      LINZESS 290 MCG CAPS capsule TAKE 1 CAPSULE BY MOUTH EVERY MORNING (BEFORE BREAKFAST)  Qty: 30 capsule, Refills: 3    Associated Diagnoses: Other irritable bowel syndrome      acetaminophen (TYLENOL) 500 MG tablet Take 500 mg by mouth every 6 hours as needed for Pain      vitamin D (CHOLECALCIFEROL) 25 MCG (1000 UT) TABS tablet Take 1 tablet by mouth daily  Qty: 30 tablet, Refills: 3    Associated Diagnoses: Vitamin D deficiency      metFORMIN (GLUCOPHAGE) 500 MG tablet Take 1 tablet by mouth 2 times daily (with meals) Take 1 tablet daily for the first 7 days.  Then BID  Qty: 60 tablet, Refills: 3    Associated Diagnoses: Prediabetes      pantoprazole (PROTONIX) 40 MG tablet TAKE ONE TABLET TWICE A DAY ORALLY 30 DAY(S)  Qty: 90 tablet, Refills: 3      ipratropium-albuterol (DUONEB) 0.5-2.5 (3) MG/3ML SOLN nebulizer solution Inhale 1 vial into the lungs 4 times daily      EPINEPHrine (EPIPEN 2-HELDER) 0.3 MG/0.3ML SOAJ injection Inject 0.3 mLs into the muscle once for 1 dose Use as directed for allergic reaction  Qty: 2 each, Refills: 0      COMBIVENT RESPIMAT  MCG/ACT AERS inhaler Inhale 1 puff into the lungs every 6 hours as needed   Refills: 6      montelukast (SINGULAIR) 10 MG tablet Take 1 tablet by mouth nightly  Qty: 30 tablet, Refills: 3      !! Masks (CLEVER CHOICE FACE MASK) MISC USE NEW FACE MASK EVERYDAY WHEN PATIENT GO OUTSIDE OF HOME DUE TO COVID PANDEMIC  Qty: 90 each, Refills: 1      !! Masks (CLEVER CHOICE FACE MASK) MISC USE NEW FACE MASK EVERYDAY WHEN PATIENT GO OUTSIDE OF HOME DUE TO COVID PANDEMIC  Qty: 90 each, Refills: 2      MISC NATURAL PRODUCT OP Take 2 tablets by mouth 2 times daily hydroxycut max-weight loss supplement      blood glucose monitor strips Test 2-3 times a day & as needed for symptoms of irregular blood glucose. BRAND OF CHOICE INSURANCE ALLOWS.  Qty: 100 strip, Refills: 11    Associated Diagnoses: Prediabetes      Lancets MISC 1 each by Does not apply route 2 times daily  Qty: 300 each, Refills: 11    Associated Diagnoses: Prediabetes      Alcohol Swabs (ALCOHOL PREP) PADS Use as directed  Qty: 100 each, Refills: 11    Associated Diagnoses: Prediabetes       ! ! - Potential duplicate medications found. Please discuss with provider. ALLERGIES     is allergic to nsaids; toradol [ketorolac tromethamine]; and ultram [tramadol]. FAMILY HISTORY     She indicated that the status of her mother is unknown. She indicated that the status of her father is unknown. She indicated that the status of her brother is unknown. SOCIAL HISTORY       Social History     Tobacco Use    Smoking status: Current Every Day Smoker     Packs/day: 1.00     Years: 21.00     Pack years: 21.00     Types: Cigarettes    Smokeless tobacco: Never Used   Substance Use Topics    Alcohol use: No    Drug use: No     PHYSICAL EXAM     INITIAL VITALS: BP (!) 109/90   Pulse 99   Temp 98.9 °F (37.2 °C) (Oral)   Resp 22   Ht 5' 2\" (1.575 m)   Wt 206 lb (93.4 kg)   LMP 06/15/2020   SpO2 100%   BMI 37.68 kg/m²    Physical Exam  Vitals signs and nursing note reviewed. Constitutional:       Appearance: Normal appearance.    HENT:      Head: Normocephalic and atraumatic. Nose: No congestion. Mouth/Throat:      Mouth: Mucous membranes are moist.   Eyes:      Conjunctiva/sclera: Conjunctivae normal.   Neck:      Musculoskeletal: Normal range of motion and neck supple. Cardiovascular:      Rate and Rhythm: Normal rate and regular rhythm. Heart sounds: No murmur. Pulmonary:      Effort: Pulmonary effort is normal.   Abdominal:      Palpations: Abdomen is soft. Tenderness: There is abdominal tenderness. There is no guarding or rebound. Comments: Incisions are well healed, no evidence of infectious changes. Musculoskeletal:         General: No signs of injury. Skin:     General: Skin is warm and dry. Capillary Refill: Capillary refill takes less than 2 seconds. Findings: No rash. Neurological:      General: No focal deficit present. Mental Status: She is alert and oriented to person, place, and time. MEDICAL DECISION MAKING:     Reviewed results. Ct demonstrates colitis/diverticulitis. Elevated leukocytosis. Discussed with Dr. Zeny Craft, Dr. José Hernadez and Dr. Stephy Carmen from radiology. Per Dr. José Hernadez, pt was started on meropenem. Fluids and pain medications provided as well. Also discussed with ob/gyn as recommended. Pt is admitted at this time. On final reeval, pt appears unchanged, nontoxic. Pt is ready for admission at this time. CRITICAL CARE:       PROCEDURES:    Procedures    DIAGNOSTIC RESULTS   EKG:All EKG's are interpreted by the Emergency Department Physician who either signs or Co-signs this chart in the absence of a cardiologist.        RADIOLOGY:All plain film, CT, MRI, and formal ultrasound images (except ED bedside ultrasound) are read by the radiologist, see reports below, unless otherwisenoted in MDM or here. CT ABDOMEN PELVIS W IV CONTRAST Additional Contrast? None   Final Result   1.   Findings compatible with acute colitis/diverticulitis of the rectosigmoid   colon with multiple pericolonic fluid collections consistent with complex   phlegmons and abscesses. 2.  Apparent appendicolith in the appendix. No acute appendicitis. Critical results were called by Dr. Nam Price MD to [facility] on   7/22/2020 at 17:16. XR CHEST PORTABLE   Final Result   Unremarkable chest.           LABS: All lab results were reviewed by myself, and all abnormals are listed below.   Labs Reviewed   CBC WITH AUTO DIFFERENTIAL - Abnormal; Notable for the following components:       Result Value    WBC 21.6 (*)     RBC 5.32 (*)     MCH 25.5 (*)     RDW 16.5 (*)     Seg Neutrophils 91 (*)     Lymphocytes 5 (*)     Segs Absolute 19.65 (*)     All other components within normal limits   CULTURE, BLOOD 1   CULTURE, BLOOD 1   COMPREHENSIVE METABOLIC PANEL   LIPASE   URINE RT REFLEX TO CULTURE   LACTIC ACID   LACTIC ACID   BASIC METABOLIC PANEL W/ REFLEX TO MG FOR LOW K   CBC WITH AUTO DIFFERENTIAL   POCT GLUCOSE   POCT GLUCOSE       EMERGENCY DEPARTMENTCOURSE:         Vitals:    Vitals:    07/22/20 1815 07/22/20 1830 07/22/20 1845 07/22/20 1946   BP: (!) 140/73 (!) 127/109 (!) 133/96 (!) 109/90   Pulse: 98  109 99   Resp: 18 22 22   Temp: 98.8 °F (37.1 °C)   98.9 °F (37.2 °C)   TempSrc: Oral   Oral   SpO2: 97% 98% 98% 100%   Weight:       Height:           The patient was given the following medications while in the emergency department:  Orders Placed This Encounter   Medications    0.9 % sodium chloride bolus    acetaminophen (TYLENOL) tablet 650 mg    0.9 % sodium chloride bolus    DISCONTD: diphenhydrAMINE (BENADRYL) injection 50 mg    diphenhydrAMINE (BENADRYL) tablet 50 mg    morphine sulfate (PF) injection 4 mg    sodium chloride flush 0.9 % injection 10 mL    0.9 % sodium chloride bolus    ioversol (OPTIRAY) 74 % injection 75 mL    DISCONTD: ciprofloxacin (CIPRO) IVPB 400 mg    DISCONTD: metronidazole (FLAGYL) 500 mg in NaCl 100 mL IVPB premix    morphine sulfate (PF) injection 4 mg    aluminum & magnesium hydroxide-simethicone (MAALOX) 200-200-20 MG/5ML suspension 30 mL    meropenem (MERREM) 1 g in sodium chloride 0.9 % 100 mL IVPB (mini-bag)    lamoTRIgine (LAMICTAL) tablet 25 mg    montelukast (SINGULAIR) tablet 10 mg    sodium chloride flush 0.9 % injection 10 mL    sodium chloride flush 0.9 % injection 10 mL    OR Linked Order Group     acetaminophen (TYLENOL) tablet 650 mg     acetaminophen (TYLENOL) suppository 650 mg    polyethylene glycol (GLYCOLAX) packet 17 g    OR Linked Order Group     promethazine (PHENERGAN) tablet 12.5 mg     ondansetron (ZOFRAN) injection 4 mg    enoxaparin (LOVENOX) injection 40 mg    OR Linked Order Group     potassium chloride (KLOR-CON M) extended release tablet 40 mEq     potassium bicarb-citric acid (EFFER-K) effervescent tablet 40 mEq     potassium chloride 10 mEq/100 mL IVPB (Peripheral Line)    magnesium sulfate 1 g in dextrose 5% 100 mL IVPB    famotidine (PEPCID) tablet 20 mg    pregabalin (LYRICA) capsule 75 mg    ipratropium-albuterol (DUONEB) nebulizer solution 1 ampule    glucose (GLUTOSE) 40 % oral gel 15 g    dextrose 50 % IV solution    glucagon (rDNA) injection 1 mg    dextrose 5 % solution    insulin lispro (HUMALOG) injection vial 0-6 Units    insulin lispro (HUMALOG) injection vial 0-3 Units    ciprofloxacin (CIPRO) IVPB 400 mg    metronidazole (FLAGYL) 500 mg in NaCl 100 mL IVPB premix    morphine sulfate (PF) injection 2 mg     CONSULTS:  IP CONSULT TO INTERNAL MEDICINE  IP CONSULT TO GENERAL SURGERY  IP CONSULT TO OB GYN  IP CONSULT TO SOCIAL WORK  IP CONSULT TO INFECTIOUS DISEASES    FINAL IMPRESSION      1.  Diverticulitis of colon          DISPOSITION/PLAN   DISPOSITION Admitted 07/22/2020 05:56:11 PM      PATIENT REFERRED TO:  KATIE Fulton - CNP  [facility] [address]  [address]          DISCHARGE MEDICATIONS:  Current Discharge Medication List        REN ALEXANDRA

## 2020-07-22 NOTE — ED NOTES
Pt. Requested to be cathed again this RN informed her that the more we cathed her the more it opened her up for risk of infection pt. Agreed to ambulate to bathroom.      Franklyn Lizarraga RN  07/22/20 5911

## 2020-07-22 NOTE — PROGRESS NOTES
Medication History completed:    New medications: quetiapine    Medications discontinued: chlorhexidine solution, dicyclomine, diphenhydramine, ondansetron, simethicone    Changes to dosing:   Lamotrigine changed to 75 mg (3 tablets) daily    Stated allergies: As listed    Other pertinent information: Medications confirmed with Jf Brown.      Thank you,  Jamey Mccray, PharmD, BCPS  [phone]

## 2020-07-22 NOTE — CONSULTS
[address]    Patient Name: Carie Huynh     Patient : 1980  Room/Bed: E/E  Admission Date/Time: 2020  3:13 PM  Primary Care Physician: KATIE Jane CNP    Consulting Provider: Dr. Barry Mann  Reason for Consult: Recent VIVEK, BS, cystoscopy in the setting of colitis/diverticulitis with abscess formation on CT Abdomen    CC:   Chief Complaint   Patient presents with    Abdominal Pain                HPI: Carie Huynh is a 47 y.o. female [id number] who is POD#14 s/p Robotic assisted Laparoscopic Hysterectomy with Bilateral Salpingectomy with Cystoscopy on 20 secondary to enlarged uterus with chronic pelvic pain and history of endometriosis presents with persistent abdominal pain that started since her surgery and has exponentially worsened especially today. She has been taking percocet as needed and it has not helped her pain. She states the pain is in her lower abdomen that does not radiate and she describes it as a pressure with intermittent episodes of sharp stabbing pain. She has never had this pain before. She is passing flatus and has been having daily bowel movements that have been soft and normal without hematochezia/melena or hard stools. She has been urinating without any problems but states it feels like she has a UTI as she feels pressure especially when she has a bowel movement or urinates. She denies any dysuria, fever, chills, CP, SOB, N/V/D, recent sick contacts. She also denies any vaginal bleeding, foul smelling vaginal discharge. She reports she has had nothing in the vagina since the surgery but admits to going swimming yesterday as her abdominal pain was slightly improved with lyrica. ED course: Pt was found to have a fever of 100.3 and a leukocytosis of 21.6. CMP, lipase, lactic acid were WNL. CXR was WNL. UA with low suspicion for UTI. Blood cultures x2 were drawn.  CT abdomen showing acute colitis/diverticulitis of rectosigmoid colon with multiple pericolonic abscesses/phlegmons with appendicolith but no evidence of appendicitis. Pt was given 2L IVF NS boluses, Tylenol 650mg 1, benadryl 50mg IV x1, morphine 4mg IV x1. Pt was started on meropenem 1g IV x1. IM admitted the patient and General surgery was consulted.       REVIEW OF SYSTEMS:   Constitutional: negative fever, negative chills, negative weight changes   HEENT: negative visual disturbances, negative headaches, negative dizziness  Breast: negative breast abnormalities, negative breast lumps, negative nipple discharge  Respiratory: negative dyspnea, negative cough, negative SOB  Cardiovascular: negative chest pain,  negative palpitations, negative arrhythmia, negative syncope   Gastrointestinal: positive abdominal pain, negative RUQ pain, negative N/V, negative diarrhea, negative constipation, negative bowel changes  Genitourinary: negative dysuria, negative hematuria, negative urinary incontinence, negative vaginal discharge, negative vaginal bleeding  Dermatological: negative rash, negative pruritis, negative mole or other skin changes  Hematologic: negative bruising, negative personal/family history of DVT/PE  Immunologic/Lymphatic: negative recent illness, negative recent sick contact  Musculoskeletal: negative back pain, negative myalgias, negative arthralgias  Neurological:  negative dizziness, negative migraines, negative seizures, negative weakness  Behavior/Psych: negative depression, negative anxiety, negative SI, negative HI    _______________________________________________________________________      OBSTETRICAL HISTORY:   OB History    Para Term  AB Living   4 3 2 1 1 2   SAB TAB Ectopic Molar Multiple Live Births   1 0 0 0 0 2      # Outcome Date GA Lbr Dc/2nd Weight Sex Delivery Anes PTL Lv   4 Term 18 37w3d  6 lb 11.2 oz (3.04 kg) F CS-LTranv Spinal N THONG      Name: Amber Paula: 8  Apgar5: 10   3 Term 13 38w0d   M CS-Unspec EPI, Spinal  THONG   2   27w0d    Vag-Spont   FD   1 SAB 09 8w0d             Obstetric Comments   FOB1   FOB2   FOB3   FOB4       PAST MEDICAL HISTORY:   has a past medical history of Anxiety, Arthritis, Asthma, Sadler's esophagus, Bipolar 1 disorder ([address] CAD (coronary artery disease), Chronic insomnia, COPD (chronic obstructive pulmonary disease) ([facility] [address] Depression, Diabetes mellitus ([facility] [address] Endometriosis, Esophagitis, GERD (gastroesophageal reflux disease), Headache(784.0), Herniated disc, cervical, Hiatal hernia, Incisional abscess, Kidney stones, MDRO (multiple drug resistant organisms) resistance, Pleurisy, Pneumonia, Seizures ([address] UTI (urinary tract infection), and Vision abnormalities. PAST SURGICAL HISTORY:   has a past surgical history that includes knee surgery (Left);  section (, ); Tonsillectomy; Knee arthroscopy (Left, 5/20/15); Cervical disc surgery; Upper gastrointestinal endoscopy (2016); Upper gastrointestinal endoscopy (2017); Upper gastrointestinal endoscopy (2017); pr esophagogastroduodenoscopy transoral diagnostic (N/A, 3/2/2017); laparoscopy; laparoscopy (N/A, 10/5/2017); Upper gastrointestinal endoscopy (N/A, 2018); Endoscopy, colon, diagnostic;  section (N/A, 2018); Upper gastrointestinal endoscopy (N/A, 10/1/2019); Colonoscopy (N/A, 10/1/2019); Colonoscopy (N/A, 2020); [place] tooth extraction; and Hysterectomy (N/A, 2020).     ALLERGIES:  Allergies as of 2020 - Review Complete 2020   Allergen Reaction Noted    Nsaids  2017    Toradol [ketorolac tromethamine]  2016    Ultram [tramadol] Nausea Only 2013       MEDICATIONS:  Current Facility-Administered Medications   Medication Dose Route Frequency Provider Last Rate Last Dose    0.9 % sodium chloride bolus  1,000 mL Intravenous Once Gabrielle Ngo MD 2,000 mL/hr at 20 1733 1,000 mL at 20 1733    sodium chloride flush 0.9 % injection 10 mL  10 mL Intravenous PRN Yaima Echevarria MD   10 mL at 07/22/20 1709    morphine sulfate (PF) injection 4 mg  4 mg Intravenous Once Yaima Echevarria MD        aluminum & magnesium hydroxide-simethicone (MAALOX) 200-200-20 MG/5ML suspension 30 mL  30 mL Oral Once Yaima Echevarria MD        meropenem [facility]) 1 g in sodium chloride 0.9 % 100 mL IVPB (mini-bag)  1 g Intravenous Once Yaima Echevarria MD         Current Outpatient Medications   Medication Sig Dispense Refill    lamoTRIgine (LAMICTAL) 25 MG tablet Take 25 mg by mouth 2 times daily      ondansetron (ZOFRAN-ODT) 4 MG disintegrating tablet Take 1 tablet by mouth every 8 hours as needed for Nausea or Vomiting 21 tablet 0    docusate sodium (COLACE) 100 MG capsule Take 1 capsule by mouth 2 times daily 60 capsule 0    simethicone (MYLICON) 80 MG chewable tablet Take 1 tablet by mouth every 6 hours as needed for Flatulence 180 tablet 0    pregabalin (LYRICA) 75 MG capsule TAKE 1 CAPSULE BY MOUTH 3 TIMES DAILY 90 capsule 0    Masks (CLEVER CHOICE FACE MASK) MISC USE NEW FACE MASK EVERYDAY WHEN PATIENT GO OUTSIDE OF HOME DUE TO COVID PANDEMIC 90 each 1    Masks (CLEVER CHOICE FACE MASK) MISC USE NEW FACE MASK EVERYDAY WHEN PATIENT GO OUTSIDE OF HOME DUE TO COVID PANDEMIC 90 each 2    BREO ELLIPTA 200-25 MCG/INH AEPB inhaler INHALE ONE PUFF BY MOUTH ONCE A DAY ONCE A DAY INHALATION 30 60 each 1    dicyclomine (BENTYL) 10 MG capsule Take 1 capsule by mouth every 6 hours as needed (cramps) 20 capsule 0    LINZESS 290 MCG CAPS capsule TAKE 1 CAPSULE BY MOUTH EVERY MORNING (BEFORE BREAKFAST) 30 capsule 3    acetaminophen (TYLENOL) 500 MG tablet Take 500 mg by mouth every 6 hours as needed for Pain      MISC NATURAL PRODUCT OP Take 2 tablets by mouth 2 times daily hydroxycut max-weight loss supplement      vitamin D (CHOLECALCIFEROL) 25 MCG (1000 UT) TABS tablet Take 1 tablet by mouth daily 30 tablet 3    metFORMIN (GLUCOPHAGE) 500 MG tablet Take 1 tablet by mouth 2 times daily (with meals) Take 1 tablet daily for the first 7 days. Then BID 60 tablet 3    blood glucose monitor strips Test 2-3 times a day & as needed for symptoms of irregular blood glucose. BRAND OF CHOICE INSURANCE ALLOWS. 100 strip 11    Lancets MISC 1 each by Does not apply route 2 times daily 300 each 11    Alcohol Swabs (ALCOHOL PREP) PADS Use as directed 100 each 11    pantoprazole (PROTONIX) 40 MG tablet TAKE ONE TABLET TWICE A DAY ORALLY 30 DAY(S) 90 tablet 3    chlorhexidine (PERIDEX) 0.12 % solution RINSE WITH 15ML TWICE A DAY FOR 30 SECONDS      ipratropium-albuterol (DUONEB) 0.5-2.5 (3) MG/3ML SOLN nebulizer solution Inhale 1 vial into the lungs 4 times daily      diphenhydrAMINE (BENADRYL) 25 MG capsule Take 1 capsule by mouth every 6 hours as needed for Itching 30 capsule 0    EPINEPHrine (EPIPEN 2-HELDER) 0.3 MG/0.3ML SOAJ injection Inject 0.3 mLs into the muscle once for 1 dose Use as directed for allergic reaction 2 each 0    COMBIVENT RESPIMAT  MCG/ACT AERS inhaler Indications: uses PRN   6    montelukast (SINGULAIR) 10 MG tablet Take 1 tablet by mouth nightly 30 tablet 3       FAMILY HISTORY:  Family History of Breast, Ovarian, Colon or Uterine Cancer: Breast cancer in her mother  family history includes Bipolar Disorder in her brother and mother; Breast Cancer in her mother; Cancer in her mother; Hypertension in her father and mother. SOCIAL HISTORY:   reports that she has been smoking cigarettes. She has a 21.00 pack-year smoking history. She has never used smokeless tobacco. She reports that she does not drink alcohol or use drugs.     HEALTH MAINTENANCE:  Date of Last Mammogram: N/A  Date of Last Colonoscopy: N/A  Date of Last Bone Density: N/A  ________________________________________________________________________                                    VITALS:  Vitals:    07/22/20 1509 07/22/20 1511   BP:  (!) 146/73   Pulse:  116   Resp:  18   Temp: 100.3 °F (37.9 °C)    TempSrc: Oral    SpO2:  96%   Weight: 206 lb (93.4 kg)    Height: 5' 2\" (1.575 m)                                             INPUT/OUTPUT:  I/O this shift:  In: 1000 [IV Piggyback:1000]  Out: -   In: 1000   Out: -                                                                                                                                PHYSICAL EXAM:   General Appearance: Appears healthy. Alert; in no acute distress. Pleasant. Skin: Skin color, texture, turgor normal. No rashes or lesions. HEENT: Normocephalic and atraumatic, moist mucous membranes, trachea midline  Respiratory: Normal expansion. Clear to auscultation. No rales, rhonchi, or wheezing. Cardiovascular: Normal rate, regular rhythm, normal S1 and S2, no murmurs, rubs or gallops. Abdomen: Soft, Tender to palpation throughout on deep palpation,, no rebound, guarding, rigidity, non-distended, three 1cm port site incisions healing well with evidence of pink granulation tissue but no evidence of erythema/edema/drainage  Pelvic Exam:   Sterile Speculum Exam:   External genitalia: Normal hair distribution, normal appearing vulva, no masses, tenderness or lesions, normal clitoris, normal urethral meatus. Vagina: Normal appearing vaginal mucosa without lesions, physiologic appearing vaginal discharge noted in the posterior vault. Vaginal cuff partially visualized that is healing well with no evidence of erythema/edema/drainage/abscesses/areas of fluctuance. Pt did not tolerate exam well and so exam was limited. Cervix: surgically absent   Sterile Vaginal Exam: Patient did not tolerate exam well and requested for writer to stop mid-exam   Vagina: Normal vaginal mucosa, vaginal cuff was not able to be palpated due to patient intolerance to exam.   Rectal Exam: Exam declined by patient. Musculoskeletal: No joint swelling, deformity, or tenderness. , no gross abnormalities.   Extremities: Non-tender BLE and non-edematous. Psych: Oriented to time, place and person, pt is very anxious appearing. OMM EXAM:  The patient did not complain of a Chief complaint requiring OMM.   Chief Complaint:none  Structural Exam: No Interest    LAB RESULTS:    Results for orders placed or performed during the hospital encounter of 07/22/20   CBC Auto Differential   Result Value Ref Range    WBC 21.6 (H) 3.5 - 11.0 k/uL    RBC 5.32 (H) 4.0 - 5.2 m/uL    Hemoglobin 13.6 12.0 - 16.0 g/dL    Hematocrit 43.0 36 - 46 %    MCV 80.8 80 - 100 fL    MCH 25.5 (L) 26 - 34 pg    MCHC 31.6 31 - 37 g/dL    RDW 16.5 (H) 11.5 - 14.9 %    Platelets 220 382 - 082 k/uL    MPV 8.4 6.0 - 12.0 fL    NRBC Automated NOT REPORTED per 100 WBC    Differential Type NOT REPORTED     Immature Granulocytes NOT REPORTED 0 %    Absolute Immature Granulocyte NOT REPORTED 0.00 - 0.30 k/uL    WBC Morphology NOT REPORTED     RBC Morphology NOT REPORTED     Platelet Estimate NOT REPORTED     Seg Neutrophils 91 (H) 36 - 66 %    Lymphocytes 5 (L) 24 - 44 %    Monocytes 3 1 - 7 %    Eosinophils % 0 0 - 4 %    Basophils 0 0 - 2 %    Bands 1 0 - 10 %    Segs Absolute 19.65 (H) 1.3 - 9.1 k/uL    Absolute Lymph # 1.08 1.0 - 4.8 k/uL    Absolute Mono # 0.65 0.1 - 1.3 k/uL    Absolute Eos # 0.00 0.0 - 0.4 k/uL    Basophils Absolute 0.00 0.0 - 0.2 k/uL    Absolute Bands # 0.22 0.0 - 1.0 k/uL    Morphology ANISOCYTOSIS PRESENT     Morphology 1+ TEARDROPS     Morphology 1+ ELLIPTOCYTES    Comprehensive Metabolic Panel   Result Value Ref Range    Glucose 94 70 - 99 mg/dL    BUN 13 6 - 20 mg/dL    CREATININE 0.70 0.50 - 0.90 mg/dL    Bun/Cre Ratio NOT REPORTED 9 - 20    Calcium 8.8 8.6 - 10.4 mg/dL    Sodium 136 135 - 144 mmol/L    Potassium 4.3 3.7 - 5.3 mmol/L    Chloride 100 98 - 107 mmol/L    CO2 23 20 - 31 mmol/L    Anion Gap 13 9 - 17 mmol/L    Alkaline Phosphatase 73 35 - 104 U/L    ALT 13 5 - 33 U/L    AST 12 <32 U/L    Total Bilirubin 0.77 0.3 - 1.2 mg/dL Total Protein 7.0 6.4 - 8.3 g/dL    Alb 4.1 3.5 - 5.2 g/dL    Albumin/Globulin Ratio NOT REPORTED 1.0 - 2.5    GFR Non-African American >60 >60 mL/min    GFR African American >60 >60 mL/min    GFR Comment          GFR Staging NOT REPORTED    Lipase   Result Value Ref Range    Lipase 18 13 - 60 U/L   Urinalysis Reflex to Culture    Specimen: Urine, clean catch   Result Value Ref Range    Color, UA YELLOW YELLOW    Turbidity UA CLEAR CLEAR    Glucose, Ur NEGATIVE NEGATIVE    Bilirubin Urine NEGATIVE NEGATIVE    Ketones, Urine NEGATIVE NEGATIVE    Specific Gravity, UA 1.020 1.000 - 1.030    Urine Hgb NEGATIVE NEGATIVE    pH, UA 6.5 5.0 - 8.0    Protein, UA NEGATIVE NEGATIVE    Urobilinogen, Urine Normal Normal    Nitrite, Urine NEGATIVE NEGATIVE    Leukocyte Esterase, Urine NEGATIVE NEGATIVE    Urinalysis Comments       Microscopic exam not performed based on chemical results unless requested in original order. Lactic Acid   Result Value Ref Range    Lactic Acid 1.0 0.5 - 2.2 mmol/L         DIAGNOSTICS:  Xr Chest Standard (2 Vw)  Result Date: 7/7/2020  EXAMINATION: TWO XRAY VIEWS OF THE CHEST 7/7/2020 12:27 pm COMPARISON: 03/14/2020 HISTORY: ORDERING SYSTEM PROVIDED HISTORY: Pneumonia due to infectious organism, unspecified laterality, unspecified part of lung TECHNOLOGIST PROVIDED HISTORY: surgery clearance, pneumonia Reason for Exam: preop clearance, pneumonia Acuity: Acute Type of Exam: Initial FINDINGS: The lungs are without acute focal process. No effusion or pneumothorax. The cardiomediastinal silhouette is normal.  The osseous structures are intact without acute process. Cervical spine hardware is redemonstrated.  Negative chest.     Ct Abdomen Pelvis W Iv Contrast Additional Contrast? None  Result Date: 7/22/2020  EXAMINATION: CT OF THE ABDOMEN AND PELVIS WITH CONTRAST 7/22/2020 5:01 pm TECHNIQUE: CT of the abdomen and pelvis was performed with the administration of intravenous contrast. Multiplanar reformatted images are provided for review. Dose modulation, iterative reconstruction, and/or weight based adjustment of the mA/kV was utilized to reduce the radiation dose to as low as reasonably achievable. COMPARISON: 2 July 2020 HISTORY: ORDERING SYSTEM PROVIDED HISTORY: abd pain TECHNOLOGIST PROVIDED HISTORY: abd pain FINDINGS: Lower Chest: No acute abnormality in the included pulmonary parenchyma at the lung bases. Shotty epicardial lymph nodes are present without gricelda adenopathy. No pericardial effusion is evident. Organs: Liver, spleen, pancreas, gallbladder, adrenals, and kidneys demonstrate no acute abnormality. GI/Bowel: No free fluid or free air is noted. The rectosigmoid colon is abnormal in appearance, with loss of haustra, serosal haziness, shaggy appearance, and pericolonic fat infiltration. Multiple pericolonic complex collections are noted, likely pericolonic abscesses including one in the left lower quadrant of 2.5 x 3.5 cm, which appears to be between phlegmon and abscess formation, a right paracentral lower pelvic collection of 2.3 cm more typical of abscess, a central posterior collection of 1.9 cm, and a 4th collection of 2.2 x 1.4 cm. Inflamed small bowel loops in the right lower quadrant are noted in the upper pelvis. Appendix contains some dense material likely an appendicolith without evidence of acute appendicitis. Pelvis: Bladder is unremarkable. Bilateral fat-containing hernias are present without strangulation. As previously noted, inflammatory changes are present in the pelvis with pericolonic complex and fluid collections compatible with multiple phlegmons and/or abscesses. No discrete extraluminal air is noted. Peritoneum/Retroperitoneum: No aortic aneurysm, retroperitoneal or mesenteric adenopathy is present. Bones/Soft Tissues: No acute osseous or soft tissue abnormality is noted.      1.  Findings compatible with acute colitis/diverticulitis of the rectosigmoid colon team      COPD   - Stable on Singulair 10mg nightly, ipratropium-albuterol nebulizer [id number] PRN, combivent inhaler PRN   - Defer to Internal medicine as primary team      Seizure disorder   - Stable on lamictal 25mg BID    - Defer to Internal medicine as primary team        GERD/Sadler's esophagus   - Stable on protonix 40mg PO BID    - Defer to Internal medicine as primary team      Chronic constipation   - Stable on linzess 290mg capsule every morning   - Defer to Internal medicine as primary team      Cervical disc herniation/Arthritis   - Defer to Internal medicine as primary team      Smoker   - Encouraged cessation      Migraine without aura   - Defer to Internal medicine as primary team      Bipolar affective disorder/Depression   - Denies any SI/HI      Insomnia    - Defer to Internal medicine as primary team      BMI 37.68    Patient Active Problem List    Diagnosis Date Noted    Hx of  x2 (G3, G4) 10/14/2018     Priority: High     Hx CS x1 (G3)      History of gestational hypertension 2018     Priority: High     18 BP of 142/82 at 20w3d, repeat BP 1 minute later 140/76, all other BPs wnl  PreE labs wnl, P/C ratio 0.08   Elevated BPs 146/67 and 150/68, pt now meets criteria for gHTN      Seizure ([facility] [address] 2018     Priority: Medium    Hx of IUFD (G2) 2018     Priority: Medium    COPD      Priority: Medium    Sadler's esophagus without dysplasia      Priority: Medium     No NSAIDS as per patient      Iron deficiency anemia 2015     Priority: Medium    Asthma 2015     Priority: Medium    GERD 2015     Priority: Medium    Spinal stenosis in cervical region 2018     Priority: Low    Cervical radiculopathy 2017     Priority: Low    Migraine without aura 2017     Priority: Low    Hiatal hernia      Priority: Low    Smoker 2017     Priority: Low    Arthritis 2016     Priority: Low    Cervical disc herniation 04/12/2016     Priority: Low    Diverticulitis 07/22/2020    Wound infection/hemorrhage, obstetric surgical, postpartum condition 07/15/2020    Postoperative visit 07/15/2020    Postoperative abdominal pain 07/15/2020    Post-op pain     RALH with BS and cystoscopy 7/8/2020 07/08/2020    Pelvic peritoneal endometriosis     Moderate persistent asthma without complication 94/10/5201    Lipoma of torso 04/14/2020    History of cervical discectomy 04/14/2020    Chronic neck pain with abnormal neurologic examination 04/14/2020    Abnormal weight gain  04/14/2020    Class 2 drug-induced obesity with serious comorbidity and body mass index (BMI) of 37.0 to 37.9 in adult 04/14/2020    Bipolar affective disorder, currently depressed, moderate ([address] 04/10/2020    Insomnia 04/10/2020    Ulnar neuropathy 04/10/2020    Major depressive disorder, recurrent episode ([address] 04/10/2020    Sciatica 04/10/2020    Endometriosis 03/09/2020    Family history of breast cancer 03/09/2020    Enlarged uterus 02/17/2020    Chronic pelvic pain in female 01/22/2020    Chronic constipation 03/26/2018    Prediabetes 12/08/2016       Plan discussed with Dr. Zoila Delarosa, who is agreeable.      Attending's Name: Dr. Nadia Brown DO  Ob/Gyn Resident  7/22/2020, 5:42 PM

## 2020-07-23 LAB
ABSOLUTE BANDS #: 0.85 K/UL (ref 0–1)
ABSOLUTE EOS #: 0 K/UL (ref 0–0.4)
ABSOLUTE IMMATURE GRANULOCYTE: ABNORMAL K/UL (ref 0–0.3)
ABSOLUTE LYMPH #: 0.85 K/UL (ref 1–4.8)
ABSOLUTE MONO #: 0.85 K/UL (ref 0.1–1.3)
ANION GAP SERPL CALCULATED.3IONS-SCNC: 11 MMOL/L (ref 9–17)
BANDS: 5 % (ref 0–10)
BASOPHILS # BLD: 0 % (ref 0–2)
BASOPHILS ABSOLUTE: 0 K/UL (ref 0–0.2)
BUN BLDV-MCNC: 9 MG/DL (ref 6–20)
BUN/CREAT BLD: ABNORMAL (ref 9–20)
C-REACTIVE PROTEIN: 129.3 MG/L (ref 0–5)
CALCIUM SERPL-MCNC: 8.3 MG/DL (ref 8.6–10.4)
CHLORIDE BLD-SCNC: 105 MMOL/L (ref 98–107)
CO2: 21 MMOL/L (ref 20–31)
CREAT SERPL-MCNC: 0.6 MG/DL (ref 0.5–0.9)
DIFFERENTIAL TYPE: ABNORMAL
EOSINOPHILS RELATIVE PERCENT: 0 % (ref 0–4)
GFR AFRICAN AMERICAN: >60 ML/MIN
GFR NON-AFRICAN AMERICAN: >60 ML/MIN
GFR SERPL CREATININE-BSD FRML MDRD: ABNORMAL ML/MIN/{1.73_M2}
GFR SERPL CREATININE-BSD FRML MDRD: ABNORMAL ML/MIN/{1.73_M2}
GLUCOSE BLD-MCNC: 121 MG/DL (ref 65–105)
GLUCOSE BLD-MCNC: 121 MG/DL (ref 65–105)
GLUCOSE BLD-MCNC: 132 MG/DL (ref 65–105)
GLUCOSE BLD-MCNC: 132 MG/DL (ref 70–99)
GLUCOSE BLD-MCNC: 136 MG/DL (ref 65–105)
GLUCOSE BLD-MCNC: 94 MG/DL (ref 65–105)
HCT VFR BLD CALC: 35.9 % (ref 36–46)
HEMOGLOBIN: 11.4 G/DL (ref 12–16)
IMMATURE GRANULOCYTES: ABNORMAL %
LYMPHOCYTES # BLD: 5 % (ref 24–44)
MCH RBC QN AUTO: 25.5 PG (ref 26–34)
MCHC RBC AUTO-ENTMCNC: 31.7 G/DL (ref 31–37)
MCV RBC AUTO: 80.4 FL (ref 80–100)
MONOCYTES # BLD: 5 % (ref 1–7)
MORPHOLOGY: ABNORMAL
MORPHOLOGY: ABNORMAL
NRBC AUTOMATED: ABNORMAL PER 100 WBC
PDW BLD-RTO: 16.3 % (ref 11.5–14.9)
PLATELET # BLD: 256 K/UL (ref 150–450)
PLATELET ESTIMATE: ABNORMAL
PMV BLD AUTO: 8 FL (ref 6–12)
POTASSIUM SERPL-SCNC: 4 MMOL/L (ref 3.7–5.3)
RBC # BLD: 4.46 M/UL (ref 4–5.2)
RBC # BLD: ABNORMAL 10*6/UL
SEG NEUTROPHILS: 85 % (ref 36–66)
SEGMENTED NEUTROPHILS ABSOLUTE COUNT: 14.35 K/UL (ref 1.3–9.1)
SODIUM BLD-SCNC: 137 MMOL/L (ref 135–144)
WBC # BLD: 16.9 K/UL (ref 3.5–11)
WBC # BLD: ABNORMAL 10*3/UL

## 2020-07-23 PROCEDURE — 2580000003 HC RX 258: Performed by: NURSE PRACTITIONER

## 2020-07-23 PROCEDURE — 94640 AIRWAY INHALATION TREATMENT: CPT

## 2020-07-23 PROCEDURE — 99222 1ST HOSP IP/OBS MODERATE 55: CPT | Performed by: INTERNAL MEDICINE

## 2020-07-23 PROCEDURE — 86140 C-REACTIVE PROTEIN: CPT

## 2020-07-23 PROCEDURE — 6360000002 HC RX W HCPCS: Performed by: PHYSICIAN ASSISTANT

## 2020-07-23 PROCEDURE — 6370000000 HC RX 637 (ALT 250 FOR IP): Performed by: STUDENT IN AN ORGANIZED HEALTH CARE EDUCATION/TRAINING PROGRAM

## 2020-07-23 PROCEDURE — 99231 SBSQ HOSP IP/OBS SF/LOW 25: CPT | Performed by: SPECIALIST

## 2020-07-23 PROCEDURE — 6360000002 HC RX W HCPCS: Performed by: STUDENT IN AN ORGANIZED HEALTH CARE EDUCATION/TRAINING PROGRAM

## 2020-07-23 PROCEDURE — 6360000002 HC RX W HCPCS: Performed by: NURSE PRACTITIONER

## 2020-07-23 PROCEDURE — 36415 COLL VENOUS BLD VENIPUNCTURE: CPT

## 2020-07-23 PROCEDURE — 94761 N-INVAS EAR/PLS OXIMETRY MLT: CPT

## 2020-07-23 PROCEDURE — 2500000003 HC RX 250 WO HCPCS: Performed by: STUDENT IN AN ORGANIZED HEALTH CARE EDUCATION/TRAINING PROGRAM

## 2020-07-23 PROCEDURE — 6370000000 HC RX 637 (ALT 250 FOR IP): Performed by: NURSE PRACTITIONER

## 2020-07-23 PROCEDURE — 85025 COMPLETE CBC W/AUTO DIFF WBC: CPT

## 2020-07-23 PROCEDURE — 1200000000 HC SEMI PRIVATE

## 2020-07-23 PROCEDURE — 82947 ASSAY GLUCOSE BLOOD QUANT: CPT

## 2020-07-23 PROCEDURE — 80048 BASIC METABOLIC PNL TOTAL CA: CPT

## 2020-07-23 RX ORDER — FENTANYL CITRATE 50 UG/ML
50 INJECTION, SOLUTION INTRAMUSCULAR; INTRAVENOUS ONCE
Status: COMPLETED | OUTPATIENT
Start: 2020-07-23 | End: 2020-07-23

## 2020-07-23 RX ORDER — DOCUSATE SODIUM 100 MG/1
100 CAPSULE, LIQUID FILLED ORAL 2 TIMES DAILY
Status: DISCONTINUED | OUTPATIENT
Start: 2020-07-23 | End: 2020-07-28 | Stop reason: HOSPADM

## 2020-07-23 RX ORDER — FENTANYL CITRATE 50 UG/ML
25 INJECTION, SOLUTION INTRAMUSCULAR; INTRAVENOUS
Status: DISCONTINUED | OUTPATIENT
Start: 2020-07-23 | End: 2020-07-24

## 2020-07-23 RX ORDER — FENTANYL CITRATE 50 UG/ML
50 INJECTION, SOLUTION INTRAMUSCULAR; INTRAVENOUS
Status: DISCONTINUED | OUTPATIENT
Start: 2020-07-23 | End: 2020-07-24

## 2020-07-23 RX ORDER — MAGNESIUM HYDROXIDE/ALUMINUM HYDROXICE/SIMETHICONE 120; 1200; 1200 MG/30ML; MG/30ML; MG/30ML
30 SUSPENSION ORAL EVERY 6 HOURS PRN
Status: DISCONTINUED | OUTPATIENT
Start: 2020-07-23 | End: 2020-07-28 | Stop reason: HOSPADM

## 2020-07-23 RX ORDER — CALCIUM CARBONATE 200(500)MG
500 TABLET,CHEWABLE ORAL 3 TIMES DAILY PRN
Status: DISCONTINUED | OUTPATIENT
Start: 2020-07-23 | End: 2020-07-28 | Stop reason: HOSPADM

## 2020-07-23 RX ORDER — PANTOPRAZOLE SODIUM 40 MG/1
40 TABLET, DELAYED RELEASE ORAL
Status: DISCONTINUED | OUTPATIENT
Start: 2020-07-24 | End: 2020-07-24

## 2020-07-23 RX ADMIN — DOCUSATE SODIUM 100 MG: 100 CAPSULE, LIQUID FILLED ORAL at 09:37

## 2020-07-23 RX ADMIN — PREGABALIN 75 MG: 75 CAPSULE ORAL at 09:37

## 2020-07-23 RX ADMIN — FENTANYL CITRATE 50 MCG: 50 INJECTION INTRAMUSCULAR; INTRAVENOUS at 08:31

## 2020-07-23 RX ADMIN — POLYETHYLENE GLYCOL 3350 17 G: 17 POWDER, FOR SOLUTION ORAL at 04:04

## 2020-07-23 RX ADMIN — METRONIDAZOLE 500 MG: 500 INJECTION, SOLUTION INTRAVENOUS at 11:42

## 2020-07-23 RX ADMIN — LAMOTRIGINE 25 MG: 25 TABLET ORAL at 09:39

## 2020-07-23 RX ADMIN — METRONIDAZOLE 500 MG: 500 INJECTION, SOLUTION INTRAVENOUS at 03:37

## 2020-07-23 RX ADMIN — ACETAMINOPHEN 650 MG: 325 TABLET, FILM COATED ORAL at 21:04

## 2020-07-23 RX ADMIN — IPRATROPIUM BROMIDE AND ALBUTEROL SULFATE 1 AMPULE: 2.5; .5 SOLUTION RESPIRATORY (INHALATION) at 19:35

## 2020-07-23 RX ADMIN — MONTELUKAST 10 MG: 10 TABLET, FILM COATED ORAL at 21:04

## 2020-07-23 RX ADMIN — FENTANYL CITRATE 100 MCG: 50 INJECTION INTRAMUSCULAR; INTRAVENOUS at 13:02

## 2020-07-23 RX ADMIN — NICOTINE POLACRILEX 2 MG: 2 GUM, CHEWING BUCCAL at 13:12

## 2020-07-23 RX ADMIN — MORPHINE SULFATE 4 MG: 4 INJECTION, SOLUTION INTRAMUSCULAR; INTRAVENOUS at 03:59

## 2020-07-23 RX ADMIN — NICOTINE POLACRILEX 2 MG: 2 GUM, CHEWING BUCCAL at 08:03

## 2020-07-23 RX ADMIN — FENTANYL CITRATE 50 MCG: 50 INJECTION INTRAMUSCULAR; INTRAVENOUS at 19:00

## 2020-07-23 RX ADMIN — FENTANYL CITRATE 50 MCG: 50 INJECTION INTRAMUSCULAR; INTRAVENOUS at 17:08

## 2020-07-23 RX ADMIN — FAMOTIDINE 20 MG: 20 TABLET, FILM COATED ORAL at 09:38

## 2020-07-23 RX ADMIN — IPRATROPIUM BROMIDE AND ALBUTEROL SULFATE 1 AMPULE: 2.5; .5 SOLUTION RESPIRATORY (INHALATION) at 16:43

## 2020-07-23 RX ADMIN — FENTANYL CITRATE 50 MCG: 50 INJECTION INTRAMUSCULAR; INTRAVENOUS at 15:07

## 2020-07-23 RX ADMIN — FENTANYL CITRATE 50 MCG: 50 INJECTION INTRAMUSCULAR; INTRAVENOUS at 23:08

## 2020-07-23 RX ADMIN — FAMOTIDINE 20 MG: 20 TABLET, FILM COATED ORAL at 21:04

## 2020-07-23 RX ADMIN — PREGABALIN 75 MG: 75 CAPSULE ORAL at 14:48

## 2020-07-23 RX ADMIN — ALUMINUM HYDROXIDE, MAGNESIUM HYDROXIDE, AND SIMETHICONE 30 ML: 200; 200; 20 SUSPENSION ORAL at 14:48

## 2020-07-23 RX ADMIN — PREGABALIN 75 MG: 75 CAPSULE ORAL at 21:04

## 2020-07-23 RX ADMIN — SODIUM CHLORIDE: 9 INJECTION, SOLUTION INTRAVENOUS at 09:38

## 2020-07-23 RX ADMIN — CIPROFLOXACIN 400 MG: 2 INJECTION, SOLUTION INTRAVENOUS at 09:38

## 2020-07-23 RX ADMIN — FENTANYL CITRATE 50 MCG: 50 INJECTION INTRAMUSCULAR; INTRAVENOUS at 10:57

## 2020-07-23 RX ADMIN — IPRATROPIUM BROMIDE AND ALBUTEROL SULFATE 1 AMPULE: .5; 3 SOLUTION RESPIRATORY (INHALATION) at 05:42

## 2020-07-23 RX ADMIN — IPRATROPIUM BROMIDE AND ALBUTEROL SULFATE 1 AMPULE: 2.5; .5 SOLUTION RESPIRATORY (INHALATION) at 07:51

## 2020-07-23 RX ADMIN — NICOTINE POLACRILEX 2 MG: 2 GUM, CHEWING BUCCAL at 00:23

## 2020-07-23 RX ADMIN — METRONIDAZOLE 500 MG: 500 INJECTION, SOLUTION INTRAVENOUS at 21:04

## 2020-07-23 RX ADMIN — FENTANYL CITRATE 50 MCG: 50 INJECTION INTRAMUSCULAR; INTRAVENOUS at 21:04

## 2020-07-23 RX ADMIN — LAMOTRIGINE 25 MG: 25 TABLET ORAL at 21:04

## 2020-07-23 RX ADMIN — MORPHINE SULFATE 4 MG: 4 INJECTION, SOLUTION INTRAMUSCULAR; INTRAVENOUS at 07:58

## 2020-07-23 RX ADMIN — DOCUSATE SODIUM 100 MG: 100 CAPSULE, LIQUID FILLED ORAL at 21:04

## 2020-07-23 RX ADMIN — CIPROFLOXACIN 400 MG: 2 INJECTION, SOLUTION INTRAVENOUS at 22:12

## 2020-07-23 ASSESSMENT — PAIN DESCRIPTION - DESCRIPTORS
DESCRIPTORS: ACHING;DISCOMFORT
DESCRIPTORS: ACHING;DISCOMFORT

## 2020-07-23 ASSESSMENT — PAIN SCALES - GENERAL
PAINLEVEL_OUTOF10: 10
PAINLEVEL_OUTOF10: 8
PAINLEVEL_OUTOF10: 10
PAINLEVEL_OUTOF10: 8
PAINLEVEL_OUTOF10: 10
PAINLEVEL_OUTOF10: 8
PAINLEVEL_OUTOF10: 7
PAINLEVEL_OUTOF10: 10
PAINLEVEL_OUTOF10: 7

## 2020-07-23 ASSESSMENT — PAIN DESCRIPTION - PROGRESSION
CLINICAL_PROGRESSION: NOT CHANGED

## 2020-07-23 ASSESSMENT — ENCOUNTER SYMPTOMS
SHORTNESS OF BREATH: 0
NAUSEA: 0
VOMITING: 0
DIARRHEA: 0
ABDOMINAL PAIN: 1
CONSTIPATION: 1
CHEST TIGHTNESS: 0
WHEEZING: 0
ABDOMINAL DISTENTION: 0
CHOKING: 0
COUGH: 0
BLOOD IN STOOL: 0

## 2020-07-23 ASSESSMENT — PAIN DESCRIPTION - LOCATION
LOCATION: ABDOMEN
LOCATION: ABDOMEN

## 2020-07-23 ASSESSMENT — PAIN DESCRIPTION - PAIN TYPE
TYPE: ACUTE PAIN
TYPE: ACUTE PAIN

## 2020-07-23 ASSESSMENT — PAIN DESCRIPTION - ORIENTATION
ORIENTATION: LOWER
ORIENTATION: LOWER

## 2020-07-23 ASSESSMENT — PAIN DESCRIPTION - FREQUENCY: FREQUENCY: CONTINUOUS

## 2020-07-23 NOTE — PROGRESS NOTES
RN notified OB resident that patient is c/o of bladder pain and pressure with urination. RN also notifed OB resident that Dr. Tomas Luna is not planning any surgical intervention.

## 2020-07-23 NOTE — PROGRESS NOTES
Breath Sounds: Diminished due to being too painful to take a deep breath  RR:16  Pulse Sat: 100 RA  Home Meds: Combivent-Respimat, Breo-Ellipta  PT reports that she knows she should quit smoking.    Bronchodilator assessment at level: PRN treatments added due to pt taking PRN tx occasionally at night.  []    Home Level  BRONCHODILATOR ASSESSMENT SCORE  Score 0 1 2 3 4 5   Breath Sounds   []  Patient Baseline [x]  No Wheeze good aeration []  Faint, scattered wheezing, good aeration []  Expiratory Wheezing and or moderately diminished []  Insp/Exp wheeze and/or very diminished []  Insp/Exp and/ or marked distress   Respiratory Rate   []  Patient Baseline [x]  Less than 20 []  Less than 20 []  20-25 []  Greater than 25 []  Greater than 25   Peak flow % of Pred or PB [x]  NA   []  Greater than 90%  []  81-90% []  71-80% []  Less than or equal to 70%  or unable to perform []  Unable due to Respiratory Distress   Dyspnea re [x]  Patient Baseline []  No SOB []  No SOB []  SOB on exertion []  SOB min activity []  At rest/acute   e FEV% Predicted       [x]  NA []  Above 69%  []  Unable []  Above 60-69%  []  Unable []  Above 50-59%  []  Unable []  Above 35-49%  []  Unable []  Less than 35%  []  Unable

## 2020-07-23 NOTE — PROGRESS NOTES
Physical Therapy  DATE: 2020    NAME: Colleen Brewer  MRN: [id number]   : 1980    Patient not seen this date for Physical Therapy due to:  [] Blood transfusion in progress  [] Hemodialysis  [x]  Patient Declined- pt states she does not need PT- up in her room Independently  [] Spine Precautions   [] Strict Bedrest  [] Surgery/ Procedure  [] Testing      [] Other        [x] PT being discontinued at this time. Patient independent. No further needs. [] PT being discontinued at this time as the patient has been transferred to palliative care. No further needs.     Charles Perry, PT

## 2020-07-23 NOTE — CONSULTS
[facility] -   Infectious diseases evaluation  admission date 7/22/2020    reason for consultation:   Diverticulitis with abscess formation. Impression :   Current:  · Colitis /diverticulitis with pericolonic abscesses    Other:  s/p Robotic assisted Laparoscopic Hysterectomy with Bilateral Salpingectomy with Cystoscopy on 7/8/20   Prediabetes  COPD  Seizure disorder  Smoking  Migraine headache  Bipolar disorder  Sadler's esophagus    Recommendations   · IV ciprofloxacin and Flagyl  · Await surgery evaluation  · Follow CBC renal function        History of Present Illness:   Initial history:  Rajesh Bragg is a 47y.o.-year-old female presented to the hospital with worsening abdominal pain, constant for 2 weeks, not improving with pain medication, pain at the lower abdomen with no radiation, pressure-like. She also had fever with temperature of 100.3 on admission. She denied any nausea or vomiting, no diarrhea, no other complaints. WBC on admission was 21.6, lactic acid normal, chest x-ray showed no infiltrate, CT abdomen/pelvis suggestive of acute colitis/diverticulitis of the rectosigmoid colon with multiple pericolonic abscesses/phlegmons with appendicolith  s/p Robotic assisted Laparoscopic Hysterectomy with Bilateral Salpingectomy with Cystoscopy on 7/8/20 secondary to enlarged uterus with chronic pelvic pain and history of endometriosis. Urinalysis was clear, negative leukocyte esterase  Interval changes  7/23/2020         I have personally reviewed the past medical history, past surgical history, medications, social history, and family history, and I haveupdated the database accordingly.   Past Medical History:     Past Medical History:   Diagnosis Date    Anxiety     Arthritis     OA    Asthma     Sadler's esophagus     LONG SEGMENT    Bipolar 1 disorder (HCC)     CAD (coronary artery disease)     Chronic insomnia     COPD (chronic obstructive pulmonary disease) ([facility]     Depression     and bipolar    Diabetes mellitus ([facility]     prediabetic    Endometriosis 3/9/2020    Esophagitis     severe    GERD (gastroesophageal reflux disease)     Headache(784.0)     Herniated disc, cervical     Hiatal hernia     Incisional abscess 1/15/2019    Kidney stones     MDRO (multiple drug resistant organisms) resistance     mrsa    Pleurisy     hx of \"years ago\"    Pneumonia     past penumonia    Seizures ([facility] [address] [facility]     2016 last seizure-focal seizure    UTI (urinary tract infection)     Vision abnormalities     wears glasses       Past Surgical  History:     Past Surgical History:   Procedure Laterality Date    CERVICAL DISC SURGERY      x2     SECTION  , 2018    x 2     SECTION N/A 2018     SECTION performed by Dannielle Munguia MD at [facility] [address] COLONOSCOPY N/A 10/1/2019    COLONOSCOPY DIAGNOSTIC ABORTED performed by Farooq Vargas MD at [facility] N/A 2020    COLONOSCOPY WITH BIOPSY performed by Farooq Vargas MD at [address], COLON, DIAGNOSTIC      HYSTERECTOMY N/A 2020    HYSTERECTOMY ABDOMINAL LAPAROSCOPIC ROBOTIC XI W/ CYSTOSCOPY performed by Dannielle Munguia MD at [facility] ARTHROSCOPY Left 5/20/15    KNEE SURGERY Left     x3    LAPAROSCOPY      dr Jean Marie Dior N/A 10/5/2017    LAPAROSCOPIC REMOVAL INTRA ABDOMINAL LIPOMA LOWER RIGHT performed by Kayode Lehman DO at [address] ESOPHAGOGASTRODUODENOSCOPY TRANSORAL DIAGNOSTIC N/A 3/2/2017    EGD ESOPHAGOGASTRODUODENOSCOPY  IP  performed by Mireya Rodriguez MD at [facility]  2016    severe esophagitis    UPPER GASTROINTESTINAL ENDOSCOPY  2017    barretts; hiatus hernia; gastritis    UPPER GASTROINTESTINAL ENDOSCOPY  2017    long seg mullins's with linear erosions, esophagitis, small hiatal hernia    UPPER GASTROINTESTINAL ENDOSCOPY N/A 1/30/2018    GATES'S    UPPER GASTROINTESTINAL ENDOSCOPY N/A 10/1/2019    EGD BIOPSY [facility]         Medications:      docusate sodium  100 mg Oral BID    [START ON 7/24/2020] linaclotide  290 mcg Oral QAM AC    lamoTRIgine  25 mg Oral BID    montelukast  10 mg Oral Nightly    sodium chloride flush  10 mL Intravenous 2 times per day    enoxaparin  40 mg Subcutaneous Daily    famotidine  20 mg Oral BID    pregabalin  75 mg Oral TID    ipratropium-albuterol  1 ampule Inhalation Q4H WA    insulin lispro  0-6 Units Subcutaneous TID WC    insulin lispro  0-3 Units Subcutaneous Nightly    ciprofloxacin  400 mg Intravenous Q12H    metroNIDAZOLE  500 mg Intravenous Q8H       Social History:     Social History     Socioeconomic History    Marital status: Single     Spouse name: Not on file    Number of children: 1    Years of education: 11th grade    Highest education level: Not on file   Occupational History    Occupation: unemployed-   Social Needs    Financial resource strain: Not on file    Food insecurity     Worry: Not on file     Inability: Not on file   [employer] needs     Medical: Not on file     Non-medical: Not on file   Tobacco Use    Smoking status: Current Every Day Smoker     Packs/day: 1.00     Years: 21.00     Pack years: 21.00     Types: Cigarettes    Smokeless tobacco: Never Used   Substance and Sexual Activity    Alcohol use: No    Drug use: No    Sexual activity: Yes     Partners: Male   Lifestyle    Physical activity     Days per week: Not on file     Minutes per session: Not on file    Stress: Not on file   Relationships    Social connections     Talks on phone: Not on file     Gets together: Not on file     Attends [employer] service: Not on file     Active member of club or organization: Not on file     Attends meetings of clubs or organizations: Not on file Decision Making:   I have independently reviewed/ordered the following labs:    CBC with Differential:   Recent Labs     07/22/20  1545 07/23/20  0606   WBC 21.6* 16.9*   HGB 13.6 11.4*   HCT 43.0 35.9*    256   LYMPHOPCT 5* 5*   MONOPCT 3 5     BMP:  Recent Labs     07/22/20  1545 07/23/20  0606    137   K 4.3 4.0    105   CO2 23 21   BUN 13 9   CREATININE 0.70 0.60     Hepatic Function Panel:   Recent Labs     07/22/20  1545   PROT 7.0   LABALBU 4.1   BILITOT 0.77   ALKPHOS 73   ALT 13   AST 12       Lab Results   Component Value Date    CREATININE 0.60 07/23/2020    GLUCOSE 132 07/23/2020    GLUCOSE 106 03/25/2012       Detailed results: Thank you for allowing us to participate in the care of this patient. Please call with questions. This note is created with the assistance of a speech recognition program.  While intending to generate adocument that actually reflects the content of the visit, the document can still have some errors including those of syntax and sound a like substitutions which may escape proof reading. It such instances, actual meaningcan be extrapolated by contextual diversion.     Andriy Castillo MD  Office: [phone]  Perfect serve / office [phone]

## 2020-07-23 NOTE — PROGRESS NOTES
[facility] [address]                 PATIENT NAME: Temo Skaggs     TODAY'S DATE: 7/23/2020, 1:54 PM    SUBJECTIVE:    Pt seen and examined. Afebrile currently, VSS. Leukocytosis improving. Patient c/o diffuse abdominal pain, not controlled with morphine. Bowels are moving. No N/V. Patient seen by OB/GYN early this morning, no intervention plans. OBJECTIVE:   VITALS:  /85   Pulse 100   Temp 98.8 °F (37.1 °C) (Oral)   Resp 18   Ht 5' 2\" (1.575 m)   Wt 206 lb (93.4 kg)   LMP 06/15/2020   SpO2 98%   BMI 37.68 kg/m²      INTAKE/OUTPUT:      Intake/Output Summary (Last 24 hours) at 7/23/2020 1354  Last data filed at 7/23/2020 2687  Gross per 24 hour   Intake 1100 ml   Output 1250 ml   Net -150 ml                 CONSTITUTIONAL:  awake and alert.   No acute distress  HEART:   RRR  LUNGS:   Decreased air entry at bases, no wheezing  ABDOMEN:   Abdomen soft, tender in RLQ, non-distended  EXTREMITIES:   No pedal edema    Data:  CBC:   Lab Results   Component Value Date    WBC 16.9 07/23/2020    RBC 4.46 07/23/2020    RBC 4.49 03/25/2012    HGB 11.4 07/23/2020    HCT 35.9 07/23/2020    MCV 80.4 07/23/2020    MCH 25.5 07/23/2020    MCHC 31.7 07/23/2020    RDW 16.3 07/23/2020     07/23/2020     03/25/2012    MPV 8.0 07/23/2020     BMP:    Lab Results   Component Value Date     07/23/2020    K 4.0 07/23/2020     07/23/2020    CO2 21 07/23/2020    BUN 9 07/23/2020    LABALBU 4.1 07/22/2020    LABALBU 4.1 03/25/2012    CREATININE 0.60 07/23/2020    CALCIUM 8.3 07/23/2020    GFRAA >60 07/23/2020    LABGLOM >60 07/23/2020    GLUCOSE 132 07/23/2020    GLUCOSE 106 03/25/2012       Radiology Review:  No new images to review      ASSESSMENT     Principal Problem:    Diverticulitis  Active Problems:    Asthma    GERD    Iron deficiency anemia    Cervical disc herniation    Smoker    Sadler's esophagus without dysplasia    COPD    Chronic constipation    Seizure

## 2020-07-23 NOTE — PROGRESS NOTES
[address] [facility].    PROGRESS NOTE             7/23/2020    9:14 AM    Name:   Heath Bell  MRN:     [id number]     Acct:      [de-identified]   Room:   2057/2057-01   Day:  1  Admit Date:  7/22/2020  3:13 PM    PCP:  KATIE Lopez CNP  Code Status:  Full Code    Subjective:     C/C:   Chief Complaint   Patient presents with    Abdominal Pain     Interval History Status:     Patient was seen and examined at bedside. Overnight, patient states she was having difficulty sleeping due to pain. On examination, patient is wincing in pain from light touch of the lower abdomen. She's requesting more pain meds and is crying. Patient states the pain is in her lower abdomen and is a 10/10. The pain has not improved since admission. She denies any nausea, vomiting, chest pain, SOB, palpitations or chills. Patient was given 4 mg of morphine and 50 mcg of fentanyl one time dose. Brief History:     Please view H&P    Review of Systems:     Review of Systems   Constitutional: Negative for chills and fever. Respiratory: Negative for cough, choking, chest tightness, shortness of breath and wheezing. Cardiovascular: Negative for chest pain, palpitations and leg swelling. Gastrointestinal: Positive for abdominal pain and constipation. Negative for abdominal distention, blood in stool, diarrhea, nausea and vomiting. Genitourinary: Negative for difficulty urinating, dyspareunia, vaginal bleeding and vaginal discharge. Medications: Allergies:     Allergies   Allergen Reactions    Nsaids      Irritates her Barrets esophogus    Toradol [Ketorolac Tromethamine]     Ultram [Tramadol] Nausea Only     Gastric upset       Current Meds:   Scheduled Meds:    docusate sodium  100 mg Oral BID    lamoTRIgine  25 mg Oral BID    montelukast  10 mg Oral Nightly    sodium chloride flush  10 mL Intravenous 2 times per day    enoxaparin 40 mg Subcutaneous Daily    famotidine  20 mg Oral BID    pregabalin  75 mg Oral TID    ipratropium-albuterol  1 ampule Inhalation Q4H WA    insulin lispro  0-6 Units Subcutaneous TID WC    insulin lispro  0-3 Units Subcutaneous Nightly    ciprofloxacin  400 mg Intravenous Q12H    metroNIDAZOLE  500 mg Intravenous Q8H     Continuous Infusions:    dextrose      sodium chloride 100 mL/hr at 07/22/20 2204     PRN Meds: sodium chloride flush, sodium chloride flush, acetaminophen **OR** acetaminophen, polyethylene glycol, promethazine **OR** ondansetron, potassium chloride **OR** potassium alternative oral replacement **OR** potassium chloride, magnesium sulfate, glucose, dextrose, glucagon (rDNA), dextrose, ipratropium-albuterol, nicotine polacrilex, morphine **OR** morphine    Data:     Past Medical History:   has a past medical history of Anxiety, Arthritis, Asthma, Sadler's esophagus, Bipolar 1 disorder ([facility] [address] CAD (coronary artery disease), Chronic insomnia, COPD (chronic obstructive pulmonary disease) ([facility] [address] Depression, Diabetes mellitus ([facility] [address] Endometriosis, Esophagitis, GERD (gastroesophageal reflux disease), Headache(784.0), Herniated disc, cervical, Hiatal hernia, Incisional abscess, Kidney stones, MDRO (multiple drug resistant organisms) resistance, Pleurisy, Pneumonia, Seizures ([address] UTI (urinary tract infection), and Vision abnormalities. Social History:   reports that she has been smoking cigarettes. She has a 21.00 pack-year smoking history. She has never used smokeless tobacco. She reports that she does not drink alcohol or use drugs.      Family History:   Family History   Problem Relation Age of Onset    Cancer Mother         breast    Bipolar Disorder Mother     Hypertension Mother     Breast Cancer Mother     Bipolar Disorder Brother     Hypertension Father        Vitals:  /73   Pulse 104   Temp 99.8 °F (37.7 °C) (Oral)   Resp 18   Ht 5' 2\" (1.575 m)   Wt 206 lb (93.4 kg) LMP 06/15/2020   SpO2 97%   BMI 37.68 kg/m²   Temp (24hrs), Av.7 °F (37.6 °C), Min:98.8 °F (37.1 °C), Max:100.8 °F (38.2 °C)    Recent Labs     20  0632 20  0754   POCGLU 96 132* 136*       I/O(24Hr): Intake/Output Summary (Last 24 hours) at 2020 0914  Last data filed at 2020 7934  Gross per 24 hour   Intake 1100 ml   Output 1250 ml   Net -150 ml       Labs:    [unfilled]    Lab Results   Component Value Date/Time    SPECIAL NOT REPORTED 2020 03:50 PM     Lab Results   Component Value Date/Time    CULTURE NO GROWTH 10 HOURS 2020 03:50 PM       [unfilled]    Radiology:    Xr Chest Standard (2 Vw)    Result Date: 2020  EXAMINATION: TWO XRAY VIEWS OF THE CHEST 2020 12:27 pm COMPARISON: 2020 HISTORY: ORDERING SYSTEM PROVIDED HISTORY: Pneumonia due to infectious organism, unspecified laterality, unspecified part of lung TECHNOLOGIST PROVIDED HISTORY: surgery clearance, pneumonia Reason for Exam: preop clearance, pneumonia Acuity: Acute Type of Exam: Initial FINDINGS: The lungs are without acute focal process. No effusion or pneumothorax. The cardiomediastinal silhouette is normal.  The osseous structures are intact without acute process. Cervical spine hardware is redemonstrated. Negative chest.     Ct Abdomen Pelvis W Iv Contrast Additional Contrast? None    Result Date: 2020  EXAMINATION: CT OF THE ABDOMEN AND PELVIS WITH CONTRAST 2020 5:01 pm TECHNIQUE: CT of the abdomen and pelvis was performed with the administration of intravenous contrast. Multiplanar reformatted images are provided for review. Dose modulation, iterative reconstruction, and/or weight based adjustment of the mA/kV was utilized to reduce the radiation dose to as low as reasonably achievable.  COMPARISON: 2020 HISTORY: ORDERING SYSTEM PROVIDED HISTORY: abd pain TECHNOLOGIST PROVIDED HISTORY: abd pain FINDINGS: Lower Chest: No acute abnormality in the included pulmonary parenchyma at the lung bases. Shotty epicardial lymph nodes are present without gricelda adenopathy. No pericardial effusion is evident. Organs: Liver, spleen, pancreas, gallbladder, adrenals, and kidneys demonstrate no acute abnormality. GI/Bowel: No free fluid or free air is noted. The rectosigmoid colon is abnormal in appearance, with loss of haustra, serosal haziness, shaggy appearance, and pericolonic fat infiltration. Multiple pericolonic complex collections are noted, likely pericolonic abscesses including one in the left lower quadrant of 2.5 x 3.5 cm, which appears to be between phlegmon and abscess formation, a right paracentral lower pelvic collection of 2.3 cm more typical of abscess, a central posterior collection of 1.9 cm, and a 4th collection of 2.2 x 1.4 cm. Inflamed small bowel loops in the right lower quadrant are noted in the upper pelvis. Appendix contains some dense material likely an appendicolith without evidence of acute appendicitis. Pelvis: Bladder is unremarkable. Bilateral fat-containing hernias are present without strangulation. As previously noted, inflammatory changes are present in the pelvis with pericolonic complex and fluid collections compatible with multiple phlegmons and/or abscesses. No discrete extraluminal air is noted. Peritoneum/Retroperitoneum: No aortic aneurysm, retroperitoneal or mesenteric adenopathy is present. Bones/Soft Tissues: No acute osseous or soft tissue abnormality is noted. 1.  Findings compatible with acute colitis/diverticulitis of the rectosigmoid colon with multiple pericolonic fluid collections consistent with complex phlegmons and abscesses. 2.  Apparent appendicolith in the appendix. No acute appendicitis. Critical results were called by Dr. Jan Garcia MD to [facility] on 7/22/2020 at 17:16.      Ct Abdomen Pelvis W Iv Contrast    Result Date: 7/2/2020  EXAMINATION: CT OF THE ABDOMEN AND PELVIS WITH CONTRAST 7/2/2020 4:55 pm TECHNIQUE: CT of the abdomen and pelvis was performed with the administration of intravenous contrast. Multiplanar reformatted images are provided for review. Dose modulation, iterative reconstruction, and/or weight based adjustment of the mA/kV was utilized to reduce the radiation dose to as low as reasonably achievable. COMPARISON: 03/04/2020 HISTORY: ORDERING SYSTEM PROVIDED HISTORY: Acute onset abdominal pain, N/V TECHNOLOGIST PROVIDED HISTORY: IV Only Contrast Acute onset abdominal pain, N/V Reason for Exam: Pt c/o abd pain since yesterday 19-year-old female with acute-onset abdominal pain, nausea and vomiting FINDINGS: Lower Chest: Mild bibasilar atelectasis and respiratory motion. No free intra-abdominal air. Tree-in-bud opacities at the lung bases. Small hiatal hernia. Mild wall thickening of the distal esophagus, stable. Organs: Liver, gallbladder, adrenal glands, pancreas, spleen grossly unremarkable in appearance. No hydronephrosis. No obstructing calculus or hydroureter. 5 mm nonobstructive midpole left renal calculus on image 81, series 2. GI/Bowel: No significant dilation of small bowel loops to suggest small bowel obstruction. Normal gas-filled appendix. Mild stool burden. Pelvis: Urinary bladder is collapsed. No inguinal or pelvic sidewall lymphadenopathy. No free fluid in the pelvis. Uterus and adnexa grossly unremarkable. Peritoneum/Retroperitoneum: Abdominal aorta normal in appearance and caliber. No retroperitoneal lymphadenopathy. Psoas muscles normal in size and symmetric in appearance. Prominent portacaval lymph nodes. Bones/Soft Tissues: Mild diffuse degenerative changes throughout the spine. Tiny midline fat-containing periumbilical hernia. 1. No acute intra-abdominal or intrapelvic abnormality identified by CT. 2. Mild bibasilar atelectasis and respiratory motion.   Tree-in-bud opacities at the lung bases likely representing inflammation or infection to include ENOC or sequela of aspiration. Follow-up is recommended to document resolution. 3. Tiny midline fat-containing periumbilical hernia. 4. Mild wall thickening of the distal esophagus, stable. Consider evaluation with EGD. 5. Small hiatal hernia. 6. 5 mm nonobstructing midpole left renal calculus. Xr Chest Portable    Result Date: 7/22/2020  EXAMINATION: ONE XRAY VIEW OF THE CHEST 7/22/2020 4:31 pm COMPARISON: 07/17/2020 and 07/07/2020 HISTORY: Reason for Exam: Chest pains Acuity: Acute Type of Exam: Initial FINDINGS: The lungs are without acute focal process. No effusion or pneumothorax. The cardiomediastinal silhouette is normal.  The osseous structures are intact without acute process. Lower cervical spine hardware. Unremarkable chest.     Xr Chest Portable    Result Date: 7/17/2020  EXAMINATION: ONE XRAY VIEW OF THE CHEST 7/17/2020 7:25 pm COMPARISON: July 7, 2020 HISTORY: ORDERING SYSTEM PROVIDED HISTORY: Wheezing TECHNOLOGIST PROVIDED HISTORY: Wheezing Reason for Exam: Wheezing Acuity: Acute Type of Exam: Initial FINDINGS: Lungs are clear. No cardiomegaly. No pulmonary edema. Negative chest radiograph. Physical Examination:        Physical Exam  Constitutional:       General: She is in acute distress (secondary to pain). Comments: Patient is in pain   HENT:      Head: Normocephalic and atraumatic. Mouth/Throat:      Mouth: Mucous membranes are moist.   Eyes:      Extraocular Movements: Extraocular movements intact. Pupils: Pupils are equal, round, and reactive to light. Cardiovascular:      Rate and Rhythm: Normal rate and regular rhythm. Heart sounds: No murmur. No friction rub. No gallop. Pulmonary:      Effort: No respiratory distress. Breath sounds: No stridor. No wheezing, rhonchi or rales. Chest:      Chest wall: No tenderness. Abdominal:      General: Bowel sounds are normal. There is no distension. Palpations: Abdomen is soft.       Tenderness: There is abdominal tenderness (tenderness in the suprapubic region to light touch). There is no guarding or rebound. Musculoskeletal:      Right lower leg: No edema. Left lower leg: No edema. Skin:     General: Skin is warm. Comments: Incision sites from port insertion surrounded by pink tissue on abdomen. No erythema or discharge   Neurological:      Mental Status: She is alert and oriented to person, place, and time.            Assessment:        Primary Problem  Diverticulitis    Active Hospital Problems    Diagnosis Date Noted    Diverticulitis [K57.92] 2020    RALH with BS and cystoscopy 2020 [Z90.710] 2020    Class 2 drug-induced obesity with serious comorbidity and body mass index (BMI) of 37.0 to 37.9 in adult [E66.1, Z68.37] 2020    Insomnia [G47.00] 04/10/2020    Bipolar affective disorder, currently depressed, moderate ([address] [F31.32] 04/10/2020    Major depressive disorder, recurrent episode ([address] [F33.9] 04/10/2020    Endometriosis [N80.9] 2020    Family history of breast cancer [Z80.3] 2020    Hx of  x2 (G3, G4) [Z98.891] 10/14/2018    Seizure ([address] [R56.9] 2018    Chronic constipation [K59.09] 2018    Migraine without aura [G43.009] 2017    COPD [J44.9]     Sadler's esophagus without dysplasia [K22.70]     Smoker [F17.200] 2017    Prediabetes [R73.03] 2016    Arthritis [M19.90] 2016    Cervical disc herniation [M50.20] 2016    Iron deficiency anemia [D50.9] 2015    Asthma [J45.909] 2015    GERD [K21.9] 2015       Plan:      Diverticulitis/colitis with pericolonic abscesses s/p Robotic Laparoscopic hysterectomy with Bilateral Salpingectomy on 2020  -CT abdomen 2020: showed findings compatible with acute colitis/diverticulitis of the rectosigmoid colon and multiple pericolonic fluid collections.  -Chest x-ray was unremarkable  -Blood cultures show no

## 2020-07-23 NOTE — PROGRESS NOTES
Gynecology Progress Note    Date: 7/23/2020  Time: 5:39 AM    Colleen Brewer is a 47 y.o. female [id number] HD# 2 with Diverticulitis/colitis with pericolonic abscesses POD#15 s/p Robotic assisted Laparoscopic Hysterectomy with Bilateral Salpingectomy with Cystoscopy on 7/8/20     Patient was seen and examined. She complained of abdominal pain that is still persistent since admission. She has been receiving morphine 2mg IV x1 and 4mg IV x2 since admission. She states it does not help at all and that pain is not controlled. Patient is  tolerating oral intake. She is urinating well . She denies any vaginal bleeding. She is  ambulating without difficulty. She is  passing flatus. She denies Fever/Chills, Chest Pain, SOB, N/V, Calf Pain    Vitals:  Vitals:    07/22/20 1845 07/22/20 1946 07/22/20 2203 07/23/20 0000   BP: (!) 133/96 (!) 109/90  127/71   Pulse: 109 99  102   Resp: 22 22 19   Temp:  98.9 °F (37.2 °C) 100.8 °F (38.2 °C) 99.5 °F (37.5 °C)   TempSrc:  Oral  Oral   SpO2: 98% 100%  97%   Weight:       Height:           Intake/Output:   Last Shift: I/O last 3 completed shifts:   In: 1100 [IV Piggyback:1100]  Out: -   Current Shift: I/O this shift:  In: -   Out: 700 [Urine:700]    Physical Exam:  General:  no apparent distress, alert and cooperative  Neurologic:  alert, oriented, normal speech, no focal findings or movement disorder noted  Lungs:  No increased work of breathing, good air exchange, clear to auscultation bilaterally, no crackles or wheezing  Heart:  normal S1 and S2, regular rate and rhythm and no murmur    Abdomen: Abdomen soft, normoactive BS normal. No masses,  No organomegaly, tender to mainly in the suprapubic/midline lower abdomen to light palpation  Incisions: Port site incision sites with pink granulation tissue, no erythema/edema/drainage  Extremities:  no calf tenderness, non edematous    Lab:  Complete Blood Count:   Recent Labs     07/22/20  1545   WBC 21.6*   HGB 13.6   HCT 43.0   PLT 325        PT/INR:    Lab Results   Component Value Date    PROTIME 9.8 01/13/2019    INR 0.9 01/13/2019     PTT:    Lab Results   Component Value Date    APTT 22.7 01/13/2019       Comprehensive Metabolic Profile:   Recent Labs     07/22/20  1545      K 4.3      CO2 23   BUN 13   CREATININE 0.70   GLUCOSE 94   CALCIUM 8.8   PROT 7.0   LABALBU 4.1   BILITOT 0.77   ALKPHOS 73   AST 12   ALT 13        Cultures:  Blood cultures x2: shows no growth for 10 hours    Assessment/Plan:  Sylvia Edmond 47 y.o. female [id number] HD# 1 with diverticulitis/colitis with pericolonic abscesses POD#15 s/p Robotic assisted Laparoscopic Hysterectomy with Bilateral Salpingectomy with Cystoscopy on 7/8/20    - Internal medicine as primary team   - Doing well, vitals overall stable with Tmax and Last Temp 100.8 @ 2203, 7/22/20   - UOP adequate (spontaneously voiding)   - IVF: NS 100ml/hr   - Labs: CBC, BMP pending this AM    - Antibiotics: Ciprofloxacin 400mg IV [id number] and Flagyl 500mg IV [id number], s/p meropenem 1g IV x1 in the ED   - DVT prophylaxis: lovenox 40mg daily    - GI: pepcid 20mg BID, Colace BID   - Pain control: Morphine 2mg/4mg i3mongs PRN, can consider oral pain medications for better pain control    - Infectious disease consult pending   - General surgery consulted   - Blood cultures x 2 with no growth for 10 hours     - Lactic acid 1.0>0.9     Leukocytosis (21.6)   - CBC pending this AM   - vitals overall stable with Tmax and Last Temp 100.8 @ 2203, 7/22/20     Endometriosis              - Stable on lyrica 75mg TID        Hx of CS x2       Prediabetes              - LDSSI   - Will monitor blood sugars AC and HS              - Defer to Internal medicine as primary team       COPD/Asthma              - Stable on Singulair 10mg nightly, ipratropium-albuterol nebulizer [id number] PRN, combivent inhaler PRN              - Defer to Internal medicine as primary team       Seizure disorder              - Stable on lamictal 25mg BID               - Defer to Internal medicine as primary team       GERD/Sadler's esophagus              - Pepcid 20mg BID ordered              - Defer to Internal medicine as primary team       Chronic constipation              - Home med: linzess 290mg capsule every morning              - Defer to Internal medicine as primary team       Cervical disc herniation/Arthritis              - Defer to Internal medicine as primary team       Smoker              - Encouraged cessation       Migraine without aura              - Defer to Internal medicine as primary team       Bipolar affective disorder/Depression              - Denies any SI/HI       Insomnia               - Defer to Internal medicine as primary team       BMI 37.68    Patient Active Problem List    Diagnosis Date Noted    Hx of  x2 (G3, G4) 10/14/2018     Priority: High     Overview Note:     Hx CS x1 (G3)      History of gestational hypertension 2018     Priority: High     Overview Note:     18 BP of 142/82 at 20w3d, repeat BP 1 minute later 140/76, all other BPs wnl  PreE labs wnl, P/C ratio 0.08   Elevated BPs 146/67 and 150/68, pt now meets criteria for gHTN      Seizure ([facility] [address] 2018     Priority: Medium    Hx of IUFD (G2) 2018     Priority: Medium    COPD      Priority: Medium    Sadler's esophagus without dysplasia      Priority: Medium     Overview Note:     No NSAIDS as per patient      Iron deficiency anemia 2015     Priority: Medium    Asthma 2015     Priority: Medium    GERD 2015     Priority: Medium    Spinal stenosis in cervical region 2018     Priority: Low    Cervical radiculopathy 2017     Priority: Low    Migraine without aura 2017     Priority: Low    Hiatal hernia      Priority: Low    Smoker 2017     Priority: Low    Arthritis 2016     Priority: Low    Cervical disc herniation 2016     Priority: Low    Diverticulitis 07/22/2020    Wound infection/hemorrhage, obstetric surgical, postpartum condition 07/15/2020    Postoperative visit 07/15/2020    Postoperative abdominal pain 07/15/2020    Post-op pain     RALH with BS and cystoscopy 7/8/2020 07/08/2020    Pelvic peritoneal endometriosis     Moderate persistent asthma without complication 83/06/9021    Lipoma of torso 04/14/2020    History of cervical discectomy 04/14/2020    Chronic neck pain with abnormal neurologic examination 04/14/2020    Abnormal weight gain  04/14/2020    Class 2 drug-induced obesity with serious comorbidity and body mass index (BMI) of 37.0 to 37.9 in adult 04/14/2020    Bipolar affective disorder, currently depressed, moderate ([address] 04/10/2020    Insomnia 04/10/2020    Ulnar neuropathy 04/10/2020    Major depressive disorder, recurrent episode ([facility] [address] 04/10/2020    Sciatica 04/10/2020    Endometriosis 03/09/2020    Family history of breast cancer 03/09/2020    Enlarged uterus 02/17/2020    Chronic pelvic pain in female 01/22/2020    Chronic constipation 03/26/2018    Prediabetes 12/08/2016     Will update Dr. Almaz Wilson. Stephanie Clark, DO  Ob/Gyn Resident  Pager: [phone]  7/23/2020, 5:39 AM   I have discussed the care of Jennifer Mitchell, including pertinent history and exam findings, with the resident. I have seen and examined the patient and the key elements of all parts of the encounter have been performed by me. I agree with the assessment, plan and orders as documented by the resident.     Chepe Nolen MD  Attending Physician

## 2020-07-23 NOTE — CONSULTS
General Surgery Consult      Pt Name: Basilia Kessler  MRN: [id number]  YOB: 1980  Date of evaluation: 7/23/2020  Primary Care Physician: KATIE Cassidy CNP   Patient evaluated at the request of  Dr. Anamika Sutherland  Reason for evaluation: Abdominal pain    SUBJECTIVE:   History of Chief Complaint:    Basilia Kessler is a 47 y.o. female who presents with abdominal pain. Patient had robotic hysterectomy with bilateral salpingectomy lysis of adhesions by gynecologist couple of weeks ago at [facility].  Patient was in the emergency department on the 15th with abdominal pain was discharged. At that time no radiology studies were done. She came back with leukocytosis abdominal pain. CT scan showed multiple loculated fluid collections. Patient is eating very well even though she states she does not want to eat. She has had 2 cheeseburgers according to the nursing staff. No nausea vomiting. No distention no fever chills. CT scan was reviewed with the radiologist.  Blood work noted. WBC count is improved with antibiotics. Infectious diseases on the case. Patient stated ever since her surgery she has had problem with abdominal pain. Bowels are moving. They are solid. Pressure during bowel movement and pressure during urination. Past Medical History   has a past medical history of Anxiety, Arthritis, Asthma, Sadler's esophagus, Bipolar 1 disorder ([address] CAD (coronary artery disease), Chronic insomnia, COPD (chronic obstructive pulmonary disease) ([address] Depression, Diabetes mellitus ([address] Endometriosis, Esophagitis, GERD (gastroesophageal reflux disease), Headache(784.0), Herniated disc, cervical, Hiatal hernia, Incisional abscess, Kidney stones, MDRO (multiple drug resistant organisms) resistance, Pleurisy, Pneumonia, Seizures ([address] UTI (urinary tract infection), and Vision abnormalities.     Past Surgical History   has a past surgical history that includes knee surgery (Left);  section (, ); Tonsillectomy; Knee arthroscopy (Left, 5/20/15); Cervical disc surgery; Upper gastrointestinal endoscopy (2016); Upper gastrointestinal endoscopy (2017); Upper gastrointestinal endoscopy (2017); pr esophagogastroduodenoscopy transoral diagnostic (N/A, 3/2/2017); laparoscopy; laparoscopy (N/A, 10/5/2017); Upper gastrointestinal endoscopy (N/A, 2018); Endoscopy, colon, diagnostic;  section (N/A, 2018); Upper gastrointestinal endoscopy (N/A, 10/1/2019); Colonoscopy (N/A, 10/1/2019); Colonoscopy (N/A, 2020); [place] tooth extraction; and Hysterectomy (N/A, 2020). Medications  Prior to Admission medications    Medication Sig Start Date End Date Taking? Authorizing Provider   QUEtiapine (SEROQUEL) 50 MG tablet Take 50 mg by mouth nightly   Yes Historical Provider, MD   lamoTRIgine (LAMICTAL) 25 MG tablet Take 75 mg by mouth daily    Yes Historical Provider, MD   docusate sodium (COLACE) 100 MG capsule Take 1 capsule by mouth 2 times daily 20  Yes Shantell Tempe, DO   pregabalin (LYRICA) 75 MG capsule TAKE 1 CAPSULE BY MOUTH 3 TIMES DAILY 20 Yes KATIE Cole CNP   BREO ELLIPTA 200-25 MCG/INH AEPB inhaler INHALE ONE PUFF BY MOUTH ONCE A DAY ONCE A DAY INHALATION 30 20  Yes KATIE Cole CNP   LINZESS 290 MCG CAPS capsule TAKE 1 CAPSULE BY MOUTH EVERY MORNING (BEFORE BREAKFAST) 20  Yes Mariya Henning MD   acetaminophen (TYLENOL) 500 MG tablet Take 500 mg by mouth every 6 hours as needed for Pain   Yes Historical Provider, MD   vitamin D (CHOLECALCIFEROL) 25 MCG (1000 UT) TABS tablet Take 1 tablet by mouth daily 20 Yes KATIE Cole CNP   metFORMIN (GLUCOPHAGE) 500 MG tablet Take 1 tablet by mouth 2 times daily (with meals) Take 1 tablet daily for the first 7 days.  Then BID 20 Yes KATIE Cole - CNP   pantoprazole (PROTONIX) 40 MG tablet TAKE ONE TABLET TWICE A DAY ORALLY 30 DAY(S) 4/6/20  Yes Kavya Molina MD   ipratropium-albuterol (DUONEB) 0.5-2.5 (3) MG/3ML SOLN nebulizer solution Inhale 1 vial into the lungs 4 times daily   Yes Historical Provider, MD   EPINEPHrine (EPIPEN 2-HELDER) 0.3 MG/0.3ML SOAJ injection Inject 0.3 mLs into the muscle once for 1 dose Use as directed for allergic reaction 3/29/19 7/22/20 Yes Jimmy Bianchi,    COMBIVENT RESPIMAT  MCG/ACT AERS inhaler Inhale 1 puff into the lungs every 6 hours as needed  9/15/18  Yes Historical Provider, MD   montelukast (SINGULAIR) 10 MG tablet Take 1 tablet by mouth nightly 9/20/18  Yes Laura Alas MD   Masks (CLEVER CHOICE FACE MASK) MISC USE NEW FACE MASK EVERYDAY WHEN PATIENT GO OUTSIDE OF HOME DUE TO COVID PANDEMIC 7/6/20   Renella CASE Bernal APRN - CNP   Masks (CLEVER CHOICE FACE MASK) MISC USE NEW FACE MASK EVERYDAY WHEN PATIENT GO OUTSIDE OF HOME DUE TO COVID PANDEMIC 7/6/20   Renella A Merlemis APRN - CNP   MISC NATURAL PRODUCT OP Take 2 tablets by mouth 2 times daily hydroxycut max-weight loss supplement    Historical Provider, MD   blood glucose monitor strips Test 2-3 times a day & as needed for symptoms of irregular blood glucose. BRAND OF CHOICE INSURANCE ALLOWS. 6/12/20   Renella A TALAT BernalN - CNP   Lancets MISC 1 each by Does not apply route 2 times daily 6/12/20   Renella A Merlemis APRN - CNP   Alcohol Swabs (ALCOHOL PREP) PADS Use as directed 6/12/20   Renella A Gauamis, APRN - CNP     Allergies  is allergic to nsaids; toradol [ketorolac tromethamine]; and ultram [tramadol]. Family History  family history includes Bipolar Disorder in her brother and mother; Breast Cancer in her mother; Cancer in her mother; Hypertension in her father and mother. Social History   reports that she has been smoking cigarettes. She has a 21.00 pack-year smoking history. She has never used smokeless tobacco. She reports that she does not drink alcohol or use drugs.     Review of Systems:  All 10 system review was conducted. Please refer to chart. OBJECTIVE:   VITALS:  height is 5' 2\" (1.575 m) and weight is 206 lb (93.4 kg). Her oral temperature is 98.8 °F (37.1 °C). Her blood pressure is 128/85 and her pulse is 100. Her respiration is 18 and oxygen saturation is 98%. CONSTITUTIONAL: Alert and oriented times 3, no acute distress and cooperative to examination with proper mood and affect. SKIN: Skin color, texture, turgor normal. No rashes or lesions. LYMPH: no cervical nodes, no inguinal nodes  HEENT: Head is normocephalic, atraumatic. EOMI, PERRLA  NECK: Supple, symmetrical, trachea midline, no adenopathy, thyroid symmetric, not enlarged and no tenderness, skin normal  CHEST/LUNGS: chest symmetric with normal A/P diameter, normal respiratory rate and rhythm, lungs clear to auscultation without wheezes, rales or rhonchi. No accessory muscle use. Scars None   CARDIOVASCULAR: Heart regular rate and rhythm Normal S1 and S2. . Carotid and femoral pulses 2+/4 and equal bilaterally  ABDOMEN: Normal shape. . Laparoscopic scar(s) present. Normal bowel sounds. No bruits. Soft, nondistended, no masses or organomegaly. no evidence of hernia. Percussion: Normal without hepatosplenomegally. Tenderness: Lower abdomen. No peritoneal signs. RECTAL: deferred, not clinically indicated  NEUROLOGIC: There are no focalizing motor or sensory deficits. CN II-XII are grossly intact.   EXTREMITIES: no cyanosis, no clubbing and no edema    LABS:   CBC with Differential:    Lab Results   Component Value Date    WBC 16.9 07/23/2020    RBC 4.46 07/23/2020    RBC 4.49 03/25/2012    HGB 11.4 07/23/2020    HCT 35.9 07/23/2020     07/23/2020     03/25/2012    MCV 80.4 07/23/2020    MCH 25.5 07/23/2020    MCHC 31.7 07/23/2020    RDW 16.3 07/23/2020    METASPCT 3 06/21/2016    LYMPHOPCT 5 07/23/2020    MONOPCT 5 07/23/2020    BASOPCT 0 07/23/2020    MONOSABS 0.85 07/23/2020    LYMPHSABS 0.85 07/23/2020 EOSABS 0.00 07/23/2020    BASOSABS 0.00 07/23/2020    DIFFTYPE NOT REPORTED 07/23/2020     BMP:    Lab Results   Component Value Date     07/23/2020    K 4.0 07/23/2020     07/23/2020    CO2 21 07/23/2020    BUN 9 07/23/2020    LABALBU 4.1 07/22/2020    LABALBU 4.1 03/25/2012    CREATININE 0.60 07/23/2020    CALCIUM 8.3 07/23/2020    GFRAA >60 07/23/2020    LABGLOM >60 07/23/2020    GLUCOSE 132 07/23/2020    GLUCOSE 106 03/25/2012     Hepatic Function Panel:    Lab Results   Component Value Date    ALKPHOS 73 07/22/2020    ALT 13 07/22/2020    AST 12 07/22/2020    PROT 7.0 07/22/2020    BILITOT 0.77 07/22/2020    BILIDIR <0.08 05/07/2018    IBILI CANNOT BE CALCULATED 05/07/2018    LABALBU 4.1 07/22/2020    LABALBU 4.1 03/25/2012     Calcium:    Lab Results   Component Value Date    CALCIUM 8.3 07/23/2020     Magnesium:    Lab Results   Component Value Date    MG 1.9 04/07/2019     Phosphorus:  No results found for: PHOS  PT/INR:    Lab Results   Component Value Date    PROTIME 9.8 01/13/2019    INR 0.9 01/13/2019     ABG:    Lab Results   Component Value Date    PHART 7.447 05/21/2017    JGM1MZR 35.0 05/21/2017    PO2ART 64.7 05/21/2017    FRR6DWP 24.2 05/21/2017    M8QJDUQA 89.6 05/21/2017     Urine Culture:  No components found for: CURINE  Blood Culture:  No components found for: CBLOOD, CFUNGUSBL  Stool Culture:  No components found for: CSTOOL    RADIOLOGY:   I have personally reviewed the following films:  Ct Abdomen Pelvis W Iv Contrast Additional Contrast? None    Result Date: 7/22/2020  EXAMINATION: CT OF THE ABDOMEN AND PELVIS WITH CONTRAST 7/22/2020 5:01 pm TECHNIQUE: CT of the abdomen and pelvis was performed with the administration of intravenous contrast. Multiplanar reformatted images are provided for review. Dose modulation, iterative reconstruction, and/or weight based adjustment of the mA/kV was utilized to reduce the radiation dose to as low as reasonably achievable.  COMPARISON: 2 July 2020 HISTORY: ORDERING SYSTEM PROVIDED HISTORY: abd pain TECHNOLOGIST PROVIDED HISTORY: abd pain FINDINGS: Lower Chest: No acute abnormality in the included pulmonary parenchyma at the lung bases. Shotty epicardial lymph nodes are present without gricelda adenopathy. No pericardial effusion is evident. Organs: Liver, spleen, pancreas, gallbladder, adrenals, and kidneys demonstrate no acute abnormality. GI/Bowel: No free fluid or free air is noted. The rectosigmoid colon is abnormal in appearance, with loss of haustra, serosal haziness, shaggy appearance, and pericolonic fat infiltration. Multiple pericolonic complex collections are noted, likely pericolonic abscesses including one in the left lower quadrant of 2.5 x 3.5 cm, which appears to be between phlegmon and abscess formation, a right paracentral lower pelvic collection of 2.3 cm more typical of abscess, a central posterior collection of 1.9 cm, and a 4th collection of 2.2 x 1.4 cm. Inflamed small bowel loops in the right lower quadrant are noted in the upper pelvis. Appendix contains some dense material likely an appendicolith without evidence of acute appendicitis. Pelvis: Bladder is unremarkable. Bilateral fat-containing hernias are present without strangulation. As previously noted, inflammatory changes are present in the pelvis with pericolonic complex and fluid collections compatible with multiple phlegmons and/or abscesses. No discrete extraluminal air is noted. Peritoneum/Retroperitoneum: No aortic aneurysm, retroperitoneal or mesenteric adenopathy is present. Bones/Soft Tissues: No acute osseous or soft tissue abnormality is noted. 1.  Findings compatible with acute colitis/diverticulitis of the rectosigmoid colon with multiple pericolonic fluid collections consistent with complex phlegmons and abscesses. 2.  Apparent appendicolith in the appendix. No acute appendicitis.  Critical results were called by Dr. Burak Dumont MD to [facility] AT [facility] on 7/22/2020 at 17:16. Xr Chest Portable    Result Date: 7/22/2020  EXAMINATION: ONE XRAY VIEW OF THE CHEST 7/22/2020 4:31 pm COMPARISON: 07/17/2020 and 07/07/2020 HISTORY: Reason for Exam: Chest pains Acuity: Acute Type of Exam: Initial FINDINGS: The lungs are without acute focal process. No effusion or pneumothorax. The cardiomediastinal silhouette is normal.  The osseous structures are intact without acute process. Lower cervical spine hardware. Unremarkable chest.         IMPRESSION:   Status post robotic hysterectomy with bilateral salpingectomy and lysis of adhesions July 8, 2020. Based on my review of the CT scan with the radiologist this seems all postoperative change from her hysterectomy. Clinical I do not believe she has had colitis or diverticulitis. There is no drainable fluid collection. All of them are loculated and very small. She is responding to IV antibiotics. She is tolerating diet. does not have any pertinent problems on file. PLAN:   As above. Continue IV antibiotics per infectious disease. Diet as tolerated. Management per GYN physician. I will follow with you as needed. Please call with any questions or concerns. Thank you for this interesting consult and for allowing us to participate in the care of this patient. If you have any questions please don't hesitate to call.           Electronically signed by Robe Bui MD  on 7/23/2020 at 5:53 PM

## 2020-07-23 NOTE — PROGRESS NOTES
Physician Progress Note      Sofi Villanueva  CSN #:                  [id number]  :                       1980  ADMIT DATE:       2020 3:13 PM  [address] [place] DATE:  RESPONDING  PROVIDER #:        Tariq TINOCO MD          QUERY TEXT:    Pt admitted with Diverticulitis. Pt noted to have Sepsis indicators. If   possible, please document in the progress notes and discharge summary if you   are evaluating and /or treating any of the following: The medical record reflects the following:  Risk Factors: 47 yr old female, recent OR on  [facility]  Clinical Indicators: WBC 21.6, Temp 100.8, Lac 1.0, RR 18-22, HR   Treatment: Admit to [facility], BC x2, IVF bolus, maintenance fluids, IV flagyl &   meropenem, repeat labs    Thank you, Macrina KAUFMAN RN MSN, CDS  Please call with any questions [phone]  Options provided:  -- Sepsis, present on admission  -- Sepsis, now resolved  -- No Sepsis, localized infection only  -- Sepsis was ruled out  -- Other - I will add my own diagnosis  -- Disagree - Clinically unable to determine / Unknown  -- Refer to Clinical Documentation Reviewer    PROVIDER RESPONSE TEXT:    This patient has localized infection only, patient is not septic. Query created by:  Anthony Swenson on 2020 7:47 AM      Electronically signed by:  Belia Garcia MD 2020 7:49 AM

## 2020-07-23 NOTE — PROGRESS NOTES
Writer spoke with patient, maintenance unable to fix A/C. Patient to be moved to room 2040. Patient updated on new order for pain medication and parameters. Nurse to administer.

## 2020-07-23 NOTE — PROGRESS NOTES
Gynecology Progress Note    Date: 7/24/2020  Time: 5:16 AM    Gisele Sloan is a 47 y.o. female [id number] HD# 3 with Diverticulitis/colitis with pericolonic abscesses POD#16 s/p Robotic assisted Laparoscopic Hysterectomy with Bilateral Salpingectomy with Cystoscopy on 7/8/20     Patient was seen and examined. Upon entering the room, patient is resting comfortably in bed and when awakened for exam, patient reports persistent abdominal pain and states pain is not well controlled. Patient is  tolerating oral intake. She is urinating well but reports pressure with urination and some pain with urination. She denies any vaginal bleeding. She is  ambulating without difficulty. She is  passing flatus and having bowel movements. She denies Fever/Chills, Chest Pain, SOB, N/V, Calf Pain    Vitals:  Vitals:    07/23/20 1341 07/23/20 1643 07/23/20 1935 07/23/20 1945   BP: 128/85   116/65   Pulse: 100   95   Resp: 18 18 20 20   Temp: 98.8 °F (37.1 °C)   99.8 °F (37.7 °C)   TempSrc: Oral   Oral   SpO2: 98% 98% 96% 99%   Weight:       Height:             Intake/Output:   Last Shift: I/O last 3 completed shifts: In: 2100 [P.O.:900; I.V.:1200]  Out: 1725 [Urine:1725]  Current Shift: No intake/output data recorded.     Physical Exam:  General:  awake, alert, cooperative, no apparent distress  Neurologic:  alert, oriented, normal speech, no focal findings or movement disorder noted  Lungs:  No increased work of breathing, good air exchange, clear to auscultation bilaterally, no crackles, slight expiratory wheezes noted throughout consistent with pt's COPD/Asthma  Heart:  normal S1 and S2, regular rate and rhythm and no murmur    Abdomen: Abdomen soft, normoactive BS normal. No masses,  No organomegaly, tender to mainly in the suprapubic/midline lower abdomen to light palpation  Incision: Port site incision sites with pink granulation tissue, no erythema/edema/drainage  Extremities:  no calf tenderness, non edematous    Lab:  Complete Blood Count:   Recent Labs     07/22/20  1545 07/23/20  0606   WBC 21.6* 16.9*   HGB 13.6 11.4*   HCT 43.0 35.9*    256        PT/INR:    Lab Results   Component Value Date    PROTIME 9.8 01/13/2019    INR 0.9 01/13/2019     PTT:    Lab Results   Component Value Date    APTT 22.7 01/13/2019       Comprehensive Metabolic Profile:   Recent Labs     07/22/20  1545 07/23/20  0606    137   K 4.3 4.0    105   CO2 23 21   BUN 13 9   CREATININE 0.70 0.60   GLUCOSE 94 132*   CALCIUM 8.8 8.3*   PROT 7.0  --    LABALBU 4.1  --    BILITOT 0.77  --    ALKPHOS 73  --    AST 12  --    ALT 13  --         Cultures:  Blood cultures x2: shows no growth x 2days    Assessment/Plan:  Winnie Todd 47 y.o. female [id number] HD# 3 with diverticulitis/colitis with pericolonic abscesses POD#16 s/p Robotic assisted Laparoscopic Hysterectomy with Bilateral Salpingectomy with Cystoscopy on 7/8/20    - Internal medicine as primary team              - Doing well overall, vitals overall stable with Tmax and Last Temp 100.8 @ 2203, 7/22/20              - UOP adequate (spontaneously voiding)              - IVF: NS 100ml/hr              - Labs: CBC, BMP daily              - Antibiotics: Continue Ciprofloxacin 400mg IV [id number] and Flagyl 500mg IV [id number]     - S/p meropenem 1g IV x1 in the ED              - DVT prophylaxis: lovenox 40mg daily               - GI: Protonix 40mg hs, Colace BID              - Pain control: Fentanyl 25/50mcg [id number] PRN and MAR shows patient is receiving 50mcg every 2 hours overnight and patient reports pain is not well controlled, however upon arrival to room, pt appeared to be resting comfortably in bed in no distress    - Will order pyridium 200mg [id number] scheduled x 3 doses as patient is reporting suprapubic pain/dysuria/urinary pressure   - UA on admission with low suspicion for UTI              - Infectious disease consulted- agree to continue Cipro/Flagyl IV - General surgery consulted- no surgical intervention planned, continue with antibiotics, appears to be signed off              - Blood cultures x 2 with no growth for 2 days     - Last CT abdomen/pelvis with IV contrast on 20 and will re-order for re-evaluation on 20 AM       Leukocytosis (21.6>16.9)- improving              - CBC daily              - vitals overall stable with Tmax and Last Temp 100.8 @ 2203, 20      Endometriosis              - Stable on lyrica 75mg TID        Hx of CS x2       Prediabetes              - LDSSI              - Will monitor blood sugars AC and HS              - Defer to Internal medicine as primary team       COPD/Asthma              - Stable on Singulair 10mg nightly, ipratropium-albuterol nebulizer [id number] PRN, combivent inhaler PRN              - Defer to Internal medicine as primary team       Seizure disorder              - Stable on lamictal 25mg BID               - Defer to Internal medicine as primary team       GERD/Sadler's esophagus              - Protonix 40mg hs ordered              - Defer to Internal medicine as primary team       Chronic constipation              - Linzess 290mg capsule every morning ordered              - Defer to Internal medicine as primary team       Cervical disc herniation/Arthritis              - Defer to Internal medicine as primary team       Smoker              - Encouraged cessation       Migraine without aura              - Defer to Internal medicine as primary team       Bipolar affective disorder/Depression              - Denies any SI/HI       Insomnia               - Defer to Internal medicine as primary team       BMI 37.68    Patient Active Problem List    Diagnosis Date Noted    Hx of  x2 (G3, G4) 10/14/2018     Priority: High     Overview Note:     Hx CS x1 (G3)      History of gestational hypertension 2018     Priority: High     Overview Note:     18 BP of 142/82 at 20w3d, repeat BP 1 minute later 140/76, all other BPs wnl  PreE labs wnl, P/C ratio 0.08  9/16 Elevated BPs 146/67 and 150/68, pt now meets criteria for gHTN      Seizure ([address] 09/16/2018     Priority: Medium    Hx of IUFD (G2) 08/29/2018     Priority: Medium    COPD      Priority: Medium    Sadler's esophagus without dysplasia      Priority: Medium     Overview Note:     No NSAIDS as per patient      Iron deficiency anemia 12/18/2015     Priority: Medium    Asthma 12/17/2015     Priority: Medium    GERD 12/17/2015     Priority: Medium    Spinal stenosis in cervical region 01/29/2018     Priority: Low    Cervical radiculopathy 12/29/2017     Priority: Low    Migraine without aura 12/29/2017     Priority: Low    Hiatal hernia      Priority: Low    Smoker 02/14/2017     Priority: Low    Arthritis 09/02/2016     Priority: Low    Cervical disc herniation 04/12/2016     Priority: Low    Diverticulitis of colon     Diverticulitis 07/22/2020    Wound infection/hemorrhage, obstetric surgical, postpartum condition 07/15/2020    Postoperative visit 07/15/2020    Postoperative abdominal pain 07/15/2020    Post-op pain     RALH with BS and cystoscopy 7/8/2020 07/08/2020    Pelvic peritoneal endometriosis     Moderate persistent asthma without complication 21/51/0654    Lipoma of torso 04/14/2020    History of cervical discectomy 04/14/2020    Chronic neck pain with abnormal neurologic examination 04/14/2020    Abnormal weight gain  04/14/2020    Class 2 drug-induced obesity with serious comorbidity and body mass index (BMI) of 37.0 to 37.9 in adult 04/14/2020    Bipolar affective disorder, currently depressed, moderate ([address] 04/10/2020    Insomnia 04/10/2020    Ulnar neuropathy 04/10/2020    Major depressive disorder, recurrent episode ([address] 04/10/2020    Sciatica 04/10/2020    Endometriosis 03/09/2020    Family history of breast cancer 03/09/2020    Enlarged uterus 02/17/2020    Chronic pelvic pain in female 01/22/2020    Chronic constipation 03/26/2018    Prediabetes 12/08/2016     Dr. Rebeca Vasquez present at bedside and agreeable to plan. Sabina Santiago DO  Ob/Gyn Resident  Pager: [phone]  7/24/2020, 5:16 AM  I have discussed the care of Lisandra Rivero, including pertinent history and exam findings, with the resident. I have seen and examined the patient and the key elements of all parts of the encounter have been performed by me. I agree with the assessment, plan and orders as documented by the resident.     Jacki Gottron, MD  Attending Physician

## 2020-07-24 ENCOUNTER — APPOINTMENT (OUTPATIENT)
Dept: CT IMAGING | Age: 40
DRG: 392 | End: 2020-07-24
Payer: COMMERCIAL

## 2020-07-24 LAB
ABSOLUTE EOS #: 0.2 K/UL (ref 0–0.4)
ABSOLUTE IMMATURE GRANULOCYTE: ABNORMAL K/UL (ref 0–0.3)
ABSOLUTE LYMPH #: 1.2 K/UL (ref 1–4.8)
ABSOLUTE MONO #: 0.9 K/UL (ref 0.1–1.3)
ANION GAP SERPL CALCULATED.3IONS-SCNC: 11 MMOL/L (ref 9–17)
BASOPHILS # BLD: 0 % (ref 0–2)
BASOPHILS ABSOLUTE: 0.1 K/UL (ref 0–0.2)
BUN BLDV-MCNC: 11 MG/DL (ref 6–20)
BUN/CREAT BLD: ABNORMAL (ref 9–20)
CALCIUM SERPL-MCNC: 8.5 MG/DL (ref 8.6–10.4)
CHLORIDE BLD-SCNC: 106 MMOL/L (ref 98–107)
CO2: 23 MMOL/L (ref 20–31)
CREAT SERPL-MCNC: 0.72 MG/DL (ref 0.5–0.9)
DIFFERENTIAL TYPE: ABNORMAL
EOSINOPHILS RELATIVE PERCENT: 1 % (ref 0–4)
GFR AFRICAN AMERICAN: >60 ML/MIN
GFR NON-AFRICAN AMERICAN: >60 ML/MIN
GFR SERPL CREATININE-BSD FRML MDRD: ABNORMAL ML/MIN/{1.73_M2}
GFR SERPL CREATININE-BSD FRML MDRD: ABNORMAL ML/MIN/{1.73_M2}
GLUCOSE BLD-MCNC: 114 MG/DL (ref 65–105)
GLUCOSE BLD-MCNC: 118 MG/DL (ref 65–105)
GLUCOSE BLD-MCNC: 119 MG/DL (ref 65–105)
GLUCOSE BLD-MCNC: 121 MG/DL (ref 70–99)
GLUCOSE BLD-MCNC: 125 MG/DL (ref 65–105)
HCT VFR BLD CALC: 34.8 % (ref 36–46)
HEMOGLOBIN: 11 G/DL (ref 12–16)
IMMATURE GRANULOCYTES: ABNORMAL %
LYMPHOCYTES # BLD: 9 % (ref 24–44)
MCH RBC QN AUTO: 25.7 PG (ref 26–34)
MCHC RBC AUTO-ENTMCNC: 31.7 G/DL (ref 31–37)
MCV RBC AUTO: 81.3 FL (ref 80–100)
MONOCYTES # BLD: 7 % (ref 1–7)
NRBC AUTOMATED: ABNORMAL PER 100 WBC
PDW BLD-RTO: 16.1 % (ref 11.5–14.9)
PLATELET # BLD: 239 K/UL (ref 150–450)
PLATELET ESTIMATE: ABNORMAL
PMV BLD AUTO: 8.6 FL (ref 6–12)
POTASSIUM SERPL-SCNC: 4 MMOL/L (ref 3.7–5.3)
RBC # BLD: 4.28 M/UL (ref 4–5.2)
RBC # BLD: ABNORMAL 10*6/UL
SEG NEUTROPHILS: 83 % (ref 36–66)
SEGMENTED NEUTROPHILS ABSOLUTE COUNT: 10.2 K/UL (ref 1.3–9.1)
SODIUM BLD-SCNC: 140 MMOL/L (ref 135–144)
WBC # BLD: 12.5 K/UL (ref 3.5–11)
WBC # BLD: ABNORMAL 10*3/UL

## 2020-07-24 PROCEDURE — 1200000000 HC SEMI PRIVATE

## 2020-07-24 PROCEDURE — 36415 COLL VENOUS BLD VENIPUNCTURE: CPT

## 2020-07-24 PROCEDURE — 6370000000 HC RX 637 (ALT 250 FOR IP): Performed by: STUDENT IN AN ORGANIZED HEALTH CARE EDUCATION/TRAINING PROGRAM

## 2020-07-24 PROCEDURE — 99231 SBSQ HOSP IP/OBS SF/LOW 25: CPT | Performed by: SPECIALIST

## 2020-07-24 PROCEDURE — 99232 SBSQ HOSP IP/OBS MODERATE 35: CPT | Performed by: INTERNAL MEDICINE

## 2020-07-24 PROCEDURE — 2580000003 HC RX 258: Performed by: SPECIALIST

## 2020-07-24 PROCEDURE — 2580000003 HC RX 258: Performed by: NURSE PRACTITIONER

## 2020-07-24 PROCEDURE — 80048 BASIC METABOLIC PNL TOTAL CA: CPT

## 2020-07-24 PROCEDURE — 94761 N-INVAS EAR/PLS OXIMETRY MLT: CPT

## 2020-07-24 PROCEDURE — 85025 COMPLETE CBC W/AUTO DIFF WBC: CPT

## 2020-07-24 PROCEDURE — 2500000003 HC RX 250 WO HCPCS: Performed by: STUDENT IN AN ORGANIZED HEALTH CARE EDUCATION/TRAINING PROGRAM

## 2020-07-24 PROCEDURE — 6360000004 HC RX CONTRAST MEDICATION: Performed by: SPECIALIST

## 2020-07-24 PROCEDURE — 6360000002 HC RX W HCPCS: Performed by: STUDENT IN AN ORGANIZED HEALTH CARE EDUCATION/TRAINING PROGRAM

## 2020-07-24 PROCEDURE — 74177 CT ABD & PELVIS W/CONTRAST: CPT

## 2020-07-24 PROCEDURE — 2580000003 HC RX 258: Performed by: STUDENT IN AN ORGANIZED HEALTH CARE EDUCATION/TRAINING PROGRAM

## 2020-07-24 PROCEDURE — 82947 ASSAY GLUCOSE BLOOD QUANT: CPT

## 2020-07-24 PROCEDURE — 6360000002 HC RX W HCPCS: Performed by: PHYSICIAN ASSISTANT

## 2020-07-24 PROCEDURE — 94640 AIRWAY INHALATION TREATMENT: CPT

## 2020-07-24 RX ORDER — FENTANYL CITRATE 50 UG/ML
25 INJECTION, SOLUTION INTRAMUSCULAR; INTRAVENOUS EVERY 4 HOURS PRN
Status: DISCONTINUED | OUTPATIENT
Start: 2020-07-24 | End: 2020-07-25

## 2020-07-24 RX ORDER — FENTANYL CITRATE 50 UG/ML
25 INJECTION, SOLUTION INTRAMUSCULAR; INTRAVENOUS EVERY 6 HOURS PRN
Status: DISCONTINUED | OUTPATIENT
Start: 2020-07-24 | End: 2020-07-24

## 2020-07-24 RX ORDER — 0.9 % SODIUM CHLORIDE 0.9 %
80 INTRAVENOUS SOLUTION INTRAVENOUS ONCE
Status: COMPLETED | OUTPATIENT
Start: 2020-07-24 | End: 2020-07-24

## 2020-07-24 RX ORDER — PHENAZOPYRIDINE HYDROCHLORIDE 200 MG/1
200 TABLET, FILM COATED ORAL EVERY 8 HOURS
Status: COMPLETED | OUTPATIENT
Start: 2020-07-24 | End: 2020-07-24

## 2020-07-24 RX ORDER — LORAZEPAM 0.5 MG/1
0.5 TABLET ORAL EVERY 6 HOURS PRN
Status: DISCONTINUED | OUTPATIENT
Start: 2020-07-24 | End: 2020-07-28 | Stop reason: HOSPADM

## 2020-07-24 RX ORDER — SODIUM CHLORIDE 0.9 % (FLUSH) 0.9 %
10 SYRINGE (ML) INJECTION PRN
Status: DISCONTINUED | OUTPATIENT
Start: 2020-07-24 | End: 2020-07-26

## 2020-07-24 RX ORDER — OXYCODONE HYDROCHLORIDE AND ACETAMINOPHEN 5; 325 MG/1; MG/1
1 TABLET ORAL EVERY 8 HOURS PRN
Status: DISCONTINUED | OUTPATIENT
Start: 2020-07-24 | End: 2020-07-25

## 2020-07-24 RX ORDER — PANTOPRAZOLE SODIUM 40 MG/1
40 TABLET, DELAYED RELEASE ORAL
Status: DISCONTINUED | OUTPATIENT
Start: 2020-07-25 | End: 2020-07-28 | Stop reason: HOSPADM

## 2020-07-24 RX ORDER — FENTANYL CITRATE 50 UG/ML
25 INJECTION, SOLUTION INTRAMUSCULAR; INTRAVENOUS ONCE
Status: COMPLETED | OUTPATIENT
Start: 2020-07-24 | End: 2020-07-24

## 2020-07-24 RX ADMIN — LAMOTRIGINE 25 MG: 25 TABLET ORAL at 07:59

## 2020-07-24 RX ADMIN — LORAZEPAM 0.5 MG: 0.5 TABLET ORAL at 21:40

## 2020-07-24 RX ADMIN — IPRATROPIUM BROMIDE AND ALBUTEROL SULFATE 1 AMPULE: 2.5; .5 SOLUTION RESPIRATORY (INHALATION) at 19:06

## 2020-07-24 RX ADMIN — Medication 10 ML: at 13:33

## 2020-07-24 RX ADMIN — FENTANYL CITRATE 25 MCG: 50 INJECTION, SOLUTION INTRAMUSCULAR; INTRAVENOUS at 16:21

## 2020-07-24 RX ADMIN — FENTANYL CITRATE 25 MCG: 50 INJECTION INTRAMUSCULAR; INTRAVENOUS at 20:23

## 2020-07-24 RX ADMIN — FENTANYL CITRATE 50 MCG: 50 INJECTION INTRAMUSCULAR; INTRAVENOUS at 05:23

## 2020-07-24 RX ADMIN — PHENAZOPYRIDINE 200 MG: 200 TABLET ORAL at 13:44

## 2020-07-24 RX ADMIN — PANTOPRAZOLE SODIUM 40 MG: 40 TABLET, DELAYED RELEASE ORAL at 07:54

## 2020-07-24 RX ADMIN — ALUMINUM HYDROXIDE, MAGNESIUM HYDROXIDE, AND SIMETHICONE 30 ML: 200; 200; 20 SUSPENSION ORAL at 00:21

## 2020-07-24 RX ADMIN — IPRATROPIUM BROMIDE AND ALBUTEROL SULFATE 1 AMPULE: 2.5; .5 SOLUTION RESPIRATORY (INHALATION) at 16:37

## 2020-07-24 RX ADMIN — OXYCODONE AND ACETAMINOPHEN 1 TABLET: 5; 325 TABLET ORAL at 12:23

## 2020-07-24 RX ADMIN — FENTANYL CITRATE 25 MCG: 50 INJECTION, SOLUTION INTRAMUSCULAR; INTRAVENOUS at 12:05

## 2020-07-24 RX ADMIN — FAMOTIDINE 20 MG: 20 TABLET, FILM COATED ORAL at 20:25

## 2020-07-24 RX ADMIN — PHENAZOPYRIDINE 200 MG: 200 TABLET ORAL at 20:28

## 2020-07-24 RX ADMIN — METRONIDAZOLE 500 MG: 500 INJECTION, SOLUTION INTRAVENOUS at 03:22

## 2020-07-24 RX ADMIN — DOCUSATE SODIUM 100 MG: 100 CAPSULE, LIQUID FILLED ORAL at 20:24

## 2020-07-24 RX ADMIN — FENTANYL CITRATE 50 MCG: 50 INJECTION INTRAMUSCULAR; INTRAVENOUS at 10:02

## 2020-07-24 RX ADMIN — FENTANYL CITRATE 50 MCG: 50 INJECTION INTRAMUSCULAR; INTRAVENOUS at 07:55

## 2020-07-24 RX ADMIN — LAMOTRIGINE 25 MG: 25 TABLET ORAL at 20:27

## 2020-07-24 RX ADMIN — PREGABALIN 75 MG: 75 CAPSULE ORAL at 07:54

## 2020-07-24 RX ADMIN — SODIUM CHLORIDE 80 ML: 9 INJECTION, SOLUTION INTRAVENOUS at 13:33

## 2020-07-24 RX ADMIN — IPRATROPIUM BROMIDE AND ALBUTEROL SULFATE 1 AMPULE: .5; 3 SOLUTION RESPIRATORY (INHALATION) at 05:24

## 2020-07-24 RX ADMIN — Medication 10 ML: at 10:46

## 2020-07-24 RX ADMIN — PHENAZOPYRIDINE 200 MG: 200 TABLET ORAL at 07:58

## 2020-07-24 RX ADMIN — MONTELUKAST 10 MG: 10 TABLET, FILM COATED ORAL at 20:24

## 2020-07-24 RX ADMIN — CIPROFLOXACIN 400 MG: 2 INJECTION, SOLUTION INTRAVENOUS at 10:02

## 2020-07-24 RX ADMIN — METRONIDAZOLE 500 MG: 500 INJECTION, SOLUTION INTRAVENOUS at 12:07

## 2020-07-24 RX ADMIN — FAMOTIDINE 20 MG: 20 TABLET, FILM COATED ORAL at 07:53

## 2020-07-24 RX ADMIN — IPRATROPIUM BROMIDE AND ALBUTEROL SULFATE 1 AMPULE: 2.5; .5 SOLUTION RESPIRATORY (INHALATION) at 12:56

## 2020-07-24 RX ADMIN — FENTANYL CITRATE 50 MCG: 50 INJECTION INTRAMUSCULAR; INTRAVENOUS at 03:22

## 2020-07-24 RX ADMIN — OXYCODONE AND ACETAMINOPHEN 1 TABLET: 5; 325 TABLET ORAL at 21:40

## 2020-07-24 RX ADMIN — PREGABALIN 75 MG: 75 CAPSULE ORAL at 20:24

## 2020-07-24 RX ADMIN — IOVERSOL 75 ML: 741 INJECTION INTRA-ARTERIAL; INTRAVENOUS at 13:33

## 2020-07-24 RX ADMIN — METRONIDAZOLE 500 MG: 500 INJECTION, SOLUTION INTRAVENOUS at 21:12

## 2020-07-24 RX ADMIN — SODIUM CHLORIDE: 9 INJECTION, SOLUTION INTRAVENOUS at 14:31

## 2020-07-24 RX ADMIN — LINACLOTIDE 290 MCG: 145 CAPSULE, GELATIN COATED ORAL at 07:58

## 2020-07-24 RX ADMIN — PREGABALIN 75 MG: 75 CAPSULE ORAL at 13:44

## 2020-07-24 RX ADMIN — FENTANYL CITRATE 50 MCG: 50 INJECTION INTRAMUSCULAR; INTRAVENOUS at 01:21

## 2020-07-24 RX ADMIN — LORAZEPAM 0.5 MG: 0.5 TABLET ORAL at 14:28

## 2020-07-24 RX ADMIN — IPRATROPIUM BROMIDE AND ALBUTEROL SULFATE 1 AMPULE: 2.5; .5 SOLUTION RESPIRATORY (INHALATION) at 08:47

## 2020-07-24 RX ADMIN — CIPROFLOXACIN 400 MG: 2 INJECTION, SOLUTION INTRAVENOUS at 21:40

## 2020-07-24 ASSESSMENT — PAIN SCALES - GENERAL
PAINLEVEL_OUTOF10: 10
PAINLEVEL_OUTOF10: 9
PAINLEVEL_OUTOF10: 9
PAINLEVEL_OUTOF10: 7
PAINLEVEL_OUTOF10: 10
PAINLEVEL_OUTOF10: 4
PAINLEVEL_OUTOF10: 6
PAINLEVEL_OUTOF10: 10
PAINLEVEL_OUTOF10: 10
PAINLEVEL_OUTOF10: 6
PAINLEVEL_OUTOF10: 10
PAINLEVEL_OUTOF10: 10
PAINLEVEL_OUTOF10: 7
PAINLEVEL_OUTOF10: 9
PAINLEVEL_OUTOF10: 7
PAINLEVEL_OUTOF10: 8
PAINLEVEL_OUTOF10: 8
PAINLEVEL_OUTOF10: 10

## 2020-07-24 ASSESSMENT — ENCOUNTER SYMPTOMS
COLOR CHANGE: 0
NAUSEA: 0
WHEEZING: 0
CHEST TIGHTNESS: 0
SHORTNESS OF BREATH: 0
VOMITING: 0
DIARRHEA: 1
COUGH: 0
CHOKING: 0
ABDOMINAL DISTENTION: 0
ABDOMINAL PAIN: 1

## 2020-07-24 ASSESSMENT — PAIN DESCRIPTION - ONSET: ONSET: ON-GOING

## 2020-07-24 ASSESSMENT — PAIN DESCRIPTION - PAIN TYPE: TYPE: ACUTE PAIN

## 2020-07-24 ASSESSMENT — PAIN DESCRIPTION - PROGRESSION
CLINICAL_PROGRESSION: NOT CHANGED

## 2020-07-24 ASSESSMENT — PAIN DESCRIPTION - ORIENTATION: ORIENTATION: LEFT;RIGHT;LOWER

## 2020-07-24 ASSESSMENT — PAIN DESCRIPTION - LOCATION: LOCATION: ABDOMEN

## 2020-07-24 ASSESSMENT — PAIN DESCRIPTION - DESCRIPTORS: DESCRIPTORS: PRESSURE

## 2020-07-24 ASSESSMENT — PAIN - FUNCTIONAL ASSESSMENT: PAIN_FUNCTIONAL_ASSESSMENT: ACTIVITIES ARE NOT PREVENTED

## 2020-07-24 ASSESSMENT — PAIN DESCRIPTION - FREQUENCY: FREQUENCY: CONTINUOUS

## 2020-07-24 NOTE — PROGRESS NOTES
Spoke internal medicine resident called regarding the patient crying in pain. RN requested physician presence at bedside.      Electronically signed by Trever Pace RN on 7/24/2020 at 4:07 PM

## 2020-07-24 NOTE — PROGRESS NOTES
Patient crying in room, writhing in pain. Reports pain is 10/10 suprapubic. Internal medicine resident notified.      Electronically signed by Shoshana Waite RN on 7/24/2020 at 3:54 PM

## 2020-07-24 NOTE — PLAN OF CARE
Problem: Pain:  Goal: Pain level will decrease  Description: Pain level will decrease  Outcome: Ongoing  Note: Pain in lower abdomen. Medicated as ordered. Patient tolerating      Problem: Pain:  Goal: Control of acute pain  Description: Control of acute pain  Outcome: Ongoing  Note: Pain in lower abdomen. Medicated as ordered. Patient tolerating      Problem: Pain:  Goal: Control of chronic pain  Description: Control of chronic pain  Outcome: Ongoing  Note: Pain in lower abdomen. Medicated as ordered. Patient tolerating      Problem: Falls - Risk of:  Goal: Will remain free from falls  Description: Will remain free from falls  Outcome: Ongoing  Note: Pt remained absent from falls. Call light within reach. Bed locked and in lowest position. Problem: Falls - Risk of:  Goal: Absence of physical injury  Description: Absence of physical injury  Outcome: Ongoing  Note: Pt remained absent from falls. Call light within reach. Bed locked and in lowest position.

## 2020-07-24 NOTE — PROGRESS NOTES
Obstetric/Gynecology Resident Interval Note    Returned page and RN states that CT Abdomen cannot be completed over the weekend and asked if order can be adjusted for CT Abdomen to be completed today instead. Notified RN that will contact attending physician for clarification. Dr. Karena Caban updated and agreeable to complete CT abdomen today. Upon chart review, CT abdomen appears to have already been completed today at 1330. Will review CT and update Dr. Karean Caban.     Katarzyna Song DO  OBGYN Resident, PGY3  [facility]  7/24/2020, 2:07 PM

## 2020-07-24 NOTE — PROGRESS NOTES
OB/GYN Resident Interval Note    CT Abdomen/Pelvis reviewed with Dr. Tree Meyer. Dr. Tree Meyer advises to continue antibiotics. ID and General Surgery following along as well. OB/GYN team will continue to follow patient while admitted.        Vinicio Lerma DO  OB/GYN Resident  PGY3  [facility], [address]  7/24/2020, 2:35 PM

## 2020-07-24 NOTE — PROGRESS NOTES
OBGYN resident perfect served regarding patient's suprapubic/ lower abdominal pain, as well as clarification with CT order.      Electronically signed by Wilfredo Haynes RN on 7/24/2020 at 12:00 PM

## 2020-07-24 NOTE — PROGRESS NOTES
Physician Progress Note      Parvin Davenport  CSN #:                  [id number]  :                       1980  ADMIT DATE:       2020 3:13 PM  [address] [place] DATE:  RESPONDING  PROVIDER #:        Jose Miguel Cadet MD          QUERY TEXT:    Patient admitted with abdominal pain. Noted documentation of colitis and   diverticulitis per H&P and all IM progress notes and no diverticulitis or   colitis per General Surgery consult. If possible, please document in progress notes and discharge summary if you   are evaluating and /or treating any of the following: The medical record reflects the following:  Risk Factors: recent robotic hysterectomy  Clinical Indicators: per gen surg do not believe she has had colitis or   diverticulitis as well as feels CT scan sows postop changes from hyst, ID   consult noted to continue Cipro and Flagyl  Treatment: Cipro and Flagyl IV    Thank you,  Cindra Homans, RN CDI Supervisor  Options provided:  -- Diverticulitis/colitis confirmed  -- Diverticulitis/colitis ruled out  -- Loculated abdominal abscess confirmed  -- Other - I will add my own diagnosis  -- Disagree - Clinically unable to determine / Unknown  -- Refer to Clinical Documentation Reviewer    PROVIDER RESPONSE TEXT:    After study, diverticulitis/colitis confirmed.     Query created by: Lydia Solorio on 2020 6:32 AM      Electronically signed by:  Jose Miguel Cadet MD 2020 2:10 PM

## 2020-07-24 NOTE — PROGRESS NOTES
[facility] -   Infectious diseases evaluation  admission date 7/22/2020    reason for consultation:   Diverticulitis with abscess formation. Impression :   Current:  · Colitis /diverticulitis with pericolonic abscesses    Other:  s/p Robotic assisted Laparoscopic Hysterectomy with Bilateral Salpingectomy with Cystoscopy on 7/8/20   Prediabetes  COPD  Seizure disorder  Smoking  Migraine headache  Bipolar disorder  Sadler's esophagus    Recommendations   · Continue IV ciprofloxacin and Flagyl  · No surgical intervention planned by surgery  · Follow CBC renal function  · Repeat CT abdomen pelvis pending        History of Present Illness:   Initial history:  Preston Smith is a 47y.o.-year-old female presented to the hospital with worsening abdominal pain, constant for 2 weeks, not improving with pain medication, pain at the lower abdomen with no radiation, pressure-like. She also had fever with temperature of 100.3 on admission. She denied any nausea or vomiting, no diarrhea, no other complaints. WBC on admission was 21.6, lactic acid normal, chest x-ray showed no infiltrate, CT abdomen/pelvis suggestive of acute colitis/diverticulitis of the rectosigmoid colon with multiple pericolonic abscesses/phlegmons with appendicolith  s/p Robotic assisted Laparoscopic Hysterectomy with Bilateral Salpingectomy with Cystoscopy on 7/8/20 secondary to enlarged uterus with chronic pelvic pain and history of endometriosis. Urinalysis was clear, negative leukocyte esterase  Interval changes  7/24/2020   She is still complaining of abdominal pain, did not sleep well last night, had one episode of loose stool today, denied nausea or vomiting no new complaints. I have personally reviewed the past medical history, past surgical history, medications, social history, and family history, and I haveupdated the database accordingly.   Past Medical History:     Past Medical History:   Diagnosis Date    Anxiety     Arthritis     OA    Asthma     Sadler's esophagus     LONG SEGMENT    Bipolar 1 disorder (HCC)     CAD (coronary artery disease)     Chronic insomnia     COPD [facility]ch[facility] obstructive pulmonary disease) [facility])     Depression     and bipolar    Diabetes mellitus ([facility])     prediabetic    Endometriosis 3/9/2020    Esophagitis     severe    GERD (gastroesophageal reflux disease)     Headache(784.0)     Herniated disc, cervical     Hiatal hernia     Incisional abscess 1/15/2019    Kidney stones     MDRO (multiple drug resistant organisms) resistance     mrsa    Pleurisy     hx of \"years ago\"    Pneumonia     past penumonia    Seizures ([address]     2016 last seizure-focal seizure    UTI (urinary tract infection)     Vision abnormalities     wears glasses       Past Surgical  History:     Past Surgical History:   Procedure Laterality Date    CERVICAL DISC SURGERY      x2     SECTION  , 2018    x 2     SECTION N/A 2018     SECTION performed by Dino Almonte MD at [address] [facility] [address] [facility] [address] COLONOSCOPY N/A 10/1/2019    COLONOSCOPY DIAGNOSTIC ABORTED performed by Fran Malloy MD at [address] N/A 2020    COLONOSCOPY WITH BIOPSY performed by Fran Malloy MD at [address], COLON, DIAGNOSTIC      HYSTERECTOMY N/A 2020    HYSTERECTOMY ABDOMINAL LAPAROSCOPIC ROBOTIC XI W/ CYSTOSCOPY performed by Dino Almonte MD at [address] [facility] ARTHROSCOPY Left 5/20/15    KNEE SURGERY Left     x3    LAPAROSCOPY      dr Nicho Romeo N/A 10/5/2017    LAPAROSCOPIC REMOVAL INTRA ABDOMINAL LIPOMA LOWER RIGHT performed by Christopher Geronimo DO at [address] ESOPHAGOGASTRODUODENOSCOPY TRANSORAL DIAGNOSTIC N/A 3/2/2017    EGD ESOPHAGOGASTRODUODENOSCOPY  IP  performed by Stalin Coronado MD at [facility]  2016    severe esophagitis    UPPER GASTROINTESTINAL ENDOSCOPY  01/19/2017    barretts; hiatus hernia; gastritis    UPPER GASTROINTESTINAL ENDOSCOPY  03/02/2017    long seg mullins's with linear erosions, esophagitis, small hiatal hernia    UPPER GASTROINTESTINAL ENDOSCOPY N/A 1/30/2018    MULLINS'S    UPPER GASTROINTESTINAL ENDOSCOPY N/A 10/1/2019    EGD BIOPSY performed by Guilherme Link MD at [facility]         Medications:      phenazopyridine  200 mg Oral Q8H    docusate sodium  100 mg Oral BID    linaclotide  290 mcg Oral QAM AC    pantoprazole  40 mg Oral QAM AC    lamoTRIgine  25 mg Oral BID    montelukast  10 mg Oral Nightly    sodium chloride flush  10 mL Intravenous 2 times per day    enoxaparin  40 mg Subcutaneous Daily    famotidine  20 mg Oral BID    pregabalin  75 mg Oral TID    ipratropium-albuterol  1 ampule Inhalation Q4H WA    insulin lispro  0-6 Units Subcutaneous TID WC    insulin lispro  0-3 Units Subcutaneous Nightly    ciprofloxacin  400 mg Intravenous Q12H    metroNIDAZOLE  500 mg Intravenous Q8H       Social History:     Social History     Socioeconomic History    Marital status: Single     Spouse name: Not on file    Number of children: 1    Years of education: 11th grade    Highest education level: Not on file   Occupational History    Occupation: unemployed-   Social Needs    Financial resource strain: Not on file    Food insecurity     Worry: Not on file     Inability: Not on file   [employer] needs     Medical: Not on file     Non-medical: Not on file   Tobacco Use    Smoking status: Current Every Day Smoker     Packs/day: 1.00     Years: 21.00     Pack years: 21.00     Types: Cigarettes    Smokeless tobacco: Never Used   Substance and Sexual Activity    Alcohol use: No    Drug use: No    Sexual activity: Yes     Partners: Male   Lifestyle    Physical activity     Days per week: Not on file     Minutes per session: Not on file    Stress: Not on file Relationships    Social connections     Talks on phone: Not on file     Gets together: Not on file     Attends Restoration service: Not on file     Active member of club or organization: Not on file     Attends meetings of clubs or organizations: Not on file     Relationship status: Not on file    Intimate partner violence     Fear of current or ex partner: Not on file     Emotionally abused: Not on file     Physically abused: Not on file     Forced sexual activity: Not on file   Other Topics Concern    Not on file   Social History Narrative    Lives with son and boyfriend       Family History:     Family History   Problem Relation Age of Onset    Cancer Mother         breast    Bipolar Disorder Mother     Hypertension Mother     Breast Cancer Mother     Bipolar Disorder Brother     Hypertension Father         Allergies:   Nsaids; Toradol [ketorolac tromethamine]; and Ultram [tramadol]     Review of Systems:     Review of Systems  As per history present illness, other than above 12 systems reviewed were negative  Physical Examination :     Patient Vitals for the past 8 hrs:   BP Temp Pulse Resp SpO2   07/24/20 0847 -- -- -- 22 97 %   07/24/20 0645 118/61 99.5 °F (37.5 °C) 97 18 96 %   07/24/20 0525 -- -- -- 20 95 %       Physical Exam  Constitutional:       General: She is not in acute distress. Appearance: Normal appearance. HENT:      Head: Normocephalic and atraumatic. Eyes:      Conjunctiva/sclera: Conjunctivae normal.      Pupils: Pupils are equal, round, and reactive to light. Neck:      Musculoskeletal: No neck rigidity. Cardiovascular:      Rate and Rhythm: Normal rate and regular rhythm. Heart sounds: No murmur. Pulmonary:      Effort: Pulmonary effort is normal.      Breath sounds: Normal breath sounds. No wheezing. Abdominal:      General: Abdomen is flat. There is no distension. Palpations: Abdomen is soft. Tenderness: There is abdominal tenderness.       Comments: Bilateral lower abdominal tenderness . Musculoskeletal:      Right lower leg: No edema. Left lower leg: No edema. Neurological:      General: No focal deficit present. Mental Status: She is alert and oriented to person, place, and time. Medical Decision Making:   I have independently reviewed/ordered the following labs:    CBC with Differential:   Recent Labs     07/23/20  0606 07/24/20  0616   WBC 16.9* 12.5*   HGB 11.4* 11.0*   HCT 35.9* 34.8*    239   LYMPHOPCT 5* 9*   MONOPCT 5 7     BMP:  Recent Labs     07/23/20  0606 07/24/20  0616    140   K 4.0 4.0    106   CO2 21 23   BUN 9 11   CREATININE 0.60 0.72     Hepatic Function Panel:   Recent Labs     07/22/20  1545   PROT 7.0   LABALBU 4.1   BILITOT 0.77   ALKPHOS 73   ALT 13   AST 12       Lab Results   Component Value Date    CREATININE 0.72 07/24/2020    GLUCOSE 121 07/24/2020    GLUCOSE 106 03/25/2012       Detailed results: Thank you for allowing us to participate in the care of this patient. Please call with questions. This note is created with the assistance of a speech recognition program.  While intending to generate adocument that actually reflects the content of the visit, the document can still have some errors including those of syntax and sound a like substitutions which may escape proof reading. It such instances, actual meaningcan be extrapolated by contextual diversion.     Froylan Cardona MD  Office: [phone]  Perfect serve / office [phone]

## 2020-07-24 NOTE — PROGRESS NOTES
[address] [facility].    PROGRESS NOTE             7/24/2020    8:57 AM    Name:   Rich Albright  MRN:     [id number]     Acct:      [de-identified]   Room:   2040/2040-01  IP Day:  2  Admit Date:  7/22/2020  3:13 PM    PCP:  KATIE Oleary CNP  Code Status:  Full Code    Subjective:     C/C:   Chief Complaint   Patient presents with    Abdominal Pain     Interval History Status:    Patient was seen and examined at bedside. Patient was alert and oriented x4. Patient reports she had difficulty sleeping overnight due to pain. This morning, she had 1 episode of diarrhea accident in bed. Patient was not in a good mood and upset. She ate some of her breakfast which consists of toast and eggs. He complains of pain, rating it 7 out of 10 with fentanyl on board. Denies any diarrhea, vomiting, chest pain, shortness of breath, fevers or chills. Brief History:     Please review H&P    Review of Systems:     Review of Systems   Constitutional: Negative for chills and fever. HENT: Negative for congestion. Respiratory: Negative for cough, choking, chest tightness, shortness of breath and wheezing. Cardiovascular: Negative for chest pain, palpitations and leg swelling. Gastrointestinal: Positive for abdominal pain and diarrhea. Negative for abdominal distention, nausea and vomiting. Genitourinary: Negative for difficulty urinating, dyspareunia, vaginal bleeding and vaginal discharge. Skin: Negative for color change. Neurological: Negative for headaches. Medications: Allergies:     Allergies   Allergen Reactions    Nsaids      Irritates her Barrets esophogus    Toradol [Ketorolac Tromethamine]     Ultram [Tramadol] Nausea Only     Gastric upset       Current Meds:   Scheduled Meds:    phenazopyridine  200 mg Oral Q8H    docusate sodium  100 mg Oral BID    linaclotide  290 mcg Oral QAM AC    pantoprazole  40 mg Oral QAM AC    lamoTRIgine  25 mg Oral BID    montelukast  10 mg Oral Nightly    sodium chloride flush  10 mL Intravenous 2 times per day    enoxaparin  40 mg Subcutaneous Daily    famotidine  20 mg Oral BID    pregabalin  75 mg Oral TID    ipratropium-albuterol  1 ampule Inhalation Q4H WA    insulin lispro  0-6 Units Subcutaneous TID WC    insulin lispro  0-3 Units Subcutaneous Nightly    ciprofloxacin  400 mg Intravenous Q12H    metroNIDAZOLE  500 mg Intravenous Q8H     Continuous Infusions:    dextrose      sodium chloride 100 mL/hr at 07/23/20 0938     PRN Meds: fentanNYL, fentanNYL, aluminum & magnesium hydroxide-simethicone, calcium carbonate, sodium chloride flush, sodium chloride flush, acetaminophen **OR** acetaminophen, polyethylene glycol, promethazine **OR** ondansetron, potassium chloride **OR** potassium alternative oral replacement **OR** potassium chloride, magnesium sulfate, glucose, dextrose, glucagon (rDNA), dextrose, ipratropium-albuterol, nicotine polacrilex, morphine **OR** morphine    Data:     Past Medical History:   has a past medical history of Anxiety, Arthritis, Asthma, Sadler's esophagus, Bipolar 1 disorder ([facility] [address] CAD (coronary artery disease), Chronic insomnia, COPD (chronic obstructive pulmonary disease) ([facility] [address] Depression, Diabetes mellitus ([facility] [address] Endometriosis, Esophagitis, GERD (gastroesophageal reflux disease), Headache(784.0), Herniated disc, cervical, Hiatal hernia, Incisional abscess, Kidney stones, MDRO (multiple drug resistant organisms) resistance, Pleurisy, Pneumonia, Seizures ([address] UTI (urinary tract infection), and Vision abnormalities. Social History:   reports that she has been smoking cigarettes. She has a 21.00 pack-year smoking history. She has never used smokeless tobacco. She reports that she does not drink alcohol or use drugs.      Family History:   Family History   Problem Relation Age of Onset    Cancer Mother         breast    Bipolar Disorder Mother  Hypertension Mother     Breast Cancer Mother     Bipolar Disorder Brother     Hypertension Father        Vitals:  /61   Pulse 97   Temp 99.5 °F (37.5 °C)   Resp 22   Ht 5' 2\" (1.575 m)   Wt 206 lb (93.4 kg)   LMP 06/15/2020   SpO2 97%   BMI 37.68 kg/m²   Temp (24hrs), Av.4 °F (37.4 °C), Min:98.8 °F (37.1 °C), Max:99.8 °F (37.7 °C)    Recent Labs     20  1116 20  1631 20  2056 20  0620   POCGLU 94 121* 121* 118*       I/O(24Hr):     Intake/Output Summary (Last 24 hours) at 2020 0857  Last data filed at 2020 9542  Gross per 24 hour   Intake 3431 ml   Output 1275 ml   Net 2156 ml       Labs:    CBC:   Lab Results   Component Value Date    WBC 12.5 2020    RBC 4.28 2020    RBC 4.49 2012    HGB 11.0 2020    HCT 34.8 2020    MCV 81.3 2020    MCH 25.7 2020    MCHC 31.7 2020    RDW 16.1 2020     2020     2012    MPV 8.6 2020     BMP:    Lab Results   Component Value Date     2020    K 4.0 2020     2020    CO2 23 2020    BUN 11 2020    LABALBU 4.1 2020    LABALBU 4.1 2012    CREATININE 0.72 2020    CALCIUM 8.5 2020    GFRAA >60 2020    LABGLOM >60 2020    GLUCOSE 121 2020    GLUCOSE 106 2012       Lab Results   Component Value Date/Time    SPECIAL NOT REPORTED 2020 03:50 PM     Lab Results   Component Value Date/Time    CULTURE NO GROWTH 2 DAYS 2020 03:50 PM         Radiology:    Xr Chest Standard (2 Vw)    Result Date: 2020  EXAMINATION: TWO XRAY VIEWS OF THE CHEST 2020 12:27 pm COMPARISON: 2020 HISTORY: ORDERING SYSTEM PROVIDED HISTORY: Pneumonia due to infectious organism, unspecified laterality, unspecified part of lung TECHNOLOGIST PROVIDED HISTORY: surgery clearance, pneumonia Reason for Exam: preop clearance, pneumonia Acuity: Acute Type of Exam: Initial FINDINGS: The lungs are without acute focal process. No effusion or pneumothorax. The cardiomediastinal silhouette is normal.  The osseous structures are intact without acute process. Cervical spine hardware is redemonstrated. Negative chest.     Ct Abdomen Pelvis W Iv Contrast Additional Contrast? None    Result Date: 7/22/2020  EXAMINATION: CT OF THE ABDOMEN AND PELVIS WITH CONTRAST 7/22/2020 5:01 pm TECHNIQUE: CT of the abdomen and pelvis was performed with the administration of intravenous contrast. Multiplanar reformatted images are provided for review. Dose modulation, iterative reconstruction, and/or weight based adjustment of the mA/kV was utilized to reduce the radiation dose to as low as reasonably achievable. COMPARISON: 2 July 2020 HISTORY: ORDERING SYSTEM PROVIDED HISTORY: abd pain TECHNOLOGIST PROVIDED HISTORY: abd pain FINDINGS: Lower Chest: No acute abnormality in the included pulmonary parenchyma at the lung bases. Shotty epicardial lymph nodes are present without gricelda adenopathy. No pericardial effusion is evident. Organs: Liver, spleen, pancreas, gallbladder, adrenals, and kidneys demonstrate no acute abnormality. GI/Bowel: No free fluid or free air is noted. The rectosigmoid colon is abnormal in appearance, with loss of haustra, serosal haziness, shaggy appearance, and pericolonic fat infiltration. Multiple pericolonic complex collections are noted, likely pericolonic abscesses including one in the left lower quadrant of 2.5 x 3.5 cm, which appears to be between phlegmon and abscess formation, a right paracentral lower pelvic collection of 2.3 cm more typical of abscess, a central posterior collection of 1.9 cm, and a 4th collection of 2.2 x 1.4 cm. Inflamed small bowel loops in the right lower quadrant are noted in the upper pelvis. Appendix contains some dense material likely an appendicolith without evidence of acute appendicitis. Pelvis: Bladder is unremarkable.   Bilateral fat-containing hernias are present without strangulation. As previously noted, inflammatory changes are present in the pelvis with pericolonic complex and fluid collections compatible with multiple phlegmons and/or abscesses. No discrete extraluminal air is noted. Peritoneum/Retroperitoneum: No aortic aneurysm, retroperitoneal or mesenteric adenopathy is present. Bones/Soft Tissues: No acute osseous or soft tissue abnormality is noted. 1.  Findings compatible with acute colitis/diverticulitis of the rectosigmoid colon with multiple pericolonic fluid collections consistent with complex phlegmons and abscesses. 2.  Apparent appendicolith in the appendix. No acute appendicitis. Critical results were called by Dr. Cheyenne Sandhu MD to [facility] on 7/22/2020 at 17:16. Ct Abdomen Pelvis W Iv Contrast    Result Date: 7/2/2020  EXAMINATION: CT OF THE ABDOMEN AND PELVIS WITH CONTRAST 7/2/2020 4:55 pm TECHNIQUE: CT of the abdomen and pelvis was performed with the administration of intravenous contrast. Multiplanar reformatted images are provided for review. Dose modulation, iterative reconstruction, and/or weight based adjustment of the mA/kV was utilized to reduce the radiation dose to as low as reasonably achievable. COMPARISON: 03/04/2020 HISTORY: ORDERING SYSTEM PROVIDED HISTORY: Acute onset abdominal pain, N/V TECHNOLOGIST PROVIDED HISTORY: IV Only Contrast Acute onset abdominal pain, N/V Reason for Exam: Pt c/o abd pain since yesterday 19-year-old female with acute-onset abdominal pain, nausea and vomiting FINDINGS: Lower Chest: Mild bibasilar atelectasis and respiratory motion. No free intra-abdominal air. Tree-in-bud opacities at the lung bases. Small hiatal hernia. Mild wall thickening of the distal esophagus, stable. Organs: Liver, gallbladder, adrenal glands, pancreas, spleen grossly unremarkable in appearance. No hydronephrosis. No obstructing calculus or hydroureter.   5 mm nonobstructive midpole left renal calculus on image 81, series 2. GI/Bowel: No significant dilation of small bowel loops to suggest small bowel obstruction. Normal gas-filled appendix. Mild stool burden. Pelvis: Urinary bladder is collapsed. No inguinal or pelvic sidewall lymphadenopathy. No free fluid in the pelvis. Uterus and adnexa grossly unremarkable. Peritoneum/Retroperitoneum: Abdominal aorta normal in appearance and caliber. No retroperitoneal lymphadenopathy. Psoas muscles normal in size and symmetric in appearance. Prominent portacaval lymph nodes. Bones/Soft Tissues: Mild diffuse degenerative changes throughout the spine. Tiny midline fat-containing periumbilical hernia. 1. No acute intra-abdominal or intrapelvic abnormality identified by CT. 2. Mild bibasilar atelectasis and respiratory motion. Tree-in-bud opacities at the lung bases likely representing inflammation or infection to include ENOC or sequela of aspiration. Follow-up is recommended to document resolution. 3. Tiny midline fat-containing periumbilical hernia. 4. Mild wall thickening of the distal esophagus, stable. Consider evaluation with EGD. 5. Small hiatal hernia. 6. 5 mm nonobstructing midpole left renal calculus. Xr Chest Portable    Result Date: 7/22/2020  EXAMINATION: ONE XRAY VIEW OF THE CHEST 7/22/2020 4:31 pm COMPARISON: 07/17/2020 and 07/07/2020 HISTORY: Reason for Exam: Chest pains Acuity: Acute Type of Exam: Initial FINDINGS: The lungs are without acute focal process. No effusion or pneumothorax. The cardiomediastinal silhouette is normal.  The osseous structures are intact without acute process. Lower cervical spine hardware.      Unremarkable chest.     Xr Chest Portable    Result Date: 7/17/2020  EXAMINATION: ONE XRAY VIEW OF THE CHEST 7/17/2020 7:25 pm COMPARISON: July 7, 2020 HISTORY: ORDERING SYSTEM PROVIDED HISTORY: Wheezing TECHNOLOGIST PROVIDED HISTORY: Wheezing Reason for Exam: Wheezing Acuity: Acute Type of Exam: Initial [F17.200] 02/14/2017    Prediabetes [R73.03] 12/08/2016    Arthritis [M19.90] 09/02/2016    Cervical disc herniation [M50.20] 04/12/2016    Iron deficiency anemia [D50.9] 12/18/2015    Asthma [J45.909] 12/17/2015    GERD [K21.9] 12/17/2015       Plan:        Diverticulitis colitis with pericolonic abscess status post robotic scopic hysterectomy with bilateral salpingectomy (7/8/20)  - Upon admission CT abdomen 7/22/20: showed acute colitis/diverticulitis of the rectosigmoid colon and multiple pericolonic fluid collections  - Upon admission WBC 21.6, improving. WBC 12.5 7/24/20  - Blood cultures: No growth day 2  - General Surgery on board: No surgical intervention planned, continue antibiotics  - ID on board: IV ciprofloxacin 400 mg Q12 hours and Flagyl 3 mg Q8 hours (day 3)  - OB/GYN on board: Pyridium 200 mg Q8 hours, 3 doses for suprapelvic pain/dysuria/urinary pressure. - Pain management with IV fentanyl 25/50mcg Q2 hours PRN, patient has received 50mcg every 2 hours overnight  - Continue medical management    Leukocytosis  - Upon admission WBC 21.6, improving. WBC 12.5 7/24/20  - CRP: 129.3  - ID on board: IV ciprofloxacin 400 mg Q12 hours and Flagyl 3 mg Q8 hours (day 3)  - Continue to monitor    Asthma  - Respiratory care evaluation and treat  - DuoNeb Q4 hours while awake  - Oxygen therapy protocol    Prediabetes  - Most recent on 6/10/20 hemoglobin A1c: 5.7  - Low ISS  - POCT glucose  - Hypoglycemia protocol      May Maurer MD  7/24/2020  8:57 AM   Attending Physician Statement    I have discussed the case of Sylvia Edmond, including pertinent history and exam findings with the resident. I have seen and examined the patient and the key elements of the encounter have been performed by me. I agree with the assessment, plan, and orders as documented by the resident. He has a previous history of narcotic use, intravenous fentanyl was reduced and she was given p.o. pain meds.   She has shown significant clinical improvement of her colitis and diverticulitis  Electronically signed by Wing Corie MD on 7/24/2020 at 1:44 PM

## 2020-07-24 NOTE — PROGRESS NOTES
Spoke with Dr. Maryam Rodriguez resident. Reported patient is not getting much relief from current PRN pain medications. The patient is reporting severe pain with urination. States the pain is not working.      Electronically signed by Constantin Jacob RN on 7/24/2020 at 1:56 PM

## 2020-07-25 LAB
-: ABNORMAL
ABSOLUTE EOS #: 0.2 K/UL (ref 0–0.4)
ABSOLUTE IMMATURE GRANULOCYTE: ABNORMAL K/UL (ref 0–0.3)
ABSOLUTE LYMPH #: 0.7 K/UL (ref 1–4.8)
ABSOLUTE MONO #: 0.6 K/UL (ref 0.1–1.3)
AMORPHOUS: ABNORMAL
ANION GAP SERPL CALCULATED.3IONS-SCNC: 9 MMOL/L (ref 9–17)
BACTERIA: ABNORMAL
BASOPHILS # BLD: 1 % (ref 0–2)
BASOPHILS ABSOLUTE: 0.1 K/UL (ref 0–0.2)
BILIRUBIN URINE: NEGATIVE
BUN BLDV-MCNC: 8 MG/DL (ref 6–20)
BUN/CREAT BLD: ABNORMAL (ref 9–20)
C-REACTIVE PROTEIN: 101.7 MG/L (ref 0–5)
CALCIUM SERPL-MCNC: 8.5 MG/DL (ref 8.6–10.4)
CASTS UA: ABNORMAL /LPF
CHLORIDE BLD-SCNC: 107 MMOL/L (ref 98–107)
CO2: 21 MMOL/L (ref 20–31)
COLOR: YELLOW
COMMENT UA: ABNORMAL
CREAT SERPL-MCNC: 0.54 MG/DL (ref 0.5–0.9)
CRYSTALS, UA: ABNORMAL /HPF
DIFFERENTIAL TYPE: ABNORMAL
EOSINOPHILS RELATIVE PERCENT: 3 % (ref 0–4)
EPITHELIAL CELLS UA: ABNORMAL /HPF
GFR AFRICAN AMERICAN: >60 ML/MIN
GFR NON-AFRICAN AMERICAN: >60 ML/MIN
GFR SERPL CREATININE-BSD FRML MDRD: ABNORMAL ML/MIN/{1.73_M2}
GFR SERPL CREATININE-BSD FRML MDRD: ABNORMAL ML/MIN/{1.73_M2}
GLUCOSE BLD-MCNC: 108 MG/DL (ref 65–105)
GLUCOSE BLD-MCNC: 110 MG/DL (ref 65–105)
GLUCOSE BLD-MCNC: 111 MG/DL (ref 65–105)
GLUCOSE BLD-MCNC: 115 MG/DL (ref 70–99)
GLUCOSE URINE: NEGATIVE
HCT VFR BLD CALC: 32.8 % (ref 36–46)
HEMOGLOBIN: 10.4 G/DL (ref 12–16)
IMMATURE GRANULOCYTES: ABNORMAL %
KETONES, URINE: NEGATIVE
LEUKOCYTE ESTERASE, URINE: ABNORMAL
LYMPHOCYTES # BLD: 8 % (ref 24–44)
MCH RBC QN AUTO: 25.5 PG (ref 26–34)
MCHC RBC AUTO-ENTMCNC: 31.7 G/DL (ref 31–37)
MCV RBC AUTO: 80.3 FL (ref 80–100)
MONOCYTES # BLD: 7 % (ref 1–7)
MUCUS: ABNORMAL
NITRITE, URINE: NEGATIVE
NRBC AUTOMATED: ABNORMAL PER 100 WBC
OTHER OBSERVATIONS UA: ABNORMAL
PDW BLD-RTO: 16.1 % (ref 11.5–14.9)
PH UA: 6 (ref 5–8)
PLATELET # BLD: 269 K/UL (ref 150–450)
PLATELET ESTIMATE: ABNORMAL
PMV BLD AUTO: 8.3 FL (ref 6–12)
POTASSIUM SERPL-SCNC: 3.8 MMOL/L (ref 3.7–5.3)
PROTEIN UA: NEGATIVE
RBC # BLD: 4.08 M/UL (ref 4–5.2)
RBC # BLD: ABNORMAL 10*6/UL
RBC UA: ABNORMAL /HPF
RENAL EPITHELIAL, UA: ABNORMAL /HPF
SEG NEUTROPHILS: 81 % (ref 36–66)
SEGMENTED NEUTROPHILS ABSOLUTE COUNT: 6.8 K/UL (ref 1.3–9.1)
SODIUM BLD-SCNC: 137 MMOL/L (ref 135–144)
SPECIFIC GRAVITY UA: 1.02 (ref 1–1.03)
TRICHOMONAS: ABNORMAL
TURBIDITY: CLEAR
URINE HGB: NEGATIVE
UROBILINOGEN, URINE: NORMAL
WBC # BLD: 8.4 K/UL (ref 3.5–11)
WBC # BLD: ABNORMAL 10*3/UL
WBC UA: ABNORMAL /HPF
YEAST: ABNORMAL

## 2020-07-25 PROCEDURE — 80048 BASIC METABOLIC PNL TOTAL CA: CPT

## 2020-07-25 PROCEDURE — 6370000000 HC RX 637 (ALT 250 FOR IP): Performed by: INTERNAL MEDICINE

## 2020-07-25 PROCEDURE — 94761 N-INVAS EAR/PLS OXIMETRY MLT: CPT

## 2020-07-25 PROCEDURE — 6370000000 HC RX 637 (ALT 250 FOR IP): Performed by: STUDENT IN AN ORGANIZED HEALTH CARE EDUCATION/TRAINING PROGRAM

## 2020-07-25 PROCEDURE — 2580000003 HC RX 258: Performed by: NURSE PRACTITIONER

## 2020-07-25 PROCEDURE — 1200000000 HC SEMI PRIVATE

## 2020-07-25 PROCEDURE — 94640 AIRWAY INHALATION TREATMENT: CPT

## 2020-07-25 PROCEDURE — 85025 COMPLETE CBC W/AUTO DIFF WBC: CPT

## 2020-07-25 PROCEDURE — 6360000002 HC RX W HCPCS: Performed by: STUDENT IN AN ORGANIZED HEALTH CARE EDUCATION/TRAINING PROGRAM

## 2020-07-25 PROCEDURE — 81001 URINALYSIS AUTO W/SCOPE: CPT

## 2020-07-25 PROCEDURE — 2500000003 HC RX 250 WO HCPCS: Performed by: STUDENT IN AN ORGANIZED HEALTH CARE EDUCATION/TRAINING PROGRAM

## 2020-07-25 PROCEDURE — 86140 C-REACTIVE PROTEIN: CPT

## 2020-07-25 PROCEDURE — 99233 SBSQ HOSP IP/OBS HIGH 50: CPT | Performed by: INTERNAL MEDICINE

## 2020-07-25 PROCEDURE — 82947 ASSAY GLUCOSE BLOOD QUANT: CPT

## 2020-07-25 RX ORDER — OXYCODONE HYDROCHLORIDE AND ACETAMINOPHEN 5; 325 MG/1; MG/1
1 TABLET ORAL EVERY 6 HOURS PRN
Status: DISCONTINUED | OUTPATIENT
Start: 2020-07-25 | End: 2020-07-28 | Stop reason: HOSPADM

## 2020-07-25 RX ORDER — METRONIDAZOLE 500 MG/1
500 TABLET ORAL EVERY 8 HOURS SCHEDULED
Status: DISCONTINUED | OUTPATIENT
Start: 2020-07-25 | End: 2020-07-28 | Stop reason: HOSPADM

## 2020-07-25 RX ORDER — CIPROFLOXACIN 500 MG/1
500 TABLET, FILM COATED ORAL EVERY 12 HOURS SCHEDULED
Status: DISCONTINUED | OUTPATIENT
Start: 2020-07-25 | End: 2020-07-28 | Stop reason: HOSPADM

## 2020-07-25 RX ADMIN — PANTOPRAZOLE SODIUM 40 MG: 40 TABLET, DELAYED RELEASE ORAL at 17:00

## 2020-07-25 RX ADMIN — IPRATROPIUM BROMIDE AND ALBUTEROL SULFATE 1 AMPULE: .5; 3 SOLUTION RESPIRATORY (INHALATION) at 02:30

## 2020-07-25 RX ADMIN — ENOXAPARIN SODIUM 40 MG: 40 INJECTION SUBCUTANEOUS at 08:25

## 2020-07-25 RX ADMIN — PREGABALIN 75 MG: 75 CAPSULE ORAL at 21:29

## 2020-07-25 RX ADMIN — LAMOTRIGINE 25 MG: 25 TABLET ORAL at 08:26

## 2020-07-25 RX ADMIN — SODIUM CHLORIDE: 9 INJECTION, SOLUTION INTRAVENOUS at 06:10

## 2020-07-25 RX ADMIN — LAMOTRIGINE 25 MG: 25 TABLET ORAL at 21:33

## 2020-07-25 RX ADMIN — IPRATROPIUM BROMIDE AND ALBUTEROL SULFATE 1 AMPULE: 2.5; .5 SOLUTION RESPIRATORY (INHALATION) at 15:40

## 2020-07-25 RX ADMIN — PREGABALIN 75 MG: 75 CAPSULE ORAL at 17:19

## 2020-07-25 RX ADMIN — LINACLOTIDE 290 MCG: 145 CAPSULE, GELATIN COATED ORAL at 08:27

## 2020-07-25 RX ADMIN — FAMOTIDINE 20 MG: 20 TABLET, FILM COATED ORAL at 08:26

## 2020-07-25 RX ADMIN — FENTANYL CITRATE 25 MCG: 50 INJECTION INTRAMUSCULAR; INTRAVENOUS at 07:28

## 2020-07-25 RX ADMIN — METRONIDAZOLE 500 MG: 500 INJECTION, SOLUTION INTRAVENOUS at 04:03

## 2020-07-25 RX ADMIN — LORAZEPAM 0.5 MG: 0.5 TABLET ORAL at 14:25

## 2020-07-25 RX ADMIN — FENTANYL CITRATE 25 MCG: 50 INJECTION INTRAMUSCULAR; INTRAVENOUS at 11:32

## 2020-07-25 RX ADMIN — PREGABALIN 75 MG: 75 CAPSULE ORAL at 08:26

## 2020-07-25 RX ADMIN — PANTOPRAZOLE SODIUM 40 MG: 40 TABLET, DELAYED RELEASE ORAL at 06:12

## 2020-07-25 RX ADMIN — LORAZEPAM 0.5 MG: 0.5 TABLET ORAL at 04:07

## 2020-07-25 RX ADMIN — CIPROFLOXACIN HYDROCHLORIDE 500 MG: 500 TABLET, FILM COATED ORAL at 21:29

## 2020-07-25 RX ADMIN — OXYCODONE HYDROCHLORIDE AND ACETAMINOPHEN 1 TABLET: 5; 325 TABLET ORAL at 21:30

## 2020-07-25 RX ADMIN — MONTELUKAST 10 MG: 10 TABLET, FILM COATED ORAL at 21:29

## 2020-07-25 RX ADMIN — DOCUSATE SODIUM 100 MG: 100 CAPSULE, LIQUID FILLED ORAL at 21:29

## 2020-07-25 RX ADMIN — FENTANYL CITRATE 25 MCG: 50 INJECTION INTRAMUSCULAR; INTRAVENOUS at 17:01

## 2020-07-25 RX ADMIN — LORAZEPAM 0.5 MG: 0.5 TABLET ORAL at 21:29

## 2020-07-25 RX ADMIN — OXYCODONE AND ACETAMINOPHEN 1 TABLET: 5; 325 TABLET ORAL at 06:15

## 2020-07-25 RX ADMIN — FENTANYL CITRATE 25 MCG: 50 INJECTION INTRAMUSCULAR; INTRAVENOUS at 01:54

## 2020-07-25 RX ADMIN — METRONIDAZOLE 500 MG: 500 TABLET ORAL at 21:29

## 2020-07-25 RX ADMIN — OXYCODONE AND ACETAMINOPHEN 1 TABLET: 5; 325 TABLET ORAL at 14:25

## 2020-07-25 ASSESSMENT — PAIN DESCRIPTION - PROGRESSION
CLINICAL_PROGRESSION: NOT CHANGED

## 2020-07-25 ASSESSMENT — PAIN SCALES - GENERAL
PAINLEVEL_OUTOF10: 10
PAINLEVEL_OUTOF10: 6
PAINLEVEL_OUTOF10: 10
PAINLEVEL_OUTOF10: 10
PAINLEVEL_OUTOF10: 9
PAINLEVEL_OUTOF10: 10
PAINLEVEL_OUTOF10: 10
PAINLEVEL_OUTOF10: 6
PAINLEVEL_OUTOF10: 10
PAINLEVEL_OUTOF10: 10

## 2020-07-25 ASSESSMENT — ENCOUNTER SYMPTOMS
DIARRHEA: 0
STRIDOR: 0
COUGH: 0
APNEA: 0
ABDOMINAL DISTENTION: 0
NAUSEA: 0
WHEEZING: 0
BLOOD IN STOOL: 0
CHEST TIGHTNESS: 0
CHOKING: 0
VOMITING: 0
RECTAL PAIN: 0
ANAL BLEEDING: 0
SHORTNESS OF BREATH: 0
CONSTIPATION: 0
ABDOMINAL PAIN: 1

## 2020-07-25 ASSESSMENT — PAIN DESCRIPTION - ONSET
ONSET: ON-GOING

## 2020-07-25 ASSESSMENT — PAIN DESCRIPTION - LOCATION
LOCATION: ABDOMEN

## 2020-07-25 ASSESSMENT — PAIN DESCRIPTION - FREQUENCY
FREQUENCY: CONTINUOUS

## 2020-07-25 ASSESSMENT — PAIN DESCRIPTION - PAIN TYPE
TYPE: ACUTE PAIN

## 2020-07-25 ASSESSMENT — PAIN - FUNCTIONAL ASSESSMENT
PAIN_FUNCTIONAL_ASSESSMENT: PREVENTS OR INTERFERES SOME ACTIVE ACTIVITIES AND ADLS

## 2020-07-25 ASSESSMENT — PAIN DESCRIPTION - DESCRIPTORS
DESCRIPTORS: PRESSURE

## 2020-07-25 ASSESSMENT — PAIN DESCRIPTION - ORIENTATION
ORIENTATION: LEFT;RIGHT;LOWER
ORIENTATION: LOWER;LEFT;RIGHT
ORIENTATION: LOWER;LEFT;RIGHT

## 2020-07-25 NOTE — PROGRESS NOTES
Patient writhing in pain, tossing and turning with legs rolling back and forth in pain. RN administered PRN Fentanyl for pain 10/10. RN was called back into the room within approximately 15 minutes and pt asked if she could be unhooked from her IV so she could smoke. This is the second time thus far this shift in which she has gone outside to smoke. Patient came out of her room with mask on and began coughing repetitively. She then came to nurse's station and asked if respiratory could be called because \"I think I need a breathing treatment before I go out and smoke\". Pt returned to her room to await respiratory.  RN notified respiratory of her request.

## 2020-07-25 NOTE — CARE COORDINATION
DISCHARGE PLANNING NOTE:    Writer reviewed chart. Plan remains for patient to be discharged home without needs. Declined VNS. S/P lap hyst with bilat salpingectomy with cysto on 7/8/2020. Per surgery, no drainable fluid collection. Active order for IV Cipro and Flagyl. General diet. Anticipate home soon. Will continue to follow for additional discharge needs.     Electronically signed by Yadiel Blanco RN on 7/25/2020 at 10:04 AM

## 2020-07-25 NOTE — PROGRESS NOTES
Gynecology Progress Note    Date: 7/25/2020  Time: 8:00 AM    Kadie Marie 47 y.o. female [id number], POD#17 s/p Robotic assisted Laparoscopic Hysterectomy with Bilateral Salpingectomy with Cystoscopy on 7/8/20     Patient seen and examined. Patient resting comfortably in bed on her cell phone when writer walked into the room this morning, but then became very excalated when asked about her pain. Patient complaining of increased \"bladder pain\" and pain throughout her lower abdomen. Patient reports her pain is not well controlled, and reports her pain is a 10/10 and then began screaming at writer and PCA that her RN said she was going to get her pain medication but has not been back with it. PCA left room at that time to get RN, who later came into the room to give patient her pain medication. Patient did not allow writer to complete physical exam even after receiving her pain medication. Patient is  tolerating oral intake. She is urinating well. She denies any vaginal bleeding. She is  ambulating without difficulty. She is  passing flatus. She denies Fever/Chills, Chest Pain, SOB, N/V, Calf Pain    Vitals:  Vitals:    07/25/20 0037 07/25/20 0230 07/25/20 0615 07/25/20 0628   BP: (!) 106/52   121/76   Pulse: 91   79   Resp: 16   16   Temp: 98.2 °F (36.8 °C)   97.8 °F (36.6 °C)   TempSrc: Oral   Oral   SpO2: 96% 96%  98%   Weight:   215 lb 9.8 oz (97.8 kg)    Height:           Intake/Output:   Last Shift: I/O last 3 completed shifts: In: 2051 [P.O.:720; I.V.:1331]  Out: 800 [Urine:800]  Current Shift: No intake/output data recorded.       Physical Exam:  General:  no apparent distress, alert and uncooperative (did not allow physical exam until she received her pain medication  Neurologic:  alert, oriented, normal speech, no focal findings or movement disorder noted  Lungs: unable to be assessed  Heart:  unable to be assessed    Abdomen: unable to be assessed  Incision: laparoscopic incisions appearing to be well healing with overlying dermabond   Extremities:  no calf tenderness, non edematous    Physical exam very limited as patient did not allow writer to perform an exam until she received her pain medication and she was \"feeling better\"    Lab:  Recent Results (from the past 12 hour(s))   POC Glucose Fingerstick    Collection Time: 07/24/20  8:16 PM   Result Value Ref Range    POC Glucose 125 (H) 65 - 105 mg/dL       Assessment/Plan:  Froylan Galan 47 y.o. female [id number], HD# 4 with diverticulitis/colitis with pericolonic abscesses POD#17 s/p Robotic assisted Laparoscopic Hysterectomy with Bilateral Salpingectomy with Cystoscopy on 7/8/20               - Internal medicine as primary team              - Doing well overall, vitals overall stable with Tmax and Last Temp 100.8 @ 2203, 7/22/20              - UOP adequate (spontaneously voiding)              - IVF: NS @ 100ml/hr              - Labs: CBC, BMP daily              - Antibiotics: Continue Ciprofloxacin 400mg IV [id number] and Flagyl 500mg IV [id number]                   - S/p Meropenem 1g IV x1 in the ED              - DVT prophylaxis: Lovenox 40mg daily               - GI: Protonix 40mg BID, [id number] mg  BID              - Pain control: Fentanyl 25/50mcg [id number] PRN   - Pyridium 200mg [id number] scheduled x 3 doses as patient is reporting suprapubic pain/dysuria/urinary pressure              - UA on admission with low suspicion for UTI              - Infectious disease consulted- agree to continue Cipro/Flagyl IV              - General surgery consulted, appreciate recommendations               - Blood cultures x 2 with no growth for 2 days                - CT Abd/Pelvis (7/24): Complicated diverticulitis with multiple abscesses appearing larger in size than previous. Extensive soft tissue stranding in the mesenteric fate. These are not amendable to percutaneous drainage.    - CT Abd/Pelvis (7/22):  Acute colitis/diverticulitis of the rectosigmoid colon with multiple pericolonic fluid collections consistent with complex phlegmons and abscesses. Apparent appendicolith without acute appendicitis. Leukocytosis (improving)              - CBC daily   - WBC 21.6>16.9>12.5              - Vitals overall stable with Tmax and Last Temp 100.8 @ 2203, 7/22/20   - ID consulted, appreciate recommendations       Endometriosis              - Stable on Lyrica 75mg TID        Prediabetes              - LDSSI              - Will monitor blood sugars AC and HS              - Defer to Internal medicine as primary team       COPD/Asthma              - Stable on Singulair 10mg nightly, ipratropium-albuterol nebulizer [id number] PRN, combivent inhaler PRN              - Defer to Internal medicine as primary team       Seizure disorder              - Stable on Lamictal 25mg BID               - Defer to Internal medicine as primary team       GERD/Sadler's esophagus              - Protonix 40mg BID              - Defer to Internal medicine as primary team       Chronic constipation              - Linzess 290mg capsule every morning ordered              - Defer to Internal medicine as primary team       Cervical disc herniation/Arthritis              - Defer to Internal medicine as primary team       Smoker              - Encouraged cessation       Migraine without aura              - Defer to Internal medicine as primary team       Bipolar affective disorder/Depression              - Denies any SI/HI       Insomnia               - Defer to Internal medicine as primary team       BMI 37.68    Tabitha Syed,   Ob/Gyn Resident  Pager: [phone]  7/25/2020, 8:00 AM     I have discussed the care of Riky Templeton, including pertinent history and exam findings, with the resident. I have seen and examined the patient and the key elements of all parts of the encounter have been performed by me. I agree with the assessment, plan and orders as documented by the resident.     Yuriy Crum,

## 2020-07-25 NOTE — PROGRESS NOTES
Attestation and add on     Patient seen and examined with residents on rounds today . Diagnosis treatment plans reviewed  Resident final note is pending       ---- ;       I have discussed the care of Álvaro Juarez , including pertinent history and exam findings,      20    with the resident. I have seen and examined the patient and the key elements of all parts of the encounter have been performed by me . I agree with the assessment, plan and orders as documented by the resident. Principal Problem:    Diverticulitis  Active Problems:    Asthma    GERD    Iron deficiency anemia    Cervical disc herniation    Smoker    Sadler's esophagus without dysplasia    COPD    Chronic constipation    Seizure ([address]    Hx of  x2 (G3, G4)    Arthritis    Migraine without aura    Prediabetes    Endometriosis    Family history of breast cancer    Bipolar affective disorder, currently depressed, moderate (HCC)    Insomnia    Major depressive disorder, recurrent episode ([address]    Class 2 drug-induced obesity with serious comorbidity and body mass index (BMI) of 37.0 to 37.9 in adult    [facility] with BS and cystoscopy 2020    Diverticulitis of colon  Resolved Problems:    * No resolved hospital problems. *            Medications: Allergies:     Allergies   Allergen Reactions    Nsaids      Irritates her Barrets esophogus    Toradol [Ketorolac Tromethamine]     Ultram [Tramadol] Nausea Only     Gastric upset       Current Meds:   Scheduled Meds:    pantoprazole  40 mg Oral BID AC    docusate sodium  100 mg Oral BID    linaclotide  290 mcg Oral QAM AC    lamoTRIgine  25 mg Oral BID    montelukast  10 mg Oral Nightly    sodium chloride flush  10 mL Intravenous 2 times per day    enoxaparin  40 mg Subcutaneous Daily    pregabalin  75 mg Oral TID    ipratropium-albuterol  1 ampule Inhalation Q4H WA    insulin lispro  0-6 Units Subcutaneous TID WC    insulin lispro  0-3 Units Subcutaneous Nightly

## 2020-07-25 NOTE — PROGRESS NOTES
IV obtained by ICU infiltrated. Residents notified that patient has been poked approximately 15 times by 5 different nurses for an IV to no avail. Writer inquired about oral pain medications as well as antibiotics. Antibiotics to be verified with Dr. Cara Day, as well as discharge recommendations.

## 2020-07-25 NOTE — PROGRESS NOTES
Writer notified that patient's IV was \"bad\" per primary RN. RN entered room, patient currently in the shower. Will attempt later.

## 2020-07-25 NOTE — PROGRESS NOTES
Dr. Kimber Fletcher notified that of loss of IV access, patient is ok to switch to Oral antibiotics. Dr. Kimber Fletcher would like patient to have abscess drained prior to discharge, Dr. Kimber Fletcher notified of CT results from 7/24/20. Results are as follows: The abscess collections have increased in    size when compared to the prior study.  These are not amenable to    percutaneous drainage. Awaiting further orders.

## 2020-07-25 NOTE — PROGRESS NOTES
esophogus    Toradol [Ketorolac Tromethamine]     Ultram [Tramadol] Nausea Only     Gastric upset       Current Meds:   Scheduled Meds:    pantoprazole  40 mg Oral BID AC    docusate sodium  100 mg Oral BID    linaclotide  290 mcg Oral QAM AC    lamoTRIgine  25 mg Oral BID    montelukast  10 mg Oral Nightly    sodium chloride flush  10 mL Intravenous 2 times per day    enoxaparin  40 mg Subcutaneous Daily    pregabalin  75 mg Oral TID    ipratropium-albuterol  1 ampule Inhalation Q4H WA    insulin lispro  0-6 Units Subcutaneous TID WC    insulin lispro  0-3 Units Subcutaneous Nightly    ciprofloxacin  400 mg Intravenous Q12H    metroNIDAZOLE  500 mg Intravenous Q8H     Continuous Infusions:    dextrose      sodium chloride 100 mL/hr at 07/25/20 0610     PRN Meds: oxyCODONE-acetaminophen, LORazepam, sodium chloride flush, fentanNYL, aluminum & magnesium hydroxide-simethicone, calcium carbonate, sodium chloride flush, sodium chloride flush, acetaminophen **OR** acetaminophen, polyethylene glycol, promethazine **OR** ondansetron, potassium chloride **OR** potassium alternative oral replacement **OR** potassium chloride, magnesium sulfate, glucose, dextrose, glucagon (rDNA), dextrose, ipratropium-albuterol, nicotine polacrilex    Data:     Past Medical History:   has a past medical history of Anxiety, Arthritis, Asthma, Sadler's esophagus, Bipolar 1 disorder ([facility] [address] CAD (coronary artery disease), Chronic insomnia, COPD (chronic obstructive pulmonary disease) ([facility] [address] Depression, Diabetes mellitus ([facility] [address] Endometriosis, Esophagitis, GERD (gastroesophageal reflux disease), Headache(784.0), Herniated disc, cervical, Hiatal hernia, Incisional abscess, Kidney stones, MDRO (multiple drug resistant organisms) resistance, Pleurisy, Pneumonia, Seizures ([facility] [address] UTI (urinary tract infection), and Vision abnormalities. Social History:   reports that she has been smoking cigarettes.  She has a 21.00 pack-year smoking TECHNIQUE: CT of the abdomen and pelvis was performed with the administration of intravenous contrast. Multiplanar reformatted images are provided for review. Dose modulation, iterative reconstruction, and/or weight based adjustment of the mA/kV was utilized to reduce the radiation dose to as low as reasonably achievable. COMPARISON: 07/22/2020 HISTORY: ORDERING SYSTEM PROVIDED HISTORY: Persistent abdominal pain, diverticulitis/colitis in the setting of recent hysterectomy TECHNOLOGIST PROVIDED HISTORY: Persistent abdominal pain, diverticulitis/colitis in the setting of recent hysterectomy Reason for Exam: Persistent abdominal pain, diverticulitis/colitis in the setting of recent hysterectomy Acuity: Acute Type of Exam: Initial FINDINGS: Lower Chest: There is been interval development of bibasilar infiltrate more pronounced on the right. Organs: The gallbladder is contracted. The remainder of the visualized upper abdominal organs are stable when compared to the prior study. GI/Bowel: No dilated large or small bowel loops are seen. There is extensive and increased soft tissue stranding in the mesentery when compared to the prior study. Again noted are changes of complicated diverticulitis. There are multiple abscess collections within the lower abdomen and pelvis. There is a 4.5 x 3.0 cm multiloculated collection in the left hemipelvis adjacent to the iliac vessels. There is a 3.7 x 2.8 cm collection in the right hemipelvis. There is another smaller collection in the left hemipelvis measuring 3.0 x 1.5 cm in size. There may be an early developing abscess versus phlegmon in the retroperitoneum on the left which is difficult to measure. Pelvis: The bladder is unremarkable in appearance.  Peritoneum/Retroperitoneum: Negative for aneurysm Bones/Soft Tissues: Normal     Changes of complicated diverticulitis with multiple abscess collections within the lower abdomen and pelvis and increased and extensive soft tissue stranding in the mesenteric fat. The abscess collections have increased in size when compared to the prior study. These are not amenable to percutaneous drainage. Interval development of bibasilar infiltrates. Ct Abdomen Pelvis W Iv Contrast Additional Contrast? None    Result Date: 7/22/2020  EXAMINATION: CT OF THE ABDOMEN AND PELVIS WITH CONTRAST 7/22/2020 5:01 pm TECHNIQUE: CT of the abdomen and pelvis was performed with the administration of intravenous contrast. Multiplanar reformatted images are provided for review. Dose modulation, iterative reconstruction, and/or weight based adjustment of the mA/kV was utilized to reduce the radiation dose to as low as reasonably achievable. COMPARISON: 2 July 2020 HISTORY: ORDERING SYSTEM PROVIDED HISTORY: abd pain TECHNOLOGIST PROVIDED HISTORY: abd pain FINDINGS: Lower Chest: No acute abnormality in the included pulmonary parenchyma at the lung bases. Shotty epicardial lymph nodes are present without gricelda adenopathy. No pericardial effusion is evident. Organs: Liver, spleen, pancreas, gallbladder, adrenals, and kidneys demonstrate no acute abnormality. GI/Bowel: No free fluid or free air is noted. The rectosigmoid colon is abnormal in appearance, with loss of haustra, serosal haziness, shaggy appearance, and pericolonic fat infiltration. Multiple pericolonic complex collections are noted, likely pericolonic abscesses including one in the left lower quadrant of 2.5 x 3.5 cm, which appears to be between phlegmon and abscess formation, a right paracentral lower pelvic collection of 2.3 cm more typical of abscess, a central posterior collection of 1.9 cm, and a 4th collection of 2.2 x 1.4 cm. Inflamed small bowel loops in the right lower quadrant are noted in the upper pelvis. Appendix contains some dense material likely an appendicolith without evidence of acute appendicitis. Pelvis: Bladder is unremarkable.   Bilateral fat-containing hernias are present without strangulation. As previously noted, inflammatory changes are present in the pelvis with pericolonic complex and fluid collections compatible with multiple phlegmons and/or abscesses. No discrete extraluminal air is noted. Peritoneum/Retroperitoneum: No aortic aneurysm, retroperitoneal or mesenteric adenopathy is present. Bones/Soft Tissues: No acute osseous or soft tissue abnormality is noted. 1.  Findings compatible with acute colitis/diverticulitis of the rectosigmoid colon with multiple pericolonic fluid collections consistent with complex phlegmons and abscesses. 2.  Apparent appendicolith in the appendix. No acute appendicitis. Critical results were called by Dr. Shabnam Scruggs MD to [facility] on 7/22/2020 at 17:16. Ct Abdomen Pelvis W Iv Contrast    Result Date: 7/2/2020  EXAMINATION: CT OF THE ABDOMEN AND PELVIS WITH CONTRAST 7/2/2020 4:55 pm TECHNIQUE: CT of the abdomen and pelvis was performed with the administration of intravenous contrast. Multiplanar reformatted images are provided for review. Dose modulation, iterative reconstruction, and/or weight based adjustment of the mA/kV was utilized to reduce the radiation dose to as low as reasonably achievable. COMPARISON: 03/04/2020 HISTORY: ORDERING SYSTEM PROVIDED HISTORY: Acute onset abdominal pain, N/V TECHNOLOGIST PROVIDED HISTORY: IV Only Contrast Acute onset abdominal pain, N/V Reason for Exam: Pt c/o abd pain since yesterday 29-year-old female with acute-onset abdominal pain, nausea and vomiting FINDINGS: Lower Chest: Mild bibasilar atelectasis and respiratory motion. No free intra-abdominal air. Tree-in-bud opacities at the lung bases. Small hiatal hernia. Mild wall thickening of the distal esophagus, stable. Organs: Liver, gallbladder, adrenal glands, pancreas, spleen grossly unremarkable in appearance. No hydronephrosis. No obstructing calculus or hydroureter. 5 mm nonobstructive midpole left renal calculus on image 81, series 2. GI/Bowel: No significant dilation of small bowel loops to suggest small bowel obstruction. Normal gas-filled appendix. Mild stool burden. Pelvis: Urinary bladder is collapsed. No inguinal or pelvic sidewall lymphadenopathy. No free fluid in the pelvis. Uterus and adnexa grossly unremarkable. Peritoneum/Retroperitoneum: Abdominal aorta normal in appearance and caliber. No retroperitoneal lymphadenopathy. Psoas muscles normal in size and symmetric in appearance. Prominent portacaval lymph nodes. Bones/Soft Tissues: Mild diffuse degenerative changes throughout the spine. Tiny midline fat-containing periumbilical hernia. 1. No acute intra-abdominal or intrapelvic abnormality identified by CT. 2. Mild bibasilar atelectasis and respiratory motion. Tree-in-bud opacities at the lung bases likely representing inflammation or infection to include ENOC or sequela of aspiration. Follow-up is recommended to document resolution. 3. Tiny midline fat-containing periumbilical hernia. 4. Mild wall thickening of the distal esophagus, stable. Consider evaluation with EGD. 5. Small hiatal hernia. 6. 5 mm nonobstructing midpole left renal calculus. Xr Chest Portable    Result Date: 7/22/2020  EXAMINATION: ONE XRAY VIEW OF THE CHEST 7/22/2020 4:31 pm COMPARISON: 07/17/2020 and 07/07/2020 HISTORY: Reason for Exam: Chest pains Acuity: Acute Type of Exam: Initial FINDINGS: The lungs are without acute focal process. No effusion or pneumothorax. The cardiomediastinal silhouette is normal.  The osseous structures are intact without acute process. Lower cervical spine hardware. Unremarkable chest.     Xr Chest Portable    Result Date: 7/17/2020  EXAMINATION: ONE XRAY VIEW OF THE CHEST 7/17/2020 7:25 pm COMPARISON: July 7, 2020 HISTORY: ORDERING SYSTEM PROVIDED HISTORY: Wheezing TECHNOLOGIST PROVIDED HISTORY: Wheezing Reason for Exam: Wheezing Acuity: Acute Type of Exam: Initial FINDINGS: Lungs are clear. No cardiomegaly. No pulmonary edema. Negative chest radiograph. Physical Examination:        Physical Exam  Constitutional:       General: She is not in acute distress. Appearance: She is obese. HENT:      Head: Normocephalic and atraumatic. Eyes:      Extraocular Movements: Extraocular movements intact. Pupils: Pupils are equal, round, and reactive to light. Cardiovascular:      Rate and Rhythm: Normal rate and regular rhythm. Heart sounds: No murmur. No friction rub. No gallop. Pulmonary:      Effort: No respiratory distress. Breath sounds: No stridor. No wheezing, rhonchi or rales. Chest:      Chest wall: No tenderness. Abdominal:      General: There is no distension. Palpations: Abdomen is soft. There is no mass. Tenderness: There is abdominal tenderness (lower abdomen). There is no guarding or rebound. Hernia: No hernia is present. Musculoskeletal:      Right lower leg: No edema. Left lower leg: No edema. Skin:     General: Skin is warm. Capillary Refill: Capillary refill takes less than 2 seconds. Neurological:      Mental Status: She is alert and oriented to person, place, and time. Motor: No weakness.            Assessment:        Primary Problem  Diverticulitis    Active Hospital Problems    Diagnosis Date Noted    Diverticulitis of colon [K57.32]     Diverticulitis [K57.92] 2020    RALH with BS and cystoscopy 2020 [Z90.710] 2020    Class 2 drug-induced obesity with serious comorbidity and body mass index (BMI) of 37.0 to 37.9 in adult [E66.1, Z68.37] 2020    Insomnia [G47.00] 04/10/2020    Bipolar affective disorder, currently depressed, moderate ([address] [F31.32] 04/10/2020    Major depressive disorder, recurrent episode ([address] [F33.9] 04/10/2020    Endometriosis [N80.9] 2020    Family history of breast cancer [Z80.3] 2020    Hx of  x2 (G3, G4) [Z98.891] 10/14/2018    Seizure ([facility] [address] [R56.9]

## 2020-07-25 NOTE — PROGRESS NOTES
[facility] -   Infectious diseases evaluation  admission date 7/22/2020     The patient is not in her room

## 2020-07-26 LAB
ABSOLUTE EOS #: 0.3 K/UL (ref 0–0.4)
ABSOLUTE IMMATURE GRANULOCYTE: ABNORMAL K/UL (ref 0–0.3)
ABSOLUTE LYMPH #: 1.2 K/UL (ref 1–4.8)
ABSOLUTE MONO #: 0.6 K/UL (ref 0.1–1.3)
ANION GAP SERPL CALCULATED.3IONS-SCNC: 10 MMOL/L (ref 9–17)
BASOPHILS # BLD: 1 % (ref 0–2)
BASOPHILS ABSOLUTE: 0.1 K/UL (ref 0–0.2)
BUN BLDV-MCNC: 12 MG/DL (ref 6–20)
BUN/CREAT BLD: NORMAL (ref 9–20)
CALCIUM SERPL-MCNC: 8.6 MG/DL (ref 8.6–10.4)
CHLORIDE BLD-SCNC: 107 MMOL/L (ref 98–107)
CO2: 26 MMOL/L (ref 20–31)
CREAT SERPL-MCNC: 0.64 MG/DL (ref 0.5–0.9)
DIFFERENTIAL TYPE: ABNORMAL
EOSINOPHILS RELATIVE PERCENT: 5 % (ref 0–4)
GFR AFRICAN AMERICAN: >60 ML/MIN
GFR NON-AFRICAN AMERICAN: >60 ML/MIN
GFR SERPL CREATININE-BSD FRML MDRD: NORMAL ML/MIN/{1.73_M2}
GFR SERPL CREATININE-BSD FRML MDRD: NORMAL ML/MIN/{1.73_M2}
GLUCOSE BLD-MCNC: 102 MG/DL (ref 65–105)
GLUCOSE BLD-MCNC: 109 MG/DL (ref 65–105)
GLUCOSE BLD-MCNC: 93 MG/DL (ref 65–105)
GLUCOSE BLD-MCNC: 95 MG/DL (ref 65–105)
GLUCOSE BLD-MCNC: 95 MG/DL (ref 70–99)
HCT VFR BLD CALC: 34.6 % (ref 36–46)
HEMOGLOBIN: 11.1 G/DL (ref 12–16)
IMMATURE GRANULOCYTES: ABNORMAL %
LYMPHOCYTES # BLD: 17 % (ref 24–44)
MCH RBC QN AUTO: 26.2 PG (ref 26–34)
MCHC RBC AUTO-ENTMCNC: 32 G/DL (ref 31–37)
MCV RBC AUTO: 81.9 FL (ref 80–100)
MONOCYTES # BLD: 9 % (ref 1–7)
NRBC AUTOMATED: ABNORMAL PER 100 WBC
PDW BLD-RTO: 16.4 % (ref 11.5–14.9)
PLATELET # BLD: 292 K/UL (ref 150–450)
PLATELET ESTIMATE: ABNORMAL
PMV BLD AUTO: 8 FL (ref 6–12)
POTASSIUM SERPL-SCNC: 4 MMOL/L (ref 3.7–5.3)
RBC # BLD: 4.22 M/UL (ref 4–5.2)
RBC # BLD: ABNORMAL 10*6/UL
SEG NEUTROPHILS: 68 % (ref 36–66)
SEGMENTED NEUTROPHILS ABSOLUTE COUNT: 4.6 K/UL (ref 1.3–9.1)
SODIUM BLD-SCNC: 143 MMOL/L (ref 135–144)
WBC # BLD: 6.8 K/UL (ref 3.5–11)
WBC # BLD: ABNORMAL 10*3/UL

## 2020-07-26 PROCEDURE — 6370000000 HC RX 637 (ALT 250 FOR IP): Performed by: STUDENT IN AN ORGANIZED HEALTH CARE EDUCATION/TRAINING PROGRAM

## 2020-07-26 PROCEDURE — 1200000000 HC SEMI PRIVATE

## 2020-07-26 PROCEDURE — 6360000002 HC RX W HCPCS: Performed by: STUDENT IN AN ORGANIZED HEALTH CARE EDUCATION/TRAINING PROGRAM

## 2020-07-26 PROCEDURE — 99232 SBSQ HOSP IP/OBS MODERATE 35: CPT | Performed by: INTERNAL MEDICINE

## 2020-07-26 PROCEDURE — 85025 COMPLETE CBC W/AUTO DIFF WBC: CPT

## 2020-07-26 PROCEDURE — 82947 ASSAY GLUCOSE BLOOD QUANT: CPT

## 2020-07-26 PROCEDURE — 94640 AIRWAY INHALATION TREATMENT: CPT

## 2020-07-26 PROCEDURE — 94761 N-INVAS EAR/PLS OXIMETRY MLT: CPT

## 2020-07-26 PROCEDURE — 94664 DEMO&/EVAL PT USE INHALER: CPT

## 2020-07-26 PROCEDURE — 36415 COLL VENOUS BLD VENIPUNCTURE: CPT

## 2020-07-26 PROCEDURE — 6370000000 HC RX 637 (ALT 250 FOR IP): Performed by: INTERNAL MEDICINE

## 2020-07-26 PROCEDURE — 80048 BASIC METABOLIC PNL TOTAL CA: CPT

## 2020-07-26 PROCEDURE — 99233 SBSQ HOSP IP/OBS HIGH 50: CPT | Performed by: INTERNAL MEDICINE

## 2020-07-26 RX ORDER — HYDROCODONE BITARTRATE AND ACETAMINOPHEN 5; 325 MG/1; MG/1
1 TABLET ORAL EVERY 8 HOURS PRN
Qty: 20 TABLET | Refills: 0 | Status: SHIPPED | OUTPATIENT
Start: 2020-07-26 | End: 2020-07-28 | Stop reason: HOSPADM

## 2020-07-26 RX ADMIN — METRONIDAZOLE 500 MG: 500 TABLET ORAL at 21:29

## 2020-07-26 RX ADMIN — PANTOPRAZOLE SODIUM 40 MG: 40 TABLET, DELAYED RELEASE ORAL at 05:47

## 2020-07-26 RX ADMIN — LORAZEPAM 0.5 MG: 0.5 TABLET ORAL at 21:29

## 2020-07-26 RX ADMIN — LORAZEPAM 0.5 MG: 0.5 TABLET ORAL at 09:30

## 2020-07-26 RX ADMIN — IPRATROPIUM BROMIDE AND ALBUTEROL SULFATE 1 AMPULE: 2.5; .5 SOLUTION RESPIRATORY (INHALATION) at 15:30

## 2020-07-26 RX ADMIN — ENOXAPARIN SODIUM 40 MG: 40 INJECTION SUBCUTANEOUS at 09:31

## 2020-07-26 RX ADMIN — CIPROFLOXACIN HYDROCHLORIDE 500 MG: 500 TABLET, FILM COATED ORAL at 09:30

## 2020-07-26 RX ADMIN — MONTELUKAST 10 MG: 10 TABLET, FILM COATED ORAL at 21:28

## 2020-07-26 RX ADMIN — CIPROFLOXACIN HYDROCHLORIDE 500 MG: 500 TABLET, FILM COATED ORAL at 21:29

## 2020-07-26 RX ADMIN — PANTOPRAZOLE SODIUM 40 MG: 40 TABLET, DELAYED RELEASE ORAL at 15:29

## 2020-07-26 RX ADMIN — IPRATROPIUM BROMIDE AND ALBUTEROL SULFATE 1 AMPULE: 2.5; .5 SOLUTION RESPIRATORY (INHALATION) at 22:48

## 2020-07-26 RX ADMIN — PREGABALIN 75 MG: 75 CAPSULE ORAL at 14:20

## 2020-07-26 RX ADMIN — LAMOTRIGINE 25 MG: 25 TABLET ORAL at 21:31

## 2020-07-26 RX ADMIN — OXYCODONE HYDROCHLORIDE AND ACETAMINOPHEN 1 TABLET: 5; 325 TABLET ORAL at 15:30

## 2020-07-26 RX ADMIN — PREGABALIN 75 MG: 75 CAPSULE ORAL at 21:29

## 2020-07-26 RX ADMIN — LORAZEPAM 0.5 MG: 0.5 TABLET ORAL at 15:29

## 2020-07-26 RX ADMIN — OXYCODONE HYDROCHLORIDE AND ACETAMINOPHEN 1 TABLET: 5; 325 TABLET ORAL at 09:29

## 2020-07-26 RX ADMIN — DOCUSATE SODIUM 100 MG: 100 CAPSULE, LIQUID FILLED ORAL at 09:30

## 2020-07-26 RX ADMIN — LAMOTRIGINE 25 MG: 25 TABLET ORAL at 09:32

## 2020-07-26 RX ADMIN — PREGABALIN 75 MG: 75 CAPSULE ORAL at 09:30

## 2020-07-26 RX ADMIN — OXYCODONE HYDROCHLORIDE AND ACETAMINOPHEN 1 TABLET: 5; 325 TABLET ORAL at 21:29

## 2020-07-26 RX ADMIN — DOCUSATE SODIUM 100 MG: 100 CAPSULE, LIQUID FILLED ORAL at 21:29

## 2020-07-26 RX ADMIN — OXYCODONE HYDROCHLORIDE AND ACETAMINOPHEN 1 TABLET: 5; 325 TABLET ORAL at 03:59

## 2020-07-26 RX ADMIN — LINACLOTIDE 290 MCG: 145 CAPSULE, GELATIN COATED ORAL at 05:47

## 2020-07-26 RX ADMIN — METRONIDAZOLE 500 MG: 500 TABLET ORAL at 05:47

## 2020-07-26 RX ADMIN — LORAZEPAM 0.5 MG: 0.5 TABLET ORAL at 03:59

## 2020-07-26 RX ADMIN — METRONIDAZOLE 500 MG: 500 TABLET ORAL at 14:20

## 2020-07-26 ASSESSMENT — PAIN DESCRIPTION - LOCATION: LOCATION: ABDOMEN

## 2020-07-26 ASSESSMENT — PAIN DESCRIPTION - DESCRIPTORS: DESCRIPTORS: SHARP

## 2020-07-26 ASSESSMENT — PAIN DESCRIPTION - FREQUENCY: FREQUENCY: CONTINUOUS

## 2020-07-26 ASSESSMENT — PAIN SCALES - GENERAL
PAINLEVEL_OUTOF10: 10
PAINLEVEL_OUTOF10: 7
PAINLEVEL_OUTOF10: 10

## 2020-07-26 ASSESSMENT — PAIN DESCRIPTION - ORIENTATION: ORIENTATION: LOWER

## 2020-07-26 ASSESSMENT — PAIN DESCRIPTION - PAIN TYPE: TYPE: ACUTE PAIN

## 2020-07-26 NOTE — PROGRESS NOTES
Fred Butt is a 71-year-old female s/p hysterectomy and salpingectomy on July 8, 2020. Presented to the ED with abdominal pain. CT scan was performed and showed postoperative changes with a diagnosis of acute diverticulitis/colitis with multiple pericolonic fluid collections consistent with complex abscesses. Surgery and OB GYN were consulted. Surgery appears signed off. OB/GYN currently still following. Patient is currently taking oral metronidazole and ciprofloxacin.

## 2020-07-26 NOTE — PROGRESS NOTES
[facility] -   Infectious diseases evaluation  admission date 7/22/2020    reason for consultation:   Diverticulitis with abscess formation. Impression :   Current:  · Colitis /diverticulitis with pericolonic abscesses increased in size on repeat CT 7/24/2020, not drainable percutaneously per radiology. Other:  s/p Robotic assisted Laparoscopic Hysterectomy with Bilateral Salpingectomy with Cystoscopy on 7/8/20   Prediabetes  COPD  Seizure disorder  Smoking  Migraine headache  Bipolar disorder  Sadler's esophagus    Recommendations   · Continue po  ciprofloxacin and Flagyl  · Surgery signed off. · I will discuss with OB/GYN surgical abscesses drainage. · Follow CBC renal function          History of Present Illness:   Initial history:  Nolan Hardwick is a 47y.o.-year-old female presented to the hospital with worsening abdominal pain, constant for 2 weeks, not improving with pain medication, pain at the lower abdomen with no radiation, pressure-like. She also had fever with temperature of 100.3 on admission. She denied any nausea or vomiting, no diarrhea, no other complaints. WBC on admission was 21.6, lactic acid normal, chest x-ray showed no infiltrate, CT abdomen/pelvis suggestive of acute colitis/diverticulitis of the rectosigmoid colon with multiple pericolonic abscesses/phlegmons with appendicolith  s/p Robotic assisted Laparoscopic Hysterectomy with Bilateral Salpingectomy with Cystoscopy on 7/8/20 secondary to enlarged uterus with chronic pelvic pain and history of endometriosis. Urinalysis was clear, negative leukocyte esterase  Interval changes  7/26/2020   She still complaining of abdominal pain, denied fever or chills, denied nausea or vomiting. Repeat CT abdomen 724 showed increased abscesses size. The patient lost her IV access yesterday and was changed to oral antibiotics.       I have personally reviewed the past medical history, past surgical history, medications, social history, and family history, and I haveupdated the database accordingly.   Past Medical History:     Past Medical History:   Diagnosis Date    Anxiety     Arthritis     OA    Asthma     Sadler's esophagus     LONG SEGMENT    Bipolar 1 disorder (HCC)     CAD (coronary artery disease)     Chronic insomnia     COPD (chronic obstructive pulmonary disease) (HCC)     Depression     and bipolar    Diabetes mellitus (HCC)     prediabetic    Endometriosis 3/9/2020    Esophagitis     severe    GERD (gastroesophageal reflux disease)     Headache(784.0)     Herniated disc, cervical     Hiatal hernia     Incisional abscess 1/15/2019    Kidney stones     MDRO (multiple drug resistant organisms) resistance     mrsa    Pleurisy     hx of \"years ago\"    Pneumonia     past penumonia    Seizures ([address]     2016 last seizure-focal seizure    UTI (urinary tract infection)     Vision abnormalities     wears glasses       Past Surgical  History:     Past Surgical History:   Procedure Laterality Date    CERVICAL DISC SURGERY      x2     SECTION  , 2018    x 2     SECTION N/A 2018     SECTION performed by Larri Shone, MD at [facility] L&D [address] COLONOSCOPY N/A 10/1/2019    COLONOSCOPY DIAGNOSTIC ABORTED performed by Hallie Babinski, MD at [address] [facility][note 3] N/A 2020    COLONOSCOPY WITH BIOPSY performed by Hallie Babinski, MD at [address] [place] [address], COLON, DIAGNOSTIC      HYSTERECTOMY N/A 2020    HYSTERECTOMY ABDOMINAL LAPAROSCOPIC ROBOTIC XI W/ CYSTOSCOPY performed by Larri Shone, MD at [address] [facility] ARTHROSCOPY Left 5/20/15    KNEE SURGERY Left     x3    LAPAROSCOPY      dr Love Pablo N/A 10/5/2017    LAPAROSCOPIC REMOVAL INTRA ABDOMINAL LIPOMA LOWER RIGHT performed by Tee Holley DO at [address] ESOPHAGOGASTRODUODENOSCOPY TRANSORAL DIAGNOSTIC N/A 3/2/2017    EGD ESOPHAGOGASTRODUODENOSCOPY IP 2055 performed by Nabil Bonilla MD at [facility]  08/16/2016    severe esophagitis    UPPER GASTROINTESTINAL ENDOSCOPY  01/19/2017    barretts; hiatus hernia; gastritis    UPPER GASTROINTESTINAL ENDOSCOPY  03/02/2017    long seg mullins's with linear erosions, esophagitis, small hiatal hernia    UPPER GASTROINTESTINAL ENDOSCOPY N/A 1/30/2018    MULLINS'S    UPPER GASTROINTESTINAL ENDOSCOPY N/A 10/1/2019    EGD BIOPSY performed by Silke Oneil MD at [facility]         Medications:      metroNIDAZOLE  500 mg Oral 3 times per day    ciprofloxacin  500 mg Oral 2 times per day    pantoprazole  40 mg Oral BID AC    docusate sodium  100 mg Oral BID    linaclotide  290 mcg Oral QAM AC    lamoTRIgine  25 mg Oral BID    montelukast  10 mg Oral Nightly    sodium chloride flush  10 mL Intravenous 2 times per day    enoxaparin  40 mg Subcutaneous Daily    pregabalin  75 mg Oral TID    ipratropium-albuterol  1 ampule Inhalation Q4H WA    insulin lispro  0-6 Units Subcutaneous TID WC    insulin lispro  0-3 Units Subcutaneous Nightly       Social History:     Social History     Socioeconomic History    Marital status: Single     Spouse name: Not on file    Number of children: 1    Years of education: 11th grade    Highest education level: Not on file   Occupational History    Occupation: unemployed-   Social Needs    Financial resource strain: Not on file    Food insecurity     Worry: Not on file     Inability: Not on file   [employer] needs     Medical: Not on file     Non-medical: Not on file   Tobacco Use    Smoking status: Current Every Day Smoker     Packs/day: 1.00     Years: 21.00     Pack years: 21.00     Types: Cigarettes    Smokeless tobacco: Never Used   Substance and Sexual Activity    Alcohol use: No    Drug use: No    Sexual activity: Yes     Partners: Male   Lifestyle    Physical activity distension. Palpations: Abdomen is soft. Tenderness: There is abdominal tenderness. Comments: Bilateral lower abdominal tenderness . Musculoskeletal:      Right lower leg: No edema. Left lower leg: No edema. Neurological:      General: No focal deficit present. Mental Status: She is alert and oriented to person, place, and time. Medical Decision Making:   I have independently reviewed/ordered the following labs:    CBC with Differential:   Recent Labs     07/25/20  0929 07/26/20  0627   WBC 8.4 6.8   HGB 10.4* 11.1*   HCT 32.8* 34.6*    292   LYMPHOPCT 8* 17*   MONOPCT 7 9*     BMP:  Recent Labs     07/25/20  0929 07/26/20  0627    143   K 3.8 4.0    107   CO2 21 26   BUN 8 12   CREATININE 0.54 0.64     Hepatic Function Panel:   No results for input(s): PROT, LABALBU, BILIDIR, IBILI, BILITOT, ALKPHOS, ALT, AST in the last 72 hours. Lab Results   Component Value Date    CREATININE 0.64 07/26/2020    GLUCOSE 95 07/26/2020    GLUCOSE 106 03/25/2012       Detailed results: Thank you for allowing us to participate in the care of this patient. Please call with questions. This note is created with the assistance of a speech recognition program.  While intending to generate adocument that actually reflects the content of the visit, the document can still have some errors including those of syntax and sound a like substitutions which may escape proof reading. It such instances, actual meaningcan be extrapolated by contextual diversion.     Varsha Thompson MD  Office: [phone]  Perfect serve / office [phone]

## 2020-07-26 NOTE — PLAN OF CARE
Problem: Pain:  Goal: Pain level will decrease  Description: Pain level will decrease  7/26/2020 1103 by Salo Rivera RN  Outcome: Completed  7/26/2020 0552 by Reees Zhu RN  Outcome: Ongoing  Goal: Control of acute pain  Description: Control of acute pain  7/26/2020 1103 by Salo Rivera RN  Outcome: Completed  7/26/2020 0552 by Reese Zhu RN  Outcome: Ongoing     Problem: Falls - Risk of:  Goal: Will remain free from falls  Description: Will remain free from falls  7/26/2020 1103 by Salo Rivera RN  Outcome: Completed  7/26/2020 0552 by Reese Zhu RN  Outcome: Met This Shift  Goal: Absence of physical injury  Description: Absence of physical injury  7/26/2020 1103 by Salo Rivera RN  Outcome: Completed  7/26/2020 0552 by Reese Zhu RN  Outcome: Met This Shift

## 2020-07-26 NOTE — CARE COORDINATION
DISCHARGE PLANNING NOTE:     Plan remains for patient to be discharged home without needs. Declined VNS.     S/P lap hyst with bilat salpingectomy with cysto on 7/8/2020. Per surgery, no drainable fluid collection.     Active order for PO Cipro and Flagyl. General diet. Anticipate home soon.     Will continue to follow for additional discharge needs.     Electronically signed by Med Clancy RN on 7/26/2020 at 8:53 AM

## 2020-07-26 NOTE — PROGRESS NOTES
Dr. Jessica Reyes paged regarding Dr. Agnieszka Mccormack wanting to keep patient. Dr. Jessica Reyes ok with canceling discharge for today.

## 2020-07-26 NOTE — PLAN OF CARE
Problem: Falls - Risk of:  Goal: Will remain free from falls  Description: Will remain free from falls  7/26/2020 0552 by Francesca Avery RN  Outcome: Met This Shift  7/25/2020 1651 by Emeka Atkins RN  Outcome: Ongoing  Goal: Absence of physical injury  Description: Absence of physical injury  7/26/2020 0552 by Francesca Avery RN  Outcome: Met This Shift  7/25/2020 1651 by Emeka Atkins RN  Outcome: Ongoing     Problem: Pain:  Goal: Pain level will decrease  Description: Pain level will decrease  7/26/2020 0552 by Francesca Avrey RN  Outcome: Ongoing  7/25/2020 1651 by Emeka Atkins RN  Outcome: Ongoing  Goal: Control of acute pain  Description: Control of acute pain  7/26/2020 0552 by Francesca Avery RN  Outcome: Ongoing  7/25/2020 1651 by Emeka Atkins RN  Outcome: Ongoing     Problem: Pain:  Intervention: Promote participation in pain management plan  7/25/2020 1651 by Emeka Atkins RN  Note: Pt requesting pain medications when due      Problem: Falls - Risk of:   Intervention: Flexibility training  7/25/2020 1651 by Emeka Atkins RN  Note: Pt able to walk downstairs to smoke

## 2020-07-26 NOTE — PROGRESS NOTES
Gynecology Progress Note    Date: 7/26/2020  Time: 7:28 AM    Froylan Galan 47 y.o. female [id number], POD # 25 s/p ASH NIXON, cysto on 7/8/20    Patient seen and examined. She complained of continued abdominal pain and is requesting writer give her pain medication more often. Patient states, \"percocet just ain't working. \"  Reports pain is coming from her bladder when writer tried lifting panus. Patient is  tolerating oral intake. She is urinating well. She denies any vaginal bleeding. She is  ambulating without difficulty and has been out to smoke multiple times. She is  passing flatus.  She denies Fever/Chills, Chest Pain, SOB, N/V, Calf Pain    Vitals:  Vitals:    07/25/20 1320 07/25/20 1541 07/25/20 1819 07/26/20 0545   BP: 124/78  (!) 118/57    Pulse: 84  92    Resp: 16 16 16    Temp: 98.3 °F (36.8 °C)  98.4 °F (36.9 °C)    TempSrc: Oral  Oral    SpO2: 95% 98% 97%    Weight:    212 lb 8.4 oz (96.4 kg)   Height:           Physical Exam:  General:  no apparent distress, alert and cooperative  Neurologic:  alert, oriented, normal speech, no focal findings or movement disorder noted  Lungs:  No increased work of breathing, good air exchange, clear to auscultation bilaterally, no crackles or wheezing  Heart:  regular rate and rhythm and no murmur    Abdomen: soft, non-distended, appropriate tenderness, no CVA tenderness, normal bowel sounds  Incisions: clean, dry and intact  Extremities:  no calf tenderness, non edematous, SCDs not on    Lab:  Recent Results (from the past 12 hour(s))   POC Glucose Fingerstick    Collection Time: 07/25/20  8:16 PM   Result Value Ref Range    POC Glucose 110 (H) 65 - 105 mg/dL   Urinalysis Reflex to Culture    Collection Time: 07/25/20 10:47 PM    Specimen: Urine, clean catch   Result Value Ref Range    Color, UA YELLOW YELLOW    Turbidity UA CLEAR CLEAR    Glucose, Ur NEGATIVE NEGATIVE    Bilirubin Urine NEGATIVE NEGATIVE    Ketones, Urine NEGATIVE NEGATIVE    Specific Gravity, UA 1.016 1.000 - 1.030    Urine Hgb NEGATIVE NEGATIVE    pH, UA 6.0 5.0 - 8.0    Protein, UA NEGATIVE NEGATIVE    Urobilinogen, Urine Normal Normal    Nitrite, Urine NEGATIVE NEGATIVE    Leukocyte Esterase, Urine SMALL (A) NEGATIVE    Urinalysis Comments NOT REPORTED    Microscopic Urinalysis    Collection Time: 07/25/20 10:47 PM   Result Value Ref Range    -          WBC, UA 2 TO 5 /HPF    RBC, UA 2 TO 5 /HPF    Casts UA NOT REPORTED /LPF    Crystals, UA NOT REPORTED None /HPF    Epithelial Cells UA 2 TO 5 /HPF    Renal Epithelial, UA NOT REPORTED 0 /HPF    Bacteria, UA FEW (A) None    Mucus, UA NOT REPORTED None    Trichomonas, UA NOT REPORTED None    Amorphous, UA NOT REPORTED None    Other Observations UA NOT REPORTED NOT REQ.     Yeast, UA NOT REPORTED None   POC Glucose Fingerstick    Collection Time: 07/26/20  6:21 AM   Result Value Ref Range    POC Glucose 95 65 - 105 mg/dL   Basic Metabolic Panel w/ Reflex to MG    Collection Time: 07/26/20  6:27 AM   Result Value Ref Range    Glucose 95 70 - 99 mg/dL    BUN 12 6 - 20 mg/dL    CREATININE 0.64 0.50 - 0.90 mg/dL    Bun/Cre Ratio NOT REPORTED 9 - 20    Calcium 8.6 8.6 - 10.4 mg/dL    Sodium 143 135 - 144 mmol/L    Potassium 4.0 3.7 - 5.3 mmol/L    Chloride 107 98 - 107 mmol/L    CO2 26 20 - 31 mmol/L    Anion Gap 10 9 - 17 mmol/L    GFR Non-African American >60 >60 mL/min    GFR African American >60 >60 mL/min    GFR Comment          GFR Staging NOT REPORTED    CBC auto differential    Collection Time: 07/26/20  6:27 AM   Result Value Ref Range    WBC 6.8 3.5 - 11.0 k/uL    RBC 4.22 4.0 - 5.2 m/uL    Hemoglobin 11.1 (L) 12.0 - 16.0 g/dL    Hematocrit 34.6 (L) 36 - 46 %    MCV 81.9 80 - 100 fL    MCH 26.2 26 - 34 pg    MCHC 32.0 31 - 37 g/dL    RDW 16.4 (H) 11.5 - 14.9 %    Platelets 064 850 - 937 k/uL    MPV 8.0 6.0 - 12.0 fL    NRBC Automated NOT REPORTED per 100 WBC    Differential Type NOT REPORTED     Seg Neutrophils 68 (H) 36 - 66 %    Lymphocytes 17 (L) 24 - 44 %    Monocytes 9 (H) 1 - 7 %    Eosinophils % 5 (H) 0 - 4 %    Basophils 1 0 - 2 %    Immature Granulocytes NOT REPORTED 0 %    Segs Absolute 4.60 1.3 - 9.1 k/uL    Absolute Lymph # 1.20 1.0 - 4.8 k/uL    Absolute Mono # 0.60 0.1 - 1.3 k/uL    Absolute Eos # 0.30 0.0 - 0.4 k/uL    Basophils Absolute 0.10 0.0 - 0.2 k/uL    Absolute Immature Granulocyte NOT REPORTED 0.00 - 0.30 k/uL    WBC Morphology NOT REPORTED     RBC Morphology NOT REPORTED     Platelet Estimate NOT REPORTED        Assessment/Plan:  Daxa Running 47 y.o. female [id number], POD #18 s/p RALH, BS, cysto   - Doing well, vitals stable    - Incision C/D/I   - Denies vaginal bleeding    - Continue post-op care, please page with any questions    Diverticulitis/colitis w/ pericolonic abscesses    - IV blown, antibiotics per primary team.  Cipro and flagyl ordered. - Afebrile, VSS    - Lovenox 40 qD    - Pain controled per primary team, receiving percocet 1 tab q6h currently    - Low suspicion for UTI    - Blood cultures NTD    - General surgery signed off    - CT Abd/Pelvis (7/24): Complicated diverticulitis with multiple abscesses appearing larger in size than previous. Extensive soft tissue stranding in the mesenteric fate. These are not amendable to percutaneous drainage.               - CT Abd/Pelvis (7/22): Acute colitis/diverticulitis of the rectosigmoid colon with multiple pericolonic fluid collections consistent with complex phlegmons and abscesses. Apparent appendicolith without acute appendicitis.        Jo Henrandez  Ob/Gyn Resident  Pager: [phone]  7/26/2020, 7:28 AM

## 2020-07-26 NOTE — PROGRESS NOTES
Comprehensive Metabolic Profile:   Recent Labs     07/25/20  0929 07/26/20  0627    143   K 3.8 4.0    107   CO2 21 26   BUN 8 12   CREATININE 0.54 0.64   GLUCOSE 115* 95   CALCIUM 8.5* 8.6        Cultures:  blood cultures x2: shows no growth 4 days    Assessment/Plan:  Tristan Sommer 47 y.o. female [id number] HD# 6 with Diverticulitis/colitis with pericolonic abscesses POD#19 s/p Robotic assisted Laparoscopic Hysterectomy with Bilateral Salpingectomy with Cystoscopy on 7/8/20    - Internal medicine as primary team              - Doing well overall, vitals overall stable with Tmax and Last Temp 100.8 @ 2203, 7/22/20              - IVF: discontinued              - Labs: CBC, BMP daily              - Antibiotics: Transitioned to PO Ciprofloxacin 500mg [id number] with Flagyl 500mg [id number]    - S/p Ciprofloxacin 400mg IV [id number] and Flagyl 500mg IV [id number]                               - S/p meropenem 1g IV x1 in the ED              - DVT prophylaxis: lovenox 40mg daily               - GI: Protonix 40mg hs, Colace BID              - Pain control: Percocet 5-325mg [id number] PRN ordered                          - Pt reports pyridium did not help the pain              - UA on admission with low suspicion for UTI               - Infectious disease consulted- agree to continue PO Cipro/Flagyl and consider surgical abscess drainage                - General surgery consulted- no surgical intervention planned, continue with antibiotics, appears to be signed off              - Blood cultures x 2 with no growth for 4 days                - CT abdomen/pelvis (7/22): acute colitis/diverticulis of rectosigmoid colon with multiple pericolonic fluid collections consistent with abscesses   - CT abdomen/pelvis (7/24): bibasilar infiltrate more pronounced on the R, extensive soft tissue stranding in the mesentery when compared to prior study, multiple abscess collections within lower abdomen/pelvis (4.5 x 3cm multiloculated uterus 02/17/2020    Chronic pelvic pain in female 01/22/2020    Chronic constipation 03/26/2018    Prediabetes 12/08/2016     Will update Dr. Rima Cedillo. Dominique Vieira DO  Ob/Gyn Resident  7/27/2020, 8:24 AM  I have discussed the care of Janeen Lopez, including pertinent history and exam findings, with the resident. I have seen and examined the patient and the key elements of all parts of the encounter have been performed by me. I agree with the assessment, plan and orders as documented by the resident. I spoke to ID who feels abscess need to be drained. Will repeat CT to reevaluate the process and then make determination as dis cussed with ID.   Cr Rich MD  Attending Physician

## 2020-07-26 NOTE — PROGRESS NOTES
07/25/20  3:56 PM   Result Value Ref Range    POC Glucose 111 (H) 65 - 105 mg/dL   POC Glucose Fingerstick    Collection Time: 07/25/20  8:16 PM   Result Value Ref Range    POC Glucose 110 (H) 65 - 105 mg/dL   Urinalysis Reflex to Culture    Collection Time: 07/25/20 10:47 PM    Specimen: Urine, clean catch   Result Value Ref Range    Color, UA YELLOW YELLOW    Turbidity UA CLEAR CLEAR    Glucose, Ur NEGATIVE NEGATIVE    Bilirubin Urine NEGATIVE NEGATIVE    Ketones, Urine NEGATIVE NEGATIVE    Specific [place], UA 1.016 1.000 - 1.030    Urine Hgb NEGATIVE NEGATIVE    pH, UA 6.0 5.0 - 8.0    Protein, UA NEGATIVE NEGATIVE    Urobilinogen, Urine Normal Normal    Nitrite, Urine NEGATIVE NEGATIVE    Leukocyte Esterase, Urine SMALL (A) NEGATIVE    Urinalysis Comments NOT REPORTED    Microscopic Urinalysis    Collection Time: 07/25/20 10:47 PM   Result Value Ref Range    -          WBC, UA 2 TO 5 /HPF    RBC, UA 2 TO 5 /HPF    Casts UA NOT REPORTED /LPF    Crystals, UA NOT REPORTED None /HPF    Epithelial Cells UA 2 TO 5 /HPF    Renal Epithelial, UA NOT REPORTED 0 /HPF    Bacteria, UA FEW (A) None    Mucus, UA NOT REPORTED None    Trichomonas, UA NOT REPORTED None    Amorphous, UA NOT REPORTED None    Other Observations UA NOT REPORTED NOT REQ.     Yeast, UA NOT REPORTED None   POC Glucose Fingerstick    Collection Time: 07/26/20  6:21 AM   Result Value Ref Range    POC Glucose 95 65 - 105 mg/dL   Basic Metabolic Panel w/ Reflex to MG    Collection Time: 07/26/20  6:27 AM   Result Value Ref Range    Glucose 95 70 - 99 mg/dL    BUN 12 6 - 20 mg/dL    CREATININE 0.64 0.50 - 0.90 mg/dL    Bun/Cre Ratio NOT REPORTED 9 - 20    Calcium 8.6 8.6 - 10.4 mg/dL    Sodium 143 135 - 144 mmol/L    Potassium 4.0 3.7 - 5.3 mmol/L    Chloride 107 98 - 107 mmol/L    CO2 26 20 - 31 mmol/L    Anion Gap 10 9 - 17 mmol/L    GFR Non-African American >60 >60 mL/min    GFR African American >60 >60 mL/min    GFR Comment          GFR Staging NOT REPORTED    CBC auto differential    Collection Time: 07/26/20  6:27 AM   Result Value Ref Range    WBC 6.8 3.5 - 11.0 k/uL    RBC 4.22 4.0 - 5.2 m/uL    Hemoglobin 11.1 (L) 12.0 - 16.0 g/dL    Hematocrit 34.6 (L) 36 - 46 %    MCV 81.9 80 - 100 fL    MCH 26.2 26 - 34 pg    MCHC 32.0 31 - 37 g/dL    RDW 16.4 (H) 11.5 - 14.9 %    Platelets 158 630 - 705 k/uL    MPV 8.0 6.0 - 12.0 fL    NRBC Automated NOT REPORTED per 100 WBC    Differential Type NOT REPORTED     Seg Neutrophils 68 (H) 36 - 66 %    Lymphocytes 17 (L) 24 - 44 %    Monocytes 9 (H) 1 - 7 %    Eosinophils % 5 (H) 0 - 4 %    Basophils 1 0 - 2 %    Immature Granulocytes NOT REPORTED 0 %    Segs Absolute 4.60 1.3 - 9.1 k/uL    Absolute Lymph # 1.20 1.0 - 4.8 k/uL    Absolute Mono # 0.60 0.1 - 1.3 k/uL    Absolute Eos # 0.30 0.0 - 0.4 k/uL    Basophils Absolute 0.10 0.0 - 0.2 k/uL    Absolute Immature Granulocyte NOT REPORTED 0.00 - 0.30 k/uL    WBC Morphology NOT REPORTED     RBC Morphology NOT REPORTED     Platelet Estimate NOT REPORTED    POC Glucose Fingerstick    Collection Time: 07/26/20 11:18 AM   Result Value Ref Range    POC Glucose 93 65 - 105 mg/dL     Recent Labs     07/25/20  1057 07/25/20  1556 07/25/20 2016 07/26/20  0621 07/26/20  1118   POCGLU 108* 111* 110* 95 93        No results found. HPI:   See history in H and P      Review of Systems:     Constitutional:  negative for chills, fevers, sweats  Respiratory:  negative for cough, dyspnea on exertion, hemoptysis, shortness of breath, wheezing  Cardiovascular:  negative for chest pain, chest pressure/discomfort, lower extremity edema, palpitations  Gastrointestinal:  negative for abdominal pain, constipation, diarrhea, nausea, vomiting  Neurological:  negative for dizziness, headache  Data:     Past Medical History:  no change     Social History:  no change    Family History: @no change    Vitals:      I/O (24Hr):     Intake/Output Summary (Last 24 hours) at 7/26/2020 1441  Last data filed at 7/26/2020 0551  Gross per 24 hour   Intake 1440 ml   Output --   Net 1440 ml       Labs:    URINE ANALYSIS: No results found for: LABURIN     CBC:  Lab Results   Component Value Date    WBC 6.8 07/26/2020    HGB 11.1 07/26/2020     07/26/2020     03/25/2012        BMP:    Lab Results   Component Value Date     07/26/2020    K 4.0 07/26/2020     07/26/2020    CO2 26 07/26/2020    BUN 12 07/26/2020    CREATININE 0.64 07/26/2020    GLUCOSE 95 07/26/2020    GLUCOSE 106 03/25/2012      LIVER PROFILE:  Lab Results   Component Value Date    ALT 13 07/22/2020    AST 12 07/22/2020    PROT 7.0 07/22/2020    BILITOT 0.77 07/22/2020    BILIDIR <0.08 05/07/2018    LABALBU 4.1 07/22/2020    LABALBU 4.1 03/25/2012               Radiology:      Physical Examination:        General appearance:  alert, cooperative and no distress  Mental Status:  oriented to person, place and time and normal affect  Lungs:  clear to auscultation bilaterally, normal effort  Heart:  regular rate and rhythm, no murmur  Abdomen:  soft, nontender, nondistended, normal bowel sounds, no masses, hepatomegaly, splenomegaly  Extremities:  no edema, redness, tenderness in the calves  Skin:  no gross lesions, rashes, induration    Assessment:        Primary Problem  Diverticulitis    Active Hospital Problems    Diagnosis Date Noted    Pelvic abscess in female [N73.9]     Diverticulitis of colon [K57.32]     Diverticulitis [K57.92] 07/22/2020    RALH with BS and cystoscopy 7/8/2020 [Z90.710] 07/08/2020    Class 2 drug-induced obesity with serious comorbidity and body mass index (BMI) of 37.0 to 37.9 in adult [E66.1, Z68.37] 04/14/2020    Insomnia [G47.00] 04/10/2020    Bipolar affective disorder, currently depressed, moderate ([facility] [address] [F31.32] 04/10/2020    Major depressive disorder, recurrent episode ([facility] [address] [F33.9] 04/10/2020    Endometriosis [N80.9] 03/09/2020    Family history of breast cancer [Z80.3] 03/09/2020    Hx of  x2 (G3, G4) [Z98.891] 10/14/2018    Seizure ([address] [R56.9] 2018    Chronic constipation [K59.09] 2018    Migraine without aura [G43.009] 2017    COPD [J44.9]     Sadler's esophagus without dysplasia [K22.70]     Smoker [F17.200] 2017    Prediabetes [R73.03] 2016    Arthritis [M19.90] 2016    Cervical disc herniation [M50.20] 2016    Iron deficiency anemia [D50.9] 2015    Asthma [J45.909] 2015    GERD [K21.9] 2015       Plan:        Continue Cipro Flagyl  ID is evaluating for possible drainage procedure for pelvic abscess    Medications: Allergies:     Allergies   Allergen Reactions    Nsaids      Irritates her Barrets esophogus    Toradol [Ketorolac Tromethamine]     Ultram [Tramadol] Nausea Only     Gastric upset       Current Meds:   Scheduled Meds:    metroNIDAZOLE  500 mg Oral 3 times per day    ciprofloxacin  500 mg Oral 2 times per day    pantoprazole  40 mg Oral BID AC    docusate sodium  100 mg Oral BID    linaclotide  290 mcg Oral QAM AC    lamoTRIgine  25 mg Oral BID    montelukast  10 mg Oral Nightly    enoxaparin  40 mg Subcutaneous Daily    pregabalin  75 mg Oral TID    ipratropium-albuterol  1 ampule Inhalation Q4H WA    insulin lispro  0-6 Units Subcutaneous TID WC    insulin lispro  0-3 Units Subcutaneous Nightly     Continuous Infusions:   PRN Meds: oxyCODONE-acetaminophen, LORazepam, aluminum & magnesium hydroxide-simethicone, calcium carbonate, acetaminophen **OR** acetaminophen, polyethylene glycol, promethazine **OR** [DISCONTINUED] ondansetron, potassium chloride **OR** potassium alternative oral replacement **OR** [DISCONTINUED] potassium chloride, glucose, glucagon (rDNA), ipratropium-albuterol, nicotine polacrilex       Valma Gilford, MD  2020  2:41 PM

## 2020-07-27 ENCOUNTER — APPOINTMENT (OUTPATIENT)
Dept: CT IMAGING | Age: 40
DRG: 392 | End: 2020-07-27
Payer: COMMERCIAL

## 2020-07-27 LAB
ABSOLUTE EOS #: 0.3 K/UL (ref 0–0.4)
ABSOLUTE IMMATURE GRANULOCYTE: ABNORMAL K/UL (ref 0–0.3)
ABSOLUTE LYMPH #: 1.6 K/UL (ref 1–4.8)
ABSOLUTE MONO #: 0.6 K/UL (ref 0.1–1.3)
ANION GAP SERPL CALCULATED.3IONS-SCNC: 13 MMOL/L (ref 9–17)
BASOPHILS # BLD: 1 % (ref 0–2)
BASOPHILS ABSOLUTE: 0.1 K/UL (ref 0–0.2)
BUN BLDV-MCNC: 14 MG/DL (ref 6–20)
BUN/CREAT BLD: ABNORMAL (ref 9–20)
C-REACTIVE PROTEIN: 36.6 MG/L (ref 0–5)
CALCIUM SERPL-MCNC: 9 MG/DL (ref 8.6–10.4)
CHLORIDE BLD-SCNC: 103 MMOL/L (ref 98–107)
CO2: 23 MMOL/L (ref 20–31)
CREAT SERPL-MCNC: 0.64 MG/DL (ref 0.5–0.9)
DIFFERENTIAL TYPE: ABNORMAL
EOSINOPHILS RELATIVE PERCENT: 4 % (ref 0–4)
GFR AFRICAN AMERICAN: >60 ML/MIN
GFR NON-AFRICAN AMERICAN: >60 ML/MIN
GFR SERPL CREATININE-BSD FRML MDRD: ABNORMAL ML/MIN/{1.73_M2}
GFR SERPL CREATININE-BSD FRML MDRD: ABNORMAL ML/MIN/{1.73_M2}
GLUCOSE BLD-MCNC: 104 MG/DL (ref 65–105)
GLUCOSE BLD-MCNC: 120 MG/DL (ref 65–105)
GLUCOSE BLD-MCNC: 122 MG/DL (ref 70–99)
GLUCOSE BLD-MCNC: 138 MG/DL (ref 65–105)
GLUCOSE BLD-MCNC: 92 MG/DL (ref 65–105)
HCT VFR BLD CALC: 37.5 % (ref 36–46)
HEMOGLOBIN: 12.1 G/DL (ref 12–16)
IMMATURE GRANULOCYTES: ABNORMAL %
LYMPHOCYTES # BLD: 20 % (ref 24–44)
MCH RBC QN AUTO: 26.3 PG (ref 26–34)
MCHC RBC AUTO-ENTMCNC: 32.2 G/DL (ref 31–37)
MCV RBC AUTO: 81.8 FL (ref 80–100)
MONOCYTES # BLD: 7 % (ref 1–7)
NRBC AUTOMATED: ABNORMAL PER 100 WBC
PDW BLD-RTO: 16.1 % (ref 11.5–14.9)
PLATELET # BLD: 339 K/UL (ref 150–450)
PLATELET ESTIMATE: ABNORMAL
PMV BLD AUTO: 8.4 FL (ref 6–12)
POTASSIUM SERPL-SCNC: 4 MMOL/L (ref 3.7–5.3)
RBC # BLD: 4.58 M/UL (ref 4–5.2)
RBC # BLD: ABNORMAL 10*6/UL
SEG NEUTROPHILS: 68 % (ref 36–66)
SEGMENTED NEUTROPHILS ABSOLUTE COUNT: 5.3 K/UL (ref 1.3–9.1)
SODIUM BLD-SCNC: 139 MMOL/L (ref 135–144)
WBC # BLD: 7.9 K/UL (ref 3.5–11)
WBC # BLD: ABNORMAL 10*3/UL

## 2020-07-27 PROCEDURE — 86140 C-REACTIVE PROTEIN: CPT

## 2020-07-27 PROCEDURE — 99232 SBSQ HOSP IP/OBS MODERATE 35: CPT | Performed by: INTERNAL MEDICINE

## 2020-07-27 PROCEDURE — 6370000000 HC RX 637 (ALT 250 FOR IP): Performed by: STUDENT IN AN ORGANIZED HEALTH CARE EDUCATION/TRAINING PROGRAM

## 2020-07-27 PROCEDURE — 80048 BASIC METABOLIC PNL TOTAL CA: CPT

## 2020-07-27 PROCEDURE — 85025 COMPLETE CBC W/AUTO DIFF WBC: CPT

## 2020-07-27 PROCEDURE — 2580000003 HC RX 258: Performed by: STUDENT IN AN ORGANIZED HEALTH CARE EDUCATION/TRAINING PROGRAM

## 2020-07-27 PROCEDURE — 6370000000 HC RX 637 (ALT 250 FOR IP): Performed by: INTERNAL MEDICINE

## 2020-07-27 PROCEDURE — 6360000004 HC RX CONTRAST MEDICATION: Performed by: STUDENT IN AN ORGANIZED HEALTH CARE EDUCATION/TRAINING PROGRAM

## 2020-07-27 PROCEDURE — 94761 N-INVAS EAR/PLS OXIMETRY MLT: CPT

## 2020-07-27 PROCEDURE — 36415 COLL VENOUS BLD VENIPUNCTURE: CPT

## 2020-07-27 PROCEDURE — 1200000000 HC SEMI PRIVATE

## 2020-07-27 PROCEDURE — 94640 AIRWAY INHALATION TREATMENT: CPT

## 2020-07-27 PROCEDURE — 74177 CT ABD & PELVIS W/CONTRAST: CPT

## 2020-07-27 PROCEDURE — 82947 ASSAY GLUCOSE BLOOD QUANT: CPT

## 2020-07-27 RX ORDER — 0.9 % SODIUM CHLORIDE 0.9 %
80 INTRAVENOUS SOLUTION INTRAVENOUS ONCE
Status: COMPLETED | OUTPATIENT
Start: 2020-07-27 | End: 2020-07-27

## 2020-07-27 RX ORDER — METRONIDAZOLE 500 MG/1
500 TABLET ORAL EVERY 8 HOURS SCHEDULED
Qty: 30 TABLET | Refills: 0 | OUTPATIENT
Start: 2020-07-27 | End: 2020-08-06

## 2020-07-27 RX ORDER — SODIUM CHLORIDE 0.9 % (FLUSH) 0.9 %
10 SYRINGE (ML) INJECTION PRN
Status: DISCONTINUED | OUTPATIENT
Start: 2020-07-27 | End: 2020-07-28 | Stop reason: HOSPADM

## 2020-07-27 RX ORDER — CIPROFLOXACIN 500 MG/1
500 TABLET, FILM COATED ORAL EVERY 12 HOURS SCHEDULED
Qty: 20 TABLET | Refills: 0 | OUTPATIENT
Start: 2020-07-27 | End: 2020-08-06

## 2020-07-27 RX ADMIN — LORAZEPAM 0.5 MG: 0.5 TABLET ORAL at 11:34

## 2020-07-27 RX ADMIN — OXYCODONE HYDROCHLORIDE AND ACETAMINOPHEN 1 TABLET: 5; 325 TABLET ORAL at 04:50

## 2020-07-27 RX ADMIN — DOCUSATE SODIUM 100 MG: 100 CAPSULE, LIQUID FILLED ORAL at 21:08

## 2020-07-27 RX ADMIN — LAMOTRIGINE 25 MG: 25 TABLET ORAL at 21:09

## 2020-07-27 RX ADMIN — Medication 10 ML: at 16:27

## 2020-07-27 RX ADMIN — IPRATROPIUM BROMIDE AND ALBUTEROL SULFATE 1 AMPULE: 2.5; .5 SOLUTION RESPIRATORY (INHALATION) at 15:26

## 2020-07-27 RX ADMIN — METRONIDAZOLE 500 MG: 500 TABLET ORAL at 21:08

## 2020-07-27 RX ADMIN — CIPROFLOXACIN HYDROCHLORIDE 500 MG: 500 TABLET, FILM COATED ORAL at 08:29

## 2020-07-27 RX ADMIN — OXYCODONE HYDROCHLORIDE AND ACETAMINOPHEN 1 TABLET: 5; 325 TABLET ORAL at 18:08

## 2020-07-27 RX ADMIN — PREGABALIN 75 MG: 75 CAPSULE ORAL at 21:08

## 2020-07-27 RX ADMIN — LINACLOTIDE 290 MCG: 145 CAPSULE, GELATIN COATED ORAL at 08:28

## 2020-07-27 RX ADMIN — IPRATROPIUM BROMIDE AND ALBUTEROL SULFATE 1 AMPULE: 2.5; .5 SOLUTION RESPIRATORY (INHALATION) at 11:00

## 2020-07-27 RX ADMIN — CIPROFLOXACIN HYDROCHLORIDE 500 MG: 500 TABLET, FILM COATED ORAL at 21:08

## 2020-07-27 RX ADMIN — METRONIDAZOLE 500 MG: 500 TABLET ORAL at 04:52

## 2020-07-27 RX ADMIN — LORAZEPAM 0.5 MG: 0.5 TABLET ORAL at 04:50

## 2020-07-27 RX ADMIN — LORAZEPAM 0.5 MG: 0.5 TABLET ORAL at 18:08

## 2020-07-27 RX ADMIN — LAMOTRIGINE 25 MG: 25 TABLET ORAL at 08:30

## 2020-07-27 RX ADMIN — OXYCODONE HYDROCHLORIDE AND ACETAMINOPHEN 1 TABLET: 5; 325 TABLET ORAL at 11:34

## 2020-07-27 RX ADMIN — MONTELUKAST 10 MG: 10 TABLET, FILM COATED ORAL at 21:08

## 2020-07-27 RX ADMIN — PANTOPRAZOLE SODIUM 40 MG: 40 TABLET, DELAYED RELEASE ORAL at 08:28

## 2020-07-27 RX ADMIN — PREGABALIN 75 MG: 75 CAPSULE ORAL at 08:28

## 2020-07-27 RX ADMIN — IOVERSOL 75 ML: 741 INJECTION INTRA-ARTERIAL; INTRAVENOUS at 16:27

## 2020-07-27 RX ADMIN — METRONIDAZOLE 500 MG: 500 TABLET ORAL at 14:26

## 2020-07-27 RX ADMIN — PROMETHAZINE HYDROCHLORIDE 12.5 MG: 25 TABLET ORAL at 10:56

## 2020-07-27 RX ADMIN — DOCUSATE SODIUM 100 MG: 100 CAPSULE, LIQUID FILLED ORAL at 08:28

## 2020-07-27 RX ADMIN — PREGABALIN 75 MG: 75 CAPSULE ORAL at 14:26

## 2020-07-27 RX ADMIN — IOHEXOL 50 ML: 240 INJECTION, SOLUTION INTRATHECAL; INTRAVASCULAR; INTRAVENOUS; ORAL at 15:05

## 2020-07-27 RX ADMIN — IPRATROPIUM BROMIDE AND ALBUTEROL SULFATE 1 AMPULE: 2.5; .5 SOLUTION RESPIRATORY (INHALATION) at 06:40

## 2020-07-27 RX ADMIN — PANTOPRAZOLE SODIUM 40 MG: 40 TABLET, DELAYED RELEASE ORAL at 18:08

## 2020-07-27 RX ADMIN — IPRATROPIUM BROMIDE AND ALBUTEROL SULFATE 1 AMPULE: 2.5; .5 SOLUTION RESPIRATORY (INHALATION) at 20:24

## 2020-07-27 RX ADMIN — SODIUM CHLORIDE 80 ML: 9 INJECTION, SOLUTION INTRAVENOUS at 16:27

## 2020-07-27 ASSESSMENT — PAIN SCALES - GENERAL
PAINLEVEL_OUTOF10: 9
PAINLEVEL_OUTOF10: 10
PAINLEVEL_OUTOF10: 9
PAINLEVEL_OUTOF10: 10

## 2020-07-27 NOTE — PROGRESS NOTES
Comprehensive Nutrition Assessment    Type and Reason for Visit:  Initial(Length of stay)    Nutrition Recommendations/Plan:     Nutrition Assessment:  Patient admission related to diverticulitis/ colitis. Patient is eating well at meals and has a good appetite. Patient admits she is drinking a lot of diet Pepsi. Patient requested more pop sent to room. Patient declined diverticulitis diet information. Continue General diet. Malnutrition Assessment:  Malnutrition Status:  No malnutrition      Estimated Daily Nutrient Needs:  Energy (kcal):  5946-6114 kcal based on 15-18 kcal/kg; Weight Used for Energy Requirements:  Current     Protein (g):  60-65 gm based on 1.2-1.3 gm/kg; Weight Used for Protein Requirements:  Ideal            Nutrition Related Findings:  No edema. Loose stools related to stool softeners      Wounds:  None       Current Nutrition Therapies:    DIET GENERAL; Anthropometric Measures:  · Height: 5' 2\" (157.5 cm)  · Current Body Weight: 209 lb (94.8 kg)   · Ideal Body Weight: 110 lbs; % Ideal Body Weight 190 %   · BMI: 38.2  · BMI Categories: Obese Class 2 (BMI 35.0 -39.9)       Nutrition Diagnosis:   · Altered GI function related to altered GI structure as evidenced by GI abnormality(colitis/ diverticulitis)      Nutrition Interventions:   Food and/or Nutrient Delivery:  Continue Current Diet  Nutrition Education/Counseling:  (Education declined)   Coordination of Nutrition Care:  Continued Inpatient Monitoring    Goals:  PO intakes are greater than 75% at meals       Nutrition Monitoring and Evaluation:   Food/Nutrient Intake Outcomes:  Food and Nutrient Intake  Physical Signs/Symptoms Outcomes:  Biochemical Data, Skin, Weight, Fluid Status or Edema     Discharge Planning:    Continue current diet     Some areas of assessment maybe incomplete due to COVID-19 precautions.     Elfego JERRY R.D, L.D,  Clinical Dietitian  Office # [phone]

## 2020-07-27 NOTE — PROGRESS NOTES
OB Resident Progress Note     Discussed case with Dr Sona Lizarraga. Dr Sona Lizarraga requesting CT scan be repeated. Order placed.        Juancarlos Cheng  OB/GYN Resident, PGY4  Pager: [phone] [facility]  07/27/20  6:10 PM

## 2020-07-27 NOTE — PROGRESS NOTES
[facility] Internal Medicine    Progress Note    7/27/2020    1:19 PM    Name:   Carie Huynh  MRN:     [id number]     Acct:      [de-identified]   Room:   2040/2040-01  IP Day:  5  Admit Date:  7/22/2020  3:13 PM    PCP:   KATIE Jane CNP  Code Status:  Full Code    Subjective:     C/C:   Chief Complaint   Patient presents with    Abdominal Pain         Interval History Status: improved. HPI:     73-year-old female status post hysterectomy and salpingo-ectomy on 7/7/2020, presented to ER with abdominal pain. CT scan showed postoperative changes, acute diverticulitis/colitis with multiple pericolonic abscesses. Patient was started on IV antibiotics. She has been improving. Currently she is on oral metronidazole and ciprofloxacin-ID following. She has been evaluated by OB/GYN as well as general surgery for consideration of draining of those abscesses-currently no surgical intervention recommended by them. Review of Systems:     Constitutional: Negative for chills, fatigue, fever and unexpected weight change. HENT: Negative for ear discharge, facial swelling, nosebleeds, sore throat, trouble swallowing and voice change. Eyes: Negative for redness and visual disturbance. Respiratory: Negative for cough, shortness of breath and wheezing. Cardiovascular: Negative for chest pain, palpitations and leg swelling. Gastrointestinal: Positive for lower abdominal pain, negative for blood in stool, diarrhea and vomiting. Genitourinary: Negative for difficulty urinating, dysuria and flank pain. Musculoskeletal: Negative for joint swelling. Skin: Negative for color change and rash. Neurological: Negative for dizziness, seizures, syncope and headaches. Hematological: Negative for adenopathy. Does not bruise/bleed easily. Medications: Allergies:     Allergies   Allergen Reactions    Nsaids      Irritates her Barrets esophogus    Toradol Mother     Hypertension Mother     Breast Cancer Mother     Bipolar Disorder Brother     Hypertension Father        Vitals:  /85   Pulse 109   Temp 97.9 °F (36.6 °C) (Oral)   Resp 20   Ht 5' 2\" (1.575 m)   Wt 209 lb 10.5 oz (95.1 kg)   LMP 06/15/2020   SpO2 97%   BMI 38.35 kg/m²   Temp (24hrs), Av.1 °F (36.7 °C), Min:97.7 °F (36.5 °C), Max:98.4 °F (36.9 °C)    Recent Labs     20  1616 20  0616 20  1104   POCGLU 102 109* 92 138*       I/O (24Hr): No intake or output data in the 24 hours ending 20 1319    Labs:    Lab Results   Component Value Date    WBC 7.9 2020    HGB 12.1 2020    HCT 37.5 2020    MCV 81.8 2020     2020     Lab Results   Component Value Date     2020    K 4.0 2020     2020    CO2 23 2020    BUN 14 2020    CREATININE 0.64 2020    GLUCOSE 122 2020    GLUCOSE 106 2012    CALCIUM 9.0 2020          Lab Results   Component Value Date/Time    SPECIAL NOT REPORTED 2020 03:50 PM     Lab Results   Component Value Date/Time    CULTURE NO GROWTH 5 DAYS 2020 03:50 PM         Radiology:    Recent data reviewed    Physical Examination:        General appearance:  alert, cooperative and no distress  Eyes: Anicteric sclera. Pupils are equally round and reactive to light. Extraocular movements are intact.   Lungs:  clear to auscultation bilaterally, normal effort  Heart:  regular rate and rhythm, no murmur  Abdomen:  soft, nondistended, normal bowel sounds, no masses, hepatomegaly, splenomegaly, lower abdominal tenderness  Extremities:  no edema, redness, tenderness in the calves  Skin:  no gross lesions, rashes, induration  Neuro:  Alert, oriented X 3, Gait normal. Non-focal.  Assessment:        Primary Problem  Diverticulitis    Active Hospital Problems    Diagnosis Date Noted    Pelvic abscess in female [N73.9]     Diverticulitis of colon [K57.32]     Diverticulitis [K57.92] 2020    RALH with BS and cystoscopy 2020 [Z90.710] 2020    Class 2 drug-induced obesity with serious comorbidity and body mass index (BMI) of 37.0 to 37.9 in adult [E66.1, Z68.37] 2020    Insomnia [G47.00] 04/10/2020    Bipolar affective disorder, currently depressed, moderate ([facility] [address] [F31.32] 04/10/2020    Major depressive disorder, recurrent episode ([facility] [address] [F33.9] 04/10/2020    Endometriosis [N80.9] 2020    Family history of breast cancer [Z80.3] 2020    Hx of  x2 (G3, G4) [Z98.891] 10/14/2018    Seizure ([facility] [address] [R56.9] 2018    Chronic constipation [K59.09] 2018    Migraine without aura [G43.009] 2017    COPD [J44.9]     Sadler's esophagus without dysplasia [K22.70]     Smoker [F17.200] 2017    Prediabetes [R73.03] 2016    Arthritis [M19.90] 2016    Cervical disc herniation [M50.20] 2016    Iron deficiency anemia [D50.9] 2015    Asthma [J45.909] 2015    GERD [K21.9] 2015           DVT prophylaxis: lovenox    Day number for antibiotics: Cipro and Flagyl    Plan:          Colitis and pericolonic abscesses secondary to hysterectomy-continue Cipro Flagyl, will contact OB/GYN regarding discharge today  Pelvic abscesses- Gyn follow-up, ID [id number] need repeat imaging in 1 to 2 weeks after discharge. Dispo: Discharge home with oral antibiotics once cleared by gynecology.     Sophie Daley MD  2020  1:19 PM

## 2020-07-27 NOTE — PROGRESS NOTES
Patient continues to walk outside randomly to smoke cigarettes. Writer educated the patient on risks. Patient verbalizes understanding.

## 2020-07-27 NOTE — PROGRESS NOTES
Gynecology Progress Note    Date: 7/28/2020  Time: 6:00 AM    Olga Bolden is a 47 y.o. female [id number] HD# 7 Diverticulitis/colitis with pericolonic abscesses POD#20 s/p Robotic assisted Laparoscopic Hysterectomy with Bilateral Salpingectomy with Cystoscopy on 7/8/20     Patient was seen and examined. She complained of abdominal pain but much improved from yesterday. She states the pain is controlled with percocet. Patient is  tolerating oral intake. She is urinating well . She denies any vaginal bleeding. She is  ambulating without difficulty. She is  passing flatus.  She denies Fever/Chills, Chest Pain, SOB, N/V, Calf Pain    Vitals:  Vitals:    07/27/20 1353 07/27/20 1526 07/27/20 1858 07/28/20 0512   BP:   104/69    Pulse:   94    Resp:  18 19    Temp:   98 °F (36.7 °C)    TempSrc:   Oral    SpO2:  96% 98% 98%   Weight:       Height: 5' 2\" (1.575 m)          Physical Exam:  General:  no apparent distress, alert and cooperative  Neurologic:  alert, oriented, normal speech, no focal findings or movement disorder noted  Lungs:  No increased work of breathing, good air exchange, clear to auscultation bilaterally, no crackles or wheezing  Heart:  normal S1 and S2, regular rate and rhythm and no murmur    Abdomen: Abdomen soft, BS normal. No masses,  No organomegaly, Minimal tenderness to lower abdomen but much improved from prior, no rebound, guarding, rigidity  Incision: Port site incision sites with pink granulation tissue, no erythema/edema/drainage  Extremities:  no calf tenderness, non edematous    Lab:  Complete Blood Count:   Recent Labs     07/25/20  0929 07/26/20  0627 07/27/20  0728   WBC 8.4 6.8 7.9   HGB 10.4* 11.1* 12.1   HCT 32.8* 34.6* 37.5    292 339        PT/INR:    Lab Results   Component Value Date    PROTIME 9.8 01/13/2019    INR 0.9 01/13/2019     PTT:    Lab Results   Component Value Date    APTT 22.7 01/13/2019       Comprehensive Metabolic Profile:   Recent Labs 07/25/20  0929 07/26/20  0627 07/27/20  0728    143 139   K 3.8 4.0 4.0    107 103   CO2 21 26 23   BUN 8 12 14   CREATININE 0.54 0.64 0.64   GLUCOSE 115* 95 122*   CALCIUM 8.5* 8.6 9.0        Cultures:  blood cultures x2: shows no growth x 5 days    Assessment/Plan:  Leonardo Trevino 47 y.o. female [id number] HD# 7 with Diverticulitis/colitis with pericolonic abscesses POD#19 s/p Robotic assisted Laparoscopic Hysterectomy with Bilateral Salpingectomy with Cystoscopy on 7/8/20    - Internal medicine as primary team              - Doing well overall, vitals overall stable with Tmax and Last Temp 100.8 @ 2203, 7/22/20              - IVF: discontinued              - Labs: CBC, BMP daily              - Antibiotics: Continue PO Ciprofloxacin 500mg [id number] with Flagyl 500mg [id number]                          - S/p Ciprofloxacin 400mg IV [id number] and Flagyl 500mg IV [id number]                               - S/p meropenem 1g IV x1 in the ED              - DVT prophylaxis: lovenox 40mg daily               - GI: Protonix 40mg hs, Colace BID              - Pain control: Percocet 5-325mg x1lsald PRN ordered              - Infectious disease consulted- agree to continue PO Cipro/Flagyl               - General surgery consulted- no surgical intervention planned, continue with antibiotics, appears to be signed off              - Blood cultures x 2 with no growth for 5 days                - CT abdomen/pelvis (7/22): acute colitis/diverticulis of rectosigmoid colon with multiple pericolonic fluid collections consistent with abscesses              - CT abdomen/pelvis (7/24): bibasilar infiltrate more pronounced on the R, extensive soft tissue stranding in the mesentery when compared to prior study, multiple abscess collections within lower abdomen/pelvis (4.5 x 3cm multiloculated collection in L hemipelvis adjacent to iliac vessels, 3.7 x 2.8cm in R hemipelvis.  These are not amenable to percutaneous drainage.   - CT abdomen/pelvis (7/27): Interval improvement with multiple abscess collections with reduction in size of collections in the pelvis. Appreciable decrease in pericolonic fat inflammation.  Abscess collections do not appear amenable to drainage.   - Improving leukocytosis, abdominal pain and interval improvement on CT abdomen all indicate antibiotics are currently working              - Ob/Gyn team does not plan for any surgical intervention at this time but will coordinate with Infectious disease to discuss surgical vs continued antibiotic course of treatment      Leukocytosis (21.6>16.9>12.5>8.4>6.8)- resolved              - CBC daily              - vitals overall stable with Tmax and Last Temp 100.8 @ 2203, 7/22/20      Endometriosis              - Stable on lyrica 75mg TID        Hx of CS x2       Prediabetes              - LDSSI              - Will monitor blood sugars AC and HS              - Defer to Internal medicine as primary team       COPD/Asthma              - Stable on Singulair 10mg nightly, ipratropium-albuterol nebulizer [id number] PRN, combivent inhaler PRN              - Defer to Internal medicine as primary team       Seizure disorder              - Stable on lamictal 25mg BID               - Defer to Internal medicine as primary team       GERD/Sadler's esophagus              - Protonix 40mg hs ordered              - Defer to Internal medicine as primary team       Chronic constipation              - AWPUGWI 290mg capsule every morning ordered              - Defer to Internal medicine as primary team       Cervical disc herniation/Arthritis              - Defer to Internal medicine as primary team       Smoker              - Encouraged cessation       Migraine without aura              - Defer to Internal medicine as primary team       Bipolar affective disorder/Depression              - Denies any SI/HI              - Ativan 0.5mg l4lacar PRN ordered              - Defer to Internal medicine as primary team       Insomnia               - Defer to Internal medicine as primary team       BMI 37.68    Patient Active Problem List    Diagnosis Date Noted    Hx of  x2 (G3, G4) 10/14/2018     Priority: High     Overview Note:     Hx CS x1 (G3)      History of gestational hypertension 2018     Priority: High     Overview Note:     18 BP of 142/82 at 20w3d, repeat BP 1 minute later 140/76, all other BPs wnl  PreE labs wnl, P/C ratio 0.08   Elevated BPs 146/67 and 150/68, pt now meets criteria for gHTN      Seizure ([facility] [address] 2018     Priority: Medium    Hx of IUFD (G2) 2018     Priority: Medium    COPD      Priority: Medium    Sadler's esophagus without dysplasia      Priority: Medium     Overview Note:     No NSAIDS as per patient      Iron deficiency anemia 2015     Priority: Medium    Asthma 2015     Priority: Medium    GERD 2015     Priority: Medium    Spinal stenosis in cervical region 2018     Priority: Low    Cervical radiculopathy 2017     Priority: Low    Migraine without aura 2017     Priority: Low    Hiatal hernia      Priority: Low    Smoker 2017     Priority: Low    Arthritis 2016     Priority: Low    Cervical disc herniation 2016     Priority: Low    Pelvic abscess in female     Diverticulitis of colon     Diverticulitis 2020    Wound infection/hemorrhage, obstetric surgical, postpartum condition 07/15/2020    Postoperative visit 07/15/2020    Postoperative abdominal pain 07/15/2020    Post-op pain     RALH with BS and cystoscopy 2020    Pelvic peritoneal endometriosis     Moderate persistent asthma without complication     Lipoma of torso 2020    History of cervical discectomy 2020    Chronic neck pain with abnormal neurologic examination 2020    Abnormal weight gain  2020    Class 2 drug-induced obesity with serious comorbidity and body mass index (BMI) of 37.0 to 37.9 in adult 04/14/2020    Bipolar affective disorder, currently depressed, moderate ([facility] [address] 04/10/2020    Insomnia 04/10/2020    Ulnar neuropathy 04/10/2020    Major depressive disorder, recurrent episode ([facility] [address] 04/10/2020    Sciatica 04/10/2020    Endometriosis 03/09/2020    Family history of breast cancer 03/09/2020    Enlarged uterus 02/17/2020    Chronic pelvic pain in female 01/22/2020    Chronic constipation 03/26/2018    Prediabetes 12/08/2016     Will update Dr. Gricelda Mckee.     Nina Tolliver DO  Ob/Gyn Resident   7/28/2020, 6:00 AM

## 2020-07-27 NOTE — PROGRESS NOTES
[facility] -   Infectious diseases evaluation  admission date 7/22/2020    reason for consultation:   Diverticulitis with abscess formation. Impression :   Current:  · Colitis /diverticulitis with pericolonic abscesses increased in size on repeat CT 7/24/2020, not drainable percutaneously per radiology. Other:  s/p Robotic assisted Laparoscopic Hysterectomy with Bilateral Salpingectomy with Cystoscopy on 7/8/20   Prediabetes  COPD  Seizure disorder  Smoking  Migraine headache  Bipolar disorder  Sadler's esophagus    Recommendations   · Continue po  ciprofloxacin and Flagyl  · The patient needs abscesses drainage that was discussed with OB/GYN Dr. Quinn Garcia (he would like to repeat CT prior to surgical intervention)  · Follow CBC renal function          History of Present Illness:   Initial history:  Josh Noyola is a 47y.o.-year-old female presented to the hospital with worsening abdominal pain, constant for 2 weeks, not improving with pain medication, pain at the lower abdomen with no radiation, pressure-like. She also had fever with temperature of 100.3 on admission. She denied any nausea or vomiting, no diarrhea, no other complaints. WBC on admission was 21.6, lactic acid normal, chest x-ray showed no infiltrate, CT abdomen/pelvis suggestive of acute colitis/diverticulitis of the rectosigmoid colon with multiple pericolonic abscesses/phlegmons with appendicolith  s/p Robotic assisted Laparoscopic Hysterectomy with Bilateral Salpingectomy with Cystoscopy on 7/8/20 secondary to enlarged uterus with chronic pelvic pain and history of endometriosis. Urinalysis was clear, negative leukocyte esterase  Interval changes  7/27/2020   She is still complaining of abdominal pain, denied fever or chills, no nausea or vomiting, no other complaints. Repeat CT abdomen 7/24 showed increased abscesses size.   The patient lost her IV access  and was changed to oral antibiotics. I have personally reviewed the past medical history, past surgical history, medications, social history, and family history, and I haveupdated the database accordingly.   Past Medical History:     Past Medical History:   Diagnosis Date    Anxiety     Arthritis     OA    Asthma     Sadler's esophagus     LONG SEGMENT    Bipolar 1 disorder (HCC)     CAD (coronary artery disease)     Chronic insomnia     COPD (chronic obstructive pulmonary disease) (HCC)     Depression     and bipolar    Diabetes mellitus (HCC)     prediabetic    Endometriosis 3/9/2020    Esophagitis     severe    GERD (gastroesophageal reflux disease)     Headache(784.0)     Herniated disc, cervical     Hiatal hernia     Incisional abscess 1/15/2019    Kidney stones     MDRO (multiple drug resistant organisms) resistance     mrsa    Pleurisy     hx of \"years ago\"    Pneumonia     past penumonia    Seizures ([address]     2016 last seizure-focal seizure    UTI (urinary tract infection)     Vision abnormalities     wears glasses       Past Surgical  History:     Past Surgical History:   Procedure Laterality Date    CERVICAL DISC SURGERY      x2     SECTION  , 2018    x 2     SECTION N/A 2018     SECTION performed by Chin Holcomb MD at [address] [facility] [address]D [address] [facility] [address] COLONOSCOPY N/A 10/1/2019    COLONOSCOPY DIAGNOSTIC ABORTED performed by Len Rodriguez MD at [address] [facility] N/A 2020    COLONOSCOPY WITH BIOPSY performed by Len Rodriguez MD at [address], COLON, DIAGNOSTIC      HYSTERECTOMY N/A 2020    HYSTERECTOMY ABDOMINAL LAPAROSCOPIC ROBOTIC XI W/ CYSTOSCOPY performed by Chin Holcomb MD at [address] [facility] ARTHROSCOPY Left 5/20/15    KNEE SURGERY Left     x3    LAPAROSCOPY      dr Yamil Lyn N/A 10/5/2017    LAPAROSCOPIC REMOVAL INTRA ABDOMINAL LIPOMA LOWER RIGHT performed by Madelaine Muniz DO at [address] [place] [address] ESOPHAGOGASTRODUODENOSCOPY TRANSORAL DIAGNOSTIC N/A 3/2/2017    EGD ESOPHAGOGASTRODUODENOSCOPY  IP 2055 performed by Danilo Smith MD at [facility]  08/16/2016    severe esophagitis    UPPER GASTROINTESTINAL ENDOSCOPY  01/19/2017    barretts; hiatus hernia; gastritis    UPPER GASTROINTESTINAL ENDOSCOPY  03/02/2017    long seg mullins's with linear erosions, esophagitis, small hiatal hernia    UPPER GASTROINTESTINAL ENDOSCOPY N/A 1/30/2018    MULLINS'S    UPPER GASTROINTESTINAL ENDOSCOPY N/A 10/1/2019    EGD BIOPSY performed by Carol Pratt MD at [facility]         Medications:      metroNIDAZOLE  500 mg Oral 3 times per day    ciprofloxacin  500 mg Oral 2 times per day    pantoprazole  40 mg Oral BID AC    docusate sodium  100 mg Oral BID    linaclotide  290 mcg Oral QAM AC    lamoTRIgine  25 mg Oral BID    montelukast  10 mg Oral Nightly    enoxaparin  40 mg Subcutaneous Daily    pregabalin  75 mg Oral TID    ipratropium-albuterol  1 ampule Inhalation Q4H WA    insulin lispro  0-6 Units Subcutaneous TID WC    insulin lispro  0-3 Units Subcutaneous Nightly       Social History:     Social History     Socioeconomic History    Marital status: Single     Spouse name: Not on file    Number of children: 1    Years of education: 11th grade    Highest education level: Not on file   Occupational History    Occupation: unemployed-   Social Needs    Financial resource strain: Not on file    Food insecurity     Worry: Not on file     Inability: Not on file   [employer] needs     Medical: Not on file     Non-medical: Not on file   Tobacco Use    Smoking status: Current Every Day Smoker     Packs/day: 1.00     Years: 21.00     Pack years: 21.00     Types: Cigarettes    Smokeless tobacco: Never Used   Substance and Sexual Activity    Alcohol use: No    Drug use: No    Sexual activity: Yes     Partners: Male sounds: Normal breath sounds. No wheezing. Abdominal:      General: Abdomen is flat. There is no distension. Palpations: Abdomen is soft. Tenderness: There is abdominal tenderness. Comments: Bilateral lower abdominal tenderness . Musculoskeletal:      Right lower leg: No edema. Left lower leg: No edema. Neurological:      General: No focal deficit present. Mental Status: She is alert and oriented to person, place, and time. Medical Decision Making:   I have independently reviewed/ordered the following labs:    CBC with Differential:   Recent Labs     07/26/20 0627 07/27/20 0728   WBC 6.8 7.9   HGB 11.1* 12.1   HCT 34.6* 37.5    339   LYMPHOPCT 17* 20*   MONOPCT 9* 7     BMP:  Recent Labs     07/26/20 0627 07/27/20 0728    139   K 4.0 4.0    103   CO2 26 23   BUN 12 14   CREATININE 0.64 0.64     Hepatic Function Panel:   No results for input(s): PROT, LABALBU, BILIDIR, IBILI, BILITOT, ALKPHOS, ALT, AST in the last 72 hours. Lab Results   Component Value Date    CREATININE 0.64 07/27/2020    GLUCOSE 122 07/27/2020    GLUCOSE 106 03/25/2012       Detailed results: Thank you for allowing us to participate in the care of this patient. Please call with questions. This note is created with the assistance of a speech recognition program.  While intending to generate adocument that actually reflects the content of the visit, the document can still have some errors including those of syntax and sound a like substitutions which may escape proof reading. It such instances, actual meaningcan be extrapolated by contextual diversion.     Quentin Jewell MD  Office: [phone]  Perfect serve / office [phone]

## 2020-07-27 NOTE — PLAN OF CARE
Problem: Activity:  Goal: Risk for activity intolerance will decrease  Description: Risk for activity intolerance will decrease  Outcome: Ongoing     Problem:  Bowel/Gastric:  Goal: Occurrences of nausea will decrease  Description: Occurrences of nausea will decrease  Outcome: Ongoing  Goal: Occurrences of vomiting will decrease  Description: Occurrences of vomiting will decrease  Outcome: Ongoing     Problem: Fluid Volume:  Goal: Maintenance of adequate hydration will improve  Description: Maintenance of adequate hydration will improve  Outcome: Ongoing     Problem: Health Behavior:  Goal: Ability to state signs and symptoms to report to health care provider will improve  Description: Ability to state signs and symptoms to report to health care provider will improve  Outcome: Ongoing

## 2020-07-27 NOTE — PLAN OF CARE
Nutrition Problem #1: Altered GI function  Intervention: Food and/or Nutrient Delivery: Continue Current Diet  Nutritional Goals: PO intakes are greater than 75% at meals

## 2020-07-28 VITALS
RESPIRATION RATE: 18 BRPM | TEMPERATURE: 98.7 F | OXYGEN SATURATION: 95 % | BODY MASS INDEX: 38.58 KG/M2 | WEIGHT: 209.66 LBS | SYSTOLIC BLOOD PRESSURE: 122 MMHG | HEIGHT: 62 IN | DIASTOLIC BLOOD PRESSURE: 92 MMHG | HEART RATE: 91 BPM

## 2020-07-28 LAB
-: NORMAL
ABSOLUTE EOS #: 0.2 K/UL (ref 0–0.4)
ABSOLUTE IMMATURE GRANULOCYTE: ABNORMAL K/UL (ref 0–0.3)
ABSOLUTE LYMPH #: 1.1 K/UL (ref 1–4.8)
ABSOLUTE MONO #: 0.6 K/UL (ref 0.1–1.3)
ANION GAP SERPL CALCULATED.3IONS-SCNC: 11 MMOL/L (ref 9–17)
BASOPHILS # BLD: 1 % (ref 0–2)
BASOPHILS ABSOLUTE: 0.1 K/UL (ref 0–0.2)
BUN BLDV-MCNC: 14 MG/DL (ref 6–20)
BUN/CREAT BLD: ABNORMAL (ref 9–20)
CALCIUM SERPL-MCNC: 8.8 MG/DL (ref 8.6–10.4)
CHLORIDE BLD-SCNC: 104 MMOL/L (ref 98–107)
CO2: 25 MMOL/L (ref 20–31)
CREAT SERPL-MCNC: 0.59 MG/DL (ref 0.5–0.9)
CULTURE: NORMAL
CULTURE: NORMAL
DIFFERENTIAL TYPE: ABNORMAL
EOSINOPHILS RELATIVE PERCENT: 4 % (ref 0–4)
GFR AFRICAN AMERICAN: >60 ML/MIN
GFR NON-AFRICAN AMERICAN: >60 ML/MIN
GFR SERPL CREATININE-BSD FRML MDRD: ABNORMAL ML/MIN/{1.73_M2}
GFR SERPL CREATININE-BSD FRML MDRD: ABNORMAL ML/MIN/{1.73_M2}
GLUCOSE BLD-MCNC: 107 MG/DL (ref 65–105)
GLUCOSE BLD-MCNC: 110 MG/DL (ref 70–99)
GLUCOSE BLD-MCNC: 96 MG/DL (ref 65–105)
HCT VFR BLD CALC: 35.8 % (ref 36–46)
HEMOGLOBIN: 11.9 G/DL (ref 12–16)
IMMATURE GRANULOCYTES: ABNORMAL %
LYMPHOCYTES # BLD: 17 % (ref 24–44)
Lab: NORMAL
Lab: NORMAL
MCH RBC QN AUTO: 26.9 PG (ref 26–34)
MCHC RBC AUTO-ENTMCNC: 33.2 G/DL (ref 31–37)
MCV RBC AUTO: 81.1 FL (ref 80–100)
MONOCYTES # BLD: 9 % (ref 1–7)
NRBC AUTOMATED: ABNORMAL PER 100 WBC
PDW BLD-RTO: 16.3 % (ref 11.5–14.9)
PLATELET # BLD: 329 K/UL (ref 150–450)
PLATELET ESTIMATE: ABNORMAL
PMV BLD AUTO: 7.9 FL (ref 6–12)
POTASSIUM SERPL-SCNC: 4.3 MMOL/L (ref 3.7–5.3)
RBC # BLD: 4.41 M/UL (ref 4–5.2)
RBC # BLD: ABNORMAL 10*6/UL
REASON FOR REJECTION: NORMAL
SEG NEUTROPHILS: 69 % (ref 36–66)
SEGMENTED NEUTROPHILS ABSOLUTE COUNT: 4.6 K/UL (ref 1.3–9.1)
SODIUM BLD-SCNC: 140 MMOL/L (ref 135–144)
SPECIMEN DESCRIPTION: NORMAL
SPECIMEN DESCRIPTION: NORMAL
WBC # BLD: 6.7 K/UL (ref 3.5–11)
WBC # BLD: ABNORMAL 10*3/UL
ZZ NTE CLEAN UP: ORDERED TEST: NORMAL
ZZ NTE WITH NAME CLEAN UP: SPECIMEN SOURCE: NORMAL

## 2020-07-28 PROCEDURE — 6370000000 HC RX 637 (ALT 250 FOR IP): Performed by: INTERNAL MEDICINE

## 2020-07-28 PROCEDURE — 6370000000 HC RX 637 (ALT 250 FOR IP): Performed by: STUDENT IN AN ORGANIZED HEALTH CARE EDUCATION/TRAINING PROGRAM

## 2020-07-28 PROCEDURE — 80048 BASIC METABOLIC PNL TOTAL CA: CPT

## 2020-07-28 PROCEDURE — 94640 AIRWAY INHALATION TREATMENT: CPT

## 2020-07-28 PROCEDURE — 85025 COMPLETE CBC W/AUTO DIFF WBC: CPT

## 2020-07-28 PROCEDURE — 82947 ASSAY GLUCOSE BLOOD QUANT: CPT

## 2020-07-28 PROCEDURE — 99239 HOSP IP/OBS DSCHRG MGMT >30: CPT | Performed by: INTERNAL MEDICINE

## 2020-07-28 PROCEDURE — 36415 COLL VENOUS BLD VENIPUNCTURE: CPT

## 2020-07-28 PROCEDURE — 99232 SBSQ HOSP IP/OBS MODERATE 35: CPT | Performed by: INTERNAL MEDICINE

## 2020-07-28 PROCEDURE — 94761 N-INVAS EAR/PLS OXIMETRY MLT: CPT

## 2020-07-28 RX ORDER — CIPROFLOXACIN 500 MG/1
500 TABLET, FILM COATED ORAL EVERY 12 HOURS SCHEDULED
Qty: 18 TABLET | Refills: 0 | Status: SHIPPED | OUTPATIENT
Start: 2020-07-28 | End: 2020-08-06

## 2020-07-28 RX ORDER — METRONIDAZOLE 500 MG/1
500 TABLET ORAL EVERY 8 HOURS SCHEDULED
Qty: 27 TABLET | Refills: 0 | Status: SHIPPED | OUTPATIENT
Start: 2020-07-28 | End: 2020-08-06

## 2020-07-28 RX ORDER — OXYCODONE HYDROCHLORIDE AND ACETAMINOPHEN 5; 325 MG/1; MG/1
1 TABLET ORAL EVERY 6 HOURS PRN
Qty: 8 TABLET | Refills: 0 | Status: SHIPPED | OUTPATIENT
Start: 2020-07-28 | End: 2020-08-04

## 2020-07-28 RX ADMIN — METRONIDAZOLE 500 MG: 500 TABLET ORAL at 14:36

## 2020-07-28 RX ADMIN — PREGABALIN 75 MG: 75 CAPSULE ORAL at 14:36

## 2020-07-28 RX ADMIN — IPRATROPIUM BROMIDE AND ALBUTEROL SULFATE 1 AMPULE: 2.5; .5 SOLUTION RESPIRATORY (INHALATION) at 14:35

## 2020-07-28 RX ADMIN — OXYCODONE HYDROCHLORIDE AND ACETAMINOPHEN 1 TABLET: 5; 325 TABLET ORAL at 11:05

## 2020-07-28 RX ADMIN — DOCUSATE SODIUM 100 MG: 100 CAPSULE, LIQUID FILLED ORAL at 08:48

## 2020-07-28 RX ADMIN — PANTOPRAZOLE SODIUM 40 MG: 40 TABLET, DELAYED RELEASE ORAL at 05:07

## 2020-07-28 RX ADMIN — OXYCODONE HYDROCHLORIDE AND ACETAMINOPHEN 1 TABLET: 5; 325 TABLET ORAL at 05:07

## 2020-07-28 RX ADMIN — LORAZEPAM 0.5 MG: 0.5 TABLET ORAL at 11:37

## 2020-07-28 RX ADMIN — PREGABALIN 75 MG: 75 CAPSULE ORAL at 08:48

## 2020-07-28 RX ADMIN — LINACLOTIDE 290 MCG: 145 CAPSULE, GELATIN COATED ORAL at 05:09

## 2020-07-28 RX ADMIN — IPRATROPIUM BROMIDE AND ALBUTEROL SULFATE 1 AMPULE: 2.5; .5 SOLUTION RESPIRATORY (INHALATION) at 10:15

## 2020-07-28 RX ADMIN — LORAZEPAM 0.5 MG: 0.5 TABLET ORAL at 05:08

## 2020-07-28 RX ADMIN — METRONIDAZOLE 500 MG: 500 TABLET ORAL at 05:07

## 2020-07-28 RX ADMIN — CIPROFLOXACIN HYDROCHLORIDE 500 MG: 500 TABLET, FILM COATED ORAL at 08:48

## 2020-07-28 RX ADMIN — IPRATROPIUM BROMIDE AND ALBUTEROL SULFATE 1 AMPULE: .5; 3 SOLUTION RESPIRATORY (INHALATION) at 05:12

## 2020-07-28 RX ADMIN — LAMOTRIGINE 25 MG: 25 TABLET ORAL at 08:48

## 2020-07-28 ASSESSMENT — PAIN SCALES - GENERAL
PAINLEVEL_OUTOF10: 10
PAINLEVEL_OUTOF10: 9
PAINLEVEL_OUTOF10: 10

## 2020-07-28 NOTE — DISCHARGE INSTR - COC
LAPAROSCOPIC ROBOTIC XI W/ CYSTOSCOPY performed by Kassy Mondragon MD at [address] ARTHROSCOPY Left 5/20/15    KNEE SURGERY Left     x3    LAPAROSCOPY      dr Franklyn Swenson N/A 10/5/2017    LAPAROSCOPIC REMOVAL INTRA ABDOMINAL LIPOMA LOWER RIGHT performed by Eusebio Villatoro DO at [address] ESOPHAGOGASTRODUODENOSCOPY TRANSORAL DIAGNOSTIC N/A 3/2/2017    EGD ESOPHAGOGASTRODUODENOSCOPY   performed by Balwinder Henriquez MD at [facility]  2016    severe esophagitis    UPPER GASTROINTESTINAL ENDOSCOPY  2017    barretts; hiatus hernia; gastritis    UPPER GASTROINTESTINAL ENDOSCOPY  2017    long seg mullins's with linear erosions, esophagitis, small hiatal hernia    UPPER GASTROINTESTINAL ENDOSCOPY N/A 2018    MULLINS'S    UPPER GASTROINTESTINAL ENDOSCOPY N/A 10/1/2019    EGD BIOPSY performed by Rosy Dowling MD at [address] [facility] [address] [facility] [address]         Immunization History:   Immunization History   Administered Date(s) Administered    Influenza A ([id number]) Vaccine PF IM 2010    Influenza Virus Vaccine 2016    Influenza, Jon Favor, 6 mo and older, IM, PF (Flulaval, Fluarix) 2018    Influenza, Quadv, IM, PF (6 mo and older Fluzone, Flulaval, Fluarix, and 3 yrs and older Afluria) 2016, 2017    Pneumococcal Polysaccharide ([id number]) 2015       Active Problems:  Patient Active Problem List   Diagnosis Code    Asthma J45.909    GERD K21.9    Iron deficiency anemia D50.9    Cervical disc herniation M50.20    Smoker F17.200    Mullins's esophagus without dysplasia K22.70    Hiatal hernia K44.9    COPD J44.9    Chronic constipation K59.09    Hx of IUFD (G2) O09.299    History of gestational hypertension Z87.59    Seizure ([address] R56.9    Hx of  x2 (G3, G4) B46.023    Arthritis M19.90    Cervical radiculopathy M54.12    Migraine without aura G43.009  Spinal stenosis in cervical region M48.02    Prediabetes R73.03    Chronic pelvic pain in female R10.2, G89.29    Enlarged uterus N85.2    Endometriosis N80.9    Family history of breast cancer Z80.3    Bipolar affective disorder, currently depressed, moderate (HCC) F31.32    Insomnia G47.00    Ulnar neuropathy G56.20    Major depressive disorder, recurrent episode ([address] F33.9    Sciatica M54.30    Moderate persistent asthma without complication C13.74    Lipoma of torso D17.1    History of cervical discectomy Z98.890    Chronic neck pain with abnormal neurologic examination M54.2, G89.28    Abnormal weight gain  R63.5    Class 2 drug-induced obesity with serious comorbidity and body mass index (BMI) of 37.0 to 37.9 in adult E66.1, Z68.37    Pelvic peritoneal endometriosis N80.3    RALH with BS and cystoscopy 7/8/2020 Z90.710    Post-op pain G89.18    Wound infection/hemorrhage, obstetric surgical, postpartum condition O90.9    Postoperative visit Z48.89    Postoperative abdominal pain R10.9, G89.18    Diverticulitis K57.92    Diverticulitis of colon K57.32    Pelvic abscess in female N73.9       Isolation/Infection:   Isolation          No Isolation        Patient Infection Status     Infection Onset Added Last Indicated Last Indicated By Review Planned Expiration Resolved Resolved By    None active    Resolved    COVID-19 Rule Out 07/05/20 07/05/20 07/05/20 COVID-19 (Ordered)   07/06/20 Rule-Out Test Resulted          Nurse Assessment:  Last Vital Signs: BP (!) 122/92   Pulse 91   Temp 98.7 °F (37.1 °C) (Oral)   Resp 18   Ht 5' 2\" (1.575 m)   Wt 209 lb 10.5 oz (95.1 kg)   LMP 06/15/2020   SpO2 95%   BMI 38.35 kg/m²     Last documented pain score (0-10 scale): Pain Level: 10  Last Weight:   Wt Readings from Last 1 Encounters:   07/28/20 209 lb 10.5 oz (95.1 kg)     Mental Status:  {IP PT MENTAL STATUS:20030}    IV Access:  {[facility] [place]ESS:584392204}    Nursing Mobility/ADLs:  Walking   {CHP DME KNME:572705659}  Transfer  {CHP DME BVWI:926108591}  Bathing  {CHP DME PGBV:360166406}  Dressing  {CHP DME NQCJ:801234659}  Toileting  {CHP DME SUPM:545228817}  Feeding  {CHP DME PYPI:489332474}  Med Admin  {CHP DME EYKI:595745462}  Med Delivery   {[facility] MED Delivery:033248491}    Wound Care Documentation and Therapy:        Elimination:  Continence:   · Bowel: {YES / RJ:10911}  · Bladder: {YES / PV:32548}  Urinary Catheter: {Urinary Catheter:306901030}   Colostomy/Ileostomy/Ileal Conduit: {YES / GC:43175}       Date of Last BM: ***    Intake/Output Summary (Last 24 hours) at 2020 1434  Last data filed at 2020 0608  Gross per 24 hour   Intake 200 ml   Output --   Net 200 ml     I/O last 3 completed shifts:   In: 200 [P.O.:200]  Out: -     Safety Concerns:     [employer] Safety Concerns:583125751}    Impairments/Disabilities:      [employer] Impairments/Disabilities:141516402}    Nutrition Therapy:  Current Nutrition Therapy:   [employer] Diet List:887534629}    Routes of Feeding: {P DME Other Feedings:323361995}  Liquids: {Slp liquid thickness:49094}  Daily Fluid Restriction: {CHP DME Yes amt example:812295930}  Last Modified Barium Swallow with Video (Video Swallowing Test): {Done Not Done YCYE:502826069}    Treatments at the Time of Hospital Discharge:   Respiratory Treatments: ***  Oxygen Therapy:  {Therapy; copd oxygen:79882}  Ventilator:    { CC Vent ZFMZ:356434390}    Rehab Therapies: {THERAPEUTIC INTERVENTION:9785967670}  Weight Bearing Status/Restrictions: [employer] Weight Bearin}  Other Medical Equipment (for information only, NOT a DME order):  {EQUIPMENT:019733482}  Other Treatments: ***    Patient's personal belongings (please select all that are sent with patient):  {[facility] DME Belongings:980025894}    RN SIGNATURE:  {Esignature:901771134}    CASE MANAGEMENT/SOCIAL WORK SECTION    Inpatient Status Date: ***    Readmission Risk Assessment Score:  Readmission Risk              Risk of Unplanned Readmission:        24           Discharging to Facility/ Agency   · Name:   · Address:  · Phone:  · Fax:    Dialysis Facility (if applicable)   · Name:  · Address:  · Dialysis Schedule:  · Phone:  · Fax:    / signature: {Esignature:155423195}    PHYSICIAN SECTION    Prognosis: {Prognosis:5265057508}    Condition at Discharge: Farrukh8 Tamie Ye Patient Condition:096007686}    Rehab Potential (if transferring to Rehab): {Prognosis:9231651033}    Recommended Labs or Other Treatments After Discharge: ***    Physician Certification: I certify the above information and transfer of Annabel Maker  is necessary for the continuing treatment of the diagnosis listed and that she requires {Admit to Appropriate Level of Care:34771} for {GREATER/LESS:027961179} 30 days.      Update Admission H&P: {CHP DME Changes in IKR:390801866}    PHYSICIAN SIGNATURE:  {Esignature:923663525}

## 2020-07-28 NOTE — PROGRESS NOTES
Dr. Minerva Eason here. She gives ok for discharge but wants Dr. Tyrone Dominguez to be informed. OB resident paged.  (Dr. aCli [place].)

## 2020-07-28 NOTE — PROGRESS NOTES
LAPAROSCOPY N/A 10/5/2017    LAPAROSCOPIC REMOVAL INTRA ABDOMINAL LIPOMA LOWER RIGHT performed by Claudius Moritz, DO at [address] [facility] ESOPHAGOGASTRODUODENOSCOPY TRANSORAL DIAGNOSTIC N/A 3/2/2017    EGD ESOPHAGOGASTRODUODENOSCOPY  IP 2055 performed by Genevieve Chao MD at [employer]  08/16/2016    severe esophagitis    UPPER GASTROINTESTINAL ENDOSCOPY  01/19/2017    barretts; hiatus hernia; gastritis    UPPER GASTROINTESTINAL ENDOSCOPY  03/02/2017    long seg mullins's with linear erosions, esophagitis, small hiatal hernia    UPPER GASTROINTESTINAL ENDOSCOPY N/A 1/30/2018    MULLINS'S    UPPER GASTROINTESTINAL ENDOSCOPY N/A 10/1/2019    EGD BIOPSY performed by Keara Crisostomo MD at [facility]         Medications:      metroNIDAZOLE  500 mg Oral 3 times per day    ciprofloxacin  500 mg Oral 2 times per day    pantoprazole  40 mg Oral BID AC    docusate sodium  100 mg Oral BID    linaclotide  290 mcg Oral QAM AC    lamoTRIgine  25 mg Oral BID    montelukast  10 mg Oral Nightly    enoxaparin  40 mg Subcutaneous Daily    pregabalin  75 mg Oral TID    ipratropium-albuterol  1 ampule Inhalation Q4H WA    insulin lispro  0-6 Units Subcutaneous TID WC    insulin lispro  0-3 Units Subcutaneous Nightly       Social History:     Social History     Socioeconomic History    Marital status: Single     Spouse name: Not on file    Number of children: 1    Years of education: 11th grade    Highest education level: Not on file   Occupational History    Occupation: unemployed-   Social Needs    Financial resource strain: Not on file    Food insecurity     Worry: Not on file     Inability: Not on file   [employer] needs     Medical: Not on file     Non-medical: Not on file   Tobacco Use    Smoking status: Current Every Day Smoker     Packs/day: 1.00     Years: 21.00     Pack years: 21.00     Types: Cigarettes    Smokeless tobacco: Never Used   Substance and Sexual Activity    Alcohol use: No    Drug use: No    Sexual activity: Yes     Partners: Male   Lifestyle    Physical activity     Days per week: Not on file     Minutes per session: Not on file    Stress: Not on file   Relationships    Social connections     Talks on phone: Not on file     Gets together: Not on file     Attends Sikhism service: Not on file     Active member of club or organization: Not on file     Attends meetings of clubs or organizations: Not on file     Relationship status: Not on file    Intimate partner violence     Fear of current or ex partner: Not on file     Emotionally abused: Not on file     Physically abused: Not on file     Forced sexual activity: Not on file   Other Topics Concern    Not on file   Social History Narrative    Lives with son and boyfriend       Family History:     Family History   Problem Relation Age of Onset    Cancer Mother         breast    Bipolar Disorder Mother     Hypertension Mother     Breast Cancer Mother     Bipolar Disorder Brother     Hypertension Father         Allergies:   Nsaids; Toradol [ketorolac tromethamine]; and Ultram [tramadol]     Review of Systems:     Review of Systems  As per history present illness, other than above 12 systems reviewed were negative  Physical Examination :     Patient Vitals for the past 8 hrs:   BP Temp Temp src Pulse Resp SpO2   07/28/20 1015 -- -- -- -- 18 95 %   07/28/20 0710 107/60 97.9 °F (36.6 °C) Oral 81 18 96 %       Physical Exam  Constitutional:       General: She is not in acute distress. Appearance: Normal appearance. HENT:      Head: Normocephalic and atraumatic. Eyes:      Conjunctiva/sclera: Conjunctivae normal.      Pupils: Pupils are equal, round, and reactive to light. Neck:      Musculoskeletal: No neck rigidity. Cardiovascular:      Rate and Rhythm: Normal rate and regular rhythm. Heart sounds: No murmur.    Pulmonary:      Effort: Pulmonary effort is normal.      Breath sounds: Normal breath sounds. No wheezing. Abdominal:      General: Abdomen is flat. There is no distension. Palpations: Abdomen is soft. Tenderness: There is abdominal tenderness. Comments: Bilateral lower abdominal tenderness . Musculoskeletal:      Right lower leg: No edema. Left lower leg: No edema. Neurological:      General: No focal deficit present. Mental Status: She is alert and oriented to person, place, and time. Medical Decision Making:   I have independently reviewed/ordered the following labs:    CBC with Differential:   Recent Labs     07/27/20  0728 07/28/20  0557   WBC 7.9 6.7   HGB 12.1 11.9*   HCT 37.5 35.8*    329   LYMPHOPCT 20* 17*   MONOPCT 7 9*     BMP:  Recent Labs     07/27/20  0728 07/28/20  0731    140   K 4.0 4.3    104   CO2 23 25   BUN 14 14   CREATININE 0.64 0.59     Hepatic Function Panel:   No results for input(s): PROT, LABALBU, BILIDIR, IBILI, BILITOT, ALKPHOS, ALT, AST in the last 72 hours. Lab Results   Component Value Date    CREATININE 0.59 07/28/2020    GLUCOSE 110 07/28/2020    GLUCOSE 106 03/25/2012       Detailed results: Thank you for allowing us to participate in the care of this patient. Please call with questions. This note is created with the assistance of a speech recognition program.  While intending to generate adocument that actually reflects the content of the visit, the document can still have some errors including those of syntax and sound a like substitutions which may escape proof reading. It such instances, actual meaningcan be extrapolated by contextual diversion.     Jaqui Schafer MD  Office: [phone]  Perfect serve / office [phone]

## 2020-07-28 NOTE — CARE COORDINATION
DISCHARGE PLANNING NOTE:    Plan is for this patient to return to home. She declines any discharge needs and states that she will have a ride home upon discharge. Possible discharge later today. Will continue to follow along.      Electronically signed by Marques Lerma RN on 7/28/2020 at 11:07 AM

## 2020-07-28 NOTE — PLAN OF CARE
Problem: Activity:  Goal: Risk for activity intolerance will decrease  Description: Risk for activity intolerance will decrease  Outcome: Ongoing     Problem: Bowel/Gastric:  Goal: Occurrences of nausea will decrease  Description: Occurrences of nausea will decrease  Outcome: Ongoing     Problem: Fluid Volume:  Goal: Maintenance of adequate hydration will improve  Description: Maintenance of adequate hydration will improve  Outcome: Ongoing     Problem: Health Behavior:  Goal: Ability to state signs and symptoms to report to health care provider will improve  Description: Ability to state signs and symptoms to report to health care provider will improve  Outcome: Ongoing     Problem: Pain:  Intervention: Opioid analgesia side-effects  Note: Adequate pain control achieved this shift. See MAR. Problem: Falls - Risk of: Intervention: Assess potential safety hazards  Note: Pt. Free of falls and injuries this shift.

## 2020-07-29 ENCOUNTER — TELEPHONE (OUTPATIENT)
Dept: FAMILY MEDICINE CLINIC | Age: 40
End: 2020-07-29

## 2020-07-29 NOTE — TELEPHONE ENCOUNTER
[facility] Transitions Initial Follow Up Call    Outreach made within 2 business days of discharge: Yes    Patient: Izabel Nixon Patient : 1980   MRN: [id number]  Reason for Admission: There are no discharge diagnoses documented for the most recent discharge. Discharge Date: 20       Spoke with:     Discharge department/facility:     TCM Interactive Patient Contact:  Was patient able to fill all prescriptions:   Was patient instructed to bring all medications to the follow-up visit:   Is patient taking all medications as directed in the discharge summary?    Does patient understand their discharge instructions:   Does patient have questions or concerns that need addressed prior to 7-14 day follow up office visit:     Scheduled appointment with PCP within 7-14 days    Follow Up  Future Appointments   Date Time Provider [place]   2020  1:00 PM KATIE Cole - CNP [facility] [address], [place]

## 2020-07-31 NOTE — DISCHARGE SUMMARY
pelvis was performed with the administration of intravenous contrast. Multiplanar reformatted images are provided for review. Dose modulation, iterative reconstruction, and/or weight based adjustment of the mA/kV was utilized to reduce the radiation dose to as low as reasonably achievable. COMPARISON: 24 July 2020 HISTORY: ORDERING SYSTEM PROVIDED HISTORY: evaluate abscess TECHNOLOGIST PROVIDED HISTORY: evaluate abscess Reason for Exam: evaluate abscess FINDINGS: Lower Chest: Basilar atelectasis and shotty epicardial lymph nodes are present. No cardiomegaly, focal consolidation or pericardial effusion. Organs: Liver, spleen, pancreas, gallbladder, adrenals, and kidneys demonstrate no acute abnormality. Nonobstructing nephrolith left kidney is demonstrated. GI/Bowel: No free fluid or free air. No small bowel obstruction. Pelvis: No evidence of appendicitis. Improved inflammatory appearance in the pelvis is present. There is significant decrease in wall thickening in the rectosigmoid colon with decrease in serosal haziness and surrounding fat infiltration. Complex septated collection in the left casandra pelvis is reduced in size now 1.8 x 3.6 cm, previously 4.5 x 3.0 cm. 2nd prominent fluid collection in the pelvis now measures 2.6 x 1.1 cm, previously 3.8 x 2.4 cm. Bladder is unremarkable. Both inguinal rings are fat containing without strangulation. No significant free pelvic fluid is noted. Peritoneum/Retroperitoneum: No aortic aneurysm. No retroperitoneal mass, retroperitoneal or upper mesenteric adenopathy. Bones/Soft Tissues: No acute osseous abnormality. No acute soft tissue abnormality. Interval improvement in complicated diverticulitis with multiple abscess collections with reduction in the size of collections in the pelvis documented above. There has been an appreciable decrease in pericolonic fat inflammation although significant residual remains in the lower pelvis closer to the bladder. Abscess collections do not appear amenable to drainage. The findings were sent to the Radiology Results [address] at 5:28 pm on 7/27/2020to be communicated to a licensed caregiver. Ct Abdomen Pelvis W Iv Contrast Additional Contrast? None    Result Date: 7/24/2020  EXAMINATION: CT OF THE ABDOMEN AND PELVIS WITH CONTRAST 7/24/2020 1:28 pm TECHNIQUE: CT of the abdomen and pelvis was performed with the administration of intravenous contrast. Multiplanar reformatted images are provided for review. Dose modulation, iterative reconstruction, and/or weight based adjustment of the mA/kV was utilized to reduce the radiation dose to as low as reasonably achievable. COMPARISON: 07/22/2020 HISTORY: ORDERING SYSTEM PROVIDED HISTORY: Persistent abdominal pain, diverticulitis/colitis in the setting of recent hysterectomy TECHNOLOGIST PROVIDED HISTORY: Persistent abdominal pain, diverticulitis/colitis in the setting of recent hysterectomy Reason for Exam: Persistent abdominal pain, diverticulitis/colitis in the setting of recent hysterectomy Acuity: Acute Type of Exam: Initial FINDINGS: Lower Chest: There is been interval development of bibasilar infiltrate more pronounced on the right. Organs: The gallbladder is contracted. The remainder of the visualized upper abdominal organs are stable when compared to the prior study. GI/Bowel: No dilated large or small bowel loops are seen. There is extensive and increased soft tissue stranding in the mesentery when compared to the prior study. Again noted are changes of complicated diverticulitis. There are multiple abscess collections within the lower abdomen and pelvis. There is a 4.5 x 3.0 cm multiloculated collection in the left hemipelvis adjacent to the iliac vessels. There is a 3.7 x 2.8 cm collection in the right hemipelvis. There is another smaller collection in the left hemipelvis measuring 3.0 x 1.5 cm in size.   There may be an early developing abscess versus phlegmon in the retroperitoneum on the left which is difficult to measure. Pelvis: The bladder is unremarkable in appearance. Peritoneum/Retroperitoneum: Negative for aneurysm Bones/Soft Tissues: Normal     Changes of complicated diverticulitis with multiple abscess collections within the lower abdomen and pelvis and increased and extensive soft tissue stranding in the mesenteric fat. The abscess collections have increased in size when compared to the prior study. These are not amenable to percutaneous drainage. Interval development of bibasilar infiltrates. Ct Abdomen Pelvis W Iv Contrast Additional Contrast? None    Result Date: 7/22/2020  EXAMINATION: CT OF THE ABDOMEN AND PELVIS WITH CONTRAST 7/22/2020 5:01 pm TECHNIQUE: CT of the abdomen and pelvis was performed with the administration of intravenous contrast. Multiplanar reformatted images are provided for review. Dose modulation, iterative reconstruction, and/or weight based adjustment of the mA/kV was utilized to reduce the radiation dose to as low as reasonably achievable. COMPARISON: 2 July 2020 HISTORY: ORDERING SYSTEM PROVIDED HISTORY: abd pain TECHNOLOGIST PROVIDED HISTORY: abd pain FINDINGS: Lower Chest: No acute abnormality in the included pulmonary parenchyma at the lung bases. Shotty epicardial lymph nodes are present without gricelda adenopathy. No pericardial effusion is evident. Organs: Liver, spleen, pancreas, gallbladder, adrenals, and kidneys demonstrate no acute abnormality. GI/Bowel: No free fluid or free air is noted. The rectosigmoid colon is abnormal in appearance, with loss of haustra, serosal haziness, shaggy appearance, and pericolonic fat infiltration.   Multiple pericolonic complex collections are noted, likely pericolonic abscesses including one in the left lower quadrant of 2.5 x 3.5 cm, which appears to be between phlegmon and abscess formation, a right paracentral lower pelvic collection of 2.3 cm more typical of abscess, a central posterior collection of 1.9 cm, and a 4th collection of 2.2 x 1.4 cm. Inflamed small bowel loops in the right lower quadrant are noted in the upper pelvis. Appendix contains some dense material likely an appendicolith without evidence of acute appendicitis. Pelvis: Bladder is unremarkable. Bilateral fat-containing hernias are present without strangulation. As previously noted, inflammatory changes are present in the pelvis with pericolonic complex and fluid collections compatible with multiple phlegmons and/or abscesses. No discrete extraluminal air is noted. Peritoneum/Retroperitoneum: No aortic aneurysm, retroperitoneal or mesenteric adenopathy is present. Bones/Soft Tissues: No acute osseous or soft tissue abnormality is noted. 1.  Findings compatible with acute colitis/diverticulitis of the rectosigmoid colon with multiple pericolonic fluid collections consistent with complex phlegmons and abscesses. 2.  Apparent appendicolith in the appendix. No acute appendicitis. Critical results were called by Dr. Hong Ellsworth MD to [facility] on 7/22/2020 at 17:16. Ct Abdomen Pelvis W Iv Contrast    Result Date: 7/2/2020  EXAMINATION: CT OF THE ABDOMEN AND PELVIS WITH CONTRAST 7/2/2020 4:55 pm TECHNIQUE: CT of the abdomen and pelvis was performed with the administration of intravenous contrast. Multiplanar reformatted images are provided for review. Dose modulation, iterative reconstruction, and/or weight based adjustment of the mA/kV was utilized to reduce the radiation dose to as low as reasonably achievable. COMPARISON: 03/04/2020 HISTORY: ORDERING SYSTEM PROVIDED HISTORY: Acute onset abdominal pain, N/V TECHNOLOGIST PROVIDED HISTORY: IV Only Contrast Acute onset abdominal pain, N/V Reason for Exam: Pt c/o abd pain since yesterday 24-year-old female with acute-onset abdominal pain, nausea and vomiting FINDINGS: Lower Chest: Mild bibasilar atelectasis and respiratory motion.   No free intra-abdominal air. Tree-in-bud opacities at the lung bases. Small hiatal hernia. Mild wall thickening of the distal esophagus, stable. Organs: Liver, gallbladder, adrenal glands, pancreas, spleen grossly unremarkable in appearance. No hydronephrosis. No obstructing calculus or hydroureter. 5 mm nonobstructive midpole left renal calculus on image 81, series 2. GI/Bowel: No significant dilation of small bowel loops to suggest small bowel obstruction. Normal gas-filled appendix. Mild stool burden. Pelvis: Urinary bladder is collapsed. No inguinal or pelvic sidewall lymphadenopathy. No free fluid in the pelvis. Uterus and adnexa grossly unremarkable. Peritoneum/Retroperitoneum: Abdominal aorta normal in appearance and caliber. No retroperitoneal lymphadenopathy. Psoas muscles normal in size and symmetric in appearance. Prominent portacaval lymph nodes. Bones/Soft Tissues: Mild diffuse degenerative changes throughout the spine. Tiny midline fat-containing periumbilical hernia. 1. No acute intra-abdominal or intrapelvic abnormality identified by CT. 2. Mild bibasilar atelectasis and respiratory motion. Tree-in-bud opacities at the lung bases likely representing inflammation or infection to include ENOC or sequela of aspiration. Follow-up is recommended to document resolution. 3. Tiny midline fat-containing periumbilical hernia. 4. Mild wall thickening of the distal esophagus, stable. Consider evaluation with EGD. 5. Small hiatal hernia. 6. 5 mm nonobstructing midpole left renal calculus. Xr Chest Portable    Result Date: 7/22/2020  EXAMINATION: ONE XRAY VIEW OF THE CHEST 7/22/2020 4:31 pm COMPARISON: 07/17/2020 and 07/07/2020 HISTORY: Reason for Exam: Chest pains Acuity: Acute Type of Exam: Initial FINDINGS: The lungs are without acute focal process. No effusion or pneumothorax. The cardiomediastinal silhouette is normal.  The osseous structures are intact without acute process.   Lower cervical spine hardware. Unremarkable chest.     Xr Chest Portable    Result Date: 7/17/2020  EXAMINATION: ONE XRAY VIEW OF THE CHEST 7/17/2020 7:25 pm COMPARISON: July 7, 2020 HISTORY: ORDERING SYSTEM PROVIDED HISTORY: Wheezing TECHNOLOGIST PROVIDED HISTORY: Wheezing Reason for Exam: Wheezing Acuity: Acute Type of Exam: Initial FINDINGS: Lungs are clear. No cardiomegaly. No pulmonary edema. Negative chest radiograph. Consultations:    Consults:     Final Specialist Recommendations/Findings:   IP CONSULT TO INTERNAL MEDICINE  IP CONSULT TO GENERAL SURGERY  IP CONSULT TO OB GYN  IP CONSULT TO SOCIAL WORK  IP CONSULT TO INFECTIOUS DISEASES      The patient was seen and examined on day of discharge and this discharge summary is in conjunction with any daily progress note from day of discharge. Discharge plan:     Disposition: home    Instructions to Patient: Keep follow-up appointments      Requiring Further Evaluation/Follow Up POST HOSPITALIZATION/Incidental Findings:    Physician Follow Up:     KATIE Cassidy CNP  [address]  [address] [address]  [phone]    In 1 week      Dalila Stevens MD  [facility] [address]  [address] [address]  [phone]    Call in 1 week  For follow up    Kaela Liu [facility]  [address] [address] [phone]    Schedule an appointment as soon as possible for a visit  Repeat CT in 2 weeks       Diet: regular    Activity: As tolerated    Discharge Medications:      Medication List      START taking these medications    ciprofloxacin 500 MG tablet  Commonly known as:  CIPRO  Take 1 tablet by mouth every 12 hours for 9 days     metroNIDAZOLE 500 MG tablet  Commonly known as:  FLAGYL  Take 1 tablet by mouth every 8 hours for 9 days     oxyCODONE-acetaminophen 5-325 MG per tablet  Commonly known as:  Percocet  Take 1 tablet by mouth every 6 hours as needed for Pain for up to 7 days.         CONTINUE taking these medications    acetaminophen 500 MG tablet  Commonly known as:  TYLENOL     Alcohol Prep Pads  Use as directed     blood glucose test strips  Test 2-3 times a day & as needed for symptoms of irregular blood glucose. BRAND OF CHOICE INSURANCE ALLOWS. Breo Ellipta 200-25 MCG/INH Aepb inhaler  Generic drug:  Fluticasone furoate-vilanterol  INHALE ONE PUFF BY MOUTH ONCE A DAY ONCE A DAY INHALATION 30     * [place] Choice Face Mask Misc  USE NEW FACE MASK EVERYDAY WHEN PATIENT GO OUTSIDE OF HOME DUE TO COVID PANDEMIC     * [place] Choice Face Mask Misc  USE NEW FACE MASK EVERYDAY WHEN PATIENT GO OUTSIDE OF HOME DUE TO COVID PANDEMIC     * Combivent Respimat  MCG/ACT Aers inhaler  Generic drug:  albuterol-ipratropium     * ipratropium-albuterol 0.5-2.5 (3) MG/3ML Soln nebulizer solution  Commonly known as:  DUONEB     docusate sodium 100 MG capsule  Commonly known as:  Colace  Take 1 capsule by mouth 2 times daily     EPINEPHrine 0.3 MG/0.3ML Soaj injection  Commonly known as:  EpiPen 2-[place]  Inject 0.3 mLs into the muscle once for 1 dose Use as directed for allergic reaction     LaMICtal 25 MG tablet  Generic drug:  lamoTRIgine     Lancets Misc  1 each by Does not apply route 2 times daily     Linzess 290 MCG Caps capsule  Generic drug:  linaclotide  TAKE 1 CAPSULE BY MOUTH EVERY MORNING (BEFORE BREAKFAST)     metFORMIN 500 MG tablet  Commonly known as:  GLUCOPHAGE  Take 1 tablet by mouth 2 times daily (with meals) Take 1 tablet daily for the first 7 days.  Then BID     MISC NATURAL PRODUCT OP     montelukast 10 MG tablet  Commonly known as:  SINGULAIR  Take 1 tablet by mouth nightly     pantoprazole 40 MG tablet  Commonly known as:  PROTONIX  TAKE ONE TABLET TWICE A DAY ORALLY 30 DAY(S)     pregabalin 75 MG capsule  Commonly known as:  LYRICA  TAKE 1 CAPSULE BY MOUTH 3 TIMES DAILY     QUEtiapine 50 MG tablet  Commonly known as:  SEROQUEL     vitamin D 25 MCG (1000 UT) Tabs tablet  Commonly known as: CHOLECALCIFEROL  Take 1 tablet by mouth daily         * This list has 4 medication(s) that are the same as other medications prescribed for you. Read the directions carefully, and ask your doctor or other care provider to review them with you. Where to Get Your Medications      These medications were sent to [place], [address] [place] [address],[facility], [address]    Phone:  [phone]   ciprofloxacin 500 MG tablet  metroNIDAZOLE 500 MG tablet     You can get these medications from any pharmacy    Bring a paper prescription for each of these medications  oxyCODONE-acetaminophen 5-325 MG per tablet         Time Spent on discharge is  35 mins in patient examination, evaluation, counseling as well as medication reconciliation, prescriptions for required medications, discharge plan and follow up. Electronically signed by   Alycia Lyn MD  7/30/2020  9:48 PM      Thank you Dr. Baylee Chi, APRN - CNP for the opportunity to be involved in this patient's care.

## 2020-08-04 RX ORDER — PREGABALIN 75 MG/1
CAPSULE ORAL
Qty: 90 CAPSULE | Refills: 0 | Status: SHIPPED | OUTPATIENT
Start: 2020-08-04 | End: 2020-09-01

## 2020-08-12 ENCOUNTER — APPOINTMENT (OUTPATIENT)
Dept: CT IMAGING | Age: 40
DRG: 871 | End: 2020-08-12
Payer: COMMERCIAL

## 2020-08-12 ENCOUNTER — APPOINTMENT (OUTPATIENT)
Dept: GENERAL RADIOLOGY | Age: 40
DRG: 871 | End: 2020-08-12
Payer: COMMERCIAL

## 2020-08-12 ENCOUNTER — HOSPITAL ENCOUNTER (INPATIENT)
Age: 40
LOS: 2 days | Discharge: HOME | DRG: 871 | End: 2020-08-14
Attending: EMERGENCY MEDICINE | Admitting: INTERNAL MEDICINE
Payer: COMMERCIAL

## 2020-08-12 DIAGNOSIS — J18.9 PNEUMONIA OF BOTH LOWER LOBES DUE TO INFECTIOUS ORGANISM: Primary | ICD-10-CM

## 2020-08-12 LAB
ABSOLUTE BANDS #: 0.56 K/UL (ref 0–1)
ABSOLUTE EOS #: 0.37 K/UL (ref 0–0.4)
ABSOLUTE IMMATURE GRANULOCYTE: ABNORMAL K/UL (ref 0–0.3)
ABSOLUTE LYMPH #: 1.12 K/UL (ref 1–4.8)
ABSOLUTE MONO #: 0.75 K/UL (ref 0.1–1.3)
ALBUMIN SERPL-MCNC: 4.4 G/DL (ref 3.5–5.2)
ALBUMIN/GLOBULIN RATIO: ABNORMAL (ref 1–2.5)
ALP BLD-CCNC: 69 U/L (ref 35–104)
ALT SERPL-CCNC: 17 U/L (ref 5–33)
ANION GAP SERPL CALCULATED.3IONS-SCNC: 10 MMOL/L (ref 9–17)
AST SERPL-CCNC: 17 U/L
BANDS: 3 % (ref 0–10)
BASOPHILS # BLD: 0 % (ref 0–2)
BASOPHILS ABSOLUTE: 0 K/UL (ref 0–0.2)
BILIRUB SERPL-MCNC: 0.53 MG/DL (ref 0.3–1.2)
BILIRUBIN URINE: NEGATIVE
BUN BLDV-MCNC: 13 MG/DL (ref 6–20)
BUN/CREAT BLD: ABNORMAL (ref 9–20)
CALCIUM SERPL-MCNC: 10.1 MG/DL (ref 8.6–10.4)
CHLORIDE BLD-SCNC: 105 MMOL/L (ref 98–107)
CO2: 23 MMOL/L (ref 20–31)
COLOR: YELLOW
COMMENT UA: NORMAL
CREAT SERPL-MCNC: 0.81 MG/DL (ref 0.5–0.9)
DIFFERENTIAL TYPE: ABNORMAL
EOSINOPHILS RELATIVE PERCENT: 2 % (ref 0–4)
GFR AFRICAN AMERICAN: >60 ML/MIN
GFR NON-AFRICAN AMERICAN: >60 ML/MIN
GFR SERPL CREATININE-BSD FRML MDRD: ABNORMAL ML/MIN/{1.73_M2}
GFR SERPL CREATININE-BSD FRML MDRD: ABNORMAL ML/MIN/{1.73_M2}
GLUCOSE BLD-MCNC: 110 MG/DL (ref 65–105)
GLUCOSE BLD-MCNC: 120 MG/DL (ref 70–99)
GLUCOSE URINE: NEGATIVE
HCT VFR BLD CALC: 42.2 % (ref 36–46)
HEMOGLOBIN: 13.3 G/DL (ref 12–16)
IMMATURE GRANULOCYTES: ABNORMAL %
KETONES, URINE: NEGATIVE
LACTIC ACID: 1.1 MMOL/L (ref 0.5–2.2)
LEUKOCYTE ESTERASE, URINE: NEGATIVE
LIPASE: 21 U/L (ref 13–60)
LYMPHOCYTES # BLD: 6 % (ref 24–44)
MCH RBC QN AUTO: 25.4 PG (ref 26–34)
MCHC RBC AUTO-ENTMCNC: 31.6 G/DL (ref 31–37)
MCV RBC AUTO: 80.5 FL (ref 80–100)
MONOCYTES # BLD: 4 % (ref 1–7)
MORPHOLOGY: ABNORMAL
MORPHOLOGY: ABNORMAL
NITRITE, URINE: NEGATIVE
NRBC AUTOMATED: ABNORMAL PER 100 WBC
PDW BLD-RTO: 16.5 % (ref 11.5–14.9)
PH UA: 8 (ref 5–8)
PLATELET # BLD: 320 K/UL (ref 150–450)
PLATELET ESTIMATE: ABNORMAL
PMV BLD AUTO: 8.7 FL (ref 6–12)
POTASSIUM SERPL-SCNC: 4.1 MMOL/L (ref 3.7–5.3)
PROTEIN UA: NEGATIVE
RBC # BLD: 5.24 M/UL (ref 4–5.2)
RBC # BLD: ABNORMAL 10*6/UL
SARS-COV-2, PCR: NORMAL
SARS-COV-2, RAPID: NOT DETECTED
SARS-COV-2: NORMAL
SEG NEUTROPHILS: 85 % (ref 36–66)
SEGMENTED NEUTROPHILS ABSOLUTE COUNT: 15.9 K/UL (ref 1.3–9.1)
SODIUM BLD-SCNC: 138 MMOL/L (ref 135–144)
SOURCE: NORMAL
SPECIFIC GRAVITY UA: 1.01 (ref 1–1.03)
TOTAL PROTEIN: 7.2 G/DL (ref 6.4–8.3)
TROPONIN INTERP: NORMAL
TROPONIN T: NORMAL NG/ML
TROPONIN, HIGH SENSITIVITY: <6 NG/L (ref 0–14)
TURBIDITY: CLEAR
URINE HGB: NEGATIVE
UROBILINOGEN, URINE: NORMAL
WBC # BLD: 18.7 K/UL (ref 3.5–11)
WBC # BLD: ABNORMAL 10*3/UL

## 2020-08-12 PROCEDURE — 80053 COMPREHEN METABOLIC PANEL: CPT

## 2020-08-12 PROCEDURE — 6370000000 HC RX 637 (ALT 250 FOR IP): Performed by: EMERGENCY MEDICINE

## 2020-08-12 PROCEDURE — 83605 ASSAY OF LACTIC ACID: CPT

## 2020-08-12 PROCEDURE — 99285 EMERGENCY DEPT VISIT HI MDM: CPT

## 2020-08-12 PROCEDURE — 2580000003 HC RX 258: Performed by: EMERGENCY MEDICINE

## 2020-08-12 PROCEDURE — 6360000004 HC RX CONTRAST MEDICATION: Performed by: EMERGENCY MEDICINE

## 2020-08-12 PROCEDURE — 36415 COLL VENOUS BLD VENIPUNCTURE: CPT

## 2020-08-12 PROCEDURE — 74177 CT ABD & PELVIS W/CONTRAST: CPT

## 2020-08-12 PROCEDURE — 87040 BLOOD CULTURE FOR BACTERIA: CPT

## 2020-08-12 PROCEDURE — 81003 URINALYSIS AUTO W/O SCOPE: CPT

## 2020-08-12 PROCEDURE — 6370000000 HC RX 637 (ALT 250 FOR IP): Performed by: NURSE PRACTITIONER

## 2020-08-12 PROCEDURE — 2580000003 HC RX 258: Performed by: NURSE PRACTITIONER

## 2020-08-12 PROCEDURE — 99223 1ST HOSP IP/OBS HIGH 75: CPT | Performed by: INTERNAL MEDICINE

## 2020-08-12 PROCEDURE — 94640 AIRWAY INHALATION TREATMENT: CPT

## 2020-08-12 PROCEDURE — 96374 THER/PROPH/DIAG INJ IV PUSH: CPT

## 2020-08-12 PROCEDURE — 84484 ASSAY OF TROPONIN QUANT: CPT

## 2020-08-12 PROCEDURE — 94761 N-INVAS EAR/PLS OXIMETRY MLT: CPT

## 2020-08-12 PROCEDURE — 96375 TX/PRO/DX INJ NEW DRUG ADDON: CPT

## 2020-08-12 PROCEDURE — 6360000002 HC RX W HCPCS: Performed by: NURSE PRACTITIONER

## 2020-08-12 PROCEDURE — 71045 X-RAY EXAM CHEST 1 VIEW: CPT

## 2020-08-12 PROCEDURE — 83690 ASSAY OF LIPASE: CPT

## 2020-08-12 PROCEDURE — 2060000000 HC ICU INTERMEDIATE R&B

## 2020-08-12 PROCEDURE — 85025 COMPLETE CBC W/AUTO DIFF WBC: CPT

## 2020-08-12 PROCEDURE — 6360000002 HC RX W HCPCS: Performed by: EMERGENCY MEDICINE

## 2020-08-12 PROCEDURE — U0002 COVID-19 LAB TEST NON-CDC: HCPCS

## 2020-08-12 PROCEDURE — 82947 ASSAY GLUCOSE BLOOD QUANT: CPT

## 2020-08-12 PROCEDURE — 93005 ELECTROCARDIOGRAM TRACING: CPT | Performed by: EMERGENCY MEDICINE

## 2020-08-12 PROCEDURE — 94664 DEMO&/EVAL PT USE INHALER: CPT

## 2020-08-12 RX ORDER — NICOTINE POLACRILEX 4 MG
15 LOZENGE BUCCAL PRN
Status: DISCONTINUED | OUTPATIENT
Start: 2020-08-12 | End: 2020-08-14 | Stop reason: HOSPADM

## 2020-08-12 RX ORDER — SODIUM CHLORIDE 9 MG/ML
INJECTION, SOLUTION INTRAVENOUS CONTINUOUS
Status: DISCONTINUED | OUTPATIENT
Start: 2020-08-12 | End: 2020-08-13

## 2020-08-12 RX ORDER — DEXTROSE MONOHYDRATE 50 MG/ML
100 INJECTION, SOLUTION INTRAVENOUS PRN
Status: DISCONTINUED | OUTPATIENT
Start: 2020-08-12 | End: 2020-08-14 | Stop reason: HOSPADM

## 2020-08-12 RX ORDER — ALBUTEROL SULFATE 2.5 MG/3ML
2.5 SOLUTION RESPIRATORY (INHALATION)
Status: DISCONTINUED | OUTPATIENT
Start: 2020-08-12 | End: 2020-08-14 | Stop reason: HOSPADM

## 2020-08-12 RX ORDER — IPRATROPIUM BROMIDE AND ALBUTEROL SULFATE 2.5; .5 MG/3ML; MG/3ML
1 SOLUTION RESPIRATORY (INHALATION)
Status: DISCONTINUED | OUTPATIENT
Start: 2020-08-13 | End: 2020-08-14 | Stop reason: HOSPADM

## 2020-08-12 RX ORDER — VITAMIN B COMPLEX
1000 TABLET ORAL DAILY
Status: DISCONTINUED | OUTPATIENT
Start: 2020-08-13 | End: 2020-08-14 | Stop reason: HOSPADM

## 2020-08-12 RX ORDER — BUDESONIDE AND FORMOTEROL FUMARATE DIHYDRATE 160; 4.5 UG/1; UG/1
2 AEROSOL RESPIRATORY (INHALATION) 2 TIMES DAILY
Status: DISCONTINUED | OUTPATIENT
Start: 2020-08-12 | End: 2020-08-14 | Stop reason: HOSPADM

## 2020-08-12 RX ORDER — LAMOTRIGINE 150 MG/1
150 TABLET ORAL DAILY
Status: DISCONTINUED | OUTPATIENT
Start: 2020-08-13 | End: 2020-08-12

## 2020-08-12 RX ORDER — 0.9 % SODIUM CHLORIDE 0.9 %
80 INTRAVENOUS SOLUTION INTRAVENOUS ONCE
Status: COMPLETED | OUTPATIENT
Start: 2020-08-12 | End: 2020-08-12

## 2020-08-12 RX ORDER — MORPHINE SULFATE 4 MG/ML
4 INJECTION, SOLUTION INTRAMUSCULAR; INTRAVENOUS ONCE
Status: COMPLETED | OUTPATIENT
Start: 2020-08-12 | End: 2020-08-12

## 2020-08-12 RX ORDER — 0.9 % SODIUM CHLORIDE 0.9 %
1000 INTRAVENOUS SOLUTION INTRAVENOUS ONCE
Status: COMPLETED | OUTPATIENT
Start: 2020-08-12 | End: 2020-08-12

## 2020-08-12 RX ORDER — LAMOTRIGINE 150 MG/1
150 TABLET ORAL NIGHTLY
Status: DISCONTINUED | OUTPATIENT
Start: 2020-08-12 | End: 2020-08-14 | Stop reason: HOSPADM

## 2020-08-12 RX ORDER — DIPHENHYDRAMINE HYDROCHLORIDE 50 MG/ML
25 INJECTION INTRAMUSCULAR; INTRAVENOUS EVERY 6 HOURS PRN
Status: DISCONTINUED | OUTPATIENT
Start: 2020-08-12 | End: 2020-08-14 | Stop reason: HOSPADM

## 2020-08-12 RX ORDER — NICOTINE 21 MG/24HR
1 PATCH, TRANSDERMAL 24 HOURS TRANSDERMAL DAILY PRN
Status: DISCONTINUED | OUTPATIENT
Start: 2020-08-12 | End: 2020-08-14 | Stop reason: HOSPADM

## 2020-08-12 RX ORDER — PANTOPRAZOLE SODIUM 40 MG/1
40 TABLET, DELAYED RELEASE ORAL
Status: DISCONTINUED | OUTPATIENT
Start: 2020-08-13 | End: 2020-08-14 | Stop reason: HOSPADM

## 2020-08-12 RX ORDER — MONTELUKAST SODIUM 10 MG/1
10 TABLET ORAL NIGHTLY
Status: DISCONTINUED | OUTPATIENT
Start: 2020-08-12 | End: 2020-08-14 | Stop reason: HOSPADM

## 2020-08-12 RX ORDER — PROMETHAZINE HYDROCHLORIDE 25 MG/1
12.5 TABLET ORAL EVERY 6 HOURS PRN
Status: DISCONTINUED | OUTPATIENT
Start: 2020-08-12 | End: 2020-08-14 | Stop reason: HOSPADM

## 2020-08-12 RX ORDER — SODIUM CHLORIDE 0.9 % (FLUSH) 0.9 %
10 SYRINGE (ML) INJECTION PRN
Status: DISCONTINUED | OUTPATIENT
Start: 2020-08-12 | End: 2020-08-14 | Stop reason: HOSPADM

## 2020-08-12 RX ORDER — ACETAMINOPHEN 325 MG/1
650 TABLET ORAL EVERY 4 HOURS PRN
Status: DISCONTINUED | OUTPATIENT
Start: 2020-08-12 | End: 2020-08-14 | Stop reason: HOSPADM

## 2020-08-12 RX ORDER — DOCUSATE SODIUM 100 MG/1
100 CAPSULE, LIQUID FILLED ORAL 2 TIMES DAILY
Status: DISCONTINUED | OUTPATIENT
Start: 2020-08-12 | End: 2020-08-14 | Stop reason: HOSPADM

## 2020-08-12 RX ORDER — QUETIAPINE FUMARATE 50 MG/1
50 TABLET, FILM COATED ORAL NIGHTLY
Status: DISCONTINUED | OUTPATIENT
Start: 2020-08-12 | End: 2020-08-14 | Stop reason: HOSPADM

## 2020-08-12 RX ORDER — KETOROLAC TROMETHAMINE 30 MG/ML
30 INJECTION, SOLUTION INTRAMUSCULAR; INTRAVENOUS ONCE
Status: DISCONTINUED | OUTPATIENT
Start: 2020-08-12 | End: 2020-08-12

## 2020-08-12 RX ORDER — DEXTROSE MONOHYDRATE 25 G/50ML
12.5 INJECTION, SOLUTION INTRAVENOUS PRN
Status: DISCONTINUED | OUTPATIENT
Start: 2020-08-12 | End: 2020-08-14 | Stop reason: HOSPADM

## 2020-08-12 RX ORDER — PREGABALIN 75 MG/1
75 CAPSULE ORAL 3 TIMES DAILY
Status: DISCONTINUED | OUTPATIENT
Start: 2020-08-12 | End: 2020-08-14 | Stop reason: HOSPADM

## 2020-08-12 RX ORDER — SODIUM CHLORIDE 0.9 % (FLUSH) 0.9 %
10 SYRINGE (ML) INJECTION EVERY 12 HOURS SCHEDULED
Status: DISCONTINUED | OUTPATIENT
Start: 2020-08-12 | End: 2020-08-14 | Stop reason: HOSPADM

## 2020-08-12 RX ORDER — MORPHINE SULFATE 2 MG/ML
2 INJECTION, SOLUTION INTRAMUSCULAR; INTRAVENOUS EVERY 4 HOURS PRN
Status: DISCONTINUED | OUTPATIENT
Start: 2020-08-12 | End: 2020-08-14 | Stop reason: HOSPADM

## 2020-08-12 RX ORDER — MAGNESIUM HYDROXIDE/ALUMINUM HYDROXICE/SIMETHICONE 120; 1200; 1200 MG/30ML; MG/30ML; MG/30ML
30 SUSPENSION ORAL EVERY 6 HOURS PRN
Status: DISCONTINUED | OUTPATIENT
Start: 2020-08-12 | End: 2020-08-14 | Stop reason: HOSPADM

## 2020-08-12 RX ORDER — IPRATROPIUM BROMIDE AND ALBUTEROL SULFATE 2.5; .5 MG/3ML; MG/3ML
1 SOLUTION RESPIRATORY (INHALATION)
Status: DISCONTINUED | OUTPATIENT
Start: 2020-08-12 | End: 2020-08-14 | Stop reason: HOSPADM

## 2020-08-12 RX ORDER — ONDANSETRON 2 MG/ML
4 INJECTION INTRAMUSCULAR; INTRAVENOUS ONCE
Status: COMPLETED | OUTPATIENT
Start: 2020-08-12 | End: 2020-08-12

## 2020-08-12 RX ADMIN — PIPERACILLIN SODIUM AND TAZOBACTAM SODIUM 3.38 G: 3; .375 INJECTION, POWDER, LYOPHILIZED, FOR SOLUTION INTRAVENOUS at 23:25

## 2020-08-12 RX ADMIN — DOCUSATE SODIUM 100 MG: 100 CAPSULE, LIQUID FILLED ORAL at 22:26

## 2020-08-12 RX ADMIN — LAMOTRIGINE 150 MG: 150 TABLET ORAL at 23:25

## 2020-08-12 RX ADMIN — PIPERACILLIN AND TAZOBACTAM 4.5 G: 4; .5 INJECTION, POWDER, LYOPHILIZED, FOR SOLUTION INTRAVENOUS; PARENTERAL at 17:31

## 2020-08-12 RX ADMIN — Medication 10 ML: at 14:25

## 2020-08-12 RX ADMIN — VANCOMYCIN HYDROCHLORIDE 2250 MG: 1.25 INJECTION, POWDER, LYOPHILIZED, FOR SOLUTION INTRAVENOUS at 18:18

## 2020-08-12 RX ADMIN — MORPHINE SULFATE 2 MG: 2 INJECTION, SOLUTION INTRAMUSCULAR; INTRAVENOUS at 22:27

## 2020-08-12 RX ADMIN — MORPHINE SULFATE 4 MG: 4 INJECTION, SOLUTION INTRAMUSCULAR; INTRAVENOUS at 13:56

## 2020-08-12 RX ADMIN — BUDESONIDE AND FORMOTEROL FUMARATE DIHYDRATE 2 PUFF: 160; 4.5 AEROSOL RESPIRATORY (INHALATION) at 22:26

## 2020-08-12 RX ADMIN — MAGNESIUM HYDROXIDE 30 ML: 400 SUSPENSION ORAL at 22:28

## 2020-08-12 RX ADMIN — IOVERSOL 75 ML: 741 INJECTION INTRA-ARTERIAL; INTRAVENOUS at 14:25

## 2020-08-12 RX ADMIN — SODIUM CHLORIDE 80 ML: 9 INJECTION, SOLUTION INTRAVENOUS at 14:24

## 2020-08-12 RX ADMIN — MONTELUKAST 10 MG: 10 TABLET, FILM COATED ORAL at 22:26

## 2020-08-12 RX ADMIN — IPRATROPIUM BROMIDE AND ALBUTEROL SULFATE 1 AMPULE: .5; 3 SOLUTION RESPIRATORY (INHALATION) at 16:11

## 2020-08-12 RX ADMIN — PREGABALIN 75 MG: 75 CAPSULE ORAL at 22:26

## 2020-08-12 RX ADMIN — IPRATROPIUM BROMIDE AND ALBUTEROL SULFATE 1 AMPULE: .5; 3 SOLUTION RESPIRATORY (INHALATION) at 22:12

## 2020-08-12 RX ADMIN — SODIUM CHLORIDE 1000 ML: 9 INJECTION, SOLUTION INTRAVENOUS at 13:32

## 2020-08-12 RX ADMIN — SODIUM CHLORIDE: 9 INJECTION, SOLUTION INTRAVENOUS at 22:27

## 2020-08-12 RX ADMIN — ONDANSETRON 4 MG: 2 INJECTION INTRAMUSCULAR; INTRAVENOUS at 13:56

## 2020-08-12 ASSESSMENT — PAIN SCALES - GENERAL
PAINLEVEL_OUTOF10: 6
PAINLEVEL_OUTOF10: 9
PAINLEVEL_OUTOF10: 8

## 2020-08-12 ASSESSMENT — ENCOUNTER SYMPTOMS
EYE PAIN: 0
NAUSEA: 1
VOMITING: 1
BACK PAIN: 0
COLOR CHANGE: 0
ABDOMINAL PAIN: 1
SHORTNESS OF BREATH: 1
DIARRHEA: 1
COUGH: 1

## 2020-08-12 ASSESSMENT — PAIN DESCRIPTION - PAIN TYPE
TYPE: ACUTE PAIN
TYPE: CHRONIC PAIN

## 2020-08-12 ASSESSMENT — PAIN DESCRIPTION - LOCATION: LOCATION: ABDOMEN;CHEST

## 2020-08-12 ASSESSMENT — PAIN - FUNCTIONAL ASSESSMENT: PAIN_FUNCTIONAL_ASSESSMENT: ACTIVITIES ARE NOT PREVENTED

## 2020-08-12 ASSESSMENT — PAIN DESCRIPTION - FREQUENCY: FREQUENCY: CONTINUOUS

## 2020-08-12 ASSESSMENT — PAIN DESCRIPTION - ONSET
ONSET: PROGRESSIVE
ONSET: ON-GOING

## 2020-08-12 NOTE — PROGRESS NOTES
Medication History completed:    No changes to medication list at this encounter. Medications confirmed with AVS from 7/30/20. Medications confirmed with Brian Rodriguez on 7/22/20. The patient recently completed a course of ciprofloxacin and metronidazole for diverticulitis.      Thank you,  Dara Drake, PharmD, BCPS  [phone]

## 2020-08-12 NOTE — ED PROVIDER NOTES
EMERGENCY DEPARTMENT ENCOUNTER    Pt Name: Miky Good  MRN: [id number]  [place] 1980  Date of evaluation: 8/12/20  CHIEF COMPLAINT       Chief Complaint   Patient presents with    Shortness of Breath    Abdominal Pain    Fever    Emesis    Diarrhea     HISTORY OF PRESENT ILLNESS   37-year-old female with a history of diverticulitis presents with complaint of left lower quadrant and suprapubic pain. Patient states the pain started last night and has gotten progressively worse today. Patient describes the pain as sharp in nature, denies radiation of pain. Patient states along with the pain she has developed nausea, vomiting, and diarrhea. Patient also admits dysuria, denies vaginal discharge or bleeding. Patient states this feels like her prior episodes of diverticulitis. She also admits fever at home for which she took Tylenol she said she measured a temperature of 100.1. Denies chest pain, admits mild shortness of breath and some cough. Patient did have recent admission for same complaint. The history is provided by the patient. REVIEW OF SYSTEMS     Review of Systems   Constitutional: Positive for appetite change, chills and fever. HENT: Negative for congestion and ear pain. Eyes: Negative for pain. Respiratory: Positive for cough and shortness of breath. Cardiovascular: Negative for chest pain, palpitations and leg swelling. Gastrointestinal: Positive for abdominal pain, diarrhea, nausea and vomiting. Genitourinary: Positive for dysuria. Negative for flank pain. Musculoskeletal: Negative for back pain. Skin: Negative for color change. Neurological: Negative for numbness and headaches. Psychiatric/Behavioral: Negative for confusion. All other systems reviewed and are negative.     PASTMEDICAL HISTORY     Past Medical History:   Diagnosis Date    Anxiety     Arthritis     OA    Asthma     Sadler's esophagus     LONG SEGMENT    Bipolar 1 disorder ([facility] [address] to 37.9 in adult E66.1, Z68.37    Pelvic peritoneal endometriosis N80.3    RALH with BS and cystoscopy 2020 Z90.710    Post-op pain G89.18    Wound infection/hemorrhage, obstetric surgical, postpartum condition O90.9    Postoperative visit Z48.89    Postoperative abdominal pain R10.9, G89.18    Diverticulitis K57.92    Diverticulitis of colon K57.32    Pelvic abscess in female N73.9    Pneumonia J18.9     SURGICAL HISTORY       Past Surgical History:   Procedure Laterality Date    CERVICAL DISC SURGERY      x2     SECTION  , 2018    x 2     SECTION N/A 2018     SECTION performed by Telly Lazaro MD at [facility] L&D [facility] N/A 10/1/2019    COLONOSCOPY DIAGNOSTIC ABORTED performed by Marlon Nelson MD at [facility] N/A 2020    COLONOSCOPY WITH BIOPSY performed by Marlon Nelson MD at [address], COLON, DIAGNOSTIC      HYSTERECTOMY N/A 2020    HYSTERECTOMY ABDOMINAL LAPAROSCOPIC ROBOTIC XI W/ CYSTOSCOPY performed by Telly Lazaro [facility] ARTHROSCOPY Left 5/20/15    KNEE SURGERY Left     x3    LAPAROSCOPY      dr Melissa Preston N/A 10/5/2017    LAPAROSCOPIC REMOVAL INTRA ABDOMINAL LIPOMA LOWER RIGHT performed by Connie Maya DO at [address] [facility] ESOPHAGOGASTRODUODENOSCOPY TRANSORAL DIAGNOSTIC N/A 3/2/2017    EGD ESOPHAGOGASTRODUODENOSCOPY  IP 2055 performed by Aries Garner MD at [facility]  2016    severe esophagitis    UPPER GASTROINTESTINAL ENDOSCOPY  2017    barretts; hiatus hernia; gastritis    UPPER GASTROINTESTINAL ENDOSCOPY  2017    long seg mullins's with linear erosions, esophagitis, small hiatal hernia    UPPER GASTROINTESTINAL ENDOSCOPY N/A 2018    MULLINS'S    UPPER GASTROINTESTINAL ENDOSCOPY N/A 10/1/2019    EGD BIOPSY performed by Marlon Nelson MD at [facility] CURRENT MEDICATIONS       Previous Medications    ACETAMINOPHEN (TYLENOL) 500 MG TABLET    Take 500 mg by mouth every 6 hours as needed for Pain    ALCOHOL SWABS (ALCOHOL PREP) PADS    Use as directed    BLOOD GLUCOSE MONITOR STRIPS    Test 2-3 times a day & as needed for symptoms of irregular blood glucose. BRAND OF CHOICE INSURANCE ALLOWS.    BREO ELLIPTA 200-25 MCG/INH AEPB INHALER    INHALE ONE PUFF BY MOUTH ONCE A DAY ONCE A DAY INHALATION 30    COMBIVENT RESPIMAT  MCG/ACT AERS INHALER    Inhale 1 puff into the lungs every 6 hours as needed     DOCUSATE SODIUM (COLACE) 100 MG CAPSULE    Take 1 capsule by mouth 2 times daily    EPINEPHRINE (EPIPEN 2-HELDER) 0.3 MG/0.3ML SOAJ INJECTION    Inject 0.3 mLs into the muscle once for 1 dose Use as directed for allergic reaction    IPRATROPIUM-ALBUTEROL (DUONEB) 0.5-2.5 (3) MG/3ML SOLN NEBULIZER SOLUTION    Inhale 1 vial into the lungs 4 times daily    LAMOTRIGINE (LAMICTAL) 25 MG TABLET    Take 75 mg by mouth daily     LANCETS MISC    1 each by Does not apply route 2 times daily    LINZESS 290 MCG CAPS CAPSULE    TAKE 1 CAPSULE BY MOUTH EVERY MORNING (BEFORE BREAKFAST)    MASKS (CLEVER CHOICE FACE MASK) MISC    USE NEW FACE MASK EVERYDAY WHEN PATIENT GO OUTSIDE OF HOME DUE TO COVID PANDEMIC    MASKS (CLEVER CHOICE FACE MASK) MISC    USE NEW FACE MASK EVERYDAY WHEN PATIENT GO OUTSIDE OF HOME DUE TO COVID PANDEMIC    METFORMIN (GLUCOPHAGE) 500 MG TABLET    Take 1 tablet by mouth 2 times daily (with meals) Take 1 tablet daily for the first 7 days.  Then BID    MISC NATURAL PRODUCT OP    Take 2 tablets by mouth 2 times daily hydroxycut max-weight loss supplement    MONTELUKAST (SINGULAIR) 10 MG TABLET    Take 1 tablet by mouth nightly    PANTOPRAZOLE (PROTONIX) 40 MG TABLET    TAKE ONE TABLET TWICE A DAY ORALLY 30 DAY(S)    PREGABALIN (LYRICA) 75 MG CAPSULE    TAKE 1 CAPSULE BY MOUTH 3 TIMES DAILY    QUETIAPINE (SEROQUEL) 50 MG TABLET    Take 50 mg by mouth nightly VITAMIN D (CHOLECALCIFEROL) 25 MCG (1000 UT) TABS TABLET    Take 1 tablet by mouth daily     ALLERGIES     is allergic to nsaids; toradol [ketorolac tromethamine]; and ultram [tramadol]. FAMILY HISTORY     She indicated that the status of her mother is unknown. She indicated that the status of her father is unknown. She indicated that the status of her brother is unknown. SOCIAL HISTORY       Social History     Tobacco Use    Smoking status: Current Every Day Smoker     Packs/day: 1.00     Years: 21.00     Pack years: 21.00     Types: Cigarettes    Smokeless tobacco: Never Used   Substance Use Topics    Alcohol use: No    Drug use: No     PHYSICAL EXAM     INITIAL VITALS: /68   Pulse 124   Temp 99.4 °F (37.4 °C) (Oral)   Resp (!) 36   Ht 5' 2\" (1.575 m)   Wt 204 lb (92.5 kg)   LMP 06/15/2020   SpO2 95%   BMI 37.31 kg/m²    Physical Exam  Vitals signs and nursing note reviewed. Constitutional:       General: She is not in acute distress. Appearance: Normal appearance. She is obese. She is not toxic-appearing. HENT:      Head: Normocephalic and atraumatic. Nose: Nose normal.      Mouth/Throat:      Mouth: Mucous membranes are moist.      Pharynx: Oropharynx is clear. Eyes:      Extraocular Movements: Extraocular movements intact. Conjunctiva/sclera: Conjunctivae normal.   Neck:      Musculoskeletal: Normal range of motion. Cardiovascular:      Rate and Rhythm: Normal rate and regular rhythm. Pulses: Normal pulses. Heart sounds: Normal heart sounds. Pulmonary:      Effort: Pulmonary effort is normal.      Breath sounds: Normal breath sounds. Abdominal:      General: Bowel sounds are normal. There is no distension. Palpations: Abdomen is soft. Tenderness: There is abdominal tenderness in the suprapubic area and left lower quadrant. Musculoskeletal: Normal range of motion. Skin:     General: Skin is warm and dry.       Capillary Refill: Capillary refill takes less than 2 seconds. Neurological:      General: No focal deficit present. Mental Status: She is alert. Psychiatric:         Mood and Affect: Mood normal.         MEDICAL DECISION MAKIN-year-old female presents with complaint of left lower quadrant abdominal pain. On initial exam patient tenderness palpation left lower quadrant as well as suprapubic initially tachycardic, will obtain abdominal labs, patient will get that 1 L normal saline bolus as well as pain and nausea medication, patient agreeable plan. Labs reviewed significant for white count of 18.7. CT reviewed showing improving diverticulitis, no abscess, no perforation, on CT also showing new infiltrates in lower lobes as well as on chest x-ray. Given patient recent admission will admit for IV antibiotics    [id number] [phone] with Dr. Darryl Rico who accepts admission. Patient demonstrates understanding and agreement with the plan, was given the opportunity to ask questions, and these questions were answered to the best of the provided information at this time. VS stable for transfer. Assessment: B/L lower lobe pneumonia      PCP: KATIE Cole - CNP  Admitting Physician: Information in this field is generated by a system approved [employer] algorithm. This dictation was prepared using [employer] voice recognition software. As a result, errors may have occurred. When identified, these errors have been corrected. While every attempt is made to correct errors in dictation, errors may still exist.          CRITICAL CARE:       PROCEDURES:    Procedures    DIAGNOSTIC RESULTS   EKG:All EKG's are interpreted by the Emergency Department Physician who either signs or Co-signs this chart in the absence of a cardiologist.        RADIOLOGY:All plain film, CT, MRI, and formal ultrasound images (except ED bedside ultrasound) are read by the radiologist, see reports below, unless otherwisenoted in MDM or here.   XR CHEST PORTABLE   Final Result   Apparent infiltrate central over the bilateral lower lungs concerning for   atypical pneumonia. CT ABDOMEN PELVIS W IV CONTRAST Additional Contrast? None   Final Result   Improving sequelae of sigmoid diverticulitis. No evidence of abscess. Previously described left pelvic collection is more consistent with normal   appearing left ovary. The right pelvic collection is resolved. New centrilobular micronodularity within the lower lung suspicious for   infectious or inflammatory bronchiolitis. LABS: All lab results were reviewed by myself, and all abnormals are listed below.   Labs Reviewed   CBC WITH AUTO DIFFERENTIAL - Abnormal; Notable for the following components:       Result Value    WBC 18.7 (*)     RBC 5.24 (*)     MCH 25.4 (*)     RDW 16.5 (*)     Seg Neutrophils 85 (*)     Lymphocytes 6 (*)     Segs Absolute 15.90 (*)     All other components within normal limits   COMPREHENSIVE METABOLIC PANEL W/ REFLEX TO MG FOR LOW K - Abnormal; Notable for the following components:    Glucose 120 (*)     All other components within normal limits   CULTURE, BLOOD 1   LIPASE   URINALYSIS   TROPONIN   LACTIC ACID   COVID-19       EMERGENCY DEPARTMENTCOURSE:         Vitals:    Vitals:    08/12/20 1400 08/12/20 1445 08/12/20 1611 08/12/20 1720   BP: (!) 95/58 96/74  118/68   Pulse:       Resp:       Temp:       TempSrc:       SpO2: 95% 96% 95% 95%   Weight:       Height:           The patient was given the following medications while in the emergency department:  Orders Placed This Encounter   Medications    0.9 % sodium chloride bolus    ondansetron (ZOFRAN) injection 4 mg    DISCONTD: ketorolac (TORADOL) injection 30 mg    morphine sulfate (PF) injection 4 mg    ioversol (OPTIRAY) 74 % injection 75 mL    sodium chloride flush 0.9 % injection 10 mL    0.9 % sodium chloride bolus    ipratropium-albuterol (DUONEB) nebulizer solution 1 ampule    piperacillin-tazobactam (ZOSYN) 4.5 g in dextrose 5 % 100 mL IVPB (mini-bag)    vancomycin (VANCOCIN) 2,250 mg in dextrose 5 % 500 mL IVPB     CONSULTS:  IP CONSULT TO HOSPITALIST    FINAL IMPRESSION      1.  Pneumonia of both lower lobes due to infectious organism [facility])          DISPOSITION/PLAN   DISPOSITION        PATIENT REFERRED TO:  Frannie Tineo, APRN - CNP  [facility] [address] [address] Sunil Gomez:  New Prescriptions    No medications on file     Chemo Salinas DO  Attending Emergency Physician                  Chemo Salinas DO  08/12/20 9974

## 2020-08-12 NOTE — ED NOTES
Pt will not leave blood pressure cuff, pulse ox or telemetry on her person. This rn finds them on the floor repeatively.        Rodger Santiago RN  08/12/20 5433

## 2020-08-12 NOTE — LETTER
[address]  Phone: [phone]    No name on file. August 14, 2020     Patient: Alexandra Sin   YOB: 1980   Date of Visit: 8/12/2020       To Whom It May Concern: It is my medical opinion that Macario Pimentel may return to work on Monday August 17,2020. If you have any questions or concerns, please don't hesitate to call.     Sincerely,      Dr. Jc Castellon MD

## 2020-08-12 NOTE — PROGRESS NOTES
Pt arrived to floor via stretcher from ED and was transfered to bed. Vitals taken. t. Call light within reach, and pt educated on its use. Bed in lowest position, and locked. Side rails up x 2. Pt refusing Heart monitor. and IV. Rn educated the pt on both of these. Pt allowed.

## 2020-08-13 ENCOUNTER — APPOINTMENT (OUTPATIENT)
Dept: GENERAL RADIOLOGY | Age: 40
DRG: 871 | End: 2020-08-13
Payer: COMMERCIAL

## 2020-08-13 LAB
ANION GAP SERPL CALCULATED.3IONS-SCNC: 9 MMOL/L (ref 9–17)
BUN BLDV-MCNC: 16 MG/DL (ref 6–20)
BUN/CREAT BLD: ABNORMAL (ref 9–20)
CALCIUM SERPL-MCNC: 8.8 MG/DL (ref 8.6–10.4)
CHLORIDE BLD-SCNC: 104 MMOL/L (ref 98–107)
CO2: 25 MMOL/L (ref 20–31)
CREAT SERPL-MCNC: 0.78 MG/DL (ref 0.5–0.9)
EKG ATRIAL RATE: 101 BPM
EKG P AXIS: 54 DEGREES
EKG P-R INTERVAL: 128 MS
EKG Q-T INTERVAL: 322 MS
EKG QRS DURATION: 88 MS
EKG QTC CALCULATION (BAZETT): 417 MS
EKG R AXIS: 52 DEGREES
EKG T AXIS: 53 DEGREES
EKG VENTRICULAR RATE: 101 BPM
GFR AFRICAN AMERICAN: >60 ML/MIN
GFR NON-AFRICAN AMERICAN: >60 ML/MIN
GFR SERPL CREATININE-BSD FRML MDRD: ABNORMAL ML/MIN/{1.73_M2}
GFR SERPL CREATININE-BSD FRML MDRD: ABNORMAL ML/MIN/{1.73_M2}
GLUCOSE BLD-MCNC: 119 MG/DL (ref 70–99)
GLUCOSE BLD-MCNC: 145 MG/DL (ref 65–105)
GLUCOSE BLD-MCNC: 156 MG/DL (ref 65–105)
GLUCOSE BLD-MCNC: 89 MG/DL (ref 65–105)
GLUCOSE BLD-MCNC: 94 MG/DL (ref 65–105)
HCT VFR BLD CALC: 35.1 % (ref 36–46)
HEMOGLOBIN: 11.5 G/DL (ref 12–16)
MCH RBC QN AUTO: 26.6 PG (ref 26–34)
MCHC RBC AUTO-ENTMCNC: 32.9 G/DL (ref 31–37)
MCV RBC AUTO: 81 FL (ref 80–100)
NRBC AUTOMATED: ABNORMAL PER 100 WBC
PDW BLD-RTO: 16.2 % (ref 11.5–14.9)
PLATELET # BLD: 251 K/UL (ref 150–450)
PMV BLD AUTO: 8.5 FL (ref 6–12)
POTASSIUM SERPL-SCNC: 4.3 MMOL/L (ref 3.7–5.3)
RBC # BLD: 4.34 M/UL (ref 4–5.2)
SODIUM BLD-SCNC: 138 MMOL/L (ref 135–144)
WBC # BLD: 6.5 K/UL (ref 3.5–11)

## 2020-08-13 PROCEDURE — 71046 X-RAY EXAM CHEST 2 VIEWS: CPT

## 2020-08-13 PROCEDURE — 6370000000 HC RX 637 (ALT 250 FOR IP): Performed by: INTERNAL MEDICINE

## 2020-08-13 PROCEDURE — 6370000000 HC RX 637 (ALT 250 FOR IP): Performed by: NURSE PRACTITIONER

## 2020-08-13 PROCEDURE — 2060000000 HC ICU INTERMEDIATE R&B

## 2020-08-13 PROCEDURE — 94761 N-INVAS EAR/PLS OXIMETRY MLT: CPT

## 2020-08-13 PROCEDURE — 6360000002 HC RX W HCPCS: Performed by: NURSE PRACTITIONER

## 2020-08-13 PROCEDURE — 87641 MR-STAPH DNA AMP PROBE: CPT

## 2020-08-13 PROCEDURE — 94640 AIRWAY INHALATION TREATMENT: CPT

## 2020-08-13 PROCEDURE — 87899 AGENT NOS ASSAY W/OPTIC: CPT

## 2020-08-13 PROCEDURE — 87449 NOS EACH ORGANISM AG IA: CPT

## 2020-08-13 PROCEDURE — 80048 BASIC METABOLIC PNL TOTAL CA: CPT

## 2020-08-13 PROCEDURE — 85027 COMPLETE CBC AUTOMATED: CPT

## 2020-08-13 PROCEDURE — 36415 COLL VENOUS BLD VENIPUNCTURE: CPT

## 2020-08-13 PROCEDURE — 93010 ELECTROCARDIOGRAM REPORT: CPT | Performed by: INTERNAL MEDICINE

## 2020-08-13 PROCEDURE — 2580000003 HC RX 258: Performed by: NURSE PRACTITIONER

## 2020-08-13 PROCEDURE — 1200000000 HC SEMI PRIVATE

## 2020-08-13 PROCEDURE — 82947 ASSAY GLUCOSE BLOOD QUANT: CPT

## 2020-08-13 RX ORDER — GUAIFENESIN 600 MG/1
1200 TABLET, EXTENDED RELEASE ORAL 2 TIMES DAILY
Status: DISCONTINUED | OUTPATIENT
Start: 2020-08-13 | End: 2020-08-14 | Stop reason: HOSPADM

## 2020-08-13 RX ORDER — METHYLPREDNISOLONE SODIUM SUCCINATE 125 MG/2ML
60 INJECTION, POWDER, LYOPHILIZED, FOR SOLUTION INTRAMUSCULAR; INTRAVENOUS EVERY 6 HOURS
Status: DISCONTINUED | OUTPATIENT
Start: 2020-08-13 | End: 2020-08-14 | Stop reason: HOSPADM

## 2020-08-13 RX ORDER — LINEZOLID 600 MG/1
600 TABLET, FILM COATED ORAL EVERY 12 HOURS SCHEDULED
Status: DISCONTINUED | OUTPATIENT
Start: 2020-08-13 | End: 2020-08-13

## 2020-08-13 RX ORDER — LORAZEPAM 0.5 MG/1
0.5 TABLET ORAL EVERY 12 HOURS PRN
Status: DISCONTINUED | OUTPATIENT
Start: 2020-08-13 | End: 2020-08-14 | Stop reason: HOSPADM

## 2020-08-13 RX ORDER — LINEZOLID 2 MG/ML
600 INJECTION, SOLUTION INTRAVENOUS EVERY 12 HOURS
Status: DISCONTINUED | OUTPATIENT
Start: 2020-08-13 | End: 2020-08-14 | Stop reason: HOSPADM

## 2020-08-13 RX ADMIN — IPRATROPIUM BROMIDE AND ALBUTEROL SULFATE 1 AMPULE: .5; 3 SOLUTION RESPIRATORY (INHALATION) at 07:10

## 2020-08-13 RX ADMIN — PREGABALIN 75 MG: 75 CAPSULE ORAL at 20:59

## 2020-08-13 RX ADMIN — DOCUSATE SODIUM 100 MG: 100 CAPSULE, LIQUID FILLED ORAL at 09:35

## 2020-08-13 RX ADMIN — LORAZEPAM 0.5 MG: 0.5 TABLET ORAL at 17:33

## 2020-08-13 RX ADMIN — METHYLPREDNISOLONE SODIUM SUCCINATE 60 MG: 125 INJECTION, POWDER, FOR SOLUTION INTRAMUSCULAR; INTRAVENOUS at 09:36

## 2020-08-13 RX ADMIN — LAMOTRIGINE 150 MG: 150 TABLET ORAL at 21:00

## 2020-08-13 RX ADMIN — PANTOPRAZOLE SODIUM 40 MG: 40 TABLET, DELAYED RELEASE ORAL at 16:42

## 2020-08-13 RX ADMIN — MORPHINE SULFATE 2 MG: 2 INJECTION, SOLUTION INTRAMUSCULAR; INTRAVENOUS at 09:36

## 2020-08-13 RX ADMIN — ALUMINUM HYDROXIDE, MAGNESIUM HYDROXIDE, AND SIMETHICONE 30 ML: 200; 200; 20 SUSPENSION ORAL at 02:21

## 2020-08-13 RX ADMIN — METFORMIN HYDROCHLORIDE 500 MG: 500 TABLET ORAL at 16:42

## 2020-08-13 RX ADMIN — Medication 1000 UNITS: at 09:36

## 2020-08-13 RX ADMIN — BUDESONIDE AND FORMOTEROL FUMARATE DIHYDRATE 2 PUFF: 160; 4.5 AEROSOL RESPIRATORY (INHALATION) at 20:59

## 2020-08-13 RX ADMIN — MORPHINE SULFATE 2 MG: 2 INJECTION, SOLUTION INTRAMUSCULAR; INTRAVENOUS at 19:44

## 2020-08-13 RX ADMIN — METFORMIN HYDROCHLORIDE 500 MG: 500 TABLET ORAL at 10:56

## 2020-08-13 RX ADMIN — IPRATROPIUM BROMIDE AND ALBUTEROL SULFATE 1 AMPULE: .5; 3 SOLUTION RESPIRATORY (INHALATION) at 19:21

## 2020-08-13 RX ADMIN — METHYLPREDNISOLONE SODIUM SUCCINATE 60 MG: 125 INJECTION, POWDER, FOR SOLUTION INTRAMUSCULAR; INTRAVENOUS at 20:59

## 2020-08-13 RX ADMIN — PIPERACILLIN SODIUM AND TAZOBACTAM SODIUM 3.38 G: 3; .375 INJECTION, POWDER, LYOPHILIZED, FOR SOLUTION INTRAVENOUS at 06:16

## 2020-08-13 RX ADMIN — MORPHINE SULFATE 2 MG: 2 INJECTION, SOLUTION INTRAMUSCULAR; INTRAVENOUS at 03:48

## 2020-08-13 RX ADMIN — MORPHINE SULFATE 2 MG: 2 INJECTION, SOLUTION INTRAMUSCULAR; INTRAVENOUS at 14:05

## 2020-08-13 RX ADMIN — METHYLPREDNISOLONE SODIUM SUCCINATE 60 MG: 125 INJECTION, POWDER, FOR SOLUTION INTRAMUSCULAR; INTRAVENOUS at 14:05

## 2020-08-13 RX ADMIN — BUDESONIDE AND FORMOTEROL FUMARATE DIHYDRATE 2 PUFF: 160; 4.5 AEROSOL RESPIRATORY (INHALATION) at 09:35

## 2020-08-13 RX ADMIN — LINEZOLID 600 MG: 600 INJECTION, SOLUTION INTRAVENOUS at 11:28

## 2020-08-13 RX ADMIN — IPRATROPIUM BROMIDE AND ALBUTEROL SULFATE 1 AMPULE: .5; 3 SOLUTION RESPIRATORY (INHALATION) at 14:48

## 2020-08-13 RX ADMIN — GUAIFENESIN 1200 MG: 600 TABLET, EXTENDED RELEASE ORAL at 20:59

## 2020-08-13 RX ADMIN — MAGNESIUM HYDROXIDE 30 ML: 400 SUSPENSION ORAL at 23:26

## 2020-08-13 RX ADMIN — PIPERACILLIN SODIUM AND TAZOBACTAM SODIUM 3.38 G: 3; .375 INJECTION, POWDER, LYOPHILIZED, FOR SOLUTION INTRAVENOUS at 15:35

## 2020-08-13 RX ADMIN — DOCUSATE SODIUM 100 MG: 100 CAPSULE, LIQUID FILLED ORAL at 20:59

## 2020-08-13 RX ADMIN — INSULIN LISPRO 2 UNITS: 100 INJECTION, SOLUTION INTRAVENOUS; SUBCUTANEOUS at 16:42

## 2020-08-13 RX ADMIN — MONTELUKAST 10 MG: 10 TABLET, FILM COATED ORAL at 20:59

## 2020-08-13 RX ADMIN — PREGABALIN 75 MG: 75 CAPSULE ORAL at 14:05

## 2020-08-13 RX ADMIN — LINEZOLID 600 MG: 600 INJECTION, SOLUTION INTRAVENOUS at 22:35

## 2020-08-13 RX ADMIN — PANTOPRAZOLE SODIUM 40 MG: 40 TABLET, DELAYED RELEASE ORAL at 05:43

## 2020-08-13 RX ADMIN — IPRATROPIUM BROMIDE AND ALBUTEROL SULFATE 1 AMPULE: .5; 3 SOLUTION RESPIRATORY (INHALATION) at 10:39

## 2020-08-13 RX ADMIN — PREGABALIN 75 MG: 75 CAPSULE ORAL at 09:36

## 2020-08-13 RX ADMIN — GUAIFENESIN 1200 MG: 600 TABLET, EXTENDED RELEASE ORAL at 09:35

## 2020-08-13 ASSESSMENT — ENCOUNTER SYMPTOMS
SHORTNESS OF BREATH: 1
SORE THROAT: 0
WHEEZING: 1
ABDOMINAL PAIN: 1
VOMITING: 1
NAUSEA: 1
DIARRHEA: 1
COUGH: 1
CONSTIPATION: 0
BACK PAIN: 0

## 2020-08-13 ASSESSMENT — PAIN - FUNCTIONAL ASSESSMENT: PAIN_FUNCTIONAL_ASSESSMENT: ACTIVITIES ARE NOT PREVENTED

## 2020-08-13 ASSESSMENT — PAIN DESCRIPTION - LOCATION: LOCATION: ABDOMEN

## 2020-08-13 ASSESSMENT — PAIN DESCRIPTION - FREQUENCY: FREQUENCY: CONTINUOUS

## 2020-08-13 ASSESSMENT — PAIN SCALES - GENERAL
PAINLEVEL_OUTOF10: 9
PAINLEVEL_OUTOF10: 8

## 2020-08-13 NOTE — PROGRESS NOTES
Pt complains of increased work of breathing and is SOB with exertion. Pt has audible wheezes and RN placed pt on 1L NC for increased work of breathing.  Pt repositioned in bed and breathing is slowly retuning to baseline RR currently trachyphonic at 22 shallow

## 2020-08-13 NOTE — PLAN OF CARE
Problem: Bowel/Gastric:  Goal: Bowel function will improve  Description: Bowel function will improve  Outcome: Ongoing     Problem: Bowel/Gastric:  Goal: Occurrences of nausea will decrease  Description: Occurrences of nausea will decrease  Outcome: Ongoing   Pt complains of indigestion during the night,     Problem: Pain:  Goal: Control of acute pain  Description: Control of acute pain  Outcome: Ongoing   Pt able to rate pain 0-10 on pain scale. Pt educated on prn pain medications for pain management.

## 2020-08-13 NOTE — CARE COORDINATION
CASE MANAGEMENT NOTE:    Admission Date:  8/12/2020 Jessica Corral is a 47 y.o.  female    Admitted for : Pneumonia [J18.9]    Met with:  Patient    PCP:  Zeferino Islas                                Insurance:  Libertad Machado Dual      Current Residence/ Living Arrangements:  independently at home             Current Services PTA:  No    Is patient agreeable to VNS: No    Freedom of choice provided:  Yes    List of [address] provided: No    VNS chosen:  No    DME:  none    Home Oxygen: No    Nebulizer: Yes    CPAP/BIPAP: No    Supplier: N/A    Potential Assistance Needed: No    SNF needed: No    Freedom of choice and list provided: NA    Pharmacy:  [address]       Does Patient want to use MEDS to BEDS? No    Is the Patient an GRACIE CURRY [facility] [place] with Readmission Risk Score greater than 14%? No  If yes, pt needs a follow up appointment made within 7 days. Family Members/Caregivers that pt would like involved in their care:    Yes    If yes, list name here:  Parents Joey Frankel and [address] [place] [address] Street    Transportation Provider:  Patient             Is patient in Isolation/One on One/Altered Mental Status? No  If yes, skip next question. If no, would they like an I-Pad to  use? No  If yes, call [phone]. Discharge Plan:  8/13: [facility] DUAL - Patient is from [facility] with children. DME - nebulizer. Declines need for VNS. Will have ride at discharge. On IV zyvox, IV zosyn, IV steroids 60Q6, IV fluids.  //KENY                 Electronically signed by: Ines Holcomb RN on 8/13/2020 at 4:11 PM

## 2020-08-13 NOTE — PROGRESS NOTES
Physician Progress Note      PATIENT:               Jenny Ferraro  CSN #:                  [id number]  :                       1980  ADMIT DATE:       2020 12:48 PM  [address] [place] DATE:  RESPONDING  PROVIDER #:        SILVER GARCIA DO          QUERY TEXT:    Pt admitted with Pneumonia. Pt noted to have tachycardia and elevated WBC . If   possible, please document in the progress notes and discharge summary if you   are evaluating and /or treating any of the following: The medical record reflects the following:  Risk Factors: Hx recent hospital admission for diverticulitis  Clinical Indicators: WBC 18.7; LA 1.1; CXR-->Ground-glass opacities in the   lung bases, right greater than left, compatible with developing inflammatory   process or infection; HR ; RR 18-36  Treatment: IV Zosyn and Zyvox, hospital admission, CXR, monitoring, IVF bolus   of 1L in ER  Options provided:  -- Sepsis, present on admission  -- No Sepsis, localized infection only. -- Sepsis was ruled out  -- Other - I will add my own diagnosis  -- Disagree - Not applicable / Not valid  -- Disagree - Clinically unable to determine / Unknown  -- Refer to Clinical Documentation Reviewer    PROVIDER RESPONSE TEXT:    This patient has sepsis which was present on admission.     Query created by: Yaimleth Verduzco on 2020 12:03 PM      Electronically signed by:  Jose Ortiz DO 2020 12:42 PM

## 2020-08-13 NOTE — PROGRESS NOTES
Pt had vanco running and called RN in stating that she was extremely itchy pt thrashing in bed scratching everywhere, no rash noted by RN. Rn shut off vanco and started new line of NS. to flush, RN sent message to Dorcas Gomez CNP.

## 2020-08-13 NOTE — PLAN OF CARE
Problem: Pain:  Goal: Pain level will decrease  Description: Pain level will decrease  8/13/2020 1637 by Georgina Infante RN  Outcome: Ongoing  8/13/2020 0333 by Angelo Andres RN  Outcome: Ongoing     Problem: Pain:  Goal: Control of acute pain  Description: Control of acute pain  8/13/2020 1637 by Georgina Infante RN  Outcome: Ongoing  8/13/2020 0333 by Angelo Andres RN  Outcome: Ongoing     Problem: Bowel/Gastric:  Goal: Bowel function will improve  Description: Bowel function will improve  8/13/2020 1637 by Georgina Infante RN  Outcome: Ongoing  8/13/2020 0333 by Angelo Andres RN  Outcome: Ongoing     Problem:  Bowel/Gastric:  Goal: Diagnostic test results will improve  Description: Diagnostic test results will improve  8/13/2020 1637 by Georgina Infante RN  Outcome: Ongoing  8/13/2020 0333 by Angelo Andres RN  Outcome: Ongoing

## 2020-08-13 NOTE — H&P
[address] [facility]     HISTORY AND PHYSICAL EXAMINATION            Date:   8/13/2020  Patientname:  Laila Martinez  Date of admission:  8/12/2020 12:48 PM  MRN:   [id number]  Account:  [de-identified]  YOB: 1980  PCP:    KATIE Guthrie CNP  Room:   2112/2112-01  Code Status:    Full Code    CHIEF COMPLAINT     Chief Complaint   Patient presents with    Shortness of Breath    Abdominal Pain    Fever    Emesis    Diarrhea       HISTORY OF PRESENT ILLNESS  (Character, Onset, Location, Duration,  Exacerbating/RelievingFactors, Radiation,   Associated Symptoms, Severity )      The patient is a 47 y.o.  female, with a history of asthma, Polar disorder, COPD, KETURAH, morbid obesity, and tobacco abuse, who presents with shortness of breath, abdominal pain, fever, emesis, and diarrhea. According to patient, symptoms began last night and progressed throughout the day today. Reports temperature of 101.5 °F at home. Patient recently admitted for diverticulitis-discharged 7/30, with only 1 dose of antibiotic left. Symptoms are associated with harsh cough and expiratory wheezing throughout all lung fields. Denies chills, chest pain, and leg swelling. There are no aggravating or alleviating factors. Symptoms are reported as constant and moderate. PAST MEDICAL HISTORY   Patient  has a past medical history of Anxiety, Arthritis, Asthma, Sadler's esophagus, Bipolar 1 disorder ([address] CAD (coronary artery disease), Chronic insomnia, COPD (chronic obstructive pulmonary disease) ([address] Depression, Diabetes mellitus ([address] Endometriosis, Esophagitis, GERD (gastroesophageal reflux disease), Headache(784.0), Herniated disc, cervical, Hiatal hernia, Incisional abscess, Kidney stones, MDRO (multiple drug resistant organisms) resistance, Pleurisy, Pneumonia, Seizures ([address] UTI (urinary tract infection), and Vision abnormalities.     PAST SURGICAL HISTORY    Patient  has a past surgical history that includes knee surgery (Left);  section (, ); Tonsillectomy; Knee arthroscopy (Left, 5/20/15); Cervical disc surgery; Upper gastrointestinal endoscopy (2016); Upper gastrointestinal endoscopy (2017); Upper gastrointestinal endoscopy (2017); pr esophagogastroduodenoscopy transoral diagnostic (N/A, 3/2/2017); laparoscopy; laparoscopy (N/A, 10/5/2017); Upper gastrointestinal endoscopy (N/A, 2018); Endoscopy, colon, diagnostic;  section (N/A, 2018); Upper gastrointestinal endoscopy (N/A, 10/1/2019); Colonoscopy (N/A, 10/1/2019); Colonoscopy (N/A, 2020); [place] tooth extraction; and Hysterectomy (N/A, 2020). FAMILY HISTORY    Patient family history includes Bipolar Disorder in her brother and mother; Breast Cancer in her mother; Cancer in her mother; Hypertension in her father and mother. SOCIAL HISTORY    Patient  reports that she has been smoking cigarettes. She has a 10.50 pack-year smoking history. She has never used smokeless tobacco. She reports that she does not drink alcohol or use drugs. HOME MEDICATIONS        Prior to Admission medications    Medication Sig Start Date End Date Taking?  Authorizing Provider   pregabalin (LYRICA) 75 MG capsule TAKE 1 CAPSULE BY MOUTH 3 TIMES DAILY 8/4/20 9/3/20 Yes KATIE Cole CNP   lamoTRIgine (LAMICTAL) 25 MG tablet Take 150 mg by mouth daily Nightly   Yes Historical Provider, MD   docusate sodium (COLACE) 100 MG capsule Take 1 capsule by mouth 2 times daily 20  Yes Ranjeet Josie, DO   Masks (CLEVER CHOICE FACE MASK) MISC USE NEW FACE MASK EVERYDAY WHEN PATIENT GO OUTSIDE OF HOME DUE TO COVID PANDEMIC 20  Yes KATIE Cole CNP   Masks (CLEVER CHOICE FACE MASK) MISC USE NEW FACE MASK EVERYDAY WHEN PATIENT GO OUTSIDE OF HOME DUE TO COVID PANDEMIC 20  Yes KATIE Cole CNP   BREO ELLIPTA 200-25 MCG/INH AEPB inhaler INHALE ONE PUFF BY MOUTH ONCE A DAY ONCE A DAY INHALATION 30 7/5/20  Yes KATIE Cole CNP   LINZESS 290 MCG CAPS capsule TAKE 1 CAPSULE BY MOUTH EVERY MORNING (BEFORE BREAKFAST) 7/2/20  Yes Ilya Lakhani MD   acetaminophen (TYLENOL) 500 MG tablet Take 500 mg by mouth every 6 hours as needed for Pain   Yes Historical Provider, MD   MISC NATURAL PRODUCT OP Take 2 tablets by mouth 2 times daily hydroxycut max-weight loss supplement   Yes Historical Provider, MD   vitamin D (CHOLECALCIFEROL) 25 MCG (1000 UT) TABS tablet Take 1 tablet by mouth daily 6/12/20 8/12/20 Yes KATIE Cole CNP   metFORMIN (GLUCOPHAGE) 500 MG tablet Take 1 tablet by mouth 2 times daily (with meals) Take 1 tablet daily for the first 7 days. Then BID 6/12/20 8/12/20 Yes KATIE Cole CNP   blood glucose monitor strips Test 2-3 times a day & as needed for symptoms of irregular blood glucose.   BRAND OF CHOICE INSURANCE ALLOWS. 6/12/20  Yes KATIE Cole CNP   Lancets MISC 1 each by Does not apply route 2 times daily 6/12/20  Yes KATIE Cole CNP   Alcohol Swabs (ALCOHOL PREP) PADS Use as directed 6/12/20  Yes KATIE Cole CNP   pantoprazole (PROTONIX) 40 MG tablet TAKE ONE TABLET TWICE A DAY ORALLY 30 DAY(S) 4/6/20  Yes Ilya Lakhani MD   ipratropium-albuterol (DUONEB) 0.5-2.5 (3) MG/3ML SOLN nebulizer solution Inhale 1 vial into the lungs 4 times daily   Yes Historical Provider, MD   COMBIVENT RESPIMAT  MCG/ACT AERS inhaler Inhale 1 puff into the lungs every 6 hours as needed  9/15/18  Yes Historical Provider, MD   montelukast (SINGULAIR) 10 MG tablet Take 1 tablet by mouth nightly 9/20/18  Yes Qasim Frank MD   QUEtiapine (SEROQUEL) 50 MG tablet Take 50 mg by mouth nightly    Historical Provider, MD   EPINEPHrine (EPIPEN 2-HELDER) 0.3 MG/0.3ML SOAJ injection Inject 0.3 mLs into the muscle once for 1 dose Use as directed for allergic reaction 3/29/19 8/12/20  Peña Salgado DO ALLERGIES      Nsaids; Toradol [ketorolac tromethamine]; and Ultram [tramadol]    REVIEW OF SYSTEMS     Review of Systems   Constitutional: Positive for appetite change and fever. Negative for chills and diaphoresis. HENT: Negative for congestion and sore throat. Respiratory: Positive for cough, shortness of breath and wheezing. Cardiovascular: Negative for chest pain, palpitations and leg swelling. Gastrointestinal: Positive for abdominal pain, diarrhea, nausea and vomiting. Negative for constipation. Genitourinary: Negative for dysuria, frequency and urgency. Musculoskeletal: Negative for back pain and myalgias. Skin: Negative for rash. Neurological: Negative for dizziness, weakness and headaches. Psychiatric/Behavioral: The patient is not nervous/anxious. PHYSICAL EXAM      /67   Pulse 75   Temp 97.8 °F (36.6 °C) (Oral)   Resp 22   Ht 5' 2\" (1.575 m)   Wt 208 lb 5.4 oz (94.5 kg)   LMP 06/15/2020   SpO2 96%   BMI 38.10 kg/m²  Body mass index is 38.1 kg/m². Physical Exam  Constitutional:       General: She is not in acute distress. Appearance: She is well-developed. She is obese. She is not diaphoretic. HENT:      Head: Normocephalic and atraumatic. Eyes:      Conjunctiva/sclera: Conjunctivae normal.      Pupils: Pupils are equal, round, and reactive to light. Neck:      Musculoskeletal: Normal range of motion and neck supple. Trachea: No tracheal deviation. Cardiovascular:      Rate and Rhythm: Normal rate and regular rhythm. Heart sounds: Normal heart sounds. No murmur. No friction rub. No gallop. Pulmonary:      Effort: Pulmonary effort is normal. No respiratory distress. Breath sounds: Wheezing and rhonchi present. No rales. Chest:      Chest wall: No tenderness. Abdominal:      General: Bowel sounds are normal. There is no distension. Palpations: Abdomen is soft. Tenderness: There is no abdominal tenderness.  There is no guarding. Musculoskeletal: Normal range of motion. General: No tenderness. Lymphadenopathy:      Cervical: No cervical adenopathy. Skin:     General: Skin is warm and dry. Coloration: Skin is not pale. Findings: No erythema or rash. Neurological:      Mental Status: She is alert and oriented to person, place, and time. Motor: No seizure activity. Coordination: Coordination normal.   Psychiatric:         Behavior: Behavior normal.         Thought Content: Thought content normal.       DIAGNOSTICS      EKG:     Labs:  CBC:   Recent Labs     08/12/20  1330 08/13/20  0435   WBC 18.7* 6.5   HGB 13.3 11.5*    251     BMP:    Recent Labs     08/12/20  1330 08/13/20  0435    138   K 4.1 4.3    104   CO2 23 25   BUN 13 16   CREATININE 0.81 0.78   GLUCOSE 120* 119*     S. Calcium:  Recent Labs     08/13/20  0435   CALCIUM 8.8     S. Ionized Calcium:No results for input(s): IONCA in the last 72 hours. S. Magnesium:No results for input(s): MG in the last 72 hours. S. Phosphorus:No results for input(s): PHOS in the last 72 hours. S. Glucose:  Recent Labs     08/12/20  1930   POCGLU 110*     Glycosylated hemoglobin A1C:   Lab Results   Component Value Date    LABA1C 5.7 06/10/2020     Hepatic:   Recent Labs     08/12/20  1330   AST 17   ALT 17   ALKPHOS 69     CARDIAC ENZY:   Recent Labs     08/12/20  1330   TROPHS <6     INR: No results for input(s): INR in the last 72 hours. BNP: No results for input(s): PROBNP in the last 72 hours. ABGs: No results for input(s): PH, PCO2, PO2, HCO3, O2SAT in the last 72 hours. Lipids: No results for input(s): CHOL, TRIG, HDL, LDLCALC in the last 72 hours. Invalid input(s): LDL  Pancreatic functions:  Recent Labs     08/12/20  1330   LIPASE 21     S.  LacticAcid:   Recent Labs     08/12/20  1655   LACTA 1.1     Thyroid functions:   Lab Results   Component Value Date    TSH 2.08 06/10/2020      U/A:  Recent Labs 08/12/20  [facility] 1.012   LEUKOCYTESUR NEGATIVE   GLUCOSEU NEGATIVE       Imaging/Diagonstics:     Ct Abdomen Pelvis W Iv Contrast Additional Contrast? None    Result Date: 8/12/2020  EXAMINATION: CT OF THE ABDOMEN AND PELVIS WITH CONTRAST 8/12/2020 2:06 pm TECHNIQUE: CT of the abdomen and pelvis was performed with the administration of intravenous contrast. Multiplanar reformatted images are provided for review. Dose modulation, iterative reconstruction, and/or weight based adjustment of the mA/kV was utilized to reduce the radiation dose to as low as reasonably achievable. COMPARISON: 07/27/2020 HISTORY: ORDERING SYSTEM PROVIDED HISTORY: llq pain TECHNOLOGIST PROVIDED HISTORY: llq pain Reason for Exam: llq pain,  history of diverticulitis , nausea vomiting fever since yesterday Acuity: Acute Type of Exam: Initial FINDINGS: Lower Chest: New centrilobular micronodularity within the lower lung suspicious for infectious or inflammatory bronchiolitis. No effusion. Organs: Liver, spleen, pancreas, and adrenal glands were within normal limits. No radiodense gallstones. Right kidney is unremarkable. 4 mm stone left renal pelvis. No obstructive uropathy. GI/Bowel: No bowel obstruction. Normal appendix. Interval improvement in the inflammatory changes involving the sigmoid colon. Interval resolution of the small fluid collection in the right pelvis. There is a small hypodensity within the left ovary which appears unchanged and most likely a small cyst or follicle. Pelvis: Urinary bladder is within normal limits. Uterus surgically absent Peritoneum/Retroperitoneum: No free air, fluid, or lymphadenopathy. Stable prominent upper abdominal in xin hepatis lymph nodes are nonspecific. Bones/Soft Tissues: No acute osseous abnormality. Improving sequelae of sigmoid diverticulitis. No evidence of abscess.  Previously described left pelvic collection is more consistent with normal appearing No acute osseous abnormality. No acute soft tissue abnormality. Interval improvement in complicated diverticulitis with multiple abscess collections with reduction in the size of collections in the pelvis documented above. There has been an appreciable decrease in pericolonic fat inflammation although significant residual remains in the lower pelvis closer to the bladder. Abscess collections do not appear amenable to drainage. The findings were sent to the Radiology Results [address] at 5:28 pm on 7/27/2020to be communicated to a licensed caregiver. Ct Abdomen Pelvis W Iv Contrast Additional Contrast? None    Result Date: 7/24/2020  EXAMINATION: CT OF THE ABDOMEN AND PELVIS WITH CONTRAST 7/24/2020 1:28 pm TECHNIQUE: CT of the abdomen and pelvis was performed with the administration of intravenous contrast. Multiplanar reformatted images are provided for review. Dose modulation, iterative reconstruction, and/or weight based adjustment of the mA/kV was utilized to reduce the radiation dose to as low as reasonably achievable. COMPARISON: 07/22/2020 HISTORY: ORDERING SYSTEM PROVIDED HISTORY: Persistent abdominal pain, diverticulitis/colitis in the setting of recent hysterectomy TECHNOLOGIST PROVIDED HISTORY: Persistent abdominal pain, diverticulitis/colitis in the setting of recent hysterectomy Reason for Exam: Persistent abdominal pain, diverticulitis/colitis in the setting of recent hysterectomy Acuity: Acute Type of Exam: Initial FINDINGS: Lower Chest: There is been interval development of bibasilar infiltrate more pronounced on the right. Organs: The gallbladder is contracted. The remainder of the visualized upper abdominal organs are stable when compared to the prior study. GI/Bowel: No dilated large or small bowel loops are seen. There is extensive and increased soft tissue stranding in the mesentery when compared to the prior study. Again noted are changes of complicated diverticulitis. appearance, with loss of haustra, serosal haziness, shaggy appearance, and pericolonic fat infiltration. Multiple pericolonic complex collections are noted, likely pericolonic abscesses including one in the left lower quadrant of 2.5 x 3.5 cm, which appears to be between phlegmon and abscess formation, a right paracentral lower pelvic collection of 2.3 cm more typical of abscess, a central posterior collection of 1.9 cm, and a 4th collection of 2.2 x 1.4 cm. Inflamed small bowel loops in the right lower quadrant are noted in the upper pelvis. Appendix contains some dense material likely an appendicolith without evidence of acute appendicitis. Pelvis: Bladder is unremarkable. Bilateral fat-containing hernias are present without strangulation. As previously noted, inflammatory changes are present in the pelvis with pericolonic complex and fluid collections compatible with multiple phlegmons and/or abscesses. No discrete extraluminal air is noted. Peritoneum/Retroperitoneum: No aortic aneurysm, retroperitoneal or mesenteric adenopathy is present. Bones/Soft Tissues: No acute osseous or soft tissue abnormality is noted. 1.  Findings compatible with acute colitis/diverticulitis of the rectosigmoid colon with multiple pericolonic fluid collections consistent with complex phlegmons and abscesses. 2.  Apparent appendicolith in the appendix. No acute appendicitis. Critical results were called by Dr. Nilesh Qureshi MD to [facility] on 7/22/2020 at 17:16. Xr Chest Portable    Result Date: 8/12/2020  EXAMINATION: ONE XRAY VIEW OF THE CHEST 8/12/2020 3:23 pm COMPARISON: Chest 07/22/2020 HISTORY: ORDERING SYSTEM PROVIDED HISTORY: infiltrate on CT TECHNOLOGIST PROVIDED HISTORY: infiltrate on CT Reason for Exam: infiltrate on CT Acuity: Unknown Type of Exam: Unknown FINDINGS: The cardiomediastinal and hilar silhouettes appear unremarkable.   Chronic appearing coarse interstitial densities predominate perihilar regions and lung bases, typical of sequela from smoking or other previous infectious/inflammatory process. Nonspecific hazy ground-glass type opacities are seen centrally over the lower lungs, possibly in part related to overlying breast shadows. No pneumothorax is seen. No acute osseous abnormality is identified. Apparent infiltrate central over the bilateral lower lungs concerning for atypical pneumonia. Xr Chest Portable    Result Date: 7/22/2020  EXAMINATION: ONE XRAY VIEW OF THE CHEST 7/22/2020 4:31 pm COMPARISON: 07/17/2020 and 07/07/2020 HISTORY: Reason for Exam: Chest pains Acuity: Acute Type of Exam: Initial FINDINGS: The lungs are without acute focal process. No effusion or pneumothorax. The cardiomediastinal silhouette is normal.  The osseous structures are intact without acute process. Lower cervical spine hardware. Unremarkable chest.     Xr Chest Portable    Result Date: 7/17/2020  EXAMINATION: ONE XRAY VIEW OF THE CHEST 7/17/2020 7:25 pm COMPARISON: July 7, 2020 HISTORY: ORDERING SYSTEM PROVIDED HISTORY: Wheezing TECHNOLOGIST PROVIDED HISTORY: Wheezing Reason for Exam: Wheezing Acuity: Acute Type of Exam: Initial FINDINGS: Lungs are clear. No cardiomegaly. No pulmonary edema. Negative chest radiograph. ASSESSMENT  and  PLAN     Principal Problem:    Pneumonia  Resolved Problems:    * No resolved hospital problems. *    Plan:    Pneumonia  -Recent hospital admission for diverticulitis  -Concern for hospital acquired pneumonia  -Started on vancomycin and Zosyn in ED  --Refused remainder of vancomycin d/t having itching on scalp  ---No urticaria or erythema noted  -Change to Zyvox BID  -MRSA nasal probe pending  -Solumedrol 60 mg Q6 hr for wheezing  -begin mucinex bid    Recent Diverticulitis  -Reports nausea, vomiting, diarrhea and abdominal pain  -CT abdomen shows Improving sequelae of sigmoid diverticulitis.  -- No evidence of abscess  -NPO orders initiated in ED  -Patient reportedly came to floor eating 2 [employer]es[employer] and fries  --Refuses to maintain NPO status    Tobacco use   -smoking cessation education  -nicotine patch PRN    Consultations:       IP CONSULT TO HOSPITALIST      KATIE Redding - CNP   8/13/2020  6:59 AM    [address]  [place], [address] [place] [address]. Phone [phone]     Attending Physician Statement  I have discussed the care of Jay Chaves with the CNP. I have examined the patient myself and taken ros and hpi , including pertinent history and exam findings. I have reviewed the key elements of all parts of the encounter with the CNPt. I agree with the assessment, plan and orders as documented by the CNP. 42-year-old female presenting with fever, diarrhea, vomiting, difficulty breathing. Chest x-ray shows bilateral groundglass opacities right better than left, COVID-19 negative.   Did for hospital-acquired pneumonia due to recent hospitalization-started on Zosyn and Zyvox  Also IV Solu-Medrol and scheduled bronchodilators  Sigmoid diverticulitis recent diagnosis-CT abdomen shows improving sequelae, no abscess  Known asthma and COPD    Electronically signed by April Jordan MD

## 2020-08-14 VITALS
WEIGHT: 210.54 LBS | HEIGHT: 62 IN | DIASTOLIC BLOOD PRESSURE: 62 MMHG | RESPIRATION RATE: 12 BRPM | TEMPERATURE: 97.6 F | BODY MASS INDEX: 38.74 KG/M2 | SYSTOLIC BLOOD PRESSURE: 130 MMHG | HEART RATE: 107 BPM | OXYGEN SATURATION: 98 %

## 2020-08-14 LAB
-: NORMAL
-: NORMAL
AMORPHOUS: NORMAL
ANION GAP SERPL CALCULATED.3IONS-SCNC: 9 MMOL/L (ref 9–17)
BACTERIA: NORMAL
BILIRUBIN URINE: NEGATIVE
BUN BLDV-MCNC: 13 MG/DL (ref 6–20)
BUN/CREAT BLD: ABNORMAL (ref 9–20)
CALCIUM SERPL-MCNC: 9 MG/DL (ref 8.6–10.4)
CASTS UA: NORMAL /LPF
CHLORIDE BLD-SCNC: 107 MMOL/L (ref 98–107)
CO2: 20 MMOL/L (ref 20–31)
COLOR: YELLOW
COMMENT UA: ABNORMAL
CREAT SERPL-MCNC: 0.7 MG/DL (ref 0.5–0.9)
CRYSTALS, UA: NORMAL /HPF
EPITHELIAL CELLS UA: NORMAL /HPF
GFR AFRICAN AMERICAN: >60 ML/MIN
GFR NON-AFRICAN AMERICAN: >60 ML/MIN
GFR SERPL CREATININE-BSD FRML MDRD: ABNORMAL ML/MIN/{1.73_M2}
GFR SERPL CREATININE-BSD FRML MDRD: ABNORMAL ML/MIN/{1.73_M2}
GLUCOSE BLD-MCNC: 109 MG/DL (ref 65–105)
GLUCOSE BLD-MCNC: 140 MG/DL (ref 65–105)
GLUCOSE BLD-MCNC: 143 MG/DL (ref 65–105)
GLUCOSE BLD-MCNC: 168 MG/DL (ref 70–99)
GLUCOSE URINE: ABNORMAL
HCT VFR BLD CALC: 38.5 % (ref 36–46)
HEMOGLOBIN: 12.1 G/DL (ref 12–16)
KETONES, URINE: ABNORMAL
LEGIONELLA PNEUMOPHILIA AG, URINE: NEGATIVE
LEUKOCYTE ESTERASE, URINE: NEGATIVE
MCH RBC QN AUTO: 25.7 PG (ref 26–34)
MCHC RBC AUTO-ENTMCNC: 31.5 G/DL (ref 31–37)
MCV RBC AUTO: 81.6 FL (ref 80–100)
MRSA, DNA, NASAL: NORMAL
MUCUS: NORMAL
NITRITE, URINE: NEGATIVE
NRBC AUTOMATED: ABNORMAL PER 100 WBC
OTHER OBSERVATIONS UA: NORMAL
PDW BLD-RTO: 16 % (ref 11.5–14.9)
PH UA: 6.5 (ref 5–8)
PLATELET # BLD: 294 K/UL (ref 150–450)
PMV BLD AUTO: 8.5 FL (ref 6–12)
POTASSIUM SERPL-SCNC: 4.5 MMOL/L (ref 3.7–5.3)
PROTEIN UA: NEGATIVE
RBC # BLD: 4.72 M/UL (ref 4–5.2)
RBC UA: NORMAL /HPF
REASON FOR REJECTION: NORMAL
RENAL EPITHELIAL, UA: NORMAL /HPF
SODIUM BLD-SCNC: 136 MMOL/L (ref 135–144)
SOURCE: NORMAL
SPECIFIC GRAVITY UA: 1.02 (ref 1–1.03)
SPECIMEN DESCRIPTION: NORMAL
STREP PNEUMONIAE ANTIGEN: NEGATIVE
TRICHOMONAS: NORMAL
TURBIDITY: ABNORMAL
URINE HGB: NEGATIVE
UROBILINOGEN, URINE: NORMAL
WBC # BLD: 12.6 K/UL (ref 3.5–11)
WBC UA: NORMAL /HPF
YEAST: NORMAL
ZZ NTE CLEAN UP: ORDERED TEST: NORMAL
ZZ NTE WITH NAME CLEAN UP: SPECIMEN SOURCE: NORMAL

## 2020-08-14 PROCEDURE — 6370000000 HC RX 637 (ALT 250 FOR IP): Performed by: NURSE PRACTITIONER

## 2020-08-14 PROCEDURE — 6360000002 HC RX W HCPCS: Performed by: NURSE PRACTITIONER

## 2020-08-14 PROCEDURE — 85027 COMPLETE CBC AUTOMATED: CPT

## 2020-08-14 PROCEDURE — 0100U HC RESPIRPTHGN MULT REV TRANS & AMP PRB TECH 21 TRGT: CPT

## 2020-08-14 PROCEDURE — 36415 COLL VENOUS BLD VENIPUNCTURE: CPT

## 2020-08-14 PROCEDURE — 6370000000 HC RX 637 (ALT 250 FOR IP): Performed by: INTERNAL MEDICINE

## 2020-08-14 PROCEDURE — 81001 URINALYSIS AUTO W/SCOPE: CPT

## 2020-08-14 PROCEDURE — 80048 BASIC METABOLIC PNL TOTAL CA: CPT

## 2020-08-14 PROCEDURE — 82947 ASSAY GLUCOSE BLOOD QUANT: CPT

## 2020-08-14 PROCEDURE — 2580000003 HC RX 258: Performed by: NURSE PRACTITIONER

## 2020-08-14 PROCEDURE — 99239 HOSP IP/OBS DSCHRG MGMT >30: CPT | Performed by: INTERNAL MEDICINE

## 2020-08-14 PROCEDURE — 94761 N-INVAS EAR/PLS OXIMETRY MLT: CPT

## 2020-08-14 PROCEDURE — 94640 AIRWAY INHALATION TREATMENT: CPT

## 2020-08-14 RX ORDER — ALBUTEROL SULFATE 2.5 MG/3ML
2.5 SOLUTION RESPIRATORY (INHALATION) EVERY 4 HOURS PRN
Qty: 120 EACH | Refills: 3 | Status: SHIPPED | OUTPATIENT
Start: 2020-08-14 | End: 2021-01-04 | Stop reason: ALTCHOICE

## 2020-08-14 RX ORDER — GUAIFENESIN 600 MG/1
1200 TABLET, EXTENDED RELEASE ORAL 2 TIMES DAILY
Qty: 30 TABLET | Refills: 0 | Status: SHIPPED | OUTPATIENT
Start: 2020-08-14 | End: 2020-10-27 | Stop reason: ALTCHOICE

## 2020-08-14 RX ORDER — PREDNISONE 20 MG/1
40 TABLET ORAL DAILY
Qty: 10 TABLET | Refills: 0 | Status: SHIPPED | OUTPATIENT
Start: 2020-08-14 | End: 2020-08-19

## 2020-08-14 RX ORDER — AMOXICILLIN AND CLAVULANATE POTASSIUM 875; 125 MG/1; MG/1
1 TABLET, FILM COATED ORAL 2 TIMES DAILY
Qty: 14 TABLET | Refills: 0 | Status: SHIPPED | OUTPATIENT
Start: 2020-08-14 | End: 2020-08-21

## 2020-08-14 RX ADMIN — Medication 1000 UNITS: at 08:27

## 2020-08-14 RX ADMIN — MORPHINE SULFATE 2 MG: 2 INJECTION, SOLUTION INTRAMUSCULAR; INTRAVENOUS at 01:36

## 2020-08-14 RX ADMIN — DOCUSATE SODIUM 100 MG: 100 CAPSULE, LIQUID FILLED ORAL at 08:28

## 2020-08-14 RX ADMIN — MORPHINE SULFATE 2 MG: 2 INJECTION, SOLUTION INTRAMUSCULAR; INTRAVENOUS at 10:19

## 2020-08-14 RX ADMIN — LORAZEPAM 0.5 MG: 0.5 TABLET ORAL at 13:20

## 2020-08-14 RX ADMIN — METHYLPREDNISOLONE SODIUM SUCCINATE 60 MG: 125 INJECTION, POWDER, FOR SOLUTION INTRAMUSCULAR; INTRAVENOUS at 08:27

## 2020-08-14 RX ADMIN — GUAIFENESIN 1200 MG: 600 TABLET, EXTENDED RELEASE ORAL at 08:28

## 2020-08-14 RX ADMIN — PREGABALIN 75 MG: 75 CAPSULE ORAL at 08:28

## 2020-08-14 RX ADMIN — IPRATROPIUM BROMIDE AND ALBUTEROL SULFATE 1 AMPULE: .5; 3 SOLUTION RESPIRATORY (INHALATION) at 15:11

## 2020-08-14 RX ADMIN — INSULIN LISPRO 2 UNITS: 100 INJECTION, SOLUTION INTRAVENOUS; SUBCUTANEOUS at 08:28

## 2020-08-14 RX ADMIN — LORAZEPAM 0.5 MG: 0.5 TABLET ORAL at 01:14

## 2020-08-14 RX ADMIN — NICOTINE POLACRILEX 2 MG: 2 GUM, CHEWING ORAL at 05:46

## 2020-08-14 RX ADMIN — NICOTINE POLACRILEX 2 MG: 2 GUM, CHEWING ORAL at 02:58

## 2020-08-14 RX ADMIN — PIPERACILLIN SODIUM AND TAZOBACTAM SODIUM 3.38 G: 3; .375 INJECTION, POWDER, LYOPHILIZED, FOR SOLUTION INTRAVENOUS at 02:21

## 2020-08-14 RX ADMIN — PREGABALIN 75 MG: 75 CAPSULE ORAL at 14:39

## 2020-08-14 RX ADMIN — MORPHINE SULFATE 2 MG: 2 INJECTION, SOLUTION INTRAMUSCULAR; INTRAVENOUS at 05:46

## 2020-08-14 RX ADMIN — PANTOPRAZOLE SODIUM 40 MG: 40 TABLET, DELAYED RELEASE ORAL at 05:46

## 2020-08-14 RX ADMIN — IPRATROPIUM BROMIDE AND ALBUTEROL SULFATE 1 AMPULE: .5; 3 SOLUTION RESPIRATORY (INHALATION) at 10:52

## 2020-08-14 RX ADMIN — PIPERACILLIN SODIUM AND TAZOBACTAM SODIUM 3.38 G: 3; .375 INJECTION, POWDER, LYOPHILIZED, FOR SOLUTION INTRAVENOUS at 10:17

## 2020-08-14 RX ADMIN — BUDESONIDE AND FORMOTEROL FUMARATE DIHYDRATE 2 PUFF: 160; 4.5 AEROSOL RESPIRATORY (INHALATION) at 08:27

## 2020-08-14 RX ADMIN — METHYLPREDNISOLONE SODIUM SUCCINATE 60 MG: 125 INJECTION, POWDER, FOR SOLUTION INTRAMUSCULAR; INTRAVENOUS at 02:28

## 2020-08-14 RX ADMIN — METFORMIN HYDROCHLORIDE 500 MG: 500 TABLET ORAL at 08:28

## 2020-08-14 RX ADMIN — LINEZOLID 600 MG: 600 INJECTION, SOLUTION INTRAVENOUS at 08:34

## 2020-08-14 RX ADMIN — METHYLPREDNISOLONE SODIUM SUCCINATE 60 MG: 125 INJECTION, POWDER, FOR SOLUTION INTRAMUSCULAR; INTRAVENOUS at 14:39

## 2020-08-14 RX ADMIN — MORPHINE SULFATE 2 MG: 2 INJECTION, SOLUTION INTRAMUSCULAR; INTRAVENOUS at 16:20

## 2020-08-14 ASSESSMENT — PAIN SCALES - GENERAL
PAINLEVEL_OUTOF10: 9
PAINLEVEL_OUTOF10: 10

## 2020-08-14 NOTE — PROGRESS NOTES
PT called out to regarding IV pain, RN flushed and had blood return but pt was yelling at RN to remove it now. RN removed and place a 20g in Left Upper Arm, blood return noted, IV flushed with ease. PT stated that she could not taste the flush, two RN asked pt to try the IV and get the antibiotics in, 5minutes later pt calls out screaming at RN that it is buring. RN enters room to find the IV pulled.

## 2020-08-14 NOTE — CARE COORDINATION
DISCHARGE PLANNING NOTE:    Plan is for this patient to return to home, no needs. She declines the need for VNS services. Remains on IV zyvox, IV zosyn and IV steroids 60Q6. Will transfer to [facility] today.      Electronically signed by Jose Long RN on 8/14/2020 at 2:43 PM

## 2020-08-14 NOTE — PROGRESS NOTES
RN attempted to go start another IV but Pt was screaming at someone on the phone and dealing with issue. RN closed door to pt room in attempt to keep hallways quiet.

## 2020-08-14 NOTE — DISCHARGE SUMMARY
[facility] Internal Medicine    Discharge Summary     Patient ID: Sam Johnston  :  1980   MRN: [id number]     ACCOUNT:  [de-identified]   Patient's PCP: KATIE Kline CNP  Admit Date: 2020   Discharge Date: 2020    Length of Stay: 2  Code Status:  Full Code  Admitting Physician: Tere Valentine MD  Discharge Physician: Tere Valentine MD     Active Discharge Diagnoses:     Primary Problem  Pneumonia      Hospital Problems  Active Hospital Problems    Diagnosis Date Noted    Pneumonia [J18.9] 2020    COPD [J44.9]        Admission Condition:  fair     Discharged Condition: fair    Hospital Stay:     Hospital Course:  Sam Johnston is a 47 y.o. female who was admitted for the management of Pneumonia , presented to ER with Shortness of Breath; Abdominal Pain; Fever; Emesis; and Diarrhea    The patient is a 47 y.o.  female, with a history of asthma, Polar disorder, COPD, KETURAH, morbid obesity, and tobacco abuse, who presents with shortness of breath, abdominal pain, fever, emesis, and diarrhea. According to patient, symptoms began last night and progressed throughout the day today. Reports temperature of 101.5 °F at home. Patient recently admitted for diverticulitis-discharged , with only 1 dose of antibiotic left. Symptoms are associated with harsh cough and expiratory wheezing throughout all lung fields. Denies chills, chest pain, and leg swelling. There are no aggravating or alleviating factors. Symptoms are reported as constant and moderate. 56-year-old female presenting with fever, diarrhea, vomiting, difficulty breathing. Chest x-ray shows bilateral groundglass opacities right better than left, COVID-19 negative.   Did for hospital-acquired pneumonia due to recent hospitalization-started on Zosyn and Zyvox  Also IV Solu-Medrol and scheduled bronchodilators  Sigmoid diverticulitis recent diagnosis-CT abdomen shows improving sequelae, no abscess  Known asthma and COPD    No fever spikes during admission, breathing significantly improved by the day of discharge. Patient discharged on oral antibiotics and steroids. Significant therapeutic interventions: As above    Significant Diagnostic Studies:   Labs / Micro:    Lab Results   Component Value Date    WBC 12.6 (H) 08/14/2020    HGB 12.1 08/14/2020    HCT 38.5 08/14/2020    MCV 81.6 08/14/2020     08/14/2020          Lab Results   Component Value Date     08/14/2020    K 4.5 08/14/2020     08/14/2020    CO2 20 08/14/2020    BUN 13 08/14/2020    CREATININE 0.70 08/14/2020    GLUCOSE 168 08/14/2020    GLUCOSE 106 03/25/2012    CALCIUM 9.0 08/14/2020          Radiology:    Xr Chest (2 Vw)    Result Date: 8/13/2020  EXAMINATION: TWO XRAY VIEWS OF THE CHEST 8/13/2020 10:24 am COMPARISON: 08/12/2020, 07/22/2020 HISTORY: ORDERING SYSTEM PROVIDED HISTORY: Pneumonia TECHNOLOGIST PROVIDED HISTORY: Pneumonia Reason for Exam: Pneumonia Acuity: Unknown Type of Exam: Unknown FINDINGS: Subtle ground-glass opacities in the lung bases, right greater than left. No pneumothorax, evidence for edema or effusion. The cardiac and mediastinal contours appear unchanged. No acute osseous abnormality identified. Status post anterior cervical fusion. Ground-glass opacities in the lung bases, right greater than left, compatible with developing inflammatory process or infection. Ct Abdomen Pelvis W Iv Contrast Additional Contrast? None    Result Date: 8/12/2020  EXAMINATION: CT OF THE ABDOMEN AND PELVIS WITH CONTRAST 8/12/2020 2:06 pm TECHNIQUE: CT of the abdomen and pelvis was performed with the administration of intravenous contrast. Multiplanar reformatted images are provided for review. Dose modulation, iterative reconstruction, and/or weight based adjustment of the mA/kV was utilized to reduce the radiation dose to as low as reasonably achievable.  COMPARISON: 07/27/2020 HISTORY: ORDERING SYSTEM PROVIDED HISTORY: llq pain TECHNOLOGIST PROVIDED HISTORY: llq pain Reason for Exam: llq pain,  history of diverticulitis , nausea vomiting fever since yesterday Acuity: Acute Type of Exam: Initial FINDINGS: Lower Chest: New centrilobular micronodularity within the lower lung suspicious for infectious or inflammatory bronchiolitis. No effusion. Organs: Liver, spleen, pancreas, and adrenal glands were within normal limits. No radiodense gallstones. Right kidney is unremarkable. 4 mm stone left renal pelvis. No obstructive uropathy. GI/Bowel: No bowel obstruction. Normal appendix. Interval improvement in the inflammatory changes involving the sigmoid colon. Interval resolution of the small fluid collection in the right pelvis. There is a small hypodensity within the left ovary which appears unchanged and most likely a small cyst or follicle. Pelvis: Urinary bladder is within normal limits. Uterus surgically absent Peritoneum/Retroperitoneum: No free air, fluid, or lymphadenopathy. Stable prominent upper abdominal in xin hepatis lymph nodes are nonspecific. Bones/Soft Tissues: No acute osseous abnormality. Improving sequelae of sigmoid diverticulitis. No evidence of abscess. Previously described left pelvic collection is more consistent with normal appearing left ovary. The right pelvic collection is resolved. New centrilobular micronodularity within the lower lung suspicious for infectious or inflammatory bronchiolitis. Ct Abdomen Pelvis W Iv Contrast Additional Contrast? Radiologist Recommendation    Result Date: 7/28/2020  EXAMINATION: CT OF THE ABDOMEN AND PELVIS WITH CONTRAST 7/27/2020 4:26 pm TECHNIQUE: CT of the abdomen and pelvis was performed with the administration of intravenous contrast. Multiplanar reformatted images are provided for review.  Dose modulation, iterative reconstruction, and/or weight based adjustment of the mA/kV was utilized to reduce the radiation dose to as low as reasonably achievable. COMPARISON: 24 July 2020 HISTORY: ORDERING SYSTEM PROVIDED HISTORY: evaluate abscess TECHNOLOGIST PROVIDED HISTORY: evaluate abscess Reason for Exam: evaluate abscess FINDINGS: Lower Chest: Basilar atelectasis and shotty epicardial lymph nodes are present. No cardiomegaly, focal consolidation or pericardial effusion. Organs: Liver, spleen, pancreas, gallbladder, adrenals, and kidneys demonstrate no acute abnormality. Nonobstructing nephrolith left kidney is demonstrated. GI/Bowel: No free fluid or free air. No small bowel obstruction. Pelvis: No evidence of appendicitis. Improved inflammatory appearance in the pelvis is present. There is significant decrease in wall thickening in the rectosigmoid colon with decrease in serosal haziness and surrounding fat infiltration. Complex septated collection in the left casandra pelvis is reduced in size now 1.8 x 3.6 cm, previously 4.5 x 3.0 cm. 2nd prominent fluid collection in the pelvis now measures 2.6 x 1.1 cm, previously 3.8 x 2.4 cm. Bladder is unremarkable. Both inguinal rings are fat containing without strangulation. No significant free pelvic fluid is noted. Peritoneum/Retroperitoneum: No aortic aneurysm. No retroperitoneal mass, retroperitoneal or upper mesenteric adenopathy. Bones/Soft Tissues: No acute osseous abnormality. No acute soft tissue abnormality. Interval improvement in complicated diverticulitis with multiple abscess collections with reduction in the size of collections in the pelvis documented above. There has been an appreciable decrease in pericolonic fat inflammation although significant residual remains in the lower pelvis closer to the bladder. Abscess collections do not appear amenable to drainage. The findings were sent to the Radiology Results [id number] at 5:28 pm on 7/27/2020to be communicated to a licensed caregiver.      Ct Abdomen Pelvis W Iv Contrast Additional Contrast? None    Result Date: 7/24/2020  EXAMINATION: CT OF THE ABDOMEN AND PELVIS WITH CONTRAST 7/24/2020 1:28 pm TECHNIQUE: CT of the abdomen and pelvis was performed with the administration of intravenous contrast. Multiplanar reformatted images are provided for review. Dose modulation, iterative reconstruction, and/or weight based adjustment of the mA/kV was utilized to reduce the radiation dose to as low as reasonably achievable. COMPARISON: 07/22/2020 HISTORY: ORDERING SYSTEM PROVIDED HISTORY: Persistent abdominal pain, diverticulitis/colitis in the setting of recent hysterectomy TECHNOLOGIST PROVIDED HISTORY: Persistent abdominal pain, diverticulitis/colitis in the setting of recent hysterectomy Reason for Exam: Persistent abdominal pain, diverticulitis/colitis in the setting of recent hysterectomy Acuity: Acute Type of Exam: Initial FINDINGS: Lower Chest: There is been interval development of bibasilar infiltrate more pronounced on the right. Organs: The gallbladder is contracted. The remainder of the visualized upper abdominal organs are stable when compared to the prior study. GI/Bowel: No dilated large or small bowel loops are seen. There is extensive and increased soft tissue stranding in the mesentery when compared to the prior study. Again noted are changes of complicated diverticulitis. There are multiple abscess collections within the lower abdomen and pelvis. There is a 4.5 x 3.0 cm multiloculated collection in the left hemipelvis adjacent to the iliac vessels. There is a 3.7 x 2.8 cm collection in the right hemipelvis. There is another smaller collection in the left hemipelvis measuring 3.0 x 1.5 cm in size. There may be an early developing abscess versus phlegmon in the retroperitoneum on the left which is difficult to measure. Pelvis: The bladder is unremarkable in appearance.  Peritoneum/Retroperitoneum: Negative for aneurysm Bones/Soft Tissues: Normal     Changes of complicated without evidence of acute appendicitis. Pelvis: Bladder is unremarkable. Bilateral fat-containing hernias are present without strangulation. As previously noted, inflammatory changes are present in the pelvis with pericolonic complex and fluid collections compatible with multiple phlegmons and/or abscesses. No discrete extraluminal air is noted. Peritoneum/Retroperitoneum: No aortic aneurysm, retroperitoneal or mesenteric adenopathy is present. Bones/Soft Tissues: No acute osseous or soft tissue abnormality is noted. 1.  Findings compatible with acute colitis/diverticulitis of the rectosigmoid colon with multiple pericolonic fluid collections consistent with complex phlegmons and abscesses. 2.  Apparent appendicolith in the appendix. No acute appendicitis. Critical results were called by Dr. Coleman Farnsworth MD to [facility] on 7/22/2020 at 17:16. Xr Chest Portable    Result Date: 8/12/2020  EXAMINATION: ONE XRAY VIEW OF THE CHEST 8/12/2020 3:23 pm COMPARISON: Chest 07/22/2020 HISTORY: ORDERING SYSTEM PROVIDED HISTORY: infiltrate on CT TECHNOLOGIST PROVIDED HISTORY: infiltrate on CT Reason for Exam: infiltrate on CT Acuity: Unknown Type of Exam: Unknown FINDINGS: The cardiomediastinal and hilar silhouettes appear unremarkable. Chronic appearing coarse interstitial densities predominate perihilar regions and lung bases, typical of sequela from smoking or other previous infectious/inflammatory process. Nonspecific hazy ground-glass type opacities are seen centrally over the lower lungs, possibly in part related to overlying breast shadows. No pneumothorax is seen. No acute osseous abnormality is identified. Apparent infiltrate central over the bilateral lower lungs concerning for atypical pneumonia.      Xr Chest Portable    Result Date: 7/22/2020  EXAMINATION: ONE XRAY VIEW OF THE CHEST 7/22/2020 4:31 pm COMPARISON: 07/17/2020 and 07/07/2020 HISTORY: Reason for Exam: Chest pains Acuity: Acute Type of TYLENOL     Alcohol Prep Pads  Use as directed     blood glucose test strips  Test 2-3 times a day & as needed for symptoms of irregular blood glucose. BRAND OF CHOICE INSURANCE ALLOWS. Breo Ellipta 200-25 MCG/INH Aepb inhaler  Generic drug:  Fluticasone furoate-vilanterol  INHALE ONE PUFF BY MOUTH ONCE A DAY ONCE A DAY INHALATION 30     * [employer] Choice Face Mask Misc  USE NEW FACE MASK EVERYDAY WHEN PATIENT GO OUTSIDE OF HOME DUE TO COVID PANDEMIC     * [employer] Choice Face Mask Misc  USE NEW FACE MASK EVERYDAY WHEN PATIENT GO OUTSIDE OF HOME DUE TO COVID PANDEMIC     * Combivent Respimat  MCG/ACT Aers inhaler  Generic drug:  albuterol-ipratropium     * ipratropium-albuterol 0.5-2.5 (3) MG/3ML Soln nebulizer solution  Commonly known as:  DUONEB     docusate sodium 100 MG capsule  Commonly known as:  Colace  Take 1 capsule by mouth 2 times daily     EPINEPHrine 0.3 MG/0.3ML Soaj injection  Commonly known as:  EpiPen 2-Hamilton  Inject 0.3 mLs into the muscle once for 1 dose Use as directed for allergic reaction     LaMICtal 25 MG tablet  Generic drug:  lamoTRIgine     Lancets Misc  1 each by Does not apply route 2 times daily     Linzess 290 MCG Caps capsule  Generic drug:  linaclotide  TAKE 1 CAPSULE BY MOUTH EVERY MORNING (BEFORE BREAKFAST)     metFORMIN 500 MG tablet  Commonly known as:  GLUCOPHAGE  Take 1 tablet by mouth 2 times daily (with meals) Take 1 tablet daily for the first 7 days.  Then BID     MISC NATURAL PRODUCT OP     montelukast 10 MG tablet  Commonly known as:  SINGULAIR  Take 1 tablet by mouth nightly     pantoprazole 40 MG tablet  Commonly known as:  PROTONIX  TAKE ONE TABLET TWICE A DAY ORALLY 30 DAY(S)     pregabalin 75 MG capsule  Commonly known as:  LYRICA  TAKE 1 CAPSULE BY MOUTH 3 TIMES DAILY     QUEtiapine 50 MG tablet  Commonly known as:  SEROQUEL     vitamin D 25 MCG (1000 UT) Tabs tablet  Commonly known as:  CHOLECALCIFEROL  Take 1 tablet by mouth daily         * This list has 4

## 2020-08-14 NOTE — PROGRESS NOTES
Pt has been demanding that the writer give her pain medications and her anxiety medications on time or she will leave the hospital AMA.  Writer educated the pt that she has to follow the times of when she can pass the medications, pt stated \" I am so sure\", writer will continue to monitor the pt

## 2020-08-14 NOTE — PROGRESS NOTES
Pt discharged at this time, pt educated on discharge instructions, pt took all her belongings with her and walked down by herself.

## 2020-08-15 ENCOUNTER — CARE COORDINATION (OUTPATIENT)
Dept: CASE MANAGEMENT | Age: 40
End: 2020-08-15

## 2020-08-15 NOTE — CARE COORDINATION
Patient contacted regarding recent discharge and COVID-19 risk. Discussed COVID-19 related testing which was available at this time. Test results were negative. Patient informed of results, if available? Yes     Care Transition Nurse/ Ambulatory Care Manager contacted the patient by telephone to perform post discharge assessment. Verified name and  with patient as identifiers. Patient has following risk factors of: COPD. CTN/ACM reviewed discharge instructions, medical action plan and red flags related to discharge diagnosis. Reviewed and educated them on any new and changed medications related to discharge diagnosis. Advised obtaining a 90-day supply of all daily and as-needed medications. Education provided regarding infection prevention, and signs and symptoms of COVID-19 and when to seek medical attention with patient who verbalized understanding. Discussed exposure protocols and quarantine from [address] you at higher risk for severe illness  and given an opportunity for questions and concerns. The patient agrees to contact the COVID-19 hotline [phone] or PCP office for questions related to their healthcare. CTN/ACM provided contact information for future reference. From CDC: Are you at higher risk for severe illness?  Wash your hands often.  Avoid close contact (6 feet, which is about two arm lengths) with people who are sick.  Put distance between yourself and other people if COVID-19 is spreading in your community.  Clean and disinfect frequently touched surfaces.  Avoid all cruise travel and non-essential air travel.  Call your healthcare professional if you have concerns about COVID-19 and your underlying condition or if you are sick. For more information on steps you can take to protect yourself, see CDC's How to [address] [place] [address] for follow-up call in 5-7 days based on severity of symptoms and risk factors.     Patient is a current smoker with

## 2020-08-17 NOTE — PROGRESS NOTES
Physician Progress Note      PATIENT:               Enio Irwin  CSN #:                  [id number]  :                       1980  ADMIT DATE:       2020 12:48 PM  [address] DATE:        2020 5:21 PM  RESPONDING  PROVIDER #:        Osman Headley MD          QUERY TEXT:    Pt admitted with Pneumonia. Pt noted to have tachycardia and elevated WBC . If   possible, please document in the progress notes and discharge summary if you   are evaluating and /or treating any of the following: The medical record reflects the following:  Risk Factors: Hx recent hospital admission for diverticulitis  Clinical Indicators: WBC 18.7; LA 1.1; CXR-->Ground-glass opacities in the   lung bases, right greater than left, compatible with developing inflammatory   process or infection; HR ; RR 18-36; Sepsis noted by ER physician  Treatment: IV Zosyn and Zyvox, hospital admission, CXR, monitoring, IVF bolus   of 1L in ER  Options provided:  -- Sepsis, present on admission  -- No Sepsis, localized infection only. -- Sepsis was ruled out  -- Other - I will add my own diagnosis  -- Disagree - Not applicable / Not valid  -- Disagree - Clinically unable to determine / Unknown  -- Refer to Clinical Documentation Reviewer    PROVIDER RESPONSE TEXT:    This patient has sepsis which was present on admission.     Query created by: Gretchen Plascencia on 2020 7:26 AM      Electronically signed by:  Osman Headley MD 2020 8:57 AM

## 2020-08-18 LAB
CULTURE: NORMAL
Lab: NORMAL
SPECIMEN DESCRIPTION: NORMAL

## 2020-08-25 ENCOUNTER — CARE COORDINATION (OUTPATIENT)
Dept: CASE MANAGEMENT | Age: 40
End: 2020-08-25

## 2020-08-25 NOTE — CARE COORDINATION
You Patient resolved from the Care Transitions episode on 8/25/2020  Discussed COVID-19 related testing which was available at this time. Test results were negative. Patient informed of results, if available? Yes    Patient/family has been provided the following resources and education related to COVID-19:                         Signs, symptoms and red flags related to COVID-19            CDC exposure and quarantine guidelines            [employer] exposure contact - [phone]            Contact for their local Department of Health                 Patient currently reports that the following symptoms have improved:  fever, fatigue, pain or aching joints, cough, shortness of breath, confusion or unusual change in mental status, chills or shaking, sweating, fast heart rate, fast breathing, dizziness/lightheadedness, less urine output, cold, clammy, and pale skin, low body temperature and no new/worsening symptoms     No further outreach scheduled with this CTN/ACM. Episode of Care resolved. Patient has this CTN/ACM contact information if future needs arise.

## 2020-08-27 RX ORDER — MELATONIN
Qty: 30 TABLET | Refills: 3 | Status: SHIPPED | OUTPATIENT
Start: 2020-08-27 | Stop reason: SDUPTHER

## 2020-09-01 RX ORDER — PREGABALIN 75 MG/1
CAPSULE ORAL
Qty: 90 CAPSULE | Refills: 1 | Status: SHIPPED | OUTPATIENT
Start: 2020-09-01 | End: 2020-09-14 | Stop reason: SDUPTHER

## 2020-09-13 ASSESSMENT — ENCOUNTER SYMPTOMS
DIARRHEA: 0
APNEA: 0
CONSTIPATION: 0
TROUBLE SWALLOWING: 0
COLOR CHANGE: 0
WHEEZING: 0
ABDOMINAL PAIN: 0
COUGH: 0
VOMITING: 0
NAUSEA: 0
SORE THROAT: 0
EYE PAIN: 0
SHORTNESS OF BREATH: 0
CHEST TIGHTNESS: 0

## 2020-09-14 ENCOUNTER — TELEMEDICINE (OUTPATIENT)
Dept: FAMILY MEDICINE CLINIC | Age: 40
End: 2020-09-14
Payer: COMMERCIAL

## 2020-09-14 PROCEDURE — G8427 DOCREV CUR MEDS BY ELIG CLIN: HCPCS | Performed by: FAMILY MEDICINE

## 2020-09-14 PROCEDURE — 99214 OFFICE O/P EST MOD 30 MIN: CPT | Performed by: FAMILY MEDICINE

## 2020-09-14 RX ORDER — PREGABALIN 75 MG/1
CAPSULE ORAL
Qty: 90 CAPSULE | Refills: 2 | Status: SHIPPED | OUTPATIENT
Start: 2020-09-14 | End: 2020-12-21

## 2020-09-14 NOTE — PATIENT INSTRUCTIONS
Patient Education        Eating Healthy Foods: Care Instructions  Your Care Instructions     Eating healthy foods can help lower your risk for disease. Healthy food gives you energy and keeps your heart strong, your brain active, your muscles working, and your bones strong. A healthy diet includes a variety of foods from the basic food groups: grains, vegetables, fruits, milk and milk products, and meat and beans. Some people may eat more of their favorite foods from only one food group and, as a result, miss getting the nutrients they need. So, it is important to pay attention not only to what you eat but also to what you are missing from your diet. You can eat a healthy, balanced diet by making a few small changes. Follow-up care is a key part of your treatment and safety. Be sure to make and go to all appointments, and call your doctor if you are having problems. It's also a good idea to know your test results and keep a list of the medicines you take. How can you care for yourself at home? Look at what you eat  · Keep a food diary for a week or two and record everything you eat or drink. Track the number of servings you eat from each food group. · For a balanced diet every day, eat a variety of:  ? 6 or more ounce-equivalents of grains, such as cereals, breads, crackers, rice, or pasta, every day. An ounce-equivalent is 1 slice of bread, 1 cup of ready-to-eat cereal, or ½ cup of cooked rice, cooked pasta, or cooked cereal.  ? 2½ cups of vegetables, especially:  § Dark-green vegetables such as broccoli and spinach. § Orange vegetables such as carrots and sweet potatoes. § Dry beans (such as miller and kidney beans) and peas (such as lentils). ? 2 cups of fresh, frozen, or canned fruit. A small apple or 1 banana or orange equals 1 cup. ? 3 cups of nonfat or low-fat milk, yogurt, or other milk products. ? 5½ ounces of meat and beans, such as chicken, fish, lean meat, beans, nuts, and seeds.  One egg, 1 tablespoon of peanut butter, ½ ounce nuts or seeds, or ¼ cup of cooked beans equals 1 ounce of meat. · Learn how to read food labels for serving sizes and ingredients. Fast-food and convenience-food meals often contain few or no fruits or vegetables. Make sure you eat some fruits and vegetables to make the meal more nutritious. · Look at your food diary. For each food group, add up what you have eaten and then divide the total by the number of days. This will give you an idea of how much you are eating from each food group. See if you can find some ways to change your diet to make it more healthy. Start small  · Do not try to make dramatic changes to your diet all at once. You might feel that you are missing out on your favorite foods and then be more likely to fail. · Start slowly, and gradually change your habits. Try some of the following:  ? Use whole wheat bread instead of white bread. ? Use nonfat or low-fat milk instead of whole milk. ? Eat brown rice instead of white rice, and eat whole wheat pasta instead of white-flour pasta. ? Try low-fat cheeses and low-fat yogurt. ? Add more fruits and vegetables to meals and have them for snacks. ? Add lettuce, tomato, cucumber, and onion to sandwiches. ? Add fruit to yogurt and cereal.  Enjoy food  · You can still eat your favorite foods. You just may need to eat less of them. If your favorite foods are high in fat, salt, and sugar, limit how often you eat them, but do not cut them out entirely. · Eat a wide variety of foods. Make healthy choices when eating out  · The type of restaurant you choose can help you make healthy choices. Even fast-food chains are now offering more low-fat or healthier choices on the menu. · Choose smaller portions, or take half of your meal home. · When eating out, try:  ? A veggie pizza with a whole wheat crust or grilled chicken (instead of sausage or pepperoni).   ? Pasta with roasted vegetables, grilled chicken, or

## 2020-09-14 NOTE — PROGRESS NOTES
[address] [address]  Phone: [phone], Fax: [phone]    TELEHEALTH EVALUATION -- Audio/Visual (During OOVPR-20 public health emergency)    Patient ID verified by me prior to start of this visit    Ryan Reynaga (:  1980) has requested an audio/video evaluation for the following concern(s):  Chief Complaint   Patient presents with    Neck Pain    Other     prediabetes      HPI:  Ryan Reynaga is an established patient she is here today for a follow-up on her chronic conditions. CHRONIC NECK PAIN/PREV DISCECTOMY  Patient reports chronic neck pain. She also noted that she had to different surgeries due to a bulging disc. Patient is not able to medicate condition and detail of her surgeries. Her first surgery was done by Dr. Veronika Little. Second surgery was done by rafael [address] [place] [address]. Patient was also discharged from that office for no-shows. Patient reports pain on her neck as constant. She describes the pain as pressure and achy. Patient denies any specific aggravating factors. She had been on different types of pain medications. She is not able to take some NSAIDs due to her Sadler's esophagus. Therefore, she is been taking Tylenol with no relief. She also reported some weakness to her right arm after the second surgery. She reports some mild numbness as well. She is on Lyrica 3 times a day she does well with this therapy she denies any adverse reaction to this. We are going to get some records from [facility] and continue her treatment. Referral to the neurosurgeon was sent in April. Patient missed the appointment and was eventually discharged. We will resend another referral to a different neurosurgeon for further management. Dr Loi Giron. PREDIABETES  Patient's hemoglobin A1c was 5.7%. She is prediabetic. Patient reported the same issues in the past and was started on metformin.   Patient denied any adverse reactions to the metformin. Patient reports stable glucose readings at home. She states that she checks her glucose once in a while. Glucose levels have been under 100 in the morning. Patient is also overweight. States that she has been trying to cut down on the calorie. She also stopped drinking pop. She is unable to do any routine exercises due to her work and her family. She continues to gain weight also. Therefore, we are going to start her on metformin to help with the prediabetes and hopes to help her lose weight. Lab Results   Component Value Date    LABA1C 5.7 06/10/2020     Lab Results   Component Value Date     06/10/2020     BMI Readings from Last 2 Encounters:   08/14/20 38.51 kg/m²   07/28/20 38.35 kg/m²     Leslie Tena  is due for Tetanus Diphtheria vaccination. Patient's last dose of TD was unknown. she denies any previous adverse reaction to this type of vaccine. Review of Systems   Constitutional: Positive for unexpected weight change. Negative for activity change, chills, fatigue and fever. HENT: Negative for congestion, ear pain, sore throat and trouble swallowing. Eyes: Negative for pain and visual disturbance. Respiratory: Negative for apnea, cough, chest tightness, shortness of breath and wheezing. Cardiovascular: Negative. Negative for chest pain. Gastrointestinal: Negative for abdominal pain, constipation, diarrhea, nausea and vomiting. Endocrine: Negative for cold intolerance and heat intolerance. Genitourinary: Negative for difficulty urinating, dysuria, frequency, genital sores, menstrual problem and pelvic pain. Musculoskeletal: Positive for arthralgias, neck pain and neck stiffness. Negative for gait problem and myalgias. Skin: Negative for color change and rash. Allergic/Immunologic: Positive for environmental allergies. Neurological: Negative for weakness, light-headedness and headaches. Psychiatric/Behavioral: Positive for sleep disturbance. Negative for decreased concentration and dysphoric mood. The patient is nervous/anxious.         Patient Active Problem List    Diagnosis Date Noted    Pneumonia 2020    Pelvic abscess in female     Diverticulitis of colon     Diverticulitis 2020    Wound infection/hemorrhage, obstetric surgical, postpartum condition 07/15/2020    Postoperative visit 07/15/2020    Postoperative abdominal pain 07/15/2020    Post-op pain     RALH with BS and cystoscopy 2020    Pelvic peritoneal endometriosis     Moderate persistent asthma without complication     Lipoma of torso 2020    History of cervical discectomy 2020    Chronic neck pain with abnormal neurologic examination 2020    Abnormal weight gain  2020    Class 2 drug-induced obesity with serious comorbidity and body mass index (BMI) of 37.0 to 37.9 in adult 2020    Bipolar affective disorder, currently depressed, moderate ([address] 04/10/2020    Insomnia 04/10/2020    Ulnar neuropathy 04/10/2020    Major depressive disorder, recurrent episode ([address] 04/10/2020    Sciatica 04/10/2020    Endometriosis 2020    Family history of breast cancer 2020    Enlarged uterus 2020    Chronic pelvic pain in female 2020    Hx of  x2 (G3, G4) 10/14/2018    Seizure ([address] 2018    Hx of IUFD (G2) 2018    History of gestational hypertension 2018    Chronic constipation 2018    Spinal stenosis in cervical region 2018    Cervical radiculopathy 2017    Migraine without aura 2017    COPD     Sadler's esophagus without dysplasia     Hiatal hernia     Smoker 2017    Prediabetes 2016    Arthritis 2016    Cervical disc herniation 2016    Iron deficiency anemia 2015    Asthma 2015    GERD 2015        Allergies   Allergen Reactions    Nsaids      Irritates her Barrets esophogus    directed Yes KATIE Cole - CNP   pantoprazole (PROTONIX) 40 MG tablet TAKE ONE TABLET TWICE A DAY ORALLY 30 DAY(S) Yes Marquis Carlos MD   ipratropium-albuterol (DUONEB) 0.5-2.5 (3) MG/3ML SOLN nebulizer solution Inhale 1 vial into the lungs 4 times daily Yes Historical Provider, MD   EPINEPHrine (EPIPEN 2-HELDER) 0.3 MG/0.3ML SOAJ injection Inject 0.3 mLs into the muscle once for 1 dose Use as directed for allergic reaction Yes Jimmy Bianchi DO   COMBIVENT RESPIMAT  MCG/ACT AERS inhaler Inhale 1 puff into the lungs every 6 hours as needed  Yes Historical Provider, MD   montelukast (SINGULAIR) 10 MG tablet Take 1 tablet by mouth nightly Yes Gabbie Parry MD   QUEtiapine (SEROQUEL) 50 MG tablet Take 50 mg by mouth nightly  Historical Provider, MD   LINZESS 290 MCG CAPS capsule TAKE 1 CAPSULE BY MOUTH EVERY MORNING (BEFORE BREAKFAST)  Patient not taking: Reported on 9/14/2020  Marquis Carlos MD       Social History     Tobacco Use    Smoking status: Current Every Day Smoker     Packs/day: 0.50     Years: 21.00     Pack years: 10.50     Types: Cigarettes    Smokeless tobacco: Never Used   Substance Use Topics    Alcohol use: No    Drug use: No        PHYSICAL EXAMINATION:  Vital Signs: (As obtained by patient/caregiver or practitioner observation)    Constitutional: [x] Appears well-developed and well-nourished [x] No apparent distress      [x] Abnormal- Obese  Mental status  [x] Alert and awake  [x] Oriented to person/place/time [x]Able to follow commands      Eyes:  EOM    [x]  Normal  [] Abnormal-  Sclera  [x]  Normal  [] Abnormal -         Discharge [x]  None visible  [] Abnormal -    HENT:   [x] Normocephalic, atraumatic.   [] Abnormal   [x] Mouth/Throat: Mucous membranes are moist.     External Ears [x] Normal  [] Abnormal-     Neck: [x] No visualized mass     Pulmonary/Chest: [x] Respiratory effort normal.  [x] No visualized signs of difficulty breathing or respiratory distress        [] Abnormal     Musculoskeletal:   [x] Normal gait with no signs of ataxia         [x] Normal range of motion of neck        [] Abnormal-     Neurological:        [x] No Facial Asymmetry (Cranial nerve 7 motor function) (limited exam to video visit)          [x] No gaze palsy        [] Abnormal-     Skin:        [x] No significant exanthematous lesions or discoloration noted on facial skin         [] Abnormal-     Psychiatric:       [x] Normal Affect [x] No Hallucinations        [x] Abnormal- Anxious    Other pertinent observable physical exam findings- Mild pressured speech, decreased cervical, thoracic and lumbar ROM  Lab Results   Component Value Date    WBC 12.6 (H) 08/14/2020    HGB 12.1 08/14/2020    HCT 38.5 08/14/2020    MCV 81.6 08/14/2020     08/14/2020     Lab Results   Component Value Date     08/14/2020    K 4.5 08/14/2020     08/14/2020    CO2 20 08/14/2020    BUN 13 08/14/2020    CREATININE 0.70 08/14/2020    GLUCOSE 168 08/14/2020    GLUCOSE 106 03/25/2012    CALCIUM 9.0 08/14/2020      Lab Results   Component Value Date    ALT 17 08/12/2020    AST 17 08/12/2020    ALKPHOS 69 08/12/2020    BILITOT 0.53 08/12/2020     Lab Results   Component Value Date    TSH 2.08 06/10/2020     Lab Results   Component Value Date    CHOL 218 (H) 04/10/2017    CHOL 213 (H) 03/22/2016     Lab Results   Component Value Date    TRIG 269 (H) 04/10/2017    TRIG 138 03/22/2016     Lab Results   Component Value Date    HDL 47 06/10/2020    HDL 47 04/10/2017    HDL 44 03/22/2016     Lab Results   Component Value Date    LDLCHOLESTEROL 109 06/10/2020    LDLCHOLESTEROL 117 04/10/2017    LDLCHOLESTEROL 141 (H) 03/22/2016     Lab Results   Component Value Date    VLDL NOT REPORTED 06/10/2020    VLDL NOT REPORTED 04/10/2017    VLDL NOT REPORTED 03/22/2016     Lab Results   Component Value Date    CHOLHDLRATIO 3.8 06/10/2020    CHOLHDLRATIO 4.6 04/10/2017    CHOLHDLRATIO 4.8 03/22/2016     Lab Results   Component Value Date    LABA1C 5.7 06/10/2020       Lab Results   Component Value Date    VITD25 19.0 (L) 06/10/2020      Due to this being a TeleHealth encounter, evaluation of the following organ systems is limited: Vitals/Constitutional/EENT/Resp/CV/GI//MS/Neuro/Skin/Heme-Lymph-Imm. ASSESSMENT/PLAN:  1. Prediabetes  Stable  Continue therapy. We will continue to monitor Hemoglobin A1c   DISCUSSED and ADVISED TO:  Continue to check Glucose levels at home. Report increased and low levels as discussed. Decrease carbohydrates, sugary drinks, desserts in your diet. Exercise regularly, as tolerated. Try to lose weight.    - Hemoglobin A1C; Future    2. Chronic neck pain with abnormal neurologic examination  Failure to Improve  Continue current therapy. NEUROSURGERY REFERRAL RESENT  DISCUSSED and ADVISED TO:  Stay at a healthy weight. Continue exercises/PT  Stretch to help prevent stiffness and to prevent injury before exercise. Gentle forms of yoga help keep joints and muscles flexible. Walk instead of jog, ride a bike, swim, and water exercise. Lift weights as tolerated. strong muscles help reduce stress on joints. Take pain medicines exactly as directed and only as needed. - pregabalin (LYRICA) 75 MG capsule; TAKE 1 CAPSULE BY MOUTH 3 TIMES DAILY  Dispense: 90 capsule; Refill: 2    3. Spinal stenosis in cervical region  Failure to Improve  Continue current therapy. NEUROSURGERY REFERRAL RESENT  DISCUSSED and ADVISED TO:  Stay at a healthy weight. Continue exercises/PT  Stretch to help prevent stiffness and to prevent injury before exercise. Gentle forms of yoga help keep joints and muscles flexible. Walk instead of jog, ride a bike, swim, and water exercise. Lift weights as tolerated. strong muscles help reduce stress on joints. Take pain medicines exactly as directed and only as needed.   - [address], DO, Neurosurgery, [place]  - pregabalin (LYRICA) 75 MG capsule; TAKE 1 CAPSULE BY MOUTH 3 TIMES DAILY  Dispense: 90 capsule; Refill: 2    4. Class 2 drug-induced obesity without serious comorbidity with body mass index (BMI) of 37.0 to 37.9 in adult  BMI decreasing  DISCUSSED AND ADVISED TO:  Eat a low-fat and low carbohydrates diet. Avoid fried foods especially fast food. Choose healthier options for snacks. Have 5-6 servings of fruits and vegetables per day. Cut down on eating processed food. Add 30 minutes to 1 hour aerobic exercise for 3-4 days a week. 5. Need for Tdap vaccination  Recommended by CDC. No current infection. Denies previous adverse reaction to vaccination.    - Tetanus-Diphth-Acell Pertussis (BOOSTRIX) injection 0.5 mL    6. Screening for viral disease  recommended    - Varicella Zoster Antibody, IgG; Future      Orders Placed This Encounter   Procedures    Varicella Zoster Antibody, IgG     Standing Status:   Future     Standing Expiration Date:   3/13/2022    Hemoglobin A1C     Standing Status:   Future     Standing Expiration Date:   3/14/2022   [address] [place] [address], [address] [place] [address], Neurosurgery, [place]     Referral Priority:   Routine     Referral Type:   Eval and Treat     Referred to Provider:   Rebecca Alvarez DO     Requested Specialty:   Neurosurgery     Number of Visits Requested:   1      Orders Placed This Encounter   Medications    Tetanus-Diphth-Acell Pertussis (BOOSTRIX) injection 0.5 mL    pregabalin (LYRICA) 75 MG capsule     Sig: TAKE 1 CAPSULE BY MOUTH 3 TIMES DAILY     Dispense:  90 capsule     Refill:  2      Niurka received counseling on the following healthy behaviors: nutrition, exercise and medication adherence  Reviewed prior labs and health maintenance. Continue current medications, diet and exercise. Discussed use, benefit, and side effects of prescribed medications. Barriers to medication compliance addressed. Patient given educational materials - see patient instructions. All patient questions answered. Patient voiced understanding. Return in about 4 weeks (around 10/12/2020) for Keep Prev Appt, Appt w/ RENELLA, Pls do PHQ9, Pls do GAD7. Seda Taylor is a 47 y.o. female being evaluated by a Virtual Visit (video visit) encounter to address concerns as mentioned above. Due to this being a TeleHealth encounter (During GIF-22 public health emergency), evaluation of the following organ systems was limited:Vitals/Constitutional/EENT/Resp/CV/GI//MS/Neuro/Skin/Heme-Lymph-Imm. Services were provided through a video synchronous discussion virtually to substitute for in-person clinic visit. This is a telehealth visit that was performed with the originating site at Patient Location: home and provider Location of [place], [place]. Verbal consent to participate in video visit was obtained. Patient ID verified by me prior to start of this visit  I discussed with the patient the nature of our telehealth visits via interactive/real-time audio/video that:  - I would evaluate the patient and recommend diagnostics and treatments based on my assessment  - Our sessions are not being recorded and that personal health information is protected  - Our team would provide follow up care in person if/when the patient needs it. Pursuant to the emergency declaration under the [address] [place] [address] and the [employer] and [employer] Act, this Virtual Visit was conducted with patient's (and/or legal guardian's) consent, to reduce the patient's risk of exposure to COVID-19 and provide necessary medical care. The patient (and/or legal guardian) has also been advised to contact this office for worsening conditions or problems, and seek emergency medical treatment and/or call 911 if deemed necessary. This note was completed by using the assistance of a speech-recognition program. However, inadvertent computerized transcription errors may be present.  Although every effort was made to ensure accuracy, no guarantees can be provided that every mistake has been identified and corrected by editing.   Electronically signed by KATIE Olson CNP on 6/11/20 at 10:46 PM EDT

## 2020-09-19 ENCOUNTER — HOSPITAL ENCOUNTER (EMERGENCY)
Age: 40
Discharge: HOME | End: 2020-09-19
Attending: EMERGENCY MEDICINE
Payer: COMMERCIAL

## 2020-09-19 VITALS
OXYGEN SATURATION: 96 % | HEIGHT: 62 IN | TEMPERATURE: 98.1 F | WEIGHT: 203 LBS | HEART RATE: 75 BPM | DIASTOLIC BLOOD PRESSURE: 65 MMHG | BODY MASS INDEX: 37.36 KG/M2 | RESPIRATION RATE: 16 BRPM | SYSTOLIC BLOOD PRESSURE: 124 MMHG

## 2020-09-19 PROCEDURE — 90471 IMMUNIZATION ADMIN: CPT | Performed by: EMERGENCY MEDICINE

## 2020-09-19 PROCEDURE — 90715 TDAP VACCINE 7 YRS/> IM: CPT | Performed by: EMERGENCY MEDICINE

## 2020-09-19 PROCEDURE — 6360000002 HC RX W HCPCS: Performed by: EMERGENCY MEDICINE

## 2020-09-19 PROCEDURE — 99282 EMERGENCY DEPT VISIT SF MDM: CPT

## 2020-09-19 RX ADMIN — TETANUS TOXOID, REDUCED DIPHTHERIA TOXOID AND ACELLULAR PERTUSSIS VACCINE, ADSORBED 0.5 ML: 5; 2.5; 8; 8; 2.5 SUSPENSION INTRAMUSCULAR at 16:54

## 2020-09-19 ASSESSMENT — PAIN DESCRIPTION - LOCATION: LOCATION: ARM

## 2020-09-19 ASSESSMENT — PAIN DESCRIPTION - ORIENTATION: ORIENTATION: RIGHT

## 2020-09-19 ASSESSMENT — PAIN DESCRIPTION - PAIN TYPE: TYPE: ACUTE PAIN

## 2020-09-19 ASSESSMENT — PAIN SCALES - GENERAL: PAINLEVEL_OUTOF10: 7

## 2020-09-19 NOTE — ED NOTES
Wrist brace applied to right wrist. Pt discharged in stable condition with discharge instructions, including follow up with PCP. Pt ambulates to door with steady gait and without assistance.                Antonio Bruno RN  09/19/20 [id number]
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
## 2020-09-19 NOTE — ED PROVIDER NOTES
mrsa    Pleurisy     hx of \"years ago\"    Pneumonia     past penumonia    Seizures ([address]     2016 last seizure-focal seizure    UTI (urinary tract infection)     Vision abnormalities     wears glasses       SURGICAL HISTORY       Past Surgical History:   Procedure Laterality Date    CERVICAL DISC SURGERY      x2     SECTION  , 2018    x 2     SECTION N/A 2018     SECTION performed by Stephanie Sherman MD at [facility]    COLONOSCOPY N/A 10/1/2019    COLONOSCOPY DIAGNOSTIC ABORTED performed by Zully Rolon MD at [facility] N/A 2020    COLONOSCOPY WITH BIOPSY performed by Zully Rolon MD at [address] [place] [address], COLON, DIAGNOSTIC      HYSTERECTOMY N/A 2020    HYSTERECTOMY ABDOMINAL LAPAROSCOPIC ROBOTIC XI W/ CYSTOSCOPY performed by Stephanie Sherman MD at [facility] ARTHROSCOPY Left 5/20/15    KNEE SURGERY Left     x3    LAPAROSCOPY      dr Jose White N/A 10/5/2017    LAPAROSCOPIC REMOVAL INTRA ABDOMINAL LIPOMA LOWER RIGHT performed by Jona Amado DO at [facility] ESOPHAGOGASTRODUODENOSCOPY TRANSORAL DIAGNOSTIC N/A 3/2/2017    EGD ESOPHAGOGASTRODUODENOSCOPY  IP 2055 performed by Benetta Aase, MD at [facility]  2016    severe esophagitis    UPPER GASTROINTESTINAL ENDOSCOPY  2017    barretts; hiatus hernia; gastritis    UPPER GASTROINTESTINAL ENDOSCOPY  2017    long seg mullins's with linear erosions, esophagitis, small hiatal hernia    UPPER GASTROINTESTINAL ENDOSCOPY N/A 2018    MULLINS'S    UPPER GASTROINTESTINAL ENDOSCOPY N/A 10/1/2019    EGD BIOPSY performed by Zully Rolon MD at [facility]       Discharge Medication List as of 2020  4:52 PM      CONTINUE these medications which have NOT CHANGED    Details   pregabalin (LYRICA) 75 MG capsule TAKE 1 CAPSULE BY TAKE ONE TABLET TWICE A DAY ORALLY 30 DAY(S), Disp-90 tablet, R-3Normal      ipratropium-albuterol (DUONEB) 0.5-2.5 (3) MG/3ML SOLN nebulizer solution Inhale 1 vial into the lungs 4 times dailyHistorical Med      EPINEPHrine (EPIPEN 2-HELDER) 0.3 MG/0.3ML SOAJ injection Inject 0.3 mLs into the muscle once for 1 dose Use as directed for allergic reaction, Disp-2 each,R-0Print      COMBIVENT RESPIMAT  MCG/ACT AERS inhaler Inhale 1 puff into the lungs every 6 hours as needed , R-6,DAWHistorical Med      montelukast (SINGULAIR) 10 MG tablet Take 1 tablet by mouth nightly, Disp-30 tablet, R-3Print       !! - Potential duplicate medications found. Please discuss with provider. ALLERGIES     is allergic to nsaids; toradol [ketorolac tromethamine]; and ultram [tramadol]. FAMILY HISTORY     She indicated that the status of her mother is unknown. She indicated that the status of her father is unknown. She indicated that the status of her brother is unknown. SOCIAL HISTORY      reports that she has been smoking cigarettes. She has a 10.50 pack-year smoking history. She has never used smokeless tobacco. She reports that she does not drink alcohol or use drugs. PHYSICAL EXAM     INITIAL VITALS: /65   Pulse 75   Temp 98.1 °F (36.7 °C) (Oral)   Resp 16   Ht 5' 2\" (1.575 m)   Wt 203 lb (92.1 kg)   LMP 06/15/2020   SpO2 96%   BMI 37.13 kg/m²   General: NAD  Head: Normocephalic, atraumatic  Eye: Anicteric, no conjunctivitis  Neck: No C-spine TTP  Heart: Regular rate and rhythm no murmurs  Lungs: Clear to auscultation bilaterally, no respiratory distress  Chest wall: No crepitus, no tenderness palpation  Abdomen: Soft, nontender, nondistended, with no peritoneal signs  Neurologic: Patient is alert and oriented x3, the patient has appropriate hand  strength bilaterally. She has appropriate opposition of all her digits. She has appropriate sensation over her median ulnar and radial dermatomes. There is no thenar or hyperthenar wasting. Tinel test is negative Phalen test is positive in the right hand. Skin: There is a subcentimeter area on the posterior forearm where there is some erythema in the skin has healed over. There is no induration or fluctuance. MEDICAL DECISION MAKING:     MDM  This is a 45-year-old female presenting with arm and hand tingling. Differential diagnosis is unclear at this time. Her  strength and sensation are appropriate on my exam today. She may have a cervical radiculopathy as she does have chronic neck issues, but I see no sign of an acute cord impingement. I do not think this is any damage related to the puncture wound she sustained a week ago. She may have some symptoms of diabetic peripheral neuropathy and with a positive Phalen test she may be developing some carpal tunnel. Tetanus updated because of the puncture wound. Treating with a wrist splint to be worn at night. D/w pt treatment plan, warning precautions for prompt ED return and importance of close OP FU, she verbalizes understanding and agrees with the treatment plan. DIAGNOSTIC RESULTS     EMERGENCY DEPARTMENT COURSE:   Vitals:    Vitals:    09/19/20 1613   BP: 124/65   Pulse: 75   Resp: 16   Temp: 98.1 °F (36.7 °C)   TempSrc: Oral   SpO2: 96%   Weight: 203 lb (92.1 kg)   Height: 5' 2\" (1.575 m)       The patient was given the following medications while in the emergency department:  Orders Placed This Encounter   Medications    Tetanus-Diphth-Acell Pertussis (BOOSTRIX) injection 0.5 mL     -------------------------  CRITICAL CARE:   CONSULTS: None  PROCEDURES: Procedures     FINAL IMPRESSION      1.  Hand numbness          DISPOSITION/PLAN   DISPOSITION Decision To Discharge 09/19/2020 04:50:11 PM      PATIENT REFERRED TO:  KATIE Sherman - JAYA [address]  [address] [facility] [address]  [facility] [address] [facility] [address] [phone]    In 3 days      [facility] ED  [address] [address]  [phone]    if symptoms worsen      DISCHARGE MEDICATIONS:  Discharge Medication List as of 9/19/2020  4:52 PM            Martha Archer MD  Attending Emergency Physician                      Martha Archer MD  09/21/20 7767

## 2020-09-21 ENCOUNTER — TELEPHONE (OUTPATIENT)
Dept: FAMILY MEDICINE CLINIC | Age: 40
End: 2020-09-21

## 2020-09-21 ASSESSMENT — ENCOUNTER SYMPTOMS
COUGH: 0
SHORTNESS OF BREATH: 0
ABDOMINAL PAIN: 0

## 2020-09-21 NOTE — TELEPHONE ENCOUNTER
[facility] ([facility]) ED Follow up Call    Reason for ED visit:  HAND NUMBNESS    9/21/2020     [facility] this is JOBY from Dr. Vishnu Bernal's office, just calling to see how you are doing after your recent ED visit. Did you receive discharge instructions? Yes  Do you understand the discharge instructions? Yes  Did the ED give you any new prescriptions? Yes  Were you able to fill your prescriptions? Yes      Do you have one of our red, yellow and green  Zone sheets that help you to determine when you should go to the ED? No      Do you need or want to make a follow up appt with your PCP? No    Do you have any further needs in the home, e.g. equipment? No       WANTS TO FOLLOW UP WITH NEUROSURGERY   FU appts/Provider:    No future appointments.

## 2020-09-24 NOTE — TELEPHONE ENCOUNTER
Last seen 20    Next Visit Date:  No future appointments. Health Maintenance   Topic Date Due    Varicella vaccine (1 of 2 - 2-dose childhood series) 1983    Annual Wellness Visit (AWV)  2019    Flu vaccine (1) 2020    A1C test (Diabetic or Prediabetic)  06/10/2021    Cervical cancer screen  2025    DTaP/Tdap/Td vaccine (2 - Td) 2030    Pneumococcal 0-64 years Vaccine  Completed    HIV screen  Completed    Hepatitis A vaccine  Aged Out    Hepatitis B vaccine  Aged Out    Hib vaccine  Aged Out    Meningococcal (ACWY) vaccine  Aged Out       Hemoglobin A1C (%)   Date Value   06/10/2020 5.7   2017 5.3   04/10/2017 5.8             ( goal A1C is < 7)   Microalb/Crt.  Ratio (mcg/mg creat)   Date Value   04/10/2017 9     LDL Cholesterol (mg/dL)   Date Value   06/10/2020 109       (goal LDL is <100)   AST (U/L)   Date Value   2020 17     ALT (U/L)   Date Value   2020 17     BUN (mg/dL)   Date Value   2020 13     BP Readings from Last 3 Encounters:   20 124/65   20 130/62   20 (!) 122/92          (goal 120/80)    All Future Testing planned in [facility]  Lab Frequency Next Occurrence   NIKKI DIGITAL SCREEN W CAD BILATERAL Once 2020   Varicella Zoster Antibody, IgG Once 2020   Hemoglobin A1C Once 2020               Patient Active Problem List:     Asthma     GERD     Iron deficiency anemia     Cervical disc herniation     Smoker     Sadler's esophagus without dysplasia     Hiatal hernia     COPD     Chronic constipation     Hx of IUFD (G2)     History of gestational hypertension     Seizure ([address]     Hx of  x2 (G3, G4)     Arthritis     Cervical radiculopathy     Migraine without aura     Spinal stenosis in cervical region     Prediabetes     Chronic pelvic pain in female     Enlarged uterus     Endometriosis     Family history of breast cancer     Bipolar affective disorder, currently depressed, moderate ([address]

## 2020-10-07 ENCOUNTER — HOSPITAL ENCOUNTER (EMERGENCY)
Age: 40
Discharge: HOME | End: 2020-10-07
Attending: EMERGENCY MEDICINE
Payer: COMMERCIAL

## 2020-10-07 ENCOUNTER — APPOINTMENT (OUTPATIENT)
Dept: GENERAL RADIOLOGY | Age: 40
End: 2020-10-07
Payer: COMMERCIAL

## 2020-10-07 VITALS
BODY MASS INDEX: 36.8 KG/M2 | RESPIRATION RATE: 18 BRPM | OXYGEN SATURATION: 97 % | HEIGHT: 62 IN | WEIGHT: 200 LBS | SYSTOLIC BLOOD PRESSURE: 130 MMHG | DIASTOLIC BLOOD PRESSURE: 73 MMHG | HEART RATE: 74 BPM | TEMPERATURE: 98.4 F

## 2020-10-07 PROCEDURE — 6360000002 HC RX W HCPCS: Performed by: STUDENT IN AN ORGANIZED HEALTH CARE EDUCATION/TRAINING PROGRAM

## 2020-10-07 PROCEDURE — 6370000000 HC RX 637 (ALT 250 FOR IP): Performed by: STUDENT IN AN ORGANIZED HEALTH CARE EDUCATION/TRAINING PROGRAM

## 2020-10-07 PROCEDURE — 99283 EMERGENCY DEPT VISIT LOW MDM: CPT

## 2020-10-07 PROCEDURE — 73502 X-RAY EXAM HIP UNI 2-3 VIEWS: CPT

## 2020-10-07 PROCEDURE — 96372 THER/PROPH/DIAG INJ SC/IM: CPT

## 2020-10-07 RX ORDER — HYDROXYZINE HYDROCHLORIDE 10 MG/1
10 TABLET, FILM COATED ORAL ONCE
Status: COMPLETED | OUTPATIENT
Start: 2020-10-07 | End: 2020-10-07

## 2020-10-07 RX ORDER — METHOCARBAMOL 500 MG/1
500 TABLET, FILM COATED ORAL 4 TIMES DAILY
Qty: 40 TABLET | Refills: 0 | Status: SHIPPED | OUTPATIENT
Start: 2020-10-07 | End: 2020-10-17

## 2020-10-07 RX ORDER — ORPHENADRINE CITRATE 30 MG/ML
60 INJECTION INTRAMUSCULAR; INTRAVENOUS ONCE
Status: COMPLETED | OUTPATIENT
Start: 2020-10-07 | End: 2020-10-07

## 2020-10-07 RX ADMIN — ORPHENADRINE CITRATE 60 MG: 30 INJECTION INTRAMUSCULAR; INTRAVENOUS at 18:34

## 2020-10-07 RX ADMIN — HYDROXYZINE HYDROCHLORIDE 10 MG: 10 TABLET ORAL at 19:40

## 2020-10-07 ASSESSMENT — ENCOUNTER SYMPTOMS
CHEST TIGHTNESS: 0
DIARRHEA: 0
ABDOMINAL PAIN: 0
VOMITING: 0
SHORTNESS OF BREATH: 0
NAUSEA: 0
CONSTIPATION: 0
BACK PAIN: 0
WHEEZING: 0
COLOR CHANGE: 0
COUGH: 0

## 2020-10-07 ASSESSMENT — PAIN SCALES - GENERAL: PAINLEVEL_OUTOF10: 8

## 2020-10-07 NOTE — ED PROVIDER NOTES
[address] ED  eMERGENCY dEPARTMENT eNCOUnter   Attending Attestation     Pt Name: Celia Maloney  MRN: [id number]  [place] 1980  Date of evaluation: 10/7/20       Celia Maloney is a 47 y.o. female who presents with Hip Pain (right)      History:   Patient fell and landed on her right hip. Patient is having severe pain and decreased range of motion. Exam: Vitals:   Vitals:    10/07/20 1807   BP: 130/73   Pulse: 74   Resp: 18   Temp: 98.4 °F (36.9 °C)   TempSrc: Oral   SpO2: 97%   Weight: 200 lb (90.7 kg)   Height: 5' 2\" (1.575 m)     Diffuse tenderness and decreased range of motion    I performed a history and physical examination of the patient and discussed management with the resident. I reviewed the residents note and agree with the documented findings and plan of care. Any areas of disagreement are noted on the chart. I was personally present for the key portions of any procedures. I have documented in the chart those procedures where I was not present during the key portions. I have personally reviewed all images and agree with the resident's interpretation. I have reviewed the emergency nurses triage note. I agree with the chief complaint, past medical history, past surgical history, allergies, medications, social and family history as documented unless otherwise noted below. Documentation of the HPI, Physical Exam and Medical Decision Making performed by medical students or scribes is based on my personal performance of the HPI, PE and MDM. I personally evaluated and examined the patient in conjunction with the APC and agree with the assessment, treatment plan, and disposition of the patient as recorded by the APC. Additional findings are as noted.     Hilda Martinez MD  Attending Emergency  Physician             Marlo Ramos MD  10/07/20 0805

## 2020-10-07 NOTE — ED PROVIDER NOTES
[facility] ED  Emergency Department Encounter  Emergency Medicine Resident     Pt Name: Luis Alberto Thakkar  MRN: [id number]  [place] 1980  Date of evaluation: 10/7/20  PCP:  KATIE Kerns CNP    CHIEF COMPLAINT       Chief Complaint   Patient presents with    Hip Pain     right       HISTORY OFPRESENT ILLNESS  (Location/Symptom, Timing/Onset, Context/Setting, Quality, Duration, Modifying Factors,Severity.)      Luis Alberto Thakkar is a 47 y.o. female who presents with right hip pain. Patient states she was play fighting with her friend, and she heard a crack and has had pain in the right hip since then. This started yesterday. Has taken Tylenol without improvement. Past medical history of asthma, GERD. Patient states she has allergies to NSAIDs and Toradol. PAST MEDICAL / SURGICAL / SOCIAL / FAMILY HISTORY      has a past medical history of Anxiety, Arthritis, Asthma, Sadler's esophagus, Bipolar 1 disorder ([address] CAD (coronary artery disease), Chronic insomnia, COPD (chronic obstructive pulmonary disease) ([address] Depression, Diabetes mellitus ([address] Endometriosis, Esophagitis, GERD (gastroesophageal reflux disease), Headache(784.0), Herniated disc, cervical, Hiatal hernia, Incisional abscess, Kidney stones, MDRO (multiple drug resistant organisms) resistance, Pleurisy, Pneumonia, Seizures ([address] UTI (urinary tract infection), and Vision abnormalities. has a past surgical history that includes knee surgery (Left);  section (, ); Tonsillectomy; Knee arthroscopy (Left, 5/20/15); Cervical disc surgery; Upper gastrointestinal endoscopy (2016); Upper gastrointestinal endoscopy (2017); Upper gastrointestinal endoscopy (2017); pr esophagogastroduodenoscopy transoral diagnostic (N/A, 3/2/2017); laparoscopy; laparoscopy (N/A, 10/5/2017); Upper gastrointestinal endoscopy (N/A, 2018);  Endoscopy, colon, diagnostic;  section (N/A, 12/26/2018); Upper gastrointestinal endoscopy (N/A, 10/1/2019); Colonoscopy (N/A, 10/1/2019); Colonoscopy (N/A, 1/28/2020); [place] tooth extraction; and Hysterectomy (N/A, 7/8/2020). Social History     Socioeconomic History    Marital status: Single     Spouse name: Not on file    Number of children: 1    Years of education: 11th grade    Highest education level: Not on file   Occupational History    Occupation: unemployed-   Social Needs    Financial resource strain: Not on file    Food insecurity     Worry: Not on file     Inability: Not on file   Anagran needs     Medical: Not on file     Non-medical: Not on file   Tobacco Use    Smoking status: Current Every Day Smoker     Packs/day: 0.50     Years: 21.00     Pack years: 10.50     Types: Cigarettes    Smokeless tobacco: Never Used   Substance and Sexual Activity    Alcohol use: No    Drug use: No    Sexual activity: Yes     Partners: Male   Lifestyle    Physical activity     Days per week: Not on file     Minutes per session: Not on file    Stress: Not on file   Relationships    Social connections     Talks on phone: Not on file     Gets together: Not on file     Attends Yazidi service: Not on file     Active member of club or organization: Not on file     Attends meetings of clubs or organizations: Not on file     Relationship status: Not on file    Intimate partner violence     Fear of current or ex partner: Not on file     Emotionally abused: Not on file     Physically abused: Not on file     Forced sexual activity: Not on file   Other Topics Concern    Not on file   Social History Narrative    Lives with son and boyfriend       Family History   Problem Relation Age of Onset    Cancer Mother         breast    Bipolar Disorder Mother     Hypertension Mother     Breast Cancer Mother     Bipolar Disorder Brother     Hypertension Father         Allergies:  Nsaids;  Toradol [ketorolac tromethamine]; and Ultram [tramadol]    Home Medications:  Prior to Admission medications    Medication Sig Start Date End Date Taking? Authorizing Provider   methocarbamol (ROBAXIN) 500 MG tablet Take 1 tablet by mouth 4 times daily for 10 days 10/7/20 10/17/20 Yes DO LEW Ledesma ELLIPTA 200-25 MCG/INH AEPB inhaler INHALE ONE PUFF BY MOUTH ONCE A DAY ONCE A DAY INHALATION 30 9/24/20   KATIE Cole CNP   pregabalin (LYRICA) 75 MG capsule TAKE 1 CAPSULE BY MOUTH 3 TIMES DAILY 9/14/20 10/14/20  KATIE Cole CNP   metFORMIN (GLUCOPHAGE) 500 MG tablet TAKE 1 TABLET BY MOUTH 2 TIMES DAILY (WITH MEALS) TAKE 1 TABLET DAILY FOR THE FIRST 7 DAYS.  THEN BID 8/27/20 9/26/20  KATIE Cole CNP   vitamin D3 (CHOLECALCIFEROL) 25 MCG (1000 UT) TABS tablet TAKE 1 TABLET BY MOUTH DAILY 8/27/20   KATIE Cole CNP   albuterol (PROVENTIL) (2.5 MG/3ML) 0.083% nebulizer solution Take 3 mLs by nebulization every 4 hours as needed for Wheezing 8/14/20   Gilford Meth, MD   guaiFENesin [facility]) 600 MG extended release tablet Take 2 tablets by mouth 2 times daily 8/14/20   Gilford Meth, MD   QUEtiapine (SEROQUEL) 50 MG tablet Take 50 mg by mouth nightly    Historical Provider, MD   lamoTRIgine (LAMICTAL) 25 MG tablet Take 150 mg by mouth daily Nightly    Historical Provider, MD   Masks (CLEVER CHOICE FACE MASK) MIS USE NEW FACE MASK EVERYDAY WHEN PATIENT GO OUTSIDE OF HOME DUE TO COVID PANDEMIC 7/6/20   KATIE Cole CNP   Masks (CLEVER CHOICE FACE MASK) MISC USE NEW FACE MASK EVERYDAY WHEN PATIENT GO OUTSIDE OF HOME DUE TO COVID PANDEMIC 7/6/20   KATIE Cole CNP   LINZESS 290 MCG CAPS capsule TAKE 1 CAPSULE BY MOUTH EVERY MORNING (BEFORE BREAKFAST)  Patient not taking: Reported on 9/14/2020 7/2/20   Waylon Hernandez MD   acetaminophen (TYLENOL) 500 MG tablet Take 500 mg by mouth every 6 hours as needed for Pain    Historical Provider, MD   MISC NATURAL PRODUCT OP Take 2 tablets by mouth 2 times daily hydroxycut max-weight loss supplement    Historical Provider, MD   blood glucose monitor strips Test 2-3 times a day & as needed for symptoms of irregular blood glucose. BRAND OF CHOICE INSURANCE ALLOWS. 6/12/20   KATIE Cole CNP   Lancets MISC 1 each by Does not apply route 2 times daily 6/12/20   KATIE Cole CNP   Alcohol Swabs (ALCOHOL PREP) PADS Use as directed 6/12/20   KATIE Cole CNP   pantoprazole (PROTONIX) 40 MG tablet TAKE ONE TABLET TWICE A DAY ORALLY 30 DAY(S) 4/6/20   Tye Damian MD   ipratropium-albuterol (DUONEB) 0.5-2.5 (3) MG/3ML SOLN nebulizer solution Inhale 1 vial into the lungs 4 times daily    Historical Provider, MD   EPINEPHrine (EPIPEN 2-HELDER) 0.3 MG/0.3ML SOAJ injection Inject 0.3 mLs into the muscle once for 1 dose Use as directed for allergic reaction 3/29/19 9/14/20  Yamila Houston DO   COMBIVENT RESPIMAT  MCG/ACT AERS inhaler Inhale 1 puff into the lungs every 6 hours as needed  9/15/18   Historical Provider, MD   montelukast (SINGULAIR) 10 MG tablet Take 1 tablet by mouth nightly 9/20/18   Vick Alvares MD       REVIEW OFSYSTEMS    (2-9 systems for level 4, 10 or more for level 5)      Review of Systems   Constitutional: Negative for chills, diaphoresis, fatigue and fever. Respiratory: Negative for cough, chest tightness, shortness of breath and wheezing. Cardiovascular: Negative for chest pain, palpitations and leg swelling. Gastrointestinal: Negative for abdominal pain, constipation, diarrhea, nausea and vomiting. Endocrine: Negative for polydipsia, polyphagia and polyuria. Genitourinary: Negative for difficulty urinating, dysuria and urgency. Musculoskeletal: Positive for arthralgias (Right hip). Negative for back pain, neck pain and neck stiffness. Skin: Negative for color change, pallor and rash. Neurological: Negative for dizziness, weakness, light-headedness and headaches. PELVIS RIGHT       MEDICATIONS ORDERED:  Orders Placed This Encounter   Medications    orphenadrine (NORFLEX) injection 60 mg    methocarbamol (ROBAXIN) 500 MG tablet     Sig: Take 1 tablet by mouth 4 times daily for 10 days     Dispense:  40 tablet     Refill:  0    hydrOXYzine (ATARAX) tablet 10 mg       DDX: Hip fracture, muscle strain    Initial MDM/Plan: 47 y.o. female who presents with hip pain since yesterday after \"play fighting\" with her friend. States she heard a pop. Patient has been ambulatory since then. Will order x-ray, treat with Norflex, discharge. DIAGNOSTIC RESULTS / EMERGENCYDEPARTMENT COURSE / MDM     LABS:  Labs Reviewed - No data to display      RADIOLOGY:  Xr Hip 2-3 Vw W Pelvis Right    Result Date: 10/7/2020  EXAMINATION: ONE XRAY VIEW OF THE PELVIS AND TWO XRAY VIEWS RIGHT HIP 10/7/2020 7:01 pm COMPARISON: None. HISTORY: ORDERING SYSTEM PROVIDED HISTORY: hip pain after play fighting TECHNOLOGIST PROVIDED HISTORY: hip pain after play fighting Reason for Exam: Pt states she injured right hip today play fighting. Best images per pt condition Acuity: Unknown Type of Exam: Initial Mechanism of Injury: Pt states she injured right hip today play fighting. Best images per pt condition FINDINGS: Bone mineralization is normal.  There is also normal alignment of the bones. No erosions or abnormal periosteal reaction. No acute fracture. Soft tissues : No significant abnormalities. No acute osseous abnormality of the pelvis/right hip. EMERGENCY DEPARTMENT COURSE:  ED Course as of Oct 08 0018   Wed Oct 07, 2020   1919 XR negative, will discharge    [JG]   1921 Notified patient of negative x-ray. Patient to follow-up with primary care provider. Will prescribe Robaxin. [JG]   1937 Patient screaming at staff demanding something for her anxiety.     [JG]      ED Course User Index  [JG] Jose Vaz,           PROCEDURES:  None    CONSULTS:  None    CRITICAL CARE:  Please see attending note    FINAL IMPRESSION      1.  Right hip pain          DISPOSITION / PLAN     DISPOSITION        PATIENT REFERRED TO:  [facility] ED  [address]  [address] [address]  [phone]  Go to   If symptoms worsen    KATIE Daniel CNP [address]  [address] [facility] [address]  [phone]    Go in 3 days        DISCHARGE MEDICATIONS:  Discharge Medication List as of 10/7/2020  7:35 PM      START taking these medications    Details   methocarbamol (ROBAXIN) 500 MG tablet Take 1 tablet by mouth 4 times daily for 10 days, Disp-40 tablet,R-0Print             Deny Hills,   Emergency Medicine Resident    (Please note that portions of this note were completed with a voice recognition program.Efforts were made to edit the dictations but occasionally words are mis-transcribed.)     Deny Hills DO  Resident  10/07/20 Leigha 1 Kiet Hernandez DO  Resident  10/08/20 4208

## 2020-10-08 ENCOUNTER — TELEPHONE (OUTPATIENT)
Dept: FAMILY MEDICINE CLINIC | Age: 40
End: 2020-10-08

## 2020-10-08 NOTE — TELEPHONE ENCOUNTER
[facility] ([facility]) ED Follow up Call    Reason for ED visit:  Right hip pain    10/8/2020           FU appts/Provider:    No future appointments. VOICEMAIL DOCUMENTATION - ERASE IF NOT USED  Hi, this message is for Niurka. This is Candelario Ferreira [facility] office. Just calling to see how you are doing after your recent visit to the Emergency Room. [facility] wants to make sure you were able to fill any prescriptions and that you understand your discharge instructions. Please return our call if you need to make a follow up appointment with your provider or have any further needs. Our phone number is [phone]. Have a great day.

## 2020-10-19 ENCOUNTER — APPOINTMENT (OUTPATIENT)
Dept: GENERAL RADIOLOGY | Age: 40
End: 2020-10-19
Payer: COMMERCIAL

## 2020-10-19 ENCOUNTER — HOSPITAL ENCOUNTER (EMERGENCY)
Age: 40
Discharge: HOME | End: 2020-10-19
Attending: EMERGENCY MEDICINE
Payer: COMMERCIAL

## 2020-10-19 VITALS
DIASTOLIC BLOOD PRESSURE: 84 MMHG | SYSTOLIC BLOOD PRESSURE: 146 MMHG | HEART RATE: 74 BPM | TEMPERATURE: 98.6 F | OXYGEN SATURATION: 98 % | RESPIRATION RATE: 16 BRPM

## 2020-10-19 PROCEDURE — 81025 URINE PREGNANCY TEST: CPT

## 2020-10-19 PROCEDURE — 71045 X-RAY EXAM CHEST 1 VIEW: CPT

## 2020-10-19 PROCEDURE — 6370000000 HC RX 637 (ALT 250 FOR IP): Performed by: EMERGENCY MEDICINE

## 2020-10-19 PROCEDURE — 99283 EMERGENCY DEPT VISIT LOW MDM: CPT

## 2020-10-19 PROCEDURE — 99284 EMERGENCY DEPT VISIT MOD MDM: CPT

## 2020-10-19 RX ORDER — ONDANSETRON 4 MG/1
4 TABLET, ORALLY DISINTEGRATING ORAL ONCE
Status: COMPLETED | OUTPATIENT
Start: 2020-10-19 | End: 2020-10-19

## 2020-10-19 RX ORDER — DICYCLOMINE HYDROCHLORIDE 10 MG/1
10 CAPSULE ORAL ONCE
Status: COMPLETED | OUTPATIENT
Start: 2020-10-19 | End: 2020-10-19

## 2020-10-19 RX ADMIN — DICYCLOMINE HYDROCHLORIDE 10 MG: 10 CAPSULE ORAL at 13:10

## 2020-10-19 RX ADMIN — ONDANSETRON 4 MG: 4 TABLET, ORALLY DISINTEGRATING ORAL at 13:10

## 2020-10-19 ASSESSMENT — ENCOUNTER SYMPTOMS
TROUBLE SWALLOWING: 0
NAUSEA: 1
DIARRHEA: 0
ABDOMINAL PAIN: 1
SHORTNESS OF BREATH: 1
CONSTIPATION: 0
VOMITING: 1
BACK PAIN: 0
COUGH: 1
SORE THROAT: 0
COLOR CHANGE: 0
BLOOD IN STOOL: 0

## 2020-10-19 ASSESSMENT — PAIN SCALES - GENERAL: PAINLEVEL_OUTOF10: 9

## 2020-10-19 NOTE — ED PROVIDER NOTES
Diagnosis Date    Anxiety     Arthritis     OA    Asthma     Sadler's esophagus     LONG SEGMENT    Bipolar 1 disorder ([facility])     CAD (coronary artery disease)     Chronic insomnia     COPD (chronic obstructive pulmonary disease) ([facility])     Depression     and bipolar    Diabetes mellitus ([facility]     prediabetic    Endometriosis 3/9/2020    Esophagitis     severe    GERD (gastroesophageal reflux disease)     Headache(784.0)     Herniated disc, cervical     Hiatal hernia     Incisional abscess 1/15/2019    Kidney stones     MDRO (multiple drug resistant organisms) resistance     mrsa    Pleurisy     hx of \"years ago\"    Pneumonia     past penumonia    Seizures ([facility]     2016 last seizure-focal seizure    UTI (urinary tract infection)     Vision abnormalities     wears glasses         SURGICAL HISTORY      has a past surgical history that includes knee surgery (Left);  section (, ); Tonsillectomy; Knee arthroscopy (Left, 5/20/15); Cervical disc surgery; Upper gastrointestinal endoscopy (2016); Upper gastrointestinal endoscopy (2017); Upper gastrointestinal endoscopy (2017); pr esophagogastroduodenoscopy transoral diagnostic (N/A, 3/2/2017); laparoscopy; laparoscopy (N/A, 10/5/2017); Upper gastrointestinal endoscopy (N/A, 2018); Endoscopy, colon, diagnostic;  section (N/A, 2018); Upper gastrointestinal endoscopy (N/A, 10/1/2019); Colonoscopy (N/A, 10/1/2019); Colonoscopy (N/A, 2020); [place] tooth extraction; and Hysterectomy (N/A, 2020).       CURRENT MEDICATIONS       Previous Medications    ACETAMINOPHEN (TYLENOL) 500 MG TABLET    Take 500 mg by mouth every 6 hours as needed for Pain    ALBUTEROL (PROVENTIL) (2.5 MG/3ML) 0.083% NEBULIZER SOLUTION    Take 3 mLs by nebulization every 4 hours as needed for Wheezing    ALCOHOL SWABS (ALCOHOL PREP) PADS    Use as directed    BLOOD GLUCOSE MONITOR STRIPS    Test 2-3 times a day & as needed for symptoms of irregular blood glucose. BRAND OF CHOICE INSURANCE ALLOWS. COMBIVENT RESPIMAT  MCG/ACT AERS INHALER    Inhale 1 puff into the lungs every 6 hours as needed     EPINEPHRINE (EPIPEN 2-HELDER) 0.3 MG/0.3ML SOAJ INJECTION    Inject 0.3 mLs into the muscle once for 1 dose Use as directed for allergic reaction    GUAIFENESIN (MUCINEX) 600 MG EXTENDED RELEASE TABLET    Take 2 tablets by mouth 2 times daily    IPRATROPIUM-ALBUTEROL (DUONEB) 0.5-2.5 (3) MG/3ML SOLN NEBULIZER SOLUTION    Inhale 1 vial into the lungs 4 times daily    LAMOTRIGINE (LAMICTAL) 25 MG TABLET    Take 150 mg by mouth daily Nightly    LANCETS MISC    1 each by Does not apply route 2 times daily    LINZESS 290 MCG CAPS CAPSULE    TAKE 1 CAPSULE BY MOUTH EVERY MORNING (BEFORE BREAKFAST)    MASKS (CLEVER CHOICE FACE MASK) MISC    USE NEW FACE MASK EVERYDAY WHEN PATIENT GO OUTSIDE OF HOME DUE TO COVID PANDEMIC    MASKS (CLEVER CHOICE FACE MASK) MISC    USE NEW FACE MASK EVERYDAY WHEN PATIENT GO OUTSIDE OF HOME DUE TO COVID PANDEMIC    METFORMIN (GLUCOPHAGE) 500 MG TABLET    TAKE 1 TABLET BY MOUTH 2 TIMES DAILY (WITH MEALS) TAKE 1 TABLET DAILY FOR THE FIRST 7 DAYS. THEN BID    MISC NATURAL PRODUCT OP    Take 2 tablets by mouth 2 times daily hydroxycut max-weight loss supplement    MONTELUKAST (SINGULAIR) 10 MG TABLET    Take 1 tablet by mouth nightly    PANTOPRAZOLE (PROTONIX) 40 MG TABLET    TAKE ONE TABLET TWICE A DAY ORALLY 30 DAY(S)    PREGABALIN (LYRICA) 75 MG CAPSULE    TAKE 1 CAPSULE BY MOUTH 3 TIMES DAILY    QUETIAPINE (SEROQUEL) 50 MG TABLET    Take 50 mg by mouth nightly    VITAMIN D3 (CHOLECALCIFEROL) 25 MCG (1000 UT) TABS TABLET    TAKE 1 TABLET BY MOUTH DAILY       ALLERGIES     is allergic to nsaids; toradol [ketorolac tromethamine]; and ultram [tramadol]. FAMILY HISTORY     She indicated that the status of her mother is unknown. She indicated that the status of her father is unknown.  She indicated that the status of her brother is unknown.     family history includes Bipolar Disorder in her brother and mother; Breast Cancer in her mother; Cancer in her mother; Hypertension in her father and mother. SOCIAL HISTORY      reports that she has been smoking cigarettes. She has a 10.50 pack-year smoking history. She has never used smokeless tobacco. She reports that she does not drink alcohol or use drugs. PHYSICAL EXAM     INITIAL VITALS:  temperature is 98.6 °F (37 °C). Her blood pressure is 146/84 (abnormal) and her pulse is 74. Her respiration is 16 and oxygen saturation is 98%. Physical Exam  Vitals signs and nursing note reviewed. Constitutional:       General: She is not in acute distress. Appearance: She is obese. HENT:      Head: Normocephalic and atraumatic. Eyes:      Conjunctiva/sclera: Conjunctivae normal.      Pupils: Pupils are equal, round, and reactive to light. Neck:      Musculoskeletal: Neck supple. Cardiovascular:      Rate and Rhythm: Normal rate and regular rhythm. Heart sounds: Normal heart sounds. No murmur. Pulmonary:      Effort: Pulmonary effort is normal. No respiratory distress. Breath sounds: Normal breath sounds. Abdominal:      General: Bowel sounds are normal. There is no distension. Palpations: Abdomen is soft. Tenderness: There is abdominal tenderness in the epigastric area. Musculoskeletal:         General: No tenderness. Lymphadenopathy:      Cervical: No cervical adenopathy. Skin:     General: Skin is warm and dry. Findings: No rash. Neurological:      Mental Status: She is alert and oriented to person, place, and time. Psychiatric:         Judgment: Judgment normal.           DIFFERENTIAL DIAGNOSIS/MDM:   45-year-old female presents with upper abdominal pain, nausea and vomiting. She is afebrile, nontoxic, normal vital signs. No acute distress. On my exam she has some mild tenderness in epigastric area.   No focal peritoneal signs. Differential includes gastritis, pancreatitis, biliary colic, gastroparesis, chronic pain. As far as her cough shortness of breath this could be due to GERD, pneumonia, bronchitis. Patient has had multiple normal CT scans throughout this past year. However she has also had scans that include diverticulitis with abscess formation. She is afebrile, nontoxic with normal vital signs. She has no lower abdominal tenderness on my exam.  Even with her history of multiple abdominal CT scans over the past year I am going to get another CT scan based on her past history and her inconsistent history and physical exam.    We will treat symptoms, get urinalysis, urine pregnancy as well. We will also get chest x-ray. DIAGNOSTIC RESULTS     EKG: All EKG's are interpreted by the Emergency Department Physician who either signs or Co-signs this chart in the absence of a cardiologist.        RADIOLOGY:   I directly visualized the following  images and reviewed the radiologist interpretations:  XR CHEST PORTABLE   Final Result   No acute process. CT ABDOMEN PELVIS WO CONTRAST Additional Contrast? None    (Results Pending)           ED BEDSIDE ULTRASOUND:      LABS:  Labs Reviewed   URINE RT REFLEX TO CULTURE   PREGNANCY, URINE         EMERGENCY DEPARTMENT COURSE:   Vitals:    Vitals:    10/19/20 1237   BP: (!) 146/84   Pulse: 74   Resp: 16   Temp: 98.6 °F (37 °C)   SpO2: 98%     3:00 PM EDT  I was updated by RN the patient eloped. She did receive a chest x-ray, Zofran and Bentyl but she did not get her CT scan. She did not get a urinalysis or pregnancy test.  She did not tell anybody she was leaving. She did not receive discharge instructions or return precautions. CRITICALCARE:      CONSULTS:  None      PROCEDURES:      FINAL IMPRESSION      1.  Abdominal pain, unspecified abdominal location            DISPOSITION/PLAN   DISPOSITION Eloped - Left Before Treatment Complete 10/19/2020 02:59:45

## 2020-10-19 NOTE — TELEPHONE ENCOUNTER
Please Approve or Refuse.   Send to Pharmacy per Pt's Request:      Next Visit Date:  Visit date not found   Last Visit Date: 9/14/2020    Hemoglobin A1C (%)   Date Value   06/10/2020 5.7   05/21/2017 5.3   04/10/2017 5.8             ( goal A1C is < 7)   BP Readings from Last 3 Encounters:   10/19/20 (!) 146/84   10/07/20 130/73   09/19/20 124/65          (goal 120/80)  BUN   Date Value Ref Range Status   08/14/2020 13 6 - 20 mg/dL Final     CREATININE   Date Value Ref Range Status   08/14/2020 0.70 0.50 - 0.90 mg/dL Final     Potassium   Date Value Ref Range Status   08/14/2020 4.5 3.7 - 5.3 mmol/L Final

## 2020-10-20 ENCOUNTER — TELEMEDICINE (OUTPATIENT)
Dept: FAMILY MEDICINE CLINIC | Age: 40
End: 2020-10-20
Payer: COMMERCIAL

## 2020-10-20 ENCOUNTER — TELEPHONE (OUTPATIENT)
Dept: FAMILY MEDICINE CLINIC | Age: 40
End: 2020-10-20

## 2020-10-20 PROCEDURE — G8427 DOCREV CUR MEDS BY ELIG CLIN: HCPCS | Performed by: FAMILY MEDICINE

## 2020-10-20 PROCEDURE — 99212 OFFICE O/P EST SF 10 MIN: CPT | Performed by: FAMILY MEDICINE

## 2020-10-20 RX ORDER — BENZONATATE 100 MG/1
100 CAPSULE ORAL 3 TIMES DAILY PRN
Qty: 90 CAPSULE | Refills: 0 | Status: SHIPPED | OUTPATIENT
Start: 2020-10-20 | End: 2020-11-19

## 2020-10-20 RX ORDER — AMOXICILLIN AND CLAVULANATE POTASSIUM 875; 125 MG/1; MG/1
1 TABLET, FILM COATED ORAL 2 TIMES DAILY
Qty: 14 TABLET | Refills: 0 | Status: SHIPPED | OUTPATIENT
Start: 2020-10-20 | End: 2020-10-27 | Stop reason: ALTCHOICE

## 2020-10-20 RX ORDER — METHYLPREDNISOLONE 4 MG/1
TABLET ORAL
Qty: 1 KIT | Refills: 0 | Status: SHIPPED | OUTPATIENT
Start: 2020-10-20 | End: 2021-01-04 | Stop reason: ALTCHOICE

## 2020-10-20 ASSESSMENT — ENCOUNTER SYMPTOMS
CHEST TIGHTNESS: 0
SHORTNESS OF BREATH: 1
NAUSEA: 0
SINUS PRESSURE: 1
CONSTIPATION: 0
DIARRHEA: 0
COUGH: 1
ABDOMINAL PAIN: 1
WHEEZING: 0
EYE PAIN: 0
SORE THROAT: 1
VOMITING: 0

## 2020-10-20 NOTE — PATIENT INSTRUCTIONS
Patient Education        Sinusitis: Care Instructions  Your Care Instructions     Sinusitis is an infection of the lining of the sinus cavities in your head. Sinusitis often follows a cold. It causes pain and pressure in your head and face. In most cases, sinusitis gets better on its own in 1 to 2 weeks. But some mild symptoms may last for several weeks. Sometimes antibiotics are needed. Follow-up care is a key part of your treatment and safety. Be sure to make and go to all appointments, and call your doctor if you are having problems. It's also a good idea to know your test results and keep a list of the medicines you take. How can you care for yourself at home? · Take an over-the-counter pain medicine, such as acetaminophen (Tylenol), ibuprofen (Advil, Motrin), or naproxen (Aleve). Read and follow all instructions on the label. · If the doctor prescribed antibiotics, take them as directed. Do not stop taking them just because you feel better. You need to take the full course of antibiotics. · Be careful when taking over-the-counter cold or flu medicines and Tylenol at the same time. Many of these medicines have acetaminophen, which is Tylenol. Read the labels to make sure that you are not taking more than the recommended dose. Too much acetaminophen (Tylenol) can be harmful. · Breathe warm, moist air from a steamy shower, a hot bath, or a sink filled with hot water. Avoid cold, dry air. Using a humidifier in your home may help. Follow the directions for cleaning the machine. · Use saline (saltwater) nasal washes to help keep your nasal passages open and wash out mucus and bacteria. You can buy saline nose drops at a grocery store or drugstore. Or you can make your own at home by adding 1 teaspoon of salt and 1 teaspoon of baking soda to 2 cups of distilled water. If you make your own, fill a bulb syringe with the solution, insert the tip into your nostril, and squeeze gently. Vernel Kennel your nose.   · Put a hot, wet towel or a warm gel pack on your face 3 or 4 times a day for 5 to 10 minutes each time. · Try a decongestant nasal spray like oxymetazoline (Afrin). Do not use it for more than 3 days in a row. Using it for more than 3 days can make your congestion worse. When should you call for help? Call your doctor now or seek immediate medical care if:    · You have new or worse swelling or redness in your face or around your eyes.     · You have a new or higher fever. Watch closely for changes in your health, and be sure to contact your doctor if:    · You have new or worse facial pain.     · The mucus from your nose becomes thicker (like pus) or has new blood in it.     · You are not getting better as expected. Where can you learn more? Go to https://[email or web address].Chu Shu. org and sign in to your [employer] account. Enter B164 in the IPexpert box to learn more about \"Sinusitis: Care Instructions. \"     If you do not have an account, please click on the \"Sign Up Now\" link. Current as of: April 15, 2020               Content Version: 12.6  © 3378-3440 [employer]. Care instructions adapted under license by [facility] ([facility]). If you have questions about a medical condition or this instruction, always ask your healthcare professional. [address] any warranty or liability for your use of this information. Patient Education        COPD and Asthma: Care Instructions  Your Care Instructions     Some people who have chronic obstructive pulmonary disease (COPD) also have asthma. Both of these problems can damage your lungs. This makes it very important to control them. Asthma causes the airways that lead to the lungs to swell and become narrow. This makes it hard to breathe. You may wheeze or cough. If you have a bad attack, you may need emergency care. There are two parts to treating asthma. · Controlling asthma over the long term.   · Treating attacks when they occur.  You and your doctor can make an asthma treatment plan that will help. This plan tells you the medicines you take every day to reduce the swelling in your airways and prevent attacks. It also tells you what to do if you have an asthma attack. Follow-up care is a key part of your treatment and safety. Be sure to make and go to all appointments, and call your doctor if you are having problems. It's also a good idea to know your test results and keep a list of the medicines you take. How can you care for yourself at home? To control asthma over the long term  Medicines   Controller medicines reduce swelling in your lungs. They also prevent asthma attacks. Take your controller medicine exactly as prescribed. Talk to your doctor if you have any problems with your medicine. · Inhaled corticosteroid is a common and effective controller medicine. Using it the right way can prevent or reduce most side effects. · Take your controller medicine every day, not just when you have symptoms. This helps prevent problems before they occur. · Always bring your asthma medicine with you when you travel. · Your doctor may prescribe long-acting medicine that combines a corticosteroid with a beta2-agonist. Follow your doctor's instructions exactly about how to take a long-acting medicine. Examples include:  ? Fluticasone and salmeterol (Advair). ? Budesonide and formoterol (Symbicort). · Do not depend on your controller medicines to stop an asthma attack that has already started. They do not work fast enough to help. · Your doctor may also prescribe anticholinergic inhalers. These include ipratropium (Atrovent) and tiotropium (Spiriva). Education   · Learn what sets off an asthma attack. Avoid these triggers when you can. Common triggers include smoke, air pollution, pollen, animal dander, colds, stress, and cold air. · Do not smoke. Smoking can make COPD and asthma worse.  If you need help quitting, talk to your doctor about stop-smoking programs and medicines. These can increase your chances of quitting for good. · Check yourself for symptoms to know which step to follow in your action plan. Watch for things like being short of breath, having chest tightness, coughing, and wheezing. Also notice if symptoms wake you up at night or if you get tired quickly when you exercise. · You may want to learn how to use a peak flow meter. This measures how open your airways are. It may help you know when you will have an asthma attack. To treat attacks when they occur  Use your asthma action plan when you have an attack. Your quick-relief medicine, such as albuterol, will stop an asthma attack. It relaxes the muscles that get tight around the airways. · Take your quick-relief medicine exactly as prescribed. Talk with your doctor if you have any problems with your medicine. · Keep this medicine with you at all times. · You may need to use this medicine before you exercise. If your doctor prescribed corticosteroid pills to use during an attack, take them as directed. They may take hours to work, but they may shorten the attack and help you breathe better. When should you call for help? Call 911 anytime you think you may need emergency care. For example, call if:    · You have severe trouble breathing. Call your doctor now or seek immediate medical care if:    · You have new or worse shortness of breath.     · You are coughing more deeply or more often, especially if you notice more mucus or a change in the color of your mucus.     · You cough up blood.     · You have new or increased swelling in your legs or belly.     · You have a fever.     · You have used your quick-relief medicine but you are still short of breath. Watch closely for changes in your health, and be sure to contact your doctor if you have any problems. Where can you learn more? Go to https://shannon.[employer]ilium Software. org and sign in to your [employer] account. Enter A350 in the [facility] box to learn more about \"COPD and Asthma: Care Instructions. \"     If you do not have an account, please click on the \"Sign Up Now\" link. Current as of: February 24, 2020               Content Version: 12.6  © 3724-0112 [employer]. Care instructions adapted under license by [facility] ([facility]). If you have questions about a medical condition or this instruction, always ask your healthcare professional. [address] any warranty or liability for your use of this information.

## 2020-10-20 NOTE — PROGRESS NOTES
[address] [address]  Phone: [phone], Fax: [phone]    TELEHEALTH EVALUATION -- Audio/Visual (During LVRIR-62 public health emergency)    Patient ID verified by me prior to start of this visit    Flaquito Mccarthy (:  1980) has requested an audio/video evaluation for the following concern(s):  Chief Complaint   Patient presents with    Cough    Congestion    Abdominal Pain    Generalized Body Aches     SYMPTOMS STARTED 3 DAYS AGO     Shortness of Breath    ED Follow-up     [facility]       HPI:  Flaquito Mccarthy is an established patient . Patient has a history of COPD and a current smoker. Patient reported a worsening history of cough, shortness of breath, runny nose, nasal congestion, general fatigue, chills, and sore throat. Symptoms have been going on for 3 days. Patient denies any direct contact with any confirmed COVID-19 virus patient. Patient was having severe nausea and vomiting yesterday. Still had some severe abdominal pain yesterday but had improved this morning. She was seen in the emergency room for that. Patient decided to leave since the ER was busy. Patient have had a chest x-ray that was done prior to her leaving. Chest x-ray came back normal with no signs of pneumonia. Patient has a known history of COPD. She is currently on Breo, albuterol, Mucinex, and an occasional DuoNeb. She is established with a pulmonologist who she has not seen for a while. Patient continues to smoke. Patient is unable to quit tried different ways of quitting without any success. Patient declined to talk about other forms of smoking cessation techniques due to being acutely ill. Patient did miss work from yesterday. Patient woke up this morning feeling worse. She works as a . Review of Systems   Constitutional: Positive for activity change, chills and fatigue. Negative for fever.    HENT: Positive for congestion, postnasal drip, sinus pressure and sore throat. Eyes: Negative for pain. Respiratory: Positive for cough and shortness of breath. Negative for chest tightness and wheezing. Cardiovascular: Negative for chest pain and palpitations. Gastrointestinal: Positive for abdominal pain (improving). Negative for constipation, diarrhea, nausea and vomiting. Endocrine: Negative. Genitourinary: Negative. Allergic/Immunologic: Positive for environmental allergies. Neurological: Negative for dizziness and headaches. Hematological: Negative for adenopathy. Psychiatric/Behavioral: Positive for sleep disturbance. The patient is nervous/anxious.         Patient Active Problem List    Diagnosis Date Noted    Pneumonia 2020    Pelvic abscess in female     Diverticulitis of colon     Diverticulitis 2020    Wound infection/hemorrhage, obstetric surgical, postpartum condition 07/15/2020    Postoperative visit 07/15/2020    Postoperative abdominal pain 07/15/2020    Post-op pain     RALH with BS and cystoscopy 2020    Pelvic peritoneal endometriosis     Moderate persistent asthma without complication     Lipoma of torso 2020    History of cervical discectomy 2020    Chronic neck pain with abnormal neurologic examination 2020    Abnormal weight gain  2020    Class 2 drug-induced obesity with serious comorbidity and body mass index (BMI) of 37.0 to 37.9 in adult 2020    Bipolar affective disorder, currently depressed, moderate ([address] 04/10/2020    Insomnia 04/10/2020    Ulnar neuropathy 04/10/2020    Major depressive disorder, recurrent episode ([address] 04/10/2020    Sciatica 04/10/2020    Endometriosis 2020    Family history of breast cancer 2020    Enlarged uterus 2020    Chronic pelvic pain in female 2020    Hx of  x2 (G3, G4) 10/14/2018    Seizure ([address] 2018    Hx of IUFD (G2) 2018    History of gestational hypertension 2018    Chronic constipation 2018    Spinal stenosis in cervical region 2018    Cervical radiculopathy 2017    Migraine without aura 2017    COPD     Mullins's esophagus without dysplasia     Hiatal hernia     Smoker 2017    Prediabetes 2016    Arthritis 2016    Cervical disc herniation 2016    Iron deficiency anemia 2015    Asthma 2015    GERD 2015        Past Surgical History:   Procedure Laterality Date    CERVICAL DISC SURGERY      x2     SECTION  , 2018    x 2     SECTION N/A 2018     SECTION performed by Fran Casey MD at [facility] L&D [address] [facility] [address] COLONOSCOPY N/A 10/1/2019    COLONOSCOPY DIAGNOSTIC ABORTED performed by Court Mcleod MD at [address] N/A 2020    COLONOSCOPY WITH BIOPSY performed by Court Mcleod MD at [address], COLON, DIAGNOSTIC      HYSTERECTOMY N/A 2020    HYSTERECTOMY ABDOMINAL LAPAROSCOPIC ROBOTIC XI W/ CYSTOSCOPY performed by Fran Casey MD at [facility] ARTHROSCOPY Left 5/20/15    KNEE SURGERY Left     x3    LAPAROSCOPY      dr Fabián Madison N/A 10/5/2017    LAPAROSCOPIC REMOVAL INTRA ABDOMINAL LIPOMA LOWER RIGHT performed by Joshua Marie DO at [address] [facility] ESOPHAGOGASTRODUODENOSCOPY TRANSORAL DIAGNOSTIC N/A 3/2/2017    EGD ESOPHAGOGASTRODUODENOSCOPY  IP  performed by Ashley Edwards MD at [address]  2016    severe esophagitis    UPPER GASTROINTESTINAL ENDOSCOPY  2017    barretts; hiatus hernia; gastritis    UPPER GASTROINTESTINAL ENDOSCOPY  2017    long seg mullins's with linear erosions, esophagitis, small hiatal hernia    UPPER GASTROINTESTINAL ENDOSCOPY N/A 2018    MULLINS'S    UPPER GASTROINTESTINAL ENDOSCOPY N/A 10/1/2019    EGD BIOPSY performed by Sujit Rueda Marilee Cook MD at [facility]       Family History   Problem Relation Age of Onset    Cancer Mother         breast    Bipolar Disorder Mother     Hypertension Mother     Breast Cancer Mother     Bipolar Disorder Brother     Hypertension Father      Current Outpatient Medications   Medication Sig Dispense Refill    amoxicillin-clavulanate (AUGMENTIN) 875-125 MG per tablet Take 1 tablet by mouth 2 times daily for 7 days 14 tablet 0    methylPREDNISolone (MEDROL DOSEPACK) 4 MG tablet Take by mouth. 1 kit 0    benzonatate (TESSALON PERLES) 100 MG capsule Take 1 capsule by mouth 3 times daily as needed for Cough (take 1-2 capsule) 90 capsule 0    BREO ELLIPTA 200-25 MCG/INH AEPB inhaler INHALE ONE PUFF BY MOUTH ONCE A DAY ONCE A DAY INHALATION 30 60 each 1    vitamin D3 (CHOLECALCIFEROL) 25 MCG (1000 UT) TABS tablet TAKE 1 TABLET BY MOUTH DAILY 30 tablet 3    albuterol (PROVENTIL) (2.5 MG/3ML) 0.083% nebulizer solution Take 3 mLs by nebulization every 4 hours as needed for Wheezing 120 each 3    guaiFENesin (MUCINEX) 600 MG extended release tablet Take 2 tablets by mouth 2 times daily 30 tablet 0    QUEtiapine (SEROQUEL) 50 MG tablet Take 50 mg by mouth nightly      lamoTRIgine (LAMICTAL) 25 MG tablet Take 150 mg by mouth daily Nightly      Masks (CLEVER CHOICE FACE MASK) MISC USE NEW FACE MASK EVERYDAY WHEN PATIENT GO OUTSIDE OF HOME DUE TO COVID PANDEMIC 90 each 1    acetaminophen (TYLENOL) 500 MG tablet Take 500 mg by mouth every 6 hours as needed for Pain      MISC NATURAL PRODUCT OP Take 2 tablets by mouth 2 times daily hydroxycut max-weight loss supplement      blood glucose monitor strips Test 2-3 times a day & as needed for symptoms of irregular blood glucose.   BRAND OF CHOICE INSURANCE ALLOWS. 100 strip 11    Lancets MISC 1 each by Does not apply route 2 times daily 300 each 11    Alcohol Swabs (ALCOHOL PREP) PADS Use as directed 100 each 11    pantoprazole (PROTONIX) 40 MG tablet TAKE ONE TABLET TWICE A DAY ORALLY 30 DAY(S) 90 tablet 3    ipratropium-albuterol (DUONEB) 0.5-2.5 (3) MG/3ML SOLN nebulizer solution Inhale 1 vial into the lungs 4 times daily      COMBIVENT RESPIMAT  MCG/ACT AERS inhaler Inhale 1 puff into the lungs every 6 hours as needed   6    montelukast (SINGULAIR) 10 MG tablet Take 1 tablet by mouth nightly 30 tablet 3    pregabalin (LYRICA) 75 MG capsule TAKE 1 CAPSULE BY MOUTH 3 TIMES DAILY 90 capsule 2    metFORMIN (GLUCOPHAGE) 500 MG tablet TAKE 1 TABLET BY MOUTH 2 TIMES DAILY (WITH MEALS) TAKE 1 TABLET DAILY FOR THE FIRST 7 DAYS.  THEN BID 60 tablet 3    LINZESS 290 MCG CAPS capsule TAKE 1 CAPSULE BY MOUTH EVERY MORNING (BEFORE BREAKFAST) (Patient not taking: Reported on 9/14/2020) 30 capsule 3    EPINEPHrine (EPIPEN 2-HELDER) 0.3 MG/0.3ML SOAJ injection Inject 0.3 mLs into the muscle once for 1 dose Use as directed for allergic reaction 2 each 0     Current Facility-Administered Medications   Medication Dose Route Frequency Provider Last Rate Last Dose    Tetanus-Diphth-Acell Pertussis (BOOSTRIX) injection 0.5 mL  0.5 mL Intramuscular Once KATIE Cole - CNP           Allergies   Allergen Reactions    Nsaids      Irritates her Barrets esophogus    Toradol [Ketorolac Tromethamine]     Ultram [Tramadol] Nausea Only     Gastric upset        Social History     Tobacco Use    Smoking status: Current Every Day Smoker     Packs/day: 0.50     Years: 21.00     Pack years: 10.50     Types: Cigarettes    Smokeless tobacco: Never Used   Substance Use Topics    Alcohol use: No    Drug use: No        PHYSICAL EXAMINATION:  Vital Signs: (As obtained by patient/caregiver or practitioner observation)    Constitutional: [x] Appears well-developed and well-nourished [] No apparent distress      [] Abnormal-   Mental status  [x] Alert and awake  [x] Oriented to person/place/time [x]Able to follow commands      Eyes:  EOM    [x]  Normal  [] 875-125 MG per tablet; Take 1 tablet by mouth 2 times daily for 7 days  Dispense: 14 tablet; Refill: 0  - methylPREDNISolone (MEDROL DOSEPACK) 4 MG tablet; Take by mouth. Dispense: 1 kit; Refill: 0    2. Chronic obstructive pulmonary disease, unspecified COPD type ([facility] [address]  Failure to Improve  Current URI  Current treatment plan is effective, no change in therapy. Reviewed use, techniques, schedule and side effects of all inhaled medications  Critical need for compliance with treatment plan to achieve optimal results  Very strongly urged to quit smoking to reduce pulmonary and CVD risk. - benzonatate (TESSALON PERLES) 100 MG capsule; Take 1 capsule by mouth 3 times daily as needed for Cough (take 1-2 capsule)  Dispense: 90 capsule; Refill: 0    3. Current smoker  Counseling given to quit smoking. Support offered. Hand out given. Educational material given  Patient declined despite discussion. Controlled Substance Monitoring:  Acute and Chronic Pain Monitoring:   RX Monitoring 9/14/2020   Attestation -   Periodic Controlled Substance Monitoring Assessed functional status. No orders of the defined types were placed in this encounter. Orders Placed This Encounter   Medications    amoxicillin-clavulanate (AUGMENTIN) 875-125 MG per tablet     Sig: Take 1 tablet by mouth 2 times daily for 7 days     Dispense:  14 tablet     Refill:  0    methylPREDNISolone (MEDROL DOSEPACK) 4 MG tablet     Sig: Take by mouth.      Dispense:  1 kit     Refill:  0    benzonatate (TESSALON PERLES) 100 MG capsule     Sig: Take 1 capsule by mouth 3 times daily as needed for Cough (take 1-2 capsule)     Dispense:  90 capsule     Refill:  0      Medications Discontinued During This Encounter   Medication Reason    Masks (CLEVER CHOICE FACE MASK) [address] [facility] received counseling on the following healthy behaviors: nutrition, exercise, medication adherence and tobacco cessation  Reviewed prior labs and health maintenance. Continue current medications, diet and exercise. Discussed use, benefit, and side effects of prescribed medications. Barriers to medication compliance addressed. Patient given educational materials - see patient instructions. All patient questions answered. Patient voiced understanding. Return in about 2 months (around 12/20/2020) for AWV, chronic cond, F2F, 30mins. Aden Grier is a 47 y.o. female patient  being evaluated by a Virtual Visit (video visit) encounter to address concerns as mentioned above. . Due to this being a TeleHealth encounter (During JKGRI-27 public health emergency), evaluation of the following organ systems was limited:Vitals/Constitutional/EENT/Resp/CV/GI//MS/Neuro/Skin/Heme-Lymph-Imm. Services were provided through a video synchronous discussion virtually to substitute for in-person clinic visit. This is a telehealth visit that was performed with the originating site at Patient Location: home and provider Location of [place], [place]. Verbal consent to participate in video visit was obtained. Patient ID verified by me prior to start of this visit  I discussed with the patient the nature of our telehealth visits via interactive/real-time audio/video that:  - I would evaluate the patient and recommend diagnostics and treatments based on my assessment  - Our sessions are not being recorded and that personal health information is protected  - Our team would provide follow up care in person if/when the patient needs it. Pursuant to the emergency declaration under the [facility], [address] [place] [address] authority and the [employer] Act, this Virtual Visit was conducted with patient's (and/or legal guardian's) consent, to reduce the patient's risk of exposure to COVID-19 and provide necessary medical care.   The patient (and/or legal guardian) has also been advised to contact this office for worsening conditions or problems, and seek emergency medical treatment and/or call 911 if deemed necessary. This note was completed by using the assistance of a speech-recognition program. However, inadvertent computerized transcription errors may be present. Although every effort was made to ensure accuracy, no guarantees can be provided that every mistake has been identified and corrected by editing.   Electronically signed by KATIE Long CNP on 10/20/20 at 9:33 AM EDT

## 2020-10-20 NOTE — LETTER
[facility]  [address] [place], [place] [address] [facility] [address]  Phone: [phone]  Fax: [phone]    KATIE Castellanos CNP        October 20, 2020     Patient: Amisha Blank   YOB: 1980   Date of Visit: 10/20/2020       To Whom it May Concern:    Charly Quintanilla was seen in my clinic on 10/20/2020. She may return to work on Oct 23, 2020. Please excuse her from October 19th until then. If you have any questions or concerns, please don't hesitate to call.     Sincerely,         KATIE Cole - CNP

## 2020-10-20 NOTE — TELEPHONE ENCOUNTER
[facility] ([facility]) ED Follow up Call     Reason for ED visit:  Abdominal pain      10/20/2020     [facility] , this is JOBY  from Dr. Tejal Bernal's office, just calling to see how you are doing after your recent ED visit. Did you receive discharge instructions? Yes  Do you understand the discharge instructions? Yes  Did the ED give you any new prescriptions? NO MEDS GIVEN   Were you able to fill your prescriptions? No: NONE WERE GIVEN       Do you have one of our red, yellow and green  Zone sheets that help you to determine when you should go to the ED? No      Do you need or want to make a follow up appt with your PCP? Yes    Do you have any further needs in the home, e.g. equipment?   No        FU appts/Provider:    Future Appointments   Date Time Provider Wesley Smith   11/5/2020 10:00 AM Lorice Rinne[address]

## 2020-10-21 RX ORDER — IPRATROPIUM/ALBUTEROL SULFATE 20-100 MCG
MIST INHALER (GRAM) INHALATION
Qty: 4 G | Refills: 2 | Status: SHIPPED | OUTPATIENT
Start: 2020-10-21 | End: 2021-01-04 | Stop reason: ALTCHOICE

## 2020-10-26 RX ORDER — PANTOPRAZOLE SODIUM 40 MG/1
TABLET, DELAYED RELEASE ORAL
Qty: 90 TABLET | Refills: 3 | Status: SHIPPED | OUTPATIENT
Start: 2020-10-26 | End: 2021-06-02

## 2020-10-27 ENCOUNTER — VIRTUAL VISIT (OUTPATIENT)
Dept: FAMILY MEDICINE CLINIC | Age: 40
End: 2020-10-27
Payer: COMMERCIAL

## 2020-10-27 PROCEDURE — 99213 OFFICE O/P EST LOW 20 MIN: CPT | Performed by: FAMILY MEDICINE

## 2020-10-27 PROCEDURE — G8427 DOCREV CUR MEDS BY ELIG CLIN: HCPCS | Performed by: FAMILY MEDICINE

## 2020-10-27 RX ORDER — PREDNISONE 20 MG/1
40 TABLET ORAL DAILY
Qty: 20 TABLET | Refills: 0 | Status: SHIPPED | OUTPATIENT
Start: 2020-10-27 | End: 2020-11-01

## 2020-10-27 RX ORDER — DOXYCYCLINE HYCLATE 100 MG
100 TABLET ORAL 2 TIMES DAILY
Qty: 14 TABLET | Refills: 0 | Status: SHIPPED | OUTPATIENT
Start: 2020-10-27 | End: 2020-11-03

## 2020-10-27 ASSESSMENT — ENCOUNTER SYMPTOMS
DIARRHEA: 0
SINUS PRESSURE: 0
WHEEZING: 0
VOMITING: 0
COUGH: 1
SHORTNESS OF BREATH: 1
EYE PAIN: 0
SORE THROAT: 1
CONSTIPATION: 0
CHEST TIGHTNESS: 0
ABDOMINAL PAIN: 0
NAUSEA: 0

## 2020-10-27 NOTE — PATIENT INSTRUCTIONS
Patient Education        Learning About Chronic Bronchitis  What is chronic bronchitis? Chronic bronchitis is long-term swelling and the buildup of mucus in the airways of your lungs. The airways (bronchial tubes) get inflamed and make a lot of mucus. This can narrow or block the airways, making it hard for you to breathe. It is a form of COPD (chronic obstructive pulmonary disease). Chronic bronchitis is usually caused by smoking. But chemical fumes, dust, or air pollution also can cause it over time. What can you expect when you have chronic bronchitis? Chronic bronchitis gets worse over time. You cannot undo the damage to your lungs. Over time, you may find that:  · You get short of breath even when you do simple things like get dressed or fix a meal.  · It is hard to eat or exercise. · You lose weight and feel weaker. Over many years, the swelling and mucus from chronic bronchitis make it more likely that you will get lung infections. But there are things you can do to prevent more damage and feel better. What are the symptoms? The main symptoms of chronic bronchitis are:  · A cough that will not go away. · Mucus that comes up when you cough. · Shortness of breath that gets worse when you exercise. At times, your symptoms may suddenly flare up and get much worse. This is a called an exacerbation (say \"egg-CRISTAL-er-BAY-santi\"). When this happens, your usual symptoms quickly get worse and stay bad. This can be dangerous. You may have to go to the hospital.  How can you keep chronic bronchitis from getting worse? Don't smoke. That is the best way to keep chronic bronchitis from getting worse. If you already smoke, it is never too late to stop. If you need help quitting, talk to your doctor about stop-smoking programs and medicines. These can increase your chances of quitting for good. You can do other things to keep chronic bronchitis from getting worse:  · Avoid bad air.  Air pollution, chemical is right for you. Rehab includes exercise programs, education about your disease and how to manage it, help with diet and other changes, and emotional support. · Eat regular, healthy meals. Use bronchodilators about 1 hour before you eat to make it easier to eat. Eat several small meals instead of three large ones. Drink beverages at the end of the meal. Avoid foods that are hard to chew. Follow-up care is a key part of your treatment and safety. Be sure to make and go to all appointments, and call your doctor if you are having problems. It's also a good idea to know your test results and keep a list of the medicines you take. Where can you learn more? Go to https://[employer]. org and sign in to your [employer] account. Enter X380 in the [employer] box to learn more about \"Learning About Chronic Bronchitis. \"     If you do not have an account, please click on the \"Sign Up Now\" link. Current as of: February 24, 2020               Content Version: 12.6  © 2006-2020 [employer]. Care instructions adapted under license by [facility] ([facility]). If you have questions about a medical condition or this instruction, always ask your healthcare professional. [place] [address] any warranty or liability for your use of this information.

## 2020-11-15 ENCOUNTER — HOSPITAL ENCOUNTER (EMERGENCY)
Age: 40
Discharge: LEFT BEFORE DISCHARGE | End: 2020-11-15
Payer: COMMERCIAL

## 2020-11-15 VITALS
BODY MASS INDEX: 36.8 KG/M2 | SYSTOLIC BLOOD PRESSURE: 113 MMHG | TEMPERATURE: 97.6 F | DIASTOLIC BLOOD PRESSURE: 81 MMHG | WEIGHT: 200 LBS | HEIGHT: 62 IN | HEART RATE: 84 BPM | RESPIRATION RATE: 24 BRPM

## 2020-11-15 ASSESSMENT — PAIN DESCRIPTION - DESCRIPTORS: DESCRIPTORS: SHOOTING;CONSTANT

## 2020-11-15 ASSESSMENT — PAIN DESCRIPTION - LOCATION: LOCATION: HIP

## 2020-11-15 ASSESSMENT — PAIN DESCRIPTION - ONSET: ONSET: PROGRESSIVE

## 2020-11-15 ASSESSMENT — PAIN DESCRIPTION - ORIENTATION: ORIENTATION: RIGHT

## 2020-11-15 ASSESSMENT — PAIN DESCRIPTION - PAIN TYPE: TYPE: ACUTE PAIN;CHRONIC PAIN

## 2020-11-15 ASSESSMENT — PAIN SCALES - GENERAL: PAINLEVEL_OUTOF10: 8

## 2020-11-15 NOTE — ED NOTES
Patient informed triage nurse that she was going to leave and go to [facility] for evaluation       Gregory Modi RN  11/15/20 [id number]

## 2020-11-16 RX ORDER — LINACLOTIDE 290 UG/1
290 CAPSULE, GELATIN COATED ORAL
Qty: 30 CAPSULE | Refills: 3 | Status: SHIPPED | OUTPATIENT
Start: 2020-11-16 | End: 2021-03-03

## 2020-12-14 RX ORDER — MELATONIN
Qty: 30 TABLET | Refills: 3 | Status: SHIPPED | OUTPATIENT
Start: 2020-12-14 | End: 2021-05-10

## 2020-12-14 NOTE — TELEPHONE ENCOUNTER
Please Approve or Refuse.   Send to Pharmacy per Pt's Request:  Next Visit Date:  12/21/2020   Last Visit Date: 10/27/2020    Hemoglobin A1C (%)   Date Value   06/10/2020 5.7   05/21/2017 5.3   04/10/2017 5.8             ( goal A1C is < 7)   BP Readings from Last 3 Encounters:   10/19/20 (!) 146/84   10/07/20 130/73   09/19/20 124/65          (goal 120/80)  BUN   Date Value Ref Range Status   08/14/2020 13 6 - 20 mg/dL Final     CREATININE   Date Value Ref Range Status   08/14/2020 0.70 0.50 - 0.90 mg/dL Final     Potassium   Date Value Ref Range Status   08/14/2020 4.5 3.7 - 5.3 mmol/L Final

## 2020-12-21 ENCOUNTER — TELEPHONE (OUTPATIENT)
Dept: FAMILY MEDICINE CLINIC | Age: 40
End: 2020-12-21

## 2020-12-21 NOTE — TELEPHONE ENCOUNTER
[facility] received a call from:Niurka    Name of Caller:same    Relationship to patient:self    Organization name: na    Best contact number: same    Reason for call: Jame Morin called and said overslept today and missed appt for medicare wellness She needs to reschedule but the availability shows blocks for this provider in Jan. Call her back and also needs refills on Lyrica medicine.  [facility]

## 2021-01-04 PROBLEM — H66.91 RIGHT OTITIS MEDIA: Status: ACTIVE | Noted: 2021-01-04

## 2021-01-11 DIAGNOSIS — G89.29 CHRONIC NECK PAIN WITH ABNORMAL NEUROLOGIC EXAMINATION: ICD-10-CM

## 2021-01-11 DIAGNOSIS — J44.9 CHRONIC OBSTRUCTIVE PULMONARY DISEASE, UNSPECIFIED COPD TYPE (HCC): ICD-10-CM

## 2021-01-11 DIAGNOSIS — M54.2 CHRONIC NECK PAIN WITH ABNORMAL NEUROLOGIC EXAMINATION: ICD-10-CM

## 2021-01-11 DIAGNOSIS — M48.02 SPINAL STENOSIS IN CERVICAL REGION: ICD-10-CM

## 2021-01-11 RX ORDER — PREGABALIN 75 MG/1
CAPSULE ORAL
Qty: 90 CAPSULE | Refills: 0 | Status: SHIPPED | OUTPATIENT
Start: 2021-01-11 | End: 2021-02-01 | Stop reason: SDUPTHER

## 2021-01-11 NOTE — TELEPHONE ENCOUNTER
Please Approve or Refuse.   Send to Pharmacy per Pt's Request:      Next Visit Date:  1/15/2021   Last Visit Date: 1/4/2021    Hemoglobin A1C (%)   Date Value   06/10/2020 5.7   05/21/2017 5.3   04/10/2017 5.8             ( goal A1C is < 7)   BP Readings from Last 3 Encounters:   10/19/20 (!) 146/84   10/07/20 130/73   09/19/20 124/65          (goal 120/80)  BUN   Date Value Ref Range Status   08/14/2020 13 6 - 20 mg/dL Final     CREATININE   Date Value Ref Range Status   08/14/2020 0.70 0.50 - 0.90 mg/dL Final     Potassium   Date Value Ref Range Status   08/14/2020 4.5 3.7 - 5.3 mmol/L Final

## 2021-01-29 VITALS — BODY MASS INDEX: 34.96 KG/M2 | WEIGHT: 190 LBS | HEIGHT: 62 IN

## 2021-01-29 ASSESSMENT — PATIENT HEALTH QUESTIONNAIRE - PHQ9
SUM OF ALL RESPONSES TO PHQ QUESTIONS 1-9: 2
1. LITTLE INTEREST OR PLEASURE IN DOING THINGS: 1
SUM OF ALL RESPONSES TO PHQ9 QUESTIONS 1 & 2: 2
SUM OF ALL RESPONSES TO PHQ QUESTIONS 1-9: 2

## 2021-01-29 NOTE — PROGRESS NOTES
Visit Information    Have you changed or started any medications since your last visit including any over-the-counter medicines, vitamins, or herbal medicines? no   Have you stopped taking any of your medications? Is so, why? -  yes - Marisol Malone  Are you having any side effects from any of your medications? - no    Have you seen any other physician or provider since your last visit?  no   Have you had any other diagnostic tests since your last visit?  no   Have you been seen in the emergency room and/or had an admission in a hospital since we last saw you?  yes - 01/06/2021   Have you had your routine dental cleaning in the past 6 months?  no     Do you have an active MyChart account? If no, what is the barrier?   Yes    Patient Care Team:  KATIE Flores CNP as PCP - General (Family Medicine)  K[facility] Flores [facility]  Chantale Augustine MD as Consulting Physician (Gastroenterology)  Светлана Card MD as Obstetrician (Perinatology)  Christian Kennedy MD as Consulting Physician (Obstetrics & Gynecology)    Medical History Review  Past Medical, Family, and Social History reviewed and does contribute to the patient presenting condition    Health Maintenance   Topic Date Due    Varicella vaccine (1 of 2 - 2-dose childhood series) 05/03/1983    Annual Wellness Visit (AWV)  06/23/2019    A1C test (Diabetic or Prediabetic)  06/10/2021    Cervical cancer screen  03/09/2025    DTaP/Tdap/Td vaccine (2 - Td) 09/19/2030    Flu vaccine  Completed    Pneumococcal 0-64 years Vaccine  Completed    Hepatitis C screen  Completed    HIV screen  Completed    Hepatitis A vaccine  Aged Out    Hepatitis B vaccine  Aged Out    Hib vaccine  Aged Out    Meningococcal (ACWY) vaccine  Aged Out

## 2021-02-01 ENCOUNTER — VIRTUAL VISIT (OUTPATIENT)
Dept: FAMILY MEDICINE CLINIC | Age: 41
End: 2021-02-01
Payer: COMMERCIAL

## 2021-02-01 DIAGNOSIS — Z11.59 SCREENING FOR VIRAL DISEASE: ICD-10-CM

## 2021-02-01 DIAGNOSIS — Z00.00 ENCOUNTER FOR ANNUAL WELLNESS EXAM IN MEDICARE PATIENT: ICD-10-CM

## 2021-02-01 DIAGNOSIS — G89.29 CHRONIC NECK PAIN WITH ABNORMAL NEUROLOGIC EXAMINATION: ICD-10-CM

## 2021-02-01 DIAGNOSIS — M54.2 CHRONIC NECK PAIN WITH ABNORMAL NEUROLOGIC EXAMINATION: ICD-10-CM

## 2021-02-01 DIAGNOSIS — F31.32 BIPOLAR AFFECTIVE DISORDER, CURRENTLY DEPRESSED, MODERATE (HCC): ICD-10-CM

## 2021-02-01 DIAGNOSIS — M48.02 SPINAL STENOSIS IN CERVICAL REGION: ICD-10-CM

## 2021-02-01 DIAGNOSIS — K59.1 FUNCTIONAL DIARRHEA: Primary | ICD-10-CM

## 2021-02-01 PROCEDURE — G8427 DOCREV CUR MEDS BY ELIG CLIN: HCPCS | Performed by: FAMILY MEDICINE

## 2021-02-01 PROCEDURE — G0438 PPPS, INITIAL VISIT: HCPCS | Performed by: FAMILY MEDICINE

## 2021-02-01 PROCEDURE — 4004F PT TOBACCO SCREEN RCVD TLK: CPT | Performed by: FAMILY MEDICINE

## 2021-02-01 PROCEDURE — G8482 FLU IMMUNIZE ORDER/ADMIN: HCPCS | Performed by: FAMILY MEDICINE

## 2021-02-01 PROCEDURE — G8417 CALC BMI ABV UP PARAM F/U: HCPCS | Performed by: FAMILY MEDICINE

## 2021-02-01 PROCEDURE — 99212 OFFICE O/P EST SF 10 MIN: CPT | Performed by: FAMILY MEDICINE

## 2021-02-01 RX ORDER — PREGABALIN 100 MG/1
CAPSULE ORAL
Qty: 90 CAPSULE | Refills: 0 | Status: SHIPPED | OUTPATIENT
Start: 2021-02-01 | End: 2021-02-22 | Stop reason: SDUPTHER

## 2021-02-01 RX ORDER — DICYCLOMINE HYDROCHLORIDE 10 MG/1
10 CAPSULE ORAL 4 TIMES DAILY
Qty: 180 CAPSULE | Refills: 1 | Status: SHIPPED | OUTPATIENT
Start: 2021-02-01 | End: 2021-03-30

## 2021-02-01 RX ORDER — PROMETHAZINE HYDROCHLORIDE 6.25 MG/5ML
12.5 SYRUP ORAL 4 TIMES DAILY PRN
Qty: 120 ML | Refills: 2 | Status: SHIPPED | OUTPATIENT
Start: 2021-02-01 | End: 2021-02-25

## 2021-02-01 NOTE — PATIENT INSTRUCTIONS
Advance Directives: Care Instructions  Overview  An advance directive is a legal way to state your wishes at the end of your life. It tells your family and your doctor what to do if you can't say what you want. There are two main types of advance directives. You can change them any time your wishes change. Living will. This form tells your family and your doctor your wishes about life support and other treatment. The form is also called a declaration. Medical power of . This form lets you name a person to make treatment decisions for you when you can't speak for yourself. This person is called a health care agent (health care proxy, health care surrogate). The form is also called a durable power of  for health care. If you do not have an advance directive, decisions about your medical care may be made by a family member, or by a doctor or a  who doesn't know you. It may help to think of an advance directive as a gift to the people who care for you. If you have one, they won't have to make tough decisions by themselves. Follow-up care is a key part of your treatment and safety. Be sure to make and go to all appointments, and call your doctor if you are having problems. It's also a good idea to know your test results and keep a list of the medicines you take. What should you include in an advance directive? Many states have a unique advance directive form. (It may ask you to address specific issues.) Or you might use a universal form that's approved by many states. If your form doesn't tell you what to address, it may be hard to know what to include in your advance directive. Use the questions below to help you get started. · Who do you want to make decisions about your medical care if you are not able to? · What life-support measures do you want if you have a serious illness that gets worse over time or can't be cured? · What are you most afraid of that might happen? Keep these facts in mind about living proctor. · Your living will is used only if you can't speak or make decisions for yourself. Most often, one or more doctors must certify that you can't speak or decide for yourself before your living will takes effect. · If you get better and can speak for yourself again, you can accept or refuse any treatment. It doesn't matter what you said in your living will. · Some states may limit your right to refuse treatment in certain cases. For example, you may need to clearly state in your living will that you don't want artificial hydration and nutrition, such as being fed through a tube. Is a living will a legal document? A living will is a legal document. Each state has its own laws about living proctor. And a living will may be called something else in your state. Here are some things to know about living proctor. · You don't need an  to complete a living will. But legal advice can be helpful if your state's laws are unclear. It can also help if your health history is complicated or your family can't agree on what should be in your living will. · You can change your living will at any time. Some people find that their wishes about end-of-life care change as their health changes. If you make big changes to your living will, complete a new form. · If you move to another state, make sure that your living will is legal in the state where you now live. In most cases, doctors will respect your wishes even if you have a form from a different state. · You might use a universal form that has been approved by many states. This kind of form can sometimes be filled out and stored online. Your digital copy will then be available wherever you have a connection to the internet. The doctors and nurses who need to treat you can find it right away. · Your state may offer an online registry. This is another place where you can store your living will online.   · It's a good idea to get your living will notarized. This means using a person called a  to watch two people sign, or witness, your living will. What should you know when you create a living will? Here are some questions to ask yourself as you make your living will:  · Do you know enough about life support methods that might be used? If not, talk to your doctor so you know what might be done if you can't breathe on your own, your heart stops, or you can't swallow. · What things would you still want to be able to do after you receive life-support methods? Would you want to be able to walk? To speak? To eat on your own? To live without the help of machines? · Do you want certain Catholic practices performed if you become very ill? · If you have a choice, where do you want to be cared for? In your home? At a hospital or nursing home? · If you have a choice at the end of your life, where would you prefer to die? At home? In a hospital or nursing home? Somewhere else? · Would you prefer to be buried or cremated? · Do you want your organs to be donated after you die? What should you do with your living will? · Make sure that your family members and your health care agent have copies of your living will (also called a declaration). · Give your doctor a copy of your living will. Ask him or her to keep it as part of your medical record. If you have more than one doctor, make sure that each one has a copy. · Put a copy of your living will where it can be easily found. For example, some people may put a copy on their refrigerator door. If you are using a digital copy, be sure your doctor, family members, and health care agent know how to find and access it. Where can you learn more? Go to https://chpejosepheweb.[employer]. org and sign in to your Akanoo account. Enter G379 in the Ensenda box to learn more about \"Learning About Living Perroy. \"     If you do not have an account, please click on the \"Sign Up Now\" link. Current as of: December 9, 2019               Content Version: 12.6  © 1016-3405 "Diagnotes[employer] Inc.". Care instructions adapted under license by [address] If you have questions about a medical condition or this instruction, always ask your healthcare professional. [address] any warranty or liability for your use of this information. Body Mass Index: Care Instructions  Your Care Instructions     Body mass index (BMI) can help you see if your weight is raising your risk for health problems. It uses a formula to compare how much you weigh with how tall you are. · A BMI lower than 18.5 is considered underweight. · A BMI between 18.5 and 24.9 is considered healthy. · A BMI between 25 and 29.9 is considered overweight. A BMI of 30 or higher is considered obese. If your BMI is in the normal range, it means that you have a lower risk for weight-related health problems. If your BMI is in the overweight or obese range, you may be at increased risk for weight-related health problems, such as high blood pressure, heart disease, stroke, arthritis or joint pain, and diabetes. If your BMI is in the underweight range, you may be at increased risk for health problems such as fatigue, lower protection (immunity) against illness, muscle loss, bone loss, hair loss, and hormone problems. BMI is just one measure of your risk for weight-related health problems. You may be at higher risk for health problems if you are not active, you eat an unhealthy diet, or you drink too much alcohol or use tobacco products. Follow-up care is a key part of your treatment and safety. Be sure to make and go to all appointments, and call your doctor if you are having problems. It's also a good idea to know your test results and keep a list of the medicines you take. How can you care for yourself at home? · Practice healthy eating habits.  This includes eating plenty of fruits, vegetables, whole grains, lean protein, and low-fat dairy. · If your doctor recommends it, get more exercise. Walking is a good choice. Bit by bit, increase the amount you walk every day. Try for at least 30 minutes on most days of the week. · Do not smoke. Smoking can increase your risk for health problems. If you need help quitting, talk to your doctor about stop-smoking programs and medicines. These can increase your chances of quitting for good. · Limit alcohol to 2 drinks a day for men and 1 drink a day for women. Too much alcohol can cause health problems. If you have a BMI higher than 25  · Your doctor may do other tests to check your risk for weight-related health problems. This may include measuring the distance around your waist. A waist measurement of more than 40 inches in men or 35 inches in women can increase the risk of weight-related health problems. · Talk with your doctor about steps you can take to stay healthy or improve your health. You may need to make lifestyle changes to lose weight and stay healthy, such as changing your diet and getting regular exercise. If you have a BMI lower than 18.5  · Your doctor may do other tests to check your risk for health problems. · Talk with your doctor about steps you can take to stay healthy or improve your health. You may need to make lifestyle changes to gain or maintain weight and stay healthy, such as getting more healthy foods in your diet and doing exercises to build muscle. Where can you learn more? Go to https://shannon.healthSynchropartners. org and sign in to your Weever Apps account. Enter S176 in the [facility] box to learn more about \"Body Mass Index: Care Instructions. \"     If you do not have an account, please click on the \"Sign Up Now\" link. Current as of: December 11, 2019               Content Version: 12.6  © 6970-4606 reKode Education, Incorporated. Care instructions adapted under license by [facility] ([facility]).  If you have questions about a medical condition or this instruction, always ask your healthcare professional. [address] any warranty or liability for your use of this information. DASH Diet: Care Instructions  Your Care Instructions     The DASH diet is an eating plan that can help lower your blood pressure. DASH stands for Dietary Approaches to Stop Hypertension. Hypertension is high blood pressure. The DASH diet focuses on eating foods that are high in calcium, potassium, and magnesium. These nutrients can lower blood pressure. The foods that are highest in these nutrients are fruits, vegetables, low-fat dairy products, nuts, seeds, and legumes. But taking calcium, potassium, and magnesium supplements instead of eating foods that are high in those nutrients does not have the same effect. The DASH diet also includes whole grains, fish, and poultry. The DASH diet is one of several lifestyle changes your doctor may recommend to lower your high blood pressure. Your doctor may also want you to decrease the amount of sodium in your diet. Lowering sodium while following the DASH diet can lower blood pressure even further than just the DASH diet alone. Follow-up care is a key part of your treatment and safety. Be sure to make and go to all appointments, and call your doctor if you are having problems. It's also a good idea to know your test results and keep a list of the medicines you take. How can you care for yourself at home? Following the DASH diet  · Eat 4 to 5 servings of fruit each day. A serving is 1 medium-sized piece of fruit, ½ cup chopped or canned fruit, 1/4 cup dried fruit, or 4 ounces (½ cup) of fruit juice. Choose fruit more often than fruit juice. · Eat 4 to 5 servings of vegetables each day. A serving is 1 cup of lettuce or raw leafy vegetables, ½ cup of chopped or cooked vegetables, or 4 ounces (½ cup) of vegetable juice. Choose vegetables more often than vegetable juice.   · Get 2 to 3 servings of low-fat and fat-free dairy each day. A serving is 8 ounces of milk, 1 cup of yogurt, or 1 ½ ounces of cheese. · Eat 6 to 8 servings of grains each day. A serving is 1 slice of bread, 1 ounce of dry cereal, or ½ cup of cooked rice, pasta, or cooked cereal. Try to choose whole-grain products as much as possible. · Limit lean meat, poultry, and fish to 2 servings each day. A serving is 3 ounces, about the size of a deck of cards. · Eat 4 to 5 servings of nuts, seeds, and legumes (cooked dried beans, lentils, and split peas) each week. A serving is 1/3 cup of nuts, 2 tablespoons of seeds, or ½ cup of cooked beans or peas. · Limit fats and oils to 2 to 3 servings each day. A serving is 1 teaspoon of vegetable oil or 2 tablespoons of salad dressing. · Limit sweets and added sugars to 5 servings or less a week. A serving is 1 tablespoon jelly or jam, ½ cup sorbet, or 1 cup of lemonade. · Eat less than 2,300 milligrams (mg) of sodium a day. If you limit your sodium to 1,500 mg a day, you can lower your blood pressure even more. Tips for success  · Start small. Do not try to make dramatic changes to your diet all at once. You might feel that you are missing out on your favorite foods and then be more likely to not follow the plan. Make small changes, and stick with them. Once those changes become habit, add a few more changes. · Try some of the following:  ? Make it a goal to eat a fruit or vegetable at every meal and at snacks. This will make it easy to get the recommended amount of fruits and vegetables each day. ? Try yogurt topped with fruit and nuts for a snack or healthy dessert. ? Add lettuce, tomato, cucumber, and onion to sandwiches. ? Combine a ready-made pizza crust with low-fat mozzarella cheese and lots of vegetable toppings. Try using tomatoes, squash, spinach, broccoli, carrots, cauliflower, and onions. ?  Have a variety of cut-up vegetables with a low-fat dip as an appetizer instead of chips and dip. ? Sprinkle sunflower seeds or chopped almonds over salads. Or try adding chopped walnuts or almonds to cooked vegetables. ? Try some vegetarian meals using beans and peas. Add garbanzo or kidney beans to salads. Make burritos and tacos with mashed miller beans or black beans. Where can you learn more? Go to https://SenseDatapedarlyn.BarBird. org and sign in to your QuEST [employer] Services account. Enter H951 in the [employer] Technology box to learn more about \"DASH Diet: Care Instructions. \"     If you do not have an account, please click on the \"Sign Up Now\" link. Current as of: December 16, 2019               Content Version: 12.6  © 4598-3252 [a]list games, Fannect. Care instructions adapted under license by [facility] ([facility]). If you have questions about a medical condition or this instruction, always ask your healthcare professional. [place] [address] any warranty or liability for your use of this information. Learning About Healthy Weight  What is a healthy weight? A healthy weight is the weight at which you feel good about yourself and have energy for work and play. It's also one that lowers your risk for health problems. What can you do to stay at a healthy weight? It can be hard to stay at a healthy weight, especially when fast food, vending-machine snacks, and processed foods are so easy to find. And with your busy lifestyle, activity may be low on your list of things to do. But staying at a healthy weight may be easier than you think. Here are some dos and don'ts for staying at a healthy weight:  Do eat healthy foods  The kinds of foods you eat have a big impact on both your weight and your health. Reaching and staying at a healthy weight is not about going on a diet. It's about making healthier food choices every day and changing your diet for good. Healthy eating means eating a variety of foods so that you get all the nutrients you need.  Your body needs protein, carbohydrate, and fats for energy. They keep your heart beating, your brain active, and your muscles working. On most days, try to eat from each food group. This means eating a variety of:  · Whole grains, such as whole wheat breads and pastas. · Fruits and vegetables. · Dairy products, such as low-fat milk, yogurt, and cheese. · Lean proteins, such as all types of fish, chicken without the skin, and beans. Don't have too much or too little of one thing. All foods, if eaten in moderation, can be part of healthy eating. Even sweets can be okay. If your favorite foods are high in fat, salt, sugar, or calories, limit how often you eat them. Eat smaller servings, or look for healthy substitutes. Do watch what you eat  Many people eat more than their bodies need. Part of staying at a healthy weight means learning how much food you really need from day to day and not eating more than that. Even with healthy foods, eating too much can make you gain weight. Having a well-balanced diet means that you eat enough, but not too much, and that your food gives you the nutrients you need to stay healthy. So listen to your body. Eat when you're hungry. Stop when you feel satisfied. It's a good idea to have healthy snacks ready for when you get hungry. Keep healthy snacks with you at work, in your car, and at home. If you have a healthy snack easily available, you'll be less likely to pick a candy bar or bag of chips from a vending machine instead. Some healthy snacks you might want to keep on hand are fruit, low-fat yogurt, string cheese, low-fat microwave popcorn, raisins and other dried fruit, nuts, whole wheat crackers, pretzels, carrots, celery sticks, and broccoli. Do some physical activity   A big part of reaching and staying at a healthy weight is being active. When you're active, you burn calories. This makes it easier to reach and stay at a healthy weight.  When you're active on a regular basis, your body burns more calories, even when you're at rest. Being active helps you lose fat and build lean muscle. Try to be active for at least 1 hour every day. This may sound like a lot, but it's okay to be active in smaller blocks of time that add up to 1 hour a day. Any activity that makes your heart beat faster and keeps it there for a while counts. A brisk walk, run, or swim will get your heart beating faster. So will climbing stairs, shooting baskets, or cycling. Even some household chores like vacuuming and mowing the lawn will get your heart rate up. Pick activities that you enjoyones that make your heart beat faster, your muscles stronger, and your muscles and joints more flexible. If you find more than one thing you like doing, do them all. You don't have to do the same thing every day. Don't diet  Diets don't work. Diets are temporary. Because you give up so much when you diet, you may be hungry and think about food all the time. And after you stop dieting, you also may overeat to make up for what you missed. Most people who diet end up gaining back the pounds they lostand more. Remember that healthy bodies come in lots of shapes and sizes. Everyone can get healthier by eating better and being more active. Where can you learn more? Go to https://Inventure Cloudcoco.Vickers Electronics. org and sign in to your Shaser account. Enter 716 [id number] in the KylesWelltheon box to learn more about \"Learning About Healthy Weight. \"     If you do not have an account, please click on the \"Sign Up Now\" link. Current as of: December 11, 2019               Content Version: 12.6  © 9854-5407 [employer] Network Technologies, Incorporated. Care instructions adapted under license by [facility] ([facility]). If you have questions about a medical condition or this instruction, always ask your healthcare professional. [address] any warranty or liability for your use of this information.            Learning About Low-Carbohydrate Diets  What is a low-carbohydrate diet? A low-carbohydrate (or \"low-carb\") diet limits foods and drinks that have carbohydrates. This includes grains, fruits, milk and yogurt, and starchy vegetables like potatoes, beans, and corn. It also avoids foods and drinks that have added sugar. Instead, low-carb diets include foods that are high in protein and fat. Why might you follow a low-carb diet? Low-carb diets may be used for a variety of reasons, such as for weight loss. People who have diabetes may use a low-carb diet to help manage their blood sugar levels. What should you do before you start the diet? Talk to your doctor before you try any diet. This is even more important if you have health problems like kidney disease, heart disease, or diabetes. Your doctor may suggest that you meet with a registered dietitian. A dietitian can help you make an eating plan that works for you. What foods do you eat on a low-carb diet? On a low-carb diet, you choose foods that are high in protein and fat. Examples of these are:  · Meat, poultry, and fish. · Eggs. · Nuts, such as walnuts, pecans, almonds, and peanuts. · Peanut butter and other nut butters. · Tofu. · Avocado. · Clevester December. · Non-starchy vegetables like broccoli, cauliflower, green beans, mushrooms, peppers, lettuce, and spinach. · Unsweetened non-dairy milks like almond milk and coconut milk. · Cheese, cottage cheese, and cream cheese. Current as of: August 22, 2019               Content Version: 12.6  © 8085-7072 Well Done, Incorporated. Care instructions adapted under license by [facility] ([facility]). If you have questions about a medical condition or this instruction, always ask your healthcare professional. [place] [address] any warranty or liability for your use of this information. Learning About Benefits From Quitting Smoking  How does quitting smoking make you healthier?      If you're thinking about quitting smoking, you may have a few reasons to be smoke-free. Your health may be one of them. · When you quit smoking, you lower your risks for cancer, lung disease, heart attack, stroke, blood vessel disease, and blindness from macular degeneration. · When you're smoke-free, you get sick less often, and you heal faster. You are less likely to get colds, flu, bronchitis, and pneumonia. · As a nonsmoker, you may find that your mood is better and you are less stressed. When and how will you feel healthier? Quitting has real health benefits that start from day 1 of being smoke-free. And the longer you stay smoke-free, the healthier you get and the better you feel. The first hours  · After just 20 minutes, your blood pressure and heart rate go down. That means there's less stress on your heart and blood vessels. · Within 12 hours, the level of carbon monoxide in your blood drops back to normal. That makes room for more oxygen. With more oxygen in your body, you may notice that you have more energy than when you smoked. After 2 weeks  · Your lungs start to work better. · Your risk of heart attack starts to drop. After 1 month  · When your lungs are clear, you cough less and breathe deeper, so it's easier to be active. · Your sense of taste and smell return. That means you can enjoy food more than you have since you started smoking. Over the years  · Over the years, your risks of heart disease, heart attack, and stroke are lower. · After 10 years, your risk of dying from lung cancer is cut by about half. And your risk for many other types of cancer is lower too. How would quitting help others in your life? When you quit smoking, you improve the health of everyone who now breathes in your smoke. · Their heart, lung, and cancer risks drop, much like yours. · They are sick less. For babies and small children, living smoke-free means they're less likely to have ear infections, pneumonia, and bronchitis.   · If you're a woman who is or will be pregnant someday, quitting smoking means a healthier . · Children who are close to you are less likely to become adult smokers. Where can you learn more? Go to https://chpepiceweb.Nine Star. org and sign in to your Autowattst account. Enter [id number] in the [facility] box to learn more about \"Learning About Benefits From Quitting Smoking. \"     If you do not have an account, please click on the \"Sign Up Now\" link. Current as of: 2020               Content Version: 12.6  © 2971-0872 Colored Solar, Incorporated. Care instructions adapted under license by [facility] ([facility]). If you have questions about a medical condition or this instruction, always ask your healthcare professional. [address] any warranty or liability for your use of this information. Personalized Preventive Plan for Daphne Heath - 2021  Medicare offers a range of preventive health benefits. Some of the tests and screenings are paid in full while other may be subject to a deductible, co-insurance, and/or copay. Some of these benefits include a comprehensive review of your medical history including lifestyle, illnesses that may run in your family, and various assessments and screenings as appropriate. After reviewing your medical record and screening and assessments performed today your provider may have ordered immunizations, labs, imaging, and/or referrals for you. A list of these orders (if applicable) as well as your Preventive Care list are included within your After Visit Summary for your review. Other Preventive Recommendations:    · A preventive eye exam performed by an eye specialist is recommended every 1-2 years to screen for glaucoma; cataracts, macular degeneration, and other eye disorders. · A preventive dental visit is recommended every 6 months. · Try to get at least 150 minutes of exercise per week or 10,000 steps per day on a pedometer .   · Order or download the FREE \"Exercise & Physical Activity: Your Everyday Guide\" from [employer]. Call 8-408.309.4913 or search [employer] online. · You need 1648-9052 mg of calcium and 9195-0163 IU of vitamin D per day. It is possible to meet your calcium requirement with diet alone, but a vitamin D supplement is usually necessary to meet this goal.  · When exposed to the sun, use a sunscreen that protects against both UVA and UVB radiation with an SPF of 30 or greater. Reapply every 2 to 3 hours or after sweating, drying off with a towel, or swimming. · Always wear a seat belt when traveling in a car. Always wear a helmet when riding a bicycle or motorcycle.

## 2021-02-01 NOTE — LETTER
[facility]  [address] [place], [place] [address] [facility] [address]  Phone: [phone]  Fax: [phone]    KATIE Richards CNP        February 1, 2021     Patient: Antonio Garcia   YOB: 1980   Date of Visit: 2/1/2021       To Whom it May Concern:    Keyanna Jordan was seen in my clinic on 2/1/2021. She may return to school on Feb 2, 20201. Please excuse her today. If you have any questions or concerns, please don't hesitate to call.     Sincerely,         KATIE Cole CNP

## 2021-02-01 NOTE — PROGRESS NOTES
[address]   [facility] [address]  [address] [place] [address]  Dept: [phone]     Medicare Annual Wellness Visit  Name: Katya Ruiz Date: 2021   MRN: [id number] Sex: Female   Age: 47 y.o. Ethnicity: Non-/Non    : 1980 Race: Genesis Cherry is here for Medicare AWV, Hip Pain (SCIATIC NERVE RIGHT SIDE/ INJURED LEG A FEW MONTHS AGO), and Discuss Medications (LYRICA DOSAGE)    Screenings for behavioral, psychosocial and functional/safety risks, and cognitive dysfunction are all negative except as indicated below. These results, as well as other patient data from the [address] [facility], are documented in Flowsheets linked to this Encounter. Allergies   Allergen Reactions    Nsaids      Irritates her Barrets esophogus    Toradol [Ketorolac Tromethamine]     Ultram [Tramadol] Nausea Only     Gastric upset         Prior to Visit Medications    Medication Sig Taking?  Authorizing Provider   promethazine (PHENERGAN) 6.25 MG/5ML syrup Take 10 mLs by mouth 4 times daily as needed for Nausea (diarrhea) Yes KATIE Cole CNP   dicyclomine (BENTYL) 10 MG capsule Take 1 capsule by mouth 4 times daily Yes KATIE Cole CNP   pregabalin (LYRICA) 100 MG capsule TAKE 1 CAPSULE BY MOUTH 3 TIMES DAILY Yes KATIE Cole CNP   sertraline (ZOLOFT) 50 MG tablet Take 1 tablet by mouth daily Yes Historical Provider, MD   BREO ELLIPCHELA 200-25 MCG/INH AEPB inhaler INHALE ONE PUFF BY MOUTH ONCE A DAY ONCE A DAY INHALATION 30 Yes KATIE Cole CNP   ipratropium-albuterol (DUONEB) 0.5-2.5 (3) MG/3ML SOLN nebulizer solution Inhale 3 mLs into the lungs every 4 hours Yes KATIE Cole CNP   cetirizine (ZYRTEC) 10 MG tablet Take 1 tablet by mouth daily Yes KATIE Cole CNP   lamoTRIgine (LAMICTAL) 150 MG tablet Take 1 tablet by mouth nightly Yes Historical Provider, MD vitamin D3 (CHOLECALCIFEROL) 25 MCG (1000 UT) TABS tablet TAKE 1 TABLET BY MOUTH DAILY Yes KATIE Cole CNP   LINZESS 290 MCG CAPS capsule TAKE 1 CAPSULE BY MOUTH EVERY MORNING (BEFORE BREAKFAST) Yes Chantale Augustine MD   pantoprazole (PROTONIX) 40 MG tablet TAKE ONE TABLET TWICE A DAY ORALLY 30 DAY(S) Yes Chantale Augustine MD   Masks (CLEVER CHOICE FACE MASK) [facility] USE NEW FACE MASK EVERYDAY WHEN PATIENT GO OUTSIDE OF HOME DUE TO COVID PANDEMIC Yes KATIE Cole CNP   acetaminophen (TYLENOL) 500 MG tablet Take 500 mg by mouth every 6 hours as needed for Pain Yes Historical Provider, MD   MISC NATURAL PRODUCT OP Take 2 tablets by mouth 2 times daily hydroxycut max-weight loss supplement Yes Historical Provider, MD   blood glucose monitor strips Test 2-3 times a day & as needed for symptoms of irregular blood glucose. BRAND OF CHOICE INSURANCE ALLOWS.  Yes KATIE Cole CNP   Lancets MISC 1 each by Does not apply route 2 times daily Yes KATIE Cole CNP   Alcohol Swabs (ALCOHOL PREP) PADS Use as directed Yes KATIE Cole CNP   ipratropium-albuterol (DUONEB) 0.5-2.5 (3) MG/3ML SOLN nebulizer solution Inhale 1 vial into the lungs 4 times daily Yes Historical Provider, MD   montelukast (SINGULAIR) 10 MG tablet Take 1 tablet by mouth nightly Yes Chelly Verdin MD   vitamin D3 (CHOLECALCIFEROL) 25 MCG (1000 UT) TABS tablet TAKE 1 TABLET BY MOUTH DAILY  KATIE Cole CNP         Past Medical History:   Diagnosis Date    Anxiety     Arthritis     OA    Asthma     Sadler's esophagus     LONG SEGMENT    Bipolar 1 disorder (HCC)     CAD (coronary artery disease)     Chronic insomnia     COPD [facility]chronic obstructive pulmonary disease) [facility]     Depression     and bipolar    Diabetes mellitus ([facility])     prediabetic    Endometriosis 3/9/2020    Esophagitis     severe    GERD (gastroesophageal reflux disease)     Headache(784.0)     Herniated disc, cervical     Hiatal hernia     Incisional abscess 1/15/2019    Kidney stones     MDRO (multiple drug resistant organisms) resistance     mrsa    Pleurisy     hx of \"years ago\"    Pneumonia     past penumonia    Seizures ([address]     2016 last seizure-focal seizure    UTI (urinary tract infection)     Vision abnormalities     wears glasses       Past Surgical History:   Procedure Laterality Date    CERVICAL DISC SURGERY      x2     SECTION  , 2018    x 2     SECTION N/A 2018     SECTION performed by Bryn Sicard, MD at [facility] OR    COLONOSCOPY N/A 10/1/2019    COLONOSCOPY DIAGNOSTIC ABORTED performed by Lizet Álvarez MD at [address] [facility] N/A 2020    COLONOSCOPY WITH BIOPSY performed by Lizet Álvarez MD at [address] [place] [address], COLON, DIAGNOSTIC      HYSTERECTOMY N/A 2020    HYSTERECTOMY ABDOMINAL LAPAROSCOPIC ROBOTIC XI W/ CYSTOSCOPY performed by Bryn Sicard, MD at [address] ARTHROSCOPY Left 5/20/15    KNEE SURGERY Left     x3    LAPAROSCOPY      dr Kaitlin Mann N/A 10/5/2017    LAPAROSCOPIC REMOVAL INTRA ABDOMINAL LIPOMA LOWER RIGHT performed by Frances Easton DO at [address] ESOPHAGOGASTRODUODENOSCOPY TRANSORAL DIAGNOSTIC N/A 3/2/2017    EGD ESOPHAGOGASTRODUODENOSCOPY  IP 5 performed by Lita Ivey MD at [facility]  2016    severe esophagitis    UPPER GASTROINTESTINAL ENDOSCOPY  2017    barretts; hiatus hernia; gastritis    UPPER GASTROINTESTINAL ENDOSCOPY  2017    long seg mullins's with linear erosions, esophagitis, small hiatal hernia    UPPER GASTROINTESTINAL ENDOSCOPY N/A 2018    MULLINS'S    UPPER GASTROINTESTINAL ENDOSCOPY N/A 10/1/2019    EGD BIOPSY performed by Lizet Álvarez MD at [address]           Family History   Problem Relation Age of Onset    Cancer Mother breast    Bipolar Disorder Mother     Hypertension Mother     Breast Cancer Mother     Bipolar Disorder Brother     Hypertension Father        CareTeam (Including outside providers/suppliers regularly involved in providing care):   Patient Care Team:  KATIE Dai CNP as PCP - General (Family Medicine)  KATIE Dai CNP as PCP - [facility][1] Empaneled Provider  Cindy Kurtz MD as Consulting Physician (Gastroenterology)  Mary Dykes MD as Obstetrician (Perinatology)  Elena Plunkett MD as Consulting Physician (Obstetrics & Gynecology)    Wt Readings from Last 3 Encounters:   01/29/21 190 lb (86.2 kg)   01/04/21 206 lb (93.4 kg)   10/07/20 200 lb (90.7 kg)     Vitals:    01/29/21 1343   Weight: 190 lb (86.2 kg)   Height: 5' 2\" (1.575 m)     Body mass index is 34.75 kg/m². Based upon direct observation of the patient, evaluation of cognition reveals recent and remote memory intact. virtual    Patient's complete Health Risk Assessment and screening values have been reviewed and are found in Flowsheets. The following problems were reviewed today and where indicated follow up appointments were made and/or referrals ordered. Positive Risk Factor Screenings with Interventions:         Substance History:  Social History     Tobacco History     Smoking Status  Current Every Day Smoker Smoking Frequency  0.5 packs/day for 21 years (10.5 pk yrs) Smoking Tobacco Type  Cigarettes    Smokeless Tobacco Use  Never Used          Alcohol History     Alcohol Use Status  No          Drug Use     Drug Use Status  No          Sexual Activity     Sexually Active  Yes Partners  Male               Alcohol Screening:       A score of 8 or more is associated with harmful or hazardous drinking. A score of 13 or more in women, and 15 or more in men, is likely to indicate alcohol dependence.   Substance Abuse Interventions:  · Tobacco abuse:  tobacco cessation tips and resources provided    General Health and ACP:  General  In general, how would you say your health is?: (!) Poor  In the past 7 days, have you experienced any of the following?  New or Increased Pain, New or Increased Fatigue, Loneliness, Social Isolation, Stress or Anger?: (!) New or Increased Pain, Stress  Do you get the social and emotional support that you need?: Yes  Do you have a Living Will?: (!) No  Advance Directives     Power of  Living Will ACP-Advance Directive ACP-Power of     Not on File Not on File Not on File Not on File      General Health Risk Interventions:  · Poor self-assessment of health status: Patient is established with [employer]  · Pain issues: home exercises provided  · Fatigue: regular exercise recommended- 3-5 times per week, 30-45 minutes per session  · Loneliness: patient's comments regarding inadequate social support: [employer] therapy  · Stress: regular exercise recommended- 3-5 times per week, 30-45 minutes per session    Health Habits/Nutrition:  Health Habits/Nutrition  Do you exercise for at least 20 minutes 2-3 times per week?: (!) No  Have you lost any weight without trying in the past 3 months?: No  Do you eat fewer than 2 meals per day?: No  Have you seen a dentist within the past year?: Yes  Body mass index: (!) 34.75  Health Habits/Nutrition Interventions:  · Inadequate physical activity:  patient agrees to exercise for at least 150 minutes/week    Hearing/Vision:  No exam data present  Hearing/Vision  Do you or your family notice any trouble with your hearing?: No  Do you have difficulty driving, watching TV, or doing any of your daily activities because of your eyesight?: No  Have you had an eye exam within the past year?: (!) No  Hearing/Vision Interventions:  · none    Safety:  Safety  Do you have working smoke detectors?: Yes  Have all throw rugs been removed or fastened?: Yes  Do you have non-slip mats or surfaces in all bathtubs/showers?: Yes  Do all of your stairways have a railing or banister?: (!) No(NO STAIRS)  Are your doorways, halls and stairs free of clutter?: Yes  Do you always fasten your seatbelt when you are in a car?: Yes  Safety Interventions:  · Home safety tips provided  · Patient declines any further evaluation/treatment for this issue     Personalized Preventive Plan   Current Health Maintenance Status  Immunization History   Administered Date(s) Administered    Influenza A (R5Y8-55) Vaccine PF IM 01/20/2010    Influenza Virus Vaccine 12/12/2016    Influenza, MDCK Quadv, with preserv IM (Flucelvax 4 yrs and older) 09/30/2020    Influenza, Quadv, 6 mo and older, IM, PF (Flulaval, Fluarix) 09/17/2018    Influenza, Quadv, IM, PF (6 mo and older Fluzone, Flulaval, Fluarix, and 3 yrs and older Afluria) 12/12/2016, 02/28/2017    Pneumococcal Polysaccharide ([id number]) 12/20/2015    Tdap (Boostrix, Adacel) 09/19/2020        Health Maintenance   Topic Date Due    Varicella vaccine (1 of 2 - 2-dose childhood series) 05[birth year]03/1983    Annual Wellness Visit (AWV)  06/23/2019    A1C test (Diabetic or Prediabetic)  06/10/2021    Cervical cancer screen  03/09/2025    DTaP/Tdap/Td vaccine (2 - Td) 09/19/2030    Flu vaccine  Completed    Pneumococcal 0-64 years Vaccine  Completed    Hepatitis C screen  Completed    HIV screen  Completed    Hepatitis A vaccine  Aged Out    Hepatitis B vaccine  Aged Out    Hib vaccine  Aged Out    Meningococcal (ACWY) vaccine  Aged Out     Recommendations for [employer] Due: see orders and patient instructions/AVS.  . Recommended screening schedule for the next 5-10 years is provided to the patient in written form: see Patient Instructions/AVS.    Teresanery Jalildiortoya was seen today for medicare awv, hip pain and discuss medications. Diagnoses and all orders for this visit:    Functional diarrhea  Patient reported some worsening nausea vomiting and diarrhea. Patient normally has some issues with constipation and is on Linzess.   Patient reported symptoms worsening today. She has had this for 2 days. Patient requests refill on Phenergan. I will provide some Bentyl to help with abdominal cramps. Patient is also instructed to go to the ER if symptoms does not get any better. -     promethazine (PHENERGAN) 6.25 MG/5ML syrup; Take 10 mLs by mouth 4 times daily as needed for Nausea (diarrhea)  -     dicyclomine (BENTYL) 10 MG capsule; Take 1 capsule by mouth 4 times daily    Chronic neck pain with abnormal neurologic examination  Known chronic  Worsening  Increase Lyrica 100 from 75 mg  Encouraged to follow-up with neurosurgeon that was referred to from previous visit. -     pregabalin (LYRICA) 100 MG capsule; TAKE 1 CAPSULE BY MOUTH 3 TIMES DAILY    Bipolar affective disorder, currently depressed, moderate (HCC)  Worsening. Started going to [employer] again. Vistaril discontinued and Zoloft started  Patient reported doing better but not fully relieved    Spinal stenosis in cervical region-worsening/increase Lyrica  -     pregabalin (LYRICA) 100 MG capsule; TAKE 1 CAPSULE BY MOUTH 3 TIMES DAILY    Screening for viral disease--recommended  -     Varicella Zoster Antibody, IgG; Future    Encounter for annual wellness exam in Medicare patient    Encouraged to see the eye doctor  Increase physical exercise  Continue to work on healthier choices  Follow-up with Dr. Shashi Olguin due to recurrent abdominal issues  Follow-up with the neurosurgeon due to worsening pain and neuropathy  Follow-up with the neurologist due to migraines  Declined to see a specialist to discuss advance care directives  Declined physical therapy                 Obesity Counseling: Assessed behavioral health risks and factors affecting choice of behavior. Suggested weight control approaches, including dietary changes behavioral modification and follow up plan. Provided educational and support documentation.   Time spent (minutes): 5    Cardiovascular Disease Risk Counseling: Assessed the patient's risk to develop cardiovascular disease and reviewed main risk factors. Reviewed steps to reduce disease risk including:   · Quitting tobacco use, reducing amount smoked, or not starting the habit  · Making healthy food choices  · Being physically active and gradualy increasing activity levels   · Reduce weight and determine a healthy BMI goal  · Monitor blood pressure and treat if higher than 140/90 mmHg  · Maintain blood total cholesterol levels under 5 mmol/l or 190 mg/dl  · Maintain LDL cholesterol levels under 3.0 mmol/l or 115 mg/dl   · Control blood glucose levels  · Consider taking aspirin (75 mg daily), once blood pressure is controlled   Provided a follow up plan. Time spent (minutes): 5    Tobacco Cessation Counseling: Patient advised about behavior change, including information about personal health harms, usage of appropriate cessation measures and benefits of cessation. Time spent (minutes): 5  This note was completed by using the assistance of a speech-recognition program. However, inadvertent computerized transcription errors may be present. Although every effort was made to ensure accuracy, no guarantees can be provided that every mistake has been identified and corrected by editing.   Electronically signed by KATIE Flores CNP on 2/1/21 at 4:59 PM EST

## 2021-02-22 DIAGNOSIS — M54.2 CHRONIC NECK PAIN WITH ABNORMAL NEUROLOGIC EXAMINATION: ICD-10-CM

## 2021-02-22 DIAGNOSIS — G89.29 CHRONIC NECK PAIN WITH ABNORMAL NEUROLOGIC EXAMINATION: ICD-10-CM

## 2021-02-22 DIAGNOSIS — M48.02 SPINAL STENOSIS IN CERVICAL REGION: ICD-10-CM

## 2021-02-23 RX ORDER — PREGABALIN 100 MG/1
CAPSULE ORAL
Qty: 90 CAPSULE | Refills: 0 | Status: SHIPPED | OUTPATIENT
Start: 2021-02-23 | End: 2021-03-24 | Stop reason: SDUPTHER

## 2021-02-24 DIAGNOSIS — K59.1 FUNCTIONAL DIARRHEA: ICD-10-CM

## 2021-02-25 RX ORDER — PROMETHAZINE HYDROCHLORIDE 6.25 MG/5ML
12.5 SYRUP ORAL 4 TIMES DAILY PRN
Qty: 120 ML | Refills: 2 | Status: SHIPPED | OUTPATIENT
Start: 2021-02-25 | End: 2021-04-01 | Stop reason: SDUPTHER

## 2021-02-25 NOTE — TELEPHONE ENCOUNTER
Please Approve or Refuse.   Send to Pharmacy per Pt's Request:  Next Visit Date:  Visit date not found   Last Visit Date: 2/1/2021    Hemoglobin A1C (%)   Date Value   06/10/2020 5.7   05/21/2017 5.3   04/10/2017 5.8             ( goal A1C is < 7)   BP Readings from Last 3 Encounters:   10/19/20 (!) 146/84   10/07/20 130/73   09/19/20 124/65          (goal 120/80)  BUN   Date Value Ref Range Status   08/14/2020 13 6 - 20 mg/dL Final     CREATININE   Date Value Ref Range Status   08/14/2020 0.70 0.50 - 0.90 mg/dL Final     Potassium   Date Value Ref Range Status   08/14/2020 4.5 3.7 - 5.3 mmol/L Final

## 2021-03-02 ENCOUNTER — OFFICE VISIT (OUTPATIENT)
Dept: NEUROSURGERY | Age: 41
End: 2021-03-02
Payer: COMMERCIAL

## 2021-03-02 VITALS
DIASTOLIC BLOOD PRESSURE: 81 MMHG | BODY MASS INDEX: 37.36 KG/M2 | WEIGHT: 203 LBS | HEIGHT: 62 IN | SYSTOLIC BLOOD PRESSURE: 124 MMHG | HEART RATE: 72 BPM

## 2021-03-02 DIAGNOSIS — M47.12 CERVICAL SPONDYLOSIS WITH MYELOPATHY: Primary | ICD-10-CM

## 2021-03-02 PROCEDURE — G8417 CALC BMI ABV UP PARAM F/U: HCPCS | Performed by: NEUROLOGICAL SURGERY

## 2021-03-02 PROCEDURE — G8427 DOCREV CUR MEDS BY ELIG CLIN: HCPCS | Performed by: NEUROLOGICAL SURGERY

## 2021-03-02 PROCEDURE — 4004F PT TOBACCO SCREEN RCVD TLK: CPT | Performed by: NEUROLOGICAL SURGERY

## 2021-03-02 PROCEDURE — G8482 FLU IMMUNIZE ORDER/ADMIN: HCPCS | Performed by: NEUROLOGICAL SURGERY

## 2021-03-02 PROCEDURE — 99203 OFFICE O/P NEW LOW 30 MIN: CPT | Performed by: NEUROLOGICAL SURGERY

## 2021-03-02 RX ORDER — METHOCARBAMOL 750 MG/1
750 TABLET, FILM COATED ORAL
Qty: 160 TABLET | Refills: 1 | Status: SHIPPED | OUTPATIENT
Start: 2021-03-02 | End: 2021-06-02

## 2021-03-02 NOTE — PROGRESS NOTES
Department of Neurosurgery                                                      New patient visit      History Obtained From:  patient    CHIEF COMPLAINT:         Chief Complaint   Patient presents with    New Patient     Spinal stenosis in cervical region       HISTORY OF PRESENT ILLNESS:       The patient is a 47 y.o. female who presents for consultation for cervical stenosis. She had a history of C5-6 ACDF as well as C3-4 ACDF done by 2 different surgeons previously. She does not seem to be interested in going back to them. .  She reports in the last year or so she has been gotten progressively worsening neck pain. Also has progressive pain that shoots down both arms and to both hands. She denies gait imbalance. She denies bowel bladder symptoms. She reports pain is constant. She reports pain wakes up at night.   Pain in the arms and hands are equal to the pain in the neck  PAST MEDICAL HISTORY :       Past Medical History:        Diagnosis Date    Anxiety     Arthritis     OA    Asthma     Sadler's esophagus     LONG SEGMENT    Bipolar 1 disorder (HCC)     CAD (coronary artery disease)     Chronic insomnia     COPD (chronic obstructive pulmonary disease) ([address]     Depression     and bipolar    Diabetes mellitus ([address]     prediabetic    Endometriosis 3/9/2020    Esophagitis     severe    GERD (gastroesophageal reflux disease)     Headache(784.0)     Herniated disc, cervical     Hiatal hernia     Incisional abscess 1/15/2019    Kidney stones     MDRO (multiple drug resistant organisms) resistance     mrsa    Pleurisy     hx of \"years ago\"    Pneumonia     past penumonia    Seizures ([address]     2016 last seizure-focal seizure    UTI (urinary tract infection)     Vision abnormalities     wears glasses       Past Surgical History:        Procedure Laterality Date    CERVICAL DISC SURGERY      x2     SECTION  , 2018    x 2     SECTION N/A 2018  SECTION performed by Sonja Mcfarlane MD at [facility]D [facility] N/A 10/1/2019    COLONOSCOPY DIAGNOSTIC ABORTED performed by Raquel Yancey MD at [address] [facility] N/A 2020    COLONOSCOPY WITH BIOPSY performed by Raquel Yancey MD at [address], COLON, DIAGNOSTIC      HYSTERECTOMY N/A 2020    HYSTERECTOMY ABDOMINAL LAPAROSCOPIC ROBOTIC XI W/ CYSTOSCOPY performed by Sonja Mcfarlane MD at [address] ARTHROSCOPY Left 5/20/15    KNEE SURGERY Left     x3    LAPAROSCOPY      dr Vilma Chairez N/A 10/5/2017    LAPAROSCOPIC REMOVAL INTRA ABDOMINAL LIPOMA LOWER RIGHT performed by Nilson Swartz DO at [address] [facility] ESOPHAGOGASTRODUODENOSCOPY TRANSORAL DIAGNOSTIC N/A 3/2/2017    EGD ESOPHAGOGASTRODUODENOSCOPY  IP  performed by Judd Carpenter MD at [address]  2016    severe esophagitis    UPPER GASTROINTESTINAL ENDOSCOPY  2017    barretts; hiatus hernia; gastritis    UPPER GASTROINTESTINAL ENDOSCOPY  2017    long seg mullins's with linear erosions, esophagitis, small hiatal hernia    UPPER GASTROINTESTINAL ENDOSCOPY N/A 2018    MULLINS'S    UPPER GASTROINTESTINAL ENDOSCOPY N/A 10/1/2019    EGD BIOPSY performed by Raquel Yancey MD at [facility] History:   Social History     Socioeconomic History    Marital status: Single     Spouse name: Not on file    Number of children: 1    Years of education: 11th grade    Highest education level: Not on file   Occupational History    Occupation: unemployed-   Social Needs    Financial resource strain: Not on file    Food insecurity     Worry: Not on file     Inability: Not on file   [employer] needs     Medical: Not on file     Non-medical: Not on file   Tobacco Use    Smoking status: Current Every Day Smoker     Packs/day: 0.50     Years: 21.00     Pack years: 10.50 Types: Cigarettes    Smokeless tobacco: Never Used   Substance and Sexual Activity    Alcohol use: No    Drug use: No    Sexual activity: Yes     Partners: Male   Lifestyle    Physical activity     Days per week: Not on file     Minutes per session: Not on file    Stress: Not on file   Relationships    Social connections     Talks on phone: Not on file     Gets together: Not on file     Attends [employer] service: Not on file     Active member of club or organization: Not on file     Attends meetings of clubs or organizations: Not on file     Relationship status: Not on file    Intimate partner violence     Fear of current or ex partner: Not on file     Emotionally abused: Not on file     Physically abused: Not on file     Forced sexual activity: Not on file   Other Topics Concern    Not on file   Social History Narrative    Lives with son and boyfriend       Family History:       Problem Relation Age of Onset    Cancer Mother         breast    Bipolar Disorder Mother     Hypertension Mother     Breast Cancer Mother     Bipolar Disorder Brother     Hypertension Father        Allergies:  Nsaids, Toradol [ketorolac tromethamine], and Ultram [tramadol]    Home Medications:  Prior to Admission medications    Medication Sig Start Date End Date Taking?  Authorizing Provider   promethazine (PHENERGAN) 6.25 MG/5ML syrup TAKE 10 MLS BY MOUTH 4 TIMES DAILY AS NEEDED FOR NAUSEA (DIARRHEA) 2/25/21  Yes KATIE Cole CNP   pregabalin (LYRICA) 100 MG capsule TAKE 1 CAPSULE BY MOUTH 3 TIMES DAILY 2/23/21 2/22/22 Yes KATIE Cole CNP   dicyclomine (BENTYL) 10 MG capsule Take 1 capsule by mouth 4 times daily 2/1/21  Yes KATIE Cole CNP   sertraline (ZOLOFT) 50 MG tablet Take 1 tablet by mouth daily 1/19/21  Yes Historical Provider, MD LEW ABERNATHY 200-25 MCG/INH AEPB inhaler INHALE ONE PUFF BY MOUTH ONCE A DAY ONCE A DAY INHALATION 30 1/11/21  Yes KATIE Cole CNP ipratropium-albuterol (DUONEB) 0.5-2.5 (3) MG/3ML SOLN nebulizer solution Inhale 3 mLs into the lungs every 4 hours 1/4/21  Yes KATIE Cole CNP   lamoTRIgine (LAMICTAL) 150 MG tablet Take 1 tablet by mouth nightly 10/28/20  Yes Historical Provider, MD   vitamin D3 (CHOLECALCIFEROL) 25 MCG (1000 UT) TABS tablet TAKE 1 TABLET BY MOUTH DAILY 12/14/20  Yes KATIE Cole CNP   LINZESS 290 MCG CAPS capsule TAKE 1 CAPSULE BY MOUTH EVERY MORNING (BEFORE BREAKFAST) 11/16/20  Yes Mag Hyde MD   pantoprazole (PROTONIX) 40 MG tablet TAKE ONE TABLET TWICE A DAY ORALLY 30 DAY(S) 10/26/20  Yes Mag Hyde MD   Masks (CLEVER CHOICE FACE MASK) MISC USE NEW FACE MASK EVERYDAY WHEN PATIENT GO OUTSIDE OF HOME DUE TO COVID PANDEMIC 7/6/20  Yes KATIE Cole CNP   acetaminophen (TYLENOL) 500 MG tablet Take 500 mg by mouth every 6 hours as needed for Pain   Yes Historical Provider, MD   blood glucose monitor strips Test 2-3 times a day & as needed for symptoms of irregular blood glucose. BRAND OF CHOICE INSURANCE ALLOWS. 6/12/20  Yes KATIE Cole CNP   Lancets MISC 1 each by Does not apply route 2 times daily 6/12/20  Yes KATIE Cole CNP   Alcohol Swabs (ALCOHOL PREP) PADS Use as directed 6/12/20  Yes KATIE Cole CNP   montelukast (SINGULAIR) 10 MG tablet Take 1 tablet by mouth nightly 9/20/18  Yes Rich Elliott MD   vitamin D3 (CHOLECALCIFEROL) 25 MCG (1000 UT) TABS tablet TAKE 1 TABLET BY MOUTH DAILY 8/27/20   KATIE Cole CNP       Current Medications:   No current facility-administered medications for this visit.        PHYSICAL EXAM:       /81 (Site: Left Upper Arm, Position: Sitting, Cuff Size: Medium Adult)   Pulse 72   Ht 5' 2\" (1.575 m)   Wt 203 lb (92.1 kg)   LMP 06/15/2020   BMI 37.13 kg/m²   Physical Exam     Gen: NAD  HEENT: moist mucus membranes  Cardio: RRR  Pulm: chest rise symmetrically  GI: abd soft  Ext: versus radiculopathy but she refused that    Patient and/or family was counseled on the diagnosis and treatment plan    Laura Carr DO     Board Certified Neurosurgeon  3/2/2021  1:35 PM      This note was created using voice recognition software. There may be inaccuracies of transcription  that are inadvertently overlooked prior to the signature. There is any questions about the transcription please contact me.

## 2021-03-03 DIAGNOSIS — J44.9 CHRONIC OBSTRUCTIVE PULMONARY DISEASE, UNSPECIFIED COPD TYPE (HCC): ICD-10-CM

## 2021-03-15 DIAGNOSIS — J44.9 COPD WITH ASTHMA (HCC): ICD-10-CM

## 2021-03-15 RX ORDER — IPRATROPIUM BROMIDE AND ALBUTEROL SULFATE 2.5; .5 MG/3ML; MG/3ML
SOLUTION RESPIRATORY (INHALATION)
Qty: 360 ML | Refills: 2 | Status: SHIPPED | OUTPATIENT
Start: 2021-03-15 | End: 2021-06-07

## 2021-03-23 ENCOUNTER — TELEPHONE (OUTPATIENT)
Dept: NEUROSURGERY | Age: 41
End: 2021-03-23

## 2021-03-24 DIAGNOSIS — G89.29 CHRONIC NECK PAIN WITH ABNORMAL NEUROLOGIC EXAMINATION: ICD-10-CM

## 2021-03-24 DIAGNOSIS — M54.2 CHRONIC NECK PAIN WITH ABNORMAL NEUROLOGIC EXAMINATION: ICD-10-CM

## 2021-03-24 DIAGNOSIS — M48.02 SPINAL STENOSIS IN CERVICAL REGION: ICD-10-CM

## 2021-03-24 RX ORDER — PREGABALIN 100 MG/1
CAPSULE ORAL
Qty: 90 CAPSULE | Refills: 0 | Status: SHIPPED | OUTPATIENT
Start: 2021-03-24 | End: 2021-04-26

## 2021-03-25 ENCOUNTER — APPOINTMENT (OUTPATIENT)
Dept: GENERAL RADIOLOGY | Age: 41
End: 2021-03-25
Payer: COMMERCIAL

## 2021-03-25 ENCOUNTER — HOSPITAL ENCOUNTER (EMERGENCY)
Age: 41
Discharge: HOME | End: 2021-03-25
Attending: EMERGENCY MEDICINE
Payer: COMMERCIAL

## 2021-03-25 VITALS
TEMPERATURE: 98.2 F | HEART RATE: 81 BPM | WEIGHT: 203 LBS | BODY MASS INDEX: 37.36 KG/M2 | SYSTOLIC BLOOD PRESSURE: 138 MMHG | RESPIRATION RATE: 16 BRPM | OXYGEN SATURATION: 96 % | DIASTOLIC BLOOD PRESSURE: 70 MMHG | HEIGHT: 62 IN

## 2021-03-25 DIAGNOSIS — S90.30XA CONTUSION OF DORSUM OF FOOT: Primary | ICD-10-CM

## 2021-03-25 PROCEDURE — 99283 EMERGENCY DEPT VISIT LOW MDM: CPT

## 2021-03-25 PROCEDURE — 73630 X-RAY EXAM OF FOOT: CPT

## 2021-03-25 ASSESSMENT — ENCOUNTER SYMPTOMS
COLOR CHANGE: 0
EYE PAIN: 0
ABDOMINAL PAIN: 0
BACK PAIN: 0
SHORTNESS OF BREATH: 0

## 2021-03-25 ASSESSMENT — PAIN DESCRIPTION - DESCRIPTORS: DESCRIPTORS: THROBBING

## 2021-03-25 ASSESSMENT — PAIN SCALES - GENERAL: PAINLEVEL_OUTOF10: 9

## 2021-03-25 ASSESSMENT — PAIN DESCRIPTION - PAIN TYPE: TYPE: ACUTE PAIN

## 2021-03-25 ASSESSMENT — PAIN DESCRIPTION - LOCATION: LOCATION: FOOT

## 2021-03-25 NOTE — TELEPHONE ENCOUNTER
Completed peer to peer, they are only able to approve 1 cervical imaging at a time, and will provide approval for cervical MRI. If review of MRI findings indicate need for CT to evaluate bony fusion, will need to reorder and submit for authorization at that time. Insurance will be sending authorization for the cervical MRI.

## 2021-03-26 ENCOUNTER — TELEPHONE (OUTPATIENT)
Dept: FAMILY MEDICINE CLINIC | Age: 41
End: 2021-03-26

## 2021-03-26 NOTE — TELEPHONE ENCOUNTER
[facility]) ED Follow up Call    Reason for ED visit:           Silvana Tripathi , this is Rosalind Candelaria from Dr. Justus Bernal's office, just calling to see how you are doing after your recent ED visit. Did you receive discharge instructions? Yes  Do you understand the discharge instructions? Yes  Did the ED give you any new prescriptions? No:   Were you able to fill your prescriptions? No:       Do you have one of our red, yellow and green  Zone sheets that help you to determine when you should go to the ED? Yes      Do you need or want to make a follow up appt with your PCP? No    Do you have any further needs in the home i.e. Equipment?   Not Applicable        FU appts/Provider:    Future Appointments   Date Time Provider Wesley Smith   4/13/2021 11:30 AM DO Allison Ross Neuro [id number]

## 2021-03-26 NOTE — ED PROVIDER NOTES
EMERGENCY DEPARTMENT ENCOUNTER    Pt Name: Jay Chaves  MRN: [id number]  [place] 1980  Date of evaluation: 3/25/21  CHIEF COMPLAINT       Chief Complaint   Patient presents with    Foot Injury     right      HISTORY OF PRESENT ILLNESS   43-year-old female presents with complaint of right foot pain. Patient states she was putting stickers on her son's wall, states that when she stepped off his bed she bumped his TV and that he fell onto the top of her right foot. Patient is happened approximately half hour ago, patient states she took a dose of Tylenol for pain, states that she noted some bruising and presented for evaluation. Patient denies any other injuries, denies any numbness tingling or weakness in the foot, denies any ankle, leg or knee pain. The history is provided by the patient. REVIEW OF SYSTEMS     Review of Systems   Constitutional: Negative for fever. HENT: Negative for congestion and ear pain. Eyes: Negative for pain. Respiratory: Negative for shortness of breath. Cardiovascular: Negative for chest pain, palpitations and leg swelling. Gastrointestinal: Negative for abdominal pain. Genitourinary: Negative for dysuria and flank pain. Musculoskeletal: Negative for back pain. Foot pain   Skin: Negative for color change. Neurological: Negative for numbness and headaches. Psychiatric/Behavioral: Negative for confusion. All other systems reviewed and are negative.     PASTMEDICAL HISTORY     Past Medical History:   Diagnosis Date    Anxiety     Arthritis     OA    Asthma     Sadler's esophagus     LONG SEGMENT    Bipolar 1 disorder (HCC)     CAD (coronary artery disease)     Chronic insomnia     COPD [facility]) [facility]     Depression     and bipolar    Diabetes mellitus ([facility])     prediabetic    Endometriosis 3/9/2020    Esophagitis     severe    GERD (gastroesophageal reflux disease)     Headache(784.0)     Herniated disc, cervical     Hiatal hernia     Incisional abscess 1/15/2019    Kidney stones     MDRO (multiple drug resistant organisms) resistance     mrsa    Pleurisy     hx of \"years ago\"    Pneumonia     past penumonia    Se[facility]     2016 last seizure-focal seizure    UTI (urinary tract infection)     Vision abnormalities     wears glasses     Past Problem List  Patient Active Problem List   Diagnosis Code    Asthma J45.909    GERD K21.9    Iron deficiency anemia D50.9    Cervical disc herniation M50.20    Smoker F17.200    Sadler's esophagus without dysplasia K22.70    Hiatal hernia K44.9    COPD J44.9    Chronic constipation K59.09    Hx of IUFD (G2) O09.299    History of gestational hypertension Z87.59    Seizure ([address] R56.9    Hx of  x2 (G3, G4) Z98.891    Arthritis M19.90    Cervical radiculopathy M54.12    Migraine without aura G43.009    Spinal stenosis in cervical region M48.02    Prediabetes R73.03    Chronic pelvic pain in female R10.2, G89.29    Enlarged uterus N85.2    Endometriosis N80.9    Family history of breast cancer Z80.3    Bipolar affective disorder, currently depressed, moderate ([address] F31.32    Insomnia G47.00    Ulnar neuropathy G56.20    Major depressive disorder, recurrent episode ([address] F33.9    Sciatica M54.30    Moderate persistent asthma without complication K80.60    Lipoma of torso D17.1    History of cervical discectomy Z98.890    Chronic neck pain with abnormal neurologic examination M54.2, G89.28    Abnormal weight gain  R63.5    Class 2 drug-induced obesity with serious comorbidity and body mass index (BMI) of 37.0 to 37.9 in adult E66.1, Z68.37    Pelvic peritoneal endometriosis N80.3    RALH with BS and cystoscopy 2020 Z90.710    Post-op pain G89.18    Wound infection/hemorrhage, obstetric surgical, postpartum condition O90.9    Postoperative visit Z48.89    Postoperative abdominal pain R10.9, G89.18    R-0Normal      ipratropium-albuterol (DUONEB) 0.5-2.5 (3) MG/3ML SOLN nebulizer solution INHALE CONTENTS OF 1 UNIT DOSE VIA NEBULIZER EVERY 4 HOURS, Disp-360 mL, R-2Normal      BREO ELLIPTA 200-25 MCG/INH AEPB inhaler INHALE ONE PUFF BY MOUTH ONCE A DAY ONCE A DAY INHALATION 30, Disp-60 each, R-1Normal      LINZESS 290 MCG CAPS capsule TAKE 1 CAPSULE BY MOUTH EVERY MORNING (BEFORE BREAKFAST), Disp-30 capsule, R-5Normal      methocarbamol (ROBAXIN-750) 750 MG tablet Take 1 tablet by mouth every 6-8 hours as needed (neck spasm/pain), Disp-160 tablet, R-1Normal      promethazine (PHENERGAN) 6.25 MG/5ML syrup TAKE 10 MLS BY MOUTH 4 TIMES DAILY AS NEEDED FOR NAUSEA (DIARRHEA), Disp-120 mL, R-2Normal      dicyclomine (BENTYL) 10 MG capsule Take 1 capsule by mouth 4 times daily, Disp-180 capsule, R-1Normal      sertraline (ZOLOFT) 50 MG tablet Take 1 tablet by mouth dailyHistorical Med      lamoTRIgine (LAMICTAL) 150 MG tablet Take 1 tablet by mouth nightlyHistorical Med      vitamin D3 (CHOLECALCIFEROL) 25 MCG (1000 UT) TABS tablet TAKE 1 TABLET BY MOUTH DAILY, Disp-30 tablet, R-3Normal      pantoprazole (PROTONIX) 40 MG tablet TAKE ONE TABLET TWICE A DAY ORALLY 30 DAY(S), Disp-90 tablet,R-3Normal      Masks (CLEVER CHOICE FACE MASK) MISC USE NEW FACE MASK EVERYDAY WHEN PATIENT GO OUTSIDE OF HOME DUE TO COVID PANDEMIC, Disp-90 each, R-1Normal      acetaminophen (TYLENOL) 500 MG tablet Take 500 mg by mouth every 6 hours as needed for PainHistorical Med      blood glucose monitor strips Test 2-3 times a day & as needed for symptoms of irregular blood glucose.   BRAND OF CHOICE INSURANCE ALLOWS., Disp-100 strip, R-11, Normal      Lancets MISC 2 TIMES DAILY Starting Fri 6/12/2020, Disp-300 each, R-11, Normal      Alcohol Swabs (ALCOHOL PREP) PADS Disp-100 each, R-11, NormalUse as directed      montelukast (SINGULAIR) 10 MG tablet Take 1 tablet by mouth nightly, Disp-30 tablet, R-3Print           ALLERGIES     is allergic to nsaids; toradol [ketorolac tromethamine]; and ultram [tramadol]. FAMILY HISTORY     She indicated that the status of her mother is unknown. She indicated that the status of her father is unknown. She indicated that the status of her brother is unknown. SOCIAL HISTORY       Social History     Tobacco Use    Smoking status: Current Every Day Smoker     Packs/day: 0.50     Years: 21.00     Pack years: 10.50     Types: Cigarettes    Smokeless tobacco: Never Used   Substance Use Topics    Alcohol use: No    Drug use: No     PHYSICAL EXAM     INITIAL VITALS: /70   Pulse 81   Temp 98.2 °F (36.8 °C) (Oral)   Resp 16   Ht 5' 2\" (1.575 m)   Wt 203 lb (92.1 kg)   LMP 06/15/2020   SpO2 96%   BMI 37.13 kg/m²    Physical Exam  Vitals signs and nursing note reviewed. Constitutional:       General: She is not in acute distress. Appearance: Normal appearance. She is not toxic-appearing. HENT:      Head: Normocephalic and atraumatic. Nose: Nose normal.      Mouth/Throat:      Mouth: Mucous membranes are moist.      Pharynx: Oropharynx is clear. Eyes:      Extraocular Movements: Extraocular movements intact. Conjunctiva/sclera: Conjunctivae normal.   Neck:      Musculoskeletal: Normal range of motion. Cardiovascular:      Rate and Rhythm: Normal rate and regular rhythm. Pulses: Normal pulses. Dorsalis pedis pulses are 2+ on the right side and 2+ on the left side. Heart sounds: Normal heart sounds. Pulmonary:      Effort: Pulmonary effort is normal.      Breath sounds: Normal breath sounds. Abdominal:      General: Bowel sounds are normal. There is no distension. Palpations: Abdomen is soft. Tenderness: There is no abdominal tenderness. Musculoskeletal: Normal range of motion. Right foot: Normal capillary refill. Tenderness present. Feet:    Skin:     General: Skin is warm and dry.       Capillary Refill: Capillary refill takes less than 2 seconds. Neurological:      General: No focal deficit present. Mental Status: She is alert. Sensory: Sensation is intact. No sensory deficit. Psychiatric:         Mood and Affect: Mood normal.         MEDICAL DECISION MAKIN-year-old female presents with complaint of right foot injury, on initial exam patient in no acute distress vitals stable, patient with tenderness and small area of bruising noted to the dorsum of the right foot over the first metatarsal, neurovascularly intact, will obtain x-ray to evaluate for bony injury    X-ray reviewed negative for acute process       Patient was placed in postop shoe for comfort, discussed continued supportive care at home, discussed need for follow-up with PCP, discussed return precautions including worsening of pain, worsening of bruising or swelling or other concerning symptoms. Patient voiced understanding is comfortable with discharge home    Patient/Guardian was informed of their diagnosis and told to follow up with PCP  in 1-3 days. Patient demonstrates understanding and agreement with the plan. They were given the opportunity to ask questions and those questions were answered to the best of our ability with the available information. Patient/Guardian told to return to the ED for any new, worsening, changing or persistent symptoms. This dictation was prepared using [employer] voice recognition software. CRITICAL CARE:       PROCEDURES:    Procedures    DIAGNOSTIC RESULTS   EKG:All EKG's are interpreted by the Emergency Department Physician who either signs or Co-signs this chart in the absence of a cardiologist.        RADIOLOGY:All plain film, CT, MRI, and formal ultrasound images (except ED bedside ultrasound) are read by the radiologist, see reports below, unless otherwisenoted in MDM or here. XR FOOT RIGHT (MIN 3 VIEWS)   Final Result   No acute osseous abnormality.            LABS: All lab results were reviewed by myself, and all abnormals are listed below. Labs Reviewed - No data to display    EMERGENCY DEPARTMENTCOURSE:         Vitals:    Vitals:    03/25/21 2102   BP: 138/70   Pulse: 81   Resp: 16   Temp: 98.2 °F (36.8 °C)   TempSrc: Oral   SpO2: 96%   Weight: 203 lb (92.1 kg)   Height: 5' 2\" (1.575 m)       The patient was given the following medications while in the emergency department:  No orders of the defined types were placed in this encounter. CONSULTS:  None    FINAL IMPRESSION      1.  Contusion of dorsum of foot          DISPOSITION/PLAN   DISPOSITION Decision To Discharge 03/25/2021 10:02:20 PM      PATIENT REFERRED TO:  KATIE Brand - [facility]  [address]  [address] [place] [address]  [phone]    Schedule an appointment as soon as possible for a visit       [facility] ED  [address] [address]  [phone]    As needed, If symptoms worsen    DISCHARGE MEDICATIONS:  Discharge Medication List as of 3/25/2021 10:02 PM        Keeley Phillips DO  Attending Emergency Physician                  Keeley Phillips DO  03/26/21 0210

## 2021-03-30 DIAGNOSIS — K59.1 FUNCTIONAL DIARRHEA: ICD-10-CM

## 2021-03-30 DIAGNOSIS — J44.9 CHRONIC OBSTRUCTIVE PULMONARY DISEASE, UNSPECIFIED COPD TYPE (HCC): ICD-10-CM

## 2021-03-30 RX ORDER — DICYCLOMINE HYDROCHLORIDE 10 MG/1
10 CAPSULE ORAL 4 TIMES DAILY
Qty: 180 CAPSULE | Refills: 1 | Status: SHIPPED | OUTPATIENT
Start: 2021-03-30 | End: 2021-08-27

## 2021-03-31 ENCOUNTER — TELEPHONE (OUTPATIENT)
Dept: FAMILY MEDICINE CLINIC | Age: 41
End: 2021-03-31

## 2021-03-31 ENCOUNTER — APPOINTMENT (OUTPATIENT)
Dept: CT IMAGING | Age: 41
End: 2021-03-31
Payer: COMMERCIAL

## 2021-03-31 ENCOUNTER — HOSPITAL ENCOUNTER (EMERGENCY)
Age: 41
Discharge: HOME | End: 2021-03-31
Attending: EMERGENCY MEDICINE
Payer: COMMERCIAL

## 2021-03-31 VITALS
OXYGEN SATURATION: 95 % | SYSTOLIC BLOOD PRESSURE: 127 MMHG | BODY MASS INDEX: 38.46 KG/M2 | RESPIRATION RATE: 20 BRPM | DIASTOLIC BLOOD PRESSURE: 61 MMHG | TEMPERATURE: 99.4 F | HEIGHT: 62 IN | WEIGHT: 209 LBS | HEART RATE: 117 BPM

## 2021-03-31 DIAGNOSIS — J18.9 PNEUMONIA DUE TO ORGANISM: Primary | ICD-10-CM

## 2021-03-31 DIAGNOSIS — J45.901 EXACERBATION OF ASTHMA, UNSPECIFIED ASTHMA SEVERITY, UNSPECIFIED WHETHER PERSISTENT: ICD-10-CM

## 2021-03-31 LAB
-: ABNORMAL
-: NORMAL
ABSOLUTE EOS #: 0.18 K/UL (ref 0–0.4)
ABSOLUTE IMMATURE GRANULOCYTE: ABNORMAL K/UL (ref 0–0.3)
ABSOLUTE LYMPH #: 0.88 K/UL (ref 1–4.8)
ABSOLUTE MONO #: 1.06 K/UL (ref 0.1–1.3)
ALBUMIN SERPL-MCNC: 3.7 G/DL (ref 3.5–5.2)
ALBUMIN/GLOBULIN RATIO: ABNORMAL (ref 1–2.5)
ALP BLD-CCNC: 65 U/L (ref 35–104)
ALT SERPL-CCNC: 15 U/L (ref 5–33)
AMORPHOUS: ABNORMAL
ANION GAP SERPL CALCULATED.3IONS-SCNC: 10 MMOL/L (ref 9–17)
AST SERPL-CCNC: 17 U/L
BACTERIA: ABNORMAL
BASOPHILS # BLD: 0 % (ref 0–2)
BASOPHILS ABSOLUTE: 0 K/UL (ref 0–0.2)
BILIRUB SERPL-MCNC: 0.27 MG/DL (ref 0.3–1.2)
BILIRUBIN URINE: NEGATIVE
BUN BLDV-MCNC: 19 MG/DL (ref 6–20)
BUN/CREAT BLD: ABNORMAL (ref 9–20)
CALCIUM SERPL-MCNC: 8.7 MG/DL (ref 8.6–10.4)
CASTS UA: ABNORMAL /LPF
CHLORIDE BLD-SCNC: 105 MMOL/L (ref 98–107)
CO2: 22 MMOL/L (ref 20–31)
COLOR: YELLOW
COMMENT UA: ABNORMAL
CREAT SERPL-MCNC: 0.53 MG/DL (ref 0.5–0.9)
CRYSTALS, UA: ABNORMAL /HPF
DIFFERENTIAL TYPE: ABNORMAL
EOSINOPHILS RELATIVE PERCENT: 1 % (ref 0–4)
EPITHELIAL CELLS UA: ABNORMAL /HPF
GFR AFRICAN AMERICAN: >60 ML/MIN
GFR NON-AFRICAN AMERICAN: >60 ML/MIN
GFR SERPL CREATININE-BSD FRML MDRD: ABNORMAL ML/MIN/{1.73_M2}
GFR SERPL CREATININE-BSD FRML MDRD: ABNORMAL ML/MIN/{1.73_M2}
GLUCOSE BLD-MCNC: 113 MG/DL (ref 70–99)
GLUCOSE URINE: NEGATIVE
HCT VFR BLD CALC: 38.7 % (ref 36–46)
HEMOGLOBIN: 12.3 G/DL (ref 12–16)
IMMATURE GRANULOCYTES: ABNORMAL %
KETONES, URINE: NEGATIVE
LEUKOCYTE ESTERASE, URINE: ABNORMAL
LYMPHOCYTES # BLD: 5 % (ref 24–44)
MCH RBC QN AUTO: 28.3 PG (ref 26–34)
MCHC RBC AUTO-ENTMCNC: 31.8 G/DL (ref 31–37)
MCV RBC AUTO: 89 FL (ref 80–100)
MONOCYTES # BLD: 6 % (ref 1–7)
MORPHOLOGY: ABNORMAL
MUCUS: ABNORMAL
NITRITE, URINE: NEGATIVE
NRBC AUTOMATED: ABNORMAL PER 100 WBC
OTHER OBSERVATIONS UA: ABNORMAL
PDW BLD-RTO: 15.7 % (ref 11.5–14.9)
PH UA: 7 (ref 5–8)
PLATELET # BLD: 279 K/UL (ref 150–450)
PLATELET ESTIMATE: ABNORMAL
PMV BLD AUTO: 8.3 FL (ref 6–12)
POTASSIUM SERPL-SCNC: 4.2 MMOL/L (ref 3.7–5.3)
PROTEIN UA: NEGATIVE
RBC # BLD: 4.35 M/UL (ref 4–5.2)
RBC # BLD: ABNORMAL 10*6/UL
RBC UA: ABNORMAL /HPF
REASON FOR REJECTION: NORMAL
RENAL EPITHELIAL, UA: ABNORMAL /HPF
SARS-COV-2, RAPID: NOT DETECTED
SEG NEUTROPHILS: 88 % (ref 36–66)
SEGMENTED NEUTROPHILS ABSOLUTE COUNT: 15.48 K/UL (ref 1.3–9.1)
SODIUM BLD-SCNC: 137 MMOL/L (ref 135–144)
SPECIFIC GRAVITY UA: 1.02 (ref 1–1.03)
SPECIMEN DESCRIPTION: NORMAL
TOTAL PROTEIN: 6.1 G/DL (ref 6.4–8.3)
TRICHOMONAS: ABNORMAL
TURBIDITY: CLEAR
URINE HGB: NEGATIVE
UROBILINOGEN, URINE: NORMAL
WBC # BLD: 17.6 K/UL (ref 3.5–11)
WBC # BLD: ABNORMAL 10*3/UL
WBC UA: ABNORMAL /HPF
YEAST: ABNORMAL
ZZ NTE CLEAN UP: ORDERED TEST: NORMAL
ZZ NTE WITH NAME CLEAN UP: SPECIMEN SOURCE: NORMAL

## 2021-03-31 PROCEDURE — 85025 COMPLETE CBC W/AUTO DIFF WBC: CPT

## 2021-03-31 PROCEDURE — 96374 THER/PROPH/DIAG INJ IV PUSH: CPT

## 2021-03-31 PROCEDURE — 6360000002 HC RX W HCPCS: Performed by: EMERGENCY MEDICINE

## 2021-03-31 PROCEDURE — 87635 SARS-COV-2 COVID-19 AMP PRB: CPT

## 2021-03-31 PROCEDURE — 96375 TX/PRO/DX INJ NEW DRUG ADDON: CPT

## 2021-03-31 PROCEDURE — 36415 COLL VENOUS BLD VENIPUNCTURE: CPT

## 2021-03-31 PROCEDURE — 99285 EMERGENCY DEPT VISIT HI MDM: CPT

## 2021-03-31 PROCEDURE — 6370000000 HC RX 637 (ALT 250 FOR IP): Performed by: EMERGENCY MEDICINE

## 2021-03-31 PROCEDURE — 74176 CT ABD & PELVIS W/O CONTRAST: CPT

## 2021-03-31 PROCEDURE — 96376 TX/PRO/DX INJ SAME DRUG ADON: CPT

## 2021-03-31 PROCEDURE — 80053 COMPREHEN METABOLIC PANEL: CPT

## 2021-03-31 PROCEDURE — 2580000003 HC RX 258: Performed by: EMERGENCY MEDICINE

## 2021-03-31 PROCEDURE — 81001 URINALYSIS AUTO W/SCOPE: CPT

## 2021-03-31 PROCEDURE — 87086 URINE CULTURE/COLONY COUNT: CPT

## 2021-03-31 RX ORDER — ONDANSETRON 2 MG/ML
4 INJECTION INTRAMUSCULAR; INTRAVENOUS ONCE
Status: COMPLETED | OUTPATIENT
Start: 2021-03-31 | End: 2021-03-31

## 2021-03-31 RX ORDER — PREDNISONE 10 MG/1
TABLET ORAL
Qty: 20 TABLET | Refills: 0 | Status: SHIPPED | OUTPATIENT
Start: 2021-04-01 | End: 2021-04-01 | Stop reason: ALTCHOICE

## 2021-03-31 RX ORDER — PREDNISONE 20 MG/1
50 TABLET ORAL ONCE
Status: COMPLETED | OUTPATIENT
Start: 2021-03-31 | End: 2021-03-31

## 2021-03-31 RX ORDER — ACETAMINOPHEN 325 MG/1
650 TABLET ORAL ONCE
Status: COMPLETED | OUTPATIENT
Start: 2021-03-31 | End: 2021-03-31

## 2021-03-31 RX ORDER — AZITHROMYCIN 500 MG/1
500 TABLET, FILM COATED ORAL DAILY
Qty: 5 TABLET | Refills: 0 | Status: SHIPPED | OUTPATIENT
Start: 2021-03-31 | End: 2021-04-05

## 2021-03-31 RX ORDER — AZITHROMYCIN 250 MG/1
500 TABLET, FILM COATED ORAL ONCE
Status: COMPLETED | OUTPATIENT
Start: 2021-03-31 | End: 2021-03-31

## 2021-03-31 RX ORDER — FENTANYL CITRATE 50 UG/ML
100 INJECTION, SOLUTION INTRAMUSCULAR; INTRAVENOUS ONCE
Status: COMPLETED | OUTPATIENT
Start: 2021-03-31 | End: 2021-03-31

## 2021-03-31 RX ORDER — 0.9 % SODIUM CHLORIDE 0.9 %
1000 INTRAVENOUS SOLUTION INTRAVENOUS ONCE
Status: COMPLETED | OUTPATIENT
Start: 2021-03-31 | End: 2021-03-31

## 2021-03-31 RX ADMIN — SODIUM CHLORIDE 1000 ML: 9 INJECTION, SOLUTION INTRAVENOUS at 10:07

## 2021-03-31 RX ADMIN — AZITHROMYCIN MONOHYDRATE 500 MG: 250 TABLET ORAL at 12:12

## 2021-03-31 RX ADMIN — ONDANSETRON 4 MG: 2 INJECTION INTRAMUSCULAR; INTRAVENOUS at 10:07

## 2021-03-31 RX ADMIN — PREDNISONE 50 MG: 20 TABLET ORAL at 12:34

## 2021-03-31 RX ADMIN — FENTANYL CITRATE 100 MCG: 50 INJECTION INTRAMUSCULAR; INTRAVENOUS at 10:07

## 2021-03-31 RX ADMIN — FENTANYL CITRATE 100 MCG: 50 INJECTION, SOLUTION INTRAMUSCULAR; INTRAVENOUS at 12:10

## 2021-03-31 RX ADMIN — ACETAMINOPHEN 650 MG: 325 TABLET, FILM COATED ORAL at 12:12

## 2021-03-31 ASSESSMENT — PAIN DESCRIPTION - LOCATION: LOCATION: ABDOMEN

## 2021-03-31 ASSESSMENT — PAIN SCALES - GENERAL
PAINLEVEL_OUTOF10: 10
PAINLEVEL_OUTOF10: 10

## 2021-03-31 ASSESSMENT — PAIN DESCRIPTION - ORIENTATION: ORIENTATION: RIGHT;LOWER

## 2021-03-31 ASSESSMENT — PAIN DESCRIPTION - DESCRIPTORS: DESCRIPTORS: SHARP

## 2021-03-31 NOTE — TELEPHONE ENCOUNTER
Pt needs a letter for work stating that she needs to self-quarantine per her MD in the hospital. Pt would like this sent to her mychart. Please call pt for any further information.

## 2021-04-01 ENCOUNTER — TELEPHONE (OUTPATIENT)
Dept: FAMILY MEDICINE CLINIC | Age: 41
End: 2021-04-01

## 2021-04-01 ENCOUNTER — TELEMEDICINE (OUTPATIENT)
Dept: FAMILY MEDICINE CLINIC | Age: 41
End: 2021-04-01
Payer: COMMERCIAL

## 2021-04-01 DIAGNOSIS — J18.9 PNEUMONIA OF BOTH LOWER LOBES DUE TO INFECTIOUS ORGANISM: Primary | ICD-10-CM

## 2021-04-01 DIAGNOSIS — N20.0 KIDNEY STONES: ICD-10-CM

## 2021-04-01 DIAGNOSIS — J30.89 NON-SEASONAL ALLERGIC RHINITIS DUE TO OTHER ALLERGIC TRIGGER: ICD-10-CM

## 2021-04-01 DIAGNOSIS — D72.820 LYMPHOCYTOSIS: ICD-10-CM

## 2021-04-01 LAB
CULTURE: NORMAL
Lab: NORMAL
SPECIMEN DESCRIPTION: NORMAL

## 2021-04-01 PROCEDURE — G8427 DOCREV CUR MEDS BY ELIG CLIN: HCPCS | Performed by: FAMILY MEDICINE

## 2021-04-01 PROCEDURE — 99213 OFFICE O/P EST LOW 20 MIN: CPT | Performed by: FAMILY MEDICINE

## 2021-04-01 RX ORDER — FLUTICASONE PROPIONATE 50 MCG
1 SPRAY, SUSPENSION (ML) NASAL DAILY
Qty: 1 BOTTLE | Refills: 1 | Status: ON HOLD | OUTPATIENT
Start: 2021-04-01 | End: 2022-05-03

## 2021-04-01 RX ORDER — PROMETHAZINE HYDROCHLORIDE 6.25 MG/5ML
12.5 SYRUP ORAL 4 TIMES DAILY PRN
Qty: 120 ML | Refills: 2 | Status: SHIPPED | OUTPATIENT
Start: 2021-04-01 | End: 2021-06-30 | Stop reason: SDUPTHER

## 2021-04-01 RX ORDER — LORATADINE 10 MG/1
10 TABLET ORAL DAILY
Qty: 90 TABLET | Refills: 1 | Status: SHIPPED | OUTPATIENT
Start: 2021-04-01 | End: 2021-07-06

## 2021-04-01 ASSESSMENT — ENCOUNTER SYMPTOMS
WHEEZING: 1
VOMITING: 0
COUGH: 1
BACK PAIN: 0
SHORTNESS OF BREATH: 1
NAUSEA: 1
ABDOMINAL PAIN: 1

## 2021-04-01 NOTE — PROGRESS NOTES
[facility] Physicians at [place] [id number] [place] [address]   O: [address] [place] [address] Ryan Santizo is a 47 y.o. female evaluated via telephone on 4/1/2021. CONSENT:  She and/or health care decision maker is aware that that she may receive a bill for this telephone service, depending on her insurance coverage, and has provided verbal consent to proceed: Yes    DOCUMENTATION:  Patient scheduled this appointment today due to Other (pneumonia, allergies, note)  Hospital abdominal pain- fever- pneumonia. - started treatment. Impression 1. Bilateral patchy airspace disease, left greater than right, suggestive of multifocal inflammatory process or infection. 2.  No acute inflammatory process identified in the abdomen or pelvis. Normal appendix. 3.  6 mm left renal stone. No stones identified on the right side. No hydronephrosis. I communicated with the patient and/or health care decision maker about:      Review of Systems  Details of this discussion including any medical advice provided: Under assessment. PHYSICAL EXAM[INSTRUCTIONS:  \"[x]\" Indicates a positive item  \"[]\" Indicates a negative item  -- DELETE ALL ITEMS NOT EXAMINED]  Patient-Reported Vitals 2/1/2021   Patient-Reported Weight 190 lbs   Patient-Reported Height -   Patient-Reported Systolic 519   Patient-Reported Diastolic 71     Constitutional: [x] No apparent distress      [] Abnormal -   Mental status: [x] Alert and awake  [x] Oriented to person/place/time[] Abnormal -   Pulmonary/Chest: [x] Respiratory effort normal         [] Abnormal -          Psychiatric:       [x] Normal Affect [] Abnormal -        [x] No Hallucinations  Other pertinent observable physical exam findings:-coughs during the visit, speaks in full sentences, anxious with pressured speech    ASSESSMENT  1.  Pneumonia of both lower lobes due to infectious organism  Worsening  Continue to take azithromycin  DISCUSSED AND ADVISED cancer    Bipolar affective disorder, currently depressed, moderate (HCC)    Insomnia    Ulnar neuropathy    Major depressive disorder, recurrent episode ([facility] [address]    Sciatica    Moderate persistent asthma without complication    Lipoma of torso    History of cervical discectomy    Chronic neck pain with abnormal neurologic examination    Abnormal weight gain     Class 2 drug-induced obesity with serious comorbidity and body mass index (BMI) of 37.0 to 37.9 in adult    Pelvic peritoneal endometriosis    RALH with BS and cystoscopy 7/8/2020    Post-op pain    Wound infection/hemorrhage, obstetric surgical, postpartum condition    Postoperative visit    Postoperative abdominal pain    Diverticulitis    Diverticulitis of colon    Pelvic abscess in female    Pneumonia    Right otitis media    Functional diarrhea    Kidney stones     Anya Carrion is a 47 y.o. female patient  being evaluated by a Virtual Visit (video visit) encounter to address concerns as mentioned above. A caregiver was present when appropriate. Due to this being a TeleHealth encounter (During [facility]- public health emergency), evaluation of the following organ systems was limited:Vitals/Constitutional/EENT/Resp/CV/GI//MS/Neuro/Skin/Heme-Lymph-Imm. Services were provided through a video synchronous discussion virtually to substitute for in-person clinic visit. This is a telehealth visit that was performed with the originating site at Patient Location: home and provider Location of [address]. Pursuant to the emergency declaration under the [facility], [address] [place] [address] [facility] authority and the [employer] and "[employer]ar General Act, this Virtual Visit was conducted with patient's (and/or legal guardian's) consent, to reduce the patient's risk of exposure to COVID-19 and provide necessary medical care.   The patient (and/or legal guardian) has also been advised to contact this office for worsening conditions or problems, and seek emergency medical treatment and/or call 911 if deemed necessary. This note was completed by using the assistance of a speech-recognition program. However, inadvertent computerized transcription errors may be present. Although every effort was made to ensure accuracy, no guarantees can be provided that every mistake has been identified and corrected by editing.   Electronically signed by KATIE Ro CNP on 4/1/21 at 11:25 AM EDT

## 2021-04-01 NOTE — ED PROVIDER NOTES
[address]  EMERGENCY DEPARTMENT ENCOUNTER      Pt Name: Elmira South  MRN: [id number]  [place] 1980  Date of evaluation: 4/1/21      CHIEF COMPLAINT       Chief Complaint   Patient presents with    Abdominal Pain     RLQ x 2 days    Nausea     HISTORY OF PRESENT ILLNESS   HPI 47 y.o. female presents with c/o abdominal pain and nausea wheezing and sob. Symptoms have been present for the last two days. Symptoms rated as severe in severity, constant in duration, worsening in course. No clear provocative or palliative factors. Omkar Brittle REVIEW OF SYSTEMS       Review of Systems   Constitutional: Positive for chills, fatigue and fever. Respiratory: Positive for cough, shortness of breath and wheezing. Cardiovascular: Negative for chest pain. Gastrointestinal: Positive for abdominal pain and nausea. Negative for vomiting. Genitourinary: Negative for dysuria. Musculoskeletal: Negative for back pain. Skin: Negative for rash. Psychiatric/Behavioral: Negative for confusion.        PAST MEDICAL HISTORY     Past Medical History:   Diagnosis Date    Anxiety     Arthritis     OA    Asthma     Sadler's esophagus     LONG SEGMENT    Bipolar 1 disorder ([facility] [address]     CAD (coronary artery disease)     Chronic insomnia     [facility] obstructive pulmonary disease) ([facility])     Depression     and bipolar    Diabetes mellitus ([address]     prediabetic    Endometriosis 3/9/2020    Esophagitis     severe    GERD (gastroesophageal reflux disease)     Headache(784.0)     Herniated disc, cervical     Hiatal hernia     Incisional abscess 1/15/2019    Kidney stones     MDRO (multiple drug resistant organisms) resistance     mrsa    Pleurisy     hx of \"years ago\"    Pneumonia     past penumonia    Seizures ([facility] [address]     2016 last seizure-focal seizure    UTI (urinary tract infection)     Vision abnormalities     wears glasses       SURGICAL HISTORY       Past Surgical History:   Procedure Laterality ipratropium-albuterol (DUONEB) 0.5-2.5 (3) MG/3ML SOLN nebulizer solution INHALE CONTENTS OF 1 UNIT DOSE VIA NEBULIZER EVERY 4 HOURS, Disp-360 mL, R-2Normal      LINZESS 290 MCG CAPS capsule TAKE 1 CAPSULE BY MOUTH EVERY MORNING (BEFORE BREAKFAST), Disp-30 capsule, R-5Normal      methocarbamol (ROBAXIN-750) 750 MG tablet Take 1 tablet by mouth every 6-8 hours as needed (neck spasm/pain), Disp-160 tablet, R-1Normal      promethazine (PHENERGAN) 6.25 MG/5ML syrup TAKE 10 MLS BY MOUTH 4 TIMES DAILY AS NEEDED FOR NAUSEA (DIARRHEA), Disp-120 mL, R-2Normal      sertraline (ZOLOFT) 50 MG tablet Take 1 tablet by mouth dailyHistorical Med      lamoTRIgine (LAMICTAL) 150 MG tablet Take 1 tablet by mouth nightlyHistorical Med      vitamin D3 (CHOLECALCIFEROL) 25 MCG (1000 UT) TABS tablet TAKE 1 TABLET BY MOUTH DAILY, Disp-30 tablet, R-3Normal      pantoprazole (PROTONIX) 40 MG tablet TAKE ONE TABLET TWICE A DAY ORALLY 30 DAY(S), Disp-90 tablet,R-3Normal      Masks (CLEVER CHOICE FACE MASK) MIS USE NEW FACE MASK EVERYDAY WHEN PATIENT GO OUTSIDE OF HOME DUE TO COVID PANDEMIC, Disp-90 each, R-1Normal      acetaminophen (TYLENOL) 500 MG tablet Take 500 mg by mouth every 6 hours as needed for PainHistorical Med      blood glucose monitor strips Test 2-3 times a day & as needed for symptoms of irregular blood glucose. [employer]ANCE ALLOWS., Disp-100 strip, R-11, Normal      Lancets MISC 2 TIMES DAILY Starting Fri 6/12/2020, Disp-300 each, R-11, Normal      Alcohol Swabs (ALCOHOL PREP) PADS Disp-100 each, R-11, NormalUse as directed      montelukast (SINGULAIR) 10 MG tablet Take 1 tablet by mouth nightly, Disp-30 tablet, R-3Print             ALLERGIES     is allergic to nsaids; toradol [ketorolac tromethamine]; and ultram [tramadol]. FAMILY HISTORY     She indicated that the status of her mother is unknown. She indicated that the status of her father is unknown.  She indicated that the status of her brother is unknown. SOCIAL HISTORY      reports that she has been smoking cigarettes. She has a 10.50 pack-year smoking history. She has never used smokeless tobacco. She reports that she does not drink alcohol or use drugs. PHYSICAL EXAM     INITIAL VITALS: /61   Pulse 117   Temp 99.4 °F (37.4 °C) (Oral)   Resp 20   Ht 5' 2\" (1.575 m)   Wt 209 lb (94.8 kg)   LMP 06/15/2020   SpO2 95%   BMI 38.23 kg/m²   Gen: nad  Head: Normocephalic, atraumatic  Eye: Pupils equal round reactive to light, no conjunctivitis  ENT: MMM  Neck: alejandra JVD  Heart:Tachycardic, regular rhythm no murmurs  Lungs: Expiratory wheezing bilaterally, no respiratory distress  Chest wall: No crepitus, no tenderness palpation  Abdomen: Soft, R:Q TTP, nondistended, with no peritoneal signs  MSK: No cva ttp  Neurologic: Patient is alert and oriented x3, motor and sensation is intact in all 4 extremities, fluent speech  Extremities: no edema,    MEDICAL DECISION MAKING:     MDM  47 y.o. female wheezing sob nausea, abdominal pain. Concern for covid. She has a h/o asthma. Speaking full sentences. No respiratory distress. Will treat with steroids. Given RLQ pain will r/o UTI and appendicits / diverticulitis / colitis. Will check for covid. Emergency Department course:    WBC count elevated at 17. She does have pyuria. CT scan of the abdomen shows no acute intra-abdominal process, but there is evidence of bilateral airspace disease. Suspect pna. Covid is negative. Starting antibiotics. Pt reassessed, and is breathing better, speaking full sentences. D/w pt the results, treatment plan, warning precautions for prompt ED return and importance of close OP FU, she verbalizes understanding and agrees with the treatment plan.        DIAGNOSTIC RESULTS       RADIOLOGY:All plain film, CT, MRI, and formal ultrasound images (except ED bedside ultrasound) are read by the radiologist and the images and interpretations are directly viewed by the emergency physician. CT ABDOMEN PELVIS WO CONTRAST Additional Contrast? None   Final Result   1. Bilateral patchy airspace disease, left greater than right, suggestive of   multifocal inflammatory process or infection. 2.  No acute inflammatory process identified in the abdomen or pelvis. Normal appendix. 3.  6 mm left renal stone. No stones identified on the right side. No   hydronephrosis. LABS: All lab results were reviewed by myself, and all abnormals are listed below.   Labs Reviewed   CBC WITH AUTO DIFFERENTIAL - Abnormal; Notable for the following components:       Result Value    WBC 17.6 (*)     RDW 15.7 (*)     Seg Neutrophils 88 (*)     Lymphocytes 5 (*)     Segs Absolute 15.48 (*)     Absolute Lymph # 0.88 (*)     All other components within normal limits   URINE RT REFLEX TO CULTURE - Abnormal; Notable for the following components:    Leukocyte Esterase, Urine SMALL (*)     All other components within normal limits   COMPREHENSIVE METABOLIC PANEL - Abnormal; Notable for the following components:    Glucose 113 (*)     Total Bilirubin 0.27 (*)     Total Protein 6.1 (*)     All other components within normal limits   MICROSCOPIC URINALYSIS - Abnormal; Notable for the following components:    Bacteria, UA FEW (*)     Amorphous, UA 1+ (*)     All other components within normal limits   CULTURE, URINE   COVID-19, RAPID   SPECIMEN REJECTION       EMERGENCY DEPARTMENT COURSE:   Vitals:    Vitals:    03/31/21 0929 03/31/21 0931 03/31/21 1130 03/31/21 1200   BP:  (!) 161/118 (!) 146/83 127/61   Pulse:  117     Resp: 16 20     Temp:  99.4 °F (37.4 °C)     TempSrc:  Oral     SpO2:  95% 95%    Weight: 209 lb (94.8 kg)      Height: 5' 2\" (1.575 m)          The patient was given the following medications while in the emergency department:  Orders Placed This Encounter   Medications    0.9 % sodium chloride bolus    fentaNYL (SUBLIMAZE) injection 100 mcg    ondansetron [facility] injection 4 mg    fentaNYL (SUBLIMAZE) injection 100 mcg    acetaminophen (TYLENOL) tablet 650 mg    azithromycin (ZITHROMAX) tablet 500 mg     Order Specific Question:   Antimicrobial Indications     Answer:   Pneumonia (CAP)    predniSONE (DELTASONE) tablet 50 mg    DISCONTD: predniSONE (DELTASONE) 10 MG tablet     Sig: Take 4 tablets by mouth once daily for 5 days     Dispense:  20 tablet     Refill:  0    azithromycin (ZITHROMAX) 500 MG tablet     Sig: Take 1 tablet by mouth daily for 5 days     Dispense:  5 tablet     Refill:  0     -------------------------  CRITICAL CARE:   CONSULTS: None  PROCEDURES: Procedures     FINAL IMPRESSION      1. Pneumonia due to organism    2.  Exacerbation of asthma, unspecified asthma severity, unspecified whether persistent          DISPOSITION/PLAN   DISPOSITION Decision To Discharge 03/31/2021 12:28:37 PM      PATIENT REFERRED TO:  Courtney Mg, KATIE - [address]  [address] [place] [address]  [phone]    In 3 days      [facility] ED  [facility] [address] [facility] [address]  [phone]    If symptoms worsen      DISCHARGE MEDICATIONS:  Discharge Medication List as of 3/31/2021 12:30 PM      START taking these medications    Details   azithromycin (ZITHROMAX) 500 MG tablet Take 1 tablet by mouth daily for 5 days, Disp-5 tablet, R-0Print      predniSONE (DELTASONE) 10 MG tablet Take 4 tablets by mouth once daily for 5 days, Disp-20 tablet, R-0Print               Qiana Mancia MD  Attending Emergency Physician                     Qiana Mancia MD  04/01/21 6670

## 2021-04-01 NOTE — LETTER
[facility] PARTNERSHIP  [address] [place] [address] [address] [employer] [address] [employer] [address] [employer] [address]  Phone: [phone]  Fax: [phone]    KATIE Smyth CNP        April 1, 2021     Patient: Dany Bowman   YOB: 1980   Date of Visit: 4/1/2021       To Whom it May Concern:    Dejan Fonseca was seen in my clinic on 4/1/2021. She is currently being treated for pneumonia. Although she tested negative for covid, she still has some symptoms related to this virus. .Therefore it is my opinion that she quarantine and stay home for 10 days. Please excuse her for missing appointments/ meetings due to her health condition. If you have any questions or concerns, please don't hesitate to call.     Sincerely,         KATIE Cole CNP

## 2021-04-01 NOTE — LETTER
[facility] PARTNERSHIP  [address] [place], [place] [address] [facility] [address]  Phone: [phone]  Fax: [phone]    KATIE Smyth CNP        April 1, 2021     Patient: Dany Bowman   YOB: 1980   Date of Visit: 4/1/2021       To Whom it May Concern:    Dejan Fonseca was seen in my clinic on 4/1/2021. She may return to work on April, APril 12. .    If you have any questions or concerns, please don't hesitate to call.     Sincerely,         KATIE Cole - CNP

## 2021-04-01 NOTE — PATIENT INSTRUCTIONS
[facility] COVID-19 Vaccination Hotline: [phone]    COVID-19 vaccine appointments are not available through our practice. As you're eligible to receive the COVID-19 vaccine, as determined by your state's department of health, you will be able to schedule an appointment through [address] or by calling [phone]. Appointments are required to receive the COVID-19 vaccine. As vaccine supply continues to be limited, we anticipate open appointments to fill up quickly and appreciate your patience as we work through the process of providing vaccines to those in our communities. Schedule a Vaccine  When you qualify to receive the vaccine, call the [facility] COVID-19 Vaccination Hotline to schedule your appointment or to get additional information about the [facility] locations which are offering the COVID-19 vaccine. To be 94% effective, it's important that you receive two doses of one of the COVID-19 vaccines. -If you are receiving the Chilel Peter vaccine, your second shot will be scheduled as close to 21 days after the first shot as possible. -If you are receiving the Moderna vaccine, your second shot will be scheduled as close to 28 days after the first shot as possible. Links to [facility]) website and [facility] website:    Marco AntonioConferize/mercy-[facility]-monitoring-coronavirus-covid-19/covid-19-vaccine/[place]/warner-vaccine    https://"[employer]/covidvaccine    [facility]  https://sites. google. com/view/wchdohio-coronavirus/home/vaccines/get-vaccinated  [employer].fi. com/r/WoodCountyCOVID_Vaccine_On-Call_List      https://[employer]/shared/EFG7PXHLG?:toolbar=n&:display_count=n&:origin=viz_share_link&:embed=y    PHARMACY LOCATIONS  Https://vaccine. coronavirus. [place].gov/    [facility]  Https://[employer]/shared/[email or web address]?:toolbar=n&:display_count=n&:origin=viz_share_link&:embed=y    [facility]  Https://[employer]/shared/UWHGTR38A?:toolbar=n&:display_count=n&:origin=nirali_share_link&:embed=y    [address]  Https://[employer]/shared/[email or web address]?:toolbar=n&:display_count=n&:origin=[employer]_share_link&:embed=y    [address]  https://[employer]/shared/32CBTV9KH?:toolbar=n&:display_count=n&:origin=[employer]_link&:embed=y    Patient Education        Clostridium Difficile Colitis: Care Instructions  Your Care Instructions     Clostridium difficile (also called C. difficile) are bacteria that can cause swelling and irritation of the large intestine, or colon. This inflammation is also called colitis. It can cause diarrhea, fever, and belly cramps. You may get C. difficile colitis if you take antibiotics. The infection is most common in people who are taking antibiotics while in the hospital. It is also common in older people in hospitals and nursing homes. Severe disease could cause the colon to swell to many times its normal size (toxic megacolon). This can cause death and needs emergency treatment. You may have a swollen belly that is painful or tender, a rapid heartbeat, and a fever. Follow-up care is a key part of your treatment and safety. Be sure to make and go to all appointments, and call your doctor if you are having problems. It's also a good idea to know your test results and keep a list of the medicines you take. How can you care for yourself at home? · Your doctor may give you antibiotics to treat C. difficile colitis. If your doctor prescribes an antibiotic, you will be given a different antibiotic than the one that caused your infection. Take your antibiotics as directed. Do not stop taking them just because you feel better. You need to take the full course of antibiotics. · To prevent dehydration, drink plenty of fluids. Choose water and other caffeine-free clear liquids until you feel better.  If you have kidney, heart, or liver disease and have to limit fluids, talk with your doctor before you increase the amount of fluids you drink. · Begin eating small amounts of mild foods, if you feel like it. Try yogurt that has live cultures of lactobacillus (check the label). ? Avoid spicy foods, fruits, alcohol, and caffeine until 48 hours after all symptoms go away. ? Avoid chewing gum that contains sorbitol. ? Avoid dairy products (except for yogurt with lactobacillus) while you have diarrhea and for 3 days after symptoms go away. · To prevent the spread of C. difficile, practice good hygiene. Keep your hands clean by washing them well and often with soap and clean, running water. This is most important after you use the bathroom and before you make and eat food. Remind everyone in your home to also wash their hands often. Alcohol-based hand sanitizers do not kill C. difficile. · After the diarrhea is better, you are much less likely to spread C. difficile. But you still need to take extra care to keep your home clean. ? Clean bathroom surfaces with a bleach solution to kill any C. difficile spores. To dilute household bleach, follow the directions on the label. When should you call for help? Call 911 if:     · You passed out (lost consciousness). Call your doctor now or seek immediate medical care if:    · You have a fever over 101°F or shaking chills.     · You feel lightheaded or have a fast heart rate.     · You pass stools that are almost always bloody.     · You have signs of needing more fluids. You have sunken eyes, a dry mouth, and pass only a little urine.     · You have severe belly pain with or without bloating.     · You have severe vomiting and cannot keep down liquids.     · You are not passing any stools or gas. Watch closely for changes in your health, and be sure to contact your doctor if:    · You do not get better as expected. Where can you learn more? Go to https://chpeshefalieb.healthCubicl. org and sign in to your [employer] account.  Enter H511 in the [facility] box to learn more about \"Clostridium Difficile Colitis: Care Instructions. \"     If you do not have an account, please click on the \"Sign Up Now\" link. Current as of: September 23, 2020               Content Version: 12.8  © 7277-7874 Healthwise, Incorporated. Care instructions adapted under license by [facility] ([facility]). If you have questions about a medical condition or this instruction, always ask your healthcare professional. [address] any warranty or liability for your use of this information.

## 2021-04-01 NOTE — TELEPHONE ENCOUNTER
[facility]) ED Follow up Call    Reason for ED visit:           Chanel Otero , this is Washington Montanez from Dr. Kei Bernal's office, just calling to see how you are doing after your recent ED visit. Did you receive discharge instructions? Yes  Do you understand the discharge instructions? Yes  Did the ED give you any new prescriptions? Yes  Were you able to fill your prescriptions? Yes      Do you have one of our red, yellow and green  Zone sheets that help you to determine when you should go to the ED? No      Do you need or want to make a follow up appt with your PCP? Yes    Do you have any further needs in the home i.e. Equipment?   No        FU appts/Provider:    Future Appointments   Date Time Provider [place]   4/1/2021 11:30 AM KATIE Cole - CNP fp sc [facility]   4/13/2021 11:30 AM DO Allison Fuentes Neuro [facility]

## 2021-04-16 ENCOUNTER — HOSPITAL ENCOUNTER (EMERGENCY)
Age: 41
Discharge: HOME | End: 2021-04-16
Attending: EMERGENCY MEDICINE
Payer: COMMERCIAL

## 2021-04-16 VITALS
WEIGHT: 204 LBS | RESPIRATION RATE: 16 BRPM | SYSTOLIC BLOOD PRESSURE: 149 MMHG | OXYGEN SATURATION: 97 % | DIASTOLIC BLOOD PRESSURE: 60 MMHG | BODY MASS INDEX: 37.54 KG/M2 | HEART RATE: 86 BPM | HEIGHT: 62 IN | TEMPERATURE: 98 F

## 2021-04-16 DIAGNOSIS — N30.00 ACUTE CYSTITIS WITHOUT HEMATURIA: Primary | ICD-10-CM

## 2021-04-16 DIAGNOSIS — N94.10 DYSPAREUNIA IN FEMALE: ICD-10-CM

## 2021-04-16 LAB
-: ABNORMAL
ABSOLUTE EOS #: 0.11 K/UL (ref 0–0.4)
ABSOLUTE IMMATURE GRANULOCYTE: ABNORMAL K/UL (ref 0–0.3)
ABSOLUTE LYMPH #: 1.58 K/UL (ref 1–4.8)
ABSOLUTE MONO #: 0.32 K/UL (ref 0.1–1.3)
AMORPHOUS: ABNORMAL
ANION GAP SERPL CALCULATED.3IONS-SCNC: 9 MMOL/L (ref 9–17)
ATYPICAL LYMPHOCYTE ABSOLUTE COUNT: 0.11 K/UL
ATYPICAL LYMPHOCYTES: 1 %
BACTERIA: ABNORMAL
BASOPHILS # BLD: 2 % (ref 0–2)
BASOPHILS ABSOLUTE: 0.21 K/UL (ref 0–0.2)
BILIRUBIN URINE: NEGATIVE
BUN BLDV-MCNC: 12 MG/DL (ref 6–20)
BUN/CREAT BLD: ABNORMAL (ref 9–20)
CALCIUM SERPL-MCNC: 9 MG/DL (ref 8.6–10.4)
CASTS UA: ABNORMAL /LPF
CHLORIDE BLD-SCNC: 103 MMOL/L (ref 98–107)
CO2: 24 MMOL/L (ref 20–31)
COLOR: YELLOW
COMMENT UA: ABNORMAL
CREAT SERPL-MCNC: 0.63 MG/DL (ref 0.5–0.9)
CRYSTALS, UA: ABNORMAL /HPF
DIFFERENTIAL TYPE: ABNORMAL
DIRECT EXAM: NORMAL
EOSINOPHILS RELATIVE PERCENT: 1 % (ref 0–4)
EPITHELIAL CELLS UA: ABNORMAL /HPF
GFR AFRICAN AMERICAN: >60 ML/MIN
GFR NON-AFRICAN AMERICAN: >60 ML/MIN
GFR SERPL CREATININE-BSD FRML MDRD: ABNORMAL ML/MIN/{1.73_M2}
GFR SERPL CREATININE-BSD FRML MDRD: ABNORMAL ML/MIN/{1.73_M2}
GLUCOSE BLD-MCNC: 103 MG/DL (ref 70–99)
GLUCOSE URINE: NEGATIVE
HCG(URINE) PREGNANCY TEST: NEGATIVE
HCT VFR BLD CALC: 39.9 % (ref 36–46)
HEMOGLOBIN: 12.9 G/DL (ref 12–16)
IMMATURE GRANULOCYTES: ABNORMAL %
KETONES, URINE: NEGATIVE
LEUKOCYTE ESTERASE, URINE: NEGATIVE
LYMPHOCYTES # BLD: 15 % (ref 24–44)
Lab: NORMAL
MCH RBC QN AUTO: 27.3 PG (ref 26–34)
MCHC RBC AUTO-ENTMCNC: 32.4 G/DL (ref 31–37)
MCV RBC AUTO: 84.1 FL (ref 80–100)
MONOCYTES # BLD: 3 % (ref 1–7)
MORPHOLOGY: ABNORMAL
MUCUS: ABNORMAL
NITRITE, URINE: POSITIVE
NRBC AUTOMATED: ABNORMAL PER 100 WBC
OTHER OBSERVATIONS UA: ABNORMAL
PDW BLD-RTO: 14.9 % (ref 11.5–14.9)
PH UA: 6 (ref 5–8)
PLATELET # BLD: 294 K/UL (ref 150–450)
PLATELET ESTIMATE: ABNORMAL
PMV BLD AUTO: 8.1 FL (ref 6–12)
POTASSIUM SERPL-SCNC: 4.1 MMOL/L (ref 3.7–5.3)
PROTEIN UA: NEGATIVE
RBC # BLD: 4.75 M/UL (ref 4–5.2)
RBC # BLD: ABNORMAL 10*6/UL
RBC UA: ABNORMAL /HPF
RENAL EPITHELIAL, UA: ABNORMAL /HPF
SEG NEUTROPHILS: 78 % (ref 36–66)
SEGMENTED NEUTROPHILS ABSOLUTE COUNT: 8.17 K/UL (ref 1.3–9.1)
SODIUM BLD-SCNC: 136 MMOL/L (ref 135–144)
SPECIFIC GRAVITY UA: 1.02 (ref 1–1.03)
SPECIMEN DESCRIPTION: NORMAL
TRICHOMONAS: ABNORMAL
TURBIDITY: CLEAR
URINE HGB: NEGATIVE
UROBILINOGEN, URINE: NORMAL
WBC # BLD: 10.5 K/UL (ref 3.5–11)
WBC # BLD: ABNORMAL 10*3/UL
WBC UA: ABNORMAL /HPF
YEAST: ABNORMAL

## 2021-04-16 PROCEDURE — 81025 URINE PREGNANCY TEST: CPT

## 2021-04-16 PROCEDURE — 36415 COLL VENOUS BLD VENIPUNCTURE: CPT

## 2021-04-16 PROCEDURE — 80048 BASIC METABOLIC PNL TOTAL CA: CPT

## 2021-04-16 PROCEDURE — 81001 URINALYSIS AUTO W/SCOPE: CPT

## 2021-04-16 PROCEDURE — 6370000000 HC RX 637 (ALT 250 FOR IP): Performed by: STUDENT IN AN ORGANIZED HEALTH CARE EDUCATION/TRAINING PROGRAM

## 2021-04-16 PROCEDURE — 87510 GARDNER VAG DNA DIR PROBE: CPT

## 2021-04-16 PROCEDURE — 87480 CANDIDA DNA DIR PROBE: CPT

## 2021-04-16 PROCEDURE — 87660 TRICHOMONAS VAGIN DIR PROBE: CPT

## 2021-04-16 PROCEDURE — 87591 N.GONORRHOEAE DNA AMP PROB: CPT

## 2021-04-16 PROCEDURE — 87491 CHLMYD TRACH DNA AMP PROBE: CPT

## 2021-04-16 PROCEDURE — 99285 EMERGENCY DEPT VISIT HI MDM: CPT

## 2021-04-16 PROCEDURE — 85025 COMPLETE CBC W/AUTO DIFF WBC: CPT

## 2021-04-16 RX ORDER — OXYCODONE HYDROCHLORIDE 5 MG/1
5 TABLET ORAL ONCE
Status: COMPLETED | OUTPATIENT
Start: 2021-04-16 | End: 2021-04-16

## 2021-04-16 RX ORDER — CEPHALEXIN 500 MG/1
500 CAPSULE ORAL 4 TIMES DAILY
Qty: 28 CAPSULE | Refills: 0 | Status: SHIPPED | OUTPATIENT
Start: 2021-04-16 | End: 2021-04-23

## 2021-04-16 RX ORDER — CEPHALEXIN 250 MG/1
500 CAPSULE ORAL ONCE
Status: COMPLETED | OUTPATIENT
Start: 2021-04-16 | End: 2021-04-16

## 2021-04-16 RX ADMIN — CEPHALEXIN 500 MG: 250 CAPSULE ORAL at 20:02

## 2021-04-16 RX ADMIN — OXYCODONE HYDROCHLORIDE 5 MG: 5 TABLET ORAL at 20:00

## 2021-04-16 ASSESSMENT — ENCOUNTER SYMPTOMS
SHORTNESS OF BREATH: 0
NAUSEA: 0
VOMITING: 0
CONSTIPATION: 0
SORE THROAT: 0
ABDOMINAL PAIN: 1
DIARRHEA: 0
TROUBLE SWALLOWING: 0

## 2021-04-16 NOTE — ED NOTES
Pelvic exam completed by Dr Edis Gaspar with RN present. Pt unable to tolerate complete pelvic exam due to pain. Specimens obtained, labeled and sent to lab.       Rajendra Frost RN  04/16/21 1954

## 2021-04-16 NOTE — ED TRIAGE NOTES
Mode of arrival (squad #, walk in, police, etc) : walk in        Chief complaint(s): abdominal pain        Arrival Note (brief scenario, treatment PTA, etc). : Pt states she has been having left sided abdominal pain and vaginal pain x4 days. Pt states she has vaginal swelling and a fishy odor. Pt states she would like to be tested for STDs. Pt denies urinary issues. Pt appears to be uncomfortable in triage. C= \"Have you ever felt that you should Cut down on your drinking? \"  No  A= \"Have people Annoyed you by criticizing your drinking? \"  No  G= \"Have you ever felt bad or Guilty about your drinking? \"  No  E= \"Have you ever had a drink as an Eye-opener first thing in the morning to steady your nerves or to help a hangover? \"  No      Deferred []      Reason for deferring: N/A    *If yes to two or more: probable alcohol abuse. *

## 2021-04-16 NOTE — ED PROVIDER NOTES
[address] ED  eMERGENCY dEPARTMENT eNCOUnter   Attending Attestation     Pt Name: Brisa Rodrigues  MRN: [id number]  [place] 1980  Date of evaluation: 4/16/21       Brisa Rodrigues is a 47 y.o. female who presents with Abdominal Pain (x4 days), Back Pain, Vaginal Pain, and Vaginal Odor (fishy)      History:   Patient's been having some vaginal pain odor and lower abdominal pain. Exam: Vitals:   Vitals:    04/16/21 1828   BP: (!) 149/60   Pulse: 86   Resp: 16   Temp: 98 °F (36.7 °C)   TempSrc: Oral   SpO2: 97%   Weight: 204 lb (92.5 kg)   Height: 5' 2\" (1.575 m)     Abdomen is soft nondistended no peritoneal signs but tenderness in the suprapubic area. I performed a history and physical examination of the patient and discussed management with the resident. I reviewed the residents note and agree with the documented findings and plan of care. Any areas of disagreement are noted on the chart. I was personally present for the key portions of any procedures. I have documented in the chart those procedures where I was not present during the key portions. I have personally reviewed all images and agree with the resident's interpretation. I have reviewed the emergency nurses triage note. I agree with the chief complaint, past medical history, past surgical history, allergies, medications, social and family history as documented unless otherwise noted below. Documentation of the HPI, Physical Exam and Medical Decision Making performed by medical students or scribes is based on my personal performance of the HPI, PE and MDM. I personally evaluated and examined the patient in conjunction with the APC and agree with the assessment, treatment plan, and disposition of the patient as recorded by the APC. Additional findings are as noted.     Kiersten Washington MD  Attending Emergency  Physician             Carline Scruggs MD  04/16/21 9161

## 2021-04-17 NOTE — ED PROVIDER NOTES
[address] ED  Emergency Department Encounter  Emergency Medicine Resident     Pt Name:Niurka Chowdary  MRN: [id number]  [place] 1980  Date of evaluation: 21  PCP:  KATIE Hein CNP    CHIEF COMPLAINT       Chief Complaint   Patient presents with    Abdominal Pain     x4 days    Back Pain    Vaginal Pain    Vaginal Odor     fishy       HISTORY OF PRESENT ILLNESS  (Location/Symptom, Timing/Onset, Context/Setting, Quality, Duration, Modifying Factors, Severity.)      Mery Graham is a 47 y.o. female who presents with 4 days of lower abdominal pain accompanied by dyspareunia and vaginal pain with vaginal odor. Patient feels like there is swelling in or around the vagina. No history of similar. Pain worse with intercourse. Back pain is chronic, worsened by current vaginal pain. No fevers chills nausea vomiting chest pain or shortness of breath. PAST MEDICAL / SURGICAL / SOCIAL / FAMILY HISTORY      has a past medical history of Anxiety, Arthritis, Asthma, Sadler's esophagus, Bipolar 1 disorder ([address] CAD (coronary artery disease), Chronic insomnia, COPD (chronic obstructive pulmonary disease) ([address] Depression, Diabetes mellitus ([address] Endometriosis, Esophagitis, GERD (gastroesophageal reflux disease), Headache(784.0), Herniated disc, cervical, Hiatal hernia, Incisional abscess, Kidney stones, MDRO (multiple drug resistant organisms) resistance, Pleurisy, Pneumonia, Seizures ([address] UTI (urinary tract infection), and Vision abnormalities. has a past surgical history that includes knee surgery (Left);  section (, ); Tonsillectomy; Knee arthroscopy (Left, 5/20/15); Cervical disc surgery; Upper gastrointestinal endoscopy (2016); Upper gastrointestinal endoscopy (2017); Upper gastrointestinal endoscopy (2017); pr esophagogastroduodenoscopy transoral diagnostic (N/A, 3/2/2017); laparoscopy; laparoscopy (N/A, 10/5/2017);  Upper gastrointestinal endoscopy (N/A, 2018); Endoscopy, colon, diagnostic;  section (N/A, 2018); Upper gastrointestinal endoscopy (N/A, 10/1/2019); Colonoscopy (N/A, 10/1/2019); Colonoscopy (N/A, 2020); Salyer tooth extraction; and Hysterectomy (N/A, 2020).     Social History     Socioeconomic History    Marital status: Single     Spouse name: Not on file    Number of children: 1    Years of education: 11th grade    Highest education level: Not on file   Occupational History    Occupation: unemployed-   Social Needs    Financial resource strain: Not on file    Food insecurity     Worry: Not on file     Inability: Not on file   [employer] needs     Medical: Not on file     Non-medical: Not on file   Tobacco Use    Smoking status: Current Every Day Smoker     Packs/day: 0.50     Years: 21.00     Pack years: 10.50     Types: Cigarettes    Smokeless tobacco: Never Used   Substance and Sexual Activity    Alcohol use: No    Drug use: No    Sexual activity: Yes     Partners: Male   Lifestyle    Physical activity     Days per week: Not on file     Minutes per session: Not on file    Stress: Not on file   Relationships    Social connections     Talks on phone: Not on file     Gets together: Not on file     Attends Orthodoxy service: Not on file     Active member of club or organization: Not on file     Attends meetings of clubs or organizations: Not on file     Relationship status: Not on file    Intimate partner violence     Fear of current or ex partner: Not on file     Emotionally abused: Not on file     Physically abused: Not on file     Forced sexual activity: Not on file   Other Topics Concern    Not on file   Social History Narrative    Lives with son and boyfriend       Family History   Problem Relation Age of Onset    Cancer Mother         breast    Bipolar Disorder Mother     Hypertension Mother     Breast Cancer Mother     Bipolar Disorder Brother     Hypertension Father        Allergies:  Nsaids, Toradol [ketorolac tromethamine], and Ultram [tramadol]    Home Medications:  Prior to Admission medications    Medication Sig Start Date End Date Taking?  Authorizing Provider   cephALEXin (KEFLEX) 500 MG capsule Take 1 capsule by mouth 4 times daily for 7 days 4/16/21 4/23/21 Yes Diane Fofana MD   promethazine (PHENERGAN) 6.25 MG/5ML syrup Take 10 mLs by mouth 4 times daily as needed for Nausea (diarrhea) 4/1/21   KATIE Cole CNP   zinc 50 MG CAPS Take 1 daily 4/1/21   KATIE Cole CNP   loratadine (CLARITIN) 10 MG tablet Take 1 tablet by mouth daily 4/1/21   KATIE Cole CNP   fluticasone (FLONASE) 50 MCG/ACT nasal spray 1 spray by Each Nostril route daily 4/1/21 5/1/21  KATIE Cole CNP   BREO ELLIPTA 200-25 MCG/INH AEPB inhaler INHALE ONE PUFF BY MOUTH ONCE A DAY ONCE A DAY INHALATION 30 3/30/21   KATIE Cole CNP   dicyclomine (BENTYL) 10 MG capsule TAKE 1 CAPSULE BY MOUTH 4 TIMES DAILY 3/30/21   KATIE Cole CNP   pregabalin (LYRICA) 100 MG capsule TAKE 1 CAPSULE BY MOUTH 3 TIMES DAILY 3/24/21 3/23/22  KATIE Cole CNP   ipratropium-albuterol (DUONEB) 0.5-2.5 (3) MG/3ML SOLN nebulizer solution INHALE CONTENTS OF 1 UNIT DOSE VIA NEBULIZER EVERY 4 HOURS 3/15/21   KATIE Cole CNP   LINZESS 290 MCG CAPS capsule TAKE 1 CAPSULE BY MOUTH EVERY MORNING (BEFORE BREAKFAST) 3/3/21   Prema Smith MD   methocarbamol (ROBAXIN-750) 750 MG tablet Take 1 tablet by mouth every 6-8 hours as needed (neck spasm/pain) 3/2/21 5/1/21  Pati Lindo DO   sertraline (ZOLOFT) 50 MG tablet Take 1 tablet by mouth daily 1/19/21   Historical Provider, MD   lamoTRIgine (LAMICTAL) 150 MG tablet Take 1 tablet by mouth nightly 10/28/20   Historical Provider, MD   vitamin D3 (CHOLECALCIFEROL) 25 MCG (1000 UT) TABS tablet TAKE 1 TABLET BY MOUTH DAILY 12/14/20   Alan Bernal, APRN - CNP pantoprazole (PROTONIX) 40 MG tablet TAKE ONE TABLET TWICE A DAY ORALLY 30 DAY(S) 10/26/20   Yamini Vicente MD   vitamin D3 (CHOLECALCIFEROL) 25 MCG (1000 UT) TABS tablet TAKE 1 TABLET BY MOUTH DAILY 8/27/20   KATIE Cole CNP   Masks (CLEVER CHOICE FACE MASK) MISC USE NEW FACE MASK EVERYDAY WHEN PATIENT GO OUTSIDE OF HOME DUE TO COVID PANDEMIC 7/6/20   KATIE Cole CNP   acetaminophen (TYLENOL) 500 MG tablet Take 500 mg by mouth every 6 hours as needed for Pain    Historical Provider, MD   blood glucose monitor strips Test 2-3 times a day & as needed for symptoms of irregular blood glucose. BRAND OF CHOICE INSURANCE ALLOWS. 6/12/20   KATIE Cole CNP   Lancets MISC 1 each by Does not apply route 2 times daily 6/12/20   KATIE Cole CNP   Alcohol Swabs (ALCOHOL PREP) PADS Use as directed 6/12/20   KATIE Cole CNP   montelukast (SINGULAIR) 10 MG tablet Take 1 tablet by mouth nightly 9/20/18   Altagracia Denton MD       REVIEW OF SYSTEMS    (2-9 systems for level 4, 10 or more for level 5)      Review of Systems   Constitutional: Negative for chills and fever. HENT: Negative for sore throat and trouble swallowing. Respiratory: Negative for shortness of breath. Cardiovascular: Negative for chest pain. Gastrointestinal: Positive for abdominal pain (Suprapubic). Negative for constipation, diarrhea, nausea and vomiting. Genitourinary: Positive for dyspareunia, pelvic pain and vaginal pain. Negative for dysuria and vaginal discharge. Musculoskeletal: Negative for arthralgias and myalgias. Skin: Negative for wound. Neurological: Negative for light-headedness and headaches. Psychiatric/Behavioral: Negative for behavioral problems.        PHYSICAL EXAM   (up to 7 for level 4, 8 or more for level 5)      INITIAL VITALS:   BP (!) 149/60   Pulse 86   Temp 98 °F (36.7 °C) (Oral)   Resp 16   Ht 5' 2\" (1.575 m)   Wt 204 lb (92.5 kg)   LMP 06/15/2020   SpO2 97%   BMI 37.31 kg/m²     Physical Exam  Vitals signs and nursing note reviewed. Exam conducted with a chaperone present. Constitutional:       Appearance: Normal appearance. She is well-developed. She is obese. She is not ill-appearing, toxic-appearing or diaphoretic. HENT:      Head: Normocephalic and atraumatic. Right Ear: External ear normal.      Left Ear: External ear normal.      Nose: Nose normal.      Mouth/Throat:      Pharynx: Uvula midline. Neck:      Musculoskeletal: Normal range of motion. Cardiovascular:      Rate and Rhythm: Normal rate and regular rhythm. Heart sounds: Normal heart sounds. No murmur. Pulmonary:      Effort: Pulmonary effort is normal. No respiratory distress. Abdominal:      Palpations: Abdomen is soft. Tenderness: There is abdominal tenderness in the suprapubic area. There is no right CVA tenderness or left CVA tenderness. Genitourinary:     Labia:         Right: No rash or lesion. Left: No rash or lesion. Vagina: No signs of injury and foreign body. Tenderness present. No vaginal discharge, erythema, bleeding, lesions or prolapsed vaginal walls. Comments: Incomplete  exam as patient did not tolerate speculum exam.  Bimanual exam with significant tenderness of the distal aspect of the vaginal canal without obvious lesion or abscess. No Bartholin cyst.  Musculoskeletal: Normal range of motion. General: No deformity. Skin:     General: Skin is warm and dry. Capillary Refill: Capillary refill takes less than 2 seconds. Neurological:      General: No focal deficit present. Mental Status: She is alert and oriented to person, place, and time.       Gait: Gait normal.   Psychiatric:         Mood and Affect: Mood normal.         Behavior: Behavior normal.         DIFFERENTIAL  DIAGNOSIS     PLAN (LABS / IMAGING / EKG):  Orders Placed This Encounter   Procedures    VAGINITIS DNA PROBE    C.trachomatis N.gonorrhoeae DNA    Pregnancy, Urine    Urinalysis Reflex to Culture    Microscopic Urinalysis    CBC Auto Differential    Basic Metabolic Panel w/ Reflex to MG    Vaginal exam       MEDICATIONS ORDERED:  Orders Placed This Encounter   Medications    oxyCODONE (ROXICODONE) immediate release tablet 5 mg    cephALEXin (KEFLEX) capsule 500 mg     Order Specific Question:   Antimicrobial Indications     Answer:   Urinary Tract Infection    cephALEXin (KEFLEX) 500 MG capsule     Sig: Take 1 capsule by mouth 4 times daily for 7 days     Dispense:  28 capsule     Refill:  0       DDX: Acute cystitis versus prolapse versus abscess versus STI versus other    DIAGNOSTIC RESULTS / EMERGENCY DEPARTMENT COURSE / MDM     LABS:  Results for orders placed or performed during the hospital encounter of 04/16/21   VAGINITIS DNA PROBE    Specimen: Vaginal   Result Value Ref Range    Specimen Description . VAGINA     Special Requests NOT REPORTED     Direct Exam NEGATIVE for Gardnerella vaginalis     Direct Exam NEGATIVE for Trichomonas vaginalis     Direct Exam NEGATIVE for Candida sp. Direct Exam       Method of testing is a DNA probe intended for detection and identification of Candida species, Gardnerella vaginalis, and Trichomonas vaginalis nucleic acid in vaginal fluid specimens from patients with symptoms of vaginitis/vaginosis.    Pregnancy, Urine   Result Value Ref Range    HCG(Urine) Pregnancy Test NEGATIVE NEGATIVE   Urinalysis Reflex to Culture    Specimen: Urine voided   Result Value Ref Range    Color, UA YELLOW YELLOW    Turbidity UA CLEAR CLEAR    Glucose, Ur NEGATIVE NEGATIVE    Bilirubin Urine NEGATIVE NEGATIVE    Ketones, Urine NEGATIVE NEGATIVE    Specific Gravity, UA 1.025 1.000 - 1.030    Urine Hgb NEGATIVE NEGATIVE    pH, UA 6.0 5.0 - 8.0    Protein, UA NEGATIVE NEGATIVE    Urobilinogen, Urine Normal Normal    Nitrite, Urine POSITIVE (A) NEGATIVE    Leukocyte Esterase, Urine NEGATIVE NEGATIVE    Urinalysis Comments NOT REPORTED    Microscopic Urinalysis   Result Value Ref Range    -          WBC, UA 5 TO 10 /HPF    RBC, UA 0 TO 2 /HPF    Casts UA NOT REPORTED /LPF    Crystals, UA NOT REPORTED None /HPF    Epithelial Cells UA 2 TO 5 /HPF    Renal Epithelial, UA NOT REPORTED 0 /HPF    Bacteria, UA MANY (A) None    Mucus, UA NOT REPORTED None    Trichomonas, UA NOT REPORTED None    Amorphous, UA NOT REPORTED None    Other Observations UA NOT REPORTED NOT REQ.     Yeast, UA NOT REPORTED None   CBC Auto Differential   Result Value Ref Range    WBC 10.5 3.5 - 11.0 k/uL    RBC 4.75 4.0 - 5.2 m/uL    Hemoglobin 12.9 12.0 - 16.0 g/dL    Hematocrit 39.9 36 - 46 %    MCV 84.1 80 - 100 fL    MCH 27.3 26 - 34 pg    MCHC 32.4 31 - 37 g/dL    RDW 14.9 11.5 - 14.9 %    Platelets 798 864 - 798 k/uL    MPV 8.1 6.0 - 12.0 fL    NRBC Automated NOT REPORTED per 100 WBC    Differential Type NOT REPORTED     Immature Granulocytes NOT REPORTED 0 %    Absolute Immature Granulocyte NOT REPORTED 0.00 - 0.30 k/uL    WBC Morphology NOT REPORTED     RBC Morphology NOT REPORTED     Platelet Estimate NOT REPORTED     Seg Neutrophils 78 (H) 36 - 66 %    Lymphocytes 15 (L) 24 - 44 %    Atypical Lymphocytes 1 %    Monocytes 3 1 - 7 %    Eosinophils % 1 0 - 4 %    Basophils 2 0 - 2 %    Segs Absolute 8.17 1.3 - 9.1 k/uL    Absolute Lymph # 1.58 1.0 - 4.8 k/uL    Atypical Lymphocytes Absolute 0.11 k/uL    Absolute Mono # 0.32 0.1 - 1.3 k/uL    Absolute Eos # 0.11 0.0 - 0.4 k/uL    Basophils Absolute 0.21 (H) 0.0 - 0.2 k/uL    Morphology 1+ TARGET CELLS    Basic Metabolic Panel w/ Reflex to MG   Result Value Ref Range    Glucose 103 (H) 70 - 99 mg/dL    BUN 12 6 - 20 mg/dL    CREATININE 0.63 0.50 - 0.90 mg/dL    Bun/Cre Ratio NOT REPORTED 9 - 20    Calcium 9.0 8.6 - 10.4 mg/dL    Sodium 136 135 - 144 mmol/L    Potassium 4.1 3.7 - 5.3 mmol/L    Chloride 103 98 - 107 mmol/L    CO2 24 20 - 31 mmol/L    Anion Gap 9 9 - 17 mmol/L    GFR Non-African American >60 >60 mL/min    GFR African American >60 >60 mL/min    GFR Comment          GFR Staging NOT REPORTED          RADIOLOGY:  None    EKG  None    All EKG's are interpreted by the Emergency Department Physician who either signs or Co-signs this chart in the absence of a cardiologist.    EMERGENCY DEPARTMENT COURSE:  Patient found lying in bed, uncomfortable appearing but no acute distress, not ill or toxic. Engaged and cooperative in exam.  Physical exam notable for stable vitals with benign nonperitoneal abdomen. Attempted  exam which patient did not tolerate. Bimanual exam without appreciable abscess infection prolapse or underlying etiology for patient's symptoms. No adnexal tenderness, low suspicion for ovarian torsion. Patient has had 8+ abdominal CTs in the last year, do not feel additional scan is warranted given the benign abdomen and superficial vaginal pain. Laboratory work-up notable for signs of acute cystitis, will treat with Keflex at a dose that would also treat a superficial skin infection for double coverage. Pain control obtained in the emergency department, started on Keflex with prescription to go home with. Patient to follow-up with OB/GYN and PCP regarding ongoing dyspareunia. Discharge plan discussed with patient who is in agreement. Educated on likely pathology, medications, return precautions, and follow-up. Patient understood all educated materials with all questions answered to their satisfaction. PROCEDURES:  None    CONSULTS:  None    CRITICAL CARE:  None    FINAL IMPRESSION      1. Acute cystitis without hematuria    2.  Dyspareunia in female          DISPOSITION / PLAN     DISPOSITION Decision To Discharge 04/16/2021 08:47:33 PM      PATIENT REFERRED TO:  KATIE Chase CNPorimery[address]  [address] [facility] [place] [address]  [phone]    Schedule an appointment as soon as possible for a visit   Regarding this visit    [facility] ED  [place] [address]  [phone]  Go to   If symptoms worsen      DISCHARGE MEDICATIONS:  Discharge Medication List as of 4/16/2021  8:48 PM      START taking these medications    Details   cephALEXin (KEFLEX) 500 MG capsule Take 1 capsule by mouth 4 times daily for 7 days, Disp-28 capsule, R-0Print             Lianne Villanueva MD  Emergency Medicine Resident    (Please note that portions of this note were completed with a voice recognition program.  Efforts were made to edit the dictations but occasionally words are mis-transcribed.)        Lianne Villanueva MD  Resident  04/17/21 [id number]

## 2021-04-19 ENCOUNTER — TELEPHONE (OUTPATIENT)
Dept: FAMILY MEDICINE CLINIC | Age: 41
End: 2021-04-19

## 2021-04-19 LAB
C TRACH DNA GENITAL QL NAA+PROBE: NEGATIVE
N. GONORRHOEAE DNA: NEGATIVE
SPECIMEN DESCRIPTION: NORMAL

## 2021-04-26 DIAGNOSIS — G89.29 CHRONIC NECK PAIN WITH ABNORMAL NEUROLOGIC EXAMINATION: ICD-10-CM

## 2021-04-26 DIAGNOSIS — M48.02 SPINAL STENOSIS IN CERVICAL REGION: ICD-10-CM

## 2021-04-26 DIAGNOSIS — M54.2 CHRONIC NECK PAIN WITH ABNORMAL NEUROLOGIC EXAMINATION: ICD-10-CM

## 2021-04-26 RX ORDER — PREGABALIN 100 MG/1
CAPSULE ORAL
Qty: 90 CAPSULE | Refills: 0 | Status: SHIPPED | OUTPATIENT
Start: 2021-04-26 | End: 2021-04-27 | Stop reason: SDUPTHER

## 2021-04-27 DIAGNOSIS — M54.2 CHRONIC NECK PAIN WITH ABNORMAL NEUROLOGIC EXAMINATION: ICD-10-CM

## 2021-04-27 DIAGNOSIS — G89.29 CHRONIC NECK PAIN WITH ABNORMAL NEUROLOGIC EXAMINATION: ICD-10-CM

## 2021-04-27 DIAGNOSIS — M48.02 SPINAL STENOSIS IN CERVICAL REGION: ICD-10-CM

## 2021-04-27 DIAGNOSIS — J44.9 CHRONIC OBSTRUCTIVE PULMONARY DISEASE, UNSPECIFIED COPD TYPE (HCC): ICD-10-CM

## 2021-04-28 RX ORDER — PREGABALIN 100 MG/1
CAPSULE ORAL
Qty: 90 CAPSULE | Refills: 0 | Status: SHIPPED | OUTPATIENT
Start: 2021-04-28 | End: 2021-05-18 | Stop reason: SDUPTHER

## 2021-05-10 DIAGNOSIS — R73.03 PREDIABETES: ICD-10-CM

## 2021-05-10 DIAGNOSIS — E55.9 VITAMIN D DEFICIENCY: ICD-10-CM

## 2021-05-10 RX ORDER — MELATONIN
Qty: 30 TABLET | Refills: 3 | Status: SHIPPED | OUTPATIENT
Start: 2021-05-10 | End: 2021-08-27

## 2021-05-10 RX ORDER — ISOPROPYL ALCOHOL 0.7 ML/1
SWAB TOPICAL
Qty: 100 EACH | Refills: 11 | Status: SHIPPED | OUTPATIENT
Start: 2021-05-10

## 2021-05-13 ENCOUNTER — HOSPITAL ENCOUNTER (EMERGENCY)
Age: 41
Discharge: HOME | End: 2021-05-13
Attending: EMERGENCY MEDICINE
Payer: COMMERCIAL

## 2021-05-13 VITALS
TEMPERATURE: 97.9 F | SYSTOLIC BLOOD PRESSURE: 130 MMHG | HEART RATE: 72 BPM | HEIGHT: 62 IN | RESPIRATION RATE: 16 BRPM | BODY MASS INDEX: 37.54 KG/M2 | DIASTOLIC BLOOD PRESSURE: 72 MMHG | OXYGEN SATURATION: 99 % | WEIGHT: 204 LBS

## 2021-05-13 DIAGNOSIS — L03.319 CELLULITIS AND ABSCESS OF TRUNK: Primary | ICD-10-CM

## 2021-05-13 DIAGNOSIS — L02.219 CELLULITIS AND ABSCESS OF TRUNK: Primary | ICD-10-CM

## 2021-05-13 PROCEDURE — 99283 EMERGENCY DEPT VISIT LOW MDM: CPT

## 2021-05-13 PROCEDURE — 6370000000 HC RX 637 (ALT 250 FOR IP): Performed by: EMERGENCY MEDICINE

## 2021-05-13 PROCEDURE — 87070 CULTURE OTHR SPECIMN AEROBIC: CPT

## 2021-05-13 PROCEDURE — 2500000003 HC RX 250 WO HCPCS: Performed by: EMERGENCY MEDICINE

## 2021-05-13 PROCEDURE — 10061 I&D ABSCESS COMP/MULTIPLE: CPT

## 2021-05-13 PROCEDURE — 87205 SMEAR GRAM STAIN: CPT

## 2021-05-13 RX ORDER — LIDOCAINE HYDROCHLORIDE AND EPINEPHRINE 10; 10 MG/ML; UG/ML
20 INJECTION, SOLUTION INFILTRATION; PERINEURAL ONCE
Status: COMPLETED | OUTPATIENT
Start: 2021-05-13 | End: 2021-05-13

## 2021-05-13 RX ORDER — CLINDAMYCIN HYDROCHLORIDE 150 MG/1
300 CAPSULE ORAL ONCE
Status: COMPLETED | OUTPATIENT
Start: 2021-05-13 | End: 2021-05-13

## 2021-05-13 RX ORDER — CLINDAMYCIN HYDROCHLORIDE 300 MG/1
300 CAPSULE ORAL 4 TIMES DAILY
Qty: 28 CAPSULE | Refills: 0 | Status: SHIPPED | OUTPATIENT
Start: 2021-05-13 | End: 2021-05-20

## 2021-05-13 RX ORDER — HYDROCODONE BITARTRATE AND ACETAMINOPHEN 5; 325 MG/1; MG/1
2 TABLET ORAL ONCE
Status: COMPLETED | OUTPATIENT
Start: 2021-05-13 | End: 2021-05-13

## 2021-05-13 RX ADMIN — HYDROCODONE BITARTRATE AND ACETAMINOPHEN 2 TABLET: 5; 325 TABLET ORAL at 03:58

## 2021-05-13 RX ADMIN — LIDOCAINE HYDROCHLORIDE,EPINEPHRINE BITARTRATE 20 ML: 10; .01 INJECTION, SOLUTION INFILTRATION; PERINEURAL at 03:58

## 2021-05-13 RX ADMIN — CLINDAMYCIN HYDROCHLORIDE 300 MG: 150 CAPSULE ORAL at 03:58

## 2021-05-13 ASSESSMENT — PAIN SCALES - GENERAL: PAINLEVEL_OUTOF10: 10

## 2021-05-13 ASSESSMENT — PAIN DESCRIPTION - PAIN TYPE: TYPE: ACUTE PAIN

## 2021-05-13 ASSESSMENT — ENCOUNTER SYMPTOMS
COUGH: 0
ABDOMINAL PAIN: 0

## 2021-05-13 NOTE — ED PROVIDER NOTES
[address]  EMERGENCY DEPARTMENT ENCOUNTER      Pt Name: Carroll Quintero  MRN: [id number]  [place] 1980  Date of evaluation: 21    CHIEF COMPLAINT       Chief Complaint   Patient presents with    Abscess     HISTORY OF PRESENT ILLNESS   HPI 46 y.o. female presents with c/o abscess. Symptoms started 1 week ago. Pain is described as sharp in character, severe in severity (rating it 10 / 10). The pain is located just below the umbilicus with no radiation. The pain has been constant in course and progressively worsening. The patient tried no medications or antiboitics prior to arrival for relief of symptoms. REVIEW OF SYSTEMS     Review of Systems   Constitutional: Negative for fever. HENT: Negative for congestion. Respiratory: Negative for cough. Cardiovascular: Negative for chest pain. Gastrointestinal: Negative for abdominal pain. Skin: Positive for rash and wound.        PAST MEDICAL HISTORY     Past Medical History:   Diagnosis Date    Anxiety     Arthritis     OA    Asthma     Sadler's esophagus     LONG SEGMENT    Bipolar 1 disorder ([address]     CAD (coronary artery disease)     Chronic insomnia     [facility]     Depression     and bipolar    Diabetes mellitus ([address]     prediabetic    Endometriosis 3/9/2020    Esophagitis     severe    GERD (gastroesophageal reflux disease)     Headache(784.0)     Herniated disc, cervical     Hiatal hernia     Incisional abscess 1/15/2019    Kidney stones     MDRO (multiple drug resistant organisms) resistance     mrsa    Pleurisy     hx of \"years ago\"    Pneumonia     past penumonia    Seizures ([address]     2016 last seizure-focal seizure    UTI (urinary tract infection)     Vision abnormalities     wears glasses       SURGICAL HISTORY       Past Surgical History:   Procedure Laterality Date    CERVICAL DISC SURGERY      x2     SECTION  , 2018    x 2     SECTION N/A 2018     SECTION performed by Genevieve Reddy MD at [facility] L&D OR    COLONOSCOPY N/A 10/1/2019    COLONOSCOPY DIAGNOSTIC ABORTED performed by Hu Cavazos MD at [address] [facility] N/A 2020    COLONOSCOPY WITH BIOPSY performed by Hu Cavazos MD at [address] [facility] [address], COLON, DIAGNOSTIC      HYSTERECTOMY N/A 2020    HYSTERECTOMY ABDOMINAL LAPAROSCOPIC ROBOTIC XI W/ CYSTOSCOPY performed by Genevieve Reddy MD at [address] [facility] [address] ARTHROSCOPY Left 5/20/15    KNEE SURGERY Left     x3    LAPAROSCOPY      dr Belia Stein N/A 10/5/2017    LAPAROSCOPIC REMOVAL INTRA ABDOMINAL LIPOMA LOWER RIGHT performed by Catalino Enciso DO at [address] [facility] ESOPHAGOGASTRODUODENOSCOPY TRANSORAL DIAGNOSTIC N/A 3/2/2017    EGD ESOPHAGOGASTRODUODENOSCOPY  IP 2055 performed by Dana Coy MD at [facility]  2016    severe esophagitis    UPPER GASTROINTESTINAL ENDOSCOPY  2017    barretts; hiatus hernia; gastritis    UPPER GASTROINTESTINAL ENDOSCOPY  2017    long seg mullins's with linear erosions, esophagitis, small hiatal hernia    UPPER GASTROINTESTINAL ENDOSCOPY N/A 2018    MULLINS'S    UPPER GASTROINTESTINAL ENDOSCOPY N/A 10/1/2019    EGD BIOPSY performed by Hu Cavazos MD at [place] [address]       Discharge Medication List as of 2021  3:55 AM      CONTINUE these medications which have NOT CHANGED    Details   vitamin D3 (CHOLECALCIFEROL) 25 MCG (1000 UT) TABS tablet TAKE 1 TABLET BY MOUTH DAILY, Disp-30 tablet, R-3Normal      Alcohol Swabs ( STERILE ALCOHOL PREP) PADS USE AS DIRECTED, Disp-100 each, R-11Normal      Fluticasone furoate-vilanterol (BREO ELLIPTA) 200-25 MCG/INH AEPB inhaler Inhale 1 puff into the lungs daily, Disp-60 each, R-1Normal      pregabalin (LYRICA) 100 MG capsule Take 1 tid, Disp-90 capsule, R-0Normal promethazine (PHENERGAN) 6.25 MG/5ML syrup Take 10 mLs by mouth 4 times daily as needed for Nausea (diarrhea), Disp-120 mL, R-2Normal      zinc 50 MG CAPS Take 1 daily, Disp-90 capsule, R-3Normal      loratadine (CLARITIN) 10 MG tablet Take 1 tablet by mouth daily, Disp-90 tablet, R-1Normal      fluticasone (FLONASE) 50 MCG/ACT nasal spray 1 spray by Each Nostril route daily, Disp-1 Bottle, R-1Normal      dicyclomine (BENTYL) 10 MG capsule TAKE 1 CAPSULE BY MOUTH 4 TIMES DAILY, Disp-180 capsule, R-1Normal      ipratropium-albuterol (DUONEB) 0.5-2.5 (3) MG/3ML SOLN nebulizer solution INHALE CONTENTS OF 1 UNIT DOSE VIA NEBULIZER EVERY 4 HOURS, Disp-360 mL, R-2Normal      LINZESS 290 MCG CAPS capsule TAKE 1 CAPSULE BY MOUTH EVERY MORNING (BEFORE BREAKFAST), Disp-30 capsule, R-5Normal      sertraline (ZOLOFT) 50 MG tablet Take 1 tablet by mouth dailyHistorical Med      lamoTRIgine (LAMICTAL) 150 MG tablet Take 1 tablet by mouth nightlyHistorical Med      pantoprazole (PROTONIX) 40 MG tablet TAKE ONE TABLET TWICE A DAY ORALLY 30 DAY(S), Disp-90 tablet,R-3Normal      Masks (CLEVER CHOICE FACE MASK) MISC USE NEW FACE MASK EVERYDAY WHEN PATIENT GO OUTSIDE OF HOME DUE TO COVID PANDEMIC, Disp-90 each, R-1Normal      acetaminophen (TYLENOL) 500 MG tablet Take 500 mg by mouth every 6 hours as needed for PainHistorical Med      blood glucose monitor strips Test 2-3 times a day & as needed for symptoms of irregular blood glucose. BRAND OF CHOICE INSURANCE ALLOWS., Disp-100 strip, R-11, Normal      Lancets MISC 2 TIMES DAILY Starting Fri 6/12/2020, Disp-300 each, R-11, Normal      montelukast (SINGULAIR) 10 MG tablet Take 1 tablet by mouth nightly, Disp-30 tablet, R-3Print             ALLERGIES     is allergic to nsaids; toradol [ketorolac tromethamine]; and ultram [tramadol]. FAMILY HISTORY     She indicated that the status of her mother is unknown. She indicated that the status of her father is unknown.  She indicated that the status of her brother is unknown. SOCIAL HISTORY      reports that she has been smoking cigarettes. She has a 10.50 pack-year smoking history. She has never used smokeless tobacco. She reports that she does not drink alcohol or use drugs. PHYSICAL EXAM     INITIAL VITALS: /72   Pulse 72   Temp 97.9 °F (36.6 °C) (Oral)   Resp 16   Ht 5' 2\" (1.575 m)   Wt 204 lb (92.5 kg)   LMP 06/15/2020   SpO2 99%   BMI 37.31 kg/m²   Gen: appears uncomfortable  Head: Normocephalic, atraumatic  Eye: Pupils equal round reactive to light, no conjunctivitis  ENT: MMM  Heart: Regular rate and rhythm no murmurs  Lungs: Clear to auscultation bilaterally, no respiratory distress  Chest wall: No crepitus, no tenderness palpation  Abdomen: Soft, nontender, nondistended, with no peritoneal signs  Skin: there is an area of erythema that is 13 x 8 cm with a central area of induration and fluctuance about 3 x 4 cm   Neurologic: Patient is alert and oriented x3, ambulatory with a normal gait, fluent speech  Extremities: no edema,       MDM  46 y.o. female presenting with abscess and cellulitis. I&D performed per procedure note. Pt provided analgesics. Pt started on a course of antibiotics. D/w pt treatment plan, warning precautions for prompt ED return and importance of close OP FU, she verbalizes understanding and agrees with the treatment plan. Emergency Department course:    @[email or web address]@    DIAGNOSTIC RESULTS     EKG: All EKG's are interpreted by the Emergency Department Physician who either signs or Co-signs this chart in the absence of a cardiologist.        RADIOLOGY:All plain film, CT, MRI, and formal ultrasound images (except ED bedside ultrasound) are read by the radiologist and the images and interpretations are directly viewed by the emergency physician. No orders to display       LABS: All lab results were reviewed by myself, and all abnormals are listed below.   Labs Reviewed   CULTURE, WOUND EMERGENCY DEPARTMENT COURSE:   Vitals:    Vitals:    05/13/21 0258   BP: 130/72   Pulse: 72   Resp: 16   Temp: 97.9 °F (36.6 °C)   TempSrc: Oral   SpO2: 99%   Weight: 204 lb (92.5 kg)   Height: 5' 2\" (1.575 m)       The patient was given the following medications while in the emergency department:  Orders Placed This Encounter   Medications    lidocaine-EPINEPHrine 1 %-1:517908 injection 20 mL    HYDROcodone-acetaminophen (NORCO) 5-325 MG per tablet 2 tablet    clindamycin (CLEOCIN) capsule 300 mg     Order Specific Question:   Antimicrobial Indications     Answer:   Skin and Soft Tissue Infection    clindamycin (CLEOCIN) 300 MG capsule     Sig: Take 1 capsule by mouth 4 times daily for 7 days     Dispense:  28 capsule     Refill:  0     -------------------------  CRITICAL CARE:   CONSULTS: None  PROCEDURES: Incision/Drainage    Date/Time: 5/13/2021 2:25 PM  Performed by: Ludy Null MD  Authorized by: Ludy Null MD     Consent:     Consent obtained:  Verbal    Consent given by:  Patient    Risks discussed:  Bleeding, incomplete drainage and pain    Alternatives discussed:  No treatment  Location:     Type:  Abscess    Size:  3 x 4    Location:  Trunk    Trunk location:  Abdomen  Pre-procedure details:     Skin preparation:  Betadine  Anesthesia (see MAR for exact dosages): Anesthesia method:  Local infiltration    Local anesthetic:  Lidocaine 1% WITH epi  Procedure type:     Complexity:  Simple  Procedure details:     Incision types:  Stab incision    Incision depth:  Subcutaneous    Scalpel blade:  11    Wound management:  Probed and deloculated and irrigated with saline    Drainage:  Bloody and serosanguinous    Drainage amount:  Scant    Packing materials:  1/4 in iodoform gauze    Amount 1/4\" iodoform:  ~ 3\"         FINAL IMPRESSION      1.  Cellulitis and abscess of trunk          DISPOSITION/PLAN   DISPOSITION Decision To Discharge 05/13/2021 03:55:37 AM      PATIENT REFERRED

## 2021-05-13 NOTE — ED NOTES
Mode of arrival (squad #, walk in, police, etc) : walk in         Chief complaint(s): abscess         Arrival Note (brief scenario, treatment PTA, etc). : Pt presents to the ER c/o pain due to abscess on the lower abd. Pt states is started as a small pimple and had progressively gotten worse over the last week. C= \"Have you ever felt that you should Cut down on your drinking? \"  No  A= \"Have people Annoyed you by criticizing your drinking? \"  No  G= \"Have you ever felt bad or Guilty about your drinking? \"  No  E= \"Have you ever had a drink as an Eye-opener first thing in the morning to steady your nerves or to help a hangover? \"  No      Deferred []      Reason for deferring: N/A    *If yes to two or more: probable alcohol abuse. Halina Waddell RN  05/13/21 4000

## 2021-05-14 ENCOUNTER — TELEPHONE (OUTPATIENT)
Dept: FAMILY MEDICINE CLINIC | Age: 41
End: 2021-05-14

## 2021-05-14 NOTE — TELEPHONE ENCOUNTER
[facility]) ED Follow up Call    Reason for ED visit:           Kaylyn Steele , this is David Nieves from Dr. Da Bernal's office, just calling to see how you are doing after your recent ED visit. Did you receive discharge instructions? Yes  Do you understand the discharge instructions? Yes  Did the ED give you any new prescriptions? Yes  Were you able to fill your prescriptions? Yes      Do you have one of our red, yellow and green  Zone sheets that help you to determine when you should go to the ED? Yes      Do you need or want to make a follow up appt with your PCP? No    Do you have any further needs in the home i.e. Equipment? Not Applicable        FU appts/Provider:    No future appointments.

## 2021-05-18 DIAGNOSIS — M54.2 CHRONIC NECK PAIN WITH ABNORMAL NEUROLOGIC EXAMINATION: ICD-10-CM

## 2021-05-18 DIAGNOSIS — M48.02 SPINAL STENOSIS IN CERVICAL REGION: ICD-10-CM

## 2021-05-18 DIAGNOSIS — G89.29 CHRONIC NECK PAIN WITH ABNORMAL NEUROLOGIC EXAMINATION: ICD-10-CM

## 2021-05-18 RX ORDER — PREGABALIN 100 MG/1
CAPSULE ORAL
Qty: 90 CAPSULE | Refills: 0 | Status: SHIPPED | OUTPATIENT
Start: 2021-05-18 | End: 2021-06-18 | Stop reason: SDUPTHER

## 2021-05-22 ENCOUNTER — HOSPITAL ENCOUNTER (EMERGENCY)
Age: 41
Discharge: HOME | End: 2021-05-22
Attending: EMERGENCY MEDICINE
Payer: COMMERCIAL

## 2021-05-22 ENCOUNTER — APPOINTMENT (OUTPATIENT)
Dept: CT IMAGING | Age: 41
End: 2021-05-22
Payer: COMMERCIAL

## 2021-05-22 VITALS
HEIGHT: 62 IN | BODY MASS INDEX: 37.73 KG/M2 | HEART RATE: 91 BPM | WEIGHT: 205 LBS | SYSTOLIC BLOOD PRESSURE: 157 MMHG | TEMPERATURE: 98.9 F | OXYGEN SATURATION: 97 % | RESPIRATION RATE: 18 BRPM | DIASTOLIC BLOOD PRESSURE: 81 MMHG

## 2021-05-22 DIAGNOSIS — R10.30 LOWER ABDOMINAL PAIN: Primary | ICD-10-CM

## 2021-05-22 LAB
ABSOLUTE EOS #: 0.2 K/UL (ref 0–0.4)
ABSOLUTE IMMATURE GRANULOCYTE: ABNORMAL K/UL (ref 0–0.3)
ABSOLUTE LYMPH #: 1.7 K/UL (ref 1–4.8)
ABSOLUTE MONO #: 0.7 K/UL (ref 0.1–1.3)
ALBUMIN SERPL-MCNC: 4.2 G/DL (ref 3.5–5.2)
ALBUMIN/GLOBULIN RATIO: ABNORMAL (ref 1–2.5)
ALP BLD-CCNC: 62 U/L (ref 35–104)
ALT SERPL-CCNC: 14 U/L (ref 5–33)
ANION GAP SERPL CALCULATED.3IONS-SCNC: 12 MMOL/L (ref 9–17)
AST SERPL-CCNC: 15 U/L
BASOPHILS # BLD: 1 % (ref 0–2)
BASOPHILS ABSOLUTE: 0.2 K/UL (ref 0–0.2)
BILIRUB SERPL-MCNC: 0.39 MG/DL (ref 0.3–1.2)
BILIRUBIN URINE: NEGATIVE
BUN BLDV-MCNC: 12 MG/DL (ref 6–20)
BUN/CREAT BLD: ABNORMAL (ref 9–20)
CALCIUM SERPL-MCNC: 8.3 MG/DL (ref 8.6–10.4)
CHLORIDE BLD-SCNC: 109 MMOL/L (ref 98–107)
CO2: 20 MMOL/L (ref 20–31)
COLOR: YELLOW
COMMENT UA: ABNORMAL
CREAT SERPL-MCNC: 0.62 MG/DL (ref 0.5–0.9)
DIFFERENTIAL TYPE: ABNORMAL
EOSINOPHILS RELATIVE PERCENT: 1 % (ref 0–4)
GFR AFRICAN AMERICAN: >60 ML/MIN
GFR NON-AFRICAN AMERICAN: >60 ML/MIN
GFR SERPL CREATININE-BSD FRML MDRD: ABNORMAL ML/MIN/{1.73_M2}
GFR SERPL CREATININE-BSD FRML MDRD: ABNORMAL ML/MIN/{1.73_M2}
GLUCOSE BLD-MCNC: 82 MG/DL (ref 70–99)
GLUCOSE URINE: NEGATIVE
HCT VFR BLD CALC: 41.4 % (ref 36–46)
HEMOGLOBIN: 13.4 G/DL (ref 12–16)
IMMATURE GRANULOCYTES: ABNORMAL %
KETONES, URINE: ABNORMAL
LEUKOCYTE ESTERASE, URINE: NEGATIVE
LIPASE: 19 U/L (ref 13–60)
LYMPHOCYTES # BLD: 15 % (ref 24–44)
MAGNESIUM: 1.8 MG/DL (ref 1.6–2.6)
MCH RBC QN AUTO: 27.5 PG (ref 26–34)
MCHC RBC AUTO-ENTMCNC: 32.3 G/DL (ref 31–37)
MCV RBC AUTO: 85.3 FL (ref 80–100)
MONOCYTES # BLD: 6 % (ref 1–7)
NITRITE, URINE: NEGATIVE
NRBC AUTOMATED: ABNORMAL PER 100 WBC
PDW BLD-RTO: 15.3 % (ref 11.5–14.9)
PH UA: 5.5 (ref 5–8)
PLATELET # BLD: 315 K/UL (ref 150–450)
PLATELET ESTIMATE: ABNORMAL
PMV BLD AUTO: 8.7 FL (ref 6–12)
POTASSIUM SERPL-SCNC: 3.3 MMOL/L (ref 3.7–5.3)
PROTEIN UA: NEGATIVE
RBC # BLD: 4.86 M/UL (ref 4–5.2)
RBC # BLD: ABNORMAL 10*6/UL
SEG NEUTROPHILS: 77 % (ref 36–66)
SEGMENTED NEUTROPHILS ABSOLUTE COUNT: 8.6 K/UL (ref 1.3–9.1)
SODIUM BLD-SCNC: 141 MMOL/L (ref 135–144)
SPECIFIC GRAVITY UA: 1.03 (ref 1–1.03)
TOTAL PROTEIN: 6.7 G/DL (ref 6.4–8.3)
TURBIDITY: CLEAR
URINE HGB: NEGATIVE
UROBILINOGEN, URINE: NORMAL
WBC # BLD: 11.4 K/UL (ref 3.5–11)
WBC # BLD: ABNORMAL 10*3/UL

## 2021-05-22 PROCEDURE — 80053 COMPREHEN METABOLIC PANEL: CPT

## 2021-05-22 PROCEDURE — 99285 EMERGENCY DEPT VISIT HI MDM: CPT

## 2021-05-22 PROCEDURE — 83690 ASSAY OF LIPASE: CPT

## 2021-05-22 PROCEDURE — 36415 COLL VENOUS BLD VENIPUNCTURE: CPT

## 2021-05-22 PROCEDURE — 81003 URINALYSIS AUTO W/O SCOPE: CPT

## 2021-05-22 PROCEDURE — 74177 CT ABD & PELVIS W/CONTRAST: CPT

## 2021-05-22 PROCEDURE — 96374 THER/PROPH/DIAG INJ IV PUSH: CPT

## 2021-05-22 PROCEDURE — 6360000004 HC RX CONTRAST MEDICATION: Performed by: STUDENT IN AN ORGANIZED HEALTH CARE EDUCATION/TRAINING PROGRAM

## 2021-05-22 PROCEDURE — 6360000002 HC RX W HCPCS: Performed by: STUDENT IN AN ORGANIZED HEALTH CARE EDUCATION/TRAINING PROGRAM

## 2021-05-22 PROCEDURE — 83735 ASSAY OF MAGNESIUM: CPT

## 2021-05-22 PROCEDURE — 6370000000 HC RX 637 (ALT 250 FOR IP): Performed by: STUDENT IN AN ORGANIZED HEALTH CARE EDUCATION/TRAINING PROGRAM

## 2021-05-22 PROCEDURE — 2580000003 HC RX 258: Performed by: STUDENT IN AN ORGANIZED HEALTH CARE EDUCATION/TRAINING PROGRAM

## 2021-05-22 PROCEDURE — 85025 COMPLETE CBC W/AUTO DIFF WBC: CPT

## 2021-05-22 RX ORDER — FENTANYL CITRATE 50 UG/ML
50 INJECTION, SOLUTION INTRAMUSCULAR; INTRAVENOUS ONCE
Status: COMPLETED | OUTPATIENT
Start: 2021-05-22 | End: 2021-05-22

## 2021-05-22 RX ORDER — SODIUM CHLORIDE 0.9 % (FLUSH) 0.9 %
10 SYRINGE (ML) INJECTION PRN
Status: DISCONTINUED | OUTPATIENT
Start: 2021-05-22 | End: 2021-05-23 | Stop reason: HOSPADM

## 2021-05-22 RX ORDER — 0.9 % SODIUM CHLORIDE 0.9 %
80 INTRAVENOUS SOLUTION INTRAVENOUS ONCE
Status: COMPLETED | OUTPATIENT
Start: 2021-05-22 | End: 2021-05-22

## 2021-05-22 RX ORDER — ONDANSETRON 4 MG/1
4 TABLET, ORALLY DISINTEGRATING ORAL ONCE
Status: COMPLETED | OUTPATIENT
Start: 2021-05-22 | End: 2021-05-22

## 2021-05-22 RX ORDER — ONDANSETRON 4 MG/1
4 TABLET, ORALLY DISINTEGRATING ORAL EVERY 8 HOURS PRN
Qty: 20 TABLET | Refills: 0 | Status: SHIPPED | OUTPATIENT
Start: 2021-05-22 | End: 2021-09-29 | Stop reason: ALTCHOICE

## 2021-05-22 RX ORDER — 0.9 % SODIUM CHLORIDE 0.9 %
1000 INTRAVENOUS SOLUTION INTRAVENOUS ONCE
Status: COMPLETED | OUTPATIENT
Start: 2021-05-22 | End: 2021-05-22

## 2021-05-22 RX ADMIN — SODIUM CHLORIDE, PRESERVATIVE FREE 10 ML: 5 INJECTION INTRAVENOUS at 21:53

## 2021-05-22 RX ADMIN — FENTANYL CITRATE 50 MCG: 50 INJECTION, SOLUTION INTRAMUSCULAR; INTRAVENOUS at 20:23

## 2021-05-22 RX ADMIN — SODIUM CHLORIDE 80 ML: 9 INJECTION, SOLUTION INTRAVENOUS at 21:52

## 2021-05-22 RX ADMIN — SODIUM CHLORIDE 1000 ML: 9 INJECTION, SOLUTION INTRAVENOUS at 20:20

## 2021-05-22 RX ADMIN — IOPAMIDOL 75 ML: 755 INJECTION, SOLUTION INTRAVENOUS at 21:52

## 2021-05-22 RX ADMIN — ONDANSETRON 4 MG: 4 TABLET, ORALLY DISINTEGRATING ORAL at 20:18

## 2021-05-22 NOTE — ED PROVIDER NOTES
[address]  Emergency Department Encounter  EmergencyMedicine Resident     Pt Name:Niurka Ruvalcaba  MRN: [id number]  [place] 1980  Date of evaluation: 21  PCP:  KATIE Smyth CNP    CHIEF COMPLAINT       Chief Complaint   Patient presents with    Abdominal Pain     Ongoing for two days.  Nausea    Emesis    Cough       HISTORY OF PRESENT ILLNESS  (Location/Symptom, Timing/Onset, Context/Setting, Quality, Duration, Modifying Factors, Severity.)      Dany Bowman is a 46 y.o. female who presents with abdominal pain. Patient presents with 4 days of lower abdominal pain, sharp, stabbing sensation, nonradiating, moderate severity, associated with nausea, vomiting, does not know what makes it better or worse. Patient denies fevers, chills, cough, congestion, chest pain, shortness of breath, dysuria, frequency, urgency, vaginal discharge, vaginal bleeding, diarrhea, constipation, lower extremity swelling or pain. Patient has history of hysterectomy,  sections. Patient has had minimal oral intake secondary to nausea and vomiting. Patient has not been able to take anything for pain. PAST MEDICAL / SURGICAL / SOCIAL / FAMILY HISTORY      has a past medical history of Anxiety, Arthritis, Asthma, Sadler's esophagus, Bipolar 1 disorder ([address] CAD (coronary artery disease), Chronic insomnia, COPD (chronic obstructive pulmonary disease) ([address] Depression, Diabetes mellitus ([address] Endometriosis, Esophagitis, GERD (gastroesophageal reflux disease), Headache(784.0), Herniated disc, cervical, Hiatal hernia, Incisional abscess, Kidney stones, MDRO (multiple drug resistant organisms) resistance, Pleurisy, Pneumonia, Seizures ([address] UTI (urinary tract infection), and Vision abnormalities. has a past surgical history that includes knee surgery (Left);  section (, 2018); Tonsillectomy; Knee arthroscopy (Left, 5/20/15); Cervical disc surgery;  Upper gastrointestinal endoscopy (2016); Upper gastrointestinal endoscopy (2017); Upper gastrointestinal endoscopy (2017); pr esophagogastroduodenoscopy transoral diagnostic (N/A, 3/2/2017); laparoscopy; laparoscopy (N/A, 10/5/2017); Upper gastrointestinal endoscopy (N/A, 2018); Endoscopy, colon, diagnostic;  section (N/A, 2018); Upper gastrointestinal endoscopy (N/A, 10/1/2019); Colonoscopy (N/A, 10/1/2019); Colonoscopy (N/A, 2020); Lindsay tooth extraction; and Hysterectomy (N/A, 2020). Social History     Socioeconomic History    Marital status: Single     Spouse name: Not on file    Number of children: 1    Years of education: 11th grade    Highest education level: Not on file   Occupational History    Occupation: unemployed-   Tobacco Use    Smoking status: Current Every Day Smoker     Packs/day: 0.50     Years: 21.00     Pack years: 10.50     Types: Cigarettes    Smokeless tobacco: Never Used   Vaping Use    Vaping Use: Every day   Substance and Sexual Activity    Alcohol use: No    Drug use: No    Sexual activity: Yes     Partners: Male   Other Topics Concern    Not on file   Social History Narrative    Lives with son and boyfriend     Social Determinants of Health     Financial Resource Strain:     Difficulty of Paying Living Expenses:    Food Insecurity:     Worried About Running Out of Food in the Last Year:     [address] in the Last Year:    Transportation Needs:     Lack of Transportation (Medical):      Lack of Transportation (Non-Medical):    Physical Activity:     Days of Exercise per Week:     Minutes of Exercise per Session:    Stress:     Feeling of Stress :    Social Connections:     Frequency of Communication with Friends and Family:     Frequency of Social Gatherings with Friends and Family:     Attends [employer] Services:     Active Member of Clubs or Organizations:     Attends Club or Organization Meetings:     Marital MCG CAPS capsule TAKE 1 CAPSULE BY MOUTH EVERY MORNING (BEFORE BREAKFAST) 3/3/21   Len Rodriguez MD   sertraline (ZOLOFT) 50 MG tablet Take 1 tablet by mouth daily 1/19/21   Historical Provider, MD   lamoTRIgine (LAMICTAL) 150 MG tablet Take 1 tablet by mouth nightly 10/28/20   Historical Provider, MD   pantoprazole (PROTONIX) 40 MG tablet TAKE ONE TABLET TWICE A DAY ORALLY 30 DAY(S) 10/26/20   Len Rodriguez MD   vitamin D3 (CHOLECALCIFEROL) 25 MCG (1000 UT) TABS tablet TAKE 1 TABLET BY MOUTH DAILY 8/27/20   KATIE Cole - CNP   Masks (CLEVER CHOICE FACE MASK) MISC USE NEW FACE MASK EVERYDAY WHEN PATIENT GO OUTSIDE OF HOME DUE TO COVID PANDEMIC 7/6/20   KATIE Cole - CNP   acetaminophen (TYLENOL) 500 MG tablet Take 500 mg by mouth every 6 hours as needed for Pain    Historical Provider, MD   blood glucose monitor strips Test 2-3 times a day & as needed for symptoms of irregular blood glucose. BRAND OF CHOICE INSURANCE ALLOWS. 6/12/20   KATIE Cole CNP   Lancets MISC 1 each by Does not apply route 2 times daily 6/12/20   Vitaliyella CASE Bernal APRN - CNP   montelukast (SINGULAIR) 10 MG tablet Take 1 tablet by mouth nightly 9/20/18   Dionna Duffy MD       REVIEW OF SYSTEMS    (2-9 systems for level 4, 10 or more for level 5)      Review of Systems   Positive for abdominal pain, nausea, vomiting. Patient denies fevers, chills, cough, congestion, chest pain, shortness of breath, dysuria, frequency, urgency, vaginal discharge, vaginal bleeding, diarrhea, constipation, lower extremity swelling or pain.     PHYSICAL EXAM   (up to 7 for level 4, 8 or more for level 5)      INITIAL VITALS:   BP (!) 157/81   Pulse 91   Temp 98.9 °F (37.2 °C)   Resp 18   Ht 5' 2\" (1.575 m)   Wt 205 lb (93 kg)   LMP 06/15/2020   SpO2 97%   BMI 37.49 kg/m²     Physical Exam   Patient is alert and oriented, openly communicative, no acute distress, nontoxic-appearing, speaking in full sentences, does not appear to be in a significant amount of pain, head is atraumatic, EOMI, CTAB, regular rate and rhythm, no extra heart sounds, lower abdominal tenderness, no upper abdominal tenderness, no CVA tenderness bilaterally, no overlying skin changes, no lower extremity swelling or pain.     DIFFERENTIAL  DIAGNOSIS     PLAN (LABS / IMAGING / EKG):  Orders Placed This Encounter   Procedures    CT ABDOMEN PELVIS W IV CONTRAST Additional Contrast? None    CBC Auto Differential    Urinalysis Reflex to Culture    Comprehensive Metabolic Panel w/ Reflex to MG    Lipase    Magnesium       MEDICATIONS ORDERED:  Orders Placed This Encounter   Medications    0.9 % sodium chloride bolus    ondansetron (ZOFRAN-ODT) disintegrating tablet 4 mg    fentaNYL (SUBLIMAZE) injection 50 mcg    0.9 % sodium chloride bolus    iopamidol (ISOVUE-370) 76 % injection 75 mL    DISCONTD: sodium chloride flush 0.9 % injection 10 mL    ondansetron (ZOFRAN ODT) 4 MG disintegrating tablet     Sig: Take 1 tablet by mouth every 8 hours as needed for Nausea     Dispense:  20 tablet     Refill:  0       DDX:     DIAGNOSTIC RESULTS / EMERGENCY DEPARTMENT COURSE / MDM   LAB RESULTS:  Results for orders placed or performed during the hospital encounter of 05/22/21   CBC Auto Differential   Result Value Ref Range    WBC 11.4 (H) 3.5 - 11.0 k/uL    RBC 4.86 4.0 - 5.2 m/uL    Hemoglobin 13.4 12.0 - 16.0 g/dL    Hematocrit 41.4 36 - 46 %    MCV 85.3 80 - 100 fL    MCH 27.5 26 - 34 pg    MCHC 32.3 31 - 37 g/dL    RDW 15.3 (H) 11.5 - 14.9 %    Platelets 043 819 - 506 k/uL    MPV 8.7 6.0 - 12.0 fL    NRBC Automated NOT REPORTED per 100 WBC    Differential Type NOT REPORTED     Seg Neutrophils 77 (H) 36 - 66 %    Lymphocytes 15 (L) 24 - 44 %    Monocytes 6 1 - 7 %    Eosinophils % 1 0 - 4 %    Basophils 1 0 - 2 %    Immature Granulocytes NOT REPORTED 0 %    Segs Absolute 8.60 1.3 - 9.1 k/uL    Absolute Lymph # 1.70 1.0 - 4.8 k/uL    Absolute Mono # include SBO, diverticulitis, abscess, perforated viscus. Will obtain abdominal labs, urinalysis, treat symptoms, reassess. RADIOLOGY:  CT ABDOMEN PELVIS W IV CONTRAST Additional Contrast? None    Result Date: 5/22/2021  EXAMINATION: CT OF THE ABDOMEN AND PELVIS WITH CONTRAST 5/22/2021 9:44 pm TECHNIQUE: CT of the abdomen and pelvis was performed with the administration of intravenous contrast. Multiplanar reformatted images are provided for review. Dose modulation, iterative reconstruction, and/or weight based adjustment of the mA/kV was utilized to reduce the radiation dose to as low as reasonably achievable. COMPARISON: 03/31/2021 HISTORY: ORDERING SYSTEM PROVIDED HISTORY: Lower abdominal pain TECHNOLOGIST PROVIDED HISTORY: Lower abdominal pain Decision Support Exception - unselect if not a suspected or confirmed emergency medical condition->Emergency Medical Condition (MA) Reason for Exam: Lower abdominal pain, n/v x 4 days per pt Acuity: Acute Type of Exam: Initial FINDINGS: Lower Chest: Lung bases are clear mild lingular atelectasis versus scarring. Small hiatal hernia. Esophageal wall thickening. Organs: Liver, spleen, adrenal glands, kidneys, pancreas unremarkable. Left renal calculus nonobstructive measuring 4.5 mm left lower pole. No hydronephrosis or ureterolithiasis GI/Bowel: Mild retained stool throughout the colon. No evidence bowel obstruction. Mild colonic diverticulosis. Appendix normal. Pelvis: Bladder is unremarkable. Bilateral adnexal follicles without suspicious adnexal lesion. Uterus is absent. Peritoneum/Retroperitoneum: No free air or free fluid. Aorta is unremarkable caliber. Bones/Soft Tissues: Minimal degenerate changes lower spine     No acute inflammatory process or bowel obstruction. Left renal calculus 4.5 mm in size. Nonobstructive.        EKG      All EKG's are interpreted by the Emergency Department Physician who either signs or Co-signs this chart in the absence of a cardiologist.    EMERGENCY DEPARTMENT COURSE:  Patient came to emergency department, HPI and physical exam were conducted. All nursing notes were reviewed. CT found no acute changes, labs largely within normal limits. On reassessment, patient has good improvement of symptoms. Patient remained stable in the emergency department with improving vital signs. Gave strict return precautions to the emergency department and discharge patient home. Recommend patient follow-up with PCP in the next 2 days for reassessment. PROCEDURES:      CONSULTS:  None    CRITICAL CARE:  Please see attending note    FINAL IMPRESSION      1.  Lower abdominal pain          DISPOSITION / PLAN     DISPOSITION Decision To Discharge 05/22/2021 10:48:11 PM      PATIENT REFERRED TO:  Ronald Cueto, APRN - CNP  [facility]  [address]  [address] [place] [address]  [phone]    Schedule an appointment as soon as possible for a visit in 3 days  For reassessment    [facility] ED  [facility] [address]  [address] [facility] [address]  [phone]  Go to   As needed, If symptoms worsen      DISCHARGE MEDICATIONS:  Discharge Medication List as of 5/22/2021 10:49 PM      START taking these medications    Details   ondansetron (ZOFRAN ODT) 4 MG disintegrating tablet Take 1 tablet by mouth every 8 hours as needed for Nausea, Disp-20 tablet, R-0Print             Guicho Vogel MD  Emergency Medicine Resident    (Please note that portions of thisnote were completed with a voice recognition program.  Efforts were made to edit the dictations but occasionally words are mis-transcribed.)       Guicho Vogel MD  Resident  05/23/21 [id number]

## 2021-05-23 NOTE — ED PROVIDER NOTES
[address]  eMERGENCY dEPARTMENT eNCOUnter   Attending Attestation     Pt Name: Geroge Sibley  MRN: [id number]  [place] 1980  Date of evaluation: 5/23/21    History, EXAM, MDM:    George Sibley is a 46 y.o. female who presents with Abdominal Pain (Ongoing for two days.), Nausea, Emesis, and Cough  Pt presenting with c/o severe abdominal pain, nausea, and vomiting. Recent I&D of an abdominal wall abscess; skin infection has resolved. Laboratory studies show no acute abnormalities. CT scan shows no sign of obstruction, infection, or perforation. Pt provided symptomatic treatment. Close outpatient FU with PCP recommended. Vitals:   Vitals:    05/22/21 1925   BP: (!) 157/81   Pulse: 91   Resp: 18   Temp: 98.9 °F (37.2 °C)   SpO2: 97%   Weight: 205 lb (93 kg)   Height: 5' 2\" (1.575 m)     I performed a history and physical examination of the patient and discussed management with the resident. I reviewed the residents note and agree with the documented findings and plan of care. Any areas of disagreement are noted on the chart. I was personally present for the key portions of any procedures. I have documented in the chart those procedures where I was not present during the key portions. I have personally reviewed all images and agree with the resident's interpretation. I have reviewed the emergency nurses triage note. I agree with the chief complaint, past medical history, past surgical history, allergies, medications, social and family history as documented unless otherwise noted below. Documentation of the HPI, Physical Exam and Medical Decision Making performed by medical students or scribes is based on my personal performance of the HPI, PE and MDM. For Phys Assistant/ Nurse Practitioner cases/documentation I have had a face to face evaluation of this patient and have completed at least one if not all key elements of the E/M (history, physical exam, and MDM).  Additional findings are as noted.    Makeda Cook MD  Attending Emergency  Physician                Makeda Cook MD  05/23/21 6471

## 2021-05-24 ENCOUNTER — TELEPHONE (OUTPATIENT)
Dept: FAMILY MEDICINE CLINIC | Age: 41
End: 2021-05-24

## 2021-05-24 NOTE — TELEPHONE ENCOUNTER
[facility]) ED Follow up Call    Reason for ED visit:  ABDOMINAL PAIN        Esau Bah , this is Arch Daisy from Dr. Mary Brenal's office, just calling to see how you are doing after your recent ED visit. Did you receive discharge instructions? Yes  Do you understand the discharge instructions? Yes  Did the ED give you any new prescriptions? Yes  Were you able to fill your prescriptions? Yes      Do you have one of our red, yellow and green  Zone sheets that help you to determine when you should go to the ED? No      Do you need or want to make a follow up appt with your PCP? Yes    Do you have any further needs in the home i.e. Equipment? No        FU appts/Provider:    No future appointments.

## 2021-05-25 NOTE — PROGRESS NOTES
[facility] Physicians at [place] [address] [facility] [address]   O: [address] Sophy Kraus is a 46 y.o. female evaluated via telephone on 5/27/2021. CONSENT:  She and/or health care decision maker is aware that that she may receive a bill for this telephone service, depending on her insurance coverage, and has provided verbal consent to proceed: Yes    DOCUMENTATION:  Patient scheduled this appointment today due to Other (Abscess)  Mary Rosas is a 46 y.o. female patient. Patient has had a recurrent abscess on her perirectal area and underneath the pannus for a while. Patient had been evaluated in the emergency room for this and had a recent incision and drainage. Patient does not think that she was drained all the way. She complains of some pressure type of pain. Cellulitis under the pannus still very visible on the camera. Patient did not want to show her perirectal area on the camera with me today. Patient denies any fever, chills, or feeling ill. However she does complain of severe pain. Patient is not able to take any NSAIDs. She is encouraged to take some Tylenol. And do some hot sitz bath. We will also start her on some doxycycline and give her some hibiclens wash. Patient thinks that she has MRSA but have not had any formal testing in the past.    PREDIABETES  Patient has a known history of prediabetes as well. She is requesting to replace her glucometer since its broken. We will send this prescription today. I communicated with the patient and/or health care decision maker about: PLAN       Review of Systems  Details of this discussion including any medical advice provided: Under assessment.     PHYSICAL EXAM[INSTRUCTIONS:  \"[x]\" Indicates a positive item  \"[]\" Indicates a negative item  -- DELETE ALL ITEMS NOT EXAMINED]  Patient-Reported Vitals 2/1/2021   Patient-Reported Weight 190 lbs   Patient-Reported Height -   Patient-Reported Systolic 414   Patient-Reported Diastolic 71     Constitutional: [x] No apparent distress      [] Abnormal -   Mental status: [x] Alert and awake  [x] Oriented to person/place/time[] Abnormal -   Pulmonary/Chest: [x] Respiratory effort normal         [] Abnormal -          Psychiatric:       [x] Normal Affect [] Abnormal -        [x] No Hallucinations  Other pertinent observable physical exam findings:-Erythematous patches underneath the panniculus. Moderately anxious, perirectal area was not assessed. ASSESSMENT  1. Vanessa-rectal abscess  Worsening  Monitor closely  Use hot sitz bath  Advised to Keep site clean and dry. DISCUSSED AND ADVISED TO:  Apply antibiotic ointment sparingly to site for the next few days. Keep open to air as much as possible. Apply dressing if needed. No pool, lakes, hot tubs until fully healed. Call for worsening redness, streaking, swelling, drainage, pain, and fever/chills. - doxycycline hyclate (VIBRAMYCIN) 100 MG capsule; Take 1 capsule by mouth 2 times daily for 10 days  Dispense: 20 capsule; Refill: 0  - chlorhexidine (HIBICLENS) 4 % external liquid; Apply topically daily as needed. Dispense: 1 Bottle; Refill: 2  - Misc. Devices (PLASTIC BED PAN) MISC; Use daily with hot water  Dispense: 1 each; Refill: 0    2. Panniculitis  Failure to Improve  Start doxycycline  Wash self twice a day  - doxycycline hyclate (VIBRAMYCIN) 100 MG capsule; Take 1 capsule by mouth 2 times daily for 10 days  Dispense: 20 capsule; Refill: 0  - chlorhexidine (HIBICLENS) 4 % external liquid; Apply topically daily as needed. Dispense: 1 Bottle; Refill: 2    3. Prediabetes  Stable  DISCUSSED and ADVISED TO:  Decrease carbohydrates, sugary drinks, desserts   Exercise regularly, as tolerated. Try to lose weight.    - glucose monitoring kit (FREESTYLE) monitoring kit; Use as directed. BRAND OF CHOICE INSURANCE ALLOWS. Dispense: 1 kit;  Refill: 0      I affirm this is a Patient Initiated Episode with a Patient who has not had a related appointment within my department in the past 7 days or scheduled within the next 24 hours.     Patient identification was verified at the start of the visit: Yes    Total Time: minutes: 11-20 minutes    Note: not billable if this call serves to triage the patient into an appointment for the relevant concern  Patient-Reported Vitals 2021   Patient-Reported Weight 190 lbs   Patient-Reported Height -   Patient-Reported Systolic 672   Patient-Reported Diastolic 71     Patient Active Problem List   Diagnosis    Asthma    GERD    Iron deficiency anemia    Cervical disc herniation    Smoker    Sadler's esophagus without dysplasia    Hiatal hernia    COPD    Chronic constipation    Hx of IUFD (G2)    History of gestational hypertension    Seizure ([address]    Hx of  x2 (G3, G4)    Arthritis    Cervical radiculopathy    Migraine without aura    Spinal stenosis in cervical region    Prediabetes    Chronic pelvic pain in female    Enlarged uterus    Endometriosis    Family history of breast cancer    Bipolar affective disorder, currently depressed, moderate ([address]    Insomnia    Ulnar neuropathy    Major depressive disorder, recurrent episode ([address]    Sciatica    Moderate persistent asthma without complication    Lipoma of torso    History of cervical discectomy    Chronic neck pain with abnormal neurologic examination    Abnormal weight gain     Class 2 drug-induced obesity with serious comorbidity and body mass index (BMI) of 37.0 to 37.9 in adult    Pelvic peritoneal endometriosis    RALH with BS and cystoscopy 2020    Post-op pain    Wound infection/hemorrhage, obstetric surgical, postpartum condition    Postoperative visit    Postoperative abdominal pain    Diverticulitis    Diverticulitis of colon    Pelvic abscess in female    Pneumonia    Right otitis media    Functional diarrhea    Kidney stones     Niurka Madrigal is a 46 y.o. female patient  being evaluated by a Virtual Visit (video visit) encounter to address concerns as mentioned above. A caregiver was present when appropriate. Due to this being a TeleHealth encounter (During TKZPK-74 public health emergency), evaluation of the following organ systems was limited:Vitals/Constitutional/EENT/Resp/CV/GI//MS/Neuro/Skin/Heme-Lymph-Imm. Services were provided through a video synchronous discussion virtually to substitute for in-person clinic visit. This is a telehealth visit that was performed with the originating site at Patient Location: home and provider Location of [place], [place]. Pursuant to the emergency declaration under the [address] [place] [address] authority and the [employer] and [employer] Act, this Virtual Visit was conducted with patient's (and/or legal guardian's) consent, to reduce the patient's risk of exposure to COVID-19 and provide necessary medical care. The patient (and/or legal guardian) has also been advised to contact this office for worsening conditions or problems, and seek emergency medical treatment and/or call 911 if deemed necessary. This note was completed by using the assistance of a speech-recognition program. However, inadvertent computerized transcription errors may be present. Although every effort was made to ensure accuracy, no guarantees can be provided that every mistake has been identified and corrected by editing.   Electronically signed by KATIE Jc CNP on 5/25/21 at 8:51 AM EDT

## 2021-05-27 ENCOUNTER — TELEMEDICINE (OUTPATIENT)
Dept: FAMILY MEDICINE CLINIC | Age: 41
End: 2021-05-27
Payer: COMMERCIAL

## 2021-05-27 ENCOUNTER — TELEMEDICINE (OUTPATIENT)
Dept: FAMILY MEDICINE CLINIC | Age: 41
End: 2021-05-27

## 2021-05-27 DIAGNOSIS — M79.3 PANNICULITIS: ICD-10-CM

## 2021-05-27 DIAGNOSIS — R73.03 PREDIABETES: ICD-10-CM

## 2021-05-27 DIAGNOSIS — K61.1 PERI-RECTAL ABSCESS: Primary | ICD-10-CM

## 2021-05-27 PROCEDURE — 99213 OFFICE O/P EST LOW 20 MIN: CPT | Performed by: FAMILY MEDICINE

## 2021-05-27 RX ORDER — DOXYCYCLINE HYCLATE 100 MG/1
100 CAPSULE ORAL 2 TIMES DAILY
Qty: 20 CAPSULE | Refills: 0 | Status: SHIPPED | OUTPATIENT
Start: 2021-05-27 | End: 2021-06-06

## 2021-05-27 RX ORDER — BLOOD SUGAR DIAGNOSTIC
STRIP MISCELLANEOUS
Qty: 50 EACH | Refills: 5 | Status: SHIPPED | OUTPATIENT
Start: 2021-05-27 | End: 2021-10-28

## 2021-05-27 RX ORDER — LANCETS 33 GAUGE
EACH MISCELLANEOUS
Qty: 100 EACH | Refills: 5 | Status: SHIPPED | OUTPATIENT
Start: 2021-05-27 | End: 2022-02-22

## 2021-05-27 RX ORDER — CHLORHEXIDINE GLUCONATE 4 G/100ML
SOLUTION TOPICAL
Qty: 1 BOTTLE | Refills: 2 | Status: SHIPPED | OUTPATIENT
Start: 2021-05-27 | End: 2021-06-10

## 2021-05-27 RX ORDER — BLOOD-GLUCOSE METER
KIT MISCELLANEOUS
Qty: 1 KIT | Refills: 0 | Status: SHIPPED | OUTPATIENT
Start: 2021-05-27

## 2021-05-27 NOTE — PROGRESS NOTES
[facility] Physicians at [address] [place] [address]   O: [address] [address] Lori Merlos is a 46 y.o. female evaluated via telephone on 5/27/2021. CONSENT:  She and/or health care decision maker is aware that that she may receive a bill for this telephone service, depending on her insurance coverage, and has provided verbal consent to proceed: Yes    DOCUMENTATION:  Patient scheduled this appointment today due to Other (Recurrent abscess)  Carroll Quintero is a 46 y.o. female patient. Patient has had a recurrent abscess on her perirectal area and underneath the pannus for a while. Patient had been evaluated in the emergency room for this and had a recent incision and drainage. Patient does not think that she was drained all the way. She complains of some pressure type of pain. Cellulitis under the pannus still very visible on the camera. Patient did not want to show her perirectal area on the camera with me today. Patient denies any fever, chills, or feeling ill. However she does complain of severe pain. Patient is not able to take any NSAIDs. She is encouraged to take some Tylenol. And do some hot sitz bath. We will also start her on some doxycycline and give her some hibiclens wash. Patient thinks that she has MRSA but have not had any formal testing in the past.    PREDIABETES  Patient has a known history of prediabetes as well. She is requesting to replace her glucometer since its broken. We will send this prescription today. I communicated with the patient and/or health care decision maker about: PLAN       Review of Systems    Details of this discussion including any medical advice provided: Under assessment.     PHYSICAL EXAM[INSTRUCTIONS:  \"[x]\" Indicates a positive item  \"[]\" Indicates a negative item  -- DELETE ALL ITEMS NOT EXAMINED]  Patient-Reported Vitals 2/1/2021   Patient-Reported Weight 190 lbs   Patient-Reported Height - Patient-Reported Systolic 055   Patient-Reported Diastolic 71     Constitutional: [x] No apparent distress      [] Abnormal -   Mental status: [x] Alert and awake  [x] Oriented to person/place/time[] Abnormal -   Pulmonary/Chest: [x] Respiratory effort normal         [] Abnormal -          Psychiatric:       [x] Normal Affect [] Abnormal -        [x] No Hallucinations  Other pertinent observable physical exam findings:-Erythematous patches underneath the panniculus. Moderately anxious, perirectal area was not assessed. ASSESSMENT  1. Vanessa-rectal abscess  Worsening  Monitor closely  Use hot sitz bath  Advised to Keep site clean and dry. DISCUSSED AND ADVISED TO:  Apply antibiotic ointment sparingly to site for the next few days. Keep open to air as much as possible. Apply dressing if needed. No pool, lakes, hot tubs until fully healed. Call for worsening redness, streaking, swelling, drainage, pain, and fever/chills. - doxycycline hyclate (VIBRAMYCIN) 100 MG capsule; Take 1 capsule by mouth 2 times daily for 10 days  Dispense: 20 capsule; Refill: 0  - chlorhexidine (HIBICLENS) 4 % external liquid; Apply topically daily as needed. Dispense: 1 Bottle; Refill: 2  - Misc. Devices (PLASTIC BED PAN) MISC; Use daily with hot water  Dispense: 1 each; Refill: 0    2. Panniculitis  Failure to Improve  Start doxycycline  Wash self twice a day  - doxycycline hyclate (VIBRAMYCIN) 100 MG capsule; Take 1 capsule by mouth 2 times daily for 10 days  Dispense: 20 capsule; Refill: 0  - chlorhexidine (HIBICLENS) 4 % external liquid; Apply topically daily as needed. Dispense: 1 Bottle; Refill: 2    3. Prediabetes  Stable  DISCUSSED and ADVISED TO:  Decrease carbohydrates, sugary drinks, desserts   Exercise regularly, as tolerated. Try to lose weight.    - glucose monitoring kit (FREESTYLE) monitoring kit; Use as directed. BRAND OF CHOICE INSURANCE ALLOWS. Dispense: 1 kit;  Refill: 0      I affirm this is a Patient Initiated Episode with a Patient who has not had a related appointment within my department in the past 7 days or scheduled within the next 24 hours.     Patient identification was verified at the start of the visit: Yes    Total Time: minutes: 11-20 minutes    Note: not billable if this call serves to triage the patient into an appointment for the relevant concern  Patient-Reported Vitals 2021   Patient-Reported Weight 190 lbs   Patient-Reported Height -   Patient-Reported Systolic 933   Patient-Reported Diastolic 71     Patient Active Problem List   Diagnosis    Asthma    GERD    Iron deficiency anemia    Cervical disc herniation    Smoker    Sadler's esophagus without dysplasia    Hiatal hernia    COPD    Chronic constipation    Hx of IUFD (G2)    History of gestational hypertension    Seizure ([address]    Hx of  x2 (G3, G4)    Arthritis    Cervical radiculopathy    Migraine without aura    Spinal stenosis in cervical region    Prediabetes    Chronic pelvic pain in female    Enlarged uterus    Endometriosis    Family history of breast cancer    Bipolar affective disorder, currently depressed, moderate ([address]    Insomnia    Ulnar neuropathy    Major depressive disorder, recurrent episode ([address]    Sciatica    Moderate persistent asthma without complication    Lipoma of torso    History of cervical discectomy    Chronic neck pain with abnormal neurologic examination    Abnormal weight gain     Class 2 drug-induced obesity with serious comorbidity and body mass index (BMI) of 37.0 to 37.9 in adult    Pelvic peritoneal endometriosis    RALH with BS and cystoscopy 2020    Post-op pain    Wound infection/hemorrhage, obstetric surgical, postpartum condition    Postoperative visit    Postoperative abdominal pain    Diverticulitis    Diverticulitis of colon    Pelvic abscess in female    Pneumonia    Right otitis media    Functional diarrhea    Kidney stones Nikunj Walter is a 46 y.o. female patient  being evaluated by a Virtual Visit (video visit) encounter to address concerns as mentioned above. A caregiver was present when appropriate. Due to this being a TeleHealth encounter (During QZRJJ-18 public health emergency), evaluation of the following organ systems was limited:Vitals/Constitutional/EENT/Resp/CV/GI//MS/Neuro/Skin/Heme-Lymph-Imm. Services were provided through a video synchronous discussion virtually to substitute for in-person clinic visit. This is a telehealth visit that was performed with the originating site at Patient Location: home and provider Location of [place], [place]. Pursuant to the emergency declaration under the [address] [place] [address] authority and the [employer] and [employer] Act, this Virtual Visit was conducted with patient's (and/or legal guardian's) consent, to reduce the patient's risk of exposure to COVID-19 and provide necessary medical care. The patient (and/or legal guardian) has also been advised to contact this office for worsening conditions or problems, and seek emergency medical treatment and/or call 911 if deemed necessary. This note was completed by using the assistance of a speech-recognition program. However, inadvertent computerized transcription errors may be present. Although every effort was made to ensure accuracy, no guarantees can be provided that every mistake has been identified and corrected by editing.   Electronically signed by KATIE Huizar CNP on 5/25/21 at 8:51 AM EDT

## 2021-05-27 NOTE — PATIENT INSTRUCTIONS
[facility] COVID-19 Vaccination Hotline: [phone]    COVID-19 vaccine appointments are not available through our practice. As you're eligible to receive the COVID-19 vaccine, as determined by your state's department of health, you will be able to schedule an appointment through [address] or by calling [phone]. Appointments are required to receive the COVID-19 vaccine. As vaccine supply continues to be limited, we anticipate open appointments to fill up quickly and appreciate your patience as we work through the process of providing vaccines to those in our communities. Schedule a Vaccine  When you qualify to receive the vaccine, call the [facility] COVID-19 Vaccination Hotline to schedule your appointment or to get additional information about the [facility] locations which are offering the COVID-19 vaccine. To be 94% effective, it's important that you receive two doses of one of the COVID-19 vaccines. -If you are receiving the Chilel Peter vaccine, your second shot will be scheduled as close to 21 days after the first shot as possible. -If you are receiving the Moderna vaccine, your second shot will be scheduled as close to 28 days after the first shot as possible. Links to [facility]) website and [facility] website:    Marco AntonioTabletize.com/mercy-[facility]-monitoring-coronavirus-covid-19/covid-19-vaccine/[place]/warner-vaccine    [email or web address]    [employer]  https://sites. google. com/view/wchdohio-coronavirus/home/vaccines/get-vaccinated  LocalElectrolysis.fi. com/r/WoodCountyCOVID_Vaccine_On-Call_List      https://AcadiaSoft/[email or web address]?:toolbar=n&:display_count=n&:origin=viz_share_link&:embed=y    PHARMACY LOCATIONS  Https://vaccine. coronavirus. [place].gov/    [facility]  Https://AcadiaSoft/shared/[email or web address]?:toolbar=n&:display_count=n&:origin=viz_share_link&:embed=y    JENI [facility]  Https://Clipik/shared/[email or web address]?:toolbar=n&:display_count=n&:origin=Entreda_share_link&:embed=y    [address]  Https://Clipik/shared/[email or web address]?:toolbar=n&:display_count=n&:origin=Chikka_link&:embed=y    [address]  https://Clipik/[email or web address]?:toolbar=n&:display_count=n&:origin=Entreda_[employer]etics_link&:embed=y

## 2021-05-27 NOTE — PATIENT INSTRUCTIONS
[facility] COVID-19 Vaccination Hotline: [phone]    COVID-19 vaccine appointments are not available through our practice. As you're eligible to receive the COVID-19 vaccine, as determined by your state's department of health, you will be able to schedule an appointment through [address] or by calling [phone]. Appointments are required to receive the COVID-19 vaccine. As vaccine supply continues to be limited, we anticipate open appointments to fill up quickly and appreciate your patience as we work through the process of providing vaccines to those in our communities. Schedule a Vaccine  When you qualify to receive the vaccine, call the [facility] COVID-19 Vaccination Hotline to schedule your appointment or to get additional information about the [facility] locations which are offering the COVID-19 vaccine. To be 94% effective, it's important that you receive two doses of one of the COVID-19 vaccines. -If you are receiving the Chilel Peter vaccine, your second shot will be scheduled as close to 21 days after the first shot as possible. -If you are receiving the Moderna vaccine, your second shot will be scheduled as close to 28 days after the first shot as possible. Links to [facility]) website and [facility] website:    Marco AntonioQui.lt/mercy-[facility]-monitoring-coronavirus-covid-19/covid-19-vaccine/[place]/warner-vaccine    [email or web address]    [employer]  https://sites. google. com/view/wchdoh[place]-coronavirus/home/vaccines/get-vaccinated  LocalElectrolysis.fi. com/r/WoodCountyCOVID_Vaccine_On-Call_List      https://Emunamedica/[email or web address]?:toolbar=n&:display_count=n&:origin=viz_share_link&:embed=y    PHARMACY LOCATIONS  Https://vaccine. coronavirus. [place].gov/    [facility]  Https://Emunamedica/shared/[email or web address]?:toolbar=n&:display_count=n&:origin=viz_share_link&:embed=y    JENI [facility]  Https://EveryScape/shared/[email or web address]?:toolbar=n&:display_count=n&:origin=Socratic_share_link&:embed=y    [address]  Https://EveryScape/shared/[email or web address]?:toolbar=n&:display_count=n&:origin=Symetrica_link&:embed=y    [address]  https://EveryScape/[email or web address]?:toolbar=n&:display_count=n&:origin=Socratic_Tencent_link&:embed=y

## 2021-06-02 DIAGNOSIS — K22.70 BARRETT'S ESOPHAGUS WITHOUT DYSPLASIA: ICD-10-CM

## 2021-06-02 RX ORDER — MONTELUKAST SODIUM 10 MG/1
TABLET ORAL
Qty: 90 TABLET | Refills: 0 | Status: SHIPPED | OUTPATIENT
Start: 2021-06-02 | End: 2021-08-16

## 2021-06-02 RX ORDER — PANTOPRAZOLE SODIUM 40 MG/1
TABLET, DELAYED RELEASE ORAL
Qty: 90 TABLET | Refills: 3 | Status: SHIPPED | OUTPATIENT
Start: 2021-06-02 | End: 2021-06-30 | Stop reason: SDUPTHER

## 2021-06-02 RX ORDER — METHOCARBAMOL 750 MG/1
750 TABLET, FILM COATED ORAL
Qty: 160 TABLET | Refills: 1 | Status: SHIPPED | OUTPATIENT
Start: 2021-06-02 | End: 2021-10-29

## 2021-06-07 DIAGNOSIS — J44.9 COPD WITH ASTHMA (HCC): ICD-10-CM

## 2021-06-07 RX ORDER — IPRATROPIUM BROMIDE AND ALBUTEROL SULFATE 2.5; .5 MG/3ML; MG/3ML
SOLUTION RESPIRATORY (INHALATION)
Qty: 360 ML | Refills: 2 | Status: SHIPPED | OUTPATIENT
Start: 2021-06-07 | End: 2021-08-27

## 2021-06-11 ENCOUNTER — HOSPITAL ENCOUNTER (EMERGENCY)
Age: 41
Discharge: LEFT BEFORE DISCHARGE | End: 2021-06-11
Payer: COMMERCIAL

## 2021-06-14 ENCOUNTER — TELEPHONE (OUTPATIENT)
Dept: FAMILY MEDICINE CLINIC | Age: 41
End: 2021-06-14

## 2021-06-14 NOTE — TELEPHONE ENCOUNTER
[facility] ([facility]) ED Follow up Call    Reason for ED visit:  MIGRAINE        FU appts/Provider:    No future appointments. VOICEMAIL DOCUMENTATION - ERASE IF NOT USED  Hi, this message is for  JOBY  This is EDDIE from [facility] office. Just calling to see how you are doing after your recent visit to the Emergency Room. [facility] wants to make sure you were able to fill any prescriptions and that you understand your discharge instructions. Please return our call if you need to make a follow up appointment with your provider or have any further needs. Our phone number is [phone]*. Have a great day.

## 2021-06-17 ENCOUNTER — APPOINTMENT (OUTPATIENT)
Dept: CT IMAGING | Age: 41
End: 2021-06-17
Payer: COMMERCIAL

## 2021-06-17 ENCOUNTER — APPOINTMENT (OUTPATIENT)
Dept: GENERAL RADIOLOGY | Age: 41
End: 2021-06-17
Payer: COMMERCIAL

## 2021-06-17 ENCOUNTER — HOSPITAL ENCOUNTER (EMERGENCY)
Age: 41
Discharge: HOME | End: 2021-06-17
Attending: EMERGENCY MEDICINE
Payer: COMMERCIAL

## 2021-06-17 VITALS
HEIGHT: 62 IN | OXYGEN SATURATION: 97 % | DIASTOLIC BLOOD PRESSURE: 79 MMHG | WEIGHT: 205 LBS | SYSTOLIC BLOOD PRESSURE: 129 MMHG | HEART RATE: 79 BPM | TEMPERATURE: 97.4 F | BODY MASS INDEX: 37.73 KG/M2 | RESPIRATION RATE: 24 BRPM

## 2021-06-17 DIAGNOSIS — S40.021A ARM CONTUSION, RIGHT, INITIAL ENCOUNTER: ICD-10-CM

## 2021-06-17 DIAGNOSIS — S00.83XA CONTUSION OF FACE, INITIAL ENCOUNTER: ICD-10-CM

## 2021-06-17 DIAGNOSIS — R06.2 WHEEZING: Primary | ICD-10-CM

## 2021-06-17 PROCEDURE — 71046 X-RAY EXAM CHEST 2 VIEWS: CPT

## 2021-06-17 PROCEDURE — 73060 X-RAY EXAM OF HUMERUS: CPT

## 2021-06-17 PROCEDURE — 70450 CT HEAD/BRAIN W/O DYE: CPT

## 2021-06-17 PROCEDURE — 70486 CT MAXILLOFACIAL W/O DYE: CPT

## 2021-06-17 PROCEDURE — 6370000000 HC RX 637 (ALT 250 FOR IP): Performed by: EMERGENCY MEDICINE

## 2021-06-17 PROCEDURE — 99284 EMERGENCY DEPT VISIT MOD MDM: CPT

## 2021-06-17 RX ORDER — ACETAMINOPHEN 500 MG
1000 TABLET ORAL ONCE
Status: COMPLETED | OUTPATIENT
Start: 2021-06-17 | End: 2021-06-17

## 2021-06-17 RX ORDER — PREDNISONE 50 MG/1
50 TABLET ORAL DAILY
Qty: 5 TABLET | Refills: 0 | Status: SHIPPED | OUTPATIENT
Start: 2021-06-17 | End: 2021-06-22

## 2021-06-17 RX ORDER — PREDNISONE 20 MG/1
60 TABLET ORAL ONCE
Status: COMPLETED | OUTPATIENT
Start: 2021-06-17 | End: 2021-06-17

## 2021-06-17 RX ADMIN — ACETAMINOPHEN 1000 MG: 500 TABLET ORAL at 18:29

## 2021-06-17 RX ADMIN — PREDNISONE 60 MG: 20 TABLET ORAL at 18:29

## 2021-06-17 ASSESSMENT — ENCOUNTER SYMPTOMS
ABDOMINAL PAIN: 0
DIARRHEA: 0
COUGH: 0
NAUSEA: 0
VOMITING: 0
SHORTNESS OF BREATH: 1
CONSTIPATION: 0
EYE DISCHARGE: 0
SORE THROAT: 0
WHEEZING: 1
COLOR CHANGE: 1

## 2021-06-17 ASSESSMENT — PAIN SCALES - GENERAL
PAINLEVEL_OUTOF10: 10
PAINLEVEL_OUTOF10: 10
PAINLEVEL_OUTOF10: 7

## 2021-06-17 ASSESSMENT — PAIN DESCRIPTION - PAIN TYPE: TYPE: ACUTE PAIN

## 2021-06-17 ASSESSMENT — PAIN DESCRIPTION - LOCATION: LOCATION: FACE

## 2021-06-17 NOTE — ED PROVIDER NOTES
[address]  EMERGENCY DEPARTMENT ENCOUNTER    Pt Name: Elmira South  MRN: [id number]  YOB: 1980  Date of evaluation:6/17/21  PCP: KATIE Cortez CNP    CHIEF COMPLAINT       Chief Complaint   Patient presents with    Shortness of Breath     x 2 days.  Rib Injury    Fall     Occured two days ago; bruising to the face and right arm; pt does not recall the fall; pt stated that she was walking down the stairs and woke up on the ground. HISTORY OF PRESENT ILLNESS    Elmira South is a 46 y.o. female who presents after a fall. Patient states 2 days ago she fell down several stairs. She does not know she lost consciousness. She remembers waking up at the bottom of stairs. She is unsure how many she fell down. States since then she has had pain on the right side of her chest, shortness of breath, pain in her right upper arm and her right jaw. She has developed some bruising on her jaw and her arm. She does have a long history of asthma and COPD. She has been wheezing more than usual.  No recent fevers, chills other illnesses. Symptoms are acute. Symptoms are moderate. Nothing else make symptoms better or worse. Patient has no other complaints at this time. REVIEW OF SYSTEMS       Review of Systems   Constitutional: Negative for chills and fever. HENT: Negative for congestion and sore throat. Eyes: Negative for discharge. Respiratory: Positive for shortness of breath and wheezing. Negative for cough. Cardiovascular: Negative for chest pain, palpitations and leg swelling. Gastrointestinal: Negative for abdominal pain, constipation, diarrhea, nausea and vomiting. Genitourinary: Negative for dysuria, flank pain and hematuria. Musculoskeletal: Positive for arthralgias and myalgias. Negative for neck pain. Skin: Positive for color change. Negative for rash. Neurological: Negative for dizziness and headaches.    Psychiatric/Behavioral: Negative for confusion. Negative in 10 essential Systems except as mentioned above and in the HPI. PAST MEDICAL HISTORY     Past Medical History:   Diagnosis Date    Anxiety     Arthritis     OA    Asthma     Sadler's esophagus     LONG SEGMENT    Bipolar 1 disorder ([facility] [address]     CAD (coronary artery disease)     Chronic insomnia     [facility] disease[facility]     Depression     and bipolar    Diabetes mellitus ([facility] [address]     prediabetic    Endometriosis 3/9/2020    Esophagitis     severe    GERD (gastroesophageal reflux disease)     Headache(784.0)     Herniated disc, cervical     Hiatal hernia     Incisional abscess 1/15/2019    Kidney stones     MDRO (multiple drug resistant organisms) resistance     mrsa    Pleurisy     hx of \"years ago\"    Pneumonia     past penumonia    Seizures ([facility] [address]     2016 last seizure-focal seizure    UTI (urinary tract infection)     Vision abnormalities     wears glasses         SURGICAL HISTORY      has a past surgical history that includes knee surgery (Left);  section (, ); Tonsillectomy; Knee arthroscopy (Left, 5/20/15); Cervical disc surgery; Upper gastrointestinal endoscopy (2016); Upper gastrointestinal endoscopy (2017); Upper gastrointestinal endoscopy (2017); pr esophagogastroduodenoscopy transoral diagnostic (N/A, 3/2/2017); laparoscopy; laparoscopy (N/A, 10/5/2017); Upper gastrointestinal endoscopy (N/A, 2018); Endoscopy, colon, diagnostic;  section (N/A, 2018); Upper gastrointestinal endoscopy (N/A, 10/1/2019); Colonoscopy (N/A, 10/1/2019); Colonoscopy (N/A, 2020); Luna tooth extraction; and Hysterectomy (N/A, 2020).       CURRENT MEDICATIONS       Previous Medications    ACETAMINOPHEN (TYLENOL) 500 MG TABLET    Take 500 mg by mouth every 6 hours as needed for Pain    ALCOHOL SWABS ( STERILE ALCOHOL PREP) PADS    USE AS DIRECTED    BLOOD GLUCOSE TEST STRIPS (Patrizia Jesse) ultram [tramadol]. FAMILY HISTORY     She indicated that the status of her mother is unknown. She indicated that the status of her father is unknown. She indicated that the status of her brother is unknown.     family history includes Bipolar Disorder in her brother and mother; Breast Cancer in her mother; Cancer in her mother; Hypertension in her father and mother. SOCIAL HISTORY      reports that she has been smoking cigarettes. She has a 10.50 pack-year smoking history. She has never used smokeless tobacco. She reports that she does not drink alcohol and does not use drugs. PHYSICAL EXAM     INITIAL VITALS:  height is 5' 2\" (1.575 m) and weight is 205 lb (93 kg). Her temperature is 97.4 °F (36.3 °C). Her blood pressure is 129/79 and her pulse is 79. Her respiration is 24 and oxygen saturation is 97%. Physical Exam  Vitals and nursing note reviewed. Constitutional:       General: She is not in acute distress. HENT:      Head: Normocephalic and atraumatic. Eyes:      Conjunctiva/sclera: Conjunctivae normal.      Pupils: Pupils are equal, round, and reactive to light. Cardiovascular:      Rate and Rhythm: Normal rate and regular rhythm. Heart sounds: Normal heart sounds. No murmur heard. Pulmonary:      Effort: Pulmonary effort is normal. Tachypnea present. No accessory muscle usage or respiratory distress. Breath sounds: Wheezing present. Abdominal:      General: Bowel sounds are normal. There is no distension. Palpations: Abdomen is soft. Tenderness: There is no abdominal tenderness. Musculoskeletal:         General: No tenderness. Cervical back: Neck supple. Lymphadenopathy:      Cervical: No cervical adenopathy. Skin:     General: Skin is warm and dry. Findings: Bruising (Right jaw, right hupper arm) present. No rash. Neurological:      Mental Status: She is alert and oriented to person, place, and time.    Psychiatric:         Judgment: Judgment normal.           DIFFERENTIAL DIAGNOSIS/MDM:   29-year-old female presents after a fall down several stairs 2 days ago. She is afebrile, nontoxic, mildly tachypneic but otherwise vital signs normal.  No acute distress. She does have some audible wheezing bilaterally. Has some bruising noted to the right side of her jaw as well as her right upper arm. She is alert and orient x4 with GCS 15. She has no gross neurologic deficits on exam.    Due to her not remembering much about the fall and questionable loss of consciousness plan will going to get a head CT, facial bone CT, x-rays of her chest and her arm. I do not think we need to get a cervical spine CT. She has no midline neck tenderness no gross neurologic deficits at this time. We will treat symptoms. I am going to start patient on steroids as I think there is probably some component of her asthma and COPD. DIAGNOSTIC RESULTS     EKG: All EKG's are interpreted by the Emergency Department Physician who either signs or Co-signs this chart in the absence of a cardiologist.        RADIOLOGY:   I directly visualized the following  images and reviewed the radiologist interpretations:  XR CHEST (2 VW)   Final Result   No acute abnormality. XR HUMERUS RIGHT (MIN 2 VIEWS)   Final Result   No acute bony abnormalities are noted         CT FACIAL BONES WO CONTRAST   Final Result   No acute traumatic injury of the facial bones. CT HEAD WO CONTRAST   Final Result   No acute intracranial abnormality. ED BEDSIDE ULTRASOUND:      LABS:  Labs Reviewed - No data to display      EMERGENCY DEPARTMENT COURSE:   Vitals:    Vitals:    06/17/21 1736   BP: 129/79   Pulse: 79   Resp: 24   Temp: 97.4 °F (36.3 °C)   SpO2: 97%   Weight: 205 lb (93 kg)   Height: 5' 2\" (1.575 m)     Work-up was unremarkable here. Head CT, facial bone CT, x-rays are normal.  No evidence of pneumothorax. Patient likely with contusions.   Will discharge home with prednisone mainly for wheezing I think she might have a mild exacerbation of her asthma and COPD. No infectious symptoms. I do not think she needs to be on antibiotics at this time. Advise follow-up with PCP, return if any symptoms worsen. She is agreeable with plan will be discharged home today. CRITICALCARE:      CONSULTS:  None      PROCEDURES:      FINAL IMPRESSION      1. Wheezing    2. Arm contusion, right, initial encounter    3.  Contusion of face, initial encounter            DISPOSITION/PLAN   DISPOSITION            PATIENT REFERRED TO:  KATIE Mena - [facility] [address]  [address] [address]  [phone]    Schedule an appointment as soon as possible for a visit       [facility] ED  [place] [id number] [phone]    If symptoms worsen      DISCHARGE MEDICATIONS:  New Prescriptions    PREDNISONE (DELTASONE) 50 MG TABLET    Take 1 tablet by mouth daily for 5 days       (Please note that portions ofthis note were completed with a voice recognition program.  Efforts were made to edit the dictations but occasionally words are mis-transcribed.)    Jenna Cadet DO  Attending Emergency Physician          Jenna Cadet DO  06/17/21 0495

## 2021-06-18 ENCOUNTER — TELEPHONE (OUTPATIENT)
Dept: FAMILY MEDICINE CLINIC | Age: 41
End: 2021-06-18

## 2021-06-18 DIAGNOSIS — M54.2 CHRONIC NECK PAIN WITH ABNORMAL NEUROLOGIC EXAMINATION: ICD-10-CM

## 2021-06-18 DIAGNOSIS — M48.02 SPINAL STENOSIS IN CERVICAL REGION: ICD-10-CM

## 2021-06-18 DIAGNOSIS — G89.29 CHRONIC NECK PAIN WITH ABNORMAL NEUROLOGIC EXAMINATION: ICD-10-CM

## 2021-06-18 RX ORDER — PREGABALIN 100 MG/1
CAPSULE ORAL
Qty: 90 CAPSULE | Refills: 0 | Status: SHIPPED | OUTPATIENT
Start: 2021-06-18 | End: 2021-07-17 | Stop reason: SDUPTHER

## 2021-06-18 NOTE — TELEPHONE ENCOUNTER
[facility]) ED Follow up Call    Reason for ED visit:           Phoebe Purvis , this is STAVANGER from Dr. Mary Bernal's office, just calling to see how you are doing after your recent ED visit. Did you receive discharge instructions? Yes  Do you understand the discharge instructions? Yes  Did the ED give you any new prescriptions? Yes  Were you able to fill your prescriptions? Yes      Do you have one of our red, yellow and green  Zone sheets that help you to determine when you should go to the ED? Yes      Do you need or want to make a follow up appt with your PCP? No    Do you have any further needs in the home i.e. Equipment? Not Applicable        FU appts/Provider:    No future appointments.

## 2021-06-30 ENCOUNTER — APPOINTMENT (OUTPATIENT)
Dept: CT IMAGING | Age: 41
End: 2021-06-30
Payer: COMMERCIAL

## 2021-06-30 ENCOUNTER — HOSPITAL ENCOUNTER (EMERGENCY)
Age: 41
Discharge: HOME | End: 2021-06-30
Attending: EMERGENCY MEDICINE
Payer: COMMERCIAL

## 2021-06-30 VITALS
WEIGHT: 205 LBS | RESPIRATION RATE: 16 BRPM | HEART RATE: 77 BPM | TEMPERATURE: 98.1 F | HEIGHT: 62 IN | BODY MASS INDEX: 37.73 KG/M2 | DIASTOLIC BLOOD PRESSURE: 63 MMHG | OXYGEN SATURATION: 94 % | SYSTOLIC BLOOD PRESSURE: 116 MMHG

## 2021-06-30 DIAGNOSIS — R10.9 FLANK PAIN: Primary | ICD-10-CM

## 2021-06-30 DIAGNOSIS — N20.0 LEFT NEPHROLITHIASIS: ICD-10-CM

## 2021-06-30 DIAGNOSIS — R11.2 NON-INTRACTABLE VOMITING WITH NAUSEA, UNSPECIFIED VOMITING TYPE: ICD-10-CM

## 2021-06-30 DIAGNOSIS — K22.70 BARRETT'S ESOPHAGUS WITHOUT DYSPLASIA: ICD-10-CM

## 2021-06-30 LAB
-: ABNORMAL
ABSOLUTE EOS #: 0.2 K/UL (ref 0–0.4)
ABSOLUTE IMMATURE GRANULOCYTE: ABNORMAL K/UL (ref 0–0.3)
ABSOLUTE LYMPH #: 1.1 K/UL (ref 1–4.8)
ABSOLUTE MONO #: 0.8 K/UL (ref 0.1–1.3)
ALBUMIN SERPL-MCNC: 4.3 G/DL (ref 3.5–5.2)
ALBUMIN/GLOBULIN RATIO: NORMAL (ref 1–2.5)
ALP BLD-CCNC: 70 U/L (ref 35–104)
ALT SERPL-CCNC: 20 U/L (ref 5–33)
AMORPHOUS: ABNORMAL
ANION GAP SERPL CALCULATED.3IONS-SCNC: 10 MMOL/L (ref 9–17)
AST SERPL-CCNC: 23 U/L
BACTERIA: ABNORMAL
BASOPHILS # BLD: 1 % (ref 0–2)
BASOPHILS ABSOLUTE: 0.1 K/UL (ref 0–0.2)
BILIRUB SERPL-MCNC: 0.48 MG/DL (ref 0.3–1.2)
BILIRUBIN DIRECT: 0.13 MG/DL
BILIRUBIN URINE: NEGATIVE
BILIRUBIN, INDIRECT: 0.35 MG/DL (ref 0–1)
BUN BLDV-MCNC: 16 MG/DL (ref 6–20)
BUN/CREAT BLD: ABNORMAL (ref 9–20)
CALCIUM SERPL-MCNC: 10.4 MG/DL (ref 8.6–10.4)
CASTS UA: ABNORMAL /LPF
CHLORIDE BLD-SCNC: 102 MMOL/L (ref 98–107)
CO2: 26 MMOL/L (ref 20–31)
COLOR: YELLOW
COMMENT UA: ABNORMAL
CREAT SERPL-MCNC: 0.73 MG/DL (ref 0.5–0.9)
CRYSTALS, UA: ABNORMAL /HPF
DIFFERENTIAL TYPE: ABNORMAL
EOSINOPHILS RELATIVE PERCENT: 2 % (ref 0–4)
EPITHELIAL CELLS UA: ABNORMAL /HPF
GFR AFRICAN AMERICAN: >60 ML/MIN
GFR NON-AFRICAN AMERICAN: >60 ML/MIN
GFR SERPL CREATININE-BSD FRML MDRD: ABNORMAL ML/MIN/{1.73_M2}
GFR SERPL CREATININE-BSD FRML MDRD: ABNORMAL ML/MIN/{1.73_M2}
GLOBULIN: NORMAL G/DL (ref 1.5–3.8)
GLUCOSE BLD-MCNC: 105 MG/DL (ref 70–99)
GLUCOSE URINE: NEGATIVE
HCT VFR BLD CALC: 42.9 % (ref 36–46)
HEMOGLOBIN: 13.7 G/DL (ref 12–16)
IMMATURE GRANULOCYTES: ABNORMAL %
KETONES, URINE: NEGATIVE
LACTIC ACID: 1.2 MMOL/L (ref 0.5–2.2)
LEUKOCYTE ESTERASE, URINE: NEGATIVE
LIPASE: 13 U/L (ref 13–60)
LYMPHOCYTES # BLD: 9 % (ref 24–44)
MCH RBC QN AUTO: 27.3 PG (ref 26–34)
MCHC RBC AUTO-ENTMCNC: 32 G/DL (ref 31–37)
MCV RBC AUTO: 85.2 FL (ref 80–100)
MONOCYTES # BLD: 6 % (ref 1–7)
MUCUS: ABNORMAL
NITRITE, URINE: NEGATIVE
NRBC AUTOMATED: ABNORMAL PER 100 WBC
OTHER OBSERVATIONS UA: ABNORMAL
PDW BLD-RTO: 15.5 % (ref 11.5–14.9)
PH UA: 7.5 (ref 5–8)
PLATELET # BLD: 258 K/UL (ref 150–450)
PLATELET ESTIMATE: ABNORMAL
PMV BLD AUTO: 8.1 FL (ref 6–12)
POTASSIUM SERPL-SCNC: 4.2 MMOL/L (ref 3.7–5.3)
PROCALCITONIN: 0.08 NG/ML
PROTEIN UA: NEGATIVE
RBC # BLD: 5.03 M/UL (ref 4–5.2)
RBC # BLD: ABNORMAL 10*6/UL
RBC UA: ABNORMAL /HPF
RENAL EPITHELIAL, UA: ABNORMAL /HPF
SEG NEUTROPHILS: 82 % (ref 36–66)
SEGMENTED NEUTROPHILS ABSOLUTE COUNT: 10 K/UL (ref 1.3–9.1)
SODIUM BLD-SCNC: 138 MMOL/L (ref 135–144)
SPECIFIC GRAVITY UA: 1.03 (ref 1–1.03)
TOTAL PROTEIN: 6.7 G/DL (ref 6.4–8.3)
TRICHOMONAS: ABNORMAL
TURBIDITY: CLEAR
URINE HGB: ABNORMAL
UROBILINOGEN, URINE: NORMAL
WBC # BLD: 12.2 K/UL (ref 3.5–11)
WBC # BLD: ABNORMAL 10*3/UL
WBC UA: ABNORMAL /HPF
YEAST: ABNORMAL

## 2021-06-30 PROCEDURE — 2580000003 HC RX 258: Performed by: STUDENT IN AN ORGANIZED HEALTH CARE EDUCATION/TRAINING PROGRAM

## 2021-06-30 PROCEDURE — 80076 HEPATIC FUNCTION PANEL: CPT

## 2021-06-30 PROCEDURE — 85025 COMPLETE CBC W/AUTO DIFF WBC: CPT

## 2021-06-30 PROCEDURE — 6360000002 HC RX W HCPCS: Performed by: STUDENT IN AN ORGANIZED HEALTH CARE EDUCATION/TRAINING PROGRAM

## 2021-06-30 PROCEDURE — 80048 BASIC METABOLIC PNL TOTAL CA: CPT

## 2021-06-30 PROCEDURE — 84145 PROCALCITONIN (PCT): CPT

## 2021-06-30 PROCEDURE — 81001 URINALYSIS AUTO W/SCOPE: CPT

## 2021-06-30 PROCEDURE — 36415 COLL VENOUS BLD VENIPUNCTURE: CPT

## 2021-06-30 PROCEDURE — 96375 TX/PRO/DX INJ NEW DRUG ADDON: CPT

## 2021-06-30 PROCEDURE — 83690 ASSAY OF LIPASE: CPT

## 2021-06-30 PROCEDURE — 6360000004 HC RX CONTRAST MEDICATION: Performed by: STUDENT IN AN ORGANIZED HEALTH CARE EDUCATION/TRAINING PROGRAM

## 2021-06-30 PROCEDURE — 99284 EMERGENCY DEPT VISIT MOD MDM: CPT

## 2021-06-30 PROCEDURE — 96374 THER/PROPH/DIAG INJ IV PUSH: CPT

## 2021-06-30 PROCEDURE — 6370000000 HC RX 637 (ALT 250 FOR IP): Performed by: STUDENT IN AN ORGANIZED HEALTH CARE EDUCATION/TRAINING PROGRAM

## 2021-06-30 PROCEDURE — 74177 CT ABD & PELVIS W/CONTRAST: CPT

## 2021-06-30 PROCEDURE — 83605 ASSAY OF LACTIC ACID: CPT

## 2021-06-30 RX ORDER — 0.9 % SODIUM CHLORIDE 0.9 %
80 INTRAVENOUS SOLUTION INTRAVENOUS ONCE
Status: COMPLETED | OUTPATIENT
Start: 2021-06-30 | End: 2021-06-30

## 2021-06-30 RX ORDER — PROMETHAZINE HYDROCHLORIDE 6.25 MG/5ML
12.5 SYRUP ORAL 4 TIMES DAILY PRN
Qty: 120 ML | Refills: 2 | Status: SHIPPED | OUTPATIENT
Start: 2021-06-30 | End: 2021-08-27

## 2021-06-30 RX ORDER — PANTOPRAZOLE SODIUM 40 MG/1
TABLET, DELAYED RELEASE ORAL
Qty: 90 TABLET | Refills: 3 | Status: SHIPPED | OUTPATIENT
Start: 2021-06-30 | End: 2022-01-11 | Stop reason: SDUPTHER

## 2021-06-30 RX ORDER — MORPHINE SULFATE 4 MG/ML
4 INJECTION, SOLUTION INTRAMUSCULAR; INTRAVENOUS ONCE
Status: COMPLETED | OUTPATIENT
Start: 2021-06-30 | End: 2021-06-30

## 2021-06-30 RX ORDER — SODIUM CHLORIDE 0.9 % (FLUSH) 0.9 %
10 SYRINGE (ML) INJECTION PRN
Status: DISCONTINUED | OUTPATIENT
Start: 2021-06-30 | End: 2021-06-30 | Stop reason: HOSPADM

## 2021-06-30 RX ORDER — LIDOCAINE 4 G/G
1 PATCH TOPICAL ONCE
Status: DISCONTINUED | OUTPATIENT
Start: 2021-06-30 | End: 2021-06-30 | Stop reason: HOSPADM

## 2021-06-30 RX ORDER — ACETAMINOPHEN 500 MG
1000 TABLET ORAL ONCE
Status: COMPLETED | OUTPATIENT
Start: 2021-06-30 | End: 2021-06-30

## 2021-06-30 RX ORDER — ONDANSETRON 2 MG/ML
4 INJECTION INTRAMUSCULAR; INTRAVENOUS ONCE
Status: COMPLETED | OUTPATIENT
Start: 2021-06-30 | End: 2021-06-30

## 2021-06-30 RX ORDER — 0.9 % SODIUM CHLORIDE 0.9 %
1000 INTRAVENOUS SOLUTION INTRAVENOUS ONCE
Status: COMPLETED | OUTPATIENT
Start: 2021-06-30 | End: 2021-06-30

## 2021-06-30 RX ADMIN — IOPAMIDOL 75 ML: 755 INJECTION, SOLUTION INTRAVENOUS at 16:15

## 2021-06-30 RX ADMIN — ONDANSETRON 4 MG: 2 INJECTION INTRAMUSCULAR; INTRAVENOUS at 15:37

## 2021-06-30 RX ADMIN — SODIUM CHLORIDE 1000 ML: 9 INJECTION, SOLUTION INTRAVENOUS at 15:35

## 2021-06-30 RX ADMIN — MORPHINE SULFATE 4 MG: 4 INJECTION, SOLUTION INTRAMUSCULAR; INTRAVENOUS at 15:36

## 2021-06-30 RX ADMIN — SODIUM CHLORIDE, PRESERVATIVE FREE 10 ML: 5 INJECTION INTRAVENOUS at 16:16

## 2021-06-30 RX ADMIN — ACETAMINOPHEN 1000 MG: 500 TABLET ORAL at 16:52

## 2021-06-30 RX ADMIN — SODIUM CHLORIDE 80 ML: 9 INJECTION, SOLUTION INTRAVENOUS at 16:16

## 2021-06-30 ASSESSMENT — PAIN SCALES - GENERAL
PAINLEVEL_OUTOF10: 9
PAINLEVEL_OUTOF10: 9
PAINLEVEL_OUTOF10: 8
PAINLEVEL_OUTOF10: 1

## 2021-06-30 ASSESSMENT — ENCOUNTER SYMPTOMS
DIARRHEA: 1
CONSTIPATION: 0
ABDOMINAL PAIN: 1
ANAL BLEEDING: 0
PHOTOPHOBIA: 0
SORE THROAT: 0
COUGH: 0
NAUSEA: 1
SHORTNESS OF BREATH: 0
BLOOD IN STOOL: 0
VOMITING: 1

## 2021-06-30 ASSESSMENT — PAIN DESCRIPTION - ORIENTATION
ORIENTATION: LOWER;RIGHT
ORIENTATION_2: RIGHT;LOWER
ORIENTATION: MID

## 2021-06-30 ASSESSMENT — PAIN DESCRIPTION - PROGRESSION
CLINICAL_PROGRESSION: NOT CHANGED
CLINICAL_PROGRESSION_2: NOT CHANGED
CLINICAL_PROGRESSION: GRADUALLY IMPROVING

## 2021-06-30 ASSESSMENT — PAIN DESCRIPTION - DESCRIPTORS
DESCRIPTORS: ACHING
DESCRIPTORS_2: ACHING

## 2021-06-30 ASSESSMENT — PAIN DESCRIPTION - ONSET
ONSET_2: ON-GOING
ONSET: ON-GOING
ONSET: ON-GOING

## 2021-06-30 ASSESSMENT — PAIN DESCRIPTION - FREQUENCY
FREQUENCY: INTERMITTENT
FREQUENCY: CONTINUOUS

## 2021-06-30 ASSESSMENT — PAIN DESCRIPTION - LOCATION
LOCATION: BACK;ABDOMEN
LOCATION: ABDOMEN
LOCATION_2: BACK

## 2021-06-30 ASSESSMENT — PAIN - FUNCTIONAL ASSESSMENT
PAIN_FUNCTIONAL_ASSESSMENT: PREVENTS OR INTERFERES SOME ACTIVE ACTIVITIES AND ADLS
PAIN_FUNCTIONAL_ASSESSMENT: PREVENTS OR INTERFERES SOME ACTIVE ACTIVITIES AND ADLS

## 2021-06-30 ASSESSMENT — PAIN DESCRIPTION - PAIN TYPE
TYPE_2: ACUTE PAIN
TYPE: ACUTE PAIN
TYPE: ACUTE PAIN

## 2021-06-30 ASSESSMENT — PAIN DESCRIPTION - DURATION: DURATION_2: CONTINUOUS

## 2021-06-30 ASSESSMENT — PAIN DESCRIPTION - INTENSITY: RATING_2: 0

## 2021-06-30 NOTE — ED PROVIDER NOTES
[address]  Emergency Department Encounter  EmergencyMedicine Resident     Pt Name:Niurka Churchill  MRN: [id number]  [place] 1980  Date of evaluation: 6/30/21  PCP:  KATIE Ferguson CNP    CHIEF COMPLAINT       Chief Complaint   Patient presents with    Abdominal Pain     all over    Nausea     Constantly nauseous.  Emesis     Half hour ago, more than 10 times.  Back Pain     Lower left       HISTORY OF PRESENT ILLNESS  (Location/Symptom, Timing/Onset, Context/Setting, Quality, Duration, Modifying Factors, Severity.)      Fatmata Stanley is a 46 y.o. female who presents with nausea vomiting, flank pain, abdominal pain. Patient notes that approximately 10 PM last night, patient ate a burrito and then started to having diffuse scratching/cramping abdominal pain that is intermittent with no alleviating factors, and worse with eating food. Patient is also having associated stabbing nonradiating left leg pain that is worse with movement and tender to palpation. Is also having associated diarrhea with nausea and vomiting. Patient denies any hematochezia, melena, or hematemesis. Patient was that she still has her gallbladder and appendix but had a partial hysterectomy. Patient notes that she still passing gas. Patient denies any kind of fevers, sore throat, runny nose, change in vision, chest pain, cough, shortness of breath, dysuria/hematuria, vaginal bleeding or discharge, numbness/tingling or weakness.     PAST MEDICAL / SURGICAL / SOCIAL / FAMILY HISTORY      has a past medical history of Anxiety, Arthritis, Asthma, Sadler's esophagus, Bipolar 1 disorder ([address] CAD (coronary artery disease), Chronic insomnia, COPD (chronic obstructive pulmonary disease) ([address] Depression, Diabetes mellitus ([address] Endometriosis, Esophagitis, GERD (gastroesophageal reflux disease), Headache(784.0), Herniated disc, cervical, Hiatal hernia, Incisional abscess, Kidney stones, MDRO (multiple drug resistant organisms) resistance, Pleurisy, Pneumonia, Seizures ([address] UTI (urinary tract infection), and Vision abnormalities. has a past surgical history that includes knee surgery (Left);  section (, ); Tonsillectomy; Knee arthroscopy (Left, 5/20/15); Cervical disc surgery; Upper gastrointestinal endoscopy (2016); Upper gastrointestinal endoscopy (2017); Upper gastrointestinal endoscopy (2017); pr esophagogastroduodenoscopy transoral diagnostic (N/A, 3/2/2017); laparoscopy; laparoscopy (N/A, 10/5/2017); Upper gastrointestinal endoscopy (N/A, 2018); Endoscopy, colon, diagnostic;  section (N/A, 2018); Upper gastrointestinal endoscopy (N/A, 10/1/2019); Colonoscopy (N/A, 10/1/2019); Colonoscopy (N/A, 2020); [place] tooth extraction; and Hysterectomy (N/A, 2020). Social History     Socioeconomic History    Marital status: Single     Spouse name: Not on file    Number of children: 1    Years of education: 11th grade    Highest education level: Not on file   Occupational History    Occupation: unemployed-   Tobacco Use    Smoking status: Current Every Day Smoker     Packs/day: 0.50     Years: 21.00     Pack years: 10.50     Types: Cigarettes    Smokeless tobacco: Never Used   Vaping Use    Vaping Use: Every day   Substance and Sexual Activity    Alcohol use: No    Drug use: No    Sexual activity: Yes     Partners: Male   Other Topics Concern    Not on file   Social History Narrative    Lives with son and boyfriend     Social Determinants of Health     Financial Resource Strain:     Difficulty of Paying Living Expenses:    Food Insecurity:     Worried About Running Out of Food in the Last Year:     [address] in the Last Year:    Transportation Needs:     Lack of Transportation (Medical):      Lack of Transportation (Non-Medical):    Physical Activity:     Days of Exercise per Week:     Minutes of Exercise per Session: Stress:     Feeling of Stress :    Social Connections:     Frequency of Communication with Friends and Family:     Frequency of Social Gatherings with Friends and Family:     Attends Islam Services:     Active Member of Clubs or Organizations:     Attends Club or Organization Meetings:     Marital Status:    Intimate Partner Violence:     Fear of Current or Ex-Partner:     Emotionally Abused:     Physically Abused:     Sexually Abused:        Family History   Problem Relation Age of Onset    Cancer Mother         breast    Bipolar Disorder Mother     Hypertension Mother     Breast Cancer Mother     Bipolar Disorder Brother     Hypertension Father        Allergies:  Nsaids, Toradol [ketorolac tromethamine], and Ultram [tramadol]    Home Medications:  Prior to Admission medications    Medication Sig Start Date End Date Taking? Authorizing Provider   promethazine (PHENERGAN) 6.25 MG/5ML syrup Take 10 mLs by mouth 4 times daily as needed for Nausea (diarrhea) 6/30/21   KATIE Cole CNP   pantoprazole (PROTONIX) 40 MG tablet 1 tab daily 6/30/21   KATIE Cole CNP   pregabalin (LYRICA) 100 MG capsule Take 1 tid 6/18/21 7/17/21  KATIE Cole CNP   ipratropium-albuterol (DUONEB) 0.5-2.5 (3) MG/3ML SOLN nebulizer solution INHALE CONTENTS OF 1 UNIT DOSE VIA NEBULIZER EVERY 4 HOURS 6/7/21   KATEI Cole CNP   methocarbamol (ROBAXIN) 750 MG tablet TAKE 1 TABLET BY MOUTH EVERY 6-8 HOURS AS NEEDED (NECK SPASM/PAIN) 6/2/21 8/1/21  Sanna Merlin, DO   montelukast (SINGULAIR) 10 MG tablet TAKE 1 TABLET IN THE EVENING ONCE A DAY ORALLY 90 DAYS 6/2/21   Karely Fraser MD   glucose monitoring kit (FREESTYLE) monitoring kit Use as directed. BRAND OF CHOICE INSURANCE ALLOWS. 5/27/21   KATIE Cole CNP   Misc.  Devices (PLASTIC BED PAN) MISC Use daily with hot water 5/27/21   KATIE Cole CNP   Lancets ([address]) [facility] USE TO TEST BLOOD SUGAR TWICE A DAY 5/27/21   KATIE Cole CNP   blood glucose test strips (ONETOUCH VERIO) strip USE TO TEST BLOOD SUGAR TWICE A DAY 5/27/21   KATIE Cole CNP   ondansetron (ZOFRAN ODT) 4 MG disintegrating tablet Take 1 tablet by mouth every 8 hours as needed for Nausea 5/22/21   Ashley Johnson MD   vitamin D3 (CHOLECALCIFEROL) 25 MCG (1000 UT) TABS tablet TAKE 1 TABLET BY MOUTH DAILY 5/10/21   KATIE Cole CNP   Alcohol Swabs ( STERILE ALCOHOL PREP) PADS USE AS DIRECTED 5/10/21   KATIE Cole CNP   Fluticasone furoate-vilanterol (BREO ELLIPTA) 200-25 MCG/INH AEPB inhaler Inhale 1 puff into the lungs daily 4/28/21   KATIE Cole CNP   zinc 50 MG CAPS Take 1 daily 4/1/21   KATIE Cole CNP   loratadine (CLARITIN) 10 MG tablet Take 1 tablet by mouth daily 4/1/21   KATIE Cole CNP   fluticasone (FLONASE) 50 MCG/ACT nasal spray 1 spray by Each Nostril route daily 4/1/21 5/1/21  KATIE Cole CNP   dicyclomine (BENTYL) 10 MG capsule TAKE 1 CAPSULE BY MOUTH 4 TIMES DAILY 3/30/21   KATIE Cole CNP   LINZESS 290 MCG CAPS capsule TAKE 1 CAPSULE BY MOUTH EVERY MORNING (BEFORE BREAKFAST) 3/3/21   Usha Panchal MD   sertraline (ZOLOFT) 50 MG tablet Take 1 tablet by mouth daily 1/19/21   Historical Provider, MD   lamoTRIgine (LAMICTAL) 150 MG tablet Take 1 tablet by mouth nightly 10/28/20   Historical Provider, MD   vitamin D3 (CHOLECALCIFEROL) 25 MCG (1000 UT) TABS tablet TAKE 1 TABLET BY MOUTH DAILY 8/27/20   KATIE Cole CNP   Masks (CLEVER CHOICE FACE MASK) MISC USE NEW FACE MASK EVERYDAY WHEN PATIENT GO OUTSIDE OF HOME DUE TO COVID PANDEMIC 7/6/20   Alan Bernal, KATIE - CNP   acetaminophen (TYLENOL) 500 MG tablet Take 500 mg by mouth every 6 hours as needed for Pain    Historical Provider, MD       REVIEW OF SYSTEMS    (2-9 systems for level 4, 10 or more for level 5) Review of Systems   Constitutional: Positive for chills. Negative for diaphoresis, fatigue and fever. HENT: Negative for congestion and sore throat. Eyes: Negative for photophobia and visual disturbance. Respiratory: Negative for cough and shortness of breath. Cardiovascular: Negative for chest pain, palpitations and leg swelling. Gastrointestinal: Positive for abdominal pain, diarrhea, nausea and vomiting. Negative for anal bleeding, blood in stool and constipation. Endocrine: Negative for polydipsia, polyphagia and polyuria. Genitourinary: Positive for flank pain. Negative for difficulty urinating, dysuria, frequency, hematuria, vaginal bleeding and vaginal discharge. Musculoskeletal: Negative for arthralgias and myalgias. Skin: Negative for rash and wound. Neurological: Negative for weakness, light-headedness and numbness. PHYSICAL EXAM   (up to 7 for level 4, 8 or more for level 5)      INITIAL VITALS:   /76   Pulse 78   Temp 98.1 °F (36.7 °C)   Resp 16   Ht 5' 2\" (1.575 m)   Wt 205 lb (93 kg)   LMP 06/15/2020   SpO2 97%   BMI 37.49 kg/m²     Physical Exam  Vitals and nursing note reviewed. Constitutional:       General: She is in acute distress (moderate). Appearance: She is well-developed. She is obese. She is not toxic-appearing or diaphoretic. HENT:      Head: Normocephalic and atraumatic. Right Ear: External ear normal.      Left Ear: External ear normal.      Nose: Nose normal.      Mouth/Throat:      Mouth: Mucous membranes are dry. Pharynx: Oropharynx is clear. Eyes:      Extraocular Movements: Extraocular movements intact. Conjunctiva/sclera: Conjunctivae normal.      Pupils: Pupils are equal, round, and reactive to light. Neck:      Vascular: No JVD. Trachea: No tracheal deviation. Cardiovascular:      Rate and Rhythm: Normal rate and regular rhythm.       Heart sounds: Normal heart sounds, S1 normal and S2 normal. No murmur heard. No friction rub. No gallop. Pulmonary:      Effort: Pulmonary effort is normal. No respiratory distress. Breath sounds: Normal breath sounds. Abdominal:      Palpations: Abdomen is soft. Tenderness: There is generalized abdominal tenderness. There is left CVA tenderness. There is no right CVA tenderness, guarding or rebound. Negative signs include Hearn's sign, Rovsing's sign and McBurney's sign. Hernia: No hernia is present. Comments: Obese abdomen   Musculoskeletal:         General: No swelling or tenderness (b/l calves are non-tender to palpation). Normal range of motion. Cervical back: Normal range of motion and neck supple. No rigidity. Skin:     General: Skin is warm and dry. Capillary Refill: Capillary refill takes less than 2 seconds. Neurological:      General: No focal deficit present. Mental Status: She is alert and oriented to person, place, and time. Motor: No abnormal muscle tone.          DIFFERENTIAL  DIAGNOSIS     PLAN (LABS / IMAGING / EKG):  Orders Placed This Encounter   Procedures    CT ABDOMEN PELVIS W IV CONTRAST Additional Contrast? None    Lactic Acid    CBC Auto Differential    Basic Metabolic Prof    Hepatic Function Panel    Lipase    Urinalysis Reflex to Culture    Procalcitonin    Microscopic Urinalysis    Insert peripheral IV       MEDICATIONS ORDERED:  Orders Placed This Encounter   Medications    morphine sulfate (PF) injection 4 mg    ondansetron (ZOFRAN) injection 4 mg    0.9 % sodium chloride bolus    0.9 % sodium chloride bolus    DISCONTD: sodium chloride flush 0.9 % injection 10 mL    iopamidol (ISOVUE-370) 76 % injection 75 mL    DISCONTD: lidocaine 4 % external patch 1 patch    acetaminophen (TYLENOL) tablet 1,000 mg       DDX: Hepatitis, kidney stone, pancreatitis, gastritis, gastroenteritis, Mae abnormality, dehydration, anemia, UTI, pyelonephritis, colitis    DIAGNOSTIC RESULTS / EMERGENCY DEPARTMENT COURSE / MDM   LAB RESULTS:  Results for orders placed or performed during the hospital encounter of 06/30/21   Lactic Acid   Result Value Ref Range    Lactic Acid 1.2 0.5 - 2.2 mmol/L   CBC Auto Differential   Result Value Ref Range    WBC 12.2 (H) 3.5 - 11.0 k/uL    RBC 5.03 4.0 - 5.2 m/uL    Hemoglobin 13.7 12.0 - 16.0 g/dL    Hematocrit 42.9 36 - 46 %    MCV 85.2 80 - 100 fL    MCH 27.3 26 - 34 pg    MCHC 32.0 31 - 37 g/dL    RDW 15.5 (H) 11.5 - 14.9 %    Platelets 051 439 - 421 k/uL    MPV 8.1 6.0 - 12.0 fL    NRBC Automated NOT REPORTED per 100 WBC    Differential Type NOT REPORTED     Seg Neutrophils 82 (H) 36 - 66 %    Lymphocytes 9 (L) 24 - 44 %    Monocytes 6 1 - 7 %    Eosinophils % 2 0 - 4 %    Basophils 1 0 - 2 %    Immature Granulocytes NOT REPORTED 0 %    Segs Absolute 10.00 (H) 1.3 - 9.1 k/uL    Absolute Lymph # 1.10 1.0 - 4.8 k/uL    Absolute Mono # 0.80 0.1 - 1.3 k/uL    Absolute Eos # 0.20 0.0 - 0.4 k/uL    Basophils Absolute 0.10 0.0 - 0.2 k/uL    Absolute Immature Granulocyte NOT REPORTED 0.00 - 0.30 k/uL    WBC Morphology NOT REPORTED     RBC Morphology NOT REPORTED     Platelet Estimate NOT REPORTED    Basic Metabolic Prof   Result Value Ref Range    Glucose 105 (H) 70 - 99 mg/dL    BUN 16 6 - 20 mg/dL    CREATININE 0.73 0.50 - 0.90 mg/dL    Bun/Cre Ratio NOT REPORTED 9 - 20    Calcium 10.4 8.6 - 10.4 mg/dL    Sodium 138 135 - 144 mmol/L    Potassium 4.2 3.7 - 5.3 mmol/L    Chloride 102 98 - 107 mmol/L    CO2 26 20 - 31 mmol/L    Anion Gap 10 9 - 17 mmol/L    GFR Non-African American >60 >60 mL/min    GFR African American >60 >60 mL/min    GFR Comment          GFR Staging NOT REPORTED    Hepatic Function Panel   Result Value Ref Range    Albumin 4.3 3.5 - 5.2 g/dL    Alkaline Phosphatase 70 35 - 104 U/L    ALT 20 5 - 33 U/L    AST 23 <32 U/L    Total Bilirubin 0.48 0.3 - 1.2 mg/dL    Bilirubin, Direct 0.13 <0.31 mg/dL    Bilirubin, Indirect 0.35 0.00 - 1.00 mg/dL    Total Protein 6.7 6.4 - 8.3 g/dL    Globulin NOT REPORTED 1.5 - 3.8 g/dL    Albumin/Globulin Ratio NOT REPORTED 1.0 - 2.5   Lipase   Result Value Ref Range    Lipase 13 13 - 60 U/L   Urinalysis Reflex to Culture    Specimen: Urine, clean catch   Result Value Ref Range    Color, UA YELLOW YELLOW    Turbidity UA CLEAR CLEAR    Glucose, Ur NEGATIVE NEGATIVE    Bilirubin Urine NEGATIVE NEGATIVE    Ketones, Urine NEGATIVE NEGATIVE    Specific Gravity, UA 1.026 1.000 - 1.030    Urine Hgb SMALL (A) NEGATIVE    pH, UA 7.5 5.0 - 8.0    Protein, UA NEGATIVE NEGATIVE    Urobilinogen, Urine Normal Normal    Nitrite, Urine NEGATIVE NEGATIVE    Leukocyte Esterase, Urine NEGATIVE NEGATIVE    Urinalysis Comments NOT REPORTED    Procalcitonin   Result Value Ref Range    Procalcitonin 0.08 <0.09 ng/mL   Microscopic Urinalysis   Result Value Ref Range    -          WBC, UA 0 TO 2 /HPF    RBC, UA 10 TO 20 /HPF    Casts UA NOT REPORTED /LPF    Crystals, UA NOT REPORTED None /HPF    Epithelial Cells UA 2 TO 5 /HPF    Renal Epithelial, UA NOT REPORTED 0 /HPF    Bacteria, UA FEW (A) None    Mucus, UA NOT REPORTED None    Trichomonas, UA NOT REPORTED None    Amorphous, UA NOT REPORTED None    Other Observations UA NOT REPORTED NOT REQ. Yeast, UA NOT REPORTED None       IMPRESSION: 81-year-old female presents for diffuse abdominal pain with left leg pain along with nausea and vomiting. Patient appears to be in moderate distress but not toxic appearing. Patient's initial vitals are stable nonconcerning. No signs respiratory stress. Abdomen is diffusely tender to palpation with left CVA tenderness. Cap refill is in 2. Concern for above differential diagnosis. Order CBC, BMP, urinalysis, hepatic function panel, lipase, lactic acid CT vitals IV contrast, IV fluids, morphine, and Zofran. Possible admission.     RADIOLOGY:  CT ABDOMEN PELVIS W IV CONTRAST Additional Contrast? None    Result Date: 6/30/2021  EXAMINATION: CT OF THE ABDOMEN AND PELVIS WITH CONTRAST 6/30/2021 4:11 pm TECHNIQUE: CT of the abdomen and pelvis was performed with the administration of intravenous contrast. Multiplanar reformatted images are provided for review. Dose modulation, iterative reconstruction, and/or weight based adjustment of the mA/kV was utilized to reduce the radiation dose to as low as reasonably achievable. COMPARISON: CT abdomen and pelvis dated 05/22/2021 HISTORY: ORDERING SYSTEM PROVIDED HISTORY: diffuse abdominal pain, left flank pain TECHNOLOGIST PROVIDED HISTORY: diffuse abdominal pain, left flank pain Decision Support Exception - unselect if not a suspected or confirmed emergency medical condition->Emergency Medical Condition (MA) Reason for Exam: diffuse abdominal pain, left flank pain Acuity: Acute Type of Exam: Initial Additional signs and symptoms: Pt c/o nausea, vomiting, stomach pian, diarrhea, and left middle back pain since last night Relevant Medical/Surgical History: No hx diabetes or cancer; Hx c-sections, laparascopic surgery FINDINGS: Lower Chest:  Visualized portion of the lower chest demonstrates no acute abnormality. Organs: The liver, spleen, pancreas, and adrenal glands are unremarkable. Gallbladder is unremarkable. There is symmetric enhancement of the kidneys. No hydronephrosis or perinephric inflammation. There is a 0.5 cm calculus in the mid left kidney. No evidence of ureteral calculus. GI/Bowel: There is no abnormal bowel distention or pericolonic inflammation. No free intraperitoneal air or fluid. Appendix is normal. Pelvis: Urinary bladder is unremarkable. Uterus is surgically absent. Ovaries appear to be retained and are grossly unremarkable. No free fluid in the pelvis. No pelvic lymphadenopathy. Peritoneum/Retroperitoneum: The abdominal aorta is normal in caliber. There is no retroperitoneal or mesenteric lymphadenopathy. Bones/Soft Tissues: There is no acute or suspicious osseous abnormality.  Visualized superficial soft tissues are within normal limits. 1.  No acute process in the abdomen or pelvis. 2.  Left nephrolithiasis. EKG  None    All EKG's are interpreted by the Emergency Department Physician who either signs or Co-signs this chart in the absence of a cardiologist.    EMERGENCY DEPARTMENT COURSE:  ED Course as of Jul 01 0224 Wed Jun 30, 2021   1542 Likely stress reaction from nausea and vomiting. WBC(!): 12.2 [CS]   1551 Lactic Acid: 1.2 [CS]   1601 Hepatic function panel is nonconcerning.    [CS]   1601 Lipase is negative. [CS]   9887 BMP is nonconcerning.    [CS]   2549 Valuated patient bedside. Patient notes that her pain significantly improved with morphine but is coming back. Patient was offered Tylenol, Toradol, and Norflex. Patient notes that she is already taking Robaxin, and she notes that NSAIDs including Toradol makes her \"esophagus as well\". As patient's nausea is resolved with Zofran, plan to give Tylenol and await for further CT and urinalysis. [CS]   3081 CT and pelvis with IV contrast reveals    1. No acute process in the abdomen or pelvis.     2.  Left nephrolithiasis.       [CS]   1745 Urinalysis reveals 10-20 RBCs. With a history of kidney stones, and a known left nephrolithiasis but the patient just passed a kidney stone. Will reevaluate patient    [CS]      ED Course User Index  [CS] Basim Point, DO     Patient agreeable discharge plan. Patient was educated return precautions. Patient verbalized understanding that this could be worsening of the life-threatening process and to return to the ER for history of present abdominal pain, nausea/vomiting, lightheadedness, chest pain, fever greater than 100.3 °F, inability urinate, or if any other concerning symptoms develop. PROCEDURES:  None    CONSULTS:  None    CRITICAL CARE:  Please see attending note    FINAL IMPRESSION      1. Flank pain    2. Left nephrolithiasis    3.  Non-intractable vomiting with nausea, unspecified vomiting type          DISPOSITION / PLAN     DISPOSITION discharge      PATIENT REFERRED TO:  [facility] ED  [place] [address]  [phone]  Go to   If symptoms worsen, As needed    [facility] [place], [address]  [address] [facility]  [address] [facility]  [address] [address]  [phone]    Schedule an appointment as soon as possible for a visit in 11 days      Mildred Encinas MD  [facility] [address] [place] [address] [phone]    Schedule an appointment as soon as possible for a visit         DISCHARGE MEDICATIONS:  Discharge Medication List as of 6/30/2021  6:04 PM          Isabelle Hutchison DO  Emergency Medicine Resident    (Please note that portions of thisnote were completed with a voice recognition program.  Efforts were made to edit the dictations but occasionally words are mis-transcribed.)       Isabelle Hutchison DO  Resident  07/01/21 [id number]

## 2021-06-30 NOTE — ED PROVIDER NOTES
conjunction with the resident and agree with the assessment, treatment plan, and disposition of the patient as recorded by the resident. I performed a history and physical examination of the patient and discussed management with the resident. I reviewed the residents note and agree with the documented findings and plan of care. Any areas of disagreement are noted on the chart. I was personally present for the key portions of any procedures. I have documented in the chart those procedures where I was not present during the key portions. I have personally reviewed all images and agree with the resident's interpretation. I have reviewed the emergency nurses triage note. I agree with the chief complaint, past medical history, past surgical history, allergies, medications, social and family history as documented unless otherwise noted.     Tonya Chávez DO  Attending Emergency Physician            Tonya Chávez DO  07/01/21 1057

## 2021-06-30 NOTE — ED NOTES
Discharge instructions reviewed with all questions addressed. Patient changed and treansferred under own power off unit to lobby without incident.       Melania Medina RN  06/30/21 [id number]

## 2021-06-30 NOTE — ED NOTES
Patient was able to provide a urine sample utilizing clean catch tech without spilling sample. Nurse did not have to st. Catheterize patient to collect sample.       Colton Cruz RN  06/30/21 [address] [facility] Jaky Montaño RN  06/30/21 2824

## 2021-07-01 ENCOUNTER — TELEPHONE (OUTPATIENT)
Dept: FAMILY MEDICINE CLINIC | Age: 41
End: 2021-07-01

## 2021-07-01 NOTE — TELEPHONE ENCOUNTER
[facility] ([facility]) ED Follow up Call     Reason for ED visit:  Flank pain      7/1/2021         FU appts/Provider:    No future appointments. VOICEMAIL DOCUMENTATION - ERASE IF NOT USED  Hi, this message is for Niurka. This is [employer], MA from [facility] office. Just calling to see how you are doing after your recent visit to the Emergency Room. [facility] wants to make sure you were able to fill any prescriptions and that you understand your discharge instructions. Please return our call if you need to make a follow up appointment with your provider or have any further needs. Our phone number is [phone]. Have a great day.

## 2021-07-05 DIAGNOSIS — J30.89 NON-SEASONAL ALLERGIC RHINITIS DUE TO OTHER ALLERGIC TRIGGER: ICD-10-CM

## 2021-07-06 RX ORDER — LORATADINE 10 MG/1
10 TABLET ORAL DAILY
Qty: 90 TABLET | Refills: 1 | Status: ON HOLD | OUTPATIENT
Start: 2021-07-06 | End: 2022-04-20

## 2021-07-06 NOTE — TELEPHONE ENCOUNTER
Please Approve or Refuse.   Send to Pharmacy per Pt's Request:  Next Visit Date:  Visit date not found   Last Visit Date: 5/27/2021    Hemoglobin A1C (%)   Date Value   06/10/2020 5.7   05/21/2017 5.3   04/10/2017 5.8             ( goal A1C is < 7)   BP Readings from Last 3 Encounters:   06/30/21 116/63   06/17/21 129/79   05/22/21 (!) 157/81          (goal 120/80)  BUN   Date Value Ref Range Status   06/30/2021 16 6 - 20 mg/dL Final     CREATININE   Date Value Ref Range Status   06/30/2021 0.73 0.50 - 0.90 mg/dL Final     Potassium   Date Value Ref Range Status   06/30/2021 4.2 3.7 - 5.3 mmol/L Final

## 2021-07-17 ENCOUNTER — PATIENT MESSAGE (OUTPATIENT)
Dept: FAMILY MEDICINE CLINIC | Age: 41
End: 2021-07-17

## 2021-07-17 DIAGNOSIS — G89.29 CHRONIC NECK PAIN WITH ABNORMAL NEUROLOGIC EXAMINATION: ICD-10-CM

## 2021-07-17 DIAGNOSIS — M54.2 CHRONIC NECK PAIN WITH ABNORMAL NEUROLOGIC EXAMINATION: ICD-10-CM

## 2021-07-17 DIAGNOSIS — J44.9 CHRONIC OBSTRUCTIVE PULMONARY DISEASE, UNSPECIFIED COPD TYPE (HCC): ICD-10-CM

## 2021-07-17 DIAGNOSIS — M48.02 SPINAL STENOSIS IN CERVICAL REGION: ICD-10-CM

## 2021-07-19 RX ORDER — PREGABALIN 100 MG/1
CAPSULE ORAL
Qty: 90 CAPSULE | Refills: 0 | Status: SHIPPED | OUTPATIENT
Start: 2021-07-19 | End: 2022-01-20

## 2021-07-19 RX ORDER — IPRATROPIUM/ALBUTEROL SULFATE 20-100 MCG
1 MIST INHALER (GRAM) INHALATION 4 TIMES DAILY
Qty: 1 INHALER | Refills: 0 | Status: SHIPPED | OUTPATIENT
Start: 2021-07-19 | End: 2021-09-22 | Stop reason: SDUPTHER

## 2021-07-19 RX ORDER — IPRATROPIUM/ALBUTEROL SULFATE 20-100 MCG
1 MIST INHALER (GRAM) INHALATION 4 TIMES DAILY
COMMUNITY
Start: 2021-06-21 | End: 2021-07-19 | Stop reason: SDUPTHER

## 2021-07-19 NOTE — TELEPHONE ENCOUNTER
From: Tamera Fagan  To: KATIE Warren - CNP  Sent: 7/17/2021 11:46 PM EDT  Subject: Prescription Question    I need a refill of my combivent rispamat inhaler and the breo please

## 2021-07-30 ENCOUNTER — HOSPITAL ENCOUNTER (EMERGENCY)
Age: 41
Discharge: HOME | End: 2021-07-30
Attending: EMERGENCY MEDICINE
Payer: COMMERCIAL

## 2021-07-30 ENCOUNTER — APPOINTMENT (OUTPATIENT)
Dept: GENERAL RADIOLOGY | Age: 41
End: 2021-07-30
Payer: COMMERCIAL

## 2021-07-30 VITALS
DIASTOLIC BLOOD PRESSURE: 72 MMHG | TEMPERATURE: 97.1 F | OXYGEN SATURATION: 95 % | RESPIRATION RATE: 16 BRPM | SYSTOLIC BLOOD PRESSURE: 160 MMHG | HEART RATE: 84 BPM

## 2021-07-30 DIAGNOSIS — M79.672 LEFT FOOT PAIN: Primary | ICD-10-CM

## 2021-07-30 PROCEDURE — 73630 X-RAY EXAM OF FOOT: CPT

## 2021-07-30 PROCEDURE — 99283 EMERGENCY DEPT VISIT LOW MDM: CPT

## 2021-07-30 PROCEDURE — 73610 X-RAY EXAM OF ANKLE: CPT

## 2021-07-30 ASSESSMENT — PAIN SCALES - GENERAL
PAINLEVEL_OUTOF10: 10
PAINLEVEL_OUTOF10: 10

## 2021-07-30 NOTE — ED PROVIDER NOTES
[address] [facility] ED  eMERGENCY dEPARTMENT eNCOUnter      Pt Name: Tamera Fagan  MRN: [id number]  [place] 1980  Date of evaluation: 7/30/2021  Provider: Randi Hilliard, [facility]       Chief Complaint   Patient presents with    Foot Pain     Left foot; denies injury; reports that she stands for prolonged periods of time while at work; PMS intact. HISTORY OF PRESENT ILLNESS  (Location/Symptom, Timing/Onset, Context/Setting, Quality, Duration, Modifying Factors, Severity.)   Tamera Fagan is a 46 y.o. female who presents to the emergency department with complaints of left foot and ankle pain x several weeks. Pt denies injury. 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 Currently, pain is over lateral side of foot and ankle. Pt also has some pain in her arch. Denies calf pain or swelling. No numbness. She is ambulatory with limp. Has not had it evaluated. States motrin and tylenol is not helping. No other complaints. Nursing Notes were reviewed. REVIEW OF SYSTEMS    (2-9 systems for level 4, 10 or more for level 5)     Review of Systems   Ankle pain  Foot pain      Except as noted above the remainder of the review of systems was reviewed and negative.        PAST MEDICAL HISTORY     Past Medical History:   Diagnosis Date    Anxiety     Arthritis     OA    Asthma     Sadler's esophagus     LONG SEGMENT    Bipolar 1 disorder ([facility] [address]     CAD (coronary artery disease)     Chronic insomnia     COPD (chronic obstructive pulmonary disease) (HCC)     Depression     and bipolar    Diabetes mellitus ([facility])     prediabetic    Endometriosis 3/9/2020    Esophagitis     severe    GERD (gastroesophageal reflux disease)     Headache(784.0)     Herniated disc, cervical     Hiatal hernia     Incisional abscess 1/15/2019    Kidney stones     MDRO (multiple drug resistant organisms) resistance     mrsa    Pleurisy     hx of \"years ago\"    Pneumonia     past penumonia    Seizures ([facility] [address]     2016 last seizure-focal seizure    UTI (urinary tract infection)     Vision abnormalities     wears glasses     None otherwise stated in nurses notes    SURGICAL HISTORY       Past Surgical History:   Procedure Laterality Date    CERVICAL [address] SURGERY      x2     SECTION  , 2018    x 2     SECTION N/A 2018     SECTION performed by Deshawn Villarreal MD at [facility]    COLONOSCOPY N/A 10/1/2019    COLONOSCOPY DIAGNOSTIC ABORTED performed by Sharonda Dc MD at [address] [facility] N/A 2020    COLONOSCOPY WITH BIOPSY performed by Sharonda Dc MD at [address] [place] [address], COLON, DIAGNOSTIC      HYSTERECTOMY N/A 2020    HYSTERECTOMY ABDOMINAL LAPAROSCOPIC ROBOTIC XI W/ CYSTOSCOPY performed by Deshawn Villarreal MD at [address] ARTHROSCOPY Left 5/20/15    KNEE SURGERY Left     x3    LAPAROSCOPY      dr Cindy Davis N/A 10/5/2017    LAPAROSCOPIC REMOVAL INTRA ABDOMINAL LIPOMA LOWER RIGHT performed by Vera Tovar DO at [address] ESOPHAGOGASTRODUODENOSCOPY TRANSORAL DIAGNOSTIC N/A 3/2/2017    EGD ESOPHAGOGASTRODUODENOSCOPY  IP 2055 performed by Benjamin Carpenter MD at [facility]  2016    severe esophagitis    UPPER GASTROINTESTINAL ENDOSCOPY  2017    barretts; hiatus hernia; gastritis    UPPER GASTROINTESTINAL ENDOSCOPY  2017    long seg mullins's with linear erosions, esophagitis, small hiatal hernia    UPPER GASTROINTESTINAL ENDOSCOPY N/A 2018    MULLINS'S    UPPER GASTROINTESTINAL ENDOSCOPY N/A 10/1/2019    EGD BIOPSY performed by Sharonda Dc MD at [address]       None otherwise stated in nurses notes    CURRENT MEDICATIONS       Previous Medications    ACETAMINOPHEN (TYLENOL) 500 MG TABLET    Take 500 mg by mouth every 6 hours as needed for Pain    ALCOHOL SWABS (HM STERILE ALCOHOL PREP) PADS    USE AS DIRECTED    BLOOD GLUCOSE TEST STRIPS (ONETOUCH VERIO) STRIP    USE TO TEST BLOOD SUGAR TWICE A DAY    COMBIVENT RESPIMAT  MCG/ACT AERS INHALER    Inhale 1 puff into the lungs 4 times daily Inhale 1 puff into the lungs 4 times daily    DICYCLOMINE (BENTYL) 10 MG CAPSULE    TAKE 1 CAPSULE BY MOUTH 4 TIMES DAILY    FLUTICASONE (FLONASE) 50 MCG/ACT NASAL SPRAY    1 spray by Each Nostril route daily    FLUTICASONE FUROATE-VILANTEROL (BREO ELLIPTA) 200-25 MCG/INH AEPB INHALER    Inhale 1 puff into the lungs daily    GLUCOSE MONITORING KIT (FREESTYLE) MONITORING KIT    Use as directed. BRAND OF CHOICE INSURANCE ALLOWS. IPRATROPIUM-ALBUTEROL (DUONEB) 0.5-2.5 (3) MG/3ML SOLN NEBULIZER SOLUTION    INHALE CONTENTS OF 1 UNIT DOSE VIA NEBULIZER EVERY 4 HOURS    LAMOTRIGINE (LAMICTAL) 150 MG TABLET    Take 1 tablet by mouth nightly    LANCETS (ONETOUCH DELICA PLUS [id number]) MISC    USE TO TEST BLOOD SUGAR TWICE A DAY    LINZESS 290 MCG CAPS CAPSULE    TAKE 1 CAPSULE BY MOUTH EVERY MORNING (BEFORE BREAKFAST)    LORATADINE (CLARITIN) 10 MG TABLET    TAKE 1 TABLET BY MOUTH DAILY    MASKS (CLEVER CHOICE FACE MASK) MISC    USE NEW FACE MASK EVERYDAY WHEN PATIENT GO OUTSIDE OF HOME DUE TO COVID PANDEMIC    METHOCARBAMOL (ROBAXIN) 750 MG TABLET    TAKE 1 TABLET BY MOUTH EVERY 6-8 HOURS AS NEEDED (NECK SPASM/PAIN)    MISC.  DEVICES (PLASTIC BED PAN) MISC    Use daily with hot water    MONTELUKAST (SINGULAIR) 10 MG TABLET    TAKE 1 TABLET IN THE EVENING ONCE A DAY ORALLY 90 DAYS    ONDANSETRON (ZOFRAN ODT) 4 MG DISINTEGRATING TABLET    Take 1 tablet by mouth every 8 hours as needed for Nausea    PANTOPRAZOLE (PROTONIX) 40 MG TABLET    1 tab daily    PREGABALIN (LYRICA) 100 MG CAPSULE    Take 1 tid    PROMETHAZINE (PHENERGAN) 6.25 MG/5ML SYRUP    Take 10 mLs by mouth 4 times daily as needed for Nausea (diarrhea)    SERTRALINE (ZOLOFT) 50 MG TABLET    Take 1 tablet by mouth daily    VITAMIN D3 (CHOLECALCIFEROL) 25 MCG (1000 UT) TABS TABLET    TAKE 1 TABLET BY MOUTH DAILY    ZINC 50 MG CAPS    Take 1 daily       ALLERGIES     Nsaids, Toradol [ketorolac tromethamine], and Ultram [tramadol]    FAMILY HISTORY           Problem Relation Age of Onset    Cancer Mother         breast    Bipolar Disorder Mother     Hypertension Mother    Gretchen Me Breast Cancer Mother     Bipolar Disorder Brother     Hypertension Father      Family Status   Relation Name Status    Mother  (Not Specified)    Brother  (Not Specified)    Father  (Not Specified)      None otherwise stated in nurses notes    SOCIAL HISTORY      reports that she has been smoking cigarettes. She has a 10.50 pack-year smoking history. She has never used smokeless tobacco. She reports that she does not drink alcohol and does not use drugs. lives at home with others     PHYSICAL EXAM    (up to 7 for level 4, 8 or more for level 5)     ED Triage Vitals [07/30/21 1604]   BP Temp Temp src Pulse Resp SpO2 Height Weight   (!) 160/72 -- -- -- -- 95 % -- --       Physical Exam   Nursing note and vitals reviewed. Constitutional: well-developed, well-nourished, nontoxic, well appearing, not distressed  HEENT:  normocephalic atraumatic, external ears normal appearance, no nasal deformity, no neck masses or edema, patient protecting airway, no stridor, phonating well  Eyes: pupils equal, sclera non-icteric, no discharge  Cardiovascular: no JVD  Respiratory: non-labored breathing, effort normal, no accessory muscle use visulized, no audible wheezing  Gastrointestinal: Abdomen not distended  Musculoskeletal: Examination of left leg reveals no visibile deformities, bruising, swelling, abrasions. Tenderness over the lateral foot, ankle and plantar fascia. Achilles intact. FROM. 5/5 strength. Brisk cap refill. Distal sensation intact. 2/2 dp and pt pulses.    Skin: no pallor, no rashes visible  Neuro: alert and oriented times 3, GCS 15, normal coordination, no dysarthria or aphasia  Psych: normal mood and affect, cooperative, normal thought content              DIAGNOSTIC RESULTS     EKG: All EKG's are interpreted by the Emergency Department Physician who either signs or Co-signs this chart in the absence of a cardiologist.        RADIOLOGY:   All plain film, CT, MRI, and formal ultrasound images (except ED bedside ultrasound) are read by the radiologist, see reports below, unless otherwise noted in MDM or here. XR FOOT LEFT (MIN 3 VIEWS)   Preliminary Result   No acute osseous abnormality. XR ANKLE LEFT (MIN 3 VIEWS)   Final Result   No acute osseous or soft tissue abnormality. CT ABDOMEN PELVIS W IV CONTRAST Additional Contrast? None    Result Date: 6/30/2021  EXAMINATION: CT OF THE ABDOMEN AND PELVIS WITH CONTRAST 6/30/2021 4:11 pm TECHNIQUE: CT of the abdomen and pelvis was performed with the administration of intravenous contrast. Multiplanar reformatted images are provided for review. Dose modulation, iterative reconstruction, and/or weight based adjustment of the mA/kV was utilized to reduce the radiation dose to as low as reasonably achievable. COMPARISON: CT abdomen and pelvis dated 05/22/2021 HISTORY: ORDERING SYSTEM PROVIDED HISTORY: diffuse abdominal pain, left flank pain TECHNOLOGIST PROVIDED HISTORY: diffuse abdominal pain, left flank pain Decision Support Exception - unselect if not a suspected or confirmed emergency medical condition->Emergency Medical Condition (MA) Reason for Exam: diffuse abdominal pain, left flank pain Acuity: Acute Type of Exam: Initial Additional signs and symptoms: Pt c/o nausea, vomiting, stomach pian, diarrhea, and left middle back pain since last night Relevant Medical/Surgical History: No hx diabetes or cancer; Hx c-sections, laparascopic surgery FINDINGS: Lower Chest:  Visualized portion of the lower chest demonstrates no acute abnormality. Organs:  The liver, spleen, pancreas, and adrenal glands are unremarkable. Gallbladder is unremarkable. There is symmetric enhancement of the kidneys. No hydronephrosis or perinephric inflammation. There is a 0.5 cm calculus in the mid left kidney. No evidence of ureteral calculus. GI/Bowel: There is no abnormal bowel distention or pericolonic inflammation. No free intraperitoneal air or fluid. Appendix is normal. Pelvis: Urinary bladder is unremarkable. Uterus is surgically absent. Ovaries appear to be retained and are grossly unremarkable. No free fluid in the pelvis. No pelvic lymphadenopathy. Peritoneum/Retroperitoneum: The abdominal aorta is normal in caliber. There is no retroperitoneal or mesenteric lymphadenopathy. Bones/Soft Tissues: There is no acute or suspicious osseous abnormality. Visualized superficial soft tissues are within normal limits. 1.  No acute process in the abdomen or pelvis. 2.  Left nephrolithiasis. LABS:  Labs Reviewed - No data to display    All other labs were within normal range or not returned as of this dictation. EMERGENCY DEPARTMENT COURSE and DIFFERENTIAL DIAGNOSIS/MDM:   Vitals:    Vitals:    07/30/21 1604   BP: (!) 160/72   Pulse: 84   Resp: 16   Temp: 97.1 °F (36.2 °C)   SpO2: 95%         Patient instructed to return to the emergency room if symptoms worsen, return, or any other concern right away which is agreed by the patient    ED MEDS:  No orders of the defined types were placed in this encounter. CONSULTS:  None    PROCEDURES:  None      FINAL IMPRESSION      1.  Left foot pain          DISPOSITION/PLAN   DISPOSITION Decision To Discharge    PATIENT REFERRED TO:  KATIE Irizarry CNP 72  [address]  [id number] [place] [address]  [phone]    Call         DISCHARGE MEDICATIONS:  New Prescriptions    No medications on file         Summation      Patient Course:      Left foot and ankle pain x several weeks. No known injury. Pain over medial and lateral side of ankle and plantar aspect. Imaging unremarkable. No signs of gout,  Infection, dvt. Will refer to podiatry. Discussed results and plan with the pt. They expressed appropriate understanding. Pt given close follow up, supportive care instructions and strict return instructions at the bedside. ED Medications administered this visit:  Medications - No data to display    New Prescriptions from this visit:    New Prescriptions    No medications on file       Follow-up:  Satinder Ayala, [address] [facility] [address]  [address]  [address] [facility] [address]  [address] [facility] [address]  [address] [facility], [address] [facility]  [address] [place] [address]  [phone]    Call           Final Impression:   1.  Left foot pain               (Please note that portions of this note were completed with a voice recognition program )        Nando Earl. Molly [address], PA-C  07/30/21 8629

## 2021-07-31 NOTE — ED PROVIDER NOTES
[address]  eMERGENCY dEPARTMENT eNCOUnter   Independent Attestation     Pt Name: Diana Lopez  MRN: [id number]  [place] 1980  Date of evaluation: 7/30/21   Diana Lopez is a 46 y.o. female who presents with Foot Pain (Left foot; denies injury; reports that she stands for prolonged periods of time while at work; PMS intact.)    Vitals:   Vitals:    07/30/21 1604   BP: (!) 160/72   Pulse: 84   Resp: 16   Temp: 97.1 °F (36.2 °C)   SpO2: 95%     Impression:   1. Left foot pain      I was personally available for consultation in the Emergency Department. I have reviewed the chart and agree with the documentation as recorded by the [facility], including the assessment, treatment plan and disposition.   Deandra Card MD  Attending Emergency  Physician                  Deandra Card MD  07/31/21 3221

## 2021-08-02 ENCOUNTER — TELEPHONE (OUTPATIENT)
Dept: FAMILY MEDICINE CLINIC | Age: 41
End: 2021-08-02

## 2021-08-02 NOTE — TELEPHONE ENCOUNTER
[facility] ([facility] ED Follow up Call    Reason for ED visit:  Left foot pain     8/2/2021   VOICEMAIL DOCUMENTATION - ERASE IF NOT USED  Hi, this message is for Niurka. This is Coy Melendez MA from [address] [place] [address] office. Just calling to see how you are doing after your recent visit to the Emergency Room. [address] [place] [address] wants to make sure you were able to fill any prescriptions and that you understand your discharge instructions. Please return our call if you need to make a follow up appointment with your provider or have any further needs. Our phone number is [phone]. Have a great day.

## 2021-08-05 ENCOUNTER — HOSPITAL ENCOUNTER (EMERGENCY)
Age: 41
Discharge: HOME | End: 2021-08-05
Attending: STUDENT IN AN ORGANIZED HEALTH CARE EDUCATION/TRAINING PROGRAM
Payer: COMMERCIAL

## 2021-08-05 ENCOUNTER — APPOINTMENT (OUTPATIENT)
Dept: CT IMAGING | Age: 41
End: 2021-08-05
Payer: COMMERCIAL

## 2021-08-05 VITALS
RESPIRATION RATE: 14 BRPM | TEMPERATURE: 97 F | WEIGHT: 220 LBS | BODY MASS INDEX: 40.48 KG/M2 | SYSTOLIC BLOOD PRESSURE: 138 MMHG | HEART RATE: 92 BPM | DIASTOLIC BLOOD PRESSURE: 81 MMHG | OXYGEN SATURATION: 97 % | HEIGHT: 62 IN
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
## 2021-08-05 PROCEDURE — 70486 CT MAXILLOFACIAL W/O DYE: CPT

## 2021-08-05 PROCEDURE — 99283 EMERGENCY DEPT VISIT LOW MDM: CPT

## 2021-08-05 PROCEDURE — 6370000000 HC RX 637 (ALT 250 FOR IP): Performed by: STUDENT IN AN ORGANIZED HEALTH CARE EDUCATION/TRAINING PROGRAM

## 2021-08-05 RX ORDER — ACETAMINOPHEN 500 MG
1000 TABLET ORAL ONCE
Status: COMPLETED | OUTPATIENT
Start: 2021-08-05 | End: 2021-08-05

## 2021-08-05 RX ADMIN — ACETAMINOPHEN 1000 MG: 500 TABLET ORAL at 19:39

## 2021-08-05 ASSESSMENT — ENCOUNTER SYMPTOMS
PHOTOPHOBIA: 0
FACIAL SWELLING: 0
VOMITING: 0
DIARRHEA: 0
ABDOMINAL PAIN: 0
EYE ITCHING: 0
SHORTNESS OF BREATH: 0
COUGH: 0
COLOR CHANGE: 0
RHINORRHEA: 0
NAUSEA: 0

## 2021-08-05 ASSESSMENT — PAIN SCALES - GENERAL: PAINLEVEL_OUTOF10: 9

## 2021-08-06 ENCOUNTER — TELEPHONE (OUTPATIENT)
Dept: FAMILY MEDICINE CLINIC | Age: 41
End: 2021-08-06
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
## 2021-08-16 DIAGNOSIS — G89.29 CHRONIC NECK PAIN WITH ABNORMAL NEUROLOGIC EXAMINATION: ICD-10-CM

## 2021-08-16 DIAGNOSIS — M54.2 CHRONIC NECK PAIN WITH ABNORMAL NEUROLOGIC EXAMINATION: ICD-10-CM

## 2021-08-16 DIAGNOSIS — M48.02 SPINAL STENOSIS IN CERVICAL REGION: ICD-10-CM

## 2021-08-16 DIAGNOSIS — M48.02 SPINAL STENOSIS IN CERVICAL REGION: Primary | ICD-10-CM

## 2021-08-16 DIAGNOSIS — J44.9 CHRONIC OBSTRUCTIVE PULMONARY DISEASE, UNSPECIFIED COPD TYPE (HCC): ICD-10-CM

## 2021-08-16 RX ORDER — PREGABALIN 100 MG/1
CAPSULE ORAL
Qty: 90 CAPSULE | Refills: 0 | OUTPATIENT
Start: 2021-08-16 | End: 2021-09-12

## 2021-08-16 RX ORDER — MONTELUKAST SODIUM 10 MG/1
TABLET ORAL
Qty: 90 TABLET | Refills: 0 | Status: SHIPPED | OUTPATIENT
Start: 2021-08-16 | End: 2021-08-30

## 2021-08-27 DIAGNOSIS — E55.9 VITAMIN D DEFICIENCY: ICD-10-CM

## 2021-08-27 DIAGNOSIS — K59.1 FUNCTIONAL DIARRHEA: ICD-10-CM

## 2021-08-27 DIAGNOSIS — J44.9 COPD WITH ASTHMA (HCC): ICD-10-CM

## 2021-08-27 RX ORDER — DICYCLOMINE HYDROCHLORIDE 10 MG/1
10 CAPSULE ORAL 4 TIMES DAILY
Qty: 180 CAPSULE | Refills: 1 | Status: SHIPPED | OUTPATIENT
Start: 2021-08-27

## 2021-08-27 RX ORDER — IPRATROPIUM BROMIDE AND ALBUTEROL SULFATE 2.5; .5 MG/3ML; MG/3ML
SOLUTION RESPIRATORY (INHALATION)
Qty: 360 ML | Refills: 2 | Status: SHIPPED | OUTPATIENT
Start: 2021-08-27 | End: 2021-09-29 | Stop reason: ALTCHOICE

## 2021-08-27 RX ORDER — MELATONIN
Qty: 30 TABLET | Refills: 3 | Status: SHIPPED | OUTPATIENT
Start: 2021-08-27 | End: 2021-11-01

## 2021-08-27 RX ORDER — PROMETHAZINE HYDROCHLORIDE 6.25 MG/5ML
12.5 SYRUP ORAL 4 TIMES DAILY PRN
Qty: 120 ML | Refills: 2 | Status: SHIPPED | OUTPATIENT
Start: 2021-08-27 | End: 2021-10-03 | Stop reason: SDUPTHER

## 2021-08-27 NOTE — TELEPHONE ENCOUNTER
Please Approve or Refuse.   Send to Pharmacy per Pt's Request:      Next Visit Date:  Visit date not found   Last Visit Date: 5/27/2021    Hemoglobin A1C (%)   Date Value   06/10/2020 5.7   05/21/2017 5.3   04/10/2017 5.8             ( goal A1C is < 7)   BP Readings from Last 3 Encounters:   08/05/21 138/81   07/30/21 (!) 160/72   06/30/21 116/63          (goal 120/80)  BUN   Date Value Ref Range Status   06/30/2021 16 6 - 20 mg/dL Final     CREATININE   Date Value Ref Range Status   06/30/2021 0.73 0.50 - 0.90 mg/dL Final     Potassium   Date Value Ref Range Status   06/30/2021 4.2 3.7 - 5.3 mmol/L Final

## 2021-08-30 RX ORDER — MONTELUKAST SODIUM 10 MG/1
TABLET ORAL
Qty: 90 TABLET | Refills: 0 | Status: SHIPPED | OUTPATIENT
Start: 2021-08-30 | End: 2021-11-01

## 2021-09-02 ENCOUNTER — HOSPITAL ENCOUNTER (EMERGENCY)
Age: 41
Discharge: LEFT BEFORE DISCHARGE | End: 2021-09-02
Payer: COMMERCIAL

## 2021-09-02 ENCOUNTER — HOSPITAL ENCOUNTER (EMERGENCY)
Age: 41
Discharge: LEFT BEFORE DISCHARGE | End: 2021-09-02
Payer: COMMERCIAL

## 2021-09-02 VITALS
RESPIRATION RATE: 20 BRPM | WEIGHT: 220 LBS | BODY MASS INDEX: 40.48 KG/M2 | DIASTOLIC BLOOD PRESSURE: 88 MMHG | SYSTOLIC BLOOD PRESSURE: 136 MMHG | HEIGHT: 62 IN | OXYGEN SATURATION: 97 % | TEMPERATURE: 97.3 F | HEART RATE: 90 BPM

## 2021-09-03 ENCOUNTER — TELEPHONE (OUTPATIENT)
Dept: FAMILY MEDICINE CLINIC | Age: 41
End: 2021-09-03

## 2021-09-03 NOTE — TELEPHONE ENCOUNTER
[facility] ([facility]) ED Follow up Call            FU appts/Provider:    No future appointments. VOICEMAIL DOCUMENTATION - ERASE IF NOT USED  Hi, this message is for Cooper Fox  This is Varsha from [email or web address] [facility] office. Just calling to see how you are doing after your recent visit to the Emergency Room. [facility] to make sure you were able to fill any prescriptions and that you understand your discharge instructions. Please return our call if you need to make a follow up appointment with your provider or have any further needs. Our phone number is [phone]. Have a great day.

## 2021-09-13 ENCOUNTER — HOSPITAL ENCOUNTER (EMERGENCY)
Age: 41
Discharge: LEFT BEFORE DISCHARGE | End: 2021-09-13
Payer: COMMERCIAL

## 2021-09-13 VITALS
OXYGEN SATURATION: 97 % | HEART RATE: 108 BPM | DIASTOLIC BLOOD PRESSURE: 95 MMHG | RESPIRATION RATE: 25 BRPM | BODY MASS INDEX: 41.41 KG/M2 | SYSTOLIC BLOOD PRESSURE: 127 MMHG | WEIGHT: 225 LBS | HEIGHT: 62 IN | TEMPERATURE: 98.2 F

## 2021-09-13 DIAGNOSIS — G89.29 CHRONIC NECK PAIN WITH ABNORMAL NEUROLOGIC EXAMINATION: ICD-10-CM

## 2021-09-13 DIAGNOSIS — M48.02 SPINAL STENOSIS IN CERVICAL REGION: ICD-10-CM

## 2021-09-13 DIAGNOSIS — M54.2 CHRONIC NECK PAIN WITH ABNORMAL NEUROLOGIC EXAMINATION: ICD-10-CM

## 2021-09-13 PROCEDURE — 93005 ELECTROCARDIOGRAM TRACING: CPT

## 2021-09-13 RX ORDER — PREGABALIN 100 MG/1
CAPSULE ORAL
Qty: 90 CAPSULE | OUTPATIENT
Start: 2021-09-13

## 2021-09-13 ASSESSMENT — PAIN SCALES - GENERAL: PAINLEVEL_OUTOF10: 8

## 2021-09-13 ASSESSMENT — PAIN DESCRIPTION - LOCATION: LOCATION: CHEST

## 2021-09-13 ASSESSMENT — PAIN DESCRIPTION - PAIN TYPE: TYPE: ACUTE PAIN

## 2021-09-13 NOTE — ED TRIAGE NOTES
Mode of arrival (squad #, walk in, police, etc) : Walk In        Chief complaint(s): Chest pain, shortness of breath        Arrival Note (brief scenario, treatment PTA, etc). : Pt arrives to ED c/o chest pain and shortness of breath. Patient states she has had her symptoms for the last four days. Patient reports a history of asthma. C= \"Have you ever felt that you should Cut down on your drinking? \"  No  A= \"Have people Annoyed you by criticizing your drinking? \"  No  G= \"Have you ever felt bad or Guilty about your drinking? \"  No  E= \"Have you ever had a drink as an Eye-opener first thing in the morning to steady your nerves or to help a hangover? \"  No      Deferred []      Reason for deferring: N/A    *If yes to two or more: probable alcohol abuse. *

## 2021-09-16 LAB
EKG ATRIAL RATE: 119 BPM
EKG P AXIS: 52 DEGREES
EKG P-R INTERVAL: 124 MS
EKG Q-T INTERVAL: 306 MS
EKG QRS DURATION: 84 MS
EKG QTC CALCULATION (BAZETT): 430 MS
EKG R AXIS: 60 DEGREES
EKG T AXIS: 38 DEGREES
EKG VENTRICULAR RATE: 119 BPM

## 2021-09-22 DIAGNOSIS — M54.2 CHRONIC NECK PAIN WITH ABNORMAL NEUROLOGIC EXAMINATION: ICD-10-CM

## 2021-09-22 DIAGNOSIS — M48.02 SPINAL STENOSIS IN CERVICAL REGION: ICD-10-CM

## 2021-09-22 DIAGNOSIS — G89.29 CHRONIC NECK PAIN WITH ABNORMAL NEUROLOGIC EXAMINATION: ICD-10-CM

## 2021-09-22 RX ORDER — PREGABALIN 100 MG/1
CAPSULE ORAL
Qty: 90 CAPSULE | Refills: 0 | OUTPATIENT
Start: 2021-09-22 | End: 2021-10-19

## 2021-09-22 RX ORDER — IPRATROPIUM/ALBUTEROL SULFATE 20-100 MCG
1 MIST INHALER (GRAM) INHALATION 4 TIMES DAILY
Qty: 4 G | Refills: 3 | Status: SHIPPED | OUTPATIENT
Start: 2021-09-22 | End: 2022-01-20 | Stop reason: SDUPTHER

## 2021-09-27 NOTE — PROGRESS NOTES
[facility] at [address] [place] [address]   O: [address] Elizabeth Ramírez is a 46 y.o. female evaluated via telephone on 9/30/2021. CONSENT:  She and/or health care decision maker is aware that that she may receive a bill for this telephone service, depending on her insurance coverage, and has provided verbal consent to proceed: Yes    DOCUMENTATION:  Patient scheduled this appointment today due to Discuss Medications and Immunizations (DID RECIEVE 1 ST COVID-19 VACCINE )    URI  Patient reported some worsening right ear pain, sore throat, and chills. Patient reported that her daughter got her sick with similar symptoms and was told to have a viral infection per the daughters provider. Patient also reported some occasional Vomiting, however, she does have a known history of migraine headaches which is associated with vomiting. She denies any fever but complains of chills. CERVICAL RADICULOPATHY  Patient reports chronic neck pain. She had several surgeries due to a bulging disc. She had an C3-4 ACDF and C5-6 ACDF. Her first surgery was done by Dr. Benito Ferreira. Second surgery was done by rafael [address] [place] [address]. Patient was also discharged from that office for no-shows. Patient reports pain on her neck as constant. She describes the pain as pressure, stiff, and achy. Patient denies any specific aggravating factors but most activities. .  She had been on different types of pain medications. She is not able to take some NSAIDs due to her Sadler's esophagus. Therefore, she is been taking Tylenol with no relief. She also reported some weakness to her right arm after the second surgery. She reports some mild numbness as well. She is on Lyrica 3 times a day she does well with this therapy she denies any adverse reaction to this. However, patient have not been following up every 3 months like we discussed.   Also she needs to do a drug screen that was sent few months ago but has not done yet. Therefore, we will not be providing this prescription for her today until we see the results. Patient was also referred to another neurosurgeon who he saw once and never followed up patient is encouraged to follow-up with him. She is established with Nallely rEvin. PREDIABETES  Patient's recent hemoglobin A1c below. Patient admits to genetics, poor eating habits, health problems and inactivity. Patient denies any polyphagia, polydipsia, polyuria. We discussed the importance lifestyle changes such as cutting down food/drinks high in carbohydrates and regular exercise to avoid any progression on this condition. We are going to continue to monitor the [facility]. Treatment diet control. Lab Results   Component Value Date    LABA1C 5.7 06/10/2020    LABA1C 5.3 05/21/2017      DEPRESSION AND ANXIETY  Niurka CASE Manzanaresshiela reported some ongoing issues with depression and anxiety. Symptoms includes difficulty concentrating, feelings of losing control, insomnia, irritable and depressed mood, insomnia and hypersomnia. Current therapy includes Lamictal,, which is working well for her. she denies adverse reaction to current therapy. she also denies suicidal/homicidal ideation, plan or intent. Patient being seen by Antonio Renee. PHQ-2 Over the past 2 weeks, how often have you been bothered by any of the following problems? Little interest or pleasure in doing things: Not at all  Feeling down, depressed, or hopeless: Not at all  PHQ-2 Score: 0  PHQ-9 Over the past 2 weeks, how often have you been bothered by any of the following problems? PHQ-9 Total Score: 0   No flowsheet data found. PHQ-9 Total Score: 0 (9/29/2021 11:33 AM)    I communicated with the patient and/or health care decision maker about: PLAN     Review of Systems  Details of this discussion including any medical advice provided: Under assessment.     PHYSICAL EXAM[INSTRUCTIONS:  \"[x]\" Indicates a positive item  \"[]\" Indicates a negative item  -- DELETE ALL ITEMS NOT EXAMINED]  Patient-Reported Vitals 9/29/2021   Patient-Reported Weight 210 LB   Patient-Reported Height 5 2   Patient-Reported Systolic -   Patient-Reported Diastolic -     Constitutional: [x] No apparent distress      [] Abnormal -   Mental status: [x] Alert and awake  [x] Oriented to person/place/time[] Abnormal -   Pulmonary/Chest: [x] Respiratory effort normal         [] Abnormal -          Psychiatric:       [x] Normal Affect [] Abnormal -        [x] No Hallucinations  Other pertinent observable physical exam findings:-moderately anxious with pressured speech  Controlled Substance Monitoring:  Acute and Chronic Pain Monitoring:   RX Monitoring 9/27/2021   Attestation -   Periodic Controlled Substance Monitoring No signs of potential drug abuse or diversion identified. ASSESSMENT  1. Viral URI  Worsening  DISCUSSED AND ADVISED TO:  Rest.  Advised to increase fluid intake. Eat more fruits and vegetables. Take medications as discussed. Report for worsening symptoms. - methylPREDNISolone (MEDROL DOSEPACK) 4 MG tablet; Take by mouth. Dispense: 1 kit; Refill: 0    2. Chronic neck pain with abnormal neurologic examination  Failure to Improve  Continue current therapy. DISCUSSED AND ADVISED TO:  Use heat packs 15 to 20 mins every 2-3 hours. Do some back stretches as tolerated. Refer to hand out for instructions. Call for worsening, numbness, weakness. 3. Spinal stenosis in cervical region  Failure to Improve  Courage to follow-up with the neurosurgeon  - DRUG SCREEN, PAIN; Future    4. Chronic pain syndrome   Failure to Improve  Hold Lyrica until drug screen is clear  Advised to follow-up every 3 months  - DRUG SCREEN, PAIN; Future    5. Bipolar affective disorder, currently depressed, moderate (HCC)  Stable  Continue current therapy. DISCUSSED and ADVISED TO:  Not stopping medication suddenly.   See the specialist as discussed. Report for feelings of SI, HI, and hallucinations. Go to the ER for increasing urge to hurt yourself. - DRUG SCREEN, PAIN; Future    6. Prediabetes  Stable  DISCUSSED and ADVISED TO:  Decrease carbohydrates, sugary drinks, desserts   Exercise regularly, as tolerated. Try to lose weight.    - Hemoglobin A1C; Future    7. History of cervical discectomy  Historical  Hold Lyrica until drug screen is cleared  - DRUG SCREEN, PAIN; Future    8. Hyperglycemia  Recommended    - Hemoglobin A1C; Future    9. Need for varicella vaccine  Recommended    - Varicella Zoster Antibody, IgG; Future    Total Time: minutes: 21-30 minutes  I affirm this is a Patient Initiated Episode with a Patient who has not had a related appointment within my department in the past 7 days or scheduled within the next 24 hours.   Patient identification was verified at the start of the visit: Yes  Note: not billable if this call serves to triage the patient into an appointment for the relevant concern  Patient-Reported Vitals 2021   Patient-Reported Weight 210 LB   Patient-Reported Height 5 2   Patient-Reported Systolic -   Patient-Reported Diastolic -     Patient Active Problem List   Diagnosis    Asthma    GERD    Iron deficiency anemia    Cervical disc herniation    Smoker    Sadler's esophagus without dysplasia    Hiatal hernia    COPD    Chronic constipation    Hx of IUFD (G2)    History of gestational hypertension    Seizure ([address]    Hx of  x2 (G3, G4)    Arthritis    Cervical radiculopathy    Migraine without aura    Spinal stenosis in cervical region    Prediabetes    Chronic pelvic pain in female    Enlarged uterus    Endometriosis    Family history of breast cancer    Bipolar affective disorder, currently depressed, moderate ([address]    Insomnia    Ulnar neuropathy    Major depressive disorder, recurrent episode ([address]    Sciatica    Moderate persistent asthma without complication    Lipoma of torso    History of cervical discectomy    Chronic neck pain with abnormal neurologic examination    Abnormal weight gain     Class 2 drug-induced obesity with serious comorbidity and body mass index (BMI) of 37.0 to 37.9 in adult    Pelvic peritoneal endometriosis    RALH with BS and cystoscopy 7/8/2020    Post-op pain    Wound infection/hemorrhage, obstetric surgical, postpartum condition    Postoperative visit    Postoperative abdominal pain    Diverticulitis    Diverticulitis of colon    Pelvic abscess in female    Pneumonia    Right otitis media    Functional diarrhea    Kidney stones     Heather Miller is a 46 y.o. female patient  being evaluated by a Virtual Visit (video visit) encounter to address concerns as mentioned above. A caregiver was present when appropriate. Due to this being a TeleHealth encounter (During [facility] public health emergency), evaluation of the following organ systems was limited:Vitals/Constitutional/EENT/Resp/CV/GI//MS/Neuro/Skin/Heme-Lymph-Imm. Services were provided through a video synchronous discussion virtually to substitute for in-person clinic visit. This is a telehealth visit that was performed with the originating site at Patient Location: home and provider Location of [place], [place]. Pursuant to the emergency declaration under the [facility] [address] authority and the [employer] and [employer] General Act, this Virtual Visit was conducted with patient's (and/or legal guardian's) consent, to reduce the patient's risk of exposure to COVID-19 and provide necessary medical care. The patient (and/or legal guardian) has also been advised to contact this office for worsening conditions or problems, and seek emergency medical treatment and/or call 911 if deemed necessary.    This note was completed by using the assistance of a speech-recognition program. However, inadvertent computerized transcription errors may be present. Although every effort was made to ensure accuracy, no guarantees can be provided that every mistake has been identified and corrected by editing.   Electronically signed by KATIE Castellanos CNP on 9/27/21 at 7:16 PM EDT

## 2021-09-29 SDOH — ECONOMIC STABILITY: FOOD INSECURITY: WITHIN THE PAST 12 MONTHS, THE FOOD YOU BOUGHT JUST DIDN'T LAST AND YOU DIDN'T HAVE MONEY TO GET MORE.: NEVER TRUE

## 2021-09-29 SDOH — ECONOMIC STABILITY: FOOD INSECURITY: WITHIN THE PAST 12 MONTHS, YOU WORRIED THAT YOUR FOOD WOULD RUN OUT BEFORE YOU GOT MONEY TO BUY MORE.: NEVER TRUE

## 2021-09-29 ASSESSMENT — PATIENT HEALTH QUESTIONNAIRE - PHQ9
2. FEELING DOWN, DEPRESSED OR HOPELESS: 0
SUM OF ALL RESPONSES TO PHQ QUESTIONS 1-9: 0
SUM OF ALL RESPONSES TO PHQ QUESTIONS 1-9: 0
SUM OF ALL RESPONSES TO PHQ9 QUESTIONS 1 & 2: 0
1. LITTLE INTEREST OR PLEASURE IN DOING THINGS: 0
SUM OF ALL RESPONSES TO PHQ QUESTIONS 1-9: 0

## 2021-09-29 ASSESSMENT — SOCIAL DETERMINANTS OF HEALTH (SDOH): HOW HARD IS IT FOR YOU TO PAY FOR THE VERY BASICS LIKE FOOD, HOUSING, MEDICAL CARE, AND HEATING?: NOT HARD AT ALL

## 2021-09-29 NOTE — PROGRESS NOTES
Visit Information    Have you changed or started any medications since your last visit including any over-the-counter medicines, vitamins, or herbal medicines? no   Have you stopped taking any of your medications? Is so, why? -  no  Are you having any side effects from any of your medications? - no    Have you seen any other physician or provider since your last visit? yes -    Have you had any other diagnostic tests since your last visit?  no   Have you been seen in the emergency room and/or had an admission in a hospital since we last saw you?  no   Have you had your routine dental cleaning in the past 6 months?  no     Do you have an active MyChart account? If no, what is the barrier?   Yes    Patient Care Team:  KATIE Warren CNP as PCP - General (Family Medicine)  KATIE Warren CNP [facility]  Cecilia Johnston MD as Consulting Physician (Gastroenterology)  Deanne Sanchez MD as Obstetrician (Perinatology)  Lana Burns MD as Consulting Physician (Obstetrics & Gynecology)    Medical History Review  Past Medical, Family, and Social History reviewed and does contribute to the patient presenting condition    Health Maintenance   Topic Date Due    Varicella vaccine (1 of 2 - 2-dose childhood series) Never done    COVID-19 Vaccine (1) Never done    A1C test (Diabetic or Prediabetic)  06/10/2021    Flu vaccine (1) 09/01/2021    Annual Wellness Visit (AWV)  02/02/2022    DTaP/Tdap/Td vaccine (2 - Td or Tdap) 09/19/2030    Pneumococcal 0-64 years Vaccine (2 of 2 - PPSV23) 05/03/2047    Hepatitis C screen  Completed    HIV screen  Completed    Hepatitis A vaccine  Aged Out    Hepatitis B vaccine  Aged Out    Hib vaccine  Aged Out    Meningococcal (ACWY) vaccine  Aged Out

## 2021-09-30 ENCOUNTER — TELEMEDICINE (OUTPATIENT)
Dept: FAMILY MEDICINE CLINIC | Age: 41
End: 2021-09-30
Payer: COMMERCIAL

## 2021-09-30 ENCOUNTER — HOSPITAL ENCOUNTER (OUTPATIENT)
Age: 41
Discharge: HOME | End: 2021-09-30
Payer: COMMERCIAL

## 2021-09-30 DIAGNOSIS — F31.32 BIPOLAR AFFECTIVE DISORDER, CURRENTLY DEPRESSED, MODERATE (HCC): ICD-10-CM

## 2021-09-30 DIAGNOSIS — R73.03 PREDIABETES: ICD-10-CM

## 2021-09-30 DIAGNOSIS — G89.29 CHRONIC NECK PAIN WITH ABNORMAL NEUROLOGIC EXAMINATION: ICD-10-CM

## 2021-09-30 DIAGNOSIS — Z98.890 HISTORY OF CERVICAL DISCECTOMY: ICD-10-CM

## 2021-09-30 DIAGNOSIS — M48.02 SPINAL STENOSIS IN CERVICAL REGION: ICD-10-CM

## 2021-09-30 DIAGNOSIS — Z23 NEED FOR VARICELLA VACCINE: ICD-10-CM

## 2021-09-30 DIAGNOSIS — R73.9 HYPERGLYCEMIA: ICD-10-CM

## 2021-09-30 DIAGNOSIS — G89.4 CHRONIC PAIN SYNDROME: ICD-10-CM

## 2021-09-30 DIAGNOSIS — M54.2 CHRONIC NECK PAIN WITH ABNORMAL NEUROLOGIC EXAMINATION: ICD-10-CM

## 2021-09-30 DIAGNOSIS — J06.9 VIRAL URI: Primary | ICD-10-CM

## 2021-09-30 PROCEDURE — G8427 DOCREV CUR MEDS BY ELIG CLIN: HCPCS | Performed by: FAMILY MEDICINE

## 2021-09-30 PROCEDURE — 36415 COLL VENOUS BLD VENIPUNCTURE: CPT

## 2021-09-30 PROCEDURE — 83036 HEMOGLOBIN GLYCOSYLATED A1C: CPT

## 2021-09-30 PROCEDURE — 86787 VARICELLA-ZOSTER ANTIBODY: CPT

## 2021-09-30 PROCEDURE — 80307 DRUG TEST PRSMV CHEM ANLYZR: CPT

## 2021-09-30 PROCEDURE — 99214 OFFICE O/P EST MOD 30 MIN: CPT | Performed by: FAMILY MEDICINE

## 2021-09-30 RX ORDER — METHYLPREDNISOLONE 4 MG/1
TABLET ORAL
Qty: 1 KIT | Refills: 0 | Status: SHIPPED | OUTPATIENT
Start: 2021-09-30 | End: 2022-01-20 | Stop reason: ALTCHOICE

## 2021-09-30 NOTE — PATIENT INSTRUCTIONS
strengthen you. You may miss moments like hearing a child laugh or seeing a friendly face when you think you're all alone. · When you're accepting, you don't  the present moment. Instead you accept your thoughts and feelings as they come. You can practice anytime, anywhere, and in any way you choose. You can practice in many ways. Here are a few ideas:  · While doing your chores, like washing the dishes, let your mind focus on what's in your hand. What does the dish feel like? Is the water warm or cold? · Go outside and take a few deep breaths. What is the air like? Is it warm or cold? · When you can, take some time at the start of your day to sit alone and think. · Take a slow walk by yourself. Count your steps while you breathe in and out. · Try yoga breathing exercises, stretches, and poses to strengthen and relax your muscles. · At work, if you can, try to stop for a few moments each hour. Note how your body feels. Let yourself regroup and let your mind settle before you return to what you were doing. · If you struggle with anxiety or \"worry thoughts,\" imagine your mind as a blue hasmukh and your worry thoughts as clouds. Now imagine those worry thoughts floating across your mind's hasmukh. Just let them pass by as you watch. Follow-up care is a key part of your treatment and safety. Be sure to make and go to all appointments, and call your doctor if you are having problems. It's also a good idea to know your test results and keep a list of the medicines you take. Where can you learn more? Go to https://Inf[employer] Pharmaceutical[employer]josephewMobly.[employer]. org and sign in to your OPPRTUNITY account. Enter V667 in the LIFE INTERACTION box to learn more about \"Learning About Mindfulness for Stress. \"     If you do not have an account, please click on the \"Sign Up Now\" link. Current as of: June 16, 2021               Content Version: 13.0  © 3070-5460 Healthwise, Incorporated.    Care instructions adapted under license by [facility] and shoulder stretch    1. Sit or stand tall and glide your head backward as in the dorsal glide stretch. 2. Raise both arms so that your hands are next to your ears. 3. Take a deep breath, and as you breathe out, lower your elbows down and behind your back. You will feel your shoulder blades slide down and together, and at the same time you will feel a stretch across your chest and the front of your shoulders. 4. Hold for about 6 seconds, and then relax for up to 10 seconds. 5. Repeat 8 to 12 times. Strengthening: Hands on head    1. Move your head backward, forward, and side to side against gentle pressure from your hands, holding each position for about 6 seconds. 2. Repeat 8 to 12 times. Follow-up care is a key part of your treatment and safety. Be sure to make and go to all appointments, and call your doctor if you are having problems. It's also a good idea to know your test results and keep a list of the medicines you take. Where can you learn more? Go to https://Ma[employer]hao.GeoEye. org and sign in to your Overwatch account. Enter P975 in the [facility] box to learn more about \"Neck: Exercises. \"     If you do not have an account, please click on the \"Sign Up Now\" link. Current as of: July 1, 2021               Content Version: 13.0  © 7095-5876 Healthwise, Incorporated. Care instructions adapted under license by [address] If you have questions about a medical condition or this instruction, always ask your healthcare professional. [place] [address] any warranty or liability for your use of this information.

## 2021-09-30 NOTE — LETTER
[facility]  [address] [facility] [address]  [address] [place] [address]  Phone: [phone]  Fax: [phone]    KATIE Ellis CNP        September 30, 2021      To whom it may concern. Liam Martinez is a 46 y.o. female patient is an established patient in our office. She was seen today for a viral infection. She was recommended to stay home today. Therefore, she is not able to attend her meeting today. Please call the office for any questions or concerns about this note.       Sincerely,        KATIE Cole - CNP

## 2021-10-01 LAB
ESTIMATED AVERAGE GLUCOSE: 103 MG/DL
HBA1C MFR BLD: 5.2 % (ref 4–6)
VZV IGG SER QL IA: 2.54

## 2021-10-03 ENCOUNTER — APPOINTMENT (OUTPATIENT)
Dept: GENERAL RADIOLOGY | Age: 41
End: 2021-10-03
Payer: COMMERCIAL

## 2021-10-03 ENCOUNTER — HOSPITAL ENCOUNTER (EMERGENCY)
Age: 41
Discharge: HOME | End: 2021-10-03
Attending: EMERGENCY MEDICINE
Payer: COMMERCIAL

## 2021-10-03 VITALS
OXYGEN SATURATION: 98 % | BODY MASS INDEX: 40.48 KG/M2 | HEART RATE: 90 BPM | SYSTOLIC BLOOD PRESSURE: 142 MMHG | DIASTOLIC BLOOD PRESSURE: 74 MMHG | HEIGHT: 62 IN | WEIGHT: 220 LBS | RESPIRATION RATE: 13 BRPM | TEMPERATURE: 98.1 F

## 2021-10-03 DIAGNOSIS — B34.9 VIRAL ILLNESS: Primary | ICD-10-CM

## 2021-10-03 DIAGNOSIS — R11.2 NAUSEA VOMITING AND DIARRHEA: ICD-10-CM

## 2021-10-03 DIAGNOSIS — R19.7 NAUSEA VOMITING AND DIARRHEA: ICD-10-CM

## 2021-10-03 DIAGNOSIS — J45.21 MILD INTERMITTENT ASTHMA WITH EXACERBATION: ICD-10-CM

## 2021-10-03 LAB
ABSOLUTE EOS #: 0.3 K/UL (ref 0–0.4)
ABSOLUTE IMMATURE GRANULOCYTE: ABNORMAL K/UL (ref 0–0.3)
ABSOLUTE LYMPH #: 2 K/UL (ref 1–4.8)
ABSOLUTE MONO #: 0.7 K/UL (ref 0.1–1.3)
ALBUMIN SERPL-MCNC: 4.1 G/DL (ref 3.5–5.2)
ALBUMIN/GLOBULIN RATIO: ABNORMAL (ref 1–2.5)
ALP BLD-CCNC: 70 U/L (ref 35–104)
ALT SERPL-CCNC: 20 U/L (ref 5–33)
ANION GAP SERPL CALCULATED.3IONS-SCNC: 12 MMOL/L (ref 9–17)
AST SERPL-CCNC: 16 U/L
BASOPHILS # BLD: 1 % (ref 0–2)
BASOPHILS ABSOLUTE: 0.1 K/UL (ref 0–0.2)
BILIRUB SERPL-MCNC: 0.23 MG/DL (ref 0.3–1.2)
BILIRUBIN URINE: NEGATIVE
BNP INTERPRETATION: NORMAL
BUN BLDV-MCNC: 12 MG/DL (ref 6–20)
BUN/CREAT BLD: ABNORMAL (ref 9–20)
CALCIUM SERPL-MCNC: 8.9 MG/DL (ref 8.6–10.4)
CHLORIDE BLD-SCNC: 103 MMOL/L (ref 98–107)
CO2: 24 MMOL/L (ref 20–31)
COLOR: YELLOW
COMMENT UA: ABNORMAL
CREAT SERPL-MCNC: 0.58 MG/DL (ref 0.5–0.9)
DIFFERENTIAL TYPE: ABNORMAL
EOSINOPHILS RELATIVE PERCENT: 3 % (ref 0–4)
GFR AFRICAN AMERICAN: >60 ML/MIN
GFR NON-AFRICAN AMERICAN: >60 ML/MIN
GFR SERPL CREATININE-BSD FRML MDRD: ABNORMAL ML/MIN/{1.73_M2}
GFR SERPL CREATININE-BSD FRML MDRD: ABNORMAL ML/MIN/{1.73_M2}
GLUCOSE BLD-MCNC: 95 MG/DL (ref 70–99)
GLUCOSE URINE: NEGATIVE
HCT VFR BLD CALC: 38.7 % (ref 36–46)
HEMOGLOBIN: 12.6 G/DL (ref 12–16)
IMMATURE GRANULOCYTES: ABNORMAL %
INFLUENZA A: NEGATIVE
INFLUENZA B: NEGATIVE
KETONES, URINE: NEGATIVE
LEUKOCYTE ESTERASE, URINE: NEGATIVE
LIPASE: 16 U/L (ref 13–60)
LYMPHOCYTES # BLD: 17 % (ref 24–44)
MAGNESIUM: 1.8 MG/DL (ref 1.6–2.6)
MCH RBC QN AUTO: 28.4 PG (ref 26–34)
MCHC RBC AUTO-ENTMCNC: 32.7 G/DL (ref 31–37)
MCV RBC AUTO: 87.1 FL (ref 80–100)
MONOCYTES # BLD: 6 % (ref 1–7)
NITRITE, URINE: NEGATIVE
NRBC AUTOMATED: ABNORMAL PER 100 WBC
PDW BLD-RTO: 15.1 % (ref 11.5–14.9)
PH UA: 6 (ref 5–8)
PLATELET # BLD: 288 K/UL (ref 150–450)
PLATELET ESTIMATE: ABNORMAL
PMV BLD AUTO: 8.6 FL (ref 6–12)
POTASSIUM SERPL-SCNC: 3.8 MMOL/L (ref 3.7–5.3)
PRO-BNP: 175 PG/ML
PROTEIN UA: NEGATIVE
RBC # BLD: 4.45 M/UL (ref 4–5.2)
RBC # BLD: ABNORMAL 10*6/UL
SARS-COV-2 RNA, RT PCR: NOT DETECTED
SEG NEUTROPHILS: 73 % (ref 36–66)
SEGMENTED NEUTROPHILS ABSOLUTE COUNT: 8.5 K/UL (ref 1.3–9.1)
SODIUM BLD-SCNC: 139 MMOL/L (ref 135–144)
SOURCE: NORMAL
SPECIFIC GRAVITY UA: 1.04 (ref 1–1.03)
SPECIMEN DESCRIPTION: NORMAL
TOTAL PROTEIN: 6.5 G/DL (ref 6.4–8.3)
TROPONIN INTERP: NORMAL
TROPONIN T: NORMAL NG/ML
TROPONIN, HIGH SENSITIVITY: <6 NG/L (ref 0–14)
TURBIDITY: CLEAR
URINE HGB: NEGATIVE
UROBILINOGEN, URINE: NORMAL
WBC # BLD: 11.6 K/UL (ref 3.5–11)
WBC # BLD: ABNORMAL 10*3/UL

## 2021-10-03 PROCEDURE — 85025 COMPLETE CBC W/AUTO DIFF WBC: CPT

## 2021-10-03 PROCEDURE — 2580000003 HC RX 258: Performed by: EMERGENCY MEDICINE

## 2021-10-03 PROCEDURE — 93005 ELECTROCARDIOGRAM TRACING: CPT | Performed by: EMERGENCY MEDICINE

## 2021-10-03 PROCEDURE — 81003 URINALYSIS AUTO W/O SCOPE: CPT

## 2021-10-03 PROCEDURE — 99284 EMERGENCY DEPT VISIT MOD MDM: CPT

## 2021-10-03 PROCEDURE — 36415 COLL VENOUS BLD VENIPUNCTURE: CPT

## 2021-10-03 PROCEDURE — 84484 ASSAY OF TROPONIN QUANT: CPT

## 2021-10-03 PROCEDURE — 83880 ASSAY OF NATRIURETIC PEPTIDE: CPT

## 2021-10-03 PROCEDURE — 94664 DEMO&/EVAL PT USE INHALER: CPT

## 2021-10-03 PROCEDURE — 6360000002 HC RX W HCPCS: Performed by: EMERGENCY MEDICINE

## 2021-10-03 PROCEDURE — 94640 AIRWAY INHALATION TREATMENT: CPT

## 2021-10-03 PROCEDURE — 96372 THER/PROPH/DIAG INJ SC/IM: CPT

## 2021-10-03 PROCEDURE — 87636 SARSCOV2 & INF A&B AMP PRB: CPT

## 2021-10-03 PROCEDURE — 80053 COMPREHEN METABOLIC PANEL: CPT

## 2021-10-03 PROCEDURE — 83735 ASSAY OF MAGNESIUM: CPT

## 2021-10-03 PROCEDURE — 96374 THER/PROPH/DIAG INJ IV PUSH: CPT

## 2021-10-03 PROCEDURE — 71045 X-RAY EXAM CHEST 1 VIEW: CPT

## 2021-10-03 PROCEDURE — 96375 TX/PRO/DX INJ NEW DRUG ADDON: CPT

## 2021-10-03 PROCEDURE — 6370000000 HC RX 637 (ALT 250 FOR IP)

## 2021-10-03 PROCEDURE — 83690 ASSAY OF LIPASE: CPT

## 2021-10-03 RX ORDER — METHYLPREDNISOLONE SODIUM SUCCINATE 125 MG/2ML
125 INJECTION, POWDER, LYOPHILIZED, FOR SOLUTION INTRAMUSCULAR; INTRAVENOUS ONCE
Status: COMPLETED | OUTPATIENT
Start: 2021-10-03 | End: 2021-10-03

## 2021-10-03 RX ORDER — PROMETHAZINE HYDROCHLORIDE 25 MG/ML
25 INJECTION, SOLUTION INTRAMUSCULAR; INTRAVENOUS ONCE
Status: COMPLETED | OUTPATIENT
Start: 2021-10-03 | End: 2021-10-03

## 2021-10-03 RX ORDER — MORPHINE SULFATE 2 MG/ML
4 INJECTION, SOLUTION INTRAMUSCULAR; INTRAVENOUS ONCE
Status: COMPLETED | OUTPATIENT
Start: 2021-10-03 | End: 2021-10-03

## 2021-10-03 RX ORDER — IPRATROPIUM BROMIDE AND ALBUTEROL SULFATE 2.5; .5 MG/3ML; MG/3ML
1 SOLUTION RESPIRATORY (INHALATION)
Status: DISCONTINUED | OUTPATIENT
Start: 2021-10-04 | End: 2021-10-04 | Stop reason: HOSPADM

## 2021-10-03 RX ORDER — PROMETHAZINE HYDROCHLORIDE 6.25 MG/5ML
12.5 SYRUP ORAL 4 TIMES DAILY PRN
Qty: 120 ML | Refills: 0 | Status: SHIPPED | OUTPATIENT
Start: 2021-10-03 | End: 2021-10-22 | Stop reason: SDUPTHER

## 2021-10-03 RX ORDER — 0.9 % SODIUM CHLORIDE 0.9 %
1000 INTRAVENOUS SOLUTION INTRAVENOUS ONCE
Status: COMPLETED | OUTPATIENT
Start: 2021-10-03 | End: 2021-10-03

## 2021-10-03 RX ORDER — ALBUTEROL SULFATE 2.5 MG/3ML
2.5 SOLUTION RESPIRATORY (INHALATION) EVERY 4 HOURS
Status: DISCONTINUED | OUTPATIENT
Start: 2021-10-03 | End: 2021-10-04 | Stop reason: HOSPADM

## 2021-10-03 RX ORDER — IPRATROPIUM BROMIDE AND ALBUTEROL SULFATE 2.5; .5 MG/3ML; MG/3ML
SOLUTION RESPIRATORY (INHALATION)
Status: COMPLETED
Start: 2021-10-03 | End: 2021-10-03

## 2021-10-03 RX ADMIN — SODIUM CHLORIDE 1000 ML: 9 INJECTION, SOLUTION INTRAVENOUS at 22:03

## 2021-10-03 RX ADMIN — IPRATROPIUM BROMIDE AND ALBUTEROL SULFATE 3 ML: .5; 3 SOLUTION RESPIRATORY (INHALATION) at 22:27

## 2021-10-03 RX ADMIN — METHYLPREDNISOLONE SODIUM SUCCINATE 125 MG: 125 INJECTION, POWDER, FOR SOLUTION INTRAMUSCULAR; INTRAVENOUS at 22:04

## 2021-10-03 RX ADMIN — PROMETHAZINE HYDROCHLORIDE 25 MG: 25 INJECTION INTRAMUSCULAR; INTRAVENOUS at 21:48

## 2021-10-03 RX ADMIN — ALBUTEROL SULFATE 2.5 MG: 2.5 SOLUTION RESPIRATORY (INHALATION) at 20:41

## 2021-10-03 RX ADMIN — MORPHINE SULFATE 4 MG: 2 INJECTION, SOLUTION INTRAMUSCULAR; INTRAVENOUS at 22:04

## 2021-10-03 ASSESSMENT — PAIN DESCRIPTION - FREQUENCY: FREQUENCY: CONTINUOUS

## 2021-10-03 ASSESSMENT — PAIN SCALES - GENERAL: PAINLEVEL_OUTOF10: 8

## 2021-10-03 ASSESSMENT — PAIN DESCRIPTION - LOCATION: LOCATION: ABDOMEN

## 2021-10-03 ASSESSMENT — PAIN DESCRIPTION - DESCRIPTORS: DESCRIPTORS: ACHING

## 2021-10-04 ENCOUNTER — TELEPHONE (OUTPATIENT)
Dept: FAMILY MEDICINE CLINIC | Age: 41
End: 2021-10-04

## 2021-10-04 LAB
6-ACETYLMORPHINE, UR: NOT DETECTED
7-AMINOCLONAZEPAM, URINE: NOT DETECTED
ALPHA-OH-ALPRAZ, URINE: NOT DETECTED
ALPHA-OH-MIDAZOLAM, URINE: NOT DETECTED
ALPRAZOLAM, URINE: NOT DETECTED
AMPHETAMINES, URINE: NOT DETECTED
BARBITURATES, URINE: NOT DETECTED
BENZOYLECGONINE, UR: NOT DETECTED
BUPRENORPHINE URINE: NOT DETECTED
CARISOPRODOL, UR: NOT DETECTED
CLONAZEPAM, URINE: NOT DETECTED
CODEINE, URINE: NOT DETECTED
CREATININE URINE: 166.9 MG/DL (ref 20–400)
DIAZEPAM, URINE: NOT DETECTED
DRUGS EXPECTED, UR: NORMAL
EER HI RES INTERP UR: NORMAL
EKG ATRIAL RATE: 74 BPM
EKG P AXIS: 51 DEGREES
EKG P-R INTERVAL: 126 MS
EKG Q-T INTERVAL: 388 MS
EKG QRS DURATION: 94 MS
EKG QTC CALCULATION (BAZETT): 430 MS
EKG R AXIS: 47 DEGREES
EKG T AXIS: 40 DEGREES
EKG VENTRICULAR RATE: 74 BPM
ETHYL GLUCURONIDE UR: PRESENT
FENTANYL URINE: NOT DETECTED
GABAPENTIN: NOT DETECTED
HYDROCODONE, URINE: NOT DETECTED
HYDROMORPHONE, URINE: NOT DETECTED
LORAZEPAM, URINE: NOT DETECTED
MARIJUANA METAB, UR: NOT DETECTED
MDA, UR: NOT DETECTED
MDEA, EVE, UR: NOT DETECTED
MDMA URINE: NOT DETECTED
MEPERIDINE METAB, UR: NOT DETECTED
METHADONE, URINE: NOT DETECTED
METHAMPHETAMINE, URINE: NOT DETECTED
METHYLPHENIDATE: NOT DETECTED
MIDAZOLAM, URINE: NOT DETECTED
MORPHINE URINE: NOT DETECTED
NALOXONE URINE: NOT DETECTED
NORBUPRENORPHINE, URINE: NOT DETECTED
NORDIAZEPAM, URINE: NOT DETECTED
NORFENTANYL, URINE: NOT DETECTED
NORHYDROCODONE, URINE: NOT DETECTED
NOROXYCODONE, URINE: NOT DETECTED
NOROXYMORPHONE, URINE: NOT DETECTED
OXAZEPAM, URINE: NOT DETECTED
OXYCODONE URINE: NOT DETECTED
OXYMORPHONE, URINE: NOT DETECTED
PAIN MANAGEMENT DRUG PANEL INTERP, URINE: NORMAL
PAIN MGT DRUG PANEL, HI RES, UR: NORMAL
PCP,URINE: NOT DETECTED
PHENTERMINE, UR: NOT DETECTED
PREGABALIN: NOT DETECTED
TAPENTADOL, URINE: NOT DETECTED
TAPENTADOL-O-SULFATE, URINE: NOT DETECTED
TEMAZEPAM, URINE: NOT DETECTED
TRAMADOL, URINE: NOT DETECTED
ZOLPIDEM METABOLITE (ZCA), URINE: NOT DETECTED
ZOLPIDEM, URINE: NOT DETECTED

## 2021-10-04 PROCEDURE — 93010 ELECTROCARDIOGRAM REPORT: CPT | Performed by: INTERNAL MEDICINE

## 2021-10-04 ASSESSMENT — ENCOUNTER SYMPTOMS
ABDOMINAL PAIN: 0
NAUSEA: 1
SHORTNESS OF BREATH: 1
DIARRHEA: 1
CONSTIPATION: 0
VOMITING: 1
SORE THROAT: 0
WHEEZING: 1
COUGH: 1
RHINORRHEA: 0
BACK PAIN: 0

## 2021-10-04 NOTE — ED PROVIDER NOTES
EMERGENCY DEPARTMENT ENCOUNTER   ATTENDING ATTESTATION     Pt Name: Da Marie  MRN: [id number]  [place] 1980  Date of evaluation: 10/3/21       Da Marie is a 46 y.o. female who presents with Shortness of Breath (ALSO C/O RIGHT EAR PAIN), Cough, Emesis, Diarrhea, and Abdominal Pain      MDM:   This is a 63-year-old female with a history of asthma who comes in today with shortness of breath wheezing chest pain pain all over patient had an admission last year for her asthma she is not hypoxic she is wheezing given Solu-Medrol and breathing treatments. Troponin negative BNP not elevated mild leukocytosis at 11.6 she could have a virus. Covid and influenza negative urinalysis negative for infection. 11:36 PM EDT  Chest x-ray is negative for infection ambulatory pulse ox does not reveal any hypoxia patient feels better after breathing treatments and steroids she will be sent home with steroids she has albuterol at home. Vitals:   Vitals:    10/03/21 2029 10/03/21 2041 10/03/21 2228   BP: (!) 142/74     Pulse: 90     Resp: 24 13    Temp: 98.1 °F (36.7 °C)     TempSrc: Oral     SpO2: 96% 98% 98%   Weight: 220 lb (99.8 kg)     Height: 5' 2\" (1.575 m)           I personally evaluated and examined the patient in conjunction with the resident and agree with the assessment, treatment plan, and disposition of the patient as recorded by the resident. I performed a history and physical examination of the patient and discussed management with the resident. I reviewed the residents note and agree with the documented findings and plan of care. Any areas of disagreement are noted on the chart. I was personally present for the key portions of any procedures. I have documented in the chart those procedures where I was not present during the key portions. I have personally reviewed all images and agree with the resident's interpretation. I have reviewed the emergency nurses triage note.  I agree with the chief complaint, past medical history, past surgical history, allergies, medications, social and family history as documented unless otherwise noted.     Edgar Coreas MD  Attending Emergency Physician            Edgar Coreas MD  10/03/21 9620

## 2021-10-04 NOTE — TELEPHONE ENCOUNTER
[facility] ([facility]) ED Follow up Call    Reason for ED visit:  SOB, COUGH, EMESIS, DIARRHEA, ABD     FU appts/Provider:    Future Appointments   Date Time Provider Wesley Smith   12/16/2021 10:00 AM DO TAWNYA Chaudhary [facility]   1/3/2022  3:00 PM Alan Bernal, APRN - CNP fp sc [facility]       VOICEMAIL DOCUMENTATION - ERASE IF NOT USED  Hi, this message is for  JOBY  This is EDDIE from [facility] office. Just calling to see how you are doing after your recent visit to the Emergency Room. [facility] wants to make sure you were able to fill any prescriptions and that you understand your discharge instructions. Please return our call if you need to make a follow up appointment with your provider or have any further needs. Our phone number is [phone]*. Have a great day.

## 2021-10-04 NOTE — ED NOTES
No change in pulse ox while ambulating, solid 95-96% both at rest and while ambulating/speaking.      Thomas Viera RN  10/03/21 8953

## 2021-10-04 NOTE — ED PROVIDER NOTES
[facility]  ED  Emergency Department Encounter  Emergency Medicine Resident     Pt Name: Geoffrey Pennington  MRN: [id number]  Maricelgfparish 1980  Date of evaluation: 10/4/21  PCP:  KATIE Rose CNP    CHIEF COMPLAINT       Chief Complaint   Patient presents with    Shortness of Breath     ALSO C/O RIGHT EAR PAIN    Cough    Emesis    Diarrhea    Abdominal Pain       HISTORY OFPRESENT ILLNESS  (Location/Symptom, Timing/Onset, Context/Setting, Quality, Duration, Modifying Arabella Mantle.)      Geoffrey Pennington is a 46 y.o. female with past medical history of asthma, COPD, coronary artery disease, diabetes, seizures who presents with sudden onset shortness of breath, chest pain and nonproductive cough which began today. Patient has been using her inhaler more frequently with little improvement. Patient also states that she has been sick over the past couple days. She reports headache, whole body muscle aches, nausea, vomiting and diarrhea. Patient reports several episodes of vomiting today states she is unable to keep down any food or water. Patient states that she was seen by her primary care physician for right ear pain and sinusitis and was treated with a Medrol Dosepak. She states she did not feel improved after this Medrol Dosepak and now feels like the infection has moved down into her lungs. Patient denies sick contact or contact with anyone who tested positive for Covid. She has not received her Covid vaccines.     PAST MEDICAL / SURGICAL / SOCIAL / FAMILY HISTORY      has a past medical history of Anxiety, Arthritis, Asthma, Sadler's esophagus, Bipolar 1 disorder ([address] CAD (coronary artery disease), Chronic insomnia, COPD (chronic obstructive pulmonary disease) ([address] Depression, Diabetes mellitus ([address] Endometriosis, Esophagitis, GERD (gastroesophageal reflux disease), Headache(784.0), Herniated disc, cervical, Hiatal hernia, Incisional abscess, Kidney stones, MDRO tablet TAKE 1 TABLET IN THE EVENING ONCE A DAY ORALLY 8/30/21  Yes KATIE Cole CNP   vitamin D3 (CHOLECALCIFEROL) 25 MCG (1000 UT) TABS tablet TAKE 1 TABLET BY MOUTH DAILY 8/27/21  Yes KATIE Cole CNP   Fluticasone furoate-vilanterol (BREO ELLIPTA) 200-25 MCG/INH AEPB inhaler Inhale 1 puff into the lungs daily 8/16/21  Yes KATIE Cole CNP   pregabalin (LYRICA) 100 MG capsule Take 1 tid 7/19/21 10/3/21 Yes KATIE Cole CNP   loratadine (CLARITIN) 10 MG tablet TAKE 1 TABLET BY MOUTH DAILY 7/6/21  Yes KATIE Cole CNP   pantoprazole (PROTONIX) 40 MG tablet 1 tab daily 6/30/21  Yes KATIE Cole CNP   sertraline (ZOLOFT) 50 MG tablet Take 1 tablet by mouth daily 1/19/21  Yes Historical Provider, MD   lamoTRIgine (LAMICTAL) 150 MG tablet Take 1 tablet by mouth nightly  10/28/20  Yes Historical Provider, MD   dicyclomine (BENTYL) 10 MG capsule TAKE 1 CAPSULE BY MOUTH 4 TIMES DAILY 8/27/21   KATIE Cole CNP   glucose monitoring kit (FREESTYLE) monitoring kit Use as directed. BRAND OF CHOICE INSURANCE ALLOWS. 5/27/21   KATIE Cole CNP   Misc.  Devices (PLASTIC BED PAN) MISC Use daily with hot water 5/27/21   KATIE Cole CNP   Lancets (ONETOUCH DELICA PLUS [id number]) MISC USE TO TEST BLOOD SUGAR TWICE A DAY 5/27/21   KATIE Cole CNP   blood glucose test strips (ONETOUCH VERIO) strip USE TO TEST BLOOD SUGAR TWICE A DAY 5/27/21   KATIE Cole CNP   Alcohol Swabs ( STERILE ALCOHOL PREP) PADS USE AS DIRECTED 5/10/21   KATIE Cole CNP   fluticasone (FLONASE) 50 MCG/ACT nasal spray 1 spray by Each Nostril route daily 4/1/21 5/1/21  KATIE Cole - CNP   LINZESS 290 MCG CAPS capsule TAKE 1 CAPSULE BY MOUTH EVERY MORNING (BEFORE BREAKFAST) 3/3/21   Beth Concepcion MD   vitamin D3 (CHOLECALCIFEROL) 25 MCG (1000 UT) TABS tablet TAKE 1 TABLET BY MOUTH DAILY 8/27/20 KATIE Cole CNP   Masks (CLEVER CHOICE FACE MASK) [facility] USE NEW FACE MASK EVERYDAY WHEN PATIENT GO OUTSIDE OF HOME DUE TO COVID PANDEMIC 7/6/20   KATIE Cole CNP   acetaminophen (TYLENOL) 500 MG tablet Take 500 mg by mouth every 6 hours as needed for Pain    Historical Provider, MD       REVIEW OFSYSTEMS    (2-9 systems for level 4, 10 or more for level 5)      Review of Systems   Constitutional: Positive for activity change and appetite change. Negative for chills and fever. HENT: Positive for ear pain. Negative for congestion, ear discharge, rhinorrhea and sore throat. Eyes: Negative for visual disturbance. Respiratory: Positive for cough, shortness of breath and wheezing. Cardiovascular: Positive for chest pain. Negative for leg swelling. Gastrointestinal: Positive for diarrhea, nausea and vomiting. Negative for abdominal pain and constipation. Genitourinary: Negative for difficulty urinating, dysuria, frequency and hematuria. Musculoskeletal: Positive for myalgias. Negative for arthralgias, back pain and neck pain. Skin: Negative for rash. Neurological: Negative for dizziness, weakness, light-headedness and numbness. All other systems reviewed and are negative. PHYSICAL EXAM   (up to 7 for level 4, 8 or more forlevel 5)      INITIAL VITALS:   Vitals:    10/03/21 2029 10/03/21 2041 10/03/21 2228   BP: (!) 142/74     Pulse: 90     Resp: 24 13    Temp: 98.1 °F (36.7 °C)     TempSrc: Oral     SpO2: 96% 98% 98%   Weight: 220 lb (99.8 kg)     Height: 5' 2\" (1.575 m)          Physical Exam  Vitals and nursing note reviewed. Constitutional:       General: She is not in acute distress. Appearance: She is obese. She is not toxic-appearing. Comments: Adult female lying in stretcher in mild distress secondary to vomiting and shortness of breath. She answers questions appropriately but in short few word sentences.    HENT:      Head: Normocephalic and atraumatic. Right Ear: Tympanic membrane, ear canal and external ear normal.      Left Ear: Tympanic membrane, ear canal and external ear normal.      Nose: Nose normal. No congestion or rhinorrhea. Mouth/Throat:      Mouth: Mucous membranes are moist.      Pharynx: Oropharynx is clear. No oropharyngeal exudate or posterior oropharyngeal erythema. Eyes:      Conjunctiva/sclera: Conjunctivae normal.      Pupils: Pupils are equal, round, and reactive to light. Cardiovascular:      Rate and Rhythm: Normal rate and regular rhythm. Heart sounds: No murmur heard. No friction rub. No gallop. Pulmonary:      Effort: No respiratory distress. Breath sounds: No stridor. Wheezing and rhonchi present. No rales. Comments: Expiratory wheezing throughout all lung fields with coarse sounding rhonchi in the right upper and middle lobe  Abdominal:      General: There is no distension. Palpations: Abdomen is soft. There is no mass. Tenderness: There is abdominal tenderness. There is guarding. Comments: Diffuse tenderness to palpation with voluntary guarding to periumbilical region and right lower quadrant. Musculoskeletal:         General: Normal range of motion. Cervical back: Neck supple. No rigidity. Right lower leg: No edema. Left lower leg: No edema. Skin:     General: Skin is warm and dry. Capillary Refill: Capillary refill takes less than 2 seconds. Findings: No rash. Neurological:      General: No focal deficit present. Mental Status: She is alert. Psychiatric:         Mood and Affect: Mood normal.         Behavior: Behavior normal.         DIFFERENTIAL  DIAGNOSIS     DDX: Viral illness, COVID-19, influenza, asthma exacerbation, COPD exacerbation, pneumonia, pleural effusion, pulmonary edema, gastritis, gastroenteritis, hepatitis, pancreatitis, cholecystitis, cholelithiasis, appendicitis, UTI, pyelonephritis    Initial MDM/Plan: 46 y.o. female with past medical history of asthma, COPD, coronary artery disease, diabetes, seizures who presents with sudden onset shortness of breath, chest pain and nonproductive cough, as well as nausea, vomiting and diarrhea which began today. On physical exam, patient is awake, alert, nontoxic but ill and uncomfortable appearing. Her vitals are remarkable for mild hypertension and O2 sats 94-95% on room air. She is afebrile. Patient has diffuse expiratory wheezing and coarse rhonchi on the right lung. Abdomen is also diffusely tender. Suspect viral illness including Covid and influenza. Will obtain labs, EKG and chest x-ray to evaluate. Will treat symptomatically with IV fluids, antiemetics and pain medicine. Patient has already received an albuterol treatment and is still significantly wheezing. Will give DuoNeb treatment at this time. Admission versus discharge pending clinical improvement and lab and imaging results. If patient's symptoms do not clinically improve and labs indicate, will obtain CT of abdomen pelvis. DIAGNOSTIC RESULTS / EMERGENCYDEPARTMENT COURSE / MDM     LABS:  Results for orders placed or performed during the hospital encounter of 10/03/21   COVID-19 & Influenza Combo    Specimen: Nasopharyngeal Swab   Result Value Ref Range    Specimen Description . NASOPHARYNGEAL SWAB     Source . CLEAN CATCH URINE     SARS-CoV-2 RNA, RT PCR Not Detected Not Detected    INFLUENZA A NEGATIVE NEGATIVE    INFLUENZA B NEGATIVE NEGATIVE   Comprehensive Metabolic Panel   Result Value Ref Range    Glucose 95 70 - 99 mg/dL    BUN 12 6 - 20 mg/dL    CREATININE 0.58 0.50 - 0.90 mg/dL    Bun/Cre Ratio NOT REPORTED 9 - 20    Calcium 8.9 8.6 - 10.4 mg/dL    Sodium 139 135 - 144 mmol/L    Potassium 3.8 3.7 - 5.3 mmol/L    Chloride 103 98 - 107 mmol/L    CO2 24 20 - 31 mmol/L    Anion Gap 12 9 - 17 mmol/L    Alkaline Phosphatase 70 35 - 104 U/L    ALT 20 5 - 33 U/L    AST 16 <32 U/L    Total Bilirubin 0.23 (L) 0.3 - 1.2 mg/dL    Total Protein 6.5 6.4 - 8.3 g/dL    Albumin 4.1 3.5 - 5.2 g/dL    Albumin/Globulin Ratio NOT REPORTED 1.0 - 2.5    GFR Non-African American >60 >60 mL/min    GFR African American >60 >60 mL/min    GFR Comment          GFR Staging NOT REPORTED    CBC Auto Differential   Result Value Ref Range    WBC 11.6 (H) 3.5 - 11.0 k/uL    RBC 4.45 4.0 - 5.2 m/uL    Hemoglobin 12.6 12.0 - 16.0 g/dL    Hematocrit 38.7 36 - 46 %    MCV 87.1 80 - 100 fL    MCH 28.4 26 - 34 pg    MCHC 32.7 31 - 37 g/dL    RDW 15.1 (H) 11.5 - 14.9 %    Platelets 609 744 - 710 k/uL    MPV 8.6 6.0 - 12.0 fL    NRBC Automated NOT REPORTED per 100 WBC    Differential Type NOT REPORTED     Seg Neutrophils 73 (H) 36 - 66 %    Lymphocytes 17 (L) 24 - 44 %    Monocytes 6 1 - 7 %    Eosinophils % 3 0 - 4 %    Basophils 1 0 - 2 %    Immature Granulocytes NOT REPORTED 0 %    Segs Absolute 8.50 1.3 - 9.1 k/uL    Absolute Lymph # 2.00 1.0 - 4.8 k/uL    Absolute Mono # 0.70 0.1 - 1.3 k/uL    Absolute Eos # 0.30 0.0 - 0.4 k/uL    Basophils Absolute 0.10 0.0 - 0.2 k/uL    Absolute Immature Granulocyte NOT REPORTED 0.00 - 0.30 k/uL    WBC Morphology NOT REPORTED     RBC Morphology NOT REPORTED     Platelet Estimate NOT REPORTED    Magnesium   Result Value Ref Range    Magnesium 1.8 1.6 - 2.6 mg/dL   Lipase   Result Value Ref Range    Lipase 16 13 - 60 U/L   Urinalysis   Result Value Ref Range    Color, UA Yellow Yellow    Turbidity UA Clear Clear    Glucose, Ur NEGATIVE NEGATIVE    Bilirubin Urine NEGATIVE NEGATIVE    Ketones, Urine NEGATIVE NEGATIVE    Specific Gravity, UA 1.039 (H) 1.000 - 1.030    Urine Hgb NEGATIVE NEGATIVE    pH, UA 6.0 5.0 - 8.0    Protein, UA NEGATIVE NEGATIVE    Urobilinogen, Urine Normal Normal    Nitrite, Urine NEGATIVE NEGATIVE    Leukocyte Esterase, Urine NEGATIVE NEGATIVE    Urinalysis Comments       Microscopic exam not performed based on chemical results unless requested in original order.    Brain Natriuretic Peptide Result Value Ref Range    Pro- <300 pg/mL    BNP Interpretation Pro-BNP Reference Range:    Troponin   Result Value Ref Range    Troponin, High Sensitivity <6 0 - 14 ng/L    Troponin T NOT REPORTED <0.03 ng/mL    Troponin Interp NOT REPORTED    EKG 12 Lead   Result Value Ref Range    Ventricular Rate 74 BPM    Atrial Rate 74 BPM    P-R Interval 126 ms    QRS Duration 94 ms    Q-T Interval 388 ms    QTc Calculation (Bazett) 430 ms    P Axis 51 degrees    R Axis 47 degrees    T Axis 40 degrees         RADIOLOGY:  XR CHEST PORTABLE    Result Date: 10/3/2021  EXAMINATION: ONE XRAY VIEW OF THE CHEST 10/3/2021 10:23 pm COMPARISON: 06/17/2021 HISTORY: ORDERING SYSTEM PROVIDED HISTORY: shortness of breath FINDINGS: The lungs are without acute focal process. No effusion or pneumothorax. The cardiomediastinal silhouette is normal.  The osseous structures are intact without acute process. Negative chest.       EKG  Normal sinus rhythm, rate: 74  NV: 126  QRS: 94  QT/QTc: 388/430   No ST elevation or depression  No T wave abnormality    All EKG's are interpreted by the Emergency Department Physician who either signs or Co-signs this chart in the absence of a cardiologist.      MEDICATIONS ORDERED:  Orders Placed This Encounter   Medications    DISCONTD: albuterol (PROVENTIL) nebulizer solution 2.5 mg     Order Specific Question:   Initiate RT Bronchodilator Protocol     Answer: Yes    0.9 % sodium chloride bolus    morphine (PF) injection 4 mg    promethazine (PHENERGAN) injection 25 mg    methylPREDNISolone sodium (SOLU-MEDROL) injection 125 mg    DISCONTD: ipratropium-albuterol (DUONEB) nebulizer solution 1 ampule     Order Specific Question:   Initiate RT Bronchodilator Protocol     Answer:    Yes    ipratropium-albuterol (DUONEB) 0.5-2.5 (3) MG/3ML nebulizer solution     Dinah Basil: cabinet override    promethazine (PHENERGAN) 6.25 MG/5ML syrup     Sig: Take 10 mLs by mouth 4 times daily as needed for Nausea (diarrhea)     Dispense:  120 mL     Refill:  0         PROCEDURES:  None      CONSULTS:  None      EMERGENCY DEPARTMENT COURSE:  ED Course as of Oct 04 0106   Sun Oct 03, 2021   2200 WBC(!): 11.6 [KA]     2235 Troponin, High Sensitivity: <6 [KA]     2235 INFLUENZA A: NEGATIVE [KA]     2235 SARS-CoV-2 RNA, RT PCR: Not Detected [KA]     2235 INFLUENZA B: NEGATIVE [KA]     2235      2300 XR CHEST PORTABLE   Negative chest.     Patient's work-up is overwhelmingly reassuring. She likely has some form of viral illness, as well as asthma exacerbation. Patient was reassessed and lungs are now clear without any wheezing. Abdomen is nontender and patient was able to ambulate around the emergency department with oximeter that showed her O2 sat stayed at around 96% cannulating. Patient feels she is ready for discharge at this time. It is requesting that her Phenergan be refilled. She states she had an inhaler at home and does not need refills of it. Patient is to return to the emergency department for any worsening symptoms. [KA]      ED Course User Index  [KA] Heriberto Vu DO          FINAL IMPRESSION      1. Viral illness    2. Nausea vomiting and diarrhea    3.  Mild intermittent asthma with exacerbation          DISPOSITION / PLAN     DISPOSITION Decision To Discharge 10/03/2021 11:36:07 PM      PATIENT REFERRED TO:  KATIE Colby CNPorimery[address]  [address] [address]  [phone]    Schedule an appointment as soon as possible for a visit       [facility] ED  [address] [address]  [phone]    If symptoms worsen      DISCHARGE MEDICATIONS:  Discharge Medication List as of 10/3/2021 11:42 PM          Heriberto Vu DO  Emergency Medicine Resident    (Please note that portions of this note were completed with a voice recognition program.Efforts were made to edit the dictations but occasionally words are mis-transcribed.)        Marii Goodman

## 2021-10-04 NOTE — ED NOTES
Mode of arrival (squad #, walk in, police, etc) : Walked into triage        Chief complaint(s): SOB and cough      Arrival Note (brief scenario, treatment PTA, etc). : Complaining of SOB and cough which started today. Hx Asthma. Also complaining of Right ear pain. States she has abdominal pain , vomiting and diarrhea. A/O X 3. Lungs with wheezing t/o posteriorly. C= \"Have you ever felt that you should Cut down on your drinking? \"  No  A= \"Have people Annoyed you by criticizing your drinking? \"  No  G= \"Have you ever felt bad or Guilty about your drinking? \"  No  E= \"Have you ever had a drink as an Eye-opener first thing in the morning to steady your nerves or to help a hangover? \"  No      Deferred []      Reason for deferring: N/A    *If yes to two or more: probable alcohol abuse. Benja Casey RN  10/03/21 2042

## 2021-10-05 NOTE — RESULT ENCOUNTER NOTE
Lyrica stays in her system longer than 6 weeks. There were no trace of it.  Meds were refilled in [place], She tookt hem the end of august.

## 2021-10-11 ENCOUNTER — PATIENT MESSAGE (OUTPATIENT)
Dept: NEUROSURGERY | Age: 41
End: 2021-10-11

## 2021-10-11 DIAGNOSIS — J44.9 CHRONIC OBSTRUCTIVE PULMONARY DISEASE, UNSPECIFIED COPD TYPE (HCC): ICD-10-CM

## 2021-10-19 NOTE — PROGRESS NOTES
[address] [address]  Phone: [phone], Fax: [phone]    TELEHEALTH EVALUATION -- Audio/Visual (During GSAAD-60 public health emergency)    Patient ID verified by me prior to start of this visit    Aden Grier (:  1980) has requested an audio/video evaluation for the following concern(s):  Chief Complaint   Patient presents with    COPD    Cough      HPI:  Aden Grier is an established patient. Patient has a history of COPD and a current smoker. Patient was recently treated for sinusitis. She had a course of Augmentin and Medrol pack. Patient felt better few days after the course. However, she reported worsening of cough and some shortness of breath. Patient denies any runny nose, nasal congestion, fever but complains of chills. Patient denies any contact with anybody with similar symptoms. She denies any contact with anybody with confirmed COVID-19 virus. She also denies any loss of sense of smell and sense of taste. Patient also has a known history of COPD and a known smoker. She is currently on Breo, albuterol, Combivent Mucinex, and an occasional DuoNeb. She is established with a pulmonologist who she has not seen for a while. Patient continues to smoke. Patient declined to discuss options on smoking cessation. Patient had  tried to quit tried quitting in the past without any success. Patient states that she has been using her inhaler on a regular basis. She also took some Tessalon Perles without any success with her coughing.     Review of Systems   Constitutional: Positive for chills and fatigue. Negative for activity change and fever. HENT: Positive for sore throat. Negative for congestion, postnasal drip and sinus pressure. Eyes: Negative for pain. Respiratory: Positive for cough and shortness of breath. Negative for chest tightness and wheezing.     Cardiovascular: Negative for chest pain and palpitations. Gastrointestinal: Negative for abdominal pain, constipation, diarrhea, nausea and vomiting. Endocrine: Negative. Genitourinary: Negative. Allergic/Immunologic: Positive for environmental allergies. Neurological: Negative for dizziness and headaches. Hematological: Negative for adenopathy. Psychiatric/Behavioral: Positive for sleep disturbance. The patient is nervous/anxious.         Patient Active Problem List    Diagnosis Date Noted    Pneumonia 2020    Pelvic abscess in female     Diverticulitis of colon     Diverticulitis 2020    Wound infection/hemorrhage, obstetric surgical, postpartum condition 07/15/2020    Postoperative visit 07/15/2020    Postoperative abdominal pain 07/15/2020    Post-op pain     RALH with BS and cystoscopy 2020    Pelvic peritoneal endometriosis     Moderate persistent asthma without complication     Lipoma of torso 2020    History of cervical discectomy 2020    Chronic neck pain with abnormal neurologic examination 2020    Abnormal weight gain  2020    Class 2 drug-induced obesity with serious comorbidity and body mass index (BMI) of 37.0 to 37.9 in adult 2020    Bipolar affective disorder, currently depressed, moderate ([address] 04/10/2020    Insomnia 04/10/2020    Ulnar neuropathy 04/10/2020    Major depressive disorder, recurrent episode ([address] 04/10/2020    Sciatica 04/10/2020    Endometriosis 2020    Family history of breast cancer 2020    Enlarged uterus 2020    Chronic pelvic pain in female 2020    Hx of  x2 (G3, G4) 10/14/2018    Seizure ([address] 2018    Hx of IUFD (G2) 2018    History of gestational hypertension 2018    Chronic constipation 2018    Spinal stenosis in cervical region 2018    Cervical radiculopathy 2017    Migraine without aura 2017    COPD     Sadler's esophagus without dysplasia     Hiatal hernia     Smoker 2017    Prediabetes 2016    Arthritis 2016    Cervical disc herniation 2016    Iron deficiency anemia 2015    Asthma 2015    GERD 2015        Past Surgical History:   Procedure Laterality Date    CERVICAL DISC SURGERY      x2     SECTION  , 2018    x 2     SECTION N/A 2018     SECTION performed by Tara Dc MD at [facility] L&D OR    COLONOSCOPY N/A 10/1/2019    COLONOSCOPY DIAGNOSTIC ABORTED performed by Patti Lopez MD at [address] 2020    COLONOSCOPY WITH BIOPSY performed by Patti Lopez MD at [address], COLON, DIAGNOSTIC      HYSTERECTOMY N/A 2020    HYSTERECTOMY ABDOMINAL LAPAROSCOPIC ROBOTIC XI W/ CYSTOSCOPY performed by Tara Dc MD at [address] ARTHROSCOPY Left 5/20/15    KNEE SURGERY Left     x3    LAPAROSCOPY      dr Mariela Burns N/A 10/5/2017    LAPAROSCOPIC REMOVAL INTRA ABDOMINAL LIPOMA LOWER RIGHT performed by Neo Peña DO at [address] ESOPHAGOGASTRODUODENOSCOPY TRANSORAL DIAGNOSTIC N/A 3/2/2017    EGD ESOPHAGOGASTRODUODENOSCOPY  IP 5 performed by Roddy Gonzalez MD at [address]  2016    severe esophagitis    UPPER GASTROINTESTINAL ENDOSCOPY  2017    barretts; hiatus hernia; gastritis    UPPER GASTROINTESTINAL ENDOSCOPY  2017    long seg mullins's with linear erosions, esophagitis, small hiatal hernia    UPPER GASTROINTESTINAL ENDOSCOPY N/A 2018    MULLINS'S    UPPER GASTROINTESTINAL ENDOSCOPY N/A 10/1/2019    EGD BIOPSY performed by Patti Lopez MD at [facility]       Family History   Problem Relation Age of Onset    Cancer Mother         breast    Bipolar Disorder Mother     Hypertension Mother     Breast Cancer Mother     Bipolar Disorder Brother    [facility] Hypertension Father      Current Outpatient Medications   Medication Sig Dispense Refill    doxycycline hyclate (VIBRA-TABS) 100 MG tablet Take 1 tablet by mouth 2 times daily for 7 days 14 tablet 0    predniSONE (DELTASONE) 20 MG tablet Take 2 tablets by mouth daily for 5 days 20 tablet 0    LINZESS 290 MCG CAPS capsule TAKE 1 CAPSULE BY MOUTH EVERY MORNING (BEFORE BREAKFAST) 30 capsule 3    pantoprazole (PROTONIX) 40 MG tablet TAKE ONE TABLET TWICE A DAY ORALLY 30 DAY(S) 90 tablet 3    COMBIVENT RESPIMAT  MCG/ACT AERS inhaler INHALE ONE PUFF BY MOUTH 4 TIMES A DAY FOUR TIMES A DAY INHALATION 30 4 g 2    methylPREDNISolone (MEDROL DOSEPACK) 4 MG tablet Take by mouth. 1 kit 0    benzonatate (TESSALON PERLES) 100 MG capsule Take 1 capsule by mouth 3 times daily as needed for Cough (take 1-2 capsule) 90 capsule 0    BREO ELLIPTA 200-25 MCG/INH AEPB inhaler INHALE ONE PUFF BY MOUTH ONCE A DAY ONCE A DAY INHALATION 30 60 each 1    pregabalin (LYRICA) 75 MG capsule TAKE 1 CAPSULE BY MOUTH 3 TIMES DAILY 90 capsule 2    metFORMIN (GLUCOPHAGE) 500 MG tablet TAKE 1 TABLET BY MOUTH 2 TIMES DAILY (WITH MEALS) TAKE 1 TABLET DAILY FOR THE FIRST 7 DAYS.  THEN BID 60 tablet 3    vitamin D3 (CHOLECALCIFEROL) 25 MCG (1000 UT) TABS tablet TAKE 1 TABLET BY MOUTH DAILY 30 tablet 3    albuterol (PROVENTIL) (2.5 MG/3ML) 0.083% nebulizer solution Take 3 mLs by nebulization every 4 hours as needed for Wheezing 120 each 3    lamoTRIgine (LAMICTAL) 25 MG tablet Take 150 mg by mouth daily Nightly      Masks (CLEVER CHOICE FACE MASK) MISC USE NEW FACE MASK EVERYDAY WHEN PATIENT GO OUTSIDE OF HOME DUE TO COVID PANDEMIC 90 each 1    acetaminophen (TYLENOL) 500 MG tablet Take 500 mg by mouth every 6 hours as needed for Pain      MISC NATURAL PRODUCT OP Take 2 tablets by mouth 2 times daily hydroxycut max-weight loss supplement      blood glucose monitor strips Test 2-3 times a day & as needed for symptoms of irregular blood No Vaccines Administered. glucose. BRAND OF CHOICE INSURANCE ALLOWS. 100 strip 11    Lancets MISC 1 each by Does not apply route 2 times daily 300 each 11    Alcohol Swabs (ALCOHOL PREP) PADS Use as directed 100 each 11    ipratropium-albuterol (DUONEB) 0.5-2.5 (3) MG/3ML SOLN nebulizer solution Inhale 1 vial into the lungs 4 times daily      EPINEPHrine (EPIPEN 2-HELDER) 0.3 MG/0.3ML SOAJ injection Inject 0.3 mLs into the muscle once for 1 dose Use as directed for allergic reaction 2 each 0    montelukast (SINGULAIR) 10 MG tablet Take 1 tablet by mouth nightly 30 tablet 3     No current facility-administered medications for this visit. Allergies   Allergen Reactions    Nsaids      Irritates her Barrets esophogus    Toradol [Ketorolac Tromethamine]     Ultram [Tramadol] Nausea Only     Gastric upset        Social History     Tobacco Use    Smoking status: Current Every Day Smoker     Packs/day: 0.50     Years: 21.00     Pack years: 10.50     Types: Cigarettes    Smokeless tobacco: Never Used   Substance Use Topics    Alcohol use: No    Drug use: No        PHYSICAL EXAMINATION:  Vital Signs: (As obtained by patient/caregiver or practitioner observation)    Constitutional: [x] Appears well-developed and well-nourished [] No apparent distress      [] Abnormal-   Mental status  [x] Alert and awake  [x] Oriented to person/place/time [x]Able to follow commands      Eyes:  EOM    [x]  Normal  [] Abnormal-  Sclera  [x]  Normal  [] Abnormal -         Discharge [x]  None visible  [] Abnormal -    HENT:   [x] Normocephalic, atraumatic.   [] Abnormal   [x] Mouth/Throat: Mucous membranes are moist.     External Ears [x] Normal  [] Abnormal-     Neck: [x] No visualized mass     Pulmonary/Chest: [x] Respiratory effort normal.  [x] No visualized signs of difficulty breathing or respiratory distress        [] Abnormal     Musculoskeletal:   [x] Normal gait with no signs of ataxia         [x] Normal range of motion of neck        [] Abnormal- about 2 months (around 12/27/2020) for Keep Prev Appt, Chronic conditions. Luz Elena Durant is a 47 y.o. female patient  being evaluated by a Virtual Visit (video visit) encounter to address concerns as mentioned above. Due to this being a TeleHealth encounter (During HRCKU-20 public health emergency), evaluation of the following organ systems was limited:Vitals/Constitutional/EENT/Resp/CV/GI//MS/Neuro/Skin/Heme-Lymph-Imm. Services were provided through a video synchronous discussion virtually to substitute for in-person clinic visit. This is a telehealth visit that was performed with the originating site at Patient Location: home and provider Location of [place], [place]. Verbal consent to participate in video visit was obtained. Patient ID verified by me prior to start of this visit  I discussed with the patient the nature of our telehealth visits via interactive/real-time audio/video that:  - I would evaluate the patient and recommend diagnostics and treatments based on my assessment  - Our sessions are not being recorded and that personal health information is protected  - Our team would provide follow up care in person if/when the patient needs it. Pursuant to the emergency declaration under the [address] [place] [address], [address] [place] [address] authority and the [employer] Act, this Virtual Visit was conducted with patient's (and/or legal guardian's) consent, to reduce the patient's risk of exposure to COVID-19 and provide necessary medical care. The patient (and/or legal guardian) has also been advised to contact this office for worsening conditions or problems, and seek emergency medical treatment and/or call 911 if deemed necessary. This note was completed by using the assistance of a speech-recognition program. However, inadvertent computerized transcription errors may be present.  Although every effort was made to ensure accuracy, no guarantees can be provided that every mistake has been identified and corrected by editing.   Electronically signed by KATIE Ferguson CNP on 10/27/20 at 7:02 AM OSVALDO

## 2021-10-20 ENCOUNTER — HOSPITAL ENCOUNTER (EMERGENCY)
Age: 41
Discharge: HOME | End: 2021-10-20
Attending: STUDENT IN AN ORGANIZED HEALTH CARE EDUCATION/TRAINING PROGRAM
Payer: COMMERCIAL

## 2021-10-20 VITALS
DIASTOLIC BLOOD PRESSURE: 76 MMHG | HEART RATE: 87 BPM | OXYGEN SATURATION: 96 % | SYSTOLIC BLOOD PRESSURE: 130 MMHG | RESPIRATION RATE: 17 BRPM | TEMPERATURE: 97.6 F

## 2021-10-20 DIAGNOSIS — L02.91 ABSCESS: Primary | ICD-10-CM

## 2021-10-20 DIAGNOSIS — L03.116 CELLULITIS OF LEFT LOWER EXTREMITY: ICD-10-CM

## 2021-10-20 PROCEDURE — 10060 I&D ABSCESS SIMPLE/SINGLE: CPT

## 2021-10-20 PROCEDURE — 6370000000 HC RX 637 (ALT 250 FOR IP): Performed by: PHYSICIAN ASSISTANT

## 2021-10-20 PROCEDURE — 10061 I&D ABSCESS COMP/MULTIPLE: CPT

## 2021-10-20 PROCEDURE — 99284 EMERGENCY DEPT VISIT MOD MDM: CPT

## 2021-10-20 PROCEDURE — 2500000003 HC RX 250 WO HCPCS: Performed by: PHYSICIAN ASSISTANT

## 2021-10-20 RX ORDER — CEPHALEXIN 500 MG/1
500 CAPSULE ORAL 4 TIMES DAILY
Qty: 40 CAPSULE | Refills: 0 | Status: SHIPPED | OUTPATIENT
Start: 2021-10-20 | End: 2021-10-30

## 2021-10-20 RX ORDER — SULFAMETHOXAZOLE AND TRIMETHOPRIM 800; 160 MG/1; MG/1
1 TABLET ORAL 2 TIMES DAILY
Qty: 20 TABLET | Refills: 0 | Status: SHIPPED | OUTPATIENT
Start: 2021-10-20 | End: 2021-10-30

## 2021-10-20 RX ORDER — LIDOCAINE HYDROCHLORIDE 10 MG/ML
20 INJECTION, SOLUTION INFILTRATION; PERINEURAL ONCE
Status: COMPLETED | OUTPATIENT
Start: 2021-10-20 | End: 2021-10-20

## 2021-10-20 RX ORDER — ACETAMINOPHEN 500 MG
1000 TABLET ORAL ONCE
Status: COMPLETED | OUTPATIENT
Start: 2021-10-20 | End: 2021-10-20

## 2021-10-20 RX ADMIN — LIDOCAINE HYDROCHLORIDE 20 ML: 10 INJECTION, SOLUTION INFILTRATION; PERINEURAL at 16:51

## 2021-10-20 RX ADMIN — ACETAMINOPHEN 1000 MG: 500 TABLET ORAL at 16:50

## 2021-10-20 ASSESSMENT — PAIN SCALES - GENERAL
PAINLEVEL_OUTOF10: 10
PAINLEVEL_OUTOF10: 9
PAINLEVEL_OUTOF10: 10

## 2021-10-20 NOTE — ED TRIAGE NOTES
Mode of arrival (squad #, walk in, police, etc) : Walk in         Chief complaint(s): Abscess         Arrival Note (brief scenario, treatment PTA, etc). : Pt states abd abscess and groin area. Pt states frequent draining of the abscess that she comes to the ER for. RN did not visualize. Pt is A&Ox4, in no acute distress, respirations even and unlabored, ambulatory with steady gait. C= \"Have you ever felt that you should Cut down on your drinking? \"  No  A= \"Have people Annoyed you by criticizing your drinking? \"  No  G= \"Have you ever felt bad or Guilty about your drinking? \"  No   E= \"Have you ever had a drink as an Eye-opener first thing in the morning to steady your nerves or to help a hangover? \"  No      Deferred []      Reason for deferring: N/A    *If yes to two or more: probable alcohol abuse. *

## 2021-10-20 NOTE — ED PROVIDER NOTES
[address] ED  eMERGENCY dEPARTMENT eNCOUnter      Pt Name: Janay Ruelas  MRN: [id number]  [place] 1980  Date of evaluation: 10/20/21      CHIEF COMPLAINT:  Chief Complaint   Patient presents with    Abscess       HISTORY OF PRESENT ILLNESS    Janay Ruelas is a 46 y.o. female who presents to the emergency room complaining of abscess' to abdomen and groin area x 1 week. Pt states they are painful. No drainage. Pt denies fever, chills. States she has been nauseated with some emesis the past 3 days. Pt reports hx of boils. Has tried warm compresses. No other complaints. Nursing Notes were reviewed. REVIEW OF SYSTEMS       Constitutional:  Per HPI  Eyes: No visual changes. Neck: No neck pain. Respiratory: Denies recent shortness of breath. Cardiac:  Denies recent chest pain. GI:  Per HPI  Musculoskeletal: Denies focal weakness. Neurologic: Denies headache or focal weakness. Skin:  rash    Negative in 10 essential Systems except as mentioned above and in the HPI. PAST MEDICAL HISTORY   PMH:  has a past medical history of Anxiety, Arthritis, Asthma, Sadler's esophagus, Bipolar 1 disorder ([address] CAD (coronary artery disease), Chronic insomnia, COPD (chronic obstructive pulmonary disease) ([address] Depression, Diabetes mellitus ([address] Endometriosis, Esophagitis, GERD (gastroesophageal reflux disease), Headache(784.0), Herniated disc, cervical, Hiatal hernia, Incisional abscess, Kidney stones, MDRO (multiple drug resistant organisms) resistance, Pleurisy, Pneumonia, Seizures ([address] UTI (urinary tract infection), and Vision abnormalities. Surgical History:  has a past surgical history that includes knee surgery (Left);  section (, 2018); Tonsillectomy; Knee arthroscopy (Left, 5/20/15); Cervical disc surgery; Upper gastrointestinal endoscopy (2016); Upper gastrointestinal endoscopy (2017);  Upper gastrointestinal endoscopy (2017); pr esophagogastroduodenoscopy transoral diagnostic (N/A, 3/2/2017); laparoscopy; laparoscopy (N/A, 10/5/2017); Upper gastrointestinal endoscopy (N/A, 2018); Endoscopy, colon, diagnostic;  section (N/A, 2018); Upper gastrointestinal endoscopy (N/A, 10/1/2019); Colonoscopy (N/A, 10/1/2019); Colonoscopy (N/A, 2020); [place] tooth extraction; and Hysterectomy (N/A, 2020). Social History:  reports that she has been smoking cigarettes. She has a 10.50 pack-year smoking history. She has never used smokeless tobacco. She reports that she does not drink alcohol and does not use drugs. Family History: None  Psychiatric History: None    Allergies:is allergic to nsaids, toradol [ketorolac tromethamine], and ultram [tramadol]. PHYSICAL EXAM     INITIAL VITALS: /76   Pulse 87   Temp 97.6 °F (36.4 °C)   Resp 17   LMP 06/15/2020   SpO2 96%   Constitutional:  Well developed   Eyes:  Pupils equal and readily reactive to light  HENT:  Atraumatic, external ears normal, nose normal, oropharynx moist. Neck- supple   Respiratory:  Clear to auscultation bilaterally with good air exchange, no W/R/R  Cardiovascular:  RRR with normal S1 and S2  Gastrointestinal/Abdomen:  Soft, NT.  BS present. Musculoskeletal:  No edema, no tenderness, no deformities. Back:  No CVA tenderness. Normal to inspection. Integument:  2cm x 2cm area of fluctuance over right abdominal pannus. Mild surrounding erythema. No drainage. No induration. No streaking. 1cm x 1cm scabbed area with mild surrounding erythema over left upper inner thigh. No vaginal invovlement. No streaking. No fluctuance or induration. No discharge.    Neurologic:  Alert & oriented x 3, no focal deficits noted       DIAGNOSTIC RESULTS     EKG: All EKG's are interpreted by the Emergency Department Physician who either signs or Co-signs this chart in the absence of a cardiologist.  Not indicated    RADIOLOGY:   Reviewed the radiologist:  No orders to display     Not indicated      LABS:  Labs Reviewed - No data to display      EMERGENCY DEPARTMENT COURSE:   -------------------------  Incision/Drainage    Date/Time: 10/20/2021 5:01 PM  Performed by: Saturnino Tellez PA-C  Authorized by: Jaz Newsome MD     Consent:     Consent obtained:  Verbal    Consent given by:  Patient    Risks discussed:  Bleeding, incomplete drainage and pain    Alternatives discussed:  Alternative treatment  Location:     Type:  Abscess    Location:  Trunk    Trunk location:  Abdomen  Pre-procedure details:     Skin preparation:  Antiseptic wash  Anesthesia (see MAR for exact dosages): Anesthesia method:  Local infiltration    Local anesthetic:  Lidocaine 1% w/o epi  Procedure type:     Complexity:  Simple  Procedure details:     Incision types:  Single straight    Scalpel blade:  11    Wound management:  Probed and deloculated    Drainage:  Bloody and purulent    Drainage amount: Moderate    Wound treatment:  Wound left open    Packing materials:  None  Post-procedure details:     Patient tolerance of procedure: Tolerated well, no immediate complications        Pt requesting pain medications. Allergic to nsaids. Offered tylenol. She became upset. Do not feel narcotics are indicated at this time. Will start on abx. Recommend warm compresses. Follow up with PCP. Discussed results and plan with the pt. They expressed appropriate understanding. Pt given close follow up, supportive care instructions and strict return instructions at the bedside. Patient was given oral antibiotics for bacterial coverage and both verbal and written instructions to follow up to ED or Family Doctor in two days for recheck and/or packing change. Was instructed to return for any worsening of the abscess or surrounding cellulitis.     Orders Placed This Encounter   Medications    lidocaine 1 % injection 20 mL    acetaminophen (TYLENOL) tablet 1,000 mg    sulfamethoxazole-trimethoprim (BACTRIM DS) 800-160 MG per tablet     Sig: Take 1 tablet by mouth 2 times daily for 10 days     Dispense:  20 tablet     Refill:  0    cephALEXin (KEFLEX) 500 MG capsule     Sig: Take 1 capsule by mouth 4 times daily for 10 days     Dispense:  40 capsule     Refill:  0       CONSULTS:  None      FINAL IMPRESSION      1. Abscess    2.  Cellulitis of left lower extremity          DISPOSITION/PLAN:  DISPOSITION Decision To Discharge 10/20/2021 04:51:54 PM        PATIENT REFERRED TO:  KATIE Beasley - CNP  [facility] [address] [place] [id number]  [phone]          DISCHARGE MEDICATIONS:  New Prescriptions    CEPHALEXIN (KEFLEX) 500 MG CAPSULE    Take 1 capsule by mouth 4 times daily for 10 days    SULFAMETHOXAZOLE-TRIMETHOPRIM (BACTRIM DS) 800-160 MG PER TABLET    Take 1 tablet by mouth 2 times daily for 10 days       (Please note that portions of this note were completed with a voice recognition program.  Efforts were made to edit the dictations but occasionally words are mis-transcribed.)    Gurpreet Marquez [address] PA-C  10/20/21 1464

## 2021-10-21 ENCOUNTER — TELEPHONE (OUTPATIENT)
Dept: FAMILY MEDICINE CLINIC | Age: 41
End: 2021-10-21

## 2021-10-21 NOTE — TELEPHONE ENCOUNTER
[facility] ([facility]) ED Follow up Call    Reason for ED visit:  Abscess      10/21/2021         FU appts/Provider:    Future Appointments   Date Time Provider Wesley Smith   12/16/2021 10:00 AM DO TAWNYA Martinez NEURO [facility]   1/3/2022  3:00 PM KATIE Cole - CNP fp sc [facility]       Pt phone rings then goes busy

## 2021-10-21 NOTE — ED PROVIDER NOTES
eMERGENCY dEPARTMENT eNCOUnter   Independent Attestation     Pt Name: Janay Ruelas  MRN: [id number]  [place] 1980  Date of evaluation: 10/20/21     Janay Ruelas is a 46 y.o. female with CC: Abscess      Based on the medical record the care appears appropriate. I was personally available for consultation in the Emergency Department. The care is provided during an unprecedented national emergency due to the novel coronavirus, COVID 19.     Bethel Tobias MD  Attending Emergency Physician                    Bethel Tobias MD  10/20/21 2100

## 2021-10-22 DIAGNOSIS — G43.019 INTRACTABLE MIGRAINE WITHOUT AURA AND WITHOUT STATUS MIGRAINOSUS: Primary | ICD-10-CM

## 2021-10-22 RX ORDER — PROMETHAZINE HYDROCHLORIDE 6.25 MG/5ML
12.5 SYRUP ORAL 4 TIMES DAILY PRN
Qty: 120 ML | Refills: 0 | Status: SHIPPED | OUTPATIENT
Start: 2021-10-22 | End: 2022-01-11 | Stop reason: SDUPTHER

## 2021-10-28 DIAGNOSIS — R73.03 PREDIABETES: ICD-10-CM

## 2021-10-28 RX ORDER — BLOOD SUGAR DIAGNOSTIC
STRIP MISCELLANEOUS
Qty: 50 EACH | Refills: 5 | Status: SHIPPED | OUTPATIENT
Start: 2021-10-28 | End: 2021-11-01

## 2021-10-29 RX ORDER — METHOCARBAMOL 750 MG/1
750 TABLET, FILM COATED ORAL
Qty: 160 TABLET | Refills: 1 | Status: SHIPPED | OUTPATIENT
Start: 2021-10-29 | End: 2022-01-22

## 2021-11-01 DIAGNOSIS — R73.03 PREDIABETES: ICD-10-CM

## 2021-11-01 DIAGNOSIS — J44.9 COPD WITH ASTHMA (HCC): ICD-10-CM

## 2021-11-01 DIAGNOSIS — E55.9 VITAMIN D DEFICIENCY: ICD-10-CM

## 2021-11-01 RX ORDER — IPRATROPIUM BROMIDE AND ALBUTEROL SULFATE 2.5; .5 MG/3ML; MG/3ML
SOLUTION RESPIRATORY (INHALATION)
Qty: 360 ML | Refills: 2 | Status: SHIPPED | OUTPATIENT
Start: 2021-11-01 | End: 2021-11-28

## 2021-11-01 RX ORDER — MONTELUKAST SODIUM 10 MG/1
TABLET ORAL
Qty: 90 TABLET | Refills: 0 | Status: SHIPPED | OUTPATIENT
Start: 2021-11-01 | End: 2022-01-24

## 2021-11-01 RX ORDER — BLOOD SUGAR DIAGNOSTIC
STRIP MISCELLANEOUS
Qty: 50 EACH | Refills: 5 | Status: ON HOLD | OUTPATIENT
Start: 2021-11-01 | End: 2022-05-03 | Stop reason: HOSPADM

## 2021-11-01 RX ORDER — MELATONIN
Qty: 30 TABLET | Refills: 3 | Status: SHIPPED | OUTPATIENT
Start: 2021-11-01 | End: 2022-03-24

## 2021-11-05 ENCOUNTER — TELEPHONE (OUTPATIENT)
Dept: FAMILY MEDICINE CLINIC | Age: 41
End: 2021-11-05

## 2021-11-05 NOTE — TELEPHONE ENCOUNTER
----- Message from Claudia Barnett sent at 11/5/2021  1:58 PM EDT -----  Subject: Appointment Request    Reason for Call: Urgent (Patient Request) ED Follow Up Visit    QUESTIONS  Type of Appointment? Established Patient  Reason for appointment request? Available appointments did not meet   patient need  Additional Information for Provider? Pt was seen in ED on 11/4/21 has   covid symptoms but was tested at the hospital and was negative. Needs a   f/u visit, Pt states she is not feeling better. No appts available soon,   pt also needs a Dr note for work. Please call pt to schedule appt.  ---------------------------------------------------------------------------  --------------  CALL BACK INFO  What is the best way for the office to contact you? OK to leave message on   voicemail  Preferred Call Back Phone Number? [phone]  ---------------------------------------------------------------------------  --------------  SCRIPT ANSWERS  Relationship to Patient? Self  (Patient requests to see provider urgently. )? Yes  Do you have any questions for your primary care provider that need to be   answered prior to your appointment? No  Have you been diagnosed with, awaiting test results for, or told that you   are suspected of having COVID-19 (Coronavirus)? (If patient has tested   negative or was tested as a requirement for work, school, or travel and   not based on symptoms, answer no)? No  Within the past two weeks have you developed any of the following symptoms   (answer no if symptoms have been present longer than 2 weeks or began   more than 2 weeks ago)? Fever or Chills, Cough, Shortness of breath or   difficulty breathing, Loss of taste or smell, Sore throat, Nasal   congestion, Sneezing or runny nose, Fatigue or generalized body aches   (answer no if pain is specific to a body part e.g. back pain), Diarrhea,   Headache?  Yes

## 2021-11-18 ENCOUNTER — OFFICE VISIT (OUTPATIENT)
Dept: NEUROSURGERY | Age: 41
End: 2021-11-18
Payer: COMMERCIAL

## 2021-11-18 ENCOUNTER — HOSPITAL ENCOUNTER (OUTPATIENT)
Dept: GENERAL RADIOLOGY | Facility: CLINIC | Age: 41
Discharge: HOME | End: 2021-11-20
Payer: COMMERCIAL

## 2021-11-18 ENCOUNTER — HOSPITAL ENCOUNTER (OUTPATIENT)
Facility: CLINIC | Age: 41
Discharge: HOME | End: 2021-11-20
Payer: COMMERCIAL

## 2021-11-18 VITALS
SYSTOLIC BLOOD PRESSURE: 138 MMHG | BODY MASS INDEX: 40.24 KG/M2 | WEIGHT: 220 LBS | TEMPERATURE: 97.6 F | DIASTOLIC BLOOD PRESSURE: 79 MMHG | RESPIRATION RATE: 16 BRPM | HEART RATE: 77 BPM

## 2021-11-18 DIAGNOSIS — M47.12 CERVICAL SPONDYLOSIS WITH MYELOPATHY: ICD-10-CM

## 2021-11-18 DIAGNOSIS — G95.9 CERVICAL MYELOPATHY (HCC): Primary | ICD-10-CM

## 2021-11-18 DIAGNOSIS — G95.9 CERVICAL MYELOPATHY (HCC): ICD-10-CM

## 2021-11-18 PROCEDURE — 4004F PT TOBACCO SCREEN RCVD TLK: CPT | Performed by: NEUROLOGICAL SURGERY

## 2021-11-18 PROCEDURE — 72052 X-RAY EXAM NECK SPINE 6/>VWS: CPT

## 2021-11-18 PROCEDURE — G8417 CALC BMI ABV UP PARAM F/U: HCPCS | Performed by: NEUROLOGICAL SURGERY

## 2021-11-18 PROCEDURE — G8484 FLU IMMUNIZE NO ADMIN: HCPCS | Performed by: NEUROLOGICAL SURGERY

## 2021-11-18 PROCEDURE — 99214 OFFICE O/P EST MOD 30 MIN: CPT | Performed by: NEUROLOGICAL SURGERY

## 2021-11-18 PROCEDURE — G8427 DOCREV CUR MEDS BY ELIG CLIN: HCPCS | Performed by: NEUROLOGICAL SURGERY

## 2021-11-18 NOTE — PROGRESS NOTES
of \"years ago\"    Pneumonia     past penumonia    Seizures ([address]     2016 last seizure-focal seizure    UTI (urinary tract infection)     Vision abnormalities     wears glasses       Past Surgical History:        Procedure Laterality Date    CERVICAL DISC SURGERY      x2     SECTION  , 2018    x 2     SECTION N/A 2018     SECTION performed by Darya Deleon MD at [facility] L&D OR    COLONOSCOPY N/A 10/1/2019    COLONOSCOPY DIAGNOSTIC ABORTED performed by Nicolas Cabrera MD at [facility] N/A 2020    COLONOSCOPY WITH BIOPSY performed by Nicolas Cabrera MD at [address] [place] [address], COLON, DIAGNOSTIC      HYSTERECTOMY N/A 2020    HYSTERECTOMY ABDOMINAL LAPAROSCOPIC ROBOTIC XI W/ CYSTOSCOPY performed by Darya Deleon MD at [address] [place] [address] ARTHROSCOPY Left 5/20/15    KNEE SURGERY Left     x3    LAPAROSCOPY      dr Butch Hyatt N/A 10/5/2017    LAPAROSCOPIC REMOVAL INTRA ABDOMINAL LIPOMA LOWER RIGHT performed by Jennifer Starr DO at [facility] ESOPHAGOGASTRODUODENOSCOPY TRANSORAL DIAGNOSTIC N/A 3/2/2017    EGD ESOPHAGOGASTRODUODENOSCOPY  IP  performed by Nina Garland MD at [facility]  2016    severe esophagitis    UPPER GASTROINTESTINAL ENDOSCOPY  2017    barretts; hiatus hernia; gastritis    UPPER GASTROINTESTINAL ENDOSCOPY  2017    long seg mullins's with linear erosions, esophagitis, small hiatal hernia    UPPER GASTROINTESTINAL ENDOSCOPY N/A 2018    MULLINS'S    UPPER GASTROINTESTINAL ENDOSCOPY N/A 10/1/2019    EGD BIOPSY performed by Nicolas Cabrera MD at [facility] History:   Social History     Socioeconomic History    Marital status: Single     Spouse name: Not on file    Number of children: 1    Years of education: 11th grade    Highest education level: Not on file   Occupational History    Occupation: unemployed-   Tobacco Use    Smoking status: Current Every Day Smoker     Packs/day: 0.50     Years: 21.00     Pack years: 10.50     Types: Cigarettes    Smokeless tobacco: Never Used   Vaping Use    Vaping Use: Every day   Substance and Sexual Activity    Alcohol use: No    Drug use: No    Sexual activity: Yes     Partners: Male   Other Topics Concern    Not on file   Social History Narrative    Lives with son and boyfriend     Social Determinants of Health     Financial Resource Strain: Low Risk     Difficulty of Paying Living Expenses: Not hard at all   Food Insecurity: No Food Insecurity    Worried About Running Out of Food in the Last Year: Never true    [address] in the Last Year: Never true   Transportation Needs:     Lack of Transportation (Medical): Not on file    Lack of Transportation (Non-Medical):  Not on file   Physical Activity:     Days of Exercise per Week: Not on file    Minutes of Exercise per Session: Not on file   Stress:     Feeling of Stress : Not on file   Social Connections:     Frequency of Communication with Friends and Family: Not on file    Frequency of Social Gatherings with Friends and Family: Not on file    Attends Christian Services: Not on file    Active Member of [address] [place] [address] [place] or Organizations: Not on file    Attends Club or Organization Meetings: Not on file    Marital Status: Not on file   Intimate Partner Violence:     Fear of Current or Ex-Partner: Not on file    Emotionally Abused: Not on file    Physically Abused: Not on file    Sexually Abused: Not on file   Housing Stability:     Unable to Pay for Housing in the Last Year: Not on file    Number of [place] in the Last Year: Not on file    Unstable Housing in the Last Year: Not on file       Family History:       Problem Relation Age of Onset    Cancer Mother         breast    Bipolar Disorder Mother     Hypertension Mother     Breast Cancer Mother     Bipolar Disorder Brother  Hypertension Father        Allergies:  Nsaids, Toradol [ketorolac tromethamine], and Ultram [tramadol]    Home Medications:  Prior to Admission medications    Medication Sig Start Date End Date Taking? Authorizing Provider   ONETOUCH VERIO strip USE TO TEST BLOOD SUGAR TWICE A DAY 11/1/21  Yes KATIE [place] CNP   ipratropium-albuterol (DUONEB) 0.5-2.5 (3) MG/3ML SOLN nebulizer solution INHALE CONTENTS OF 1 UNIT DOSE VIA NEBULIZER EVERY 4 HOURS 11/1/21  Yes KATIE [place] CNP   vitamin D3 (CHOLECALCIFEROL) 25 MCG (1000 UT) TABS tablet TAKE 1 TABLET BY MOUTH DAILY 11/1/21  Yes KATIE [place] CNP   montelukast (SINGULAIR) 10 MG tablet TAKE 1 TABLET IN THE EVENING ONCE A DAY ORALLY 11/1/21  Yes KATIE [place] CNP   methocarbamol (ROBAXIN) 750 MG tablet TAKE 1 TABLET BY MOUTH EVERY 6-8 HOURS AS NEEDED (NECK SPASM/PAIN) 10/29/21 12/28/21 Yes Terrell Guerra DO   promethazine (PHENERGAN) 6.25 MG/5ML syrup Take 10 mLs by mouth 4 times daily as needed for Nausea (diarrhea) 10/22/21  Yes KATIE [place] CNP   BREO ELLIPTA 200-25 MCG/INH AEPB inhaler INHALE 1 PUFF INTO THE LUNGS DAILY 10/11/21  Yes KATIE [place] CNP   methylPREDNISolone (MEDROL DOSEPACK) 4 MG tablet Take by mouth. 9/30/21  Yes KATIE [place] CNP   COMBIVENT RESPIMAT  MCG/ACT AERS inhaler Inhale 1 puff into the lungs 4 times daily Inhale 1 puff into the lungs 4 times daily 9/22/21  Yes Tio Chin MD   dicyclomine (BENTYL) 10 MG capsule TAKE 1 CAPSULE BY MOUTH 4 TIMES DAILY 8/27/21  Yes KATIE [place] CNP   loratadine (CLARITIN) 10 MG tablet TAKE 1 TABLET BY MOUTH DAILY 7/6/21  Yes RenKATIE Cabral CNP   pantoprazole (PROTONIX) 40 MG tablet 1 tab daily 6/30/21  Yes KATIE [place] CNP   glucose monitoring kit (FREESTYLE) monitoring kit Use as directed. BRAND OF CHOICE INSURANCE ALLOWS. 5/27/21  Yes KATIE [place] CNP   Misc. Devices (PLASTIC BED PAN) MISC Use daily with hot water 5/27/21  Yes KATIE [place] CNP   Lancets (ONETOUCH DELICA PLUS [id number]) MISC USE TO TEST BLOOD SUGAR TWICE A DAY 5/27/21  Yes KATIE [place] CNP   Alcohol Swabs (HM STERILE ALCOHOL PREP) PADS USE AS DIRECTED 5/10/21  Yes KATIE [place] CNP   sertraline (ZOLOFT) 50 MG tablet Take 1 tablet by mouth daily 1/19/21  Yes Historical Provider, MD   lamoTRIgine (LAMICTAL) 150 MG tablet Take 1 tablet by mouth nightly  10/28/20  Yes Historical Provider, MD   Masks (CLEVER CHOICE FACE MASK) MISC USE NEW FACE MASK EVERYDAY WHEN PATIENT GO OUTSIDE OF HOME DUE TO COVID PANDEMIC 7/6/20  Yes KATIE [place] CNP   acetaminophen (TYLENOL) 500 MG tablet Take 500 mg by mouth every 6 hours as needed for Pain   Yes Historical Provider, MD   pregabalin (LYRICA) 100 MG capsule Take 1 tid 7/19/21 10/3/21  KATIE [place] CNP   fluticasone (FLONASE) 50 MCG/ACT nasal spray 1 spray by Each Nostril route daily 4/1/21 5/1/21  KATIE [place] CNP   LINZESS 290 MCG CAPS capsule TAKE 1 CAPSULE BY MOUTH EVERY MORNING (BEFORE BREAKFAST) 3/3/21   Ferdinand Gurrola MD   vitamin D3 (CHOLECALCIFEROL) 25 MCG (1000 UT) TABS tablet TAKE 1 TABLET BY MOUTH DAILY 8/27/20   KATIE [place] CNP       Current Medications:   No current facility-administered medications for this visit. PHYSICAL EXAM:       /79   Pulse 77   Temp 97.6 °F (36.4 °C)   Resp 16   Wt 220 lb (99.8 kg)   LMP 06/15/2020   BMI 40.24 kg/m²   Physical Exam     Gen: NAD  HEENT: moist mucus membranes  Cardio: RRR  Pulm: chest rise symmetrically  GI: abd soft  Ext: no edema  Skin: warm    Neuro:    AOX3  CN 2-12 grossly intact  Speech articulate  No pronator drift  Sensation symmetrical   Right zazueta's sign  Motor 5/5 except:   4+/5 wrist extension right        Radiology Review:  None new.        ASSESSMENT AND PLAN:       Patient Active Problem List Diagnosis    Asthma    GERD    Iron deficiency anemia    Cervical disc herniation    Smoker    Sadler's esophagus without dysplasia    Hiatal hernia    COPD    Chronic constipation    Hx of IUFD (G2)    History of gestational hypertension    Seizure ([address]    Hx of  x2 (G3, G4)    Arthritis    Cervical radiculopathy    Migraine without aura    Spinal stenosis in cervical region    Prediabetes    Chronic pelvic pain in female    Enlarged uterus    Endometriosis    Family history of breast cancer    Bipolar affective disorder, currently depressed, moderate (HCC)    Insomnia    Ulnar neuropathy    Major depressive disorder, recurrent episode ([address]    Sciatica    Moderate persistent asthma without complication    Lipoma of torso    History of cervical discectomy    Chronic neck pain with abnormal neurologic examination    Abnormal weight gain     Class 2 drug-induced obesity with serious comorbidity and body mass index (BMI) of 37.0 to 37.9 in adult    Pelvic peritoneal endometriosis    RALH with BS and cystoscopy 2020    Post-op pain    Wound infection/hemorrhage, obstetric surgical, postpartum condition    Postoperative visit    Postoperative abdominal pain    Diverticulitis    Diverticulitis of colon    Pelvic abscess in female    Pneumonia    Right otitis media    Functional diarrhea    Kidney stones    Cervical spondylosis with myelopathy    Cervical myelopathy (HCC)         A/P:  This is a 46 y.o. female with Cervical myelopathy ([address]  -     MRI CERVICAL SPINE WO CONTRAST; Future  -     XR CERVICAL SPINE W OBLIQUES FLEXION AND EXTENSION; Future  Cervical spondylosis with myelopathy  -     XR CERVICAL SPINE W OBLIQUES FLEXION AND EXTENSION; Future     Cervical myelopathy with C5 radiculopathy, of right side.   The prior fusion of C3-4 and C5-6 resulting likely C4-5 disease    Overall, patient is experiencing gradually worsening symptoms one year post previous cervical fusion surgery in 2018 which includes radiating burning pain and numbness from the right shoulder into the lateral forearm and 1st and 2nd digit, radiating burning pain from the cervical region down the spine, and loss of strength and clumsiness of the right hand. I thoroughly discussed details of diagnosis, natural histories of disease, and risks, benefits and outcomes of surgical intervention. I used their imagings to depict the surgery and diagnosis to the patient. I will order XR of cervical spine W obliques, flexion and extension. I will order MRI cervical spine WO contrast.   I will follow up with her in 4 weeks when orders are completed. By signing my name below, Malaika Manzanares DO, attest that this documentation has been prepared under the direction and in the presence of Tenisha Mcrae DO. Electronically signed: Corin Palmer DO, Scribe, 11/18/21     I, Tucker Zamudio, personally performed the services described in this documentation. All medical record entries made by the scribe were at my direction and in my presence. I have reviewed the chart and discharge instructions and agree that the records reflect my personal performance and is accurate and complete. Tucker Zamudio DO. Board certified neurosurgeon 11/18/21       This note was created using voice recognition software. There may be inaccuracies of transcription  that are inadvertently overlooked prior to the signature. There is any questions about the transcription please contact me.

## 2021-11-25 DIAGNOSIS — J44.9 COPD WITH ASTHMA (HCC): ICD-10-CM

## 2021-11-26 NOTE — TELEPHONE ENCOUNTER
Please Approve or Refuse.   Send to Pharmacy per Pt's Request:      Next Visit Date:  1/3/2022   Last Visit Date: 9/30/2021    Hemoglobin A1C (%)   Date Value   09/30/2021 5.2   06/10/2020 5.7   05/21/2017 5.3             ( goal A1C is < 7)   BP Readings from Last 3 Encounters:   11/18/21 138/79   10/20/21 130/76   10/03/21 (!) 142/74          (goal 120/80)  BUN   Date Value Ref Range Status   10/03/2021 12 6 - 20 mg/dL Final     CREATININE   Date Value Ref Range Status   10/03/2021 0.58 0.50 - 0.90 mg/dL Final     Potassium   Date Value Ref Range Status   10/03/2021 3.8 3.7 - 5.3 mmol/L Final

## 2021-11-28 RX ORDER — IPRATROPIUM BROMIDE AND ALBUTEROL SULFATE 2.5; .5 MG/3ML; MG/3ML
SOLUTION RESPIRATORY (INHALATION)
Qty: 360 ML | Refills: 2 | Status: SHIPPED | OUTPATIENT
Start: 2021-11-28 | End: 2022-02-21

## 2021-11-29 ENCOUNTER — HOSPITAL ENCOUNTER (OUTPATIENT)
Dept: MRI IMAGING | Facility: CLINIC | Age: 41
Discharge: HOME | End: 2021-12-01
Payer: COMMERCIAL

## 2021-11-29 DIAGNOSIS — G95.9 CERVICAL MYELOPATHY (HCC): ICD-10-CM

## 2021-11-29 PROCEDURE — 72141 MRI NECK SPINE W/O DYE: CPT

## 2021-12-16 ENCOUNTER — OFFICE VISIT (OUTPATIENT)
Dept: NEUROSURGERY | Age: 41
End: 2021-12-16
Payer: COMMERCIAL

## 2021-12-16 ENCOUNTER — TELEPHONE (OUTPATIENT)
Dept: NEUROSURGERY | Age: 41
End: 2021-12-16

## 2021-12-16 VITALS
BODY MASS INDEX: 40.48 KG/M2 | OXYGEN SATURATION: 97 % | TEMPERATURE: 97.5 F | SYSTOLIC BLOOD PRESSURE: 135 MMHG | RESPIRATION RATE: 16 BRPM | HEART RATE: 90 BPM | WEIGHT: 220 LBS | HEIGHT: 62 IN | DIASTOLIC BLOOD PRESSURE: 85 MMHG

## 2021-12-16 DIAGNOSIS — Z98.1 S/P CERVICAL SPINAL FUSION: ICD-10-CM

## 2021-12-16 DIAGNOSIS — G44.86 CERVICOGENIC HEADACHE: Primary | ICD-10-CM

## 2021-12-16 DIAGNOSIS — M47.812 CERVICAL SPONDYLOSIS WITHOUT MYELOPATHY: ICD-10-CM

## 2021-12-16 PROCEDURE — G8484 FLU IMMUNIZE NO ADMIN: HCPCS | Performed by: NEUROLOGICAL SURGERY

## 2021-12-16 PROCEDURE — G8427 DOCREV CUR MEDS BY ELIG CLIN: HCPCS | Performed by: NEUROLOGICAL SURGERY

## 2021-12-16 PROCEDURE — 4004F PT TOBACCO SCREEN RCVD TLK: CPT | Performed by: NEUROLOGICAL SURGERY

## 2021-12-16 PROCEDURE — 99214 OFFICE O/P EST MOD 30 MIN: CPT | Performed by: NEUROLOGICAL SURGERY

## 2021-12-16 PROCEDURE — G8417 CALC BMI ABV UP PARAM F/U: HCPCS | Performed by: NEUROLOGICAL SURGERY

## 2021-12-16 NOTE — PROGRESS NOTES
Department of Neurosurgery                                                      Follow up visit      History Obtained From:  patient    CHIEF COMPLAINT:         Chief Complaint   Patient presents with    Follow-up     4 week follow up       HISTORY OF PRESENT ILLNESS:       The patient is a 46 y.o. female who presents for follow up for cervical myelopathy and cervical spondylosis with myelopathy with surgical history of C3-4 ACDF and C5-6 ACDF. She reports severe headaches radiating from neck onset with sometimes with nausea. She has seen a headache specialist where she was prescribed Lyrica. Neck pain is constant throughout the day. Burning radiating pain down spine. Pain radiates from neck into right shoulder down the entirety of arm into first two digits. Pain is so severe, it makes her unable to sleep at night. Pain in neck is about 85% and arm is about 15%. She does not want to undergo injections via pain management after a negative reaction to cortisone shot in foot. She has a fear of needles in her neck. She has tried cortisone and heating pads which did not relieve pain.        PAST MEDICAL HISTORY :       Past Medical History:        Diagnosis Date    Anxiety     Arthritis     OA    Asthma     Sadler's esophagus     LONG SEGMENT    Bipolar 1 disorder [address]     CAD (coronary artery disease)     Chronic insomnia     [facility]     Depression     and bipolar    Diabetes mellitus ([facility])     prediabetic    Endometriosis 3/9/2020    Esophagitis     severe    GERD (gastroesophageal reflux disease)     Headache(784.0)     Herniated disc, cervical     Hiatal hernia     Incisional abscess 1/15/2019    Kidney stones     MDRO (multiple drug resistant organisms) resistance     mrsa    Pleurisy     hx of \"years ago\"    Pneumonia     past penumonia    Seizures ([facility]     2016 last seizure-focal seizure    UTI (urinary tract infection)     Vision abnormalities     wears glasses       Past Surgical History:        Procedure Laterality Date    CERVICAL [address] SURGERY      x2     SECTION  , 2018    x 2     SECTION N/A 2018     SECTION performed by Sharad Willson MD at [facility] L[facility]D OR    COLONOSCOPY N/A 10/1/2019    COLONOSCOPY DIAGNOSTIC ABORTED performed by Gretchen Conklin MD at [address] [facility] N/A 2020    COLONOSCOPY WITH BIOPSY performed by Gretchen Conklin MD at [address] [place] [address], COLON, DIAGNOSTIC      HYSTERECTOMY N/A 2020    HYSTERECTOMY ABDOMINAL LAPAROSCOPIC ROBOTIC XI W/ CYSTOSCOPY performed by Sharad Willson MD at [address] [facility] ARTHROSCOPY Left 5/20/15    KNEE SURGERY Left     x3    LAPAROSCOPY      dr Wenceslao Yu N/A 10/5/2017    LAPAROSCOPIC REMOVAL INTRA ABDOMINAL LIPOMA LOWER RIGHT performed by Robert Putnam DO at [facility] ESOPHAGOGASTRODUODENOSCOPY TRANSORAL DIAGNOSTIC N/A 3/2/2017    EGD ESOPHAGOGASTRODUODENOSCOPY  IP 5 [facility]  2016    severe esophagitis    UPPER GASTROINTESTINAL ENDOSCOPY  2017    barretts; hiatus hernia; gastritis    UPPER GASTROINTESTINAL ENDOSCOPY  2017    long seg mullins's with linear erosions, esophagitis, small hiatal hernia    UPPER GASTROINTESTINAL ENDOSCOPY N/A 2018    MULLINS'S    UPPER GASTROINTESTINAL ENDOSCOPY N/A 10/1/2019    EGD BIOPSY performed by [facility] Con[facility]lin MD at [facility] History:   Social History     Socioeconomic History    Marital status: Single     Spouse name: Not on file    Number of children: 1    Years of education: 11th grade    Highest education level: Not on file   Occupational History    Occupation: unemployed-   Tobacco Use    Smoking status: Current Every Day Smoker     Packs/day: 0.50     Years: 21.00     Pack years: 10.50     Types: Cigarettes    Smokeless tobacco: Never Used   Vaping Use    Vaping Use: Every day   Substance and Sexual Activity    Alcohol use: No    Drug use: No    Sexual activity: Yes     Partners: Male   Other Topics Concern    Not on file   Social History Narrative    Lives with son and boyfriend     Social Determinants of Health     Financial Resource Strain: Low Risk     Difficulty of Paying Living Expenses: Not hard at all   Food Insecurity: No Food Insecurity    Worried About Running Out of Food in the Last Year: Never true    Phillip of Food in the Last Year: Never true   Transportation Needs:     Lack of Transportation (Medical): Not on file    Lack of Transportation (Non-Medical):  Not on file   Physical Activity:     Days of Exercise per Week: Not on file    Minutes of Exercise per Session: Not on file   Stress:     Feeling of Stress : Not on file   Social Connections:     Frequency of Communication with Friends and Family: Not on file    Frequency of Social Gatherings with Friends and Family: Not on file    Attends Mandaeism Services: Not on file    Active Member of Clubs or Organizations: Not on file    Attends Club or Organization Meetings: Not on file    Marital Status: Not on file   Intimate Partner Violence:     Fear of Current or Ex-Partner: Not on file    Emotionally Abused: Not on file    Physically Abused: Not on file    Sexually Abused: Not on file   Housing Stability:     Unable to Pay for Housing in the Last Year: Not on file    Number of [place] in the Last Year: Not on file    Unstable Housing in the Last Year: Not on file       Family History:       Problem Relation Age of Onset    Cancer Mother         breast    Bipolar Disorder Mother     Hypertension Mother     Breast Cancer Mother     Bipolar Disorder Brother     Hypertension Father        Allergies:  Nsaids, Toradol [ketorolac tromethamine], and Ultram [tramadol]    Home Medications:  Prior to Admission medications    Medication Sig Start Date End Date Taking? Authorizing Provider   ipratropium-albuterol (DUONEB) 0.5-2.5 (3) MG/3ML SOLN nebulizer solution INHALE CONTENTS OF 1 UNIT DOSE VIA NEBULIZER EVERY 4 HOURS 11/28/21  Yes KATIE [place] CNP   ONETOUCH VERIO strip USE TO TEST BLOOD SUGAR TWICE A DAY 11/1/21  Yes KATIE Cole CNP   vitamin D3 (CHOLECALCIFEROL) 25 MCG (1000 UT) TABS tablet TAKE 1 TABLET BY MOUTH DAILY 11/1/21  Yes KATIE Cole CNP   montelukast (SINGULAIR) 10 MG tablet TAKE 1 TABLET IN THE EVENING ONCE A DAY ORALLY 11/1/21  Yes KATIE Cole CNP   methocarbamol (ROBAXIN) 750 MG tablet TAKE 1 TABLET BY MOUTH EVERY 6-8 HOURS AS NEEDED (NECK SPASM/PAIN) 10/29/21 12/28/21 Yes Crescencio Acosta DO   promethazine (PHENERGAN) 6.25 MG/5ML syrup Take 10 mLs by mouth 4 times daily as needed for Nausea (diarrhea) 10/22/21  Yes KATIE [place] CNP   BREO ELLIPTA 200-25 MCG/INH AEPB inhaler INHALE 1 PUFF INTO THE LUNGS DAILY 10/11/21  Yes KATIE Cole CNP   methylPREDNISolone (MEDROL DOSEPACK) 4 MG tablet Take by mouth. 9/30/21  Yes KATIE Cole CNP   COMBIVENT RESPIMAT  MCG/ACT AERS inhaler Inhale 1 puff into the lungs 4 times daily Inhale 1 puff into the lungs 4 times daily 9/22/21  Yes Monisha Guaman MD   dicyclomine (BENTYL) 10 MG capsule TAKE 1 CAPSULE BY MOUTH 4 TIMES DAILY 8/27/21  Yes KATIE Cole CNP   loratadine (CLARITIN) 10 MG tablet TAKE 1 TABLET BY MOUTH DAILY 7/6/21  Yes KATIE Cole CNP   pantoprazole (PROTONIX) 40 MG tablet 1 tab daily 6/30/21  Yes KATIE Cole CNP   glucose monitoring kit (FREESTYLE) monitoring kit Use as directed. BRAND OF CHOICE INSURANCE ALLOWS. 5/27/21  Yes KATIE Cole CNP   Misc.  Devices (PLASTIC BED PAN) MISC Use daily with hot water 5/27/21  Yes KATIE Cole CNP   Lancets ([facility] DELICA PLUS [id number]) [facility] USE TO TEST BLOOD SUGAR TWICE A DAY 5/27/21  Yes KATIE Cole CNP   Alcohol Swabs ( STERILE ALCOHOL PREP) PADS USE AS DIRECTED 5/10/21  Yes KATIE Cole CNP   sertraline (ZOLOFT) 50 MG tablet Take 1 tablet by mouth daily 1/19/21  Yes Historical Provider, MD   lamoTRIgine (LAMICTAL) 150 MG tablet Take 1 tablet by mouth nightly  10/28/20  Yes Historical Provider, MD   Masks (CLEVER CHOICE FACE MASK) [facility] USE NEW FACE MASK EVERYDAY WHEN PATIENT GO OUTSIDE OF HOME DUE TO COVID PANDEMIC 7/6/20  Yes KATIE Cole CNP   acetaminophen (TYLENOL) 500 MG tablet Take 500 mg by mouth every 6 hours as needed for Pain   Yes Historical Provider, MD   pregabalin (LYRICA) 100 MG capsule Take 1 tid 7/19/21 10/3/21  KATIE Cole CNP   fluticasone (FLONASE) 50 MCG/ACT nasal spray 1 spray by Each Nostril route daily 4/1/21 5/1/21  KATIE Cole CNP   LINZESS 290 MCG CAPS capsule TAKE 1 CAPSULE BY MOUTH EVERY MORNING (BEFORE BREAKFAST) 3/3/21   Chas Boyd MD   vitamin D3 (CHOLECALCIFEROL) 25 MCG (1000 UT) TABS tablet TAKE 1 TABLET BY MOUTH DAILY 8/27/20   KATIE Cole CNP       Current Medications:   No current facility-administered medications for this visit. PHYSICAL EXAM:       /85   Pulse 90   Temp 97.5 °F (36.4 °C)   Resp 16   Ht 5' 2\" (1.575 m)   Wt 220 lb (99.8 kg)   LMP 06/15/2020   SpO2 97%   BMI 40.24 kg/m²   Physical Exam     Gen: NAD  HEENT: moist mucus membranes  Cardio: RRR  Pulm: chest rise symmetrically  GI: abd soft  Ext: no edema  Skin: warm    Neuro:    AOX3  CN 2-12 grossly intact  Speech articulate  Motor 5/5  No pronator drift  Sensation symmetrical   Attempted phalen's test but could not tolerate          Radiology Review:    MRI Cervical Spine WO Contrast   11/29/2021  Official Read:   1.  Status post ACDF at C3-4 and C5-6.  There appears to be solid fusion of  the vertebral bodies along the endplates. 2. Mild central canal stenoses at C4-5 and C6-7.  3. Mild neural foraminal stenoses from C3-4 to C6-7. XR Cervical Spine W Obliques Flexion and Extension   2021  Official Read:   Cervical spine alignment is anatomic.  Stable hardware anteriorly fusing C3-4  C5-6. no acute osseous abnormality.  Neural foramina patent bilaterally. Mild degenerative change with loss disc height endplate spondylosis M9-8. With extension there is approximately 1 mm of retrolisthesis C 4 relative to  C5.  Normal alignment on additional neutral on flexion views.  Prevertebral  soft tissues unremarkable.     My Read:     No significant stenosis, no spondylolisthesis    ASSESSMENT AND PLAN:       Patient Active Problem List   Diagnosis    Asthma    GERD    Iron deficiency anemia    Cervical disc herniation    Smoker    Sadler's esophagus without dysplasia    Hiatal hernia    COPD    Chronic constipation    Cervicogenic headache    Hx of IUFD (G2)    History of gestational hypertension    Seizure ([address]    Hx of  x2 (G3, G4)    Arthritis    Cervical radiculopathy    Migraine without aura    Spinal stenosis in cervical region    Prediabetes    Chronic pelvic pain in female    Enlarged uterus    Endometriosis    Family history of breast cancer    S/P cervical spinal fusion    Bipolar affective disorder, currently depressed, moderate (HCC)    Insomnia    Ulnar neuropathy    Major depressive disorder, recurrent episode ([address]    Sciatica    Moderate persistent asthma without complication    Lipoma of torso    History of cervical discectomy    Chronic neck pain with abnormal neurologic examination    Abnormal weight gain     Class 2 drug-induced obesity with serious comorbidity and body mass index (BMI) of 37.0 to 37.9 in adult    Pelvic peritoneal endometriosis    RALH with BS and cystoscopy 2020    Post-op pain    Wound infection/hemorrhage, obstetric and in my presence. I have reviewed the chart and discharge instructions and agree that the records reflect my personal performance and is accurate and complete. Giovanna Encinas DO. Board certified neurosurgeon 12/16/21       This note was created using voice recognition software. There may be inaccuracies of transcription  that are inadvertently overlooked prior to the signature. There is any questions about the transcription please contact me.

## 2021-12-30 ENCOUNTER — HOSPITAL ENCOUNTER (EMERGENCY)
Age: 41
Discharge: HOME | End: 2021-12-30
Attending: EMERGENCY MEDICINE
Payer: COMMERCIAL

## 2021-12-30 ENCOUNTER — APPOINTMENT (OUTPATIENT)
Dept: GENERAL RADIOLOGY | Age: 41
End: 2021-12-30
Payer: COMMERCIAL

## 2021-12-30 VITALS
DIASTOLIC BLOOD PRESSURE: 73 MMHG | TEMPERATURE: 98.2 F | OXYGEN SATURATION: 96 % | HEIGHT: 62 IN | BODY MASS INDEX: 40.48 KG/M2 | HEART RATE: 92 BPM | SYSTOLIC BLOOD PRESSURE: 126 MMHG | WEIGHT: 220 LBS | RESPIRATION RATE: 24 BRPM

## 2021-12-30 DIAGNOSIS — R10.84 GENERALIZED ABDOMINAL PAIN: Primary | ICD-10-CM

## 2021-12-30 LAB
-: ABNORMAL
ABSOLUTE EOS #: 0.1 K/UL (ref 0–0.4)
ABSOLUTE IMMATURE GRANULOCYTE: ABNORMAL K/UL (ref 0–0.3)
ABSOLUTE LYMPH #: 0.6 K/UL (ref 1–4.8)
ABSOLUTE MONO #: 0.5 K/UL (ref 0.1–1.3)
ALBUMIN SERPL-MCNC: 4.4 G/DL (ref 3.5–5.2)
ALBUMIN/GLOBULIN RATIO: NORMAL (ref 1–2.5)
ALP BLD-CCNC: 81 U/L (ref 35–104)
ALT SERPL-CCNC: 21 U/L (ref 5–33)
AMORPHOUS: ABNORMAL
ANION GAP SERPL CALCULATED.3IONS-SCNC: 14 MMOL/L (ref 9–17)
AST SERPL-CCNC: 22 U/L
BACTERIA: ABNORMAL
BASOPHILS # BLD: 1 % (ref 0–2)
BASOPHILS ABSOLUTE: 0.1 K/UL (ref 0–0.2)
BILIRUB SERPL-MCNC: 0.36 MG/DL (ref 0.3–1.2)
BILIRUBIN URINE: ABNORMAL
BUN BLDV-MCNC: 10 MG/DL (ref 6–20)
BUN/CREAT BLD: NORMAL (ref 9–20)
CALCIUM SERPL-MCNC: 9 MG/DL (ref 8.6–10.4)
CASTS UA: ABNORMAL /LPF
CHLORIDE BLD-SCNC: 104 MMOL/L (ref 98–107)
CO2: 21 MMOL/L (ref 20–31)
COLOR: YELLOW
COMMENT UA: ABNORMAL
CREAT SERPL-MCNC: 0.62 MG/DL (ref 0.5–0.9)
CRYSTALS, UA: ABNORMAL /HPF
DIFFERENTIAL TYPE: ABNORMAL
EOSINOPHILS RELATIVE PERCENT: 2 % (ref 0–4)
EPITHELIAL CELLS UA: ABNORMAL /HPF
GFR AFRICAN AMERICAN: >60 ML/MIN
GFR NON-AFRICAN AMERICAN: >60 ML/MIN
GFR SERPL CREATININE-BSD FRML MDRD: NORMAL ML/MIN/{1.73_M2}
GFR SERPL CREATININE-BSD FRML MDRD: NORMAL ML/MIN/{1.73_M2}
GLUCOSE BLD-MCNC: 96 MG/DL (ref 70–99)
GLUCOSE URINE: NEGATIVE
HCT VFR BLD CALC: 41.6 % (ref 36–46)
HEMOGLOBIN: 13.8 G/DL (ref 12–16)
IMMATURE GRANULOCYTES: ABNORMAL %
KETONES, URINE: ABNORMAL
LEUKOCYTE ESTERASE, URINE: NEGATIVE
LIPASE: 15 U/L (ref 13–60)
LYMPHOCYTES # BLD: 11 % (ref 24–44)
MCH RBC QN AUTO: 28.2 PG (ref 26–34)
MCHC RBC AUTO-ENTMCNC: 33.3 G/DL (ref 31–37)
MCV RBC AUTO: 84.7 FL (ref 80–100)
MONOCYTES # BLD: 9 % (ref 1–7)
MUCUS: ABNORMAL
NITRITE, URINE: NEGATIVE
NRBC AUTOMATED: ABNORMAL PER 100 WBC
OTHER OBSERVATIONS UA: ABNORMAL
PDW BLD-RTO: 14.4 % (ref 11.5–14.9)
PH UA: 5.5 (ref 5–8)
PLATELET # BLD: 282 K/UL (ref 150–450)
PLATELET ESTIMATE: ABNORMAL
PMV BLD AUTO: 8.4 FL (ref 6–12)
POTASSIUM SERPL-SCNC: 3.7 MMOL/L (ref 3.7–5.3)
PROTEIN UA: NEGATIVE
RBC # BLD: 4.91 M/UL (ref 4–5.2)
RBC # BLD: ABNORMAL 10*6/UL
RBC UA: ABNORMAL /HPF
RENAL EPITHELIAL, UA: ABNORMAL /HPF
SARS-COV-2, RAPID: NOT DETECTED
SEG NEUTROPHILS: 77 % (ref 36–66)
SEGMENTED NEUTROPHILS ABSOLUTE COUNT: 4.3 K/UL (ref 1.3–9.1)
SODIUM BLD-SCNC: 139 MMOL/L (ref 135–144)
SPECIFIC GRAVITY UA: 1.03 (ref 1–1.03)
SPECIMEN DESCRIPTION: NORMAL
TOTAL PROTEIN: 7.1 G/DL (ref 6.4–8.3)
TRICHOMONAS: ABNORMAL
TROPONIN INTERP: NORMAL
TROPONIN T: NORMAL NG/ML
TROPONIN, HIGH SENSITIVITY: <6 NG/L (ref 0–14)
TURBIDITY: CLEAR
URINE HGB: NEGATIVE
UROBILINOGEN, URINE: NORMAL
WBC # BLD: 5.5 K/UL (ref 3.5–11)
WBC # BLD: ABNORMAL 10*3/UL
WBC UA: ABNORMAL /HPF
YEAST: ABNORMAL

## 2021-12-30 PROCEDURE — 93005 ELECTROCARDIOGRAM TRACING: CPT | Performed by: STUDENT IN AN ORGANIZED HEALTH CARE EDUCATION/TRAINING PROGRAM

## 2021-12-30 PROCEDURE — 96374 THER/PROPH/DIAG INJ IV PUSH: CPT

## 2021-12-30 PROCEDURE — 74018 RADEX ABDOMEN 1 VIEW: CPT

## 2021-12-30 PROCEDURE — 2500000003 HC RX 250 WO HCPCS: Performed by: STUDENT IN AN ORGANIZED HEALTH CARE EDUCATION/TRAINING PROGRAM

## 2021-12-30 PROCEDURE — 99285 EMERGENCY DEPT VISIT HI MDM: CPT

## 2021-12-30 PROCEDURE — 94640 AIRWAY INHALATION TREATMENT: CPT

## 2021-12-30 PROCEDURE — 6360000002 HC RX W HCPCS

## 2021-12-30 PROCEDURE — 96375 TX/PRO/DX INJ NEW DRUG ADDON: CPT

## 2021-12-30 PROCEDURE — 80053 COMPREHEN METABOLIC PANEL: CPT

## 2021-12-30 PROCEDURE — 81001 URINALYSIS AUTO W/SCOPE: CPT

## 2021-12-30 PROCEDURE — 87635 SARS-COV-2 COVID-19 AMP PRB: CPT

## 2021-12-30 PROCEDURE — 2580000003 HC RX 258: Performed by: STUDENT IN AN ORGANIZED HEALTH CARE EDUCATION/TRAINING PROGRAM

## 2021-12-30 PROCEDURE — 6370000000 HC RX 637 (ALT 250 FOR IP): Performed by: EMERGENCY MEDICINE

## 2021-12-30 PROCEDURE — 6360000002 HC RX W HCPCS: Performed by: STUDENT IN AN ORGANIZED HEALTH CARE EDUCATION/TRAINING PROGRAM

## 2021-12-30 PROCEDURE — 84484 ASSAY OF TROPONIN QUANT: CPT

## 2021-12-30 PROCEDURE — 83690 ASSAY OF LIPASE: CPT

## 2021-12-30 PROCEDURE — 36415 COLL VENOUS BLD VENIPUNCTURE: CPT

## 2021-12-30 PROCEDURE — 85025 COMPLETE CBC W/AUTO DIFF WBC: CPT

## 2021-12-30 RX ORDER — ALBUTEROL SULFATE 2.5 MG/3ML
SOLUTION RESPIRATORY (INHALATION)
Status: COMPLETED
Start: 2021-12-30 | End: 2021-12-30

## 2021-12-30 RX ORDER — SODIUM CHLORIDE, SODIUM LACTATE, POTASSIUM CHLORIDE, AND CALCIUM CHLORIDE .6; .31; .03; .02 G/100ML; G/100ML; G/100ML; G/100ML
1000 INJECTION, SOLUTION INTRAVENOUS ONCE
Status: COMPLETED | OUTPATIENT
Start: 2021-12-30 | End: 2021-12-30

## 2021-12-30 RX ORDER — IPRATROPIUM BROMIDE AND ALBUTEROL SULFATE 2.5; .5 MG/3ML; MG/3ML
1 SOLUTION RESPIRATORY (INHALATION) ONCE
Status: COMPLETED | OUTPATIENT
Start: 2021-12-30 | End: 2021-12-30

## 2021-12-30 RX ORDER — DROPERIDOL 2.5 MG/ML
0.62 INJECTION, SOLUTION INTRAMUSCULAR; INTRAVENOUS ONCE
Status: COMPLETED | OUTPATIENT
Start: 2021-12-30 | End: 2021-12-30

## 2021-12-30 RX ORDER — ALBUTEROL SULFATE 2.5 MG/3ML
2.5 SOLUTION RESPIRATORY (INHALATION) EVERY 6 HOURS PRN
Status: DISCONTINUED | OUTPATIENT
Start: 2021-12-30 | End: 2021-12-30 | Stop reason: HOSPADM

## 2021-12-30 RX ORDER — ONDANSETRON 2 MG/ML
4 INJECTION INTRAMUSCULAR; INTRAVENOUS ONCE
Status: COMPLETED | OUTPATIENT
Start: 2021-12-30 | End: 2021-12-30

## 2021-12-30 RX ADMIN — IPRATROPIUM BROMIDE AND ALBUTEROL SULFATE 1 AMPULE: .5; 2.5 SOLUTION RESPIRATORY (INHALATION) at 18:26

## 2021-12-30 RX ADMIN — SODIUM CHLORIDE, POTASSIUM CHLORIDE, SODIUM LACTATE AND CALCIUM CHLORIDE 1000 ML: 600; 310; 30; 20 INJECTION, SOLUTION INTRAVENOUS at 18:04

## 2021-12-30 RX ADMIN — ALBUTEROL SULFATE 2.5 MG: 2.5 SOLUTION RESPIRATORY (INHALATION) at 18:27

## 2021-12-30 RX ADMIN — DROPERIDOL 0.62 MG: 2.5 INJECTION, SOLUTION INTRAMUSCULAR; INTRAVENOUS at 19:22

## 2021-12-30 RX ADMIN — FAMOTIDINE 20 MG: 10 INJECTION, SOLUTION INTRAVENOUS at 18:05

## 2021-12-30 RX ADMIN — ONDANSETRON 4 MG: 2 INJECTION, SOLUTION INTRAMUSCULAR; INTRAVENOUS at 18:05

## 2021-12-30 ASSESSMENT — PAIN SCALES - GENERAL: PAINLEVEL_OUTOF10: 10

## 2021-12-30 NOTE — ED TRIAGE NOTES
Mode of arrival (squad #, walk in, police, etc) : walk in        Chief complaint(s): NVD        Arrival Note (brief scenario, treatment PTA, etc). : Pt with abd pain, NVD, and body aches/chills since yesterday. C= \"Have you ever felt that you should Cut down on your drinking? \"  No  A= \"Have people Annoyed you by criticizing your drinking? \"  No  G= \"Have you ever felt bad or Guilty about your drinking? \"  No  E= \"Have you ever had a drink as an Eye-opener first thing in the morning to steady your nerves or to help a hangover? \"  No      Deferred []      Reason for deferring: N/A    *If yes to two or more: probable alcohol abuse. *

## 2021-12-30 NOTE — ED PROVIDER NOTES
[address] ED  eMERGENCY dEPARTMENT eNCOUnter   Attending Attestation     Pt Name: Sejal Carlson  MRN: [id number]  [place] 1980  Date of evaluation: 12/30/21       Sejal Carlson is a 46 y.o. female who presents with Abdominal Pain, Emesis, Diarrhea, and Generalized Body Aches      History:   Patient is having diffuse abdominal pain vomiting diarrhea and generalized body aches that started yesterday. Patient states the pain is severe. No fever no cough no shortness of breath. Exam: Vitals:   Vitals:    12/30/21 1643   BP: 132/82   Pulse: 105   Resp: 20   Temp: 98.2 °F (36.8 °C)   TempSrc: Temporal   SpO2: 95%   Weight: 220 lb (99.8 kg)   Height: 5' 2\" (1.575 m)     Heart is tachycardic but regular. Abdomen is soft diffusely tender to palpation but no peritoneal signs. I performed a history and physical examination of the patient and discussed management with the resident. I reviewed the residents note and agree with the documented findings and plan of care. Any areas of disagreement are noted on the chart. I was personally present for the key portions of any procedures. I have documented in the chart those procedures where I was not present during the key portions. I have personally reviewed all images and agree with the resident's interpretation. I have reviewed the emergency nurses triage note. I agree with the chief complaint, past medical history, past surgical history, allergies, medications, social and family history as documented unless otherwise noted below. Documentation of the HPI, Physical Exam and Medical Decision Making performed by medical students or scribes is based on my personal performance of the HPI, PE and MDM. I personally evaluated and examined the patient in conjunction with the APC and agree with the assessment, treatment plan, and disposition of the patient as recorded by the APC. Additional findings are as noted.     Carolin Garay MD  Attending Emergency Physician             Bri Hernández MD  12/30/21 9945

## 2021-12-31 NOTE — ED NOTES
Pt in hallway yelling and crying \"I want to go home. Please someone take this IV out. I need to leave. Its so hot in here\" RN to bedside and removed IV. Approved by Dr. Tawanda Woo.  Pt then witnessed leaving ER      Aldo Rutherford RN  12/30/21 [id number]

## 2021-12-31 NOTE — ED PROVIDER NOTES
[address] ED  Emergency Department Encounter  EmergencyMedicineResident     This patient was seen during the COVID-19 crisis. There were limited resources and those resources we did have had to be conserved for the sickest of patients. Pt Name: Earl Cho  MRN: [id number]  [place] 1980  Date of evaluation: 21  PCP: KATIE Bhandari - CNP    CHIEF COMPLAINT       Chief Complaint   Patient presents with    Abdominal Pain    Emesis    Diarrhea    Generalized Body Aches       HISTORY OF PRESENT ILLNESS  (Location/Symptom, Timing/Onset, Context/Setting, Quality, Duration, Modifying Factors, Severity.)      Earl Cho is a 46 y.o. female who presents for evaluation of abdominal pain. Patient has chronic abdominal pain. Generalized body aches started yesterday she reports that she is also getting fevers with chills and a headache. She has multiple ED visits for abdomen pain in the past.  Patient recent CT scan in  that was negative. She is having normal bowel movements. Primarily complaining of pain nausea and vomiting. PAST MEDICAL / SURGICAL /SOCIAL / FAMILY HISTORY      has a past medical history of Anxiety, Arthritis, Asthma, Sadler's esophagus, Bipolar 1 disorder ([address] CAD (coronary artery disease), Chronic insomnia, COPD (chronic obstructive pulmonary disease) ([address] Depression, Diabetes mellitus ([address] Endometriosis, Esophagitis, GERD (gastroesophageal reflux disease), Headache(784.0), Herniated disc, cervical, Hiatal hernia, Incisional abscess, Kidney stones, MDRO (multiple drug resistant organisms) resistance, Pleurisy, Pneumonia, Seizures ([address] UTI (urinary tract infection), and Vision abnormalities. has a past surgical history that includes knee surgery (Left);  section (, 2018); Tonsillectomy; Knee arthroscopy (Left, 5/20/15); Cervical disc surgery; Upper gastrointestinal endoscopy (2016);  Upper gastrointestinal endoscopy (2017); Upper gastrointestinal endoscopy (2017); pr esophagogastroduodenoscopy transoral diagnostic (N/A, 3/2/2017); laparoscopy; laparoscopy (N/A, 10/5/2017); Upper gastrointestinal endoscopy (N/A, 2018); Endoscopy, colon, diagnostic;  section (N/A, 2018); Upper gastrointestinal endoscopy (N/A, 10/1/2019); Colonoscopy (N/A, 10/1/2019); Colonoscopy (N/A, 2020); [place] tooth extraction; and Hysterectomy (N/A, 2020). Social History     Socioeconomic History    Marital status: Single     Spouse name: Not on file    Number of children: 1    Years of education: 11th grade    Highest education level: Not on file   Occupational History    Occupation: unemployed-   Tobacco Use    Smoking status: Current Every Day Smoker     Packs/day: 0.50     Years: 21.00     Pack years: 10.50     Types: Cigarettes    Smokeless tobacco: Never Used   Vaping Use    Vaping Use: Every day   Substance and Sexual Activity    Alcohol use: No    Drug use: No    Sexual activity: Yes     Partners: Male   Other Topics Concern    Not on file   Social History Narrative    Lives with son and boyfriend     Social Determinants of Health     Financial Resource Strain: Low Risk     Difficulty of Paying Living Expenses: Not hard at all   Food Insecurity: No Food Insecurity    Worried About Running Out of Food in the Last Year: Never true    [address] in the Last Year: Never true   Transportation Needs:     Lack of Transportation (Medical): Not on file    Lack of Transportation (Non-Medical):  Not on file   Physical Activity:     Days of Exercise per Week: Not on file    Minutes of Exercise per Session: Not on file   Stress:     Feeling of Stress : Not on file   Social Connections:     Frequency of Communication with Friends and Family: Not on file    Frequency of Social Gatherings with Friends and Family: Not on file    Attends [employer]: Not on file   [employer] daily 9/22/21   Erika Hernández MD   dicyclomine (BENTYL) 10 MG capsule TAKE 1 CAPSULE BY MOUTH 4 TIMES DAILY 8/27/21   KATIE Cole CNP   pregabalin (LYRICA) 100 MG capsule Take 1 tid 7/19/21 10/3/21  KATIE Cole CNP   loratadine (CLARITIN) 10 MG tablet TAKE 1 TABLET BY MOUTH DAILY 7/6/21   KATIE Cole CNP   pantoprazole (PROTONIX) 40 MG tablet 1 tab daily 6/30/21   KATIE Cole CNP   glucose monitoring kit (FREESTYLE) monitoring kit Use as directed. BRAND OF CHOICE INSURANCE ALLOWS. 5/27/21   KATIE Cole CNP   Misc. Devices (PLASTIC BED PAN) MISC Use daily with hot water 5/27/21   KATIE Cole CNP   Lancets (ONETOUCH DELICA PLUS [id number]) MISC USE TO TEST BLOOD SUGAR TWICE A DAY 5/27/21   KATIE Cole CNP   Alcohol Swabs ( STERILE ALCOHOL PREP) PADS USE AS DIRECTED 5/10/21   KATIE Cole CNP   fluticasone (FLONASE) 50 MCG/ACT nasal spray 1 spray by Each Nostril route daily 4/1/21 5/1/21  KATIE Cole CNP   LINZESS 290 MCG CAPS capsule TAKE 1 CAPSULE BY MOUTH EVERY MORNING (BEFORE BREAKFAST) 3/3/21   Carmelo Rivera MD   sertraline (ZOLOFT) 50 MG tablet Take 1 tablet by mouth daily 1/19/21   Historical Provider, MD   lamoTRIgine (LAMICTAL) 150 MG tablet Take 1 tablet by mouth nightly  10/28/20   Historical Provider, MD   vitamin D3 (CHOLECALCIFEROL) 25 MCG (1000 UT) TABS tablet TAKE 1 TABLET BY MOUTH DAILY 8/27/20   KATIE Cole CNP   Masks (CLEVER CHOICE FACE MASK) MISC USE NEW FACE MASK EVERYDAY WHEN PATIENT GO OUTSIDE OF HOME DUE TO COVID PANDEMIC 7/6/20   Renella A Gauamis, APRN - CNP   acetaminophen (TYLENOL) 500 MG tablet Take 500 mg by mouth every 6 hours as needed for Pain    Historical Provider, MD       REVIEW OF SYSTEMS    (2-9 systems for level 4, 10 or more forlevel 5)      Review of Systems   Constitutional: Negative for activity change, chills and fever.    HENT: Negative for congestion, sinus pain and sore throat. Eyes: Negative for pain and visual disturbance. Respiratory: Negative for cough and shortness of breath. Cardiovascular: Negative for chest pain. Gastrointestinal: Positive for abdominal pain, nausea and vomiting. Negative for diarrhea. Genitourinary: Negative for difficulty urinating, dysuria and hematuria. Musculoskeletal: Negative for back pain and myalgias. Skin: Negative for rash and wound. Neurological: Negative for dizziness, light-headedness and headaches. Psychiatric/Behavioral: Negative for agitation and confusion. PHYSICAL EXAM   (up to 7 for level 4, 8 or more forlevel 5)      ED TRIAGE VITALS BP: 132/82, Temp: 98.2 °F (36.8 °C), Pulse: 105, Resp: 20, SpO2: 95 %    Vitals:    12/30/21 1643 12/30/21 1808 12/30/21 1828   BP: 132/82 126/73    Pulse: 105 92    Resp: 20 24    Temp: 98.2 °F (36.8 °C)     TempSrc: Temporal     SpO2: 95% 94% 96%   Weight: 220 lb (99.8 kg)     Height: 5' 2\" (1.575 m)           Physical Exam  Vitals and nursing note reviewed. Constitutional:       General: She is in acute distress. Appearance: Normal appearance. HENT:      Head: Normocephalic and atraumatic. Nose: Nose normal.      Mouth/Throat:      Mouth: Mucous membranes are moist.   Eyes:      Extraocular Movements: Extraocular movements intact. Pupils: Pupils are equal, round, and reactive to light. Cardiovascular:      Rate and Rhythm: Normal rate and regular rhythm. Pulses: Normal pulses. Heart sounds: Normal heart sounds. Pulmonary:      Effort: Pulmonary effort is normal.      Breath sounds: Normal breath sounds. Abdominal:      General: Abdomen is flat. Bowel sounds are normal.      Palpations: Abdomen is soft. Tenderness: There is no abdominal tenderness. There is no right CVA tenderness or left CVA tenderness. Musculoskeletal:         General: Normal range of motion.       Cervical back: Normal range of motion. Skin:     General: Skin is warm and dry. Capillary Refill: Capillary refill takes less than 2 seconds. Neurological:      General: No focal deficit present. Mental Status: She is alert and oriented to person, place, and time. Psychiatric:         Mood and Affect: Mood normal.         Behavior: Behavior normal.           DIFFERENTIAL  DIAGNOSIS     PLAN (LABS / IMAGING / EKG):  Orders Placed This Encounter   Procedures    COVID-19, Rapid    XR ABDOMEN (KUB) (SINGLE AP VIEW)    CBC Auto Differential    Comprehensive Metabolic Panel w/ Reflex to MG    Lipase    Troponin    Urinalysis Reflex to Culture    Microscopic Urinalysis    EKG 12 Lead       MEDICATIONS ORDERED:  ED Medication Orders (From admission, onward)    Start Ordered     Status Ordering Provider    12/30/21 1845 12/30/21 1842  droperidol (INAPSINE) injection 0.625 mg  ONCE         Last MAR action: Given - by Nory Lantigua on 12/30/21 at [facility], [address] [place] [address]    12/30/21 1830 12/30/21 1821  ipratropium-albuterol (DUONEB) nebulizer solution 1 ampule  ONCE        Question:  Initiate RT Bronchodilator Protocol  Answer:  No    Last MAR action: Given - by Maddi Anderson on 12/30/21 at [facility]    12/30/21 1715 12/30/21 1705  lactated ringers bolus  ONCE         Last MAR action: Stopped - by Nory Lantigua on 12/30/21 at [facility]    12/30/21 1715 12/30/21 1705  ondansetron (ZOFRAN) injection 4 mg  ONCE         Last MAR action: Given - by Nory Lantigua on 12/30/21 at [address] [facility]    12/30/21 1715 12/30/21 1705  famotidine (PEPCID) injection 20 mg  ONCE         Last MAR action: Given - by Nory Lantigua on 12/30/21 at [address] [facility]           DDX: Chronic abdominal pain    DIAGNOSTIC RESULTS / EMERGENCY DEPARTMENT COURSE / MDM     IMPRESSION & INITIAL PLAN:  42-year-old female present emerged from today for evaluation of chronic abdominal pain, with endorsed. .  Patient was treated with IV fluids, antiemetics and Pepcid. Patient became visibly upset and he did not receive stronger pain medications. Urine pregnancy came back negative. Patient was treated with droperidol and then eventually eventually stormed out of the building after she did not receive narcotic pain medication. LABS:  Results for orders placed or performed during the hospital encounter of 12/30/21   COVID-19, Rapid    Specimen: Nasopharyngeal Swab   Result Value Ref Range    Specimen Description . NASOPHARYNGEAL SWAB     SARS-CoV-2, Rapid Not Detected Not Detected   CBC Auto Differential   Result Value Ref Range    WBC 5.5 3.5 - 11.0 k/uL    RBC 4.91 4.0 - 5.2 m/uL    Hemoglobin 13.8 12.0 - 16.0 g/dL    Hematocrit 41.6 36 - 46 %    MCV 84.7 80 - 100 fL    MCH 28.2 26 - 34 pg    MCHC 33.3 31 - 37 g/dL    RDW 14.4 11.5 - 14.9 %    Platelets 937 686 - 528 k/uL    MPV 8.4 6.0 - 12.0 fL    NRBC Automated NOT REPORTED per 100 WBC    Differential Type NOT REPORTED     Seg Neutrophils 77 (H) 36 - 66 %    Lymphocytes 11 (L) 24 - 44 %    Monocytes 9 (H) 1 - 7 %    Eosinophils % 2 0 - 4 %    Basophils 1 0 - 2 %    Immature Granulocytes NOT REPORTED 0 %    Segs Absolute 4.30 1.3 - 9.1 k/uL    Absolute Lymph # 0.60 (L) 1.0 - 4.8 k/uL    Absolute Mono # 0.50 0.1 - 1.3 k/uL    Absolute Eos # 0.10 0.0 - 0.4 k/uL    Basophils Absolute 0.10 0.0 - 0.2 k/uL    Absolute Immature Granulocyte NOT REPORTED 0.00 - 0.30 k/uL    WBC Morphology NOT REPORTED     RBC Morphology NOT REPORTED     Platelet Estimate NOT REPORTED    Comprehensive Metabolic Panel w/ Reflex to MG   Result Value Ref Range    Glucose 96 70 - 99 mg/dL    BUN 10 6 - 20 mg/dL    CREATININE 0.62 0.50 - 0.90 mg/dL    Bun/Cre Ratio NOT REPORTED 9 - 20    Calcium 9.0 8.6 - 10.4 mg/dL    Sodium 139 135 - 144 mmol/L    Potassium 3.7 3.7 - 5.3 mmol/L    Chloride 104 98 - 107 mmol/L    CO2 21 20 - 31 mmol/L    Anion Gap 14 9 - 17 mmol/L    Alkaline Phosphatase 81 35 - 104 U/L    ALT 21 5 CRITICAL CARE:  See attending physician note    FINAL IMPRESSION      1.  Generalized abdominal pain          DISPOSITION / PLAN     DISPOSITION Decision To Discharge 12/30/2021 07:34:49 PM      PATIENT REFERRED TO:  KATIE Gan CNP [address] [facility] [address]  [address] [facility] [address]  [phone]    In 1 week      [facility] ED  [facility]  [address] [facility]  [phone]    If symptoms worsen      DISCHARGE MEDICATIONS:  Discharge Medication List as of 12/30/2021  7:37 PM        Discharge Medication List as of 12/30/2021  7:37 PM           Hermelindo Pena MD  Emergency Medicine Resident    (Please note that portions of this note were completed with a voice recognition program.  Efforts were made to edit the dictations but occasionally words are mis-transcribed.)       Hermelindo Pena MD  Resident  12/31/21 7802

## 2022-01-02 LAB
EKG ATRIAL RATE: 94 BPM
EKG P AXIS: 52 DEGREES
EKG P-R INTERVAL: 126 MS
EKG Q-T INTERVAL: 342 MS
EKG QRS DURATION: 90 MS
EKG QTC CALCULATION (BAZETT): 427 MS
EKG R AXIS: 50 DEGREES
EKG T AXIS: 38 DEGREES
EKG VENTRICULAR RATE: 94 BPM

## 2022-01-02 PROCEDURE — 93010 ELECTROCARDIOGRAM REPORT: CPT | Performed by: INTERNAL MEDICINE

## 2022-01-10 ASSESSMENT — ENCOUNTER SYMPTOMS
RESPIRATORY NEGATIVE: 1
GASTROINTESTINAL NEGATIVE: 1

## 2022-01-10 ASSESSMENT — PAIN DESCRIPTION - LOCATION: LOCATION: NECK

## 2022-01-10 ASSESSMENT — PAIN - FUNCTIONAL ASSESSMENT: PAIN_FUNCTIONAL_ASSESSMENT: PREVENTS OR INTERFERES SOME ACTIVE ACTIVITIES AND ADLS

## 2022-01-10 ASSESSMENT — PAIN DESCRIPTION - FREQUENCY: FREQUENCY: CONTINUOUS

## 2022-01-10 ASSESSMENT — PAIN SCALES - GENERAL: PAINLEVEL_OUTOF10: 8

## 2022-01-10 ASSESSMENT — PAIN DESCRIPTION - PAIN TYPE: TYPE: CHRONIC PAIN

## 2022-01-10 ASSESSMENT — PAIN DESCRIPTION - ONSET: ONSET: ON-GOING

## 2022-01-10 ASSESSMENT — PAIN DESCRIPTION - PROGRESSION: CLINICAL_PROGRESSION: GRADUALLY WORSENING

## 2022-01-10 ASSESSMENT — PAIN DESCRIPTION - DESCRIPTORS: DESCRIPTORS: BURNING

## 2022-01-10 ASSESSMENT — PAIN DESCRIPTION - ORIENTATION: ORIENTATION: RIGHT

## 2022-01-10 NOTE — PROGRESS NOTES
[facility] Pain Management  Patient Pain Assessment  Dr. Zo Mayorga Consultation/ Follow Up     Primary Care Physician: Dominick Lindsey, KATIE - CNP    Chief complaint:   Chief Complaint   Patient presents with    Neck Pain   . HISTORY OF PRESENT ILLNESS:  Earl Cho is 46 y.o. female with    HPI  This is a pleasant 42-year-old female with a past medical history significant for obesity, BMI above 40  She is seen for history of chronic neck pain  Onset of symptom many years ago  Pain located over the cervical spine predominantly right-sided associated with occipital headache  Extension over the trapezius to the shoulder  No significant dermatomal radiation in arm  Report numbness and tingling in the right arm  The symptoms are chronic and have been progressively worsening  Pain interfere with quality of life    She is diagnosed with cervical spondylosis and spinal stenosis in past  Had cervical spine fusion surgery x2  Most recent surgery was 2018  She had a recent MRI cervical spine  Was recently evaluated by the neurosurgery clinic at [address] and was not advised for any surgery  She is referred for consideration of spine interventional pain procedure    In past she was taking Lyrica and found it helpful  Reports no side effect from Lyrica    She has done multiple courses of physical therapy in past with no significant improvement  She continues to do home exercises as per treatment plan    She has tried NSAIDs and muscle relaxant     RX Monitoring 1/13/2022   Attestation -   Periodic Controlled Substance Monitoring Possible medication side effects, risk of tolerance/dependence & alternative treatments discussed. ;No signs of potential drug abuse or diversion identified. Chronic Pain > 50 MEDD Obtained or confirmed \"Consent for Opioid Use\" on file.        Current Pain Assessment  Pain Assessment  Pain Assessment: 0-10  Pain Level: 9  Patient's Stated Pain Goal: 2  Pain Type: Chronic pain  Pain Location: Back,Arm,Neck,Hand  Pain Orientation: Right  Pain Radiating Towards: right side of neck to hand with numbness in tingling in finger tips thumb, 1st, 2nd, and 3rd digits  Pain Descriptors: Aching,Burning,Constant,Radiating,Sharp,Numbness,Tingling  Pain Frequency: Continuous  Pain Onset: Gradual  Clinical Progression: Gradually worsening  Functional Pain Assessment: Prevents or interferes some active activities and ADLs  Non-Pharmaceutical Pain Intervention(s): Repositioned,Rest     ADVERSE MEDICATION EFFECTS:   Constipation: no  Bowel Regimen: No:   Diet: common adult  Appetite:  ok  Sedation:  no  Urinary Retention: no    FOCUSED PAIN SCALE:  Highest : 10  Lowest :5  Average: Range-8  When and What  was your last procedure:      Was your procedureeffective:  not applicable    ACTIVITY/SOCIAL/EMOTIONAL:  Sleep Pattern: 4 hours per night.nightime awakenings  Energy Level: Normal  Currently attending Physical Therapy:  No. Did in past however stopped per patient due to increased pain  Chiropractic Treatments: No  Home Exercises: daily range of motion exercises  Mobility: no current mobility problem   Currently seeing a Psychiatristor Psychologist:  Yes. [facility]  Emotional Issues: anxiety/ nervousness. Patient denies depression   Mood: appropriate     ABERRANT BEHAVIORS SINCE LAST VISIT:  Have you ever been treated in another [facility] For knee in past  Refills for prescriptions appropriate: not applicable  Lost rx/pills: not applicable  Taking more medication than prescribed:  not applicable  Are you receiving PAIN medications from  other doctors: no  Last Urine/Serum Drug Screen :to be collected if ordered  Was Serum/UDS as anticipated? 9- 2021 not as anticipated  Brought pill bottles in :not applicable   Was Pill count appropriate? :not applicable   Are currently pregnant? not applicable  Recent ER visits: Yes on 12-30-21 for abdominal pain.      Soap:  1    BP (!) 142/81   Pulse 97   Temp 97.7 °F (36.5 °C) (Infrared)   Resp 20   Ht 5' 2\" (1.575 m)   Wt 220 lb (99.8 kg)   LMP 06/15/2020   BMI 40.24 kg/m²       Previous management history:   Previous diagnostic workup: MRI of Cspine on 21     Previous Medications tried:  - NSAID's: listed as allergy/ unable to take due to Sadler's esophagus  - Neurontin: No  - Lyrica :yes, helped and PCP prescribing however, will not prescribe anymore      Past Medical History      Diagnosis Date    Anxiety     Arthritis     OA    Asthma     Sadler's esophagus     LONG SEGMENT    Bipolar 1 disorder ([address]     CAD (coronary artery disease)     Chronic insomnia     [facility]     Depression     and bipolar    Diabetes mellitus ([facility]     prediabetic    Endometriosis 3/9/2020    Esophagitis     severe    GERD (gastroesophageal reflux disease)     Headache(784.0)     Herniated disc, cervical     Hiatal hernia     Incisional abscess 1/15/2019    Kidney stones     MDRO (multiple drug resistant organisms) resistance     mrsa    Pleurisy     hx of \"years ago\"    Pneumonia     past penumonia    Seizures ([facility]     2016 last seizure-focal seizure    UTI (urinary tract infection)     Vision abnormalities     wears glasses       Surgical History  Past Surgical History:   Procedure Laterality Date    CERVICAL DISC SURGERY      x2     SECTION  , 2018    x 2     SECTION N/A 2018     SECTION performed by Wendi Vincent MD at [address] [facility]    COLONOSCOPY N/A 10/1/2019    COLONOSCOPY DIAGNOSTIC ABORTED performed by Crystal Dorado MD at [address] [facility] N/A 2020    COLONOSCOPY WITH BIOPSY performed by Crystal Dorado MD at [address] [place] [address], [place], DIAGNOSTIC      HYSTERECTOMY N/A 2020    HYSTERECTOMY ABDOMINAL LAPAROSCOPIC ROBOTIC XI W/ CYSTOSCOPY performed by Wendi Vincent MD at [address] ARTHROSCOPY Left 5/20/15    KNEE SURGERY Left     x3    LAPAROSCOPY      dr Sarita Gutierrez N/A 10/5/2017    LAPAROSCOPIC REMOVAL INTRA ABDOMINAL LIPOMA LOWER RIGHT performed by Js Lin DO at [address] ESOPHAGOGASTRODUODENOSCOPY TRANSORAL DIAGNOSTIC N/A 3/2/2017    EGD ESOPHAGOGASTRODUODENOSCOPY  IP 2055 performed by Bob Conway MD at [address]  08/16/2016    severe esophagitis    UPPER GASTROINTESTINAL ENDOSCOPY  01/19/2017    barretts; hiatus hernia; gastritis    UPPER GASTROINTESTINAL ENDOSCOPY  03/02/2017    long seg mullins's with linear erosions, esophagitis, small hiatal hernia    UPPER GASTROINTESTINAL ENDOSCOPY N/A 1/30/2018    MULLINS'S    UPPER GASTROINTESTINAL ENDOSCOPY N/A 10/1/2019    EGD BIOPSY performed by Napoleon Meehan MD at [facility]         Medications  Current Outpatient Medications   Medication Sig Dispense Refill    pregabalin (LYRICA) 100 MG capsule Take 1 capsule by mouth 3 times daily for 30 days.  90 capsule 0    Methocarbamol (ROBAXIN PO) Take by mouth      sertraline (ZOLOFT) 100 MG tablet Take 1.5 tablets by mouth daily      lamoTRIgine (LAMICTAL) 200 MG tablet Take 1 tablet by mouth daily      ipratropium-albuterol (DUONEB) 0.5-2.5 (3) MG/3ML SOLN nebulizer solution INHALE CONTENTS OF 1 UNIT DOSE VIA NEBULIZER EVERY 4 HOURS 360 mL 2    vitamin D3 (CHOLECALCIFEROL) 25 MCG (1000 UT) TABS tablet TAKE 1 TABLET BY MOUTH DAILY 30 tablet 3    montelukast (SINGULAIR) 10 MG tablet TAKE 1 TABLET IN THE EVENING ONCE A DAY ORALLY 90 tablet 0    BREO ELLIPTA 200-25 MCG/INH AEPB inhaler INHALE 1 PUFF INTO THE LUNGS DAILY 60 each 1    COMBIVENT RESPIMAT  MCG/ACT AERS inhaler Inhale 1 puff into the lungs 4 times daily Inhale 1 puff into the lungs 4 times daily 4 g 3    dicyclomine (BENTYL) 10 MG capsule TAKE 1 CAPSULE BY MOUTH 4 TIMES DAILY 180 capsule 1    loratadine (CLARITIN) 10 MG tablet TAKE 1 TABLET BY MOUTH DAILY 90 tablet 1    acetaminophen (TYLENOL) 500 MG tablet Take 500 mg by mouth every 6 hours as needed for Pain      pantoprazole (PROTONIX) 40 MG tablet 1 tab daily 90 tablet 3    promethazine (PHENERGAN) 6.25 MG/5ML syrup Take 10 mLs by mouth 4 times daily as needed for Nausea (diarrhea) 120 mL 0    ONETOUCH VERIO strip USE TO TEST BLOOD SUGAR TWICE A DAY 50 each 5    methylPREDNISolone (MEDROL DOSEPACK) 4 MG tablet Take by mouth. 1 kit 0    pregabalin (LYRICA) 100 MG capsule Take 1 tid 90 capsule 0    glucose monitoring kit (FREESTYLE) monitoring kit Use as directed. BRAND OF CHOICE INSURANCE ALLOWS. 1 kit 0    Misc. Devices (PLASTIC BED PAN) MISC Use daily with hot water 1 each 0    Lancets (ONETOUCH DELICA PLUS [id number]) MISC USE TO TEST BLOOD SUGAR TWICE A  each 5    Alcohol Swabs (HM STERILE ALCOHOL PREP) PADS USE AS DIRECTED 100 each 11    fluticasone (FLONASE) 50 MCG/ACT nasal spray 1 spray by Each Nostril route daily 1 Bottle 1    Masks (CLEVER CHOICE FACE MASK) MISC USE NEW FACE MASK EVERYDAY WHEN PATIENT GO OUTSIDE OF HOME DUE TO COVID PANDEMIC 90 each 1     No current facility-administered medications for this encounter. Allergies  Nsaids, Toradol [ketorolac tromethamine], and Ultram [tramadol]    Family History  family history includes Bipolar Disorder in her brother and mother; Breast Cancer in her mother; Cancer in her mother; Hypertension in her father and mother.       Social History  Social History     Socioeconomic History    Marital status: Single     Spouse name: Not on file    Number of children: 1    Years of education: 11th grade    Highest education level: Not on file   Occupational History    Occupation: unemployed-   Tobacco Use    Smoking status: Current Every Day Smoker     Packs/day: 0.50     Years: 21.00     Pack years: 10.50     Types: Cigarettes    Smokeless tobacco: Never Used   Vaping Use    Vaping Use: Every day Substance and Sexual Activity    Alcohol use: No    Drug use: Not Currently     Comment: 10 years ago- cocaine    Sexual activity: Yes     Partners: Male   Other Topics Concern    Not on file   Social History Narrative    Lives with son and boyfriend     Social Determinants of Health     Financial Resource Strain: Low Risk     Difficulty of Paying Living Expenses: Not hard at all   Food Insecurity: No Food Insecurity    Worried About Running Out of Food in the Last Year: Never true    Phillip of Food in the Last Year: Never true   Transportation Needs:     Lack of Transportation (Medical): Not on file    Lack of Transportation (Non-Medical): Not on file   Physical Activity:     Days of Exercise per Week: Not on file    Minutes of Exercise per Session: Not on file   Stress:     Feeling of Stress : Not on file   Social Connections:     Frequency of Communication with Friends and Family: Not on file    Frequency of Social Gatherings with Friends and Family: Not on file    Attends Synagogue Services: Not on file    Active Member of [address] [place] [address] [employer] or Organizations: Not on file    Attends Club or Organization Meetings: Not on file    Marital Status: Not on file   Intimate Partner Violence:     Fear of Current or Ex-Partner: Not on file    Emotionally Abused: Not on file    Physically Abused: Not on file    Sexually Abused: Not on file   Housing Stability:     Unable to Pay for Housing in the Last Year: Not on file    Number of [place] in the Last Year: Not on file    Unstable Housing in the Last Year: Not on file      reports previous drug use. REVIEW OF SYSTEMS:  Review of Systems   Constitutional: Negative. Negative for fever. HENT: Negative. Eyes:        Wears eyeglasses   Respiratory: Negative. Gastrointestinal: Negative. Genitourinary: Negative. Musculoskeletal: Positive for arthralgias and neck pain. Skin: Negative. Neurological: Positive for weakness and numbness. Right arm weakness and numbness   Hematological: Bruises/bleeds easily. Psychiatric/Behavioral: Positive for sleep disturbance. Negative for suicidal ideas. The patient is nervous/anxious. Objective:  General Appearance:  Uncomfortable, in pain, well-appearing and in no acute distress. Vital signs: (most recent): Blood pressure (!) 142/81, pulse 97, temperature 97.7 °F (36.5 °C), temperature source Infrared, resp. rate 20, height 5' 2\" (1.575 m), weight 220 lb (99.8 kg), last menstrual period 06/15/2020, not currently breastfeeding. Vital signs are normal.  No fever. Output: Producing urine and producing stool. HEENT: Normal HEENT exam.    Lungs:  Normal effort and normal respiratory rate. Breath sounds clear to auscultation. She is not in respiratory distress. No decreased breath sounds. Heart: Normal rate. Regular rhythm. Extremities: Normal range of motion. There is no deformity. Neurological: Patient is alert and oriented to person, place and time. Normal strength. Patient has normal reflexes, normal muscle tone and normal coordination. Pupils:  Pupils are equal, round, and reactive to light. Pupils are equal.   Skin:  Warm and dry. No rash or cyanosis.    Cervical spine examination  No apparent deformity and inspection  Range of motion is limited and associated with pain  Positive tenderness to palpation over right cervical paraspinal muscle and facet joint  Spurling maneuver negative  Gait stable    Neurological examination  Normal muscle mass, normal muscle tone, muscle strength 5/5 in both upper extremities in all major muscle group  Dependent reflexes are normal a little difficult to elicit likely due to body habitus  Clonus negative      Assessment & Plan   This is a pleasant 28-year-old female with a past medical history significant for obesity, BMI above 40  She is seen for history of chronic neck pain  Onset of symptom many years ago  Pain located over the physician  Orders Placed This Encounter   Procedures    EMG     Standing Status:   Future     Standing Expiration Date:   3/14/2022     Order Specific Question:   Which body part? Answer:   BOTH ARMS    [place] INJ DX/THER AGNT PARAVERT FACET JOINT, CERV/THORAC, 1ST LEVEL     Right cervical medial branch nerve block at C2 / C3 / C4 / C5     Standing Status:   Future     Standing Expiration Date:   4/13/2022      Orders Placed This Encounter   Medications    pregabalin (LYRICA) 100 MG capsule     Sig: Take 1 capsule by mouth 3 times daily for 30 days. Dispense:  90 capsule     Refill:  0        This note was created using voice recognition software. There may be inaccuracies of transcription  that are inadvertently overlooked prior to the signature. There is anyquestions about the transcription please contact me.

## 2022-01-11 DIAGNOSIS — G43.019 INTRACTABLE MIGRAINE WITHOUT AURA AND WITHOUT STATUS MIGRAINOSUS: ICD-10-CM

## 2022-01-11 DIAGNOSIS — K22.70 BARRETT'S ESOPHAGUS WITHOUT DYSPLASIA: ICD-10-CM

## 2022-01-11 RX ORDER — PROMETHAZINE HYDROCHLORIDE 6.25 MG/5ML
12.5 SYRUP ORAL 4 TIMES DAILY PRN
Qty: 120 ML | Refills: 0 | Status: SHIPPED | OUTPATIENT
Start: 2022-01-11 | End: 2022-03-09 | Stop reason: SDUPTHER

## 2022-01-11 RX ORDER — PANTOPRAZOLE SODIUM 40 MG/1
TABLET, DELAYED RELEASE ORAL
Qty: 90 TABLET | Refills: 3 | Status: SHIPPED | OUTPATIENT
Start: 2022-01-11 | End: 2022-03-09 | Stop reason: SDUPTHER

## 2022-01-13 ENCOUNTER — HOSPITAL ENCOUNTER (OUTPATIENT)
Dept: PAIN MANAGEMENT | Age: 42
Discharge: HOME | End: 2022-01-13
Payer: COMMERCIAL

## 2022-01-13 VITALS
DIASTOLIC BLOOD PRESSURE: 81 MMHG | WEIGHT: 220 LBS | RESPIRATION RATE: 20 BRPM | TEMPERATURE: 97.7 F | HEIGHT: 62 IN | HEART RATE: 97 BPM | BODY MASS INDEX: 40.48 KG/M2 | SYSTOLIC BLOOD PRESSURE: 142 MMHG

## 2022-01-13 DIAGNOSIS — Z98.1 S/P CERVICAL SPINAL FUSION: ICD-10-CM

## 2022-01-13 DIAGNOSIS — G44.86 CERVICOGENIC HEADACHE: ICD-10-CM

## 2022-01-13 DIAGNOSIS — M47.812 CERVICAL SPONDYLOSIS WITHOUT MYELOPATHY: Primary | ICD-10-CM

## 2022-01-13 PROCEDURE — 99204 OFFICE O/P NEW MOD 45 MIN: CPT | Performed by: ANESTHESIOLOGY

## 2022-01-13 PROCEDURE — 99203 OFFICE O/P NEW LOW 30 MIN: CPT

## 2022-01-13 RX ORDER — PREGABALIN 100 MG/1
100 CAPSULE ORAL 3 TIMES DAILY
Qty: 90 CAPSULE | Refills: 0 | Status: ON HOLD | OUTPATIENT
Start: 2022-01-13 | End: 2022-05-03

## 2022-01-13 ASSESSMENT — PAIN - FUNCTIONAL ASSESSMENT: PAIN_FUNCTIONAL_ASSESSMENT: PREVENTS OR INTERFERES SOME ACTIVE ACTIVITIES AND ADLS

## 2022-01-13 ASSESSMENT — PAIN DESCRIPTION - FREQUENCY: FREQUENCY: CONTINUOUS

## 2022-01-13 ASSESSMENT — PAIN DESCRIPTION - PROGRESSION: CLINICAL_PROGRESSION: GRADUALLY WORSENING

## 2022-01-13 ASSESSMENT — PAIN DESCRIPTION - ORIENTATION: ORIENTATION: RIGHT

## 2022-01-13 ASSESSMENT — PAIN SCALES - GENERAL: PAINLEVEL_OUTOF10: 9

## 2022-01-13 ASSESSMENT — PAIN DESCRIPTION - PAIN TYPE: TYPE: CHRONIC PAIN

## 2022-01-13 ASSESSMENT — PAIN DESCRIPTION - ONSET: ONSET: GRADUAL

## 2022-01-15 ENCOUNTER — HOSPITAL ENCOUNTER (EMERGENCY)
Age: 42
Discharge: HOME | End: 2022-01-15
Attending: EMERGENCY MEDICINE
Payer: COMMERCIAL

## 2022-01-15 ENCOUNTER — APPOINTMENT (OUTPATIENT)
Dept: CT IMAGING | Age: 42
End: 2022-01-15
Payer: COMMERCIAL

## 2022-01-15 VITALS
BODY MASS INDEX: 40.48 KG/M2 | SYSTOLIC BLOOD PRESSURE: 143 MMHG | RESPIRATION RATE: 20 BRPM | DIASTOLIC BLOOD PRESSURE: 101 MMHG | HEIGHT: 62 IN | WEIGHT: 220 LBS | HEART RATE: 102 BPM | TEMPERATURE: 99.4 F | OXYGEN SATURATION: 95 %

## 2022-01-15 DIAGNOSIS — N20.0 NEPHROLITHIASIS: Primary | ICD-10-CM

## 2022-01-15 LAB
-: ABNORMAL
ABSOLUTE EOS #: 0.27 K/UL (ref 0–0.44)
ABSOLUTE IMMATURE GRANULOCYTE: 0.04 K/UL (ref 0–0.3)
ABSOLUTE LYMPH #: 1.72 K/UL (ref 1.1–3.7)
ABSOLUTE MONO #: 0.63 K/UL (ref 0.1–1.2)
ALBUMIN SERPL-MCNC: 4 G/DL (ref 3.5–5.2)
ALBUMIN/GLOBULIN RATIO: 1.7 (ref 1–2.5)
ALP BLD-CCNC: 64 U/L (ref 35–104)
ALT SERPL-CCNC: 13 U/L (ref 5–33)
AMORPHOUS: ABNORMAL
ANION GAP SERPL CALCULATED.3IONS-SCNC: 10 MMOL/L (ref 9–17)
AST SERPL-CCNC: 13 U/L
BACTERIA: ABNORMAL
BASOPHILS # BLD: 1 % (ref 0–2)
BASOPHILS ABSOLUTE: 0.1 K/UL (ref 0–0.2)
BILIRUB SERPL-MCNC: 0.17 MG/DL (ref 0.3–1.2)
BILIRUBIN URINE: NEGATIVE
BUN BLDV-MCNC: 11 MG/DL (ref 6–20)
BUN/CREAT BLD: ABNORMAL (ref 9–20)
CALCIUM SERPL-MCNC: 8.5 MG/DL (ref 8.6–10.4)
CASTS UA: ABNORMAL /LPF (ref 0–2)
CASTS UA: ABNORMAL /LPF (ref 0–2)
CHLORIDE BLD-SCNC: 110 MMOL/L (ref 98–107)
CO2: 20 MMOL/L (ref 20–31)
COLOR: YELLOW
COMMENT UA: ABNORMAL
CREAT SERPL-MCNC: 0.75 MG/DL (ref 0.5–0.9)
CRYSTALS, UA: ABNORMAL /HPF
DIFFERENTIAL TYPE: ABNORMAL
EOSINOPHILS RELATIVE PERCENT: 3 % (ref 1–4)
EPITHELIAL CELLS UA: ABNORMAL /HPF (ref 0–5)
GFR AFRICAN AMERICAN: >60 ML/MIN
GFR NON-AFRICAN AMERICAN: >60 ML/MIN
GFR SERPL CREATININE-BSD FRML MDRD: ABNORMAL ML/MIN/{1.73_M2}
GFR SERPL CREATININE-BSD FRML MDRD: ABNORMAL ML/MIN/{1.73_M2}
GLUCOSE BLD-MCNC: 119 MG/DL (ref 70–99)
GLUCOSE URINE: NEGATIVE
HCT VFR BLD CALC: 42.7 % (ref 36.3–47.1)
HEMOGLOBIN: 13.8 G/DL (ref 11.9–15.1)
IMMATURE GRANULOCYTES: 1 %
KETONES, URINE: NEGATIVE
LEUKOCYTE ESTERASE, URINE: NEGATIVE
LIPASE: 18 U/L (ref 13–60)
LYMPHOCYTES # BLD: 21 % (ref 24–43)
MCH RBC QN AUTO: 28 PG (ref 25.2–33.5)
MCHC RBC AUTO-ENTMCNC: 32.3 G/DL (ref 28.4–34.8)
MCV RBC AUTO: 86.6 FL (ref 82.6–102.9)
MONOCYTES # BLD: 8 % (ref 3–12)
MUCUS: ABNORMAL
NITRITE, URINE: NEGATIVE
NRBC AUTOMATED: 0 PER 100 WBC
OTHER OBSERVATIONS UA: ABNORMAL
PDW BLD-RTO: 13.9 % (ref 11.8–14.4)
PH UA: 5.5 (ref 5–8)
PLATELET # BLD: 334 K/UL (ref 138–453)
PLATELET ESTIMATE: ABNORMAL
PMV BLD AUTO: 10.4 FL (ref 8.1–13.5)
POTASSIUM SERPL-SCNC: 3.8 MMOL/L (ref 3.7–5.3)
PROTEIN UA: ABNORMAL
RBC # BLD: 4.93 M/UL (ref 3.95–5.11)
RBC # BLD: ABNORMAL 10*6/UL
RBC UA: ABNORMAL /HPF (ref 0–2)
RENAL EPITHELIAL, UA: ABNORMAL /HPF
SEG NEUTROPHILS: 66 % (ref 36–65)
SEGMENTED NEUTROPHILS ABSOLUTE COUNT: 5.44 K/UL (ref 1.5–8.1)
SODIUM BLD-SCNC: 140 MMOL/L (ref 135–144)
SPECIFIC GRAVITY UA: 1.02 (ref 1–1.03)
TOTAL PROTEIN: 6.3 G/DL (ref 6.4–8.3)
TRICHOMONAS: ABNORMAL
TURBIDITY: CLEAR
URINE HGB: NEGATIVE
UROBILINOGEN, URINE: NORMAL
WBC # BLD: 8.2 K/UL (ref 3.5–11.3)
WBC # BLD: ABNORMAL 10*3/UL
WBC UA: ABNORMAL /HPF (ref 0–5)
YEAST: ABNORMAL

## 2022-01-15 PROCEDURE — 6360000002 HC RX W HCPCS: Performed by: STUDENT IN AN ORGANIZED HEALTH CARE EDUCATION/TRAINING PROGRAM

## 2022-01-15 PROCEDURE — 96374 THER/PROPH/DIAG INJ IV PUSH: CPT

## 2022-01-15 PROCEDURE — 6370000000 HC RX 637 (ALT 250 FOR IP): Performed by: STUDENT IN AN ORGANIZED HEALTH CARE EDUCATION/TRAINING PROGRAM

## 2022-01-15 PROCEDURE — 94664 DEMO&/EVAL PT USE INHALER: CPT

## 2022-01-15 PROCEDURE — 2580000003 HC RX 258: Performed by: STUDENT IN AN ORGANIZED HEALTH CARE EDUCATION/TRAINING PROGRAM

## 2022-01-15 PROCEDURE — 94640 AIRWAY INHALATION TREATMENT: CPT

## 2022-01-15 PROCEDURE — 96361 HYDRATE IV INFUSION ADD-ON: CPT

## 2022-01-15 PROCEDURE — 36415 COLL VENOUS BLD VENIPUNCTURE: CPT

## 2022-01-15 PROCEDURE — 80053 COMPREHEN METABOLIC PANEL: CPT

## 2022-01-15 PROCEDURE — 99282 EMERGENCY DEPT VISIT SF MDM: CPT

## 2022-01-15 PROCEDURE — 83690 ASSAY OF LIPASE: CPT

## 2022-01-15 PROCEDURE — 74176 CT ABD & PELVIS W/O CONTRAST: CPT

## 2022-01-15 PROCEDURE — 85025 COMPLETE CBC W/AUTO DIFF WBC: CPT

## 2022-01-15 PROCEDURE — 87086 URINE CULTURE/COLONY COUNT: CPT

## 2022-01-15 PROCEDURE — 81001 URINALYSIS AUTO W/SCOPE: CPT

## 2022-01-15 RX ORDER — MORPHINE SULFATE 4 MG/ML
4 INJECTION, SOLUTION INTRAMUSCULAR; INTRAVENOUS ONCE
Status: COMPLETED | OUTPATIENT
Start: 2022-01-15 | End: 2022-01-15

## 2022-01-15 RX ORDER — OXYCODONE HYDROCHLORIDE 5 MG/1
5 TABLET ORAL EVERY 8 HOURS PRN
Qty: 3 TABLET | Refills: 0 | Status: SHIPPED | OUTPATIENT
Start: 2022-01-15 | End: 2022-01-18

## 2022-01-15 RX ORDER — 0.9 % SODIUM CHLORIDE 0.9 %
1000 INTRAVENOUS SOLUTION INTRAVENOUS ONCE
Status: COMPLETED | OUTPATIENT
Start: 2022-01-15 | End: 2022-01-15

## 2022-01-15 RX ORDER — ACETAMINOPHEN 500 MG
1000 TABLET ORAL EVERY 6 HOURS PRN
Qty: 20 TABLET | Refills: 1 | Status: ON HOLD | OUTPATIENT
Start: 2022-01-15 | End: 2022-04-17 | Stop reason: HOSPADM

## 2022-01-15 RX ORDER — TAMSULOSIN HYDROCHLORIDE 0.4 MG/1
0.4 CAPSULE ORAL DAILY
Status: DISCONTINUED | OUTPATIENT
Start: 2022-01-15 | End: 2022-01-15 | Stop reason: HOSPADM

## 2022-01-15 RX ORDER — TAMSULOSIN HYDROCHLORIDE 0.4 MG/1
0.4 CAPSULE ORAL DAILY
Qty: 30 CAPSULE | Refills: 0 | Status: SHIPPED | OUTPATIENT
Start: 2022-01-15 | End: 2022-04-12

## 2022-01-15 RX ADMIN — TAMSULOSIN HYDROCHLORIDE 0.4 MG: 0.4 CAPSULE ORAL at 16:34

## 2022-01-15 RX ADMIN — MORPHINE SULFATE 4 MG: 4 INJECTION INTRAVENOUS at 15:42

## 2022-01-15 RX ADMIN — SODIUM CHLORIDE 1000 ML: 9 INJECTION, SOLUTION INTRAVENOUS at 15:43

## 2022-01-15 ASSESSMENT — PAIN DESCRIPTION - LOCATION: LOCATION: ABDOMEN;BACK

## 2022-01-15 ASSESSMENT — PAIN SCALES - GENERAL
PAINLEVEL_OUTOF10: 9
PAINLEVEL_OUTOF10: 9

## 2022-01-15 ASSESSMENT — ENCOUNTER SYMPTOMS
SHORTNESS OF BREATH: 0
ABDOMINAL PAIN: 1
TROUBLE SWALLOWING: 0
NAUSEA: 1
DIARRHEA: 0
BACK PAIN: 0
VOMITING: 0

## 2022-01-15 ASSESSMENT — PAIN DESCRIPTION - ORIENTATION: ORIENTATION: LEFT

## 2022-01-15 NOTE — ED PROVIDER NOTES
[facility]     Emergency Department     Faculty Note/ Attestation      Pt Name: Ирина Amin                                       MRN: [id number]  [place] 1980  Date of evaluation: 1/15/2022    Patients PCP:    KATIE Alexander - CNP      Attestation  I performed a history and physical examination of the patient and discussed management with the resident. I reviewed the residents note and agree with the documented findings and plan of care. Any areas of disagreement are noted on the chart. I was personally present for the key portions of any procedures. I have documented in the chart those procedures where I was not present during the key portions. I have reviewed the emergency nurses triage note. I agree with the chief complaint, past medical history, past surgical history, allergies, medications, social and family history as documented unless otherwise noted below. For Physician Assistant/ Nurse Practitioner cases/documentation I have personally evaluated this patient and have completed at least one if not all key elements of the E/M (history, physical exam, and MDM). Additional findings are as noted. Initial Screens:             Vitals:    Vitals:    01/15/22 1511 01/15/22 1527   BP:  (!) 143/101   Pulse: 102    Resp: 20    Temp: 99.4 °F (37.4 °C)    SpO2: 95%    Weight: 220 lb (99.8 kg)    Height: 5' 2\" (1.575 m)        CHIEF COMPLAINT       Chief Complaint   Patient presents with    Abdominal Pain     x 1 week             DIAGNOSTIC RESULTS             RADIOLOGY:   CT ABDOMEN PELVIS WO CONTRAST Additional Contrast? None   Final Result   1. 7 mm calculus in the left renal pelvis. No associated hydronephrosis or   hydroureter. 2. No additional calculi bilaterally.                LABS:  Labs Reviewed   CBC WITH AUTO DIFFERENTIAL - Abnormal; Notable for the following components:       Result Value    Seg Neutrophils 66 (*)     Lymphocytes 21 (*)     Immature Granulocytes 1 (*)     All other components within normal limits   COMPREHENSIVE METABOLIC PANEL - Abnormal; Notable for the following components:    Glucose 119 (*)     Calcium 8.5 (*)     Chloride 110 (*)     Total Bilirubin 0.17 (*)     Total Protein 6.3 (*)     All other components within normal limits   URINE RT REFLEX TO CULTURE - Abnormal; Notable for the following components:    Protein, UA TRACE (*)     All other components within normal limits   MICROSCOPIC URINALYSIS - Abnormal; Notable for the following components:    Mucus, UA 1+ (*)     All other components within normal limits   CULTURE, URINE   LIPASE         EMERGENCY DEPARTMENT COURSE:     -------------------------  BP: (!) 143/101, Temp: 99.4 °F (37.4 °C), Pulse: 102, Resp: 20      Comments    L sided flank pain x1 week, hx of renal stones  Not relieved by tylenol    Pain improved with medications here in the department, stone identified on CT, no signs of infection at this time.   We will discharge patient with supportive care and strainer, follow-up with urology    (Please note that portions of this note were completed with a voice recognition program.  Efforts were made to edit the dictations but occasionally words are mis-transcribed.)      Brad Arenas MD,, MD  Attending Emergency Physician         Brad Arenas MD  01/22/22 8013

## 2022-01-15 NOTE — ED PROVIDER NOTES
[address] [place] [address] ED  Emergency Department Encounter  EmergencyMedicine Resident     Pt Name:Niurka Max  MRN: [id number]  Maricelgfparish 1980  Date of evaluation: 1/15/22  PCP:  KATIE Romero CNP    This patient was evaluated in the Emergency Department for symptoms described in the history of present illness. The patient was evaluated in the context of the global COVID-19 pandemic, which necessitated consideration that the patient might be at risk for infection with the SARS-CoV-2 virus that causes COVID-19. Institutional protocols and algorithms that pertain to the evaluation of patients at risk for COVID-19 are in a state of rapid change based on information released by regulatory bodies including the CDC and federal and state organizations. These policies and algorithms were followed during the patient's care in the ED. CHIEF COMPLAINT       Chief Complaint   Patient presents with    Abdominal Pain     x 1 week       HISTORY OF PRESENT ILLNESS  (Location/Symptom, Timing/Onset, Context/Setting, Quality, Duration, Modifying Factors, Severity.)      Erin Marie is a 46 y.o. female who presents with complaint of abdominal pain, nausea, back pain, dysuria. Past medical history is significant for GERD, Sadler's esophagus, hiatal hernia. She states that her abdominal pain has been ongoing for the past 1 week she states that her symptoms feel similar to prior episodes of nephrolithiasis review of records show that in June of this year she had a CT scan of her abdomen showed a 0.5 cm nephrolithiasis on the left side. Patient is having some dysuria no hematuria nausea no vomiting. No chest pain or shortness of breath. She has been taking NSAIDs and Tylenol at home despite being advised not to do so with the NSAIDs given her Sadler's esophagitis.  This has not helped    PAST MEDICAL / SURGICAL / SOCIAL / FAMILY HISTORY      has a past medical history of Anxiety, Arthritis, Asthma, Sadler's esophagus, Bipolar 1 disorder ([address] CAD (coronary artery disease), Chronic insomnia, COPD (chronic obstructive pulmonary disease) ([address] Depression, Diabetes mellitus ([address] Endometriosis, Esophagitis, GERD (gastroesophageal reflux disease), Headache(784.0), Herniated disc, cervical, Hiatal hernia, Incisional abscess, Kidney stones, MDRO (multiple drug resistant organisms) resistance, Pleurisy, Pneumonia, Seizures ([address] UTI (urinary tract infection), and Vision abnormalities. Problem Noted Date   Moderate persistent asthma with exacerbation 10/22/2019   Acute intractable tension-type headache 2018   Morbid obesity 2017   Chronic pelvic pain in female 2017   Type 2 diabetes mellitus with circulatory disorder 2016   Asthma with status asthmaticus           has a past surgical history that includes knee surgery (Left);  section (, ); Tonsillectomy; Knee arthroscopy (Left, 5/20/15); Cervical disc surgery; Upper gastrointestinal endoscopy (2016); Upper gastrointestinal endoscopy (2017); Upper gastrointestinal endoscopy (2017); pr esophagogastroduodenoscopy transoral diagnostic (N/A, 3/2/2017); laparoscopy; laparoscopy (N/A, 10/5/2017); Upper gastrointestinal endoscopy (N/A, 2018); Endoscopy, colon, diagnostic;  section (N/A, 2018); Upper gastrointestinal endoscopy (N/A, 10/1/2019); Colonoscopy (N/A, 10/1/2019); Colonoscopy (N/A, 2020); [place] tooth extraction; and Hysterectomy (N/A, 2020).     ARTHROSCOPIC REPAIR ACL     lt knee      SECTION 2013 - 2013        NECK SURGERY 2016 - 2016   fusion     UPPER ENDOSCOPY W/ ESOPHAGEAL MANOMETRY 2016 - 2016        TONSILLECTOMY ADENOIDECTOMY          INCISION AND DRAINAGE OF WOUND          ESOPHAGOGASTRODUODENOSCOPY 2017 N/A Procedure: EGD WITH BIOPSY; Surgeon: Nemo Alcala MD; Location: [address] [place] [address]; Service: Gastroenterology; Laterality: N/A;     TONSILLECTOMY          LAPAROSCOPY 6/27/2017 Abdomen/N/A Procedure: LAPAROSCOPY DIAGNOSTIC, lysis of adhesions, excision of endometrial implant; Surgeon: Felisha Deleon MD; Location: [address] [place] [address]; Service: Gynecology; Laterality: N/A;     HYSTERECTOMY              Social History     Socioeconomic History    Marital status: Single     Spouse name: Not on file    Number of children: 1    Years of education: 11th grade    Highest education level: Not on file   Occupational History    Occupation: unemployed-   Tobacco Use    Smoking status: Current Every Day Smoker     Packs/day: 0.50     Years: 21.00     Pack years: 10.50     Types: Cigarettes    Smokeless tobacco: Never Used   Vaping Use    Vaping Use: Every day   Substance and Sexual Activity    Alcohol use: No    Drug use: Not Currently     Comment: 10 years ago- cocaine    Sexual activity: Yes     Partners: Male   Other Topics Concern    Not on file   Social History Narrative    Lives with son and boyfriend     Social Determinants of Health     Financial Resource Strain: Low Risk     Difficulty of Paying Living Expenses: Not hard at all   Food Insecurity: No Food Insecurity    Worried About Running Out of Food in the Last Year: Never true    [address] in the Last Year: Never true   Transportation Needs:     Lack of Transportation (Medical): Not on file    Lack of Transportation (Non-Medical):  Not on file   Physical Activity:     Days of Exercise per Week: Not on file    Minutes of Exercise per Session: Not on file   Stress:     Feeling of Stress : Not on file   Social Connections:     Frequency of Communication with Friends and Family: Not on file    Frequency of Social Gatherings with Friends and Family: Not on file    Attends Jew Services: Not on file    Active Member of Clubs or Organizations: Not on file    Attends Club or Organization Meetings: Not on file    Marital Status: Not on file Intimate Partner Violence:     Fear of Current or Ex-Partner: Not on file    Emotionally Abused: Not on file    Physically Abused: Not on file    Sexually Abused: Not on file   Housing Stability:     Unable to Pay for Housing in the Last Year: Not on file    Number of Places Lived in the Last Year: Not on file    Unstable Housing in the Last Year: Not on file       Family History   Problem Relation Age of Onset    Cancer Mother         breast    Bipolar Disorder Mother     Hypertension Mother     Breast Cancer Mother     Bipolar Disorder Brother     Hypertension Father        Allergies:  Nsaids, Toradol [ketorolac tromethamine], and Ultram [tramadol]    Home Medications:  Prior to Admission medications    Medication Sig Start Date End Date Taking? Authorizing Provider   acetaminophen (TYLENOL) 500 MG tablet Take 2 tablets by mouth every 6 hours as needed for Pain 1/15/22  Yes Jann Robertson,    tamsulosin (FLOMAX) 0.4 MG capsule Take 1 capsule by mouth daily 1/15/22  Yes Jann Robertson DO   oxyCODONE (ROXICODONE) 5 MG immediate release tablet Take 1 tablet by mouth every 8 hours as needed for Pain for up to 3 days. Intended supply: 3 days. Take lowest dose possible to manage pain 1/15/22 1/18/22 Yes Clinton Clifton, DO   Misc. Devices (STRAINER/STAINLESS STEEL/2.5\") MISC 1 each by Does not apply route as needed (when you urinate) 1/15/22  Yes Jann Robertson,    pregabalin (LYRICA) 100 MG capsule Take 1 capsule by mouth 3 times daily for 30 days.  1/13/22 2/12/22  Mitchel Quarles MD   pantoprazole (PROTONIX) 40 MG tablet 1 tab daily 1/11/22   KATIE Cole CNP   promethazine (PHENERGAN) 6.25 MG/5ML syrup Take 10 mLs by mouth 4 times daily as needed for Nausea (diarrhea) 1/11/22   KATIE Cole CNP   Methocarbamol (ROBAXIN PO) Take by mouth    Historical Provider, MD   sertraline (ZOLOFT) 100 MG tablet Take 1.5 tablets by mouth daily 11/1/21   Historical Provider, MD lamoTRIgine (LAMICTAL) 200 MG tablet Take 1 tablet by mouth daily 11/1/21   Historical Provider, MD   ipratropium-albuterol (DUONEB) 0.5-2.5 (3) MG/3ML SOLN nebulizer solution INHALE CONTENTS OF 1 UNIT DOSE VIA NEBULIZER EVERY 4 HOURS 11/28/21   KATIE Cole CNP   ONETOUCH VERIO strip USE TO TEST BLOOD SUGAR TWICE A DAY 11/1/21   KATIE Cole CNP   vitamin D3 (CHOLECALCIFEROL) 25 MCG (1000 UT) TABS tablet TAKE 1 TABLET BY MOUTH DAILY 11/1/21   KATIE Cole CNP   montelukast (SINGULAIR) 10 MG tablet TAKE 1 TABLET IN THE EVENING ONCE A DAY ORALLY 11/1/21   KATIE Cole CNP   BREO ELLIPTA 200-25 MCG/INH AEPB inhaler INHALE 1 PUFF INTO THE LUNGS DAILY 10/11/21   KATIE Cole CNP   methylPREDNISolone (MEDROL DOSEPACK) 4 MG tablet Take by mouth. 9/30/21   KATIE Cole CNP   COMBIVENT RESPIMAT  MCG/ACT AERS inhaler Inhale 1 puff into the lungs 4 times daily Inhale 1 puff into the lungs 4 times daily 9/22/21   Kayode Alvares MD   dicyclomine (BENTYL) 10 MG capsule TAKE 1 CAPSULE BY MOUTH 4 TIMES DAILY 8/27/21   KATIE Cole CNP   pregabalin (LYRICA) 100 MG capsule Take 1 tid 7/19/21 1/10/22  KATIE Cole CNP   loratadine (CLARITIN) 10 MG tablet TAKE 1 TABLET BY MOUTH DAILY 7/6/21   KATIE Cole CNP   glucose monitoring kit (FREESTYLE) monitoring kit Use as directed.   BRAND OF CHOICE INSURANCE ALLOWS. 5/27/21   KATIE Cole CNP   Lancets (ONETOUCH DELICA PLUS [id number]) MISC USE TO TEST BLOOD SUGAR TWICE A DAY 5/27/21   KATIE Cole CNP   Alcohol Swabs ( STERILE ALCOHOL PREP) PADS USE AS DIRECTED 5/10/21   KATIE Cole CNP   fluticasone (FLONASE) 50 MCG/ACT nasal spray 1 spray by Each Nostril route daily 4/1/21 5/1/21  KATIE Cole CNP   vitamin D3 (CHOLECALCIFEROL) 25 MCG (1000 UT) TABS tablet TAKE 1 TABLET BY MOUTH DAILY 8/27/20   Alan WILLOUGHBY Gauamis, APRN - CNP   Masks (CLEVER CHOICE FACE MASK) [facility] USE NEW FACE MASK EVERYDAY WHEN PATIENT GO OUTSIDE OF HOME DUE TO COVID PANDEMIC 7/6/20   KATIE Cole CNP       REVIEW OF SYSTEMS    (2-9 systems for level 4, 10 or more for level 5)      Review of Systems   Constitutional: Positive for appetite change. Negative for fever. HENT: Negative for congestion and trouble swallowing. Respiratory: Negative for shortness of breath. Cardiovascular: Negative for chest pain. Gastrointestinal: Positive for abdominal pain and nausea. Negative for diarrhea and vomiting. Genitourinary: Positive for dysuria and flank pain. Negative for hematuria, pelvic pain, vaginal bleeding and vaginal discharge. Musculoskeletal: Negative for back pain. Skin: Negative for pallor, rash and wound. Allergic/Immunologic: Negative for food allergies. Neurological: Negative for weakness, numbness and headaches. Hematological: Negative for adenopathy. Psychiatric/Behavioral: Negative for agitation and confusion. PHYSICAL EXAM   (up to 7 for level 4, 8 or more for level 5)      INITIAL VITALS:   BP (!) 143/101   Pulse 102   Temp 99.4 °F (37.4 °C)   Resp 20   Ht 5' 2\" (1.575 m)   Wt 220 lb (99.8 kg)   LMP 06/15/2020   SpO2 95%   BMI 40.24 kg/m²     Physical Exam  Vitals and nursing note reviewed. Constitutional:       Appearance: She is obese. She is not toxic-appearing. HENT:      Head: Normocephalic and atraumatic. Right Ear: External ear normal.      Left Ear: External ear normal.      Nose: Nose normal.      Mouth/Throat:      Pharynx: Oropharynx is clear. Eyes:      Conjunctiva/sclera: Conjunctivae normal.   Cardiovascular:      Rate and Rhythm: Regular rhythm. Tachycardia present. Pulses: Normal pulses. Pulmonary:      Effort: Pulmonary effort is normal. No respiratory distress. Abdominal:      Palpations: Abdomen is soft. Tenderness: There is no abdominal tenderness. There is left CVA tenderness. There is no right CVA tenderness or guarding. Musculoskeletal:         General: Normal range of motion. Cervical back: Normal range of motion. Right lower leg: No edema. Left lower leg: No edema. Skin:     General: Skin is warm. Capillary Refill: Capillary refill takes less than 2 seconds. Neurological:      Mental Status: She is alert and oriented to person, place, and time. Psychiatric:         Mood and Affect: Mood normal.         DIFFERENTIAL  DIAGNOSIS     PLAN (LABS / IMAGING / EKG):  Orders Placed This Encounter   Procedures    Culture, Urine    CT ABDOMEN PELVIS WO CONTRAST Additional Contrast? None    CBC WITH AUTO DIFFERENTIAL    COMPREHENSIVE METABOLIC PANEL    Urinalysis Reflex to Culture    LIPASE    Microscopic Urinalysis    Initiate ED RT Aerosol protocol       MEDICATIONS ORDERED:  Orders Placed This Encounter   Medications    0.9 % sodium chloride bolus    morphine injection 4 mg    tamsulosin (FLOMAX) capsule 0.4 mg    acetaminophen (TYLENOL) 500 MG tablet     Sig: Take 2 tablets by mouth every 6 hours as needed for Pain     Dispense:  20 tablet     Refill:  1    tamsulosin (FLOMAX) 0.4 MG capsule     Sig: Take 1 capsule by mouth daily     Dispense:  30 capsule     Refill:  0    oxyCODONE (ROXICODONE) 5 MG immediate release tablet     Sig: Take 1 tablet by mouth every 8 hours as needed for Pain for up to 3 days. Intended supply: 3 days. Take lowest dose possible to manage pain     Dispense:  3 tablet     Refill:  0    albuterol (PROVENTIL) nebulizer solution 5 mg     Order Specific Question:   Initiate RT Bronchodilator Protocol     Answer: Yes    ipratropium (ATROVENT) 0.02 % nebulizer solution 0.5 mg     Order Specific Question:   Initiate RT Bronchodilator Protocol     Answer: Yes    Misc.  Devices (STRAINER/STAINLESS STEEL/2.5\") MISC     Si each by Does not apply route as needed (when you urinate)     Dispense:  1 each     Refill:  0       DDX: kidney stone, pyelo, gastritis, pancreatitis, uti,     DIAGNOSTIC RESULTS / EMERGENCY DEPARTMENT COURSE / MDM   LAB RESULTS:  Results for orders placed or performed during the hospital encounter of 01/15/22   CBC WITH AUTO DIFFERENTIAL   Result Value Ref Range    WBC 8.2 3.5 - 11.3 k/uL    RBC 4.93 3.95 - 5.11 m/uL    Hemoglobin 13.8 11.9 - 15.1 g/dL    Hematocrit 42.7 36.3 - 47.1 %    MCV 86.6 82.6 - 102.9 fL    MCH 28.0 25.2 - 33.5 pg    MCHC 32.3 28.4 - 34.8 g/dL    RDW 13.9 11.8 - 14.4 %    Platelets 208 743 - 208 k/uL    MPV 10.4 8.1 - 13.5 fL    NRBC Automated 0.0 0.0 per 100 WBC    Differential Type NOT REPORTED     Seg Neutrophils 66 (H) 36 - 65 %    Lymphocytes 21 (L) 24 - 43 %    Monocytes 8 3 - 12 %    Eosinophils % 3 1 - 4 %    Basophils 1 0 - 2 %    Immature Granulocytes 1 (H) 0 %    Segs Absolute 5.44 1.50 - 8.10 k/uL    Absolute Lymph # 1.72 1.10 - 3.70 k/uL    Absolute Mono # 0.63 0.10 - 1.20 k/uL    Absolute Eos # 0.27 0.00 - 0.44 k/uL    Basophils Absolute 0.10 0.00 - 0.20 k/uL    Absolute Immature Granulocyte 0.04 0.00 - 0.30 k/uL    WBC Morphology NOT REPORTED     RBC Morphology NOT REPORTED     Platelet Estimate NOT REPORTED    COMPREHENSIVE METABOLIC PANEL   Result Value Ref Range    Glucose 119 (H) 70 - 99 mg/dL    BUN 11 6 - 20 mg/dL    CREATININE 0.75 0.50 - 0.90 mg/dL    Bun/Cre Ratio NOT REPORTED 9 - 20    Calcium 8.5 (L) 8.6 - 10.4 mg/dL    Sodium 140 135 - 144 mmol/L    Potassium 3.8 3.7 - 5.3 mmol/L    Chloride 110 (H) 98 - 107 mmol/L    CO2 20 20 - 31 mmol/L    Anion Gap 10 9 - 17 mmol/L    Alkaline Phosphatase 64 35 - 104 U/L    ALT 13 5 - 33 U/L    AST 13 <32 U/L    Total Bilirubin 0.17 (L) 0.3 - 1.2 mg/dL    Total Protein 6.3 (L) 6.4 - 8.3 g/dL    Albumin 4.0 3.5 - 5.2 g/dL    Albumin/Globulin Ratio 1.7 1.0 - 2.5    GFR Non-African American >60 >60 mL/min    GFR African American >60 >60 mL/min    GFR Comment          GFR Staging NOT REPORTED Urinalysis Reflex to Culture    Specimen: Urine, clean catch   Result Value Ref Range    Color, UA Yellow Yellow    Turbidity UA Clear Clear    Glucose, Ur NEGATIVE NEGATIVE    Bilirubin Urine NEGATIVE NEGATIVE    Ketones, Urine NEGATIVE NEGATIVE    Specific Gravity, UA 1.020 1.005 - 1.030    Urine Hgb NEGATIVE NEGATIVE    pH, UA 5.5 5.0 - 8.0    Protein, UA TRACE (A) NEGATIVE    Urobilinogen, Urine Normal Normal    Nitrite, Urine NEGATIVE NEGATIVE    Leukocyte Esterase, Urine NEGATIVE NEGATIVE    Urinalysis Comments NOT REPORTED    LIPASE   Result Value Ref Range    Lipase 18 13 - 60 U/L   Microscopic Urinalysis   Result Value Ref Range    -          WBC, UA 5 TO 10 0 - 5 /HPF    RBC, UA 0 TO 2 0 - 2 /HPF    Casts UA 20 TO 50 0 - 2 /LPF    Casts UA HYALINE 0 - 2 /LPF    Crystals, UA NOT REPORTED None /HPF    Epithelial Cells UA 20 TO 50 0 - 5 /HPF    Renal Epithelial, UA NOT REPORTED 0 /HPF    Bacteria, UA NOT REPORTED None    Mucus, UA 1+ (A) None    Trichomonas, UA NOT REPORTED None    Amorphous, UA NOT REPORTED None    Other Observations UA NOT REPORTED NOT REQ. Yeast, UA NOT REPORTED None       IMPRESSION: Patient is alert uncomfortable appearing nontoxic obese 21-year-old female complaining of a 1 week history of progressively worsening left flank pain she has some nausea no vomiting similar presentation to prior episodes of nephrolithiasis. On exam patient is uncomfortable crying tachycardic to 102. Abdomen is soft and nontender pain is localized to left flank. CVA tenderness on the left side. The urinalysis, CBC, CMP, bedside ultrasound, CT abdomen pelvis noncontrast, morphine, lipase, ivf.     RADIOLOGY:  CT ABDOMEN PELVIS WO CONTRAST Additional Contrast? None    Result Date: 1/15/2022  EXAMINATION: CT OF THE ABDOMEN AND PELVIS WITHOUT CONTRAST 1/15/2022 3:48 pm TECHNIQUE: CT of the abdomen and pelvis was performed without the administration of intravenous contrast. Multiplanar reformatted images are provided for review. Dose modulation, iterative reconstruction, and/or weight based adjustment of the mA/kV was utilized to reduce the radiation dose to as low as reasonably achievable. COMPARISON: 06/30/2021 CT HISTORY: ORDERING SYSTEM PROVIDED HISTORY: stone protocol TECHNOLOGIST PROVIDED HISTORY: stone protocol Decision Support Exception - unselect if not a suspected or confirmed emergency medical condition->Emergency Medical Condition (MA) Is the patient pregnant?->No FINDINGS:  System: There is a 7 mm calculus in the left renal pelvis. This has migrated from the mid left kidney since the prior CT. No hydronephrosis or hydroureter. No additional calculi bilaterally. The bladder is normal with no intraluminal filling defect. Lower Chest:  The lung bases are clear. The base of the heart is normal. Organs: The liver, gallbladder, biliary ducts, pancreas and spleen are normal.  Adrenal glands are normal. GI/Bowel: The stomach, duodenum and small bowel are normal. A normal appendix is visualized. The colon is normal. Pelvis: The uterus is absent. Peritoneum/Retroperitoneum: The aorta tapers normally. No lymph node enlargement. Bones/Soft Tissues: No significant skeletal abnormalities. 1. 7 mm calculus in the left renal pelvis. No associated hydronephrosis or hydroureter. 2. No additional calculi bilaterally. EKG  none    All EKG's are interpreted by the Emergency Department Physician who either signs or Co-signs this chart in the absence of a cardiologist.    EMERGENCY DEPARTMENT COURSE:  ED Course as of 01/15/22 1907   Sat Laron 15, 2022   1532 Seen and evaluated workup initiated. [BG]   1611 There is a 7 mm calculus in the left renal pelvis. This has migrated from the mid left kidney since the prior CT.   No hydronephrosis or hydroureter.  [BG]      ED Course User Index  [BG] Ирина Black,          PROCEDURES:      CONSULTS:  None    CRITICAL CARE:      FINAL IMPRESSION      1. Nephrolithiasis          DISPOSITION / PLAN     DISPOSITION  dc      PATIENT REFERRED TO:  Tierney Moran, [address]  [facility] [address]  [address] [facility] [address]  [address] [address]  [phone]    Call today  for followup and reevaluation in 1-2 days    [facility]  [address] [facility] [address]  [phone]  Go to   If symptoms worsen, As needed    [address] [facility] [address] [facility] [address]  [phone]  Schedule an appointment as soon as possible for a visit   As needed      DISCHARGE MEDICATIONS:  Discharge Medication List as of 1/15/2022  4:39 PM      START taking these medications    Details   tamsulosin (FLOMAX) 0.4 MG capsule Take 1 capsule by mouth daily, Disp-30 capsule, R-0Print      oxyCODONE (ROXICODONE) 5 MG immediate release tablet Take 1 tablet by mouth every 8 hours as needed for Pain for up to 3 days. Intended supply: 3 days.  Take lowest dose possible to manage pain, Disp-3 tablet, R-0Print             Maddie Muro DO  Emergency Medicine Resident    (Please note that portions of thisnote were completed with a voice recognition program.  Efforts were made to edit the dictations but occasionally words are mis-transcribed.)       Maddie Muro DO  Resident  01/15/22 [id number]

## 2022-01-15 NOTE — ED NOTES
Abdominal pain x 1 week   Left sided flank pain x 1 week   Emesis x 1 occurrence   Hx of kidney stones      Ector Leavitt RN  01/15/22 [id number]

## 2022-01-17 ENCOUNTER — TELEPHONE (OUTPATIENT)
Dept: FAMILY MEDICINE CLINIC | Age: 42
End: 2022-01-17

## 2022-01-17 LAB
CULTURE: NORMAL
Lab: NORMAL
SPECIMEN DESCRIPTION: NORMAL

## 2022-01-17 NOTE — TELEPHONE ENCOUNTER
[facility] ([facility]) ED Follow up Call     Reason for ED visit:   Nephrolithiasis        1/17/2022     Line busy     FU appts/Provider:    No future appointments.

## 2022-01-20 ENCOUNTER — TELEMEDICINE (OUTPATIENT)
Dept: FAMILY MEDICINE CLINIC | Age: 42
End: 2022-01-20
Payer: COMMERCIAL

## 2022-01-20 DIAGNOSIS — M48.02 SPINAL STENOSIS IN CERVICAL REGION: ICD-10-CM

## 2022-01-20 DIAGNOSIS — E66.01 MORBID OBESITY WITH BMI OF 40.0-44.9, ADULT (HCC): ICD-10-CM

## 2022-01-20 DIAGNOSIS — B96.89 ACUTE BACTERIAL RHINOSINUSITIS: Primary | ICD-10-CM

## 2022-01-20 DIAGNOSIS — J01.90 ACUTE BACTERIAL RHINOSINUSITIS: Primary | ICD-10-CM

## 2022-01-20 DIAGNOSIS — J44.9 COPD WITH ASTHMA (HCC): ICD-10-CM

## 2022-01-20 DIAGNOSIS — J30.89 NON-SEASONAL ALLERGIC RHINITIS DUE TO OTHER ALLERGIC TRIGGER: ICD-10-CM

## 2022-01-20 DIAGNOSIS — N20.0 NEPHROLITHIASIS: ICD-10-CM

## 2022-01-20 DIAGNOSIS — G43.019 INTRACTABLE MIGRAINE WITHOUT AURA AND WITHOUT STATUS MIGRAINOSUS: ICD-10-CM

## 2022-01-20 PROCEDURE — 99214 OFFICE O/P EST MOD 30 MIN: CPT | Performed by: FAMILY MEDICINE

## 2022-01-20 PROCEDURE — G8427 DOCREV CUR MEDS BY ELIG CLIN: HCPCS | Performed by: FAMILY MEDICINE

## 2022-01-20 RX ORDER — PREDNISONE 10 MG/1
TABLET ORAL
Qty: 30 TABLET | Refills: 0 | Status: ON HOLD | OUTPATIENT
Start: 2022-01-20 | End: 2022-04-17 | Stop reason: HOSPADM

## 2022-01-20 RX ORDER — AMOXICILLIN AND CLAVULANATE POTASSIUM 875; 125 MG/1; MG/1
1 TABLET, FILM COATED ORAL 2 TIMES DAILY
Qty: 14 TABLET | Refills: 0 | Status: SHIPPED | OUTPATIENT
Start: 2022-01-20 | End: 2022-01-27

## 2022-01-20 RX ORDER — IPRATROPIUM/ALBUTEROL SULFATE 20-100 MCG
1 MIST INHALER (GRAM) INHALATION 4 TIMES DAILY
Qty: 4 G | Refills: 3 | Status: SHIPPED | OUTPATIENT
Start: 2022-01-20 | End: 2022-03-09 | Stop reason: SDUPTHER

## 2022-01-20 ASSESSMENT — PATIENT HEALTH QUESTIONNAIRE - PHQ9
SUM OF ALL RESPONSES TO PHQ QUESTIONS 1-9: 0
2. FEELING DOWN, DEPRESSED OR HOPELESS: 0
1. LITTLE INTEREST OR PLEASURE IN DOING THINGS: 0
SUM OF ALL RESPONSES TO PHQ QUESTIONS 1-9: 0
SUM OF ALL RESPONSES TO PHQ9 QUESTIONS 1 & 2: 0
SUM OF ALL RESPONSES TO PHQ QUESTIONS 1-9: 0
SUM OF ALL RESPONSES TO PHQ QUESTIONS 1-9: 0

## 2022-01-20 ASSESSMENT — ENCOUNTER SYMPTOMS
WHEEZING: 0
RHINORRHEA: 1
EYE PAIN: 0
BACK PAIN: 1
VOMITING: 0
CONSTIPATION: 0
SINUS PRESSURE: 1
NAUSEA: 0
DIARRHEA: 0
CHEST TIGHTNESS: 0
COUGH: 1
ABDOMINAL PAIN: 0
SORE THROAT: 1

## 2022-01-20 NOTE — PROGRESS NOTES
[facility] at [place] [id number] [facility] [id number]   O: [address] [place] [address] Yair Bocanegra is a 46 y.o. female evaluated via telephone on 1/20/2022. CONSENT:  She and/or health care decision maker is aware that that she may receive a bill for this telephone service, depending on her insurance coverage, and has provided verbal consent to proceed: Yes    DOCUMENTATION:  Patient scheduled this appointment today due to ED Follow-up (LOWER BACK PAIN/ PAIN SCORE: 10/10), Flank Pain, Cough, and Sinus Problem    KIDNEY STONES  Patient was recently seen in the emergency room for flank pain. Patient was found to have a 7 mm kidney stone on the right side. Patient was given some IV fluids and was sent home. She has an appointment with the urologist on the 28th of this month. Patient is very concerned about this we discussed potential procedures. She was given some Flomax. She continues to have some intermittent pain. Patient was given a few days worth of oxycodone. She started to drink a lot more fluids and cut down on the pop and coffee. FINDINGS:    System: There is a 7 mm calculus in the left renal pelvis. This has   migrated from the mid left kidney since the prior CT. No hydronephrosis or   hydroureter. No additional calculi bilaterally. The bladder is normal with   no intraluminal filling defect. Lower Chest:  The lung bases are clear. The base of the heart is normal.       Organs: The liver, gallbladder, biliary ducts, pancreas and spleen are   normal.  Adrenal glands are normal.       GI/Bowel: The stomach, duodenum and small bowel are normal. A normal appendix   is visualized. The colon is normal.       Pelvis: The uterus is absent. Peritoneum/Retroperitoneum: The aorta tapers normally. No lymph node   enlargement. Bones/Soft Tissues: No significant skeletal abnormalities.            Impression   1. 7 mm calculus in the left renal pelvis. No associated hydronephrosis or   hydroureter. 2. No additional calculi bilaterally     SINUSITIS// NON ALLERGIC RHINITIS  Patient also reported some worsening sinus pressure nonproductive cough, facial pain, and some feeling more ill in the past several days. Patient denies any fever chills denies any contact IV with COVID-19 virus. She denies any deficits with sense of smell or taste. Declined COVID-19 virus screening. COPD/OBESITY  Patient has a known history of COPD with asthma currently on Breo and Combivent. Patient have not been very adherent to her therapy and has had a long history of smoking. Patient continues to have some nonproductive cough and have also had some increasing weight. CERVICAL RADICULOPATHY/ MIGRAINE HEADACHE  Patient has a known history of chronic neck pain and migraine headaches. She had several surgeries due to a bulging disc. She had an C3-4 ACDF and C5-6 ACDF. Her first surgery was done by Dr. Israel Heard. Second surgery was done by rafael [address] [place] [address]. Patient was also discharged from that office for no-shows. Patient reports pain on her neck as constant. She describes the pain as pressure, stiff, and achy. Patient denies any specific aggravating factors but most activities. .  She had been on different types of pain medications. She is not able to take some NSAIDs due to her Sadler's esophagus. Therefore, she is been taking Tylenol with no relief. She also reported some weakness to her right arm after the second surgery. She reports some mild numbness as well. She is on Lyrica 3 times a day she does well with this therapy she denies any adverse reaction to this. However, patient have not been following up every 3 months like we discussed. Also she needs to do a drug screen that was sent few months ago but has not done yet. Therefore, we will not be providing this prescription for her today until we see the results.   Patient was also referred to another neurosurgeon who he saw once and never followed up patient is encouraged to follow-up with him. She is established with Orin Styles. Patient is recently seen by the neurosurgeon and was told that this is nothing surgical therefore, she was sent to the pain management doctor she now has another appointment with them. Patient was restarted back on Lyrica which I stopped given to her since her drug screen came back with no presence of Lyrica . Patient also reported that Lyrica is helping her migraine headaches which she is had for a long time. Neurosurgeon also referred her to the neurologist for her migraine headaches who she has an appointment with in the next few days. PREDIABETES-due for A1c  Patient's recent hemoglobin A1c below. Patient admits to genetics, poor eating habits, health problems and inactivity. Patient denies any polyphagia, polydipsia, polyuria. We discussed the importance lifestyle changes such as cutting down food/drinks high in carbohydrates and regular exercise to avoid any progression on this condition. We are going to continue to monitor the [facility]. Treatment diet control. Lab Results   Component Value Date    LABA1C 5.2 09/30/2021    LABA1C 5.7 06/10/2020         Depression: Not at risk    PHQ-2 Score: 0    Depressioin screening-- negative  PHQ-9 Total Score: 0 (1/20/2022 10:00 AM)    I communicated with the patient and/or health care decision maker about: PLAN     Review of Systems   Constitutional: Positive for unexpected weight change. Negative for chills, fatigue and fever. HENT: Positive for congestion, postnasal drip, rhinorrhea, sinus pressure and sore throat. Eyes: Negative for pain. Respiratory: Positive for cough. Negative for chest tightness and wheezing. Cardiovascular: Negative for chest pain and palpitations. Gastrointestinal: Negative for abdominal pain, constipation, diarrhea, nausea and vomiting. Endocrine: Negative.     Genitourinary: days, then 1 tab X 3 days  Dispense: 30 tablet; Refill: 0  - amoxicillin-clavulanate (AUGMENTIN) 875-125 MG per tablet; Take 1 tablet by mouth 2 times daily for 7 days  Dispense: 14 tablet; Refill: 0    2. Nephrolithiasis  Failure to Improve  Encouraged to see the urologist as scheduled  DISCUSSED and ADVISED TO:  Drink plenty of fluids, enough until urine is light yellow or clear like water. Choose water and other caffeine-free clear liquids. If you do not feel like eating or drinking, try taking small sips of water, sports drinks, or other rehydration drinks. Get plenty of rest.  Go to the Emergency Room if you are not able to tolerate any food or water. 3. Intractable migraine without aura and without status migrainosus  Failure to Improve  Take medications as discussed. DISCUSSED AND ADVISED TO:  Find ways to manage stress. Rest well, cut down on caffeine, and alcohol intake  Stop smoking. Report for increasing incidences and worsening symptoms. 4. Spinal stenosis in cervical region  Failure to Improve  Continue current therapy. DISCUSSED AND ADVISED TO:  Use heat packs 15 to 20 mins every 2-3 hours. Do some back stretches as tolerated. Refer to hand out for instructions. Call for worsening, numbness, weakness. 5. COPD with asthma ([facility] [address]  Failure to Improve  Current treatment plan is effective, no change in therapy. Reviewed use, techniques, schedule and side effects of all inhaled medications  Critical need for compliance with treatment plan to achieve optimal results  Very strongly urged to quit smoking to reduce pulmonary and CVD risk. - Fluticasone furoate-vilanterol (BREO ELLIPTA) 200-25 MCG/INH AEPB inhaler; Inhale 1 puff into the lungs daily  Dispense: 60 each; Refill: 1  - COMBIVENT RESPIMAT  MCG/ACT AERS inhaler; Inhale 1 puff into the lungs 4 times daily Inhale 1 puff into the lungs 4 times daily  Dispense: 4 g; Refill: 3    6.  Non-seasonal allergic rhinitis due to other allergic trigger  Failure to Improve  Continue with the same therapy   ADVISED TO:  Avoid known allergens/irritants. Stop smoking or avoid second hand smoke. Stay hydrated. Report for worsening symptoms      7. Morbid obesity with BMI of 40.0-44.9, adult (HCC)  Failure to Improve  BMI increasing  DISCUSSED AND ADVISED TO:  Eat a low-fat and low carbohydrates diet. Avoid fried foods especially fast food. Choose healthier options for snacks. Have 5-6 servings of fruits and vegetables per day. Cut down on eating processed food. Add 30 minutes to 1 hour aerobic exercise for 3-4 days a week.           Note: not billable if this call serves to triage the patient into an appointment for the relevant concern  Patient-Reported Vitals 2022   Patient-Reported Weight 220 LB   Patient-Reported Height 5 2   Patient-Reported Systolic -   Patient-Reported Diastolic -     Patient Active Problem List   Diagnosis    Asthma    GERD    Iron deficiency anemia    Cervical disc herniation    Smoker    Sadler's esophagus without dysplasia    Hiatal hernia    COPD    Chronic constipation    Cervicogenic headache    Hx of IUFD (G2)    History of gestational hypertension    Seizure ([address]    Hx of  x2 (G3, G4)    Arthritis    Cervical radiculopathy    Migraine without aura    Spinal stenosis in cervical region    Prediabetes    Chronic pelvic pain in female    Enlarged uterus    Endometriosis    Family history of breast cancer    S/P cervical spinal fusion    Bipolar affective disorder, currently depressed, moderate (HCC)    Insomnia    Ulnar neuropathy    Major depressive disorder, recurrent episode ([address]    Sciatica    Moderate persistent asthma without complication    Lipoma of torso    History of cervical discectomy    Chronic neck pain with abnormal neurologic examination    Abnormal weight gain     Class 2 drug-induced obesity with serious comorbidity and body mass index (BMI) of 37.0 to 37.9 in adult    Pelvic peritoneal endometriosis    RALH with BS and cystoscopy 7/8/2020    Post-op pain    Wound infection/hemorrhage, obstetric surgical, postpartum condition    Postoperative visit    Postoperative abdominal pain    Diverticulitis    Diverticulitis of colon    Pelvic abscess in female    Pneumonia    Right otitis media    Functional diarrhea    Kidney stones    Cervical spondylosis without myelopathy    Cervical myelopathy ([facility] [address]     Jess Reed is a 46 y.o. female patient  patient  being evaluated by through a synchronous (real-time) audio-video encounter . The patient (or guardian if applicable) is aware that this is a billable service, which includes applicable co-pays. This Virtual Visit was conducted withpatient's (and/or legal guardian's) consent. The visit was conducted pursuant tot he emergency declaration under the [facility] waiver authority and the [employer] and [employer] General Act. Patient identification was verified,and a caregiver was present when appropriate. The patient was located in a state where the provider was licensed to provide care. This note was completed by using the assistance of a speech-recognition program. However, inadvertent computerized transcription errors may be present. Although every effort was made to ensure accuracy, no guarantees can be provided that every mistake has been identified and corrected by editing.   Electronically signed by KATIE Gupta CNP on 9/27/21 at 7:16 PM EDT

## 2022-01-20 NOTE — PATIENT INSTRUCTIONS
Patient Education        Kidney Stone: Care Instructions  Your Care Instructions     Kidney stones are formed when salts, minerals, and other substances normally found in the urine clump together. They can be as small as grains of sand or, rarely, as large as golf balls. While the stone is traveling through the ureter, which is the tube that carries urine from the kidney to the bladder, you will probably feel pain. The pain may be mild or very severe. You may also have some blood in your urine. As soon as the stone reaches the bladder, any intense pain should go away. If a stone is too large to pass on its own, you may need a medical procedure to help you pass the stone. The doctor has checked you carefully, but problems can develop later. If you notice any problems or new symptoms, get medical treatment right away. Follow-up care is a key part of your treatment and safety. Be sure to make and go to all appointments, and call your doctor if you are having problems. It's also a good idea to know your test results and keep a list of the medicines you take. How can you care for yourself at home? · Drink plenty of fluids. If you have kidney, heart, or liver disease and have to limit fluids, talk with your doctor before you increase the amount of fluids you drink. · Take pain medicines exactly as directed. Call your doctor if you think you are having a problem with your medicine. ? If the doctor gave you a prescription medicine for pain, take it as prescribed. ? If you are not taking a prescription pain medicine, ask your doctor if you can take an over-the-counter medicine. Read and follow all instructions on the label. · Your doctor may ask you to strain your urine so that you can collect your kidney stone when it passes. You can use a kitchen strainer or a tea strainer to catch the stone. Store it in a plastic bag until you see your doctor again.   Preventing future kidney stones  Some changes in your diet may help prevent kidney stones. Depending on the cause of your stones, your doctor may recommend that you:  · Drink plenty of fluids. If you have kidney, heart, or liver disease and have to limit fluids, talk with your doctor before you increase the amount of fluids you drink. · Limit coffee, tea, and alcohol. Also avoid grapefruit juice. · Do not take more than the recommended daily dose of vitamins C and D.  · Avoid antacids such as Gaviscon, Maalox, Mylanta, or Tums. · Limit the amount of salt (sodium) in your diet. · Eat a balanced diet that is not too high in protein. · Limit foods that are high in a substance called oxalate, which can cause kidney stones. These foods include dark green vegetables, rhubarb, chocolate, wheat bran, nuts, cranberries, and beans. When should you call for help? Call your doctor now or seek immediate medical care if:    · You cannot keep down fluids.     · Your pain gets worse.     · You have a fever or chills.     · You have new or worse pain in your back just below your rib cage (the flank area).     · You have new or more blood in your urine. Watch closely for changes in your health, and be sure to contact your doctor if:    · You do not get better as expected. Where can you learn more? Go to https://LearnSprout[employer].Domosite. org and sign in to your [employer] account. Enter M335 in the [facility] box to learn more about \"Kidney Stone: Care Instructions. \"     If you do not have an account, please click on the \"Sign Up Now\" link. Current as of: September 8, 2021               Content Version: 13.1  © 7495-2654 Not iT. Care instructions adapted under license by [facility] ([facility]). If you have questions about a medical condition or this instruction, always ask your healthcare professional. [address]  any warranty or liability for your use of this information.        Patient Education        Shock Wave Lithotripsy: What to taking a prescription pain medicine, ask your doctor if you can take acetaminophen (Tylenol). Do not take ibuprofen (Advil, Motrin) or naproxen (Aleve), or similar medicines unless your doctor tells you to. ? Do not take two or more pain medicines at the same time unless the doctor told you to. Many pain medicines have acetaminophen, which is Tylenol. Too much acetaminophen (Tylenol) can be harmful. Other instructions    · Urinate through the strainer the doctor gives you. Save any stone pieces, including those that look like sand or gravel. Take these to your doctor. This will help your doctor find the cause of your stones. Follow-up care is a key part of your treatment and safety. Be sure to make and go to all appointments, and call your doctor if you are having problems. It's also a good idea to know your test results and keep a list of the medicines you take. When should you call for help? Call 911 anytime you think you may need emergency care. For example, call if:    · You passed out (lost consciousness).     · You have chest pain, are short of breath, or cough up blood. Call your doctor now or seek immediate medical care if:    · You have pain that does not get better after you take pain medicine.     · You have new or more blood clots in your urine. (It is normal for the urine to be pink for a few days.)     · You cannot urinate.     · You have symptoms of a urinary tract infection. These may include:  ? Pain or burning when you urinate. ? A frequent need to urinate without being able to pass much urine. ? Pain in the flank, which is just below the rib cage and above the waist on either side of the back. ? Blood in the urine. ? A fever.     · You are sick to your stomach or cannot drink fluids.     · You have signs of a blood clot in your leg (called a deep vein thrombosis), such as:  ? Pain in the calf, back of the knee, thigh, or groin. ? Redness and swelling in your leg.    Watch closely for any changes in your health, and be sure to contact your doctor if you have any problems. Where can you learn more? Go to https://chpepiceweb.[employer]. org and sign in to your [employer] account. Enter T714 in the eOriginal box to learn more about \"Shock Wave Lithotripsy: What to Expect at Home. \"     If you do not have an account, please click on the \"Sign Up Now\" link. Current as of: September 8, 2021               Content Version: 13.1  © 7036-4092 Healthwise, Incorporated. Care instructions adapted under license by [facility] ([facility]). If you have questions about a medical condition or this instruction, always ask your healthcare professional. [address] any warranty or liability for your use of this information.

## 2022-01-20 NOTE — PROGRESS NOTES
Visit Information    Have you changed or started any medications since your last visit including any over-the-counter medicines, vitamins, or herbal medicines? no   Have you stopped taking any of your medications? Is so, why? -  no  Are you having any side effects from any of your medications? - no    Have you seen any other physician or provider since your last visit?  no   Have you had any other diagnostic tests since your last visit? yes -    Have you been seen in the emergency room and/or had an admission in a hospital since we last saw you?  yes -    Have you had your routine dental cleaning in the past 6 months?  no     Do you have an active MyChart account? If no, what is the barrier?   Yes    Patient Care Team:  KATIE Mattson CNP as PCP - General (Family Medicine)  KATIE Mattson CNP as PCP - [facility] Empaneled Provider  Chas Boyd MD as Consulting Physician (Gastroenterology)  Linda Starkey MD as Obstetrician (Perinatology)  Maribel White MD as Consulting Physician (Obstetrics & Gynecology)  Jessica Sauer DO as Consulting Physician (Neurosurgery)    Medical History Review  Past Medical, Family, and Social History reviewed and does contribute to the patient presenting condition    Health Maintenance   Topic Date Due    Varicella vaccine (1 of 2 - 2-dose childhood series) Never done    COVID-19 Vaccine (1) Never done    Flu vaccine (1) 09/01/2021    Depression Monitoring  09/29/2022    A1C test (Diabetic or Prediabetic)  09/30/2022    DTaP/Tdap/Td vaccine (2 - Td or Tdap) 09/19/2030    Pneumococcal 0-64 years Vaccine (2 of 2 - PPSV23) 05/03/2047    Hepatitis C screen  Completed    HIV screen  Completed    Hepatitis A vaccine  Aged Out    Hepatitis B vaccine  Aged Out    Hib vaccine  Aged Out    Meningococcal (ACWY) vaccine  Aged Out

## 2022-01-22 RX ORDER — METHOCARBAMOL 750 MG/1
750 TABLET, FILM COATED ORAL
Qty: 160 TABLET | Refills: 1 | Status: SHIPPED | OUTPATIENT
Start: 2022-01-22 | End: 2022-03-23

## 2022-02-08 ENCOUNTER — TELEPHONE (OUTPATIENT)
Dept: PAIN MANAGEMENT | Age: 42
End: 2022-02-08

## 2022-02-08 NOTE — TELEPHONE ENCOUNTER
Pre procedure call made. Patient understood instructions. No vaccines in the past two weeks. All questions and concerns answered.

## 2022-02-09 ENCOUNTER — HOSPITAL ENCOUNTER (OUTPATIENT)
Dept: PAIN MANAGEMENT | Age: 42
Discharge: HOME | End: 2022-02-09
Payer: COMMERCIAL

## 2022-02-09 ENCOUNTER — HOSPITAL ENCOUNTER (OUTPATIENT)
Dept: GENERAL RADIOLOGY | Age: 42
Discharge: HOME | End: 2022-02-11
Payer: COMMERCIAL

## 2022-02-09 VITALS
SYSTOLIC BLOOD PRESSURE: 132 MMHG | HEIGHT: 62 IN | WEIGHT: 218 LBS | BODY MASS INDEX: 40.12 KG/M2 | OXYGEN SATURATION: 98 % | HEART RATE: 82 BPM | RESPIRATION RATE: 20 BRPM | TEMPERATURE: 97.5 F | DIASTOLIC BLOOD PRESSURE: 78 MMHG

## 2022-02-09 DIAGNOSIS — R52 PAIN: ICD-10-CM

## 2022-02-09 PROCEDURE — 62321 NJX INTERLAMINAR CRV/THRC: CPT

## 2022-02-09 PROCEDURE — 6360000002 HC RX W HCPCS: Performed by: ANESTHESIOLOGY

## 2022-02-09 PROCEDURE — 99152 MOD SED SAME PHYS/QHP 5/>YRS: CPT | Performed by: ANESTHESIOLOGY

## 2022-02-09 PROCEDURE — 62321 NJX INTERLAMINAR CRV/THRC: CPT | Performed by: ANESTHESIOLOGY

## 2022-02-09 PROCEDURE — 6360000004 HC RX CONTRAST MEDICATION: Performed by: ANESTHESIOLOGY

## 2022-02-09 PROCEDURE — 3209999900 FLUORO FOR SURGICAL PROCEDURES

## 2022-02-09 RX ORDER — MIDAZOLAM HYDROCHLORIDE 1 MG/ML
INJECTION INTRAMUSCULAR; INTRAVENOUS
Status: COMPLETED | OUTPATIENT
Start: 2022-02-09 | End: 2022-02-09

## 2022-02-09 RX ORDER — DEXAMETHASONE SODIUM PHOSPHATE 10 MG/ML
INJECTION, SOLUTION INTRAMUSCULAR; INTRAVENOUS
Status: COMPLETED | OUTPATIENT
Start: 2022-02-09 | End: 2022-02-09

## 2022-02-09 RX ORDER — FENTANYL CITRATE 50 UG/ML
INJECTION, SOLUTION INTRAMUSCULAR; INTRAVENOUS
Status: COMPLETED | OUTPATIENT
Start: 2022-02-09 | End: 2022-02-09

## 2022-02-09 RX ADMIN — MIDAZOLAM 2 MG: 1 INJECTION INTRAMUSCULAR; INTRAVENOUS at 09:25

## 2022-02-09 RX ADMIN — Medication 50 MCG: at 09:25

## 2022-02-09 RX ADMIN — DEXAMETHASONE SODIUM PHOSPHATE 10 MG: 10 INJECTION, SOLUTION INTRAMUSCULAR; INTRAVENOUS at 09:30

## 2022-02-09 RX ADMIN — IOHEXOL 1 ML: 180 INJECTION INTRAVENOUS at 09:30

## 2022-02-09 ASSESSMENT — PAIN - FUNCTIONAL ASSESSMENT
PAIN_FUNCTIONAL_ASSESSMENT: PREVENTS OR INTERFERES SOME ACTIVE ACTIVITIES AND ADLS
PAIN_FUNCTIONAL_ASSESSMENT: 0-10
PAIN_FUNCTIONAL_ASSESSMENT: 0-10

## 2022-02-09 ASSESSMENT — PAIN DESCRIPTION - DESCRIPTORS: DESCRIPTORS: BURNING;NUMBNESS

## 2022-02-09 NOTE — H&P
UPDATE:  Office visit pain clinic in [facility] with all required elements of H&P dated 01/13/2022  Patient seen preop, chart reviewed,   No changes in medical history and health assessment since last evaluation. PE:  AAO x 3, in NAD, VSS,. Resp: breathing on RA, no respiratory distress  Cardiac: rate normal    Risk / Benefits explained to patient, patient agree to proceed with plan.   ASA 3  MP 3

## 2022-02-09 NOTE — OP NOTE
Preoperative Diagnosis: Cervical disc herniation and radiculitis  Postoperative Diagnosis:  Cervical disc herniation and radiculitis    Procedure Performed:  Cervical epidural steroid injection under fluoroscopy guidance    BLOOD LOSS: NONE    Procedure: The Patient was seen in the preop area, chart was reviewed, informed consent was obtained. Patient was taken to procedure room and was placed in prone position. Vital signs were monitored through out the  Procedure. A time out was completed. The site was prepped and draped in sterile manner. The target point was marked at The interlaminar space at C7/T1 . Skin and deep tissues were anesthetized with 1 % lidocaine. A  19-gauge Tuohy epidural needlele was advanced  under fluoroscopy guidance in AP view. Epidural space was identified using TEMI technique. A 19 G catheter was threaded up in epidural space for 2 levels. Position was confirmed in Lateral view. Then after negative aspiration contrast dye was injected with live fluoroscopy in AP views that showed  spread of the contrast in the epidural space  and no vascular runoff or intrathecal spread. Finally 5 ml of treatment solution containing 4 ml of PF NS and 1 ml of dexamethasone 10 mg / ml was injected. The needle was removed and a Band-Aid was placed over the needle  insertion site. The patient's vital signs remained stable and the patient tolerated the procedure well. SEDATION NOTE:    ASA CLASSIFICATION  3  MP   CLASSIFICATION  3    Moderate intravenous conscious sedation was supervised by Dr. Harvinder Palm  The patient was independently monitored by a Registered Nurse assigned to the Procedure Room  Monitoring included automated blood pressure, continuous EKG, Capnography and continuous pulse oximetry. The detailed Conscious Record is permanently stored in the [place] [address].      The following is the conscious sedation record;  Start Time:  0924  End times:  0934  Duration:  10 minutes  MEDS GIVEN 2 MG VERSED AND 50 MCG FENTANYL

## 2022-02-09 NOTE — PROGRESS NOTES
Discharge criteria met. Post procedure dressing dry and intact. Sensory and motor function intact as per pre-procedure. Patient alert and oriented x3  Instructions and follow up reviewed with pt at patient at discharge. Discharged home transported by wheelchair, family in car  Discharged @ [phone]

## 2022-02-10 ENCOUNTER — TELEPHONE (OUTPATIENT)
Dept: PAIN MANAGEMENT | Age: 42
End: 2022-02-10

## 2022-02-10 NOTE — TELEPHONE ENCOUNTER
Spoke with patient and she states she is sore at the injection site. Informed to continue with ice and Tylenol. Call if pain worsens.

## 2022-02-21 DIAGNOSIS — R73.03 PREDIABETES: ICD-10-CM

## 2022-02-21 DIAGNOSIS — J44.9 COPD WITH ASTHMA (HCC): ICD-10-CM

## 2022-02-21 RX ORDER — IPRATROPIUM BROMIDE AND ALBUTEROL SULFATE 2.5; .5 MG/3ML; MG/3ML
SOLUTION RESPIRATORY (INHALATION)
Qty: 360 ML | Refills: 2 | Status: ON HOLD | OUTPATIENT
Start: 2022-02-21 | End: 2022-05-03 | Stop reason: HOSPADM

## 2022-02-22 RX ORDER — LANCETS 33 GAUGE
EACH MISCELLANEOUS
Qty: 100 EACH | Refills: 5 | Status: SHIPPED | OUTPATIENT
Start: 2022-02-22 | End: 2022-03-24

## 2022-03-09 DIAGNOSIS — K22.70 BARRETT'S ESOPHAGUS WITHOUT DYSPLASIA: ICD-10-CM

## 2022-03-09 DIAGNOSIS — G43.019 INTRACTABLE MIGRAINE WITHOUT AURA AND WITHOUT STATUS MIGRAINOSUS: ICD-10-CM

## 2022-03-09 DIAGNOSIS — J44.9 COPD WITH ASTHMA (HCC): ICD-10-CM

## 2022-03-10 RX ORDER — PANTOPRAZOLE SODIUM 40 MG/1
TABLET, DELAYED RELEASE ORAL
Qty: 90 TABLET | Refills: 2 | Status: SHIPPED | OUTPATIENT
Start: 2022-03-10

## 2022-03-10 RX ORDER — IPRATROPIUM/ALBUTEROL SULFATE 20-100 MCG
1 MIST INHALER (GRAM) INHALATION 4 TIMES DAILY
Qty: 4 G | Refills: 3 | Status: ON HOLD | OUTPATIENT
Start: 2022-03-10 | End: 2022-05-03 | Stop reason: HOSPADM

## 2022-03-10 RX ORDER — PROMETHAZINE HYDROCHLORIDE 6.25 MG/5ML
12.5 SYRUP ORAL 4 TIMES DAILY PRN
Qty: 120 ML | Refills: 0 | Status: ON HOLD | OUTPATIENT
Start: 2022-03-10 | End: 2022-04-17 | Stop reason: HOSPADM

## 2022-03-16 ASSESSMENT — ENCOUNTER SYMPTOMS
CHEST TIGHTNESS: 0
RHINORRHEA: 1
COUGH: 1
SINUS PRESSURE: 1
EYE PAIN: 0

## 2022-03-16 NOTE — PROGRESS NOTES
[facility] at [facility] [address] [place] [address]   O: [address] [address] Marco A Poe is a 46 y.o. female evaluated via telephone on 3/17/2022. CONSENT:  She and/or health care decision maker is aware that that she may receive a bill for this telephone service, depending on her insurance coverage, and has provided verbal consent to proceed: Yes    DOCUMENTATION:  Patient scheduled this appointment today due to URI, COPD, and Depression    URI/ COPD  Patient reported feeling ill for the past 2 weeks but have gotten worse since Monday. However, patient does have a known history of COPD using Breo, Combivent, Singulair, and albuterol as her routine inhalers. Patient states that she had gotten worse in the past week and also reported feeling nauseated however she is keeping food and fluids down with the help of some Phenergan that we have sent for her previously. Patient states that she had a fever on Monday but did not have a fever yesterday. She scheduled this appointment today because she wanted to get a note so that she is able to get her of some of her meetings that she is supposed to be having for her CPS case for one of her children. She has missed some meetings in the past.    She denies any anosmia or loss sense of taste. She had done a Covid home test which came back negative. She does not recall any Covid exposure but have had similar symptoms in the past when she had Covid. Patient also reported she did have 1 dose of vaccine but this is not documented and not reflecting on her records today. DEPRESSION AND ANXIETY  Pamela Stephenson reported some ongoing issues with depression and anxiety. Symptoms includes difficulty concentrating, feelings of losing control, insomnia, irritable and depressed mood, insomnia and hypersomnia. Current therapy includes Lamictal,, which is working well for her.  she denies adverse reaction to current therapy. she also denies suicidal/homicidal ideation, plan or intent. Patient being seen by Ines Garcia. PHQ-2 Over the past 2 weeks, how often have you been bothered by any of the following problems? Little interest or pleasure in doing things: Not at all  Feeling down, depressed, or hopeless: Not at all  PHQ-2 Score: 0     No flowsheet data found. Depression: Not at risk    PHQ-2 Score: 0    Depressioin screening-- negative  No data recorded    I communicated with the patient and/or health care decision maker about: PLAN     Review of Systems   Constitutional: Positive for chills, fever and unexpected weight change. Negative for fatigue. HENT: Positive for congestion, postnasal drip, rhinorrhea and sinus pressure. Eyes: Negative for pain. Respiratory: Positive for cough and wheezing. Negative for chest tightness. Cardiovascular: Negative for chest pain and palpitations. Gastrointestinal: Positive for nausea and vomiting. Endocrine: Negative. Musculoskeletal: Positive for neck stiffness. Neck pain, back pain   Allergic/Immunologic: Positive for environmental allergies. Neurological: Positive for headaches. Negative for dizziness. Hematological: Negative for adenopathy. Psychiatric/Behavioral: Positive for sleep disturbance. Negative for suicidal ideas. The patient is nervous/anxious. Details of this discussion including any medical advice provided: Under assessment.     PHYSICAL EXAM[INSTRUCTIONS:  \"[x]\" Indicates a positive item  \"[]\" Indicates a negative item  -- DELETE ALL ITEMS NOT EXAMINED]  Patient-Reported Vitals 1/20/2022   Patient-Reported Weight 220 LB   Patient-Reported Height 5 2   Patient-Reported Systolic -   Patient-Reported Diastolic -     Constitutional: [x] No apparent distress      [] Abnormal -   Mental status: [x] Alert and awake  [x] Oriented to person/place/time[] Abnormal -   Pulmonary/Chest: [x] Respiratory effort normal         [] Abnormal - Psychiatric:       [x] Normal Affect [] Abnormal -        [x] No Hallucinations  Other pertinent observable physical exam findings:-moderately anxious with pressured speech constant coughing, ill-appearing   Controlled Substance Monitoring:  Acute and Chronic Pain Monitoring:   RX Monitoring 1/19/2022   Attestation The Prescription Monitoring Report for this patient was reviewed today. Periodic Controlled Substance Monitoring Potential drug abuse or diversion identified, see note documentation. ;Assessed functional status. Chronic Pain > 50 MEDD -     ASSESSMENT  1. Suspected 2019 novel coronavirus infection  Failure to Improve  Ongoing  Testing site: [phone]  Continue COVID 19 symptoms monitoring  Continue to safe distance from family and friends. DISCUSSED and ADVISED TO:  Stay away from people with suspected COVID 19. Wear a mask. Stay away from big crowds. Wash hands frequently. Use alcohol based hand cleansers frequently. Try not to touch face. Try to improve your immune system by eating healthy food, getting enough sleep and trying to avoid excessive stress. Stay in touch with the current news. Report for fever/chills,body aches, shortness of breath, malaise, loss of taste/smell, worsening cough. - COVID-19; Future  - POCT Influenza A/B    2. Acute gastroenteritis  Failure to Improve  DISCUSSED and ADVISED TO:  Drink plenty of fluids, enough until urine is light yellow or clear like water. Choose water and other caffeine-free clear liquids. If you do not feel like eating or drinking, try taking small sips of water, sports drinks, or other rehydration drinks. Get plenty of rest.  Go to the Emergency Room if you are not able to tolerate any food or water.    - POCT Influenza A/B    3. COPD with asthma ([facility] [address] [address]  Failure to Improve  Current treatment plan is effective, no change in therapy.    Reviewed use, techniques, schedule and side effects of all inhaled medications  Critical need for compliance with treatment plan to achieve optimal results  Very strongly urged to quit smoking to reduce pulmonary and CVD risk. 4. Bipolar affective disorder, currently depressed, moderate (HCC)  Failure to Improve  Continue current therapy. DISCUSSED and ADVISED TO:  Not stopping medication suddenly. See the specialist as discussed. Report for feelings of SI, HI, and hallucinations. Go to the ER for increasing urge to hurt yourself. Jimena Ingram is a 46 y.o. female patient  being evaluated by through a synchronous (real-time) audio-video encounter . The patient (or guardian if applicable) is aware that this is a billable service, which includes applicable co-pays. This Virtual Visit was conducted withpatient's (and/or legal guardian's) consent. The visit was conducted pursuant tot he emergency declaration under the [facility] waiver authority and the [employer] Act. Patient identification was verified,and a caregiver was present when appropriate. The patient was located in a state where the provider was licensed to provide care. This note was completed by using the assistance of a speech-recognition program. However, inadvertent computerized transcription errors may be present. Although every effort was made to ensure accuracy, no guarantees can be provided that every mistake has been identified and corrected by editing.   Electronically signed by KATIE Haskins CNP on 9/27/21 at 7:16 PM EDT

## 2022-03-17 ENCOUNTER — TELEMEDICINE (OUTPATIENT)
Dept: FAMILY MEDICINE CLINIC | Age: 42
End: 2022-03-17
Payer: COMMERCIAL

## 2022-03-17 DIAGNOSIS — K52.9 ACUTE GASTROENTERITIS: ICD-10-CM

## 2022-03-17 DIAGNOSIS — Z20.822 SUSPECTED 2019 NOVEL CORONAVIRUS INFECTION: Primary | ICD-10-CM

## 2022-03-17 DIAGNOSIS — J44.9 COPD WITH ASTHMA (HCC): ICD-10-CM

## 2022-03-17 DIAGNOSIS — F31.32 BIPOLAR AFFECTIVE DISORDER, CURRENTLY DEPRESSED, MODERATE (HCC): ICD-10-CM

## 2022-03-17 PROCEDURE — 87804 INFLUENZA ASSAY W/OPTIC: CPT | Performed by: FAMILY MEDICINE

## 2022-03-17 PROCEDURE — 99214 OFFICE O/P EST MOD 30 MIN: CPT | Performed by: FAMILY MEDICINE

## 2022-03-17 PROCEDURE — G8427 DOCREV CUR MEDS BY ELIG CLIN: HCPCS | Performed by: FAMILY MEDICINE

## 2022-03-17 ASSESSMENT — PATIENT HEALTH QUESTIONNAIRE - PHQ9
2. FEELING DOWN, DEPRESSED OR HOPELESS: 0
SUM OF ALL RESPONSES TO PHQ QUESTIONS 1-9: 0
9. THOUGHTS THAT YOU WOULD BE BETTER OFF DEAD, OR OF HURTING YOURSELF: 0
SUM OF ALL RESPONSES TO PHQ9 QUESTIONS 1 & 2: 0
SUM OF ALL RESPONSES TO PHQ QUESTIONS 1-9: 0
SUM OF ALL RESPONSES TO PHQ QUESTIONS 1-9: 0
1. LITTLE INTEREST OR PLEASURE IN DOING THINGS: 0
SUM OF ALL RESPONSES TO PHQ QUESTIONS 1-9: 0

## 2022-03-17 ASSESSMENT — ENCOUNTER SYMPTOMS
WHEEZING: 1
VOMITING: 1
NAUSEA: 1

## 2022-03-17 NOTE — TELEPHONE ENCOUNTER
Please Approve or Refuse.   Send to Pharmacy per Pt's Request: [facility]     Next Visit Date:  3/17/2022   Last Visit Date: 1/20/2022    Hemoglobin A1C (%)   Date Value   09/30/2021 5.2   06/10/2020 5.7   05/21/2017 5.3             ( goal A1C is < 7)   BP Readings from Last 3 Encounters:   02/09/22 132/78   01/15/22 (!) 143/101   01/13/22 (!) 142/81          (goal 120/80)  BUN   Date Value Ref Range Status   01/15/2022 11 6 - 20 mg/dL Final     CREATININE   Date Value Ref Range Status   01/15/2022 0.75 0.50 - 0.90 mg/dL Final     Potassium   Date Value Ref Range Status   01/15/2022 3.8 3.7 - 5.3 mmol/L Final

## 2022-03-17 NOTE — LETTER
[facility]  [address] [place] [address] [address] [address]  Phone: [phone]  Fax: [phone]    KATIE Bhandari CNP        March 17, 2022      Please excuse her from her meetings starting March 14, 2022. May return to meetings on Mar 23, Tuesday unless she tests positive for covid.        Sincerely,        KATIE Cole CNP

## 2022-03-17 NOTE — PATIENT INSTRUCTIONS
Patient Education        Learning About Coronavirus [phone])  What is coronavirus (COVID-19)? COVID-19 is a disease caused by a type of coronavirus. This illness was first found in December 2019. It has since spread worldwide. Coronaviruses are a large group of viruses. They cause the common cold. They also cause more serious illnesses like Middle East respiratory syndrome (MERS) and severe acute respiratory syndrome (SARS). COVID-19 is caused by a novel coronavirus. That means it's a new type that has not been seen in people before. What are the symptoms? COVID-19 symptoms may include:  · Fever. · Cough. · Trouble breathing. · Chills or repeated shaking with chills. · Muscle and body aches. · Headache. · Sore throat. · New loss of taste or smell. · Vomiting. · Diarrhea. In severe cases, COVID-19 can cause pneumonia and make it hard to breathe without help from a machine. It can cause death. How is it diagnosed? COVID-19 is diagnosed with a viral test. This may also be called a PCR test or antigen test. It looks for evidence of the virus in your breathing passages or lungs (respiratory system). The test is most often done on a sample from the nose, throat, or lungs. It's sometimes done on a sample of saliva. One way a sample is collected is by putting a long swab into the back of your nose. How is it treated? Mild cases of COVID-19 can be treated at home. Serious cases need treatment in the hospital. Treatment may include medicines to reduce symptoms, plus breathing support such as oxygen therapy or a ventilator. Some people may be placed on their belly to help their oxygen levels. Treatments that may help people who have COVID-19 include:  Antiviral medicines. These medicines treat viral infections. Remdesivir is an example. Immune-based therapy. These medicines help the immune system fight COVID-19. Examples include monoclonal antibodies. Blood thinners.   These medicines help prevent a separate bedroom and use a separate bathroom. · Clean and disinfect your home every day. Use household  and disinfectant wipes or sprays. Take special care to clean things that you touch with your hands. How can you self-isolate when you have COVID-19? If you have COVID-19, there are things you can do to help avoid spreading the virus to others. · Limit contact with people in your home. If possible, stay in a separate bedroom and use a separate bathroom. · Wear a mask when you are around other people. · If you have to leave home, avoid crowds and try to stay at least 6 feet away from other people. · Avoid contact with pets and other animals. · Cover your mouth and nose with a tissue when you cough or sneeze. Then throw it in the trash right away. · Wash your hands often, especially after you cough or sneeze. Use soap and water, and scrub for at least 20 seconds. If soap and water aren't available, use an alcohol-based hand . · Don't share personal household items. These include bedding, towels, cups and glasses, and eating utensils. · [address] in the warmest water allowed for the fabric type, and dry it completely. It's okay to wash other people's laundry with yours. · Clean and disinfect your home. Use household  and disinfectant wipes or sprays. When should you call for help? Call 911 anytime you think you may need emergency care. For example, call if you have life-threatening symptoms, such as:    · You have severe trouble breathing. (You can't talk at all.)     · You have constant chest pain or pressure.     · You are severely dizzy or lightheaded.     · You are confused or can't think clearly.     · You have pale, gray, or blue-colored skin or lips.     · You pass out (lose consciousness) or are very hard to wake up. Call your doctor now or seek immediate medical care if:    · You have moderate trouble breathing.  (You can't speak a full sentence.)     · You are coughing up blood (more than about 1 teaspoon).     · You have signs of low blood pressure. These include feeling lightheaded; being too weak to stand; and having cold, pale, clammy skin. Watch closely for changes in your health, and be sure to contact your doctor if:    · Your symptoms get worse.     · You are not getting better as expected.     · You have new or worse symptoms of anxiety, depression, nightmares, or flashbacks. Call before you go to the doctor's office. Follow their instructions. And wear a mask. Current as of: July 1, 2021               Content Version: 13.1  © 2006-2021 [employer]. Care instructions adapted under license by [facility] ([facility]). If you have questions about a medical condition or this instruction, always ask your healthcare professional. [address] any warranty or liability for your use of this information.

## 2022-03-24 DIAGNOSIS — Z98.1 S/P CERVICAL SPINAL FUSION: ICD-10-CM

## 2022-03-24 DIAGNOSIS — E55.9 VITAMIN D DEFICIENCY: ICD-10-CM

## 2022-03-24 DIAGNOSIS — M47.812 CERVICAL SPONDYLOSIS WITHOUT MYELOPATHY: ICD-10-CM

## 2022-03-24 DIAGNOSIS — R73.03 PREDIABETES: ICD-10-CM

## 2022-03-24 DIAGNOSIS — G44.86 CERVICOGENIC HEADACHE: ICD-10-CM

## 2022-03-24 RX ORDER — MELATONIN
Qty: 90 TABLET | Refills: 5 | Status: SHIPPED | OUTPATIENT
Start: 2022-03-24

## 2022-03-24 RX ORDER — LANCETS 33 GAUGE
EACH MISCELLANEOUS
Qty: 100 EACH | Refills: 5 | Status: SHIPPED | OUTPATIENT
Start: 2022-03-24

## 2022-03-24 RX ORDER — MELATONIN
Qty: 120 TABLET | OUTPATIENT
Start: 2022-03-24

## 2022-03-25 RX ORDER — PREGABALIN 100 MG/1
CAPSULE ORAL
Qty: 90 CAPSULE | OUTPATIENT
Start: 2022-03-25

## 2022-04-12 ENCOUNTER — APPOINTMENT (OUTPATIENT)
Dept: CT IMAGING | Age: 42
End: 2022-04-12
Payer: COMMERCIAL

## 2022-04-12 ENCOUNTER — HOSPITAL ENCOUNTER (EMERGENCY)
Age: 42
Discharge: HOME | End: 2022-04-12
Attending: EMERGENCY MEDICINE
Payer: COMMERCIAL

## 2022-04-12 ENCOUNTER — TELEPHONE (OUTPATIENT)
Dept: UROLOGY | Age: 42
End: 2022-04-12

## 2022-04-12 VITALS
HEART RATE: 88 BPM | DIASTOLIC BLOOD PRESSURE: 101 MMHG | SYSTOLIC BLOOD PRESSURE: 164 MMHG | TEMPERATURE: 98.6 F | OXYGEN SATURATION: 99 % | RESPIRATION RATE: 16 BRPM

## 2022-04-12 DIAGNOSIS — N20.0 KIDNEY STONES: ICD-10-CM

## 2022-04-12 DIAGNOSIS — N20.0 NEPHROLITHIASIS: Primary | ICD-10-CM

## 2022-04-12 LAB
-: NORMAL
ABSOLUTE EOS #: 0.23 K/UL (ref 0–0.44)
ABSOLUTE IMMATURE GRANULOCYTE: <0.03 K/UL (ref 0–0.3)
ABSOLUTE LYMPH #: 1.64 K/UL (ref 1.1–3.7)
ABSOLUTE MONO #: 0.43 K/UL (ref 0.1–1.2)
ANION GAP SERPL CALCULATED.3IONS-SCNC: 11 MMOL/L (ref 9–17)
BASOPHILS # BLD: 1 % (ref 0–2)
BASOPHILS ABSOLUTE: 0.07 K/UL (ref 0–0.2)
BILIRUBIN URINE: NEGATIVE
BUN BLDV-MCNC: 13 MG/DL (ref 6–20)
CALCIUM SERPL-MCNC: 9.9 MG/DL (ref 8.6–10.4)
CASTS UA: NORMAL /LPF (ref 0–8)
CHLORIDE BLD-SCNC: 105 MMOL/L (ref 98–107)
CO2: 22 MMOL/L (ref 20–31)
COLOR: YELLOW
CREAT SERPL-MCNC: 0.64 MG/DL (ref 0.5–0.9)
EOSINOPHILS RELATIVE PERCENT: 3 % (ref 1–4)
EPITHELIAL CELLS UA: NORMAL /HPF (ref 0–5)
GFR AFRICAN AMERICAN: >60 ML/MIN
GFR NON-AFRICAN AMERICAN: >60 ML/MIN
GFR SERPL CREATININE-BSD FRML MDRD: ABNORMAL ML/MIN/{1.73_M2}
GLUCOSE BLD-MCNC: 107 MG/DL (ref 70–99)
GLUCOSE URINE: NEGATIVE
HCG(URINE) PREGNANCY TEST: NEGATIVE
HCG(URINE) PREGNANCY TEST: NEGATIVE
HCT VFR BLD CALC: 49.2 % (ref 36.3–47.1)
HEMOGLOBIN: 16.3 G/DL (ref 11.9–15.1)
IMMATURE GRANULOCYTES: 0 %
KETONES, URINE: NEGATIVE
LEUKOCYTE ESTERASE, URINE: NEGATIVE
LYMPHOCYTES # BLD: 24 % (ref 24–43)
MCH RBC QN AUTO: 29.2 PG (ref 25.2–33.5)
MCHC RBC AUTO-ENTMCNC: 33.1 G/DL (ref 28.4–34.8)
MCV RBC AUTO: 88.2 FL (ref 82.6–102.9)
MONOCYTES # BLD: 6 % (ref 3–12)
NITRITE, URINE: NEGATIVE
NRBC AUTOMATED: 0 PER 100 WBC
PDW BLD-RTO: 13.7 % (ref 11.8–14.4)
PH UA: 5 (ref 5–8)
PLATELET # BLD: 79 K/UL (ref 138–453)
PMV BLD AUTO: 12.4 FL (ref 8.1–13.5)
POTASSIUM SERPL-SCNC: 4.2 MMOL/L (ref 3.7–5.3)
PROTEIN UA: NEGATIVE
RBC # BLD: 5.58 M/UL (ref 3.95–5.11)
RBC UA: NORMAL /HPF (ref 0–4)
SEG NEUTROPHILS: 66 % (ref 36–65)
SEGMENTED NEUTROPHILS ABSOLUTE COUNT: 4.44 K/UL (ref 1.5–8.1)
SODIUM BLD-SCNC: 138 MMOL/L (ref 135–144)
SPECIFIC GRAVITY UA: 1.02 (ref 1–1.03)
TURBIDITY: CLEAR
URINE HGB: NEGATIVE
UROBILINOGEN, URINE: NORMAL
WBC # BLD: 6.8 K/UL (ref 3.5–11.3)
WBC UA: NORMAL /HPF (ref 0–5)

## 2022-04-12 PROCEDURE — 85025 COMPLETE CBC W/AUTO DIFF WBC: CPT

## 2022-04-12 PROCEDURE — 96374 THER/PROPH/DIAG INJ IV PUSH: CPT

## 2022-04-12 PROCEDURE — 81001 URINALYSIS AUTO W/SCOPE: CPT

## 2022-04-12 PROCEDURE — 80048 BASIC METABOLIC PNL TOTAL CA: CPT

## 2022-04-12 PROCEDURE — 96376 TX/PRO/DX INJ SAME DRUG ADON: CPT

## 2022-04-12 PROCEDURE — 96375 TX/PRO/DX INJ NEW DRUG ADDON: CPT

## 2022-04-12 PROCEDURE — 99283 EMERGENCY DEPT VISIT LOW MDM: CPT

## 2022-04-12 PROCEDURE — 81025 URINE PREGNANCY TEST: CPT

## 2022-04-12 PROCEDURE — 6370000000 HC RX 637 (ALT 250 FOR IP): Performed by: STUDENT IN AN ORGANIZED HEALTH CARE EDUCATION/TRAINING PROGRAM

## 2022-04-12 PROCEDURE — 87086 URINE CULTURE/COLONY COUNT: CPT

## 2022-04-12 PROCEDURE — 6360000002 HC RX W HCPCS: Performed by: STUDENT IN AN ORGANIZED HEALTH CARE EDUCATION/TRAINING PROGRAM

## 2022-04-12 PROCEDURE — 74176 CT ABD & PELVIS W/O CONTRAST: CPT

## 2022-04-12 RX ORDER — MORPHINE SULFATE 4 MG/ML
4 INJECTION, SOLUTION INTRAMUSCULAR; INTRAVENOUS ONCE
Status: COMPLETED | OUTPATIENT
Start: 2022-04-12 | End: 2022-04-12

## 2022-04-12 RX ORDER — ONDANSETRON 2 MG/ML
4 INJECTION INTRAMUSCULAR; INTRAVENOUS ONCE
Status: COMPLETED | OUTPATIENT
Start: 2022-04-12 | End: 2022-04-12

## 2022-04-12 RX ORDER — ACETAMINOPHEN 500 MG
1000 TABLET ORAL 3 TIMES DAILY
Qty: 42 TABLET | Refills: 0 | Status: ON HOLD | OUTPATIENT
Start: 2022-04-12 | End: 2022-04-17 | Stop reason: HOSPADM

## 2022-04-12 RX ORDER — OXYCODONE HYDROCHLORIDE 5 MG/1
5 TABLET ORAL EVERY 6 HOURS PRN
Qty: 12 TABLET | Refills: 0 | Status: SHIPPED | OUTPATIENT
Start: 2022-04-12 | End: 2022-04-15

## 2022-04-12 RX ORDER — TAMSULOSIN HYDROCHLORIDE 0.4 MG/1
0.4 CAPSULE ORAL DAILY
Status: DISCONTINUED | OUTPATIENT
Start: 2022-04-12 | End: 2022-04-12 | Stop reason: HOSPADM

## 2022-04-12 RX ORDER — FENTANYL CITRATE 50 UG/ML
75 INJECTION, SOLUTION INTRAMUSCULAR; INTRAVENOUS ONCE
Status: COMPLETED | OUTPATIENT
Start: 2022-04-12 | End: 2022-04-12

## 2022-04-12 RX ORDER — TAMSULOSIN HYDROCHLORIDE 0.4 MG/1
0.4 CAPSULE ORAL DAILY
Qty: 14 CAPSULE | Refills: 0 | Status: ON HOLD | OUTPATIENT
Start: 2022-04-12 | End: 2022-04-17 | Stop reason: HOSPADM

## 2022-04-12 RX ADMIN — FENTANYL CITRATE 75 MCG: 50 INJECTION, SOLUTION INTRAMUSCULAR; INTRAVENOUS at 10:30

## 2022-04-12 RX ADMIN — ONDANSETRON 4 MG: 2 INJECTION INTRAMUSCULAR; INTRAVENOUS at 10:30

## 2022-04-12 RX ADMIN — MORPHINE SULFATE 4 MG: 4 INJECTION INTRAVENOUS at 11:31

## 2022-04-12 RX ADMIN — TAMSULOSIN HYDROCHLORIDE 0.4 MG: 0.4 CAPSULE ORAL at 12:00

## 2022-04-12 RX ADMIN — ONDANSETRON 4 MG: 2 INJECTION INTRAMUSCULAR; INTRAVENOUS at 11:31

## 2022-04-12 ASSESSMENT — ENCOUNTER SYMPTOMS
DIARRHEA: 0
BACK PAIN: 0
COUGH: 0
VOMITING: 0
ABDOMINAL PAIN: 0
NAUSEA: 1
SHORTNESS OF BREATH: 0
SORE THROAT: 0

## 2022-04-12 ASSESSMENT — PAIN - FUNCTIONAL ASSESSMENT: PAIN_FUNCTIONAL_ASSESSMENT: 0-10

## 2022-04-12 ASSESSMENT — PAIN SCALES - GENERAL
PAINLEVEL_OUTOF10: 5
PAINLEVEL_OUTOF10: 9
PAINLEVEL_OUTOF10: 6

## 2022-04-12 NOTE — ED TRIAGE NOTES
PT has had bilateral flank pain and CVA tenderness for 2 days. This morning pt started having blood in urine. Pt states she feel like she has in the past with kidney stones.

## 2022-04-12 NOTE — ED PROVIDER NOTES
[facility]  ED  Emergency Department Encounter  EmergencyMedicine Resident     Pt Name:Niurka Edmondson  MRN: [id number]  [place] 1980  Date of evaluation: 22  PCP:  KATIE Hastings CNP    CHIEF COMPLAINT       Chief Complaint   Patient presents with    Hematuria    Flank Pain       HISTORY OF PRESENT ILLNESS  (Location/Symptom, Timing/Onset, Context/Setting, Quality, Duration, Modifying Factors, Severity.)      Dave Peter is a 46 y.o. female who presents with hematuria and flank pain. Patient has a past medical history of kidney stones. Left kidney stone was in January showed 7 mm calculus in the left renal pelvis without obstruction or hydronephrosis. She was discharged to follow-up with urology but thinks she missed that appointment. For the past couple days she has had l bilateral flank pain left greater than right. She has been taking Tylenol but it is not helping. States she is unable to take NSAIDs due to Sadler's esophagus. She states that she woke up today and the pain was severe. Also noted some hematuria. Endorses nausea which she thinks is secondary to the pain. Endorses decreased urine and dysuria. Denies any vomiting, fevers, chills, abdominal pain, vaginal discharge. PAST MEDICAL / SURGICAL / SOCIAL / FAMILY HISTORY      has a past medical history of Anxiety, Arthritis, Asthma, Sadler's esophagus, Bipolar 1 disorder ([address] CAD (coronary artery disease), Chronic insomnia, COPD (chronic obstructive pulmonary disease) ([address] Depression, Diabetes mellitus ([address] Endometriosis, Esophagitis, GERD (gastroesophageal reflux disease), Headache(784.0), Herniated disc, cervical, Hiatal hernia, Incisional abscess, Kidney stones, MDRO (multiple drug resistant organisms) resistance, Pleurisy, Pneumonia, Seizures ([address] UTI (urinary tract infection), and Vision abnormalities. has a past surgical history that includes knee surgery (Left);   section (, ); Tonsillectomy; Knee arthroscopy (Left, 5/20/15); Cervical disc surgery; Upper gastrointestinal endoscopy (2016); Upper gastrointestinal endoscopy (2017); Upper gastrointestinal endoscopy (2017); pr esophagogastroduodenoscopy transoral diagnostic (N/A, 3/2/2017); laparoscopy; laparoscopy (N/A, 10/5/2017); Upper gastrointestinal endoscopy (N/A, 2018); Endoscopy, colon, diagnostic;  section (N/A, 2018); Upper gastrointestinal endoscopy (N/A, 10/1/2019); Colonoscopy (N/A, 10/1/2019); Colonoscopy (N/A, 2020); [place] tooth extraction; and Hysterectomy (N/A, 2020). Social History     Socioeconomic History    Marital status: Single     Spouse name: Not on file    Number of children: 1    Years of education: 11th grade    Highest education level: Not on file   Occupational History    Occupation: unemployed-   Tobacco Use    Smoking status: Current Every Day Smoker     Packs/day: 0.50     Years: 21.00     Pack years: 10.50     Types: Cigarettes    Smokeless tobacco: Never Used   Vaping Use    Vaping Use: Every day   Substance and Sexual Activity    Alcohol use: No    Drug use: Not Currently     Comment: 10 years ago- cocaine    Sexual activity: Yes     Partners: Male   Other Topics Concern    Not on file   Social History Narrative    Lives with son and boyfriend     Social Determinants of Health     Financial Resource Strain: Low Risk     Difficulty of Paying Living Expenses: Not hard at all   Food Insecurity: No Food Insecurity    Worried About Running Out of Food in the Last Year: Never true    [address] in the Last Year: Never true   Transportation Needs:     Lack of Transportation (Medical): Not on file    Lack of Transportation (Non-Medical):  Not on file   Physical Activity:     Days of Exercise per Week: Not on file    Minutes of Exercise per Session: Not on file   Stress:     Feeling of Stress : Not on file   Social Connections:     Frequency of Communication with Friends and Family: Not on file    Frequency of Social Gatherings with Friends and Family: Not on file    Attends Denominational Services: Not on file    Active Member of Clubs or Organizations: Not on file    Attends Club or Organization Meetings: Not on file    Marital Status: Not on file   Intimate Partner Violence:     Fear of Current or Ex-Partner: Not on file    Emotionally Abused: Not on file    Physically Abused: Not on file    Sexually Abused: Not on file   Housing Stability:     Unable to Pay for Housing in the Last Year: Not on file    Number of [place] in the Last Year: Not on file    Unstable Housing in the Last Year: Not on file       Family History   Problem Relation Age of Onset    Cancer Mother         breast    Bipolar Disorder Mother     Hypertension Mother     Breast Cancer Mother     Bipolar Disorder Brother     Hypertension Father        Allergies:  Nsaids, Toradol [ketorolac tromethamine], and Ultram [tramadol]    Home Medications:  Prior to Admission medications    Medication Sig Start Date End Date Taking? Authorizing Provider   oxyCODONE (ROXICODONE) 5 MG immediate release tablet Take 1 tablet by mouth every 6 hours as needed for Pain for up to 3 days.  4/12/22 4/15/22 Yes Emilia Fletcher DO   acetaminophen (TYLENOL) 500 MG tablet Take 2 tablets by mouth 3 times daily for 7 days 4/12/22 4/19/22 Yes Aj Gómez DO   tamsulosin [facility]) 0.4 MG capsule Take 1 capsule by mouth daily for 14 days 4/12/22 4/26/22 Yes Johnson Fletcher DO   Lancets (ONETOUCH DELICA PLUS [id number]) MISC USE TO TEST BLOOD SUGAR TWICE A DAY 3/24/22   KATIE Cole CNP   vitamin D3 (CHOLECALCIFEROL) 25 MCG (1000 UT) TABS tablet TAKE 1 TABLET BY MOUTH DAILY 3/24/22   KATIE Cole CNP   BREO ELLIPTA 200-25 MCG/INH AEPB inhaler INHALE 1 PUFF INTO THE LUNGS DAILY 3/17/22   KATIE Cole CNP pantoprazole (PROTONIX) 40 MG tablet 1 tab daily 3/10/22   KATIE Cole CNP   promethazine (PHENERGAN) 6.25 MG/5ML syrup Take 10 mLs by mouth 4 times daily as needed for Nausea (diarrhea) 3/10/22   KATIE Cole CNP   COMBIVENT RESPIMAT  MCG/ACT AERS inhaler Inhale 1 puff into the lungs 4 times daily Inhale 1 puff into the lungs 4 times daily 3/10/22   KATIE Cole CNP   ipratropium-albuterol (DUONEB) 0.5-2.5 (3) MG/3ML SOLN nebulizer solution INHALE CONTENTS OF 1 UNIT DOSE VIA NEBULIZER EVERY 4 HOURS 2/21/22   KATIE Cole CNP   montelukast (SINGULAIR) 10 MG tablet TAKE 1 TABLET IN THE EVENING ONCE A DAY ORALLY 1/24/22   KATIE Cole CNP   predniSONE (DELTASONE) 10 MG tablet Take 4 tabs X 3 days, then 3 tabs X 3 days, then 2 tabs X 3 days, then 1 tab X 3 days 1/20/22   KATIE Cole CNP   acetaminophen (TYLENOL) 500 MG tablet Take 2 tablets by mouth every 6 hours as needed for Pain 1/15/22   Ruma Charles, DO   Misc. Devices (STRAINER/STAINLESS STEEL/2.5\") MISC 1 each by Does not apply route as needed (when you urinate) 1/15/22   Jann Robertson,    pregabalin (LYRICA) 100 MG capsule Take 1 capsule by mouth 3 times daily for 30 days. 1/13/22 2/12/22  Brit Sabillon MD   Methocarbamol (ROBAXIN PO) Take by mouth    Historical Provider, MD   sertraline (ZOLOFT) 100 MG tablet Take 1.5 tablets by mouth daily 11/1/21   Historical Provider, MD   lamoTRIgine (LAMICTAL) 200 MG tablet Take 1 tablet by mouth daily 11/1/21   Historical Provider, MD   ONETOUCH VERIO strip USE TO TEST BLOOD SUGAR TWICE A DAY 11/1/21   KATIE Cole CNP   dicyclomine (BENTYL) 10 MG capsule TAKE 1 CAPSULE BY MOUTH 4 TIMES DAILY 8/27/21   KATIE Cole CNP   loratadine (CLARITIN) 10 MG tablet TAKE 1 TABLET BY MOUTH DAILY 7/6/21   KATIE Cole CNP   glucose monitoring kit (FREESTYLE) monitoring kit Use as directed.   BRAND OF CHOICE INSURANCE ALLOWS. 5/27/21   KATIE Cole CNP   Alcohol Swabs ( STERILE ALCOHOL PREP) PADS USE AS DIRECTED 5/10/21   KATIE Cole CNP   fluticasone (FLONASE) 50 MCG/ACT nasal spray 1 spray by Each Nostril route daily 4/1/21 5/1/21  KATIE Cole CNP   vitamin D3 (CHOLECALCIFEROL) 25 MCG (1000 UT) TABS tablet TAKE 1 TABLET BY MOUTH DAILY 8/27/20   KATIE Cole CNP   Masks (CLEVER CHOICE FACE MASK) MISC USE NEW FACE MASK EVERYDAY WHEN PATIENT GO OUTSIDE OF HOME DUE TO COVID PANDEMIC 7/6/20   KATIE Cole CNP       REVIEW OF SYSTEMS    (2-9 systems for level 4, 10 or more for level 5)      Review of Systems   Constitutional: Negative for chills and fever. HENT: Negative for congestion and sore throat. Respiratory: Negative for cough and shortness of breath. Cardiovascular: Negative for chest pain. Gastrointestinal: Positive for nausea. Negative for abdominal pain, diarrhea and vomiting. Genitourinary: Positive for dysuria. Negative for frequency, pelvic pain, vaginal bleeding, vaginal discharge and vaginal pain. Musculoskeletal: Negative for back pain and neck stiffness. Skin: Negative for rash. Neurological: Negative for weakness and headaches. Psychiatric/Behavioral: Negative for confusion. PHYSICAL EXAM   (up to 7 for level 4, 8 or more for level 5)      INITIAL VITALS:   BP (!) 164/101   Pulse 88   Temp 98.6 °F (37 °C) (Oral)   Resp 16   LMP 06/15/2020   SpO2 99%      Vitals:    04/12/22 0940   BP: (!) 164/101   Pulse: 88   Resp: 16   Temp: 98.6 °F (37 °C)   TempSrc: Oral   SpO2: 99%        Physical Exam  Vitals reviewed. Constitutional:       General: She is not in acute distress. Appearance: She is well-developed. She is not ill-appearing or diaphoretic. HENT:      Head: Normocephalic and atraumatic. Eyes:      Extraocular Movements: Extraocular movements intact.       Pupils: Pupils are equal, round, and reactive to light. Cardiovascular:      Rate and Rhythm: Normal rate and regular rhythm. Pulmonary:      Effort: Pulmonary effort is normal.      Breath sounds: Normal breath sounds. Abdominal:      General: There is no distension. Palpations: Abdomen is soft. Tenderness: There is no abdominal tenderness. There is right CVA tenderness and left CVA tenderness. There is no guarding or rebound. Musculoskeletal:         General: Normal range of motion. Cervical back: Normal range of motion and neck supple. Skin:     General: Skin is warm and dry. Neurological:      General: No focal deficit present. Mental Status: She is alert and oriented to person, place, and time. DIFFERENTIAL  DIAGNOSIS     PLAN (LABS / IMAGING / EKG):  Orders Placed This Encounter   Procedures    Culture, Urine    CT ABDOMEN PELVIS WO CONTRAST Additional Contrast? None    CBC with Auto Differential    Basic Metabolic Panel w/ Reflex to MG    Urinalysis with Microscopic    PREGNANCY, URINE    Pregnancy, Urine    Inpatient consult to Urology       MEDICATIONS ORDERED:  Orders Placed This Encounter   Medications    fentaNYL (SUBLIMAZE) injection 75 mcg    ondansetron (ZOFRAN) injection 4 mg    morphine injection 4 mg    ondansetron (ZOFRAN) injection 4 mg    DISCONTD: tamsulosin (FLOMAX) capsule 0.4 mg    oxyCODONE (ROXICODONE) 5 MG immediate release tablet     Sig: Take 1 tablet by mouth every 6 hours as needed for Pain for up to 3 days.      Dispense:  12 tablet     Refill:  0    acetaminophen (TYLENOL) 500 MG tablet     Sig: Take 2 tablets by mouth 3 times daily for 7 days     Dispense:  42 tablet     Refill:  0    tamsulosin (FLOMAX) 0.4 MG capsule     Sig: Take 1 capsule by mouth daily for 14 days     Dispense:  14 capsule     Refill:  0       DIAGNOSTIC RESULTS / EMERGENCY DEPARTMENT COURSE / MDM   LAB RESULTS:  Results for orders placed or performed during the hospital encounter of 04/12/22   CBC with Auto Differential   Result Value Ref Range    WBC 6.8 3.5 - 11.3 k/uL    RBC 5.58 (H) 3.95 - 5.11 m/uL    Hemoglobin 16.3 (H) 11.9 - 15.1 g/dL    Hematocrit 49.2 (H) 36.3 - 47.1 %    MCV 88.2 82.6 - 102.9 fL    MCH 29.2 25.2 - 33.5 pg    MCHC 33.1 28.4 - 34.8 g/dL    RDW 13.7 11.8 - 14.4 %    Platelets 79 (L) 137 - 453 k/uL    MPV 12.4 8.1 - 13.5 fL    NRBC Automated 0.0 0.0 per 100 WBC    Seg Neutrophils 66 (H) 36 - 65 %    Lymphocytes 24 24 - 43 %    Monocytes 6 3 - 12 %    Eosinophils % 3 1 - 4 %    Basophils 1 0 - 2 %    Immature Granulocytes 0 0 %    Segs Absolute 4.44 1.50 - 8.10 k/uL    Absolute Lymph # 1.64 1.10 - 3.70 k/uL    Absolute Mono # 0.43 0.10 - 1.20 k/uL    Absolute Eos # 0.23 0.00 - 0.44 k/uL    Basophils Absolute 0.07 0.00 - 0.20 k/uL    Absolute Immature Granulocyte <0.03 0.00 - 0.30 k/uL   Basic Metabolic Panel w/ Reflex to MG   Result Value Ref Range    Glucose 107 (H) 70 - 99 mg/dL    BUN 13 6 - 20 mg/dL    CREATININE 0.64 0.50 - 0.90 mg/dL    Calcium 9.9 8.6 - 10.4 mg/dL    Sodium 138 135 - 144 mmol/L    Potassium 4.2 3.7 - 5.3 mmol/L    Chloride 105 98 - 107 mmol/L    CO2 22 20 - 31 mmol/L    Anion Gap 11 9 - 17 mmol/L    GFR Non-African American >60 >60 mL/min    GFR African American >60 >60 mL/min    GFR Comment         Urinalysis with Microscopic   Result Value Ref Range    Color, UA Yellow Yellow    Turbidity UA Clear Clear    Glucose, Ur NEGATIVE NEGATIVE    Bilirubin Urine NEGATIVE NEGATIVE    Ketones, Urine NEGATIVE NEGATIVE    Specific Gravity, UA 1.018 1.005 - 1.030    Urine Hgb NEGATIVE NEGATIVE    pH, UA 5.0 5.0 - 8.0    Protein, UA NEGATIVE NEGATIVE    Urobilinogen, Urine Normal Normal    Nitrite, Urine NEGATIVE NEGATIVE    Leukocyte Esterase, Urine NEGATIVE NEGATIVE    -          WBC, UA 0 TO 2 0 - 5 /HPF    RBC, UA None 0 - 4 /HPF    Casts UA  0 - 8 /LPF     5 TO 10 HYALINE Reference range defined for non-centrifuged specimen.     Epithelial Cells UA 2 TO 5 0 - 5 /HPF   PREGNANCY, URINE   Result Value Ref Range    HCG(Urine) Pregnancy Test NEGATIVE NEGATIVE   Pregnancy, Urine   Result Value Ref Range    HCG(Urine) Pregnancy Test NEGATIVE NEGATIVE         RADIOLOGY:  CT ABDOMEN PELVIS WO CONTRAST Additional Contrast? None   Final Result   Stable left-sided mildly prominent partially extrarenal pelvis which contains   unchanged 7 mm calculus. EMERGENCY DEPARTMENT COURSE & MDM:    This is a 71-year-old female with history of kidney stones coming in today for flank pain left greater than right, hematuria, dysuria. Concerned that 7 mm stone in the left renal pelvis has migrated to the ureters causing obstruction. Plan for pain control, lab work, urine, CT scan. She is status post hysterectomy. Will need urology consultation once work-up complete. Patient's pain improved after fentanyl and morphine. Work-up negative for UTI. CT scan shows unchanged nephrolithiasis. Urology consulted per note below. Discharged with Flomax, pain control, follow-up to urology in the next day or 2. ED Course as of 04/12/22 1616   Tue Apr 12, 2022   1056 RBC, UA: None [CS]   1141 Discussed with urology, petey for discharge to follow up. They message the clinic staff to keep an eye out for her for follow-up appointment. They also recommend Flomax. Will reassess patient and if pain better under control and able to tolerate p.o. will discharge. [CS]   1229 Patient tolerating p.o. She is in her street close. Awaiting ride from her son's father. [CS]      ED Course User Index  [CS] Zelda Lowery DO         PROCEDURES:    CONSULTS:  IP CONSULT TO UROLOGY    CRITICAL CARE:  Please see attending note    FINAL IMPRESSION      1. Nephrolithiasis    2.  Kidney stones          DISPOSITION / PLAN     DISPOSITION Decision To Discharge 04/12/2022 12:31:34 PM      PATIENT REFERRED TO:  [facility]  [address] [place] [address] [address]  [phone]          DISCHARGE MEDICATIONS:  Discharge Medication List as of 4/12/2022 12:35 PM      START taking these medications    Details   oxyCODONE (ROXICODONE) 5 MG immediate release tablet Take 1 tablet by mouth every 6 hours as needed for Pain for up to 3 days. , Disp-12 tablet, R-0Print      !! acetaminophen (TYLENOL) 500 MG tablet Take 2 tablets by mouth 3 times daily for 7 days, Disp-42 tablet, R-0Print       !! - Potential duplicate medications found. Please discuss with provider.           Leti Tariq DO  Emergency Medicine Resident    (Please note that portions of thisnote were completed with a voice recognition program.  Efforts were made to edit the dictations but occasionally words are mis-transcribed.)        Leti Tariq DO  Resident  04/12/22 9083

## 2022-04-12 NOTE — ED NOTES
The following labs labeled with pt sticker and tubed to lab:     [] Blue     [x] Lavender   [] on ice  [x] Green/yellow  [] Green/black [] on ice  [] Yellow  [] Red  [] Pink      [] COVID-19 swab    [] Rapid  [] PCR  [] Flu swab  [] Peds Viral Panel     [] Urine Sample  [] Pelvic Cultures  [] Blood Cultures            Sintia Herring RN  04/12/22 8893

## 2022-04-12 NOTE — ED PROVIDER NOTES
[facility]  ED  eMERGENCY dEPARTMENT eNCOUnter   Attending Attestation     Pt Name: Amisha Blank  MRN: [id number]  [place] 1980  Date of evaluation: 4/12/22       Amisha Blank is a 46 y.o. female who presents with Hematuria and Flank Pain      History: Patient does with blood in her urine and left-sided flank pain. Patient says that she has had history of pyelonephritis as well as kidney stone in the past.    Exam: Heart rate and rhythm are regular. Lungs are clear to auscultation bilaterally. Abdomen is soft, nontender. Patient has left-sided CVA tenderness to percussion. Plan for urinalysis, CT scan, pain control as needed. Concern for Mundo versus kidney stone. I performed a history and physical examination of the patient and discussed management with the resident. I reviewed the residents note and agree with the documented findings and plan of care. Any areas of disagreement are noted on the chart. I was personally present for the key portions of any procedures. I have documented in the chart those procedures where I was not present during the key portions. I have personally reviewed all images and agree with the resident's interpretation. I have reviewed the emergency nurses triage note. I agree with the chief complaint, past medical history, past surgical history, allergies, medications, social and family history as documented unless otherwise noted below. Documentation of the HPI, Physical Exam and Medical Decision Making performed by medical students or scribes is based on my personal performance of the HPI, PE and MDM. For Phys Assistant/ Nurse Practitioner cases/documentation I have had a face to face evaluation of this patient and have completed at least one if not all key elements of the E/M (history, physical exam, and MDM). Additional findings are as noted.     For APC cases I have personally evaluated and examined the patient in conjunction with the [facility] and agree with the treatment plan and disposition of the patient as recorded by the APC.     Kaylyn Calabrese MD  Attending Emergency  Physician       Ioana Tompkins MD  04/12/22 0886

## 2022-04-12 NOTE — TELEPHONE ENCOUNTER
----- Message from Nehal Terrell PA-C sent at 4/12/2022 12:54 PM EDT -----  Regarding: [facility] f/up  Hello,  This patient went to ED for  flank pain, found to have a 7mm non obstructing stone, discharged home. She needs outpatient appointment in clinic to schedule outpatient cysto, URS, HLL and stent placement with any of the [facility] clinic docs.      Thanks,  Rivas Fry

## 2022-04-13 LAB
CULTURE: NORMAL
SPECIMEN DESCRIPTION: NORMAL

## 2022-04-16 ENCOUNTER — APPOINTMENT (OUTPATIENT)
Dept: GENERAL RADIOLOGY | Age: 42
DRG: 661 | End: 2022-04-16
Payer: COMMERCIAL

## 2022-04-16 ENCOUNTER — HOSPITAL ENCOUNTER (INPATIENT)
Age: 42
LOS: 6 days | Discharge: HOME | DRG: 661 | End: 2022-04-22
Attending: EMERGENCY MEDICINE | Admitting: EMERGENCY MEDICINE
Payer: COMMERCIAL

## 2022-04-16 ENCOUNTER — APPOINTMENT (OUTPATIENT)
Dept: ULTRASOUND IMAGING | Age: 42
DRG: 661 | End: 2022-04-16
Payer: COMMERCIAL

## 2022-04-16 DIAGNOSIS — N20.0 NEPHROLITHIASIS: Primary | ICD-10-CM

## 2022-04-16 DIAGNOSIS — N20.0 KIDNEY STONES: ICD-10-CM

## 2022-04-16 LAB
ABSOLUTE EOS #: 0.27 K/UL (ref 0–0.44)
ABSOLUTE IMMATURE GRANULOCYTE: <0.03 K/UL (ref 0–0.3)
ABSOLUTE LYMPH #: 1.91 K/UL (ref 1.1–3.7)
ABSOLUTE MONO #: 0.35 K/UL (ref 0.1–1.2)
ANION GAP SERPL CALCULATED.3IONS-SCNC: 11 MMOL/L (ref 9–17)
BASOPHILS # BLD: 1 % (ref 0–2)
BASOPHILS ABSOLUTE: 0.09 K/UL (ref 0–0.2)
BILIRUBIN URINE: NEGATIVE
BUN BLDV-MCNC: 15 MG/DL (ref 6–20)
CALCIUM SERPL-MCNC: 9 MG/DL (ref 8.6–10.4)
CHLORIDE BLD-SCNC: 107 MMOL/L (ref 98–107)
CO2: 21 MMOL/L (ref 20–31)
COLOR: YELLOW
COMMENT UA: NORMAL
CREAT SERPL-MCNC: 0.64 MG/DL (ref 0.5–0.9)
EOSINOPHILS RELATIVE PERCENT: 4 % (ref 1–4)
GFR AFRICAN AMERICAN: >60 ML/MIN
GFR NON-AFRICAN AMERICAN: >60 ML/MIN
GFR SERPL CREATININE-BSD FRML MDRD: ABNORMAL ML/MIN/{1.73_M2}
GLUCOSE BLD-MCNC: 115 MG/DL (ref 70–99)
GLUCOSE URINE: NEGATIVE
HCT VFR BLD CALC: 43.9 % (ref 36.3–47.1)
HEMOGLOBIN: 14.6 G/DL (ref 11.9–15.1)
IMMATURE GRANULOCYTES: 0 %
KETONES, URINE: NEGATIVE
LEUKOCYTE ESTERASE, URINE: NEGATIVE
LYMPHOCYTES # BLD: 27 % (ref 24–43)
MCH RBC QN AUTO: 29.3 PG (ref 25.2–33.5)
MCHC RBC AUTO-ENTMCNC: 33.3 G/DL (ref 28.4–34.8)
MCV RBC AUTO: 88.2 FL (ref 82.6–102.9)
MONOCYTES # BLD: 5 % (ref 3–12)
NITRITE, URINE: NEGATIVE
NRBC AUTOMATED: 0 PER 100 WBC
PDW BLD-RTO: 13.5 % (ref 11.8–14.4)
PH UA: 5.5 (ref 5–8)
PLATELET # BLD: 308 K/UL (ref 138–453)
PMV BLD AUTO: 10.5 FL (ref 8.1–13.5)
POTASSIUM SERPL-SCNC: 4 MMOL/L (ref 3.7–5.3)
PROTEIN UA: NEGATIVE
RBC # BLD: 4.98 M/UL (ref 3.95–5.11)
SARS-COV-2, RAPID: NOT DETECTED
SEG NEUTROPHILS: 62 % (ref 36–65)
SEGMENTED NEUTROPHILS ABSOLUTE COUNT: 4.35 K/UL (ref 1.5–8.1)
SODIUM BLD-SCNC: 139 MMOL/L (ref 135–144)
SPECIFIC GRAVITY UA: 1.03 (ref 1–1.03)
SPECIMEN DESCRIPTION: NORMAL
TURBIDITY: CLEAR
URINE HGB: NEGATIVE
UROBILINOGEN, URINE: NORMAL
WBC # BLD: 7 K/UL (ref 3.5–11.3)

## 2022-04-16 PROCEDURE — G0378 HOSPITAL OBSERVATION PER HR: HCPCS

## 2022-04-16 PROCEDURE — 87635 SARS-COV-2 COVID-19 AMP PRB: CPT

## 2022-04-16 PROCEDURE — 96376 TX/PRO/DX INJ SAME DRUG ADON: CPT

## 2022-04-16 PROCEDURE — 99283 EMERGENCY DEPT VISIT LOW MDM: CPT

## 2022-04-16 PROCEDURE — 2580000003 HC RX 258: Performed by: STUDENT IN AN ORGANIZED HEALTH CARE EDUCATION/TRAINING PROGRAM

## 2022-04-16 PROCEDURE — 6370000000 HC RX 637 (ALT 250 FOR IP): Performed by: STUDENT IN AN ORGANIZED HEALTH CARE EDUCATION/TRAINING PROGRAM

## 2022-04-16 PROCEDURE — 76770 US EXAM ABDO BACK WALL COMP: CPT

## 2022-04-16 PROCEDURE — 81003 URINALYSIS AUTO W/O SCOPE: CPT

## 2022-04-16 PROCEDURE — 6360000002 HC RX W HCPCS: Performed by: STUDENT IN AN ORGANIZED HEALTH CARE EDUCATION/TRAINING PROGRAM

## 2022-04-16 PROCEDURE — 80048 BASIC METABOLIC PNL TOTAL CA: CPT

## 2022-04-16 PROCEDURE — 1200000000 HC SEMI PRIVATE

## 2022-04-16 PROCEDURE — 94640 AIRWAY INHALATION TREATMENT: CPT

## 2022-04-16 PROCEDURE — 96372 THER/PROPH/DIAG INJ SC/IM: CPT

## 2022-04-16 PROCEDURE — 96375 TX/PRO/DX INJ NEW DRUG ADDON: CPT

## 2022-04-16 PROCEDURE — G0378 HOSPITAL OBSERVATION PER HR: HCPCS | Performed by: EMERGENCY MEDICINE

## 2022-04-16 PROCEDURE — 74018 RADEX ABDOMEN 1 VIEW: CPT

## 2022-04-16 PROCEDURE — 96374 THER/PROPH/DIAG INJ IV PUSH: CPT

## 2022-04-16 PROCEDURE — 85025 COMPLETE CBC W/AUTO DIFF WBC: CPT

## 2022-04-16 RX ORDER — CETIRIZINE HYDROCHLORIDE 10 MG/1
10 TABLET ORAL DAILY
Status: DISCONTINUED | OUTPATIENT
Start: 2022-04-16 | End: 2022-04-22 | Stop reason: HOSPADM

## 2022-04-16 RX ORDER — NICOTINE 21 MG/24HR
1 PATCH, TRANSDERMAL 24 HOURS TRANSDERMAL DAILY
Status: DISCONTINUED | OUTPATIENT
Start: 2022-04-17 | End: 2022-04-16

## 2022-04-16 RX ORDER — LAMOTRIGINE 100 MG/1
200 TABLET ORAL DAILY
Status: DISCONTINUED | OUTPATIENT
Start: 2022-04-16 | End: 2022-04-22 | Stop reason: HOSPADM

## 2022-04-16 RX ORDER — 0.9 % SODIUM CHLORIDE 0.9 %
1000 INTRAVENOUS SOLUTION INTRAVENOUS ONCE
Status: COMPLETED | OUTPATIENT
Start: 2022-04-16 | End: 2022-04-16

## 2022-04-16 RX ORDER — TAMSULOSIN HYDROCHLORIDE 0.4 MG/1
0.4 CAPSULE ORAL DAILY
Status: DISCONTINUED | OUTPATIENT
Start: 2022-04-16 | End: 2022-04-22 | Stop reason: HOSPADM

## 2022-04-16 RX ORDER — SODIUM CHLORIDE 9 MG/ML
INJECTION, SOLUTION INTRAVENOUS PRN
Status: DISCONTINUED | OUTPATIENT
Start: 2022-04-16 | End: 2022-04-22 | Stop reason: HOSPADM

## 2022-04-16 RX ORDER — SODIUM CHLORIDE 0.9 % (FLUSH) 0.9 %
5-40 SYRINGE (ML) INJECTION EVERY 12 HOURS SCHEDULED
Status: DISCONTINUED | OUTPATIENT
Start: 2022-04-16 | End: 2022-04-22 | Stop reason: HOSPADM

## 2022-04-16 RX ORDER — MONTELUKAST SODIUM 10 MG/1
10 TABLET ORAL NIGHTLY
Status: DISCONTINUED | OUTPATIENT
Start: 2022-04-16 | End: 2022-04-22 | Stop reason: HOSPADM

## 2022-04-16 RX ORDER — ONDANSETRON 2 MG/ML
4 INJECTION INTRAMUSCULAR; INTRAVENOUS EVERY 6 HOURS PRN
Status: DISCONTINUED | OUTPATIENT
Start: 2022-04-16 | End: 2022-04-22 | Stop reason: HOSPADM

## 2022-04-16 RX ORDER — SODIUM CHLORIDE 9 MG/ML
INJECTION, SOLUTION INTRAVENOUS CONTINUOUS
Status: DISCONTINUED | OUTPATIENT
Start: 2022-04-16 | End: 2022-04-22 | Stop reason: HOSPADM

## 2022-04-16 RX ORDER — IPRATROPIUM BROMIDE AND ALBUTEROL SULFATE 2.5; .5 MG/3ML; MG/3ML
1 SOLUTION RESPIRATORY (INHALATION) 4 TIMES DAILY
Status: DISCONTINUED | OUTPATIENT
Start: 2022-04-16 | End: 2022-04-22 | Stop reason: HOSPADM

## 2022-04-16 RX ORDER — PANTOPRAZOLE SODIUM 40 MG/1
40 TABLET, DELAYED RELEASE ORAL
Status: DISCONTINUED | OUTPATIENT
Start: 2022-04-17 | End: 2022-04-22 | Stop reason: HOSPADM

## 2022-04-16 RX ORDER — ACETAMINOPHEN 500 MG
1000 TABLET ORAL EVERY 6 HOURS PRN
Status: DISCONTINUED | OUTPATIENT
Start: 2022-04-16 | End: 2022-04-17

## 2022-04-16 RX ORDER — SODIUM CHLORIDE 0.9 % (FLUSH) 0.9 %
5-40 SYRINGE (ML) INJECTION PRN
Status: DISCONTINUED | OUTPATIENT
Start: 2022-04-16 | End: 2022-04-22 | Stop reason: HOSPADM

## 2022-04-16 RX ORDER — ONDANSETRON 2 MG/ML
4 INJECTION INTRAMUSCULAR; INTRAVENOUS ONCE
Status: COMPLETED | OUTPATIENT
Start: 2022-04-16 | End: 2022-04-16

## 2022-04-16 RX ORDER — TAMSULOSIN HYDROCHLORIDE 0.4 MG/1
0.4 CAPSULE ORAL ONCE
Status: COMPLETED | OUTPATIENT
Start: 2022-04-16 | End: 2022-04-16

## 2022-04-16 RX ORDER — ACETAMINOPHEN 325 MG/1
650 TABLET ORAL EVERY 4 HOURS PRN
Status: DISCONTINUED | OUTPATIENT
Start: 2022-04-16 | End: 2022-04-16 | Stop reason: SDUPTHER

## 2022-04-16 RX ORDER — NICOTINE 21 MG/24HR
1 PATCH, TRANSDERMAL 24 HOURS TRANSDERMAL DAILY
Status: DISCONTINUED | OUTPATIENT
Start: 2022-04-16 | End: 2022-04-16

## 2022-04-16 RX ORDER — PANTOPRAZOLE SODIUM 40 MG/1
40 TABLET, DELAYED RELEASE ORAL
Status: DISCONTINUED | OUTPATIENT
Start: 2022-04-16 | End: 2022-04-16

## 2022-04-16 RX ORDER — DICYCLOMINE HYDROCHLORIDE 10 MG/1
10 CAPSULE ORAL 4 TIMES DAILY
Status: DISCONTINUED | OUTPATIENT
Start: 2022-04-16 | End: 2022-04-22 | Stop reason: HOSPADM

## 2022-04-16 RX ORDER — ONDANSETRON 4 MG/1
4 TABLET, FILM COATED ORAL ONCE
Status: DISCONTINUED | OUTPATIENT
Start: 2022-04-16 | End: 2022-04-16

## 2022-04-16 RX ORDER — ONDANSETRON 4 MG/1
4 TABLET, ORALLY DISINTEGRATING ORAL EVERY 8 HOURS PRN
Status: DISCONTINUED | OUTPATIENT
Start: 2022-04-16 | End: 2022-04-22 | Stop reason: HOSPADM

## 2022-04-16 RX ORDER — MORPHINE SULFATE 4 MG/ML
2 INJECTION, SOLUTION INTRAMUSCULAR; INTRAVENOUS
Status: DISCONTINUED | OUTPATIENT
Start: 2022-04-16 | End: 2022-04-17

## 2022-04-16 RX ORDER — BUDESONIDE AND FORMOTEROL FUMARATE DIHYDRATE 80; 4.5 UG/1; UG/1
2 AEROSOL RESPIRATORY (INHALATION) 2 TIMES DAILY
Status: DISCONTINUED | OUTPATIENT
Start: 2022-04-16 | End: 2022-04-22 | Stop reason: HOSPADM

## 2022-04-16 RX ORDER — MORPHINE SULFATE 4 MG/ML
4 INJECTION, SOLUTION INTRAMUSCULAR; INTRAVENOUS ONCE
Status: COMPLETED | OUTPATIENT
Start: 2022-04-16 | End: 2022-04-16

## 2022-04-16 RX ADMIN — MORPHINE SULFATE 4 MG: 4 INJECTION, SOLUTION INTRAMUSCULAR; INTRAVENOUS at 13:48

## 2022-04-16 RX ADMIN — SERTRALINE 150 MG: 50 TABLET, FILM COATED ORAL at 20:35

## 2022-04-16 RX ADMIN — IPRATROPIUM BROMIDE AND ALBUTEROL SULFATE 1 AMPULE: .5; 3 SOLUTION RESPIRATORY (INHALATION) at 16:30

## 2022-04-16 RX ADMIN — MORPHINE SULFATE 2 MG: 4 INJECTION INTRAVENOUS at 22:46

## 2022-04-16 RX ADMIN — ONDANSETRON 4 MG: 2 INJECTION INTRAMUSCULAR; INTRAVENOUS at 11:41

## 2022-04-16 RX ADMIN — LAMOTRIGINE 200 MG: 100 TABLET ORAL at 20:36

## 2022-04-16 RX ADMIN — SODIUM CHLORIDE, PRESERVATIVE FREE 10 ML: 5 INJECTION INTRAVENOUS at 20:36

## 2022-04-16 RX ADMIN — SODIUM CHLORIDE 1000 ML: 9 INJECTION, SOLUTION INTRAVENOUS at 11:40

## 2022-04-16 RX ADMIN — ACETAMINOPHEN 1000 MG: 500 TABLET ORAL at 16:30

## 2022-04-16 RX ADMIN — TAMSULOSIN HYDROCHLORIDE 0.4 MG: 0.4 CAPSULE ORAL at 14:10

## 2022-04-16 RX ADMIN — BUDESONIDE AND FORMOTEROL FUMARATE DIHYDRATE 2 PUFF: 80; 4.5 AEROSOL RESPIRATORY (INHALATION) at 19:34

## 2022-04-16 RX ADMIN — DICYCLOMINE HYDROCHLORIDE 10 MG: 10 CAPSULE ORAL at 16:39

## 2022-04-16 RX ADMIN — MONTELUKAST SODIUM 10 MG: 10 TABLET, FILM COATED ORAL at 20:36

## 2022-04-16 RX ADMIN — CETIRIZINE HYDROCHLORIDE 10 MG: 10 TABLET ORAL at 16:39

## 2022-04-16 RX ADMIN — MORPHINE SULFATE 2 MG: 4 INJECTION INTRAVENOUS at 20:30

## 2022-04-16 RX ADMIN — MORPHINE SULFATE 2 MG: 4 INJECTION INTRAVENOUS at 16:30

## 2022-04-16 RX ADMIN — TAMSULOSIN HYDROCHLORIDE 0.4 MG: 0.4 CAPSULE ORAL at 16:39

## 2022-04-16 RX ADMIN — ENOXAPARIN SODIUM 40 MG: 100 INJECTION SUBCUTANEOUS at 16:40

## 2022-04-16 RX ADMIN — SODIUM CHLORIDE: 9 INJECTION, SOLUTION INTRAVENOUS at 19:10

## 2022-04-16 RX ADMIN — MORPHINE SULFATE 4 MG: 4 INJECTION INTRAVENOUS at 11:41

## 2022-04-16 RX ADMIN — IPRATROPIUM BROMIDE AND ALBUTEROL SULFATE 1 AMPULE: .5; 3 SOLUTION RESPIRATORY (INHALATION) at 19:34

## 2022-04-16 ASSESSMENT — PAIN - FUNCTIONAL ASSESSMENT
PAIN_FUNCTIONAL_ASSESSMENT: ACTIVITIES ARE NOT PREVENTED
PAIN_FUNCTIONAL_ASSESSMENT: 0-10

## 2022-04-16 ASSESSMENT — PAIN DESCRIPTION - PROGRESSION
CLINICAL_PROGRESSION: GRADUALLY WORSENING

## 2022-04-16 ASSESSMENT — PAIN SCALES - GENERAL
PAINLEVEL_OUTOF10: 10
PAINLEVEL_OUTOF10: 9

## 2022-04-16 ASSESSMENT — PAIN DESCRIPTION - FREQUENCY
FREQUENCY: CONTINUOUS
FREQUENCY: CONTINUOUS

## 2022-04-16 ASSESSMENT — PAIN DESCRIPTION - DESCRIPTORS
DESCRIPTORS: SHARP
DESCRIPTORS: SHARP

## 2022-04-16 ASSESSMENT — PAIN DESCRIPTION - LOCATION
LOCATION: FLANK
LOCATION: FLANK

## 2022-04-16 ASSESSMENT — PAIN DESCRIPTION - ONSET: ONSET: ON-GOING

## 2022-04-16 ASSESSMENT — PAIN DESCRIPTION - ORIENTATION: ORIENTATION: RIGHT;LEFT

## 2022-04-16 ASSESSMENT — PAIN DESCRIPTION - PAIN TYPE: TYPE: ACUTE PAIN

## 2022-04-16 NOTE — PLAN OF CARE
Problem: Respiratory  Intervention: Respiratory assessment  4/16/2022 1937 by Lawrance Lanes, RCP  Note: BRONCHOSPASM/BRONCHOCONSTRICTION     [x]         IMPROVE AERATION/BREATH SOUNDS  [x]   ADMINISTER BRONCHODILATOR THERAPY AS APPROPRIATE  [x]   ASSESS BREATH SOUNDS  []   IMPLEMENT AEROSOL/MDI PROTOCOL  [x]   PATIENT EDUCATION AS NEEDED

## 2022-04-16 NOTE — CARE COORDINATION
Case Management Initial Discharge Plan  Flavio Phillips,             Met with:patient to discuss discharge plans. Information verified: address, contacts, phone number, , insurance Yes  Insurance Provider: Harvey Eckert my care    Emergency Contact/Next of Kin name & number: pallavi Connelly  Who are involved in patient's support system? Family     PCP: Sallie Baeza, KATIE - CNP  Date of last visit: telemedicine 3/17       Discharge Planning    Living Arrangements:    lives alone    Home is 2nd floor appartment  10 stairs to climb to get into front door,     Patient able to perform ADL's:Independent    Current Services (outpatient & in home) none  DME equipment: neb      Is patient receiving oral anticoagulation therapy? No        Does patient have any issues/concerns obtaining medications? No      Is there a preferred Pharmacy after hours or on weekends? [facility]        Potential Assistance Needed:   f/u pcp, f/u urology          Evaluation: no      Patient expects to be discharged to:   home    If home: is the family and/or caregiver wiling & able to provide support at home? yes  Who will be providing this support? Family       Transportation provider: family   Transportation arrangements needed for discharge: No    Readmission Risk              Risk of Unplanned Readmission:  0             Does patient have a readmission risk score greater than 14?: No  If yes, follow-up appointment must be made within 7 days of discharge.      Goals of Care: safe transition plan       Educated eys on transitional options, provided freedom of choice and are agreeable with plan      Discharge Plan: return home independently           Electronically signed by Agatha Pratt RN on 22 at 4:29 PM EDT

## 2022-04-16 NOTE — ED PROVIDER NOTES
[facility] ED     Emergency Department     Faculty Attestation    I performed a history and physical examination of the patient and discussed management with the resident. I reviewed the residents note and agree with the documented findings and plan of care. Any areas of disagreement are noted on the chart. I was personally present for the key portions of any procedures. I have documented in the chart those procedures where I was not present during the key portions. I have reviewed the emergency nurses triage note. I agree with the chief complaint, past medical history, past surgical history, allergies, medications, social and family history as documented unless otherwise noted below. For Physician Assistant/ Nurse Practitioner cases/documentation I have personally evaluated this patient and have completed at least one if not all key elements of the E/M (history, physical exam, and MDM). Additional findings are as noted. This patient was evaluated in the Emergency Department for symptoms described in the history of present illness. He/she was evaluated in the context of the global COVID-19 pandemic, which necessitated consideration that the patient might be at risk for infection with the SARS-CoV-2 virus that causes COVID-19. Institutional protocols and algorithms that pertain to the evaluation of patients at risk for COVID-19 are in a state of rapid change based on information released by regulatory bodies including the CDC and federal and state organizations. These policies and algorithms were followed during the patient's care in the ED. Patient returns with flank pain, diagnosed with submillimeter kidney stone on Monday. Pain is not controlled has nausea vomiting as well. No fevers. On exam patient peers uncomfortable.   Reviewed old records see below, will check labs, discussed with urology, possible admit    CT 4/12/22  Stable left-sided mildly prominent partially extrarenal pelvis which contains   unchanged 7 mm calculus.        Critical Care     none    Idris Blackburn MD, Leslie Jimenezp  Attending Emergency  Physician             Idris Blackburn MD  04/16/22 3704

## 2022-04-16 NOTE — PLAN OF CARE
Problem: Nutritional:  Goal: Ability to tolerate tube feedings without aspirating will improve  Description: Ability to tolerate tube feedings without aspirating will improve  Outcome: Ongoing  Goal: Consumption of food in small portions  Description: Consumption of food in small portions  Outcome: Ongoing  Goal: Consumption of liquid of appropriate consistency  Description: Consumption of liquid of appropriate consistency  Outcome: Ongoing     Problem: Respiratory:  Goal: Ability to maintain a clear airway will improve  Description: Ability to maintain a clear airway will improve  Outcome: Ongoing  Goal: Will remain free from infection  Description: Will remain free from infection  Outcome: Ongoing  Goal: Absence of aspiration  Description: Absence of aspiration  Outcome: Ongoing     Problem: Safety:  Goal: Ability to chew and swallow food without choking will improve  Description: Ability to chew and swallow food without choking will improve  Outcome: Ongoing  Goal: Ability to demonstrate good, daily oral hygiene techniques will improve  Description: Ability to demonstrate good, daily oral hygiene techniques will improve  Outcome: Ongoing  Goal: Maintenance of upright position during and after feeding  Description: Maintenance of upright position during and after feeding  Outcome: Ongoing     Problem: Nutritional:  Goal: Ability to tolerate tube feedings without aspirating will improve  Description: Ability to tolerate tube feedings without aspirating will improve  4/16/2022 1940 by Glen Osman RN  Outcome: Ongoing  4/16/2022 1652 by Glen Osman RN  Outcome: Ongoing  Goal: Consumption of food in small portions  Description: Consumption of food in small portions  4/16/2022 1940 by Glen Osman RN  Outcome: Ongoing  4/16/2022 1652 by Glen Osman RN  Outcome: Ongoing  Goal: Consumption of liquid of appropriate consistency  Description: Consumption of liquid of appropriate consistency  4/16/2022 1940 by Glen Osman RN  Outcome: Ongoing  4/16/2022 1652 by Christine Mcconnell RN  Outcome: Ongoing     Problem: Respiratory:  Goal: Ability to maintain a clear airway will improve  Description: Ability to maintain a clear airway will improve  4/16/2022 1940 by Christine Mcconnell RN  Outcome: Ongoing  4/16/2022 1652 by Christine Mcconnell RN  Outcome: Ongoing  Goal: Will remain free from infection  Description: Will remain free from infection  4/16/2022 1940 by Christine Mcconnell RN  Outcome: Ongoing  4/16/2022 1652 by Christine Mcconnell RN  Outcome: Ongoing  Goal: Absence of aspiration  Description: Absence of aspiration  4/16/2022 1940 by Christine Mcconnell RN  Outcome: Ongoing  4/16/2022 1652 by Christine Mcconnell RN  Outcome: Ongoing     Problem: Safety:  Goal: Ability to chew and swallow food without choking will improve  Description: Ability to chew and swallow food without choking will improve  4/16/2022 1940 by Christine Mcconnell RN  Outcome: Ongoing  4/16/2022 1652 by Christine Mcconnell RN  Outcome: Ongoing  Goal: Ability to demonstrate good, daily oral hygiene techniques will improve  Description: Ability to demonstrate good, daily oral hygiene techniques will improve  4/16/2022 1940 by Christine Mcconnell RN  Outcome: Ongoing  4/16/2022 1652 by Christine Mcconnell RN  Outcome: Ongoing  Goal: Maintenance of upright position during and after feeding  Description: Maintenance of upright position during and after feeding  4/16/2022 1940 by Christine Mcconnell RN  Outcome: Ongoing  4/16/2022 1652 by Christine cMconnell RN  Outcome: Ongoing

## 2022-04-16 NOTE — ED NOTES
Patient presents to ED for evaluation of flank pain. Patient reports hx flank pain left sided beginning about five days ago. Patient was seen in ED on 04/12/2022, diagnosed with 7mm kidney stone, and discharged home with outpatient urology follow up. Patient reports significant increase in severity of pain with associated nausea and vomiting. Patient has follow up with urology scheduled but its not until May 2nd.      Rangel Pearson RN  04/16/22 8243

## 2022-04-16 NOTE — ED NOTES
Patient ambulatory to and from restroom with steady gait. Patient c/o more pain at this time. Resident notified.      Mukesh Del Rio RN  04/16/22 [id number]

## 2022-04-16 NOTE — ED NOTES
Patient given boxed lunch. Patient resting comfortably and in no acute distress. Respirations even and nonlabored. Patient denies any needs at this time. Bed in lowest position. Call light within reach. Will continue to monitor.      Js Hernandez RN  04/16/22 2280

## 2022-04-16 NOTE — CONSULTS
[address] [facility]  Urology H&P      Patient:  Ti Sparks  MRN: [id number]  YOB: 1980    CHIEF COMPLAINT: Left-sided flank pain    HISTORY OF PRESENT ILLNESS:   The patient is a 46 y.o. female who presents with left-sided flank pain. Patient presents with recurrent left-sided flank pain. She presented earlier this week with left-sided flank pain radiating down to the groin. At that time, she obtained a CT abdomen and pelvis that showed a mildly prominent extrarenal pelvis with several millimeter renal calculi with no prominent hydronephrosis present. Upon presentation today, she obtained a renal ultrasound that showed no hydronephrosis and KUB showed the persistence of the stone. She states that her pain medication has not alleviated her pain. She denies any fevers, chills, chest pain or shortness of breath. She noted that earlier in the week she was having gross hematuria. She denies any urinary urgency, frequency, burning with urination. She denies any chest pain or shortness of breath. For presentation to the ED, WBC 7.0, creatinine 0.64. Patient's old records, notes and chart reviewed and summarized above.     Past Medical History:    Past Medical History:   Diagnosis Date    Anxiety     Arthritis     OA    Asthma     Sadler's esophagus     LONG SEGMENT    Bipolar 1 disorder (HCC)     CAD (coronary artery disease)     Chronic insomnia     [facility]     Depression     and bipolar    Diabetes mellitus (HCC)     prediabetic    Endometriosis 3/9/2020    Esophagitis     severe    GERD (gastroesophageal reflux disease)     Headache(784.0)     Herniated disc, cervical     Hiatal hernia     Incisional abscess 1/15/2019    Kidney stones     MDRO (multiple drug resistant organisms) resistance     mrsa    Pleurisy     hx of \"years ago\"    Pneumonia     past penumonia    Seizures ([address]     2016 last seizure-focal seizure    UTI (urinary tract infection)     Vision abnormalities     wears glasses       Past Surgical History:    Past Surgical History:   Procedure Laterality Date    CERVICAL DISC SURGERY      x2     SECTION  , 2018    x 2     SECTION N/A 2018     SECTION performed [facility] De[facility]ira [facility]illan [facility] at [facility]    COLONOSCOPY N/A 10/1/2019    COLONOSCOPY DIAGNOSTIC ABORTED performed by Jessica Lentz MD at [address] [address] N/A 2020    COLONOSCOPY WITH BIOPSY performed by Jessica Lentz MD at [address] [place] [address], COLON, DIAGNOSTIC      HYSTERECTOMY N/A 2020    HYSTERECTOMY ABDOMINAL LAPAROSCOPIC ROBOTIC XI W/ CYSTOSCOPY performed by Deyanira Santillan MD at [address] ARTHROSCOPY Left 5/20/15    KNEE SURGERY Left     x3    LAPAROSCOPY      dr Xiomara Mccormick N/A 10/5/2017    LAPAROSCOPIC REMOVAL INTRA ABDOMINAL LIPOMA LOWER RIGHT performed by Yoel Galindo DO at [address] [facility] ESOPHAGOGASTRODUODENOSCOPY TRANSORAL DIAGNOSTIC N/A 3/2/2017    EGD ESOPHAGOGASTRODUODENOSCOPY  IP 2055 performed by Lydia Shearer MD at [facility]  2016    severe esophagitis    UPPER GASTROINTESTINAL ENDOSCOPY  2017    barretts; hiatus hernia; gastritis    UPPER GASTROINTESTINAL ENDOSCOPY  2017    long seg mullins's with linear erosions, esophagitis, small hiatal hernia    UPPER GASTROINTESTINAL ENDOSCOPY N/A 2018    MULLINS'S    UPPER GASTROINTESTINAL ENDOSCOPY N/A 10/1/2019    EGD BIOPSY performed by Jessica Lentz MD at [facility]         Medications:      Current Facility-Administered Medications:     acetaminophen (TYLENOL) tablet 1,000 mg, 1,000 mg, Oral, Q6H PRN, Susana Dolan MD    budesonide-formoterol (SYMBICORT) 80-4.5 MCG/ACT inhaler 2 puff, 2 puff, Inhalation, BID, Susana Dolan MD    dicyclomine (BENTYL) capsule 10 mg, 10 mg, Oral, 4x Daily, Aaliyah Toro MD    lamoTRIgine (LAMICTAL) tablet 200 mg, 200 mg, Oral, Daily, Aaliyah Toro MD    cetirizine (ZYRTEC) tablet 10 mg, 10 mg, Oral, Daily, Aaliyah Toro MD    montelukast (SINGULAIR) tablet 10 mg, 10 mg, Oral, Nightly, Aaliyah Toro MD    tamsulosin [facility]) capsule 0.4 mg, 0.4 mg, Oral, Daily, Aaliyah Toro MD    sertraline (ZOLOFT) tablet 150 mg, 150 mg, Oral, Daily, Aaliyah Toro MD    0.9 % sodium chloride infusion, , IntraVENous, Continuous, Aaliyah Toro MD    sodium chloride flush 0.9 % injection 5-40 mL, 5-40 mL, IntraVENous, 2 times per day, Aaliyah Toro MD    sodium chloride flush 0.9 % injection 5-40 mL, 5-40 mL, IntraVENous, PRN, Aaliyah Toro MD    0.9 % sodium chloride infusion, , IntraVENous, PRN, Aaliyah Toro MD    enoxaparin (LOVENOX) injection 40 mg, 40 mg, SubCUTAneous, Daily, Aaliyah Toro MD    ondansetron (ZOFRAN-ODT) disintegrating tablet 4 mg, 4 mg, Oral, Q8H PRN **OR** ondansetron (ZOFRAN) injection 4 mg, 4 mg, IntraVENous, Q6H PRN, Aaliyah Toro MD    morphine injection 2 mg, 2 mg, IntraVENous, Q2H PRN, Aaliyah Toro MD    ipratropium-albuterol (DUONEB) nebulizer solution 1 ampule, 1 ampule, Inhalation, 4x daily, Aaliyah Toro MD    Current Outpatient Medications:     acetaminophen (TYLENOL) 500 MG tablet, Take 2 tablets by mouth 3 times daily for 7 days, Disp: 42 tablet, Rfl: 0    tamsulosin (FLOMAX) 0.4 MG capsule, Take 1 capsule by mouth daily for 14 days, Disp: 14 capsule, Rfl: 0    Lancets (ONETOUCH DELICA PLUS [id number]) MISC, USE TO TEST BLOOD SUGAR TWICE A DAY, Disp: 100 each, Rfl: 5    vitamin D3 (CHOLECALCIFEROL) 25 MCG (1000 UT) TABS tablet, TAKE 1 TABLET BY MOUTH DAILY, Disp: 90 tablet, Rfl: 5    BREO ELLIPTA 200-25 MCG/INH AEPB inhaler, INHALE 1 PUFF INTO THE LUNGS DAILY, Disp: 60 each, Rfl: 1    pantoprazole (PROTONIX) 40 MG tablet, 1 tab daily, Disp: 90 tablet, Rfl: 2    promethazine (PHENERGAN) 6.25 MG/5ML syrup, Take 10 mLs by mouth 4 times daily as needed for Nausea (diarrhea), Disp: 120 mL, Rfl: 0    COMBIVENT RESPIMAT  MCG/ACT AERS inhaler, Inhale 1 puff into the lungs 4 times daily Inhale 1 puff into the lungs 4 times daily, Disp: 4 g, Rfl: 3    ipratropium-albuterol (DUONEB) 0.5-2.5 (3) MG/3ML SOLN nebulizer solution, INHALE CONTENTS OF 1 UNIT DOSE VIA NEBULIZER EVERY 4 HOURS, Disp: 360 mL, Rfl: 2    montelukast (SINGULAIR) 10 MG tablet, TAKE 1 TABLET IN THE EVENING ONCE A DAY ORALLY, Disp: 90 tablet, Rfl: 2    predniSONE (DELTASONE) 10 MG tablet, Take 4 tabs X 3 days, then 3 tabs X 3 days, then 2 tabs X 3 days, then 1 tab X 3 days, Disp: 30 tablet, Rfl: 0    acetaminophen (TYLENOL) 500 MG tablet, Take 2 tablets by mouth every 6 hours as needed for Pain, Disp: 20 tablet, Rfl: 1    Misc. Devices (STRAINER/STAINLESS STEEL/2.5\") MISC, 1 each by Does not apply route as needed (when you urinate), Disp: 1 each, Rfl: 0    pregabalin (LYRICA) 100 MG capsule, Take 1 capsule by mouth 3 times daily for 30 days. , Disp: 90 capsule, Rfl: 0    Methocarbamol (ROBAXIN PO), Take by mouth, Disp: , Rfl:     sertraline (ZOLOFT) 100 MG tablet, Take 1.5 tablets by mouth daily, Disp: , Rfl:     lamoTRIgine (LAMICTAL) 200 MG tablet, Take 1 tablet by mouth daily, Disp: , Rfl:     ONETOUCH VERIO strip, USE TO TEST BLOOD SUGAR TWICE A DAY, Disp: 50 each, Rfl: 5    dicyclomine (BENTYL) 10 MG capsule, TAKE 1 CAPSULE BY MOUTH 4 TIMES DAILY, Disp: 180 capsule, Rfl: 1    loratadine (CLARITIN) 10 MG tablet, TAKE 1 TABLET BY MOUTH DAILY, Disp: 90 tablet, Rfl: 1    glucose monitoring kit (FREESTYLE) monitoring kit, Use as directed.  BRAND OF CHOICE INSURANCE ALLOWS., Disp: 1 kit, Rfl: 0    Alcohol Swabs ( STERILE ALCOHOL PREP) PADS, USE AS DIRECTED, Disp: 100 each, Rfl: 11    fluticasone (FLONASE) 50 MCG/ACT nasal spray, 1 spray by Each Nostril route daily, Disp: 1 Bottle, Rfl: 1    Masks (CLEVER CHOICE FACE MASK) MISC, USE NEW FACE MASK EVERYDAY WHEN PATIENT GO OUTSIDE OF HOME DUE TO COVID PANDEMIC, Disp: 80 each, Rfl: 1    Allergies: Allergies   Allergen Reactions    Nsaids      Irritates her Barrets esophogus    Toradol [Ketorolac Tromethamine]     Ultram [Tramadol] Nausea Only     Gastric upset       Social History:   Social History     Socioeconomic History    Marital status: Single     Spouse name: Not on file    Number of children: 1    Years of education: 11th grade    Highest education level: Not on file   Occupational History    Occupation: unemployed-   Tobacco Use    Smoking status: Current Every Day Smoker     Packs/day: 0.50     Years: 21.00     Pack years: 10.50     Types: Cigarettes    Smokeless tobacco: Never Used   Vaping Use    Vaping Use: Every day   Substance and Sexual Activity    Alcohol use: No    Drug use: Not Currently     Comment: 10 years ago- cocaine    Sexual activity: Yes     Partners: Male   Other Topics Concern    Not on file   Social History Narrative    Lives with son and boyfriend     Social Determinants of Health     Financial Resource Strain: Low Risk     Difficulty of Paying Living Expenses: Not hard at all   Food Insecurity: No Food Insecurity    Worried About Running Out of Food in the Last Year: Never true    [address] in the Last Year: Never true   Transportation Needs:     Lack of Transportation (Medical): Not on file    Lack of Transportation (Non-Medical):  Not on file   Physical Activity:     Days of Exercise per Week: Not on file    Minutes of Exercise per Session: Not on file   Stress:     Feeling of Stress : Not on file   Social Connections:     Frequency of Communication with Friends and Family: Not on file    Frequency of Social Gatherings with Friends and Family: Not on file    Attends Rastafari Services: Not on file    Active Member of Clubs or Organizations: Not on file    Attends Club or Organization Meetings: Not on file    Marital Status: Not on file   Intimate Partner Violence:     Fear of Current or Ex-Partner: Not on file    Emotionally Abused: Not on file    Physically Abused: Not on file    Sexually Abused: Not on file   Housing Stability:     Unable to Pay for Housing in the Last Year: Not on file    Number of Places Lived in the Last Year: Not on file    Unstable Housing in the Last Year: Not on file       Family History:    Family History   Problem Relation Age of Onset    Cancer Mother         breast    Bipolar Disorder Mother     Hypertension Mother     Breast Cancer Mother     Bipolar Disorder Brother     Hypertension Father        REVIEW OF SYSTEMS:  A comprehensive 14 point review of systems was obtained. Constitutional: No fatigue  Eyes: No blurry vision  Ears, nose, mouth, throat, face: No ringing in the ears; no facial droop. Respiratory: No cough or cold. Cardiovascular: No palpitations  Gastrointestinal: No diarrhea or constipation. Genitourinary: No burning with urination  Integument/Skin: No rashes  Hematologic/Lymphatic: No easy bruising  Musculoskeletal: No muscle pains  Neurologic: No weakness in the extremities. Psychiatric: No depression or suicidal thoughts. Endocrine: No heat or cold intolerances. Allergic/Immunologic: No current seasonal allergies; no skin hives. Physical Exam:      This a 46 y.o. female   Vitals:    04/16/22 1116   BP: (!) 135/90   Pulse: 87   Resp: 16   Temp: 97.9 °F (36.6 °C)   SpO2: 97%     Constitutional: Patient in no acute distress. Neuro: alert and oriented to person place and time. Head: Atraumatic and normocephalic. Neck: Trachea midline. Ext: 2+ radial pulses bilaterally. Psych: Mood and affect normal.  Skin: No rashes or bruising present. Lungs: Respiratory effort normal.  Cardiovascular:  Regular rhythm. Abdomen: Soft, non-tender, non-distended.  Left side has CVA tenderness on exam.  Bladder non-tender and not distended. Lymphatics: no palpable lymphadenopathy  Pelvic exam: deferred. Rectal exam not indicated. Labs:  Recent Labs     04/16/22  1132   WBC 7.0   HGB 14.6   HCT 43.9   MCV 88.2        Recent Labs     04/16/22  1132      K 4.0      CO2 21   BUN 15   CREATININE 0.64       No results for input(s): COLORU, PHUR, LABCAST, WBCUA, RBCUA, MUCUS, TRICHOMONAS, YEAST, BACTERIA, CLARITYU, SPECGRAV, LEUKOCYTESUR, UROBILINOGEN, [place] in the last 72 hours. Invalid input(s): NITRATE, GLUCOSEUKETONESUAMORPHOUS        -----------------------------------------------------------------  Imaging Results:  XR ABDOMEN (KUB) (SINGLE AP VIEW)    Result Date: 4/16/2022  EXAMINATION: ONE SUPINE XRAY VIEW(S) OF THE ABDOMEN 4/16/2022 11:50 am COMPARISON: 12/30/2021 and CT abdomen/pelvis 04/12/2022. HISTORY: ORDERING SYSTEM PROVIDED HISTORY: assess for left sided kidney stone TECHNOLOGIST PROVIDED HISTORY: assess for left sided kidney stone FINDINGS: Very small portion of the inferior aspect of the pelvis has not been included on the study. There is degradation of image quality related to patient body habitus. There is suspicion of persistent visualization of 7 mm calculus centered in the region of the left renal pelvis. No other radiopaque renal/ureteral calculi are identified. Bowel gas pattern is nonspecific. Visualized portions of the lung bases are clear. Findings suggestive of persistent presence of 7 mm calculus in the region of the left renal pelvis as described above.      US RENAL COMPLETE    Result Date: 4/16/2022  EXAMINATION: RETROPERITONEAL ULTRASOUND OF THE KIDNEYS AND URINARY BLADDER 4/16/2022 COMPARISON: CT abdomen pelvis from 04/12/2022 HISTORY: ORDERING SYSTEM PROVIDED HISTORY: concern for left sided kidney stone TECHNOLOGIST PROVIDED HISTORY: concern for left sided kidney stone 74-year-old female with suspected left-sided kidney stone FINDINGS: Kidneys: Exam is limited due to patient's body habitus and bowel gas. Right kidney measures 9.7 x 4.5 x 4.5 cm. Right renal cortical thickness measures 1.2 cm. Left kidney measures 11.5 x 5.7 x 4.4 cm. Left renal cortical thickness measures 1.9 cm. No hydronephrosis or perinephric fluid. No echogenic foci with posterior acoustic shadowing to suggest renal calculi. Gross preservation of the corticomedullary differentiation. Bladder: Urinary bladder grossly unremarkable in appearance. Nonvisualization of ureteral jets. Urinary bladder measures 5.6 x 4.7 x 3.0 cm for a bladder volume of 41.4 mL. 1. Nonvisualization of ureteral jets. 2. Limited unremarkable renal ultrasound. No hydronephrosis. Assessment and Plan   Impression:   problem list:  Recurrent left-sided flank pain      Plan:  Due to patient presenting to the ED with multiple complaints of left-sided flank pain she will be admitted for observation. Pain control with Toradol, IV fluids, strain all urine, Flomax  If continues to have pain tomorrow will discuss with attending physician while possible stent placement. Encourage ambulation, incentive spirometry use  N.p.o. at midnight          Hayden Hopkins MD  2:57 PM 4/16/2022       42-year-old female with left-sided flank pain, and renal calculi, patient will be scheduled for cystoscopy and a stent placement.     Agree with the evaluation and management as mentioned above    Tc Blankenship MD

## 2022-04-16 NOTE — LETTER
[facility]  [address] [facility] [address] [address]  Phone: [phone]        April 22, 2022     Patient: Cassandra Edwards   YOB: 1980   Date of Visit: 4/16/2022       To Whom It May Concern: It is my medical opinion that Roderick Singh should remain out of work at least until 4/29/2022. Please excuse her from work starting 4/11/2022. She may need to be out of work for a longer period of time following reevaluation. If you have any questions or concerns, please don't hesitate to call.     Sincerely,        Bg Wayne D.O.

## 2022-04-16 NOTE — ED NOTES
Report given to [facility] RN.  All questions answered at this time     Candelario Ureña RN  04/16/22 [id number]

## 2022-04-16 NOTE — ED PROVIDER NOTES
[facility] ED  Emergency Department Encounter  Emergency Medicine Resident     Pt Name: Guy Craft  MRN: [id number]  [place] 1980  Date of evaluation: 4/16/22  PCP:  KATIE Chong CNP    Chief Complaint     Chief Complaint   Patient presents with    Nephrolithiasis     7 mm stone dx monday        HISTORY OF PRESENT ILLNESS     Guy Craft is a 46 y.o. female who presents with concerns for left-sided flank pain. Patient was previously seen and evaluated 4/12/2022, had a CT abdomen pelvis without contrast that showed a 7 mm calculus at that time. Patient states he returns with concerns for worsening pain as well as worsening nausea and vomiting. Patient does have a history of nonmetastatic kidney stone, previous CT scan did not show any signs of hydronephrosis. Patient does endorse 10 out of 10 flank pain which describes as colicky, cramping, near constant at this Juncture. Patient denies aggravating relieving factors, does state that she had decreased urinary output compared to baseline. Patient has fever, chills, is still passing flatus. REVIEW OF SYSTEMS       Constitutional: Denies recent fever, chills. Eyes: No visual changes. Neck: No midline neck pain  Respiratory: Denies recent shortness of breath. Cardiac:  Denies recent chest pain. GI: Denies abdominal pain, does endorse left-sided costovertebral pain. Denies Blood in the stool or black tarry stools. : denies dysuria. Musculoskeletal: Denies focal weakness. Neurologic: denies headache or focal weakness. Skin:  Denies any rash.       PAST MEDICAL/SURGICAL/FAMILY HISTORY     PMH:  has a past medical history of Anxiety, Arthritis, Asthma, Sadler's esophagus, Bipolar 1 disorder ([address] CAD (coronary artery disease), Chronic insomnia, COPD (chronic obstructive pulmonary disease) ([facility] [address] Depression, Diabetes mellitus ([facility] [address] Endometriosis, Esophagitis, GERD (gastroesophageal reflux disease), Headache(784.0), Herniated disc, cervical, Hiatal hernia, Incisional abscess, Kidney stones, MDRO (multiple drug resistant organisms) resistance, Pleurisy, Pneumonia, Seizures ([address] UTI (urinary tract infection), and Vision abnormalities. Surgical History:  has a past surgical history that includes knee surgery (Left);  section (, ); Tonsillectomy; Knee arthroscopy (Left, 5/20/15); Cervical disc surgery; Upper gastrointestinal endoscopy (2016); Upper gastrointestinal endoscopy (2017); Upper gastrointestinal endoscopy (2017); pr esophagogastroduodenoscopy transoral diagnostic (N/A, 3/2/2017); laparoscopy; laparoscopy (N/A, 10/5/2017); Upper gastrointestinal endoscopy (N/A, 2018); Endoscopy, colon, diagnostic;  section (N/A, 2018); Upper gastrointestinal endoscopy (N/A, 10/1/2019); Colonoscopy (N/A, 10/1/2019); Colonoscopy (N/A, 2020); [place] tooth extraction; and Hysterectomy (N/A, 2020). Social History:  reports that she has been smoking cigarettes. She has a 10.50 pack-year smoking history. She has never used smokeless tobacco. She reports previous drug use. She reports that she does not drink alcohol. Family History: Noncontributory at this time  Psychiatric History: Noncontributory at this time    Allergies:is allergic to nsaids, toradol [ketorolac tromethamine], and ultram [tramadol]. PHYSICAL EXAM       INITIAL VITALS: BP (!) 135/90   Pulse 87   Temp 97.9 °F (36.6 °C) (Oral)   Resp 16   LMP 06/15/2020   SpO2 97%     CONSTITUTIONAL: Vital signs reviewed, Alert and oriented X 3. HEAD: Atraumatic, Normocephalic. EYES: Eyes are normal to inspection, Pupils equal, round and reactive to light. NECK: Normal ROM, No jugular venous distention, No meningeal signs,  No midline bony tenderness to palpation of C/T/L/S-spines. RESPIRATORY CHEST: No respiratory distress.    ABDOMEN: Tenderness appreciated to costovertebral angle on the left. without guarding, without rebound No distension. No pulsatile masses palpated. BACK:  No midline bony tenderness to palpation. No paraspinal muscle tenderness   UPPER EXTREMITY: Inspection normal, No cyanosis. LOWER EXTREMITY: Pulses are 2+ and equal bilaterally with cap refill < 2 seconds. NEURO: GCS is 15.  5/5 strength in bilateral lower extremities with sensation to light touch intact. DTR's are 2+ bilaterally with bilateral downgoing babinski's. DP/PT pulses are 2+, strong, and equal bilaterally. SKIN: Skin is warm, Skin is dry. PSYCHIATRIC: Oriented X 3, Normal affect.      Diagnostic Results     LABS:  Not indicated  Results for orders placed or performed during the hospital encounter of 04/16/22   CBC with Auto Differential   Result Value Ref Range    WBC 7.0 3.5 - 11.3 k/uL    RBC 4.98 3.95 - 5.11 m/uL    Hemoglobin 14.6 11.9 - 15.1 g/dL    Hematocrit 43.9 36.3 - 47.1 %    MCV 88.2 82.6 - 102.9 fL    MCH 29.3 25.2 - 33.5 pg    MCHC 33.3 28.4 - 34.8 g/dL    RDW 13.5 11.8 - 14.4 %    Platelets 662 338 - 659 k/uL    MPV 10.5 8.1 - 13.5 fL    NRBC Automated 0.0 0.0 per 100 WBC    Seg Neutrophils 62 36 - 65 %    Lymphocytes 27 24 - 43 %    Monocytes 5 3 - 12 %    Eosinophils % 4 1 - 4 %    Basophils 1 0 - 2 %    Immature Granulocytes 0 0 %    Segs Absolute 4.35 1.50 - 8.10 k/uL    Absolute Lymph # 1.91 1.10 - 3.70 k/uL    Absolute Mono # 0.35 0.10 - 1.20 k/uL    Absolute Eos # 0.27 0.00 - 0.44 k/uL    Basophils Absolute 0.09 0.00 - 0.20 k/uL    Absolute Immature Granulocyte <0.03 0.00 - 0.30 k/uL   Basic Metabolic Panel   Result Value Ref Range    Glucose 115 (H) 70 - 99 mg/dL    BUN 15 6 - 20 mg/dL    CREATININE 0.64 0.50 - 0.90 mg/dL    Calcium 9.0 8.6 - 10.4 mg/dL    Sodium 139 135 - 144 mmol/L    Potassium 4.0 3.7 - 5.3 mmol/L    Chloride 107 98 - 107 mmol/L    CO2 21 20 - 31 mmol/L    Anion Gap 11 9 - 17 mmol/L    GFR Non-African American >60 >60 mL/min    GFR African American >60 >60 mL/min    GFR Comment             RADIOLOGY:  Not indicated  US RENAL COMPLETE   Final Result   1. Nonvisualization of ureteral jets. 2. Limited unremarkable renal ultrasound. No hydronephrosis. XR ABDOMEN (KUB) (SINGLE AP VIEW)   Preliminary Result   Findings suggestive of persistent presence of 7 mm calculus in the region of   the left renal pelvis as described above. Medical decision making  (MDM) / ED Course     Patient was seen and evaluated with concerns for left-sided flank pain with a known kidney stone. Patient is here, chills, does endorse nausea and vomiting, states that she had worsening symptoms with a known 7 mm kidney stone. Plan to speak with nephrology for recommendations, plan to get a urinalysis and assess for urinary tract infection, plan to get a CBC in order assess for elevated white blood cell count, pending a BMP and reassess renal function, electrolyte abnormality. Spoke with Dr. Slick Canela of urology, recommended a renal ultrasound as well as a KUB. XR ABDOMEN (KUB) (SINGLE AP VIEW)    Result Date: 4/16/2022  EXAMINATION: ONE SUPINE XRAY VIEW(S) OF THE ABDOMEN 4/16/2022 11:50 am COMPARISON: 12/30/2021 and CT abdomen/pelvis 04/12/2022. HISTORY: ORDERING SYSTEM PROVIDED HISTORY: assess for left sided kidney stone TECHNOLOGIST PROVIDED HISTORY: assess for left sided kidney stone FINDINGS: Very small portion of the inferior aspect of the pelvis has not been included on the study. There is degradation of image quality related to patient body habitus. There is suspicion of persistent visualization of 7 mm calculus centered in the region of the left renal pelvis. No other radiopaque renal/ureteral calculi are identified. Bowel gas pattern is nonspecific. Visualized portions of the lung bases are clear. Findings suggestive of persistent presence of 7 mm calculus in the region of the left renal pelvis as described above.      US RENAL COMPLETE    Result Date: 4/16/2022  EXAMINATION: RETROPERITONEAL ULTRASOUND OF THE KIDNEYS AND URINARY BLADDER 4/16/2022 COMPARISON: CT abdomen pelvis from 04/12/2022 HISTORY: ORDERING SYSTEM PROVIDED HISTORY: concern for left sided kidney stone TECHNOLOGIST PROVIDED HISTORY: concern for left sided kidney stone 71-year-old female with suspected left-sided kidney stone FINDINGS: Kidneys: Exam is limited due to patient's body habitus and bowel gas. Right kidney measures 9.7 x 4.5 x 4.5 cm. Right renal cortical thickness measures 1.2 cm. Left kidney measures 11.5 x 5.7 x 4.4 cm. Left renal cortical thickness measures 1.9 cm. No hydronephrosis or perinephric fluid. No echogenic foci with posterior acoustic shadowing to suggest renal calculi. Gross preservation of the corticomedullary differentiation. Bladder: Urinary bladder grossly unremarkable in appearance. Nonvisualization of ureteral jets. Urinary bladder measures 5.6 x 4.7 x 3.0 cm for a bladder volume of 41.4 mL. 1. Nonvisualization of ureteral jets. 2. Limited unremarkable renal ultrasound. No hydronephrosis. Patient still symptomatic with nausea, vomiting, consistent left-sided renal pain. Spoke with urology who recommended Mr. The observation unit with potential stenting tomorrow. Patient placed on maintenance fluids, given antinausea medications, Flomax, admitted the observation unit for urology evaluation in the morning. Made n.p.o. at midnight      Final impression      1. Nephrolithiasis           Disposition with plan     Disposition: Decision To Admit    PATIENT REFERRED TO:  No follow-up provider specified.     DISCHARGE MEDICATIONS:  New Prescriptions    No medications on file     Leslie Ba MD  PGY-3 Emergency Medicine  [facility]       (Please note that portions of this note were completed with a voice recognition program.  Efforts were made to edit the dictations but occasionally words are mis-transcribed.)        Jessica Ace MD  Resident  04/16/22 [id number]

## 2022-04-16 NOTE — ED NOTES
The following labs labeled with pt sticker and tubed to lab:     [] Blue     [x] Lavender   [] on ice  [] Green/yellow  [x] Green/black [] on ice  [] Yellow  [x] Red  [] Pink      [] COVID-19 swab    [] Rapid  [] PCR  [] Flu swab  [] Peds Viral Panel     [] Urine Sample  [] Pelvic Cultures  [] Blood Cultures     Olive Saravia RN  04/16/22 4091

## 2022-04-17 ENCOUNTER — APPOINTMENT (OUTPATIENT)
Dept: GENERAL RADIOLOGY | Age: 42
DRG: 661 | End: 2022-04-17
Payer: COMMERCIAL

## 2022-04-17 ENCOUNTER — APPOINTMENT (OUTPATIENT)
Dept: ULTRASOUND IMAGING | Age: 42
DRG: 661 | End: 2022-04-17
Payer: COMMERCIAL

## 2022-04-17 ENCOUNTER — ANESTHESIA EVENT (OUTPATIENT)
Dept: OPERATING ROOM | Age: 42
DRG: 661 | End: 2022-04-17
Payer: COMMERCIAL

## 2022-04-17 ENCOUNTER — ANESTHESIA (OUTPATIENT)
Dept: OPERATING ROOM | Age: 42
DRG: 661 | End: 2022-04-17
Payer: COMMERCIAL

## 2022-04-17 VITALS — OXYGEN SATURATION: 98 % | DIASTOLIC BLOOD PRESSURE: 71 MMHG | SYSTOLIC BLOOD PRESSURE: 126 MMHG

## 2022-04-17 PROCEDURE — 3600000002 HC SURGERY LEVEL 2 BASE: Performed by: UROLOGY

## 2022-04-17 PROCEDURE — 2700000000 HC OXYGEN THERAPY PER DAY

## 2022-04-17 PROCEDURE — 6360000002 HC RX W HCPCS: Performed by: UROLOGY

## 2022-04-17 PROCEDURE — 6370000000 HC RX 637 (ALT 250 FOR IP): Performed by: UROLOGY

## 2022-04-17 PROCEDURE — 6360000002 HC RX W HCPCS: Performed by: STUDENT IN AN ORGANIZED HEALTH CARE EDUCATION/TRAINING PROGRAM

## 2022-04-17 PROCEDURE — 6370000000 HC RX 637 (ALT 250 FOR IP): Performed by: HEALTH CARE PROVIDER

## 2022-04-17 PROCEDURE — BT1F1ZZ FLUOROSCOPY OF LEFT KIDNEY, URETER AND BLADDER USING LOW OSMOLAR CONTRAST: ICD-10-PCS | Performed by: UROLOGY

## 2022-04-17 PROCEDURE — 2709999900 HC NON-CHARGEABLE SUPPLY: Performed by: UROLOGY

## 2022-04-17 PROCEDURE — 6370000000 HC RX 637 (ALT 250 FOR IP): Performed by: STUDENT IN AN ORGANIZED HEALTH CARE EDUCATION/TRAINING PROGRAM

## 2022-04-17 PROCEDURE — 6370000000 HC RX 637 (ALT 250 FOR IP)

## 2022-04-17 PROCEDURE — 6360000002 HC RX W HCPCS: Performed by: ANESTHESIOLOGY

## 2022-04-17 PROCEDURE — 6360000002 HC RX W HCPCS: Performed by: NURSE ANESTHETIST, CERTIFIED REGISTERED

## 2022-04-17 PROCEDURE — 3600000012 HC SURGERY LEVEL 2 ADDTL 15MIN: Performed by: UROLOGY

## 2022-04-17 PROCEDURE — 2580000003 HC RX 258: Performed by: NURSE ANESTHETIST, CERTIFIED REGISTERED

## 2022-04-17 PROCEDURE — 2500000003 HC RX 250 WO HCPCS: Performed by: NURSE ANESTHETIST, CERTIFIED REGISTERED

## 2022-04-17 PROCEDURE — 3209999900 FLUORO FOR SURGICAL PROCEDURES

## 2022-04-17 PROCEDURE — 6370000000 HC RX 637 (ALT 250 FOR IP): Performed by: EMERGENCY MEDICINE

## 2022-04-17 PROCEDURE — 2580000003 HC RX 258: Performed by: UROLOGY

## 2022-04-17 PROCEDURE — 3700000000 HC ANESTHESIA ATTENDED CARE: Performed by: UROLOGY

## 2022-04-17 PROCEDURE — 3700000001 HC ADD 15 MINUTES (ANESTHESIA): Performed by: UROLOGY

## 2022-04-17 PROCEDURE — 6360000004 HC RX CONTRAST MEDICATION: Performed by: UROLOGY

## 2022-04-17 PROCEDURE — C1758 CATHETER, URETERAL: HCPCS | Performed by: UROLOGY

## 2022-04-17 PROCEDURE — 76856 US EXAM PELVIC COMPLETE: CPT

## 2022-04-17 PROCEDURE — G0378 HOSPITAL OBSERVATION PER HR: HCPCS

## 2022-04-17 PROCEDURE — 1200000000 HC SEMI PRIVATE

## 2022-04-17 PROCEDURE — 94760 N-INVAS EAR/PLS OXIMETRY 1: CPT

## 2022-04-17 PROCEDURE — 7100000010 HC PHASE II RECOVERY - FIRST 15 MIN: Performed by: UROLOGY

## 2022-04-17 PROCEDURE — 94640 AIRWAY INHALATION TREATMENT: CPT

## 2022-04-17 PROCEDURE — 6360000002 HC RX W HCPCS: Performed by: EMERGENCY MEDICINE

## 2022-04-17 PROCEDURE — 7100000011 HC PHASE II RECOVERY - ADDTL 15 MIN: Performed by: UROLOGY

## 2022-04-17 PROCEDURE — 0T778DZ DILATION OF LEFT URETER WITH INTRALUMINAL DEVICE, VIA NATURAL OR ARTIFICIAL OPENING ENDOSCOPIC: ICD-10-PCS | Performed by: UROLOGY

## 2022-04-17 PROCEDURE — C1769 GUIDE WIRE: HCPCS | Performed by: UROLOGY

## 2022-04-17 PROCEDURE — C2617 STENT, NON-COR, TEM W/O DEL: HCPCS | Performed by: UROLOGY

## 2022-04-17 DEVICE — URETERAL STENT
Type: IMPLANTABLE DEVICE | Site: URETER | Status: FUNCTIONAL
Brand: POLARIS™ ULTRA

## 2022-04-17 RX ORDER — OXYBUTYNIN CHLORIDE 5 MG/1
5 TABLET, EXTENDED RELEASE ORAL DAILY
Status: DISCONTINUED | OUTPATIENT
Start: 2022-04-17 | End: 2022-04-18

## 2022-04-17 RX ORDER — PROPOFOL 10 MG/ML
INJECTION, EMULSION INTRAVENOUS PRN
Status: DISCONTINUED | OUTPATIENT
Start: 2022-04-17 | End: 2022-04-17

## 2022-04-17 RX ORDER — ONDANSETRON 2 MG/ML
4 INJECTION INTRAMUSCULAR; INTRAVENOUS
Status: DISCONTINUED | OUTPATIENT
Start: 2022-04-17 | End: 2022-04-17 | Stop reason: HOSPADM

## 2022-04-17 RX ORDER — CALCIUM CARBONATE 200(500)MG
500 TABLET,CHEWABLE ORAL ONCE
Status: COMPLETED | OUTPATIENT
Start: 2022-04-17 | End: 2022-04-17

## 2022-04-17 RX ORDER — DIPHENHYDRAMINE HYDROCHLORIDE 50 MG/ML
12.5 INJECTION INTRAMUSCULAR; INTRAVENOUS
Status: DISCONTINUED | OUTPATIENT
Start: 2022-04-17 | End: 2022-04-17 | Stop reason: HOSPADM

## 2022-04-17 RX ORDER — MAGNESIUM HYDROXIDE 1200 MG/15ML
LIQUID ORAL PRN
Status: DISCONTINUED | OUTPATIENT
Start: 2022-04-17 | End: 2022-04-17 | Stop reason: ALTCHOICE

## 2022-04-17 RX ORDER — CALCIUM CARBONATE 200(500)MG
500 TABLET,CHEWABLE ORAL 3 TIMES DAILY PRN
Status: DISCONTINUED | OUTPATIENT
Start: 2022-04-17 | End: 2022-04-22 | Stop reason: HOSPADM

## 2022-04-17 RX ORDER — PHENAZOPYRIDINE HYDROCHLORIDE 100 MG/1
200 TABLET, FILM COATED ORAL 3 TIMES DAILY
Status: DISCONTINUED | OUTPATIENT
Start: 2022-04-17 | End: 2022-04-22 | Stop reason: HOSPADM

## 2022-04-17 RX ORDER — OXYCODONE HYDROCHLORIDE 5 MG/1
5 TABLET ORAL EVERY 6 HOURS PRN
Status: DISCONTINUED | OUTPATIENT
Start: 2022-04-17 | End: 2022-04-18

## 2022-04-17 RX ORDER — OXYCODONE HYDROCHLORIDE 5 MG/1
5 TABLET ORAL EVERY 6 HOURS PRN
Qty: 12 TABLET | Refills: 0 | Status: SHIPPED | OUTPATIENT
Start: 2022-04-17 | End: 2022-04-20

## 2022-04-17 RX ORDER — ACETAMINOPHEN 500 MG
1000 TABLET ORAL EVERY 6 HOURS PRN
Qty: 112 TABLET | Refills: 0 | Status: SHIPPED | OUTPATIENT
Start: 2022-04-17 | End: 2022-05-01

## 2022-04-17 RX ORDER — TAMSULOSIN HYDROCHLORIDE 0.4 MG/1
0.4 CAPSULE ORAL DAILY
Qty: 30 CAPSULE | Refills: 0 | Status: SHIPPED | OUTPATIENT
Start: 2022-04-17 | End: 2022-04-22 | Stop reason: SDUPTHER

## 2022-04-17 RX ORDER — KETOROLAC TROMETHAMINE 30 MG/ML
30 INJECTION, SOLUTION INTRAMUSCULAR; INTRAVENOUS ONCE
Status: DISCONTINUED | OUTPATIENT
Start: 2022-04-17 | End: 2022-04-22 | Stop reason: HOSPADM

## 2022-04-17 RX ORDER — MIDAZOLAM HYDROCHLORIDE 1 MG/ML
INJECTION INTRAMUSCULAR; INTRAVENOUS PRN
Status: DISCONTINUED | OUTPATIENT
Start: 2022-04-17 | End: 2022-04-17 | Stop reason: SDUPTHER

## 2022-04-17 RX ORDER — METHOCARBAMOL 500 MG/1
500 TABLET, FILM COATED ORAL 4 TIMES DAILY
Status: DISCONTINUED | OUTPATIENT
Start: 2022-04-17 | End: 2022-04-18

## 2022-04-17 RX ORDER — FENTANYL CITRATE 50 UG/ML
25 INJECTION, SOLUTION INTRAMUSCULAR; INTRAVENOUS EVERY 5 MIN PRN
Status: DISCONTINUED | OUTPATIENT
Start: 2022-04-17 | End: 2022-04-17 | Stop reason: HOSPADM

## 2022-04-17 RX ORDER — ACETAMINOPHEN 325 MG/1
650 TABLET ORAL ONCE
Status: COMPLETED | OUTPATIENT
Start: 2022-04-17 | End: 2022-04-17

## 2022-04-17 RX ORDER — SODIUM CHLORIDE, SODIUM LACTATE, POTASSIUM CHLORIDE, CALCIUM CHLORIDE 600; 310; 30; 20 MG/100ML; MG/100ML; MG/100ML; MG/100ML
INJECTION, SOLUTION INTRAVENOUS CONTINUOUS PRN
Status: DISCONTINUED | OUTPATIENT
Start: 2022-04-17 | End: 2022-04-17 | Stop reason: SDUPTHER

## 2022-04-17 RX ORDER — OXYBUTYNIN CHLORIDE 5 MG/1
5 TABLET ORAL 3 TIMES DAILY PRN
Qty: 20 TABLET | Refills: 0 | Status: SHIPPED | OUTPATIENT
Start: 2022-04-17 | End: 2022-04-22 | Stop reason: HOSPADM

## 2022-04-17 RX ORDER — MORPHINE SULFATE 4 MG/ML
4 INJECTION, SOLUTION INTRAMUSCULAR; INTRAVENOUS EVERY 4 HOURS PRN
Status: DISCONTINUED | OUTPATIENT
Start: 2022-04-17 | End: 2022-04-17

## 2022-04-17 RX ORDER — SODIUM CHLORIDE 9 MG/ML
INJECTION, SOLUTION INTRAVENOUS PRN
Status: DISCONTINUED | OUTPATIENT
Start: 2022-04-17 | End: 2022-04-17 | Stop reason: HOSPADM

## 2022-04-17 RX ORDER — FENTANYL CITRATE 50 UG/ML
50 INJECTION, SOLUTION INTRAMUSCULAR; INTRAVENOUS EVERY 5 MIN PRN
Status: DISCONTINUED | OUTPATIENT
Start: 2022-04-17 | End: 2022-04-17 | Stop reason: HOSPADM

## 2022-04-17 RX ORDER — KETAMINE HCL IN NACL, ISO-OSM 100MG/10ML
SYRINGE (ML) INJECTION PRN
Status: DISCONTINUED | OUTPATIENT
Start: 2022-04-17 | End: 2022-04-17 | Stop reason: SDUPTHER

## 2022-04-17 RX ORDER — PROPOFOL 10 MG/ML
INJECTION, EMULSION INTRAVENOUS CONTINUOUS PRN
Status: DISCONTINUED | OUTPATIENT
Start: 2022-04-17 | End: 2022-04-17 | Stop reason: SDUPTHER

## 2022-04-17 RX ORDER — CEFAZOLIN SODIUM 1 G/3ML
INJECTION, POWDER, FOR SOLUTION INTRAMUSCULAR; INTRAVENOUS PRN
Status: DISCONTINUED | OUTPATIENT
Start: 2022-04-17 | End: 2022-04-17 | Stop reason: SDUPTHER

## 2022-04-17 RX ORDER — MORPHINE SULFATE 2 MG/ML
2 INJECTION, SOLUTION INTRAMUSCULAR; INTRAVENOUS ONCE
Status: DISCONTINUED | OUTPATIENT
Start: 2022-04-17 | End: 2022-04-22

## 2022-04-17 RX ORDER — ACETAMINOPHEN 500 MG
1000 TABLET ORAL EVERY 8 HOURS SCHEDULED
Status: DISCONTINUED | OUTPATIENT
Start: 2022-04-17 | End: 2022-04-22 | Stop reason: HOSPADM

## 2022-04-17 RX ORDER — FENTANYL CITRATE 50 UG/ML
50 INJECTION, SOLUTION INTRAMUSCULAR; INTRAVENOUS
Status: DISCONTINUED | OUTPATIENT
Start: 2022-04-17 | End: 2022-04-17

## 2022-04-17 RX ORDER — SODIUM CHLORIDE 0.9 % (FLUSH) 0.9 %
5-40 SYRINGE (ML) INJECTION PRN
Status: DISCONTINUED | OUTPATIENT
Start: 2022-04-17 | End: 2022-04-17 | Stop reason: HOSPADM

## 2022-04-17 RX ORDER — MORPHINE SULFATE 4 MG/ML
4 INJECTION, SOLUTION INTRAMUSCULAR; INTRAVENOUS EVERY 6 HOURS PRN
Status: DISCONTINUED | OUTPATIENT
Start: 2022-04-17 | End: 2022-04-18

## 2022-04-17 RX ORDER — SODIUM CHLORIDE 0.9 % (FLUSH) 0.9 %
5-40 SYRINGE (ML) INJECTION EVERY 12 HOURS SCHEDULED
Status: DISCONTINUED | OUTPATIENT
Start: 2022-04-17 | End: 2022-04-17 | Stop reason: HOSPADM

## 2022-04-17 RX ORDER — FENTANYL CITRATE 50 UG/ML
INJECTION, SOLUTION INTRAMUSCULAR; INTRAVENOUS PRN
Status: DISCONTINUED | OUTPATIENT
Start: 2022-04-17 | End: 2022-04-17 | Stop reason: SDUPTHER

## 2022-04-17 RX ORDER — KETOROLAC TROMETHAMINE 30 MG/ML
30 INJECTION, SOLUTION INTRAMUSCULAR; INTRAVENOUS ONCE
Status: CANCELLED | OUTPATIENT
Start: 2022-04-17 | End: 2022-04-22

## 2022-04-17 RX ORDER — LIDOCAINE HYDROCHLORIDE 10 MG/ML
INJECTION, SOLUTION EPIDURAL; INFILTRATION; INTRACAUDAL; PERINEURAL PRN
Status: DISCONTINUED | OUTPATIENT
Start: 2022-04-17 | End: 2022-04-17 | Stop reason: SDUPTHER

## 2022-04-17 RX ORDER — ATROPA BELLADONNA AND OPIUM 16.2; 6 MG/1; MG/1
60 SUPPOSITORY RECTAL
Status: COMPLETED | OUTPATIENT
Start: 2022-04-17 | End: 2022-04-17

## 2022-04-17 RX ADMIN — IPRATROPIUM BROMIDE AND ALBUTEROL SULFATE 1 AMPULE: .5; 3 SOLUTION RESPIRATORY (INHALATION) at 19:57

## 2022-04-17 RX ADMIN — IPRATROPIUM BROMIDE AND ALBUTEROL SULFATE 1 AMPULE: .5; 3 SOLUTION RESPIRATORY (INHALATION) at 12:24

## 2022-04-17 RX ADMIN — ONDANSETRON 4 MG: 2 INJECTION INTRAMUSCULAR; INTRAVENOUS at 22:48

## 2022-04-17 RX ADMIN — IPRATROPIUM BROMIDE AND ALBUTEROL SULFATE 1 AMPULE: .5; 3 SOLUTION RESPIRATORY (INHALATION) at 15:44

## 2022-04-17 RX ADMIN — ONDANSETRON 4 MG: 2 INJECTION INTRAMUSCULAR; INTRAVENOUS at 10:26

## 2022-04-17 RX ADMIN — MONTELUKAST SODIUM 10 MG: 10 TABLET, FILM COATED ORAL at 19:38

## 2022-04-17 RX ADMIN — PHENAZOPYRIDINE HYDROCHLORIDE 200 MG: 100 TABLET ORAL at 19:38

## 2022-04-17 RX ADMIN — FENTANYL CITRATE 50 MCG: 50 INJECTION, SOLUTION INTRAMUSCULAR; INTRAVENOUS at 09:01

## 2022-04-17 RX ADMIN — BUDESONIDE AND FORMOTEROL FUMARATE DIHYDRATE 2 PUFF: 80; 4.5 AEROSOL RESPIRATORY (INHALATION) at 08:06

## 2022-04-17 RX ADMIN — Medication 10 MG: at 09:05

## 2022-04-17 RX ADMIN — PROPOFOL 200 MCG/KG/MIN: 10 INJECTION, EMULSION INTRAVENOUS at 09:01

## 2022-04-17 RX ADMIN — FENTANYL CITRATE 25 MCG: 50 INJECTION, SOLUTION INTRAMUSCULAR; INTRAVENOUS at 09:13

## 2022-04-17 RX ADMIN — Medication 10 MG: at 09:10

## 2022-04-17 RX ADMIN — ONDANSETRON 4 MG: 2 INJECTION INTRAMUSCULAR; INTRAVENOUS at 17:08

## 2022-04-17 RX ADMIN — MORPHINE SULFATE 2 MG: 4 INJECTION INTRAVENOUS at 07:32

## 2022-04-17 RX ADMIN — IPRATROPIUM BROMIDE AND ALBUTEROL SULFATE 1 AMPULE: .5; 3 SOLUTION RESPIRATORY (INHALATION) at 08:06

## 2022-04-17 RX ADMIN — HYOSCYAMINE SULFATE 250 MCG: 0.12 TABLET ORAL; SUBLINGUAL at 12:25

## 2022-04-17 RX ADMIN — FENTANYL CITRATE 50 MCG: 50 INJECTION, SOLUTION INTRAMUSCULAR; INTRAVENOUS at 13:39

## 2022-04-17 RX ADMIN — SERTRALINE 150 MG: 50 TABLET, FILM COATED ORAL at 19:38

## 2022-04-17 RX ADMIN — OXYBUTYNIN CHLORIDE 5 MG: 5 TABLET, EXTENDED RELEASE ORAL at 13:30

## 2022-04-17 RX ADMIN — FENTANYL CITRATE 50 MCG: 50 INJECTION, SOLUTION INTRAMUSCULAR; INTRAVENOUS at 09:45

## 2022-04-17 RX ADMIN — ANTACID TABLETS 500 MG: 500 TABLET, CHEWABLE ORAL at 22:46

## 2022-04-17 RX ADMIN — FENTANYL CITRATE 50 MCG: 50 INJECTION, SOLUTION INTRAMUSCULAR; INTRAVENOUS at 09:40

## 2022-04-17 RX ADMIN — MORPHINE SULFATE 2 MG: 4 INJECTION INTRAVENOUS at 00:47

## 2022-04-17 RX ADMIN — MIDAZOLAM HYDROCHLORIDE 2 MG: 1 INJECTION, SOLUTION INTRAMUSCULAR; INTRAVENOUS at 08:57

## 2022-04-17 RX ADMIN — FENTANYL CITRATE 50 MCG: 50 INJECTION, SOLUTION INTRAMUSCULAR; INTRAVENOUS at 11:33

## 2022-04-17 RX ADMIN — MORPHINE SULFATE 2 MG: 4 INJECTION INTRAVENOUS at 02:47

## 2022-04-17 RX ADMIN — ACETAMINOPHEN 1000 MG: 500 TABLET ORAL at 10:26

## 2022-04-17 RX ADMIN — NICOTINE POLACRILEX 2 MG: 2 GUM, CHEWING BUCCAL at 11:28

## 2022-04-17 RX ADMIN — FENTANYL CITRATE 50 MCG: 50 INJECTION, SOLUTION INTRAMUSCULAR; INTRAVENOUS at 15:59

## 2022-04-17 RX ADMIN — NICOTINE POLACRILEX 2 MG: 2 GUM, CHEWING BUCCAL at 23:04

## 2022-04-17 RX ADMIN — MORPHINE SULFATE 2 MG: 4 INJECTION INTRAVENOUS at 05:04

## 2022-04-17 RX ADMIN — DICYCLOMINE HYDROCHLORIDE 10 MG: 10 CAPSULE ORAL at 19:38

## 2022-04-17 RX ADMIN — METHOCARBAMOL TABLETS 500 MG: 500 TABLET, COATED ORAL at 16:14

## 2022-04-17 RX ADMIN — SODIUM CHLORIDE, PRESERVATIVE FREE 10 ML: 5 INJECTION INTRAVENOUS at 10:40

## 2022-04-17 RX ADMIN — PANTOPRAZOLE SODIUM 40 MG: 40 TABLET, DELAYED RELEASE ORAL at 16:14

## 2022-04-17 RX ADMIN — Medication 10 MG: at 09:20

## 2022-04-17 RX ADMIN — SODIUM CHLORIDE: 9 INJECTION, SOLUTION INTRAVENOUS at 16:07

## 2022-04-17 RX ADMIN — CEFAZOLIN 2000 MG: 330 INJECTION, POWDER, FOR SOLUTION INTRAMUSCULAR; INTRAVENOUS at 09:09

## 2022-04-17 RX ADMIN — TAMSULOSIN HYDROCHLORIDE 0.4 MG: 0.4 CAPSULE ORAL at 10:25

## 2022-04-17 RX ADMIN — Medication 10 MG: at 09:01

## 2022-04-17 RX ADMIN — METHOCARBAMOL TABLETS 500 MG: 500 TABLET, COATED ORAL at 19:38

## 2022-04-17 RX ADMIN — SODIUM CHLORIDE, POTASSIUM CHLORIDE, SODIUM LACTATE AND CALCIUM CHLORIDE: 600; 310; 30; 20 INJECTION, SOLUTION INTRAVENOUS at 08:56

## 2022-04-17 RX ADMIN — Medication 10 MG: at 09:15

## 2022-04-17 RX ADMIN — FENTANYL CITRATE 50 MCG: 50 INJECTION, SOLUTION INTRAMUSCULAR; INTRAVENOUS at 12:33

## 2022-04-17 RX ADMIN — DICYCLOMINE HYDROCHLORIDE 10 MG: 10 CAPSULE ORAL at 16:14

## 2022-04-17 RX ADMIN — METHOCARBAMOL TABLETS 500 MG: 500 TABLET, COATED ORAL at 13:30

## 2022-04-17 RX ADMIN — PHENAZOPYRIDINE HYDROCHLORIDE 200 MG: 100 TABLET ORAL at 13:31

## 2022-04-17 RX ADMIN — OXYCODONE 5 MG: 5 TABLET ORAL at 20:37

## 2022-04-17 RX ADMIN — FENTANYL CITRATE 25 MCG: 50 INJECTION, SOLUTION INTRAMUSCULAR; INTRAVENOUS at 09:06

## 2022-04-17 RX ADMIN — DICYCLOMINE HYDROCHLORIDE 10 MG: 10 CAPSULE ORAL at 12:03

## 2022-04-17 RX ADMIN — ENOXAPARIN SODIUM 40 MG: 100 INJECTION SUBCUTANEOUS at 10:25

## 2022-04-17 RX ADMIN — MORPHINE SULFATE 4 MG: 4 INJECTION INTRAVENOUS at 10:26

## 2022-04-17 RX ADMIN — NICOTINE POLACRILEX 2 MG: 2 GUM, CHEWING BUCCAL at 00:47

## 2022-04-17 RX ADMIN — BUDESONIDE AND FORMOTEROL FUMARATE DIHYDRATE 2 PUFF: 80; 4.5 AEROSOL RESPIRATORY (INHALATION) at 19:57

## 2022-04-17 RX ADMIN — ANTACID TABLETS 500 MG: 500 TABLET, CHEWABLE ORAL at 02:48

## 2022-04-17 RX ADMIN — FENTANYL CITRATE 50 MCG: 50 INJECTION, SOLUTION INTRAMUSCULAR; INTRAVENOUS at 17:08

## 2022-04-17 RX ADMIN — LAMOTRIGINE 200 MG: 100 TABLET ORAL at 19:38

## 2022-04-17 RX ADMIN — CETIRIZINE HYDROCHLORIDE 10 MG: 10 TABLET ORAL at 10:25

## 2022-04-17 RX ADMIN — MORPHINE SULFATE 4 MG: 4 INJECTION INTRAVENOUS at 19:28

## 2022-04-17 RX ADMIN — SODIUM CHLORIDE: 9 INJECTION, SOLUTION INTRAVENOUS at 10:48

## 2022-04-17 RX ADMIN — LIDOCAINE HYDROCHLORIDE 50 MG: 10 INJECTION, SOLUTION EPIDURAL; INFILTRATION; INTRACAUDAL; PERINEURAL at 09:01

## 2022-04-17 RX ADMIN — ACETAMINOPHEN 650 MG: 325 TABLET ORAL at 23:32

## 2022-04-17 RX ADMIN — DICYCLOMINE HYDROCHLORIDE 10 MG: 10 CAPSULE ORAL at 10:25

## 2022-04-17 RX ADMIN — PANTOPRAZOLE SODIUM 40 MG: 40 TABLET, DELAYED RELEASE ORAL at 10:25

## 2022-04-17 ASSESSMENT — PAIN - FUNCTIONAL ASSESSMENT
PAIN_FUNCTIONAL_ASSESSMENT: ACTIVITIES ARE NOT PREVENTED
PAIN_FUNCTIONAL_ASSESSMENT: PREVENTS OR INTERFERES WITH ALL ACTIVE AND SOME PASSIVE ACTIVITIES
PAIN_FUNCTIONAL_ASSESSMENT: ACTIVITIES ARE NOT PREVENTED
PAIN_FUNCTIONAL_ASSESSMENT: PREVENTS OR INTERFERES SOME ACTIVE ACTIVITIES AND ADLS
PAIN_FUNCTIONAL_ASSESSMENT: PREVENTS OR INTERFERES SOME ACTIVE ACTIVITIES AND ADLS
PAIN_FUNCTIONAL_ASSESSMENT: ACTIVITIES ARE NOT PREVENTED

## 2022-04-17 ASSESSMENT — PULMONARY FUNCTION TESTS
PIF_VALUE: 1
PIF_VALUE: 1
PIF_VALUE: 0
PIF_VALUE: 1
PIF_VALUE: 1
PIF_VALUE: 0
PIF_VALUE: 0
PIF_VALUE: 2
PIF_VALUE: 1
PIF_VALUE: 1
PIF_VALUE: 3
PIF_VALUE: 0
PIF_VALUE: 1
PIF_VALUE: 28
PIF_VALUE: 0
PIF_VALUE: 1
PIF_VALUE: 0
PIF_VALUE: 0
PIF_VALUE: 1
PIF_VALUE: 0
PIF_VALUE: 1
PIF_VALUE: 21
PIF_VALUE: 1
PIF_VALUE: 0
PIF_VALUE: 0
PIF_VALUE: 1
PIF_VALUE: 1

## 2022-04-17 ASSESSMENT — PAIN DESCRIPTION - ONSET
ONSET: ON-GOING

## 2022-04-17 ASSESSMENT — PAIN SCALES - GENERAL
PAINLEVEL_OUTOF10: 9
PAINLEVEL_OUTOF10: 8
PAINLEVEL_OUTOF10: 9
PAINLEVEL_OUTOF10: 10
PAINLEVEL_OUTOF10: 10
PAINLEVEL_OUTOF10: 7
PAINLEVEL_OUTOF10: 10
PAINLEVEL_OUTOF10: 7
PAINLEVEL_OUTOF10: 0
PAINLEVEL_OUTOF10: 8
PAINLEVEL_OUTOF10: 10
PAINLEVEL_OUTOF10: 0
PAINLEVEL_OUTOF10: 10
PAINLEVEL_OUTOF10: 9
PAINLEVEL_OUTOF10: 10
PAINLEVEL_OUTOF10: 0
PAINLEVEL_OUTOF10: 9
PAINLEVEL_OUTOF10: 7
PAINLEVEL_OUTOF10: 10
PAINLEVEL_OUTOF10: 9
PAINLEVEL_OUTOF10: 10
PAINLEVEL_OUTOF10: 0

## 2022-04-17 ASSESSMENT — PAIN DESCRIPTION - PROGRESSION
CLINICAL_PROGRESSION: GRADUALLY WORSENING
CLINICAL_PROGRESSION: RAPIDLY WORSENING
CLINICAL_PROGRESSION: GRADUALLY WORSENING
CLINICAL_PROGRESSION: NOT CHANGED
CLINICAL_PROGRESSION: GRADUALLY WORSENING
CLINICAL_PROGRESSION: RAPIDLY IMPROVING
CLINICAL_PROGRESSION: GRADUALLY WORSENING

## 2022-04-17 ASSESSMENT — PAIN DESCRIPTION - PAIN TYPE
TYPE: SURGICAL PAIN
TYPE: ACUTE PAIN
TYPE: SURGICAL PAIN

## 2022-04-17 ASSESSMENT — PAIN DESCRIPTION - LOCATION
LOCATION: ABDOMEN
LOCATION: FLANK
LOCATION: ABDOMEN

## 2022-04-17 ASSESSMENT — PAIN DESCRIPTION - DESCRIPTORS
DESCRIPTORS: SHARP;SHOOTING;STABBING
DESCRIPTORS: CRAMPING

## 2022-04-17 ASSESSMENT — PAIN SCALES - WONG BAKER
WONGBAKER_NUMERICALRESPONSE: 0
WONGBAKER_NUMERICALRESPONSE: 6
WONGBAKER_NUMERICALRESPONSE: 0
WONGBAKER_NUMERICALRESPONSE: 0
WONGBAKER_NUMERICALRESPONSE: 4
WONGBAKER_NUMERICALRESPONSE: 0
WONGBAKER_NUMERICALRESPONSE: 0
WONGBAKER_NUMERICALRESPONSE: 6

## 2022-04-17 ASSESSMENT — PAIN DESCRIPTION - ORIENTATION
ORIENTATION: LOWER
ORIENTATION: LOWER
ORIENTATION: LEFT
ORIENTATION: LOWER

## 2022-04-17 ASSESSMENT — PAIN DESCRIPTION - FREQUENCY
FREQUENCY: CONTINUOUS

## 2022-04-17 ASSESSMENT — COPD QUESTIONNAIRES: CAT_SEVERITY: NO INTERVAL CHANGE

## 2022-04-17 ASSESSMENT — ENCOUNTER SYMPTOMS
STRIDOR: 0
SHORTNESS OF BREATH: 0

## 2022-04-17 ASSESSMENT — LIFESTYLE VARIABLES: SMOKING_STATUS: 0

## 2022-04-17 NOTE — PROGRESS NOTES
Urology - Progress Note      Subjective: Patient continues to have uncontrolled pain even with IV narcotics. Denies F/C/CP/SOA/N/V. Voiding spontaneously. No documented fevers    Patient Vitals for the past 24 hrs:   BP Temp Temp src Pulse Resp SpO2 Weight   04/17/22 0806 -- -- -- -- 20 98 % --   04/17/22 0750 130/85 98.5 °F (36.9 °C) Oral 72 18 98 % --   04/16/22 1945 129/77 98.2 °F (36.8 °C) Oral 75 16 95 % --   04/16/22 1703 135/80 97.3 °F (36.3 °C) Oral 67 18 97 % --   04/16/22 1432 -- -- -- -- -- -- 218 lb (98.9 kg)   04/16/22 1116 (!) 135/90 97.9 °F (36.6 °C) Oral 87 16 97 % --       Intake/Output Summary (Last 24 hours) at 4/17/2022 0849  Last data filed at 4/16/2022 1340  Gross per 24 hour   Intake 1000 ml   Output --   Net 1000 ml       Recent Labs     04/16/22  1132   WBC 7.0   HGB 14.6   HCT 43.9   MCV 88.2        Recent Labs     04/16/22  1132      K 4.0      CO2 21   BUN 15   CREATININE 0.64       Recent Labs     04/16/22  1512   COLORU Yellow   PHUR 5.5   SPECGRAV 1.027   LEUKOCYTESUR NEGATIVE   UROBILINOGEN Normal   BILIRUBINUR NEGATIVE       Additional Lab/culture results: Urinalysis negative    Physical Exam:   NAD, AOx3  NLB, equal chest rise B/L  RRR, 2+ radial pulses  ABD S/ND/NT, severe left CVA tenderness  Warm extremities, no calf tenderness    Interval Imaging Findings: None     Assessment:  Guy Craft is a 46 y.o. female who presents with:    Intermittently obstructing left 7 mm UPJ stone  Intractable flank pain    Plan:   Keep NPO  Plan for OR for cystoscopy and left ureteral stent insertion  Covid negative  Can likely discharge after procedure, will need definitive procedure in 1 to 2 weeks  Pain control per primary    Rigoberto Blevins MD  PGY-5, [address] Department of Urology   8:49 AM 4/17/2022

## 2022-04-17 NOTE — PROGRESS NOTES
[address]  CDU / OBSERVATION ENCOUNTER  ATTENDING NOTE   Some improvement. Patient no longer writhing about but still requiring multiple analgesics and antiemetics.   Patient requiring inpatient stay due to difficulty with pain control    Desiree Barnes MD  Attending Emergency  Physician

## 2022-04-17 NOTE — ANESTHESIA POSTPROCEDURE EVALUATION
Department of Anesthesiology  Postprocedure Note    Patient: Keshav Stack  MRN: [id number]  [place]: 1980  Date of evaluation: 4/17/2022  Time:  12:11 PM     Procedure Summary     Date: 04/17/22 Room / Location: [address]    Anesthesia Start: 7168 Anesthesia Stop: 9281    Procedure: CYSTOSCOPY, URETERAL STENT INSERTION, RETROGRADE PYELOGRAM (Left ) Diagnosis: (KIDNEY STONE/PAIN)    Surgeons: Foreign Salazar MD Responsible Provider: Macario Collins MD    Anesthesia Type: MAC ASA Status: 3          Anesthesia Type: MAC    Alina Phase I:      Alina Phase II: Alina Score: 9    Last vitals: Reviewed and per EMR flowsheets.        Anesthesia Post Evaluation    Patient location during evaluation: PACU  Patient participation: complete - patient participated  Level of consciousness: awake  Pain score: 1  Airway patency: patent  Nausea & Vomiting: no nausea and no vomiting  Complications: no  Cardiovascular status: blood pressure returned to baseline and hemodynamically stable  Respiratory status: acceptable  Hydration status: euvolemic

## 2022-04-17 NOTE — PLAN OF CARE
Problem: Nutritional:  Goal: Ability to tolerate tube feedings without aspirating will improve  Description: Ability to tolerate tube feedings without aspirating will improve  Outcome: Ongoing  Goal: Consumption of food in small portions  Description: Consumption of food in small portions  Outcome: Ongoing  Goal: Consumption of liquid of appropriate consistency  Description: Consumption of liquid of appropriate consistency  Outcome: Ongoing     Problem: Respiratory:  Goal: Ability to maintain a clear airway will improve  Description: Ability to maintain a clear airway will improve  Outcome: Ongoing  Goal: Will remain free from infection  Description: Will remain free from infection  Outcome: Ongoing  Goal: Absence of aspiration  Description: Absence of aspiration  Outcome: Ongoing     Problem: Safety:  Goal: Ability to chew and swallow food without choking will improve  Description: Ability to chew and swallow food without choking will improve  Outcome: Ongoing  Goal: Ability to demonstrate good, daily oral hygiene techniques will improve  Description: Ability to demonstrate good, daily oral hygiene techniques will improve  Outcome: Ongoing  Goal: Maintenance of upright position during and after feeding  Description: Maintenance of upright position during and after feeding  Outcome: Ongoing     Problem: Pain:  Goal: Pain level will decrease  Description: Pain level will decrease  Outcome: Ongoing  Goal: Control of acute pain  Description: Control of acute pain  Outcome: Ongoing  Goal: Control of chronic pain  Description: Control of chronic pain  Outcome: Ongoing

## 2022-04-17 NOTE — H&P
[address]  CDU / OBSERVATION eNCOUnter  Resident Note     Pt Name: Aden Grier  MRN: [id number]  [place] 1980  Date of evaluation: 4/17/22  Patient's PCP is :  KATIE Gan - CNP    CHIEF COMPLAINT       Chief Complaint   Patient presents with    Nephrolithiasis     7 mm stone dx monday          HISTORY OF PRESENT ILLNESS    Aden Grier is a 46 y.o. female who presented to the emergency department concern for left-sided flank pain. Patient was previously seen and evaluated on 4/12/2022 had a CT abdomen pelvis without contrast that showed a 7 mm calculus at that time. Patient returns emergency room yesterday with some worsening pain, nausea, vomiting. Patient endorsed 10 out of 10 flank pain described as colicky, cramping. Denies any aggravating or relieving factors. Feels like his got some decreased urine output compared to baseline. Denies any fevers, chills. KUB showed a persistent presence of a 7 mm calculus in the renal pelvis. Renal ultrasound showed no signs of any hydronephrosis. Emergency department called with urology who recommended placing patient in the observation unit for potential stent this morning. Patient was in significant pain at bedside this morning. She is going to get some placement with urology with the next 30 to 60 minutes.     Location/Symptom: Flank pain  Timing/Onset: 1 week  Provocation: None  Quality: Crampy, colicky  Radiation: Groin  Severity: 10 out of 10  Timing/Duration: Persistent  Modifying Factors: Minimal improvement with current treatment regimen    REVIEW OF SYSTEMS     General ROS - No fevers, No malaise   Ophthalmic ROS - No discharge, No changes in vision  ENT ROS -  No sore throat, No rhinorrhea,   Respiratory ROS - no shortness of breath, no cough, no  wheezing  Cardiovascular ROS - No chest pain, no dyspnea on exertion  Gastrointestinal ROS - No abdominal pain, no nausea or vomiting, no change in bowel habits, no black or bloody stools  Genito-Urinary ROS - No dysuria, +voiding, or hematuria; +flank pain  Musculoskeletal ROS - No myalgias, No arthalgias  Neurological ROS - No headache, no dizziness/lightheadedness, No focal weakness, no loss of sensation  Dermatological ROS - No lesions, No rash     (PQRS) Advance directives on face sheet per hospital policy. No change unless specifically mentioned in chart    PAST MEDICAL HISTORY    has a past medical history of Anxiety, Arthritis, Asthma, Sadler's esophagus, Bipolar 1 disorder ([address] CAD (coronary artery disease), Chronic insomnia, COPD (chronic obstructive pulmonary disease) ([address] Depression, Diabetes mellitus ([address] Endometriosis, Esophagitis, GERD (gastroesophageal reflux disease), Headache(784.0), Herniated disc, cervical, Hiatal hernia, Incisional abscess, Kidney stones, MDRO (multiple drug resistant organisms) resistance, Pleurisy, Pneumonia, Seizures ([address] UTI (urinary tract infection), and Vision abnormalities. I have reviewed the past medical history with the patient and it is pertinent to this complaint. SURGICAL HISTORY      has a past surgical history that includes knee surgery (Left);  section (, ); Tonsillectomy; Knee arthroscopy (Left, 5/20/15); Cervical disc surgery; Upper gastrointestinal endoscopy (2016); Upper gastrointestinal endoscopy (2017); Upper gastrointestinal endoscopy (2017); pr esophagogastroduodenoscopy transoral diagnostic (N/A, 3/2/2017); laparoscopy; laparoscopy (N/A, 10/5/2017); Upper gastrointestinal endoscopy (N/A, 2018); Endoscopy, colon, diagnostic;  section (N/A, 2018); Upper gastrointestinal endoscopy (N/A, 10/1/2019); Colonoscopy (N/A, 10/1/2019); Colonoscopy (N/A, 2020); [place] tooth extraction; and Hysterectomy (N/A, 2020). I have reviewed and agree with Surgical History entered and it is pertinent to this complaint.      CURRENT MEDICATIONS acetaminophen (TYLENOL) tablet 1,000 mg, Q6H PRN  budesonide-formoterol (SYMBICORT) 80-4.5 MCG/ACT inhaler 2 puff, BID  dicyclomine (BENTYL) capsule 10 mg, 4x Daily  lamoTRIgine (LAMICTAL) tablet 200 mg, Daily  cetirizine (ZYRTEC) tablet 10 mg, Daily  montelukast (SINGULAIR) tablet 10 mg, Nightly  tamsulosin (FLOMAX) capsule 0.4 mg, Daily  sertraline (ZOLOFT) tablet 150 mg, Daily  0.9 % sodium chloride infusion, Continuous  sodium chloride flush 0.9 % injection 5-40 mL, 2 times per day  sodium chloride flush 0.9 % injection 5-40 mL, PRN  0.9 % sodium chloride infusion, PRN  enoxaparin (LOVENOX) injection 40 mg, Daily  ondansetron (ZOFRAN-ODT) disintegrating tablet 4 mg, Q8H PRN   Or  ondansetron (ZOFRAN) injection 4 mg, Q6H PRN  morphine injection 2 mg, Q2H PRN  ipratropium-albuterol (DUONEB) nebulizer solution 1 ampule, 4x daily  pantoprazole (PROTONIX) tablet 40 mg, BID AC  nicotine polacrilex (NICORETTE) gum 2 mg, Q2H PRN        All medication charted and reviewed. ALLERGIES     is allergic to nsaids, toradol [ketorolac tromethamine], and ultram [tramadol]. FAMILY HISTORY     She indicated that her mother is alive. She indicated that her father is alive. She indicated that the status of her brother is unknown.     family history includes Bipolar Disorder in her brother and mother; Breast Cancer in her mother; Cancer in her mother; Hypertension in her father and mother. The patient denies any pertinent family history. I have reviewed and agree with the family history entered. I have reviewed the Family History and it is not significant to the case    SOCIAL HISTORY      reports that she has been smoking cigarettes. She has a 10.50 pack-year smoking history. She has never used smokeless tobacco. She reports previous drug use. She reports that she does not drink alcohol. I have reviewed and agree with all Social.  There are no concerns for substance abuse/use.     PHYSICAL EXAM     INITIAL VITALS: weight is 218 lb (98.9 kg). Her oral temperature is 98.2 °F (36.8 °C). Her blood pressure is 129/77 and her pulse is 75. Her respiration is 16 and oxygen saturation is 95%. CONSTITUTIONAL: AOx4, no apparent distress, appears stated age    HEAD: normocephalic, atraumatic   EYES: PERRLA, EOMI    ENT: moist mucous membranes, uvula midline   NECK: supple, symmetric   BACK: symmetric   LUNGS: clear to auscultation bilaterally   CARDIOVASCULAR: regular rate and rhythm, no murmurs, rubs or gallops   ABDOMEN: soft, non-tender, non-distended with normal active bowel sounds   NEUROLOGIC:  MAEx4, no focal sensory or motor deficits   MUSCULOSKELETAL: no clubbing, cyanosis or edema   SKIN: no rash or wounds       DIFFERENTIAL DIAGNOSIS/MDM:   Abdominal Pain:  DDX: GERD, PUD, pancreatitis, cholecystitis, GB colic, cholangitis, Cnpb-Vzms-Vwieik, ACS/ MI, pneumonia, SBO, DKA, AAA, mesenteric ischemia, perforated viscous, acute gastroenteritis, NSAP, pyelonephritis, kidney stone, appendicitis, hernia, D-TICS, testicular torsion, ectopic, ovarian torsion, ovarian cyst, PID, Mittelschmerz, period/ fibroid, UTI, constipation, epididymitis/ orchitis  Ransons criteria: WBC>16, age >49, glucose>200, AST>80, LDH>350  Evaluate for: EtOH abuse, ACS risk factors, point tenderness, rebound, guardingm Hearn sign, GB US (stone, sono Hearn, wall thick>3mm, CBD>6mm)    Back Pain:  DDX: PE, vertebral fracture, epidural abscess  Lower: cauda equina, herniated disc   Mid: AAA rupture, pyelonephritis, kidney stone, pancreatitis, PUD  Upper: pneumothorax, aortic dissection, PE, nonspecific      DIAGNOSTIC RESULTS   RADIOLOGY:   I directly visualized the following  images and reviewed the radiologist interpretations:    XR ABDOMEN (KUB) (SINGLE AP VIEW)    Result Date: 4/16/2022  EXAMINATION: ONE SUPINE XRAY VIEW(S) OF THE ABDOMEN 4/16/2022 11:50 am COMPARISON: 12/30/2021 and CT abdomen/pelvis 04/12/2022.  HISTORY: ORDERING SYSTEM PROVIDED HISTORY: assess for left sided kidney stone TECHNOLOGIST PROVIDED HISTORY: assess for left sided kidney stone FINDINGS: Very small portion of the inferior aspect of the pelvis has not been included on the study. There is degradation of image quality related to patient body habitus. There is suspicion of persistent visualization of 7 mm calculus centered in the region of the left renal pelvis. No other radiopaque renal/ureteral calculi are identified. Bowel gas pattern is nonspecific. Visualized portions of the lung bases are clear. Findings suggestive of persistent presence of 7 mm calculus in the region of the left renal pelvis as described above. US RENAL COMPLETE    Result Date: 4/16/2022  EXAMINATION: RETROPERITONEAL ULTRASOUND OF THE KIDNEYS AND URINARY BLADDER 4/16/2022 COMPARISON: CT abdomen pelvis from 04/12/2022 HISTORY: ORDERING SYSTEM PROVIDED HISTORY: concern for left sided kidney stone TECHNOLOGIST PROVIDED HISTORY: concern for left sided kidney stone 81-year-old female with suspected left-sided kidney stone FINDINGS: Kidneys: Exam is limited due to patient's body habitus and bowel gas. Right kidney measures 9.7 x 4.5 x 4.5 cm. Right renal cortical thickness measures 1.2 cm. Left kidney measures 11.5 x 5.7 x 4.4 cm. Left renal cortical thickness measures 1.9 cm. No hydronephrosis or perinephric fluid. No echogenic foci with posterior acoustic shadowing to suggest renal calculi. Gross preservation of the corticomedullary differentiation. Bladder: Urinary bladder grossly unremarkable in appearance. Nonvisualization of ureteral jets. Urinary bladder measures 5.6 x 4.7 x 3.0 cm for a bladder volume of 41.4 mL. 1. Nonvisualization of ureteral jets. 2. Limited unremarkable renal ultrasound. No hydronephrosis. LABS:  I have reviewed and interpreted all available lab results.   Labs Reviewed   BASIC METABOLIC PANEL - Abnormal; Notable for the following components:       Result Value Glucose 115 (*)     All other components within normal limits   COVID-19, RAPID   CBC WITH AUTO DIFFERENTIAL   URINALYSIS WITH REFLEX TO CULTURE       CDU IMPRESSION / PLAN      Mackenzie Farr is a 46 y.o. female who presents with left renal calculi and severe left flank pain    · Urology following appreciate recommendations  · Patient to OR this morning for cystoscopy with ureteral stent insertion  · Continue to monitor pain control  · Continue home medications and pain control  · Monitor vitals, labs, and imaging  · DISPO: pending consults and clinical improvement    CONSULTS:    IP CONSULT TO UROLOGY    PROCEDURES:  Not indicated       PATIENT REFERRED TO:    No follow-up provider specified. --  Ely Wyatt DO   Emergency Medicine Resident     This dictation was generated by voice recognition computer software. Although all attempts are made to edit the dictation for accuracy, there may be errors in the transcription that are not intended.

## 2022-04-17 NOTE — PLAN OF CARE
Urology     Patient underwent stent placement this morning. From our standpoint, she is OK to discharge once recovered later today if pain is controlled. We would recommend discharge with oxybutynin 5 mg TID prn for bladder spasms (20 pills), Flomax 0.4 mg daily (30d Rx), and a small amount of pain medication. She needs to follow-up with Dr. Danielle Blackwell in the office next week (call for appt, instructions in DC paperwork). Please call with questions.     Balaji Davis MD  4/17/22 10:37 AM

## 2022-04-17 NOTE — ANESTHESIA PRE PROCEDURE
3 days, then 2 tabs X 3 days, then 1 tab X 3 days  Patient not taking: Reported on 4/16/2022 1/20/22   KATIE Cole CNP   acetaminophen (TYLENOL) 500 MG tablet Take 2 tablets by mouth every 6 hours as needed for Pain 1/15/22   Jose Alberto Godwin, DO   Misc. Devices (STRAINER/STAINLESS STEEL/2.5\") MISC 1 each by Does not apply route as needed (when you urinate)  Patient not taking: Reported on 4/16/2022 1/15/22   Jann Robertson,    pregabalin (LYRICA) 100 MG capsule Take 1 capsule by mouth 3 times daily for 30 days. 1/13/22 2/12/22  Milagro Ya MD   Methocarbamol (ROBAXIN PO) Take by mouth    Historical Provider, MD   sertraline (ZOLOFT) 100 MG tablet Take 1.5 tablets by mouth daily 11/1/21   Historical Provider, MD   lamoTRIgine (LAMICTAL) 200 MG tablet Take 1 tablet by mouth daily 11/1/21   Historical Provider, MD   ONETOUCH VERIO strip USE TO TEST BLOOD SUGAR TWICE A DAY 11/1/21   KATIE Cole CNP   dicyclomine (BENTYL) 10 MG capsule TAKE 1 CAPSULE BY MOUTH 4 TIMES DAILY 8/27/21   KATIE Cole CNP   loratadine (CLARITIN) 10 MG tablet TAKE 1 TABLET BY MOUTH DAILY 7/6/21   KATIE Cole CNP   glucose monitoring kit (FREESTYLE) monitoring kit Use as directed. BRAND OF CHOICE INSURANCE ALLOWS. 5/27/21   KATIE Cole CNP   Alcohol Swabs ( STERILE ALCOHOL PREP) PADS USE AS DIRECTED 5/10/21   KATIE Cole CNP   fluticasone (FLONASE) 50 MCG/ACT nasal spray 1 spray by Each Nostril route daily 4/1/21 5/1/21  KATIE Cole CNP   vitamin D3 (CHOLECALCIFEROL) 25 MCG (1000 UT) TABS tablet TAKE 1 TABLET BY MOUTH DAILY 8/27/20   KATIE Cole CNP   Masks (CLEVER CHOICE FACE MASK) MISC USE NEW FACE MASK EVERYDAY WHEN PATIENT GO OUTSIDE OF HOME DUE TO COVID PANDEMIC 7/6/20   Alan Bernal, KATIE - CNP       Current medications:    No current facility-administered medications for this visit.      No current outpatient medications on file.      Facility-Administered Medications Ordered in Other Visits   Medication Dose Route Frequency Provider Last Rate Last Admin    morphine injection 4 mg  4 mg IntraVENous Q4H PRN Jennetta Poet, DO        morphine (PF) injection 2 mg  2 mg IntraVENous Once Jennetta Poet, DO        morphine injection 4 mg  4 mg IntraVENous Q4H PRN Jennetta Poet, DO        acetaminophen (TYLENOL) tablet 1,000 mg  1,000 mg Oral Q6H PRN Tiffanie Smith MD   1,000 mg at 04/16/22 1630    budesonide-formoterol (SYMBICORT) 80-4.5 MCG/ACT inhaler 2 puff  2 puff Inhalation BID Tiffanie Smith MD   2 puff at 04/17/22 0806    dicyclomine (BENTYL) capsule 10 mg  10 mg Oral 4x Daily Tiffanie Smith MD   10 mg at 04/16/22 1639    lamoTRIgine (LAMICTAL) tablet 200 mg  200 mg Oral Daily Tiffanie Smith MD   200 mg at 04/16/22 2036    cetirizine (ZYRTEC) tablet 10 mg  10 mg Oral Daily Tiffanie Smith MD   10 mg at 04/16/22 1639    montelukast (SINGULAIR) tablet 10 mg  10 mg Oral Nightly Tiffanie Smith MD   10 mg at 04/16/22 2036    tamsulosin (FLOMAX) capsule 0.4 mg  0.4 mg Oral Daily Tiffanie Smith MD   0.4 mg at 04/16/22 1639    sertraline (ZOLOFT) tablet 150 mg  150 mg Oral Daily Tiffanie Smith MD   150 mg at 04/16/22 2035    0.9 % sodium chloride infusion   IntraVENous Continuous Tiffanie Smith  mL/hr at 04/16/22 1910 New Bag at 04/16/22 1910    sodium chloride flush 0.9 % injection 5-40 mL  5-40 mL IntraVENous 2 times per day Tiffanie Smith MD   10 mL at 04/16/22 2036    sodium chloride flush 0.9 % injection 5-40 mL  5-40 mL IntraVENous PRN Tiffanie Smith MD        0.9 % sodium chloride infusion   IntraVENous PRN Tiffanie Smith MD        enoxaparin (LOVENOX) injection 40 mg  40 mg SubCUTAneous Daily Tiffanie Smith MD   40 mg at 04/16/22 1640    ondansetron (ZOFRAN-ODT) disintegrating tablet 4 mg  4 mg Oral Q8H PRN Tiffanie Smith MD Or    ondansetron (ZOFRAN) injection 4 mg  4 mg IntraVENous Q6H PRN Steven Sánchez MD        ipratropium-albuterol (DUONEB) nebulizer solution 1 ampule  1 ampule Inhalation 4x daily Steven Sánchez MD   1 ampule at 22 0806    pantoprazole (PROTONIX) tablet 40 mg  40 mg Oral BID AC Soni Xavier MD        nicotine polacrilex (NICORETTE) gum 2 mg  2 mg Oral Q2H PRN Soni Xavier MD   2 mg at 22 0047       Allergies:     Allergies   Allergen Reactions    Nsaids      Irritates her Barrets esophogus    Toradol [Ketorolac Tromethamine]     Ultram [Tramadol] Nausea Only     Gastric upset       Problem List:    Patient Active Problem List   Diagnosis Code    Asthma J45.909    GERD K21.9    Iron deficiency anemia D50.9    Cervical disc herniation M50.20    Smoker F17.200    Sadler's esophagus without dysplasia K22.70    Hiatal hernia K44.9    COPD with asthma ([address] J44.9    Chronic constipation K59.09    Cervicogenic headache G44.86    Hx of IUFD (G2) O09.299    History of gestational hypertension Z87.59    Seizure ([address] R56.9    Hx of  x2 (G3, G4) Z98.891    Arthritis M19.90    Cervical radiculopathy M54.12    Migraine without aura G43.009    Spinal stenosis in cervical region M48.02    Prediabetes R73.03    Chronic pelvic pain in female R10.2, G89.29    Enlarged uterus N85.2    Endometriosis N80.9    Family history of breast cancer Z80.3    S/P cervical spinal fusion Z98.1    Bipolar affective disorder, currently depressed, moderate ([address] F31.32    Insomnia G47.00    Ulnar neuropathy G56.20    Major depressive disorder, recurrent episode ([address] F33.9    Sciatica M54.30    Moderate persistent asthma without complication H71.49    Lipoma of torso D17.1    History of cervical discectomy Z98.890    Chronic neck pain with abnormal neurologic examination M54.2, G89.29    Abnormal weight gain  R63.5    Class 2 drug-induced obesity with serious comorbidity and body mass index (BMI) of 37.0 to 37.9 in adult E66.1, Z68.37    Pelvic peritoneal endometriosis N80.3    RALH with BS and cystoscopy 2020 Z90.710    Post-op pain G89.18    Wound infection/hemorrhage, obstetric surgical, postpartum condition O90.9    Postoperative visit Z48.89    Postoperative abdominal pain R10.9, G89.18    Diverticulitis K57.92    Diverticulitis of colon K57.32    Pelvic abscess in female N73.9    Pneumonia J18.9    Right otitis media H66.91    Functional diarrhea K59.1    Kidney stones N20.0    Cervical spondylosis without myelopathy M47.812    Cervical myelopathy (HCC) G95.9    Nephrolithiasis N20.0       Past Medical History:        Diagnosis Date    Anxiety     Arthritis     OA    Asthma     Sadler's esophagus     LONG SEGMENT    Bipolar 1 disorder (HCC)     CAD (coronary artery disease)     Chronic insomnia     [facility] disease) [facility]     Depression     and bipolar    Diabetes mellitus ([facility])     prediabetic    Endometriosis 3/9/2020    Esophagitis     severe    GERD (gastroesophageal reflux disease)     Headache(784.0)     Herniated disc, cervical     Hiatal hernia     Incisional abscess 1/15/2019    Kidney stones     MDRO (multiple drug resistant organisms) resistance     mrsa    Pleurisy     hx of \"years ago\"    Pneumonia     past penumonia    Seizures ([facility]     2016 last seizure-focal seizure    UTI (urinary tract infection)     Vision abnormalities     wears glasses       Past Surgical History:        Procedure Laterality Date    CERVICAL DISC SURGERY      x2     SECTION  , 2018    x 2     SECTION N/A 2018     SECTION performed by Calvin Espinosa MD at [address] [place] [address] L&D OR    COLONOSCOPY N/A 10/1/2019    COLONOSCOPY DIAGNOSTIC ABORTED performed by Usha Panchal MD at [address] [facility] N/A 2020    COLONOSCOPY WITH BIOPSY performed by Usha Panchal MD at [address] [place] [address] ENDO    ENDOSCOPY, COLON, DIAGNOSTIC      HYSTERECTOMY N/A 7/8/2020    HYSTERECTOMY ABDOMINAL LAPAROSCOPIC ROBOTIC XI W/ CYSTOSCOPY performed by Lana Burns MD at [address] ARTHROSCOPY Left 5/20/15    KNEE SURGERY Left     x3    LAPAROSCOPY      dr Janice Tidwell N/A 10/5/2017    LAPAROSCOPIC REMOVAL INTRA ABDOMINAL LIPOMA LOWER RIGHT performed by Sg Echeverria DO at [address] [facility] ESOPHAGOGASTRODUODENOSCOPY TRANSORAL DIAGNOSTIC N/A 3/2/2017    EGD ESOPHAGOGASTRODUODENOSCOPY  IP 2055 performed by Ana Laura Eldridge MD at [facility]  08/16/2016    severe esophagitis    UPPER GASTROINTESTINAL ENDOSCOPY  01/19/2017    barretts; hiatus hernia; gastritis    UPPER GASTROINTESTINAL ENDOSCOPY  03/02/2017    long seg mullins's with linear erosions, esophagitis, small hiatal hernia    UPPER GASTROINTESTINAL ENDOSCOPY N/A 1/30/2018    MULLINS'S    UPPER GASTROINTESTINAL ENDOSCOPY N/A 10/1/2019    EGD BIOPSY performed by Cecilia Johnston MD at [facility] History:    Social History     Tobacco Use    Smoking status: Current Every Day Smoker     Packs/day: 0.50     Years: 21.00     Pack years: 10.50     Types: Cigarettes    Smokeless tobacco: Never Used   Substance Use Topics    Alcohol use: No                                Ready to quit: Not Answered  Counseling given: Not Answered      Vital Signs (Current): There were no vitals filed for this visit.                                            BP Readings from Last 3 Encounters:   04/17/22 130/85   04/12/22 (!) 164/101   02/09/22 132/78       NPO Status:                                                                                 BMI:   Wt Readings from Last 3 Encounters:   04/16/22 218 lb (98.9 kg)   02/09/22 218 lb (98.9 kg)   01/15/22 220 lb (99.8 kg)     There is no height or weight on file to calculate BMI.    CBC:   Lab Results   Component Value Date    WBC 7.0 04/16/2022    RBC 4.98 04/16/2022    RBC 4.49 03/25/2012    HGB 14.6 04/16/2022    HCT 43.9 04/16/2022    MCV 88.2 04/16/2022    RDW 13.5 04/16/2022     04/16/2022     03/25/2012     LR    CMP:   Lab Results   Component Value Date     04/16/2022    K 4.0 04/16/2022     04/16/2022    CO2 21 04/16/2022    BUN 15 04/16/2022    CREATININE 0.64 04/16/2022    GFRAA >60 04/16/2022    LABGLOM >60 04/16/2022    GLUCOSE 115 04/16/2022    GLUCOSE 106 03/25/2012    PROT 6.3 01/15/2022    CALCIUM 9.0 04/16/2022    BILITOT 0.17 01/15/2022    ALKPHOS 64 01/15/2022    AST 13 01/15/2022    ALT 13 01/15/2022       POC Tests:   No results for input(s): POCGLU, POCNA, POCK, POCCL, POCBUN, POCHEMO, POCHCT in the last 72 hours.     Coags:   Lab Results   Component Value Date    PROTIME 9.8 01/13/2019    INR 0.9 01/13/2019    APTT 22.7 01/13/2019       HCG (If Applicable):   Lab Results   Component Value Date    PREGTESTUR NEGATIVE 04/12/2022    PREGTESTUR NEGATIVE 04/12/2022    HCG NEGATIVE 07/08/2020    HCGQUANT 61,609 (H) 06/11/2018        ABGs:   Lab Results   Component Value Date    PHART 7.447 05/21/2017    PO2ART 64.7 05/21/2017    WBZ1RSS 35.0 05/21/2017    IPR2IGE 24.2 05/21/2017    E4XKIJZB 89.6 05/21/2017        Type & Screen (If Applicable):  No results found for: LABABO, [address]    Drug/Infectious Status (If Applicable):  Lab Results   Component Value Date    HEPCAB NONREACTIVE 03/22/2016       COVID-19 Screening (If Applicable):   Lab Results   Component Value Date    COVID19 Not Detected 04/16/2022    COVID19 Not Detected 10/03/2021         Anesthesia Evaluation  Patient summary reviewed no history of anesthetic complications:   Airway: Mallampati: II  TM distance: >3 FB   Neck ROM: full  Mouth opening: > = 3 FB Dental: normal exam         Pulmonary:normal exam  breath sounds clear to auscultation  (+) pneumonia: no interval change,  COPD: no interval change,  asthma: allergic

## 2022-04-17 NOTE — PROGRESS NOTES
BRONCHOSPASM/BRONCHOCONSTRICTION     [x]         IMPROVE AERATION/BREATH SOUNDS  [x]   ADMINISTER BRONCHODILATOR THERAPY AS APPROPRIATE  [x]   ASSESS BREATH SOUNDSL  [x]   PATIENT EDUCATION AS NEEDED    PROVIDE ADEQUATE OXYGENATION WITH ACCEPTABLE SP02/ABG'S    [x]  IDENTIFY APPROPRIATE OXYGEN THERAPY  [x]   MONITOR SP02/ABG'S AS NEEDED   []   PATIENT EDUCATION AS NEEDED

## 2022-04-17 NOTE — OP NOTE
Operative Note      Patient: Kalpesh Tovar  YOB: 1980  MRN: [id number]    Date of Procedure: 4/17/2022    Pre-Op Diagnosis: KIDNEY STONE/severe left flank PAIN    Post-Op Diagnosis: Left renal calculi with left flank pain       Procedure(s):  CYSTOSCOPY URETERAL STENT INSERTION    Surgeon(s):  Hilda Sparks MD    Assistant:   * No surgical staff found *    Anesthesia: Choice    Estimated Blood Loss (mL): Minimal    Complications: None    Specimens:   * No specimens in log *    Implants:  * No implants in log *      Drains: * No LDAs found *    Findings: Indications: 59-year-old female second visit to the emergency room with intractable flank pain CT scan demonstrating a left renal calculus 7 mm. Because of the patient persistent flank pain patient scheduled for cystoscopy pyelogram and left stent placement    Detailed Description of Procedure:   Patient was brought to the operating room positioned in dorsolithotomy proper patient identification procedure identification prepping and draping. We entered the bladder with the cystoscope examination the bladder was carried out no tumors ulcers or stones identified retrograde pyelography using a #8 cone-tip ureteral catheter ureter pelvicalyceal structures narrowing at the ureteropelvic junction documented. Identified Glidewire inserted in the ureter followed by insertion of a double-J stent positioned in the renal pelvis distal end of the bladder.     Procedure terminated patient to recovery room in stable condition    Electronically signed by Hilda Sparks MD on 4/17/2022 at 8:59 AM

## 2022-04-17 NOTE — PLAN OF CARE
Problem: Nutritional:  Goal: Ability to tolerate tube feedings without aspirating will improve  Description: Ability to tolerate tube feedings without aspirating will improve  4/17/2022 1934 by Kain Lerma RN  Outcome: Ongoing  4/17/2022 1143 by Kain Lerma RN  Outcome: Ongoing  Goal: Consumption of food in small portions  Description: Consumption of food in small portions  4/17/2022 1934 by Kain Lerma RN  Outcome: Ongoing  4/17/2022 1143 by Kain Lerma RN  Outcome: Ongoing  Goal: Consumption of liquid of appropriate consistency  Description: Consumption of liquid of appropriate consistency  4/17/2022 1934 by Kain Lerma RN  Outcome: Ongoing  4/17/2022 1143 by Kain Lerma RN  Outcome: Ongoing     Problem: Respiratory:  Goal: Ability to maintain a clear airway will improve  Description: Ability to maintain a clear airway will improve  4/17/2022 1934 by Kain Lerma RN  Outcome: Ongoing  4/17/2022 1143 by Kain Lerma RN  Outcome: Ongoing  Goal: Will remain free from infection  Description: Will remain free from infection  4/17/2022 1934 by Kain Lerma RN  Outcome: Ongoing  4/17/2022 1143 by Kain Lerma RN  Outcome: Ongoing  Goal: Absence of aspiration  Description: Absence of aspiration  4/17/2022 1934 by Kain Lerma RN  Outcome: Ongoing  4/17/2022 1143 by Kain Lerma RN  Outcome: Ongoing     Problem: Safety:  Goal: Ability to chew and swallow food without choking will improve  Description: Ability to chew and swallow food without choking will improve  4/17/2022 1934 by Kain Lerma RN  Outcome: Ongoing  4/17/2022 1143 by Kain Lerma RN  Outcome: Ongoing  Goal: Ability to demonstrate good, daily oral hygiene techniques will improve  Description: Ability to demonstrate good, daily oral hygiene techniques will improve  4/17/2022 1934 by Kain Lerma RN  Outcome: Ongoing  4/17/2022 1143 by Kain Lerma RN  Outcome: Ongoing  Goal: Maintenance of upright position during and after feeding  Description: Maintenance of upright position during and after feeding  4/17/2022 1934 by Lizett Boyer RN  Outcome: Ongoing  4/17/2022 1143 by Lizett Boyer RN  Outcome: Ongoing     Problem: Pain:  Goal: Pain level will decrease  Description: Pain level will decrease  4/17/2022 1934 by Lizett Boyer RN  Outcome: Ongoing  4/17/2022 1143 by Lizett Boyer RN  Outcome: Ongoing  Goal: Control of acute pain  Description: Control of acute pain  4/17/2022 1934 by Lizett Boyer RN  Outcome: Ongoing  4/17/2022 1143 by Lizett Boyer RN  Outcome: Ongoing  Goal: Control of chronic pain  Description: Control of chronic pain  4/17/2022 1934 by Lizett Boyer RN  Outcome: Ongoing  4/17/2022 1143 by Lizett Boyer RN  Outcome: Ongoing

## 2022-04-17 NOTE — PROGRESS NOTES
[facility] [employer]  CDU / OBSERVATION ENCOUNTER  ATTENDING NOTE       I performed a history and physical examination of the patient and discussed management with the resident or midlevel provider. I reviewed the resident or midlevel provider's note and agree with the documented findings and plan of care. Any areas of disagreement are noted on the chart. I was personally present for the key portions of any procedures. I have documented in the chart those procedures where I was not present during the key portions. I have reviewed the nurses notes. I agree with the chief complaint, past medical history, past surgical history, allergies, medications, social and family history as documented unless otherwise noted below. The Family history, social history, and ROS are effectively unchanged since admission unless noted elsewhere in the chart. Patient with left-sided flank pain. Patient with recurrent complaints. CT abdomen showed mildly prominent extrarenal pelvis with calculi. Patient had renal ultrasound showing persistence of stone. Patient not getting appropriately relief with oral medications. Patient mated for IV analgesia and neurology evaluation. Patient with appropriate renal function. Patient with normal white count and creatinine. Patient seen by urology. Patient for straining of urine. Symptomatic relief. Flomax. Possible stent basement for persistent symptoms. Aggressive supportive therapy. Patient with significant discomfort on arriving back to floor from OR. Patient required multiple rounds of analgesics. Patient required antispasmodics and urology was made aware of care. Patient with ultrasound pelvis as well to rule out ovarian torsion. Patient did improve over the course of the afternoon. Technically difficult ultrasound study without clear identification of ovaries.   Patient seems to be responding to treatment    Abdi Garcia MD  Attending Emergency Physician

## 2022-04-18 PROBLEM — R10.9 INTRACTABLE ABDOMINAL PAIN: Status: ACTIVE | Noted: 2022-04-18

## 2022-04-18 PROCEDURE — 6370000000 HC RX 637 (ALT 250 FOR IP): Performed by: UROLOGY

## 2022-04-18 PROCEDURE — 6360000002 HC RX W HCPCS: Performed by: STUDENT IN AN ORGANIZED HEALTH CARE EDUCATION/TRAINING PROGRAM

## 2022-04-18 PROCEDURE — 94760 N-INVAS EAR/PLS OXIMETRY 1: CPT

## 2022-04-18 PROCEDURE — 6360000002 HC RX W HCPCS: Performed by: EMERGENCY MEDICINE

## 2022-04-18 PROCEDURE — 6370000000 HC RX 637 (ALT 250 FOR IP): Performed by: STUDENT IN AN ORGANIZED HEALTH CARE EDUCATION/TRAINING PROGRAM

## 2022-04-18 PROCEDURE — 1200000000 HC SEMI PRIVATE

## 2022-04-18 PROCEDURE — 94640 AIRWAY INHALATION TREATMENT: CPT

## 2022-04-18 PROCEDURE — 2580000003 HC RX 258: Performed by: UROLOGY

## 2022-04-18 PROCEDURE — 6370000000 HC RX 637 (ALT 250 FOR IP): Performed by: HEALTH CARE PROVIDER

## 2022-04-18 PROCEDURE — 2500000003 HC RX 250 WO HCPCS: Performed by: STUDENT IN AN ORGANIZED HEALTH CARE EDUCATION/TRAINING PROGRAM

## 2022-04-18 PROCEDURE — 6360000002 HC RX W HCPCS: Performed by: UROLOGY

## 2022-04-18 RX ORDER — OXYCODONE HYDROCHLORIDE 5 MG/1
10 TABLET ORAL EVERY 6 HOURS PRN
Status: DISCONTINUED | OUTPATIENT
Start: 2022-04-18 | End: 2022-04-21

## 2022-04-18 RX ORDER — METHOCARBAMOL 750 MG/1
750 TABLET, FILM COATED ORAL 4 TIMES DAILY
Status: DISCONTINUED | OUTPATIENT
Start: 2022-04-18 | End: 2022-04-22 | Stop reason: HOSPADM

## 2022-04-18 RX ORDER — OXYCODONE HYDROCHLORIDE 5 MG/1
5 TABLET ORAL EVERY 6 HOURS PRN
Status: DISCONTINUED | OUTPATIENT
Start: 2022-04-18 | End: 2022-04-21

## 2022-04-18 RX ORDER — ATROPA BELLADONNA AND OPIUM 16.2; 6 MG/1; MG/1
60 SUPPOSITORY RECTAL EVERY 8 HOURS
Status: DISPENSED | OUTPATIENT
Start: 2022-04-18 | End: 2022-04-19

## 2022-04-18 RX ORDER — MORPHINE SULFATE 4 MG/ML
4 INJECTION, SOLUTION INTRAMUSCULAR; INTRAVENOUS
Status: DISCONTINUED | OUTPATIENT
Start: 2022-04-18 | End: 2022-04-22

## 2022-04-18 RX ORDER — ATROPA BELLADONNA AND OPIUM 16.2; 6 MG/1; MG/1
60 SUPPOSITORY RECTAL ONCE
Status: COMPLETED | OUTPATIENT
Start: 2022-04-18 | End: 2022-04-18

## 2022-04-18 RX ORDER — OXYBUTYNIN CHLORIDE 5 MG/1
5 TABLET ORAL 3 TIMES DAILY
Status: DISCONTINUED | OUTPATIENT
Start: 2022-04-18 | End: 2022-04-20

## 2022-04-18 RX ADMIN — LAMOTRIGINE 200 MG: 100 TABLET ORAL at 20:15

## 2022-04-18 RX ADMIN — ACETAMINOPHEN 1000 MG: 500 TABLET ORAL at 16:49

## 2022-04-18 RX ADMIN — OXYCODONE 10 MG: 5 TABLET ORAL at 18:21

## 2022-04-18 RX ADMIN — PANTOPRAZOLE SODIUM 40 MG: 40 TABLET, DELAYED RELEASE ORAL at 11:13

## 2022-04-18 RX ADMIN — BUDESONIDE AND FORMOTEROL FUMARATE DIHYDRATE 2 PUFF: 80; 4.5 AEROSOL RESPIRATORY (INHALATION) at 20:14

## 2022-04-18 RX ADMIN — IPRATROPIUM BROMIDE AND ALBUTEROL SULFATE 1 AMPULE: .5; 3 SOLUTION RESPIRATORY (INHALATION) at 11:17

## 2022-04-18 RX ADMIN — PHENAZOPYRIDINE HYDROCHLORIDE 200 MG: 100 TABLET ORAL at 20:15

## 2022-04-18 RX ADMIN — SERTRALINE 150 MG: 50 TABLET, FILM COATED ORAL at 20:15

## 2022-04-18 RX ADMIN — SODIUM CHLORIDE, PRESERVATIVE FREE 10 ML: 5 INJECTION INTRAVENOUS at 08:20

## 2022-04-18 RX ADMIN — DICYCLOMINE HYDROCHLORIDE 10 MG: 10 CAPSULE ORAL at 20:15

## 2022-04-18 RX ADMIN — OXYBUTYNIN CHLORIDE 5 MG: 5 TABLET ORAL at 11:13

## 2022-04-18 RX ADMIN — ATROPA BELLADONNA AND OPIUM 60 MG: 16.2; 6 SUPPOSITORY RECTAL at 16:49

## 2022-04-18 RX ADMIN — PHENAZOPYRIDINE HYDROCHLORIDE 200 MG: 100 TABLET ORAL at 11:13

## 2022-04-18 RX ADMIN — DICYCLOMINE HYDROCHLORIDE 10 MG: 10 CAPSULE ORAL at 11:13

## 2022-04-18 RX ADMIN — METHOCARBAMOL TABLETS 750 MG: 750 TABLET, COATED ORAL at 20:15

## 2022-04-18 RX ADMIN — MORPHINE SULFATE 4 MG: 4 INJECTION INTRAVENOUS at 21:55

## 2022-04-18 RX ADMIN — CETIRIZINE HYDROCHLORIDE 10 MG: 10 TABLET ORAL at 11:13

## 2022-04-18 RX ADMIN — METHOCARBAMOL TABLETS 500 MG: 500 TABLET, COATED ORAL at 11:13

## 2022-04-18 RX ADMIN — ACETAMINOPHEN 1000 MG: 500 TABLET ORAL at 05:57

## 2022-04-18 RX ADMIN — PANTOPRAZOLE SODIUM 40 MG: 40 TABLET, DELAYED RELEASE ORAL at 20:15

## 2022-04-18 RX ADMIN — IPRATROPIUM BROMIDE AND ALBUTEROL SULFATE 1 AMPULE: .5; 3 SOLUTION RESPIRATORY (INHALATION) at 08:00

## 2022-04-18 RX ADMIN — OXYCODONE 10 MG: 5 TABLET ORAL at 12:39

## 2022-04-18 RX ADMIN — DICYCLOMINE HYDROCHLORIDE 10 MG: 10 CAPSULE ORAL at 16:48

## 2022-04-18 RX ADMIN — IPRATROPIUM BROMIDE AND ALBUTEROL SULFATE 1 AMPULE: .5; 3 SOLUTION RESPIRATORY (INHALATION) at 15:52

## 2022-04-18 RX ADMIN — MORPHINE SULFATE 4 MG: 4 INJECTION INTRAVENOUS at 08:26

## 2022-04-18 RX ADMIN — TAMSULOSIN HYDROCHLORIDE 0.4 MG: 0.4 CAPSULE ORAL at 11:13

## 2022-04-18 RX ADMIN — ONDANSETRON 4 MG: 2 INJECTION INTRAMUSCULAR; INTRAVENOUS at 08:18

## 2022-04-18 RX ADMIN — MONTELUKAST SODIUM 10 MG: 10 TABLET, FILM COATED ORAL at 20:15

## 2022-04-18 RX ADMIN — METHOCARBAMOL TABLETS 750 MG: 750 TABLET, COATED ORAL at 16:48

## 2022-04-18 RX ADMIN — MORPHINE SULFATE 4 MG: 4 INJECTION INTRAVENOUS at 01:32

## 2022-04-18 RX ADMIN — OXYBUTYNIN CHLORIDE 5 MG: 5 TABLET ORAL at 20:15

## 2022-04-18 RX ADMIN — CEFTRIAXONE SODIUM 1000 MG: 1 INJECTION, POWDER, FOR SOLUTION INTRAMUSCULAR; INTRAVENOUS at 17:43

## 2022-04-18 RX ADMIN — ACETAMINOPHEN 1000 MG: 500 TABLET ORAL at 21:55

## 2022-04-18 RX ADMIN — OXYBUTYNIN CHLORIDE 5 MG: 5 TABLET ORAL at 16:49

## 2022-04-18 RX ADMIN — OXYCODONE 5 MG: 5 TABLET ORAL at 03:13

## 2022-04-18 RX ADMIN — ATROPA BELLADONNA AND OPIUM 60 MG: 16.2; 6 SUPPOSITORY RECTAL at 11:05

## 2022-04-18 RX ADMIN — PHENAZOPYRIDINE HYDROCHLORIDE 200 MG: 100 TABLET ORAL at 16:49

## 2022-04-18 RX ADMIN — BUDESONIDE AND FORMOTEROL FUMARATE DIHYDRATE 2 PUFF: 80; 4.5 AEROSOL RESPIRATORY (INHALATION) at 08:00

## 2022-04-18 RX ADMIN — IPRATROPIUM BROMIDE AND ALBUTEROL SULFATE 1 AMPULE: .5; 3 SOLUTION RESPIRATORY (INHALATION) at 20:14

## 2022-04-18 RX ADMIN — SODIUM CHLORIDE: 9 INJECTION, SOLUTION INTRAVENOUS at 03:12

## 2022-04-18 ASSESSMENT — PAIN SCALES - GENERAL
PAINLEVEL_OUTOF10: 7
PAINLEVEL_OUTOF10: 10
PAINLEVEL_OUTOF10: 9
PAINLEVEL_OUTOF10: 10
PAINLEVEL_OUTOF10: 10
PAINLEVEL_OUTOF10: 9
PAINLEVEL_OUTOF10: 9
PAINLEVEL_OUTOF10: 10
PAINLEVEL_OUTOF10: 5
PAINLEVEL_OUTOF10: 9

## 2022-04-18 ASSESSMENT — PAIN DESCRIPTION - PROGRESSION
CLINICAL_PROGRESSION: GRADUALLY WORSENING
CLINICAL_PROGRESSION: NOT CHANGED

## 2022-04-18 ASSESSMENT — PAIN DESCRIPTION - ONSET
ONSET: ON-GOING
ONSET: ON-GOING

## 2022-04-18 ASSESSMENT — PAIN DESCRIPTION - FREQUENCY
FREQUENCY: CONTINUOUS
FREQUENCY: CONTINUOUS

## 2022-04-18 ASSESSMENT — PAIN - FUNCTIONAL ASSESSMENT: PAIN_FUNCTIONAL_ASSESSMENT: PREVENTS OR INTERFERES SOME ACTIVE ACTIVITIES AND ADLS

## 2022-04-18 ASSESSMENT — PAIN DESCRIPTION - PAIN TYPE: TYPE: ACUTE PAIN

## 2022-04-18 ASSESSMENT — PAIN DESCRIPTION - LOCATION: LOCATION: FLANK;ABDOMEN

## 2022-04-18 NOTE — PLAN OF CARE
Problem: Nutritional:  Goal: Ability to tolerate tube feedings without aspirating will improve  Description: Ability to tolerate tube feedings without aspirating will improve  4/17/2022 2005 by Minnie Choi RN  Outcome: Ongoing  4/17/2022 1934 by Davonte Emerson RN  Outcome: Ongoing  4/17/2022 1143 by Davonte Emerson RN  Outcome: Ongoing  Goal: Consumption of food in small portions  Description: Consumption of food in small portions  4/17/2022 2005 by Minnie Choi RN  Outcome: Ongoing  4/17/2022 1934 by Davonte Emerson RN  Outcome: Ongoing  4/17/2022 1143 by Davonte Emerson RN  Outcome: Ongoing  Goal: Consumption of liquid of appropriate consistency  Description: Consumption of liquid of appropriate consistency  4/17/2022 2005 by Minnie Choi RN  Outcome: Ongoing  4/17/2022 1934 by Davonte Emerson RN  Outcome: Ongoing  4/17/2022 1143 by Davonte Emerson RN  Outcome: Ongoing     Problem: Respiratory:  Goal: Ability to maintain a clear airway will improve  Description: Ability to maintain a clear airway will improve  4/17/2022 2005 by Minnie Choi RN  Outcome: Ongoing  4/17/2022 1934 by Davonte Emerson RN  Outcome: Ongoing  4/17/2022 1143 by Davonte Emerson RN  Outcome: Ongoing  Goal: Will remain free from infection  Description: Will remain free from infection  4/17/2022 2005 by Minnie Choi RN  Outcome: Ongoing  4/17/2022 1934 by Davonte Emerson RN  Outcome: Ongoing  4/17/2022 1143 by Davonte Emerson RN  Outcome: Ongoing  Goal: Absence of aspiration  Description: Absence of aspiration  4/17/2022 2005 by Minnie Choi RN  Outcome: Ongoing  4/17/2022 1934 by Davonte Emerson RN  Outcome: Ongoing  4/17/2022 1143 by Davonte Emerson RN  Outcome: Ongoing     Problem: Safety:  Goal: Ability to chew and swallow food without choking will improve  Description: Ability to chew and swallow food without choking will improve  4/17/2022 2005 by Minnie Choi RN  Outcome: Ongoing  4/17/2022 1934 by Davonte Emerson RN  Outcome: Ongoing  4/17/2022 1143 by Davonte Emerson RN  Outcome: Ongoing  Goal: Ability to demonstrate good, daily oral hygiene techniques will improve  Description: Ability to demonstrate good, daily oral hygiene techniques will improve  4/17/2022 2005 by Maksim Lemons RN  Outcome: Ongoing  4/17/2022 1934 by Usha Vidales RN  Outcome: Ongoing  4/17/2022 1143 by Usha Vidales RN  Outcome: Ongoing  Goal: Maintenance of upright position during and after feeding  Description: Maintenance of upright position during and after feeding  4/17/2022 2005 by Maksim Lemons RN  Outcome: Ongoing  4/17/2022 1934 by Usha Vidales RN  Outcome: Ongoing  4/17/2022 1143 by Usha Vidales RN  Outcome: Ongoing     Problem: Pain:  Goal: Pain level will decrease  Description: Pain level will decrease  4/17/2022 2005 by Maksim Lemons RN  Outcome: Ongoing  4/17/2022 1934 by Usha Vidales RN  Outcome: Ongoing  4/17/2022 1143 by Usha Vidales RN  Outcome: Ongoing  Goal: Control of acute pain  Description: Control of acute pain  4/17/2022 2005 by Maksim Lemons RN  Outcome: Ongoing  4/17/2022 1934 by Usha Vidales RN  Outcome: Ongoing  4/17/2022 1143 by Usha Vidales RN  Outcome: Ongoing  Goal: Control of chronic pain  Description: Control of chronic pain  4/17/2022 2005 by Maksim Lemons RN  Outcome: Ongoing  4/17/2022 1934 by Usha Vidales RN  Outcome: Ongoing  4/17/2022 1143 by Usha Vidales RN  Outcome: Ongoing

## 2022-04-18 NOTE — PROGRESS NOTES
OBS/CDU   RESIDENT NOTE      Patients PCP is:  Alan Bernal, KATIE - CNP        SUBJECTIVE      No acute events overnight. Patient seen evaluated patient this morning. Still having some severe pain that is not adequately controlled on current regimen at this time. Did not received her 9am medications at time of evaluation yet. PHYSICAL EXAM      General: NAD, AO X 3  Heent: EMOI, PERRL  Neck: SUPPLE, NO JVD  Cardiovascular: RRR, S1S2  Pulmonary: CTAB, NO SOB  Abdomen: SOFT, NTTP, ND, +BS  Extremities: +2/4 PULSES DISTAL, NO SWELLING  Neuro / Psych: NO NUMBNESS OR TINGLING, MENTATION AT BASELINE    PERTINENT TEST /EXAMS      I have reviewed all available laboratory results.     MEDICATIONS CURRENT   oxyCODONE (ROXICODONE) immediate release tablet 5 mg, Q6H PRN   Or  oxyCODONE (ROXICODONE) immediate release tablet 10 mg, Q6H PRN  oxybutynin (DITROPAN) tablet 5 mg, TID  morphine injection 4 mg, Q2H PRN  opium-belladonna (B&O SUPPRETTES) 16.2-60 MG suppository 60 mg, Once  morphine (PF) injection 2 mg, Once  phenazopyridine (PYRIDIUM) tablet 200 mg, TID  methocarbamol (ROBAXIN) tablet 500 mg, 4x Daily  acetaminophen (TYLENOL) tablet 1,000 mg, 3 times per day  ketorolac (TORADOL) injection 30 mg, Once  calcium carbonate (TUMS) chewable tablet 500 mg, TID PRN  budesonide-formoterol (SYMBICORT) 80-4.5 MCG/ACT inhaler 2 puff, BID  dicyclomine (BENTYL) capsule 10 mg, 4x Daily  lamoTRIgine (LAMICTAL) tablet 200 mg, Daily  cetirizine (ZYRTEC) tablet 10 mg, Daily  montelukast (SINGULAIR) tablet 10 mg, Nightly  tamsulosin (FLOMAX) capsule 0.4 mg, Daily  sertraline (ZOLOFT) tablet 150 mg, Daily  0.9 % sodium chloride infusion, Continuous  sodium chloride flush 0.9 % injection 5-40 mL, 2 times per day  sodium chloride flush 0.9 % injection 5-40 mL, PRN  0.9 % sodium chloride infusion, PRN  enoxaparin (LOVENOX) injection 40 mg, Daily  ondansetron (ZOFRAN-ODT) disintegrating tablet 4 mg, Q8H PRN   Or  ondansetron (ZOFRAN) injection 4 mg, Q6H PRN  ipratropium-albuterol (DUONEB) nebulizer solution 1 ampule, 4x daily  pantoprazole (PROTONIX) tablet 40 mg, BID AC  nicotine polacrilex (NICORETTE) gum 2 mg, Q2H PRN        All medication charted and reviewed. CONSULTS      IP CONSULT TO UROLOGY    ASSESSMENT/PLAN       González Junior is a 46 y.o. female who presents with intermittently obstructing left 7mm UPJ stone s/p left stent placement with Urology on 4/17/22    · Urology following, appreciate recommendations  · Patient to be discharged from urological standpoint at this point as of this morning, however patient with continued severe pain this morning. Will reevaluate if she seems to not have any cases. Will reach out to urology for potential reassessment if patient's pain continues to remain unchanged. · Oxybutynin 5 mg 3 times daily percent pain for 40 days, Flomax 0.4 mg for 14 days, Pyridium for 5 days  · Follow-up with Dr. Lucrecia Joseph in 2 weeks for stent removal and with stone treatment  · Continue home medications and pain control  · Monitor vitals, labs, and imaging  · DISPO: pending consults and clinical improvement    --  Cornel Mcgrath DO  Emergency Medicine Resident Physician     This dictation was generated by voice recognition computer software. Although all attempts are made to edit the dictation for accuracy, there may be errors in the transcription that are not intended.

## 2022-04-18 NOTE — PROGRESS NOTES
[facility] [employer]  CDU / OBSERVATION ENCOUNTER  ATTENDING NOTE       I performed a history and physical examination of the patient and discussed management with the resident or midlevel provider. I reviewed the resident or midlevel provider's note and agree with the documented findings and plan of care. Any areas of disagreement are noted on the chart. I was personally present for the key portions of any procedures. I have documented in the chart those procedures where I was not present during the key portions. I have reviewed the nurses notes. I agree with the chief complaint, past medical history, past surgical history, allergies, medications, social and family history as documented unless otherwise noted below. The Family history, social history, and ROS are effectively unchanged since admission unless noted elsewhere in the chart. Patient with ongoing intractable pain. Patient requiring parenteral medications in order to obtain pain relief. Urology has been notified. Patient currently symptomatically improving once medicated but requiring parenteral administration. Patient requiring admission due to intractable pain. Will discuss with urology the value of possibly removing the stent versus other alternative therapy.     Abdi Garcia MD  Attending Emergency  Physician

## 2022-04-18 NOTE — PLAN OF CARE
BRONCHOSPASM/BRONCHOCONSTRICTION     [x]         IMPROVE AERATION/BREATH SOUNDS  [x]   ADMINISTER BRONCHODILATOR THERAPY AS APPROPRIATE  [x]   ASSESS BREATH SOUNDS  []   IMPLEMENT AEROSOL/MDI PROTOCOL  [x]   PATIENT EDUCATION AS NEEDED [FreeTextEntry1] : 64 year old male with past medical history of CAD sp CABG, MI sp RCA stent complicated by stent thrombus on Effiient, CHF ( last EF 40% ), HTN, HLD, DM on insulin pump, PVD (VAISHALI 0.9) and bilateral OA of knees here for clearance for R TKR 10/21/2019.\par \par CAD: asymptomatic. EKG SR 72bpm with LBBB unchanged from history. c/w ASA, Effient, statin, BBlocker ( pt unable to take ACE I 2/2 angioedema )\par CHF: asymptomatic. No signs of Fluid overload on PE. BNP high but may be due to renal disease. Chest DX 10/10/19 reveal no acute cardiopulmonary process. Currently not taking furosemide. Rerefer allergy for ACE reaction. \par HTN: controlled c/w low salt diet and bblocker\par HLD: non fasting reveal an increase in LDL from 40's to 130. Check on medication compliance. If compliant, increase to Rosuvastatin 40mg and Zetia 10mg. If PT becomes intolerant or fails rx, suggest Praulent.\par DM: Ha1C increased from 7.6 to 8.0. Patient needs to decrease sugar / carbohydrate intake. Folow up endocrinology.\par Anemia: patient currently taking OTC iron supplements. H/H 15.4/53.0 . Recommended GI clearance given  history of iron deficiency anemia without diagnosed source. \par PVD: currently no complaints, however because of hx of borderline VAISHALI and multiple episodes of leg ulcerations with cellulitis, get arterial US at next fu visit\par Multiple Allergies: reinforced to visit an allergist \par \par Clearance: patient's RCRI score is 3-4 and is high cardiac risk for this procedure. Given history of iron deficiency anemia, possible GI bleed as source and may need anticoagulation post operatively requesting GI clearance prior to surgery\par \par History taken patient examined and case discussed with PACHECO Cardenas. \par

## 2022-04-18 NOTE — PROGRESS NOTES
Urology Progress Note     Subjective:   AFVSS   Patient sleeping comfortably   No complaints overnight   UOP 700cc recorded     Patient Vitals for the past 24 hrs:   BP Temp Temp src Pulse Resp SpO2 Height Weight   04/18/22 0546 -- -- -- -- -- -- -- 209 lb 14.1 oz (95.2 kg)   04/17/22 1954 (!) 150/85 97.3 °F (36.3 °C) Oral 76 21 96 % -- --   04/17/22 1945 -- -- -- 75 -- -- -- --   04/17/22 1224 -- -- -- -- 16 94 % -- --   04/17/22 1046 -- -- -- -- -- -- 5' 2\" (1.575 m) 208 lb 3.2 oz (94.4 kg)   04/17/22 1003 130/87 -- -- -- -- 97 % -- --   04/17/22 1000 130/87 97.7 °F (36.5 °C) Temporal 63 16 96 % -- --   04/17/22 0945 129/83 -- -- 71 16 94 % -- --   04/17/22 0930 137/84 99 °F (37.2 °C) Temporal 79 16 99 % -- --   04/17/22 0806 -- -- -- -- 20 98 % -- --   04/17/22 0750 130/85 98.5 °F (36.9 °C) Oral 72 18 98 % -- --       Intake/Output Summary (Last 24 hours) at 4/18/2022 0731  Last data filed at 4/18/2022 0546  Gross per 24 hour   Intake 4323.67 ml   Output 700 ml   Net 3623.67 ml       Recent Labs     04/16/22  1132   WBC 7.0   HGB 14.6   HCT 43.9   MCV 88.2        Recent Labs     04/16/22  1132      K 4.0      CO2 21   BUN 15   CREATININE 0.64       Recent Labs     04/16/22  1512   COLORU Yellow   PHUR 5.5   SPECGRAV 1.027   LEUKOCYTESUR NEGATIVE   UROBILINOGEN Normal   BILIRUBINUR NEGATIVE       Additional Lab/culture results: UA negative    Physical Exam:   General: sleeping comfortably in room, no acute distress   Head: atraumatic, normocephalic   HEENT: moist membranes, PERRLA, EOMI  Respiratory: normal respiratory effort on room air  Cardiac: regular rate  Abdomen: soft,obese, nondistended  Extremities: moves all extremities  Psych: appropriate mood       Interval Imaging Findings:    Impression:    Ernesto Ho is a 46 y.o. female who presents with:    Intermittently obstructing left 7 mm UPJ stone s/p left stent placement on 4/17/22    Plan:   Regular diet  Pain control and antiemetics as needed  Will wean off the IV narcotics and give scheduled oxybuytin to help with stent pain  UA negative for infection  She can be discharged home from a urological perspective with oxybuytin 5mg 3 times daily for stent pain for 14 days, Flomax 0.4mg  for 14 days, pyridium for 5 days and a few narcotics of choice  She will need to follow up with Dr. Nayla Chahal in 1-2 weeks for expedited procedure of stent removal and with stone treatment.        Dawood Armendariz MD  7:31 AM 4/18/2022

## 2022-04-19 ENCOUNTER — APPOINTMENT (OUTPATIENT)
Dept: GENERAL RADIOLOGY | Age: 42
DRG: 661 | End: 2022-04-19
Payer: COMMERCIAL

## 2022-04-19 ENCOUNTER — ANESTHESIA (OUTPATIENT)
Dept: OPERATING ROOM | Age: 42
DRG: 661 | End: 2022-04-19
Payer: COMMERCIAL

## 2022-04-19 ENCOUNTER — ANESTHESIA EVENT (OUTPATIENT)
Dept: OPERATING ROOM | Age: 42
DRG: 661 | End: 2022-04-19
Payer: COMMERCIAL

## 2022-04-19 VITALS — OXYGEN SATURATION: 98 % | SYSTOLIC BLOOD PRESSURE: 142 MMHG | DIASTOLIC BLOOD PRESSURE: 97 MMHG | TEMPERATURE: 96.7 F

## 2022-04-19 LAB — GLUCOSE BLD-MCNC: 93 MG/DL (ref 65–105)

## 2022-04-19 PROCEDURE — 6370000000 HC RX 637 (ALT 250 FOR IP): Performed by: PHYSICIAN ASSISTANT

## 2022-04-19 PROCEDURE — 6360000002 HC RX W HCPCS: Performed by: ANESTHESIOLOGY

## 2022-04-19 PROCEDURE — 2580000003 HC RX 258: Performed by: PHYSICIAN ASSISTANT

## 2022-04-19 PROCEDURE — 51798 US URINE CAPACITY MEASURE: CPT

## 2022-04-19 PROCEDURE — 6370000000 HC RX 637 (ALT 250 FOR IP): Performed by: STUDENT IN AN ORGANIZED HEALTH CARE EDUCATION/TRAINING PROGRAM

## 2022-04-19 PROCEDURE — 6370000000 HC RX 637 (ALT 250 FOR IP): Performed by: UROLOGY

## 2022-04-19 PROCEDURE — C2617 STENT, NON-COR, TEM W/O DEL: HCPCS | Performed by: UROLOGY

## 2022-04-19 PROCEDURE — 2580000003 HC RX 258: Performed by: UROLOGY

## 2022-04-19 PROCEDURE — 6360000002 HC RX W HCPCS: Performed by: EMERGENCY MEDICINE

## 2022-04-19 PROCEDURE — 6360000002 HC RX W HCPCS: Performed by: PHYSICIAN ASSISTANT

## 2022-04-19 PROCEDURE — 3209999900 FLUORO FOR SURGICAL PROCEDURES

## 2022-04-19 PROCEDURE — 0T778DZ DILATION OF LEFT URETER WITH INTRALUMINAL DEVICE, VIA NATURAL OR ARTIFICIAL OPENING ENDOSCOPIC: ICD-10-PCS | Performed by: UROLOGY

## 2022-04-19 PROCEDURE — BT1F1ZZ FLUOROSCOPY OF LEFT KIDNEY, URETER AND BLADDER USING LOW OSMOLAR CONTRAST: ICD-10-PCS | Performed by: UROLOGY

## 2022-04-19 PROCEDURE — C1769 GUIDE WIRE: HCPCS | Performed by: UROLOGY

## 2022-04-19 PROCEDURE — 7100000000 HC PACU RECOVERY - FIRST 15 MIN: Performed by: UROLOGY

## 2022-04-19 PROCEDURE — 82947 ASSAY GLUCOSE BLOOD QUANT: CPT

## 2022-04-19 PROCEDURE — 1200000000 HC SEMI PRIVATE

## 2022-04-19 PROCEDURE — 94640 AIRWAY INHALATION TREATMENT: CPT

## 2022-04-19 PROCEDURE — 6370000000 HC RX 637 (ALT 250 FOR IP): Performed by: HEALTH CARE PROVIDER

## 2022-04-19 PROCEDURE — 2500000003 HC RX 250 WO HCPCS: Performed by: NURSE ANESTHETIST, CERTIFIED REGISTERED

## 2022-04-19 PROCEDURE — 6360000002 HC RX W HCPCS: Performed by: NURSE ANESTHETIST, CERTIFIED REGISTERED

## 2022-04-19 PROCEDURE — 2500000003 HC RX 250 WO HCPCS: Performed by: PHYSICIAN ASSISTANT

## 2022-04-19 PROCEDURE — 7100000001 HC PACU RECOVERY - ADDTL 15 MIN: Performed by: UROLOGY

## 2022-04-19 PROCEDURE — 3600000004 HC SURGERY LEVEL 4 BASE: Performed by: UROLOGY

## 2022-04-19 PROCEDURE — 2709999900 HC NON-CHARGEABLE SUPPLY: Performed by: UROLOGY

## 2022-04-19 PROCEDURE — 3700000001 HC ADD 15 MINUTES (ANESTHESIA): Performed by: UROLOGY

## 2022-04-19 PROCEDURE — 3700000000 HC ANESTHESIA ATTENDED CARE: Performed by: UROLOGY

## 2022-04-19 PROCEDURE — 0TP98DZ REMOVAL OF INTRALUMINAL DEVICE FROM URETER, VIA NATURAL OR ARTIFICIAL OPENING ENDOSCOPIC: ICD-10-PCS | Performed by: UROLOGY

## 2022-04-19 PROCEDURE — 3600000014 HC SURGERY LEVEL 4 ADDTL 15MIN: Performed by: UROLOGY

## 2022-04-19 PROCEDURE — C1758 CATHETER, URETERAL: HCPCS | Performed by: UROLOGY

## 2022-04-19 DEVICE — URETERAL STENT
Type: IMPLANTABLE DEVICE | Status: FUNCTIONAL
Brand: PERCUFLEX™ PLUS

## 2022-04-19 RX ORDER — FENTANYL CITRATE 50 UG/ML
50 INJECTION, SOLUTION INTRAMUSCULAR; INTRAVENOUS
Status: COMPLETED | OUTPATIENT
Start: 2022-04-19 | End: 2022-04-19

## 2022-04-19 RX ORDER — MIDAZOLAM HYDROCHLORIDE 1 MG/ML
INJECTION INTRAMUSCULAR; INTRAVENOUS PRN
Status: DISCONTINUED | OUTPATIENT
Start: 2022-04-19 | End: 2022-04-19 | Stop reason: SDUPTHER

## 2022-04-19 RX ORDER — MAGNESIUM HYDROXIDE 1200 MG/15ML
LIQUID ORAL CONTINUOUS PRN
Status: COMPLETED | OUTPATIENT
Start: 2022-04-19 | End: 2022-04-19

## 2022-04-19 RX ORDER — FENTANYL CITRATE 50 UG/ML
50 INJECTION, SOLUTION INTRAMUSCULAR; INTRAVENOUS EVERY 5 MIN PRN
Status: DISCONTINUED | OUTPATIENT
Start: 2022-04-19 | End: 2022-04-19 | Stop reason: HOSPADM

## 2022-04-19 RX ORDER — LORAZEPAM 2 MG/ML
1 INJECTION INTRAMUSCULAR EVERY 6 HOURS PRN
Status: DISCONTINUED | OUTPATIENT
Start: 2022-04-19 | End: 2022-04-22

## 2022-04-19 RX ORDER — SODIUM CHLORIDE 0.9 % (FLUSH) 0.9 %
5-40 SYRINGE (ML) INJECTION PRN
Status: DISCONTINUED | OUTPATIENT
Start: 2022-04-19 | End: 2022-04-19 | Stop reason: HOSPADM

## 2022-04-19 RX ORDER — PSEUDOEPHED/ACETAMINOPH/DIPHEN 30MG-500MG
TABLET ORAL
Qty: 42 TABLET | OUTPATIENT
Start: 2022-04-19

## 2022-04-19 RX ORDER — DIPHENHYDRAMINE HYDROCHLORIDE 50 MG/ML
12.5 INJECTION INTRAMUSCULAR; INTRAVENOUS
Status: DISCONTINUED | OUTPATIENT
Start: 2022-04-19 | End: 2022-04-19 | Stop reason: HOSPADM

## 2022-04-19 RX ORDER — MIDAZOLAM HYDROCHLORIDE 2 MG/2ML
1 INJECTION, SOLUTION INTRAMUSCULAR; INTRAVENOUS EVERY 10 MIN PRN
Status: DISCONTINUED | OUTPATIENT
Start: 2022-04-19 | End: 2022-04-19 | Stop reason: HOSPADM

## 2022-04-19 RX ORDER — GLYCOPYRROLATE 1 MG/5 ML
SYRINGE (ML) INTRAVENOUS PRN
Status: DISCONTINUED | OUTPATIENT
Start: 2022-04-19 | End: 2022-04-19 | Stop reason: SDUPTHER

## 2022-04-19 RX ORDER — ONDANSETRON 2 MG/ML
4 INJECTION INTRAMUSCULAR; INTRAVENOUS
Status: DISCONTINUED | OUTPATIENT
Start: 2022-04-19 | End: 2022-04-19 | Stop reason: HOSPADM

## 2022-04-19 RX ORDER — LIDOCAINE HYDROCHLORIDE 10 MG/ML
1 INJECTION, SOLUTION EPIDURAL; INFILTRATION; INTRACAUDAL; PERINEURAL
Status: DISCONTINUED | OUTPATIENT
Start: 2022-04-19 | End: 2022-04-19 | Stop reason: HOSPADM

## 2022-04-19 RX ORDER — SODIUM CHLORIDE 9 MG/ML
INJECTION, SOLUTION INTRAVENOUS PRN
Status: DISCONTINUED | OUTPATIENT
Start: 2022-04-19 | End: 2022-04-19 | Stop reason: HOSPADM

## 2022-04-19 RX ORDER — LIDOCAINE HYDROCHLORIDE 10 MG/ML
INJECTION, SOLUTION INFILTRATION; PERINEURAL PRN
Status: DISCONTINUED | OUTPATIENT
Start: 2022-04-19 | End: 2022-04-19 | Stop reason: SDUPTHER

## 2022-04-19 RX ORDER — ONDANSETRON 2 MG/ML
INJECTION INTRAMUSCULAR; INTRAVENOUS PRN
Status: DISCONTINUED | OUTPATIENT
Start: 2022-04-19 | End: 2022-04-19 | Stop reason: SDUPTHER

## 2022-04-19 RX ORDER — SODIUM CHLORIDE, SODIUM LACTATE, POTASSIUM CHLORIDE, CALCIUM CHLORIDE 600; 310; 30; 20 MG/100ML; MG/100ML; MG/100ML; MG/100ML
INJECTION, SOLUTION INTRAVENOUS CONTINUOUS
Status: DISCONTINUED | OUTPATIENT
Start: 2022-04-19 | End: 2022-04-19

## 2022-04-19 RX ORDER — ROCURONIUM BROMIDE 10 MG/ML
INJECTION, SOLUTION INTRAVENOUS PRN
Status: DISCONTINUED | OUTPATIENT
Start: 2022-04-19 | End: 2022-04-19 | Stop reason: SDUPTHER

## 2022-04-19 RX ORDER — SODIUM CHLORIDE 0.9 % (FLUSH) 0.9 %
5-40 SYRINGE (ML) INJECTION EVERY 12 HOURS SCHEDULED
Status: DISCONTINUED | OUTPATIENT
Start: 2022-04-19 | End: 2022-04-19 | Stop reason: HOSPADM

## 2022-04-19 RX ORDER — CEFAZOLIN SODIUM 2 G/50ML
SOLUTION INTRAVENOUS PRN
Status: DISCONTINUED | OUTPATIENT
Start: 2022-04-19 | End: 2022-04-19 | Stop reason: SDUPTHER

## 2022-04-19 RX ORDER — FENTANYL CITRATE 50 UG/ML
INJECTION, SOLUTION INTRAMUSCULAR; INTRAVENOUS PRN
Status: DISCONTINUED | OUTPATIENT
Start: 2022-04-19 | End: 2022-04-19 | Stop reason: SDUPTHER

## 2022-04-19 RX ORDER — FENTANYL CITRATE 50 UG/ML
25 INJECTION, SOLUTION INTRAMUSCULAR; INTRAVENOUS
Status: COMPLETED | OUTPATIENT
Start: 2022-04-19 | End: 2022-04-19

## 2022-04-19 RX ORDER — FENTANYL CITRATE 50 UG/ML
25 INJECTION, SOLUTION INTRAMUSCULAR; INTRAVENOUS EVERY 5 MIN PRN
Status: DISCONTINUED | OUTPATIENT
Start: 2022-04-19 | End: 2022-04-19 | Stop reason: HOSPADM

## 2022-04-19 RX ORDER — DEXAMETHASONE SODIUM PHOSPHATE 10 MG/ML
INJECTION INTRAMUSCULAR; INTRAVENOUS PRN
Status: DISCONTINUED | OUTPATIENT
Start: 2022-04-19 | End: 2022-04-19 | Stop reason: SDUPTHER

## 2022-04-19 RX ORDER — PROPOFOL 10 MG/ML
INJECTION, EMULSION INTRAVENOUS PRN
Status: DISCONTINUED | OUTPATIENT
Start: 2022-04-19 | End: 2022-04-19 | Stop reason: SDUPTHER

## 2022-04-19 RX ADMIN — PROPOFOL 150 MG: 10 INJECTION, EMULSION INTRAVENOUS at 11:34

## 2022-04-19 RX ADMIN — METHOCARBAMOL TABLETS 750 MG: 750 TABLET, COATED ORAL at 16:05

## 2022-04-19 RX ADMIN — SUGAMMADEX 200 MG: 100 INJECTION, SOLUTION INTRAVENOUS at 12:01

## 2022-04-19 RX ADMIN — MORPHINE SULFATE 4 MG: 4 INJECTION INTRAVENOUS at 03:52

## 2022-04-19 RX ADMIN — DICYCLOMINE HYDROCHLORIDE 10 MG: 10 CAPSULE ORAL at 20:41

## 2022-04-19 RX ADMIN — CEFTRIAXONE SODIUM 1000 MG: 1 INJECTION, POWDER, FOR SOLUTION INTRAMUSCULAR; INTRAVENOUS at 17:05

## 2022-04-19 RX ADMIN — ACETAMINOPHEN 1000 MG: 500 TABLET ORAL at 14:35

## 2022-04-19 RX ADMIN — LORAZEPAM 1 MG: 2 INJECTION INTRAMUSCULAR; INTRAVENOUS at 16:14

## 2022-04-19 RX ADMIN — ACETAMINOPHEN 1000 MG: 500 TABLET ORAL at 05:34

## 2022-04-19 RX ADMIN — SODIUM CHLORIDE: 9 INJECTION, SOLUTION INTRAVENOUS at 08:53

## 2022-04-19 RX ADMIN — TAMSULOSIN HYDROCHLORIDE 0.4 MG: 0.4 CAPSULE ORAL at 09:01

## 2022-04-19 RX ADMIN — NICOTINE POLACRILEX 2 MG: 2 GUM, CHEWING BUCCAL at 18:41

## 2022-04-19 RX ADMIN — ONDANSETRON 4 MG: 2 INJECTION INTRAMUSCULAR; INTRAVENOUS at 11:34

## 2022-04-19 RX ADMIN — MORPHINE SULFATE 4 MG: 4 INJECTION INTRAVENOUS at 14:43

## 2022-04-19 RX ADMIN — IPRATROPIUM BROMIDE AND ALBUTEROL SULFATE 1 AMPULE: .5; 3 SOLUTION RESPIRATORY (INHALATION) at 09:14

## 2022-04-19 RX ADMIN — ROCURONIUM BROMIDE 50 MG: 10 INJECTION INTRAVENOUS at 11:34

## 2022-04-19 RX ADMIN — FENTANYL CITRATE 100 MCG: 50 INJECTION, SOLUTION INTRAMUSCULAR; INTRAVENOUS at 11:34

## 2022-04-19 RX ADMIN — BUDESONIDE AND FORMOTEROL FUMARATE DIHYDRATE 2 PUFF: 80; 4.5 AEROSOL RESPIRATORY (INHALATION) at 09:14

## 2022-04-19 RX ADMIN — PHENAZOPYRIDINE HYDROCHLORIDE 200 MG: 100 TABLET ORAL at 09:01

## 2022-04-19 RX ADMIN — MORPHINE SULFATE 4 MG: 4 INJECTION INTRAVENOUS at 19:28

## 2022-04-19 RX ADMIN — Medication 0.2 MG: at 11:55

## 2022-04-19 RX ADMIN — MORPHINE SULFATE 4 MG: 4 INJECTION INTRAVENOUS at 08:44

## 2022-04-19 RX ADMIN — OXYBUTYNIN CHLORIDE 5 MG: 5 TABLET ORAL at 14:35

## 2022-04-19 RX ADMIN — PANTOPRAZOLE SODIUM 40 MG: 40 TABLET, DELAYED RELEASE ORAL at 17:05

## 2022-04-19 RX ADMIN — SODIUM CHLORIDE: 9 INJECTION, SOLUTION INTRAVENOUS at 11:35

## 2022-04-19 RX ADMIN — IPRATROPIUM BROMIDE AND ALBUTEROL SULFATE 1 AMPULE: .5; 3 SOLUTION RESPIRATORY (INHALATION) at 21:17

## 2022-04-19 RX ADMIN — NICOTINE POLACRILEX 2 MG: 2 GUM, CHEWING BUCCAL at 06:41

## 2022-04-19 RX ADMIN — DEXAMETHASONE SODIUM PHOSPHATE 10 MG: 10 INJECTION INTRAMUSCULAR; INTRAVENOUS at 11:34

## 2022-04-19 RX ADMIN — PHENAZOPYRIDINE HYDROCHLORIDE 200 MG: 100 TABLET ORAL at 20:42

## 2022-04-19 RX ADMIN — PANTOPRAZOLE SODIUM 40 MG: 40 TABLET, DELAYED RELEASE ORAL at 07:53

## 2022-04-19 RX ADMIN — MONTELUKAST SODIUM 10 MG: 10 TABLET, FILM COATED ORAL at 20:42

## 2022-04-19 RX ADMIN — DICYCLOMINE HYDROCHLORIDE 10 MG: 10 CAPSULE ORAL at 09:01

## 2022-04-19 RX ADMIN — DICYCLOMINE HYDROCHLORIDE 10 MG: 10 CAPSULE ORAL at 18:41

## 2022-04-19 RX ADMIN — PHENAZOPYRIDINE HYDROCHLORIDE 200 MG: 100 TABLET ORAL at 14:35

## 2022-04-19 RX ADMIN — CETIRIZINE HYDROCHLORIDE 10 MG: 10 TABLET ORAL at 09:01

## 2022-04-19 RX ADMIN — ACETAMINOPHEN 1000 MG: 500 TABLET ORAL at 22:07

## 2022-04-19 RX ADMIN — CEFAZOLIN SODIUM 2000 MG: 2 SOLUTION INTRAVENOUS at 11:45

## 2022-04-19 RX ADMIN — MIDAZOLAM HYDROCHLORIDE 2 MG: 1 INJECTION, SOLUTION INTRAMUSCULAR; INTRAVENOUS at 11:34

## 2022-04-19 RX ADMIN — SERTRALINE 150 MG: 50 TABLET, FILM COATED ORAL at 20:42

## 2022-04-19 RX ADMIN — FENTANYL CITRATE 50 MCG: 50 INJECTION, SOLUTION INTRAMUSCULAR; INTRAVENOUS at 11:18

## 2022-04-19 RX ADMIN — OXYBUTYNIN CHLORIDE 5 MG: 5 TABLET ORAL at 22:10

## 2022-04-19 RX ADMIN — OXYCODONE 10 MG: 5 TABLET ORAL at 16:05

## 2022-04-19 RX ADMIN — MORPHINE SULFATE 4 MG: 4 INJECTION INTRAVENOUS at 17:24

## 2022-04-19 RX ADMIN — OXYBUTYNIN CHLORIDE 5 MG: 5 TABLET ORAL at 09:00

## 2022-04-19 RX ADMIN — OXYCODONE 10 MG: 5 TABLET ORAL at 22:07

## 2022-04-19 RX ADMIN — NICOTINE POLACRILEX 2 MG: 2 GUM, CHEWING BUCCAL at 23:36

## 2022-04-19 RX ADMIN — OXYCODONE 10 MG: 5 TABLET ORAL at 05:34

## 2022-04-19 RX ADMIN — LIDOCAINE HYDROCHLORIDE 50 MG: 10 INJECTION, SOLUTION INFILTRATION; PERINEURAL at 11:34

## 2022-04-19 RX ADMIN — FENTANYL CITRATE 50 MCG: 50 INJECTION, SOLUTION INTRAMUSCULAR; INTRAVENOUS at 12:53

## 2022-04-19 RX ADMIN — BUDESONIDE AND FORMOTEROL FUMARATE DIHYDRATE 2 PUFF: 80; 4.5 AEROSOL RESPIRATORY (INHALATION) at 21:17

## 2022-04-19 RX ADMIN — SODIUM CHLORIDE: 9 INJECTION, SOLUTION INTRAVENOUS at 13:45

## 2022-04-19 RX ADMIN — LAMOTRIGINE 200 MG: 100 TABLET ORAL at 20:41

## 2022-04-19 RX ADMIN — MORPHINE SULFATE 4 MG: 4 INJECTION INTRAVENOUS at 23:15

## 2022-04-19 RX ADMIN — METHOCARBAMOL TABLETS 750 MG: 750 TABLET, COATED ORAL at 20:42

## 2022-04-19 RX ADMIN — FENTANYL CITRATE 50 MCG: 50 INJECTION, SOLUTION INTRAMUSCULAR; INTRAVENOUS at 13:09

## 2022-04-19 RX ADMIN — METHOCARBAMOL TABLETS 750 MG: 750 TABLET, COATED ORAL at 09:01

## 2022-04-19 ASSESSMENT — PULMONARY FUNCTION TESTS
PIF_VALUE: 0
PIF_VALUE: 27
PIF_VALUE: 2
PIF_VALUE: 27
PIF_VALUE: 0
PIF_VALUE: 31
PIF_VALUE: 20
PIF_VALUE: 0
PIF_VALUE: 39
PIF_VALUE: 26
PIF_VALUE: 25
PIF_VALUE: 26
PIF_VALUE: 27
PIF_VALUE: 0
PIF_VALUE: 25
PIF_VALUE: 26
PIF_VALUE: 27
PIF_VALUE: 23
PIF_VALUE: 0
PIF_VALUE: 28
PIF_VALUE: 25
PIF_VALUE: 26
PIF_VALUE: 25
PIF_VALUE: 26
PIF_VALUE: 27
PIF_VALUE: 27
PIF_VALUE: 22
PIF_VALUE: 2
PIF_VALUE: 25
PIF_VALUE: 26
PIF_VALUE: 0
PIF_VALUE: 0
PIF_VALUE: 24
PIF_VALUE: 26
PIF_VALUE: 26
PIF_VALUE: 29
PIF_VALUE: 25
PIF_VALUE: 16
PIF_VALUE: 26
PIF_VALUE: 26
PIF_VALUE: 24
PIF_VALUE: 26
PIF_VALUE: 27
PIF_VALUE: 20
PIF_VALUE: 28
PIF_VALUE: 0
PIF_VALUE: 0
PIF_VALUE: 36
PIF_VALUE: 25
PIF_VALUE: 27
PIF_VALUE: 0
PIF_VALUE: 3

## 2022-04-19 ASSESSMENT — PAIN SCALES - GENERAL
PAINLEVEL_OUTOF10: 9
PAINLEVEL_OUTOF10: 5
PAINLEVEL_OUTOF10: 9
PAINLEVEL_OUTOF10: 10
PAINLEVEL_OUTOF10: 9
PAINLEVEL_OUTOF10: 10
PAINLEVEL_OUTOF10: 8
PAINLEVEL_OUTOF10: 10
PAINLEVEL_OUTOF10: 10
PAINLEVEL_OUTOF10: 8
PAINLEVEL_OUTOF10: 10
PAINLEVEL_OUTOF10: 10
PAINLEVEL_OUTOF10: 3
PAINLEVEL_OUTOF10: 5
PAINLEVEL_OUTOF10: 9
PAINLEVEL_OUTOF10: 10
PAINLEVEL_OUTOF10: 10
PAINLEVEL_OUTOF10: 9
PAINLEVEL_OUTOF10: 7

## 2022-04-19 ASSESSMENT — PAIN DESCRIPTION - PAIN TYPE: TYPE: ACUTE PAIN

## 2022-04-19 ASSESSMENT — PAIN DESCRIPTION - LOCATION
LOCATION: ABDOMEN
LOCATION: ABDOMEN

## 2022-04-19 ASSESSMENT — COPD QUESTIONNAIRES: CAT_SEVERITY: NO INTERVAL CHANGE

## 2022-04-19 ASSESSMENT — LIFESTYLE VARIABLES: SMOKING_STATUS: 0

## 2022-04-19 ASSESSMENT — ENCOUNTER SYMPTOMS
STRIDOR: 0
SHORTNESS OF BREATH: 0

## 2022-04-19 NOTE — ANESTHESIA POSTPROCEDURE EVALUATION
Department of Anesthesiology  Postprocedure Note    Patient: Dave Peter  MRN: [id number]  [place]: 1980  Date of evaluation: 4/19/2022  Time:  12:43 PM     Procedure Summary     Date: 04/19/22 Room / Location: [address]    Anesthesia Start: 1128 Anesthesia Stop: 9928    Procedure: HOLMIUM LASER STANDBY,  CYSTOSCOPY, LEFT URETEROSCOPY , LEFT STENT EXCHANGE, LEFT RETROGRADE PYELOGRAM (Left ) Diagnosis: (LEFT KIDNEY STONE)    Surgeons: Jeri Sellers MD Responsible Provider: Damian Mcgregor MD    Anesthesia Type: general ASA Status: 3          Anesthesia Type: general    Alina Phase I: Alina Score: 8    Alina Phase II: Alina Score: 9    Last vitals: Reviewed and per EMR flowsheets.        Anesthesia Post Evaluation    Patient location during evaluation: PACU  Patient participation: complete - patient participated  Level of consciousness: awake  Pain score: 1  Airway patency: patent  Nausea & Vomiting: no nausea and no vomiting  Complications: no  Cardiovascular status: blood pressure returned to baseline and hemodynamically stable  Respiratory status: acceptable  Hydration status: euvolemic

## 2022-04-19 NOTE — ANESTHESIA PRE PROCEDURE
Department of Anesthesiology  Preprocedure Note       Name:  Luz Elena Durant   Age:  46 y.o.  :  1980                                          MRN:  [id number]         Date:  2022      Surgeon: Yisel Adames):  Kyree Rao MD    Procedure:   Procedure: HOLMIUM LASER CYSTOSCOPY, URETEROSCOPY , STENT EXCHANGE VS. REMOVAL (Left )   Anesthesia type: General   Pre-op diagnosis: LEFT KIDNEY STONE         Medications prior to admission:   Prior to Admission medications    Medication Sig Start Date End Date Taking? Authorizing Provider   acetaminophen (TYLENOL) 500 MG tablet Take 2 tablets by mouth every 6 hours as needed for Pain 22  Sigurd Staff, DO   oxyCODONE (ROXICODONE) 5 MG immediate release tablet Take 1 tablet by mouth every 6 hours as needed for Pain for up to 3 days. Intended supply: 3 days.  Take lowest dose possible to manage pain 22  Sigurd Staff, DO   oxybutynin (DITROPAN) 5 MG tablet Take 1 tablet by mouth 3 times daily as needed (Bladder Spasms) 22   Sigurd Staff, DO   tamsulosin [facility]) 0.4 MG capsule Take 1 capsule by mouth daily 22   Sigurd Staff, DO   Lancets (ONETOUCH DELICA PLUS [id number]) MISC USE TO TEST BLOOD SUGAR TWICE A DAY 3/24/22   KATIE Cole CNP   vitamin D3 (CHOLECALCIFEROL) 25 MCG (1000 UT) TABS tablet TAKE 1 TABLET BY MOUTH DAILY  Patient not taking: Reported on 2022 3/24/22   KATIE Cole CNP   BREO ELLIPTA 200-25 MCG/INH AEPB inhaler INHALE 1 PUFF INTO THE LUNGS DAILY 3/17/22   KATIE Cole CNP   pantoprazole (PROTONIX) 40 MG tablet 1 tab daily 3/10/22   KATIE Cole CNP   COMBIVENT RESPIMAT  MCG/ACT AERS inhaler Inhale 1 puff into the lungs 4 times daily Inhale 1 puff into the lungs 4 times daily 3/10/22   KATIE Cole CNP   ipratropium-albuterol (DUONEB) 0.5-2.5 (3) MG/3ML SOLN nebulizer solution INHALE CONTENTS OF 1 UNIT DOSE VIA NEBULIZER EVERY 4 HOURS 2/21/22   KATIE Cole CNP   montelukast (SINGULAIR) 10 MG tablet TAKE 1 TABLET IN THE EVENING ONCE A DAY ORALLY 1/24/22   KATIE Cole CNP   Misc. Devices (STRAINER/STAINLESS STEEL/2.5\") MISC 1 each by Does not apply route as needed (when you urinate)  Patient not taking: Reported on 4/16/2022 1/15/22   Jann Robertson,    pregabalin (LYRICA) 100 MG capsule Take 1 capsule by mouth 3 times daily for 30 days. 1/13/22 2/12/22  Arabella Ryder MD   Methocarbamol (ROBAXIN PO) Take by mouth    Historical Provider, MD   sertraline (ZOLOFT) 100 MG tablet Take 1.5 tablets by mouth daily 11/1/21   Historical Provider, MD   lamoTRIgine (LAMICTAL) 200 MG tablet Take 1 tablet by mouth daily 11/1/21   Historical Provider, MD   ONETOUCH VERIO strip USE TO TEST BLOOD SUGAR TWICE A DAY 11/1/21   KATIE Cole CNP   dicyclomine (BENTYL) 10 MG capsule TAKE 1 CAPSULE BY MOUTH 4 TIMES DAILY 8/27/21   KATIE Cole CNP   loratadine (CLARITIN) 10 MG tablet TAKE 1 TABLET BY MOUTH DAILY 7/6/21   KATIE Cole CNP   glucose monitoring kit (FREESTYLE) monitoring kit Use as directed. BRAND OF CHOICE INSURANCE ALLOWS. 5/27/21   KATIE Cole CNP   Alcohol Swabs ( STERILE ALCOHOL PREP) PADS USE AS DIRECTED 5/10/21   KATIE Cole CNP   fluticasone (FLONASE) 50 MCG/ACT nasal spray 1 spray by Each Nostril route daily 4/1/21 5/1/21  KATIE Cole CNP   vitamin D3 (CHOLECALCIFEROL) 25 MCG (1000 UT) TABS tablet TAKE 1 TABLET BY MOUTH DAILY 8/27/20   KATIE Cole CNP   Masks (CLEVER CHOICE FACE MASK) MISC USE NEW FACE MASK EVERYDAY WHEN PATIENT GO OUTSIDE OF HOME DUE TO COVID PANDEMIC 7/6/20   KATIE Cole - CNP       Current medications:    No current facility-administered medications for this visit.      Current Outpatient Medications   Medication Sig Dispense Refill    acetaminophen (TYLENOL) 500 MG tablet Take 2 tablets by mouth every 6 hours as needed for Pain 112 tablet 0    oxyCODONE (ROXICODONE) 5 MG immediate release tablet Take 1 tablet by mouth every 6 hours as needed for Pain for up to 3 days. Intended supply: 3 days.  Take lowest dose possible to manage pain 12 tablet 0    oxybutynin (DITROPAN) 5 MG tablet Take 1 tablet by mouth 3 times daily as needed (Bladder Spasms) 20 tablet 0    tamsulosin (FLOMAX) 0.4 MG capsule Take 1 capsule by mouth daily 30 capsule 0     Facility-Administered Medications Ordered in Other Visits   Medication Dose Route Frequency Provider Last Rate Last Admin    [MAR Hold] oxyCODONE (ROXICODONE) immediate release tablet 5 mg  5 mg Oral Q6H PRN Bartolo Maki MD        Or   Yojana Ferrera [facility] Hold] oxyCODONE (ROXICODONE) immediate release tablet 10 mg  10 mg Oral Q6H PRN Bartolo Maki MD   10 mg at 04/19/22 0534    [MAR Hold] oxybutynin (DITROPAN) tablet 5 mg  5 mg Oral TID Bartolo Maki MD   5 mg at 04/19/22 0900    [MAR Hold] morphine injection 4 mg  4 mg IntraVENous Q2H PRN Jina Gamboa MD   4 mg at 04/19/22 0844    [MAR Hold] methocarbamol (ROBAXIN) tablet 750 mg  750 mg Oral 4x Daily Aristeo Prasad, DO   750 mg at 04/19/22 0901    [MAR Hold] cefTRIAXone (ROCEPHIN) 1,000 mg in sterile water 10 mL IV syringe  1,000 mg IntraVENous Q24H Nicho Coello MD   1,000 mg at 04/18/22 1743    opium-belladonna (B&O SUPPRETTES) 16.2-60 MG suppository 60 mg  60 mg Rectal Q8H Nicho Coello MD   60 mg at 04/18/22 1649    [MAR Hold] morphine (PF) injection 2 mg  2 mg IntraVENous Once Bernabe Soares MD        [facility] Hold] phenazopyridine (PYRIDIUM) tablet 200 mg  200 mg Oral TID Goyo Madden DO   200 mg at 04/19/22 0901    [MAR Hold] acetaminophen (TYLENOL) tablet 1,000 mg  1,000 mg Oral 3 times per day Goyo Madden DO   1,000 mg at 04/19/22 0534    [MAR Hold] ketorolac (TORADOL) injection 30 mg  30 mg IntraVENous Once Goyo Madden DO        [facility] Hold] calcium carbonate (TUMS) chewable tablet 500 mg  500 mg Oral TID PRN Omkar Ceci Flores DO   500 mg at 04/17/22 2246    [MAR Hold] budesonide-formoterol (SYMBICORT) 80-4.5 MCG/ACT inhaler 2 puff  2 puff Inhalation BID Jac King MD   2 puff at 04/19/22 0914    [MAR Hold] dicyclomine (BENTYL) capsule 10 mg  10 mg Oral 4x Daily Jac King MD   10 mg at 04/19/22 0901    [MAR Hold] lamoTRIgine (LAMICTAL) tablet 200 mg  200 mg Oral Daily Jac King MD   200 mg at 04/18/22 2015    [MAR Hold] cetirizine (ZYRTEC) tablet 10 mg  10 mg Oral Daily Jac King MD   10 mg at 04/19/22 0901    [MAR Hold] montelukast (SINGULAIR) tablet 10 mg  10 mg Oral Nightly Jac King MD   10 mg at 04/18/22 2015    [MAR Hold] tamsulosin (FLOMAX) capsule 0.4 mg  0.4 mg Oral Daily Jac King MD   0.4 mg at 04/19/22 0901    [MAR Hold] sertraline (ZOLOFT) tablet 150 mg  150 mg Oral Daily Jac King MD   150 mg at 04/18/22 2015    [MAR Hold] 0.9 % sodium chloride infusion   IntraVENous Continuous Jac King  mL/hr at 04/19/22 0853 New Bag at 04/19/22 0853    [MAR Hold] sodium chloride flush 0.9 % injection 5-40 mL  5-40 mL IntraVENous 2 times per day Jac King MD   10 mL at 04/18/22 0820    [MAR Hold] sodium chloride flush 0.9 % injection 5-40 mL  5-40 mL IntraVENous PRN Jac King MD        [facility] Hold] 0.9 % sodium chloride infusion   IntraVENous PRN Jac King MD        [facility] Hold] enoxaparin (LOVENOX) injection 40 mg  40 mg SubCUTAneous Daily Jac King MD   40 mg at 04/17/22 1025    [MAR Hold] ondansetron (ZOFRAN-ODT) disintegrating tablet 4 mg  4 mg Oral Q8H PRN Jac King MD        Or   Lorri Arellano [facility] Hold] ondansetron [facility]) injection 4 mg  4 mg IntraVENous Q6H PRN Jac King MD   4 mg at 04/18/22 0818    [MAR Hold] ipratropium-albuterol (DUONEB) nebulizer solution 1 ampule  1 ampule Inhalation 4x daily Jac King MD   1 ampule at 04/19/22 0914    [MAR Hold] pantoprazole ([address]) tablet 40 mg  40 mg Oral BID AC Milo Duran MD   40 mg at 22 0753       Allergies:     Allergies   Allergen Reactions    Nsaids      Irritates her Barrets esophogus    Toradol [Ketorolac Tromethamine]     Ultram [Tramadol] Nausea Only     Gastric upset       Problem List:    Patient Active Problem List   Diagnosis Code    Asthma J45.909    GERD K21.9    Iron deficiency anemia D50.9    Cervical disc herniation M50.20    Smoker F17.200    Sadler's esophagus without dysplasia K22.70    Hiatal hernia K44.9    COPD with asthma ([address] J44.9    Chronic constipation K59.09    Cervicogenic headache G44.86    Hx of IUFD (G2) O09.299    History of gestational hypertension Z87.59    Seizure ([address] R56.9    Hx of  x2 (G3, G4) Z98.891    Arthritis M19.90    Cervical radiculopathy M54.12    Migraine without aura G43.009    Spinal stenosis in cervical region M48.02    Prediabetes R73.03    Chronic pelvic pain in female R10.2, G89.29    Enlarged uterus N85.2    Endometriosis N80.9    Family history of breast cancer Z80.3    S/P cervical spinal fusion Z98.1    Bipolar affective disorder, currently depressed, moderate ([address] F31.32    Insomnia G47.00    Ulnar neuropathy G56.20    Major depressive disorder, recurrent episode ([address] F33.9    Sciatica M54.30    Moderate persistent asthma without complication J90.20    Lipoma of torso D17.1    History of cervical discectomy Z98.890    Chronic neck pain with abnormal neurologic examination M54.2, G89.29    Abnormal weight gain  R63.5    Class 2 drug-induced obesity with serious comorbidity and body mass index (BMI) of 37.0 to 37.9 in adult E66.1, Z68.37    Pelvic peritoneal endometriosis N80.3    RALH with BS and cystoscopy 2020 Z90.710    Post-op pain G89.18    Wound infection/hemorrhage, obstetric surgical, postpartum condition O90.9    Postoperative visit Z48.89    Postoperative abdominal pain R10.9, G89.18    Diverticulitis K57.92    Diverticulitis of colon K57.32    Pelvic abscess in female N73.9    Pneumonia J18.9    Right otitis media H66.91    Functional diarrhea K59.1    Kidney stones N20.0    Cervical spondylosis without myelopathy M47.812    Cervical myelopathy (HCC) G95.9    Nephrolithiasis N20.0    Intractable abdominal pain R10.9       Past Medical History:        Diagnosis Date    Anxiety     Arthritis     OA    Asthma     Sadler's esophagus     LONG SEGMENT    Bipolar 1 disorder (HCC)     CAD (coronary artery disease)     Chronic insomnia     [facility] obstructive pulmonary disease) [facility]     Depression     and bipolar    Diabetes mellitus ([facility])     prediabetic    Endometriosis 3/9/2020    Esophagitis     severe    GERD (gastroesophageal reflux disease)     Headache(784.0)     Herniated disc, cervical     Hiatal hernia     Incisional abscess 1/15/2019    Kidney stones     MDRO (multiple drug resistant organisms) resistance     mrsa    Pleurisy     hx of \"years ago\"    Pneumonia     past penumonia    Seizures ([address]     2016 last seizure-focal seizure    UTI (urinary tract infection)     Vision abnormalities     wears glasses       Past Surgical History:        Procedure Laterality Date    CERVICAL DISC SURGERY      x2     SECTION  , 2018    x 2     SECTION N/A 2018     SECTION performed by Suzanne You MD at [address] [place] [address] L&D OR    COLONOSCOPY N/A 10/1/2019    COLONOSCOPY DIAGNOSTIC ABORTED performed by Shann Spurling, MD at [address] [facility] N/A 2020    COLONOSCOPY WITH BIOPSY performed by Shann Spurling, MD at [place] [address]  2022    CYSTOSCOPY Left 2022    CYSTOSCOPY, URETERAL STENT INSERTION, RETROGRADE PYELOGRAM performed by Allean Cogan, MD at [address] [facility], COLON, DIAGNOSTIC      HYSTERECTOMY N/A 2020    HYSTERECTOMY ABDOMINAL LAPAROSCOPIC ROBOTIC XI W/ CYSTOSCOPY performed by Cecy Castillo MD at [address] [facility] ARTHROSCOPY Left 5/20/15    KNEE SURGERY Left     x3    LAPAROSCOPY      dr Angeles Bae N/A 10/5/2017    LAPAROSCOPIC REMOVAL INTRA ABDOMINAL LIPOMA LOWER RIGHT performed by Greg Salas DO at [address] [facility] ESOPHAGOGASTRODUODENOSCOPY TRANSORAL DIAGNOSTIC N/A 3/2/2017    EGD ESOPHAGOGASTRODUODENOSCOPY  IP 2055 performed by Shilpa Bradford MD at [address] [facility]  08/16/2016    severe esophagitis    UPPER GASTROINTESTINAL ENDOSCOPY  01/19/2017    barretts; hiatus hernia; gastritis    UPPER GASTROINTESTINAL ENDOSCOPY  03/02/2017    long seg mullins's with linear erosions, esophagitis, small hiatal hernia    UPPER GASTROINTESTINAL ENDOSCOPY N/A 1/30/2018    MULLINS'S    UPPER GASTROINTESTINAL ENDOSCOPY N/A 10/1/2019    EGD BIOPSY performed by Manny Peña MD at [address] [facility] Left 04/17/2022    WISDOM TOOTH EXTRACTION         Social History:    Social History     Tobacco Use    Smoking status: Current Every Day Smoker     Packs/day: 0.50     Years: 21.00     Pack years: 10.50     Types: Cigarettes    Smokeless tobacco: Never Used   Substance Use Topics    Alcohol use: No                                Ready to quit: Not Answered  Counseling given: Not Answered      Vital Signs (Current): There were no vitals filed for this visit.                                            BP Readings from Last 3 Encounters:   04/19/22 132/89   04/17/22 126/71   04/12/22 (!) 164/101       NPO Status:                                                                                 BMI:   Wt Readings from Last 3 Encounters:   04/18/22 209 lb 14.1 oz (95.2 kg)   02/09/22 218 lb (98.9 kg)   01/15/22 220 lb (99.8 kg)     There is no height or weight on file to calculate BMI.    CBC:   Lab Results   Component Value Date    WBC 7.0 04/16/2022    RBC 4.98 04/16/2022    RBC 4.49 03/25/2012    HGB 14.6 04/16/2022    HCT 43.9 04/16/2022    MCV 88.2 04/16/2022    RDW 13.5 04/16/2022     04/16/2022     03/25/2012     LR    CMP:   Lab Results   Component Value Date     04/16/2022    K 4.0 04/16/2022     04/16/2022    CO2 21 04/16/2022    BUN 15 04/16/2022    CREATININE 0.64 04/16/2022    GFRAA >60 04/16/2022    LABGLOM >60 04/16/2022    GLUCOSE 115 04/16/2022    GLUCOSE 106 03/25/2012    PROT 6.3 01/15/2022    CALCIUM 9.0 04/16/2022    BILITOT 0.17 01/15/2022    ALKPHOS 64 01/15/2022    AST 13 01/15/2022    ALT 13 01/15/2022       POC Tests:   No results for input(s): POCGLU, POCNA, POCK, POCCL, POCBUN, POCHEMO, POCHCT in the last 72 hours.     Coags:   Lab Results   Component Value Date    PROTIME 9.8 01/13/2019    INR 0.9 01/13/2019    APTT 22.7 01/13/2019       HCG (If Applicable):   Lab Results   Component Value Date    PREGTESTUR NEGATIVE 04/12/2022    PREGTESTUR NEGATIVE 04/12/2022    HCG NEGATIVE 07/08/2020    HCGQUANT 61,609 (H) 06/11/2018        ABGs:   Lab Results   Component Value Date    PHART 7.447 05/21/2017    PO2ART 64.7 05/21/2017    CRO5WAV 35.0 05/21/2017    GXB6ZWJ 24.2 05/21/2017    K5PYQTMI 89.6 05/21/2017        Type & Screen (If Applicable):  No results found for: LABABO, [address]    Drug/Infectious Status (If Applicable):  Lab Results   Component Value Date    HEPCAB NONREACTIVE 03/22/2016       COVID-19 Screening (If Applicable):   Lab Results   Component Value Date    COVID19 Not Detected 04/16/2022    COVID19 Not Detected 10/03/2021         Anesthesia Evaluation  Patient summary reviewed no history of anesthetic complications:   Airway: Mallampati: II  TM distance: >3 FB   Neck ROM: full  Mouth opening: > = 3 FB Dental: normal exam         Pulmonary:normal exam  breath sounds clear to auscultation  (+) pneumonia: no interval change,  COPD: no interval change,  asthma: allergic asthma,     (-) shortness of breath, recent URI, sleep apnea, rhonchi, wheezes, rales, stridor, not a current smoker and no decreased breath sounds                           Cardiovascular:  Exercise tolerance: good (>4 METS),   (+) CAD: no interval change,     (-) pacemaker, hypertension, valvular problems/murmurs, past MI, CABG/stent, dysrhythmias,  angina,  CHF, orthopnea, PND,  HIDALGO, murmur, weak pulses,  friction rub, systolic click, carotid bruit,  JVD, peripheral edema, no pulmonary hypertension and no hyperlipidemia    ECG reviewed  Rhythm: regular  Rate: normal           Beta Blocker:  Not on Beta Blocker         Neuro/Psych:   (+) headaches: migraine headaches, psychiatric history: stable without treatmentdepression/anxiety    (-) seizures, neuromuscular disease, TIA and CVA           GI/Hepatic/Renal:   (+) hiatal hernia, GERD: no interval change,      (-) PUD, hepatitis, liver disease, no renal disease, bowel prep and no morbid obesity       Endo/Other:    (+) DiabetesType II DM, , no malignancy/cancer. (-) hypothyroidism, hyperthyroidism, blood dyscrasia, arthritis, no electrolyte abnormalities, no malignancy/cancer               Abdominal:             Vascular: negative vascular ROS. - PVD, DVT and PE. Other Findings:               Anesthesia Plan      general     ASA 3       Induction: intravenous. MIPS: Postoperative opioids intended, Prophylactic antiemetics administered and Postoperative trial extubation. Anesthetic plan and risks discussed with patient. Plan discussed with CRNA.             Narrative & Impression    Normal sinus rhythm  Normal ECG  When compared with ECG of 03-OCT-2021 22:17,  No significant change was found      Specimen Collected: 12/30/21 17:21                  Fadumo Mcmullen MD   4/19/2022

## 2022-04-19 NOTE — PROGRESS NOTES
OBS/CDU   RESIDENT NOTE      Patients PCP is:  Alan Bernal, KATIE - CNP        SUBJECTIVE      No acute events overnight. Patient seen evaluated patient this morning. Patient to OR today with Urology. Pain moderately controlled with current regimen. PHYSICAL EXAM      General: NAD, AO X 3  Heent: EMOI, PERRL  Neck: SUPPLE, NO JVD  Cardiovascular: RRR, S1S2  Pulmonary: CTAB, NO SOB  Abdomen: SOFT, NTTP, ND, +BS  Extremities: +2/4 PULSES DISTAL, NO SWELLING  Neuro / Psych: NO NUMBNESS OR TINGLING, MENTATION AT BASELINE    PERTINENT TEST /EXAMS      I have reviewed all available laboratory results.     MEDICATIONS CURRENT   oxyCODONE (ROXICODONE) immediate release tablet 5 mg, Q6H PRN   Or  oxyCODONE (ROXICODONE) immediate release tablet 10 mg, Q6H PRN  oxybutynin (DITROPAN) tablet 5 mg, TID  morphine injection 4 mg, Q2H PRN  methocarbamol (ROBAXIN) tablet 750 mg, 4x Daily  cefTRIAXone (ROCEPHIN) 1,000 mg in sterile water 10 mL IV syringe, Q24H  opium-belladonna (B&O SUPPRETTES) 16.2-60 MG suppository 60 mg, Q8H  morphine (PF) injection 2 mg, Once  phenazopyridine (PYRIDIUM) tablet 200 mg, TID  acetaminophen (TYLENOL) tablet 1,000 mg, 3 times per day  ketorolac (TORADOL) injection 30 mg, Once  calcium carbonate (TUMS) chewable tablet 500 mg, TID PRN  budesonide-formoterol (SYMBICORT) 80-4.5 MCG/ACT inhaler 2 puff, BID  dicyclomine (BENTYL) capsule 10 mg, 4x Daily  lamoTRIgine (LAMICTAL) tablet 200 mg, Daily  cetirizine (ZYRTEC) tablet 10 mg, Daily  montelukast (SINGULAIR) tablet 10 mg, Nightly  tamsulosin (FLOMAX) capsule 0.4 mg, Daily  sertraline (ZOLOFT) tablet 150 mg, Daily  0.9 % sodium chloride infusion, Continuous  sodium chloride flush 0.9 % injection 5-40 mL, 2 times per day  sodium chloride flush 0.9 % injection 5-40 mL, PRN  0.9 % sodium chloride infusion, PRN  enoxaparin (LOVENOX) injection 40 mg, Daily  ondansetron (ZOFRAN-ODT) disintegrating tablet 4 mg, Q8H PRN   Or  ondansetron (ZOFRAN) injection 4 mg, Q6H PRN  ipratropium-albuterol (DUONEB) nebulizer solution 1 ampule, 4x daily  pantoprazole (PROTONIX) tablet 40 mg, BID AC        All medication charted and reviewed. CONSULTS      IP CONSULT TO UROLOGY    ASSESSMENT/PLAN       Aden Grier is a 46 y.o. female who presents with intermittently obstructing left 7mm UPJ stone s/p left stent placement with Urology on 4/17/22    · Urology following, appreciate recommendations  · plan for cystoscopy, left ureteroscopy and holmium laser lithotripsy and stent exchange vs removal today with urology  · Oxybutynin 5 mg 3 times daily percent pain for 40 days, Flomax 0.4 mg for 14 days, Pyridium for 5 days  · Continue home medications and pain control  · Monitor vitals, labs, and imaging  · DISPO: pending consults and clinical improvement    --  Shanika Starkey DO  Emergency Medicine Resident Physician     This dictation was generated by voice recognition computer software. Although all attempts are made to edit the dictation for accuracy, there may be errors in the transcription that are not intended.

## 2022-04-19 NOTE — PROGRESS NOTES
[address]  CDU / OBSERVATION ENCOUNTER  ATTENDING NOTE       I performed a history and physical examination of the patient and discussed management with the resident or midlevel provider. I reviewed the resident or midlevel provider's note and agree with the documented findings and plan of care. Any areas of disagreement are noted on the chart. I was personally present for the key portions of any procedures. I have documented in the chart those procedures where I was not present during the key portions. I have reviewed the nurses notes. I agree with the chief complaint, past medical history, past surgical history, allergies, medications, social and family history as documented unless otherwise noted below. The Family history, social history, and ROS are effectively unchanged since admission unless noted elsewhere in the chart. Patient with ongoing difficulty with stent and kidney stone. Appreciate urology intervention. Anticipating OR today. Reevaluate after procedure    Patient still with significant discomfort.   Patient requiring ongoing IV analgesic and supportive care    Tanner Rogers MD  Attending Emergency  Physician

## 2022-04-19 NOTE — PROGRESS NOTES
Urology Progress Note     Subjective:   AFVSS   Patient sleeping comfortably until woken up  Requiring morphine overnight   NPO    Patient Vitals for the past 24 hrs:   BP Temp Temp src Pulse Resp SpO2   04/18/22 2042 126/76 98 °F (36.7 °C) Oral 77 22 94 %   04/18/22 2014 -- -- -- -- 22 98 %   04/18/22 0815 (!) 145/86 97.5 °F (36.4 °C) Oral 67 22 94 %   04/18/22 0803 -- -- -- -- -- 96 %       Intake/Output Summary (Last 24 hours) at 4/19/2022 0701  Last data filed at 4/19/2022 0536  Gross per 24 hour   Intake --   Output 200 ml   Net -200 ml       Recent Labs     04/16/22  1132   WBC 7.0   HGB 14.6   HCT 43.9   MCV 88.2        Recent Labs     04/16/22  1132      K 4.0      CO2 21   BUN 15   CREATININE 0.64       Recent Labs     04/16/22  1512   COLORU Yellow   PHUR 5.5   SPECGRAV 1.027   LEUKOCYTESUR NEGATIVE   UROBILINOGEN Normal   BILIRUBINUR NEGATIVE       Additional Lab/culture results: UA negative    Physical Exam:   General: sleeping comfortably in room, no acute distress   Head: atraumatic, normocephalic   HEENT: moist membranes, PERRLA, EOMI  Respiratory: normal respiratory effort on room air  Cardiac: regular rate  Abdomen: soft,obese, nondistended, left flank pain   Extremities: moves all extremities  Psych: appropriate mood       Interval Imaging Findings:    Impression:    Maida Frances is a 46 y.o. female who presents with:    Intermittently obstructing left 7 mm UPJ stone s/p left stent placement on 4/17/22  Intractable flank pain    Plan:   NPOMN  COVID negative  Pain control and antiemetics as needed  UA negative for infection  Will plan for cystoscopy, left ureteroscopy and holmium laser lithotripsy and stent exchange vs removal today  Patient is still requiring a lot of IV narcotics, if ureteroscopy does not solve patient's pain problem, would consider getting a CT urogram (with and without contrast) to rule out possible embolic etiology   We will continue to follow Yumiko Thao MD  7:01 AM 4/19/2022

## 2022-04-19 NOTE — OP NOTE
Operative Note      Patient: Paul Brothers  YOB: 1980  MRN: [id number]    Date of Procedure: 4/19/2022    Pre-Op Diagnosis: LEFT KIDNEY STONE    Post-Op Diagnosis: same       Procedure performed:  1. Cystourethroscopy  2. Left ureteral stent exchange, 4.8 [place] x24 cm double-J ureteral stent without string  3. Left retrograde pyelogram  4. Left ureteroscopy    Surgeon(s):  Serafin Ye MD    Assistant:   Resident: Issa Gutierrez MD; Reyes Banana, MD; [facility], DO    Anesthesia: General    Estimated Blood Loss (mL): Minimal    Complications: None    Specimens:   None    Implants/drains:  4.8 [place] x24 cm double-J ureteral stent on LEFT without string    Findings: Cystoscopy unremarkable, no bladder masses or lesions, left ureter with narrowing and superficial mucosal damage and avulsion at the mid to proximal ureter preventing advancement of the flexible ureteroscope    Detailed Description of Procedure:   Patient was brought to the operating room and placed on the table in supine position. General anesthesia was induced. Preoperative antibiotics, 2 g Ancef, were given. Patient also received 1 g Rocephin overnight. EPC cuffs were on and working. Time was performed confirming 2 patient identifiers and all were in agreement. Patient was then transferred dorsolithotomy position. She was prepped and draped in normal sterile fashion. We began by inserting a 22 [place] 30 degree lens cystoscope into the bladder. Once I the bladder systemic cystoscopy was performed revealing no bladder masses or lesions. We turned our attention to the left ureteral orifice out of which there was a stent protruding. Using stent graspers, the stent was grasped and brought to the urethral meatus. A wire was introduced into the stent and advanced into the left renal pelvis under direct fluoroscopy. The stent was then removed, leaving the wire in place.   A dual-lumen was used to introduce a second wire.  Upon attempting to advance the dual-lumen, there was significant resistance at the mid ureter. A second wire was successfully able to be introduced, however the dual-lumen was not able to be advanced further, and it was removed. Over one of the wires, a flexible ureteroscope was advanced to the level of the mid to proximal ureter at which point the ureteroscope was unable to be advanced further due to significant narrowing, blanching of the mucosa, and superficial avulsion of the ureter. At this point, contrast was injected through the ureteroscope to perform a left retrograde pyelogram which revealed left hydronephrosis with calyceal dilatation and narrowing to the mid to proximal ureter. Contrast was observed to empty with an appropriate mount of time from the pelvis as demonstrated with repeat fluoroscopy a minute later. At this point the ureteroscope was removed and a 4.8 [place] x26 cm double-J ureteral stent without string was placed on the left side and confirmed to be in good position with fluoroscopy. The bladder was then emptied and the cystoscope was removed intact. This concluded the procedure. Patient was awoken in the operating room and transferred to PACU in stable condition. Dr. Stephanie Menendez was available for the entire duration the procedure. Plan: Patient will return to floor for further monitoring. Stent must remain in place for at least a week to allow passive dilation of the left ureter in order to allow us to access her left renal pelvis and eventually treat her stone. From urology standpoint patient is okay for discharge.     Electronically signed by Scot Chao DO on 4/19/2022 at 12:12 PM

## 2022-04-20 ENCOUNTER — APPOINTMENT (OUTPATIENT)
Dept: CT IMAGING | Age: 42
DRG: 661 | End: 2022-04-20
Payer: COMMERCIAL

## 2022-04-20 DIAGNOSIS — J30.89 NON-SEASONAL ALLERGIC RHINITIS DUE TO OTHER ALLERGIC TRIGGER: ICD-10-CM

## 2022-04-20 PROCEDURE — 6370000000 HC RX 637 (ALT 250 FOR IP): Performed by: PHYSICIAN ASSISTANT

## 2022-04-20 PROCEDURE — 94640 AIRWAY INHALATION TREATMENT: CPT

## 2022-04-20 PROCEDURE — 6360000004 HC RX CONTRAST MEDICATION: Performed by: STUDENT IN AN ORGANIZED HEALTH CARE EDUCATION/TRAINING PROGRAM

## 2022-04-20 PROCEDURE — 6370000000 HC RX 637 (ALT 250 FOR IP): Performed by: EMERGENCY MEDICINE

## 2022-04-20 PROCEDURE — 2580000003 HC RX 258: Performed by: PHYSICIAN ASSISTANT

## 2022-04-20 PROCEDURE — 6360000002 HC RX W HCPCS: Performed by: EMERGENCY MEDICINE

## 2022-04-20 PROCEDURE — 6370000000 HC RX 637 (ALT 250 FOR IP): Performed by: STUDENT IN AN ORGANIZED HEALTH CARE EDUCATION/TRAINING PROGRAM

## 2022-04-20 PROCEDURE — 1200000000 HC SEMI PRIVATE

## 2022-04-20 PROCEDURE — 6360000002 HC RX W HCPCS: Performed by: PHYSICIAN ASSISTANT

## 2022-04-20 PROCEDURE — 74177 CT ABD & PELVIS W/CONTRAST: CPT

## 2022-04-20 PROCEDURE — 94761 N-INVAS EAR/PLS OXIMETRY MLT: CPT

## 2022-04-20 RX ORDER — OXYBUTYNIN CHLORIDE 10 MG/1
10 TABLET, EXTENDED RELEASE ORAL DAILY
Status: DISCONTINUED | OUTPATIENT
Start: 2022-04-20 | End: 2022-04-22 | Stop reason: HOSPADM

## 2022-04-20 RX ORDER — OXYCODONE HCL 20 MG/1
20 TABLET, FILM COATED, EXTENDED RELEASE ORAL EVERY 12 HOURS SCHEDULED
Status: DISCONTINUED | OUTPATIENT
Start: 2022-04-20 | End: 2022-04-21

## 2022-04-20 RX ORDER — LORATADINE 10 MG/1
10 TABLET ORAL DAILY
Qty: 90 TABLET | Refills: 2 | Status: ON HOLD | OUTPATIENT
Start: 2022-04-20 | End: 2022-05-03 | Stop reason: HOSPADM

## 2022-04-20 RX ORDER — OXYBUTYNIN CHLORIDE 10 MG/1
10 TABLET, EXTENDED RELEASE ORAL 2 TIMES DAILY
Status: DISCONTINUED | OUTPATIENT
Start: 2022-04-20 | End: 2022-04-20

## 2022-04-20 RX ADMIN — BUDESONIDE AND FORMOTEROL FUMARATE DIHYDRATE 2 PUFF: 80; 4.5 AEROSOL RESPIRATORY (INHALATION) at 20:10

## 2022-04-20 RX ADMIN — LAMOTRIGINE 200 MG: 100 TABLET ORAL at 20:36

## 2022-04-20 RX ADMIN — METHOCARBAMOL TABLETS 750 MG: 750 TABLET, COATED ORAL at 08:14

## 2022-04-20 RX ADMIN — IPRATROPIUM BROMIDE AND ALBUTEROL SULFATE 1 AMPULE: .5; 3 SOLUTION RESPIRATORY (INHALATION) at 11:22

## 2022-04-20 RX ADMIN — OXYCODONE HYDROCHLORIDE 20 MG: 20 TABLET, FILM COATED, EXTENDED RELEASE ORAL at 20:37

## 2022-04-20 RX ADMIN — ACETAMINOPHEN 1000 MG: 500 TABLET ORAL at 13:40

## 2022-04-20 RX ADMIN — OXYCODONE HYDROCHLORIDE 20 MG: 20 TABLET, FILM COATED, EXTENDED RELEASE ORAL at 09:33

## 2022-04-20 RX ADMIN — LORAZEPAM 1 MG: 2 INJECTION INTRAMUSCULAR; INTRAVENOUS at 01:12

## 2022-04-20 RX ADMIN — ACETAMINOPHEN 1000 MG: 500 TABLET ORAL at 06:48

## 2022-04-20 RX ADMIN — PANTOPRAZOLE SODIUM 40 MG: 40 TABLET, DELAYED RELEASE ORAL at 17:15

## 2022-04-20 RX ADMIN — OXYCODONE 10 MG: 5 TABLET ORAL at 11:09

## 2022-04-20 RX ADMIN — DICYCLOMINE HYDROCHLORIDE 10 MG: 10 CAPSULE ORAL at 08:14

## 2022-04-20 RX ADMIN — METHOCARBAMOL TABLETS 750 MG: 750 TABLET, COATED ORAL at 20:36

## 2022-04-20 RX ADMIN — OXYCODONE 10 MG: 5 TABLET ORAL at 17:15

## 2022-04-20 RX ADMIN — SODIUM CHLORIDE: 9 INJECTION, SOLUTION INTRAVENOUS at 00:03

## 2022-04-20 RX ADMIN — PHENAZOPYRIDINE HYDROCHLORIDE 200 MG: 100 TABLET ORAL at 17:15

## 2022-04-20 RX ADMIN — MORPHINE SULFATE 4 MG: 4 INJECTION INTRAVENOUS at 01:12

## 2022-04-20 RX ADMIN — MORPHINE SULFATE 4 MG: 4 INJECTION INTRAVENOUS at 08:14

## 2022-04-20 RX ADMIN — DICYCLOMINE HYDROCHLORIDE 10 MG: 10 CAPSULE ORAL at 13:40

## 2022-04-20 RX ADMIN — METHOCARBAMOL TABLETS 750 MG: 750 TABLET, COATED ORAL at 13:40

## 2022-04-20 RX ADMIN — OXYBUTYNIN CHLORIDE 10 MG: 10 TABLET, EXTENDED RELEASE ORAL at 09:36

## 2022-04-20 RX ADMIN — IPRATROPIUM BROMIDE AND ALBUTEROL SULFATE 1 AMPULE: .5; 3 SOLUTION RESPIRATORY (INHALATION) at 15:34

## 2022-04-20 RX ADMIN — SODIUM CHLORIDE: 9 INJECTION, SOLUTION INTRAVENOUS at 20:39

## 2022-04-20 RX ADMIN — ENOXAPARIN SODIUM 40 MG: 100 INJECTION SUBCUTANEOUS at 08:14

## 2022-04-20 RX ADMIN — IPRATROPIUM BROMIDE AND ALBUTEROL SULFATE 1 AMPULE: .5; 3 SOLUTION RESPIRATORY (INHALATION) at 20:10

## 2022-04-20 RX ADMIN — PHENAZOPYRIDINE HYDROCHLORIDE 200 MG: 100 TABLET ORAL at 20:36

## 2022-04-20 RX ADMIN — SERTRALINE 150 MG: 50 TABLET, FILM COATED ORAL at 20:36

## 2022-04-20 RX ADMIN — OXYCODONE 10 MG: 5 TABLET ORAL at 04:07

## 2022-04-20 RX ADMIN — MONTELUKAST SODIUM 10 MG: 10 TABLET, FILM COATED ORAL at 20:36

## 2022-04-20 RX ADMIN — METHOCARBAMOL TABLETS 750 MG: 750 TABLET, COATED ORAL at 17:14

## 2022-04-20 RX ADMIN — CETIRIZINE HYDROCHLORIDE 10 MG: 10 TABLET ORAL at 08:14

## 2022-04-20 RX ADMIN — PHENAZOPYRIDINE HYDROCHLORIDE 200 MG: 100 TABLET ORAL at 08:14

## 2022-04-20 RX ADMIN — TAMSULOSIN HYDROCHLORIDE 0.4 MG: 0.4 CAPSULE ORAL at 08:14

## 2022-04-20 RX ADMIN — DICYCLOMINE HYDROCHLORIDE 10 MG: 10 CAPSULE ORAL at 17:14

## 2022-04-20 RX ADMIN — DICYCLOMINE HYDROCHLORIDE 10 MG: 10 CAPSULE ORAL at 20:36

## 2022-04-20 RX ADMIN — IOPAMIDOL 75 ML: 755 INJECTION, SOLUTION INTRAVENOUS at 08:56

## 2022-04-20 RX ADMIN — MORPHINE SULFATE 4 MG: 4 INJECTION INTRAVENOUS at 05:41

## 2022-04-20 RX ADMIN — LORAZEPAM 1 MG: 2 INJECTION INTRAMUSCULAR; INTRAVENOUS at 09:31

## 2022-04-20 RX ADMIN — PANTOPRAZOLE SODIUM 40 MG: 40 TABLET, DELAYED RELEASE ORAL at 06:48

## 2022-04-20 ASSESSMENT — PAIN SCALES - GENERAL
PAINLEVEL_OUTOF10: 10
PAINLEVEL_OUTOF10: 9
PAINLEVEL_OUTOF10: 8
PAINLEVEL_OUTOF10: 10
PAINLEVEL_OUTOF10: 9
PAINLEVEL_OUTOF10: 10
PAINLEVEL_OUTOF10: 10
PAINLEVEL_OUTOF10: 8

## 2022-04-20 NOTE — PLAN OF CARE
Problem: Nutritional:  Goal: Ability to tolerate tube feedings without aspirating will improve  Description: Ability to tolerate tube feedings without aspirating will improve  4/20/2022 0303 by Donal Gonzalez RN  Outcome: Ongoing  4/19/2022 1738 by Kelin Grande  Outcome: Ongoing  Goal: Consumption of food in small portions  Description: Consumption of food in small portions  4/20/2022 0303 by Donal Gonzalez RN  Outcome: Ongoing  4/19/2022 1738 by Kelin Grande  Outcome: Ongoing  Goal: Consumption of liquid of appropriate consistency  Description: Consumption of liquid of appropriate consistency  4/20/2022 0303 by Donal Gonzalez RN  Outcome: Ongoing  4/19/2022 1738 by Kelin Grande  Outcome: Ongoing     Problem: Nutritional:  Goal: Consumption of food in small portions  Description: Consumption of food in small portions  4/20/2022 0303 by Donal Gonzalez RN  Outcome: Ongoing  4/19/2022 1738 by Kelin Grande  Outcome: Ongoing     Problem: Nutritional:  Goal: Consumption of liquid of appropriate consistency  Description: Consumption of liquid of appropriate consistency  4/20/2022 0303 by Donal Gonzalez RN  Outcome: Ongoing  4/19/2022 1738 by Kelin Grande  Outcome: Ongoing     Problem: Respiratory:  Goal: Ability to maintain a clear airway will improve  Description: Ability to maintain a clear airway will improve  4/20/2022 0303 by Donal Gonzalez RN  Outcome: Ongoing  4/19/2022 1738 by Kelin Grande  Outcome: Ongoing  Goal: Will remain free from infection  Description: Will remain free from infection  4/20/2022 0303 by Donal Gonzalez RN  Outcome: Ongoing  4/19/2022 1738 by Kelin Grande  Outcome: Ongoing  Goal: Absence of aspiration  Description: Absence of aspiration  4/20/2022 0303 by Donal Gonzalez RN  Outcome: Ongoing  4/19/2022 1738 by Kelin Grande  Outcome: Ongoing     Problem: Respiratory:  Goal: Will remain free from infection  Description: Will remain free from infection  4/20/2022 0303 by Trey Cosby RN  Outcome: Ongoing  4/19/2022 1738 by Adalgisa Wong  Outcome: Ongoing

## 2022-04-20 NOTE — PROGRESS NOTES
Urology Progress Note     Subjective:   No acute events overnight  No fevers or chills  No nausea or vomiting  Ambulating without difficulty  Was sleeping peacefully until woken up then immediately tearful and complaining of pain in her suprapubic area  Yesterday when attempting to see patient postoperatively in the afternoon, she was out of the hospital taking a smoke break, in speaking with nurse she did this earlier in the morning before her procedure and upon returning from her smoke break requested IV pain medications immediately. Patient Vitals for the past 24 hrs:   BP Temp Temp src Pulse Resp SpO2   04/19/22 1930 136/80 98.3 °F (36.8 °C) Oral 84 18 94 %   04/19/22 1553 133/83 98 °F (36.7 °C) Oral 81 20 96 %   04/19/22 1345 (!) 141/72 97.4 °F (36.3 °C) Oral 90 18 95 %   04/19/22 1324 -- -- -- 80 19 96 %   04/19/22 1320 (!) 154/74 -- -- 84 16 97 %   04/19/22 1247 (!) 129/95 -- -- 89 20 97 %   04/19/22 1234 -- -- -- 96 13 98 %   04/19/22 1219 130/81 96.8 °F (36 °C) Temporal 96 20 97 %   04/19/22 0835 132/89 97.9 °F (36.6 °C) Oral 64 18 96 %       Intake/Output Summary (Last 24 hours) at 4/20/2022 4016  Last data filed at 4/19/2022 1735  Gross per 24 hour   Intake 3738.71 ml   Output --   Net 3738.71 ml       No results for input(s): WBC, HGB, HCT, MCV, PLT in the last 72 hours. No results for input(s): NA, K, CL, CO2, PHOS, BUN, CREATININE, CA in the last 72 hours. No results for input(s): COLORU, PHUR, LABCAST, WBCUA, RBCUA, MUCUS, TRICHOMONAS, YEAST, BACTERIA, CLARITYU, SPECGRAV, LEUKOCYTESUR, UROBILINOGEN, Arsen Cliche in the last 72 hours.     Invalid input(s): NITRATE, GLUCOSEUKETONESUAMORPHOUS    Additional Lab/culture results: UA negative    Physical Exam:   General: sleeping comfortably in room, no acute distress   Head: atraumatic, normocephalic   HEENT: moist membranes, PERRLA, EOMI  Respiratory: normal respiratory effort on room air  Cardiac: regular rate  Abdomen: soft,obese, nondistended  : having suprapubic tenderness   Extremities: moves all extremities  Psych: appropriate mood       Interval Imaging Findings:    Impression:    Flavio Phillips is a 46 y.o. female who presents with: Intermittently obstructing left 7 mm UPJ stone s/p left stent placement on 4/17/22 and left ureteroscopy with left stent exchange 4/19/2022  Intractable flank pain    Plan:   Maintain left ureteral stent - will need to have this for at least a week prior to stone treatment next week with Dr. Brenda Nelson abd/pel with contrast today to rule out other urological causes for her pain  Pain control and antiemetics as needed  UA negative for infection  Continue oxybutynin - increase dosage to 10mg extended release daily, pyridium TID, robaxin QID, flomax qd    Intraoperatively, there was evidence of left ureteral narrowing in the proximal portion of her ureter with significant blanching as well as mild ureteral avulsion. Our instruments were not able to safely advance through this area of her ureter due to the significant narrowing. Should her stent be removed at this time, this would put her at high risk for ureteral stricture which would require invasive abdominal surgery to repair and additionally would require an indwelling stent for 6 weeks. Patient must maintain her current stent for at least a week prior to repeat surgical intervention to allow the ureter to passively dilate safely. We will obtain CT abd/pel w wo contrast today to rule out significant urological causes for her pain other than stent discomfort.     [facility], DO  6:28 AM 4/20/2022

## 2022-04-20 NOTE — CARE COORDINATION
Met with patient at bedside to ensure plan remains home independently and that she has a ride home. Denies needs and has a ride.

## 2022-04-20 NOTE — PROGRESS NOTES
OBS/CDU   Attending NOTE      Patients PCP is:  KATIE Cole - CNP        SUBJECTIVE       Intermittent spasm and pain. Patient with colicky discomfort. Patient seen by urology. Appreciate input. Patient requiring ongoing parenteral support. Patient able to eat and able to walk about but has episodes of significant discomfort and pain. PHYSICAL EXAM      General: NAD, AO X 3  Heent: EMOI, PERRL  Neck: SUPPLE, NO JVD  Cardiovascular: RRR, S1S2  Pulmonary: CTAB, NO SOB  Abdomen: SOFT, NTTP, ND, +BS  Extremities: +2/4 PULSES DISTAL, NO SWELLING  Neuro / Psych: NO NUMBNESS OR TINGLING, MENTATION AT BASELINE    PERTINENT TEST /EXAMS      I have reviewed all available laboratory results.     MEDICATIONS CURRENT   oxybutynin (DITROPAN-XL) extended release tablet 10 mg, Daily  LORazepam (ATIVAN) injection 1 mg, Q6H PRN  nicotine polacrilex (NICORETTE) gum 2 mg, Q2H PRN  oxyCODONE (ROXICODONE) immediate release tablet 5 mg, Q6H PRN   Or  oxyCODONE (ROXICODONE) immediate release tablet 10 mg, Q6H PRN  morphine injection 4 mg, Q2H PRN  methocarbamol (ROBAXIN) tablet 750 mg, 4x Daily  morphine (PF) injection 2 mg, Once  phenazopyridine (PYRIDIUM) tablet 200 mg, TID  acetaminophen (TYLENOL) tablet 1,000 mg, 3 times per day  ketorolac (TORADOL) injection 30 mg, Once  calcium carbonate (TUMS) chewable tablet 500 mg, TID PRN  budesonide-formoterol (SYMBICORT) 80-4.5 MCG/ACT inhaler 2 puff, BID  dicyclomine (BENTYL) capsule 10 mg, 4x Daily  lamoTRIgine (LAMICTAL) tablet 200 mg, Daily  cetirizine (ZYRTEC) tablet 10 mg, Daily  montelukast (SINGULAIR) tablet 10 mg, Nightly  tamsulosin (FLOMAX) capsule 0.4 mg, Daily  sertraline (ZOLOFT) tablet 150 mg, Daily  0.9 % sodium chloride infusion, Continuous  sodium chloride flush 0.9 % injection 5-40 mL, 2 times per day  sodium chloride flush 0.9 % injection 5-40 mL, PRN  0.9 % sodium chloride infusion, PRN  enoxaparin (LOVENOX) injection 40 mg, Daily  ondansetron

## 2022-04-21 LAB
ABSOLUTE EOS #: 0.16 K/UL (ref 0–0.44)
ABSOLUTE IMMATURE GRANULOCYTE: <0.03 K/UL (ref 0–0.3)
ABSOLUTE LYMPH #: 1.56 K/UL (ref 1.1–3.7)
ABSOLUTE MONO #: 0.53 K/UL (ref 0.1–1.2)
ANION GAP SERPL CALCULATED.3IONS-SCNC: 9 MMOL/L (ref 9–17)
BASOPHILS # BLD: 1 % (ref 0–2)
BASOPHILS ABSOLUTE: 0.08 K/UL (ref 0–0.2)
BUN BLDV-MCNC: 15 MG/DL (ref 6–20)
CALCIUM SERPL-MCNC: 8.2 MG/DL (ref 8.6–10.4)
CHLORIDE BLD-SCNC: 109 MMOL/L (ref 98–107)
CO2: 23 MMOL/L (ref 20–31)
CREAT SERPL-MCNC: 0.7 MG/DL (ref 0.5–0.9)
EOSINOPHILS RELATIVE PERCENT: 2 % (ref 1–4)
GFR AFRICAN AMERICAN: >60 ML/MIN
GFR NON-AFRICAN AMERICAN: >60 ML/MIN
GFR SERPL CREATININE-BSD FRML MDRD: ABNORMAL ML/MIN/{1.73_M2}
GLUCOSE BLD-MCNC: 92 MG/DL (ref 70–99)
HCT VFR BLD CALC: 34.6 % (ref 36.3–47.1)
HEMOGLOBIN: 11.4 G/DL (ref 11.9–15.1)
IMMATURE GRANULOCYTES: 0 %
LYMPHOCYTES # BLD: 22 % (ref 24–43)
MCH RBC QN AUTO: 29.7 PG (ref 25.2–33.5)
MCHC RBC AUTO-ENTMCNC: 32.9 G/DL (ref 28.4–34.8)
MCV RBC AUTO: 90.1 FL (ref 82.6–102.9)
MONOCYTES # BLD: 7 % (ref 3–12)
NRBC AUTOMATED: 0 PER 100 WBC
PDW BLD-RTO: 14.1 % (ref 11.8–14.4)
PLATELET # BLD: 255 K/UL (ref 138–453)
PMV BLD AUTO: 10.5 FL (ref 8.1–13.5)
POTASSIUM SERPL-SCNC: 4 MMOL/L (ref 3.7–5.3)
RBC # BLD: 3.84 M/UL (ref 3.95–5.11)
SEG NEUTROPHILS: 67 % (ref 36–65)
SEGMENTED NEUTROPHILS ABSOLUTE COUNT: 4.84 K/UL (ref 1.5–8.1)
SODIUM BLD-SCNC: 141 MMOL/L (ref 135–144)
WBC # BLD: 7.2 K/UL (ref 3.5–11.3)

## 2022-04-21 PROCEDURE — 6370000000 HC RX 637 (ALT 250 FOR IP): Performed by: EMERGENCY MEDICINE

## 2022-04-21 PROCEDURE — 85025 COMPLETE CBC W/AUTO DIFF WBC: CPT

## 2022-04-21 PROCEDURE — 1200000000 HC SEMI PRIVATE

## 2022-04-21 PROCEDURE — 2580000003 HC RX 258: Performed by: PHYSICIAN ASSISTANT

## 2022-04-21 PROCEDURE — 6370000000 HC RX 637 (ALT 250 FOR IP): Performed by: PHYSICIAN ASSISTANT

## 2022-04-21 PROCEDURE — 6370000000 HC RX 637 (ALT 250 FOR IP): Performed by: STUDENT IN AN ORGANIZED HEALTH CARE EDUCATION/TRAINING PROGRAM

## 2022-04-21 PROCEDURE — 6360000002 HC RX W HCPCS: Performed by: PHYSICIAN ASSISTANT

## 2022-04-21 PROCEDURE — 6370000000 HC RX 637 (ALT 250 FOR IP): Performed by: HEALTH CARE PROVIDER

## 2022-04-21 PROCEDURE — 6360000002 HC RX W HCPCS: Performed by: EMERGENCY MEDICINE

## 2022-04-21 PROCEDURE — 36415 COLL VENOUS BLD VENIPUNCTURE: CPT

## 2022-04-21 PROCEDURE — 94640 AIRWAY INHALATION TREATMENT: CPT

## 2022-04-21 PROCEDURE — 80048 BASIC METABOLIC PNL TOTAL CA: CPT

## 2022-04-21 RX ORDER — OXYCODONE HCL 40 MG/1
40 TABLET, FILM COATED, EXTENDED RELEASE ORAL EVERY 12 HOURS SCHEDULED
Status: DISCONTINUED | OUTPATIENT
Start: 2022-04-21 | End: 2022-04-22 | Stop reason: HOSPADM

## 2022-04-21 RX ORDER — OXYCODONE HYDROCHLORIDE 5 MG/1
10 TABLET ORAL EVERY 4 HOURS PRN
Status: DISCONTINUED | OUTPATIENT
Start: 2022-04-21 | End: 2022-04-22 | Stop reason: HOSPADM

## 2022-04-21 RX ORDER — OXYCODONE HYDROCHLORIDE 5 MG/1
5 TABLET ORAL EVERY 4 HOURS PRN
Status: DISCONTINUED | OUTPATIENT
Start: 2022-04-21 | End: 2022-04-22 | Stop reason: HOSPADM

## 2022-04-21 RX ADMIN — DICYCLOMINE HYDROCHLORIDE 10 MG: 10 CAPSULE ORAL at 09:32

## 2022-04-21 RX ADMIN — SODIUM CHLORIDE: 9 INJECTION, SOLUTION INTRAVENOUS at 17:15

## 2022-04-21 RX ADMIN — TAMSULOSIN HYDROCHLORIDE 0.4 MG: 0.4 CAPSULE ORAL at 09:32

## 2022-04-21 RX ADMIN — ENOXAPARIN SODIUM 40 MG: 100 INJECTION SUBCUTANEOUS at 09:32

## 2022-04-21 RX ADMIN — ACETAMINOPHEN 1000 MG: 500 TABLET ORAL at 06:41

## 2022-04-21 RX ADMIN — METHOCARBAMOL TABLETS 750 MG: 750 TABLET, COATED ORAL at 17:13

## 2022-04-21 RX ADMIN — MORPHINE SULFATE 4 MG: 4 INJECTION INTRAVENOUS at 07:35

## 2022-04-21 RX ADMIN — DICYCLOMINE HYDROCHLORIDE 10 MG: 10 CAPSULE ORAL at 12:49

## 2022-04-21 RX ADMIN — LORAZEPAM 1 MG: 2 INJECTION INTRAMUSCULAR; INTRAVENOUS at 21:54

## 2022-04-21 RX ADMIN — IPRATROPIUM BROMIDE AND ALBUTEROL SULFATE 1 AMPULE: .5; 3 SOLUTION RESPIRATORY (INHALATION) at 11:43

## 2022-04-21 RX ADMIN — OXYCODONE 10 MG: 5 TABLET ORAL at 21:55

## 2022-04-21 RX ADMIN — OXYCODONE HYDROCHLORIDE 40 MG: 40 TABLET, FILM COATED, EXTENDED RELEASE ORAL at 10:23

## 2022-04-21 RX ADMIN — NICOTINE POLACRILEX 2 MG: 2 GUM, CHEWING BUCCAL at 18:38

## 2022-04-21 RX ADMIN — OXYCODONE 10 MG: 5 TABLET ORAL at 17:13

## 2022-04-21 RX ADMIN — IPRATROPIUM BROMIDE AND ALBUTEROL SULFATE 1 AMPULE: .5; 3 SOLUTION RESPIRATORY (INHALATION) at 19:27

## 2022-04-21 RX ADMIN — MONTELUKAST SODIUM 10 MG: 10 TABLET, FILM COATED ORAL at 21:55

## 2022-04-21 RX ADMIN — ACETAMINOPHEN 1000 MG: 500 TABLET ORAL at 21:56

## 2022-04-21 RX ADMIN — PANTOPRAZOLE SODIUM 40 MG: 40 TABLET, DELAYED RELEASE ORAL at 06:41

## 2022-04-21 RX ADMIN — NICOTINE POLACRILEX 2 MG: 2 GUM, CHEWING BUCCAL at 01:58

## 2022-04-21 RX ADMIN — BUDESONIDE AND FORMOTEROL FUMARATE DIHYDRATE 2 PUFF: 80; 4.5 AEROSOL RESPIRATORY (INHALATION) at 07:26

## 2022-04-21 RX ADMIN — ACETAMINOPHEN 1000 MG: 500 TABLET ORAL at 14:39

## 2022-04-21 RX ADMIN — LAMOTRIGINE 200 MG: 100 TABLET ORAL at 21:55

## 2022-04-21 RX ADMIN — SERTRALINE 150 MG: 50 TABLET, FILM COATED ORAL at 21:55

## 2022-04-21 RX ADMIN — METHOCARBAMOL TABLETS 750 MG: 750 TABLET, COATED ORAL at 21:55

## 2022-04-21 RX ADMIN — OXYCODONE HYDROCHLORIDE 40 MG: 40 TABLET, FILM COATED, EXTENDED RELEASE ORAL at 21:55

## 2022-04-21 RX ADMIN — IPRATROPIUM BROMIDE AND ALBUTEROL SULFATE 1 AMPULE: .5; 3 SOLUTION RESPIRATORY (INHALATION) at 15:21

## 2022-04-21 RX ADMIN — DICYCLOMINE HYDROCHLORIDE 10 MG: 10 CAPSULE ORAL at 17:13

## 2022-04-21 RX ADMIN — IPRATROPIUM BROMIDE AND ALBUTEROL SULFATE 1 AMPULE: .5; 3 SOLUTION RESPIRATORY (INHALATION) at 07:26

## 2022-04-21 RX ADMIN — PHENAZOPYRIDINE HYDROCHLORIDE 200 MG: 100 TABLET ORAL at 09:31

## 2022-04-21 RX ADMIN — SODIUM CHLORIDE: 9 INJECTION, SOLUTION INTRAVENOUS at 07:26

## 2022-04-21 RX ADMIN — MORPHINE SULFATE 4 MG: 4 INJECTION INTRAVENOUS at 07:30

## 2022-04-21 RX ADMIN — CETIRIZINE HYDROCHLORIDE 10 MG: 10 TABLET ORAL at 09:32

## 2022-04-21 RX ADMIN — OXYCODONE 10 MG: 5 TABLET ORAL at 01:56

## 2022-04-21 RX ADMIN — BUDESONIDE AND FORMOTEROL FUMARATE DIHYDRATE 2 PUFF: 80; 4.5 AEROSOL RESPIRATORY (INHALATION) at 19:28

## 2022-04-21 RX ADMIN — NICOTINE POLACRILEX 2 MG: 2 GUM, CHEWING BUCCAL at 15:08

## 2022-04-21 RX ADMIN — DICYCLOMINE HYDROCHLORIDE 10 MG: 10 CAPSULE ORAL at 21:56

## 2022-04-21 RX ADMIN — PHENAZOPYRIDINE HYDROCHLORIDE 200 MG: 100 TABLET ORAL at 14:39

## 2022-04-21 RX ADMIN — OXYBUTYNIN CHLORIDE 10 MG: 10 TABLET, EXTENDED RELEASE ORAL at 09:32

## 2022-04-21 RX ADMIN — MORPHINE SULFATE 4 MG: 4 INJECTION INTRAVENOUS at 19:29

## 2022-04-21 RX ADMIN — PHENAZOPYRIDINE HYDROCHLORIDE 200 MG: 100 TABLET ORAL at 21:56

## 2022-04-21 ASSESSMENT — PAIN SCALES - GENERAL
PAINLEVEL_OUTOF10: 10
PAINLEVEL_OUTOF10: 9
PAINLEVEL_OUTOF10: 6
PAINLEVEL_OUTOF10: 10
PAINLEVEL_OUTOF10: 10
PAINLEVEL_OUTOF10: 9
PAINLEVEL_OUTOF10: 9
PAINLEVEL_OUTOF10: 10
PAINLEVEL_OUTOF10: 9
PAINLEVEL_OUTOF10: 6

## 2022-04-21 ASSESSMENT — PAIN DESCRIPTION - LOCATION: LOCATION: ABDOMEN;BACK

## 2022-04-21 ASSESSMENT — PAIN DESCRIPTION - ORIENTATION: ORIENTATION: LEFT

## 2022-04-21 ASSESSMENT — PAIN DESCRIPTION - DESCRIPTORS: DESCRIPTORS: ACHING;DISCOMFORT

## 2022-04-21 NOTE — PLAN OF CARE
Problem: Nutritional:  Goal: Ability to tolerate tube feedings without aspirating will improve  Description: Ability to tolerate tube feedings without aspirating will improve  Outcome: Progressing  Goal: Consumption of food in small portions  Description: Consumption of food in small portions  Outcome: Progressing  Goal: Consumption of liquid of appropriate consistency  Description: Consumption of liquid of appropriate consistency  Outcome: Progressing     Problem: Nutritional:  Goal: Consumption of liquid of appropriate consistency  Description: Consumption of liquid of appropriate consistency  Outcome: Progressing     Problem: Respiratory:  Goal: Will remain free from infection  Description: Will remain free from infection  Outcome: Progressing     Problem: Respiratory:  Goal: Absence of aspiration  Description: Absence of aspiration  Outcome: Progressing     Problem: Safety:  Goal: Ability to chew and swallow food without choking will improve  Description: Ability to chew and swallow food without choking will improve  Outcome: Progressing  Goal: Ability to demonstrate good, daily oral hygiene techniques will improve  Description: Ability to demonstrate good, daily oral hygiene techniques will improve  Outcome: Progressing  Goal: Maintenance of upright position during and after feeding  Description: Maintenance of upright position during and after feeding  Outcome: Progressing

## 2022-04-21 NOTE — PROGRESS NOTES
Urology Progress Note     Subjective:   No acute events overnight  No fevers or chills  No nausea or vomiting  Excruciating pain in back and stomach this am  Ambulating without difficulty  No SOB or chest pain  Discussed results of CT scan with the patient and she expressed understanding      Patient Vitals for the past 24 hrs:   BP Temp Temp src Pulse Resp SpO2   04/20/22 1929 121/64 98.5 °F (36.9 °C) Temporal 78 20 94 %   04/20/22 1124 -- -- -- -- 20 97 %       Intake/Output Summary (Last 24 hours) at 4/21/2022 5170  Last data filed at 4/21/2022 0501  Gross per 24 hour   Intake 3383.04 ml   Output --   Net 3383.04 ml       No results for input(s): WBC, HGB, HCT, MCV, PLT in the last 72 hours. No results for input(s): NA, K, CL, CO2, PHOS, BUN, CREATININE, CA in the last 72 hours. No results for input(s): COLORU, PHUR, LABCAST, WBCUA, RBCUA, MUCUS, TRICHOMONAS, YEAST, BACTERIA, CLARITYU, SPECGRAV, LEUKOCYTESUR, UROBILINOGEN, Zollie Ortiz in the last 72 hours. Invalid input(s): NITRATE, GLUCOSEUKETONESUAMORPHOUS    Additional Lab/culture results: UA negative    Physical Exam:   General: sleeping comfortably in room, no acute distress   Head: atraumatic, normocephalic   HEENT: moist membranes, PERRLA, EOMI  Respiratory: normal respiratory effort on room air  Cardiac: regular rate  Abdomen: soft,obese, nondistended, lower abdominal pain  : having suprapubic tenderness, left flank pain  Extremities: moves all extremities  Psych: appropriate mood       Interval Imaging Findings: CT abd/pel    Impression:    Flaquito Mccarthy is a 46 y.o. female who presents with:    Intermittently obstructing left 7 mm UPJ stone s/p left stent placement on 4/17/22 and left ureteroscopy with left stent exchange 4/19/2022  Intractable flank pain    Plan:   Maintain left ureteral stent - will need to have this for at least a week prior to stone treatment next week with Dr. Jina Carrion  CT abd/pel with contrast today did not show any evidence of infarct or renal masses  Pain control and antiemetics as needed  UA negative for infection  Continue oxybutynin - increase dosage to 10mg extended release daily, pyridium TID, robaxin QID, flomax qd    Intraoperatively, there was evidence of left ureteral narrowing in the proximal portion of her ureter with significant blanching as well as mild ureteral avulsion. Our instruments were not able to safely advance through this area of her ureter due to the significant narrowing. Should her stent be removed at this time, this would put her at high risk for ureteral stricture which would require invasive abdominal surgery to repair and additionally would require an indwelling stent for 6 weeks. Patient must maintain her current stent for at least a week prior to repeat surgical intervention to allow the ureter to passively dilate safely.      [facility], DO  6:32 AM 4/21/2022

## 2022-04-21 NOTE — PLAN OF CARE
Problem: Nutritional:  Goal: Ability to tolerate tube feedings without aspirating will improve  Description: Ability to tolerate tube feedings without aspirating will improve  4/21/2022 0954 by Марина Mcdonald  Outcome: Progressing  4/21/2022 0451 by Aj Alberts RN  Outcome: Progressing  Goal: Consumption of food in small portions  Description: Consumption of food in small portions  4/21/2022 0954 by Марина Mcdonald  Outcome: Progressing  4/21/2022 0451 by Aj Alberts RN  Outcome: Progressing  Goal: Consumption of liquid of appropriate consistency  Description: Consumption of liquid of appropriate consistency  4/21/2022 0954 by Марина Mcdonald  Outcome: Progressing  4/21/2022 0451 by Aj Alberts RN  Outcome: Progressing     Problem: Respiratory:  Goal: Ability to maintain a clear airway will improve  Description: Ability to maintain a clear airway will improve  4/21/2022 0954 by Марина Mcdonald  Outcome: Progressing  4/21/2022 0451 by Aj Alberts RN  Outcome: Progressing  Goal: Will remain free from infection  Description: Will remain free from infection  4/21/2022 0954 by Марина Mcdonald  Outcome: Progressing  4/21/2022 0451 by Aj Alberts RN  Outcome: Progressing  Goal: Absence of aspiration  Description: Absence of aspiration  4/21/2022 0954 by Марина Mcdonald  Outcome: Progressing  4/21/2022 0451 by Aj Alberts RN  Outcome: Progressing     Problem: Safety:  Goal: Ability to chew and swallow food without choking will improve  Description: Ability to chew and swallow food without choking will improve  4/21/2022 0954 by Марина Mcdonald  Outcome: Progressing  4/21/2022 0451 by Aj Alberts RN  Outcome: Progressing  Goal: Ability to demonstrate good, daily oral hygiene techniques will improve  Description: Ability to demonstrate good, daily oral hygiene techniques will improve  4/21/2022 0954 by Марина Mcdonald  Outcome: Progressing  4/21/2022 0451 by Jeromy Juarez RN  Outcome: Progressing  Goal: Maintenance of upright position during and after feeding  Description: Maintenance of upright position during and after feeding  4/21/2022 0954 by Shannan Morales  Outcome: Progressing  4/21/2022 0451 by Jeromy Juarez RN  Outcome: Progressing     Problem: Pain:  Goal: Pain level will decrease  Description: Pain level will decrease  4/21/2022 0954 by Shannan Morales  Outcome: Progressing  4/21/2022 0451 by Jeromy Juarez RN  Outcome: Progressing  Goal: Control of acute pain  Description: Control of acute pain  4/21/2022 0954 by Shannan Morales  Outcome: Progressing  4/21/2022 0451 by Jeromy Juarez RN  Outcome: Progressing  Goal: Control of chronic pain  Description: Control of chronic pain  4/21/2022 0954 by Shannan Morales  Outcome: Progressing  4/21/2022 0451 by Jeromy Juarez RN  Outcome: Progressing     Problem: Discharge Planning  Goal: Discharge to home or other facility with appropriate resources  4/21/2022 0954 by Shannan Morales  Outcome: Progressing  4/21/2022 0451 by Jeromy Juarez RN  Outcome: Progressing

## 2022-04-22 VITALS
BODY MASS INDEX: 38.62 KG/M2 | RESPIRATION RATE: 18 BRPM | WEIGHT: 209.88 LBS | DIASTOLIC BLOOD PRESSURE: 63 MMHG | TEMPERATURE: 97.4 F | HEIGHT: 62 IN | HEART RATE: 87 BPM | OXYGEN SATURATION: 91 % | SYSTOLIC BLOOD PRESSURE: 130 MMHG

## 2022-04-22 LAB — GLUCOSE BLD-MCNC: 89 MG/DL (ref 65–105)

## 2022-04-22 PROCEDURE — 6360000002 HC RX W HCPCS: Performed by: EMERGENCY MEDICINE

## 2022-04-22 PROCEDURE — 6370000000 HC RX 637 (ALT 250 FOR IP): Performed by: EMERGENCY MEDICINE

## 2022-04-22 PROCEDURE — 6370000000 HC RX 637 (ALT 250 FOR IP): Performed by: PHYSICIAN ASSISTANT

## 2022-04-22 PROCEDURE — 82947 ASSAY GLUCOSE BLOOD QUANT: CPT

## 2022-04-22 PROCEDURE — 2580000003 HC RX 258: Performed by: PHYSICIAN ASSISTANT

## 2022-04-22 PROCEDURE — 6370000000 HC RX 637 (ALT 250 FOR IP): Performed by: STUDENT IN AN ORGANIZED HEALTH CARE EDUCATION/TRAINING PROGRAM

## 2022-04-22 PROCEDURE — 6370000000 HC RX 637 (ALT 250 FOR IP): Performed by: HEALTH CARE PROVIDER

## 2022-04-22 PROCEDURE — 94640 AIRWAY INHALATION TREATMENT: CPT

## 2022-04-22 PROCEDURE — 6360000002 HC RX W HCPCS: Performed by: PHYSICIAN ASSISTANT

## 2022-04-22 RX ORDER — OXYCODONE HCL 40 MG/1
40 TABLET, FILM COATED, EXTENDED RELEASE ORAL EVERY 12 HOURS SCHEDULED
Qty: 14 TABLET | Refills: 0 | Status: SHIPPED | OUTPATIENT
Start: 2022-04-22 | End: 2022-04-29

## 2022-04-22 RX ORDER — METHOCARBAMOL 750 MG/1
750 TABLET, FILM COATED ORAL 4 TIMES DAILY
Qty: 40 TABLET | Refills: 0 | Status: ON HOLD | OUTPATIENT
Start: 2022-04-22 | End: 2022-05-03 | Stop reason: HOSPADM

## 2022-04-22 RX ORDER — PHENAZOPYRIDINE HYDROCHLORIDE 200 MG/1
200 TABLET, FILM COATED ORAL 3 TIMES DAILY
Qty: 21 TABLET | Refills: 0 | Status: SHIPPED | OUTPATIENT
Start: 2022-04-22 | End: 2022-04-29

## 2022-04-22 RX ORDER — OXYBUTYNIN CHLORIDE 10 MG/1
10 TABLET, EXTENDED RELEASE ORAL DAILY
Qty: 30 TABLET | Refills: 0 | Status: SHIPPED | OUTPATIENT
Start: 2022-04-23

## 2022-04-22 RX ORDER — OXYCODONE HYDROCHLORIDE 5 MG/1
5 TABLET ORAL EVERY 4 HOURS PRN
Qty: 15 TABLET | Refills: 0 | Status: SHIPPED | OUTPATIENT
Start: 2022-04-22 | End: 2022-04-25

## 2022-04-22 RX ORDER — TAMSULOSIN HYDROCHLORIDE 0.4 MG/1
0.4 CAPSULE ORAL DAILY
Qty: 30 CAPSULE | Refills: 0 | Status: SHIPPED | OUTPATIENT
Start: 2022-04-22

## 2022-04-22 RX ADMIN — NICOTINE POLACRILEX 2 MG: 2 GUM, CHEWING BUCCAL at 11:37

## 2022-04-22 RX ADMIN — IPRATROPIUM BROMIDE AND ALBUTEROL SULFATE 1 AMPULE: .5; 3 SOLUTION RESPIRATORY (INHALATION) at 11:21

## 2022-04-22 RX ADMIN — PHENAZOPYRIDINE HYDROCHLORIDE 200 MG: 100 TABLET ORAL at 09:06

## 2022-04-22 RX ADMIN — CETIRIZINE HYDROCHLORIDE 10 MG: 10 TABLET ORAL at 09:06

## 2022-04-22 RX ADMIN — NICOTINE POLACRILEX 2 MG: 2 GUM, CHEWING BUCCAL at 04:08

## 2022-04-22 RX ADMIN — OXYCODONE 10 MG: 5 TABLET ORAL at 10:48

## 2022-04-22 RX ADMIN — OXYBUTYNIN CHLORIDE 10 MG: 10 TABLET, EXTENDED RELEASE ORAL at 09:06

## 2022-04-22 RX ADMIN — METHOCARBAMOL TABLETS 750 MG: 750 TABLET, COATED ORAL at 13:32

## 2022-04-22 RX ADMIN — DICYCLOMINE HYDROCHLORIDE 10 MG: 10 CAPSULE ORAL at 09:06

## 2022-04-22 RX ADMIN — SODIUM CHLORIDE: 9 INJECTION, SOLUTION INTRAVENOUS at 03:16

## 2022-04-22 RX ADMIN — OXYCODONE 10 MG: 5 TABLET ORAL at 06:11

## 2022-04-22 RX ADMIN — OXYCODONE 10 MG: 5 TABLET ORAL at 02:02

## 2022-04-22 RX ADMIN — TAMSULOSIN HYDROCHLORIDE 0.4 MG: 0.4 CAPSULE ORAL at 09:06

## 2022-04-22 RX ADMIN — LORAZEPAM 1 MG: 2 INJECTION INTRAMUSCULAR; INTRAVENOUS at 04:06

## 2022-04-22 RX ADMIN — OXYCODONE HYDROCHLORIDE 40 MG: 40 TABLET, FILM COATED, EXTENDED RELEASE ORAL at 09:12

## 2022-04-22 RX ADMIN — ANTACID TABLETS 500 MG: 500 TABLET, CHEWABLE ORAL at 04:08

## 2022-04-22 RX ADMIN — ACETAMINOPHEN 1000 MG: 500 TABLET ORAL at 06:11

## 2022-04-22 RX ADMIN — MORPHINE SULFATE 4 MG: 4 INJECTION INTRAVENOUS at 03:15

## 2022-04-22 RX ADMIN — ENOXAPARIN SODIUM 40 MG: 100 INJECTION SUBCUTANEOUS at 09:06

## 2022-04-22 RX ADMIN — DICYCLOMINE HYDROCHLORIDE 10 MG: 10 CAPSULE ORAL at 13:32

## 2022-04-22 RX ADMIN — METHOCARBAMOL TABLETS 750 MG: 750 TABLET, COATED ORAL at 09:06

## 2022-04-22 RX ADMIN — IPRATROPIUM BROMIDE AND ALBUTEROL SULFATE 1 AMPULE: .5; 3 SOLUTION RESPIRATORY (INHALATION) at 08:01

## 2022-04-22 RX ADMIN — ACETAMINOPHEN 1000 MG: 500 TABLET ORAL at 13:32

## 2022-04-22 RX ADMIN — BUDESONIDE AND FORMOTEROL FUMARATE DIHYDRATE 2 PUFF: 80; 4.5 AEROSOL RESPIRATORY (INHALATION) at 08:01

## 2022-04-22 RX ADMIN — PANTOPRAZOLE SODIUM 40 MG: 40 TABLET, DELAYED RELEASE ORAL at 06:12

## 2022-04-22 ASSESSMENT — PAIN DESCRIPTION - LOCATION
LOCATION: ABDOMEN
LOCATION: ABDOMEN;BACK
LOCATION: ABDOMEN
LOCATION: ABDOMEN;BACK

## 2022-04-22 ASSESSMENT — PAIN DESCRIPTION - DESCRIPTORS
DESCRIPTORS: ACHING;DISCOMFORT
DESCRIPTORS: DISCOMFORT;ACHING
DESCRIPTORS: DISCOMFORT
DESCRIPTORS: CRAMPING

## 2022-04-22 ASSESSMENT — PAIN SCALES - GENERAL
PAINLEVEL_OUTOF10: 6
PAINLEVEL_OUTOF10: 10
PAINLEVEL_OUTOF10: 9
PAINLEVEL_OUTOF10: 9
PAINLEVEL_OUTOF10: 6
PAINLEVEL_OUTOF10: 6
PAINLEVEL_OUTOF10: 10
PAINLEVEL_OUTOF10: 8

## 2022-04-22 ASSESSMENT — PAIN DESCRIPTION - ORIENTATION
ORIENTATION: LEFT
ORIENTATION: LEFT

## 2022-04-22 NOTE — DISCHARGE SUMMARY
CDU Discharge Summary        Patient:  Celia Maloney  YOB: 1980    MRN: [id number]   Acct: [de-identified]    Primary Care Physician: KATIE Ambrosio CNP    Admit date:  4/16/2022 11:19 AM  Discharge date: 4/22/2022  1:50 PM     Discharge Diagnoses:     Acute flank pain due to nephrolithiasis  Improved with pain control, urology evaluation resulting in stenting of left ureter on 4/17/2022, with stent exchange on 4/19/2022    Follow-up:  Call today/tomorrow for a follow up appointment with KATIE Ambrosio CNP , or return to the Emergency Room with worsening symptoms    Stressed to patient the importance of following up with primary care doctor for further workup/management of symptoms. Pt verbalizes understanding and agrees with plan. Discharge Medications:  Changes to medications            Medication List      START taking these medications    oxybutynin 10 MG extended release tablet  Commonly known as: DITROPAN-XL  Take 1 tablet by mouth daily  Start taking on: April 23, 2022     * oxyCODONE 40 MG extended release tablet  Commonly known as: OXYCONTIN  Take 1 tablet by mouth every 12 hours for 7 days. * oxyCODONE 5 MG immediate release tablet  Commonly known as: ROXICODONE  Take 1 tablet by mouth every 4 hours as needed for Pain (Breakthrough pain) for up to 3 days. phenazopyridine 200 MG tablet  Commonly known as: PYRIDIUM  Take 1 tablet by mouth in the morning, at noon, and at bedtime for 7 days         * This list has 2 medication(s) that are the same as other medications prescribed for you. Read the directions carefully, and ask your doctor or other care provider to review them with you. CHANGE how you take these medications    acetaminophen 500 MG tablet  Commonly known as: TYLENOL  Take 2 tablets by mouth every 6 hours as needed for Pain  What changed: Another medication with the same name was removed.  Continue taking this medication, and follow the directions you see here. * ROBAXIN PO  What changed: Another medication with the same name was added. Make sure you understand how and when to take each. * methocarbamol 750 MG tablet  Commonly known as: ROBAXIN  Take 1 tablet by mouth 4 times daily for 10 days  What changed: You were already taking a medication with the same name, and this prescription was added. Make sure you understand how and when to take each. * This list has 2 medication(s) that are the same as other medications prescribed for you. Read the directions carefully, and ask your doctor or other care provider to review them with you. CONTINUE taking these medications    Breo Ellipta 200-25 MCG/INH Aepb inhaler  Generic drug: Fluticasone furoate-vilanterol  INHALE 1 PUFF INTO THE LUNGS DAILY     [place] Choice Face Mask Misc  USE NEW FACE MASK EVERYDAY WHEN PATIENT GO OUTSIDE OF HOME DUE TO COVID PANDEMIC     dicyclomine 10 MG capsule  Commonly known as: BENTYL  TAKE 1 CAPSULE BY MOUTH 4 TIMES DAILY     fluticasone 50 MCG/ACT nasal spray  Commonly known as: FLONASE  1 spray by Each Nostril route daily     glucose monitoring kit  Use as directed. BRAND OF CHOICE INSURANCE ALLOWS.       Sterile Alcohol Prep Pads  USE AS DIRECTED     * ipratropium-albuterol 0.5-2.5 (3) MG/3ML Soln nebulizer solution  Commonly known as: DUONEB  INHALE CONTENTS OF 1 UNIT DOSE VIA NEBULIZER EVERY 4 HOURS     * Combivent Respimat  MCG/ACT Aers inhaler  Generic drug: albuterol-ipratropium  Inhale 1 puff into the lungs 4 times daily Inhale 1 puff into the lungs 4 times daily     lamoTRIgine 200 MG tablet  Commonly known as: LAMICTAL     loratadine 10 MG tablet  Commonly known as: CLARITIN  TAKE 1 TABLET BY MOUTH DAILY     montelukast 10 MG tablet  Commonly known as: SINGULAIR  TAKE 1 TABLET IN THE EVENING ONCE A DAY ORALLY     OneTouch Delica Plus [id number] Misc  USE TO TEST BLOOD SUGAR TWICE A DAY     OneTouch Verio strip  Generic drug: blood glucose test strips  USE TO TEST BLOOD SUGAR TWICE A DAY     pantoprazole 40 MG tablet  Commonly known as: PROTONIX  1 tab daily     pregabalin 100 MG capsule  Commonly known as: Lyrica  Take 1 capsule by mouth 3 times daily for 30 days. sertraline 100 MG tablet  Commonly known as: ZOLOFT     Strainer/Stainless Steel/2.5\" Misc  1 each by Does not apply route as needed (when you urinate)     tamsulosin 0.4 MG capsule  Commonly known as: FLOMAX  Take 1 capsule by mouth daily     vitamin D3 25 MCG (1000 UT) Tabs tablet  Commonly known as: CHOLECALCIFEROL  TAKE 1 TABLET BY MOUTH DAILY         * This list has 2 medication(s) that are the same as other medications prescribed for you. Read the directions carefully, and ask your doctor or other care provider to review them with you. STOP taking these medications    predniSONE 10 MG tablet  Commonly known as: DELTASONE     promethazine 6.25 MG/5ML syrup  Commonly known as: PHENERGAN        ASK your doctor about these medications    * oxyCODONE 5 MG immediate release tablet  Commonly known as: Roxicodone  Take 1 tablet by mouth every 6 hours as needed for Pain for up to 3 days. Intended supply: 3 days. Take lowest dose possible to manage pain  Ask about: Should I take this medication? * This list has 1 medication(s) that are the same as other medications prescribed for you. Read the directions carefully, and ask your doctor or other care provider to review them with you.                Where to Get Your Medications      These medications were sent to [address], [address]    Phone: [phone]   · loratadine 10 MG tablet     These medications were sent to [facility] [address] [facility], [address]  [address], [address] [place] [address]    Phone: [phone]   · acetaminophen 500 MG tablet  · methocarbamol 750 MG tablet  · oxybutynin 10 MG extended release tablet  · phenazopyridine 200 MG tablet  · tamsulosin 0.4 MG capsule     You can get these medications from any pharmacy    Bring a paper prescription for each of these medications  · oxyCODONE 40 MG extended release tablet  · oxyCODONE 5 MG immediate release tablet  · oxyCODONE 5 MG immediate release tablet         Diet:  No diet orders on file , Advance as tolerated     Activity:  As tolerated    Consultants: IP CONSULT TO UROLOGY    Procedures:  Not indicated     Diagnostic Test:   Results for orders placed or performed during the hospital encounter of 04/16/22   COVID-19, Rapid    Specimen: Nasopharyngeal Swab   Result Value Ref Range    Specimen Description . NASOPHARYNGEAL SWAB     SARS-CoV-2, Rapid Not Detected Not Detected   CBC with Auto Differential   Result Value Ref Range    WBC 7.0 3.5 - 11.3 k/uL    RBC 4.98 3.95 - 5.11 m/uL    Hemoglobin 14.6 11.9 - 15.1 g/dL    Hematocrit 43.9 36.3 - 47.1 %    MCV 88.2 82.6 - 102.9 fL    MCH 29.3 25.2 - 33.5 pg    MCHC 33.3 28.4 - 34.8 g/dL    RDW 13.5 11.8 - 14.4 %    Platelets 951 521 - 115 k/uL    MPV 10.5 8.1 - 13.5 fL    NRBC Automated 0.0 0.0 per 100 WBC    Seg Neutrophils 62 36 - 65 %    Lymphocytes 27 24 - 43 %    Monocytes 5 3 - 12 %    Eosinophils % 4 1 - 4 %    Basophils 1 0 - 2 %    Immature Granulocytes 0 0 %    Segs Absolute 4.35 1.50 - 8.10 k/uL    Absolute Lymph # 1.91 1.10 - 3.70 k/uL    Absolute Mono # 0.35 0.10 - 1.20 k/uL    Absolute Eos # 0.27 0.00 - 0.44 k/uL    Basophils Absolute 0.09 0.00 - 0.20 k/uL    Absolute Immature Granulocyte <0.03 0.00 - 0.30 k/uL   Basic Metabolic Panel   Result Value Ref Range    Glucose 115 (H) 70 - 99 mg/dL    BUN 15 6 - 20 mg/dL    CREATININE 0.64 0.50 - 0.90 mg/dL    Calcium 9.0 8.6 - 10.4 mg/dL    Sodium 139 135 - 144 mmol/L    Potassium 4.0 3.7 - 5.3 mmol/L    Chloride 107 98 - 107 mmol/L    CO2 21 20 - 31 mmol/L    Anion Gap 11 9 - 17 mmol/L    GFR Non-African American >60 >60 mL/min    GFR African American >60 >60 mL/min    GFR Comment         Urinalysis with Reflex to Culture    Specimen: Urine   Result Value Ref Range    Color, UA Yellow Yellow    Turbidity UA Clear Clear    Glucose, Ur NEGATIVE NEGATIVE    Bilirubin Urine NEGATIVE NEGATIVE    Ketones, Urine NEGATIVE NEGATIVE    Specific Gravity, UA 1.027 1.005 - 1.030    Urine Hgb NEGATIVE NEGATIVE    pH, UA 5.5 5.0 - 8.0    Protein, UA NEGATIVE NEGATIVE    Urobilinogen, Urine Normal Normal    Nitrite, Urine NEGATIVE NEGATIVE    Leukocyte Esterase, Urine NEGATIVE NEGATIVE    Urinalysis Comments       Microscopic exam not performed based on chemical results unless requested in original order.    CBC with Auto Differential   Result Value Ref Range    WBC 7.2 3.5 - 11.3 k/uL    RBC 3.84 (L) 3.95 - 5.11 m/uL    Hemoglobin 11.4 (L) 11.9 - 15.1 g/dL    Hematocrit 34.6 (L) 36.3 - 47.1 %    MCV 90.1 82.6 - 102.9 fL    MCH 29.7 25.2 - 33.5 pg    MCHC 32.9 28.4 - 34.8 g/dL    RDW 14.1 11.8 - 14.4 %    Platelets 140 486 - 031 k/uL    MPV 10.5 8.1 - 13.5 fL    NRBC Automated 0.0 0.0 per 100 WBC    Seg Neutrophils 67 (H) 36 - 65 %    Lymphocytes 22 (L) 24 - 43 %    Monocytes 7 3 - 12 %    Eosinophils % 2 1 - 4 %    Basophils 1 0 - 2 %    Immature Granulocytes 0 0 %    Segs Absolute 4.84 1.50 - 8.10 k/uL    Absolute Lymph # 1.56 1.10 - 3.70 k/uL    Absolute Mono # 0.53 0.10 - 1.20 k/uL    Absolute Eos # 0.16 0.00 - 0.44 k/uL    Basophils Absolute 0.08 0.00 - 0.20 k/uL    Absolute Immature Granulocyte <0.03 0.00 - 0.30 k/uL   Basic Metabolic Panel w/ Reflex to MG   Result Value Ref Range    Glucose 92 70 - 99 mg/dL    BUN 15 6 - 20 mg/dL    CREATININE 0.70 0.50 - 0.90 mg/dL    Calcium 8.2 (L) 8.6 - 10.4 mg/dL    Sodium 141 135 - 144 mmol/L    Potassium 4.0 3.7 - 5.3 mmol/L    Chloride 109 (H) 98 - 107 mmol/L    CO2 23 20 - 31 mmol/L    Anion Gap 9 9 - 17 mmol/L    GFR Non-African American >60 >60 mL/min    GFR African American >60 >60 mL/min    GFR Comment         POC Glucose Fingerstick   Result Value Ref Range    POC Glucose 93 65 - 105 mg/dL   POC Glucose Fingerstick   Result Value Ref Range    POC Glucose 89 65 - 105 mg/dL     CT ABDOMEN PELVIS WO CONTRAST Additional Contrast? None    Result Date: 4/12/2022  EXAMINATION: CT OF THE ABDOMEN AND PELVIS WITHOUT CONTRAST 4/12/2022 10:22 am TECHNIQUE: CT of the abdomen and pelvis was performed without the administration of intravenous contrast. Multiplanar reformatted images are provided for review. Dose modulation, iterative reconstruction, and/or weight based adjustment of the mA/kV was utilized to reduce the radiation dose to as low as reasonably achievable. COMPARISON: None. HISTORY: Reason for Exam: B/L flank pain. L>R FINDINGS: Lower Chest: Normal heart size. Lung bases clear. KIDNEYS AND URINARY TRACT: Stable left-sided mildly prominent partially extrarenal pelvis which contains unchanged 7 mm calculus. Right kidney and collecting system appear unremarkable. ORGANS: Lack of intravenous contrast limits evaluation of the solid organs and bowel. The liver, spleen, pancreas adrenal glands and gallbladder appear grossly unremarkable. GI/BOWEL: No bowel obstruction or inflammation. Normal normal appendix. PELVIS: Bladder is unremarkable. Hysterectomy. Normal size left ovary. PERITONEUM/RETROPERITONEUM: No lymphadenopathy is noted. Normal caliber aorta. No ascites or free air. BONES/SOFT TISSUES: The osseous structures demonstrate no no abnormality. Stable mild degenerative change in the visualized thoracic spine     Stable left-sided mildly prominent partially extrarenal pelvis which contains unchanged 7 mm calculus. XR ABDOMEN (KUB) (SINGLE AP VIEW)    Result Date: 4/18/2022  EXAMINATION: ONE SUPINE XRAY VIEW(S) OF THE ABDOMEN 4/16/2022 11:50 am COMPARISON: 12/30/2021 and CT abdomen/pelvis 04/12/2022.  HISTORY: ORDERING SYSTEM PROVIDED HISTORY: assess for left sided kidney stone TECHNOLOGIST PROVIDED HISTORY: assess for left sided kidney stone FINDINGS: Very small portion of the inferior aspect of the pelvis has not been included on the study. There is degradation of image quality related to patient body habitus. There is suspicion of persistent visualization of 7 mm calculus centered in the region of the left renal pelvis. No other radiopaque renal/ureteral calculi are identified. Bowel gas pattern is nonspecific. Visualized portions of the lung bases are clear. Findings suggestive of persistent presence of 7 mm calculus in the region of the left renal pelvis as described above. US RENAL COMPLETE    Result Date: 4/16/2022  EXAMINATION: RETROPERITONEAL ULTRASOUND OF THE KIDNEYS AND URINARY BLADDER 4/16/2022 COMPARISON: CT abdomen pelvis from 04/12/2022 HISTORY: ORDERING SYSTEM PROVIDED HISTORY: concern for left sided kidney stone TECHNOLOGIST PROVIDED HISTORY: concern for left sided kidney stone 73-year-old female with suspected left-sided kidney stone FINDINGS: Kidneys: Exam is limited due to patient's body habitus and bowel gas. Right kidney measures 9.7 x 4.5 x 4.5 cm. Right renal cortical thickness measures 1.2 cm. Left kidney measures 11.5 x 5.7 x 4.4 cm. Left renal cortical thickness measures 1.9 cm. No hydronephrosis or perinephric fluid. No echogenic foci with posterior acoustic shadowing to suggest renal calculi. Gross preservation of the corticomedullary differentiation. Bladder: Urinary bladder grossly unremarkable in appearance. Nonvisualization of ureteral jets. Urinary bladder measures 5.6 x 4.7 x 3.0 cm for a bladder volume of 41.4 mL. 1. Nonvisualization of ureteral jets. 2. Limited unremarkable renal ultrasound. No hydronephrosis. US PELVIS COMPLETE    Result Date: 4/17/2022  EXAMINATION: PELVIC ULTRASOUND 4/17/2022 TECHNIQUE: Transabdominal and transvaginal pelvic ultrasound was performed.  COMPARISON: CT abdomen pelvis from 04/12/2022 HISTORY: ORDERING SYSTEM PROVIDED HISTORY: r/o torsion TECHNOLOGIST PROVIDED HISTORY: r/o torsion 68-year-old female; rule out torsion FINDINGS: Measurements: Uterus: Prior hysterectomy. Endometrial stripe: Prior hysterectomy. Right Ovary:Not visualized. Left Ovary: Not visualized. Ultrasound Findings: Exam is limited due to patient's body habitus and bowel gas. Uterus: Prior hysterectomy. Endometrial stripe: Prior hysterectomy. Right Ovary: Not visualized. Left Ovary:  Not visualized. Free Fluid: No evidence of free fluid. 1. Limited exam due to patient's body habitus and bowel gas. 2. Nonvisualization of the ovaries. 3. Prior hysterectomy. CT ABDOMEN PELVIS W IV CONTRAST Additional Contrast? None    Result Date: 4/20/2022  EXAMINATION: CT OF THE ABDOMEN AND PELVIS WITH CONTRAST 4/20/2022 5:54 am TECHNIQUE: CT of the abdomen and pelvis was performed with the administration of intravenous contrast. Multiplanar reformatted images are provided for review. Dose modulation, iterative reconstruction, and/or weight based adjustment of the mA/kV was utilized to reduce the radiation dose to as low as reasonably achievable. COMPARISON: CT abdomen pelvis 04/12/2022 and 06/30/2021, renal ultrasound 04/16/2022 HISTORY: ORDERING SYSTEM PROVIDED HISTORY: rule out renal infarct TECHNOLOGIST PROVIDED HISTORY: rule out renal infarct Reason for Exam: rule out renal infarct FINDINGS: Lower Chest: There are mild atelectatic changes in the lung bases. New small bilateral pleural effusions. Diffuse wall thickening of the distal esophagus which appears increased. Heart size is stable. No pericardial effusion. Organs: Ill-defined somewhat geographic region of low attenuation along the anterior central hepatic parenchyma, possibly transient hepatic attenuation differences or focal fatty infiltration. Mild intrahepatic biliary ductal dilatation and mild either gallbladder wall thickening or pericholecystic fluid, new from prior.   Spleen, pancreas, and adrenal glands are grossly unremarkable. The right kidney enhances normally without hydronephrosis. The left kidney has a new ill-defined area of relative hypoenhancement in the upper pole, most conspicuous along the posterior cortex, and there is an unchanged subcentimeter hypodensity in the lower pole the left kidney which is too small to definitively characterize. The 7 mm stone that was previously in the left renal pelvis appears to have refluxed into a lower pole calyx peer a left ureteral stent is present with the proximal pigtail in the upper pole calices and the distal pigtail coiled in the urinary bladder. There is persistent mild left pelviectasis which appears overall similar and there is urothelial enhancement and thickening which is asymmetric to the contralateral side. GI/Bowel: Normal appendix. No bowel obstruction. Moderate colonic stool burden predominating in the right hemicolon. Pelvis: Urinary bladder is nearly empty containing a small volume of gas and the distal portion of the ureteral stent. There is mild perivesicular fat stranding. Hysterectomy. Adnexa are within normal limits. Peritoneum/Retroperitoneum: Mild fat stranding along the left retroperitoneum. No free air. Small reactive lymph nodes about the xin hepatis and along the retroperitoneum. Normal caliber aorta. Bones/Soft Tissues: Sequelae of medication injection along the subcutaneous fat of the anterior abdominal wall. Small fat containing inguinal hernias without inflammatory change. Unchanged area of likely fat necrosis the left gluteal subcutaneous fat. Mild degenerative changes of the spine and SI joints. 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 No acute bony findings.      Ill-defined region of hypoenhancement in the upper pole left kidney with features more suggestive of ascending urinary tract infection/pyelonephritis rather than renal infarct in the setting of asymmetric left-sided urothelial enhancement with periureteric and perivesicular fat stranding, though correlation with urinary labs is required. Left ureteral stent appears grossly appropriate in positioning, but there is persistent left pelviectasis. The 7 mm stone is now located in the lower pole calices, previously located in the renal pelvis. New mild gallbladder wall thickening or pericholecystic fluid with mild intrahepatic biliary ductal dilatation. Correlate for any concern for cholecystitis. Ultrasound or HIDA can further assess as clinically indicated. Circumferential distal esophageal wall thickening suggestive of esophagitis. Correlate clinically. New small bilateral pleural effusions with atelectatic changes in the lung bases. FLUORO FOR SURGICAL PROCEDURES    Result Date: 4/19/2022  Radiology exam is complete. No Radiologist dictation. Please follow up with ordering provider. FLUORO FOR SURGICAL PROCEDURES    Result Date: 4/17/2022  Radiology exam is complete. No Radiologist dictation. Please follow up with ordering provider. Physical Exam:    General appearance - NAD, AOx 3   Lungs -CTAB, no R/R/R  Heart - RRR, no M/R/G  Abdomen - Soft, NT/ND  Neurological:  MAEx4, No focal motor deficit, sensory loss  Extremities - Cap refil <2 sec in all ext., no edema  Skin -warm, dry      Hospital Course:  Clinical course has improved, labs and imaging reviewed. González Junior originally presented to the hospital on 4/16/2022 11:19 AM. with concerns for continued left-sided flank pain after she was seen and evaluated on 4/12/2022 and found to have a 7 mm calculus. Pain was unable to be controlled in the emergency department. At that time it was determined that She required further observation and was placed to the observation unit for urology evaluation. Patient underwent stent placement on 4/17/2022 with urology. She continued to have persistent pain afterwards and was taken back to the OR on 4/19/2022.   Stent was exchanged at this time. Stent was needed to be maintained given concerns for future complications should percent have been removed at that time, such as ureter stricture. She was admitted and labs and imaging were followed daily. Imaging results as above. Patient's pain was monitored daily and her pain regimen was adjusted appropriately until she was able to have her pain controlled started on oral medications. She is medically stable to be discharged. She will have follow-up with urology for removal of the stent and management of the obstructing left UPJ stone. Disposition: Home    Patient stated that they will not drive themselves home from the hospital if they have gotten pain killers/ narcotics earlier that day and that they will arrange for transportation on their own or work with the  for a ride. Patient counseled NOT to drive while under the influence of narcotics/ pain killers. Condition: Good    Patient stable and ready for discharge home. I have discussed plan of care with patient and they are in understanding. They were instructed to read discharge paperwork. All of their questions and concerns were addressed. Time Spent: 4 day      --  Cornel Mcgrath DO  Emergency Medicine Resident Physician    This dictation was generated by voice recognition computer software. Although all attempts are made to edit the dictation for accuracy, there may be errors in the transcription that are not intended.

## 2022-04-22 NOTE — PROGRESS NOTES
OBS/CDU   Attending NOTE      Patients PCP is:  KATIE Cole - CNP        SUBJECTIVE      Ongoing colicky pain. Patient with intractable discomfort at times. Patient slightly improving. Patient with analgesia. Patient getting relief with narcotic analgesics. We will have to adjust oral      PHYSICAL EXAM      General: NAD, AO X 3  Heent: EMOI, PERRL  Neck: SUPPLE, NO JVD  Cardiovascular: RRR, S1S2  Pulmonary: CTAB, NO SOB  Abdomen: SOFT, NTTP, ND, +BS  Extremities: +2/4 PULSES DISTAL, NO SWELLING  Neuro / Psych: NO NUMBNESS OR TINGLING, MENTATION AT BASELINE    PERTINENT TEST /EXAMS      I have reviewed all available laboratory results.     MEDICATIONS CURRENT   oxyCODONE (OXYCONTIN) extended release tablet 40 mg, 2 times per day  oxyCODONE (ROXICODONE) immediate release tablet 5 mg, Q4H PRN   Or  oxyCODONE (ROXICODONE) immediate release tablet 10 mg, Q4H PRN  oxybutynin (DITROPAN-XL) extended release tablet 10 mg, Daily  LORazepam (ATIVAN) injection 1 mg, Q6H PRN  nicotine polacrilex (NICORETTE) gum 2 mg, Q2H PRN  morphine injection 4 mg, Q2H PRN  methocarbamol (ROBAXIN) tablet 750 mg, 4x Daily  morphine (PF) injection 2 mg, Once  phenazopyridine (PYRIDIUM) tablet 200 mg, TID  acetaminophen (TYLENOL) tablet 1,000 mg, 3 times per day  ketorolac (TORADOL) injection 30 mg, Once  calcium carbonate (TUMS) chewable tablet 500 mg, TID PRN  budesonide-formoterol (SYMBICORT) 80-4.5 MCG/ACT inhaler 2 puff, BID  dicyclomine (BENTYL) capsule 10 mg, 4x Daily  lamoTRIgine (LAMICTAL) tablet 200 mg, Daily  cetirizine (ZYRTEC) tablet 10 mg, Daily  montelukast (SINGULAIR) tablet 10 mg, Nightly  tamsulosin (FLOMAX) capsule 0.4 mg, Daily  sertraline (ZOLOFT) tablet 150 mg, Daily  0.9 % sodium chloride infusion, Continuous  sodium chloride flush 0.9 % injection 5-40 mL, 2 times per day  sodium chloride flush 0.9 % injection 5-40 mL, PRN  0.9 % sodium chloride infusion, PRN  enoxaparin (LOVENOX) injection 40 mg, Daily  ondansetron (ZOFRAN-ODT) disintegrating tablet 4 mg, Q8H PRN   Or  ondansetron (ZOFRAN) injection 4 mg, Q6H PRN  ipratropium-albuterol (DUONEB) nebulizer solution 1 ampule, 4x daily  pantoprazole (PROTONIX) tablet 40 mg, BID AC        All medication charted and reviewed. CONSULTS      IP CONSULT TO UROLOGY    ASSESSMENT/PLAN       Laura Still is a 46 y.o. female who presents with. She was in the. Patient states symptoms began patient requiring stents in order for passive dilatation of ureter. · Renal lithiasis. Patient requiring urinary drainage procedures. Patient with ongoing IV analgesics for pain control. · Some improvement. We will continue IV fluids. Pain regimen being changed to adjust patient's needs. we will continue IV analgesics for breakthrough pain. ·   · Continue home medications and pain control  · Monitor vitals, labs, and imaging  · DISPO: pending consults and clinical improvement    --  Boris Lowry MD  Emergency Medicine Attending Physician     This dictation was generated by voice recognition computer software. Although all attempts are made to edit the dictation for accuracy, there may be errors in the transcription that are not intended.

## 2022-04-22 NOTE — PLAN OF CARE
Problem: Nutritional:  Goal: Ability to tolerate tube feedings without aspirating will improve  Description: Ability to tolerate tube feedings without aspirating will improve  4/21/2022 2022 by Bernardo Rodriguez RN  Outcome: Progressing  4/21/2022 0954 by Crystal Levin  Outcome: Progressing  Goal: Consumption of food in small portions  Description: Consumption of food in small portions  4/21/2022 2022 by Bernardo Rodriguez RN  Outcome: Progressing  4/21/2022 0954 by Crystal Levin  Outcome: Progressing  Goal: Consumption of liquid of appropriate consistency  Description: Consumption of liquid of appropriate consistency  4/21/2022 2022 by Bernardo Rodriguez RN  Outcome: Progressing  4/21/2022 0954 by Crystal Levin  Outcome: Progressing     Problem: Respiratory:  Goal: Ability to maintain a clear airway will improve  Description: Ability to maintain a clear airway will improve  4/21/2022 2022 by Bernardo Rodriguez RN  Outcome: Progressing  4/21/2022 0954 by Crystal Levin  Outcome: Progressing  Goal: Will remain free from infection  Description: Will remain free from infection  4/21/2022 2022 by Bernardo Rodriguez RN  Outcome: Progressing  4/21/2022 0954 by Crystal Levin  Outcome: Progressing  Goal: Absence of aspiration  Description: Absence of aspiration  4/21/2022 2022 by Bernardo Rodriguez RN  Outcome: Progressing  4/21/2022 0954 by Crystal Levin  Outcome: Progressing     Problem: Safety:  Goal: Ability to chew and swallow food without choking will improve  Description: Ability to chew and swallow food without choking will improve  4/21/2022 2022 by Bernardo Rodriguez RN  Outcome: Progressing  4/21/2022 0954 by Crystal Levin  Outcome: Progressing  Goal: Ability to demonstrate good, daily oral hygiene techniques will improve  Description: Ability to demonstrate good, daily oral hygiene techniques will improve  4/21/2022 2022 by Bernardo Rodriguez RN  Outcome: Progressing  4/21/2022 0954 by

## 2022-04-22 NOTE — PLAN OF CARE
Problem: Nutritional:  Goal: Ability to tolerate tube feedings without aspirating will improve  Description: Ability to tolerate tube feedings without aspirating will improve  4/22/2022 0943 by Renan Rodriguez  Outcome: Progressing  4/21/2022 2022 by Amy Luna RN  Outcome: Progressing  Goal: Consumption of food in small portions  Description: Consumption of food in small portions  4/22/2022 0943 by Renan Rodriguez  Outcome: Progressing  4/21/2022 2022 by Amy Luna RN  Outcome: Progressing  Goal: Consumption of liquid of appropriate consistency  Description: Consumption of liquid of appropriate consistency  4/22/2022 0943 by Renan Rodriguez  Outcome: Progressing  4/21/2022 2022 by Amy Luna RN  Outcome: Progressing     Problem: Respiratory:  Goal: Ability to maintain a clear airway will improve  Description: Ability to maintain a clear airway will improve  4/22/2022 0943 by Renan Rodriguez  Outcome: Progressing  4/21/2022 2022 by Amy Luna RN  Outcome: Progressing  Goal: Will remain free from infection  Description: Will remain free from infection  4/22/2022 0943 by Renan Rodriguez  Outcome: Progressing  4/21/2022 2022 by Amy Luna RN  Outcome: Progressing  Goal: Absence of aspiration  Description: Absence of aspiration  4/22/2022 0943 by Renan Rodriguez  Outcome: Progressing  4/21/2022 2022 by Amy Luna RN  Outcome: Progressing     Problem: Safety:  Goal: Ability to chew and swallow food without choking will improve  Description: Ability to chew and swallow food without choking will improve  4/22/2022 0943 by Renan Rodriguez  Outcome: Progressing  4/21/2022 2022 by Amy Luna RN  Outcome: Progressing  Goal: Ability to demonstrate good, daily oral hygiene techniques will improve  Description: Ability to demonstrate good, daily oral hygiene techniques will improve  4/22/2022 0943 by Renan Rodriguez  Outcome: Progressing  4/21/2022 2022 by Sarah Whitmore RN  Outcome: Progressing  Goal: Maintenance of upright position during and after feeding  Description: Maintenance of upright position during and after feeding  4/22/2022 0943 by Taft Boeck  Outcome: Progressing  4/21/2022 2022 by Sarah Whitmore RN  Outcome: Progressing     Problem: Pain:  Goal: Pain level will decrease  Description: Pain level will decrease  4/22/2022 0943 by Taft Boeck  Outcome: Progressing  4/21/2022 2022 by Sarah Whitmore RN  Outcome: Progressing  Goal: Control of acute pain  Description: Control of acute pain  4/22/2022 0943 by Taft Boeck  Outcome: Progressing  4/21/2022 2022 by Sarah Whitmore RN  Outcome: Progressing  Goal: Control of chronic pain  Description: Control of chronic pain  4/22/2022 0943 by Taft Boeck  Outcome: Progressing  4/21/2022 2022 by Sarah Whitmore RN  Outcome: Progressing     Problem: Discharge Planning  Goal: Discharge to home or other facility with appropriate resources  4/22/2022 0943 by Taft Boeck  Outcome: Progressing  4/21/2022 2022 by Sarah Whitmore RN  Outcome: Progressing     Problem: Chronic Conditions and Co-morbidities  Goal: Patient's chronic conditions and co-morbidity symptoms are monitored and maintained or improved  Outcome: Progressing

## 2022-04-22 NOTE — PROGRESS NOTES
CLINICAL PHARMACY NOTE: MEDS TO BEDS    Total # of Prescriptions Filled: 5   The following medications were delivered to the patient:  · OXY 5   · OXYBUTYNIN ER 10     Additional Documentation:    PT DIDN'T WANT TYLENOL, METHOCARBAMOL OR PYRIDUM. ..  PICKED UP AT [facility]

## 2022-04-23 NOTE — PROGRESS NOTES
[address]  CDU / OBSERVATION ENCOUNTER  ATTENDING NOTE       I performed a history and physical examination of the patient and discussed management with the resident or midlevel provider. I reviewed the resident or midlevel provider's note and agree with the documented findings and plan of care. Any areas of disagreement are noted on the chart. I was personally present for the key portions of any procedures. I have documented in the chart those procedures where I was not present during the key portions. I have reviewed the nurses notes. I agree with the chief complaint, past medical history, past surgical history, allergies, medications, social and family history as documented unless otherwise noted below. The Family history, social history, and ROS are effectively unchanged since admission unless noted elsewhere in the chart. Patient improved. Patient has been up and about and is now ready for discharge. Patient will need close urologic follow-up. Discussed with urology. Patient has level of comfort such that she will be able to be discharged but does need repeat evaluation within several days.     Madalyn Ramos MD  Attending Emergency  Physician

## 2022-04-25 ENCOUNTER — ANESTHESIA EVENT (OUTPATIENT)
Dept: OPERATING ROOM | Age: 42
End: 2022-04-25
Payer: COMMERCIAL

## 2022-04-25 ENCOUNTER — HOSPITAL ENCOUNTER (OUTPATIENT)
Age: 42
Setting detail: OUTPATIENT SURGERY
Discharge: HOME | End: 2022-04-25
Attending: UROLOGY | Admitting: UROLOGY
Payer: COMMERCIAL

## 2022-04-25 ENCOUNTER — ANESTHESIA (OUTPATIENT)
Dept: OPERATING ROOM | Age: 42
End: 2022-04-25
Payer: COMMERCIAL

## 2022-04-25 VITALS
WEIGHT: 203.1 LBS | OXYGEN SATURATION: 96 % | BODY MASS INDEX: 37.37 KG/M2 | TEMPERATURE: 97.3 F | HEART RATE: 64 BPM | HEIGHT: 62 IN | SYSTOLIC BLOOD PRESSURE: 167 MMHG | RESPIRATION RATE: 16 BRPM | DIASTOLIC BLOOD PRESSURE: 100 MMHG

## 2022-04-25 VITALS — TEMPERATURE: 79.9 F | SYSTOLIC BLOOD PRESSURE: 128 MMHG | DIASTOLIC BLOOD PRESSURE: 80 MMHG | OXYGEN SATURATION: 100 %

## 2022-04-25 LAB — GLUCOSE BLD-MCNC: 94 MG/DL (ref 65–105)

## 2022-04-25 PROCEDURE — 6360000002 HC RX W HCPCS: Performed by: NURSE ANESTHETIST, CERTIFIED REGISTERED

## 2022-04-25 PROCEDURE — 6360000002 HC RX W HCPCS

## 2022-04-25 PROCEDURE — 3700000001 HC ADD 15 MINUTES (ANESTHESIA): Performed by: UROLOGY

## 2022-04-25 PROCEDURE — 6370000000 HC RX 637 (ALT 250 FOR IP)

## 2022-04-25 PROCEDURE — 2500000003 HC RX 250 WO HCPCS: Performed by: NURSE ANESTHETIST, CERTIFIED REGISTERED

## 2022-04-25 PROCEDURE — 3700000000 HC ANESTHESIA ATTENDED CARE: Performed by: UROLOGY

## 2022-04-25 PROCEDURE — 2580000003 HC RX 258: Performed by: ANESTHESIOLOGY

## 2022-04-25 PROCEDURE — 7100000000 HC PACU RECOVERY - FIRST 15 MIN: Performed by: UROLOGY

## 2022-04-25 PROCEDURE — 2580000003 HC RX 258: Performed by: NURSE ANESTHETIST, CERTIFIED REGISTERED

## 2022-04-25 PROCEDURE — 2580000003 HC RX 258: Performed by: UROLOGY

## 2022-04-25 PROCEDURE — 3600000002 HC SURGERY LEVEL 2 BASE: Performed by: UROLOGY

## 2022-04-25 PROCEDURE — 7100000011 HC PHASE II RECOVERY - ADDTL 15 MIN: Performed by: UROLOGY

## 2022-04-25 PROCEDURE — 7100000001 HC PACU RECOVERY - ADDTL 15 MIN: Performed by: UROLOGY

## 2022-04-25 PROCEDURE — 7100000010 HC PHASE II RECOVERY - FIRST 15 MIN: Performed by: UROLOGY

## 2022-04-25 PROCEDURE — 3600000012 HC SURGERY LEVEL 2 ADDTL 15MIN: Performed by: UROLOGY

## 2022-04-25 PROCEDURE — 6360000002 HC RX W HCPCS: Performed by: ANESTHESIOLOGY

## 2022-04-25 PROCEDURE — 82947 ASSAY GLUCOSE BLOOD QUANT: CPT

## 2022-04-25 PROCEDURE — 2709999900 HC NON-CHARGEABLE SUPPLY: Performed by: UROLOGY

## 2022-04-25 RX ORDER — OXYCODONE HYDROCHLORIDE 5 MG/1
10 TABLET ORAL PRN
Status: DISCONTINUED | OUTPATIENT
Start: 2022-04-25 | End: 2022-04-25 | Stop reason: HOSPADM

## 2022-04-25 RX ORDER — SODIUM CHLORIDE, SODIUM LACTATE, POTASSIUM CHLORIDE, CALCIUM CHLORIDE 600; 310; 30; 20 MG/100ML; MG/100ML; MG/100ML; MG/100ML
INJECTION, SOLUTION INTRAVENOUS CONTINUOUS PRN
Status: DISCONTINUED | OUTPATIENT
Start: 2022-04-25 | End: 2022-04-25 | Stop reason: SDUPTHER

## 2022-04-25 RX ORDER — METOCLOPRAMIDE HYDROCHLORIDE 5 MG/ML
10 INJECTION INTRAMUSCULAR; INTRAVENOUS ONCE
Status: COMPLETED | OUTPATIENT
Start: 2022-04-25 | End: 2022-04-25

## 2022-04-25 RX ORDER — SODIUM CHLORIDE 0.9 % (FLUSH) 0.9 %
5-40 SYRINGE (ML) INJECTION PRN
Status: DISCONTINUED | OUTPATIENT
Start: 2022-04-25 | End: 2022-04-25 | Stop reason: HOSPADM

## 2022-04-25 RX ORDER — SODIUM CHLORIDE 0.9 % (FLUSH) 0.9 %
5-40 SYRINGE (ML) INJECTION EVERY 12 HOURS SCHEDULED
Status: DISCONTINUED | OUTPATIENT
Start: 2022-04-25 | End: 2022-04-25 | Stop reason: HOSPADM

## 2022-04-25 RX ORDER — PROPOFOL 10 MG/ML
INJECTION, EMULSION INTRAVENOUS PRN
Status: DISCONTINUED | OUTPATIENT
Start: 2022-04-25 | End: 2022-04-25 | Stop reason: SDUPTHER

## 2022-04-25 RX ORDER — ONDANSETRON 2 MG/ML
4 INJECTION INTRAMUSCULAR; INTRAVENOUS
Status: DISCONTINUED | OUTPATIENT
Start: 2022-04-25 | End: 2022-04-25 | Stop reason: HOSPADM

## 2022-04-25 RX ORDER — MAGNESIUM HYDROXIDE 1200 MG/15ML
LIQUID ORAL CONTINUOUS PRN
Status: COMPLETED | OUTPATIENT
Start: 2022-04-25 | End: 2022-04-25

## 2022-04-25 RX ORDER — ONDANSETRON 2 MG/ML
INJECTION INTRAMUSCULAR; INTRAVENOUS PRN
Status: DISCONTINUED | OUTPATIENT
Start: 2022-04-25 | End: 2022-04-25 | Stop reason: SDUPTHER

## 2022-04-25 RX ORDER — OXYCODONE HYDROCHLORIDE 5 MG/1
5 TABLET ORAL PRN
Status: DISCONTINUED | OUTPATIENT
Start: 2022-04-25 | End: 2022-04-25 | Stop reason: HOSPADM

## 2022-04-25 RX ORDER — LIDOCAINE HYDROCHLORIDE 20 MG/ML
INJECTION, SOLUTION EPIDURAL; INFILTRATION; INTRACAUDAL; PERINEURAL PRN
Status: DISCONTINUED | OUTPATIENT
Start: 2022-04-25 | End: 2022-04-25 | Stop reason: SDUPTHER

## 2022-04-25 RX ORDER — METOCLOPRAMIDE HYDROCHLORIDE 5 MG/ML
INJECTION INTRAMUSCULAR; INTRAVENOUS
Status: COMPLETED
Start: 2022-04-25 | End: 2022-04-25

## 2022-04-25 RX ORDER — SODIUM CHLORIDE, SODIUM LACTATE, POTASSIUM CHLORIDE, CALCIUM CHLORIDE 600; 310; 30; 20 MG/100ML; MG/100ML; MG/100ML; MG/100ML
INJECTION, SOLUTION INTRAVENOUS CONTINUOUS
Status: DISCONTINUED | OUTPATIENT
Start: 2022-04-26 | End: 2022-04-25 | Stop reason: HOSPADM

## 2022-04-25 RX ORDER — OXYCODONE HYDROCHLORIDE AND ACETAMINOPHEN 5; 325 MG/1; MG/1
TABLET ORAL
Status: COMPLETED
Start: 2022-04-25 | End: 2022-04-25

## 2022-04-25 RX ORDER — MIDAZOLAM HYDROCHLORIDE 1 MG/ML
INJECTION INTRAMUSCULAR; INTRAVENOUS PRN
Status: DISCONTINUED | OUTPATIENT
Start: 2022-04-25 | End: 2022-04-25 | Stop reason: SDUPTHER

## 2022-04-25 RX ORDER — FENTANYL CITRATE 50 UG/ML
INJECTION, SOLUTION INTRAMUSCULAR; INTRAVENOUS PRN
Status: DISCONTINUED | OUTPATIENT
Start: 2022-04-25 | End: 2022-04-25 | Stop reason: SDUPTHER

## 2022-04-25 RX ORDER — DEXAMETHASONE SODIUM PHOSPHATE 10 MG/ML
INJECTION, SOLUTION INTRAMUSCULAR; INTRAVENOUS PRN
Status: DISCONTINUED | OUTPATIENT
Start: 2022-04-25 | End: 2022-04-25 | Stop reason: SDUPTHER

## 2022-04-25 RX ORDER — FENTANYL CITRATE 50 UG/ML
25 INJECTION, SOLUTION INTRAMUSCULAR; INTRAVENOUS EVERY 5 MIN PRN
Status: DISCONTINUED | OUTPATIENT
Start: 2022-04-25 | End: 2022-04-25 | Stop reason: HOSPADM

## 2022-04-25 RX ORDER — CEFAZOLIN SODIUM 1 G/3ML
INJECTION, POWDER, FOR SOLUTION INTRAMUSCULAR; INTRAVENOUS PRN
Status: DISCONTINUED | OUTPATIENT
Start: 2022-04-25 | End: 2022-04-25 | Stop reason: SDUPTHER

## 2022-04-25 RX ORDER — LIDOCAINE HYDROCHLORIDE 10 MG/ML
1 INJECTION, SOLUTION EPIDURAL; INFILTRATION; INTRACAUDAL; PERINEURAL
Status: DISCONTINUED | OUTPATIENT
Start: 2022-04-26 | End: 2022-04-25 | Stop reason: HOSPADM

## 2022-04-25 RX ORDER — SODIUM CHLORIDE 9 MG/ML
25 INJECTION, SOLUTION INTRAVENOUS PRN
Status: DISCONTINUED | OUTPATIENT
Start: 2022-04-25 | End: 2022-04-25 | Stop reason: HOSPADM

## 2022-04-25 RX ORDER — OXYCODONE HYDROCHLORIDE AND ACETAMINOPHEN 5; 325 MG/1; MG/1
1 TABLET ORAL
Status: COMPLETED | OUTPATIENT
Start: 2022-04-25 | End: 2022-04-25

## 2022-04-25 RX ORDER — SODIUM CHLORIDE 9 MG/ML
INJECTION, SOLUTION INTRAVENOUS PRN
Status: DISCONTINUED | OUTPATIENT
Start: 2022-04-25 | End: 2022-04-25 | Stop reason: HOSPADM

## 2022-04-25 RX ORDER — FENTANYL CITRATE 50 UG/ML
50 INJECTION, SOLUTION INTRAMUSCULAR; INTRAVENOUS EVERY 5 MIN PRN
Status: COMPLETED | OUTPATIENT
Start: 2022-04-25 | End: 2022-04-25

## 2022-04-25 RX ADMIN — PROPOFOL 200 MG: 10 INJECTION, EMULSION INTRAVENOUS at 17:24

## 2022-04-25 RX ADMIN — ONDANSETRON 4 MG: 2 INJECTION INTRAMUSCULAR; INTRAVENOUS at 18:01

## 2022-04-25 RX ADMIN — Medication 100 MCG: at 17:24

## 2022-04-25 RX ADMIN — METOCLOPRAMIDE HYDROCHLORIDE 10 MG: 5 INJECTION INTRAMUSCULAR; INTRAVENOUS at 18:44

## 2022-04-25 RX ADMIN — OXYCODONE AND ACETAMINOPHEN 1 TABLET: 5; 325 TABLET ORAL at 18:58

## 2022-04-25 RX ADMIN — SODIUM CHLORIDE, POTASSIUM CHLORIDE, SODIUM LACTATE AND CALCIUM CHLORIDE: 600; 310; 30; 20 INJECTION, SOLUTION INTRAVENOUS at 13:51

## 2022-04-25 RX ADMIN — FENTANYL CITRATE 50 MCG: 50 INJECTION, SOLUTION INTRAMUSCULAR; INTRAVENOUS at 18:32

## 2022-04-25 RX ADMIN — MIDAZOLAM 2 MG: 1 INJECTION INTRAMUSCULAR; INTRAVENOUS at 17:20

## 2022-04-25 RX ADMIN — DEXAMETHASONE SODIUM PHOSPHATE 10 MG: 10 INJECTION, SOLUTION INTRAMUSCULAR; INTRAVENOUS at 17:30

## 2022-04-25 RX ADMIN — CEFAZOLIN 2000 MG: 1 INJECTION, POWDER, FOR SOLUTION INTRAMUSCULAR; INTRAVENOUS at 17:33

## 2022-04-25 RX ADMIN — FENTANYL CITRATE 50 MCG: 50 INJECTION, SOLUTION INTRAMUSCULAR; INTRAVENOUS at 18:26

## 2022-04-25 RX ADMIN — METOCLOPRAMIDE 10 MG: 5 INJECTION, SOLUTION INTRAMUSCULAR; INTRAVENOUS at 18:44

## 2022-04-25 RX ADMIN — SODIUM CHLORIDE, POTASSIUM CHLORIDE, SODIUM LACTATE AND CALCIUM CHLORIDE: 600; 310; 30; 20 INJECTION, SOLUTION INTRAVENOUS at 17:20

## 2022-04-25 RX ADMIN — OXYCODONE HYDROCHLORIDE AND ACETAMINOPHEN 1 TABLET: 5; 325 TABLET ORAL at 18:58

## 2022-04-25 RX ADMIN — LIDOCAINE HYDROCHLORIDE 40 MG: 20 INJECTION, SOLUTION EPIDURAL; INFILTRATION; INTRACAUDAL; PERINEURAL at 17:24

## 2022-04-25 ASSESSMENT — PULMONARY FUNCTION TESTS
PIF_VALUE: 3
PIF_VALUE: 0
PIF_VALUE: 17
PIF_VALUE: 17
PIF_VALUE: 3
PIF_VALUE: 1
PIF_VALUE: 17
PIF_VALUE: 3
PIF_VALUE: 4
PIF_VALUE: 3
PIF_VALUE: 17
PIF_VALUE: 1
PIF_VALUE: 2
PIF_VALUE: 5
PIF_VALUE: 5
PIF_VALUE: 4
PIF_VALUE: 17
PIF_VALUE: 3
PIF_VALUE: 17
PIF_VALUE: 3
PIF_VALUE: 17
PIF_VALUE: 4
PIF_VALUE: 18
PIF_VALUE: 17
PIF_VALUE: 3
PIF_VALUE: 8
PIF_VALUE: 1
PIF_VALUE: 3
PIF_VALUE: 17
PIF_VALUE: 3
PIF_VALUE: 16
PIF_VALUE: 17
PIF_VALUE: 18
PIF_VALUE: 17
PIF_VALUE: 17
PIF_VALUE: 15
PIF_VALUE: 17
PIF_VALUE: 18
PIF_VALUE: 3

## 2022-04-25 ASSESSMENT — PAIN - FUNCTIONAL ASSESSMENT: PAIN_FUNCTIONAL_ASSESSMENT: 0-10

## 2022-04-25 ASSESSMENT — PAIN SCALES - GENERAL
PAINLEVEL_OUTOF10: 8
PAINLEVEL_OUTOF10: 10
PAINLEVEL_OUTOF10: 10
PAINLEVEL_OUTOF10: 9
PAINLEVEL_OUTOF10: 5
PAINLEVEL_OUTOF10: 9

## 2022-04-25 ASSESSMENT — PAIN DESCRIPTION - LOCATION
LOCATION: ABDOMEN
LOCATION: ABDOMEN

## 2022-04-25 ASSESSMENT — COPD QUESTIONNAIRES: CAT_SEVERITY: MODERATE

## 2022-04-25 ASSESSMENT — PAIN DESCRIPTION - ORIENTATION
ORIENTATION: LEFT
ORIENTATION: LEFT

## 2022-04-25 ASSESSMENT — PAIN DESCRIPTION - DESCRIPTORS: DESCRIPTORS: SHARP

## 2022-04-25 NOTE — OP NOTE
Dr. Nidia Saeed MD  Urologic Surgery        [address] [facility] [address] [place]  04/25/22    Patient:  Pasha Muniz  MRN: [id number]  YOB: 1980    Surgeon: Dr. Nidia Saeed MD  Assistant: None    Pre-op Diagnosis: Left Kidney Stone  Post-op Diagnosis: Same    Procedure:   Left Shock-wave Lithotripsy  Cystoscopy with left ureteral stent removed. Anesthesia: General  Complications: None  OR Blood Loss: Minimal  Fluids: Cystalloids  Specimens: None    Indication:  53-year-old female with a left 7 mm kidney stone. We discussed treatment options. After risks and benefits were explained elected to proceed with shockwave procedure today. Narrative of the Procedure:    After informed consent was obtained in the preoperative area, the patient was taken back to the operating room and transferred to the lithotripsy table in supine position. EPC cuffs were placed. The machine was turned on. Anesthesia was induced and antibiotics were given. A timeout occurred. Two patient identifiers were used. The patient's Left flank was placed over the shock-wave device. Spot x-ray images located the stone which was located in the mid-pole. . The patient's flank was then coupled to the lithotriptor. The kidney was pre-treated with 200 shocks. A 2 minute pause was conducted. A further 2300 shocks were delivered to the kidney at a shock rate of 90 Hz. Periodically spot xray images were taken to ensure the stone was appropriately targeted. A total of 2500 shocks were delivered. At that time, the stone demonstrated excellent fragmentation. The patient's flank was inspected and there was no evidence of hemorrhage. The patient was then awaken, extubated, transferred back to the hospital bed, and discharged back to the PACU in good and stable condition. Follow-Up: The patient has been instructed to follow up with Nidia Saeed MD as scheduled.  Should they have any issues before follow-up they are asked to contact our office at any time.     Tenisha Grajeda MD  Electronically signed on 4/25/2022 at 6:15 PM

## 2022-04-25 NOTE — ANESTHESIA PRE PROCEDURE
Department of Anesthesiology  Preprocedure Note       Name:  Pasha Muniz   Age:  46 y.o.  :  1980                                          MRN:  [id number]         Date:  2022      Surgeon: Kolton Matson):  Nidia Saeed MD    Procedure: Procedure(s):  CYSTO LEFT ESWL EXTRACORPOREAL SHOCK WAVE LITHOTRIPSY LEFT STENT REMOVAL  -[facility]    Medications prior to admission:   Prior to Admission medications    Medication Sig Start Date End Date Taking? Authorizing Provider   phenazopyridine (PYRIDIUM) 200 MG tablet Take 1 tablet by mouth in the morning, at noon, and at bedtime for 7 days 22  Stoen Moon,    tamsulosin [facility]) 0.4 MG capsule Take 1 capsule by mouth daily 22   Stone Moon DO   methocarbamol (ROBAXIN) 750 MG tablet Take 1 tablet by mouth 4 times daily for 10 days 22  Stone Moon DO   oxybutynin (DITROPAN-XL) 10 MG extended release tablet Take 1 tablet by mouth daily 22   Stone Moon DO   oxyCODONE (OXYCONTIN) 40 MG extended release tablet Take 1 tablet by mouth every 12 hours for 7 days. 22  Stone Moon DO   oxyCODONE (ROXICODONE) 5 MG immediate release tablet Take 1 tablet by mouth every 4 hours as needed for Pain (Breakthrough pain) for up to 3 days.  22  Stone Moon DO   loratadine (CLARITIN) 10 MG tablet TAKE 1 TABLET BY MOUTH DAILY 22   KATIE Cole CNP   acetaminophen (TYLENOL) 500 MG tablet Take 2 tablets by mouth every 6 hours as needed for Pain 22  Stone Moon DO   Lancets (ONETOUCH DELICA PLUS [id number]) MISC USE TO TEST BLOOD SUGAR TWICE A DAY 3/24/22   KATIE Cole CNP   vitamin D3 (CHOLECALCIFEROL) 25 MCG (1000 UT) TABS tablet TAKE 1 TABLET BY MOUTH DAILY  Patient not taking: Reported on 2022 3/24/22   KATIE Cole CNP   BREO ELLIPTA 200-25 MCG/INH AEPB inhaler INHALE 1 PUFF INTO THE LUNGS DAILY 3/17/22   Alan Bernal APRN - CNP pantoprazole (PROTONIX) 40 MG tablet 1 tab daily 3/10/22   KATIE Cole CNP   COMBIVENT RESPIMAT  MCG/ACT AERS inhaler Inhale 1 puff into the lungs 4 times daily Inhale 1 puff into the lungs 4 times daily 3/10/22   KATIE Cole CNP   ipratropium-albuterol (DUONEB) 0.5-2.5 (3) MG/3ML SOLN nebulizer solution INHALE CONTENTS OF 1 UNIT DOSE VIA NEBULIZER EVERY 4 HOURS 2/21/22   KATIE Cole CNP   montelukast (SINGULAIR) 10 MG tablet TAKE 1 TABLET IN THE EVENING ONCE A DAY ORALLY 1/24/22   KATIE Cole CNP   Misc. Devices (STRAINER/STAINLESS STEEL/2.5\") MISC 1 each by Does not apply route as needed (when you urinate)  Patient not taking: Reported on 4/16/2022 1/15/22   Jann Robertson,    pregabalin (LYRICA) 100 MG capsule Take 1 capsule by mouth 3 times daily for 30 days. 1/13/22 2/12/22  Margorie Crigler, MD   Methocarbamol (ROBAXIN PO) Take by mouth    Historical Provider, MD   sertraline (ZOLOFT) 100 MG tablet Take 1.5 tablets by mouth daily 11/1/21   Historical Provider, MD   lamoTRIgine (LAMICTAL) 200 MG tablet Take 1 tablet by mouth daily 11/1/21   Historical Provider, MD   ONETOUCH VERIO strip USE TO TEST BLOOD SUGAR TWICE A DAY 11/1/21   KATIE Cole CNP   dicyclomine (BENTYL) 10 MG capsule TAKE 1 CAPSULE BY MOUTH 4 TIMES DAILY 8/27/21   KATIE Cole CNP   glucose monitoring kit (FREESTYLE) monitoring kit Use as directed.   BRAND OF CHOICE INSURANCE ALLOWS. 5/27/21   KATIE Cole CNP   Alcohol Swabs ( STERILE ALCOHOL PREP) PADS USE AS DIRECTED 5/10/21   KATIE Cole CNP   fluticasone (FLONASE) 50 MCG/ACT nasal spray 1 spray by Each Nostril route daily 4/1/21 5/1/21  KATIE Cole CNP   vitamin D3 (CHOLECALCIFEROL) 25 MCG (1000 UT) TABS tablet TAKE 1 TABLET BY MOUTH DAILY 8/27/20   KATIE Cole CNP   Masks (CLEVER CHOICE FACE MASK) MISC USE NEW FACE MASK EVERYDAY WHEN PATIENT GO OUTSIDE OF HOME DUE TO COVID PANDEMIC 7/6/20   Alan Bernal, KATIE - CNP       Current medications:    Current Facility-Administered Medications   Medication Dose Route Frequency Provider Last Rate Last Admin    [START ON 4/26/2022] lidocaine PF 1 % injection 1 mL  1 mL IntraDERmal Once PRN MD Cesar Churchillrafael Yuan Grit ON 4/26/2022] lactated ringers infusion   IntraVENous Continuous Sadi Lazo MD        sodium chloride flush 0.9 % injection 5-40 mL  5-40 mL IntraVENous 2 times per day Sadi Lzao MD        sodium chloride flush 0.9 % injection 5-40 mL  5-40 mL IntraVENous PRN Sadi Lazo MD        0.9 % sodium chloride infusion   IntraVENous PRN Sadi Lazo MD        sodium chloride flush 0.9 % injection 5-40 mL  5-40 mL IntraVENous 2 times per day Christel Gonzalez MD        sodium chloride flush 0.9 % injection 5-40 mL  5-40 mL IntraVENous PRN Christel Gonzalez MD        0.9 % sodium chloride infusion  25 mL IntraVENous PRN Christel Gonzalez MD        fentaNYL (SUBLIMAZE) injection 25 mcg  25 mcg IntraVENous Q5 Min PRN Christel Gonzalez MD        fentaNYL (SUBLIMAZE) injection 50 mcg  50 mcg IntraVENous Q5 Min PRN Christel Gonzalez MD        oxyCODONE (ROXICODONE) immediate release tablet 5 mg  5 mg Oral PRN Christel Gonzalez MD        Or    oxyCODONE (ROXICODONE) immediate release tablet 10 mg  10 mg Oral PRN Christel Gonzalez MD        ondansetron [facility]) injection 4 mg  4 mg IntraVENous Once PRN Christel Gonzalez MD           Allergies:     Allergies   Allergen Reactions    Nsaids      Irritates her Barrets esophogus    Toradol [Ketorolac Tromethamine]     Ultram [Tramadol] Nausea Only     Gastric upset       Problem List:    Patient Active Problem List   Diagnosis Code    Asthma J45.909    GERD K21.9    Iron deficiency anemia D50.9    Cervical disc herniation M50.20    Smoker F17.200    Sadler's esophagus without dysplasia K22.70    Hiatal hernia K44.9    COPD with asthma ([facility] [address] J44.9    Chronic constipation K59.09    Cervicogenic headache G44.86    Hx of IUFD (G2) O09.299    History of gestational hypertension Z87.59    Seizure ([facility] [address] R56.9    Hx of  x2 (G3, G4) Z98.891    Arthritis M19.90    Cervical radiculopathy M54.12    Migraine without aura G43.009    Spinal stenosis in cervical region M48.02    Prediabetes R73.03    Chronic pelvic pain in female R10.2, G89.29    Enlarged uterus N85.2    Endometriosis N80.9    Family history of breast cancer Z80.3    S/P cervical spinal fusion Z98.1    Bipolar affective disorder, currently depressed, moderate (HCC) F31.32    Insomnia G47.00    Ulnar neuropathy G56.20    Major depressive disorder, recurrent episode (HCC) F33.9    Sciatica M54.30    Moderate persistent asthma without complication A03.92    Lipoma of torso D17.1    History of cervical discectomy Z98.890    Chronic neck pain with abnormal neurologic examination M54.2, G89.29    Abnormal weight gain  R63.5    Class 2 drug-induced obesity with serious comorbidity and body mass index (BMI) of 37.0 to 37.9 in adult E66.1, Z68.37    Pelvic peritoneal endometriosis N80.3    RALH with BS and cystoscopy 2020 Z90.710    Post-op pain G89.18    Wound infection/hemorrhage, obstetric surgical, postpartum condition O90.9    Postoperative visit Z48.89    Postoperative abdominal pain R10.9, G89.18    Diverticulitis K57.92    Diverticulitis of colon K57.32    Pelvic abscess in female N73.9    Pneumonia J18.9    Right otitis media H66.91    Functional diarrhea K59.1    Kidney stones N20.0    Cervical spondylosis without myelopathy M47.812    Cervical myelopathy (HCC) G95.9    Nephrolithiasis N20.0    Intractable abdominal pain R10.9       Past Medical History:        Diagnosis Date    Anxiety     Arthritis     OA    Asthma     Sadler's esophagus     LONG SEGMENT    Bipolar 1 disorder ([facility] [address]     CAD (coronary artery disease)     Chronic insomnia     COPD ([facility]ronic obstructive pulmonary disease) ([facility] [address]     Depression     and bipolar    Diabetes mellitus ([facility] [address]     prediabetic    Endometriosis 3/9/2020    Esophagitis     severe    GERD (gastroesophageal reflux disease)     Headache(784.0)     Herniated disc, cervical     Hiatal hernia     Incisional abscess 1/15/2019    Kidney stones     MDRO (multiple drug resistant organisms) resistance     mrsa    Pleurisy     hx of \"years ago\"    Pneumonia     past penumonia    Seizures ([facility] [address]     2016 last seizure-focal seizure    UTI (urinary tract infection)     Vision abnormalities     wears glasses       Past Surgical History:        Procedure Laterality Date    CERVICAL DISC SURGERY      x2     SECTION  , 2018    x 2     SECTION N/A 2018     SECTION performed by Mookie Seaman MD at [address] [facility] L[facility]D OR    COLONOSCOPY N/A 10/01/2019    COLONOSCOPY DIAGNOSTIC ABORTED performed by Ian Friend MD at [address] 2020    COLONOSCOPY WITH BIOPSY performed by Ian Friend MD at [facility] 8 Left 2022    CYSTOSCOPY, URETERAL STENT INSERTION, RETROGRADE PYELOGRAM performed by Carola Rivera MD at [address], COLON, DIAGNOSTIC      HYSTERECTOMY N/A 2020    HYSTERECTOMY ABDOMINAL LAPAROSCOPIC ROBOTIC XI W/ CYSTOSCOPY [facility] [address] [facility] [address] [facility]ROS[facility] Left 2015    KNEE SURGERY Left     x3    LAPAROSCOPY      dr Woo Ahr N/A 10/05/2017    LAPAROSCOPIC REMOVAL INTRA ABDOMINAL LIPOMA LOWER RIGHT performed by Grecia Burns DO at [address] [facility] ESOPHAGOGASTRODUODENOSCOPY TRANSORAL DIAGNOSTIC N/A 2017    EGD ESOPHAGOGASTRODUODENOSCOPY  IP  performed by Michael Nassar MD at [facility]  2016    severe esophagitis    UPPER GASTROINTESTINAL ENDOSCOPY  2017    barretts; hiatus hernia; gastritis    UPPER GASTROINTESTINAL ENDOSCOPY 03/02/2017    long seg mullins's with linear erosions, esophagitis, small hiatal hernia    UPPER GASTROINTESTINAL ENDOSCOPY N/A 01/30/2018    MULLINS'S    UPPER GASTROINTESTINAL ENDOSCOPY N/A 10/01/2019    EGD BIOPSY performed by Sharonda Dc MD at [facility] [address] Left 4/19/2022    HOLMIUM LASER STANDBY,  CYSTOSCOPY, LEFT URETEROSCOPY , LEFT STENT EXCHANGE, LEFT RETROGRADE PYELOGRAM performed by Alee Nuñez MD at [address] [place] [address] URETEROSCOPY Left 04/19/2022    HOLMIUM LASER STANDBY,  CYSTOSCOPY, LEFT URETEROSCOPY , LEFT STENT EXCHANGE, LEFT RETROGRADE PYELOGRAM (Left)    WISDOM TOOTH EXTRACTION         Social History:    Social History     Tobacco Use    Smoking status: Current Every Day Smoker     Packs/day: 0.50     Years: 21.00     Pack years: 10.50     Types: Cigarettes    Smokeless tobacco: Never Used   Substance Use Topics    Alcohol use: No                                Ready to quit: Not Answered  Counseling given: Not Answered      Vital Signs (Current):   Vitals:    04/25/22 1339 04/25/22 1339   BP:  (!) 144/92   Pulse:  57   Resp:  16   Temp:  97.7 °F (36.5 °C)   SpO2:  97%   Weight: 203 lb 1.6 oz (92.1 kg)    Height: 5' 2\" (1.575 m)                                               BP Readings from Last 3 Encounters:   04/25/22 (!) 144/92   04/22/22 130/63   04/19/22 (!) 142/97       NPO Status:                                                                                 BMI:   Wt Readings from Last 3 Encounters:   04/25/22 203 lb 1.6 oz (92.1 kg)   04/18/22 209 lb 14.1 oz (95.2 kg)   02/09/22 218 lb (98.9 kg)     Body mass index is 37.15 kg/m².     CBC:   Lab Results   Component Value Date    WBC 7.2 04/21/2022    RBC 3.84 04/21/2022    RBC 4.49 03/25/2012    HGB 11.4 04/21/2022    HCT 34.6 04/21/2022    MCV 90.1 04/21/2022    RDW 14.1 04/21/2022     04/21/2022     03/25/2012       CMP:   Lab Results   Component Value Date     04/21/2022    K 4.0 04/21/2022    CL 109 04/21/2022    CO2 23 04/21/2022    BUN 15 04/21/2022    CREATININE 0.70 04/21/2022    GFRAA >60 04/21/2022    LABGLOM >60 04/21/2022    GLUCOSE 92 04/21/2022    GLUCOSE 106 03/25/2012    PROT 6.3 01/15/2022    CALCIUM 8.2 04/21/2022    BILITOT 0.17 01/15/2022    ALKPHOS 64 01/15/2022    AST 13 01/15/2022    ALT 13 01/15/2022       POC Tests: No results for input(s): POCGLU, POCNA, POCK, POCCL, POCBUN, POCHEMO, POCHCT in the last 72 hours.     Coags:   Lab Results   Component Value Date    PROTIME 9.8 01/13/2019    INR 0.9 01/13/2019    APTT 22.7 01/13/2019       HCG (If Applicable):   Lab Results   Component Value Date    PREGTESTUR NEGATIVE 04/12/2022    PREGTESTUR NEGATIVE 04/12/2022    HCG NEGATIVE 07/08/2020    HCGQUANT 61,609 (H) 06/11/2018        ABGs:   Lab Results   Component Value Date    PHART 7.447 05/21/2017    PO2ART 64.7 05/21/2017    EXN7QLB 35.0 05/21/2017    SBL3GIF 24.2 05/21/2017    B9SWCECK 89.6 05/21/2017        Type & Screen (If Applicable):  No results found for: LABABO, LABRH    Drug/Infectious Status (If Applicable):  Lab Results   Component Value Date    HEPCAB NONREACTIVE 03/22/2016       COVID-19 Screening (If Applicable):   Lab Results   Component Value Date    COVID19 Not Detected 04/16/2022    COVID19 Not Detected 10/03/2021           Anesthesia Evaluation  Patient summary reviewed and Nursing notes reviewed no history of anesthetic complications:   Airway: Mallampati: II  TM distance: >3 FB   Neck ROM: full  Mouth opening: > = 3 FB Dental: normal exam         Pulmonary:normal exam  breath sounds clear to auscultation  (+) pneumonia: resolved,  COPD: moderate,  asthma: allergic asthma,                            Cardiovascular:  Exercise tolerance: good (>4 METS),   (+) CAD: no interval change,       ECG reviewed  Rhythm: regular  Rate: normal                    Neuro/Psych:   (+) seizures:, headaches: tension headaches, psychiatric history: stable with treatment GI/Hepatic/Renal:   (+) hiatal hernia, GERD: poorly controlled,           Endo/Other:    (+) DiabetesType II DM, , .                 Abdominal:             Vascular: negative vascular ROS. Other Findings:             Anesthesia Plan      general     ASA 3       Induction: intravenous. MIPS: Postoperative opioids intended. Anesthetic plan and risks discussed with patient. Plan discussed with CRNA.                   Kary Goodman MD   4/25/2022

## 2022-04-25 NOTE — PROGRESS NOTES
Patient given 2 mg Versed under the order of Dr. Selina Yang. Patient blood pressure 144/76.  Patient placed on monitor and will be monitored until she goes to OR

## 2022-04-25 NOTE — H&P
Interval H&P Note    Pt Name: Ilya Spivey  MRN: [id number]  YOB: 1980  Date of evaluation: 4/25/2022      [x] I have reviewed in epic the H&P by Dr. Jasmin Brady dated 4/17/2022 for an Interval History and Physical note. [x] I have examined  Niurka Gama  There are no changes to the patient who is scheduled for CYSTO LEFT ESWL EXTRACORPOREAL SHOCK WAVE LITHOTRIPSY LEFT STENT REMOVAL  -NEXTMED by Abel Khalil MD for DX KIDNEY STONES. The patient denies new health changes, fever, chills, wheezing, cough, increased SOB, chest pain, open sores or wounds. Hx diabetes. POC BS 94. Last Vitamin D3 4/24/2022. Vital signs: BP (!) 144/92   Pulse 57   Temp 97.7 °F (36.5 °C)   Resp 16   Ht 5' 2\" (1.575 m)   Wt 203 lb 1.6 oz (92.1 kg)   LMP 06/15/2020   SpO2 97%   BMI 37.15 kg/m²     Allergies:  Nsaids, Toradol [ketorolac tromethamine], and Ultram [tramadol]    Medications:    Prior to Admission medications    Medication Sig Start Date End Date Taking? Authorizing Provider   phenazopyridine (PYRIDIUM) 200 MG tablet Take 1 tablet by mouth in the morning, at noon, and at bedtime for 7 days 4/22/22 4/29/22  Aristeo Prasad DO   tamsulosin [facility]) 0.4 MG capsule Take 1 capsule by mouth daily 4/22/22   Aristeo Prasad DO   methocarbamol (ROBAXIN) 750 MG tablet Take 1 tablet by mouth 4 times daily for 10 days 4/22/22 5/2/22  Aristeo Prasad DO   oxybutynin (DITROPAN-XL) 10 MG extended release tablet Take 1 tablet by mouth daily 4/23/22   Aristeo Prasad DO   oxyCODONE (OXYCONTIN) 40 MG extended release tablet Take 1 tablet by mouth every 12 hours for 7 days. 4/22/22 4/29/22  Aristeo Prasad DO   oxyCODONE (ROXICODONE) 5 MG immediate release tablet Take 1 tablet by mouth every 4 hours as needed for Pain (Breakthrough pain) for up to 3 days.  4/22/22 4/25/22  Aristeo Prasad DO   loratadine (CLARITIN) 10 MG tablet TAKE 1 TABLET BY MOUTH DAILY 4/20/22   Alan Bernal, APRN - CNP   acetaminophen (TYLENOL) 500 MG tablet Take 2 tablets by mouth every 6 hours as needed for Pain 4/17/22 5/1/22  Jon Ball DO   Lancets (ONETOUCH DELICA PLUS [id number]) MISC USE TO TEST BLOOD SUGAR TWICE A DAY 3/24/22   KATIE Cole CNP   vitamin D3 (CHOLECALCIFEROL) 25 MCG (1000 UT) TABS tablet TAKE 1 TABLET BY MOUTH DAILY  Patient not taking: Reported on 4/16/2022 3/24/22   KATIE Cole CNP   BREO ELLIPTA 200-25 MCG/INH AEPB inhaler INHALE 1 PUFF INTO THE LUNGS DAILY 3/17/22   KATIE Cole CNP   pantoprazole (PROTONIX) 40 MG tablet 1 tab daily 3/10/22   KATIE Cole CNP   COMBIVENT RESPIMAT  MCG/ACT AERS inhaler Inhale 1 puff into the lungs 4 times daily Inhale 1 puff into the lungs 4 times daily 3/10/22   KATIE Cole CNP   ipratropium-albuterol (DUONEB) 0.5-2.5 (3) MG/3ML SOLN nebulizer solution INHALE CONTENTS OF 1 UNIT DOSE VIA NEBULIZER EVERY 4 HOURS 2/21/22   KATIE Cole CNP   montelukast (SINGULAIR) 10 MG tablet TAKE 1 TABLET IN THE EVENING ONCE A DAY ORALLY 1/24/22   KATIE Cole CNP   Misc. Devices (STRAINER/STAINLESS STEEL/2.5\") MISC 1 each by Does not apply route as needed (when you urinate)  Patient not taking: Reported on 4/16/2022 1/15/22   Jann Robertson DO   pregabalin (LYRICA) 100 MG capsule Take 1 capsule by mouth 3 times daily for 30 days.  1/13/22 2/12/22  Itzel Chew MD   Methocarbamol (ROBAXIN PO) Take by mouth    Historical Provider, MD   sertraline (ZOLOFT) 100 MG tablet Take 1.5 tablets by mouth daily 11/1/21   Historical Provider, MD   lamoTRIgine (LAMICTAL) 200 MG tablet Take 1 tablet by mouth daily 11/1/21   Historical Provider, MD   ONETOUCH VERIO strip USE TO TEST BLOOD SUGAR TWICE A DAY 11/1/21   KATIE Cole CNP   dicyclomine (BENTYL) 10 MG capsule TAKE 1 CAPSULE BY MOUTH 4 TIMES DAILY 8/27/21   KATIE Cole CNP   glucose monitoring kit (FREESTYLE) monitoring kit Use as directed. BRAND OF CHOICE INSURANCE ALLOWS. 5/27/21   KATIE Cole CNP   Alcohol Swabs ( STERILE ALCOHOL PREP) PADS USE AS DIRECTED 5/10/21   KATIE Cole CNP   fluticasone (FLONASE) 50 MCG/ACT nasal spray 1 spray by Each Nostril route daily 4/1/21 5/1/21  KATIE Cole CNP   vitamin D3 (CHOLECALCIFEROL) 25 MCG (1000 UT) TABS tablet TAKE 1 TABLET BY MOUTH DAILY 8/27/20   KATIE Cole CNP   Masks (CLEVER CHOICE FACE MASK) MISC USE NEW FACE MASK EVERYDAY WHEN PATIENT GO OUTSIDE OF HOME DUE TO COVID PANDEMIC 7/6/20   KATIE Cole CNP         This is a 46 y.o. female who is pleasant, cooperative, alert and oriented x3, tearful and anxious, in no acute distress. Heart: Heart sounds are normal.  HR 57 asymptomatic bradycardic rate and regular rhythm without murmur, gallop or rub. Lungs: Normal respiratory effort with equal expansion, good air exchange, unlabored and clear to auscultation without wheezes or rales bilaterally   Abdomen: soft, rounded, nontender, nondistended with bowel sounds active.        Labs:  Recent Labs     04/21/22  1343   HGB 11.4*   HCT 34.6*   WBC 7.2   MCV 90.1         K 4.0   *   CO2 23   BUN 15   CREATININE 0.70   GLUCOSE 92       Recent Labs     04/16/22  1413   COVID19 Not Detected       KATIE Mosley CNP  Electronically signed 4/25/2022 at 1:42 PM      Jamin Langford DO   Resident   Internal Medicine   H&P      Signed   Date of Service:  4/17/2022  7:01 AM         Related encounter: ED to Hosp-Admission (Discharged) from 4/16/2022 in [facility] Renal//Med Surg           Signed        Expand All Collapse All          Show:Clear all  [x]Manual[x]Template[]Copied    Added by:  [x]Jose Guadalupe Parra DO      []Frantz for details    [address]  CDU / [facility] NOTE      Pt Name: Celia Maloney  MRN: [id number]  [place] 1980  Date of evaluation: 4/17/22  Patient's PCP is :  KATIE Cole - CNP     CHIEF COMPLAINT             Chief Complaint   Patient presents with    Nephrolithiasis       7 mm stone dx monday             HISTORY OF PRESENT ILLNESS    Kalpesh Tovar is a 46 y.o. female who presented to the emergency department concern for left-sided flank pain. Patient was previously seen and evaluated on 4/12/2022 had a CT abdomen pelvis without contrast that showed a 7 mm calculus at that time. Patient returns emergency room yesterday with some worsening pain, nausea, vomiting. Patient endorsed 10 out of 10 flank pain described as colicky, cramping. Denies any aggravating or relieving factors. Feels like his got some decreased urine output compared to baseline. Denies any fevers, chills. KUB showed a persistent presence of a 7 mm calculus in the renal pelvis. Renal ultrasound showed no signs of any hydronephrosis. Emergency department called with urology who recommended placing patient in the observation unit for potential stent this morning. Patient was in significant pain at bedside this morning. She is going to get some placement with urology with the next 30 to 60 minutes.      Location/Symptom: Flank pain  Timing/Onset: 1 week  Provocation: None  Quality: Crampy, colicky  Radiation: Groin  Severity: 10 out of 10  Timing/Duration: Persistent  Modifying Factors: Minimal improvement with current treatment regimen     REVIEW OF SYSTEMS     General ROS - No fevers, No malaise   Ophthalmic ROS - No discharge, No changes in vision  ENT ROS -  No sore throat, No rhinorrhea,   Respiratory ROS - no shortness of breath, no cough, no  wheezing  Cardiovascular ROS - No chest pain, no dyspnea on exertion  Gastrointestinal ROS - No abdominal pain, no nausea or vomiting, no change in bowel habits, no black or bloody stools  Genito-Urinary ROS - No dysuria, +voiding, or hematuria; +flank pain  Musculoskeletal ROS - No myalgias, No arthalgias  Neurological ROS - No headache, no dizziness/lightheadedness, No focal weakness, no loss of sensation  Dermatological ROS - No lesions, No rash      (PQRS) Advance directives on face sheet per hospital policy. No change unless specifically mentioned in chart     PAST MEDICAL HISTORY    has a past medical history of Anxiety, Arthritis, Asthma, Sadler's esophagus, Bipolar 1 disorder ([address] CAD (coronary artery disease), Chronic insomnia, COPD (chronic obstructive pulmonary disease) ([address] Depression, Diabetes mellitus ([address] Endometriosis, Esophagitis, GERD (gastroesophageal reflux disease), Headache(784.0), Herniated disc, cervical, Hiatal hernia, Incisional abscess, Kidney stones, MDRO (multiple drug resistant organisms) resistance, Pleurisy, Pneumonia, Seizures ([address] UTI (urinary tract infection), and Vision abnormalities. I have reviewed the past medical history with the patient and it is pertinent to this complaint. SURGICAL HISTORY      has a past surgical history that includes knee surgery (Left);  section (, ); Tonsillectomy; Knee arthroscopy (Left, 5/20/15); Cervical disc surgery; Upper gastrointestinal endoscopy (2016); Upper gastrointestinal endoscopy (2017); Upper gastrointestinal endoscopy (2017); pr esophagogastroduodenoscopy transoral diagnostic (N/A, 3/2/2017); laparoscopy; laparoscopy (N/A, 10/5/2017); Upper gastrointestinal endoscopy (N/A, 2018); Endoscopy, colon, diagnostic;  section (N/A, 2018); Upper gastrointestinal endoscopy (N/A, 10/1/2019); Colonoscopy (N/A, 10/1/2019); Colonoscopy (N/A, 2020); [place] tooth extraction; and Hysterectomy (N/A, 2020). I have reviewed and agree with Surgical History entered and it is pertinent to this complaint.       CURRENT MEDICATIONS       Current Meds Link used for Sign Out Report   acetaminophen (TYLENOL) tablet 1,000 mg, Q6H PRN  budesonide-formoterol (SYMBICORT) 80-4.5 MCG/ACT inhaler 2 puff, BID  dicyclomine (BENTYL) capsule 10 mg, 4x Daily  lamoTRIgine (LAMICTAL) tablet 200 mg, Daily  cetirizine (ZYRTEC) tablet 10 mg, Daily  montelukast (SINGULAIR) tablet 10 mg, Nightly  tamsulosin (FLOMAX) capsule 0.4 mg, Daily  sertraline (ZOLOFT) tablet 150 mg, Daily  0.9 % sodium chloride infusion, Continuous  sodium chloride flush 0.9 % injection 5-40 mL, 2 times per day  sodium chloride flush 0.9 % injection 5-40 mL, PRN  0.9 % sodium chloride infusion, PRN  enoxaparin (LOVENOX) injection 40 mg, Daily  ondansetron (ZOFRAN-ODT) disintegrating tablet 4 mg, Q8H PRN   Or  ondansetron (ZOFRAN) injection 4 mg, Q6H PRN  morphine injection 2 mg, Q2H PRN  ipratropium-albuterol (DUONEB) nebulizer solution 1 ampule, 4x daily  pantoprazole (PROTONIX) tablet 40 mg, BID AC  nicotine polacrilex (NICORETTE) gum 2 mg, Q2H PRN            All medication charted and reviewed. ALLERGIES     is allergic to nsaids, toradol [ketorolac tromethamine], and ultram [tramadol]. FAMILY HISTORY     She indicated that her mother is alive. She indicated that her father is alive. She indicated that the status of her brother is unknown.     family history includes Bipolar Disorder in her brother and mother; Breast Cancer in her mother; Cancer in her mother; Hypertension in her father and mother. The patient denies any pertinent family history. I have reviewed and agree with the family history entered. I have reviewed the Family History and it is not significant to the case     SOCIAL HISTORY      reports that she has been smoking cigarettes. She has a 10.50 pack-year smoking history. She has never used smokeless tobacco. She reports previous drug use. She reports that she does not drink alcohol. I have reviewed and agree with all Social.  There are no concerns for substance abuse/use. PHYSICAL EXAM     INITIAL VITALS:  weight is 218 lb (98.9 kg). Her oral temperature is 98.2 °F (36.8 °C).  Her blood pressure is 129/77 and her pulse is 75. Her respiration is 16 and oxygen saturation is 95%. CONSTITUTIONAL: AOx4, no apparent distress, appears stated age    HEAD: normocephalic, atraumatic   EYES: PERRLA, EOMI    ENT: moist mucous membranes, uvula midline   NECK: supple, symmetric   BACK: symmetric   LUNGS: clear to auscultation bilaterally   CARDIOVASCULAR: regular rate and rhythm, no murmurs, rubs or gallops   ABDOMEN: soft, non-tender, non-distended with normal active bowel sounds   NEUROLOGIC:  MAEx4, no focal sensory or motor deficits   MUSCULOSKELETAL: no clubbing, cyanosis or edema   SKIN: no rash or wounds         DIFFERENTIAL DIAGNOSIS/MDM:   Abdominal Pain:  DDX: GERD, PUD, pancreatitis, cholecystitis, GB colic, cholangitis, Vnxr-Ocih-Xbjyrc, ACS/ MI, pneumonia, SBO, DKA, AAA, mesenteric ischemia, perforated viscous, acute gastroenteritis, NSAP, pyelonephritis, kidney stone, appendicitis, hernia, D-TICS, testicular torsion, ectopic, ovarian torsion, ovarian cyst, PID, Mittelschmerz, period/ fibroid, UTI, constipation, epididymitis/ orchitis  Ransons criteria: WBC>16, age >49, glucose>200, AST>80, LDH>350  Evaluate for: EtOH abuse, ACS risk factors, point tenderness, rebound, guardingm Hearn sign, GB US (stone, sono Hearn, wall thick>3mm, CBD>6mm)     Back Pain:  DDX: PE, vertebral fracture, epidural abscess  Lower: cauda equina, herniated disc   Mid: AAA rupture, pyelonephritis, kidney stone, pancreatitis, PUD  Upper: pneumothorax, aortic dissection, PE, nonspecific        DIAGNOSTIC RESULTS   RADIOLOGY:   I directly visualized the following  images and reviewed the radiologist interpretations:     XR ABDOMEN (KUB) (SINGLE AP VIEW)     Result Date: 4/16/2022  EXAMINATION: ONE SUPINE XRAY VIEW(S) OF THE ABDOMEN 4/16/2022 11:50 am COMPARISON: 12/30/2021 and CT abdomen/pelvis 04/12/2022.  HISTORY: ORDERING SYSTEM PROVIDED HISTORY: assess for left sided kidney stone TECHNOLOGIST PROVIDED HISTORY: assess for left sided kidney stone FINDINGS: Very small portion of the inferior aspect of the pelvis has not been included on the study. There is degradation of image quality related to patient body habitus. There is suspicion of persistent visualization of 7 mm calculus centered in the region of the left renal pelvis. No other radiopaque renal/ureteral calculi are identified. Bowel gas pattern is nonspecific. Visualized portions of the lung bases are clear. Findings suggestive of persistent presence of 7 mm calculus in the region of the left renal pelvis as described above. US RENAL COMPLETE     Result Date: 4/16/2022  EXAMINATION: RETROPERITONEAL ULTRASOUND OF THE KIDNEYS AND URINARY BLADDER 4/16/2022 COMPARISON: CT abdomen pelvis from 04/12/2022 HISTORY: ORDERING SYSTEM PROVIDED HISTORY: concern for left sided kidney stone TECHNOLOGIST PROVIDED HISTORY: concern for left sided kidney stone 68-year-old female with suspected left-sided kidney stone FINDINGS: Kidneys: Exam is limited due to patient's body habitus and bowel gas. Right kidney measures 9.7 x 4.5 x 4.5 cm. Right renal cortical thickness measures 1.2 cm. Left kidney measures 11.5 x 5.7 x 4.4 cm. Left renal cortical thickness measures 1.9 cm. No hydronephrosis or perinephric fluid. No echogenic foci with posterior acoustic shadowing to suggest renal calculi. Gross preservation of the corticomedullary differentiation. Bladder: Urinary bladder grossly unremarkable in appearance. Nonvisualization of ureteral jets. Urinary bladder measures 5.6 x 4.7 x 3.0 cm for a bladder volume of 41.4 mL. 1. Nonvisualization of ureteral jets. 2. Limited unremarkable renal ultrasound. No hydronephrosis. LABS:  I have reviewed and interpreted all available lab results.         Labs Reviewed   BASIC METABOLIC PANEL - Abnormal; Notable for the following components:       Result Value      Glucose 115 (*)       All other components within normal limits   COVID-19, RAPID   CBC WITH AUTO DIFFERENTIAL   URINALYSIS WITH REFLEX TO CULTURE         CDU IMPRESSION / PLAN       Gorman Reas Patrecia Cowden is a 46 y.o. female who presents with left renal calculi and severe left flank pain     · Urology following appreciate recommendations  § Patient to OR this morning for cystoscopy with ureteral stent insertion  § Continue to monitor pain control  · Continue home medications and pain control  · Monitor vitals, labs, and imaging  · DISPO: pending consults and clinical improvement     CONSULTS:     IP CONSULT TO UROLOGY     PROCEDURES:  Not indicated         PATIENT REFERRED TO:     No follow-up provider specified. --  Venkatesh Madden DO   Emergency Medicine Resident      This dictation was generated by voice recognition computer software. Although all attempts are made to edit the dictation for accuracy, there may be errors in the transcription that are not intended.                      Cosigned by: Suki Luna MD at 4/17/2022  3:14 PM       Revision History                            Routing History

## 2022-04-25 NOTE — ANESTHESIA POSTPROCEDURE EVALUATION
Department of Anesthesiology  Postprocedure Note    Patient: Mackenzie Farr  MRN: [id number]  YOB: 1980  Date of evaluation: 4/25/2022  Time:  6:24 PM     Procedure Summary     Date: 04/25/22 Room / Location: [facility] - INPATIENT    Anesthesia Start: 8284 Anesthesia Stop: 4217    Procedure: CYSTO LEFT ESWL EXTRACORPOREAL SHOCK WAVE LITHOTRIPSY LEFT STENT REMOVAL  -NEXTMED (Left Ureter) Diagnosis: (DX KIDNEY STONES)    Surgeons: Dc Chamorro MD Responsible Provider: Kin Bradley DO    Anesthesia Type: general ASA Status: 3          Anesthesia Type: general    Alina Phase I:      Alina Phase II:      Last vitals: Reviewed and per EMR flowsheets.        Anesthesia Post Evaluation    Patient location during evaluation: PACU  Patient participation: complete - patient participated  Level of consciousness: awake and alert  Airway patency: patent  Nausea & Vomiting: no nausea and no vomiting  Complications: no  Cardiovascular status: hemodynamically stable  Respiratory status: acceptable  Hydration status: stable

## 2022-04-26 ENCOUNTER — TELEPHONE (OUTPATIENT)
Dept: UROLOGY | Age: 42
End: 2022-04-26

## 2022-04-26 NOTE — TELEPHONE ENCOUNTER
----- Message from Tameka Gardner MD sent at 4/25/2022  6:14 PM EDT -----  Kati,This patient needs to see us back in 1 month in clinic with a KATIA.

## 2022-04-26 NOTE — TELEPHONE ENCOUNTER
Patient has been rescheduled for 5/27 at 8:40AM. Patient was scheduled for new patient appointment on 5/2 but did have surgery on 4/25 and was told to follow up in 4 wks.

## 2022-04-27 RX ORDER — TAMSULOSIN HYDROCHLORIDE 0.4 MG/1
CAPSULE ORAL
Qty: 14 CAPSULE | OUTPATIENT
Start: 2022-04-27

## 2022-04-29 DIAGNOSIS — N20.0 NEPHROLITHIASIS: Primary | ICD-10-CM

## 2022-04-30 ENCOUNTER — HOSPITAL ENCOUNTER (INPATIENT)
Age: 42
LOS: 2 days | Discharge: HOME | DRG: 885 | End: 2022-05-03
Attending: STUDENT IN AN ORGANIZED HEALTH CARE EDUCATION/TRAINING PROGRAM | Admitting: PSYCHIATRY & NEUROLOGY
Payer: COMMERCIAL

## 2022-04-30 DIAGNOSIS — Z98.1 S/P CERVICAL SPINAL FUSION: ICD-10-CM

## 2022-04-30 DIAGNOSIS — T14.91XA SUICIDE ATTEMPT (HCC): Primary | ICD-10-CM

## 2022-04-30 DIAGNOSIS — J30.89 NON-SEASONAL ALLERGIC RHINITIS DUE TO OTHER ALLERGIC TRIGGER: ICD-10-CM

## 2022-04-30 DIAGNOSIS — E87.6 HYPOKALEMIA: ICD-10-CM

## 2022-04-30 DIAGNOSIS — T50.902A OVERDOSE, INTENTIONAL SELF-HARM, INITIAL ENCOUNTER (HCC): ICD-10-CM

## 2022-04-30 DIAGNOSIS — G44.86 CERVICOGENIC HEADACHE: ICD-10-CM

## 2022-04-30 DIAGNOSIS — M47.812 CERVICAL SPONDYLOSIS WITHOUT MYELOPATHY: ICD-10-CM

## 2022-04-30 LAB
ABSOLUTE EOS #: 0.1 K/UL (ref 0–0.4)
ABSOLUTE LYMPH #: 1.9 K/UL (ref 1–4.8)
ABSOLUTE MONO #: 0.9 K/UL (ref 0.1–1.3)
ACETAMINOPHEN LEVEL: <5 UG/ML (ref 10–30)
ALBUMIN SERPL-MCNC: 4.5 G/DL (ref 3.5–5.2)
ALP BLD-CCNC: 73 U/L (ref 35–104)
ALT SERPL-CCNC: 18 U/L (ref 5–33)
AMPHETAMINE SCREEN URINE: NEGATIVE
ANION GAP SERPL CALCULATED.3IONS-SCNC: 14 MMOL/L (ref 9–17)
AST SERPL-CCNC: 20 U/L
BARBITURATE SCREEN URINE: NEGATIVE
BASOPHILS # BLD: 1 % (ref 0–2)
BASOPHILS ABSOLUTE: 0.2 K/UL (ref 0–0.2)
BENZODIAZEPINE SCREEN, URINE: NEGATIVE
BILIRUB SERPL-MCNC: 0.82 MG/DL (ref 0.3–1.2)
BUN BLDV-MCNC: 8 MG/DL (ref 6–20)
CALCIUM SERPL-MCNC: 12.9 MG/DL (ref 8.6–10.4)
CANNABINOID SCREEN URINE: NEGATIVE
CHLORIDE BLD-SCNC: 98 MMOL/L (ref 98–107)
CO2: 27 MMOL/L (ref 20–31)
COCAINE METABOLITE, URINE: POSITIVE
CREAT SERPL-MCNC: 0.74 MG/DL (ref 0.5–0.9)
EOSINOPHILS RELATIVE PERCENT: 1 % (ref 0–4)
ETHANOL PERCENT: <0.01 %
ETHANOL: <10 MG/DL
GFR AFRICAN AMERICAN: >60 ML/MIN
GFR NON-AFRICAN AMERICAN: >60 ML/MIN
GFR SERPL CREATININE-BSD FRML MDRD: ABNORMAL ML/MIN/{1.73_M2}
GLUCOSE BLD-MCNC: 103 MG/DL (ref 70–99)
HCG QUALITATIVE: NEGATIVE
HCT VFR BLD CALC: 41.2 % (ref 36–46)
HEMOGLOBIN: 14 G/DL (ref 12–16)
LYMPHOCYTES # BLD: 15 % (ref 24–44)
MCH RBC QN AUTO: 28.9 PG (ref 26–34)
MCHC RBC AUTO-ENTMCNC: 33.9 G/DL (ref 31–37)
MCV RBC AUTO: 85.1 FL (ref 80–100)
METHADONE SCREEN, URINE: NEGATIVE
MONOCYTES # BLD: 7 % (ref 1–7)
OPIATES, URINE: NEGATIVE
OXYCODONE SCREEN URINE: NEGATIVE
PDW BLD-RTO: 14.3 % (ref 11.5–14.9)
PHENCYCLIDINE, URINE: NEGATIVE
PLATELET # BLD: 360 K/UL (ref 150–450)
PMV BLD AUTO: 8.3 FL (ref 6–12)
POTASSIUM SERPL-SCNC: 2.9 MMOL/L (ref 3.7–5.3)
RBC # BLD: 4.84 M/UL (ref 4–5.2)
SALICYLATE LEVEL: <1 MG/DL (ref 3–10)
SEG NEUTROPHILS: 76 % (ref 36–66)
SEGMENTED NEUTROPHILS ABSOLUTE COUNT: 9.5 K/UL (ref 1.3–9.1)
SODIUM BLD-SCNC: 139 MMOL/L (ref 135–144)
TEST INFORMATION: ABNORMAL
TOTAL PROTEIN: 7.2 G/DL (ref 6.4–8.3)
TOXIC TRICYCLIC SC,BLOOD: ABNORMAL
TRICYCLIC ANTIDEP,URINE: NEGATIVE
TROPONIN, HIGH SENSITIVITY: 6 NG/L (ref 0–14)
WBC # BLD: 12.6 K/UL (ref 3.5–11)

## 2022-04-30 PROCEDURE — 96365 THER/PROPH/DIAG IV INF INIT: CPT

## 2022-04-30 PROCEDURE — 93005 ELECTROCARDIOGRAM TRACING: CPT | Performed by: STUDENT IN AN ORGANIZED HEALTH CARE EDUCATION/TRAINING PROGRAM

## 2022-04-30 PROCEDURE — 85025 COMPLETE CBC W/AUTO DIFF WBC: CPT

## 2022-04-30 PROCEDURE — 99285 EMERGENCY DEPT VISIT HI MDM: CPT

## 2022-04-30 PROCEDURE — 96372 THER/PROPH/DIAG INJ SC/IM: CPT

## 2022-04-30 PROCEDURE — 96366 THER/PROPH/DIAG IV INF ADDON: CPT

## 2022-04-30 PROCEDURE — 81001 URINALYSIS AUTO W/SCOPE: CPT

## 2022-04-30 PROCEDURE — G0480 DRUG TEST DEF 1-7 CLASSES: HCPCS

## 2022-04-30 PROCEDURE — 84484 ASSAY OF TROPONIN QUANT: CPT

## 2022-04-30 PROCEDURE — 80179 DRUG ASSAY SALICYLATE: CPT

## 2022-04-30 PROCEDURE — 80143 DRUG ASSAY ACETAMINOPHEN: CPT

## 2022-04-30 PROCEDURE — 6370000000 HC RX 637 (ALT 250 FOR IP): Performed by: STUDENT IN AN ORGANIZED HEALTH CARE EDUCATION/TRAINING PROGRAM

## 2022-04-30 PROCEDURE — 36415 COLL VENOUS BLD VENIPUNCTURE: CPT

## 2022-04-30 PROCEDURE — 6360000002 HC RX W HCPCS: Performed by: STUDENT IN AN ORGANIZED HEALTH CARE EDUCATION/TRAINING PROGRAM

## 2022-04-30 PROCEDURE — 80053 COMPREHEN METABOLIC PANEL: CPT

## 2022-04-30 PROCEDURE — 84703 CHORIONIC GONADOTROPIN ASSAY: CPT

## 2022-04-30 PROCEDURE — 80307 DRUG TEST PRSMV CHEM ANLYZR: CPT

## 2022-04-30 RX ORDER — POTASSIUM CHLORIDE 7.45 MG/ML
10 INJECTION INTRAVENOUS
Status: DISPENSED | OUTPATIENT
Start: 2022-04-30 | End: 2022-04-30

## 2022-04-30 RX ORDER — ONDANSETRON 2 MG/ML
4 INJECTION INTRAMUSCULAR; INTRAVENOUS ONCE
Status: COMPLETED | OUTPATIENT
Start: 2022-04-30 | End: 2022-04-30

## 2022-04-30 RX ORDER — POTASSIUM BICARBONATE 25 MEQ/1
50 TABLET, EFFERVESCENT ORAL ONCE
Status: COMPLETED | OUTPATIENT
Start: 2022-04-30 | End: 2022-04-30

## 2022-04-30 RX ORDER — POTASSIUM CHLORIDE 7.45 MG/ML
10 INJECTION INTRAVENOUS ONCE
Status: COMPLETED | OUTPATIENT
Start: 2022-04-30 | End: 2022-05-01

## 2022-04-30 RX ORDER — ONDANSETRON 2 MG/ML
4 INJECTION INTRAMUSCULAR; INTRAVENOUS ONCE
Status: DISCONTINUED | OUTPATIENT
Start: 2022-04-30 | End: 2022-04-30

## 2022-04-30 RX ADMIN — POTASSIUM CHLORIDE 10 MEQ: 7.46 INJECTION, SOLUTION INTRAVENOUS at 23:12

## 2022-04-30 RX ADMIN — POTASSIUM BICARBONATE 50 MEQ: 978 TABLET, EFFERVESCENT ORAL at 21:03

## 2022-04-30 RX ADMIN — POTASSIUM CHLORIDE 10 MEQ: 7.46 INJECTION, SOLUTION INTRAVENOUS at 22:02

## 2022-04-30 RX ADMIN — ONDANSETRON 4 MG: 2 INJECTION, SOLUTION INTRAMUSCULAR; INTRAVENOUS at 19:55

## 2022-04-30 ASSESSMENT — ENCOUNTER SYMPTOMS
NAUSEA: 0
ABDOMINAL PAIN: 0
SHORTNESS OF BREATH: 0
VOMITING: 0
RHINORRHEA: 0
COUGH: 0

## 2022-04-30 ASSESSMENT — PAIN SCALES - GENERAL: PAINLEVEL_OUTOF10: 9

## 2022-04-30 ASSESSMENT — PAIN DESCRIPTION - LOCATION: LOCATION: GENERALIZED

## 2022-04-30 ASSESSMENT — PAIN DESCRIPTION - PAIN TYPE: TYPE: ACUTE PAIN

## 2022-04-30 ASSESSMENT — PAIN DESCRIPTION - DESCRIPTORS: DESCRIPTORS: DISCOMFORT

## 2022-04-30 ASSESSMENT — PAIN - FUNCTIONAL ASSESSMENT: PAIN_FUNCTIONAL_ASSESSMENT: 0-10

## 2022-04-30 NOTE — ED PROVIDER NOTES
[facility]  Emergency Department Encounter  Emergency Medicine Physician     Pt Name: Angelique Perry  MRN: [id number]  Maricelgfparish 1980  Date of evaluation: 4/30/22  PCP:  KATIE Daniel CNP    CHIEF COMPLAINT       Chief Complaint   Patient presents with    Suicide Attempt       HISTORY OF PRESENT ILLNESS  (Location/Symptom, Timing/Onset, Context/Setting, Quality, Duration, Modifying Factors, Severity.)    Angelique Perry is a 46 y.o. female who presents with suicidal attempt. Patient states that she took a handful of each of the following medications: Lamictal, Benadryl, Zoloft. Patient states that she is prescribed Lamictal for seizures, Zoloft for depression, and that she had Benadryl in the house. Patient states that she is under a significant amount of stress recently. She states that her daughter is currently in foster care and there is a chance that she may not be able to get her out of foster care. She states that her son is currently with her son's father and he has indicated to her that he would likely retain full custody of the child. Patient began believing that she had failed her children and stated at that point she did not want to live anymore so she took the medications. States that later in the morning she smoked crack but denies any other substances including alcohol. States that she did not take any other medication throughout the day. Currently complaining of fatigue which has been worsening throughout the day along with anxiety which has been fluctuating throughout the day. Denies any pain. Denies any syncope. Does endorse a headache which has been going on for the past 3 hours, described as a dull throbbing headache.   States she has a history of headaches this feels similar to the ones that she had in the past.        PAST MEDICAL / SURGICAL / SOCIAL / FAMILY HISTORY    has a past medical history of Anxiety, Arthritis, Asthma, Sadler's esophagus, Bipolar 1 disorder (HCC), Chronic insomnia, COPD (chronic obstructive pulmonary disease) ([facility] [address] Depression, Diabetes mellitus ([facility] [address] Endometriosis, Esophagitis, GERD (gastroesophageal reflux disease), Headache(784.0), Herniated disc, cervical, Hiatal hernia, Incisional abscess, Kidney stones, MDRO (multiple drug resistant organisms) resistance, Pleurisy, Pneumonia, Seizure ([facility] [address] Seizures ([address] UTI (urinary tract infection), and Vision abnormalities. has a past surgical history that includes knee surgery (Left);  section (, ); Tonsillectomy; Knee arthroscopy (Left, 2015); Cervical disc surgery; Upper gastrointestinal endoscopy (2016); Upper gastrointestinal endoscopy (2017); Upper gastrointestinal endoscopy (2017); pr esophagogastroduodenoscopy transoral diagnostic (N/A, 2017); laparoscopy; laparoscopy (N/A, 10/05/2017); Upper gastrointestinal endoscopy (N/A, 2018); Endoscopy, colon, diagnostic;  section (N/A, 2018); Upper gastrointestinal endoscopy (N/A, 10/01/2019); Colonoscopy (N/A, 10/01/2019); Colonoscopy (N/A, 2020); [place] tooth extraction; Hysterectomy (N/A, 2020); Cystoscopy (Left, 2022); Ureteroscopy (Left, 2022); Ureter surgery (Left, 2022); and Lithotripsy (Left, 2022).     Social History     Socioeconomic History    Marital status:      Spouse name: Not on file    Number of children: 1    Years of education: 11th grade    Highest education level: Not on file   Occupational History    Occupation: unemployed-   Tobacco Use    Smoking status: Current Every Day Smoker     Packs/day: 0.50     Years: 21.00     Pack years: 10.50     Types: Cigarettes    Smokeless tobacco: Never Used   Vaping Use    Vaping Use: Every day   Substance and Sexual Activity    Alcohol use: No    Drug use: Yes     Comment: crack    Sexual activity: Yes     Partners: Male   Other Topics Concern    Not on file   Social History Narrative    Lives with son and boyfriend     Social Determinants of Health     Financial Resource Strain: Low Risk     Difficulty of Paying Living Expenses: Not hard at all   Food Insecurity: No Food Insecurity    Worried About Running Out of Food in the Last Year: Never true    [address] in the Last Year: Never true   Transportation Needs:     Lack of Transportation (Medical): Not on file    Lack of Transportation (Non-Medical): Not on file   Physical Activity:     Days of Exercise per Week: Not on file    Minutes of Exercise per Session: Not on file   Stress:     Feeling of Stress : Not on file   Social Connections:     Frequency of Communication with Friends and Family: Not on file    Frequency of Social Gatherings with Friends and Family: Not on file    Attends Sabianism Services: Not on file    Active Member of Clubs or Organizations: Not on file    Attends Club or Organization Meetings: Not on file    Marital Status: Not on file   Intimate Partner Violence:     Fear of Current or Ex-Partner: Not on file    Emotionally Abused: Not on file    Physically Abused: Not on file    Sexually Abused: Not on file   Housing Stability:     Unable to Pay for Housing in the Last Year: Not on file    Number of [place] in the Last Year: Not on file    Unstable Housing in the Last Year: Not on file       Family History   Problem Relation Age of Onset    Cancer Mother         breast    Bipolar Disorder Mother     Hypertension Mother     Breast Cancer Mother     Bipolar Disorder Brother     Hypertension Father        Allergies:    Nsaids, Toradol [ketorolac tromethamine], and Ultram [tramadol]    Home Medications:  Prior to Admission medications    Medication Sig Start Date End Date Taking?  Authorizing Provider   tamsulosin (FLOMAX) 0.4 MG capsule Take 1 capsule by mouth daily 4/22/22   Mark Luna DO   methocarbamol (ROBAXIN) 750 MG tablet Take 1 tablet by mouth 4 times daily for 10 days 4/22/22 5/2/22  Fernande Simmonds, DO   oxybutynin (DITROPAN-XL) 10 MG extended release tablet Take 1 tablet by mouth daily 4/23/22   Fernande Simmonds, DO   loratadine (CLARITIN) 10 MG tablet TAKE 1 TABLET BY MOUTH DAILY 4/20/22   KATIE Cole CNP   acetaminophen (TYLENOL) 500 MG tablet Take 2 tablets by mouth every 6 hours as needed for Pain 4/17/22 5/1/22  Fernande Simmonds, DO   Lancets (ONETOUCH DELICA PLUS [id number]) MISC USE TO TEST BLOOD SUGAR TWICE A DAY 3/24/22   KATIE Cole CNP   vitamin D3 (CHOLECALCIFEROL) 25 MCG (1000 UT) TABS tablet TAKE 1 TABLET BY MOUTH DAILY 3/24/22   KATIE Cole CNP   BREO ELLIPTA 200-25 MCG/INH AEPB inhaler INHALE 1 PUFF INTO THE LUNGS DAILY 3/17/22   KATIE Cole CNP   pantoprazole (PROTONIX) 40 MG tablet 1 tab daily 3/10/22   KATIE Cole CNP   COMBIVENT RESPIMAT  MCG/ACT AERS inhaler Inhale 1 puff into the lungs 4 times daily Inhale 1 puff into the lungs 4 times daily 3/10/22   KATIE Cole CNP   ipratropium-albuterol (DUONEB) 0.5-2.5 (3) MG/3ML SOLN nebulizer solution INHALE CONTENTS OF 1 UNIT DOSE VIA NEBULIZER EVERY 4 HOURS 2/21/22   KATIE Cole CNP   montelukast (SINGULAIR) 10 MG tablet TAKE 1 TABLET IN THE EVENING ONCE A DAY ORALLY 1/24/22   KATIE Cole CNP   Misc. Devices (STRAINER/STAINLESS STEEL/2.5\") MISC 1 each by Does not apply route as needed (when you urinate)  Patient not taking: Reported on 4/16/2022 1/15/22   Jann Robertson,    pregabalin (LYRICA) 100 MG capsule Take 1 capsule by mouth 3 times daily for 30 days.  1/13/22 2/12/22  Sharad Gill MD   Methocarbamol (ROBAXIN PO) Take by mouth    Historical Provider, MD   sertraline (ZOLOFT) 100 MG tablet Take 1.5 tablets by mouth daily 11/1/21   Historical Provider, MD   lamoTRIgine (LAMICTAL) 200 MG tablet Take 1 tablet by mouth daily 11/1/21   Historical Provider, MD   [facility] VERIO strip USE TO TEST BLOOD SUGAR TWICE A DAY 11/1/21   KATIE Cole CNP   dicyclomine (BENTYL) 10 MG capsule TAKE 1 CAPSULE BY MOUTH 4 TIMES DAILY 8/27/21   KATIE Cole CNP   glucose monitoring kit (FREESTYLE) monitoring kit Use as directed. BRAND OF CHOICE INSURANCE ALLOWS. 5/27/21   KATIE Cole CNP   Alcohol Swabs ( STERILE ALCOHOL PREP) PADS USE AS DIRECTED 5/10/21   KATIE Cole CNP   fluticasone (FLONASE) 50 MCG/ACT nasal spray 1 spray by Each Nostril route daily 4/1/21 5/1/21  KATIE Cole CNP   vitamin D3 (CHOLECALCIFEROL) 25 MCG (1000 UT) TABS tablet TAKE 1 TABLET BY MOUTH DAILY 8/27/20   KATIE Cole CNP   Masks (CLEVER CHOICE FACE MASK) MISC USE NEW FACE MASK EVERYDAY WHEN PATIENT GO OUTSIDE OF HOME DUE TO COVID PANDEMIC 7/6/20   KATIE Cole CNP       REVIEW OF SYSTEMS    (2-9 systems for level 4, 10 or more for level 5)    Review of Systems   Constitutional: Positive for fatigue. Negative for chills and fever. HENT: Negative for congestion and rhinorrhea. Respiratory: Negative for cough and shortness of breath. Cardiovascular: Negative for chest pain. Gastrointestinal: Negative for abdominal pain, nausea and vomiting. Genitourinary: Negative for dysuria and flank pain. Musculoskeletal: Negative for myalgias. Neurological: Positive for headaches. Psychiatric/Behavioral: Positive for suicidal ideas. Negative for agitation and confusion. The patient is nervous/anxious. All other systems reviewed and are negative. PHYSICAL EXAM   (up to 7 for level 4, 8 or more for level 5)    INITIAL VITALS:   ED Triage Vitals [04/30/22 1841]   BP Temp Temp Source Pulse Resp SpO2 Height Weight   (!) 155/96 98.2 °F (36.8 °C) Temporal 101 18 95 % -- --       Physical Exam  Vitals and nursing note reviewed. Constitutional:       General: She is not in acute distress.      Appearance: She is well-developed. She is obese. She is not ill-appearing. HENT:      Mouth/Throat:      Mouth: Mucous membranes are dry. Eyes:      Extraocular Movements: Extraocular movements intact. Pupils: Pupils are equal, round, and reactive to light. Cardiovascular:      Rate and Rhythm: Regular rhythm. Tachycardia present. Pulses: Normal pulses. Radial pulses are 2+ on the right side and 2+ on the left side. Posterior tibial pulses are 2+ on the right side and 2+ on the left side. Heart sounds: Normal heart sounds. No murmur heard. Pulmonary:      Effort: Pulmonary effort is normal. No respiratory distress. Breath sounds: Normal breath sounds. No decreased breath sounds. Abdominal:      General: There is no distension. Palpations: Abdomen is soft. Tenderness: There is no abdominal tenderness. Musculoskeletal:      Cervical back: Normal range of motion and neck supple. No spinous process tenderness or muscular tenderness. Right lower leg: No edema. Left lower leg: No edema. Skin:     General: Skin is warm and dry. Capillary Refill: Capillary refill takes less than 2 seconds. Comments: Skin is pink, warm, dry. Neurological:      General: No focal deficit present. Mental Status: She is alert and oriented to person, place, and time. GCS: GCS eye subscore is 4. GCS verbal subscore is 5. GCS motor subscore is 6. Cranial Nerves: No dysarthria or facial asymmetry. Sensory: Sensation is intact. No sensory deficit. Motor: Motor function is intact. No weakness, tremor or abnormal muscle tone. Psychiatric:         Attention and Perception: She does not perceive auditory or visual hallucinations. Mood and Affect: Mood is anxious and depressed. Affect is flat. Speech: Speech is not delayed or tangential.         Behavior: Behavior is not aggressive or combative. Behavior is cooperative.          Thought Content: Thought content includes suicidal ideation. Thought content does not include homicidal ideation. Thought content includes suicidal plan. Thought content does not include homicidal plan. Judgment: Judgment is impulsive. DIFFERENTIAL  DIAGNOSIS   PLAN (LABS / IMAGING / EKG):  Orders Placed This Encounter   Procedures    CBC with Auto Differential    Comprehensive Metabolic Panel    Troponin    TOX SCR, BLD, ED    URINE DRUG SCREEN    HCG Qualitative, Serum    Drug screen, tricyclic    ADULT DIET; Regular    EKG 12 Lead    Suicide precautions       MEDICATIONS ORDERED:  Orders Placed This Encounter   Medications    DISCONTD: ondansetron (ZOFRAN) injection 4 mg    ondansetron (ZOFRAN) injection 4 mg    potassium bicarbonate (K-LYTE) disintegrating tablet 50 mEq    potassium chloride 10 mEq/100 mL IVPB (Peripheral Line)         DIAGNOSTIC RESULTS / EMERGENCYDEPARTMENT COURSE / MDM   LABS:  Labs Reviewed   CBC WITH AUTO DIFFERENTIAL - Abnormal; Notable for the following components:       Result Value    WBC 12.6 (*)     Seg Neutrophils 76 (*)     Lymphocytes 15 (*)     Segs Absolute 9.50 (*)     All other components within normal limits   COMPREHENSIVE METABOLIC PANEL - Abnormal; Notable for the following components:    Glucose 103 (*)     Calcium 12.9 (*)     Potassium 2.9 (*)     All other components within normal limits   TOX SCR, BLD, ED - Abnormal; Notable for the following components:    Acetaminophen Level <5 (*)     Salicylate Lvl <1 (*)     All other components within normal limits   TROPONIN   HCG, SERUM, QUALITATIVE   URINE DRUG SCREEN   DRUG SCREEN TRICYCLIC URINE       RADIOLOGY:  No results found. EKG    EKG Interpretation    Interpreted by me    Rhythm: normal sinus   Rate: normal  Axis: normal  Ectopy: none  Conduction: normal  ST Segments: no acute change  T Waves: no acute change  Q Waves: Lead III    Clinical Impression: Sinus rhythm rate of 90.   No ST segment changes, no arrhythmia, no ectopy. Normal axis, good R wave progression. QTc 420 ms. Q wave present in lead III. Q wave in lead III appears new compared to EKG dated 4/30/2022    All EKG's are interpreted by the Emergency Department Physician whoeither signs or Co-signs this chart in the absence of a cardiologist.    Impression:  Patient presents after a suicide attempt. Took 1 handful of Benadryl, 1 handful of Lamictal, and 1 handful of Zoloft this morning. Zoloft is 150 mg tablets, Benadryl patient believes is 25 mg tablets, and Lamictal is 250 mg tablets. Did speak with poison control who recommended the lab work as above along with EKG. Will obtain these labs in addition to a pregnancy test as patient will likely require a [facility] admission. Patient placed in suicide precautions. EMERGENCY DEPARTMENT COURSE:  ED Course as of 04/30/22 2211   Sat Apr 30, 2022   1939 Patient took 3 handfuls of Benadryl, Lamictal, and Zoloft this morning. Spoke with poison control, recommended labs as ordered, EKG, telemetry monitoring. [AP]   1950 Alerted by RN that patient is vomiting. Will give some Zofran at this time. [AP]   2008 Potassium low at 2.9. Will order IV and oral repletion [AP]      ED Course User Index  [AP] Shahab Yang,        Patient signed out to Dr. Carol Ann Drake:  none    CONSULTS:  None    CRITICAL CARE:  There was a high probability of clinically significant/life threatening deterioration in this patient's condition which required my urgent intervention. Total critical care time was 15 minutes. This excludes any time for separately reportable procedures. FINAL IMPRESSION     1. Suicide attempt ([facility] [address]    2. Overdose, intentional self-harm, initial encounter ([facility] [address]    3. Hypokalemia          DISPOSITION / PLAN   DISPOSITION  - to be admitted    PATIENT REFERRED TO:  No follow-up provider specified.     DISCHARGE MEDICATIONS:  New Prescriptions    No medications on file       Jyoti Hwang DO [place]  Emergency Medicine Attending    (Please note that portions of this note were completed with a voice recognition program.  Efforts were made to edit the dictations but occasionally words are mis-transcribed.)         Unruly King DO  04/30/22 7391

## 2022-04-30 NOTE — ED TRIAGE NOTES
Pt states \"I took a bunch of pills and I was smoking crack trying to kill myself. \" Pt states \"I dont know what pills they were- it was the antidepressants Im on. \"

## 2022-05-01 PROBLEM — F41.0 GENERALIZED ANXIETY DISORDER WITH PANIC ATTACKS: Status: ACTIVE | Noted: 2022-05-01

## 2022-05-01 PROBLEM — R45.851 DEPRESSION WITH SUICIDAL IDEATION: Status: ACTIVE | Noted: 2022-05-01

## 2022-05-01 PROBLEM — F33.3 MAJOR DEPRESSIVE DISORDER, RECURRENT, SEVERE WITH PSYCHOTIC FEATURES (HCC): Status: ACTIVE | Noted: 2022-05-01

## 2022-05-01 PROBLEM — F14.10 COCAINE ABUSE, EPISODIC USE (HCC): Status: ACTIVE | Noted: 2022-05-01

## 2022-05-01 PROBLEM — F32.A DEPRESSION WITH SUICIDAL IDEATION: Status: ACTIVE | Noted: 2022-05-01

## 2022-05-01 PROBLEM — F41.1 GENERALIZED ANXIETY DISORDER WITH PANIC ATTACKS: Status: ACTIVE | Noted: 2022-05-01

## 2022-05-01 LAB
BACTERIA: NORMAL
BILIRUBIN URINE: NEGATIVE
CASTS UA: NORMAL /LPF
COLOR: YELLOW
EPITHELIAL CELLS UA: NORMAL /HPF
GLUCOSE BLD-MCNC: 102 MG/DL (ref 65–105)
GLUCOSE BLD-MCNC: 95 MG/DL (ref 65–105)
GLUCOSE URINE: NEGATIVE
KETONES, URINE: ABNORMAL
LEUKOCYTE ESTERASE, URINE: NEGATIVE
NITRITE, URINE: NEGATIVE
PH UA: 6 (ref 5–8)
POTASSIUM SERPL-SCNC: 3.6 MMOL/L (ref 3.7–5.3)
PROTEIN UA: NEGATIVE
RBC UA: NORMAL /HPF
SPECIFIC GRAVITY UA: 1.01 (ref 1–1.03)
TURBIDITY: ABNORMAL
URINE HGB: ABNORMAL
UROBILINOGEN, URINE: NORMAL
WBC UA: NORMAL /HPF

## 2022-05-01 PROCEDURE — 94761 N-INVAS EAR/PLS OXIMETRY MLT: CPT

## 2022-05-01 PROCEDURE — 6370000000 HC RX 637 (ALT 250 FOR IP): Performed by: PSYCHIATRY & NEUROLOGY

## 2022-05-01 PROCEDURE — 6370000000 HC RX 637 (ALT 250 FOR IP): Performed by: INTERNAL MEDICINE

## 2022-05-01 PROCEDURE — 82947 ASSAY GLUCOSE BLOOD QUANT: CPT

## 2022-05-01 PROCEDURE — 36415 COLL VENOUS BLD VENIPUNCTURE: CPT

## 2022-05-01 PROCEDURE — 84132 ASSAY OF SERUM POTASSIUM: CPT

## 2022-05-01 PROCEDURE — 90792 PSYCH DIAG EVAL W/MED SRVCS: CPT | Performed by: PSYCHIATRY & NEUROLOGY

## 2022-05-01 PROCEDURE — 94640 AIRWAY INHALATION TREATMENT: CPT

## 2022-05-01 PROCEDURE — 99223 1ST HOSP IP/OBS HIGH 75: CPT | Performed by: INTERNAL MEDICINE

## 2022-05-01 PROCEDURE — 94664 DEMO&/EVAL PT USE INHALER: CPT

## 2022-05-01 PROCEDURE — APPSS60 APP SPLIT SHARED TIME 46-60 MINUTES: Performed by: NURSE PRACTITIONER

## 2022-05-01 PROCEDURE — 6370000000 HC RX 637 (ALT 250 FOR IP): Performed by: NURSE PRACTITIONER

## 2022-05-01 PROCEDURE — 6370000000 HC RX 637 (ALT 250 FOR IP): Performed by: EMERGENCY MEDICINE

## 2022-05-01 PROCEDURE — 1240000000 HC EMOTIONAL WELLNESS R&B

## 2022-05-01 RX ORDER — NICOTINE 21 MG/24HR
1 PATCH, TRANSDERMAL 24 HOURS TRANSDERMAL DAILY
Status: DISCONTINUED | OUTPATIENT
Start: 2022-05-01 | End: 2022-05-01

## 2022-05-01 RX ORDER — OXYBUTYNIN CHLORIDE 10 MG/1
10 TABLET, EXTENDED RELEASE ORAL DAILY
Status: DISCONTINUED | OUTPATIENT
Start: 2022-05-01 | End: 2022-05-03 | Stop reason: HOSPADM

## 2022-05-01 RX ORDER — IPRATROPIUM BROMIDE AND ALBUTEROL SULFATE 2.5; .5 MG/3ML; MG/3ML
SOLUTION RESPIRATORY (INHALATION)
Status: DISPENSED
Start: 2022-05-01 | End: 2022-05-01

## 2022-05-01 RX ORDER — DICYCLOMINE HYDROCHLORIDE 10 MG/1
10 CAPSULE ORAL 4 TIMES DAILY
Status: DISCONTINUED | OUTPATIENT
Start: 2022-05-01 | End: 2022-05-03 | Stop reason: HOSPADM

## 2022-05-01 RX ORDER — DIPHENHYDRAMINE HYDROCHLORIDE 50 MG/ML
50 INJECTION INTRAMUSCULAR; INTRAVENOUS EVERY 4 HOURS PRN
Status: DISCONTINUED | OUTPATIENT
Start: 2022-05-01 | End: 2022-05-03 | Stop reason: HOSPADM

## 2022-05-01 RX ORDER — HALOPERIDOL 5 MG
5 TABLET ORAL EVERY 4 HOURS PRN
Status: DISCONTINUED | OUTPATIENT
Start: 2022-05-01 | End: 2022-05-03 | Stop reason: HOSPADM

## 2022-05-01 RX ORDER — BUDESONIDE AND FORMOTEROL FUMARATE DIHYDRATE 160; 4.5 UG/1; UG/1
2 AEROSOL RESPIRATORY (INHALATION) 2 TIMES DAILY
Refills: 1 | Status: DISCONTINUED | OUTPATIENT
Start: 2022-05-01 | End: 2022-05-03 | Stop reason: HOSPADM

## 2022-05-01 RX ORDER — IPRATROPIUM BROMIDE AND ALBUTEROL SULFATE 2.5; .5 MG/3ML; MG/3ML
1 SOLUTION RESPIRATORY (INHALATION) EVERY 4 HOURS PRN
Status: DISCONTINUED | OUTPATIENT
Start: 2022-05-01 | End: 2022-05-03 | Stop reason: HOSPADM

## 2022-05-01 RX ORDER — HYDROXYZINE 50 MG/1
50 TABLET, FILM COATED ORAL 3 TIMES DAILY PRN
Status: DISCONTINUED | OUTPATIENT
Start: 2022-05-01 | End: 2022-05-03 | Stop reason: HOSPADM

## 2022-05-01 RX ORDER — FAMOTIDINE 20 MG/1
20 TABLET, FILM COATED ORAL ONCE
Status: COMPLETED | OUTPATIENT
Start: 2022-05-01 | End: 2022-05-01

## 2022-05-01 RX ORDER — TRAZODONE HYDROCHLORIDE 50 MG/1
50 TABLET ORAL NIGHTLY PRN
Status: DISCONTINUED | OUTPATIENT
Start: 2022-05-01 | End: 2022-05-03 | Stop reason: HOSPADM

## 2022-05-01 RX ORDER — PANTOPRAZOLE SODIUM 40 MG/1
40 TABLET, DELAYED RELEASE ORAL
Status: DISCONTINUED | OUTPATIENT
Start: 2022-05-01 | End: 2022-05-03 | Stop reason: HOSPADM

## 2022-05-01 RX ORDER — ACETAMINOPHEN 500 MG
1000 TABLET ORAL EVERY 6 HOURS PRN
Status: DISCONTINUED | OUTPATIENT
Start: 2022-05-01 | End: 2022-05-03 | Stop reason: HOSPADM

## 2022-05-01 RX ORDER — MONTELUKAST SODIUM 10 MG/1
10 TABLET ORAL NIGHTLY
Status: DISCONTINUED | OUTPATIENT
Start: 2022-05-01 | End: 2022-05-03 | Stop reason: HOSPADM

## 2022-05-01 RX ORDER — CLINDAMYCIN HYDROCHLORIDE 150 MG/1
300 CAPSULE ORAL EVERY 6 HOURS SCHEDULED
Status: DISCONTINUED | OUTPATIENT
Start: 2022-05-01 | End: 2022-05-03 | Stop reason: HOSPADM

## 2022-05-01 RX ORDER — IPRATROPIUM BROMIDE AND ALBUTEROL SULFATE 2.5; .5 MG/3ML; MG/3ML
1 SOLUTION RESPIRATORY (INHALATION) 4 TIMES DAILY
Status: DISCONTINUED | OUTPATIENT
Start: 2022-05-01 | End: 2022-05-01

## 2022-05-01 RX ORDER — VITAMIN B COMPLEX
1 TABLET ORAL DAILY
Status: DISCONTINUED | OUTPATIENT
Start: 2022-05-01 | End: 2022-05-03 | Stop reason: HOSPADM

## 2022-05-01 RX ORDER — TAMSULOSIN HYDROCHLORIDE 0.4 MG/1
0.4 CAPSULE ORAL DAILY
Status: DISCONTINUED | OUTPATIENT
Start: 2022-05-01 | End: 2022-05-03 | Stop reason: HOSPADM

## 2022-05-01 RX ORDER — PREGABALIN 100 MG/1
100 CAPSULE ORAL 3 TIMES DAILY
Status: DISCONTINUED | OUTPATIENT
Start: 2022-05-01 | End: 2022-05-03

## 2022-05-01 RX ORDER — ACETAMINOPHEN 325 MG/1
650 TABLET ORAL EVERY 4 HOURS PRN
Status: DISCONTINUED | OUTPATIENT
Start: 2022-05-01 | End: 2022-05-03 | Stop reason: HOSPADM

## 2022-05-01 RX ORDER — MAGNESIUM HYDROXIDE/ALUMINUM HYDROXICE/SIMETHICONE 120; 1200; 1200 MG/30ML; MG/30ML; MG/30ML
30 SUSPENSION ORAL EVERY 6 HOURS PRN
Status: DISCONTINUED | OUTPATIENT
Start: 2022-05-01 | End: 2022-05-03 | Stop reason: HOSPADM

## 2022-05-01 RX ORDER — HALOPERIDOL 5 MG/ML
5 INJECTION INTRAMUSCULAR EVERY 4 HOURS PRN
Status: DISCONTINUED | OUTPATIENT
Start: 2022-05-01 | End: 2022-05-03 | Stop reason: HOSPADM

## 2022-05-01 RX ORDER — IPRATROPIUM BROMIDE AND ALBUTEROL SULFATE 2.5; .5 MG/3ML; MG/3ML
1 SOLUTION RESPIRATORY (INHALATION) 4 TIMES DAILY
Status: DISCONTINUED | OUTPATIENT
Start: 2022-05-01 | End: 2022-05-03 | Stop reason: HOSPADM

## 2022-05-01 RX ORDER — LORAZEPAM 2 MG/ML
2 INJECTION INTRAMUSCULAR EVERY 4 HOURS PRN
Status: DISCONTINUED | OUTPATIENT
Start: 2022-05-01 | End: 2022-05-03 | Stop reason: HOSPADM

## 2022-05-01 RX ORDER — FLUTICASONE PROPIONATE 50 MCG
1 SPRAY, SUSPENSION (ML) NASAL DAILY
Status: DISCONTINUED | OUTPATIENT
Start: 2022-05-01 | End: 2022-05-03 | Stop reason: HOSPADM

## 2022-05-01 RX ORDER — POLYETHYLENE GLYCOL 3350 17 G/17G
17 POWDER, FOR SOLUTION ORAL DAILY PRN
Status: DISCONTINUED | OUTPATIENT
Start: 2022-05-01 | End: 2022-05-03 | Stop reason: HOSPADM

## 2022-05-01 RX ORDER — POTASSIUM BICARBONATE 25 MEQ/1
50 TABLET, EFFERVESCENT ORAL ONCE
Status: DISCONTINUED | OUTPATIENT
Start: 2022-05-01 | End: 2022-05-03 | Stop reason: HOSPADM

## 2022-05-01 RX ORDER — DOXYCYCLINE 100 MG/1
100 CAPSULE ORAL EVERY 12 HOURS SCHEDULED
Status: DISCONTINUED | OUTPATIENT
Start: 2022-05-01 | End: 2022-05-02

## 2022-05-01 RX ORDER — ACETAMINOPHEN 325 MG/1
650 TABLET ORAL ONCE
Status: COMPLETED | OUTPATIENT
Start: 2022-05-01 | End: 2022-05-01

## 2022-05-01 RX ORDER — SUCRALFATE 1 G/1
1 TABLET ORAL EVERY 6 HOURS SCHEDULED
Status: DISCONTINUED | OUTPATIENT
Start: 2022-05-01 | End: 2022-05-03 | Stop reason: HOSPADM

## 2022-05-01 RX ORDER — PANTOPRAZOLE SODIUM 40 MG/1
40 TABLET, DELAYED RELEASE ORAL
Status: DISCONTINUED | OUTPATIENT
Start: 2022-05-01 | End: 2022-05-01

## 2022-05-01 RX ORDER — LORAZEPAM 1 MG/1
2 TABLET ORAL EVERY 4 HOURS PRN
Status: DISCONTINUED | OUTPATIENT
Start: 2022-05-01 | End: 2022-05-03 | Stop reason: HOSPADM

## 2022-05-01 RX ORDER — LAMOTRIGINE 100 MG/1
200 TABLET ORAL DAILY
Status: DISCONTINUED | OUTPATIENT
Start: 2022-05-01 | End: 2022-05-03 | Stop reason: HOSPADM

## 2022-05-01 RX ADMIN — IPRATROPIUM BROMIDE AND ALBUTEROL SULFATE 1 AMPULE: 2.5; .5 SOLUTION RESPIRATORY (INHALATION) at 11:43

## 2022-05-01 RX ADMIN — SUCRALFATE 1 G: 1 TABLET ORAL at 18:07

## 2022-05-01 RX ADMIN — IPRATROPIUM BROMIDE AND ALBUTEROL SULFATE 1 AMPULE: 2.5; .5 SOLUTION RESPIRATORY (INHALATION) at 15:29

## 2022-05-01 RX ADMIN — IPRATROPIUM BROMIDE AND ALBUTEROL SULFATE 1 AMPULE: .5; 2.5 SOLUTION RESPIRATORY (INHALATION) at 21:09

## 2022-05-01 RX ADMIN — NICOTINE POLACRILEX 2 MG: 2 GUM, CHEWING BUCCAL at 12:50

## 2022-05-01 RX ADMIN — LORAZEPAM 2 MG: 1 TABLET ORAL at 12:49

## 2022-05-01 RX ADMIN — DOXYCYCLINE 100 MG: 100 CAPSULE ORAL at 20:53

## 2022-05-01 RX ADMIN — DICYCLOMINE HYDROCHLORIDE 10 MG: 10 CAPSULE ORAL at 20:57

## 2022-05-01 RX ADMIN — FAMOTIDINE 20 MG: 20 TABLET, FILM COATED ORAL at 02:05

## 2022-05-01 RX ADMIN — CLINDAMYCIN HYDROCHLORIDE 300 MG: 150 CAPSULE ORAL at 18:07

## 2022-05-01 RX ADMIN — Medication 1000 UNITS: at 18:08

## 2022-05-01 RX ADMIN — OXYBUTYNIN CHLORIDE 10 MG: 10 TABLET, EXTENDED RELEASE ORAL at 18:07

## 2022-05-01 RX ADMIN — ACETAMINOPHEN 650 MG: 325 TABLET ORAL at 02:05

## 2022-05-01 RX ADMIN — MONTELUKAST 10 MG: 10 TABLET, FILM COATED ORAL at 20:53

## 2022-05-01 RX ADMIN — PREGABALIN 100 MG: 100 CAPSULE ORAL at 20:57

## 2022-05-01 RX ADMIN — HALOPERIDOL 5 MG: 5 TABLET ORAL at 12:50

## 2022-05-01 RX ADMIN — IPRATROPIUM BROMIDE AND ALBUTEROL SULFATE 1 AMPULE: 2.5; .5 SOLUTION RESPIRATORY (INHALATION) at 08:35

## 2022-05-01 RX ADMIN — PANTOPRAZOLE SODIUM 40 MG: 40 TABLET, DELAYED RELEASE ORAL at 17:02

## 2022-05-01 RX ADMIN — ACETAMINOPHEN 650 MG: 325 TABLET ORAL at 13:51

## 2022-05-01 RX ADMIN — NICOTINE POLACRILEX 2 MG: 2 GUM, CHEWING BUCCAL at 09:49

## 2022-05-01 RX ADMIN — SERTRALINE 150 MG: 100 TABLET, FILM COATED ORAL at 20:52

## 2022-05-01 RX ADMIN — DOXYCYCLINE 100 MG: 100 CAPSULE ORAL at 02:43

## 2022-05-01 ASSESSMENT — ENCOUNTER SYMPTOMS
COLOR CHANGE: 0
SORE THROAT: 0
CONSTIPATION: 1
RECTAL PAIN: 0
SHORTNESS OF BREATH: 0
BACK PAIN: 1
VOICE CHANGE: 0
COUGH: 0
ABDOMINAL PAIN: 1
EYES NEGATIVE: 1
TROUBLE SWALLOWING: 0
NAUSEA: 1
WHEEZING: 0
VOMITING: 1
DIARRHEA: 1
ANAL BLEEDING: 1
CHOKING: 0
BLOOD IN STOOL: 0

## 2022-05-01 ASSESSMENT — PAIN SCALES - GENERAL
PAINLEVEL_OUTOF10: 0
PAINLEVEL_OUTOF10: 3
PAINLEVEL_OUTOF10: 6
PAINLEVEL_OUTOF10: 0

## 2022-05-01 ASSESSMENT — PAIN DESCRIPTION - LOCATION
LOCATION: BACK
LOCATION: BACK

## 2022-05-01 ASSESSMENT — SLEEP AND FATIGUE QUESTIONNAIRES
AVERAGE NUMBER OF SLEEP HOURS: 6
DO YOU USE A SLEEP AID: YES
DO YOU HAVE DIFFICULTY SLEEPING: YES
SLEEP PATTERN: DIFFICULTY FALLING ASLEEP;RESTLESSNESS

## 2022-05-01 ASSESSMENT — PAIN DESCRIPTION - ORIENTATION: ORIENTATION: RIGHT;LEFT

## 2022-05-01 ASSESSMENT — LIFESTYLE VARIABLES
HOW OFTEN DO YOU HAVE A DRINK CONTAINING ALCOHOL: MONTHLY OR LESS
HOW MANY STANDARD DRINKS CONTAINING ALCOHOL DO YOU HAVE ON A TYPICAL DAY: 1 OR 2

## 2022-05-01 ASSESSMENT — PATIENT HEALTH QUESTIONNAIRE - PHQ9: SUM OF ALL RESPONSES TO PHQ QUESTIONS 1-9: 9

## 2022-05-01 ASSESSMENT — PAIN DESCRIPTION - DESCRIPTORS: DESCRIPTORS: ACHING

## 2022-05-01 NOTE — BH NOTE
[facility]  Admission Note     Admission Type:   Admission Type: Voluntary    Reason for admission:  Reason for Admission: Patient involuntary from [facility] after an intentional overdose of home medications. Patient is upset over losing custody of her children. Patient was compliant with admission process and signed the voluntary admission form. Patient was wanded for safety and changed into hospital attire. Patient agrees to remain safe on the unit. PATIENT STRENGTHS:  Patient is medication compliant. Patient Strengths and Limitations:    Patient is medication compliant. Patient has trouble with problem solving and potentially harmful leisure activities.     Addictive Behavior:   Addictive Behavior  In the Past 3 Months, Have You Felt or Has Someone Told You That You Have a Problem With  : None    Medical Problems:   Past Medical History:   Diagnosis Date    Anxiety     Arthritis     OA    Asthma     Sadler's esophagus     LONG SEGMENT    Bipolar 1 disorder ([facility])     Chronic insomnia     COPD (chronic obstructive pulmonary disease) ([facility])     Depression     and bipolar    Diabetes mellitus ([address]     prediabetic    Endometriosis 3/9/2020    Esophagitis     severe    GERD (gastroesophageal reflux disease)     Headache(784.0)     Herniated disc, cervical     Hiatal hernia     Incisional abscess 1/15/2019    Kidney stones     MDRO (multiple drug resistant organisms) resistance     mrsa    Pleurisy     hx of \"years ago\"    Pneumonia     past penumonia    Seizure ([address]     Seizures ([facility] [address]     2016 last seizure-focal seizure    UTI (urinary tract infection)     Vision abnormalities     wears glasses       Status EXAM:  Mental Status and Behavioral Exam  Normal: No  Level of Assistance: Independent/Self  Facial Expression: Flat  Affect: Blunt  Level of Consciousness: Alert  Frequency of Checks: 4 times per hour, close  Mood:Normal: No  Mood: Depressed,Anxious,Irritable  Motor Activity:Normal: Yes  Eye Contact: Good  Observed Behavior: Guarded,Cooperative  Sexual Misconduct History: Current - no  Preception: [place] to person,[place] to time,[place] to place,[place] to situation  Attention:Normal: No  Attention: Distractible  Thought Processes: Circumstantial  Thought Content:Normal: No  Thought Content: Preoccupations  Depression Symptoms: Impaired concentration  Anxiety Symptoms: Generalized  Deepthi Symptoms: No problems reported or observed. Hallucinations: Auditory (comment)  Delusions: No  Memory:Normal: Yes  Insight and Judgment: No  Insight and Judgment: Poor judgment,Poor insight    Tobacco Screening:  Practical Counseling, on admission, cintia X, if applicable and completed (first 3 are required if patient doesn't refuse):            ( )  Recognizing danger situations (included triggers and roadblocks)                    ( )  Coping skills (new ways to manage stress, exercise, relaxation techniques, changing routine, distraction)                                                           ( )  Basic information about quitting (benefits of quitting, techniques in how to quit, available resources  ( ) Referral for counseling faxed to [facility]                                           (x ) Patient refused counseling  ( ) Patient has not smoked in the last 30 days    Metabolic Screening:    Lab Results   Component Value Date    LABA1C 5.2 09/30/2021       Lab Results   Component Value Date    CHOL 218 (H) 04/10/2017    CHOL 213 (H) 03/22/2016     Lab Results   Component Value Date    TRIG 269 (H) 04/10/2017    TRIG 138 03/22/2016     Lab Results   Component Value Date    HDL 47 06/10/2020    HDL 47 04/10/2017    HDL 44 03/22/2016     No components found for: LDLCAL  No results found for: LABVLDL      Body mass index is 37.13 kg/m².     BP Readings from Last 2 Encounters:   05/01/22 132/72   04/25/22 (!) 167/100           Pt admitted with followings belongings:  Dental Appliances: None  Vision - Corrective Lenses: Eyeglasses  Hearing Aid: None  Jewelry: None  Body Piercings Removed: N/A  Clothing: Footwear,Jacket/Coat,Pants,Shirt,Socks,Undergarments,At bedside  Other Valuables: Cigarettes,Lighter/Matches,Other (Comment) (locked up)     Patient oriented to surroundings and program expectations and copy of patient rights given. Received admission packet:  yes. Consents reviewed, signed yes. Patient verbalized understanding: yes.   Patient education on precautions: yes                   Irwin Hawley RN

## 2022-05-01 NOTE — CARE COORDINATION
BHI Biopsychosocial Assessment    Current Level of Psychosocial Functioning     Independent xxx  Dependent    Minimal Assist     Comments:    Psychosocial High Risk Factors (check all that apply)    Unable to obtain meds   Chronic illness/pain    Substance abuse   Lack of Family Support   Financial stress   Isolation   Inadequate Community Resources  Suicide attempt(s)  Not taking medications   Victim of crime   Developmental Delay  Unable to manage personal needs    Age 72 or older   Homeless  No transportation   Readmission within 30 days  Unemployment  Traumatic Event    Comments:   Psychiatric Advanced Directives: pt denies     Family to Involve in Treatment:  Pt denies     Sexual Orientation:  N/A    Patient Strengths: pt is linked with [facility], has ss income, has stable housing     Patient Barriers: pt reports use of crack cocaine prior to admission, has involvement with [employer] and drug court       Opiate Education Provided:  Pt denies       CMHC/mental health history: Pt is linked with [facility] for dual services     Plan of Care   medication management, group/individual therapies, family meetings, psycho -education, treatment team meetings to assist with stabilization    Initial Discharge Plan:  Pt plans to return home at discharge; follow with       Clinical Summary:  Addy Conway is a 46year old single female who has been admitted to [place] [address] after being brought to hospital for evaluation and treatment following suicide attempt by intentional overdose of medications. Pt reports increase in stressors including the placement of her daughter in foster care, states she has been working though her case plan services but has ongoing frustration with failure of reunification with her daughter. Pt states it was this frustration that lead to her relapse on crack cocaine prior to admission. Pt reports involvement with drug court for purpose of her case with [facility].  Pt reports history of 7 years sobriety from crack prior to her daughter's foster placement. Pt reports she plans to return home at discharge and continue with [facility]. Pt reports her father is supportive, has no contact with her mother due to history of AOD issues. Pt reports she also has a 5year old son who lives with his father. SW offered ongoing support.

## 2022-05-01 NOTE — ED NOTES
Patient complaints of headache and back ache request pain medication and that cart is uncomfortable. Dr Rahul Mcmanus updated and cart changed out with different mattress. Patient voiced improved comfort on cart.         Quinton Juarez RN  05/01/22 0110

## 2022-05-01 NOTE — ED NOTES
Pt requesting food, pt given crackers due to pts recent episode of emesis. Pt told writer the crackers were too dry and she would like something else to eat. Pt informed to take small sip of her potassium drink and we will see how she tolerates that for now.      Sarah Charles RN  04/30/22 [id number]

## 2022-05-01 NOTE — ED NOTES
Provisional Diagnosis:   Depression with Suicidal ideations     Psychosocial and Contextual Factors:   Patient came to the ED today after a suicide attempt. Patient has custody issues with both of her children. Patient has substance abuse issues. (homelessness, barriers to treatment)    C-SSRS Summary:      Patient: X  Family:   Agency:         Present Suicidal Behavior:  X    Verbal:  Patient reported suicidal ideations to ED staff. Attempt: Patient attempted to overdose on pills today. Past Suicidal Behavior:  Patient refuses to answer     Verbal:     Attempt:         Substance Abuse: Crack    Self-Injurious/Self-Mutilation: Patient unwilling to answer     Violence Current or Past: Patient is agitated, verbally aggressive towards staff. Trauma Identified:  Patient unwilling to answer     Protective Factors:    Patient has housing. Patient has insurance. Risk Factors:    Patient is non compliant with treatment. Patient has substance abuse issues. Clinical Summary:    Patient is a 46year old female that is brought to the ED today via her  for a suicide attempt. Patient arrives to the ED and tells ED staff that she took Lamictal, Benadryl and Zoloft today in a suicide attempt. Patient told ED staff she also smoked crack later in the morning she smoked crack in an additional effort to kill herself. Patient reports life stressors including custody issues with her children were the stressor to todays suicide attempt. Patient reports her daughter is in foster care and her son is living with his father. Patient states \"I wanted to come here. I was going to sign myself into the [facility] but now I want to leave. \" Patient reports that she was under the impression her  could \"be with her the whole time. \" Patient tells this writer she does not want to answer questions or \"be bothered. \"     Patient is generally uncooperative with ED staff.  Patient does not appear to have any previous inpatient psychiatric admissions. Patient admits to being prescribed zoloft but denies being linked with a mental health agency. Patient is involuntary status. Level of Care Disposition:    Writer consulted with Dr. Caryn Steve, psychiatrist. Patient accepted to the [facility] for safety and stabilization.
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
## 2022-05-01 NOTE — ED NOTES
Pt informed the IV potassium may burn, writer hanging fluids to dilute the potassium. Pt states \"it burns! \" informed no potassium is currently infusing. Writer assessed IV site noting no signs of infiltration or phlebitis. Pt requesting IV to b taken out. Pt informed she will need and IV in another location and this one to remain in until another one can be located due to pt being a hard stick and having overdosed today. Pt informed writer \"everyone here is rude to me and treating me like a prisoner. I want to speak with a supervisor and I want to be transferred to another facility if I am not going to speak with the psychiatrist elaina! \".       Karine Holt, ANNY  04/30/22 [id number]

## 2022-05-01 NOTE — GROUP NOTE
Group Therapy Note    Date: 5/1/2022    Group Start Time: 8911  Group End Time: 1450  Group Topic: Psychoeducation    [facility]    Patient refused to attend music for coping group at [phone] after encouragement from staff. 1:1 talk time offered by staff as alternative to group session.

## 2022-05-01 NOTE — H&P
[address] [facility] Internal Medicine  Bella Burr MD; Pelon Christianson MD; Nano Miner MD; MD Helen Oneal MD; MD SHAMIKA Durand [facility] Internal Medicine   [facility]    HISTORY AND PHYSICAL EXAMINATION            Date:   5/1/2022  Patient name:  Deepti Smith  Date of admission:  4/30/2022  6:48 PM  MRN:   [id number]  Account:  [de-identified]  YOB: 1980  PCP:    Sharyle Solders, APRN - CNP  Room:   [address] [place] [address]  Code Status:    Full Code    Chief Complaint:     Chief Complaint   Patient presents with    Suicide Attempt   Left breast cellulitis hypokalemia    History Obtained From:     Patient medical record nursing staff    History of Present Illness:     Deepti Smith is a 46 y.o. Non- / non  female who presents with Suicide Attempt   and is admitted to the hospital for the management of Major depressive disorder, recurrent, severe with psychotic features ([facility] [address]     72-year-old class II obese  lady with a history of renal calculi recent stent insertion lithotripsy and stent removal a week ago  Active smoker history of COPD on inhalers  History of Sadler's esophagus on Protonix twice a day  Complains of left breast redness for last few days denies any nipple retraction no history of breast cancer no family history of breast cancer    Past Medical History:     Past Medical History:   Diagnosis Date    Anxiety     Arthritis     OA    Asthma     Sadler's esophagus     LONG SEGMENT    Bipolar 1 disorder (HCC)     Chronic insomnia     COPD (chronic obstructive pulmonary disease) ([address]     Depression     and bipolar    Diabetes mellitus ([address]     prediabetic    Endometriosis 3/9/2020    Esophagitis     severe    GERD (gastroesophageal reflux disease)     Headache(784.0)     Herniated disc, cervical     Hiatal hernia     Incisional abscess 1/15/2019    Kidney stones     MDRO (multiple drug resistant organisms) resistance     mrsa    Pleurisy     hx of \"years ago\"    Pneumonia     past penumonia    Seizure ([facility] [address]     Seizures ([facility] [address]     2016 last seizure-focal seizure    UTI (urinary tract infection)     Vision abnormalities     wears glasses        Past Surgical History:     Past Surgical History:   Procedure Laterality Date    CERVICAL DISC SURGERY      x2     SECTION  , 2018    x 2     SECTION N/A 2018     SECTION performed by Deyanira Santillan MD at [facility] L&D OR    COLONOSCOPY N/A 10/01/2019    COLONOSCOPY DIAGNOSTIC ABORTED performed by Jessica Lentz MD at [address] 2020    COLONOSCOPY WITH BIOPSY performed by Jessica Lentz MD at [place] [address] Left 2022    CYSTOSCOPY, URETERAL STENT INSERTION, RETROGRADE PYELOGRAM performed by Clarice Boles MD at [address], COLON, DIAGNOSTIC      HYSTERECTOMY N/A 2020    HYSTERECTOMY ABDOMINAL LAPAROSCOPIC ROBOTIC XI W/ CYSTOSCOPY performed by Deyanira Santillan MD [address] ARTHROSCOPY Left 2015    KNEE SURGERY Left     x3    LAPAROSCOPY      dr Xiomara Mccormick N/A 10/05/2017    LAPAROSCOPIC REMOVAL INTRA ABDOMINAL LIPOMA LOWER RIGHT performed by Yoel Galindo DO at [address] [facility] LITHOTRIPSY Left 2022    CYSTO LEFT ESWL EXTRACORPOREAL SHOCK WAVE LITHOTRIPSY LEFT STENT REMOVAL  -NEXTMED performed by Dc Chamorro MD at [address] ESOPHAGOGASTRODUODENOSCOPY TRANSORAL DIAGNOSTIC N/A 2017    EGD ESOPHAGOGASTRODUODENOSCOPY  IP 2055 performed by Lydia Shearer MD at [facility]  2016    severe esophagitis    UPPER GASTROINTESTINAL ENDOSCOPY  2017    barretts; hiatus hernia; gastritis    UPPER GASTROINTESTINAL ENDOSCOPY  2017    long seg mullins's with linear erosions, esophagitis, small hiatal hernia    UPPER GASTROINTESTINAL ENDOSCOPY N/A 01/30/2018    GATES'S    UPPER GASTROINTESTINAL ENDOSCOPY N/A 10/01/2019    EGD BIOPSY performed by Edd Em MD at [facility] [address] Left 4/19/2022    HOLMIUM LASER STANDBY,  CYSTOSCOPY, LEFT URETEROSCOPY , LEFT STENT EXCHANGE, LEFT RETROGRADE PYELOGRAM performed by Montez Lynch MD at [address] [place] [address] Drive URETEROSCOPY Left 04/19/2022    HOLMIUM LASER STANDBY,  CYSTOSCOPY, LEFT URETEROSCOPY , LEFT STENT EXCHANGE, LEFT RETROGRADE PYELOGRAM (Left)    WISDOM TOOTH EXTRACTION          Medications Prior to Admission:     Prior to Admission medications    Medication Sig Start Date End Date Taking?  Authorizing Provider   tamsulosin (FLOMAX) 0.4 MG capsule Take 1 capsule by mouth daily 4/22/22   Nilda Reilly,    methocarbamol (ROBAXIN) 750 MG tablet Take 1 tablet by mouth 4 times daily for 10 days 4/22/22 5/2/22  Nilda Reilly,    oxybutynin (DITROPAN-XL) 10 MG extended release tablet Take 1 tablet by mouth daily 4/23/22   Nilda Reilly,    loratadine (CLARITIN) 10 MG tablet TAKE 1 TABLET BY MOUTH DAILY 4/20/22   KATIE Cole CNP   acetaminophen (TYLENOL) 500 MG tablet Take 2 tablets by mouth every 6 hours as needed for Pain 4/17/22 5/1/22  Nilda Reilly,    Lancets (ONETOUCH DELICA PLUS [id number]) MISC USE TO TEST BLOOD SUGAR TWICE A DAY 3/24/22   KATIE Cole CNP   vitamin D3 (CHOLECALCIFEROL) 25 MCG (1000 UT) TABS tablet TAKE 1 TABLET BY MOUTH DAILY 3/24/22   KATIE Cole - JAYA   BREO ELLIPTA 200-25 MCG/INH AEPB inhaler INHALE 1 PUFF INTO THE LUNGS DAILY 3/17/22   KATIE Cole CNP   pantoprazole (PROTONIX) 40 MG tablet 1 tab daily 3/10/22   KATIE Cole - CNP   COMBIVENT RESPIMAT  MCG/ACT AERS inhaler Inhale 1 puff into the lungs 4 times daily Inhale 1 puff into the lungs 4 times daily 3/10/22   Renella A Gauamis, APRN - CNP   ipratropium-albuterol (DUONEB) 0.5-2.5 (3) MG/3ML SOLN nebulizer solution INHALE CONTENTS OF 1 UNIT DOSE VIA NEBULIZER EVERY 4 HOURS 2/21/22   KATIE Cole CNP   montelukast (SINGULAIR) 10 MG tablet TAKE 1 TABLET IN THE EVENING ONCE A DAY ORALLY 1/24/22   KATIE Cole CNP   Misc. Devices (STRAINER/STAINLESS STEEL/2.5\") MISC 1 each by Does not apply route as needed (when you urinate)  Patient not taking: Reported on 4/16/2022 1/15/22   Jann Robertson DO   pregabalin (LYRICA) 100 MG capsule Take 1 capsule by mouth 3 times daily for 30 days. 1/13/22 2/12/22  Noemi Hill MD   Methocarbamol (ROBAXIN PO) Take by mouth    Historical Provider, MD   sertraline (ZOLOFT) 100 MG tablet Take 1.5 tablets by mouth daily 11/1/21   Historical Provider, MD   lamoTRIgine (LAMICTAL) 200 MG tablet Take 1 tablet by mouth daily 11/1/21   Historical Provider, MD   ONETOUCH VERIO strip USE TO TEST BLOOD SUGAR TWICE A DAY 11/1/21   KATIE Cole CNP   dicyclomine (BENTYL) 10 MG capsule TAKE 1 CAPSULE BY MOUTH 4 TIMES DAILY 8/27/21   KATIE Cole CNP   glucose monitoring kit (FREESTYLE) monitoring kit Use as directed. BRAND OF CHOICE INSURANCE ALLOWS. 5/27/21   KATIE Cole CNP   Alcohol Swabs ( STERILE ALCOHOL PREP) PADS USE AS DIRECTED 5/10/21   KATIE Cole CNP   fluticasone (FLONASE) 50 MCG/ACT nasal spray 1 spray by Each Nostril route daily 4/1/21 5/1/21  KATIE Cloe CNP   vitamin D3 (CHOLECALCIFEROL) 25 MCG (1000 UT) TABS tablet TAKE 1 TABLET BY MOUTH DAILY 8/27/20   KATIE Cole CNP   Masks (CLEVER CHOICE FACE MASK) MISC USE NEW FACE MASK EVERYDAY WHEN PATIENT GO OUTSIDE OF HOME DUE TO COVID PANDEMIC 7/6/20   KATIE Cole CNP        Allergies:     Nsaids, Toradol [ketorolac tromethamine], and Ultram [tramadol]    Social History:     Tobacco:    reports that she has been smoking cigarettes. She has a 10.50 pack-year smoking history.  She has never used smokeless tobacco.  Alcohol:      reports no history of alcohol use. Drug Use:  reports current drug use. Family History:     Family History   Problem Relation Age of Onset    Cancer Mother         breast    Bipolar Disorder Mother     Hypertension Mother     Breast Cancer Mother     Bipolar Disorder Brother     Hypertension Father        Review of Systems:     Positive and Negative as described in HPI.   Left breast cellulitis  CONSTITUTIONAL:  negative for fevers, chills, sweats, fatigue, weight loss  HEENT:  negative for vision, hearing changes, runny nose, throat pain  RESPIRATORY:  negative for shortness of breath, cough, congestion, wheezing  CARDIOVASCULAR:  negative for chest pain, palpitations  GASTROINTESTINAL:  negative for nausea, vomiting, diarrhea, constipation, change in bowel habits, abdominal pain   GENITOURINARY:  negative for difficulty of urination, burning with urination, frequency   INTEGUMENT:  negative for rash, skin lesions, easy bruising   HEMATOLOGIC/LYMPHATIC:  negative for swelling/edema   ALLERGIC/IMMUNOLOGIC:  negative for urticaria , itching  ENDOCRINE:  negative increase in drinking, increase in urination, hot or cold intolerance  MUSCULOSKELETAL:  negative joint pains, muscle aches, swelling of joints  Complains of back pain post stent removal  NEUROLOGICAL:  negative for headaches, dizziness, lightheadedness, numbness, pain, tingling extremities      Physical Exam:   /72   Pulse 95   Temp 98.5 °F (36.9 °C) (Oral)   Resp 20   Ht 5' 2\" (1.575 m)   Wt 203 lb (92.1 kg)   LMP 06/15/2020   SpO2 97%   BMI 37.13 kg/m²   Temp (24hrs), Av.2 °F (36.8 °C), Min:97.9 °F (36.6 °C), Max:98.5 °F (36.9 °C)    Recent Labs     22  1156   POCGLU 102     No intake or output data in the 24 hours ending 22 1355  Left breast small area of cellulitis possible  Examined in the presence of nursing staff  General Appearance: alert, well appearing, and in no acute distress  Mental status: oriented to person, place, and time  Head: normocephalic, atraumatic  Eye: no icterus, redness, pupils equal and reactive, extraocular eye movements intact, conjunctiva clear  Ear: normal external ear, no discharge, hearing intact  Nose: no drainage noted  Mouth: mucous membranes moist  Neck: supple, no carotid bruits, thyroid not palpable  Lungs: Bilateral equal air entry, clear to ausculation, no wheezing, rales or rhonchi, normal effort  Cardiovascular: normal rate, regular rhythm, no murmur, gallop, rub  Abdomen: Soft, nontender, nondistended, normal bowel sounds, no hepatomegaly or splenomegaly  Neurologic: There are no new focal motor or sensory deficits, normal muscle tone and bulk, no abnormal sensation, normal speech, cranial nerves II through XII grossly intact  Skin: No gross lesions, rashes, bruising or bleeding on exposed skin area  Extremities: peripheral pulses palpable, no pedal edema or calf pain with palpation      Investigations:      Laboratory Testing:  Recent Results (from the past 24 hour(s))   CBC with Auto Differential    Collection Time: 04/30/22  8:09 PM   Result Value Ref Range    WBC 12.6 (H) 3.5 - 11.0 k/uL    RBC 4.84 4.0 - 5.2 m/uL    Hemoglobin 14.0 12.0 - 16.0 g/dL    Hematocrit 41.2 36 - 46 %    MCV 85.1 80 - 100 fL    MCH 28.9 26 - 34 pg    MCHC 33.9 31 - 37 g/dL    RDW 14.3 11.5 - 14.9 %    Platelets 705 491 - 577 k/uL    MPV 8.3 6.0 - 12.0 fL    Seg Neutrophils 76 (H) 36 - 66 %    Lymphocytes 15 (L) 24 - 44 %    Monocytes 7 1 - 7 %    Eosinophils % 1 0 - 4 %    Basophils 1 0 - 2 %    Segs Absolute 9.50 (H) 1.3 - 9.1 k/uL    Absolute Lymph # 1.90 1.0 - 4.8 k/uL    Absolute Mono # 0.90 0.1 - 1.3 k/uL    Absolute Eos # 0.10 0.0 - 0.4 k/uL    Basophils Absolute 0.20 0.0 - 0.2 k/uL   Comprehensive Metabolic Panel    Collection Time: 04/30/22  8:09 PM   Result Value Ref Range    Glucose 103 (H) 70 - 99 mg/dL    BUN 8 6 - 20 mg/dL    CREATININE 0.74 0.50 - 0.90 mg/dL    Calcium 12.9 (H) 8.6 - 10.4 mg/dL Sodium 139 135 - 144 mmol/L    Potassium 2.9 (LL) 3.7 - 5.3 mmol/L    Chloride 98 98 - 107 mmol/L    CO2 27 20 - 31 mmol/L    Anion Gap 14 9 - 17 mmol/L    Alkaline Phosphatase 73 35 - 104 U/L    ALT 18 5 - 33 U/L    AST 20 <32 U/L    Total Bilirubin 0.82 0.3 - 1.2 mg/dL    Total Protein 7.2 6.4 - 8.3 g/dL    Albumin 4.5 3.5 - 5.2 g/dL    GFR Non-African American >60 >60 mL/min    GFR African American >60 >60 mL/min    GFR Comment         Troponin    Collection Time: 04/30/22  8:09 PM   Result Value Ref Range    Troponin, High Sensitivity 6 0 - 14 ng/L   TOX SCR, BLD, ED    Collection Time: 04/30/22  8:09 PM   Result Value Ref Range    Acetaminophen Level <5 (L) 10 - 30 ug/mL    Ethanol <10 <10 mg/dL    Ethanol percent <4.922 %    Salicylate Lvl <1 (L) 3 - 10 mg/dL    Toxic Tricyclic Sc,Blood WRONG TEST ORDERED  SEE UTAD NEGATIVE   HCG Qualitative, Serum    Collection Time: 04/30/22  8:09 PM   Result Value Ref Range    hCG Qual NEGATIVE NEGATIVE   EKG 12 Lead    Collection Time: 04/30/22  8:14 PM   Result Value Ref Range    Ventricular Rate 90 BPM    Atrial Rate 90 BPM    P-R Interval 142 ms    QRS Duration 96 ms    Q-T Interval 350 ms    QTc Calculation (Bazett) 428 ms    P Axis 31 degrees    R Axis 27 degrees    T Axis 22 degrees   URINE DRUG SCREEN    Collection Time: 04/30/22 11:28 PM   Result Value Ref Range    Amphetamine Screen, Ur NEGATIVE NEGATIVE    Barbiturate Screen, Ur NEGATIVE NEGATIVE    Benzodiazepine Screen, Urine NEGATIVE NEGATIVE    Cocaine Metabolite, Urine POSITIVE (A) NEGATIVE    Methadone Screen, Urine NEGATIVE NEGATIVE    Opiates, Urine NEGATIVE NEGATIVE    Phencyclidine, Urine NEGATIVE NEGATIVE    Cannabinoid Scrn, Ur NEGATIVE NEGATIVE    Oxycodone Screen, Ur NEGATIVE NEGATIVE    Test Information       Assay provides medical screening only. The absence of expected drug(s) and/or metabolite(s) may indicate diluted or adulterated urine, limitations of testing or timing of collection.    Drug screen, tricyclic    Collection Time: 04/30/22 11:28 PM   Result Value Ref Range    Tricyclic Antidep,Urine NEGATIVE NEGATIVE   Urinalysis with Reflex to Culture    Collection Time: 04/30/22 11:28 PM    Specimen: Urine, clean catch   Result Value Ref Range    Color, UA Yellow Yellow    Turbidity UA Cloudy (A) Clear    Glucose, Ur NEGATIVE NEGATIVE    Bilirubin Urine NEGATIVE NEGATIVE    Ketones, Urine TRACE (A) NEGATIVE    Specific [place], UA 1.014 1.000 - 1.030    Urine Hgb LARGE (A) NEGATIVE    pH, UA 6.0 5.0 - 8.0    Protein, UA NEGATIVE NEGATIVE    Urobilinogen, Urine Normal Normal    Nitrite, Urine NEGATIVE NEGATIVE    Leukocyte Esterase, Urine NEGATIVE NEGATIVE   Microscopic Urinalysis    Collection Time: 04/30/22 11:28 PM   Result Value Ref Range    WBC, UA 3 to 5 /HPF    RBC, UA TOO NUMEROUS TO COUNT /HPF    Casts UA 0 TO 2 /LPF    Epithelial Cells UA 3 to 5 /HPF    Bacteria, UA None None   Potassium    Collection Time: 05/01/22 12:57 AM   Result Value Ref Range    Potassium 3.6 (L) 3.7 - 5.3 mmol/L   POC Glucose Fingerstick    Collection Time: 05/01/22 11:56 AM   Result Value Ref Range    POC Glucose 102 65 - 105 mg/dL       Imaging/Diagnostics:  No results found.     Assessment :      Hospital Problems           Last Modified POA    * (Principal) Major depressive disorder, recurrent, severe with psychotic features ([facility] [address] 5/1/2022 Yes    Cocaine abuse, episodic use ([facility] [address] 5/1/2022 Yes    Generalized anxiety disorder with panic attacks 5/1/2022 Yes          Plan:     73-year-old lady class II obese BMI 37.13  History of esophagitis Protonix 40 twice a day  History of Sadler's outpatient repeat endoscopy  Left breast cellulitis examined in presence of nurse oral clinda and doxycycline  Will need outpatient mammogram rule out underlying breast cancer  Microscopic hematuria with recent history of lithotripsy and ureteral stent  Hypokalemia potassium 3.6 replace 40 M EQ recheck      Cherri Ford, MD  5/1/2022  1:55 PM    Copy sent to Dr. Nancy Dumont APRN - CNP    Please note that this chart was generated using voice recognition Dragon dictation software. Although every effort was made to ensure the accuracy of this automated transcription, some errors in transcription may have occurred.

## 2022-05-01 NOTE — PLAN OF CARE
PRE-CONSULT ROUNDING NOTE    HPI    68-year-old female presents with suicide attempt. PMHX includes anxiety, arthritis, Bipolar 1, COPD, depression, T2DM, GERD, Sadler's esophagus, HH, seizure. GI services consulted for esophagitis. Recent CT abd/pelvis revealed circumferential distal esophageal wall thickening. Labs reviewed. Last seen in office with Dr. Golden Roberson in April 2020. Patient has hx of GERD, long-segment Rajinder's, HH, IBS-C. Last EGD October 2019 - HH, Sadler's, gastritis. Last colonsocopy October 2019 - poor prep, ? Small polyp, random biopsies negative. Recommend repeat colonoscopy 3 years. At present patient reports odnyophagia, epigastric pain, GERD. Reports that her PCP cut her protonix down to once daily dosing and since she has been having marked increase in her symptoms. Denies any dysphagia. Has poor appetite she contributes to stress, anxiety, depression. Reports 15 lb weight loss 2 months. Has alternating constipation with diarrhea. No melena, hematochezia. Also reports dysuria, back pain. Review of Systems   Constitutional: Positive for appetite change and unexpected weight change. Negative for fatigue and fever. HENT: Negative for mouth sores, sore throat, trouble swallowing and voice change. Eyes: Negative. Respiratory: Negative for cough, choking, shortness of breath and wheezing. Cardiovascular: Negative for chest pain, palpitations and leg swelling. Gastrointestinal: Positive for abdominal pain, anal bleeding, constipation, diarrhea, nausea and vomiting. Negative for blood in stool and rectal pain. Endocrine: Negative for polyphagia and polyuria. Genitourinary: Negative for difficulty urinating, frequency, hematuria, pelvic pain, urgency and vaginal bleeding. Musculoskeletal: Positive for arthralgias and back pain. Negative for gait problem and joint swelling. Skin: Negative for color change, pallor and rash.    Allergic/Immunologic: Negative for food allergies. Neurological: Negative for dizziness, seizures, weakness, light-headedness and headaches. Hematological: Negative for adenopathy. Does not bruise/bleed easily. Psychiatric/Behavioral: Negative for sleep disturbance. The patient is not nervous/anxious. Physical Exam  Vitals and nursing note reviewed. Constitutional:       Appearance: She is well-developed. She is obese. Comments: Abdominal obesity   HENT:      Head: Normocephalic and atraumatic. Eyes:      General: No scleral icterus. Conjunctiva/sclera: Conjunctivae normal.      Pupils: Pupils are equal, round, and reactive to light. Neck:      Thyroid: No thyromegaly. Vascular: No hepatojugular reflux or JVD. Trachea: No tracheal deviation. Cardiovascular:      Rate and Rhythm: Normal rate and regular rhythm. Heart sounds: Normal heart sounds. Pulmonary:      Effort: Pulmonary effort is normal. No respiratory distress. Breath sounds: Normal breath sounds. No wheezing or rales. Abdominal:      General: Bowel sounds are normal. There is no distension. Palpations: Abdomen is soft. There is no hepatomegaly or mass. Tenderness: There is no abdominal tenderness. There is no rebound. Hernia: No hernia is present. Musculoskeletal:         General: No tenderness. Cervical back: Normal range of motion and neck supple. Comments: No joint swelling   Lymphadenopathy:      Cervical: No cervical adenopathy. Skin:     General: Skin is warm. Findings: No bruising, ecchymosis, erythema or rash. Neurological:      Mental Status: She is alert and oriented to person, place, and time. Cranial Nerves: No cranial nerve deficit. Psychiatric:         Mood and Affect: Mood is anxious. Thought Content:  Thought content normal.       ASSESSMENT/PLAN    Epigastric pain  GERD  Hx Sadler's  HH  Nausea, vomiting  CT revealing distal esophagitis  IBS,mixed    -Protonix BID  -Carafate QID  -Diet as tolerated  -Tentatively plan EGD for Tuesday  -Will discuss with MD

## 2022-05-01 NOTE — BH NOTE
Patient given tobacco quitline number 46974546750 at this time, refusing to call at this time, states \" I just dont want to quit now\"- patient given information as to the dangers of long term tobacco use. Continue to reinforce the importance of tobacco cessation.

## 2022-05-01 NOTE — ED NOTES
Pt screaming profanities loudly during IV insertion. Writer politely asked pt not to scream profanities. Pt informed writer she is rude.       Zoraida Cintron RN  04/30/22 [id number]

## 2022-05-01 NOTE — PLAN OF CARE
[facility]  Initial Interdisciplinary Treatment Plan NO      Original treatment plan Date & Time: 5/1/2022 0837    Admission Type:  Admission Type: Voluntary    Reason for admission:   Reason for Admission: Patient involuntary from [facility] after an intentional overdose of home medications. Patient is upset over losing custody of her children.     Estimated Length of Stay:  5-7days  Estimated Discharge Date: to be determined by physician    PATIENT STRENGTHS:  Patient Strengths:   Patient Strengths and Limitations:   Addictive Behavior: Addictive Behavior  In the Past 3 Months, Have You Felt or Has Someone Told You That You Have a Problem With  : None  Medical Problems:  Past Medical History:   Diagnosis Date    Anxiety     Arthritis     OA    Asthma     Sadler's esophagus     LONG SEGMENT    Bipolar 1 disorder (HCC)     Chronic insomnia     COPD (chronic obstructive pulmonary disease) ([facility] [address]     Depression     and bipolar    Diabetes mellitus ([facility] [address]     prediabetic    Endometriosis 3/9/2020    Esophagitis     severe    GERD (gastroesophageal reflux disease)     Headache(784.0)     Herniated disc, cervical     Hiatal hernia     Incisional abscess 1/15/2019    Kidney stones     MDRO (multiple drug resistant organisms) resistance     mrsa    Pleurisy     hx of \"years ago\"    Pneumonia     past penumonia    Seizure ([facility] [address]     Seizures ([facility] [address]     2016 last seizure-focal seizure    UTI (urinary tract infection)     Vision abnormalities     wears glasses     Status EXAM:Mental Status and Behavioral Exam  Normal: No  Level of Assistance: Independent/Self  Facial Expression: Flat  Affect: Blunt  Level of Consciousness: Alert  Frequency of Checks: 4 times per hour, close  Mood:Normal: No  Mood: Depressed,Anxious,Irritable  Motor Activity:Normal: Yes  Eye Contact: Good  Observed Behavior: Guarded,Cooperative  Sexual Misconduct History: Current - no  Preception: [place] to person,[place] to time,[place] to place,[place] to situation  Attention:Normal: No  Attention: Distractible  Thought Processes: Circumstantial  Thought Content:Normal: No  Thought Content: Preoccupations  Depression Symptoms: Impaired concentration  Anxiety Symptoms: Generalized  Deepthi Symptoms: No problems reported or observed. Hallucinations:  Auditory (comment)  Delusions: No  Memory:Normal: Yes  Insight and Judgment: No  Insight and Judgment: Poor judgment,Poor insight    EDUCATION:   Learner Progress Toward Treatment Goals: reviewed group plans and strategies for care    Method:group therapy, medication compliance, individualized assessments and care planning    Outcome: needs reinforcement    PATIENT GOALS: to be discussed with patient within 72 hours    PLAN/TREATMENT RECOMMENDATIONS:     continue group therapy , medications compliance, goal setting, individualized assessments and care, continue to monitor pt on unit      SHORT-TERM GOALS:   Time frame for Short-Term Goals: 5-7 days    LONG-TERM GOALS:  Time frame for Long-Term Goals: 6 months  Members Present in Team Meeting: See Signature Sheet    MONICA Dorsey

## 2022-05-01 NOTE — ED NOTES
Writer at end of hallway hearing pt vomit. Writer checked on pt, gave pt emesis bag and cold washcloth. Dr. Myrtle Travis and Luzma Kamara, [facility] notified. Pt informed writer she overdosed on lamictal this morning.      Miky Yang RN  04/30/22 [id number]

## 2022-05-01 NOTE — ED NOTES
Has abscess under her Left breast, Dr. Hannah Fitzgerald informed.      Kavita Veloz, RN  05/01/22 [id number]

## 2022-05-01 NOTE — PROGRESS NOTES
Behavioral Services  Medicare Certification Upon Admission    I certify that this patient's inpatient psychiatric hospital admission is medically necessary for:    [x] (1) Treatment which could reasonably be expected to improve this patient's condition,       [x] (2) Or for diagnostic study;     AND     [x](2) The inpatient psychiatric services are provided while the individual is under the care of a physician and are included in the individualized plan of care.     Estimated length of stay/service 7 days    Plan for post-hospital care Home with outpatient community mental health    Electronically signed by Kadi Irizarry MD on 5/1/2022 at 4:03 PM

## 2022-05-01 NOTE — ED NOTES
Patient now States \" after I threw up I remember I didn't take overdose of lamictal tonight\"  I am hungry can I eat now informed if not nauseated will try some cracker with medication order soon.       Jessica Monroe RN  04/30/22 2153       Jessica Monroe RN  04/30/22 2204

## 2022-05-01 NOTE — GROUP NOTE
Group Therapy Note    Date: 5/1/2022    Group Start Time: 1030  Group End Time: 2441  Group Topic: Psychoeducation    [facility]    patient refused to attend psychoeducation group at [phone] after encouragement from staff.   1:1 talk time provided as alternative to group session

## 2022-05-01 NOTE — PROGRESS NOTES
05/01/22 0218   Respiratory   Respiratory Pattern Regular   Respiratory Depth Normal   Respiratory Quality/Effort Unlabored   Chest Assessment Chest expansion symmetrical   L Breath Sounds Expiratory Wheezes; Inspiratory Wheezes   R Breath Sounds Inspiratory Wheezes; Expiratory Wheezes   Cough/Sputum   Sputum How Obtained Spontaneous cough   Cough Congested   Frequency Frequent   Sputum Amount None   Modified Redd Scale   Modified Redd Scale 2   Additional Respiratory Assessments   Pulse 93   Resp 20   SpO2 94 %   Position Left Side

## 2022-05-02 LAB
ANION GAP SERPL CALCULATED.3IONS-SCNC: 12 MMOL/L (ref 9–17)
BUN BLDV-MCNC: 16 MG/DL (ref 6–20)
CALCIUM SERPL-MCNC: 8.9 MG/DL (ref 8.6–10.4)
CHLORIDE BLD-SCNC: 106 MMOL/L (ref 98–107)
CO2: 26 MMOL/L (ref 20–31)
CREAT SERPL-MCNC: 0.85 MG/DL (ref 0.5–0.9)
EKG ATRIAL RATE: 90 BPM
EKG P AXIS: 31 DEGREES
EKG P-R INTERVAL: 142 MS
EKG Q-T INTERVAL: 350 MS
EKG QRS DURATION: 96 MS
EKG QTC CALCULATION (BAZETT): 428 MS
EKG R AXIS: 27 DEGREES
EKG T AXIS: 22 DEGREES
EKG VENTRICULAR RATE: 90 BPM
GFR AFRICAN AMERICAN: >60 ML/MIN
GFR NON-AFRICAN AMERICAN: >60 ML/MIN
GFR SERPL CREATININE-BSD FRML MDRD: ABNORMAL ML/MIN/{1.73_M2}
GLUCOSE BLD-MCNC: 102 MG/DL (ref 65–105)
GLUCOSE BLD-MCNC: 103 MG/DL (ref 70–99)
GLUCOSE BLD-MCNC: 121 MG/DL (ref 65–105)
GLUCOSE BLD-MCNC: 129 MG/DL (ref 65–105)
POTASSIUM SERPL-SCNC: 3.9 MMOL/L (ref 3.7–5.3)
SODIUM BLD-SCNC: 144 MMOL/L (ref 135–144)

## 2022-05-02 PROCEDURE — 36415 COLL VENOUS BLD VENIPUNCTURE: CPT

## 2022-05-02 PROCEDURE — 94761 N-INVAS EAR/PLS OXIMETRY MLT: CPT

## 2022-05-02 PROCEDURE — 6370000000 HC RX 637 (ALT 250 FOR IP): Performed by: PSYCHIATRY & NEUROLOGY

## 2022-05-02 PROCEDURE — 82947 ASSAY GLUCOSE BLOOD QUANT: CPT

## 2022-05-02 PROCEDURE — 99232 SBSQ HOSP IP/OBS MODERATE 35: CPT

## 2022-05-02 PROCEDURE — 6370000000 HC RX 637 (ALT 250 FOR IP): Performed by: NURSE PRACTITIONER

## 2022-05-02 PROCEDURE — 6370000000 HC RX 637 (ALT 250 FOR IP): Performed by: INTERNAL MEDICINE

## 2022-05-02 PROCEDURE — 1240000000 HC EMOTIONAL WELLNESS R&B

## 2022-05-02 PROCEDURE — 6370000000 HC RX 637 (ALT 250 FOR IP)

## 2022-05-02 PROCEDURE — 93010 ELECTROCARDIOGRAM REPORT: CPT | Performed by: INTERNAL MEDICINE

## 2022-05-02 PROCEDURE — 6370000000 HC RX 637 (ALT 250 FOR IP): Performed by: EMERGENCY MEDICINE

## 2022-05-02 PROCEDURE — 94640 AIRWAY INHALATION TREATMENT: CPT

## 2022-05-02 PROCEDURE — 80048 BASIC METABOLIC PNL TOTAL CA: CPT

## 2022-05-02 RX ORDER — DOXYCYCLINE 100 MG/1
100 CAPSULE ORAL EVERY 12 HOURS SCHEDULED
Status: DISCONTINUED | OUTPATIENT
Start: 2022-05-02 | End: 2022-05-03 | Stop reason: HOSPADM

## 2022-05-02 RX ORDER — LANOLIN ALCOHOL/MO/W.PET/CERES
1.5 CREAM (GRAM) TOPICAL NIGHTLY PRN
Status: DISCONTINUED | OUTPATIENT
Start: 2022-05-02 | End: 2022-05-03 | Stop reason: HOSPADM

## 2022-05-02 RX ADMIN — DICYCLOMINE HYDROCHLORIDE 10 MG: 10 CAPSULE ORAL at 20:50

## 2022-05-02 RX ADMIN — PREGABALIN 100 MG: 100 CAPSULE ORAL at 09:15

## 2022-05-02 RX ADMIN — SUCRALFATE 1 G: 1 TABLET ORAL at 00:51

## 2022-05-02 RX ADMIN — LAMOTRIGINE 200 MG: 100 TABLET ORAL at 09:16

## 2022-05-02 RX ADMIN — DOXYCYCLINE 100 MG: 100 CAPSULE ORAL at 20:50

## 2022-05-02 RX ADMIN — SUCRALFATE 1 G: 1 TABLET ORAL at 13:14

## 2022-05-02 RX ADMIN — DOXYCYCLINE 100 MG: 100 CAPSULE ORAL at 09:17

## 2022-05-02 RX ADMIN — CLINDAMYCIN HYDROCHLORIDE 300 MG: 150 CAPSULE ORAL at 06:10

## 2022-05-02 RX ADMIN — Medication 1000 UNITS: at 09:15

## 2022-05-02 RX ADMIN — DICYCLOMINE HYDROCHLORIDE 10 MG: 10 CAPSULE ORAL at 09:16

## 2022-05-02 RX ADMIN — Medication 1.5 MG: at 20:53

## 2022-05-02 RX ADMIN — NICOTINE POLACRILEX 2 MG: 2 GUM, CHEWING BUCCAL at 20:07

## 2022-05-02 RX ADMIN — SUCRALFATE 1 G: 1 TABLET ORAL at 17:31

## 2022-05-02 RX ADMIN — IPRATROPIUM BROMIDE AND ALBUTEROL SULFATE 1 AMPULE: 2.5; .5 SOLUTION RESPIRATORY (INHALATION) at 08:43

## 2022-05-02 RX ADMIN — DICYCLOMINE HYDROCHLORIDE 10 MG: 10 CAPSULE ORAL at 17:32

## 2022-05-02 RX ADMIN — TAMSULOSIN HYDROCHLORIDE 0.4 MG: 0.4 CAPSULE ORAL at 09:15

## 2022-05-02 RX ADMIN — PREGABALIN 100 MG: 100 CAPSULE ORAL at 20:50

## 2022-05-02 RX ADMIN — SUCRALFATE 1 G: 1 TABLET ORAL at 06:10

## 2022-05-02 RX ADMIN — PANTOPRAZOLE SODIUM 40 MG: 40 TABLET, DELAYED RELEASE ORAL at 06:10

## 2022-05-02 RX ADMIN — OXYBUTYNIN CHLORIDE 10 MG: 10 TABLET, EXTENDED RELEASE ORAL at 09:16

## 2022-05-02 RX ADMIN — PANTOPRAZOLE SODIUM 40 MG: 40 TABLET, DELAYED RELEASE ORAL at 17:33

## 2022-05-02 RX ADMIN — CLINDAMYCIN HYDROCHLORIDE 300 MG: 150 CAPSULE ORAL at 13:12

## 2022-05-02 RX ADMIN — SERTRALINE 150 MG: 100 TABLET, FILM COATED ORAL at 20:50

## 2022-05-02 RX ADMIN — CLINDAMYCIN HYDROCHLORIDE 300 MG: 150 CAPSULE ORAL at 00:51

## 2022-05-02 RX ADMIN — PREGABALIN 100 MG: 100 CAPSULE ORAL at 13:14

## 2022-05-02 RX ADMIN — BUDESONIDE AND FORMOTEROL FUMARATE DIHYDRATE 2 PUFF: 160; 4.5 AEROSOL RESPIRATORY (INHALATION) at 09:17

## 2022-05-02 RX ADMIN — NICOTINE POLACRILEX 2 MG: 2 GUM, CHEWING BUCCAL at 13:26

## 2022-05-02 RX ADMIN — IPRATROPIUM BROMIDE AND ALBUTEROL SULFATE 1 AMPULE: 2.5; .5 SOLUTION RESPIRATORY (INHALATION) at 21:45

## 2022-05-02 RX ADMIN — CLINDAMYCIN HYDROCHLORIDE 300 MG: 150 CAPSULE ORAL at 17:32

## 2022-05-02 RX ADMIN — DICYCLOMINE HYDROCHLORIDE 10 MG: 10 CAPSULE ORAL at 13:13

## 2022-05-02 RX ADMIN — MONTELUKAST 10 MG: 10 TABLET, FILM COATED ORAL at 20:50

## 2022-05-02 RX ADMIN — IPRATROPIUM BROMIDE AND ALBUTEROL SULFATE 1 AMPULE: 2.5; .5 SOLUTION RESPIRATORY (INHALATION) at 12:44

## 2022-05-02 RX ADMIN — NICOTINE POLACRILEX 2 MG: 2 GUM, CHEWING BUCCAL at 18:10

## 2022-05-02 RX ADMIN — BUDESONIDE AND FORMOTEROL FUMARATE DIHYDRATE 2 PUFF: 160; 4.5 AEROSOL RESPIRATORY (INHALATION) at 20:48

## 2022-05-02 ASSESSMENT — PAIN SCALES - GENERAL
PAINLEVEL_OUTOF10: 0
PAINLEVEL_OUTOF10: 2
PAINLEVEL_OUTOF10: 0
PAINLEVEL_OUTOF10: 2
PAINLEVEL_OUTOF10: 2
PAINLEVEL_OUTOF10: 0

## 2022-05-02 ASSESSMENT — LIFESTYLE VARIABLES: HOW OFTEN DO YOU HAVE A DRINK CONTAINING ALCOHOL: NEVER

## 2022-05-02 NOTE — PLAN OF CARE
Problem: Chronic Conditions and Co-morbidities  Goal: Patient's chronic conditions and co-morbidity symptoms are monitored and maintained or improved  Outcome: Progressing  Note: Pt encouraged to explore coping skills for reducing anxiety. Problem: Pain  Goal: Verbalizes/displays adequate comfort level or baseline comfort level  Outcome: Progressing  Note: Patient is paranoid and irritable at this time. Problem: Anxiety  Goal: Will report anxiety at manageable levels  Description: INTERVENTIONS:  1. Administer medication as ordered  2. Teach and rehearse alternative coping skills  3. Provide emotional support with 1:1 interaction with staff  Outcome: Progressing  Note: Patient admits to anxiety but refuses to take prn for anxiety. Problem: Involuntary Admit  Goal: Will cooperate with staff recommendations and doctor's orders and will demonstrate appropriate behavior  Description: INTERVENTIONS:  1. Treat underlying conditions and offer medication as ordered  2. Educate regarding involuntary admission procedures and rules  3. Contain excessive/inappropriate behavior per unit and hospital policies  Outcome: Progressing  Note: Patient refused respiratory treatment but later got upset for not receiving respiratory treatment.

## 2022-05-02 NOTE — GROUP NOTE
Group Therapy Note    Date: 5/2/2022    Group Start Time: 1100  Group End Time: 4946  Group Topic: Cognitive Skills    BEAR Martinez, CTRS        Group Therapy Note    Attendees: 6/18         Pt did not participate in Cognitive Skills Group at 1100am when encouraged by RT due to resting in room. Pt was offered talk time as an alternative to group and declined.      Discipline Responsible: Psychoeducational Specialist      Signature:  Mitch Hutchison

## 2022-05-02 NOTE — PLAN OF CARE
Problem: Chronic Conditions and Co-morbidities  Goal: Patient's chronic conditions and co-morbidity symptoms are monitored and maintained or improved  5/2/2022 1937 by Marsha Andersen RN  Outcome: Progressing  Flowsheets (Taken 5/2/2022 1937)  Care Plan - Patient's Chronic Conditions and Co-Morbidity Symptoms are Monitored and Maintained or Improved:   Monitor and assess patient's chronic conditions and comorbid symptoms for stability, deterioration, or improvement   Collaborate with multidisciplinary team to address chronic and comorbid conditions and prevent exacerbation or deterioration  Note: Pt reports mild sadness and anxiety. Pt denies any thoughts to harm self. Pt reports her anxiety is manageable without medications. Pt is brightened and social in dayroom. Pt encouraged to attend groups to learn coping skills. Pt voiced understanding. Problem: Pain  Goal: Verbalizes/displays adequate comfort level or baseline comfort level  5/2/2022 1937 by Marsha Andersen RN  Outcome: Progressing  Flowsheets (Taken 5/2/2022 1937)  Verbalizes/displays adequate comfort level or baseline comfort level:   Encourage patient to monitor pain and request assistance   Assess pain using appropriate pain scale   Administer analgesics based on type and severity of pain and evaluate response  Note: Patient denies any pain currently. Patient encouraged to inform staff if she develops any pain. Patient voiced understanding. Problem: Depression  Goal: Will be euthymic at discharge  Description: INTERVENTIONS:  1. Administer medication as ordered  2. Provide emotional support via 1:1 interaction with staff  3. Encourage involvement in milieu/groups/activities  4. Monitor for social isolation  5/2/2022 1937 by Marsha Andersen RN  Outcome: Progressing  Note: Pt reports mild sadness and anxiety. Pt reports it is better than when she was originally admitted. Pt encouraged to discuss changing symptoms with staff. Problem: Anxiety  Goal: Will report anxiety at manageable levels  Description: INTERVENTIONS:  1. Administer medication as ordered  2. Teach and rehearse alternative coping skills  3. Provide emotional support with 1:1 interaction with staff  5/2/2022 1937 by Benny Modi RN  Outcome: Progressing  Flowsheets (Taken 5/2/2022 1937)  Will report anxiety at manageable levels:   Administer medication as ordered   Provide emotional support with 1:1 interaction with staff   Teach and rehearse alternative coping skills  Note: Pt reports mild sadness and anxiety. Pt denies any thoughts to harm self. Pt reports her anxiety is manageable without medications. Pt is brightened and social in dayroom. Problem: Involuntary Admit  Goal: Will cooperate with staff recommendations and doctor's orders and will demonstrate appropriate behavior  Description: INTERVENTIONS:  1. Treat underlying conditions and offer medication as ordered  2. Educate regarding involuntary admission procedures and rules  3. Contain excessive/inappropriate behavior per unit and hospital policies  0/4/1305 9774 by Benny Modi RN  Outcome: Progressing  Flowsheets (Taken 5/2/2022 1937)  Will cooperate with staff recommendations and doctor's orders and will demonstrate appropriate behavior:   Treat underlying conditions and offer medication as ordered   Educate regarding involuntary admission procedures and rules  Note: Pt was cooperative during assessment. Pt is interacting appropriately with other patients.

## 2022-05-02 NOTE — PLAN OF CARE
Problem: Depression  Goal: Will be euthymic at discharge  Description: INTERVENTIONS:  1. Administer medication as ordered  2. Provide emotional support via 1:1 interaction with staff  3. Encourage involvement in milieu/groups/activities  4. Monitor for social isolation  5/2/2022 1347 by Rico Roberto RN  Outcome: Progressing  Pt admits to some ongoing anxiety. Flat and isolates to room at times. Denies thoughts of self-harm, safety checks continue Q15 minutes. Problem: Involuntary Admit  Goal: Will cooperate with staff recommendations and doctor's orders and will demonstrate appropriate behavior  Description: INTERVENTIONS:  1. Treat underlying conditions and offer medication as ordered  2. Educate regarding involuntary admission procedures and rules  3. Contain excessive/inappropriate behavior per unit and hospital policies  8/1/8648 [id number] by Rico Roberto RN  Outcome: Progressing  Pt cooperative and pleasant in interactions. Took medications without issue. Problem: Anxiety  Goal: Will report anxiety at manageable levels  Description: INTERVENTIONS:  1. Administer medication as ordered  2. Teach and rehearse alternative coping skills  3. Provide emotional support with 1:1 interaction with staff  5/2/2022 1347 by Rico Roberto RN  Outcome: Progressing     Problem: Pain  Goal: Verbalizes/displays adequate comfort level or baseline comfort level  5/2/2022 1347 by Rico Roberto RN  Outcome: Progressing  Pt assessed for pain and treated as necessary. Problem: Chronic Conditions and Co-morbidities  Goal: Patient's chronic conditions and co-morbidity symptoms are monitored and maintained or improved  5/2/2022 1347 by Rico Roberto RN  Outcome: Progressing  Pt assessed in head to toe physical, offered medications and fluids for episodes of nausea and vomiting.

## 2022-05-02 NOTE — PROGRESS NOTES
Daily Progress Note  5/2/2022    Patient Name: Karoline Whittington: Suicide attempt by overdose on Lamictal, Benadryl, and Zoloft         SUBJECTIVE:      Patient is seen today for a follow up assessment. Patient is compliant with scheduled medications including Lamictal, and Zoloft. Patient last required oral emergency Haldol and Ativan yesterday 5/1 at 12:49 PM.  Patient is agreeable to interview in the unit milieu today. She continues to endorse depression and anxiety however reports her mood is much improved today. When asked about events leading up to hospitalization patient states \"I have been dealing a lot with CSB and I became very overwhelmed. \"  She reports that she slept well last night and feels well rested today. She denies any issues with her appetite. Zoey Whitney reports improvement in suicidal ideation stating the thoughts are less intense and severe today. She denies homicidal ideation. She continues to feel that she would be unsafe out of the hospital at this time due to continued suicidal thoughts. She denies auditory and visual hallucinations. She denies paranoia. She denies any medication side effects or other medical concerns at this time. Patient did report that she usually takes melatonin at home for sleep so this will be ordered for patient. Patient is less irritable than she has been on previous days and she did attend group today with active participation. Appetite:  [x] Normal/Adequate/Unchanged  [] Increased  [] Decreased      Sleep:       [x] Normal/Adequate/Unchanged  [] Fair  [] Poor      Group Attendance on Unit:   [] Yes  [x] Selectively    [] No    Medication Side Effects:  Patient denies any medication side effects at the time of assessment. Mental Status Exam  Level of consciousness: Alert and awake. Appearance: Appropriate attire for setting, seated in chair, with fair  grooming and hygiene.    Behavior/Motor: Approachable, slight psychomotor slowing  Attitude toward examiner: Cooperative, attentive, good eye contact. Speech: Normal rate, normal volume, normal tone. Mood:  Patient reports \"feeling much better\". Affect: Depressed  Thought processes: Linear and coherent. Thought content: Denies homicidal ideation. Suicidal Ideation: Reports improvement in suicidal ideations  Delusions: No evidence of delusions. Denies paranoia. Perceptual Disturbance: Patient does not appear to be responding to internal stimuli. Denies auditory hallucinations. Denies visual hallucinations. Cognition: Oriented to self, location, time, and situation. Memory: Intact. Insight & Judgement: Poor. Data   height is 5' 2\" (1.575 m) and weight is 203 lb (92.1 kg). Her temporal temperature is 97.3 °F (36.3 °C). Her blood pressure is 119/53 (abnormal) and her pulse is 73. Her respiration is 14 and oxygen saturation is 94%.    Labs:   Admission on 04/30/2022   Component Date Value Ref Range Status    Ventricular Rate 04/30/2022 90  BPM Final    Atrial Rate 04/30/2022 90  BPM Final    P-R Interval 04/30/2022 142  ms Final    QRS Duration 04/30/2022 96  ms Final    Q-T Interval 04/30/2022 350  ms Final    QTc Calculation (Bazett) 04/30/2022 428  ms Final    P Axis 04/30/2022 31  degrees Final    R Axis 04/30/2022 27  degrees Final    T Axis 04/30/2022 22  degrees Final    WBC 04/30/2022 12.6* 3.5 - 11.0 k/uL Final    RBC 04/30/2022 4.84  4.0 - 5.2 m/uL Final    Hemoglobin 04/30/2022 14.0  12.0 - 16.0 g/dL Final    Hematocrit 04/30/2022 41.2  36 - 46 % Final    MCV 04/30/2022 85.1  80 - 100 fL Final    MCH 04/30/2022 28.9  26 - 34 pg Final    MCHC 04/30/2022 33.9  31 - 37 g/dL Final    RDW 04/30/2022 14.3  11.5 - 14.9 % Final    Platelets 69/58/2076 360  150 - 450 k/uL Final    MPV 04/30/2022 8.3  6.0 - 12.0 fL Final    Seg Neutrophils 04/30/2022 76* 36 - 66 % Final    Lymphocytes 04/30/2022 15* 24 - 44 % Final    Monocytes 04/30/2022 7  1 - 7 % Final    Eosinophils % 04/30/2022 1  0 - 4 % Final    Basophils 04/30/2022 1  0 - 2 % Final    Segs Absolute 04/30/2022 9.50* 1.3 - 9.1 k/uL Final    Absolute Lymph # 04/30/2022 1.90  1.0 - 4.8 k/uL Final    Absolute Mono # 04/30/2022 0.90  0.1 - 1.3 k/uL Final    Absolute Eos # 04/30/2022 0.10  0.0 - 0.4 k/uL Final    Basophils Absolute 04/30/2022 0.20  0.0 - 0.2 k/uL Final    Glucose 04/30/2022 103* 70 - 99 mg/dL Final    BUN 04/30/2022 8  6 - 20 mg/dL Final    CREATININE 04/30/2022 0.74  0.50 - 0.90 mg/dL Final    Calcium 04/30/2022 12.9* 8.6 - 10.4 mg/dL Final    Sodium 04/30/2022 139  135 - 144 mmol/L Final    Potassium 04/30/2022 2.9* 3.7 - 5.3 mmol/L Final    Chloride 04/30/2022 98  98 - 107 mmol/L Final    CO2 04/30/2022 27  20 - 31 mmol/L Final    Anion Gap 04/30/2022 14  9 - 17 mmol/L Final    Alkaline Phosphatase 04/30/2022 73  35 - 104 U/L Final    ALT 04/30/2022 18  5 - 33 U/L Final    AST 04/30/2022 20  <32 U/L Final    Total Bilirubin 04/30/2022 0.82  0.3 - 1.2 mg/dL Final    Total Protein 04/30/2022 7.2  6.4 - 8.3 g/dL Final    Albumin 04/30/2022 4.5  3.5 - 5.2 g/dL Final    GFR Non- 04/30/2022 >60  >60 mL/min Final    GFR  04/30/2022 >60  >60 mL/min Final    GFR Comment 04/30/2022        Final    Comment: Average GFR for 30-36 years old:   80 mL/min/1.73sq m  Chronic Kidney Disease:   <60 mL/min/1.73sq m  Kidney failure:   <15 mL/min/1.73sq m              eGFR calculated using average adult body mass. Additional eGFR calculator available at:        [employer]            Troponin, High Sensitivity 04/30/2022 6  0 - 14 ng/L Final    Comment:       High Sensitivity Troponin values cannot be compared with other Troponin methodologies. Patients with high levels of Biotin oral intake (i.e >5mg/day) may have falsely decreased   Troponin levels.  Samples collected within 8 hours of biotin intake may require additional   information for diagnosis.  Acetaminophen Level 04/30/2022 <5* 10 - 30 ug/mL Final    Ethanol 04/30/2022 <10  <10 mg/dL Final    Ethanol percent 04/30/2022 <8.302  % Final    Salicylate Lvl 56/21/2732 <1* 3 - 10 mg/dL Final    Toxic Tricyclic Sc,Blood 59/56/0693 WRONG TEST ORDERED  SEE [facility]  NEGATIVE Final    Amphetamine Screen, Ur 04/30/2022 NEGATIVE  NEGATIVE Final    Comment:       (Positive cutoff 1000 ng/mL)                  Barbiturate Screen, Ur 04/30/2022 NEGATIVE  NEGATIVE Final    Comment:       (Positive cutoff 200 ng/mL)                  Benzodiazepine Screen, Urine 04/30/2022 NEGATIVE  NEGATIVE Final    Comment:       (Positive cutoff 200 ng/mL)                  Cocaine Metabolite, Urine 04/30/2022 POSITIVE* NEGATIVE Final    Comment:       (Positive cutoff 300 ng/mL)                  Methadone Screen, Urine 04/30/2022 NEGATIVE  NEGATIVE Final    Comment:       (Positive cutoff 300 ng/mL)                  Opiates, Urine 04/30/2022 NEGATIVE  NEGATIVE Final    Comment:       (Positive cutoff 300 ng/mL)                  Phencyclidine, Urine 04/30/2022 NEGATIVE  NEGATIVE Final    Comment:       (Positive cutoff 25 ng/mL)                  Cannabinoid Scrn, Ur 04/30/2022 NEGATIVE  NEGATIVE Final    Comment:       (Positive cutoff 50 ng/mL)                  Oxycodone Screen, Ur 04/30/2022 NEGATIVE  NEGATIVE Final    Comment:       (Positive cutoff 100 ng/mL)                  Test Information 04/30/2022 Assay provides medical screening only. The absence of expected drug(s) and/or metabolite(s) may indicate diluted or adulterated urine, limitations of testing or timing of collection. Final    Comment: Testing for legal purposes should be confirmed by another method. To request confirmation   of test result, please call the lab within 7 days of sample submission.       hCG Qual 04/30/2022 NEGATIVE  NEGATIVE Final    Comment: Specimens with hCG levels near the threshold of the test (25 mIU/mL) may give a negative or   indeterminate result. In such cases, another test should be performed with a new specimen   in 48-72 hours. If early pregnancy is suspected clinically in this setting, correlation   with quantitative serum b-hCG level is suggested.  Tricyclic Antidep,Urine 90/47/5406 NEGATIVE  NEGATIVE Final    Comment:       (Positive cutoff 1000 ng/mL)  Assay provides rapid clinical screening only. Presumptive positive results for legal   purposes should be confirmed by another method. To request confirmation, please call the   lab within 7 days of sample submission.       Color, UA 04/30/2022 Yellow  Yellow Final    Turbidity UA 04/30/2022 Cloudy* Clear Final    Glucose, Ur 04/30/2022 NEGATIVE  NEGATIVE Final    Bilirubin Urine 04/30/2022 NEGATIVE  NEGATIVE Final    Ketones, Urine 04/30/2022 TRACE* NEGATIVE Final    Specific [place], UA 04/30/2022 1.014  1.000 - 1.030 Final    Urine Hgb 04/30/2022 LARGE* NEGATIVE Final    pH, UA 04/30/2022 6.0  5.0 - 8.0 Final    Protein, UA 04/30/2022 NEGATIVE  NEGATIVE Final    Urobilinogen, Urine 04/30/2022 Normal  Normal Final    Nitrite, Urine 04/30/2022 NEGATIVE  NEGATIVE Final    Leukocyte Esterase, Urine 04/30/2022 NEGATIVE  NEGATIVE Final    WBC, UA 04/30/2022 3 to 5  /HPF Final    RBC, UA 04/30/2022 TOO NUMEROUS TO COUNT  /HPF Final    Casts UA 04/30/2022 0 TO 2  /LPF Final    Epithelial Cells UA 04/30/2022 3 to 5  /HPF Final    Bacteria, UA 04/30/2022 None  None Final    Potassium 05/01/2022 3.6* 3.7 - 5.3 mmol/L Final    POC Glucose 05/01/2022 102  65 - 105 mg/dL Final    Glucose 05/02/2022 103* 70 - 99 mg/dL Final    BUN 05/02/2022 16  6 - 20 mg/dL Final    CREATININE 05/02/2022 0.85  0.50 - 0.90 mg/dL Final    Calcium 05/02/2022 8.9  8.6 - 10.4 mg/dL Final    Sodium 05/02/2022 144  135 - 144 mmol/L Final    Potassium 05/02/2022 3.9  3.7 - 5.3 mmol/L Final    Chloride 05/02/2022 106  98 - 107 mmol/L Final    CO2 05/02/2022 26  20 - 31 mmol/L Final    Anion Gap 05/02/2022 12  9 - 17 mmol/L Final    GFR Non- 05/02/2022 >60  >60 mL/min Final    GFR  05/02/2022 >60  >60 mL/min Final    GFR Comment 05/02/2022        Final    Comment: Average GFR for 30-36 years old:   80 mL/min/1.73sq m  Chronic Kidney Disease:   <60 mL/min/1.73sq m  Kidney failure:   <15 mL/min/1.73sq m              eGFR calculated using average adult body mass. Additional eGFR calculator available at:        [employer]            POC Glucose 05/01/2022 95  65 - 105 mg/dL Final    POC Glucose 05/02/2022 102  65 - 105 mg/dL Final    POC Glucose 05/02/2022 121* 65 - 105 mg/dL Final         Reviewed patient's current plan of care and vital signs with nursing staff.     Labs reviewed: [x] Yes  Last EKG in EMR reviewed: [x] Yes  QTc: 428    Medications  Current Facility-Administered Medications: acetaminophen (TYLENOL) tablet 650 mg, 650 mg, Oral, Q4H PRN  aluminum & magnesium hydroxide-simethicone (MAALOX) 200-200-20 MG/5ML suspension 30 mL, 30 mL, Oral, Q6H PRN  hydrOXYzine (ATARAX) tablet 50 mg, 50 mg, Oral, TID PRN  polyethylene glycol (GLYCOLAX) packet 17 g, 17 g, Oral, Daily PRN  traZODone (DESYREL) tablet 50 mg, 50 mg, Oral, Nightly PRN  haloperidol lactate (HALDOL) injection 5 mg, 5 mg, IntraMUSCular, Q4H PRN **AND** LORazepam (ATIVAN) injection 2 mg, 2 mg, IntraMUSCular, Q4H PRN **AND** diphenhydrAMINE (BENADRYL) injection 50 mg, 50 mg, IntraMUSCular, Q4H PRN  haloperidol (HALDOL) tablet 5 mg, 5 mg, Oral, Q4H PRN **AND** LORazepam (ATIVAN) tablet 2 mg, 2 mg, Oral, Q4H PRN  ipratropium-albuterol (DUONEB) nebulizer solution 1 ampule, 1 ampule, Inhalation, Q4H PRN  ipratropium-albuterol (DUONEB) nebulizer solution 1 ampule, 1 ampule, Inhalation, 4x daily  doxycycline monohydrate (MONODOX) capsule 100 mg, 100 mg, Oral, 2 times per day  nicotine polacrilex (NICORETTE) gum 2 mg, 2 mg, Oral, Q1H PRN  potassium bicarbonate (K-LYTE) disintegrating tablet 50 mEq, 50 mEq, Oral, Once  pantoprazole (PROTONIX) tablet 40 mg, 40 mg, Oral, BID AC  sucralfate (CARAFATE) tablet 1 g, 1 g, Oral, 4 times per day  clindamycin (CLEOCIN) capsule 300 mg, 300 mg, Oral, 4 times per day  acetaminophen (TYLENOL) tablet 1,000 mg, 1,000 mg, Oral, Q6H PRN  budesonide-formoterol (SYMBICORT) 160-4.5 MCG/ACT inhaler 2 puff, 2 puff, Inhalation, BID  dicyclomine (BENTYL) capsule 10 mg, 10 mg, Oral, 4x Daily  fluticasone (FLONASE) 50 MCG/ACT nasal spray 1 spray, 1 spray, Each Nostril, Daily  lamoTRIgine (LAMICTAL) tablet 200 mg, 200 mg, Oral, Daily  montelukast (SINGULAIR) tablet 10 mg, 10 mg, Oral, Nightly  oxybutynin (DITROPAN-XL) extended release tablet 10 mg, 10 mg, Oral, Daily  pregabalin (LYRICA) capsule 100 mg, 100 mg, Oral, TID  tamsulosin (FLOMAX) capsule 0.4 mg, 0.4 mg, Oral, Daily  Vitamin D (CHOLECALCIFEROL) tablet 1,000 Units, 1 tablet, Oral, Daily  sertraline (ZOLOFT) tablet 150 mg, 150 mg, Oral, Nightly    ASSESSMENT  Major depressive disorder, recurrent, severe with psychotic features ([facility]         PLAN  Patient symptoms are: Modestly Improving. Continue current medications at this time  Monitor need and frequency of PRN medications. Encourage participation in groups and milieu. Attempt to develop insight. Psycho-education conducted. Supportive Therapy conducted. Probable discharge is to be determined by MD.   Follow-up daily while inpatient. Patient continues to be monitored in the inpatient psychiatric facility at [facility] for safety and stabilization. Patient continues to need, on a daily basis, active treatment furnished directly by or requiring the supervision of inpatient psychiatric personnel. Electronically signed by KATIE Rose CNP on 5/2/2022 at 3:03 PM    **This report has been created using voice recognition software.  It may contain minor errors which are inherent in voice recognition technology. **

## 2022-05-02 NOTE — GROUP NOTE
Group Therapy Note    Date: 5/2/2022    Group Start Time: 1330  Group End Time: 6571  Group Topic: Cognitive Skills    [facility]    MONICA Bolivar        Group Therapy Note    Attendees: 4/16           Patient's Goal:  To increase social interaction and to practice decision making, concentration, and communication skills. Notes: Pt participated fully in group. Pt was able to practice decision making, concentration, and communication skills . Status After Intervention:  Improved     Participation Level:  Active Listener, sharing, appropriate     Participation Quality: Appropriate, Sharing, Attentive, supportive        Speech:  Normal        Thought Process/Content: Logical ,linear     Affective Functioning: Congruent, brightens with humor      Mood: Euthymic     Level of consciousness:  Alert, and Attentive        Response to Learning:  Able to express current knowledge, able to verbalize/acknowledge new learning, and Progressing to goal        Endings: None Reported     Modes of Intervention: Education, Support, Socialization, Exploration, Clarifying and Problem-solving        Discipline Responsible: Psychoeducational Specialist        Signature:  MONICA Patiño

## 2022-05-02 NOTE — PLAN OF CARE
5 [facility]  Day 3 Interdisciplinary Treatment Plan NOTE    Review Date & Time: 5/2/2022               1310    Admission Type:   Admission Type: Voluntary    Reason for admission:  Reason for Admission: Patient involuntary from [facility] ED after an intentional overdose of home medications. Patient is upset over losing custody of her children.   Estimated Length of Stay: 5-7 days  Estimated Discharge Date Update: to be determined by physician    PATIENT STRENGTHS:  Patient Strengths    Patient Strengths and Limitations:Limitations: General negative or hopeless attitude about future/recovery,Demonstrates discomfort with /lack of social skills,Multiple barriers to leisure interests,Inappropriate/potentially harmful leisure interests  Addictive Behavior:Addictive Behavior  In the Past 3 Months, Have You Felt or Has Someone Told You That You Have a Problem With  : None  Medical Problems:  Past Medical History:   Diagnosis Date    Anxiety     Arthritis     OA    Asthma     Sadler's esophagus     LONG SEGMENT    Bipolar 1 disorder ([address]     Chronic insomnia     COPD (chronic obstructive pulmonary disease) ([address]     Depression     and bipolar    Diabetes mellitus ([facility]     prediabetic    Endometriosis 3/9/2020    Esophagitis     severe    GERD (gastroesophageal reflux disease)     Headache(784.0)     Herniated disc, cervical     Hiatal hernia     Incisional abscess 1/15/2019    Kidney stones     MDRO (multiple drug resistant organisms) resistance     mrsa    Pleurisy     hx of \"years ago\"    Pneumonia     past penumonia    Seizure ([address]     Seizures ([address]     2016 last seizure-focal seizure    UTI (urinary tract infection)     Vision abnormalities     wears glasses       Risk:  Fall Risk   Conor Scale Conor Scale Score: 22  BVC    Change in scores no Changes to plan of Care no    Status EXAM:   Mental Status and Behavioral Exam  Normal: No  Level of Assistance: Independent/Self  Facial Expression: Flat  Affect: Blunt  Level of Consciousness: Alert  Frequency of Checks: 4 times per hour, close  Mood:Normal: No  Mood: Depressed,Anxious,Irritable  Motor Activity:Normal: No  Motor Activity: Decreased  Eye Contact: Good  Observed Behavior: Agitated  Sexual Misconduct History: Current - no  Preception: [place] to person,[place] to time,[place] to place,[place] to situation  Attention:Normal: No  Attention: Distractible  Thought Processes: Circumstantial  Thought Content:Normal: No  Thought Content: Preoccupations  Depression Symptoms: Impaired concentration,Increased irritability,Isolative  Anxiety Symptoms: Generalized  Deepthi Symptoms: No problems reported or observed. Hallucinations: None  Delusions: No  Memory:Normal: Yes  Insight and Judgment: No  Insight and Judgment: Poor insight,Poor judgment    Daily Assessment Last Entry:                Daily Nutrition (WDL): Within Defined Limits  Level of Assistance: Independent/Self    Patient Monitoring:  Frequency of Checks: 4 times per hour, close    Psychiatric Symptoms:   Depression Symptoms  Depression Symptoms: Impaired concentration,Increased irritability,Isolative  Anxiety Symptoms  Anxiety Symptoms: Generalized  Deepthi Symptoms  Deepthi Symptoms: No problems reported or observed. Suicide Risk CSSR-S:  1) Within the past month, have you wished you were dead or wished you could go to sleep and not wake up? : Yes  2) Have you actually had any thoughts of killing yourself? : Yes  3) Have you been thinking about how you might kill yourself? : Yes  5) Have you started to work out or worked out the details of how to kill yourself?  Do you intend to carry out this plan? : Yes  6) Have you ever done anything, started to do anything, or prepared to do anything to end your life?: Yes  Change in Result                   no                        Change in Plan of care           no      EDUCATION:   EDUCATION:   Learner Progress Toward Treatment Goals: Reviewed results and recommendations of this team, Reviewed group plan and strategies, Reviewed signs, symptoms and risk of self harm and violent behavior, Reviewed goals and plan of care    Method:small group, individual verbal education    Outcome:verbalized by patient, but needs reinforcement to obtain goals    PATIENT GOALS:  Short term: \"feel better\"  Long term: \"get my GED, get a job\"    PLAN/TREATMENT RECOMMENDATIONS UPDATE: continue with group therapies, increased socialization, continue planning for after discharge goals, continue with medication compliance    SHORT-TERM GOALS UPDATE:   Time frame for Short-Term Goals: 5-7 days    LONG-TERM GOALS UPDATE:   Time frame for Long-Term Goals: 6 months  Members Present in Team Meeting: See Signature Sheet    Rosalva Canada

## 2022-05-02 NOTE — GROUP NOTE
Group Therapy Note    Date: 5/2/2022    Group Start Time: 1000  Group End Time: 1347  Group Topic: Psychoeducation    [facility] MIKE Montiel LSW        Group Therapy Note    patient refused to attend psychoeducational group at 10:00am after encouragement from staff.        Signature:  MIKE Quinteros LSW

## 2022-05-03 VITALS
TEMPERATURE: 97.5 F | OXYGEN SATURATION: 96 % | HEIGHT: 62 IN | WEIGHT: 203 LBS | SYSTOLIC BLOOD PRESSURE: 126 MMHG | HEART RATE: 78 BPM | RESPIRATION RATE: 16 BRPM | DIASTOLIC BLOOD PRESSURE: 81 MMHG | BODY MASS INDEX: 37.36 KG/M2

## 2022-05-03 LAB — GLUCOSE BLD-MCNC: 101 MG/DL (ref 65–105)

## 2022-05-03 PROCEDURE — 6370000000 HC RX 637 (ALT 250 FOR IP): Performed by: PSYCHIATRY & NEUROLOGY

## 2022-05-03 PROCEDURE — 99239 HOSP IP/OBS DSCHRG MGMT >30: CPT | Performed by: PSYCHIATRY & NEUROLOGY

## 2022-05-03 PROCEDURE — 6370000000 HC RX 637 (ALT 250 FOR IP): Performed by: NURSE PRACTITIONER

## 2022-05-03 PROCEDURE — 82947 ASSAY GLUCOSE BLOOD QUANT: CPT

## 2022-05-03 PROCEDURE — 94761 N-INVAS EAR/PLS OXIMETRY MLT: CPT

## 2022-05-03 PROCEDURE — 6370000000 HC RX 637 (ALT 250 FOR IP): Performed by: INTERNAL MEDICINE

## 2022-05-03 PROCEDURE — 94640 AIRWAY INHALATION TREATMENT: CPT

## 2022-05-03 PROCEDURE — 94664 DEMO&/EVAL PT USE INHALER: CPT

## 2022-05-03 RX ORDER — DOXYCYCLINE 100 MG/1
100 CAPSULE ORAL EVERY 12 HOURS SCHEDULED
Qty: 12 CAPSULE | Refills: 0 | Status: SHIPPED | OUTPATIENT
Start: 2022-05-03

## 2022-05-03 RX ORDER — PREGABALIN 100 MG/1
100 CAPSULE ORAL 3 TIMES DAILY
Qty: 90 CAPSULE | Refills: 0 | Status: SHIPPED | OUTPATIENT
Start: 2022-05-03 | End: 2022-05-03

## 2022-05-03 RX ORDER — CLINDAMYCIN HYDROCHLORIDE 300 MG/1
300 CAPSULE ORAL 4 TIMES DAILY
Qty: 20 CAPSULE | Refills: 0 | Status: SHIPPED | OUTPATIENT
Start: 2022-05-03

## 2022-05-03 RX ORDER — FLUTICASONE PROPIONATE 50 MCG
1 SPRAY, SUSPENSION (ML) NASAL DAILY
Qty: 1 EACH | Refills: 0 | Status: SHIPPED | OUTPATIENT
Start: 2022-05-03 | End: 2022-06-02

## 2022-05-03 RX ORDER — LAMOTRIGINE 200 MG/1
200 TABLET ORAL DAILY
Qty: 30 TABLET | Refills: 3 | Status: SHIPPED | OUTPATIENT
Start: 2022-05-03

## 2022-05-03 RX ORDER — PREGABALIN 100 MG/1
100 CAPSULE ORAL 3 TIMES DAILY
Qty: 90 CAPSULE | Refills: 0 | Status: SHIPPED | OUTPATIENT
Start: 2022-05-03 | End: 2022-06-02

## 2022-05-03 RX ADMIN — PANTOPRAZOLE SODIUM 40 MG: 40 TABLET, DELAYED RELEASE ORAL at 06:00

## 2022-05-03 RX ADMIN — CLINDAMYCIN HYDROCHLORIDE 300 MG: 150 CAPSULE ORAL at 13:22

## 2022-05-03 RX ADMIN — OXYBUTYNIN CHLORIDE 10 MG: 10 TABLET, EXTENDED RELEASE ORAL at 13:23

## 2022-05-03 RX ADMIN — DICYCLOMINE HYDROCHLORIDE 10 MG: 10 CAPSULE ORAL at 13:22

## 2022-05-03 RX ADMIN — IPRATROPIUM BROMIDE AND ALBUTEROL SULFATE 1 AMPULE: 2.5; .5 SOLUTION RESPIRATORY (INHALATION) at 07:52

## 2022-05-03 RX ADMIN — SUCRALFATE 1 G: 1 TABLET ORAL at 13:22

## 2022-05-03 RX ADMIN — IPRATROPIUM BROMIDE AND ALBUTEROL SULFATE 1 AMPULE: 2.5; .5 SOLUTION RESPIRATORY (INHALATION) at 11:34

## 2022-05-03 RX ADMIN — NICOTINE POLACRILEX 2 MG: 2 GUM, CHEWING BUCCAL at 08:54

## 2022-05-03 RX ADMIN — DICYCLOMINE HYDROCHLORIDE 10 MG: 10 CAPSULE ORAL at 08:54

## 2022-05-03 RX ADMIN — PREGABALIN 100 MG: 100 CAPSULE ORAL at 08:54

## 2022-05-03 RX ADMIN — CLINDAMYCIN HYDROCHLORIDE 300 MG: 150 CAPSULE ORAL at 00:07

## 2022-05-03 RX ADMIN — SUCRALFATE 1 G: 1 TABLET ORAL at 06:00

## 2022-05-03 RX ADMIN — NICOTINE POLACRILEX 2 MG: 2 GUM, CHEWING BUCCAL at 13:22

## 2022-05-03 RX ADMIN — SUCRALFATE 1 G: 1 TABLET ORAL at 00:07

## 2022-05-03 RX ADMIN — Medication 1000 UNITS: at 08:54

## 2022-05-03 RX ADMIN — CLINDAMYCIN HYDROCHLORIDE 300 MG: 150 CAPSULE ORAL at 06:00

## 2022-05-03 RX ADMIN — TAMSULOSIN HYDROCHLORIDE 0.4 MG: 0.4 CAPSULE ORAL at 08:54

## 2022-05-03 RX ADMIN — DOXYCYCLINE 100 MG: 100 CAPSULE ORAL at 08:55

## 2022-05-03 NOTE — PROGRESS NOTES
[facility]. Hypokalemia [E87.6]  Suicide attempt ([facility] [address] [facility] Iker Aragon  Depression with suicidal ideation [F32. A, R45.851]  Overdose, intentional self-harm, initial encounter ([facility] [address] Yue Andersen . Vitals:    22 1134   BP:    Pulse:    Resp:    Temp:    SpO2: 96%   . Patients home meds are   Prior to Admission medications    Medication Sig Start Date End Date Taking? Authorizing Provider   sertraline (ZOLOFT) 50 MG tablet Take 3 tablets by mouth nightly 5/3/22  Yes Altamese Apgar, MD   clindamycin (CLEOCIN) 300 MG capsule Take 1 capsule by mouth 4 times daily 5/3/22  Yes Altamese Apgar, MD   doxycycline monohydrate (MONODOX) 100 MG capsule Take 1 capsule by mouth every 12 hours 5/3/22  Yes Altamese Apgar, MD   fluticasone (FLONASE) 50 MCG/ACT nasal spray 1 spray by Each Nostril route daily 5/3/22 6/2/22 Yes Altamese Apgar, MD   lamoTRIgine (LAMICTAL) 200 MG tablet Take 1 tablet by mouth daily 5/3/22  Yes Altamese Apgar, MD   pregabalin (LYRICA) 100 MG capsule Take 1 capsule by mouth 3 times daily for 30 days.  5/3/22 6/2/22 Yes Altamese Apgar, MD   tamsulosin (FLOMAX) 0.4 MG capsule Take 1 capsule by mouth daily 22   Willim Flavio, DO   oxybutynin (DITROPAN-XL) 10 MG extended release tablet Take 1 tablet by mouth daily 22   Willim Flavio, DO   acetaminophen (TYLENOL) 500 MG tablet Take 2 tablets by mouth every 6 hours as needed for Pain 22  Willim Flavio, DO   Lancets (ONETOUCH DELICA PLUS [id number]) MISC USE TO TEST BLOOD SUGAR TWICE A DAY 3/24/22   KATIE Cole - CNP   vitamin D3 (CHOLECALCIFEROL) 25 MCG (1000 UT) TABS tablet TAKE 1 TABLET BY MOUTH DAILY 3/24/22   KATIE Cole CNP   BREO ELLIPTA 200-25 MCG/INH AEPB inhaler INHALE 1 PUFF INTO THE LUNGS DAILY 3/17/22   KATIE Cole CNP   pantoprazole (PROTONIX) 40 MG tablet 1 tab daily 3/10/22   KATIE Cole CNP   montelukast (SINGULAIR) 10 MG tablet TAKE 1 TABLET IN THE EVENING ONCE A DAY ORALLY 1/24/22   KATIE Cole CNP   Misc. Devices (STRAINER/STAINLESS STEEL/2.5\") MISC 1 each by Does not apply route as needed (when you urinate)  Patient not taking: Reported on 4/16/2022 1/15/22   Jann Robertson, DO   Methocarbamol (ROBAXIN PO) Take by mouth    Historical Provider, MD   dicyclomine (BENTYL) 10 MG capsule TAKE 1 CAPSULE BY MOUTH 4 TIMES DAILY 8/27/21   KATIE Cole CNP   glucose monitoring kit (FREESTYLE) monitoring kit Use as directed.   BRAND OF CHOICE INSURANCE ALLOWS. 5/27/21   KATIE Cole CNP   Alcohol Swabs ( STERILE ALCOHOL PREP) PADS USE AS DIRECTED 5/10/21   KATIE Cole CNP   vitamin D3 (CHOLECALCIFEROL) 25 MCG (1000 UT) TABS tablet TAKE 1 TABLET BY MOUTH DAILY 8/27/20   KATIE Cole CNP   Masks (CLEVER CHOICE FACE MASK) MIS USE NEW FACE MASK EVERYDAY WHEN PATIENT GO OUTSIDE OF HOME DUE TO COVID PANDEMIC 7/6/20   KATIE Cole CNP       Assessment:     05/03/22 1136   RT Protocol   History Pulmonary Disease 2   Respiratory pattern 2   Breath sounds 0   Cough 0   Indications for Bronchodilator Therapy Decreased or absent breath sounds;None   Bronchodilator Assessment Score 4         HR - 82  RR - 18  SpO2 - 95% (room air)  Breath Sounds: Clear      Bronchodilator assessment at level  1  Hyperinflation assessment at level 1  Secretion Management assessment at level  1    [x]    Bronchodilator Assessment  BRONCHODILATOR ASSESSMENT SCORE  Score 0 1 2 3 4 5   Breath Sounds   []  Patient Baseline [x]  No Wheeze good aeration []  Faint, scattered wheezing, good aeration []  Expiratory Wheezing and or moderately diminished []  Insp/Exp wheeze and/or very diminished []  Insp/Exp and/ or marked distress   Respiratory Rate   []  Patient Baseline [x]  Less than 20 []  Less than 20 []  20-25 []  Greater than 25 []  Greater than 25   Peak flow % of Pred or PB [x]  NA   []  Greater than 90% []  81-90% []  71-80% []  Less than or equal to 70%  or unable to perform []  Unable due to Respiratory Distress   Dyspnea re []  Patient Baseline [x]  No SOB []  No SOB []  SOB on exertion []  SOB min activity []  At rest/acute   e FEV% Predicted       [x]  NA []  Above 69%  []  Unable []  Above 60-69%  []  Unable []  Above 50-59%  []  Unable []  Above 35-49%  []  Unable []  Less than 35%  []  Unable                 [x]  Hyperinflation Assessment  Score 1 2 3   CXR and Breath Sounds   []  Clear []  No atelectasis  Basilar aeration []  Atelectasis or absent basilar breath sounds   Incentive Spirometry Volume  (Per IBW)   []  Greater than or equal to 15ml/Kg []  less than 15ml/Kg []  less than 15ml/Kg   Surgery within last 2 weeks [x]  None or general   []  Abdominal or thoracic surgery  []  Abdominal or thoracic   Chronic Pulmonary Historyre []  No [x]  Yes []  Yes     [x]  Secretion Management Assessment  Score 1 2 3   Bilateral Breath Sounds   []  Occasional Rhonchi []  Scattered Rhonchi []  Course Rhonchi and/or poor aeration   Sputum    []  Small amount of thin secretions []  Moderate amount of viscous secretions []  Copius, Viscious Yellow/ Secretions   CXR as reported by physician [x]  clear  []  Unavailable []  Infiltrates and/or consolidation  []  Unavailable []  Mucus Plugging and or lobar consolidation  []  Unavailable   Cough [x]  Strong, productive cough []  Weak productive cough []  No cough or weak non-productive cough   Yumiko Harding RCP  2:33 PM

## 2022-05-03 NOTE — GROUP NOTE
Group Therapy Note    Date: 5/3/2022    Group Start Time: 1100  Group End Time: 9638  Group Topic: Recreational    [address] [facility], [facility]    Patient refused to attend leisure skills group at 1100 after encouragement from staff. 1:1 talk time offered by staff as alternative to group session.

## 2022-05-03 NOTE — PROGRESS NOTES
CLINICAL PHARMACY NOTE: MEDS TO BEDS    Total # of Prescriptions Filled: 4   The following medications were delivered to the patient:  · Sertraline HCL 50mg  · Doxycycline Monohydrate 100mg  · Clindamycin HCL 300mg  · Flonase 50mcg    Additional Documentation:  Delivered Medication to [address]     Refill(s) Too Soon + Profiled Rx(s)   - Lamictal: 6/26

## 2022-05-03 NOTE — GROUP NOTE
Group Therapy Note    Date: 5/3/2022    Group Start Time: 1000  Group End Time: 3213  Group Topic: Psychotherapy    [facility]    STEFANI Hill        Group Therapy Note    Attendees: 8/21         Patient's Goal:  Expression of feeling     Notes:  Mindful coping     Status After Intervention:  Improved    Participation Level:  Active Listener and Interactive    Participation Quality: Appropriate and Attentive      Speech:  normal      Thought Process/Content: Logical      Affective Functioning: Congruent      Mood: euthymic      Level of consciousness:  Alert and Oriented x4      Response to Learning: Able to verbalize current knowledge/experience and Able to verbalize/acknowledge new learning      Endings: None Reported    Modes of Intervention: Education and Support      Discipline Responsible: /Counselor      Signature:  STEFANI Hill

## 2022-05-03 NOTE — DISCHARGE INSTR - DIET
Good nutrition is important when healing from an illness, injury, or surgery. Follow any nutrition recommendations given to you during your hospital stay. If you were given an oral nutrition supplement while in the hospital, continue to take this supplement at home. You can take it with meals, in-between meals, and/or before bedtime. These supplements can be purchased at most local grocery stores, pharmacies, and chain Online [employer]-stores. If you have any questions about your diet or nutrition, call the hospital and ask for the dietitian.     Follow PCP's orders

## 2022-05-03 NOTE — BH NOTE
[facility]  Discharge Note    Patient discharged home to private residence via private vehicle drive by . Pt discharged with followings belongings:   Dental Appliances: None  Vision - Corrective Lenses: Eyeglasses  Hearing Aid: None  Jewelry: None  Body Piercings Removed: N/A  Clothing: Footwear,Jacket/Coat,Pants,Shirt,Socks,Undergarments,At bedside  Other Valuables: Cigarettes,Lighter/Matches,Other (Comment) (locked up)   Valuables sent home with patient. Patient education on aftercare instructions: Yes, and verbalized understanding. Information faxed to [facility] by staff at 3:50 PM .Patient verbalize understanding of AVS:  Yes. Status EXAM upon discharge:  Mental Status and Behavioral Exam  Normal: Yes  Level of Assistance: Independent/Self  Facial Expression: Brightened  Affect: Appropriate  Level of Consciousness: Alert  Frequency of Checks: 4 times per hour, close  Mood:Normal: No  Mood: Anxious,Depressed,Irritable  Motor Activity:Normal: Yes  Motor Activity: Decreased  Eye Contact: Good  Observed Behavior: Cooperative,Preoccupied  Sexual Misconduct History: Current - no  Preception: [place] to person,[place] to time,[place] to place,[place] to situation  Attention:Normal: No  Attention: Distractible  Thought Processes: Circumstantial  Thought Content:Normal: No  Thought Content: Preoccupations  Depression Symptoms: Feelings of hopelessess  Anxiety Symptoms: Generalized  Deepthi Symptoms: No problems reported or observed.   Hallucinations: None  Delusions: No  Memory:Normal: Yes  Insight and Judgment: No  Insight and Judgment: Poor judgment,Poor insight      Metabolic Screening:    Lab Results   Component Value Date    LABA1C 5.2 09/30/2021       Lab Results   Component Value Date    CHOL 218 (H) 04/10/2017    CHOL 213 (H) 03/22/2016     Lab Results   Component Value Date    TRIG 269 (H) 04/10/2017    TRIG 138 03/22/2016     Lab Results   Component Value Date    HDL 47 06/10/2020    HDL 47 04/10/2017    HDL 44 03/22/2016     No components found for: LDLCAL  No results found for: Guero Lauren LPN
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
## 2022-05-03 NOTE — PLAN OF CARE
Problem: Chronic Conditions and Co-morbidities  Goal: Patient's chronic conditions and co-morbidity symptoms are monitored and maintained or improved  Outcome: Progressing     Problem: Pain  Goal: Verbalizes/displays adequate comfort level or baseline comfort level  Outcome: Progressing     Problem: Depression  Goal: Will be euthymic at discharge  Description: INTERVENTIONS:  1. Administer medication as ordered  2. Provide emotional support via 1:1 interaction with staff  3. Encourage involvement in milieu/groups/activities  4. Monitor for social isolation  Outcome: Progressing     Problem: Anxiety  Goal: Will report anxiety at manageable levels  Description: INTERVENTIONS:  1. Administer medication as ordered  2. Teach and rehearse alternative coping skills  3. Provide emotional support with 1:1 interaction with staff  Outcome: Progressing     Problem: Involuntary Admit  Goal: Will cooperate with staff recommendations and doctor's orders and will demonstrate appropriate behavior  Description: INTERVENTIONS:  1. Treat underlying conditions and offer medication as ordered  2. Educate regarding involuntary admission procedures and rules  3.  Contain excessive/inappropriate behavior per unit and hospital policies  Outcome: Progressing

## 2022-05-03 NOTE — GROUP NOTE
Group Therapy Note    Date: 5/3/2022    Group Start Time: 1400  Group End Time: 1450  Group Topic: Psychoeducation    [facility]    Patient refused to attend goal setting and support group at 1400 after encouragement from staff. 1:1 talk time offered by staff as alternative to group session.
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
## 2022-05-03 NOTE — BH NOTE
Patient given tobacco quitline number 38991414360 at this time, refusing to call at this time, states \" I just dont want to quit now\"- patient given information as to the dangers of long term tobacco use.

## 2022-05-05 DIAGNOSIS — J44.9 COPD WITH ASTHMA (HCC): ICD-10-CM

## 2022-05-05 RX ORDER — IPRATROPIUM/ALBUTEROL SULFATE 20-100 MCG
1 MIST INHALER (GRAM) INHALATION 4 TIMES DAILY
Qty: 16 G | OUTPATIENT
Start: 2022-05-05

## 2022-05-05 NOTE — CARE COORDINATION
Name: Angelique Perry    : 1980    Discharge Date: 5/3/2022    Primary Auth/Cert #: [id number]    Destination: home with     Discharge Medications:      Medication List      START taking these medications    clindamycin 300 MG capsule  Commonly known as: CLEOCIN  Take 1 capsule by mouth 4 times daily  Notes to patient: Antibiotic     doxycycline monohydrate 100 MG capsule  Commonly known as: MONODOX  Take 1 capsule by mouth every 12 hours  Notes to patient: Antibiotic        CHANGE how you take these medications    ROBAXIN PO  What changed: Another medication with the same name was removed. Continue taking this medication, and follow the directions you see here. Notes to patient: Muscle relaxer     sertraline 50 MG tablet  Commonly known as: ZOLOFT  Take 3 tablets by mouth nightly  What changed:   · medication strength  · when to take this  Notes to patient: Mood stabilizer        CONTINUE taking these medications    acetaminophen 500 MG tablet  Commonly known as: TYLENOL  Take 2 tablets by mouth every 6 hours as needed for Pain  Notes to patient: Pain reliever      Breo Ellipta 200-25 MCG/INH Aepb inhaler  Generic drug: Fluticasone furoate-vilanterol  INHALE 1 PUFF INTO THE LUNGS DAILY  Notes to patient: To help with wheezing     [place] Choice Face Mask Misc  USE NEW FACE MASK EVERYDAY WHEN PATIENT GO OUTSIDE OF HOME DUE TO COVID PANDEMIC     dicyclomine 10 MG capsule  Commonly known as: BENTYL  TAKE 1 CAPSULE BY MOUTH 4 TIMES DAILY  Notes to patient: To help with stomach cramping/pain     fluticasone 50 MCG/ACT nasal spray  Commonly known as: FLONASE  1 spray by Each Nostril route daily  Notes to patient: To help with allergies     glucose monitoring kit  Use as directed. BRAND OF CHOICE INSURANCE ALLOWS.  Sterile Alcohol Prep Pads  USE AS DIRECTED     lamoTRIgine 200 MG tablet  Commonly known as: LAMICTAL  Take 1 tablet by mouth daily  Notes to patient:  To help with seizures/mood Ventricular Rate : 75  Atrial Rate : 75  P-R Interval : 138  QRS Duration : 110  Q-T Interval : 358  QTC Calculation(Bezet) : 399  R Axis : 25  T Axis : 189  Diagnosis : Atrial-paced rhythm  Left ventricular hypertrophy with repolarization abnormality  ****Abnormal ECG****    Confirmed by RAFA JACOB MASOOD ([id number]) on 6/3/2019 6:24:19 PM   montelukast 10 MG tablet  Commonly known as: SINGULAIR  TAKE 1 TABLET IN THE EVENING ONCE A DAY ORALLY  Notes to patient: To help with allergies     OneTouch Delica Plus [id number] Misc  USE TO TEST BLOOD SUGAR TWICE A DAY     oxybutynin 10 MG extended release tablet  Commonly known as: DITROPAN-XL  Take 1 tablet by mouth daily  Notes to patient: To help with urinary issues     pantoprazole 40 MG tablet  Commonly known as: PROTONIX  1 tab daily  Notes to patient: Acid reducer     pregabalin 100 MG capsule  Commonly known as: Lyrica  Take 1 capsule by mouth 3 times daily for 30 days. Strainer/Stainless Steel/2.5\" Misc  1 each by Does not apply route as needed (when you urinate)     tamsulosin 0.4 MG capsule  Commonly known as: FLOMAX  Take 1 capsule by mouth daily  Notes to patient:  To help with urinary issues     vitamin D3 25 MCG (1000 UT) Tabs tablet  Commonly known as: CHOLECALCIFEROL  TAKE 1 TABLET BY MOUTH DAILY  Notes to patient: Supplement        STOP taking these medications    Combivent Respimat  MCG/ACT Aers inhaler  Generic drug: albuterol-ipratropium     ipratropium-albuterol 0.5-2.5 (3) MG/3ML Soln nebulizer solution  Commonly known as: DUONEB     loratadine 10 MG tablet  Commonly known as: CLARITIN     OneTouch Verio strip  Generic drug: blood glucose test strips           Where to Get Your Medications      These medications were sent to [address] [facility] [address], [address], [address] [facility] [address]    Hours: [facility] (Mon-Fri 9AM-5:30PM) Phone: [phone]   · clindamycin 300 MG capsule  · doxycycline monohydrate 100 MG capsule  · fluticasone 50 MCG/ACT nasal spray  · lamoTRIgine 200 MG tablet  · sertraline 50 MG tablet     You can get these medications from any pharmacy    Bring a paper prescription for each of these medications  · pregabalin 100 MG capsule         Follow Up Appointment: [facility] [address] [address]  [phone]  On 5/11/2022  @ [facility] for medication management

## 2022-05-15 ENCOUNTER — HOSPITAL ENCOUNTER (EMERGENCY)
Age: 42
Discharge: HOME | End: 2022-05-15
Attending: EMERGENCY MEDICINE
Payer: COMMERCIAL

## 2022-05-15 VITALS
HEART RATE: 85 BPM | BODY MASS INDEX: 37.54 KG/M2 | OXYGEN SATURATION: 100 % | SYSTOLIC BLOOD PRESSURE: 163 MMHG | RESPIRATION RATE: 20 BRPM | HEIGHT: 62 IN | WEIGHT: 204 LBS | TEMPERATURE: 98.4 F | DIASTOLIC BLOOD PRESSURE: 98 MMHG

## 2022-05-15 DIAGNOSIS — R10.9 FLANK PAIN: Primary | ICD-10-CM

## 2022-05-15 LAB
ANION GAP SERPL CALCULATED.3IONS-SCNC: 13 MMOL/L (ref 9–17)
BILIRUBIN URINE: NEGATIVE
BUN BLDV-MCNC: 18 MG/DL (ref 6–20)
CALCIUM SERPL-MCNC: 9.7 MG/DL (ref 8.6–10.4)
CHLORIDE BLD-SCNC: 104 MMOL/L (ref 98–107)
CO2: 19 MMOL/L (ref 20–31)
COLOR: YELLOW
COMMENT UA: NORMAL
CREAT SERPL-MCNC: 0.86 MG/DL (ref 0.5–0.9)
GFR AFRICAN AMERICAN: >60 ML/MIN
GFR NON-AFRICAN AMERICAN: >60 ML/MIN
GFR SERPL CREATININE-BSD FRML MDRD: ABNORMAL ML/MIN/{1.73_M2}
GLUCOSE BLD-MCNC: 89 MG/DL (ref 70–99)
GLUCOSE URINE: NEGATIVE
KETONES, URINE: NEGATIVE
LEUKOCYTE ESTERASE, URINE: NEGATIVE
NITRITE, URINE: NEGATIVE
PH UA: 5 (ref 5–8)
POTASSIUM SERPL-SCNC: 4.6 MMOL/L (ref 3.7–5.3)
PROTEIN UA: NEGATIVE
SODIUM BLD-SCNC: 136 MMOL/L (ref 135–144)
SPECIFIC GRAVITY UA: 1.02 (ref 1–1.03)
TURBIDITY: CLEAR
URINE HGB: NEGATIVE
UROBILINOGEN, URINE: NORMAL

## 2022-05-15 PROCEDURE — 99284 EMERGENCY DEPT VISIT MOD MDM: CPT

## 2022-05-15 PROCEDURE — 6370000000 HC RX 637 (ALT 250 FOR IP): Performed by: STUDENT IN AN ORGANIZED HEALTH CARE EDUCATION/TRAINING PROGRAM

## 2022-05-15 PROCEDURE — 94640 AIRWAY INHALATION TREATMENT: CPT

## 2022-05-15 PROCEDURE — 6360000002 HC RX W HCPCS: Performed by: STUDENT IN AN ORGANIZED HEALTH CARE EDUCATION/TRAINING PROGRAM

## 2022-05-15 PROCEDURE — 80048 BASIC METABOLIC PNL TOTAL CA: CPT

## 2022-05-15 PROCEDURE — 2580000003 HC RX 258: Performed by: EMERGENCY MEDICINE

## 2022-05-15 PROCEDURE — 96374 THER/PROPH/DIAG INJ IV PUSH: CPT

## 2022-05-15 PROCEDURE — 96361 HYDRATE IV INFUSION ADD-ON: CPT

## 2022-05-15 PROCEDURE — 81003 URINALYSIS AUTO W/O SCOPE: CPT

## 2022-05-15 RX ORDER — ONDANSETRON 2 MG/ML
4 INJECTION INTRAMUSCULAR; INTRAVENOUS ONCE
Status: COMPLETED | OUTPATIENT
Start: 2022-05-15 | End: 2022-05-15

## 2022-05-15 RX ORDER — ORPHENADRINE CITRATE 30 MG/ML
60 INJECTION INTRAMUSCULAR; INTRAVENOUS ONCE
Status: DISCONTINUED | OUTPATIENT
Start: 2022-05-15 | End: 2022-05-15 | Stop reason: HOSPADM

## 2022-05-15 RX ORDER — LIDOCAINE 4 G/G
1 PATCH TOPICAL DAILY
Status: DISCONTINUED | OUTPATIENT
Start: 2022-05-15 | End: 2022-05-15 | Stop reason: HOSPADM

## 2022-05-15 RX ORDER — ALBUTEROL SULFATE 2.5 MG/3ML
2.5 SOLUTION RESPIRATORY (INHALATION) EVERY 6 HOURS PRN
Status: DISCONTINUED | OUTPATIENT
Start: 2022-05-15 | End: 2022-05-15 | Stop reason: HOSPADM

## 2022-05-15 RX ORDER — IPRATROPIUM BROMIDE AND ALBUTEROL SULFATE 2.5; .5 MG/3ML; MG/3ML
1 SOLUTION RESPIRATORY (INHALATION) EVERY 4 HOURS PRN
Status: DISCONTINUED | OUTPATIENT
Start: 2022-05-15 | End: 2022-05-15 | Stop reason: HOSPADM

## 2022-05-15 RX ORDER — 0.9 % SODIUM CHLORIDE 0.9 %
1000 INTRAVENOUS SOLUTION INTRAVENOUS ONCE
Status: COMPLETED | OUTPATIENT
Start: 2022-05-15 | End: 2022-05-15

## 2022-05-15 RX ORDER — ACETAMINOPHEN 500 MG
1000 TABLET ORAL ONCE
Status: COMPLETED | OUTPATIENT
Start: 2022-05-15 | End: 2022-05-15

## 2022-05-15 RX ORDER — ORPHENADRINE CITRATE 100 MG/1
100 TABLET, EXTENDED RELEASE ORAL 2 TIMES DAILY
Qty: 14 TABLET | Refills: 0 | Status: SHIPPED | OUTPATIENT
Start: 2022-05-15

## 2022-05-15 RX ADMIN — SODIUM CHLORIDE 1000 ML: 9 INJECTION, SOLUTION INTRAVENOUS at 16:33

## 2022-05-15 RX ADMIN — ONDANSETRON 4 MG: 2 INJECTION INTRAMUSCULAR; INTRAVENOUS at 15:54

## 2022-05-15 RX ADMIN — ACETAMINOPHEN 1000 MG: 500 TABLET ORAL at 15:53

## 2022-05-15 RX ADMIN — IPRATROPIUM BROMIDE AND ALBUTEROL SULFATE 1 AMPULE: .5; 2.5 SOLUTION RESPIRATORY (INHALATION) at 16:25

## 2022-05-15 ASSESSMENT — ENCOUNTER SYMPTOMS
VOMITING: 0
SHORTNESS OF BREATH: 1
FACIAL SWELLING: 0
ABDOMINAL PAIN: 0
NAUSEA: 1
DIARRHEA: 0
BACK PAIN: 0

## 2022-05-15 ASSESSMENT — PAIN - FUNCTIONAL ASSESSMENT: PAIN_FUNCTIONAL_ASSESSMENT: 0-10

## 2022-05-15 ASSESSMENT — PAIN SCALES - GENERAL
PAINLEVEL_OUTOF10: 10
PAINLEVEL_OUTOF10: 10

## 2022-05-15 ASSESSMENT — PAIN DESCRIPTION - ORIENTATION: ORIENTATION: LEFT

## 2022-05-15 ASSESSMENT — PAIN DESCRIPTION - LOCATION: LOCATION: FLANK

## 2022-05-15 NOTE — ED PROVIDER NOTES
[facility] ED  Emergency Department Encounter  EmergencyMedicine Resident     Pt Name:Niurka Watson  MRN: [id number]  [place] 1980  Date of evaluation: 5/15/22  PCP:  KATIE Spears CNP    This patient was evaluated in the Emergency Department for symptoms described in the history of present illness. The patient was evaluated in the context of the global COVID-19 pandemic, which necessitated consideration that the patient might be at risk for infection with the SARS-CoV-2 virus that causes COVID-19. Institutional protocols and algorithms that pertain to the evaluation of patients at risk for COVID-19 are in a state of rapid change based on information released by regulatory bodies including the CDC and federal and state organizations. These policies and algorithms were followed during the patient's care in the ED. CHIEF COMPLAINT       Chief Complaint   Patient presents with    Flank Pain     left flank pain known kidney stone, lithotrispy in april with stent pain. Dr. Indio Wright pt       HISTORY OF PRESENT ILLNESS  (Location/Symptom, Timing/Onset, Context/Setting, Quality, Duration, Modifying Factors, Severity.)      Vanessa Rodriguez is a 38 y.o. female who presents with left-sided back pain. Patient reports that she has kidney stones and that this is a kidney stone, underwent lithotripsy in April. Patient was recently admitted to observation unit for urology evaluation and stent removal.  Patient reports that since then the pain has been persistent and severe, 10/10. She reports that she can urinate but that she feels like she needs to push that there is a stone obstructing her ureter.     PAST MEDICAL / SURGICAL / SOCIAL / FAMILY HISTORY      has a past medical history of Anxiety, Arthritis, Asthma, Sadler's esophagus, Bipolar 1 disorder ([address] Chronic insomnia, COPD (chronic obstructive pulmonary disease) ([address] Depression, Diabetes mellitus ([address] Endometriosis, Esophagitis, GERD (gastroesophageal reflux disease), Headache(784.0), Herniated disc, cervical, Hiatal hernia, Incisional abscess, Kidney stones, MDRO (multiple drug resistant organisms) resistance, Pleurisy, Pneumonia, Seizure ([address] Seizures ([address] UTI (urinary tract infection), and Vision abnormalities. has a past surgical history that includes knee surgery (Left);  section (, ); Tonsillectomy; Knee arthroscopy (Left, 2015); Cervical disc surgery; Upper gastrointestinal endoscopy (2016); Upper gastrointestinal endoscopy (2017); Upper gastrointestinal endoscopy (2017); pr esophagogastroduodenoscopy transoral diagnostic (N/A, 2017); laparoscopy; laparoscopy (N/A, 10/05/2017); Upper gastrointestinal endoscopy (N/A, 2018); Endoscopy, colon, diagnostic;  section (N/A, 2018); Upper gastrointestinal endoscopy (N/A, 10/01/2019); Colonoscopy (N/A, 10/01/2019); Colonoscopy (N/A, 2020); [place] tooth extraction; Hysterectomy (N/A, 2020); Cystoscopy (Left, 2022); Ureteroscopy (Left, 2022); Ureter surgery (Left, 2022); and Lithotripsy (Left, 2022).     Social History     Socioeconomic History    Marital status:      Spouse name: Not on file    Number of children: 1    Years of education: 11th grade    Highest education level: Not on file   Occupational History    Occupation: unemployed-   Tobacco Use    Smoking status: Current Every Day Smoker     Packs/day: 0.50     Years: 21.00     Pack years: 10.50     Types: Cigarettes    Smokeless tobacco: Never Used   Vaping Use    Vaping Use: Every day   Substance and Sexual Activity    Alcohol use: No    Drug use: Yes     Comment: crack    Sexual activity: Yes     Partners: Male   Other Topics Concern    Not on file   Social History Narrative    Lives with son and boyfriend     Social Determinants of Health     Financial Resource Strain: Low Risk     Difficulty of Paying Living Expenses: Not hard at all   Food Insecurity: No Food Insecurity    Worried About Running Out of Food in the Last Year: Never true    Ran Out of Food in the Last Year: Never true   Transportation Needs:     Lack of Transportation (Medical): Not on file    Lack of Transportation (Non-Medical): Not on file   Physical Activity:     Days of Exercise per Week: Not on file    Minutes of Exercise per Session: Not on file   Stress:     Feeling of Stress : Not on file   Social Connections:     Frequency of Communication with Friends and Family: Not on file    Frequency of Social Gatherings with Friends and Family: Not on file    Attends Hoahaoism Services: Not on file    Active Member of Clubs or Organizations: Not on file    Attends Club or Organization Meetings: Not on file    Marital Status: Not on file   Intimate Partner Violence:     Fear of Current or Ex-Partner: Not on file    Emotionally Abused: Not on file    Physically Abused: Not on file    Sexually Abused: Not on file   Housing Stability:     Unable to Pay for Housing in the Last Year: Not on file    Number of [place] in the Last Year: Not on file    Unstable Housing in the Last Year: Not on file       Family History   Problem Relation Age of Onset    Cancer Mother         breast    Bipolar Disorder Mother     Hypertension Mother     Breast Cancer Mother     Bipolar Disorder Brother     Hypertension Father        Allergies:  Nsaids, Toradol [ketorolac tromethamine], and Ultram [tramadol]    Home Medications:  Prior to Admission medications    Medication Sig Start Date End Date Taking?  Authorizing Provider   orphenadrine (NORFLEX) 100 MG extended release tablet Take 1 tablet by mouth 2 times daily 5/15/22  Yes Kandice Marte,    sertraline (ZOLOFT) 50 MG tablet Take 3 tablets by mouth nightly 5/3/22   Segundo Francois MD   clindamycin (CLEOCIN) 300 MG capsule Take 1 capsule by mouth 4 times daily 5/3/22   Segundo Francois MD doxycycline monohydrate (MONODOX) 100 MG capsule Take 1 capsule by mouth every 12 hours 5/3/22   David Hoang MD   fluticasone [facility]) 50 MCG/ACT nasal spray 1 spray by Each Nostril route daily 5/3/22 6/2/22  David Hoang MD   lamoTRIgine (LAMICTAL) 200 MG tablet Take 1 tablet by mouth daily 5/3/22   David Hoang MD   pregabalin (LYRICA) 100 MG capsule Take 1 capsule by mouth 3 times daily for 30 days. 5/3/22 6/2/22  David Hoang MD   tamsulosin (FLOMAX) 0.4 MG capsule Take 1 capsule by mouth daily 4/22/22   Barrett Castillo DO   oxybutynin (DITROPAN-XL) 10 MG extended release tablet Take 1 tablet by mouth daily 4/23/22   Barrett Castillo DO   acetaminophen (TYLENOL) 500 MG tablet Take 2 tablets by mouth every 6 hours as needed for Pain 4/17/22 5/1/22  Barrett Castillo DO   Lancets (ONETOUCH DELICA PLUS [id number]) MISC USE TO TEST BLOOD SUGAR TWICE A DAY 3/24/22   KATIE Cole CNP   vitamin D3 (CHOLECALCIFEROL) 25 MCG (1000 UT) TABS tablet TAKE 1 TABLET BY MOUTH DAILY 3/24/22   KATIE Cole CNP   BREO ELLIPTA 200-25 MCG/INH AEPB inhaler INHALE 1 PUFF INTO THE LUNGS DAILY 3/17/22   KATIE Cole CNP   pantoprazole (PROTONIX) 40 MG tablet 1 tab daily 3/10/22   KATIE Cole CNP   montelukast (SINGULAIR) 10 MG tablet TAKE 1 TABLET IN THE EVENING ONCE A DAY ORALLY 1/24/22   KATIE Cole CNP   Misc. Devices (STRAINER/STAINLESS STEEL/2.5\") MISC 1 each by Does not apply route as needed (when you urinate)  Patient not taking: Reported on 4/16/2022 1/15/22   Jann Robertson, DO   Methocarbamol (ROBAXIN PO) Take by mouth    Historical Provider, MD   dicyclomine (BENTYL) 10 MG capsule TAKE 1 CAPSULE BY MOUTH 4 TIMES DAILY 8/27/21   KATIE Cole CNP   glucose monitoring kit (FREESTYLE) monitoring kit Use as directed.   BRAND OF CHOICE INSURANCE ALLOWS. 5/27/21   KATIE Cole CNP   Alcohol Swabs ( STERILE ALCOHOL PREP) PADS USE AS DIRECTED 5/10/21   KATIE Cole CNP   vitamin D3 (CHOLECALCIFEROL) 25 MCG (1000 UT) TABS tablet TAKE 1 TABLET BY MOUTH DAILY 8/27/20   KATIE Cole CNP   Masks (CLEVER CHOICE FACE MASK) [facility] USE NEW FACE MASK EVERYDAY WHEN PATIENT GO OUTSIDE OF HOME DUE TO COVID PANDEMIC 7/6/20   KATIE Cole CNP       REVIEW OF SYSTEMS    (2-9 systems for level 4, 10 or more for level 5)      Review of Systems   Constitutional: Negative for appetite change. HENT: Negative for facial swelling. Respiratory: Positive for shortness of breath. Cardiovascular: Negative for chest pain. Gastrointestinal: Positive for nausea. Negative for abdominal pain, diarrhea and vomiting. Genitourinary: Positive for difficulty urinating and flank pain. Musculoskeletal: Negative for back pain. Skin: Negative for wound. Allergic/Immunologic:        NSAID allergy   Neurological: Negative for headaches. Hematological:        - AC   Psychiatric/Behavioral: Negative for confusion. PHYSICAL EXAM   (up to 7 for level 4, 8 or more for level 5)      INITIAL VITALS:   BP (!) 163/98   Pulse 85   Temp 98.4 °F (36.9 °C) (Oral)   Resp 20   Ht 5' 2\" (1.575 m)   Wt 204 lb (92.5 kg)   LMP 06/15/2020   SpO2 100%   BMI 37.31 kg/m²     Physical Exam  Constitutional:       General: She is not in acute distress. HENT:      Head: Normocephalic and atraumatic. Right Ear: External ear normal.      Left Ear: External ear normal.      Nose: Nose normal.   Eyes:      Conjunctiva/sclera: Conjunctivae normal.   Cardiovascular:      Rate and Rhythm: Normal rate. Pulses: Normal pulses. Pulmonary:      Effort: Pulmonary effort is normal. No respiratory distress. Breath sounds: Wheezing present. Abdominal:      General: Abdomen is flat. There is no distension. Palpations: Abdomen is soft. Tenderness: There is abdominal tenderness. There is no guarding or rebound.       Comments: Left flank tender palpation   Musculoskeletal:         General: No swelling. Cervical back: No tenderness. Skin:     General: Skin is warm. Capillary Refill: Capillary refill takes less than 2 seconds. Neurological:      Mental Status: She is alert and oriented to person, place, and time.    Psychiatric:         Mood and Affect: Mood normal.         DIFFERENTIAL  DIAGNOSIS     PLAN (LABS / IMAGING / EKG):  Orders Placed This Encounter   Procedures    Urinalysis with Reflex to Culture    Basic Metabolic Panel       MEDICATIONS ORDERED:  Orders Placed This Encounter   Medications    ondansetron (ZOFRAN) injection 4 mg    acetaminophen (TYLENOL) tablet 1,000 mg    lidocaine 4 % external patch 1 patch    0.9 % sodium chloride bolus    ipratropium-albuterol (DUONEB) nebulizer solution 1 ampule     Order Specific Question:   Initiate RT Bronchodilator Protocol     Answer:   No    albuterol (PROVENTIL) nebulizer solution 2.5 mg     Order Specific Question:   Initiate RT Bronchodilator Protocol     Answer:   No    orphenadrine (NORFLEX) injection 60 mg    orphenadrine (NORFLEX) 100 MG extended release tablet     Sig: Take 1 tablet by mouth 2 times daily     Dispense:  14 tablet     Refill:  0       DDX: uti, kidney stone, strain, sprain, asthma    DIAGNOSTIC RESULTS / EMERGENCY DEPARTMENT COURSE / MDM   LAB RESULTS:  Results for orders placed or performed during the hospital encounter of 05/15/22   Urinalysis with Reflex to Culture    Specimen: Urine   Result Value Ref Range    Color, UA Yellow Yellow    Turbidity UA Clear Clear    Glucose, Ur NEGATIVE NEGATIVE    Bilirubin Urine NEGATIVE NEGATIVE    Ketones, Urine NEGATIVE NEGATIVE    Specific Gravity, UA 1.021 1.005 - 1.030    Urine Hgb NEGATIVE NEGATIVE    pH, UA 5.0 5.0 - 8.0    Protein, UA NEGATIVE NEGATIVE    Urobilinogen, Urine Normal Normal    Nitrite, Urine NEGATIVE NEGATIVE    Leukocyte Esterase, Urine NEGATIVE NEGATIVE    Urinalysis Comments Microscopic exam not performed based on chemical results unless requested in original order. Basic Metabolic Panel   Result Value Ref Range    Glucose 89 70 - 99 mg/dL    BUN 18 6 - 20 mg/dL    CREATININE 0.86 0.50 - 0.90 mg/dL    Calcium 9.7 8.6 - 10.4 mg/dL    Sodium 136 135 - 144 mmol/L    Potassium 4.6 3.7 - 5.3 mmol/L    Chloride 104 98 - 107 mmol/L    CO2 19 (L) 20 - 31 mmol/L    Anion Gap 13 9 - 17 mmol/L    GFR Non-African American >60 >60 mL/min    GFR African American >60 >60 mL/min    GFR Comment             IMPRESSION: normal renal function, no jagdish    RADIOLOGY:  Ultrasound negative for hydronephrosis    EMERGENCY DEPARTMENT COURSE:  ED Course as of 05/15/22 1723   Sun May 15, 2022   1618 Nitrite, Urine: NEGATIVE [ML]   1618 Leukocyte Esterase, Urine: NEGATIVE  L renal US no hydronephrosis [ML]   1630 Patient requesting breathing treatment for wheezing and history of asthma. Patient does have mild bilateral expiratory wheezing, RT at bedside with albuterol. [ML]      ED Course User Index  [ML] Mandeep Lee DO     Patient very upset that she was not given stronger pain medications, refusing norflex. Discussed extensively there is not currently an emergent condition. Encouraged follow up with urology, continue OTC medications and provided norflex prescription. FINAL IMPRESSION      1.  Flank pain          DISPOSITION / PLAN     DISPOSITION Decision To Discharge 05/15/2022 04:58:26 PM      PATIENT REFERRED TO:  KATIE Campos CNP  [facility] [address]  [address] [address]  [phone]    Schedule an appointment as soon as possible for a visit         DISCHARGE MEDICATIONS:  New Prescriptions    ORPHENADRINE (NORFLEX) 100 MG EXTENDED RELEASE TABLET    Take 1 tablet by mouth 2 times daily       Zion Sanchez DO  Emergency Medicine Resident    (Please note that portions of thisnote were completed with a voice recognition program.  Efforts were made to edit the dictations but occasionally words are mis-transcribed.)       Fransisco Dean,   Resident  05/15/22 1970 Johnie Crabtree,   Resident  05/15/22 0779

## 2022-05-15 NOTE — ED NOTES
Follow up with Dr. Bipin Santos on 5/27.  Pain ongoing x1 week and has to work tomorrow and unable to tolerate pain     Tracy Maradiaga RN  05/15/22 1166

## 2022-05-15 NOTE — ED NOTES
Urine sample collected from pt,  labeled and sent to lab for testing.       Eyad Cabrera, ANNY  05/15/22 5569

## 2022-05-15 NOTE — ED NOTES
Dr. Irma Whittington at bedside to evaluate pt     Eyad Cabrera, ANNY  05/15/22 [address] [facility]  05/15/22 1528

## 2022-05-15 NOTE — ED NOTES
Pt is cursing loudly in treatment area. Writer to bedside to ask pt to please control language and lower volume. Dr. Melba Romberg sent to bedside.       Drea Clarke RN  05/15/22 4336

## 2022-05-15 NOTE — ED NOTES
Dr. Clay De Paz at bedside to re-evaluate pt. Pt is crying, states pain is not controlled.       Army Josie RN  05/15/22 4640

## 2022-05-15 NOTE — ED NOTES
Pt calling out in pain, pt states she can not tolerate the pain. Resident notified.       Tammy Gonzales RN  05/15/22 6226

## 2022-05-18 DIAGNOSIS — J44.9 COPD WITH ASTHMA (HCC): ICD-10-CM

## 2022-05-18 RX ORDER — IPRATROPIUM BROMIDE AND ALBUTEROL SULFATE 2.5; .5 MG/3ML; MG/3ML
SOLUTION RESPIRATORY (INHALATION)
Qty: 30 EACH | Refills: 2 | Status: SHIPPED | OUTPATIENT
Start: 2022-05-18

## 2022-07-12 ENCOUNTER — HOSPITAL ENCOUNTER (EMERGENCY)
Age: 42
Discharge: HOME | End: 2022-07-12
Attending: STUDENT IN AN ORGANIZED HEALTH CARE EDUCATION/TRAINING PROGRAM
Payer: MEDICARE

## 2022-07-12 VITALS
WEIGHT: 180 LBS | HEIGHT: 62 IN | OXYGEN SATURATION: 96 % | BODY MASS INDEX: 33.13 KG/M2 | HEART RATE: 73 BPM | TEMPERATURE: 97.7 F | RESPIRATION RATE: 20 BRPM

## 2022-07-12 DIAGNOSIS — Z76.89 ENCOUNTER FOR PSYCHIATRIC ASSESSMENT: Primary | ICD-10-CM

## 2022-07-12 LAB
ABSOLUTE EOS #: 0.2 K/UL (ref 0–0.4)
ABSOLUTE LYMPH #: 1.6 K/UL (ref 1–4.8)
ABSOLUTE MONO #: 0.6 K/UL (ref 0.1–1.3)
ANION GAP SERPL CALCULATED.3IONS-SCNC: 10 MMOL/L (ref 9–17)
BASOPHILS # BLD: 1 % (ref 0–2)
BASOPHILS ABSOLUTE: 0.1 K/UL (ref 0–0.2)
BUN BLDV-MCNC: 12 MG/DL (ref 6–20)
CALCIUM SERPL-MCNC: 8.9 MG/DL (ref 8.6–10.4)
CHLORIDE BLD-SCNC: 108 MMOL/L (ref 98–107)
CO2: 21 MMOL/L (ref 20–31)
CREAT SERPL-MCNC: 0.87 MG/DL (ref 0.5–0.9)
EOSINOPHILS RELATIVE PERCENT: 2 % (ref 0–4)
ETHANOL PERCENT: <0.01 %
ETHANOL: <10 MG/DL
GFR AFRICAN AMERICAN: >60 ML/MIN
GFR NON-AFRICAN AMERICAN: >60 ML/MIN
GFR SERPL CREATININE-BSD FRML MDRD: ABNORMAL ML/MIN/{1.73_M2}
GLUCOSE BLD-MCNC: 109 MG/DL (ref 70–99)
HCT VFR BLD CALC: 43.8 % (ref 36–46)
HEMOGLOBIN: 14.4 G/DL (ref 12–16)
LYMPHOCYTES # BLD: 18 % (ref 24–44)
MCH RBC QN AUTO: 28.9 PG (ref 26–34)
MCHC RBC AUTO-ENTMCNC: 32.8 G/DL (ref 31–37)
MCV RBC AUTO: 88.1 FL (ref 80–100)
MONOCYTES # BLD: 6 % (ref 1–7)
PDW BLD-RTO: 14.5 % (ref 11.5–14.9)
PLATELET # BLD: 219 K/UL (ref 150–450)
PMV BLD AUTO: 8.4 FL (ref 6–12)
POTASSIUM SERPL-SCNC: 3.8 MMOL/L (ref 3.7–5.3)
RBC # BLD: 4.97 M/UL (ref 4–5.2)
SEG NEUTROPHILS: 73 % (ref 36–66)
SEGMENTED NEUTROPHILS ABSOLUTE COUNT: 6.4 K/UL (ref 1.3–9.1)
SODIUM BLD-SCNC: 139 MMOL/L (ref 135–144)
WBC # BLD: 8.8 K/UL (ref 3.5–11)

## 2022-07-12 PROCEDURE — 80048 BASIC METABOLIC PNL TOTAL CA: CPT

## 2022-07-12 PROCEDURE — G0480 DRUG TEST DEF 1-7 CLASSES: HCPCS

## 2022-07-12 PROCEDURE — 36415 COLL VENOUS BLD VENIPUNCTURE: CPT

## 2022-07-12 PROCEDURE — 85025 COMPLETE CBC W/AUTO DIFF WBC: CPT

## 2022-07-12 PROCEDURE — 99283 EMERGENCY DEPT VISIT LOW MDM: CPT

## 2022-07-12 ASSESSMENT — ENCOUNTER SYMPTOMS
PHOTOPHOBIA: 0
NAUSEA: 0
DIARRHEA: 0
VOMITING: 0
FACIAL SWELLING: 0
ABDOMINAL PAIN: 0
COUGH: 0
COLOR CHANGE: 0
RHINORRHEA: 0
EYE ITCHING: 0
SHORTNESS OF BREATH: 0

## 2022-07-12 ASSESSMENT — LIFESTYLE VARIABLES
HOW OFTEN DO YOU HAVE A DRINK CONTAINING ALCOHOL: NEVER
HOW MANY STANDARD DRINKS CONTAINING ALCOHOL DO YOU HAVE ON A TYPICAL DAY: PATIENT DECLINED
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
## 2022-07-13 ENCOUNTER — TELEPHONE (OUTPATIENT)
Dept: FAMILY MEDICINE CLINIC | Age: 42
End: 2022-07-13

## 2022-07-13 NOTE — TELEPHONE ENCOUNTER
[address]  ED Follow up Call    Reason for ED visit:  PSYCHIATRIC ASSESSMENT          FU appts/Provider:    No future appointments. VOICEMAIL DOCUMENTATION - ERASE IF NOT USED  Hi, this message is for  JOBY  This is EDDIE from [email or web address] [place] office. Just calling to see how you are doing after your recent visit to the Emergency Room. [facility] [place] [facility] to make sure you were able to fill any prescriptions and that you understand your discharge instructions. Please return our call if you need to make a follow up appointment with your provider or have any further needs. Our phone number is [phone]*. Have a great day.

## 2022-07-13 NOTE — ED PROVIDER NOTES
and bipolar    Diabetes mellitus ([facility]     prediabetic    Endometriosis 3/9/2020    Esophagitis     severe    GERD (gastroesophageal reflux disease)     Headache(784.0)     Herniated disc, cervical     Hiatal hernia     Incisional abscess 1/15/2019    Kidney stones     MDRO (multiple drug resistant organisms) resistance     mrsa    Pleurisy     hx of \"years ago\"    Pneumonia     past penumonia    Seizure ([address]     Seizures ([facility] [address]     2016 last seizure-focal seizure    UTI (urinary tract infection)     Vision abnormalities     wears glasses     Past Problem List  Patient Active Problem List   Diagnosis Code    Asthma J45.909    GERD K21.9    Iron deficiency anemia D50.9    Cervical disc herniation M50.20    Smoker F17.200    Sadler's esophagus without dysplasia K22.70    Hiatal hernia K44.9    COPD with asthma ([address] J44.9    Chronic constipation K59.09    Cervicogenic headache G44.86    Hx of IUFD (G2) O09.299    History of gestational hypertension Z87.59    Seizure ([facility] [address] R56.9    Hx of  x2 (G3, G4) Z98.891    Arthritis M19.90    Cervical radiculopathy M54.12    Migraine without aura G43.009    Spinal stenosis in cervical region M48.02    Prediabetes R73.03    Chronic pelvic pain in female R10.2, G89.29    Enlarged uterus N85.2    Endometriosis N80.9    Family history of breast cancer Z80.3    S/P cervical spinal fusion Z98.1    Bipolar affective disorder, currently depressed, moderate ([address] F31.32    Insomnia G47.00    Ulnar neuropathy G56.20    Major depressive disorder, recurrent episode ([address] F33.9    Sciatica M54.30    Moderate persistent asthma without complication H83.82    Lipoma of torso D17.1    History of cervical discectomy Z98.890    Chronic neck pain with abnormal neurologic examination M54.2, G89.29    Abnormal weight gain  R63.5    Class 2 drug-induced obesity with serious comorbidity and body mass index (BMI) of 37.0 to 37.9 in adult E66.1, Q78.08    Pelvic peritoneal endometriosis N80.3    RALH with BS and cystoscopy 2020 Z90.710    Post-op pain G89.18    Wound infection/hemorrhage, obstetric surgical, postpartum condition O90.9    Postoperative visit Z48.89    Postoperative abdominal pain R10.9, G89.18    Diverticulitis K57.92    Diverticulitis of colon K57.32    Pelvic abscess in female N73.9    Pneumonia J18.9    Right otitis media H66.91    Functional diarrhea K59.1    Kidney stones N20.0    Cervical spondylosis without myelopathy M47.812    Cervical myelopathy (HCC) G95.9    Nephrolithiasis N20.0    Intractable abdominal pain R10.9    Depression with suicidal ideation F32. A, R45.851    Major depressive disorder, recurrent, severe with psychotic features ([facility] [address] F33.3    Cocaine abuse, episodic use ([facility] [address] F14.10    Generalized anxiety disorder with panic attacks F41.1, F41.0     SURGICAL HISTORY       Past Surgical History:   Procedure Laterality Date    CERVICAL DISC SURGERY      x2     SECTION  , 2018    x 2     SECTION N/A 2018     SECTION performed by Taco Lemus [facility] at [facility] L&D OR    COLONOSCOPY N/A 10/01/2019    COLONOSCOPY DIAGNOSTIC ABORTED performed by Lindy Costa MD at [address] 2020    COLONOSCOPY WITH BIOPSY performed by Lindy Costa MD at [facility] 8 Left 2022    CYSTOSCOPY, URETERAL STENT INSERTION, RETROGRADE PYELOGRAM performed by Wilder Hedrick MD at [facility] 22, COLON, DIAGNOSTIC      HYSTERECTOMY (CERVIX STATUS UNKNOWN) N/A 2020    HYSTERECTOMY ABDOMINAL LAPAROSCOPIC ROBOTIC XI W/ CYSTOSCOPY performed by Taco Lemus MD at [facility] ARTHROSCOPY Left 2015    KNEE SURGERY Left     x3    LAPAROSCOPY      dr Farrah Harris N/A 10/05/2017    LAPAROSCOPIC REMOVAL INTRA ABDOMINAL LIPOMA LOWER RIGHT performed by Vishnu Aleman DO at [address] [employer] LITHOTRIPSY Left 2022 CYSTO LEFT ESWL EXTRACORPOREAL SHOCK WAVE LITHOTRIPSY LEFT STENT REMOVAL  -NEXTMED performed by Alysia Senior MD at [address] [facility] ESOPHAGOGASTRODUODENOSCOPY TRANSORAL DIAGNOSTIC N/A 03/02/2017    EGD ESOPHAGOGASTRODUODENOSCOPY  IP 2055 performed by João Santamaria MD at [facility]  08/16/2016    severe esophagitis    UPPER GASTROINTESTINAL ENDOSCOPY  01/19/2017    barretts; hiatus hernia; gastritis    UPPER GASTROINTESTINAL ENDOSCOPY  03/02/2017    long seg mullins's with linear erosions, esophagitis, small hiatal hernia    UPPER GASTROINTESTINAL ENDOSCOPY N/A 01/30/2018    MULLINS'S    UPPER GASTROINTESTINAL ENDOSCOPY N/A 10/01/2019    EGD BIOPSY performed by Oracio Arce MD at [place] [address] Left 4/19/2022    HOLMIUM LASER STANDBY,  CYSTOSCOPY, LEFT URETEROSCOPY , LEFT STENT EXCHANGE, LEFT RETROGRADE PYELOGRAM performed by Jeet Youngblood MD at [address] URETEROSCOPY Left 04/19/2022    HOLMIUM LASER STANDBY,  CYSTOSCOPY, LEFT URETEROSCOPY , LEFT STENT EXCHANGE, LEFT RETROGRADE PYELOGRAM (Left)    WISDOM TOOTH EXTRACTION       CURRENT MEDICATIONS       Previous Medications    ACETAMINOPHEN (TYLENOL) 500 MG TABLET    Take 2 tablets by mouth every 6 hours as needed for Pain    ALCOHOL SWABS ( STERILE ALCOHOL PREP) PADS    USE AS DIRECTED    BREO ELLIPTA 200-25 MCG/INH AEPB INHALER    INHALE 1 PUFF INTO THE LUNGS DAILY    CLINDAMYCIN (CLEOCIN) 300 MG CAPSULE    Take 1 capsule by mouth 4 times daily    DICYCLOMINE (BENTYL) 10 MG CAPSULE    TAKE 1 CAPSULE BY MOUTH 4 TIMES DAILY    DOXYCYCLINE MONOHYDRATE (MONODOX) 100 MG CAPSULE    Take 1 capsule by mouth every 12 hours    FLUTICASONE (FLONASE) 50 MCG/ACT NASAL SPRAY    1 spray by Each Nostril route daily    GLUCOSE MONITORING KIT (FREESTYLE) MONITORING KIT    Use as directed. BRAND OF CHOICE INSURANCE ALLOWS.     IPRATROPIUM-ALBUTEROL (DUONEB) 0.5-2.5 (3) MG/3ML SOLN NEBULIZER SOLUTION INHALE CONTENTS OF 1 UNIT DOSE VIA NEBULIZER EVERY 4 HOURS    LAMOTRIGINE (LAMICTAL) 200 MG TABLET    Take 1 tablet by mouth daily    LANCETS (ONETOUCH DELICA PLUS [id number]) MISC    USE TO TEST BLOOD SUGAR TWICE A DAY    MASKS (CLEVER CHOICE FACE MASK) MISC    USE NEW FACE MASK EVERYDAY WHEN PATIENT GO OUTSIDE OF HOME DUE TO COVID PANDEMIC    METHOCARBAMOL (ROBAXIN PO)    Take by mouth    MISC. DEVICES (STRAINER/STAINLESS STEEL/2.5\") MISC    1 each by Does not apply route as needed (when you urinate)    MONTELUKAST (SINGULAIR) 10 MG TABLET    TAKE 1 TABLET IN THE EVENING ONCE A DAY ORALLY    ORPHENADRINE (NORFLEX) 100 MG EXTENDED RELEASE TABLET    Take 1 tablet by mouth 2 times daily    OXYBUTYNIN (DITROPAN-XL) 10 MG EXTENDED RELEASE TABLET    Take 1 tablet by mouth daily    PANTOPRAZOLE (PROTONIX) 40 MG TABLET    1 tab daily    PREGABALIN (LYRICA) 100 MG CAPSULE    Take 1 capsule by mouth 3 times daily for 30 days. SERTRALINE (ZOLOFT) 50 MG TABLET    Take 3 tablets by mouth nightly    TAMSULOSIN (FLOMAX) 0.4 MG CAPSULE    Take 1 capsule by mouth daily    VITAMIN D3 (CHOLECALCIFEROL) 25 MCG (1000 UT) TABS TABLET    TAKE 1 TABLET BY MOUTH DAILY     ALLERGIES     is allergic to nsaids, toradol [ketorolac tromethamine], and ultram [tramadol]. FAMILY HISTORY     She indicated that her mother is alive. She indicated that her father is alive. She indicated that the status of her brother is unknown.      SOCIAL HISTORY       Social History     Tobacco Use    Smoking status: Current Every Day Smoker     Packs/day: 0.50     Years: 21.00     Pack years: 10.50     Types: Cigarettes    Smokeless tobacco: Never Used   Vaping Use    Vaping Use: Every day   Substance Use Topics    Alcohol use: No    Drug use: Yes     Comment: crack     PHYSICAL EXAM     INITIAL VITALS: Pulse 73   Temp 97.7 °F (36.5 °C) (Oral)   Resp 20   Ht 5' 2\" (1.575 m)   Wt 180 lb (81.6 kg)   LMP 06/15/2020   SpO2 96%   BMI 32.92 kg/m² Physical Exam  Vitals and nursing note reviewed. Constitutional:       Appearance: She is normal weight. HENT:      Head: Normocephalic and atraumatic. Eyes:      Extraocular Movements: Extraocular movements intact. Pupils: Pupils are equal, round, and reactive to light. Cardiovascular:      Rate and Rhythm: Normal rate and regular rhythm. Pulmonary:      Effort: Pulmonary effort is normal.      Breath sounds: Normal breath sounds. Abdominal:      General: Abdomen is flat. There is no distension. Palpations: There is no mass. Musculoskeletal:         General: No swelling. Normal range of motion. Cervical back: Normal range of motion and neck supple. Skin:     General: Skin is warm and dry. Neurological:      General: No focal deficit present. Mental Status: She is alert. Mental status is at baseline. Psychiatric:      Comments: Denies suicidal ideation, homicidal ideation, linear and appropriate, does not appear to be responding to internal stimuli         MEDICAL DECISION MAKIN-year-old female presents for evaluation under pink slip with police for reports of suicidal statements. Patient is denying suicidal ideation and homicidal ideation. She is calm and linear and appropriate in the ER. Since the patient was placed on a pink slip by police we did check some blood work and speak with psychiatry who recommended discharge home. We watched the patient for several hours in the ER and she was calm and appropriate. We will discharge home. CRITICAL CARE:       PROCEDURES:    Procedures    DIAGNOSTIC RESULTS   EKG:All EKG's are interpreted by the Emergency Department Physician who either signs or Co-signs this chart in the absence of a cardiologist.        RADIOLOGY:All plain film, CT, MRI, and formal ultrasound images (except ED bedside ultrasound) are read by the radiologist, see reports below, unless otherwisenoted in MDM or here.   No orders to display LABS: All lab results were reviewed by myself, and all abnormals are listed below. Labs Reviewed   CBC WITH AUTO DIFFERENTIAL - Abnormal; Notable for the following components:       Result Value    Seg Neutrophils 73 (*)     Lymphocytes 18 (*)     All other components within normal limits   BASIC METABOLIC PANEL - Abnormal; Notable for the following components:    Glucose 109 (*)     Chloride 108 (*)     All other components within normal limits   [facility]       EMERGENCY DEPARTMENTCOURSE:         Vitals:    Vitals:    07/12/22 1704   Pulse: 73   Resp: 20   Temp: 97.7 °F (36.5 °C)   TempSrc: Oral   SpO2: 96%   Weight: 180 lb (81.6 kg)   Height: 5' 2\" (1.575 m)       The patient was given the following medications while in the emergency department:  No orders of the defined types were placed in this encounter. CONSULTS:  None    FINAL IMPRESSION      1. Encounter for psychiatric assessment          DISPOSITION/PLAN   DISPOSITION Decision To Discharge 07/12/2022 07:09:02 PM      PATIENT REFERRED TO:  Ramiro Martell, [address] [facility] [address]  [facility] AT THE [facility]    Call   As needed    DISCHARGE MEDICATIONS:  New Prescriptions    No medications on file     The care is provided during an unprecedented national emergency due to the novel coronavirus, COVID 19.   MD Meri Edouard MD  07/12/22 [id number]

## 2022-07-26 NOTE — CARE COORDINATION
ONGOING DISCHARGE PLAN:    Spoke with patient regarding discharge plan and patient confirms that plan is still to return to home w/ BF, w/ no needs at this time. Pt. Denies VNS. Pt Had Lap Hyst w/ Bony Salpingectomy w/ Cystoscopy on 7/8/20. OB continues to follow. Pt. Remains on IV Cipro/Flagyl, ID on board. Surgery following, Per previous notes, no drainable fluid collection. General Diet. Will continue to follow for additional discharge needs.     Electronically signed by Sandra Mcleod RN on 7/24/2020 at 11:19 AM Otezla Counseling: The side effects of Otezla were discussed with the patient, including but not limited to worsening or new depression, weight loss, diarrhea, nausea, upper respiratory tract infection, and headache. Patient instructed to call the office should any adverse effect occur.  The patient verbalized understanding of the proper use and possible adverse effects of Otezla.  All the patient's questions and concerns were addressed.

## 2022-08-28 ENCOUNTER — APPOINTMENT (OUTPATIENT)
Dept: CT IMAGING | Age: 42
End: 2022-08-28
Payer: MEDICARE

## 2022-08-28 ENCOUNTER — HOSPITAL ENCOUNTER (EMERGENCY)
Age: 42
Discharge: HOME | End: 2022-08-28
Attending: EMERGENCY MEDICINE
Payer: MEDICARE

## 2022-08-28 ENCOUNTER — APPOINTMENT (OUTPATIENT)
Dept: GENERAL RADIOLOGY | Age: 42
End: 2022-08-28
Payer: MEDICARE

## 2022-08-28 VITALS
TEMPERATURE: 100.4 F | OXYGEN SATURATION: 94 % | RESPIRATION RATE: 15 BRPM | HEART RATE: 85 BPM | DIASTOLIC BLOOD PRESSURE: 84 MMHG | SYSTOLIC BLOOD PRESSURE: 138 MMHG

## 2022-08-28 DIAGNOSIS — U07.1 COVID: Primary | ICD-10-CM

## 2022-08-28 DIAGNOSIS — N12 PYELONEPHRITIS: ICD-10-CM

## 2022-08-28 LAB
ABSOLUTE EOS #: 0.13 K/UL (ref 0–0.44)
ABSOLUTE IMMATURE GRANULOCYTE: <0.03 K/UL (ref 0–0.3)
ABSOLUTE LYMPH #: 0.98 K/UL (ref 1.1–3.7)
ABSOLUTE MONO #: 0.54 K/UL (ref 0.1–1.2)
ALBUMIN SERPL-MCNC: 4 G/DL (ref 3.5–5.2)
ALBUMIN/GLOBULIN RATIO: 1.7 (ref 1–2.5)
ALP BLD-CCNC: 61 U/L (ref 35–104)
ALT SERPL-CCNC: 13 U/L (ref 5–33)
ANION GAP SERPL CALCULATED.3IONS-SCNC: 11 MMOL/L (ref 9–17)
AST SERPL-CCNC: 17 U/L
BACTERIA: ABNORMAL
BASOPHILS # BLD: 1 % (ref 0–2)
BASOPHILS ABSOLUTE: 0.08 K/UL (ref 0–0.2)
BILIRUB SERPL-MCNC: 0.27 MG/DL (ref 0.3–1.2)
BILIRUBIN URINE: NEGATIVE
BUN BLDV-MCNC: 10 MG/DL (ref 6–20)
CALCIUM SERPL-MCNC: 9 MG/DL (ref 8.6–10.4)
CASTS UA: ABNORMAL /LPF (ref 0–8)
CHLORIDE BLD-SCNC: 103 MMOL/L (ref 98–107)
CO2: 25 MMOL/L (ref 20–31)
COLOR: YELLOW
CREAT SERPL-MCNC: 0.79 MG/DL (ref 0.5–0.9)
EOSINOPHILS RELATIVE PERCENT: 2 % (ref 1–4)
EPITHELIAL CELLS UA: ABNORMAL /HPF (ref 0–5)
GFR AFRICAN AMERICAN: >60 ML/MIN
GFR NON-AFRICAN AMERICAN: >60 ML/MIN
GFR SERPL CREATININE-BSD FRML MDRD: ABNORMAL ML/MIN/{1.73_M2}
GLUCOSE BLD-MCNC: 110 MG/DL (ref 70–99)
GLUCOSE URINE: NEGATIVE
HCT VFR BLD CALC: 40.3 % (ref 36.3–47.1)
HEMOGLOBIN: 12.6 G/DL (ref 11.9–15.1)
IMMATURE GRANULOCYTES: 0 %
INR BLD: 0.9
KETONES, URINE: NEGATIVE
LACTIC ACID, SEPSIS WHOLE BLOOD: 1.1 MMOL/L (ref 0.5–1.9)
LEUKOCYTE ESTERASE, URINE: ABNORMAL
LIPASE: 28 U/L (ref 13–60)
LYMPHOCYTES # BLD: 14 % (ref 24–43)
MCH RBC QN AUTO: 26.8 PG (ref 25.2–33.5)
MCHC RBC AUTO-ENTMCNC: 31.3 G/DL (ref 28.4–34.8)
MCV RBC AUTO: 85.6 FL (ref 82.6–102.9)
MONOCYTES # BLD: 7 % (ref 3–12)
NITRITE, URINE: NEGATIVE
NRBC AUTOMATED: 0 PER 100 WBC
PARTIAL THROMBOPLASTIN TIME: 25.7 SEC (ref 20.5–30.5)
PDW BLD-RTO: 13.4 % (ref 11.8–14.4)
PH UA: 8.5 (ref 5–8)
PLATELET # BLD: 268 K/UL (ref 138–453)
PMV BLD AUTO: 10.6 FL (ref 8.1–13.5)
POTASSIUM SERPL-SCNC: 3.6 MMOL/L (ref 3.7–5.3)
PROTEIN UA: ABNORMAL
PROTHROMBIN TIME: 10.1 SEC (ref 9.1–12.3)
RBC # BLD: 4.71 M/UL (ref 3.95–5.11)
RBC UA: ABNORMAL /HPF (ref 0–4)
SARS-COV-2, RAPID: DETECTED
SEG NEUTROPHILS: 76 % (ref 36–65)
SEGMENTED NEUTROPHILS ABSOLUTE COUNT: 5.53 K/UL (ref 1.5–8.1)
SODIUM BLD-SCNC: 139 MMOL/L (ref 135–144)
SPECIFIC GRAVITY UA: 1.02 (ref 1–1.03)
SPECIMEN DESCRIPTION: ABNORMAL
TOTAL PROTEIN: 6.4 G/DL (ref 6.4–8.3)
TURBIDITY: ABNORMAL
URINE HGB: NEGATIVE
UROBILINOGEN, URINE: NORMAL
WBC # BLD: 7.3 K/UL (ref 3.5–11.3)
WBC UA: ABNORMAL /HPF (ref 0–5)

## 2022-08-28 PROCEDURE — 87635 SARS-COV-2 COVID-19 AMP PRB: CPT

## 2022-08-28 PROCEDURE — 87040 BLOOD CULTURE FOR BACTERIA: CPT

## 2022-08-28 PROCEDURE — 96374 THER/PROPH/DIAG INJ IV PUSH: CPT

## 2022-08-28 PROCEDURE — 83605 ASSAY OF LACTIC ACID: CPT

## 2022-08-28 PROCEDURE — 6360000004 HC RX CONTRAST MEDICATION: Performed by: EMERGENCY MEDICINE

## 2022-08-28 PROCEDURE — 6360000002 HC RX W HCPCS

## 2022-08-28 PROCEDURE — 87086 URINE CULTURE/COLONY COUNT: CPT

## 2022-08-28 PROCEDURE — 85730 THROMBOPLASTIN TIME PARTIAL: CPT

## 2022-08-28 PROCEDURE — 99285 EMERGENCY DEPT VISIT HI MDM: CPT

## 2022-08-28 PROCEDURE — 6370000000 HC RX 637 (ALT 250 FOR IP)

## 2022-08-28 PROCEDURE — 2580000003 HC RX 258

## 2022-08-28 PROCEDURE — 85025 COMPLETE CBC W/AUTO DIFF WBC: CPT

## 2022-08-28 PROCEDURE — 85610 PROTHROMBIN TIME: CPT

## 2022-08-28 PROCEDURE — 80053 COMPREHEN METABOLIC PANEL: CPT

## 2022-08-28 PROCEDURE — 71045 X-RAY EXAM CHEST 1 VIEW: CPT

## 2022-08-28 PROCEDURE — 81001 URINALYSIS AUTO W/SCOPE: CPT

## 2022-08-28 PROCEDURE — 96375 TX/PRO/DX INJ NEW DRUG ADDON: CPT

## 2022-08-28 PROCEDURE — 96365 THER/PROPH/DIAG IV INF INIT: CPT

## 2022-08-28 PROCEDURE — 74177 CT ABD & PELVIS W/CONTRAST: CPT

## 2022-08-28 PROCEDURE — 83690 ASSAY OF LIPASE: CPT

## 2022-08-28 PROCEDURE — 36415 COLL VENOUS BLD VENIPUNCTURE: CPT

## 2022-08-28 RX ORDER — SULFAMETHOXAZOLE AND TRIMETHOPRIM 800; 160 MG/1; MG/1
1 TABLET ORAL 2 TIMES DAILY
Qty: 20 TABLET | Refills: 0 | Status: SHIPPED | OUTPATIENT
Start: 2022-08-28 | End: 2022-09-07

## 2022-08-28 RX ORDER — ONDANSETRON 4 MG/1
4 TABLET, FILM COATED ORAL EVERY 8 HOURS PRN
Qty: 9 TABLET | Refills: 0 | Status: SHIPPED | OUTPATIENT
Start: 2022-08-28 | End: 2022-08-31

## 2022-08-28 RX ORDER — ACETAMINOPHEN 500 MG
1000 TABLET ORAL ONCE
Status: COMPLETED | OUTPATIENT
Start: 2022-08-28 | End: 2022-08-28

## 2022-08-28 RX ORDER — PROCHLORPERAZINE EDISYLATE 5 MG/ML
10 INJECTION INTRAMUSCULAR; INTRAVENOUS ONCE
Status: COMPLETED | OUTPATIENT
Start: 2022-08-28 | End: 2022-08-28

## 2022-08-28 RX ORDER — DIPHENHYDRAMINE HYDROCHLORIDE 50 MG/ML
25 INJECTION INTRAMUSCULAR; INTRAVENOUS ONCE
Status: COMPLETED | OUTPATIENT
Start: 2022-08-28 | End: 2022-08-28

## 2022-08-28 RX ORDER — SODIUM CHLORIDE, SODIUM LACTATE, POTASSIUM CHLORIDE, AND CALCIUM CHLORIDE .6; .31; .03; .02 G/100ML; G/100ML; G/100ML; G/100ML
1000 INJECTION, SOLUTION INTRAVENOUS ONCE
Status: COMPLETED | OUTPATIENT
Start: 2022-08-28 | End: 2022-08-28

## 2022-08-28 RX ADMIN — IOPAMIDOL 75 ML: 755 INJECTION, SOLUTION INTRAVENOUS at 13:56

## 2022-08-28 RX ADMIN — PROCHLORPERAZINE EDISYLATE 10 MG: 5 INJECTION INTRAMUSCULAR; INTRAVENOUS at 12:17

## 2022-08-28 RX ADMIN — SODIUM CHLORIDE, POTASSIUM CHLORIDE, SODIUM LACTATE AND CALCIUM CHLORIDE 1000 ML: 600; 310; 30; 20 INJECTION, SOLUTION INTRAVENOUS at 12:02

## 2022-08-28 RX ADMIN — CEFTRIAXONE SODIUM 1000 MG: 1 INJECTION, POWDER, FOR SOLUTION INTRAMUSCULAR; INTRAVENOUS at 14:53

## 2022-08-28 RX ADMIN — ACETAMINOPHEN 1000 MG: 500 TABLET ORAL at 12:18

## 2022-08-28 RX ADMIN — DIPHENHYDRAMINE HYDROCHLORIDE 25 MG: 50 INJECTION, SOLUTION INTRAMUSCULAR; INTRAVENOUS at 12:17

## 2022-08-28 ASSESSMENT — PAIN DESCRIPTION - DESCRIPTORS: DESCRIPTORS: ACHING;TENDER;BURNING

## 2022-08-28 ASSESSMENT — PAIN - FUNCTIONAL ASSESSMENT: PAIN_FUNCTIONAL_ASSESSMENT: 0-10

## 2022-08-28 ASSESSMENT — PAIN DESCRIPTION - PAIN TYPE
TYPE: ACUTE PAIN
TYPE: ACUTE PAIN

## 2022-08-28 ASSESSMENT — PAIN DESCRIPTION - FREQUENCY
FREQUENCY: CONTINUOUS
FREQUENCY: CONTINUOUS

## 2022-08-28 ASSESSMENT — PAIN DESCRIPTION - ORIENTATION
ORIENTATION: RIGHT
ORIENTATION: RIGHT

## 2022-08-28 ASSESSMENT — PAIN SCALES - GENERAL: PAINLEVEL_OUTOF10: 5

## 2022-08-28 ASSESSMENT — PAIN DESCRIPTION - LOCATION
LOCATION: ABDOMEN
LOCATION: ABDOMEN

## 2022-08-28 NOTE — ED PROVIDER NOTES
[facility] ED  Emergency Department Encounter  Emergency Medicine Resident     Pt Name:Niurka Rivas  MRN: [id number]  [place] 1980  Date of evaluation: 22  PCP:  KATIE Hunter CNP      CHIEF COMPLAINT       Chief Complaint   Patient presents with    Nausea    Emesis    Abdominal Pain    Cough       HISTORY OF PRESENT ILLNESS  (Location/Symptom, Timing/Onset, Context/Setting, Quality, Duration, Modifying Factors, Severity.)      Chaya Villaseñor is a 38 y.o. female who presents with complaints of nausea, vomiting, productive cough over the past 2 days that is occasionally productive. Patient also complains of right lower quadrant abdominal pain over the past day, body aches, chills. Patient not COVID vaccinated. Denied any urinary symptoms. No diarrhea. Does note history of smoking. No chest pain or shortness of breath. PAST MEDICAL / SURGICAL / SOCIAL / FAMILY HISTORY      has a past medical history of Anxiety, Arthritis, Asthma, Sadler's esophagus, Bipolar 1 disorder ([address] Chronic insomnia, COPD (chronic obstructive pulmonary disease) ([address] Depression, Diabetes mellitus ([address] Endometriosis, Esophagitis, GERD (gastroesophageal reflux disease), Headache(784.0), Herniated disc, cervical, Hiatal hernia, Incisional abscess, Kidney stones, MDRO (multiple drug resistant organisms) resistance, Pleurisy, Pneumonia, Seizure ([address] Seizures ([address] UTI (urinary tract infection), and Vision abnormalities. Reviewed with patient     has a past surgical history that includes knee surgery (Left);  section (, ); Tonsillectomy; Knee arthroscopy (Left, 2015); Cervical disc surgery; Upper gastrointestinal endoscopy (2016); Upper gastrointestinal endoscopy (2017); Upper gastrointestinal endoscopy (2017); pr esophagogastroduodenoscopy transoral diagnostic (N/A, 2017); laparoscopy; laparoscopy (N/A, 10/05/2017);  Upper gastrointestinal endoscopy (N/A, 2018); Endoscopy, colon, diagnostic;  section (N/A, 2018); Upper gastrointestinal endoscopy (N/A, 10/01/2019); Colonoscopy (N/A, 10/01/2019); Colonoscopy (N/A, 2020); [place] tooth extraction; Hysterectomy (N/A, 2020); Cystoscopy (Left, 2022); Ureteroscopy (Left, 2022); Ureter surgery (Left, 2022); and Lithotripsy (Left, 2022).   Reviewed with patient    Social History     Socioeconomic History    Marital status:      Spouse name: Not on file    Number of children: 1    Years of education: 11th grade    Highest education level: Not on file   Occupational History    Occupation: unemployed-   Tobacco Use    Smoking status: Every Day     Packs/day: 0.50     Years: 21.00     Pack years: 10.50     Types: Cigarettes    Smokeless tobacco: Never   Vaping Use    Vaping Use: Every day   Substance and Sexual Activity    Alcohol use: No    Drug use: Yes     Comment: crack    Sexual activity: Yes     Partners: Male   Other Topics Concern    Not on file   Social History Narrative    Lives with son and boyfriend     Social Determinants of Health     Financial Resource Strain: Low Risk     Difficulty of Paying Living Expenses: Not hard at all   Food Insecurity: No Food Insecurity    Worried About Running Out of Food in the Last Year: Never true    Ran Out of Food in the Last Year: Never true   Transportation Needs: Not on file   Physical Activity: Not on file   Stress: Not on file   Social Connections: Not on file   Intimate Partner Violence: Not on file   Housing Stability: Not on file       Family History   Problem Relation Age of Onset    Cancer Mother         breast    Bipolar Disorder Mother     Hypertension Mother     Breast Cancer Mother     Bipolar Disorder Brother     Hypertension Father        Allergies:  Nsaids, Toradol [ketorolac tromethamine], and Ultram [tramadol]    Home Medications:  Prior to Admission medications Medication Sig Start Date End Date Taking? Authorizing Provider   sulfamethoxazole-trimethoprim (BACTRIM DS;SEPTRA DS) 800-160 MG per tablet Take 1 tablet by mouth 2 times daily for 10 days 8/28/22 9/7/22 Yes Eldon Prather MD   nirmatrelvir/ritonavir (PAXLOVID) 20 x 150 MG & 10 x 100MG TBPK Take 3 tablets (two 150 mg nirmatrelvir and one 100 mg ritonavir tablets) by mouth every 12 hours for 5 days. 8/28/22 9/2/22 Yes Eldon Prather MD   ondansetron [facility]) 4 MG tablet Take 1 tablet by mouth every 8 hours as needed for Nausea 8/28/22 8/31/22 Yes Eldon Prather MD   ipratropium-albuterol (DUONEB) 0.5-2.5 (3) MG/3ML SOLN nebulizer solution INHALE CONTENTS OF 1 UNIT DOSE VIA NEBULIZER EVERY 4 HOURS 5/18/22   KATIE Cole - CNP   orphenadrine (NORFLEX) 100 MG extended release tablet Take 1 tablet by mouth 2 times daily 5/15/22   Christophe Heath DO   sertraline (ZOLOFT) 50 MG tablet Take 3 tablets by mouth nightly 5/3/22   Jolynn Craig MD   clindamycin (CLEOCIN) 300 MG capsule Take 1 capsule by mouth 4 times daily  Patient not taking: Reported on 7/12/2022 5/3/22   Jolynn Craig MD   doxycycline monohydrate (MONODOX) 100 MG capsule Take 1 capsule by mouth every 12 hours 5/3/22   Jolynn Craig MD   fluticasone (FLONASE) 50 MCG/ACT nasal spray 1 spray by Each Nostril route daily 5/3/22 6/2/22  Jolynn Craig MD   lamoTRIgine (LAMICTAL) 200 MG tablet Take 1 tablet by mouth daily 5/3/22   Jolynn Craig MD   pregabalin (LYRICA) 100 MG capsule Take 1 capsule by mouth 3 times daily for 30 days.  5/3/22 6/2/22  Jolynn Craig MD   tamsulosin (FLOMAX) 0.4 MG capsule Take 1 capsule by mouth daily  Patient not taking: Reported on 7/12/2022 4/22/22   Desiree Madrigal,    oxybutynin (DITROPAN-XL) 10 MG extended release tablet Take 1 tablet by mouth daily 4/23/22   Desiree Madrigal DO   acetaminophen (TYLENOL) 500 MG tablet Take 2 tablets by mouth every 6 hours as needed for Pain 4/17/22 5/1/22  Desiree Madrigal, DO   Lancets (ONETOUCH DELICA PLUS [id number]) MISC USE TO TEST BLOOD SUGAR TWICE A DAY 3/24/22   KATIE Cole CNP   vitamin D3 (CHOLECALCIFEROL) 25 MCG (1000 UT) TABS tablet TAKE 1 TABLET BY MOUTH DAILY 3/24/22   KATIE Cole - JAYA   BREO ELLIPTA 200-25 MCG/INH AEPB inhaler INHALE 1 PUFF INTO THE LUNGS DAILY 3/17/22   KATIE Cole CNP   pantoprazole (PROTONIX) 40 MG tablet 1 tab daily 3/10/22   KATIE Cole - CNP   montelukast (SINGULAIR) 10 MG tablet TAKE 1 TABLET IN THE EVENING ONCE A DAY ORALLY 1/24/22   KATIE Cole CNP   Misc. Devices (STRAINER/STAINLESS STEEL/2.5\") MISC 1 each by Does not apply route as needed (when you urinate)  Patient not taking: Reported on 4/16/2022 1/15/22   Jann Robertson, DO   Methocarbamol (ROBAXIN PO) Take by mouth    Historical Provider, MD   dicyclomine (BENTYL) 10 MG capsule TAKE 1 CAPSULE BY MOUTH 4 TIMES DAILY 8/27/21   KATIE Cole CNP   glucose monitoring kit (FREESTYLE) monitoring kit Use as directed. BRAND OF CHOICE INSURANCE ALLOWS. 5/27/21   KATIE Cole CNP   Alcohol Swabs ( STERILE ALCOHOL PREP) PADS USE AS DIRECTED 5/10/21   KATIE Cole CNP   vitamin D3 (CHOLECALCIFEROL) 25 MCG (1000 UT) TABS tablet TAKE 1 TABLET BY MOUTH DAILY 8/27/20   KATIE Cole CNP   Masks (CLEVER CHOICE FACE MASK) MISC USE NEW FACE MASK EVERYDAY WHEN PATIENT GO OUTSIDE OF HOME DUE TO COVID PANDEMIC 7/6/20   KATIE Cole CNP       REVIEW OF SYSTEMS    (2-9 systems for level 4, 10 or more for level 5)      Review of Systems   Constitutional:  Positive for chills. Negative for fatigue. HENT:  Positive for congestion and rhinorrhea. Eyes:  Negative for photophobia and visual disturbance. Respiratory:  Positive for cough. Negative for shortness of breath. Cardiovascular:  Negative for chest pain.    Gastrointestinal:  Positive for abdominal pain, nausea and vomiting. Genitourinary:  Negative for dysuria and frequency. Musculoskeletal:  Positive for arthralgias. Neurological:  Negative for dizziness and headaches. PHYSICAL EXAM   (up to 7 for level 4, 8 or more for level 5)      INITIAL VITALS:   /84   Pulse 85   Temp 100.4 °F (38 °C) (Oral)   Resp 15   LMP 06/15/2020   SpO2 94%     Physical Exam  Vitals and nursing note reviewed. Constitutional:       General: She is not in acute distress. HENT:      Head: Atraumatic. Right Ear: External ear normal.      Left Ear: External ear normal.      Nose: Nose normal.      Mouth/Throat:      Mouth: Mucous membranes are moist.      Pharynx: Oropharynx is clear. Eyes:      Conjunctiva/sclera: Conjunctivae normal.   Cardiovascular:      Rate and Rhythm: Normal rate and regular rhythm. Pulses: Normal pulses. Pulmonary:      Effort: Pulmonary effort is normal. No respiratory distress. Breath sounds: Normal breath sounds. No wheezing or rales. Abdominal:      Palpations: Abdomen is soft. Tenderness: There is abdominal tenderness in the right lower quadrant. There is no guarding or rebound. Musculoskeletal:         General: Normal range of motion. Cervical back: Normal range of motion. Skin:     General: Skin is warm and dry. Capillary Refill: Capillary refill takes less than 2 seconds. Neurological:      General: No focal deficit present. Mental Status: She is alert and oriented to person, place, and time.        DIFFERENTIAL  DIAGNOSIS     PLAN (LABS / IMAGING / EKG):  Orders Placed This Encounter   Procedures    COVID-19, Rapid    Culture, Urine    Culture, Blood 1    Culture, Blood 1    CT ABDOMEN PELVIS W IV CONTRAST Additional Contrast? None    XR CHEST PORTABLE    CBC with Auto Differential    Comprehensive Metabolic Panel    Lipase    Urinalysis with Reflex to Culture    Microscopic Urinalysis    Protime-INR    APTT       MEDICATIONS ORDERED:  Orders Placed This Encounter   Medications    lactated ringers bolus    prochlorperazine (COMPAZINE) injection 10 mg    diphenhydrAMINE (BENADRYL) injection 25 mg    acetaminophen (TYLENOL) tablet 1,000 mg    cefTRIAXone (ROCEPHIN) 1,000 mg in sodium chloride 0.9 % 50 mL IVPB mini-bag     Order Specific Question:   Antimicrobial Indications     Answer:   Urinary Tract Infection     Order Specific Question:   UTI duration of therapy     Answer:   7 days    iopamidol (ISOVUE-370) 76 % injection 75 mL    sulfamethoxazole-trimethoprim (BACTRIM DS;SEPTRA DS) 800-160 MG per tablet     Sig: Take 1 tablet by mouth 2 times daily for 10 days     Dispense:  20 tablet     Refill:  0    nirmatrelvir/ritonavir (PAXLOVID) 20 x 150 MG & 10 x 100MG TBPK     Sig: Take 3 tablets (two 150 mg nirmatrelvir and one 100 mg ritonavir tablets) by mouth every 12 hours for 5 days. Dispense:  30 tablet     Refill:  0     Order Specific Question:   Does this patient qualify for COVID-19 antIviral therapy based on criteria for treatment? Answer:   Yes    ondansetron (ZOFRAN) 4 MG tablet     Sig: Take 1 tablet by mouth every 8 hours as needed for Nausea     Dispense:  9 tablet     Refill:  0       DDX: COVID, pneumonia, appendicitis, UTI, viral URI, pyelonephritis    DIAGNOSTIC RESULTS / EMERGENCY DEPARTMENT COURSE / MDM   LAB RESULTS:  Results for orders placed or performed during the hospital encounter of 08/28/22   COVID-19, Rapid    Specimen: Nasopharyngeal Swab   Result Value Ref Range    Specimen Description . NASOPHARYNGEAL SWAB     SARS-CoV-2, Rapid DETECTED (A) Not Detected   Culture, Urine    Specimen: Urine, clean catch   Result Value Ref Range    Specimen Description . CLEAN CATCH URINE     Culture NO SIGNIFICANT GROWTH    Culture, Blood 1    Specimen: Blood   Result Value Ref Range    Specimen Description . BLOOD     Special Requests RAC 10ML     Culture NO GROWTH 1 DAY    Culture, Blood 1    Specimen: Blood   Result Value Ref Range Specimen Description . BLOOD     Special Requests R HAND 10ML     Culture NO GROWTH 1 DAY    CBC with Auto Differential   Result Value Ref Range    WBC 7.3 3.5 - 11.3 k/uL    RBC 4.71 3.95 - 5.11 m/uL    Hemoglobin 12.6 11.9 - 15.1 g/dL    Hematocrit 40.3 36.3 - 47.1 %    MCV 85.6 82.6 - 102.9 fL    MCH 26.8 25.2 - 33.5 pg    MCHC 31.3 28.4 - 34.8 g/dL    RDW 13.4 11.8 - 14.4 %    Platelets 088 516 - 289 k/uL    MPV 10.6 8.1 - 13.5 fL    NRBC Automated 0.0 0.0 per 100 WBC    Seg Neutrophils 76 (H) 36 - 65 %    Lymphocytes 14 (L) 24 - 43 %    Monocytes 7 3 - 12 %    Eosinophils % 2 1 - 4 %    Basophils 1 0 - 2 %    Immature Granulocytes 0 0 %    Segs Absolute 5.53 1.50 - 8.10 k/uL    Absolute Lymph # 0.98 (L) 1.10 - 3.70 k/uL    Absolute Mono # 0.54 0.10 - 1.20 k/uL    Absolute Eos # 0.13 0.00 - 0.44 k/uL    Basophils Absolute 0.08 0.00 - 0.20 k/uL    Absolute Immature Granulocyte <0.03 0.00 - 0.30 k/uL   Comprehensive Metabolic Panel   Result Value Ref Range    Glucose 110 (H) 70 - 99 mg/dL    BUN 10 6 - 20 mg/dL    Creatinine 0.79 0.50 - 0.90 mg/dL    Calcium 9.0 8.6 - 10.4 mg/dL    Sodium 139 135 - 144 mmol/L    Potassium 3.6 (L) 3.7 - 5.3 mmol/L    Chloride 103 98 - 107 mmol/L    CO2 25 20 - 31 mmol/L    Anion Gap 11 9 - 17 mmol/L    Alkaline Phosphatase 61 35 - 104 U/L    ALT 13 5 - 33 U/L    AST 17 <32 U/L    Total Bilirubin 0.27 (L) 0.3 - 1.2 mg/dL    Total Protein 6.4 6.4 - 8.3 g/dL    Albumin 4.0 3.5 - 5.2 g/dL    Albumin/Globulin Ratio 1.7 1.0 - 2.5    GFR Non-African American >60 >60 mL/min    GFR African American >60 >60 mL/min    GFR Comment         Lipase   Result Value Ref Range    Lipase 28 13 - 60 U/L   Urinalysis with Reflex to Culture    Specimen: Urine   Result Value Ref Range    Color, UA Yellow Yellow    Turbidity UA Cloudy (A) Clear    Glucose, Ur NEGATIVE NEGATIVE    Bilirubin Urine NEGATIVE NEGATIVE    Ketones, Urine NEGATIVE NEGATIVE    Specific Gravity, UA 1.017 1.005 - 1.030    Urine Hgb NEGATIVE NEGATIVE    pH, UA 8.5 (H) 5.0 - 8.0    Protein, UA NEGATIVE  Verified by sulfosalicylic acid test. (A) NEGATIVE    Urobilinogen, Urine Normal Normal    Nitrite, Urine NEGATIVE NEGATIVE    Leukocyte Esterase, Urine SMALL (A) NEGATIVE   Microscopic Urinalysis   Result Value Ref Range    WBC, UA 20 TO 50 0 - 5 /HPF    RBC, UA 2 TO 5 0 - 4 /HPF    Casts UA  0 - 8 /LPF     10 TO 20 HYALINE Reference range defined for non-centrifuged specimen. Epithelial Cells UA 20 TO 50 0 - 5 /HPF    Bacteria, UA MODERATE (A) None   Lactate, Sepsis   Result Value Ref Range    Lactic Acid, Sepsis, Whole Blood 1.1 0.5 - 1.9 mmol/L   Protime-INR   Result Value Ref Range    Protime 10.1 9.1 - 12.3 sec    INR 0.9    APTT   Result Value Ref Range    PTT 25.7 20.5 - 30.5 sec       IMPRESSION: COVID, pyelonephritis    RADIOLOGY:  CT ABDOMEN PELVIS W IV CONTRAST Additional Contrast? None   Final Result   No acute intra-abdominal or intrapelvic abnormalities are noted. Normal   appendix      Small hiatal hernia and stable circumferential thickening of the lower   esophagus suggesting esophagitis. RECOMMENDATIONS:   Based on MIPS measure 405, incidental abd lesions in this patient population   do not warrant F/U W/O a documented medical reason. .         XR CHEST PORTABLE   Final Result   No acute cardiopulmonary process. EMERGENCY DEPARTMENT COURSE:  59-year-old female presented to ED with complaints of URI symptoms, nausea, vomiting, right lower quadrant abdominal pain and chills over the past few days. History of smoking. No chest pain or shortness of breath. Not COVID vaccinated. On exam, febrile at 100.4, nontachycardic, satting well on room air, lungs clear, moderate right lower quadrant tenderness without guarding or rebound. Patient did have neutrophilic predominance but overall white blood cell count was within normal limits. No evidence of appendicitis on CT abdomen pelvis.   Chest x-ray without evidence of pneumonia. Due to neutrophil predominance on CBC, septic work-up was obtained. Patient found to be COVID-positive, urine consistent with infection so plan to cover for pyelonephritis as well. Given Rocephin. Discussed risks and benefits of starting Paxil COVID with patient as patient is also on Lamictal.  Noted that this could reduce her levels of Lamictal in her system well on Paxlovid. Patient verbalized understanding and still would like to try Paxlovid. Lactate within normal limits. Patient discharged home with prescription for Bactrim and Paxil bid and instructed to follow-up with PCP. Return precautions discussed. ED Course as of 08/30/22 1207   Sun Aug 28, 2022   1309 Leukocyte Esterase, Urine(!): SMALL [AR]   1309 WBC, UA: 20 TO 50 [AR]   1309 Epithelial Cells, UA: 20 TO 50 [AR]   1309 Bacteria, UA(!): MODERATE [AR]   1309 SARS-CoV-2, Rapid(!): DETECTED [AR]   1425 Lactic Acid, Sepsis, Whole Blood: 1.1 [AR]      ED Course User Index  [AR] Justice Lan MD       No notes of EC Admission Criteria type on file. PROCEDURES:  None    CONSULTS:  None    Sepsis Times and Checklist  Vital Signs: BP: 138/84  Heart Rate: 85  Resp: 15  Temp: 100.4 °F (38 °C) SpO2: 94 %  SIRS (>2) Non Pregnant       Temp > 38.3C or < 36C   HR > 90   RR > 20   WBC > 12 or < 4 or >10% bands  SIRS (>2) Pregnant 20 weeks until 3 days postpartum   Temp > 38C or <36C   HR >110   RR > 24   WBC >15 or < 4 or >10% bands   SIRS (>2) and confirmed or suspected source of infection = Sepsis  Is Sepsis due to likely bacterial infection?: Yes  If not, then sepsis is due to likely viral etiology. Sepsis Identified:  Date: 8/28/22   Time: 1159  Sepsis Orders:   CBC(required): Yes   CMP: Yes   PT/PTT: Yes   Blood Cultures x2(required): Yes   Urinalysis and Urine Culture: Yes   Lactate(required):  Yes   Antibiotics Given (within 3 hours of sepsis identification, after blood cultures):  Yes    (If unable to obtain IV access for IV antibiotics within 3 hours of identification of sepsis, IM antibiotics is acceptable.)              If lactate >2.0 MUST repeat within 6 hours    If elevated, is elevated lactate from a likely infectious source?: Yes. IV Fluid Bolus:  Is lactate > 4.0:  No    FINAL IMPRESSION      1. COVID    2. Pyelonephritis          DISPOSITION / PLAN     DISPOSITION Decision To Discharge 08/28/2022 02:39:06 PM      PATIENT REFERRED TO:  Raquel Vaca, APRN - CNP  [address] [facility]  [address] [facility]  [address] [facility]    In 3 days      [facility]  [address] [address]  [phone]    As needed    DISCHARGE MEDICATIONS:  Discharge Medication List as of 8/28/2022  2:41 PM        START taking these medications    Details   sulfamethoxazole-trimethoprim (BACTRIM DS;SEPTRA DS) 800-160 MG per tablet Take 1 tablet by mouth 2 times daily for 10 days, Disp-20 tablet, R-0Print      nirmatrelvir/ritonavir (PAXLOVID) 20 x 150 MG & 10 x 100MG TBPK Take 3 tablets (two 150 mg nirmatrelvir and one 100 mg ritonavir tablets) by mouth every 12 hours for 5 days. , Disp-30 tablet, R-0Print      ondansetron (ZOFRAN) 4 MG tablet Take 1 tablet by mouth every 8 hours as needed for Nausea, Disp-9 tablet, R-0Print             Kerry Balbuena MD  Emergency Medicine Resident    (Please note that portions of thisnote were completed with a voice recognition program.  Efforts were made to edit the dictations but occasionally words are mis-transcribed.)      Bill Brown MD  Resident  08/30/22 [id number]

## 2022-08-28 NOTE — DISCHARGE INSTRUCTIONS
Thank you for visiting [facility] Emergency Department. You need to call KATIE Cole CNP to make an appointment as directed for follow up. Should you have any questions regarding your care or further treatment, please call [facility] at [phone].

## 2022-08-28 NOTE — ED PROVIDER NOTES
[facility]     Emergency Department     Faculty Note/ Attestation      Pt Name: Chaya Villaseñor                                       MRN: [id number]  [place] 1980  Date of evaluation: 8/28/2022    Patients PCP:    KATIE Hunter - CNP    Attestation  I performed a history and physical examination of the patient and discussed management with the resident. I reviewed the residents note and agree with the documented findings and plan of care. Any areas of disagreement are noted on the chart. I was personally present for the key portions of any procedures. I have documented in the chart those procedures where I was not present during the key portions. I have reviewed the emergency nurses triage note. I agree with the chief complaint, past medical history, past surgical history, allergies, medications, social and family history as documented unless otherwise noted below. For Physician Assistant/ Nurse Practitioner cases/documentation I have personally evaluated this patient and have completed at least one if not all key elements of the E/M (history, physical exam, and MDM). Additional findings are as noted. Initial Screens:             Vitals:    Vitals:    08/28/22 1137 08/28/22 1140   BP: 138/84    Pulse: 85    Resp: 15    Temp:  100.4 °F (38 °C)   TempSrc:  Oral   SpO2: 94%        CHIEF COMPLAINT       Chief Complaint   Patient presents with    Nausea    Emesis    Abdominal Pain    Cough       The pt is a 39 YO With N/V x 2 days with cough. The pt occasionally having productive cough. The pt having pain in the RLQ has not had appy. The pt having achiness and chills        EMERGENCY DEPARTMENT COURSE:     -------------------------  BP: 138/84, Temp: 100.4 °F (38 °C), Heart Rate: 85, Resp: 15  Physical Exam __  Constitutional:  oriented to person, place, and time. appears well-developed and well-nourished.    HENT: no facial swelling or edema  Head: Normocephalic and atraumatic. Eyes: Right eye exhibits no discharge. Left eye exhibits no discharge. No scleral icterus. Neck: No JVD present. No tracheal deviation present. Cardiovascular: pulses present in extremities  Pulmonary/Chest: Effort normal. No respiratory distress. Musculoskeletal: Normal range of motion. exhibits no edema. Neurological: they are alert and oriented to person, place, and time. ABD: TTP in the RLQ on exam no rebound guarding or obturator sign. Skin: Skin is warm and dry. Psychiatric: has a normal mood and affect. behavior is normal.      Comments  Patient symptoms most consistent with COVID however given the multiple risk factors including her right lower quadrant pain consider possible appendicitis CT will be obtained patient will be given antibiotics and does have sepsis treatment initiated however most likely due to viral illness from the [facility]. Sepsis Times and Checklist  Vital Signs: BP: 138/84  Heart Rate: 85  Resp: 15  Temp: 100.4 °F (38 °C) SpO2: 94 %  SIRS (>2) Non Pregnant       Temp > 38.3C or < 36C   HR > 90   RR > 20   WBC > 12 or < 4 or >10% bands  SIRS (>2) Pregnant 20 weeks until 3 days postpartum   Temp > 38C or <36C   HR >110   RR > 24   WBC >15 or < 4 or >10% bands   SIRS (>2) and confirmed or suspected source of infection = Sepsis  Is Sepsis due to likely bacterial infection?: Most likely due to Viral COVID but treating with antibiotics for UTI too  If not, then sepsis is due to likely viral etiology. No signs of bacterail infection out side of the UTI pt is normal vitals and can be DCed.       ED Course as of 08/28/22 1624   Sun Aug 28, 2022   1309 Leukocyte Esterase, Urine(!): SMALL [AR]   1309 WBC, UA: 20 TO 50 [AR]   1309 Epithelial Cells, UA: 20 TO 50 [AR]   1309 Bacteria, UA(!): MODERATE [AR]   1309 SARS-CoV-2, Rapid(!): DETECTED [AR]   1425 Lactic Acid, Sepsis, Whole Blood: 1.1 [AR]      ED Course User Index  [AR] MD Nika Dean DO William,, , RDMS.   Attending Emergency Physician         Velma Davis DO  08/28/22 [id number]

## 2022-08-29 LAB
CULTURE: NORMAL
SPECIMEN DESCRIPTION: NORMAL

## 2022-08-30 ASSESSMENT — ENCOUNTER SYMPTOMS
COUGH: 1
RHINORRHEA: 1
SHORTNESS OF BREATH: 0
ABDOMINAL PAIN: 1
VOMITING: 1
PHOTOPHOBIA: 0
NAUSEA: 1

## 2022-09-02 LAB
CULTURE: NORMAL
CULTURE: NORMAL
Lab: NORMAL
Lab: NORMAL
SPECIMEN DESCRIPTION: NORMAL
SPECIMEN DESCRIPTION: NORMAL

## 2022-10-13 ENCOUNTER — TELEPHONE (OUTPATIENT)
Dept: FAMILY MEDICINE CLINIC | Age: 42
End: 2022-10-13

## 2022-10-13 NOTE — TELEPHONE ENCOUNTER
Patient has missed her third appointment for the year, would you like to dismiss?   10/13/22  06/30/22  01/03/22

## 2024-01-04 ENCOUNTER — HOSPITAL ENCOUNTER (EMERGENCY)
Age: 44
Discharge: HOME | End: 2024-01-05
Attending: EMERGENCY MEDICINE
Payer: MEDICARE

## 2024-01-04 ENCOUNTER — APPOINTMENT (OUTPATIENT)
Dept: CT IMAGING | Age: 44
End: 2024-01-04
Payer: MEDICARE

## 2024-01-04 VITALS
BODY MASS INDEX: 35.88 KG/M2 | OXYGEN SATURATION: 98 % | TEMPERATURE: 98 F | SYSTOLIC BLOOD PRESSURE: 133 MMHG | HEART RATE: 93 BPM | WEIGHT: 195 LBS | HEIGHT: 62 IN | RESPIRATION RATE: 20 BRPM | DIASTOLIC BLOOD PRESSURE: 86 MMHG

## 2024-01-04 DIAGNOSIS — A64 STI (SEXUALLY TRANSMITTED INFECTION): Primary | ICD-10-CM

## 2024-01-04 LAB
ANION GAP SERPL CALCULATED.3IONS-SCNC: 9 MMOL/L (ref 9–17)
BACTERIA URNS QL MICRO: ABNORMAL
BASOPHILS # BLD: 0 K/UL (ref 0–0.2)
BASOPHILS NFR BLD: 0 % (ref 0–2)
BILIRUB UR QL STRIP: NEGATIVE
BUN SERPL-MCNC: 17 MG/DL (ref 6–20)
CALCIUM SERPL-MCNC: 8.2 MG/DL (ref 8.6–10.4)
CANDIDA SPECIES: NEGATIVE
CASTS #/AREA URNS LPF: ABNORMAL /LPF
CHLORIDE SERPL-SCNC: 107 MMOL/L (ref 98–107)
CLARITY UR: CLEAR
CO2 SERPL-SCNC: 22 MMOL/L (ref 20–31)
COLOR UR: YELLOW
CREAT SERPL-MCNC: 0.6 MG/DL (ref 0.5–0.9)
EOSINOPHIL # BLD: 0.4 K/UL (ref 0–0.4)
EOSINOPHILS RELATIVE PERCENT: 4 % (ref 0–4)
EPI CELLS #/AREA URNS HPF: ABNORMAL /HPF
ERYTHROCYTE [DISTWIDTH] IN BLOOD BY AUTOMATED COUNT: 13.6 % (ref 11.5–14.9)
GARDNERELLA VAGINALIS: POSITIVE
GFR SERPL CREATININE-BSD FRML MDRD: >60 ML/MIN/1.73M2
GLUCOSE SERPL-MCNC: 110 MG/DL (ref 70–99)
GLUCOSE UR STRIP-MCNC: NEGATIVE MG/DL
HCT VFR BLD AUTO: 42.8 % (ref 36–46)
HGB BLD-MCNC: 14.4 G/DL (ref 12–16)
HGB UR QL STRIP.AUTO: NEGATIVE
KETONES UR STRIP-MCNC: NEGATIVE MG/DL
LEUKOCYTE ESTERASE UR QL STRIP: ABNORMAL
LIPASE SERPL-CCNC: 17 U/L (ref 13–60)
LYMPHOCYTES NFR BLD: 2.1 K/UL (ref 1–4.8)
LYMPHOCYTES RELATIVE PERCENT: 22 % (ref 24–44)
MCH RBC QN AUTO: 30.2 PG (ref 26–34)
MCHC RBC AUTO-ENTMCNC: 33.6 G/DL (ref 31–37)
MCV RBC AUTO: 89.9 FL (ref 80–100)
MONOCYTES NFR BLD: 0.7 K/UL (ref 0.1–1.3)
MONOCYTES NFR BLD: 7 % (ref 1–7)
NEUTROPHILS NFR BLD: 67 % (ref 36–66)
NEUTS SEG NFR BLD: 6.6 K/UL (ref 1.3–9.1)
NITRITE UR QL STRIP: NEGATIVE
PH UR STRIP: 6 [PH] (ref 5–8)
PLATELET # BLD AUTO: 267 K/UL (ref 150–450)
PMV BLD AUTO: 8.1 FL (ref 6–12)
POTASSIUM SERPL-SCNC: 4.1 MMOL/L (ref 3.7–5.3)
PROT UR STRIP-MCNC: NEGATIVE MG/DL
RBC # BLD AUTO: 4.75 M/UL (ref 4–5.2)
RBC #/AREA URNS HPF: ABNORMAL /HPF
SODIUM SERPL-SCNC: 138 MMOL/L (ref 135–144)
SOURCE: ABNORMAL
SP GR UR STRIP: 1.02 (ref 1–1.03)
TRICHOMONAS: POSITIVE
UROBILINOGEN UR STRIP-ACNC: NORMAL EU/DL (ref 0–1)
WBC #/AREA URNS HPF: ABNORMAL /HPF
WBC OTHER # BLD: 9.8 K/UL (ref 3.5–11)

## 2024-01-04 PROCEDURE — 87086 URINE CULTURE/COLONY COUNT: CPT

## 2024-01-04 PROCEDURE — 6360000004 HC RX CONTRAST MEDICATION: Performed by: STUDENT IN AN ORGANIZED HEALTH CARE EDUCATION/TRAINING PROGRAM

## 2024-01-04 PROCEDURE — 99285 EMERGENCY DEPT VISIT HI MDM: CPT

## 2024-01-04 PROCEDURE — 96374 THER/PROPH/DIAG INJ IV PUSH: CPT

## 2024-01-04 PROCEDURE — 87529 HSV DNA AMP PROBE: CPT

## 2024-01-04 PROCEDURE — 74177 CT ABD & PELVIS W/CONTRAST: CPT

## 2024-01-04 PROCEDURE — 6360000002 HC RX W HCPCS: Performed by: STUDENT IN AN ORGANIZED HEALTH CARE EDUCATION/TRAINING PROGRAM

## 2024-01-04 PROCEDURE — 87660 TRICHOMONAS VAGIN DIR PROBE: CPT

## 2024-01-04 PROCEDURE — 96376 TX/PRO/DX INJ SAME DRUG ADON: CPT

## 2024-01-04 PROCEDURE — 87491 CHLMYD TRACH DNA AMP PROBE: CPT

## 2024-01-04 PROCEDURE — 96375 TX/PRO/DX INJ NEW DRUG ADDON: CPT

## 2024-01-04 PROCEDURE — 2580000003 HC RX 258: Performed by: STUDENT IN AN ORGANIZED HEALTH CARE EDUCATION/TRAINING PROGRAM

## 2024-01-04 PROCEDURE — 87077 CULTURE AEROBIC IDENTIFY: CPT

## 2024-01-04 PROCEDURE — 80048 BASIC METABOLIC PNL TOTAL CA: CPT

## 2024-01-04 PROCEDURE — 87591 N.GONORRHOEAE DNA AMP PROB: CPT

## 2024-01-04 PROCEDURE — 81001 URINALYSIS AUTO W/SCOPE: CPT

## 2024-01-04 PROCEDURE — 85025 COMPLETE CBC W/AUTO DIFF WBC: CPT

## 2024-01-04 PROCEDURE — 87480 CANDIDA DNA DIR PROBE: CPT

## 2024-01-04 PROCEDURE — 83690 ASSAY OF LIPASE: CPT

## 2024-01-04 PROCEDURE — 36415 COLL VENOUS BLD VENIPUNCTURE: CPT

## 2024-01-04 PROCEDURE — 87510 GARDNER VAG DNA DIR PROBE: CPT

## 2024-01-04 RX ORDER — DOXYCYCLINE 100 MG/1
100 CAPSULE ORAL ONCE
Status: COMPLETED | OUTPATIENT
Start: 2024-01-05 | End: 2024-01-05

## 2024-01-04 RX ORDER — MORPHINE SULFATE 4 MG/ML
4 INJECTION, SOLUTION INTRAMUSCULAR; INTRAVENOUS
Status: COMPLETED | OUTPATIENT
Start: 2024-01-04 | End: 2024-01-04

## 2024-01-04 RX ORDER — CEFTRIAXONE 500 MG/1
500 INJECTION, POWDER, FOR SOLUTION INTRAMUSCULAR; INTRAVENOUS ONCE
Status: COMPLETED | OUTPATIENT
Start: 2024-01-05 | End: 2024-01-05

## 2024-01-04 RX ORDER — METRONIDAZOLE 500 MG/1
500 TABLET ORAL ONCE
Status: COMPLETED | OUTPATIENT
Start: 2024-01-05 | End: 2024-01-05

## 2024-01-04 RX ORDER — SODIUM CHLORIDE 0.9 % (FLUSH) 0.9 %
10 SYRINGE (ML) INJECTION PRN
Status: DISCONTINUED | OUTPATIENT
Start: 2024-01-04 | End: 2024-01-05 | Stop reason: HOSPADM

## 2024-01-04 RX ORDER — 0.9 % SODIUM CHLORIDE 0.9 %
1000 INTRAVENOUS SOLUTION INTRAVENOUS ONCE
Status: COMPLETED | OUTPATIENT
Start: 2024-01-04 | End: 2024-01-04

## 2024-01-04 RX ORDER — ONDANSETRON 2 MG/ML
4 INJECTION INTRAMUSCULAR; INTRAVENOUS ONCE
Status: COMPLETED | OUTPATIENT
Start: 2024-01-04 | End: 2024-01-04

## 2024-01-04 RX ORDER — 0.9 % SODIUM CHLORIDE 0.9 %
100 INTRAVENOUS SOLUTION INTRAVENOUS ONCE
Status: COMPLETED | OUTPATIENT
Start: 2024-01-04 | End: 2024-01-04

## 2024-01-04 RX ADMIN — ONDANSETRON 4 MG: 2 INJECTION INTRAMUSCULAR; INTRAVENOUS at 19:32

## 2024-01-04 RX ADMIN — MORPHINE SULFATE 4 MG: 4 INJECTION, SOLUTION INTRAMUSCULAR; INTRAVENOUS at 19:33

## 2024-01-04 RX ADMIN — ONDANSETRON 4 MG: 2 INJECTION INTRAMUSCULAR; INTRAVENOUS at 21:59

## 2024-01-04 RX ADMIN — SODIUM CHLORIDE, PRESERVATIVE FREE 10 ML: 5 INJECTION INTRAVENOUS at 21:24

## 2024-01-04 RX ADMIN — SODIUM CHLORIDE 100 ML: 9 INJECTION, SOLUTION INTRAVENOUS at 21:24

## 2024-01-04 RX ADMIN — IOPAMIDOL 75 ML: 755 INJECTION, SOLUTION INTRAVENOUS at 21:24

## 2024-01-04 RX ADMIN — MORPHINE SULFATE 4 MG: 4 INJECTION, SOLUTION INTRAMUSCULAR; INTRAVENOUS at 22:00

## 2024-01-04 RX ADMIN — SODIUM CHLORIDE 1000 ML: 9 INJECTION, SOLUTION INTRAVENOUS at 19:32

## 2024-01-04 ASSESSMENT — LIFESTYLE VARIABLES
HOW OFTEN DO YOU HAVE A DRINK CONTAINING ALCOHOL: NEVER
HOW MANY STANDARD DRINKS CONTAINING ALCOHOL DO YOU HAVE ON A TYPICAL DAY: PATIENT DOES NOT DRINK

## 2024-01-04 ASSESSMENT — PAIN SCALES - GENERAL: PAINLEVEL_OUTOF10: 9

## 2024-01-04 ASSESSMENT — PAIN DESCRIPTION - LOCATION: LOCATION: ABDOMEN;GROIN

## 2024-01-04 ASSESSMENT — PAIN - FUNCTIONAL ASSESSMENT: PAIN_FUNCTIONAL_ASSESSMENT: 0-10

## 2024-01-04 NOTE — ED TRIAGE NOTES
Mode of arrival (squad #, walk in, police, etc) : walk-in        Chief complaint(s): abdominal pain, urinary retention, and rash/swelling vagina        Arrival Note (brief scenario, treatment PTA, etc).: patient states the RLQ pain and other symtpoms for 3 days         C= \"Have you ever felt that you should Cut down on your drinking?\"  No  A= \"Have people Annoyed you by criticizing your drinking?\"  No  G= \"Have you ever felt bad or Guilty about your drinking?\"  No  E= \"Have you ever had a drink as an Eye-opener first thing in the morning to steady your nerves or to help a hangover?\"  No      Deferred []      Reason for deferring: N/A    *If yes to two or more: probable alcohol abuse.*

## 2024-01-05 LAB
C TRACH DNA SPEC QL PROBE+SIG AMP: NEGATIVE
HSV1 DNA SPEC QL NAA+PROBE: NEGATIVE
HSV2 DNA SPEC QL NAA+PROBE: POSITIVE
N GONORRHOEA DNA SPEC QL PROBE+SIG AMP: NEGATIVE
SPECIMEN DESCRIPTION: ABNORMAL
SPECIMEN DESCRIPTION: NORMAL

## 2024-01-05 PROCEDURE — 96372 THER/PROPH/DIAG INJ SC/IM: CPT

## 2024-01-05 PROCEDURE — 6370000000 HC RX 637 (ALT 250 FOR IP): Performed by: STUDENT IN AN ORGANIZED HEALTH CARE EDUCATION/TRAINING PROGRAM

## 2024-01-05 PROCEDURE — 6360000002 HC RX W HCPCS: Performed by: STUDENT IN AN ORGANIZED HEALTH CARE EDUCATION/TRAINING PROGRAM

## 2024-01-05 RX ORDER — OXYBUTYNIN CHLORIDE 10 MG/1
10 TABLET, EXTENDED RELEASE ORAL DAILY
Qty: 30 TABLET | Refills: 0 | Status: SHIPPED | OUTPATIENT
Start: 2024-01-05

## 2024-01-05 RX ORDER — ACETAMINOPHEN 500 MG
1000 TABLET ORAL EVERY 6 HOURS PRN
Qty: 112 TABLET | Refills: 0 | Status: SHIPPED | OUTPATIENT
Start: 2024-01-05 | End: 2024-01-19

## 2024-01-05 RX ORDER — DOXYCYCLINE HYCLATE 100 MG
100 TABLET ORAL 2 TIMES DAILY
Qty: 14 TABLET | Refills: 0 | Status: SHIPPED | OUTPATIENT
Start: 2024-01-05 | End: 2024-01-12

## 2024-01-05 RX ORDER — ONDANSETRON 4 MG/1
4 TABLET, ORALLY DISINTEGRATING ORAL 3 TIMES DAILY PRN
Qty: 3 TABLET | Refills: 0 | Status: SHIPPED | OUTPATIENT
Start: 2024-01-05

## 2024-01-05 RX ORDER — METRONIDAZOLE 500 MG/1
500 TABLET ORAL 2 TIMES DAILY
Qty: 14 TABLET | Refills: 0 | Status: SHIPPED | OUTPATIENT
Start: 2024-01-05 | End: 2024-01-12

## 2024-01-05 RX ADMIN — CEFTRIAXONE SODIUM 500 MG: 500 INJECTION, POWDER, FOR SOLUTION INTRAMUSCULAR; INTRAVENOUS at 00:08

## 2024-01-05 RX ADMIN — DOXYCYCLINE 100 MG: 100 CAPSULE ORAL at 00:08

## 2024-01-05 RX ADMIN — METRONIDAZOLE 500 MG: 500 TABLET ORAL at 00:08

## 2024-01-05 NOTE — ED PROVIDER NOTES
[facility] ED  Emergency Department Encounter  Emergency Medicine Resident     Pt Name:Niurka Gama  MRN: [id number]  Birthdate 1980  Date of evaluation: 24  PCP:  No primary care provider on file.  Note Started: 12:14 AM EST      CHIEF COMPLAINT       Chief Complaint   Patient presents with    Abdominal Pain    Urinary Retention    Groin Swelling       HISTORY OF PRESENT ILLNESS  (Location/Symptom, Timing/Onset, Context/Setting, Quality, Duration, Modifying Factors, Severity.)      Niurka Gama is a 43 y.o. female who presents with abdominal pain, dysuria and swelling in her labial region.  Patient reports that she has been having worsening abdominal pain in the right lower quadrant and lower abdomen for the past 3 days.  She additionally reports some nausea associated with the pain.  She reports she feels that her genital region is swollen and painful to palpation.  She reports that she is sexually active and is concerned about sexually transmitted infections.  She additionally reports that she has been having significant dysuria where she will only make a few drops of urine and does not seem to be able to produce much urine.  She reports that is very painful to urinate.    PAST MEDICAL / SURGICAL / SOCIAL / FAMILY HISTORY      has a past medical history of Anxiety, Arthritis, Asthma, Sadler's esophagus, Bipolar 1 disorder ([facility]), Chronic insomnia, COPD (chronic obstructive pulmonary disease) ([facility]), Depression, Diabetes mellitus ([facility]), Endometriosis, Esophagitis, GERD (gastroesophageal reflux disease), Headache(784.0), Herniated disc, cervical, Hiatal hernia, Incisional abscess, Kidney stones, MDRO (multiple drug resistant organisms) resistance, Pleurisy, Pneumonia, Seizure ([facility]), Seizures (HCC), UTI (urinary tract infection), and Vision abnormalities.     has a past surgical history that includes knee surgery (Left);  section (, 2018); Tonsillectomy; Knee arthroscopy (Left, 
performance of the HPI, PE and MDM. I personally evaluated and examined the patient in conjunction with the APC and agree with the assessment, treatment plan, and disposition of the patient as recorded by the APC. Additional findings are as noted.    Srikanth Salgado DO  Attending Emergency  Physician              Srikanth Salgado DO  01/05/24 1151

## 2024-01-05 NOTE — PROGRESS NOTES
Pharmacy Note:     Patient is positive for HSV2. Patient was not initiated on antivirals during encounter. Discussed case with Dr. Salgado and recommended initiating valacyclovir 1000 mg twice daily for 7 days. He agrees to prescribe.     Contacted patient at preferred number to discuss test results and treatment plan. Advised patient to avoid sexual activity during flares and that this condition would require chronic management with a PCP or OB/GYN. Patient expressed understanding and requested prescription be sent to [facility] on [address] in [place]. Prescription received by Perri Norman.     Thank you,  Ignacia Graves, PharmD, BCPS  [phone]

## 2024-01-05 NOTE — ED NOTES
The following labs labeled with pt sticker and tubed to lab:     [] COVID-19 swab & Flu  [] RSV  [x] Pelvic

## 2024-01-05 NOTE — DISCHARGE INSTRUCTIONS
Take your medication as indicated and prescribed.  If you are given an antibiotic then, make sure you get the prescription filled and take the antibiotics until finished.  Drink plenty of water while taking the antibiotics.  Avoid drinking alcohol or drinks that have caffeine in it while taking antibiotics.       For pain use acetaminophen (Tylenol) or ibuprofen (Motrin / Advil), unless prescribed medications that have acetaminophen or ibuprofen (or similar medications) in it.  You can take over the counter acetaminophen tablets (1 - 2 tablets of the 500-mg strength every 6 hours) or ibuprofen tablets (2 tablets every 4 hours).    Do not have any sexual intercourse until the test results are completed in 2 - 3 days.   If your results come back positive for a STD then make sure that any of your sexual partners are treated before having intercourse with them.  The primary means of preventing STD transmission is with the use of condoms.    PLEASE RETURN TO THE EMERGENCY DEPARTMENT IMMEDIATELY for worsening symptoms, pain with urination, worsening vaginal discharge, or if you develop any concerning symptoms such as: high fever not relieved by acetaminophen (Tylenol) and/or ibuprofen (Motrin / Advil), chills, shortness of breath, chest pain, feeling of your heart fluttering or racing, persistent nausea and/or vomiting, vomiting up blood, blood in your stool, loss of consciousness, numbness, weakness or tingling in the arms or legs or change in color of the extremities, changes in mental status, persistent headache, blurry vision loss of bladder / bowel control, unable to follow up with your physician, or other any other care or concern.

## 2024-01-05 NOTE — ED NOTES
The following labs labeled with pt sticker and tubed to lab:     [x] Pelvic swabs  [] RSV  [] Strep

## 2024-01-06 LAB
MICROORGANISM SPEC CULT: ABNORMAL
SPECIMEN DESCRIPTION: ABNORMAL

## 2024-01-24 RX ORDER — OXYBUTYNIN CHLORIDE 10 MG/1
10 TABLET, EXTENDED RELEASE ORAL DAILY
Qty: 30 TABLET | Refills: 0 | OUTPATIENT
Start: 2024-01-24

## 2024-03-09 ENCOUNTER — HOSPITAL ENCOUNTER (EMERGENCY)
Age: 44
Discharge: HOME | End: 2024-03-09
Attending: EMERGENCY MEDICINE
Payer: MEDICARE

## 2024-03-09 ENCOUNTER — APPOINTMENT (OUTPATIENT)
Dept: GENERAL RADIOLOGY | Age: 44
End: 2024-03-09
Payer: MEDICARE

## 2024-03-09 VITALS
OXYGEN SATURATION: 98 % | TEMPERATURE: 97.9 F | DIASTOLIC BLOOD PRESSURE: 98 MMHG | SYSTOLIC BLOOD PRESSURE: 147 MMHG | HEART RATE: 70 BPM | RESPIRATION RATE: 17 BRPM

## 2024-03-09 DIAGNOSIS — J44.1 COPD EXACERBATION (HCC): Primary | ICD-10-CM

## 2024-03-09 DIAGNOSIS — J44.89 COPD WITH ASTHMA: ICD-10-CM

## 2024-03-09 DIAGNOSIS — J18.9 PNEUMONIA OF RIGHT MIDDLE LOBE DUE TO INFECTIOUS ORGANISM: ICD-10-CM

## 2024-03-09 PROCEDURE — 71046 X-RAY EXAM CHEST 2 VIEWS: CPT

## 2024-03-09 PROCEDURE — 6370000000 HC RX 637 (ALT 250 FOR IP): Performed by: EMERGENCY MEDICINE

## 2024-03-09 PROCEDURE — 99284 EMERGENCY DEPT VISIT MOD MDM: CPT | Performed by: EMERGENCY MEDICINE

## 2024-03-09 PROCEDURE — 93005 ELECTROCARDIOGRAM TRACING: CPT | Performed by: EMERGENCY MEDICINE

## 2024-03-09 PROCEDURE — 94640 AIRWAY INHALATION TREATMENT: CPT

## 2024-03-09 RX ORDER — BENZONATATE 100 MG/1
100 CAPSULE ORAL ONCE
Status: COMPLETED | OUTPATIENT
Start: 2024-03-09 | End: 2024-03-09

## 2024-03-09 RX ORDER — IPRATROPIUM BROMIDE AND ALBUTEROL SULFATE 2.5; .5 MG/3ML; MG/3ML
SOLUTION RESPIRATORY (INHALATION)
Qty: 30 EACH | Refills: 0 | Status: SHIPPED | OUTPATIENT
Start: 2024-03-09

## 2024-03-09 RX ORDER — AMOXICILLIN AND CLAVULANATE POTASSIUM 875; 125 MG/1; MG/1
1 TABLET, FILM COATED ORAL 2 TIMES DAILY
Qty: 10 TABLET | Refills: 0 | Status: SHIPPED | OUTPATIENT
Start: 2024-03-09 | End: 2024-03-14

## 2024-03-09 RX ORDER — IPRATROPIUM BROMIDE AND ALBUTEROL SULFATE 2.5; .5 MG/3ML; MG/3ML
1 SOLUTION RESPIRATORY (INHALATION) EVERY 4 HOURS PRN
Status: DISCONTINUED | OUTPATIENT
Start: 2024-03-09 | End: 2024-03-09 | Stop reason: HOSPADM

## 2024-03-09 RX ORDER — PREDNISONE 20 MG/1
60 TABLET ORAL ONCE
Status: COMPLETED | OUTPATIENT
Start: 2024-03-09 | End: 2024-03-09

## 2024-03-09 RX ORDER — BENZONATATE 100 MG/1
100 CAPSULE ORAL 3 TIMES DAILY PRN
Qty: 30 CAPSULE | Refills: 0 | Status: SHIPPED | OUTPATIENT
Start: 2024-03-09 | End: 2024-03-19

## 2024-03-09 RX ORDER — PREDNISONE 50 MG/1
50 TABLET ORAL DAILY
Qty: 4 TABLET | Refills: 0 | Status: SHIPPED | OUTPATIENT
Start: 2024-03-09 | End: 2024-03-13

## 2024-03-09 RX ADMIN — BENZONATATE 100 MG: 100 CAPSULE ORAL at 10:24

## 2024-03-09 RX ADMIN — PREDNISONE 60 MG: 20 TABLET ORAL at 10:24

## 2024-03-09 RX ADMIN — IPRATROPIUM BROMIDE AND ALBUTEROL SULFATE 1 DOSE: .5; 3 SOLUTION RESPIRATORY (INHALATION) at 10:50

## 2024-03-09 ASSESSMENT — PAIN SCALES - GENERAL: PAINLEVEL_OUTOF10: 6

## 2024-03-09 ASSESSMENT — ENCOUNTER SYMPTOMS
VOMITING: 0
ABDOMINAL PAIN: 0
COUGH: 1
NAUSEA: 0
SHORTNESS OF BREATH: 1

## 2024-03-09 ASSESSMENT — PAIN - FUNCTIONAL ASSESSMENT: PAIN_FUNCTIONAL_ASSESSMENT: 0-10

## 2024-03-09 NOTE — ED PROVIDER NOTES
[facility]     Emergency Department     Faculty Attestation    I performed a history and physical examination of the patient and discussed management with the resident. I reviewed the resident’s note and agree with the documented findings including all diagnostic interpretations and plan of care. Any areas of disagreement are noted on the chart. I was personally present for the key portions of any procedures. I have documented in the chart those procedures where I was not present during the key portions. I have reviewed the emergency nurses triage note. I agree with the chief complaint, past medical history, past surgical history, allergies, medications, social and family history as documented unless otherwise noted below. Documentation of the HPI, Physical Exam and Medical Decision Making performed by jamaal is based on my personal performance of the HPI, PE and MDM. For Physician Assistant/ Nurse Practitioner cases/documentation I have personally evaluated this patient and have completed at least one if not all key elements of the E/M (history, physical exam, and MDM). Additional findings are as noted.    Primary Care Physician: No primary care provider on file.    Note Started: 11:15 AM EST     VITAL SIGNS:   oral temperature is 97.9 °F (36.6 °C). Her blood pressure is 147/98 (abnormal) and her pulse is 70. Her respiration is 17 and oxygen saturation is 98%.      Medical Decision Making  Shortness of breath.  Going on for 1 and half weeks.  Nonproductive cough.  Does have history of COPD.  On examination speaking full sentences, cardiac exam regular rate and rhythm no murmurs rubs gallops, pulmonary shows wheezing throughout no obvious rails abdomen soft nontender nondistended calves nontender nonswollen.  Impression COPD exacerbation.  Chest x-ray, aerosols, steroids, reassess    Amount and/or Complexity of Data Reviewed  Labs: ordered.  Radiology:

## 2024-03-09 NOTE — DISCHARGE INSTRUCTIONS
Take your medication as indicated and prescribed.  If you are a diabetic, you should check your blood sugar more frequently while taking prednisone.  Use your inhaler or nebulizer as prescribed, or at minimum every 4 hours while you are having an asthma attack.    If you are given an antibiotic, then make sure you get the prescription filled and take the antibiotics until finished.  Drink plenty of water while taking the antibiotics.  Avoid drinking alcohol or drinks that have caffeine in it while taking antibiotics.       Being around people that smoke, mahendra houses, weather change can cause an asthma exacerbation.  If you smoke, then you should discuss with your physician about ways to quit smoking.    PLEASE RETURN TO THE EMERGENCY DEPARTMENT IMMEDIATELY for worsening symptoms of shortness of breath, wheezing, change in the amount of sputum that you cough up or a change in the color of your sputum, using your inhaler more frequently or if your inhaler only lasts up to 2 hours, or if you develop any concerning symptoms such as: high fever not relieved by acetaminophen (Tylenol) and/or ibuprofen (Motrin / Advil), chills, shortness of breath, chest pain, feeling of your heart fluttering or racing, persistent nausea and/or vomiting, vomiting up blood, blood in your stool, loss of consciousness, numbness, weakness or tingling in the arms or legs or change in color of the extremities, changes in mental status, persistent headache, blurry vision, loss of bladder / bowel control, unable to follow up with your physician, or other any other care or concern.

## 2024-03-09 NOTE — ED NOTES
Pt arrived with c/o cp  Pt states its been happening for 3 days  Pt states she  smoker and has asthma  Pt denies taking anything to relive the pain  Pt states she thinks she has pneumonia

## 2024-03-10 DIAGNOSIS — J44.89 COPD WITH ASTHMA (HCC): ICD-10-CM

## 2024-03-10 LAB
EKG ATRIAL RATE: 69 BPM
EKG P AXIS: 24 DEGREES
EKG P-R INTERVAL: 118 MS
EKG Q-T INTERVAL: 382 MS
EKG QRS DURATION: 88 MS
EKG QTC CALCULATION (BAZETT): 409 MS
EKG R AXIS: 44 DEGREES
EKG T AXIS: 42 DEGREES
EKG VENTRICULAR RATE: 69 BPM

## 2024-03-10 PROCEDURE — 93010 ELECTROCARDIOGRAM REPORT: CPT | Performed by: INTERNAL MEDICINE

## 2024-03-11 RX ORDER — IPRATROPIUM BROMIDE AND ALBUTEROL SULFATE 2.5; .5 MG/3ML; MG/3ML
SOLUTION RESPIRATORY (INHALATION)
Qty: 90 ML | OUTPATIENT
Start: 2024-03-11

## 2024-09-23 NOTE — PROGRESS NOTES
RN attempted to start IV x2 attempts, unsuccessful. House supervisor notified. Would you like to see pt in office to discuss Chantix or are you okay with just sending it? Pt needs to quit smoking as she has a surgery coming up.    Pended scripts for Chantix starter pack and Chantix 1 MG thereafter.

## 2024-12-09 ENCOUNTER — HOSPITAL ENCOUNTER (OUTPATIENT)
Age: 44
Setting detail: SPECIMEN
Discharge: HOME | End: 2024-12-09
Payer: MEDICARE

## 2024-12-09 DIAGNOSIS — R20.0 NUMBNESS AND TINGLING IN RIGHT HAND: ICD-10-CM

## 2024-12-09 DIAGNOSIS — R29.898 RIGHT ARM WEAKNESS: ICD-10-CM

## 2024-12-09 DIAGNOSIS — M25.50 ARTHRALGIA, UNSPECIFIED JOINT: ICD-10-CM

## 2024-12-09 DIAGNOSIS — R20.2 NUMBNESS AND TINGLING IN RIGHT HAND: ICD-10-CM

## 2024-12-09 LAB
AMPHET UR QL SCN: NEGATIVE
BARBITURATES UR QL SCN: NEGATIVE
BENZODIAZ UR QL: NEGATIVE
CANNABINOIDS UR QL SCN: NEGATIVE
COCAINE UR QL SCN: NEGATIVE
FENTANYL UR QL: NEGATIVE
METHADONE UR QL: NEGATIVE
OPIATES UR QL SCN: NEGATIVE
OXYCODONE UR QL SCN: NEGATIVE
PCP UR QL SCN: NEGATIVE
TEST INFORMATION: NORMAL

## 2024-12-09 PROCEDURE — 80307 DRUG TEST PRSMV CHEM ANLYZR: CPT

## 2024-12-10 NOTE — RESULT ENCOUNTER NOTE
I called and spoke to patient over the phone. Lyrica sent to pharmacy, medication agreement for Lyrica completed over the phone.

## 2024-12-24 ENCOUNTER — HOSPITAL ENCOUNTER (OUTPATIENT)
Dept: MRI IMAGING | Age: 44
Discharge: HOME | End: 2024-12-26
Payer: MEDICARE

## 2024-12-24 DIAGNOSIS — Z98.1 S/P CERVICAL SPINAL FUSION: ICD-10-CM

## 2024-12-24 DIAGNOSIS — R20.0 NUMBNESS AND TINGLING IN RIGHT HAND: ICD-10-CM

## 2024-12-24 DIAGNOSIS — R20.2 NUMBNESS AND TINGLING IN RIGHT HAND: ICD-10-CM

## 2024-12-24 DIAGNOSIS — R29.898 RIGHT ARM WEAKNESS: ICD-10-CM

## 2024-12-24 PROCEDURE — 72141 MRI NECK SPINE W/O DYE: CPT

## 2024-12-27 NOTE — RESULT ENCOUNTER NOTE
Hello, the patient has an upcoming appointment with you on 1/2/25. She has a history of cervical spinal fusion and is having new RUE weakness/numbness/tingling/neck pain. I just wanted to forward you the MRI results. Thanks, have a great day.    Kay Devine

## 2025-01-02 ENCOUNTER — OFFICE VISIT (OUTPATIENT)
Dept: NEUROSURGERY | Age: 45
End: 2025-01-02
Payer: MEDICARE

## 2025-01-02 VITALS
SYSTOLIC BLOOD PRESSURE: 128 MMHG | HEIGHT: 62 IN | HEART RATE: 65 BPM | BODY MASS INDEX: 34.6 KG/M2 | DIASTOLIC BLOOD PRESSURE: 87 MMHG | WEIGHT: 188 LBS

## 2025-01-02 DIAGNOSIS — M47.22 CERVICAL SPONDYLOSIS WITH RADICULOPATHY: Primary | ICD-10-CM

## 2025-01-02 DIAGNOSIS — Z98.1 HISTORY OF FUSION OF CERVICAL SPINE: ICD-10-CM

## 2025-01-02 DIAGNOSIS — G56.01 CARPAL TUNNEL SYNDROME OF RIGHT WRIST: ICD-10-CM

## 2025-01-02 PROCEDURE — G8417 CALC BMI ABV UP PARAM F/U: HCPCS | Performed by: NURSE PRACTITIONER

## 2025-01-02 PROCEDURE — G8427 DOCREV CUR MEDS BY ELIG CLIN: HCPCS | Performed by: NURSE PRACTITIONER

## 2025-01-02 PROCEDURE — 99204 OFFICE O/P NEW MOD 45 MIN: CPT | Performed by: NURSE PRACTITIONER

## 2025-01-02 PROCEDURE — 4004F PT TOBACCO SCREEN RCVD TLK: CPT | Performed by: NURSE PRACTITIONER

## 2025-01-02 NOTE — PROGRESS NOTES
85.3 kg (188 lb)   LMP 06/15/2020   BMI 34.39 kg/m²   Estimated body mass index is 34.39 kg/m² as calculated from the following:    Height as of this encounter: 1.575 m (5' 2\").    Weight as of this encounter: 85.3 kg (188 lb).    General:  Niurka Gama is a 44 y.o. year old female who appears her stated age.   HEENT: Normocephalic atraumatic. Neck supple.  Chest: regular rate; pulses equal  Abdomen: Soft nontender nondistended.  Ext: DP and PT pulses 2+, good cap refill  Neuro    Mentation  Appropriate affect  Registration intact  Orientation intact  Judgment intact to situation    Cranial Nerves:   Pupils equal and reactive to light  Extraocular motion intact  Face and shrug symmetric  Tongue midline  No dysarthria  v1-3 sensation symmetric, masseter tone symmetric  Hearing symmetric    Sensation: Diminished right upper extremity-non-dermatomal    Motor  L deltoid 5/5; R deltoid 5/5  L biceps 5/5; R biceps 5/5  L triceps 5/5; R triceps 5/5  L wrist extension 5/5; R wrist extension 5/5  L intrinsics 5/5; R intrinsics 5/5     L hip flexion 5/5 , R hip flexion 5/5  L knee extension 5/5; R knee extension 5/5  L Dorsiflexion 5/5; R dorsiflexion 5/5  L Plantarflexion 5/5; R plantarflexion 5/5  L EHL 5/5; R EHL 5/5    Reflexes  L Brachioradialis 2+/4; R brachioradialis 2+/4  L Biceps 2+/4; R Biceps 2+/4  L Triceps 2+/4; R Triceps 2+/4  L Patellar 2+/4: R Patellar 2+/4  L Achilles 2+/4; R Achilles 2+/4    hoffmans L: neg  hoffmans R: neg  Clonus L: neg  Clonus R: neg  Babinski L: neg  Babinski R: neg    Positive Spurling's  Negative Lhermitte's  Positive Tinel's and Phalen's right wrist  Positive Tinel's right cubital tunnel    Studies Review:     MRI cervical 12/24/2024:  FINDINGS:  BONES/ALIGNMENT: There is normal alignment of the spine.  C3-C4 and C5-C6  anterior cervical discectomy and fusion hardware is noted with associated  blooming metallic artifact without definitive evidence of hardware  malalignment or

## 2025-02-03 ENCOUNTER — HOSPITAL ENCOUNTER (OUTPATIENT)
Dept: PHYSICAL THERAPY | Age: 45
Setting detail: THERAPIES SERIES
Discharge: HOME | End: 2025-02-03
Payer: MEDICARE

## 2025-02-03 PROCEDURE — 97162 PT EVAL MOD COMPLEX 30 MIN: CPT

## 2025-02-03 NOTE — CONSULTS
[] [facility]   Outpatient Rehabilitation & Therapy  [address]  P: [phone]   F: [phone]  [facility] Outpatient Physical Therapy  [address]         Phone: [phone]       Fax: [phone]    Physical Therapy Cervical Spine Evaluation    Date:  2/3/2025  Patient: Niurka Gama  : 1980  MRN: [id number]  Physician: Cathie Cardona APRN -CNP   Medical Diagnosis: Cervical spondylosis with radiculopathy (M47.22) History of fusion of cervical spine (Z98.1)   Clinical Diagnosis: Cervical Pain with Radiculopathy (M54.12)  Onset date: 2025  Next 's appt.: TBD  PT Visit Information  PT Insurance Information: [employer] Medicare-based om medical necessity No hard max  (authorization required after evaluation through [facility] [facility] Medicaid - based on medical necessity No hard max   Total # of Visits Approved: 12  Total # of Visits to Date: 1  No Show: 0  Canceled Appointment: 0     Subjective  CC: Neck Pain   HPI: (2025) Patient is a 44 year old female who presented with chronic neck pain/ongoing for numerous months. No trauma or isolated incident reported. She stated she followed up with her PCP in early Dec 2024 to establish new care. She stated the neck pain was discussed among  other things. An MRI was ordered. Based on the clinical findings, a referral to neurosurgery was provided. She followed up with neurosurgery on 2025. She was fitted with right carpal tunnel brace that she is to wear nocturnally.  In addition, orders for upper extremities' EMG, cervical x-ray and outpatient physical therapy to further address the physical impairments and activity limitations.      History of cervical neck fusion in 2016 and 2018. (ACDF at C3-4 and C5-6)     Past Medical History:   Diagnosis Date    Anxiety     Arthritis     OA    Asthma     Sadler's esophagus     LONG SEGMENT    Bipolar 1 disorder (HCC)     Chronic

## 2025-02-18 ENCOUNTER — HOSPITAL ENCOUNTER (INPATIENT)
Age: 45
LOS: 6 days | Discharge: HOME | DRG: 881 | End: 2025-02-24
Attending: EMERGENCY MEDICINE | Admitting: PSYCHIATRY & NEUROLOGY
Payer: MEDICARE

## 2025-02-18 DIAGNOSIS — R45.851 DEPRESSION WITH SUICIDAL IDEATION: Primary | ICD-10-CM

## 2025-02-18 DIAGNOSIS — F32.A DEPRESSION WITH SUICIDAL IDEATION: Primary | ICD-10-CM

## 2025-02-18 LAB
ALBUMIN SERPL-MCNC: 3.8 G/DL (ref 3.5–5.2)
ALP SERPL-CCNC: 53 U/L (ref 35–104)
ALT SERPL-CCNC: 14 U/L (ref 10–35)
AMPHET UR QL SCN: NEGATIVE
ANION GAP SERPL CALCULATED.3IONS-SCNC: 12 MMOL/L (ref 9–16)
APAP SERPL-MCNC: <5 UG/ML (ref 10–30)
AST SERPL-CCNC: 20 U/L (ref 10–35)
BARBITURATES UR QL SCN: NEGATIVE
BASOPHILS # BLD: 0 K/UL (ref 0–0.2)
BASOPHILS NFR BLD: 0 % (ref 0–2)
BENZODIAZ UR QL: NEGATIVE
BILIRUB SERPL-MCNC: 0.3 MG/DL (ref 0–1.2)
BUN SERPL-MCNC: 12 MG/DL (ref 6–20)
CALCIUM SERPL-MCNC: 8.7 MG/DL (ref 8.6–10.4)
CANNABINOIDS UR QL SCN: NEGATIVE
CHLORIDE SERPL-SCNC: 103 MMOL/L (ref 98–107)
CO2 SERPL-SCNC: 23 MMOL/L (ref 20–31)
COCAINE UR QL SCN: POSITIVE
CREAT SERPL-MCNC: 0.7 MG/DL (ref 0.7–1.2)
EOSINOPHIL # BLD: 0.1 K/UL (ref 0–0.4)
EOSINOPHILS RELATIVE PERCENT: 2 % (ref 0–4)
ERYTHROCYTE [DISTWIDTH] IN BLOOD BY AUTOMATED COUNT: 15.3 % (ref 11.5–14.9)
ETHANOL PERCENT: 0 %
ETHANOLAMINE SERPL-MCNC: <10 MG/DL (ref 0–0.08)
FENTANYL UR QL: NEGATIVE
GFR, ESTIMATED: >90 ML/MIN/1.73M2
GLUCOSE SERPL-MCNC: 93 MG/DL (ref 74–99)
HCT VFR BLD AUTO: 40.1 % (ref 36–46)
HGB BLD-MCNC: 13.1 G/DL (ref 12–16)
LYMPHOCYTES NFR BLD: 1.5 K/UL (ref 1–4.8)
LYMPHOCYTES RELATIVE PERCENT: 22 % (ref 24–44)
MCH RBC QN AUTO: 27.3 PG (ref 26–34)
MCHC RBC AUTO-ENTMCNC: 32.6 G/DL (ref 31–37)
MCV RBC AUTO: 83.6 FL (ref 80–100)
METHADONE UR QL: NEGATIVE
MONOCYTES NFR BLD: 0.4 K/UL (ref 0.1–1.3)
MONOCYTES NFR BLD: 6 % (ref 1–7)
NEUTROPHILS NFR BLD: 70 % (ref 36–66)
NEUTS SEG NFR BLD: 5.1 K/UL (ref 1.3–9.1)
OPIATES UR QL SCN: NEGATIVE
OXYCODONE UR QL SCN: NEGATIVE
PCP UR QL SCN: NEGATIVE
PLATELET # BLD AUTO: 262 K/UL (ref 150–450)
PMV BLD AUTO: 9 FL (ref 6–12)
POTASSIUM SERPL-SCNC: 3.8 MMOL/L (ref 3.7–5.3)
PROT SERPL-MCNC: 6.5 G/DL (ref 6.6–8.7)
RBC # BLD AUTO: 4.8 M/UL (ref 4–5.2)
SALICYLATES SERPL-MCNC: <1 MG/DL (ref 0–10)
SODIUM SERPL-SCNC: 138 MMOL/L (ref 136–145)
TEST INFORMATION: ABNORMAL
WBC OTHER # BLD: 7.1 K/UL (ref 3.5–11)

## 2025-02-18 PROCEDURE — 99285 EMERGENCY DEPT VISIT HI MDM: CPT

## 2025-02-18 PROCEDURE — 80143 DRUG ASSAY ACETAMINOPHEN: CPT

## 2025-02-18 PROCEDURE — 80179 DRUG ASSAY SALICYLATE: CPT

## 2025-02-18 PROCEDURE — 85025 COMPLETE CBC W/AUTO DIFF WBC: CPT

## 2025-02-18 PROCEDURE — 1240000000 HC EMOTIONAL WELLNESS R&B

## 2025-02-18 PROCEDURE — 6370000000 HC RX 637 (ALT 250 FOR IP): Performed by: EMERGENCY MEDICINE

## 2025-02-18 PROCEDURE — 80307 DRUG TEST PRSMV CHEM ANLYZR: CPT

## 2025-02-18 PROCEDURE — 94640 AIRWAY INHALATION TREATMENT: CPT

## 2025-02-18 PROCEDURE — 6370000000 HC RX 637 (ALT 250 FOR IP)

## 2025-02-18 PROCEDURE — 6370000000 HC RX 637 (ALT 250 FOR IP): Performed by: PSYCHIATRY & NEUROLOGY

## 2025-02-18 PROCEDURE — 94664 DEMO&/EVAL PT USE INHALER: CPT

## 2025-02-18 PROCEDURE — 80053 COMPREHEN METABOLIC PANEL: CPT

## 2025-02-18 PROCEDURE — 36415 COLL VENOUS BLD VENIPUNCTURE: CPT

## 2025-02-18 PROCEDURE — 94761 N-INVAS EAR/PLS OXIMETRY MLT: CPT

## 2025-02-18 PROCEDURE — G0480 DRUG TEST DEF 1-7 CLASSES: HCPCS

## 2025-02-18 RX ORDER — HYDROXYZINE HYDROCHLORIDE 50 MG/1
50 TABLET, FILM COATED ORAL 3 TIMES DAILY PRN
Status: DISCONTINUED | OUTPATIENT
Start: 2025-02-18 | End: 2025-02-24 | Stop reason: HOSPADM

## 2025-02-18 RX ORDER — HALOPERIDOL 5 MG/1
5 TABLET ORAL EVERY 4 HOURS PRN
Status: DISCONTINUED | OUTPATIENT
Start: 2025-02-18 | End: 2025-02-24 | Stop reason: HOSPADM

## 2025-02-18 RX ORDER — BUDESONIDE AND FORMOTEROL FUMARATE DIHYDRATE 160; 4.5 UG/1; UG/1
2 AEROSOL RESPIRATORY (INHALATION) 2 TIMES DAILY
Status: ON HOLD | COMMUNITY
End: 2025-02-24 | Stop reason: HOSPADM

## 2025-02-18 RX ORDER — DIPHENHYDRAMINE HYDROCHLORIDE 50 MG/ML
50 INJECTION INTRAMUSCULAR; INTRAVENOUS EVERY 4 HOURS PRN
Status: DISCONTINUED | OUTPATIENT
Start: 2025-02-18 | End: 2025-02-24 | Stop reason: HOSPADM

## 2025-02-18 RX ORDER — TRAZODONE HYDROCHLORIDE 50 MG/1
50 TABLET ORAL NIGHTLY PRN
Status: DISCONTINUED | OUTPATIENT
Start: 2025-02-18 | End: 2025-02-24 | Stop reason: HOSPADM

## 2025-02-18 RX ORDER — POLYETHYLENE GLYCOL 3350 17 G/17G
17 POWDER, FOR SOLUTION ORAL DAILY PRN
Status: DISCONTINUED | OUTPATIENT
Start: 2025-02-18 | End: 2025-02-24 | Stop reason: HOSPADM

## 2025-02-18 RX ORDER — POLYETHYLENE GLYCOL 3350 17 G
2 POWDER IN PACKET (EA) ORAL
Status: DISCONTINUED | OUTPATIENT
Start: 2025-02-18 | End: 2025-02-24 | Stop reason: HOSPADM

## 2025-02-18 RX ORDER — IPRATROPIUM BROMIDE AND ALBUTEROL SULFATE 2.5; .5 MG/3ML; MG/3ML
1 SOLUTION RESPIRATORY (INHALATION) ONCE
Status: COMPLETED | OUTPATIENT
Start: 2025-02-18 | End: 2025-02-18

## 2025-02-18 RX ORDER — ACETAMINOPHEN 325 MG/1
650 TABLET ORAL EVERY 4 HOURS PRN
Status: DISCONTINUED | OUTPATIENT
Start: 2025-02-18 | End: 2025-02-24 | Stop reason: HOSPADM

## 2025-02-18 RX ORDER — HALOPERIDOL 5 MG/ML
5 INJECTION INTRAMUSCULAR EVERY 4 HOURS PRN
Status: DISCONTINUED | OUTPATIENT
Start: 2025-02-18 | End: 2025-02-24 | Stop reason: HOSPADM

## 2025-02-18 RX ORDER — CALCIUM CARBONATE 500 MG/1
500 TABLET, CHEWABLE ORAL 3 TIMES DAILY PRN
Status: DISCONTINUED | OUTPATIENT
Start: 2025-02-18 | End: 2025-02-24 | Stop reason: HOSPADM

## 2025-02-18 RX ADMIN — TRAZODONE HYDROCHLORIDE 50 MG: 50 TABLET ORAL at 19:59

## 2025-02-18 RX ADMIN — IPRATROPIUM BROMIDE AND ALBUTEROL SULFATE 1 DOSE: .5; 2.5 SOLUTION RESPIRATORY (INHALATION) at 18:01

## 2025-02-18 RX ADMIN — ANTACID TABLETS 500 MG: 500 TABLET, CHEWABLE ORAL at 21:07

## 2025-02-18 RX ADMIN — HYDROXYZINE HYDROCHLORIDE 50 MG: 50 TABLET, FILM COATED ORAL at 19:59

## 2025-02-18 RX ADMIN — NICOTINE POLACRILEX 2 MG: 2 LOZENGE ORAL at 19:59

## 2025-02-18 ASSESSMENT — PATIENT HEALTH QUESTIONNAIRE - PHQ9
SUM OF ALL RESPONSES TO PHQ QUESTIONS 1-9: 2
2. FEELING DOWN, DEPRESSED OR HOPELESS: SEVERAL DAYS
SUM OF ALL RESPONSES TO PHQ QUESTIONS 1-9: 2
1. LITTLE INTEREST OR PLEASURE IN DOING THINGS: SEVERAL DAYS
SUM OF ALL RESPONSES TO PHQ9 QUESTIONS 1 & 2: 2

## 2025-02-18 ASSESSMENT — LIFESTYLE VARIABLES
HOW MANY STANDARD DRINKS CONTAINING ALCOHOL DO YOU HAVE ON A TYPICAL DAY: PATIENT DOES NOT DRINK
HOW OFTEN DO YOU HAVE A DRINK CONTAINING ALCOHOL: NEVER

## 2025-02-18 ASSESSMENT — SLEEP AND FATIGUE QUESTIONNAIRES
AVERAGE NUMBER OF SLEEP HOURS: 5
DO YOU USE A SLEEP AID: NO
DO YOU HAVE DIFFICULTY SLEEPING: YES
SLEEP PATTERN: DISTURBED/INTERRUPTED SLEEP;DIFFICULTY ARISING

## 2025-02-18 NOTE — ED PROVIDER NOTES
DIAGNOSTIC RESULTS       RADIOLOGY:All plain film, CT, MRI, and formal ultrasound images (except ED bedside ultrasound) are read by the radiologist and the images and interpretations are directly viewed by the emergency physician.     No orders to display       LABS: All lab results were reviewed by myself, and all abnormals are listed below.  Labs Reviewed   CBC WITH AUTO DIFFERENTIAL - Abnormal; Notable for the following components:       Result Value    RDW 15.3 (*)     Neutrophils % 70 (*)     Lymphocytes % 22 (*)     All other components within normal limits   COMPREHENSIVE METABOLIC PANEL - Abnormal; Notable for the following components:    Total Protein 6.5 (*)     All other components within normal limits   URINE DRUG SCREEN - Abnormal; Notable for the following components:    Cocaine Metabolite, Urine POSITIVE (*)     All other components within normal limits   ACETAMINOPHEN LEVEL - Abnormal; Notable for the following components:    Acetaminophen Level <5 (*)     All other components within normal limits   ETHANOL   SALICYLATE LEVEL       EMERGENCY DEPARTMENT COURSE:   Vitals:    Vitals:    02/18/25 1837 02/18/25 1905 02/18/25 1935 02/18/25 1937   BP:  122/73 118/72    Pulse:  72 71 66   Resp:  18 18    Temp:  98.6 °F (37 °C) 97.8 °F (36.6 °C)    TempSrc:  Temporal Temporal    SpO2:  95% 96%    Weight: 85.3 kg (188 lb 0.8 oz) 85.7 kg (189 lb)     Height: 1.575 m (5' 2\") 1.575 m (5' 2\")         The patient was given the following medications while in the emergency department:  Orders Placed This Encounter   Medications    acetaminophen (TYLENOL) tablet 650 mg    polyethylene glycol (GLYCOLAX) packet 17 g    hydrOXYzine HCl (ATARAX) tablet 50 mg    OR Linked Order Group     haloperidol (HALDOL) tablet 5 mg     haloperidol lactate (HALDOL) injection 5 mg    diphenhydrAMINE (BENADRYL) injection 50 mg    traZODone (DESYREL) tablet 50 mg    nicotine polacrilex (COMMIT) lozenge 2 mg    ipratropium 0.5

## 2025-02-18 NOTE — ED TRIAGE NOTES
Pt states she has been feeling suicidal. Pt denies any plan. Pt is supposed to be taking meds but has not taken them in about 3 weeks because she relapsed with her drug use. Pt states she was also doing crack 3 days ago but states she is still feeling very sick and thinks it may have been laced with fentanyl. Denies alcohol use.

## 2025-02-18 NOTE — ED NOTES
Provisional Diagnosis:   Major Depressive disorder    Psychosocial and Contextual Factors:   Patient reports recent psychosocial issues and relapse. Patient off psychotropic medications since the beginning of the month.     C-SSRS Summary:      Patient: X  Family:   Agency:     Substance Abuse: Patient reports     Present Suicidal Behavior:  Patient reports suicidal ideation with a plan to run in front of a car.    Verbal: X    Attempt:    Past Suicidal Behavior: Patient has two previous suicide attempts, with the last one being by overdose on medications on 4/30/22.    Verbal:X    Attempt:X      Self-Injurious/Self-Mutilation:Patient denies      Violence Current or Past Patient is calm and cooperative.       Trauma Identified: Patient reports issues with children services and her children as traumatic.       Risk Factors:    Patient report recent relapse.      Clinical Summary:    Niurka Gama is a 44 y.o. female who presents to the ED for suicidal ideation with a plan to walk infront of a car to kill herself. Patient states \"Everything is just getting to me. I just don't want to live anymore. I failed my kids.\"  Patient reports recent argument with her son's father, whom has custroy of their child, about visiting her son.  Patient states this lead to her relapse.    Patient states she has not been taking her psychotropic medications for the last three weeks, which is when she relapsed. Patient states at this time her fiance broke up with her due to her drug use. Patient states she has been staying at her sister in [facility] house, where they both use crack cocaine.    Patient believes when she used crack cocaine three days ago it may have been laced with Fentanyl as she has not felt good since then. Patient denies previous opioid use.     Patient reports two previous suicide attempts, with the last one being by overdose on medications on 4/30/22.    Patient displays no abnormal movements, speech rate, or tone.

## 2025-02-18 NOTE — BH NOTE
Patient arrived onto unit at 1826 via wheelchair accompanied by [facility] Staff from [facility]. Accepting of admission process with ANNY Quispe.

## 2025-02-18 NOTE — BH NOTE
Patient given tobacco quitline number 49069713446 at this time, refusing to call at this time, states \" I just dont want to quit now\"- patient given information as to the dangers of long term tobacco use. Continue to reinforce the importance of tobacco cessation.

## 2025-02-18 NOTE — ED NOTES
Upon arrival to [facility], pt started to complain of SOB and needing a breathing treatment. On assessment patient is coughing and stating she is wheezing. When listened to pt lungs, lungs are clear with slight end expiratory wheezes. Patient is showing that she is SOB when RN is at bedside. When RN is at the desk, patient is not showing any signs of distress or SOB.

## 2025-02-18 NOTE — BH NOTE
Writer was notified of patient stating she needs to wear her wrist brace at ALL times per her surgeon. Writer previously had a conversation with [facility] staff from where patient came from and per doctors notes patient was to wear brace at night. [facility] staff explained this to patient and she stated she can't wear it at night and only wears it during the day so she wont wear the brace at all. Writer assigned patient a bed. Patient got to unit and told staff she is to wear brace at all times. Staff alerted writer and stated patient needed to be a 1:1. Writer told staff patient stated she wouldn't wear brace at all during stay. Writer told staff I would call to clarify about 1:1. Dr. Pichardo said patient can be 1:1 for tonight (2/18) only due to stories changing and evaluation of patient tomorrow. Writer put in order for 1:1 and then called staff to make aware. Staff then called and stated patient did not want to wear brace at night despite doctors orders are for brace to be worn at night. Provider notified of change in status and discontinuation of 1:1 order.

## 2025-02-19 PROBLEM — F25.1 SCHIZOAFFECTIVE DISORDER, DEPRESSIVE TYPE (HCC): Status: ACTIVE | Noted: 2022-05-01

## 2025-02-19 PROBLEM — F14.20 COCAINE USE DISORDER, MODERATE, DEPENDENCE (HCC): Status: ACTIVE | Noted: 2025-02-19

## 2025-02-19 LAB — D DIMER PPP FEU-MCNC: 0.53 UG/ML FEU (ref 0–0.59)

## 2025-02-19 PROCEDURE — 99222 1ST HOSP IP/OBS MODERATE 55: CPT | Performed by: INTERNAL MEDICINE

## 2025-02-19 PROCEDURE — 94761 N-INVAS EAR/PLS OXIMETRY MLT: CPT

## 2025-02-19 PROCEDURE — 1240000000 HC EMOTIONAL WELLNESS R&B

## 2025-02-19 PROCEDURE — 6370000000 HC RX 637 (ALT 250 FOR IP): Performed by: PSYCHIATRY & NEUROLOGY

## 2025-02-19 PROCEDURE — 99222 1ST HOSP IP/OBS MODERATE 55: CPT | Performed by: PSYCHIATRY & NEUROLOGY

## 2025-02-19 PROCEDURE — 6370000000 HC RX 637 (ALT 250 FOR IP): Performed by: INTERNAL MEDICINE

## 2025-02-19 PROCEDURE — 94640 AIRWAY INHALATION TREATMENT: CPT

## 2025-02-19 PROCEDURE — 6370000000 HC RX 637 (ALT 250 FOR IP)

## 2025-02-19 PROCEDURE — 36415 COLL VENOUS BLD VENIPUNCTURE: CPT

## 2025-02-19 PROCEDURE — APPSS60 APP SPLIT SHARED TIME 46-60 MINUTES

## 2025-02-19 PROCEDURE — 85379 FIBRIN DEGRADATION QUANT: CPT

## 2025-02-19 RX ORDER — MONTELUKAST SODIUM 10 MG/1
10 TABLET ORAL NIGHTLY
Status: DISCONTINUED | OUTPATIENT
Start: 2025-02-19 | End: 2025-02-24 | Stop reason: HOSPADM

## 2025-02-19 RX ORDER — BUDESONIDE AND FORMOTEROL FUMARATE DIHYDRATE 160; 4.5 UG/1; UG/1
2 AEROSOL RESPIRATORY (INHALATION) 2 TIMES DAILY
Status: DISCONTINUED | OUTPATIENT
Start: 2025-02-19 | End: 2025-02-19

## 2025-02-19 RX ORDER — LAMOTRIGINE 25 MG/1
25 TABLET ORAL DAILY
Status: DISCONTINUED | OUTPATIENT
Start: 2025-02-19 | End: 2025-02-24 | Stop reason: HOSPADM

## 2025-02-19 RX ORDER — PANTOPRAZOLE SODIUM 40 MG/1
40 TABLET, DELAYED RELEASE ORAL DAILY
Status: DISCONTINUED | OUTPATIENT
Start: 2025-02-19 | End: 2025-02-24 | Stop reason: HOSPADM

## 2025-02-19 RX ORDER — IPRATROPIUM BROMIDE AND ALBUTEROL SULFATE 2.5; .5 MG/3ML; MG/3ML
1 SOLUTION RESPIRATORY (INHALATION) EVERY 4 HOURS PRN
Status: DISCONTINUED | OUTPATIENT
Start: 2025-02-19 | End: 2025-02-19 | Stop reason: SDUPTHER

## 2025-02-19 RX ORDER — BUDESONIDE AND FORMOTEROL FUMARATE DIHYDRATE 160; 4.5 UG/1; UG/1
2 AEROSOL RESPIRATORY (INHALATION)
Status: DISCONTINUED | OUTPATIENT
Start: 2025-02-19 | End: 2025-02-24 | Stop reason: HOSPADM

## 2025-02-19 RX ORDER — IPRATROPIUM BROMIDE AND ALBUTEROL SULFATE 2.5; .5 MG/3ML; MG/3ML
1 SOLUTION RESPIRATORY (INHALATION)
Status: DISCONTINUED | OUTPATIENT
Start: 2025-02-19 | End: 2025-02-24 | Stop reason: HOSPADM

## 2025-02-19 RX ORDER — IPRATROPIUM BROMIDE AND ALBUTEROL SULFATE 2.5; .5 MG/3ML; MG/3ML
1 SOLUTION RESPIRATORY (INHALATION) EVERY 4 HOURS PRN
Status: DISCONTINUED | OUTPATIENT
Start: 2025-02-19 | End: 2025-02-24 | Stop reason: HOSPADM

## 2025-02-19 RX ORDER — ALBUTEROL SULFATE 90 UG/1
2 INHALANT RESPIRATORY (INHALATION) EVERY 6 HOURS PRN
Status: DISCONTINUED | OUTPATIENT
Start: 2025-02-19 | End: 2025-02-24 | Stop reason: HOSPADM

## 2025-02-19 RX ORDER — IPRATROPIUM BROMIDE AND ALBUTEROL SULFATE 2.5; .5 MG/3ML; MG/3ML
1 SOLUTION RESPIRATORY (INHALATION) ONCE
Status: COMPLETED | OUTPATIENT
Start: 2025-02-19 | End: 2025-02-19

## 2025-02-19 RX ORDER — PREGABALIN 50 MG/1
50 CAPSULE ORAL 3 TIMES DAILY
Status: DISCONTINUED | OUTPATIENT
Start: 2025-02-19 | End: 2025-02-24 | Stop reason: HOSPADM

## 2025-02-19 RX ORDER — VITAMIN B COMPLEX
1000 TABLET ORAL DAILY
Status: DISCONTINUED | OUTPATIENT
Start: 2025-02-19 | End: 2025-02-24 | Stop reason: HOSPADM

## 2025-02-19 RX ADMIN — NICOTINE POLACRILEX 2 MG: 2 LOZENGE ORAL at 14:16

## 2025-02-19 RX ADMIN — ANTACID TABLETS 500 MG: 500 TABLET, CHEWABLE ORAL at 15:32

## 2025-02-19 RX ADMIN — PANTOPRAZOLE SODIUM 40 MG: 40 TABLET, DELAYED RELEASE ORAL at 12:48

## 2025-02-19 RX ADMIN — MONTELUKAST 10 MG: 10 TABLET, FILM COATED ORAL at 20:32

## 2025-02-19 RX ADMIN — Medication 2 PUFF: at 20:32

## 2025-02-19 RX ADMIN — HYDROXYZINE HYDROCHLORIDE 50 MG: 50 TABLET, FILM COATED ORAL at 17:47

## 2025-02-19 RX ADMIN — NICOTINE POLACRILEX 2 MG: 2 LOZENGE ORAL at 08:11

## 2025-02-19 RX ADMIN — PREGABALIN 50 MG: 50 CAPSULE ORAL at 13:37

## 2025-02-19 RX ADMIN — IPRATROPIUM BROMIDE AND ALBUTEROL SULFATE 1 DOSE: .5; 2.5 SOLUTION RESPIRATORY (INHALATION) at 07:50

## 2025-02-19 RX ADMIN — IPRATROPIUM BROMIDE AND ALBUTEROL SULFATE 1 DOSE: 2.5; .5 SOLUTION RESPIRATORY (INHALATION) at 11:18

## 2025-02-19 RX ADMIN — IPRATROPIUM BROMIDE AND ALBUTEROL SULFATE 1 DOSE: 2.5; .5 SOLUTION RESPIRATORY (INHALATION) at 15:20

## 2025-02-19 RX ADMIN — TRAZODONE HYDROCHLORIDE 50 MG: 50 TABLET ORAL at 21:45

## 2025-02-19 RX ADMIN — Medication 1000 UNITS: at 21:44

## 2025-02-19 RX ADMIN — Medication 2 PUFF: at 08:00

## 2025-02-19 RX ADMIN — PREGABALIN 50 MG: 50 CAPSULE ORAL at 20:32

## 2025-02-19 RX ADMIN — NICOTINE POLACRILEX 2 MG: 2 LOZENGE ORAL at 11:52

## 2025-02-19 RX ADMIN — NICOTINE POLACRILEX 2 MG: 2 LOZENGE ORAL at 21:22

## 2025-02-19 RX ADMIN — IPRATROPIUM BROMIDE AND ALBUTEROL SULFATE 1 DOSE: 2.5; .5 SOLUTION RESPIRATORY (INHALATION) at 20:05

## 2025-02-19 RX ADMIN — SERTRALINE 50 MG: 50 TABLET, FILM COATED ORAL at 21:44

## 2025-02-19 RX ADMIN — NICOTINE POLACRILEX 2 MG: 2 LOZENGE ORAL at 17:37

## 2025-02-19 RX ADMIN — LAMOTRIGINE 25 MG: 25 TABLET ORAL at 21:45

## 2025-02-19 ASSESSMENT — PAIN SCALES - GENERAL: PAINLEVEL_OUTOF10: 8

## 2025-02-19 NOTE — BH NOTE
Patient approached nurses station demanding for her wrist brace. She states that she is in unbearable pain. She is also demanding for doctors to see her to resume her Lyrica. She was informed that a provider will see her today - but was unhappy with response. She continues to remain irritable with staff.

## 2025-02-19 NOTE — BH NOTE
[facility]  Initial Interdisciplinary Treatment Plan Note      Original treatment plan Date & Time: 2/19/25 12:45pm    Admission Type:  Admission Type: Voluntary    Reason for admission:   Reason for Admission: Patient has suicidal ideations to walk into traffic due to life stressors.    Estimated Length of Stay:  5-7days  Estimated Discharge Date: To be determined by physician.    PATIENT STRENGTHS:  Patient Strengths:   Patient Strengths and Limitations:Limitations: Difficulty problem solving/relies on others to help solve problems, Apathetic / unmotivated, Inappropriate/potentially harmful leisure interests  Addictive Behavior: Addictive Behavior  In the Past 3 Months, Have You Felt or Has Someone Told You That You Have a Problem With  : None  Medical Problems:  Past Medical History:   Diagnosis Date    Anxiety     Arthritis     OA    Asthma     Sadler's esophagus     LONG SEGMENT    Bipolar 1 disorder ([facility])     Chronic insomnia     COPD (chronic obstructive pulmonary disease) ([facility])     Depression     and bipolar    Diabetes mellitus ([facility])     prediabetic    Endometriosis 3/9/2020    Esophagitis     severe    GERD (gastroesophageal reflux disease)     Headache(784.0)     Herniated disc, cervical     Hiatal hernia     Incisional abscess 1/15/2019    Kidney stones     MDRO (multiple drug resistant organisms) resistance     mrsa    Pleurisy     hx of \"years ago\"    Pneumonia     past penumonia    Seizure ([facility])     Seizures ([facility])     2016 last seizure-focal seizure    UTI (urinary tract infection)     Vision abnormalities     wears glasses     Status EXAM:Mental Status and Behavioral Exam  Normal: No  Level of Assistance: Independent/Self  Facial Expression: Flat  Affect: Appropriate  Level of Consciousness: Alert  Frequency of Checks: 4 times per hour, close  Mood:Normal: No  Mood: Depressed, Anxious  Motor Activity:Normal: Yes  Eye Contact: Good  Observed Behavior: Cooperative  Sexual Misconduct

## 2025-02-19 NOTE — BH NOTE
RN attempted to call Dr. Pichardo regarding patient exacerbation of COPD and shortness of breath. No answer .. Awaiting return call.

## 2025-02-19 NOTE — BH NOTE
Contacted Dr. Bains to report patient symptoms of shortness of breath and patient requesting duoneb treatment. No response at this time.

## 2025-02-19 NOTE — PLAN OF CARE
Problem: Depression  Goal: Will be euthymic at discharge  Description: INTERVENTIONS:  1. Administer medication as ordered  2. Provide emotional support via 1:1 interaction with staff  3. Encourage involvement in milieu/groups/activities  4. Monitor for social isolation  Outcome: Progressing     Problem: Anxiety  Goal: Will report anxiety at manageable levels  Description: INTERVENTIONS:  1. Administer medication as ordered  2. Teach and rehearse alternative coping skills  3. Provide emotional support with 1:1 interaction with staff  Outcome: Progressing     Patient endorses depression and anxiety. Patient denies any suicidal ideation. Patient endorses auditory hallucinations of \"people just talking\". Every 15 minute safety checks maintained.

## 2025-02-19 NOTE — GROUP NOTE
Group Therapy Note    Date: 2/19/2025    Group Start Time: 1100  Group End Time: 1135  Group Topic: Relaxation    Karon Ceja CTRS    Relaxation Group Note        Date: February 19, 2025 Start Time: 11am  End Time:  1135am      Number of Participants in Group & Unit Census:  3/10    Topic: relaxation group     Goal of Group: pt will identify benefits of using art for coping and leisure       Comments:     Patient did not participate in Relaxation group, despite staff encouragement and explanation of benefits.  Patient remain seclusive to self.  Q15 minute safety checks maintained for patient safety and will continue to encourage patient to attend unit programming.              Signature:  MONICA POE     Intake Note      Patient Name: Maryjane Jackson  YOB: 1932    Age: 89 y.o.  Date of Intake: 5/3/2024   Education: 10 Ethnicity Black   Gender: Female Referring Provider: Dr. Ortega     REASON FOR REFERRAL AND EVALUATION PROCEDURES:  Maryjane Jackson  was referred for evaluation by her Primary Care Physician to assist in differential diagnosis and individualized treatment planning. she understood the rationale and procedures for evaluation, as well as the limits to confidentiality, and agreed to participate. she consented to have this report made available to her  treating providers through her  electronic medical records.   History Sources: Patient, Relative (Daughter), and Medical Record    HISTORY OF PRESENT ILLNESS:  The patient is an 89-year-old female with pertinent medical history noted for coronary artery disease and hypertension. She presented for clinical interview accompanied by her daughter who assisted with establishing history.  According to the patient's daughter, the patient appeared to be in her typical state of age-appropriate health at the time her son passed away in July 2022.  The patient's daughter reported that family had noticed what were believed to be age-appropriate changes in short-term memory prior to the loss of her son but his passing significantly exacerbated these changes.  They have since remained stable and do not appear to be progressive in nature.  The patient's daughter described the changes to include forgetfulness such as difficulty recalling details from previous conversations.    Pertaining to the patient's emotional functioning, she reportedly did not have a history of clinically significant psychiatric symptomatology, diagnosis, or treatment.  However, after the loss of her son, family noticed she was more withdrawn, stressed, and irritable.  The patient's daughter reported the patient was always \"the type of person who feels like she needs to be strong.\"  She

## 2025-02-19 NOTE — BH NOTE
Dr. Pichardo called back to unit and spoke with RN. She was informed that respiratory is on unit and that EMERGENCY RESPIRATORY Treatment is being completed by Albin (Respiratory Therapist). Dr. Pichardo informed that respiratory stated that Duoneb is an appropriate medication.     Respiratory Therapist did state to staff \"I know Niurka, Normally she curses me out and tells me to get the fuck out\".

## 2025-02-19 NOTE — GROUP NOTE
Group Therapy Note    Date: 2/19/2025    Group Start Time: 1430  Group End Time: 1505  Group Topic: Cognitive Skills    Karon Ceja CTRS    Cognitive Skills Group Note        Date: February 19, 2025 Start Time: 2:30pm  End Time:  305pm      Number of Participants in Group & Unit Census:  1/10    Topic: cognitive skills     Goal of Group: pt will demonstrate improved coping skills and improved interpersonal relationships       Comments:     Patient did not participate in Cognitive Skills group, despite staff encouragement and explanation of benefits.  Patient remain seclusive to self.  Q15 minute safety checks maintained for patient safety and will continue to encourage patient to attend unit programming.              Signature:  MONICA POE

## 2025-02-19 NOTE — BH NOTE
Writer overheard patient on the phone stating \"If I ask to see a fucking doctor, I expect to see a fucking doctor and I will throw a fit everytime\".

## 2025-02-19 NOTE — BH NOTE
[facility]  Admission Note     Admission Type:   Admission Type: Voluntary    Reason for admission:  Reason for Admission: Patient has suicidal ideations to walk into traffic due to life stressors.      Addictive Behavior:   Addictive Behavior  In the Past 3 Months, Have You Felt or Has Someone Told You That You Have a Problem With  : None    Medical Problems:   Past Medical History:   Diagnosis Date    Anxiety     Arthritis     OA    Asthma     Sadler's esophagus     LONG SEGMENT    Bipolar 1 disorder ([facility])     Chronic insomnia     COPD (chronic obstructive pulmonary disease) ([facility])     Depression     and bipolar    Diabetes mellitus ([facility])     prediabetic    Endometriosis 3/9/2020    Esophagitis     severe    GERD (gastroesophageal reflux disease)     Headache(784.0)     Herniated disc, cervical     Hiatal hernia     Incisional abscess 1/15/2019    Kidney stones     MDRO (multiple drug resistant organisms) resistance     mrsa    Pleurisy     hx of \"years ago\"    Pneumonia     past penumonia    Seizure ([facility])     Seizures ([facility])     2016 last seizure-focal seizure    UTI (urinary tract infection)     Vision abnormalities     wears glasses       Status EXAM:  Mental Status and Behavioral Exam  Normal: No  Level of Assistance: Independent/Self  Facial Expression: Flat  Affect: Appropriate  Level of Consciousness: Alert  Frequency of Checks: 4 times per hour, close  Mood:Normal: No  Mood: Depressed, Anxious, Helpless, Sad  Motor Activity:Normal: Yes  Eye Contact: Good  Observed Behavior: Cooperative, Friendly, Preoccupied  Sexual Misconduct History: Current - no  Preception: Truman to person, Truman to time, Truman to place, Truman to situation  Attention:Normal: No  Attention: Distractible  Thought Processes: Unremarkable  Thought Content:Normal: No  Thought Content: Preoccupations  Depression Symptoms: Feelings of helplessness, Feelings of hopelessess, Feelings of worthlessness, Crying  Anxiety

## 2025-02-19 NOTE — CARE COORDINATION
BHI Biopsychosocial Assessment    Current Level of Psychosocial Functioning     Independent XX  Dependent    Minimal Assist     Psychosocial High Risk Factors (check all that apply)    Unable to obtain meds   Chronic illness/pain    Substance abuse XX  Lack of Family Support XX  Financial stress XX  Isolation   Inadequate Community Resources XX  Suicide attempt(s)  Not taking medications XX  Victim of crime   Developmental Delay  Unable to manage personal needs    Age 65 or older   Homeless  No transportation   Readmission within 30 days  Unemployment  Traumatic Event    Psychiatric Advanced Directives: N/A    Family to Involve in Treatment: Potentially ex-fiance, Dad    Sexual Orientation:  N/A    Patient Strengths: Insurance    Patient Barriers: Substance use, Non-adherence to medications, History of suicide attempt    Opiate Education Provided:  Denies    [facility]/mental health history: Last at [facility] in 2022. Not currently linked to [facility]. PCP is [facility].    Plan of Care   medication management, group/individual therapies, family meetings, psycho -education, treatment team meetings to assist with stabilization    Initial Discharge Plan:  Interested in [facility] treatment, spec. Tiffany. Otherwise Housing TBD and link to [facility].    Clinical Summary:    Niurka is a 45yo admitted to the [facility]. At time of assessment, she endorses suicidal ideation without plan or intent and contracts for safety. She denies homicidal ideation and hallucinations.    Niurka has inpatient treatment history and was last at the [facility] in 2022. She is not currently linked to [facility] but has history with [facility] and does not wish to re-link with them. Her PCP is [facility].  She denies opiate use and endorses cocaine use; Offered [facility] treatment resources however declined stating that she knows she has limited options due to having Medicare.  She endorses past and denies current legal concerns.  She endorses past and denies current abuse

## 2025-02-19 NOTE — GROUP NOTE
Group Therapy Note    Date: 2/19/2025    Group Start Time: 0800  Group End Time: 0815  Group Topic: Nursing    Kate Arevalo RN      Group Therapy Note     Date: 2/19/2025     Group Start Time: 0800  Group End Time: 0815  Group Topic: Orientation Group     BEAR Love RN           Group Therapy Note     Attendees: 5/11     Patient attended morning orientation group and actively listened while educated on unit policies, expectations, and orientation. Patient was reminded of group times, location of white board, today's staff, shower location/time, group time limitations at nurses station, phone and tv times. Patient educated on discharge process and planning as well as informed that all patient's are seen by a MD or NP every day.            Signature: Kate YEUNG RN

## 2025-02-19 NOTE — GROUP NOTE
Group Therapy Note    Date: 2/19/2025    Group Start Time: 0900  Group End Time: 0945  Group Topic: Discharge Planning    Lin Ma        Group Therapy Note    Attendees: 4/11     Goals/Discharge planning  Group Note        Date: February 19, 2025 Start Time: 9am  End Time:  0945      Number of Participants in Group & Unit Census:  4/11    Topic: Goals/ Discharge     Goal of Group: Set short term goal for the day      Comments:     Patient did not participate in  Goal group/Discharge planning  group, despite staff encouragement and explanation of benefits.  Patient remain seclusive to self.  Q15 minute safety checks maintained for patient safety and will continue to encourage patient to attend unit programming.

## 2025-02-19 NOTE — H&P
[facility] Internal Medicine  Azam Bains MD; Suarj Milian MD, Scott Amin MD, Thais Gaitan MD, Dr Sandra Guillermo MD; Louis Faust MD    [facility] Internal Medicine   IN-PATIENT SERVICE   [facility]     HISTORY AND PHYSICAL EXAMINATION            Date:   2/19/2025  Patient name:  Niurka Gama  Date of admission:  2/18/2025  3:32 PM  MRN:   [id number]  Account:  [id number]  YOB: 1980  PCP:    Kay Devine APRN - CNP  Room:   [address] [place] [address]  Code Status:    Full Code      Chief Complaint:     Asthma  cp    History Obtained From:     Patient/EMR/bedside RN     History of Present Illness:     44-year-old lady with a history of smoking history of COPD moderate persistent asthma patient complains of dyspnea and chest pain this morning  Chest pain was sudden in onset worse with breathing no worsening of the pain with exertion some worsening with cough dyspnea was mild to moderate did not need oxygen    At the time of examination patient was breathing comfortably on room air auscultation revealed mild wheezing and good air entry  No chest wall tenderness patient was comfortable and has no dyspnea    Past Medical History:     Past Medical History:   Diagnosis Date    Anxiety     Arthritis     OA    Asthma     Sadler's esophagus     LONG SEGMENT    Bipolar 1 disorder ([facility])     Chronic insomnia     COPD (chronic obstructive pulmonary disease) ([facility])     Depression     and bipolar    Diabetes mellitus ([facility])     prediabetic    Endometriosis 3/9/2020    Esophagitis     severe    GERD (gastroesophageal reflux disease)     Headache(784.0)     Herniated disc, cervical     Hiatal hernia     Incisional abscess 1/15/2019    Kidney stones     MDRO (multiple drug resistant organisms) resistance     mrsa    Pleurisy     hx of \"years ago\"    Pneumonia     past penumonia    Seizure ([facility])     Seizures ([facility])     2016 last seizure-focal seizure    UTI 
    [facility] Internal Medicine  Azam Bains MD; Suraj Milian MD, Scott Amin MD, Thais Gaitan MD, Dr Sandra Guillermo MD; Louis Faust MD    [facility] Internal Medicine   IN-PATIENT SERVICE   [facility]     HISTORY AND PHYSICAL EXAMINATION            Date:   2/19/2025  Patient name:  Niurka Gama  Date of admission:  2/18/2025  3:32 PM  MRN:   [id number]  Account:  [id number]  YOB: 1980  PCP:    Kay Devine APRN - CNP  Room:   [address] [place] [address]  Code Status:    Full Code      Chief Complaint:     Asthma  cp    History Obtained From:     Patient/EMR/bedside RN     History of Present Illness:     44-year-old lady with a history of smoking history of COPD moderate persistent asthma patient complains of dyspnea and chest pain this morning  Chest pain was sudden in onset worse with breathing no worsening of the pain with exertion some worsening with cough dyspnea was mild to moderate did not need oxygen    At the time of examination patient was breathing comfortably on room air auscultation revealed mild wheezing and good air entry  No chest wall tenderness patient was comfortable and has no dyspnea    Past Medical History:     Past Medical History:   Diagnosis Date    Anxiety     Arthritis     OA    Asthma     Sadler's esophagus     LONG SEGMENT    Bipolar 1 disorder ([facility])     Chronic insomnia     COPD (chronic obstructive pulmonary disease) ([facility])     Depression     and bipolar    Diabetes mellitus ([facility])     prediabetic    Endometriosis 3/9/2020    Esophagitis     severe    GERD (gastroesophageal reflux disease)     Headache(784.0)     Herniated disc, cervical     Hiatal hernia     Incisional abscess 1/15/2019    Kidney stones     MDRO (multiple drug resistant organisms) resistance     mrsa    Pleurisy     hx of \"years ago\"    Pneumonia     past penumonia    Seizure ([facility])     Seizures ([facility])     2016 last seizure-focal seizure    UTI 
Kiah Núñez MD at [facility] ENDO    URETER SURGERY Left 4/19/2022    HOLMIUM LASER STANDBY,  CYSTOSCOPY, LEFT URETEROSCOPY , LEFT STENT EXCHANGE, LEFT RETROGRADE PYELOGRAM performed by Carlo Parikh MD at [facility] OR    URETEROSCOPY Left 04/19/2022    HOLMIUM LASER STANDBY,  CYSTOSCOPY, LEFT URETEROSCOPY , LEFT STENT EXCHANGE, LEFT RETROGRADE PYELOGRAM (Left)    WISDOM TOOTH EXTRACTION         Allergies:  Nsaids, Toradol [ketorolac tromethamine], and Ultram [tramadol]         Social History:     Born in: [place], [place]  Family: Raised by her father, no relationship with mother.  Reports close with father  Highest Level of Education: 11th grade  Occupation: Unemployed, receives SSI  Marital Status: Single  Children: 2 children however she cannot see them  Residence: Currently homeless [facility]  Stressors: Homelessness, recently relapse, chronic mental illness  Patient Assets/Supportive Factors: Patient is seeking additional support         DRUG USE HISTORY  Social History     Tobacco Use   Smoking Status Every Day    Current packs/day: 0.50    Average packs/day: 0.5 packs/day for 21.0 years (10.5 ttl pk-yrs)    Types: Cigarettes   Smokeless Tobacco Never     Social History     Substance and Sexual Activity   Alcohol Use No     Social History     Substance and Sexual Activity   Drug Use Yes    Comment: dee Bah endorses recently relapsing on crack-cocaine and using for the past month.  She reports that prior to this she was 2 years sober.  She also reports occasional cannabis use to help her \"sleep.\"  She denies other illicit drug or alcohol use.  She is interested in possible inpatient rehabilitation at discharge.          LEGAL HISTORY:   HISTORY OF INCARCERATION: [x] Yes [] No    Family History:       Problem Relation Age of Onset    Cancer Mother         breast    Bipolar Disorder Mother     Hypertension Mother     Breast Cancer Mother     Bipolar Disorder Brother

## 2025-02-19 NOTE — PLAN OF CARE
Problem: Depression  Goal: Will be euthymic at discharge  Description: INTERVENTIONS:  1. Administer medication as ordered  2. Provide emotional support via 1:1 interaction with staff  3. Encourage involvement in milieu/groups/activities  4. Monitor for social isolation  Outcome: Progressing     Problem: Behavior  Goal: Pt/Family maintain appropriate behavior and adhere to behavioral management agreement, if implemented  Description: INTERVENTIONS:  1. Assess patient/family's coping skills and  non-compliant behavior (including use of illegal substances)  2. Notify security of behavior or suspected illegal substances which indicate the need for search of the family and/or belongings  3. Encourage verbalization of thoughts and concerns in a socially appropriate manner  4. Utilize positive, consistent limit setting strategies supporting safety of patient, staff and others  5. Encourage participation in the decision making process about the behavioral management agreement  6. If a visitor's behavior poses a threat to safety call refer to organization policy.  7. Initiate consult with , Psychosocial CNS, Spiritual Care as appropriate  Outcome: Progressing

## 2025-02-19 NOTE — GROUP NOTE
Group Therapy Note    Date: 2/19/2025    Group Start Time: 1000  Group End Time: 1035  Group Topic: Psychotherapy     Yudi Clement MSW        Group Therapy Note    Attendees: 3/11     Patient was offered group therapy today but declined to participate despite encouragement from staff.  1:1 was offered.    Discipline Responsible: /Counselor      Signature:  MIKE Mendez

## 2025-02-20 ENCOUNTER — APPOINTMENT (OUTPATIENT)
Dept: GENERAL RADIOLOGY | Age: 45
DRG: 881 | End: 2025-02-20
Payer: MEDICARE

## 2025-02-20 LAB
ANION GAP SERPL CALCULATED.3IONS-SCNC: 9 MMOL/L (ref 9–16)
BASOPHILS # BLD: 0 K/UL (ref 0–0.2)
BASOPHILS NFR BLD: 0 % (ref 0–2)
BUN SERPL-MCNC: 17 MG/DL (ref 6–20)
CALCIUM SERPL-MCNC: 9 MG/DL (ref 8.6–10.4)
CHLORIDE SERPL-SCNC: 106 MMOL/L (ref 98–107)
CO2 SERPL-SCNC: 23 MMOL/L (ref 20–31)
CREAT SERPL-MCNC: 0.7 MG/DL (ref 0.7–1.2)
DATE, STOOL #1: NORMAL
EOSINOPHIL # BLD: 0.2 K/UL (ref 0–0.4)
EOSINOPHILS RELATIVE PERCENT: 2 % (ref 0–4)
ERYTHROCYTE [DISTWIDTH] IN BLOOD BY AUTOMATED COUNT: 15.5 % (ref 11.5–14.9)
GFR, ESTIMATED: >90 ML/MIN/1.73M2
GLUCOSE SERPL-MCNC: 101 MG/DL (ref 74–99)
HCT VFR BLD AUTO: 40.6 % (ref 36–46)
HEMOCCULT SP1 STL QL: NEGATIVE
HGB BLD-MCNC: 13 G/DL (ref 12–16)
LYMPHOCYTES NFR BLD: 1.8 K/UL (ref 1–4.8)
LYMPHOCYTES RELATIVE PERCENT: 22 % (ref 24–44)
MCH RBC QN AUTO: 27 PG (ref 26–34)
MCHC RBC AUTO-ENTMCNC: 32.1 G/DL (ref 31–37)
MCV RBC AUTO: 84.3 FL (ref 80–100)
MONOCYTES NFR BLD: 0.4 K/UL (ref 0.1–1.3)
MONOCYTES NFR BLD: 5 % (ref 1–7)
NEUTROPHILS NFR BLD: 71 % (ref 36–66)
NEUTS SEG NFR BLD: 5.7 K/UL (ref 1.3–9.1)
PLATELET # BLD AUTO: 273 K/UL (ref 150–450)
PMV BLD AUTO: 8.8 FL (ref 6–12)
POTASSIUM SERPL-SCNC: 4.9 MMOL/L (ref 3.7–5.3)
RBC # BLD AUTO: 4.82 M/UL (ref 4–5.2)
SODIUM SERPL-SCNC: 138 MMOL/L (ref 136–145)
TIME, STOOL #1: 1739
WBC OTHER # BLD: 8 K/UL (ref 3.5–11)

## 2025-02-20 PROCEDURE — 6370000000 HC RX 637 (ALT 250 FOR IP): Performed by: PSYCHIATRY & NEUROLOGY

## 2025-02-20 PROCEDURE — 6370000000 HC RX 637 (ALT 250 FOR IP): Performed by: INTERNAL MEDICINE

## 2025-02-20 PROCEDURE — 6370000000 HC RX 637 (ALT 250 FOR IP)

## 2025-02-20 PROCEDURE — 85025 COMPLETE CBC W/AUTO DIFF WBC: CPT

## 2025-02-20 PROCEDURE — 36415 COLL VENOUS BLD VENIPUNCTURE: CPT

## 2025-02-20 PROCEDURE — 94640 AIRWAY INHALATION TREATMENT: CPT

## 2025-02-20 PROCEDURE — 74018 RADEX ABDOMEN 1 VIEW: CPT

## 2025-02-20 PROCEDURE — 80048 BASIC METABOLIC PNL TOTAL CA: CPT

## 2025-02-20 PROCEDURE — 1240000000 HC EMOTIONAL WELLNESS R&B

## 2025-02-20 PROCEDURE — 94761 N-INVAS EAR/PLS OXIMETRY MLT: CPT

## 2025-02-20 PROCEDURE — 82270 OCCULT BLOOD FECES: CPT

## 2025-02-20 PROCEDURE — 99232 SBSQ HOSP IP/OBS MODERATE 35: CPT | Performed by: PSYCHIATRY & NEUROLOGY

## 2025-02-20 PROCEDURE — 99231 SBSQ HOSP IP/OBS SF/LOW 25: CPT | Performed by: INTERNAL MEDICINE

## 2025-02-20 RX ORDER — DICYCLOMINE HCL 20 MG
20 TABLET ORAL
Status: DISCONTINUED | OUTPATIENT
Start: 2025-02-20 | End: 2025-02-24 | Stop reason: HOSPADM

## 2025-02-20 RX ORDER — SIMETHICONE 80 MG
80 TABLET,CHEWABLE ORAL EVERY 6 HOURS PRN
Status: DISCONTINUED | OUTPATIENT
Start: 2025-02-20 | End: 2025-02-24 | Stop reason: HOSPADM

## 2025-02-20 RX ADMIN — IPRATROPIUM BROMIDE AND ALBUTEROL SULFATE 1 DOSE: .5; 2.5 SOLUTION RESPIRATORY (INHALATION) at 03:12

## 2025-02-20 RX ADMIN — Medication 2 PUFF: at 08:54

## 2025-02-20 RX ADMIN — PREGABALIN 50 MG: 50 CAPSULE ORAL at 20:40

## 2025-02-20 RX ADMIN — IPRATROPIUM BROMIDE AND ALBUTEROL SULFATE 1 DOSE: 2.5; .5 SOLUTION RESPIRATORY (INHALATION) at 19:48

## 2025-02-20 RX ADMIN — NICOTINE POLACRILEX 2 MG: 2 LOZENGE ORAL at 19:20

## 2025-02-20 RX ADMIN — ALBUTEROL SULFATE 2 PUFF: 90 AEROSOL, METERED RESPIRATORY (INHALATION) at 08:53

## 2025-02-20 RX ADMIN — IPRATROPIUM BROMIDE AND ALBUTEROL SULFATE 1 DOSE: 2.5; .5 SOLUTION RESPIRATORY (INHALATION) at 16:28

## 2025-02-20 RX ADMIN — PANTOPRAZOLE SODIUM 40 MG: 40 TABLET, DELAYED RELEASE ORAL at 08:53

## 2025-02-20 RX ADMIN — SERTRALINE 50 MG: 50 TABLET, FILM COATED ORAL at 21:54

## 2025-02-20 RX ADMIN — TRAZODONE HYDROCHLORIDE 50 MG: 50 TABLET ORAL at 21:54

## 2025-02-20 RX ADMIN — PREGABALIN 50 MG: 50 CAPSULE ORAL at 14:06

## 2025-02-20 RX ADMIN — IPRATROPIUM BROMIDE AND ALBUTEROL SULFATE 1 DOSE: 2.5; .5 SOLUTION RESPIRATORY (INHALATION) at 11:19

## 2025-02-20 RX ADMIN — LAMOTRIGINE 25 MG: 25 TABLET ORAL at 21:54

## 2025-02-20 RX ADMIN — MONTELUKAST 10 MG: 10 TABLET, FILM COATED ORAL at 21:53

## 2025-02-20 RX ADMIN — DICYCLOMINE HYDROCHLORIDE 20 MG: 20 TABLET ORAL at 21:53

## 2025-02-20 RX ADMIN — NICOTINE POLACRILEX 2 MG: 2 LOZENGE ORAL at 15:12

## 2025-02-20 RX ADMIN — NICOTINE POLACRILEX 2 MG: 2 LOZENGE ORAL at 07:30

## 2025-02-20 RX ADMIN — Medication 2 PUFF: at 19:51

## 2025-02-20 RX ADMIN — ANTACID TABLETS 500 MG: 500 TABLET, CHEWABLE ORAL at 21:19

## 2025-02-20 RX ADMIN — Medication 1000 UNITS: at 21:54

## 2025-02-20 RX ADMIN — PREGABALIN 50 MG: 50 CAPSULE ORAL at 08:53

## 2025-02-20 RX ADMIN — HYDROXYZINE HYDROCHLORIDE 50 MG: 50 TABLET, FILM COATED ORAL at 23:12

## 2025-02-20 ASSESSMENT — PAIN SCALES - GENERAL
PAINLEVEL_OUTOF10: 9
PAINLEVEL_OUTOF10: 0

## 2025-02-20 ASSESSMENT — PAIN DESCRIPTION - DESCRIPTORS: DESCRIPTORS: ACHING;CRAMPING;DISCOMFORT

## 2025-02-20 ASSESSMENT — PAIN DESCRIPTION - LOCATION: LOCATION: ABDOMEN

## 2025-02-20 NOTE — BH NOTE
Stool collected and placed into specimen cup. Writer notes brown, soft stool, no blood visibly noted.

## 2025-02-20 NOTE — BH NOTE
Writer placed specimen collection hat in patient's toilet. Patient aware of need for stool sample.

## 2025-02-20 NOTE — BH NOTE
Charge nurse-   Patient's primary nurse called writer because patient was becoming agitated regarding her wrist brace and wanted to \"speak to a supervisor.\" Writer went to discuss with the patient her concerns. Patient reports that she has carpel tunnel syndrome and states that she has a note from her neurosurgeon that she's supposed to wear her brace at night. However, per historical documentation patient told [facility] staff that she \"cannot wear it at night.\" Staff reached out to on-call psychiatrist, who stated that it will need to be evaluated in the morning by internal medicine. Patient began to state that she is going to get a  and danielle the hospital, that the [facility] is not helping her, and that she will sign herself out tomorrow. Writer used therapeutic communication during the interaction and offered pharmacologic and non-pharmacologic pain intervention (I.e PRN Tylenol, ice), which patient refused.

## 2025-02-20 NOTE — BH NOTE
On call provider notified of best practice advisory suggesting patient to be placed on suicide precautions. Provider to discontinue order as patient does not meet criteria for suicide precautions at this time. Continue to observe patient on every 15 minute checks.

## 2025-02-20 NOTE — BH NOTE
Patient requested to wear arm brace due to carpal tunnel. RN messaged on call Provider. On call provider states patient will need to be evaluated in morning by internal medicine.

## 2025-02-20 NOTE — GROUP NOTE
Group Therapy Note    Date: 2/20/2025    Group Start Time: 0910  Group End Time: 0926  Group Topic: Daily Inventory Group    CZ BHLA G    Michelle Navas LPN        Group Therapy Note    Attendees: 2/9     Patient did not participate in goals group, despite staff encouragement and explanation of benefits.  Patient remain seclusive to self.  Q15 minute safety checks maintained for patient safety and will continue to encourage patient to attend unit programming.       Discipline Responsible: Licensed Practical Nurse        Signature:  Michelle Navas LPN

## 2025-02-20 NOTE — BH NOTE
Patient came to the desk reporting x3 black, tar-like stools within the last two hours. Patient is reporting extreme stomach pain and states when she burps there is a very foul taste in her mouth. Unable to contact Dr. Bains due to [employer] not currently working, house supervisor, Elaine,  was contacted to explain the situation. Per Elaine she will get in contact with Dr. Bains and have him call the unit and to give her a call back if writer does not hear back from MD within 10-15 minutes.

## 2025-02-20 NOTE — PLAN OF CARE
Problem: Depression  Goal: Will be euthymic at discharge  Description: INTERVENTIONS:  1. Administer medication as ordered  2. Provide emotional support via 1:1 interaction with staff  3. Encourage involvement in milieu/groups/activities  4. Monitor for social isolation  Outcome: Progressing     Problem: Anxiety  Goal: Will report anxiety at manageable levels  Description: INTERVENTIONS:  1. Administer medication as ordered  2. Teach and rehearse alternative coping skills  3. Provide emotional support with 1:1 interaction with staff  Outcome: Progressing     Patient is alert and oriented. Patient endorses depression and anxiety. Patient denies the presence of suicidal ideation. Patient remains free from self harm. Patient agreeable to seek out staff should feelings worsen or arise. Every 15 minute safety checks maintained.

## 2025-02-20 NOTE — BH NOTE
Contacted Dr. Bains via [employer] at [phone] to report patient's frustrations with not being woken up for assessment. Patient is currently expressing frustration about not being allowed to wear her wrist brace (see previous notes leading up to this note regarding wrist brace). No response at this time.

## 2025-02-20 NOTE — GROUP NOTE
Group Therapy Note    Date: 2/20/2025    Group Start Time: 1100  Group End Time: 1140  Group Topic: Psychoeducation    Angeles Arias CTRS        Group Therapy Note    Attendees: 4/9    Psych-Ed/Relapse Prevention Group Note        Date: February 20, 2025 Start Time: 11am  End Time: 11:40am      Number of Participants in Group & Unit Census:  4/9    Topic:  interpersonal skills, coping skills, self-expression     Goal of Group: To improve interpersonal skills and coping skills awareness through collaborating with peers and demonstrating self-expression.      Comments:     Patient did not participate in Psych-Ed/Relapse Prevention group, despite staff encouragement and explanation of benefits.  Patient remain seclusive to self.  Q15 minute safety checks maintained for patient safety and will continue to encourage patient to attend unit programming.        Signature:  MONICA Tee

## 2025-02-20 NOTE — PLAN OF CARE
Problem: Depression  Goal: Will be euthymic at discharge  Description: INTERVENTIONS:  1. Administer medication as ordered  2. Provide emotional support via 1:1 interaction with staff  3. Encourage involvement in milieu/groups/activities  4. Monitor for social isolation  2/19/2025 2240 by Yuridia Benson RN  Outcome: Progressing     Problem: Behavior  Goal: Pt/Family maintain appropriate behavior and adhere to behavioral management agreement, if implemented  Description: INTERVENTIONS:  1. Assess patient/family's coping skills and  non-compliant behavior (including use of illegal substances)  2. Notify security of behavior or suspected illegal substances which indicate the need for search of the family and/or belongings  3. Encourage verbalization of thoughts and concerns in a socially appropriate manner  4. Utilize positive, consistent limit setting strategies supporting safety of patient, staff and others  5. Encourage participation in the decision making process about the behavioral management agreement  6. If a visitor's behavior poses a threat to safety call refer to organization policy.  7. Initiate consult with , Psychosocial CNS, Spiritual Care as appropriate  2/19/2025 2240 by Yuridia Benson RN  Outcome: Progressing  Note: Patient is out in day room during this shift watching television and calling supports. Patient became irritable when told she would need evaluated for arm brace before she can wear it. Patient was able to redirect after speaking with charge nurse about the situation. Patient is agreeable to take schedule medication. She is depressed and anxious. She denies suicidal ideation and thoughts of self harm. Staff maintains Q15 minute safety checks.

## 2025-02-20 NOTE — BH NOTE
Writer called lab and asked to be transferred to phlebotomy. Writer transferred and attempted to get ahold of lab to report stat lab ordered. No answer at this time.

## 2025-02-20 NOTE — GROUP NOTE
Group Therapy Note    Date: 2/20/2025    Group Start Time: 1000  Group End Time: 1035  Group Topic: Psychotherapy     Yudi Clement MSW        Group Therapy Note    Attendees: 3/9     Patient was offered group therapy today but declined to participate despite encouragement from staff.  1:1 was offered.    Discipline Responsible: /Counselor      Signature:  MIKE Mendez

## 2025-02-20 NOTE — GROUP NOTE
Group Therapy Note    Date: 2/20/2025    Group Start Time: 1430  Group End Time: 1505  Group Topic: Cognitive Skills    Angeles Arias CTRS        Group Therapy Note    Attendees: 3/9    Cognitive Skills Group Note        Date: February 20, 2025 Start Time: 2:30pm  End Time: 3:05pm      Number of Participants in Group & Unit Census:  3/9    Topic:  interpersonal skills, decision-making, concentration     Goal of Group: To improve interpersonal skills and decision-making through collaborating with peers and concentrating on a presented task.       Comments:     Patient did not participate in Cognitive Skills group, despite staff encouragement and explanation of benefits.  Patient remain seclusive to self.  Q15 minute safety checks maintained for patient safety and will continue to encourage patient to attend unit programming.        Signature:  MONICA Tee

## 2025-02-21 ENCOUNTER — APPOINTMENT (OUTPATIENT)
Dept: CT IMAGING | Age: 45
DRG: 881 | End: 2025-02-21
Payer: MEDICARE

## 2025-02-21 LAB
ALBUMIN SERPL-MCNC: 4.1 G/DL (ref 3.5–5.2)
ALP SERPL-CCNC: 60 U/L (ref 35–104)
ALT SERPL-CCNC: 16 U/L (ref 10–35)
ANION GAP SERPL CALCULATED.3IONS-SCNC: 10 MMOL/L (ref 9–16)
AST SERPL-CCNC: 24 U/L (ref 10–35)
BILIRUB SERPL-MCNC: 0.2 MG/DL (ref 0–1.2)
BILIRUB UR QL STRIP: NEGATIVE
BUN SERPL-MCNC: 18 MG/DL (ref 6–20)
CALCIUM SERPL-MCNC: 9 MG/DL (ref 8.6–10.4)
CHLORIDE SERPL-SCNC: 104 MMOL/L (ref 98–107)
CLARITY UR: CLEAR
CO2 SERPL-SCNC: 24 MMOL/L (ref 20–31)
COLOR UR: YELLOW
COMMENT: NORMAL
CREAT SERPL-MCNC: 0.7 MG/DL (ref 0.7–1.2)
GFR, ESTIMATED: >90 ML/MIN/1.73M2
GLUCOSE SERPL-MCNC: 95 MG/DL (ref 74–99)
GLUCOSE UR STRIP-MCNC: NEGATIVE MG/DL
HGB UR QL STRIP.AUTO: NEGATIVE
KETONES UR STRIP-MCNC: NEGATIVE MG/DL
LEUKOCYTE ESTERASE UR QL STRIP: NEGATIVE
LIPASE SERPL-CCNC: 28 U/L (ref 13–60)
NITRITE UR QL STRIP: NEGATIVE
PH UR STRIP: 6.5 [PH] (ref 5–8)
POTASSIUM SERPL-SCNC: 4.7 MMOL/L (ref 3.7–5.3)
PROT SERPL-MCNC: 7.1 G/DL (ref 6.6–8.7)
PROT UR STRIP-MCNC: NEGATIVE MG/DL
SODIUM SERPL-SCNC: 138 MMOL/L (ref 136–145)
SP GR UR STRIP: 1.02 (ref 1–1.03)
TROPONIN I SERPL HS-MCNC: <6 NG/L (ref 0–14)
UROBILINOGEN UR STRIP-ACNC: NORMAL EU/DL (ref 0–1)

## 2025-02-21 PROCEDURE — 80053 COMPREHEN METABOLIC PANEL: CPT

## 2025-02-21 PROCEDURE — 36415 COLL VENOUS BLD VENIPUNCTURE: CPT

## 2025-02-21 PROCEDURE — 99232 SBSQ HOSP IP/OBS MODERATE 35: CPT | Performed by: PSYCHIATRY & NEUROLOGY

## 2025-02-21 PROCEDURE — 6370000000 HC RX 637 (ALT 250 FOR IP): Performed by: INTERNAL MEDICINE

## 2025-02-21 PROCEDURE — 74176 CT ABD & PELVIS W/O CONTRAST: CPT

## 2025-02-21 PROCEDURE — 84484 ASSAY OF TROPONIN QUANT: CPT

## 2025-02-21 PROCEDURE — 81003 URINALYSIS AUTO W/O SCOPE: CPT

## 2025-02-21 PROCEDURE — 6370000000 HC RX 637 (ALT 250 FOR IP): Performed by: PSYCHIATRY & NEUROLOGY

## 2025-02-21 PROCEDURE — 1240000000 HC EMOTIONAL WELLNESS R&B

## 2025-02-21 PROCEDURE — 6370000000 HC RX 637 (ALT 250 FOR IP)

## 2025-02-21 PROCEDURE — 6370000000 HC RX 637 (ALT 250 FOR IP): Performed by: NURSE PRACTITIONER

## 2025-02-21 PROCEDURE — 94640 AIRWAY INHALATION TREATMENT: CPT

## 2025-02-21 PROCEDURE — 99232 SBSQ HOSP IP/OBS MODERATE 35: CPT | Performed by: INTERNAL MEDICINE

## 2025-02-21 PROCEDURE — 83690 ASSAY OF LIPASE: CPT

## 2025-02-21 PROCEDURE — 94761 N-INVAS EAR/PLS OXIMETRY MLT: CPT

## 2025-02-21 RX ORDER — BISACODYL 10 MG
10 SUPPOSITORY, RECTAL RECTAL DAILY PRN
Status: DISCONTINUED | OUTPATIENT
Start: 2025-02-21 | End: 2025-02-24 | Stop reason: HOSPADM

## 2025-02-21 RX ORDER — SERTRALINE HYDROCHLORIDE 100 MG/1
100 TABLET, FILM COATED ORAL DAILY
Status: DISCONTINUED | OUTPATIENT
Start: 2025-02-21 | End: 2025-02-24 | Stop reason: HOSPADM

## 2025-02-21 RX ORDER — PROMETHAZINE HYDROCHLORIDE 25 MG/1
25 TABLET ORAL EVERY 6 HOURS PRN
Status: DISCONTINUED | OUTPATIENT
Start: 2025-02-21 | End: 2025-02-24 | Stop reason: HOSPADM

## 2025-02-21 RX ADMIN — SIMETHICONE 80 MG: 80 TABLET, CHEWABLE ORAL at 00:18

## 2025-02-21 RX ADMIN — PROMETHAZINE HYDROCHLORIDE 25 MG: 25 TABLET ORAL at 17:40

## 2025-02-21 RX ADMIN — PREGABALIN 50 MG: 50 CAPSULE ORAL at 21:33

## 2025-02-21 RX ADMIN — TRAZODONE HYDROCHLORIDE 50 MG: 50 TABLET ORAL at 21:33

## 2025-02-21 RX ADMIN — Medication 1000 UNITS: at 21:32

## 2025-02-21 RX ADMIN — PANTOPRAZOLE SODIUM 40 MG: 40 TABLET, DELAYED RELEASE ORAL at 07:45

## 2025-02-21 RX ADMIN — BISACODYL 10 MG: 10 SUPPOSITORY RECTAL at 01:20

## 2025-02-21 RX ADMIN — DICYCLOMINE HYDROCHLORIDE 20 MG: 20 TABLET ORAL at 21:32

## 2025-02-21 RX ADMIN — LAMOTRIGINE 25 MG: 25 TABLET ORAL at 21:33

## 2025-02-21 RX ADMIN — PREGABALIN 50 MG: 50 CAPSULE ORAL at 13:37

## 2025-02-21 RX ADMIN — DICYCLOMINE HYDROCHLORIDE 20 MG: 20 TABLET ORAL at 11:48

## 2025-02-21 RX ADMIN — IPRATROPIUM BROMIDE AND ALBUTEROL SULFATE 1 DOSE: 2.5; .5 SOLUTION RESPIRATORY (INHALATION) at 07:19

## 2025-02-21 RX ADMIN — Medication 2 PUFF: at 21:32

## 2025-02-21 RX ADMIN — PROMETHAZINE HYDROCHLORIDE 25 MG: 25 TABLET ORAL at 13:37

## 2025-02-21 RX ADMIN — SERTRALINE HYDROCHLORIDE 100 MG: 100 TABLET ORAL at 21:33

## 2025-02-21 RX ADMIN — DICYCLOMINE HYDROCHLORIDE 20 MG: 20 TABLET ORAL at 17:40

## 2025-02-21 RX ADMIN — SIMETHICONE 80 MG: 80 TABLET, CHEWABLE ORAL at 07:45

## 2025-02-21 RX ADMIN — HYDROXYZINE HYDROCHLORIDE 50 MG: 50 TABLET, FILM COATED ORAL at 21:33

## 2025-02-21 RX ADMIN — PREGABALIN 50 MG: 50 CAPSULE ORAL at 07:46

## 2025-02-21 RX ADMIN — PROMETHAZINE HYDROCHLORIDE 25 MG: 25 TABLET ORAL at 03:50

## 2025-02-21 RX ADMIN — IPRATROPIUM BROMIDE AND ALBUTEROL SULFATE 1 DOSE: 2.5; .5 SOLUTION RESPIRATORY (INHALATION) at 11:14

## 2025-02-21 RX ADMIN — HALOPERIDOL 5 MG: 5 TABLET ORAL at 14:05

## 2025-02-21 RX ADMIN — Medication 2 PUFF: at 07:46

## 2025-02-21 RX ADMIN — IPRATROPIUM BROMIDE AND ALBUTEROL SULFATE 1 DOSE: 2.5; .5 SOLUTION RESPIRATORY (INHALATION) at 21:32

## 2025-02-21 RX ADMIN — DICYCLOMINE HYDROCHLORIDE 20 MG: 20 TABLET ORAL at 07:45

## 2025-02-21 RX ADMIN — MONTELUKAST 10 MG: 10 TABLET, FILM COATED ORAL at 21:32

## 2025-02-21 ASSESSMENT — PAIN SCALES - GENERAL
PAINLEVEL_OUTOF10: 10
PAINLEVEL_OUTOF10: 2
PAINLEVEL_OUTOF10: 2
PAINLEVEL_OUTOF10: 0

## 2025-02-21 ASSESSMENT — PAIN - FUNCTIONAL ASSESSMENT: PAIN_FUNCTIONAL_ASSESSMENT: ACTIVITIES ARE NOT PREVENTED

## 2025-02-21 NOTE — BH NOTE
Rounding with lab, this patient refused a troponin blood draw despite educating her on the purpose of the lab.

## 2025-02-21 NOTE — PLAN OF CARE
Problem: Depression  Goal: Will be euthymic at discharge  Description: INTERVENTIONS:  1. Administer medication as ordered  2. Provide emotional support via 1:1 interaction with staff  3. Encourage involvement in milieu/groups/activities  4. Monitor for social isolation  2/21/2025 1406 by Gonzales Linares RN  Outcome: Progressing  2/21/2025 0138 by Lavonne Powell LPN  Outcome: Not Progressing     Problem: Behavior  Goal: Pt/Family maintain appropriate behavior and adhere to behavioral management agreement, if implemented  Description: INTERVENTIONS:  1. Assess patient/family's coping skills and  non-compliant behavior (including use of illegal substances)  2. Notify security of behavior or suspected illegal substances which indicate the need for search of the family and/or belongings  3. Encourage verbalization of thoughts and concerns in a socially appropriate manner  4. Utilize positive, consistent limit setting strategies supporting safety of patient, staff and others  5. Encourage participation in the decision making process about the behavioral management agreement  6. If a visitor's behavior poses a threat to safety call refer to organization policy.  7. Initiate consult with , Psychosocial CNS, Spiritual Care as appropriate  2/21/2025 1406 by Gonzales Linares RN  Outcome: Not Progressing  2/21/2025 0138 by Lavonne Powell LPN  Outcome: Not Progressing     Problem: Anxiety  Goal: Will report anxiety at manageable levels  Description: INTERVENTIONS:  1. Administer medication as ordered  2. Teach and rehearse alternative coping skills  3. Provide emotional support with 1:1 interaction with staff  2/21/2025 1406 by Gonzales Linares RN  Outcome: Not Progressing  2/21/2025 0138 by Lavonne Powell LPN  Outcome: Not Progressing  Note: Patient endorses depression and anxiety. Patient has been irritable throughout the shift. Patient was observed on the phone tearful and

## 2025-02-21 NOTE — BH NOTE
Patient increasing in agitation as evidenced by heightened use of profanity and elevated vocal volume. Will continue to monitor and provide support as needed.

## 2025-02-21 NOTE — BH NOTE
Emergency PRN Medication Administration Note:      Patient is Agitated and Disruptive as evidence by screaming, yelling, cursing threatening and refusing staff direction. Staff attempted to find and relieve the distress by Talking to patient, Refocusing on new activity, Offering suggestions, Checking for undiagnosed pain, and Administer PRN medications Patient is currently  continuing to escalate  accepted PRN medications. Medication Administered as prescribed: Haldol 5mg oral. Patient Tolerated medication administration.   Will continue to monitor, offer support, and reassess.

## 2025-02-21 NOTE — PLAN OF CARE
Problem: Depression  Goal: Will be euthymic at discharge  Description: INTERVENTIONS:  1. Administer medication as ordered  2. Provide emotional support via 1:1 interaction with staff  3. Encourage involvement in milieu/groups/activities  4. Monitor for social isolation  2/21/2025 0138 by Lavonne Powell LPN  Outcome: Not Progressing     Problem: Behavior  Goal: Pt/Family maintain appropriate behavior and adhere to behavioral management agreement, if implemented  Description: INTERVENTIONS:  1. Assess patient/family's coping skills and  non-compliant behavior (including use of illegal substances)  2. Notify security of behavior or suspected illegal substances which indicate the need for search of the family and/or belongings  3. Encourage verbalization of thoughts and concerns in a socially appropriate manner  4. Utilize positive, consistent limit setting strategies supporting safety of patient, staff and others  5. Encourage participation in the decision making process about the behavioral management agreement  6. If a visitor's behavior poses a threat to safety call refer to organization policy.  7. Initiate consult with , Psychosocial CNS, Spiritual Care as appropriate  2/21/2025 0138 by Lavonne Powell LPN  Outcome: Not Progressing     Problem: Anxiety  Goal: Will report anxiety at manageable levels  Description: INTERVENTIONS:  1. Administer medication as ordered  2. Teach and rehearse alternative coping skills  3. Provide emotional support with 1:1 interaction with staff  2/21/2025 0138 by Lavonne Powell LPN  Outcome: Not Progressing  Note: Patient endorses depression and anxiety. Patient has been irritable throughout the shift. Patient was observed on the phone tearful and talking loudly several times. Patient reports sleep and appetite have been poor due to not feeling well. Patient has been compliant with medications and care and attending to her own hygiene needs.

## 2025-02-21 NOTE — BH NOTE
Emergency Medication Follow-Up Note:    PRN medication of haldol 5mg oral was effective as evidence by Patient regain behavioral control, absence of behavior warranting emergency medication, resting in room. Patient denies medication side effects. Will continue to monitor and provide support as needed.

## 2025-02-21 NOTE — BH NOTE
Patient has been complaining of diarrhea, nausea and stomach pain since the start of this shift. Writer has contacted the on call provider several times with multiple orders received CBC,KUB, Bentyl, Simethicone, Bisacodyl suppository and an occult stool was collected prior to this shift starting. Patient reports symptoms have not been resolved with any of the orders. CBC and KUB results were reviewed by the on call providers. Patient also received approval for her right upper extremity brace for carpel tunnel to be worn nocturnally and line of sight orders placed by attending MD.

## 2025-02-21 NOTE — GROUP NOTE
Group Therapy Note    Date: 2/21/2025    Group Start Time: 1430  Group End Time: 1515  Group Topic: Community Meeting    [facility] Modesto Garibay, RN        Group Therapy Note    Attendees: 5/14   Patient was offered group therapy today but declined to participate despite encouragement from staff.  1:1 was offered.

## 2025-02-21 NOTE — BH NOTE
Stat labs drawn. Urine collected for testing patient refusing covid testing and EKG asking it to be done at another time. Will continue to monitor and support patient.

## 2025-02-21 NOTE — GROUP NOTE
Group Therapy Note    Date: 2/21/2025    Group Start Time: 1100  Group End Time: 1145  Group Topic: Cognitive Skills    Angeles Arias CTRS        Group Therapy Note    Attendees: 2/8    Cognitive Skills Group Note        Date: February 21, 2025 Start Time: 11am  End Time: 11:45am      Number of Participants in Group & Unit Census:  2/8    Topic:  interpersonal skills, decision-making, concentration     Goal of Group: To improve interpersonal skills and decision-making through collaborating with peers and concentrating on a presented task.       Comments:     Patient did not participate in Cognitive Skills group, despite staff encouragement and explanation of benefits.  Patient remain seclusive to self.  Q15 minute safety checks maintained for patient safety and will continue to encourage patient to attend unit programming.        Signature:  MONICA Tee

## 2025-02-21 NOTE — BH NOTE
Patient continued to complain of severe stomach pain with diarrhea and emesis demanding to be sent to the ER on call provider notified again at 0223 of symptoms and requested for patient to be evaluated in person orders received for STAT CT patient taken down to CT at 0315 and returned with the CNP at her side. Patient evaluated by CNP and new orders received for Phenergan and STAT CMP medication was administered and labs were drawn awaiting results.

## 2025-02-21 NOTE — BH NOTE
[facility]  Day 3 Interdisciplinary Treatment Plan NOTE    Review Date & Time: 2/21/25 1300    Admission Type:   Admission Type: Voluntary    Reason for admission:  Reason for Admission: Patient has suicidal ideations to walk into traffic due to life stressors.  Estimated Length of Stay: 5-7 days  Estimated Discharge Date Update: to be determined by physician    PATIENT STRENGTHS:  Patient Strengths    Patient Strengths and Limitations:Limitations: Difficulty problem solving/relies on others to help solve problems, Apathetic / unmotivated, Inappropriate/potentially harmful leisure interests  Addictive Behavior:Addictive Behavior  In the Past 3 Months, Have You Felt or Has Someone Told You That You Have a Problem With  : None  Medical Problems:  Past Medical History:   Diagnosis Date    Anxiety     Arthritis     OA    Asthma     Sadler's esophagus     LONG SEGMENT    Bipolar 1 disorder ([facility])     Chronic insomnia     COPD (chronic obstructive pulmonary disease) ([facility])     Depression     and bipolar    Diabetes mellitus ([facility])     prediabetic    Endometriosis 3/9/2020    Esophagitis     severe    GERD (gastroesophageal reflux disease)     Headache(784.0)     Herniated disc, cervical     Hiatal hernia     Incisional abscess 1/15/2019    Kidney stones     MDRO (multiple drug resistant organisms) resistance     mrsa    Pleurisy     hx of \"years ago\"    Pneumonia     past penumonia    Seizure ([facility])     Seizures ([facility])     2016 last seizure-focal seizure    UTI (urinary tract infection)     Vision abnormalities     wears glasses       Risk:  Fall Risk   Conor Scale Conor Scale Score: 22  BVC    Change in scores no Changes to plan of Care no    Status EXAM:   Mental Status and Behavioral Exam  Normal: No  Level of Assistance: Independent/Self  Facial Expression: Worried, Sad  Affect: Congruent  Level of Consciousness: Alert  Frequency of Checks: 4 times per hour, close  Mood:Normal: No  Mood: Depressed,

## 2025-02-22 PROCEDURE — 6370000000 HC RX 637 (ALT 250 FOR IP): Performed by: INTERNAL MEDICINE

## 2025-02-22 PROCEDURE — 6370000000 HC RX 637 (ALT 250 FOR IP): Performed by: PSYCHIATRY & NEUROLOGY

## 2025-02-22 PROCEDURE — 90833 PSYTX W PT W E/M 30 MIN: CPT | Performed by: PSYCHIATRY & NEUROLOGY

## 2025-02-22 PROCEDURE — APPSS30 APP SPLIT SHARED TIME 16-30 MINUTES: Performed by: NURSE PRACTITIONER

## 2025-02-22 PROCEDURE — 99232 SBSQ HOSP IP/OBS MODERATE 35: CPT | Performed by: PSYCHIATRY & NEUROLOGY

## 2025-02-22 PROCEDURE — 94640 AIRWAY INHALATION TREATMENT: CPT

## 2025-02-22 PROCEDURE — 94761 N-INVAS EAR/PLS OXIMETRY MLT: CPT

## 2025-02-22 PROCEDURE — 6370000000 HC RX 637 (ALT 250 FOR IP)

## 2025-02-22 PROCEDURE — 1240000000 HC EMOTIONAL WELLNESS R&B

## 2025-02-22 PROCEDURE — 6370000000 HC RX 637 (ALT 250 FOR IP): Performed by: NURSE PRACTITIONER

## 2025-02-22 RX ADMIN — IPRATROPIUM BROMIDE AND ALBUTEROL SULFATE 1 DOSE: 2.5; .5 SOLUTION RESPIRATORY (INHALATION) at 07:36

## 2025-02-22 RX ADMIN — TRAZODONE HYDROCHLORIDE 50 MG: 50 TABLET ORAL at 21:45

## 2025-02-22 RX ADMIN — DICYCLOMINE HYDROCHLORIDE 20 MG: 20 TABLET ORAL at 20:43

## 2025-02-22 RX ADMIN — Medication 2 PUFF: at 07:46

## 2025-02-22 RX ADMIN — LAMOTRIGINE 25 MG: 25 TABLET ORAL at 21:45

## 2025-02-22 RX ADMIN — PREGABALIN 50 MG: 50 CAPSULE ORAL at 14:31

## 2025-02-22 RX ADMIN — PROMETHAZINE HYDROCHLORIDE 25 MG: 25 TABLET ORAL at 23:14

## 2025-02-22 RX ADMIN — BENZOCAINE: 0.1 GEL TOPICAL at 20:46

## 2025-02-22 RX ADMIN — NICOTINE POLACRILEX 2 MG: 2 LOZENGE ORAL at 14:32

## 2025-02-22 RX ADMIN — IPRATROPIUM BROMIDE AND ALBUTEROL SULFATE 1 DOSE: 2.5; .5 SOLUTION RESPIRATORY (INHALATION) at 11:33

## 2025-02-22 RX ADMIN — IPRATROPIUM BROMIDE AND ALBUTEROL SULFATE 1 DOSE: 2.5; .5 SOLUTION RESPIRATORY (INHALATION) at 15:19

## 2025-02-22 RX ADMIN — PANTOPRAZOLE SODIUM 40 MG: 40 TABLET, DELAYED RELEASE ORAL at 09:50

## 2025-02-22 RX ADMIN — DICYCLOMINE HYDROCHLORIDE 20 MG: 20 TABLET ORAL at 11:17

## 2025-02-22 RX ADMIN — Medication 3 MG: at 23:14

## 2025-02-22 RX ADMIN — ACETAMINOPHEN 650 MG: 325 TABLET ORAL at 20:05

## 2025-02-22 RX ADMIN — Medication 1000 UNITS: at 21:45

## 2025-02-22 RX ADMIN — PREGABALIN 50 MG: 50 CAPSULE ORAL at 09:50

## 2025-02-22 RX ADMIN — PROMETHAZINE HYDROCHLORIDE 25 MG: 25 TABLET ORAL at 14:31

## 2025-02-22 RX ADMIN — Medication 2 PUFF: at 19:35

## 2025-02-22 RX ADMIN — MONTELUKAST 10 MG: 10 TABLET, FILM COATED ORAL at 20:43

## 2025-02-22 RX ADMIN — IPRATROPIUM BROMIDE AND ALBUTEROL SULFATE 1 DOSE: 2.5; .5 SOLUTION RESPIRATORY (INHALATION) at 19:35

## 2025-02-22 RX ADMIN — PREGABALIN 50 MG: 50 CAPSULE ORAL at 20:43

## 2025-02-22 RX ADMIN — DICYCLOMINE HYDROCHLORIDE 20 MG: 20 TABLET ORAL at 17:30

## 2025-02-22 RX ADMIN — NICOTINE POLACRILEX 2 MG: 2 LOZENGE ORAL at 18:09

## 2025-02-22 RX ADMIN — SERTRALINE HYDROCHLORIDE 100 MG: 100 TABLET ORAL at 21:45

## 2025-02-22 RX ADMIN — HYDROXYZINE HYDROCHLORIDE 50 MG: 50 TABLET, FILM COATED ORAL at 18:09

## 2025-02-22 ASSESSMENT — PAIN SCALES - GENERAL
PAINLEVEL_OUTOF10: 5
PAINLEVEL_OUTOF10: 3
PAINLEVEL_OUTOF10: 4
PAINLEVEL_OUTOF10: 5
PAINLEVEL_OUTOF10: 7
PAINLEVEL_OUTOF10: 6
PAINLEVEL_OUTOF10: 0

## 2025-02-22 ASSESSMENT — PAIN DESCRIPTION - DESCRIPTORS
DESCRIPTORS: THROBBING
DESCRIPTORS: THROBBING

## 2025-02-22 ASSESSMENT — PAIN SCALES - WONG BAKER
WONGBAKER_NUMERICALRESPONSE: NO HURT

## 2025-02-22 ASSESSMENT — PAIN DESCRIPTION - LOCATION
LOCATION: TEETH
LOCATION: ABDOMEN
LOCATION: TEETH
LOCATION: ABDOMEN

## 2025-02-22 NOTE — GROUP NOTE
Group Therapy Note    Date: 2/22/2025    Group Start Time: 1030  Group End Time: 1115  Group Topic: Psychoeducation    CZ BHNiurka Jorgensen, LSW        Group Therapy Note    Attendees: 5/12       patient refused to attend psychoeducation group at 10a after encouragement from staff.  1:1 talk time provided as alternative to group session

## 2025-02-22 NOTE — GROUP NOTE
Psych-Ed/Relapse Prevention Group Note        Date: February 22, 2025 Start Time: 2pm  End Time:  2:45pm      Number of Participants in Group & Unit Census:  8/12    Topic: Coping skills    Goal of Group:Patient will identify benefits of music for coping and relaxation      Comments:     Patient did not participate in Psych-Ed/Relapse Prevention group, despite staff encouragement and explanation of benefits.  Patient remain seclusive to self.  Q15 minute safety checks maintained for patient safety and will continue to encourage patient to attend unit programming.         Signature:  Ayala Gill, CTRS

## 2025-02-22 NOTE — PLAN OF CARE
Problem: Anxiety  Goal: Will report anxiety at manageable levels  Description: INTERVENTIONS:  1. Administer medication as ordered  2. Teach and rehearse alternative coping skills  3. Provide emotional support with 1:1 interaction with staff  2/22/2025 1342 by Nayla Tapia LPN  Outcome: Progressing     Problem: Safety - Adult  Goal: Free from fall injury  Outcome: Progressing     Problem: Depression  Goal: Will be euthymic at discharge  Description: INTERVENTIONS:  1. Administer medication as ordered  2. Provide emotional support via 1:1 interaction with staff  3. Encourage involvement in milieu/groups/activities  4. Monitor for social isolation  2/22/2025 1342 by Nayla Tapia LPN  Outcome: Not Progressing  2/22/2025 0447 by Victorina Lemus RN  Outcome: Progressing     Problem: Behavior  Goal: Pt/Family maintain appropriate behavior and adhere to behavioral management agreement, if implemented  Description: INTERVENTIONS:  1. Assess patient/family's coping skills and  non-compliant behavior (including use of illegal substances)  2. Notify security of behavior or suspected illegal substances which indicate the need for search of the family and/or belongings  3. Encourage verbalization of thoughts and concerns in a socially appropriate manner  4. Utilize positive, consistent limit setting strategies supporting safety of patient, staff and others  5. Encourage participation in the decision making process about the behavioral management agreement  6. If a visitor's behavior poses a threat to safety call refer to organization policy.  7. Initiate consult with , Psychosocial CNS, Spiritual Care as appropriate  Outcome: Not Progressing     Problem: Pain  Goal: Verbalizes/displays adequate comfort level or baseline comfort level  Outcome: Not Progressing    Patient remains safe on unit, free from injury and self-harm.  Patient denies suicidal ideations or thoughts of self-harm.  15 minute

## 2025-02-22 NOTE — PLAN OF CARE
Problem: Depression  Goal: Will be euthymic at discharge  Description: INTERVENTIONS:  1. Administer medication as ordered  2. Provide emotional support via 1:1 interaction with staff  3. Encourage involvement in milieu/groups/activities  4. Monitor for social isolation  Outcome: Progressing     Problem: Anxiety  Goal: Will report anxiety at manageable levels  Description: INTERVENTIONS:  1. Administer medication as ordered  2. Teach and rehearse alternative coping skills  3. Provide emotional support with 1:1 interaction with staff  Outcome: Progressing     Patient denies depression or anxiety at this time. Patient has been isolative to room, flat and blunt. Patient has not complained of any nausea or vomiting at this time. Writer will continue to monitor patient status and offer emotional support. [id number] checks implemented per protocol. Patient continues to be [facility]BC at this time.

## 2025-02-23 PROCEDURE — 6370000000 HC RX 637 (ALT 250 FOR IP)

## 2025-02-23 PROCEDURE — 6370000000 HC RX 637 (ALT 250 FOR IP): Performed by: PSYCHIATRY & NEUROLOGY

## 2025-02-23 PROCEDURE — 99232 SBSQ HOSP IP/OBS MODERATE 35: CPT | Performed by: NURSE PRACTITIONER

## 2025-02-23 PROCEDURE — 6370000000 HC RX 637 (ALT 250 FOR IP): Performed by: INTERNAL MEDICINE

## 2025-02-23 PROCEDURE — 94640 AIRWAY INHALATION TREATMENT: CPT

## 2025-02-23 PROCEDURE — 1240000000 HC EMOTIONAL WELLNESS R&B

## 2025-02-23 PROCEDURE — 6370000000 HC RX 637 (ALT 250 FOR IP): Performed by: NURSE PRACTITIONER

## 2025-02-23 RX ADMIN — DICYCLOMINE HYDROCHLORIDE 20 MG: 20 TABLET ORAL at 16:12

## 2025-02-23 RX ADMIN — NICOTINE POLACRILEX 2 MG: 2 LOZENGE ORAL at 15:16

## 2025-02-23 RX ADMIN — PROMETHAZINE HYDROCHLORIDE 25 MG: 25 TABLET ORAL at 19:09

## 2025-02-23 RX ADMIN — IPRATROPIUM BROMIDE AND ALBUTEROL SULFATE 1 DOSE: 2.5; .5 SOLUTION RESPIRATORY (INHALATION) at 20:43

## 2025-02-23 RX ADMIN — PREGABALIN 50 MG: 50 CAPSULE ORAL at 21:06

## 2025-02-23 RX ADMIN — Medication 3 MG: at 21:06

## 2025-02-23 RX ADMIN — LAMOTRIGINE 25 MG: 25 TABLET ORAL at 21:06

## 2025-02-23 RX ADMIN — MONTELUKAST 10 MG: 10 TABLET, FILM COATED ORAL at 21:06

## 2025-02-23 RX ADMIN — DICYCLOMINE HYDROCHLORIDE 20 MG: 20 TABLET ORAL at 06:22

## 2025-02-23 RX ADMIN — PREGABALIN 50 MG: 50 CAPSULE ORAL at 08:40

## 2025-02-23 RX ADMIN — SERTRALINE HYDROCHLORIDE 100 MG: 100 TABLET ORAL at 21:06

## 2025-02-23 RX ADMIN — HYDROXYZINE HYDROCHLORIDE 50 MG: 50 TABLET, FILM COATED ORAL at 22:46

## 2025-02-23 RX ADMIN — Medication 1000 UNITS: at 21:06

## 2025-02-23 RX ADMIN — NICOTINE POLACRILEX 2 MG: 2 LOZENGE ORAL at 12:35

## 2025-02-23 RX ADMIN — IPRATROPIUM BROMIDE AND ALBUTEROL SULFATE 1 DOSE: 2.5; .5 SOLUTION RESPIRATORY (INHALATION) at 15:17

## 2025-02-23 RX ADMIN — PANTOPRAZOLE SODIUM 40 MG: 40 TABLET, DELAYED RELEASE ORAL at 08:40

## 2025-02-23 RX ADMIN — PROMETHAZINE HYDROCHLORIDE 25 MG: 25 TABLET ORAL at 13:05

## 2025-02-23 RX ADMIN — PREGABALIN 50 MG: 50 CAPSULE ORAL at 13:31

## 2025-02-23 RX ADMIN — DICYCLOMINE HYDROCHLORIDE 20 MG: 20 TABLET ORAL at 21:06

## 2025-02-23 RX ADMIN — DICYCLOMINE HYDROCHLORIDE 20 MG: 20 TABLET ORAL at 10:53

## 2025-02-23 RX ADMIN — NICOTINE POLACRILEX 2 MG: 2 LOZENGE ORAL at 16:12

## 2025-02-23 RX ADMIN — TRAZODONE HYDROCHLORIDE 50 MG: 50 TABLET ORAL at 22:46

## 2025-02-23 RX ADMIN — Medication 2 PUFF: at 20:50

## 2025-02-23 RX ADMIN — IPRATROPIUM BROMIDE AND ALBUTEROL SULFATE 1 DOSE: 2.5; .5 SOLUTION RESPIRATORY (INHALATION) at 08:12

## 2025-02-23 RX ADMIN — Medication 2 PUFF: at 08:22

## 2025-02-23 ASSESSMENT — PAIN SCALES - GENERAL
PAINLEVEL_OUTOF10: 0
PAINLEVEL_OUTOF10: 0

## 2025-02-23 ASSESSMENT — LIFESTYLE VARIABLES
HOW MANY STANDARD DRINKS CONTAINING ALCOHOL DO YOU HAVE ON A TYPICAL DAY: PATIENT DOES NOT DRINK
HOW OFTEN DO YOU HAVE A DRINK CONTAINING ALCOHOL: NEVER
HOW MANY STANDARD DRINKS CONTAINING ALCOHOL DO YOU HAVE ON A TYPICAL DAY: PATIENT DOES NOT DRINK
HOW OFTEN DO YOU HAVE A DRINK CONTAINING ALCOHOL: NEVER

## 2025-02-23 ASSESSMENT — PAIN SCALES - WONG BAKER: WONGBAKER_NUMERICALRESPONSE: NO HURT

## 2025-02-23 ASSESSMENT — PAIN - FUNCTIONAL ASSESSMENT: PAIN_FUNCTIONAL_ASSESSMENT: ACTIVITIES ARE NOT PREVENTED

## 2025-02-23 NOTE — GROUP NOTE
Group Therapy Note    Date: 2/23/2025    Group Start Time: 1030  Group End Time: 1115  Group Topic: Psychoeducation    BEAR BHNiurka Jorgensen, LSW        Group Therapy Note    Attendees: 5/12       patient refused to attend psychoeducation group at 1030a after encouragement from staff.  1:1 talk time provided as alternative to group session

## 2025-02-23 NOTE — GROUP NOTE
Psych-Ed/Relapse Prevention Group Note        Date: February 23, 2025 Start Time: 2pm  End Time: 3pm      Number of Participants in Group & Unit Census:  5/12    Topic: Relaxation    Goal of Group:Patient will identify the benefits of creative expression and music for relaxation and stress management      Comments:     Patient did not participate in Psych-Ed/Relapse Prevention group, despite staff encouragement and explanation of benefits.  Patient remain seclusive to self.  Q15 minute safety checks maintained for patient safety and will continue to encourage patient to attend unit programming.         Signature:  Ayala Gill, CTRS

## 2025-02-23 NOTE — PLAN OF CARE
Problem: Pain  Goal: Verbalizes/displays adequate comfort level or baseline comfort level  2/23/2025 1029 by Cristiano Gao LPN  Outcome: Not Progressing     Problem: Depression  Goal: Will be euthymic at discharge  Description: INTERVENTIONS:  1. Administer medication as ordered  2. Provide emotional support via 1:1 interaction with staff  3. Encourage involvement in milieu/groups/activities  4. Monitor for social isolation  2/23/2025 1029 by Cristiano Gao LPN  Outcome: Progressing     Problem: Behavior  Goal: Pt/Family maintain appropriate behavior and adhere to behavioral management agreement, if implemented  Description: INTERVENTIONS:  1. Assess patient/family's coping skills and  non-compliant behavior (including use of illegal substances)  2. Notify security of behavior or suspected illegal substances which indicate the need for search of the family and/or belongings  3. Encourage verbalization of thoughts and concerns in a socially appropriate manner  4. Utilize positive, consistent limit setting strategies supporting safety of patient, staff and others  5. Encourage participation in the decision making process about the behavioral management agreement  6. If a visitor's behavior poses a threat to safety call refer to organization policy.  7. Initiate consult with , Psychosocial CNS, Spiritual Care as appropriate  2/23/2025 1029 by Cristiano Gao LPN  Outcome: Progressing     Problem: Anxiety  Goal: Will report anxiety at manageable levels  Description: INTERVENTIONS:  1. Administer medication as ordered  2. Teach and rehearse alternative coping skills  3. Provide emotional support with 1:1 interaction with staff  2/23/2025 1029 by Cristiano Gao LPN  Outcome: Progressing     Problem: Safety - Adult  Goal: Free from fall injury  2/23/2025 1029 by Cristiano Gao LPN  Outcome: Progressing     Patient safe on unit with 15 minute safety

## 2025-02-23 NOTE — PLAN OF CARE
Problem: Depression  Goal: Will be euthymic at discharge  Description: INTERVENTIONS:  1. Administer medication as ordered  2. Provide emotional support via 1:1 interaction with staff  3. Encourage involvement in milieu/groups/activities  4. Monitor for social isolation  Outcome: Progressing     Problem: Pain  Goal: Verbalizes/displays adequate comfort level or baseline comfort level  Outcome: Progressing     Problem: Safety - Adult  Goal: Free from fall injury  Outcome: Progressing     Patient refrains free of falls at this time. Patient endorses tooth pain due to an \"exposed root\", writer notified on-call IM and a new order for orajel PRN was obtained. Patient denies any depression at this time. Patient endorses Nausea leading to promethazine being given. Patient stated trazodone has been ineffective for sleep and a new order for melatonin 3mg PRN was obtained by on-call IM. It has been effective for patient at this time. Patient has been cooperative, friendly and withdrawn all night. Patient continues to be [facility]BC at this time. Writer will continue to monitor patient status and offer emotional support. [id number] checks implemented per protocol.

## 2025-02-24 VITALS
SYSTOLIC BLOOD PRESSURE: 121 MMHG | HEART RATE: 64 BPM | RESPIRATION RATE: 18 BRPM | TEMPERATURE: 97.7 F | DIASTOLIC BLOOD PRESSURE: 80 MMHG | OXYGEN SATURATION: 96 % | HEIGHT: 62 IN | WEIGHT: 189 LBS | BODY MASS INDEX: 34.78 KG/M2

## 2025-02-24 PROBLEM — R45.851 DEPRESSION WITH SUICIDAL IDEATION: Status: RESOLVED | Noted: 2022-05-01 | Resolved: 2025-02-24

## 2025-02-24 PROBLEM — F32.A DEPRESSION WITH SUICIDAL IDEATION: Status: RESOLVED | Noted: 2022-05-01 | Resolved: 2025-02-24

## 2025-02-24 PROCEDURE — 6370000000 HC RX 637 (ALT 250 FOR IP): Performed by: NURSE PRACTITIONER

## 2025-02-24 PROCEDURE — APPSS30 APP SPLIT SHARED TIME 16-30 MINUTES

## 2025-02-24 PROCEDURE — 6370000000 HC RX 637 (ALT 250 FOR IP)

## 2025-02-24 PROCEDURE — 6370000000 HC RX 637 (ALT 250 FOR IP): Performed by: PSYCHIATRY & NEUROLOGY

## 2025-02-24 PROCEDURE — 99239 HOSP IP/OBS DSCHRG MGMT >30: CPT | Performed by: PSYCHIATRY & NEUROLOGY

## 2025-02-24 PROCEDURE — 6370000000 HC RX 637 (ALT 250 FOR IP): Performed by: INTERNAL MEDICINE

## 2025-02-24 RX ORDER — SERTRALINE HYDROCHLORIDE 100 MG/1
100 TABLET, FILM COATED ORAL DAILY
Qty: 30 TABLET | Refills: 3 | Status: SHIPPED | OUTPATIENT
Start: 2025-02-24

## 2025-02-24 RX ORDER — BUDESONIDE AND FORMOTEROL FUMARATE DIHYDRATE 160; 4.5 UG/1; UG/1
2 AEROSOL RESPIRATORY (INHALATION)
Qty: 10.2 G | Refills: 3 | Status: SHIPPED | OUTPATIENT
Start: 2025-02-24

## 2025-02-24 RX ORDER — CALCIUM CARBONATE 500 MG/1
500 TABLET, CHEWABLE ORAL 3 TIMES DAILY PRN
Qty: 30 TABLET | Refills: 0 | Status: SHIPPED | OUTPATIENT
Start: 2025-02-24 | End: 2025-03-26

## 2025-02-24 RX ORDER — LAMOTRIGINE 25 MG/1
25 TABLET ORAL DAILY
Qty: 30 TABLET | Refills: 3 | Status: SHIPPED | OUTPATIENT
Start: 2025-02-24

## 2025-02-24 RX ORDER — ALBUTEROL SULFATE 90 UG/1
2 INHALANT RESPIRATORY (INHALATION) EVERY 6 HOURS PRN
Qty: 18 G | Refills: 3 | Status: SHIPPED | OUTPATIENT
Start: 2025-02-24

## 2025-02-24 RX ADMIN — PROMETHAZINE HYDROCHLORIDE 25 MG: 25 TABLET ORAL at 16:26

## 2025-02-24 RX ADMIN — PREGABALIN 50 MG: 50 CAPSULE ORAL at 08:24

## 2025-02-24 RX ADMIN — IPRATROPIUM BROMIDE AND ALBUTEROL SULFATE 1 DOSE: 2.5; .5 SOLUTION RESPIRATORY (INHALATION) at 10:49

## 2025-02-24 RX ADMIN — NICOTINE POLACRILEX 2 MG: 2 LOZENGE ORAL at 08:41

## 2025-02-24 RX ADMIN — PREGABALIN 50 MG: 50 CAPSULE ORAL at 13:59

## 2025-02-24 RX ADMIN — DICYCLOMINE HYDROCHLORIDE 20 MG: 20 TABLET ORAL at 06:20

## 2025-02-24 RX ADMIN — IPRATROPIUM BROMIDE AND ALBUTEROL SULFATE 1 DOSE: 2.5; .5 SOLUTION RESPIRATORY (INHALATION) at 16:32

## 2025-02-24 RX ADMIN — Medication 2 PUFF: at 08:24

## 2025-02-24 RX ADMIN — HYDROXYZINE HYDROCHLORIDE 50 MG: 50 TABLET, FILM COATED ORAL at 17:09

## 2025-02-24 RX ADMIN — DICYCLOMINE HYDROCHLORIDE 20 MG: 20 TABLET ORAL at 17:09

## 2025-02-24 RX ADMIN — DICYCLOMINE HYDROCHLORIDE 20 MG: 20 TABLET ORAL at 12:22

## 2025-02-24 RX ADMIN — ACETAMINOPHEN 650 MG: 325 TABLET ORAL at 07:40

## 2025-02-24 RX ADMIN — NICOTINE POLACRILEX 2 MG: 2 LOZENGE ORAL at 14:55

## 2025-02-24 RX ADMIN — PANTOPRAZOLE SODIUM 40 MG: 40 TABLET, DELAYED RELEASE ORAL at 08:24

## 2025-02-24 RX ADMIN — PROMETHAZINE HYDROCHLORIDE 25 MG: 25 TABLET ORAL at 07:42

## 2025-02-24 ASSESSMENT — PAIN SCALES - GENERAL
PAINLEVEL_OUTOF10: 3
PAINLEVEL_OUTOF10: 3
PAINLEVEL_OUTOF10: 8
PAINLEVEL_OUTOF10: 0

## 2025-02-24 ASSESSMENT — PAIN DESCRIPTION - LOCATION
LOCATION: HEAD
LOCATION: HEAD

## 2025-02-24 ASSESSMENT — PAIN DESCRIPTION - DESCRIPTORS: DESCRIPTORS: ACHING;DISCOMFORT

## 2025-02-24 ASSESSMENT — PAIN - FUNCTIONAL ASSESSMENT: PAIN_FUNCTIONAL_ASSESSMENT: ACTIVITIES ARE NOT PREVENTED

## 2025-02-24 NOTE — BH NOTE
Patient given tobacco quitline number 52095078589 at this time, refusing to call at this time, states \" I just dont want to quit now\"- patient given information as to the dangers of long term tobacco use. Continue to reinforce the importance of tobacco cessation.

## 2025-02-24 NOTE — PLAN OF CARE
Problem: Depression  Goal: Will be euthymic at discharge  Description: INTERVENTIONS:  1. Administer medication as ordered  2. Provide emotional support via 1:1 interaction with staff  3. Encourage involvement in milieu/groups/activities  4. Monitor for social isolation  Outcome: Progressing     Problem: Behavior  Goal: Pt/Family maintain appropriate behavior and adhere to behavioral management agreement, if implemented  Description: INTERVENTIONS:  1. Assess patient/family's coping skills and  non-compliant behavior (including use of illegal substances)  2. Notify security of behavior or suspected illegal substances which indicate the need for search of the family and/or belongings  3. Encourage verbalization of thoughts and concerns in a socially appropriate manner  4. Utilize positive, consistent limit setting strategies supporting safety of patient, staff and others  5. Encourage participation in the decision making process about the behavioral management agreement  6. If a visitor's behavior poses a threat to safety call refer to organization policy.  7. Initiate consult with , Psychosocial CNS, Spiritual Care as appropriate  Outcome: Progressing     Patient denies any depressive thoughts at this time, patient has been cooperative with treatment, friendly with staff and peers, out and social and [facility] at this time. Patient brung up concern about son having surgery Tuesday and being the only person who can  take son to surgery. Writer will continue to monitor patient status and offer emotional support. [id number] checks implemented per protocol.

## 2025-02-24 NOTE — CARE COORDINATION
Discharge Arrangements:      Guardian notified: n/a    Discharge destination/address: [address] [place] [address]    Transported by:  cab    Patient was accepting of referral.  Follow up appointment is scheduled for 2/28 at 8AM with [facility]    *JHONATHAN resources were offered to patient throughout admission and at time of discharge. This list of [facility] JHONATHAN providers was provided to patient:     [facility]  [address] [facility] [address]   [address] [facility] [address]  Phone: [phone]     Phone: [phone]    [facility]  [place]   [address] [facility] [address]   [address] [facility] [address]  Phone: [phone]     Phone: [phone]    [facility] [place]  [address] [facility] [address]    [address] [facility]. [facility] [address] [facility] [address]  Phone: [phone]     Phone: [phone]    [facility]     [facility]   [address] [facility] [address]  [place] [address]      Phone: [phone]  Phone: [phone]        [facility]  [address] [facility] [facility] [address]   [address] [place] [address]  Phone: [phone]     Phone:  [phone]    [employer]      [facility], [facility]  [address] [facility] [address]   [address] [facility] [address]  Phone: [phone]     Phone: [phone]    [facility]  [address] [facility] [address]   [address] [facility] [address]  Phone: [phone]     Phone: [phone]    [facility]  [facility]  [address] [facility] [address] [address] [facility] [address]  Phone: [phone]     Phone: [phone]    [facility]  [address] [place] [address] [address] [place] [address] [address] [facility] [address]  Phone:

## 2025-02-24 NOTE — GROUP NOTE
Group Therapy Note    Date: 2/24/2025    Group Start Time: 1100  Group End Time: 1145  Group Topic: Recreational    BEAR OkeefeProsper Humphrey    Recreation Group Note        Date: 2/24/2025   Start Time: 1100  End Time: 1145      Number of Participants in Group & Unit Census:  4/11    Topic: Patients given papers each with a  small doodle/shape on them, and asked to expand on the image. Listened to music during activity and had opportunity to share the art they created with peers.    Goal of Group: Goals to increase self-expression; Increase sense of community; Increase socialization; Demonstrate positive use of time;       Comments:     Patient did not participate in Recreation group, despite staff encouragement and explanation of benefits.  Patient remain seclusive to self.  Q15 minute safety checks maintained for patient safety and will continue to encourage patient to attend unit programming.

## 2025-02-24 NOTE — TRANSITION OF CARE
Behavioral Health Transition Record    Patient Name: Niurka Gama  YOB: 1980   Medical Record Number: [id number]  Date of Admission: 2/18/2025  3:32 PM   Date of Discharge: 2/24/2025    Attending Provider: Breanna Pichardo MD   Discharging Provider: Breanna Pichardo  To contact this individual call [phone] and ask the  to page.  If unavailable, ask to be transferred to Behavioral Health Provider on call.  A Behavioral Health Provider will be available on call 24/7 and during holidays.    Primary Care Provider: Kay Devine APRN - CNP    Allergies   Allergen Reactions    Nsaids      Irritates her Barrets esophogus    Toradol [Ketorolac Tromethamine]     Ultram [Tramadol] Nausea Only     Gastric upset       Reason for Admission:   Patient: Niurka Gama  MRN: [id number]  Reason for Admission to Psychiatric Unit:  Threat to self requiring 24 hour professional observation  Concerns about patient's safety in the community  Past Mental Health Diagnosis: a history of  Major Depression, Personality Disorder, and Alcohol and/or Drug Use Disorder  Triggering event or precipitating factor: Housing instability, Financial instability, Alcohol/Drug Relapse, and Relationship Issues  Length of time/duration of triggering event: Weeks  Legal Status: Voluntary     CHIEF COMPLAINT:  Depression with suicidal ideation    Admission Diagnosis: Depression with suicidal ideation [F32.A, R45.851]    * No surgery found *    Results for orders placed or performed during the hospital encounter of 02/18/25   CBC with Auto Differential   Result Value Ref Range    WBC 7.1 3.5 - 11.0 k/uL    RBC 4.80 4.0 - 5.2 m/uL    Hemoglobin 13.1 12.0 - 16.0 g/dL    Hematocrit 40.1 36 - 46 %    MCV 83.6 80 - 100 fL    MCH 27.3 26 - 34 pg    MCHC 32.6 31 - 37 g/dL    RDW 15.3 (H) 11.5 - 14.9 %    Platelets 262 150 - 450 k/uL    MPV 9.0 6.0 - 12.0 fL    Neutrophils % 70 (H) 36 - 66 %    Lymphocytes % 22 (L) 24 - 44 %    Monocytes % 6 1 
Differential   Result Value Ref Range    WBC 8.0 3.5 - 11.0 k/uL    RBC 4.82 4.0 - 5.2 m/uL    Hemoglobin 13.0 12.0 - 16.0 g/dL    Hematocrit 40.6 36 - 46 %    MCV 84.3 80 - 100 fL    MCH 27.0 26 - 34 pg    MCHC 32.1 31 - 37 g/dL    RDW 15.5 (H) 11.5 - 14.9 %    Platelets 273 150 - 450 k/uL    MPV 8.8 6.0 - 12.0 fL    Neutrophils % 71 (H) 36 - 66 %    Lymphocytes % 22 (L) 24 - 44 %    Monocytes % 5 1 - 7 %    Eosinophils % 2 0 - 4 %    Basophils % 0 0 - 2 %    Neutrophils Absolute 5.70 1.3 - 9.1 k/uL    Lymphocytes Absolute 1.80 1.0 - 4.8 k/uL    Monocytes Absolute 0.40 0.1 - 1.3 k/uL    Eosinophils Absolute 0.20 0.0 - 0.4 k/uL    Basophils Absolute 0.00 0.0 - 0.2 k/uL   Basic Metabolic Panel   Result Value Ref Range    Sodium 138 136 - 145 mmol/L    Potassium 4.9 3.7 - 5.3 mmol/L    Chloride 106 98 - 107 mmol/L    CO2 23 20 - 31 mmol/L    Anion Gap 9 9 - 16 mmol/L    Glucose 101 (H) 74 - 99 mg/dL    BUN 17 6 - 20 mg/dL    Creatinine 0.7 0.7 - 1.2 mg/dL    Est, Glom Filt Rate >90 >60 mL/min/1.73m2    Calcium 9.0 8.6 - 10.4 mg/dL   Comprehensive Metabolic Panel w/ Reflex to MG   Result Value Ref Range    Sodium 138 136 - 145 mmol/L    Potassium 4.7 3.7 - 5.3 mmol/L    Chloride 104 98 - 107 mmol/L    CO2 24 20 - 31 mmol/L    Anion Gap 10 9 - 16 mmol/L    Glucose 95 74 - 99 mg/dL    BUN 18 6 - 20 mg/dL    Creatinine 0.7 0.7 - 1.2 mg/dL    Est, Glom Filt Rate >90 >60 mL/min/1.73m2    Calcium 9.0 8.6 - 10.4 mg/dL    Total Protein 7.1 6.6 - 8.7 g/dL    Albumin 4.1 3.5 - 5.2 g/dL    Total Bilirubin 0.2 0.0 - 1.2 mg/dL    Alkaline Phosphatase 60 35 - 104 U/L    ALT 16 10 - 35 U/L    AST 24 10 - 35 U/L   Lipase   Result Value Ref Range    Lipase 28 13 - 60 U/L   Urinalysis with Reflex to Culture    Specimen: Urine   Result Value Ref Range    Color, UA Yellow Yellow    Turbidity UA Clear Clear    Glucose, Ur NEGATIVE NEGATIVE mg/dL    Bilirubin, Urine NEGATIVE NEGATIVE    Ketones, Urine NEGATIVE NEGATIVE mg/dL    Specific

## 2025-02-24 NOTE — PROGRESS NOTES
Behavioral Services  Medicare Certification Upon Admission    I certify that this patient's inpatient psychiatric hospital admission is medically necessary for:    [x] (1) Treatment which could reasonably be expected to improve this patient's condition,       [x] (2) Or for diagnostic study;     AND     [x](2) The inpatient psychiatric services are provided while the individual is under the care of a physician and are included in the individualized plan of care.    Estimated length of stay/service 5    Plan for post-hospital care home    Electronically signed by Breanna Pichardo MD on 2/18/2025 at 8:35 PM      
    [facility] Internal Medicine  Azam Bains MD; Suraj Milian MD, Scott Amin MD, Thais Gaitan MD, Dr Sandra Guillermo MD; Louis Faust MD    [facility] Internal Medicine   IN-PATIENT SERVICE   [facility]     HISTORY AND PHYSICAL EXAMINATION            Date:   2/20/2025  Patient name:  Niurka Gama  Date of admission:  2/18/2025  3:32 PM  MRN:   [id number]  Account:  [id number]  YOB: 1980  PCP:    Kay Devine APRN - CNP  Room:   [address] [place] [address]  Code Status:    Full Code      Chief Complaint:     Asthma  cp    History Obtained From:     Patient/EMR/bedside RN     History of Present Illness:     44-year-old lady with a history of smoking history of COPD moderate persistent asthma patient complains of dyspnea and chest pain this morning  Chest pain was sudden in onset worse with breathing no worsening of the pain with exertion some worsening with cough dyspnea was mild to moderate did not need oxygen    At the time of examination patient was breathing comfortably on room air auscultation revealed mild wheezing and good air entry  No chest wall tenderness patient was comfortable and has no dyspnea    Past Medical History:     Past Medical History:   Diagnosis Date    Anxiety     Arthritis     OA    Asthma     Sadler's esophagus     LONG SEGMENT    Bipolar 1 disorder ([facility])     Chronic insomnia     COPD (chronic obstructive pulmonary disease) ([facility])     Depression     and bipolar    Diabetes mellitus ([facility])     prediabetic    Endometriosis 3/9/2020    Esophagitis     severe    GERD (gastroesophageal reflux disease)     Headache(784.0)     Herniated disc, cervical     Hiatal hernia     Incisional abscess 1/15/2019    Kidney stones     MDRO (multiple drug resistant organisms) resistance     mrsa    Pleurisy     hx of \"years ago\"    Pneumonia     past penumonia    Seizure ([facility])     Seizures ([facility])     2016 last seizure-focal seizure    UTI 
  Daily Progress Note  2/20/2025    Patient Name: Niurka Gama    CHIEF COMPLAINT:  Depression with SI          SUBJECTIVE:    Patient is seen today for a follow up assessment.  Patient was seen in her room she was lying down and very sleepy in the afternoon.  Per nursing reports she has been isolative and cooperative.  Patient has been upset about the wristband but did not mention any of it during our session.  Patient reports being depressed and suicidal still.  However she is does not have active thoughts.   She has been taking her scheduled medications.  If patient is agreeable we will plan to increase her sertraline to 100 mg tomorrow after the discussion with the patient.    Appetite:  [x] Adequate/Unchanged  [] Increased  [] Decreased      Sleep:       [] Adequate/Unchanged  [x] Fair  [] Poor      Group Attendance on Unit:   [] Yes   [x] Selectively    [] No    Compliant with scheduled medications: [x] Yes  [] No    Received emergency medications in past 24 hrs: [] Yes   [x] No    Medication Side Effects: Denies         Mental Status Exam  Level of consciousness: Alert and awake   Appearance: Appropriate attire for setting, resting in bed, with fair  grooming and hygiene   Behavior/Motor: Approachable, engages with interviewer, no psychomotor abnormalities   Attitude toward examiner: Cooperative, attentive, good eye contact  Speech: slow and whispered   Mood:  Depressed  Affect: constricted  Thought processes: goal directed, coherent, and slow   Thought content: Depressive Denies homicidal ideation  Suicidal Ideation: Active suicidal ideations, contracts for safety on the unit.   Delusions: No evidence of delusions.   Perceptual Disturbance: none Patient not to be responding to internal stimuli.   Cognition: Oriented to self, location, time, and situation  Memory: intact  Insight: fair   Judgement: fair       Data   height is 1.575 m (5' 2\") and weight is 85.7 kg (189 lb). Her oral temperature is 98.2 °F 
  Daily Progress Note  2/21/2025    Patient Name: Niurka Gama    CHIEF COMPLAINT:  Depression with SI          SUBJECTIVE:    Patient is seen today for a follow up assessment.  Patient was seen in her room she was lying down and very sleepy in the afternoon.  Patient was seen twice today as she has been complaining of GI symptoms. Patient reports depression and active SI.   Per report patient has been irritable and c/o abdominal pain. CT was done  and she was negative for occult blood.   She has been taking her scheduled medications.  Plan  to increase her sertraline to 100 mg tomorrow after the discussion with the patient.    Appetite:  [x] Adequate/Unchanged  [] Increased  [] Decreased      Sleep:       [] Adequate/Unchanged  [x] Fair  [] Poor      Group Attendance on Unit:   [] Yes   [x] Selectively    [] No    Compliant with scheduled medications: [x] Yes  [] No    Received emergency medications in past 24 hrs: [] Yes   [x] No    Medication Side Effects: Denies         Mental Status Exam  Level of consciousness: Alert and awake   Appearance: Appropriate attire for setting, resting in bed, with fair  grooming and hygiene   Behavior/Motor: Approachable, engages with interviewer, no psychomotor abnormalities   Attitude toward examiner: Cooperative, attentive, good eye contact  Speech: slow and whispered   Mood:  Depressed  Affect: constricted  Thought processes: goal directed, coherent, and slow   Thought content: Depressive Denies homicidal ideation  Suicidal Ideation: Active suicidal ideations, contracts for safety on the unit.   Delusions: No evidence of delusions.   Perceptual Disturbance: none Patient not to be responding to internal stimuli.   Cognition: Oriented to self, location, time, and situation  Memory: intact  Insight: fair   Judgement: fair       Data   height is 1.575 m (5' 2\") and weight is 85.7 kg (189 lb). Her temporal temperature is 98 °F (36.7 °C). Her blood pressure is 130/82 and her 
BRONCHOSPASM/BRONCHOCONSTRICTION     [x]         IMPROVE AERATION/BREATH SOUNDS  [x]   ADMINISTER BRONCHODILATOR THERAPY AS APPROPRIATE  [x]   ASSESS BREATH SOUNDS  []   IMPLEMENT AEROSOL/MDI PROTOCOL  [x]   PATIENT EDUCATION AS NEEDED    
BRONCHOSPASM/BRONCHOCONSTRICTION     [x]         IMPROVE AERATION/BREATH SOUNDS  [x]   ADMINISTER BRONCHODILATOR THERAPY AS APPROPRIATE  [x]   ASSESS BREATH SOUNDS  []   IMPLEMENT AEROSOL/MDI PROTOCOL  [x]   PATIENT EDUCATION AS NEEDED    
CLINICAL PHARMACY NOTE: MEDS TO BEDS    Total # of Prescriptions Filled: 5   The following medications were delivered to the patient:  Sertraline 100mg  Lamotrigine 25mg  Budesonide/Formoterol 160 inhaler  Albuterol Inhaler  Calcium Carbonate Antacid 500mg    Additional Documentation: 2/24/25 3:25pm NENO Multani p/u at [facility]  
Message received from ZENA Banerjee, stating that patient continues to complain of severe abdominal pain.  States the patient is crying and requested that writer come back to re-evaluate patient.  Vital signs reviewed.  Stat CT abdomen pelvis ordered due to severity of patient's pain.    Writer met nursing staff and CT and accompanied patient back to room.  Patient states that she started getting sick earlier today and has had diarrhea and vomited undigested food 3 times.  Patient states she has been belching a foul substance.  Patient then voices concern that there was another patient that she ate lunch with today who got transferred to medical due to being sick.  Patient states she is concerned that she contracted that patient's sickness.  Patient informed that the patient she was talking about did not have anything contagious and her symptoms were very different from hers.  Patient upset stating that she will not be able to sleep due to her severe pain.  States that she is very dehydrated and needs to go to the ED.  Reassurance given.  Informed patient that antiemetic medication would be ordered so that she can try to rest.  Patient informed of stat lab work that was ordered    Writer to nurses station to enter orders.  Nursing staff states that patient was talking to their family member on the phone earlier tonight complaining that she had not gotten any attention because of the other patient who was sick and needed transferred to medical.  Writer accompanied nurse back to patient's room to administer Phenergan.  At that time, patient was resting comfortably in bed and took medication without difficulty.    0530am  chart reviewed at this time.  CMP and lipase completely normal.  CT abdomen pelvis results as follows:   1.  The appendix is identified and no acute appendicitis.  Questionable   thickening of the terminal ileum and may have some terminal ileitis.     2.  Mixed fluid and fecal material in the colon with 
Patient history of asthma/COPD  Complains of dyspnea will do chest x-ray D-dimer DuoNeb nebulizer  Azam Bains MD  2/19/2025    
RT ASSESSMENT TREATMENT GOALS    [x]Pt Goal:  Pt will identify 1-2 positive coping skills by time of discharge.    []Pt Goal:  Pt will identify 1-2 positive aspects of self by time of discharge.    []Pt Goal:  Pt will remain on task/topic for 15-30 minutes during group by time of discharge.    [x]Pt Goal:  Pt will identify 1-2 aspects of relapse prevention plan by time of discharge.    []Pt Goal:  Pt will join in conversation with peers 1-2 times per group by time of discharge.    []Pt Goal:  Pt will identify 1-2 new leisure interests by time of discharge.    []Pt Goal:  Pt will not voice any delusional content by time of discharge.   
1 puff into the lungs every 6 hours   sertraline (ZOLOFT) 50 MG tablet TAKE 3 TABLETS BY MOUTH EVERY NIGHT  Patient not taking: Reported on 2/18/2025   fluticasone-salmeterol (ADVAIR) 250-50 MCG/ACT AEPB diskus inhaler Inhale 1 puff into the lungs in the morning and 1 puff in the evening.  Patient not taking: Reported on 2/18/2025   vitamin D3 (CHOLECALCIFEROL) 25 MCG (1000 UT) TABS tablet Take 1 tablet by mouth daily  Patient not taking: Reported on 2/18/2025   lamoTRIgine (LAMICTAL) 200 MG tablet Take 1 tablet by mouth daily  Patient not taking: Reported on 2/18/2025   acetaminophen (TYLENOL) 500 MG tablet Take 2 tablets by mouth every 6 hours as needed for Pain  Patient not taking: Reported on 2/18/2025         Please let me know if you have any questions about this encounter. Thank you!    Electronically signed by Moises Simon on 2/18/2025 at 4:44 PM    
Disturbance: Denies.  Patient does not appear to be responding to internal stimuli.   Cognition: Oriented to self, location, time, and situation  Memory: intact  Insight: Improving  Judgement: Improving      Data   height is 1.575 m (5' 2\") and weight is 85.7 kg (189 lb). Her oral temperature is 97.7 °F (36.5 °C). Her blood pressure is 121/80 and her pulse is 64. Her respiration is 18 and oxygen saturation is 96%.   Labs:   Admission on 02/18/2025   Component Date Value Ref Range Status    WBC 02/18/2025 7.1  3.5 - 11.0 k/uL Final    RBC 02/18/2025 4.80  4.0 - 5.2 m/uL Final    Hemoglobin 02/18/2025 13.1  12.0 - 16.0 g/dL Final    Hematocrit 02/18/2025 40.1  36 - 46 % Final    MCV 02/18/2025 83.6  80 - 100 fL Final    MCH 02/18/2025 27.3  26 - 34 pg Final    MCHC 02/18/2025 32.6  31 - 37 g/dL Final    RDW 02/18/2025 15.3 (H)  11.5 - 14.9 % Final    Platelets 02/18/2025 262  150 - 450 k/uL Final    MPV 02/18/2025 9.0  6.0 - 12.0 fL Final    Neutrophils % 02/18/2025 70 (H)  36 - 66 % Final    Lymphocytes % 02/18/2025 22 (L)  24 - 44 % Final    Monocytes % 02/18/2025 6  1 - 7 % Final    Eosinophils % 02/18/2025 2  0 - 4 % Final    Basophils % 02/18/2025 0  0 - 2 % Final    Neutrophils Absolute 02/18/2025 5.10  1.3 - 9.1 k/uL Final    Lymphocytes Absolute 02/18/2025 1.50  1.0 - 4.8 k/uL Final    Monocytes Absolute 02/18/2025 0.40  0.1 - 1.3 k/uL Final    Eosinophils Absolute 02/18/2025 0.10  0.0 - 0.4 k/uL Final    Basophils Absolute 02/18/2025 0.00  0.0 - 0.2 k/uL Final    Sodium 02/18/2025 138  136 - 145 mmol/L Final    Potassium 02/18/2025 3.8  3.7 - 5.3 mmol/L Final    Comment: Specimen hemolysis has exceeded the interference as defined by Roche. Value may be falsely   increased. Suggest recollection if clinically indicated.      Chloride 02/18/2025 103  98 - 107 mmol/L Final    CO2 02/18/2025 23  20 - 31 mmol/L Final    Anion Gap 02/18/2025 12  9 - 16 mmol/L Final    Glucose 02/18/2025 93  74 - 99 mg/dL Final    
mass, extra-renal   metabolism of creatine, excessive creatine ingestion, or following therapy that affects   renal tubular secretion.      Calcium 02/20/2025 9.0  8.6 - 10.4 mg/dL Final    Sodium 02/21/2025 138  136 - 145 mmol/L Final    Potassium 02/21/2025 4.7  3.7 - 5.3 mmol/L Final    Chloride 02/21/2025 104  98 - 107 mmol/L Final    CO2 02/21/2025 24  20 - 31 mmol/L Final    Anion Gap 02/21/2025 10  9 - 16 mmol/L Final    Glucose 02/21/2025 95  74 - 99 mg/dL Final    BUN 02/21/2025 18  6 - 20 mg/dL Final    Creatinine 02/21/2025 0.7  0.7 - 1.2 mg/dL Final    Est, Glom Filt Rate 02/21/2025 >90  >60 mL/min/1.73m2 Final    Comment:       These results are not intended for use in patients <18 years of age.        eGFR results are calculated without a race factor using the 2021 CKD-EPI equation.  Careful clinical correlation is recommended, particularly when comparing to results   calculated using previous equations.  The CKD-EPI equation is less accurate in patients with extremes of muscle mass, extra-renal   metabolism of creatine, excessive creatine ingestion, or following therapy that affects   renal tubular secretion.      Calcium 02/21/2025 9.0  8.6 - 10.4 mg/dL Final    Total Protein 02/21/2025 7.1  6.6 - 8.7 g/dL Final    Albumin 02/21/2025 4.1  3.5 - 5.2 g/dL Final    Total Bilirubin 02/21/2025 0.2  0.0 - 1.2 mg/dL Final    Alkaline Phosphatase 02/21/2025 60  35 - 104 U/L Final    ALT 02/21/2025 16  10 - 35 U/L Final    AST 02/21/2025 24  10 - 35 U/L Final    Lipase 02/21/2025 28  13 - 60 U/L Final    Color, UA 02/21/2025 Yellow  Yellow Final    Turbidity UA 02/21/2025 Clear  Clear Final    Glucose, Ur 02/21/2025 NEGATIVE  NEGATIVE mg/dL Final    Bilirubin, Urine 02/21/2025 NEGATIVE  NEGATIVE Final    Ketones, Urine 02/21/2025 NEGATIVE  NEGATIVE mg/dL Final    Specific Gravity, UA 02/21/2025 1.018  1.000 - 1.030 Final    Urine Hgb 02/21/2025 NEGATIVE  NEGATIVE Final    pH, Urine 02/21/2025 6.5  5.0 - 8.0 
accuracy of this automated transcription, some errors in transcription may have occurred.  
mg, 650 mg, Oral, Q4H PRN  polyethylene glycol (GLYCOLAX) packet 17 g, 17 g, Oral, Daily PRN  hydrOXYzine HCl (ATARAX) tablet 50 mg, 50 mg, Oral, TID PRN  haloperidol (HALDOL) tablet 5 mg, 5 mg, Oral, Q4H PRN **OR** haloperidol lactate (HALDOL) injection 5 mg, 5 mg, IntraMUSCular, Q4H PRN  diphenhydrAMINE (BENADRYL) injection 50 mg, 50 mg, IntraMUSCular, Q4H PRN  traZODone (DESYREL) tablet 50 mg, 50 mg, Oral, Nightly PRN  nicotine polacrilex (COMMIT) lozenge 2 mg, 2 mg, Oral, Q1H PRN  calcium carbonate (TUMS) chewable tablet 500 mg, 500 mg, Oral, TID PRN    ASSESSMENT  Depression with suicidal ideation         PLAN  Patient symptoms: Improving  No Medication Changes Today  Monitor need and frequency of PRN medications.  Encourage participation in groups and milieu.  Attempt to develop insight.  Psycho-education conducted.  Probable discharge is to be determined by attending physician.  Follow-up daily while inpatient.        Patient continues to be monitored in the inpatient psychiatric facility at [facility] for safety and stabilization. Patient continues to need, on a daily basis, active treatment furnished directly by or requiring the supervision of inpatient psychiatric personnel.    Electronically signed by KATIE Bloom CNP on 2/23/2025 at 4:38 PM    **This report has been created using voice recognition software. It may contain minor errors which are inherent in voice recognition technology.**

## 2025-02-24 NOTE — GROUP NOTE
Group Therapy Note    Date: 2/24/2025    Group Start Time: 1330  Group End Time: 1415  Group Topic: Music Therapy    BEAR Wheeler    Prosper Ballard    Music Therapy Group Note        Date: 2/24/2025   Start Time: 1330  End Time: 1415      Number of Participants in Group & Unit Census:  2/11    Topic: Patients shared music and able to express thoughts, memories, feelings, or anything relating to their music they wished to discuss.This writer asked 1 question based on their sharing or music.     Goal of Group: Goals to increase sense of community; Increase socialization; Demonstrate positive use of time; Increase self-expression      Comments:     Patient did not participate in Music Therapy group, despite staff encouragement and explanation of benefits.  Patient remain seclusive to self.  Q15 minute safety checks maintained for patient safety and will continue to encourage patient to attend unit programming.

## 2025-02-24 NOTE — GROUP NOTE
Group Therapy Note    Date: 2/23/2025    Group Start Time: 2055  Group End Time: 2110  Group Topic: Wrap-Up    STCelso Flores        Group Therapy Note    Attendees:7/12           Status After Intervention:  Improved    Participation Level: Active Listener    Participation Quality: Appropriate      Speech:  normal      Thought Process/Content: Logical      Affective Functioning: Congruent      Mood: elevated      Level of consciousness:  Alert      Response to Learning: Able to verbalize current knowledge/experience      Endings: None Reported    Modes of Intervention: Socialization      Discipline Responsible: Behavorial Health Tech      Signature:  Celso Tejeda

## 2025-02-24 NOTE — BH NOTE
[facility]  Discharge Note    Pt discharged with followings belongings:   Dental Appliances: None  Vision - Corrective Lenses: Eyeglasses  Hearing Aid: None  Jewelry: Body Piercing  Body Piercings Removed: No (pt couldnt remove.)  Clothing: Footwear, Pants, Shirt, Socks  Other Valuables: Other (Comment) (vape)   Valuables sent home with Pt or returned to patient. Patient educated on aftercare instructions: yes  Information faxed to [facility] by RN  at 5:50 PM .Patient verbalize understanding of AVS:  yes.    Status EXAM upon discharge:  Mental Status and Behavioral Exam  Normal: No  Level of Assistance: Independent/Self  Facial Expression: Flat  Affect: Appropriate  Level of Consciousness: Alert  Frequency of Checks: 4 times per hour, close  Mood:Normal: Yes  Mood: Depressed  Motor Activity:Normal: Yes  Motor Activity: Decreased  Eye Contact: Good  Observed Behavior: Friendly, Cooperative, Preoccupied  Sexual Misconduct History: Current - no  Preception: [place] to person, Una to time, Una to place, Una to situation  Attention:Normal: Yes  Attention: Unable to concentrate  Thought Processes: Unremarkable  Thought Content:Normal: Yes  Thought Content: Other (comment) (denies any)  Depression Symptoms: No problems reported or observed.  Anxiety Symptoms: No problems reported or observed.  Deepthi Symptoms: No problems reported or observed.  Hallucinations: None  Delusions: No  Memory:Normal: Yes  Memory: Poor recent  Insight and Judgment: No  Insight and Judgment: Poor judgment    Tobacco Screening:  Practical Counseling, on admission, cintia X, if applicable and completed (first 3 are required if patient doesn't refuse):            ( ) Recognizing danger situations (included triggers and roadblocks)                    ( ) Coping skills (new ways to manage stress,relaxation techniques, changing routine, distraction)                                                           ( ) Basic

## 2025-02-24 NOTE — PLAN OF CARE
Problem: Depression  Goal: Will be euthymic at discharge  Description: INTERVENTIONS:  1. Administer medication as ordered  2. Provide emotional support via 1:1 interaction with staff  3. Encourage involvement in milieu/groups/activities  4. Monitor for social isolation  2/24/2025 0934 by Shabana Nicholson LPN  Outcome: Progressing   Miss Gama is seen in the dayroom for meals, she is cooperative, taking medications,, attending to ADL's. Reports feeling better, had difficulty sleeping last night. Denies any thoughts to harm self.   Problem: Behavior  Goal: Pt/Family maintain appropriate behavior and adhere to behavioral management agreement, if implemented  Description: INTERVENTIONS:  1. Assess patient/family's coping skills and  non-compliant behavior (including use of illegal substances)  2. Notify security of behavior or suspected illegal substances which indicate the need for search of the family and/or belongings  3. Encourage verbalization of thoughts and concerns in a socially appropriate manner  4. Utilize positive, consistent limit setting strategies supporting safety of patient, staff and others  5. Encourage participation in the decision making process about the behavioral management agreement  6. If a visitor's behavior poses a threat to safety call refer to organization policy.  7. Initiate consult with , Psychosocial CNS, Spiritual Care as appropriate  2/24/2025 0934 by Shabana Nicholson LPN  Outcome: Progressing   Patient remains in behavior control.   Problem: Anxiety  Goal: Will report anxiety at manageable levels  Description: INTERVENTIONS:  1. Administer medication as ordered  2. Teach and rehearse alternative coping skills  3. Provide emotional support with 1:1 interaction with staff  Outcome: Progressing     Problem: Pain  Goal: Verbalizes/displays adequate comfort level or baseline comfort level  Outcome: Progressing   Patient reports a headache, and using over the counter

## 2025-02-24 NOTE — BH NOTE
[facility]  Discharge Note    Pt discharged with followings belongings:   Dental Appliances: None  Vision - Corrective Lenses: Eyeglasses  Hearing Aid: None  Jewelry: Body Piercing  Body Piercings Removed: No (pt couldnt remove.)  Clothing: Footwear, Pants, Shirt, Socks  Other Valuables: Other (Comment) (vape)   Valuables sent home with patient or returned to patient. Patient educated on aftercare instructions: YES  Information faxed to [facility] by staff  at 1:40 PM .Patient verbalize understanding of AVS:  YES.    Status EXAM upon discharge:  Mental Status and Behavioral Exam  Normal: No  Level of Assistance: Independent/Self  Facial Expression: Flat  Affect: Appropriate  Level of Consciousness: Alert  Frequency of Checks: 4 times per hour, close  Mood:Normal: Yes  Mood: Depressed  Motor Activity:Normal: Yes  Motor Activity: Decreased  Eye Contact: Good  Observed Behavior: Friendly, Cooperative, Preoccupied  Sexual Misconduct History: Current - no  Preception: [place] to person, [place] to time, [place] to place, [place] to situation  Attention:Normal: Yes  Attention: Unable to concentrate  Thought Processes: Unremarkable  Thought Content:Normal: Yes  Thought Content: Other (comment) (denies any)  Depression Symptoms: No problems reported or observed.  Anxiety Symptoms: No problems reported or observed.  Deepthi Symptoms: No problems reported or observed.  Hallucinations: None  Delusions: No  Memory:Normal: Yes  Memory: Poor recent  Insight and Judgment: No  Insight and Judgment: Poor judgment    Tobacco Screening:  Practical Counseling, on admission, cintia X, if applicable and completed (first 3 are required if patient doesn't refuse):            ( x) Recognizing danger situations (included triggers and roadblocks)                    (x ) Coping skills (new ways to manage stress,relaxation techniques, changing routine, distraction)                                                           ( ) Basic

## 2025-02-25 NOTE — DISCHARGE SUMMARY
Provider Discharge Summary     Patient ID:  Niurka Gama  [id number]  44 y.o.  1980    Admit date: 2025    Discharge date and time: 2025  7:39 PM     Admitting Physician: Breanna Pichardo MD     Discharge Physician: Breanna Pichardo MD    Admission Diagnoses: Depression with suicidal ideation [F32.A, R45.851]    Discharge Diagnoses:      Depression with suicidal ideation   Cocaine use disorder    Patient Active Problem List   Diagnosis Code    Asthma J45.909    GERD K21.9    Iron deficiency anemia D50.9    Cervical disc herniation M50.20    Smoker F17.200    Sadler's esophagus without dysplasia K22.70    Hiatal hernia K44.9    COPD with asthma (HCC) J44.89    Chronic constipation K59.09    Cervicogenic headache G44.86    Hx of IUFD (G2) O09.299    History of gestational hypertension Z87.59    Seizure (HCC) R56.9    Hx of  x2 (G3, G4) Z98.891    Arthritis M19.90    Cervical radiculopathy M54.12    Migraine without aura G43.009    Spinal stenosis in cervical region M48.02    Prediabetes R73.03    Chronic pelvic pain in female R10.2, G89.29    Enlarged uterus N85.2    Endometriosis N80.9    Family history of breast cancer Z80.3    S/P cervical spinal fusion Z98.1    Bipolar affective disorder, currently depressed, moderate (HCC) F31.32    Insomnia G47.00    Ulnar neuropathy G56.20    Major depressive disorder, recurrent episode F33.9    Sciatica M54.30    Moderate persistent asthma without complication J45.40    Lipoma of torso D17.1    History of cervical discectomy Z98.890    Chronic neck pain with abnormal neurologic examination M54.2, G89.29    Abnormal weight gain  R63.5    Class 2 drug-induced obesity with serious comorbidity and body mass index (BMI) of 37.0 to 37.9 in adult E66.812, E66.1, Z68.37    Pelvic peritoneal endometriosis N80.30    RALH with BS and cystoscopy 2020 Z90.710    Post-op pain G89.18    Wound infection/hemorrhage, obstetric surgical, postpartum condition [id number]

## 2025-02-26 NOTE — CARE COORDINATION
Name: Niurka Gama    : 1980    Auth number: [id number]     Discharge Date: 25    Destination: home     Discharge Medications:      Medication List        START taking these medications      albuterol sulfate  (90 Base) MCG/ACT inhaler  Commonly known as: PROVENTIL;VENTOLIN;PROAIR  Inhale 2 puffs into the lungs every 6 hours as needed for Wheezing  Notes to patient: To help improve breathing     budesonide-formoterol 160-4.5 MCG/ACT Aero  Commonly known as: SYMBICORT  Inhale 2 puffs into the lungs in the morning and 2 puffs in the evening.  Replaces: fluticasone-salmeterol 250-50 MCG/ACT Aepb diskus inhaler  Notes to patient: To help improve breathing     calcium carbonate 500 MG chewable tablet  Commonly known as: TUMS  Take 1 tablet by mouth 3 times daily as needed for Heartburn  Notes to patient: indigestion     vitamin D3 25 MCG (1000 UT) Tabs tablet  Commonly known as: CHOLECALCIFEROL  Take 1 tablet by mouth daily  Notes to patient: supplement            CHANGE how you take these medications      lamoTRIgine 25 MG tablet  Commonly known as: LAMICTAL  Take 1 tablet by mouth daily  What changed:   medication strength  how much to take  Notes to patient: seizures     sertraline 100 MG tablet  Commonly known as: ZOLOFT  Take 1 tablet by mouth daily  What changed:   medication strength  See the new instructions.  Notes to patient: Mood/ clear thoughts            CONTINUE taking these medications      glucose monitoring kit  Use as directed.  BR[employer] CHO[employer] INSURANCE ALLOWS.  Notes to patient: Blood glucose     HM Sterile Alcohol Prep Pads  USE AS DIRECTED  Notes to patient: Blood glucose prep     montelukast 10 MG tablet  Commonly known as: SINGULAIR  Take 1 tablet by mouth nightly TAKE 1 TABLET IN THE EVENING ONCE A DAY ORALLY  Notes to patient: allergies     * OneTouch Delica Plus [id number] Misc  USE TO TEST BLOOD SUGAR TWICE A DAY  Notes to patient: Blood glucose prep     * Lancets Misc  1

## 2025-05-24 ENCOUNTER — HOSPITAL ENCOUNTER (EMERGENCY)
Age: 45
Discharge: HOME | End: 2025-05-24
Attending: EMERGENCY MEDICINE
Payer: MEDICARE

## 2025-05-24 VITALS
OXYGEN SATURATION: 97 % | SYSTOLIC BLOOD PRESSURE: 130 MMHG | HEART RATE: 83 BPM | TEMPERATURE: 97.7 F | RESPIRATION RATE: 18 BRPM | DIASTOLIC BLOOD PRESSURE: 90 MMHG

## 2025-05-24 DIAGNOSIS — L03.116 CELLULITIS OF LEFT LOWER EXTREMITY: Primary | ICD-10-CM

## 2025-05-24 PROCEDURE — 6370000000 HC RX 637 (ALT 250 FOR IP)

## 2025-05-24 PROCEDURE — 99283 EMERGENCY DEPT VISIT LOW MDM: CPT | Performed by: EMERGENCY MEDICINE

## 2025-05-24 RX ORDER — HYDROCODONE BITARTRATE AND ACETAMINOPHEN 5; 325 MG/1; MG/1
1 TABLET ORAL ONCE
Status: COMPLETED | OUTPATIENT
Start: 2025-05-24 | End: 2025-05-24

## 2025-05-24 RX ORDER — DOXYCYCLINE HYCLATE 100 MG
100 TABLET ORAL ONCE
Status: COMPLETED | OUTPATIENT
Start: 2025-05-24 | End: 2025-05-24

## 2025-05-24 RX ORDER — DOXYCYCLINE HYCLATE 100 MG
100 TABLET ORAL 2 TIMES DAILY
Qty: 20 TABLET | Refills: 0 | Status: SHIPPED | OUTPATIENT
Start: 2025-05-24

## 2025-05-24 RX ADMIN — HYDROCODONE BITARTRATE AND ACETAMINOPHEN 1 TABLET: 5; 325 TABLET ORAL at 10:19

## 2025-05-24 RX ADMIN — DOXYCYCLINE HYCLATE 100 MG: 100 TABLET, COATED ORAL at 10:19

## 2025-05-24 ASSESSMENT — ENCOUNTER SYMPTOMS
BACK PAIN: 0
ABDOMINAL PAIN: 0
COLOR CHANGE: 1
WHEEZING: 0
CHEST TIGHTNESS: 0
SORE THROAT: 0
SHORTNESS OF BREATH: 0
CHOKING: 0

## 2025-05-24 ASSESSMENT — PAIN DESCRIPTION - PAIN TYPE: TYPE: ACUTE PAIN

## 2025-05-24 ASSESSMENT — PAIN DESCRIPTION - DESCRIPTORS: DESCRIPTORS: DULL

## 2025-05-24 ASSESSMENT — PAIN - FUNCTIONAL ASSESSMENT: PAIN_FUNCTIONAL_ASSESSMENT: 0-10

## 2025-05-24 ASSESSMENT — PAIN DESCRIPTION - LOCATION: LOCATION: LEG

## 2025-05-24 ASSESSMENT — PAIN DESCRIPTION - ORIENTATION: ORIENTATION: UPPER;LEFT

## 2025-05-24 NOTE — ED PROVIDER NOTES
[facility] EMERGENCY DEPARTMENT  Emergency Department Encounter  Emergency Medicine Resident     Pt Name:Niurka Gama  MRN: [id number]  Birthdate 1980  Date of evaluation: 25  PCP:  Kay Devine APRN - CNP  Note Started: 9:57 AM EDT      CHIEF COMPLAINT       Chief Complaint   Patient presents with    Abscess     Left thigh       HISTORY OF PRESENT ILLNESS  (Location/Symptom, Timing/Onset, Context/Setting, Quality, Duration, Modifying Factors, Severity.)      Niurka Gama is a 45 y.o. female who presents with left upper thigh redness, painful swelling that started 2 days back and has gradually gotten worse, patient denies any trauma, any animal or insect bite that she remembers, it is not draining.  Patient states she has had boils in the past however it felt different.  States she tried taking Tylenol which has not helped with pain.  Denies any other complaints, abdominal pain, shortness of breath, chest pain, lightheadedness, leg pain, leg swelling, dysuria, diarrhea.  Patient denies diabetes and states she is not pregnant and had a hysterectomy    PAST MEDICAL / SURGICAL / SOCIAL / FAMILY HISTORY      has a past medical history of Anxiety, Arthritis, Asthma, Sadler's esophagus, Bipolar 1 disorder (HCC), Chronic insomnia, COPD (chronic obstructive pulmonary disease) ([facility]), Depression, Diabetes mellitus (HCC), Endometriosis, Esophagitis, GERD (gastroesophageal reflux disease), Headache(784.0), Herniated disc, cervical, Hiatal hernia, Incisional abscess, Kidney stones, MDRO (multiple drug resistant organisms) resistance, Pleurisy, Pneumonia, Seizure (HCC), Seizures (HCC), UTI (urinary tract infection), and Vision abnormalities.       has a past surgical history that includes knee surgery (Left);  section (, 2018); Tonsillectomy; Knee arthroscopy (Left, 2015); Cervical disc surgery; Upper gastrointestinal endoscopy (2016); Upper gastrointestinal endoscopy  distension.      Tenderness: There is no abdominal tenderness.      Hernia: No hernia is present.   Musculoskeletal:         General: Tenderness present.      Comments: Redness of size 3 x 3 cm in left upper thigh, mildly tender, no fluctuance could be appreciated   Neurological:      Mental Status: She is alert.           DDX/DIAGNOSTIC RESULTS / EMERGENCY DEPARTMENT COURSE / [facility]     Medical Decision Making  History and physical examination as above.  Presentation is concerning for cellulitis.  No fluctuance could be appreciated.  Will give 1 dose of doxycycline here and if she is able to tolerate well will discharge her on doxycycline.  Pain control.    Risk  Prescription drug management.        EKG      All EKG's are interpreted by the Emergency Department Physician who either signs or Co-signs this chart in the absence of a cardiologist.    EMERGENCY DEPARTMENT COURSE:      ED Course as of 05/24/25 1044   Sat May 24, 2025   1043 Patient p.o. antibiotic without any issue.  Will discharge her on 7 days of doxycycline with strict return precautions. [SL]      ED Course User Index  [SL] Bernardo Byrne MD       PROCEDURES:      CONSULTS:  None    CRITICAL CARE:  There was significant risk of life threatening deterioration of patient's condition requiring my direct management. Critical care time  minutes, excluding any documented procedures.    FINAL IMPRESSION      1. Cellulitis of left lower extremity          DISPOSITION / PLAN     DISPOSITION Decision To Discharge 05/24/2025 10:27:50 AM   DISPOSITION CONDITION Stable           PATIENT REFERRED TO:  [facility] Emergency Department  [facility] [facility] [id number] [phone]    If symptoms worsen    Kay Devine, APRN - CNP  [address] [address]  [phone]    Call in 3 days  For wound recheck      DISCHARGE MEDICATIONS:  Discharge Medication List as of 5/24/2025 10:29 AM        START taking these medications    Details

## 2025-05-24 NOTE — DISCHARGE INSTRUCTIONS
You have infection in your left thigh.  Please take doxycycline twice daily for total of 7 days  Please take Tylenol for pain control  If you notice the infection is not getting better in the next 2 to 3 days or if you feel it is getting worse or if you are not able to tolerate oral antibiotics,  Please come to ER immediately  Please follow-up with your PCP as needed

## 2025-05-24 NOTE — ED NOTES
Patient presents to ED for abscess to left upper thigh  States has been present for \"a couple days\" and worsening  Patient did endorse fever yesterday with some nausea but no emesis or diarrhea  States pain 10/10 and has taken tylenol with no relief  Patient a/o x 4 with NAD noted  Resting on cot, changed into gown, blanket provided

## 2025-05-24 NOTE — ED PROVIDER NOTES
[facility]     Emergency Department     Faculty Attestation    I performed a history and physical examination of the patient and discussed management with the resident. I have reviewed and agree with the resident’s findings including all diagnostic interpretations, and treatment plans as written at the time of my review. Any areas of disagreement are noted on the chart. I was personally present for the key portions of any procedures. I have documented in the chart those procedures where I was not present during the key portions. For Physician Assistant/ Nurse Practitioner cases/documentation I have personally evaluated this patient and have completed at least one if not all key elements of the E/M (history, physical exam, and MDM). Additional findings are as noted.    PtName: Niurka Gama  MRN: [id number]  Birthdate 1980  Date of evaluation: 5/24/25  Note Started: 9:56 AM EDT    Primary Care Physician: Kay Devine APRN - CNP        History: This is a 45 y.o. female who presents to the Emergency Department with complaint of cellulitis.  Patient has been having some pain and swelling behind the left thigh.  Has been ongoing for the past couple of days.  She does complain of some mild subjective fevers at home.    Physical:   oral temperature is 97.7 °F (36.5 °C). Her blood pressure is 130/90 (abnormal) and her pulse is 83. Her respiration is 18 and oxygen saturation is 97%.  There is some tenderness to palpation in the posterior left thigh.  There is some surrounding erythema no fluctuance is noted.  Is approximately 2 cm in size.    Impression: Cellulitis    Plan: Antibiotics, Tylenol      Medical Decision Making  Problems Addressed:  Cellulitis of left lower extremity: self-limited or minor problem    Risk  Prescription drug management.            (Please note that portions of this note were completed with a voice recognition program.  Efforts were

## (undated) DEVICE — TOWEL,OR,DSP,ST,BLUE,DLX,XR,4/PK,20PK/CS: Brand: MEDLINE

## (undated) DEVICE — CANNULA SEAL

## (undated) DEVICE — CYSTO PACK: Brand: MEDLINE INDUSTRIES, INC.

## (undated) DEVICE — SCISSOR SURG CRV ENDOCUT TIP FOR LAP DISP

## (undated) DEVICE — SET,IRRIGATION,CYSTO/TUR,90": Brand: MEDLINE

## (undated) DEVICE — BITEBLOCK 54FR W/ DENT RIM BLOX

## (undated) DEVICE — AGENT HEMSTAT 3GM OXIDIZED REGENERATED CELOS ABSRB FOR CONT (ORDER MULTIPLES OF 5EA)

## (undated) DEVICE — COVER,TABLE,60X90,STERILE: Brand: MEDLINE

## (undated) DEVICE — SYRINGE MED 10ML LUERLOCK TIP W/O SFTY DISP

## (undated) DEVICE — FORCEPS BX L240CM JAW DIA2.8MM L CAP W/ NDL MIC MESH TOOTH

## (undated) DEVICE — SPONGE DRN W4XL4IN RAYON/POLYESTER 6 PLY NONWOVEN PRECUT

## (undated) DEVICE — ADAPTER URO SCP UROLOK LL

## (undated) DEVICE — SEALER LAP L37CM MARYLAND JAW OPN NANO COAT MULTIFUNCTIONAL

## (undated) DEVICE — AIRLIFE™ NASAL OXYGEN CANNULA CURVED, FLARED TIP, WITH 7 FEET (2.1 M) CRUSH RESISTANT TUBING, OVER-THE-EAR STYLE: Brand: AIRLIFE™

## (undated) DEVICE — LEGGINGS, PAIR, CLEAR, STERILE: Brand: MEDLINE

## (undated) DEVICE — TROCAR: Brand: KII® SLEEVE

## (undated) DEVICE — GLOVE ORANGE PI 8   MSG9080

## (undated) DEVICE — SYRINGE,EAR/ULCER, 2 OZ, STERILE: Brand: MEDLINE

## (undated) DEVICE — SOLUTION IRRIG 1500ML STRL H2O USP POUR PLAS BTL

## (undated) DEVICE — DRAPE EQUIP STAND XL MAYO CVR

## (undated) DEVICE — 40580 - THE PINK PAD - ADVANCED TRENDELENBURG POSITIONING KIT: Brand: 40580 - THE PINK PAD - ADVANCED TRENDELENBURG POSITIONING KIT

## (undated) DEVICE — NO USE 18 MONTHS GOWN STD LG  1153D

## (undated) DEVICE — TROCARS: Brand: KII® BLUNT TIP ACCESS SYSTEM

## (undated) DEVICE — SUTURE VCRL SZ 2-0 L36IN ABSRB VLT L36MM CT-1 1/2 CIR J345H

## (undated) DEVICE — SYRINGE MED 50ML LUERSLIP TIP

## (undated) DEVICE — DUAL LUMEN URETERAL CATHETER

## (undated) DEVICE — GLOVE ORTHO 8   MSG9480

## (undated) DEVICE — GLOVE ORANGE PI 7   MSG9070

## (undated) DEVICE — CONE TIP URETERAL CATHETER WITH OPEN-END: Brand: CONE TIP

## (undated) DEVICE — 3M™ IOBAN™ 2 ANTIMICROBIAL INCISE DRAPE 6650EZ: Brand: IOBAN™ 2

## (undated) DEVICE — ST CHARLES GYN LAPAROSCOPY PK: Brand: MEDLINE INDUSTRIES, INC.

## (undated) DEVICE — ARM DRAPE

## (undated) DEVICE — TRAY URIN CATH 16FR DRNGE BG STATLOK STBL DEV F SURSTP

## (undated) DEVICE — SUTURE CHROMIC GUT SZ 1 L36IN ABSRB BRN L48MM CTX 1/2 CIR 905H

## (undated) DEVICE — GOWN,POLY REINFORCED,LG: Brand: MEDLINE

## (undated) DEVICE — DRAPE,REIN 53X77,STERILE: Brand: MEDLINE

## (undated) DEVICE — GAUZE,SPONGE,4"X4",16PLY,XRAY,STRL,LF: Brand: MEDLINE

## (undated) DEVICE — ELECTRO LUBE IS A SINGLE PATIENT USE DEVICE THAT IS INTENDED TO BE USED ON ELECTROSURGICAL ELECTRODES TO REDUCE STICKING.: Brand: KEY SURGICAL ELECTRO LUBE

## (undated) DEVICE — LAPAROSCOPIC ELECTRODE WITH A 3/32" PIN CONNECTOR, L-HOOK 5 MM X 27 CM: Brand: CONMED

## (undated) DEVICE — BLADELESS OBTURATOR: Brand: WECK VISTA

## (undated) DEVICE — DEFENDO AIR WATER SUCTION AND BIOPSY VALVE KIT FOR  OLYMPUS: Brand: DEFENDO AIR/WATER/SUCTION AND BIOPSY VALVE

## (undated) DEVICE — FORCEPS BX L240CM JAW DIA2.4MM ORNG L CAP W/ NDL DISP RAD

## (undated) DEVICE — SUTURE PDS II SZ 1 L36IN ABSRB VLT CT L40MM 1/2 CIR TAPR Z359T

## (undated) DEVICE — MANIPULATOR UTER 3CM ULTEM PLAS CUP DELINEATOR FOR USE W/

## (undated) DEVICE — SUTURE STRATAFIX SPRL PDS + SZ 0 L9IN ABSRB VLT CT-1 L36MM SXPP1B455

## (undated) DEVICE — Z DUP USE 2257490 ADHESIVE SKIN CLSRE 036ML TPCL 2CTL CNCRLTE HIGH VSCSTY DRMB

## (undated) DEVICE — GOWN,AURORA,NONREINFORCED,LARGE: Brand: MEDLINE

## (undated) DEVICE — SURGICAL PROCEDURE PACK C SECT B

## (undated) DEVICE — SURGICEL ENDOSCP APPL

## (undated) DEVICE — TROCAR: Brand: KII FIOS FIRST ENTRY

## (undated) DEVICE — SUTURE VCRL SZ 1 L36IN ABSRB VLT L36MM CT-1 1/2 CIR J347H

## (undated) DEVICE — SURGICAL PROCEDURE PACK C SECT ST CHARLES SCMH

## (undated) DEVICE — Y-TYPE TUR/BLADDER IRRIGATION SET, REGULATING CLAMP

## (undated) DEVICE — PACK PROCEDURE SURG CYSTO SVMMC LF

## (undated) DEVICE — GLOVE SURG SZ 65 THK91MIL LTX FREE SYN POLYISOPRENE

## (undated) DEVICE — ELECTRODE PT RET AD L9FT HI MOIST COND ADH HYDRGEL CORDED

## (undated) DEVICE — TIP COVER ACCESSORY

## (undated) DEVICE — GLOVE SURG SZ 65 STD WHT LTX SYN POLYMER BEAD REINF ANTI RL

## (undated) DEVICE — GLOVE SURG 7 PF POLYMER COAT WHT STRL SIGN LTX ESSENTIAL LTX

## (undated) DEVICE — ST CHARLES GEN LAPAROSCOPY PK: Brand: MEDLINE INDUSTRIES, INC.

## (undated) DEVICE — Device

## (undated) DEVICE — JELLY,LUBE,STERILE,FLIP TOP,TUBE,2-OZ: Brand: MEDLINE

## (undated) DEVICE — GELFOAM SZ 100 SPNG

## (undated) DEVICE — INSUFFLATION NEEDLE TO ESTABLISH PNEUMOPERITONEUM.: Brand: INSUFFLATION NEEDLE

## (undated) DEVICE — COVER,MAYO STAND,XL,STERILE: Brand: MEDLINE

## (undated) DEVICE — KENDALL SCD EXPRESS SLEEVES, KNEE LENGTH, MEDIUM: Brand: KENDALL SCD

## (undated) DEVICE — 3M™ STERI-STRIP™ ELASTIC SKIN CLOSURES, E4547, 1/2 IN X 4 IN (12 MM X 100 MM), 6 STRIPS/ENVELOPE: Brand: 3M™ STERI-STRIP™

## (undated) DEVICE — THIS PRODUCT IS SINGLE USE AND INTENDED TO BE USED FOR BLUNT DISSECTION OF TISSUE.: Brand: ASPEN® ENDOSCOPIC KITTNER, SINGLE TIP

## (undated) DEVICE — SOLUTION IV 1000ML 0.9% SOD CHL PH 5 INJ USP VIAFLX PLAS

## (undated) DEVICE — MEDI-VAC YANK SUCT HNDL W/TPRD BULBOUS TIP & NON-CONDUCTIVE: Brand: CARDINAL HEALTH

## (undated) DEVICE — TOTAL TRAY, 16FR 10ML SIL FOLEY, URN: Brand: MEDLINE

## (undated) DEVICE — BLADELESS OBTURATOR, LONG: Brand: WECK VISTA

## (undated) DEVICE — Z INACTIVE USE 2527070 DRAPE SURG W40XL44IN UNDERBUTTOCK SMS POLYPR W/ PCH BK DISP

## (undated) DEVICE — CATHETERIZATION KIT PEDIATRIC 16 FR 5 CC INDWL INF CTRL

## (undated) DEVICE — SUTURE VCRL + SZ 4-0 L27IN ABSRB WHT FS-2 3/8 CIR REV CUT VCP422H

## (undated) DEVICE — TRI-LUMEN FILTERED TUBE SET WITH ACTIVATED CHARCOAL FILTER: Brand: AIRSEAL

## (undated) DEVICE — CATHETER URETH 16FR BLLN 5CC SIL ALLY W/ SIL HYDRGEL 2 W F

## (undated) DEVICE — CHLORAPREP 26ML ORANGE

## (undated) DEVICE — SINGLE PORT MANIFOLD: Brand: NEPTUNE 2

## (undated) DEVICE — SUTURE VCRL + SZ 0 L27IN ABSRB VLT L26MM UR-6 5/8 CIR VCP603H

## (undated) DEVICE — AIRSEAL 8 MM ACCESS PORT AND LOW PROFILE OBTURATOR WITH BLADELESS OPTICAL TIP, 120 MM LENGTH: Brand: AIRSEAL

## (undated) DEVICE — SOLUTION ANTIFOG VIS SYS CLEARIFY LAPSCP

## (undated) DEVICE — SYRINGE 20ML LL S/C 50

## (undated) DEVICE — SOLUTION IV IRRIG POUR BRL 0.9% SODIUM CHL 2F7124

## (undated) DEVICE — SINGLE ACTION PUMPING SYSTEM

## (undated) DEVICE — SUTURE VCRL + SZ 4-0 L18IN ABSRB UD L19MM PS-2 3/8 CIR PRIM VCP496H

## (undated) DEVICE — GLOVE ORANGE PI 7 1/2   MSG9075

## (undated) DEVICE — GUIDEWIRE URO L150CM DIA0.035IN STIFF NIT HYDRPHLC STR TIP

## (undated) DEVICE — BAG SPEC LAP H6IN DIA3IN 250ML 10 12MM CANN ATTCH MEM WIRE